# Patient Record
Sex: FEMALE | Race: WHITE | Employment: OTHER | URBAN - METROPOLITAN AREA
[De-identification: names, ages, dates, MRNs, and addresses within clinical notes are randomized per-mention and may not be internally consistent; named-entity substitution may affect disease eponyms.]

---

## 2017-05-02 ENCOUNTER — ONCOLOGY VISIT (OUTPATIENT)
Dept: ONCOLOGY | Facility: CLINIC | Age: 47
End: 2017-05-02
Attending: PHYSICIAN ASSISTANT
Payer: COMMERCIAL

## 2017-05-02 VITALS
RESPIRATION RATE: 16 BRPM | HEIGHT: 64 IN | TEMPERATURE: 98.4 F | OXYGEN SATURATION: 99 % | SYSTOLIC BLOOD PRESSURE: 109 MMHG | WEIGHT: 128.2 LBS | HEART RATE: 59 BPM | BODY MASS INDEX: 21.89 KG/M2 | DIASTOLIC BLOOD PRESSURE: 72 MMHG

## 2017-05-02 DIAGNOSIS — R20.9 DISTURBANCE OF SKIN SENSATION: ICD-10-CM

## 2017-05-02 DIAGNOSIS — M25.512 ACUTE PAIN OF LEFT SHOULDER: Primary | ICD-10-CM

## 2017-05-02 PROCEDURE — 99212 OFFICE O/P EST SF 10 MIN: CPT | Mod: ZF

## 2017-05-02 PROCEDURE — 99214 OFFICE O/P EST MOD 30 MIN: CPT | Mod: ZP | Performed by: PHYSICIAN ASSISTANT

## 2017-05-02 RX ORDER — DOXYCYCLINE 100 MG/1
100 CAPSULE ORAL DAILY
COMMUNITY
Start: 2017-04-06 | End: 2017-05-16

## 2017-05-02 RX ORDER — MUPIROCIN 20 MG/G
OINTMENT TOPICAL
COMMUNITY
Start: 2016-12-02 | End: 2017-05-16

## 2017-05-02 ASSESSMENT — PAIN SCALES - GENERAL: PAINLEVEL: SEVERE PAIN (6)

## 2017-05-02 NOTE — LETTER
5/2/2017      RE: Shan Marsh  [address]  [facility] [address]       Oncology Follow-up Visit:    Reason for visit: Shoulder pain    History Of Present Illness:  Ms. Marsh is a 46 year old premenopausal female with [id number] er/pr positive her 2 negative breast cancer.  She initially noticed a left breast mass of around a coin size in December 2012, for which she saw a doctor at [facility] & was given a referral, which she did not follow. However, later she noticed the mass getting bigger & followed up on the referral in May. Had a mammogram, U/s, MRI breast followed by biopsy.  She underwent a diagnostic mammogram with Ultrasound, which revealed heterogeneously dense breast tissue of about 5 cm x 2.6 cm size area with suspicious microcalcifications. MRI breast showed nonmasslike enhancement in the upper-outer quadrant extending from about 1:00 to 3:00 measuring about 6 cm from anterior to posterior by about 1.8 cm in transverse diameter by about 2.7 cm in superior inferior dimension similar to calcifications on mammogram. She subsequently had a stereotactic needle biopsy which revealed Ductal carcinoma in-situ (DCIS), high grade, suspicious for microinvasion. She was seen by , had bilateral mastectomies on 6/11/12. Her final pathology revealed grade 1 infiltrating ductal cancer, ER positive, [place] positive, HER-2/kaitlin negative. She had multiple microscopic areas of invasion, each measuring 1-2 mm. Her pathology was reviewed at our multidisciplinary conference, and confirmed multiple areas of microinvasion. Her final staging was a [id number], stage I breast cancer. [place] lymph node biopsy was negative. Her oncotype score was 15% (10 year distant recurrence rate with 5 years of hormone therapy). Her initial evaluation also revealed a hemangioma in the lateral segment left lobe of the liver in the dome, which was confirmed by MRI abdomen. She started Tamoxifen August 2012 and  "discontinued February 2013 due to side effects (palpitations, vaginal discharge, mood swings). BRCA negative.     Interval history/ROS:   Shan hasn't been seen in over 2 years in oncology clinic. She is here today for 1 mos left shoulder pain, intermittent, starts in shoulder and shoots down arm. Now also has numbness in forearm. No weakness. Worse when she sleeps on it and with certain movements like overhead activity. Ibuprofen helps. No other bone pains. 10 pt ROS otherwise negative.        Physical Exam:  /72 (BP Location: Left arm, Patient Position: Chair, Cuff Size: Adult Regular)  Pulse 59  Temp 98.4  F (36.9  C) (Oral)  Resp 16  Ht 1.626 m (5' 4.02\")  Wt 58.2 kg (128 lb 3.2 oz)  LMP 04/25/2017  SpO2 99%  BMI 21.99 kg/m2   Wt Readings from Last 4 Encounters:   05/02/17 58.2 kg (128 lb 3.2 oz)   06/28/16 56.2 kg (123 lb 14.4 oz)   06/25/16 54.4 kg (120 lb)   05/11/15 56.7 kg (125 lb)   General: No acute distress. Affect flat.   Breasts: S/p b/l mastectomy with reconstruction. No lumps/bumps  Musc: Left shoulder: Inspection of the left shoulder is without abnormality. On palpation she has pain in soft tissue of anterior shoulder going into the axilla. ROM limited and pain elicited with Abduction. Strength is 5/5 but with pain and there is numbness in her ventral forearm.     ASSESSMENT/PLAN:  1. Left shoulder pain >1 mos worse with abduction with neuropathic pain and sensory changes. 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 Will get MRI and if MRI negative for cancer, will refer to ortho and PT.     2. Stage I premenopausal er/pr positive, her2 negative breast cancer. Her Oncotype DX recurrence score was 23, which is associated with a ten year distant recurrence rate of 15% for woman who take five years of adjuvant Tamoxifen. She was on Tamoxifen from August until February and discontinued due to palpitations, mood " swings, and vaginal discharge. She declined further hormonal therapy. She is 5 years out and hasn't been coming to any follow up appts. Will assess her shoulder and go from there.     Over 50% of 15 min visit was spent counseling and coordinating care    Marilu HAN

## 2017-05-02 NOTE — PROGRESS NOTES
Oncology Follow-up Visit:    Reason for visit: Shoulder pain    History Of Present Illness:  Ms. Marsh is a 46 year old premenopausal female with [id number] er/pr positive her 2 negative breast cancer.  She initially noticed a left breast mass of around a coin size in December 2012, for which she saw a doctor at [facility] & was given a referral, which she did not follow. However, later she noticed the mass getting bigger & followed up on the referral in May. Had a mammogram, U/s, MRI breast followed by biopsy.  She underwent a diagnostic mammogram with Ultrasound, which revealed heterogeneously dense breast tissue of about 5 cm x 2.6 cm size area with suspicious microcalcifications. MRI breast showed nonmasslike enhancement in the upper-outer quadrant extending from about 1:00 to 3:00 measuring about 6 cm from anterior to posterior by about 1.8 cm in transverse diameter by about 2.7 cm in superior inferior dimension similar to calcifications on mammogram. She subsequently had a stereotactic needle biopsy which revealed Ductal carcinoma in-situ (DCIS), high grade, suspicious for microinvasion. She was seen by , had bilateral mastectomies on 6/11/12. Her final pathology revealed grade 1 infiltrating ductal cancer, ER positive, [place] positive, HER-2/kaitlin negative. She had multiple microscopic areas of invasion, each measuring 1-2 mm. Her pathology was reviewed at our multidisciplinary conference, and confirmed multiple areas of microinvasion. Her final staging was a [id number], stage I breast cancer. [place] lymph node biopsy was negative. Her oncotype score was 15% (10 year distant recurrence rate with 5 years of hormone therapy). Her initial evaluation also revealed a hemangioma in the lateral segment left lobe of the liver in the dome, which was confirmed by MRI abdomen. She started Tamoxifen August 2012 and discontinued February 2013 due to side effects (palpitations, vaginal discharge, mood  "swings). BRCA negative.     Interval history/ROS:   Shan hasn't been seen in over 2 years in oncology clinic. She is here today for 1 mos left shoulder pain, intermittent, starts in shoulder and shoots down arm. Now also has numbness in forearm. No weakness. Worse when she sleeps on it and with certain movements like overhead activity. Ibuprofen helps. No other bone pains. 10 pt ROS otherwise negative.        Physical Exam:  /72 (BP Location: Left arm, Patient Position: Chair, Cuff Size: Adult Regular)  Pulse 59  Temp 98.4  F (36.9  C) (Oral)  Resp 16  Ht 1.626 m (5' 4.02\")  Wt 58.2 kg (128 lb 3.2 oz)  LMP 04/25/2017  SpO2 99%  BMI 21.99 kg/m2   Wt Readings from Last 4 Encounters:   05/02/17 58.2 kg (128 lb 3.2 oz)   06/28/16 56.2 kg (123 lb 14.4 oz)   06/25/16 54.4 kg (120 lb)   05/11/15 56.7 kg (125 lb)   General: No acute distress. Affect flat.   Breasts: S/p b/l mastectomy with reconstruction. No lumps/bumps  Musc: Left shoulder: Inspection of the left shoulder is without abnormality. On palpation she has pain in soft tissue of anterior shoulder going into the axilla. ROM limited and pain elicited with Abduction. Strength is 5/5 but with pain and there is numbness in her ventral forearm.     ASSESSMENT/PLAN:  1. Left shoulder pain >1 mos worse with abduction with neuropathic pain and sensory changes. 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 Will get MRI and if MRI negative for cancer, will refer to ortho and PT.     2. Stage I premenopausal er/pr positive, her2 negative breast cancer. Her Oncotype DX recurrence score was 23, which is associated with a ten year distant recurrence rate of 15% for woman who take five years of adjuvant Tamoxifen. She was on Tamoxifen from August until February and discontinued due to palpitations, mood swings, and vaginal discharge. She declined further hormonal therapy. She is 5 years " out and hasn't been coming to any follow up appts. Will assess her shoulder and go from there.     Over 50% of 15 min visit was spent counseling and coordinating care    Marilu HAN

## 2017-05-02 NOTE — NURSING NOTE
"Oncology Rooming Note    May 2, 2017 3:48 PM   Shan Marsh is a 46 year old female who presents for:    Chief Complaint   Patient presents with     Oncology Clinic Visit     Breast Ca- F/U Arm pain     Initial Vitals: /72 (BP Location: Left arm, Patient Position: Chair, Cuff Size: Adult Regular)  Pulse 59  Temp 98.4  F (36.9  C) (Oral)  Resp 16  Ht 1.626 m (5' 4.02\")  Wt 58.2 kg (128 lb 3.2 oz)  LMP 04/25/2017  SpO2 99%  BMI 21.99 kg/m2 Estimated body mass index is 21.99 kg/(m^2) as calculated from the following:    Height as of this encounter: 1.626 m (5' 4.02\").    Weight as of this encounter: 58.2 kg (128 lb 3.2 oz). Body surface area is 1.62 meters squared.  Severe Pain (6) Comment: left arm   Patient's last menstrual period was 04/25/2017.  Allergies reviewed: Yes  Medications reviewed: Yes    Medications: Medication refills not needed today.  Pharmacy name entered into [employer]:    [facility] [place], [place] - [address] [place] [address] DRUG STORE [id number] [place] [address] - [address] [place] [address] AT [facility] & [facility] [place], [place] - [address] [facility] DRUG STORE [id number] - [place], [place] - [address] AT [facility] [address]    Clinical concerns: Shan has been having left arm pain for about one month. She stated she has a very high pain tolerance but she rated her arm pain a 6. provider was notified.    7 minutes for nursing intake (face to face time)     Elyssa Pike CMA              "

## 2017-05-02 NOTE — MR AVS SNAPSHOT
After Visit Summary   5/2/2017    Shan Marsh    MRN: [id number]           Patient Information     Date Of Birth          1970        Visit Information        Provider Department      5/2/2017 4:00 PM Marilu Pastor PA-C [facility]        Today's Diagnoses     Acute pain of left shoulder    -  1    Disturbance of skin sensation           Follow-ups after your visit        Additional Services     ORTHO  REFERRAL       [facility] is referring you to the Orthopedic  Services at [facility].       The  Representative will assist you in the coordination of your Orthopedic and Musculoskeletal Care as prescribed by your physician.    The  Representative will call you within 1 business day to help schedule your appointment, or you may contact the  Representative at:    All areas ~ [phone]     Type of Referral : Non Surgical       Timeframe requested: 3 - 5 days. After MRI. Patient having some sensory changes    Coverage of these services is subject to the terms and limitations of your health insurance plan.  Please call member services at your health plan with any benefit or coverage questions.      If X-rays, CT or MRI's have been performed, please contact the facility where they were done to arrange for , prior to your scheduled appointment.  Please bring this referral request to your appointment and present it to your specialist.                  Your next 10 appointments already scheduled     May 04, 2017  7:45 PM CDT   (Arrive by 7:30 PM)   MR SHOULDER LEFT W/O & W CONTRAST with [id number]   [facility] ([email or web address])    [address] [address] [phone]           Take your medicines as usual, unless your doctor tells you not to. Bring a list of your current medicines to your exam (including  vitamins, minerals and over-the-counter drugs).  You will be given intravenous contrast for this exam. To prepare:   The day before your exam, drink extra fluids at least six 8-ounce glasses (unless your doctor tells you to restrict your fluids).   Have a blood test (creatinine test) within 30 days of your exam. Go to your clinic or Diagnostic Imaging Department for this test.  The MRI machine uses a strong magnet. Please wear clothes without metal (snaps, zippers). A sweatsuit works well, or we may give you a hospital gown.  Please remove any body piercings and hair extensions before you arrive. You will also remove watches, jewelry, hairpins, wallets, dentures, partial dental plates and hearing aids. You may wear contact lenses, and you may be able to wear your rings. We have a safe place to keep your personal items, but it is safer to leave them at home.   **IMPORTANT** THE INSTRUCTIONS BELOW ARE ONLY FOR THOSE PATIENTS WHO HAVE BEEN TOLD THEY WILL RECEIVE SEDATION OR GENERAL ANESTHESIA DURING THEIR MRI PROCEDURE:  IF YOU WILL RECEIVE SEDATION (take medicine to help you relax during your exam):   You must get the medicine from your doctor before you arrive. Bring the medicine to the exam. Do not take it at home.   Arrive one hour early. Bring someone who can take you home after the test. Your medicine will make you sleepy. After the exam, you may not drive, take a bus or take a taxi by yourself.   No eating 8 hours before your exam. You may have clear liquids up until 4 hours before your exam. (Clear liquids include water, clear tea, black coffee and fruit juice without pulp.)  IF YOU WILL RECEIVE ANESTHESIA (be asleep for your exam):   Arrive 1 1/2 hours early. Bring someone who can take you home after the test. You may not drive, take a bus or take a taxi by yourself.   No eating 8 hours before your exam. You may have clear liquids up until 4 hours before your exam. (Clear liquids include water, clear tea,  "black coffee and fruit juice without pulp.)  Please call the Imaging Department at your exam site with any questions.              Future tests that were ordered for you today     Open Future Orders        Priority Expected Expires Ordered    MR Shoulder Left w/o & w Contrast Routine  5/2/2018 5/2/2017            Who to contact     If you have questions or need follow up information about today's clinic visit or your schedule please contact [facility] directly at [phone].  Normal or non-critical lab and imaging results will be communicated to you by Sparkbrowserhart, letter or phone within 4 business days after the clinic has received the results. If you do not hear from us within 7 days, please contact the clinic through Amity or phone. If you have a critical or abnormal lab result, we will notify you by phone as soon as possible.  Submit refill requests through Amity or call your pharmacy and they will forward the refill request to us. Please allow 3 business days for your refill to be completed.          Additional Information About Your Visit        Amity Information     Amity gives you secure access to your electronic health record. If you see a primary care provider, you can also send messages to your care team and make appointments. If you have questions, please call your primary care clinic.  If you do not have a primary care provider, please call [phone] and they will assist you.        Care EveryWhere ID     This is your Care EveryWhere ID. This could be used by other organizations to access your [place] medical records  [id number]        Your Vitals Were     Pulse Temperature Respirations Height Last Period Pulse Oximetry    59 98.4  F (36.9  C) (Oral) 16 1.626 m (5' 4.02\") 04/25/2017 99%    BMI (Body Mass Index)                   21.99 kg/m2            Blood Pressure from Last 3 Encounters:   05/02/17 109/72   06/28/16 97/49   06/25/16 102/54    Weight from Last 3 " Encounters:   05/02/17 58.2 kg (128 lb 3.2 oz)   06/28/16 56.2 kg (123 lb 14.4 oz)   06/25/16 54.4 kg (120 lb)              We Performed the Following     ORTHO  REFERRAL        Primary Care Provider Office Phone # Fax #    Marilu Michelle [phone]       [facility] [address] [facility] [address]        Thank you!     Thank you for choosing [facility]  for your care. Our goal is always to provide you with excellent care. Hearing back from our patients is one way we can continue to improve our services. Please take a few minutes to complete the written survey that you may receive in the mail after your visit with us. Thank you!             Your Updated Medication List - Protect others around you: Learn how to safely use, store and throw away your medicines at www.disposemymeds.org.          This list is accurate as of: 5/2/17  4:19 PM.  Always use your most recent med list.                   Brand Name Dispense Instructions for use    doxycycline Monohydrate 100 MG Caps      Take 100 mg by mouth daily       FLINTSTONES MULTIVITAMIN Chew      1 tablet daily       methylPREDNISolone 4 MG tablet    MEDROL DOSEPAK    21 tablet    Follow package instructions       mupirocin 2 % ointment    BACTROBAN

## 2017-05-08 DIAGNOSIS — C50.919 MALIGNANT NEOPLASM OF FEMALE BREAST, UNSPECIFIED LATERALITY, UNSPECIFIED SITE OF BREAST: Primary | ICD-10-CM

## 2017-05-09 ENCOUNTER — HOSPITAL ENCOUNTER (OUTPATIENT)
Dept: PET IMAGING | Facility: CLINIC | Age: 47
Discharge: HOME | End: 2017-05-09
Attending: PHYSICIAN ASSISTANT | Admitting: PHYSICIAN ASSISTANT
Payer: COMMERCIAL

## 2017-05-09 DIAGNOSIS — C50.919 MALIGNANT NEOPLASM OF FEMALE BREAST, UNSPECIFIED LATERALITY, UNSPECIFIED SITE OF BREAST: ICD-10-CM

## 2017-05-09 LAB — GLUCOSE BLDC GLUCOMTR-MCNC: 81 MG/DL (ref 70–99)

## 2017-05-09 PROCEDURE — 82962 GLUCOSE BLOOD TEST: CPT

## 2017-05-09 PROCEDURE — 25500064 ZZH RX 255 OP 636: Performed by: PHYSICIAN ASSISTANT

## 2017-05-09 PROCEDURE — A9552 F18 FDG: HCPCS | Performed by: PHYSICIAN ASSISTANT

## 2017-05-09 PROCEDURE — 71260 CT THORAX DX C+: CPT

## 2017-05-09 PROCEDURE — 78816 PET IMAGE W/CT FULL BODY: CPT | Mod: PI

## 2017-05-09 PROCEDURE — 34300033 ZZH RX 343: Performed by: PHYSICIAN ASSISTANT

## 2017-05-09 RX ORDER — IOPAMIDOL 755 MG/ML
45-150 INJECTION, SOLUTION INTRAVASCULAR ONCE
Status: COMPLETED | OUTPATIENT
Start: 2017-05-09 | End: 2017-05-09

## 2017-05-09 RX ADMIN — IOPAMIDOL 69 ML: 755 INJECTION, SOLUTION INTRAVENOUS at 14:28

## 2017-05-09 RX ADMIN — FLUDEOXYGLUCOSE F-18 8.78 MCI.: 500 INJECTION, SOLUTION INTRAVENOUS at 13:46

## 2017-05-10 ENCOUNTER — TRANSFERRED RECORDS (OUTPATIENT)
Dept: HEALTH INFORMATION MANAGEMENT | Facility: CLINIC | Age: 47
End: 2017-05-10

## 2017-05-10 ENCOUNTER — ONCOLOGY VISIT (OUTPATIENT)
Dept: ONCOLOGY | Facility: CLINIC | Age: 47
End: 2017-05-10
Attending: INTERNAL MEDICINE
Payer: COMMERCIAL

## 2017-05-10 ENCOUNTER — HOSPITAL ENCOUNTER (OUTPATIENT)
Facility: CLINIC | Age: 47
End: 2017-05-10
Attending: INTERNAL MEDICINE | Admitting: INTERNAL MEDICINE
Payer: COMMERCIAL

## 2017-05-10 ENCOUNTER — APPOINTMENT (OUTPATIENT)
Dept: LAB | Facility: CLINIC | Age: 47
End: 2017-05-10
Attending: INTERNAL MEDICINE
Payer: COMMERCIAL

## 2017-05-10 ENCOUNTER — DOCUMENTATION ONLY (OUTPATIENT)
Dept: SURGERY | Facility: CLINIC | Age: 47
End: 2017-05-10

## 2017-05-10 VITALS
HEART RATE: 51 BPM | BODY MASS INDEX: 20.56 KG/M2 | WEIGHT: 123.4 LBS | SYSTOLIC BLOOD PRESSURE: 113 MMHG | TEMPERATURE: 98.5 F | HEIGHT: 65 IN | DIASTOLIC BLOOD PRESSURE: 70 MMHG | RESPIRATION RATE: 12 BRPM | OXYGEN SATURATION: 95 %

## 2017-05-10 DIAGNOSIS — R59.0 MEDIASTINAL ADENOPATHY: Primary | ICD-10-CM

## 2017-05-10 DIAGNOSIS — C50.919 MALIGNANT NEOPLASM OF FEMALE BREAST, UNSPECIFIED LATERALITY, UNSPECIFIED SITE OF BREAST: Primary | ICD-10-CM

## 2017-05-10 DIAGNOSIS — C50.919: ICD-10-CM

## 2017-05-10 DIAGNOSIS — R59.0 AXILLARY ADENOPATHY: ICD-10-CM

## 2017-05-10 DIAGNOSIS — R59.1 LYMPHADENOPATHY: ICD-10-CM

## 2017-05-10 LAB
ALBUMIN SERPL-MCNC: 4.2 G/DL (ref 3.4–5)
ALP SERPL-CCNC: 68 U/L (ref 40–150)
ALT SERPL W P-5'-P-CCNC: 26 U/L (ref 0–50)
ANION GAP SERPL CALCULATED.3IONS-SCNC: 8 MMOL/L (ref 3–14)
AST SERPL W P-5'-P-CCNC: 24 U/L (ref 0–45)
BASOPHILS # BLD AUTO: 0 10E9/L (ref 0–0.2)
BASOPHILS NFR BLD AUTO: 0.9 %
BILIRUB SERPL-MCNC: 0.7 MG/DL (ref 0.2–1.3)
BUN SERPL-MCNC: 14 MG/DL (ref 7–30)
CALCIUM SERPL-MCNC: 9.1 MG/DL (ref 8.5–10.1)
CANCER AG27-29 SERPL-ACNC: 30 U/ML (ref 0–39)
CHLORIDE SERPL-SCNC: 107 MMOL/L (ref 94–109)
CO2 SERPL-SCNC: 26 MMOL/L (ref 20–32)
CREAT SERPL-MCNC: 0.62 MG/DL (ref 0.52–1.04)
DIFFERENTIAL METHOD BLD: ABNORMAL
EOSINOPHIL # BLD AUTO: 0 10E9/L (ref 0–0.7)
EOSINOPHIL NFR BLD AUTO: 0.9 %
ERYTHROCYTE [DISTWIDTH] IN BLOOD BY AUTOMATED COUNT: 12.2 % (ref 10–15)
GFR SERPL CREATININE-BSD FRML MDRD: NORMAL ML/MIN/1.7M2
GLUCOSE SERPL-MCNC: 94 MG/DL (ref 70–99)
HCT VFR BLD AUTO: 39.7 % (ref 35–47)
HGB BLD-MCNC: 13.1 G/DL (ref 11.7–15.7)
IMM GRANULOCYTES # BLD: 0 10E9/L (ref 0–0.4)
IMM GRANULOCYTES NFR BLD: 0.3 %
LYMPHOCYTES # BLD AUTO: 0.9 10E9/L (ref 0.8–5.3)
LYMPHOCYTES NFR BLD AUTO: 24.9 %
MCH RBC QN AUTO: 31.8 PG (ref 26.5–33)
MCHC RBC AUTO-ENTMCNC: 33 G/DL (ref 31.5–36.5)
MCV RBC AUTO: 96 FL (ref 78–100)
MONOCYTES # BLD AUTO: 0.2 10E9/L (ref 0–1.3)
MONOCYTES NFR BLD AUTO: 6.1 %
NEUTROPHILS # BLD AUTO: 2.3 10E9/L (ref 1.6–8.3)
NEUTROPHILS NFR BLD AUTO: 66.9 %
NRBC # BLD AUTO: 0 10*3/UL
NRBC BLD AUTO-RTO: 0 /100
PLATELET # BLD AUTO: 167 10E9/L (ref 150–450)
POTASSIUM SERPL-SCNC: 3.8 MMOL/L (ref 3.4–5.3)
PROT SERPL-MCNC: 7.8 G/DL (ref 6.8–8.8)
RBC # BLD AUTO: 4.12 10E12/L (ref 3.8–5.2)
SODIUM SERPL-SCNC: 141 MMOL/L (ref 133–144)
WBC # BLD AUTO: 3.5 10E9/L (ref 4–11)

## 2017-05-10 PROCEDURE — 85025 COMPLETE CBC W/AUTO DIFF WBC: CPT | Performed by: INTERNAL MEDICINE

## 2017-05-10 PROCEDURE — 99212 OFFICE O/P EST SF 10 MIN: CPT | Mod: ZF

## 2017-05-10 PROCEDURE — 99215 OFFICE O/P EST HI 40 MIN: CPT | Mod: ZP | Performed by: INTERNAL MEDICINE

## 2017-05-10 PROCEDURE — 82306 VITAMIN D 25 HYDROXY: CPT | Performed by: INTERNAL MEDICINE

## 2017-05-10 PROCEDURE — 80053 COMPREHEN METABOLIC PANEL: CPT | Performed by: INTERNAL MEDICINE

## 2017-05-10 PROCEDURE — 36415 COLL VENOUS BLD VENIPUNCTURE: CPT

## 2017-05-10 PROCEDURE — 86300 IMMUNOASSAY TUMOR CA 15-3: CPT | Performed by: INTERNAL MEDICINE

## 2017-05-10 ASSESSMENT — PAIN SCALES - GENERAL: PAINLEVEL: NO PAIN (0)

## 2017-05-10 NOTE — PROGRESS NOTES
May 10, 2017    Oncology Follow-up Visit:    Reason for visit: Follow up breast cancer    History Of Present Illness:  Ms. Marsh is a 46 year old premenopausal female with a history of [id number] er/pr positive her 2 negative breast cancer.  She initially noticed a left breast mass of around a coin size in December 2011, for which she saw a doctor at [facility] & was given a referral, which she did not follow. However, later she noticed the mass getting bigger & followed up on the referral in May 2012. Had a mammogram, U/s, MRI breast followed by biopsy.  She underwent a diagnostic mammogram with Ultrasound, which revealed heterogeneously dense breast tissue of about 5 cm x 2.6 cm size area with suspicious microcalcifications. MRI breast showed nonmasslike enhancement in the upper-outer quadrant extending from about 1:00 to 3:00 measuring about 6 cm from anterior to posterior by about 1.8 cm in transverse diameter by about 2.7 cm in superior inferior dimension similar to calcifications on mammogram. She subsequently had a stereotactic needle biopsy which revealed Ductal carcinoma in-situ (DCIS), high grade, suspicious for microinvasion. She was seen by , had bilateral mastectomies with sentinel lymph node biopsy on 6/11/12. Her final pathology revealed grade 1 infiltrating ductal cancer, ER positive, [place] positive, HER-2/kaitlin negative. She had multiple microscopic areas of invasion, each measuring 1-2 mm. Her pathology was reviewed at our multidisciplinary conference, and confirmed multiple areas of microinvasion. Her final staging was a X6uS4I4, stage I breast cancer. [place] lymph node biopsy was negative. Her oncotype score was 23 giving her a risk of distant recurrence at 10 years of 15%, assuming she takes tamoxifen. Her initial evaluation also revealed a hemangioma in the lateral segment left lobe of the liver in the dome, which was confirmed by MRI abdomen. She started Tamoxifen August  2012 and discontinued February 2013 due to adverse effects. She is not currently on any hormonal therapy.    Interval history/ROS:  Shan returns today on an add on basis.  She called into the clinic with left shoulder pain.  She was seen by my PA with imaging that confirmed a mass suspicious for metastatic disease.  She underwent a PET/CT and returns today to discuss next steps and to discuss the results of the imaging.     She is taking advil for it.       ROS: 10 point ROS neg other than the symptoms noted above in the HPI.      Physical Exam:  LMP 04/25/2017  General: No acute distress  HEENT: Sclera anicteric, PERRLA. Oral mucosa pink and moist  Lymph: No lymphadenopathy in neck, supraclavicular, and axillary areas  Heart: Regular, rate, and rhythm  Lungs: Clear to ascultation bilaterally  Breasts: S/p b/l mastectomy with reconstruction. No palpable masses.  Abdomen: Positive bowel sounds. Soft, non-distended, non-tender. No organomegaly or mass.   Extremities: no lower extremity edema  Neuro: Cranial nerves grossly intact    ASSESSMENT/PLAN:  Stage I premenopausal er/pr positive, her2 negative breast cancer. Her Oncotype DX recurrence score was 23, which is associated with a ten year distant recurrence rate of 15% for woman who take five years of adjuvant Tamoxifen. She declined chemotherapy.  She was on Tamoxifen from August until February 2013 and discontinued due to palpitations, mood swings, and yeast infections.      In follow up, Shan has a PET/CT scan and MRI of her shoulder that are concerning for metastatic disease.  I reviewed these images with her and discussed that she has masses in the left axilla and the hilar lymph nodes and supraclavicular nodes.  I would like to biopsy the axilla as well as the hilar lymph nodes.  It will be important to determine ER staining as well as whether the hilar lymph nodes could be cancerous or inflammatory.      I discussed with Shan that I am worried she has  stage 4 metastatic disease.  She asked many questions about how this would affect her life and whether she is terminal.  She has 6 and 9 year old girls at home.  I discussed that I would review this and a treatment plan with her next week when we have these biopsy results.    She met with my RNCC to discuss support services.    Ana Whitman MD

## 2017-05-10 NOTE — MR AVS SNAPSHOT
After Visit Summary   5/10/2017    Shan Marsh    MRN: [id number]           Patient Information     Date Of Birth          1970        Visit Information        Provider Department      5/10/2017 10:00 AM Ana Whitman MD [facility]        Today's Diagnoses     Malignant neoplasm of female breast, unspecified laterality, unspecified site of breast (H)    -  1    Axillary adenopathy        Lymphadenopathy           Follow-ups after your visit        Your next 10 appointments already scheduled     May 10, 2017 11:45 AM CDT   [facility] with  [facility]   [facility] ([facility])    [address]  2nd Floor  [facility] [id number] [phone]            May 10, 2017  3:15 PM CDT   Lymphedema Evaluation with Nelia Coles PT   [facility] [place] [facility] ([facility])    [address] [place] [address]  [address] [place] [id number]  [facility] [id number] [phone]              Future tests that were ordered for you today     Open Future Orders        Priority Expected Expires Ordered    MA Post Procedure Left Routine  5/10/2018 5/10/2017    US Axillary Left Routine  5/10/2018 5/10/2017    US Biopsy Axillary Routine  5/10/2018 5/10/2017    Comprehensive metabolic panel Routine 5/11/2017 5/10/2018 5/10/2017    *CBC with platelets differential Routine 5/11/2017 5/10/2018 5/10/2017    Vitamin D deficiency screening Routine 5/11/2017 5/10/2018 5/10/2017    Ca27.29  breast tumor marker Routine 5/11/2017 5/10/2018 5/10/2017    PET Oncology Whole Body Routine 5/8/2017 8/6/2017 5/8/2017            Who to contact     If you have questions or need follow up information about today's clinic visit or your schedule please contact [facility] directly at [phone].  Normal or non-critical lab and imaging results will be communicated to you by  "MyChart, letter or phone within 4 business days after the clinic has received the results. If you do not hear from us within 7 days, please contact the clinic through [employer] or phone. If you have a critical or abnormal lab result, we will notify you by phone as soon as possible.  Submit refill requests through [employer] or call your pharmacy and they will forward the refill request to us. Please allow 3 business days for your refill to be completed.          Additional Information About Your Visit        [employer] Information     [employer] gives you secure access to your electronic health record. If you see a primary care provider, you can also send messages to your care team and make appointments. If you have questions, please call your primary care clinic.  If you do not have a primary care provider, please call [phone] and they will assist you.        Care EveryWhere ID     This is your Care EveryWhere ID. This could be used by other organizations to access your [place] medical records  [id number]        Your Vitals Were     Pulse Temperature Respirations Height Last Period Pulse Oximetry    51 98.5  F (36.9  C) (Oral) 12 1.646 m (5' 4.8\") 04/25/2017 95%    BMI (Body Mass Index)                   20.66 kg/m2            Blood Pressure from Last 3 Encounters:   05/10/17 113/70   05/02/17 109/72   06/28/16 97/49    Weight from Last 3 Encounters:   05/10/17 56 kg (123 lb 6.4 oz)   05/02/17 58.2 kg (128 lb 3.2 oz)   06/28/16 56.2 kg (123 lb 14.4 oz)               Primary Care Provider Office Phone # Fax #    Marilu Michelle [phone]       [facility] [address] [facility] [address]        Thank you!     Thank you for choosing [facility]  for your care. Our goal is always to provide you with excellent care. Hearing back from our patients is one way we can continue to improve our services. Please take a few minutes to complete the written survey that you may " receive in the mail after your visit with us. Thank you!             Your Updated Medication List - Protect others around you: Learn how to safely use, store and throw away your medicines at www.disposemymeds.org.          This list is accurate as of: 5/10/17 11:37 AM.  Always use your most recent med list.                   Brand Name Dispense Instructions for use    doxycycline Monohydrate 100 MG Caps      Take 100 mg by mouth daily       FLINTSTONES MULTIVITAMIN Chew      1 tablet daily       methylPREDNISolone 4 MG tablet    MEDROL DOSEPAK    21 tablet    Follow package instructions       mupirocin 2 % ointment    BACTROBAN

## 2017-05-10 NOTE — NURSING NOTE
"Oncology Rooming Note    May 10, 2017 10:00 AM   Shan Marsh is a 46 year old female who presents for:    Chief Complaint   Patient presents with     Oncology Clinic Visit     breast ca     Initial Vitals: /70  Pulse 51  Temp 98.5  F (36.9  C) (Oral)  Resp 12  Ht 1.646 m (5' 4.8\")  Wt 56 kg (123 lb 6.4 oz)  LMP 04/25/2017  SpO2 95%  BMI 20.66 kg/m2 Estimated body mass index is 20.66 kg/(m^2) as calculated from the following:    Height as of this encounter: 1.646 m (5' 4.8\").    Weight as of this encounter: 56 kg (123 lb 6.4 oz). Body surface area is 1.6 meters squared.  No Pain (0) Comment: Data Unavailable   Patient's last menstrual period was 04/25/2017.  Allergies reviewed: Yes  Medications reviewed: Yes    Medications: Medication refills not needed today.  Pharmacy name entered into [facility]:    [facility] [address] [facility] [address] [place] [address] - [address] [place] [address] AT [facility] [address]  [facility] [place], [place] - [address] [facility] [address] - [place], [place] - [address] [facility] [address]    Clinical concerns: no Dr. Whitman was NOT notified.    5 minutes for nursing intake (face to face time)     Abril Crespo CMA              "

## 2017-05-10 NOTE — LETTER
5/10/2017       RE: Shan Marsh  [address]  [facility] [address]     Dear Colleague,    Thank you for referring your patient, Shan Marsh, to the [facility]. Please see a copy of my visit note below.    May 10, 2017    Oncology Follow-up Visit:    Reason for visit: Follow up breast cancer    History Of Present Illness:  Ms. Marsh is a 46 year old premenopausal female with a history of [id number] er/pr positive her 2 negative breast cancer.  She initially noticed a left breast mass of around a coin size in December 2011, for which she saw a doctor at [facility] & was given a referral, which she did not follow. However, later she noticed the mass getting bigger & followed up on the referral in May 2012. Had a mammogram, U/s, MRI breast followed by biopsy.  She underwent a diagnostic mammogram with Ultrasound, which revealed heterogeneously dense breast tissue of about 5 cm x 2.6 cm size area with suspicious microcalcifications. MRI breast showed nonmasslike enhancement in the upper-outer quadrant extending from about 1:00 to 3:00 measuring about 6 cm from anterior to posterior by about 1.8 cm in transverse diameter by about 2.7 cm in superior inferior dimension similar to calcifications on mammogram. She subsequently had a stereotactic needle biopsy which revealed Ductal carcinoma in-situ (DCIS), high grade, suspicious for microinvasion. She was seen by , had bilateral mastectomies with sentinel lymph node biopsy on 6/11/12. Her final pathology revealed grade 1 infiltrating ductal cancer, ER positive, UT positive, HER-2/kaitlin negative. She had multiple microscopic areas of invasion, each measuring 1-2 mm. Her pathology was reviewed at our multidisciplinary conference, and confirmed multiple areas of microinvasion. Her final staging was a [id number], stage I breast cancer. [place] lymph node biopsy was negative. Her oncotype score was 23 giving her  a risk of distant recurrence at 10 years of 15%, assuming she takes tamoxifen. Her initial evaluation also revealed a hemangioma in the lateral segment left lobe of the liver in the dome, which was confirmed by MRI abdomen. She started Tamoxifen August 2012 and discontinued February 2013 due to adverse effects. She is not currently on any hormonal therapy.    Interval history/ROS:  Shan returns today on an add on basis.  She called into the clinic with left shoulder pain.  She was seen by my PA with imaging that confirmed a mass suspicious for metastatic disease.  She underwent a PET/CT and returns today to discuss next steps and to discuss the results of the imaging.     She is taking advil for it.       ROS: 10 point ROS neg other than the symptoms noted above in the HPI.      Physical Exam:  LMP 04/25/2017  General: No acute distress  HEENT: Sclera anicteric, PERRLA. Oral mucosa pink and moist  Lymph: No lymphadenopathy in neck, supraclavicular, and axillary areas  Heart: Regular, rate, and rhythm  Lungs: Clear to ascultation bilaterally  Breasts: S/p b/l mastectomy with reconstruction. No palpable masses.  Abdomen: Positive bowel sounds. Soft, non-distended, non-tender. No organomegaly or mass.   Extremities: no lower extremity edema  Neuro: Cranial nerves grossly intact    ASSESSMENT/PLAN:  Stage I premenopausal er/pr positive, her2 negative breast cancer. Her Oncotype DX recurrence score was 23, which is associated with a ten year distant recurrence rate of 15% for woman who take five years of adjuvant Tamoxifen. She declined chemotherapy.  She was on Tamoxifen from August until February 2013 and discontinued due to palpitations, mood swings, and yeast infections.      In follow up, Shan has a PET/CT scan and MRI of her shoulder that are concerning for metastatic disease.  I reviewed these images with her and discussed that she has masses in the left axilla and the hilar lymph nodes and supraclavicular  nodes.  I would like to biopsy the axilla as well as the hilar lymph nodes.  It will be important to determine ER staining as well as whether the hilar lymph nodes could be cancerous or inflammatory.      I discussed with Shan that I am worried she has stage 4 metastatic disease.  She asked many questions about how this would affect her life and whether she is terminal.  She has 6 and 9 year old girls at home.  I discussed that I would review this and a treatment plan with her next week when we have these biopsy results.    She met with my RNCC to discuss support services.    Ana Whitman MD

## 2017-05-10 NOTE — NURSING NOTE
Chief Complaint   Patient presents with     Oncology Clinic Visit     breast ca   labs drawn peripherally from right arm after provider visit

## 2017-05-10 NOTE — PROGRESS NOTES
You have been scheduled for a bronchoscopy on Friday, May 12, 2017 at 1:30 .  You have to arrive at least one hour prior to your appointment time.  Dr. Sg Meyer is a pulmonary interventions specialist and he will be doing the procedure.  You will meet him prior to the procedure and he will answer any additional questions.    Arrival Time is 12:30     This will be performed at the [facility].  You will go to the [facility] and check-in at the endoscopy suite located on the first floor.       [facility] ([facility])   [address] [place] [address]    Parking:   parking is available at the front entrance of the hospital.     Special Instructions for:  None    How do I get ready?  Please bring an adult who can drive you home after your exam.  The adult  will have to come with you to check-in and accompany you at time of discharge from the procedure.  You will receive medication that will sedate you for your procedure and you will be unable to operate a vehicle.      Follow these guidelines for taking your Medications:   If you take blood thinners or anti-platelet medications such as coumadin, warfarin, plavix, aspirin, xarelto, eliquis, pradaxa, effient, brilinta, ibuprofen, advil, or alleve then these medications will need to be held for 1 week prior to the examination in general.  Notify your nurse or doctor if you are taking any of these medications for specific advice on discontinuing or  holding them.    If you have diabetes do not take insulins or other diabetic medications the morning of your procedure.    You may take necessary medications with a small sip of water. Clarify with your doctor if you have questions concerning which medications you can or cannot take.    Bring a list of your medications and their doses to the examination.    Stop all food and drink including water for a minimum  of 6 hours before the examination. Additional Information:    What is a bronchoscopy?   This exam uses a lighted tube to look at the main airways of the lungs.  We insert a thin, flexible tube through the nose or mouth, past your vocal cords and into the lungs.  We will take tissue or fluid samples if needed.    What happens during the examination?   The examination will take approximately 1 to 2 hours.  When you arrive we will get IV access.  During the examination we will monitor your heart rate, blood pressure and oxygen levels.  You will receive supplemental oxygen during the procedure.  You should not talk during the examination.  The bronchoscope will be inserted either through your nose or mouth.  Topical numbing medication will be used to decrease any discomfort as well as IV sedating medication will be used to ensure your comfort.      Particular procedures that will be performed vary in regards to your diagnosis or specific illness and should be discussed with your physician prior to the procedure and while being consented for the procedure when you arrive.     What happens after the examination?  You will remain in the recovery room for approximately 1-2 hours.  Your IV will be removed and we will monitor your blood pressure, heart rate, breathing rate, oxygen levels.  You may or may not need a chest x-ray following the procedure.  Tell the nurse if you have any chest pain, trouble breathing, have a bloody nose, or if you are coughing up blood.    When you go home you may cough up a small amount of blood for one or two days following the procedure.  It is also not uncommon to have mild fevers for 1 day.    Specific risks to the procedure should be discussed with your physician.  In general, there are risks for a collapsed lung (pneumothorax), bleeding complications, allergic reactions to any medications used, increased shortness of breath following the procedure, a hoarse voice or throat pain, and mild  low-grade fever.    Questions:   If you have questions following the procedure you may contact your physician or Endoscopy at [phone].  There is a 24 hour on-call physician who can be reached by calling the main hospital at [phone] (ask for the Pulmonary Doctor on Call).  You can also contact Loren Blandon at [phone] (Interventional Pulmonary Nurse Specialist).

## 2017-05-12 ENCOUNTER — SURGERY (OUTPATIENT)
Age: 47
End: 2017-05-12

## 2017-05-12 ENCOUNTER — HOSPITAL ENCOUNTER (OUTPATIENT)
Facility: CLINIC | Age: 47
Discharge: HOME | End: 2017-05-12
Attending: INTERNAL MEDICINE | Admitting: INTERNAL MEDICINE
Payer: COMMERCIAL

## 2017-05-12 VITALS
SYSTOLIC BLOOD PRESSURE: 114 MMHG | OXYGEN SATURATION: 98 % | RESPIRATION RATE: 17 BRPM | DIASTOLIC BLOOD PRESSURE: 76 MMHG

## 2017-05-12 LAB — DEPRECATED CALCIDIOL+CALCIFEROL SERPL-MC: 27 UG/L (ref 20–75)

## 2017-05-12 PROCEDURE — 88173 CYTOPATH EVAL FNA REPORT: CPT | Performed by: INTERNAL MEDICINE

## 2017-05-12 PROCEDURE — 25000308 HC RX OP HPI UCR WEL MED 250 IP 250: Performed by: INTERNAL MEDICINE

## 2017-05-12 PROCEDURE — 25000128 H RX IP 250 OP 636: Performed by: INTERNAL MEDICINE

## 2017-05-12 PROCEDURE — 25000132 ZZH RX MED GY IP 250 OP 250 PS 637: Performed by: INTERNAL MEDICINE

## 2017-05-12 PROCEDURE — 00000155 ZZHCL STATISTIC H-CELL BLOCK W/STAIN: Performed by: INTERNAL MEDICINE

## 2017-05-12 PROCEDURE — 88377 M/PHMTRC ALYS ISHQUANT/SEMIQ: CPT | Performed by: STUDENT IN AN ORGANIZED HEALTH CARE EDUCATION/TRAINING PROGRAM

## 2017-05-12 PROCEDURE — 25000125 ZZHC RX 250: Performed by: INTERNAL MEDICINE

## 2017-05-12 PROCEDURE — 99152 MOD SED SAME PHYS/QHP 5/>YRS: CPT | Performed by: INTERNAL MEDICINE

## 2017-05-12 PROCEDURE — G0500 MOD SEDAT ENDO SERVICE >5YRS: HCPCS | Performed by: INTERNAL MEDICINE

## 2017-05-12 PROCEDURE — 88360 TUMOR IMMUNOHISTOCHEM/MANUAL: CPT | Performed by: INTERNAL MEDICINE

## 2017-05-12 PROCEDURE — 40000588 ZZH STATISTIC BRONCH IN ENDO (20 MIN) THERAPIST TIME

## 2017-05-12 PROCEDURE — 00000158 ZZHCL STATISTIC H-FISH PROCESS B/S: Performed by: INTERNAL MEDICINE

## 2017-05-12 PROCEDURE — 99153 MOD SED SAME PHYS/QHP EA: CPT | Performed by: INTERNAL MEDICINE

## 2017-05-12 PROCEDURE — 31652 BRONCH EBUS SAMPLNG 1/2 NODE: CPT | Performed by: INTERNAL MEDICINE

## 2017-05-12 PROCEDURE — 88305 TISSUE EXAM BY PATHOLOGIST: CPT | Performed by: INTERNAL MEDICINE

## 2017-05-12 PROCEDURE — 00000159 ZZHCL STATISTIC H-SEND OUTS PREP: Performed by: INTERNAL MEDICINE

## 2017-05-12 PROCEDURE — 88172 CYTP DX EVAL FNA 1ST EA SITE: CPT | Performed by: INTERNAL MEDICINE

## 2017-05-12 RX ORDER — FENTANYL CITRATE 50 UG/ML
INJECTION, SOLUTION INTRAMUSCULAR; INTRAVENOUS PRN
Status: DISCONTINUED | OUTPATIENT
Start: 2017-05-12 | End: 2017-05-12 | Stop reason: HOSPADM

## 2017-05-12 RX ORDER — LIDOCAINE HYDROCHLORIDE 40 MG/ML
INJECTION, SOLUTION RETROBULBAR PRN
Status: DISCONTINUED | OUTPATIENT
Start: 2017-05-12 | End: 2017-05-12 | Stop reason: HOSPADM

## 2017-05-12 RX ADMIN — BENZOCAINE 1 SPRAY: 220 SPRAY, METERED PERIODONTAL at 13:57

## 2017-05-12 RX ADMIN — FENTANYL CITRATE 25 MCG: 50 INJECTION, SOLUTION INTRAMUSCULAR; INTRAVENOUS at 13:57

## 2017-05-12 RX ADMIN — MIDAZOLAM HYDROCHLORIDE 0.5 MG: 1 INJECTION, SOLUTION INTRAMUSCULAR; INTRAVENOUS at 13:57

## 2017-05-12 RX ADMIN — LIDOCAINE HYDROCHLORIDE 15 ML: 40 INJECTION, SOLUTION RETROBULBAR; TOPICAL at 13:58

## 2017-05-12 RX ADMIN — FENTANYL CITRATE 25 MCG: 50 INJECTION, SOLUTION INTRAMUSCULAR; INTRAVENOUS at 14:16

## 2017-05-12 RX ADMIN — MIDAZOLAM HYDROCHLORIDE 0.5 MG: 1 INJECTION, SOLUTION INTRAMUSCULAR; INTRAVENOUS at 14:00

## 2017-05-12 RX ADMIN — FENTANYL CITRATE 25 MCG: 50 INJECTION, SOLUTION INTRAMUSCULAR; INTRAVENOUS at 14:00

## 2017-05-12 RX ADMIN — LIDOCAINE HYDROCHLORIDE 21 ML: 10 INJECTION, SOLUTION EPIDURAL; INFILTRATION; INTRACAUDAL; PERINEURAL at 14:04

## 2017-05-12 RX ADMIN — LIDOCAINE HYDROCHLORIDE 9 ML: 40 INJECTION, SOLUTION RETROBULBAR; TOPICAL at 14:03

## 2017-05-12 NOTE — PROCEDURES
Procedure(s):    Bronchoscopy  Endobronchial ultrasound / EBUS (7 sites biopsied, see below for details)    Indication:  Breast cancer with PET positive mediastinal nodes    Attending of Record:     MARC Meyer MD    Trainees Present:   Toshia Whitfield MD    Medications:    9 ml 4% lidocaine  9 ml 1% lidocaine  1 spray(s) hurricaine spray  1.5 mg versed  100 mcg fentanyl    Sedation Time:  Total sedation time was 30 minutes of continuous bedside 1:1 monitoring.     Time Out:  Performed    The patient's medical record has been reviewed.  The indication for the procedure was reviewed.  The necessary history and physical examination was performed and reviewed.  The risks, benefits and alternatives of the procedure were discussed with the the patient in detail and she had the opportunity to ask questions.  I discussed in particular the potential complications including risks of minor or life-threatening bleeding and/or infection, respiratory failure, vocal cord trauma / paralysis, pneumothorax, and discomfort. Sedation risks were also discussed including abnormal heart rhythms, low blood pressure, and respiratory failure. All questions were answered to the best of my ability.  Verbal and written informed consent was obtained.  The proposed procedure and the patient's identification were verified prior to the procedure by the physician and the nurse.    The patient was assessed for the adequacy for the procedure and to receive medications.   Mental Status:  Alert and oriented x 3  Airway examination:  Class I (Complete visualization of soft palate)  Pulmonary:  Clear to ausculation bilaterally  CV:  RRR, no murmurs or gallops  ASA Grade:  (II)  Mild systemic disease    After clinical evaluation and reviewing the indication, risks, alternatives and benefits of the procedure the patient was deemed to be in satisfactory condition to undergo the procedure.      Immediately before administration of medications the  patient was re-assessed for adequacy to receive sedatives including the heart rate, respiratory rate, mental status, oxygen saturation, blood pressure and adequacy of pulmonary ventilation. These same parameters were continuously monitored throughout the procedure.    Maneuvers / Procedure:     The bronchoscope was inserted through the mouth, the cords were anesthetized with lidocaine. Upper airway structures, vocal cords (anatomy and function) appear to be normal.  The patient was not intubated..      Airway Examination:  A complete airway examination was performed from the distal trachea to the subsegmental level in each lobe of both lungs with exceptions/pertinent findings noted as follows; no endobronchial lesion  EBUS:  Station 7 biopsy x 5 with JORGE L present                    Pertinent Images / special notes:     Station 7      Any disposable equipment was visually inspected and deemed to be intact immediately post procedure.      Recommendations:     -->  Await for final reading  -->  Follow up with oncology

## 2017-05-12 NOTE — IP AVS SNAPSHOT
MRN:[id number]                      After Visit Summary   5/12/2017    Shan Marsh    MRN: [id number]           Thank you!     Thank you for choosing [facility] for your care. Our goal is always to provide you with excellent care. Hearing back from our patients is one way we can continue to improve our services. Please take a few minutes to complete the written survey that you may receive in the mail after you visit with us. Thank you!        Patient Information     Date Of Birth          1970        About your hospital stay     You were admitted on:  May 12, 2017 You last received care in the:  [facility], Endoscopy    You were discharged on:  May 12, 2017       Who to Call     For medical emergencies, please call 911.  For non-urgent questions about your medical care, please call your primary care provider or clinic, [phone]  For questions related to your surgery, please call your surgery clinic        Attending Provider     Provider Bandar Cameron MD Pulmonary       Primary Care Provider Office Phone # Fax #    Marilu Michelle [phone]       [address] [address]        Your next 10 appointments already scheduled     May 16, 2017 12:00 PM CDT   (Arrive by 11:45 AM)   Return Visit with Ana Whitman MD   [facility] ([facility])    [address] [address] [phone]              Further instructions from your care team       Discharge Instructions after Bronchoscopy    Activity    You had medicine to relax and for pain. You may feel dizzy or sleepy.  For 24 hours:    Do not drive or use heavy equipment.    Do not make important decisions.    Do not drink any alcohol.    Diet    When you can swallow easily, you may go back to your regular diet, medicines  and light exercise.    Follow-up    We took small tissue or  fluid samples to study. We will call you with the results in about 10 business days.    Call right away if you have:    Unusual chest pain    Temperature above 100.6  F (37.5  C)    Coughing that does not stop.    If you have severe pain, bleeding, or shortness of breath, go to an emergency room.    If you have questions, call:  Monday to Friday, 7 a.m. to 4:30 p.m.  Endoscopy: [phone] (We may have to call you back)    After hours:  Hospital: [phone] (Ask for the pulmonary fellow on call)    Pending Results     Date and Time Order Name Status Description    5/12/2017 1003 HER 2 KRISTEN FISH In process     5/10/2017 1346 SURGICAL PATHOLOGY EXAM In process     5/10/2017 1218 VITAMIN D DEFICIENCY SCREENING In process             Admission Information     Date & Time Provider Department Dept. Phone    5/12/2017 Bandar Meyer MD [facility], [place], Endoscopy [phone]      Your Vitals Were     Blood Pressure Respirations Last Period Pulse Oximetry          106/74 14 04/24/2017 96%        MyChart Information     [employer] gives you secure access to your electronic health record. If you see a primary care provider, you can also send messages to your care team and make appointments. If you have questions, please call your primary care clinic.  If you do not have a primary care provider, please call [phone] and they will assist you.        Care EveryWhere ID     This is your Care EveryWhere ID. This could be used by other organizations to access your [place] medical records  [id number]           Review of your medicines      UNREVIEWED medicines. Ask your doctor about these medicines        Dose / Directions    doxycycline Monohydrate 100 MG Caps        Dose:  100 mg   Take 100 mg by mouth daily   Refills:  0       FLINTSTONES MULTIVITAMIN Chew        1 tablet daily   Refills:  0       methylPREDNISolone 4 MG tablet   Commonly known as:  MEDROL DOSEPAK   Used for:  Drug-induced skin rash        Follow  package instructions   Quantity:  21 tablet   Refills:  0       mupirocin 2 % ointment   Commonly known as:  BACTROBAN        Refills:  0                Protect others around you: Learn how to safely use, store and throw away your medicines at www.disposemymeds.org.             Medication List: This is a list of all your medications and when to take them. Check marks below indicate your daily home schedule. Keep this list as a reference.      Medications           Morning Afternoon Evening Bedtime As Needed    doxycycline Monohydrate 100 MG Caps   Take 100 mg by mouth daily                                FLINTSTONES MULTIVITAMIN Chew   1 tablet daily                                methylPREDNISolone 4 MG tablet   Commonly known as:  MEDROL DOSEPAK   Follow package instructions                                mupirocin 2 % ointment   Commonly known as:  BACTROBAN

## 2017-05-12 NOTE — DISCHARGE INSTRUCTIONS
Discharge Instructions after Bronchoscopy    Activity    You had medicine to relax and for pain. You may feel dizzy or sleepy.  For 24 hours:    Do not drive or use heavy equipment.    Do not make important decisions.    Do not drink any alcohol.    Diet    When you can swallow easily, you may go back to your regular diet, medicines  and light exercise.    Follow-up    We took small tissue or fluid samples to study. We will call you with the results in about 10 business days.    Call right away if you have:    Unusual chest pain    Temperature above 100.6  F (37.5  C)    Coughing that does not stop.    If you have severe pain, bleeding, or shortness of breath, go to an emergency room.    If you have questions, call:  Monday to Friday, 7 a.m. to 4:30 p.m.  Endoscopy: [phone] (We may have to call you back)    After hours:  Hospital: [phone] (Ask for the pulmonary fellow on call)

## 2017-05-12 NOTE — OR NURSING
EBUS with FNA of Station 7 done, pt tolerated well. VSS on 3L NC, weaned to room air. Pathology team took specimens to lab.

## 2017-05-12 NOTE — IP AVS SNAPSHOT
[facility], [place], Endoscopy    [address] [address]    Phone:  [phone]                                       After Visit Summary   5/12/2017    Shan Marsh    MRN: [id number]           After Visit Summary Signature Page     I have received my discharge instructions, and my questions have been answered. I have discussed any challenges I see with this plan with the nurse or doctor.    ..........................................................................................................................................  Patient/Patient Representative Signature      ..........................................................................................................................................  Patient Representative Print Name and Relationship to Patient    ..................................................               ................................................  Date                                            Time    ..........................................................................................................................................  Reviewed by Signature/Title    ...................................................              ..............................................  Date                                                            Time

## 2017-05-16 ENCOUNTER — ALLIED HEALTH/NURSE VISIT (OUTPATIENT)
Dept: ONCOLOGY | Facility: CLINIC | Age: 47
End: 2017-05-16

## 2017-05-16 ENCOUNTER — ONCOLOGY VISIT (OUTPATIENT)
Dept: ONCOLOGY | Facility: CLINIC | Age: 47
End: 2017-05-16
Attending: INTERNAL MEDICINE
Payer: COMMERCIAL

## 2017-05-16 VITALS
SYSTOLIC BLOOD PRESSURE: 113 MMHG | WEIGHT: 119.71 LBS | BODY MASS INDEX: 20.04 KG/M2 | HEART RATE: 60 BPM | DIASTOLIC BLOOD PRESSURE: 75 MMHG | RESPIRATION RATE: 18 BRPM | OXYGEN SATURATION: 100 % | TEMPERATURE: 98.1 F

## 2017-05-16 DIAGNOSIS — C50.919 MALIGNANT NEOPLASM OF FEMALE BREAST, UNSPECIFIED LATERALITY, UNSPECIFIED SITE OF BREAST: Primary | ICD-10-CM

## 2017-05-16 DIAGNOSIS — Z71.9 VISIT FOR COUNSELING: Primary | ICD-10-CM

## 2017-05-16 PROCEDURE — 99212 OFFICE O/P EST SF 10 MIN: CPT | Mod: ZF

## 2017-05-16 PROCEDURE — 99215 OFFICE O/P EST HI 40 MIN: CPT | Mod: ZP | Performed by: INTERNAL MEDICINE

## 2017-05-16 ASSESSMENT — PAIN SCALES - GENERAL: PAINLEVEL: MILD PAIN (3)

## 2017-05-16 NOTE — NURSING NOTE
"Oncology Rooming Note    May 16, 2017 11:54 AM   Shan Marsh is a 46 year old female who presents for:    Chief Complaint   Patient presents with     Oncology Clinic Visit     Return for Breast Ca      Initial Vitals: /75 (BP Location: Right arm, Patient Position: Chair, Cuff Size: Adult Regular)  Pulse 60  Temp 98.1  F (36.7  C) (Oral)  Resp 18  Wt 54.3 kg (119 lb 11.4 oz)  LMP 04/24/2017  SpO2 100%  BMI 20.04 kg/m2 Estimated body mass index is 20.04 kg/(m^2) as calculated from the following:    Height as of 5/10/17: 1.646 m (5' 4.8\").    Weight as of this encounter: 54.3 kg (119 lb 11.4 oz). Body surface area is 1.58 meters squared.  Mild Pain (3) Comment: Data Unavailable   Patient's last menstrual period was 04/24/2017.  Allergies reviewed: Yes  Medications reviewed: Yes    Medications: Medication refills not needed today.  Pharmacy name entered into [facility]:    [facility] [address] [facility] [address] [place], [place] - [address] [place] [address] AV AT [facility] & [address]  [facility] [place], [place] - [address] [facility]UG STORE [address] - [place], [place] - [address] [facility] OF [address]    Clinical concerns: no concerns  Antonette was notified.    7 minutes for nursing intake (face to face time)     Julia Mayfield MA              "

## 2017-05-16 NOTE — MR AVS SNAPSHOT
After Visit Summary   5/16/2017    Shan Marsh    MRN: [id number]           Patient Information     Date Of Birth          1970        Visit Information        Provider Department      5/16/2017 12:00 PM Ana Whitman MD [facility]        Today's Diagnoses     Malignant neoplasm of female breast, unspecified laterality, unspecified site of breast (H)    -  1       Follow-ups after your visit        Your next 10 appointments already scheduled     May 17, 2017 11:30 AM CDT   NM INJECTION with UUNMINJ2   [facility], Nuclear Medicine ([facility])    [address] [facility] [id number] [phone]            May 17, 2017  1:30 PM CDT   Ech Complete with UUECHR2   [facility] [place],  Echocardiography ([facility])    [address] [place] [id number] [phone]           1.  Please bring or wear a comfortable two-piece outfit. 2.  You may eat, drink and take your normal medicines. 3.  For any questions that cannot be answered, please contact the ordering physician            May 17, 2017  2:30 PM CDT   NM BONE SCAN WHOLE BODY with UUNM3   [facility], Nuclear Medicine ([facility])    [address] [place] [address] [phone]           Please bring a list of your medicines to the exam. (Include vitamins, minerals and over-the-counter drugs.) You should wear comfortable clothes. Leave your valuables at home. Please bring related prior results and films. Tell your doctor:   If you are breastfeeding or may be pregnant.   If you have had a barium test within the past few days. Barium may change the results of certain exams.   If you think you may need sedation (medicine to help you relax).  You may eat and drink as normal.  Drink plenty of fluids and empty bladder frequently between injection  and scans.            May 24, 2017  8:45 AM CDT   [facility] Lab Draw with  [facility] LAB DRAW   [facility] Lab Draw ([facility])    [address] [address] [phone]            May 24, 2017  9:30 AM CDT   (Arrive by 9:15 AM)   Return Visit with GRAY Harman   [facility] ([facility])    [address] [address] [phone]              Future tests that were ordered for you today     Open Future Orders        Priority Expected Expires Ordered    NM Bone whole body Routine  8/14/2017 5/16/2017    Echocardiogram Complete Routine  5/16/2018 5/16/2017            Who to contact     If you have questions or need follow up information about today's clinic visit or your schedule please contact [facility] directly at [phone].  Normal or non-critical lab and imaging results will be communicated to you by WeSpekehart, letter or phone within 4 business days after the clinic has received the results. If you do not hear from us within 7 days, please contact the clinic through eSilicon or phone. If you have a critical or abnormal lab result, we will notify you by phone as soon as possible.  Submit refill requests through eSilicon or call your pharmacy and they will forward the refill request to us. Please allow 3 business days for your refill to be completed.          Additional Information About Your Visit        WeSpekehart Information     eSilicon gives you secure access to your electronic health record. If you see a primary care provider, you can also send messages to your care team and make appointments. If you have questions, please call your primary care clinic.  If you do not have a primary care provider, please call [phone] and they will assist you.        Care EveryWhere ID     This is your Care EveryWhere ID. This could be used by other  organizations to access your [place] medical records  [id number]        Your Vitals Were     Pulse Temperature Respirations Last Period Pulse Oximetry BMI (Body Mass Index)    60 98.1  F (36.7  C) (Oral) 18 04/24/2017 100% 20.04 kg/m2       Blood Pressure from Last 3 Encounters:   05/16/17 113/75   05/12/17 114/76   05/10/17 113/70    Weight from Last 3 Encounters:   05/16/17 54.3 kg (119 lb 11.4 oz)   05/10/17 56 kg (123 lb 6.4 oz)   05/02/17 58.2 kg (128 lb 3.2 oz)              We Performed the Following     HCL URINE PREGNANCY TEST          Today's Medication Changes          These changes are accurate as of: 5/16/17  1:49 PM.  If you have any questions, ask your nurse or doctor.               Stop taking these medicines if you haven't already. Please contact your care team if you have questions.     doxycycline Monohydrate 100 MG Caps   Stopped by:  Ana Whitman MD           mupirocin 2 % ointment   Commonly known as:  BACTROBAN   Stopped by:  Ana Whitman MD                    Primary Care Provider Office Phone # Fax #    Marilu PAULA Michelle [phone]       [address] [address]        Thank you!     Thank you for choosing [facility]  for your care. Our goal is always to provide you with excellent care. Hearing back from our patients is one way we can continue to improve our services. Please take a few minutes to complete the written survey that you may receive in the mail after your visit with us. Thank you!             Your Updated Medication List - Protect others around you: Learn how to safely use, store and throw away your medicines at www.disposemymeds.org.          This list is accurate as of: 5/16/17  1:49 PM.  Always use your most recent med list.                   Brand Name Dispense Instructions for use    FLINTSTONES MULTIVITAMIN Chew      1 tablet daily       methylPREDNISolone 4 MG tablet    MEDROL DOSEPAK    21  tablet    Follow package instructions

## 2017-05-16 NOTE — LETTER
5/16/2017       RE: Sahn Marsh  [address]  [facility] [address]     Dear Colleague,    Thank you for referring your patient, Shan Marsh, to the [facility]. Please see a copy of my visit note below.    dictated    ONCOLOGY VISIT NOTE      REASON FOR VISIT:  I am seeing Ms. Marsh in followup to discuss her laboratory results.      HISTORY OF PRESENT ILLNESS:  Shan is a 46-year-old premenopausal woman who comes in today for followup of her biopsies.  Briefly in history, she has a history of a T1a N0 M0, ER-positive, KY-positive, and HER2-negative breast cancer.  She originally met me in 12/2011 with a coin-sized mass involving her left breast.  This mass got bigger, and by 05/2012 she was referred for a mammogram, ultrasound and breast MRI.  This revealed heterogeneously dense breast tissue with an area of suspicious microcalcifications measuring 5 x 2.6 cm involving the left breast.  MRI showed non-mass-like enhancement in the upper outer quadrant extending from about 1-3 o'clock, measuring about 6 cm.  Stereotactic needle biopsy revealed DCIS high-grade, which was suspicious for microinvasion.  She was seen by Dr. Misael Cheng, and underwent bilateral mastectomies with sentinel lymph node biopsy on 06/11/2012.  Her final pathology revealed grade 1 infiltrating ductal carcinoma, ER-positive, KY-positive an d HER2-negative.  She had multiple microscopic areas of invasion, each measuring 1-2 mm.  Her pathology was reviewed at our Multidisciplinary Conference, and confirmed multiple areas of microinvasion.  Her final staging was a T1a N0 M0 stage I breast cancer.  Mercedes lymph node biopsy was negative.  Her Oncotype score was 23, giving her a risk of distant recurrence at 10 years of 15%, assuming she took tamoxifen.  She started on tamoxifen in 08/2012.  This was discontinued in 02/2013 due to complaints of vaginal discharge.  She also had complained of some  palpitations at that time and saw Cardiology.  This workup was negative.  She does have a type 1 AV block felt to be benign from her endurance as an athlete.      Shan presented to see me on 05/10/2017, at which time she had noticed fullness and pain involving her left shoulder with numbness down involving her left upper extremity.  She was found to have left axillary adenopathy, and was sent for a PET/CT scan revealing on MRI of the shoulder that she had a 4 x 1.8 x 1.5 cm lesion close to the subcutaneous tissue with close proximity to the distal clavicle.  She was subsequently sent for a PET/CT scan, which confirmed multiple PET-avid left axillary lymph nodes, as well as hilar lymph nodes that were suspicious for distant metastatic disease.  There were no suspicious lesions in the liver.  No suspicious lesions in the bones.  She has a known 3.2 cm hemangioma in the left lobe of the liver.      Shan comes in today for followup after her biopsy.  She says that she has been anxious, but is doing okay.  She is taking Tylenol PM at bedtime.  This seems to help her sleep.  She uses Tylenol as needed during the day for some numbness and discomfort involving her left upper extremity and down into her fingertips.      REVIEW OF SYSTEMS:  Her 10-point review of systems is otherwise negative.      PHYSICAL EXAMINATION:  A physical examination was not performed today.  More than 40 minutes were spent in counseling and coordinating care with the patient, her  and her mother.      ASSESSMENT AND PLAN:  This is a 46-year-old female with a history of a T1a N0, ER-positive, ID-positive and HER2-negative left breast cancer treated with bilateral mastectomies 5 years ago, who now presents with metastatic disease involving the left axilla, as well as left hilar lymph node.  On biopsy, these are strongly ER-positive, ID is 40%, and HER2 by FISH was negative with a ratio of 1.5.      I had a long discussion with Shan and her  mom and her  today.  We discussed that she has stage IV breast cancer, metastatic ductal carcinoma, that is strongly ER-positive and HER2-negative.  I discussed with her the treatment options.  At this time, we discussed that overall our treatments would be palliative, and are not curative-intent.  I would like to start her on aggressive endocrine therapy.  We are currently participating in a clinical trial with the [employer] using tamoxifen in conjunction with palbociclib.  I discussed with her that this is a phase II study.  She would take both of these oral medications.  The side effects were discussed in detail, and she was given a copy of the consent, as well as met with the research nurse to discuss the possibilities of going on this study.  On this trial, she would take both of these medications.  I think this would be a good choice for her, and I discussed the biologic rationale for that.      We discussed that if she is not interested in the clinical trial, other treatment options would be using tamoxifen alone or using ovarian suppression plus/minus tamoxifen or an aromatase inhibitor.  She inquired questions about whether to have her ovaries removed.  We discussed that this could be done in the future.  At this time, I would recommend the clinical trial as a first step, and then consider oophorectomy.  I would still recommend pill forms of therapy even with an oophorectomy.      She previously had a short course of tamoxifen and did not like the side effects, including vaginal discharge.  I did review that her palpitations, and that she had a full negative cardiac workup.  If these recurred on tamoxifen, her cardiologist, Dr. Juana Bryant, would recommend that she have a Holter testing performed.      She is coping reasonably well.      She is going to consider the trial.  She needs a bone scan and an echocardiogram, and she will get back to me as to whether she wants to participate in  the study.         CAMELIA GRISSOM MD             D: 2017 10:41   T: 2017 14:19   MT: VIJAYA      Name:     NIKKIE HICKS   MRN:      [id number]        Account:      [id number]   :      1970           Service Date: 2017      Document: [id number]

## 2017-05-16 NOTE — NURSING NOTE
[employer] Trial of Palbociclib with Tamoxifen as first -Line treatment in metastatic breast cancer. Dr Whitman and I presented this trial to patient and  and Mother. Consent version dated July 27,2016and related HIPAA forms were reviewed in detail , including potential risks and benefits, study procedures and schedule. Patient was given adequate time to consider trial and has decided to sign consents today and proceed with screening procedures. All her questions were answered to her satisfaction. Copy of consent given to patient and copy prepared for scanning into the EMR.  Patient reports only medications she is using are: Tylenol PM (Left shoulder pain /onset 4/11/17), tincture form  homeopathic medicine of dandelion root, oxalis, viola and poke weed, and multi vitamin daily. Pregnancy precautions discussed and  has had a vasectomy. Plan to start treatment next Wednesday 5/24/17.    Protocol Version Date Feb 10, 2016  IRB #[id number]  PI/Treating MD: Ana Whitman [phone]  Genet Christie RN/ Research [phone]

## 2017-05-17 ENCOUNTER — HOSPITAL ENCOUNTER (OUTPATIENT)
Dept: NUCLEAR MEDICINE | Facility: CLINIC | Age: 47
Setting detail: NUCLEAR MEDICINE
Discharge: HOME | End: 2017-05-17
Attending: INTERNAL MEDICINE | Admitting: INTERNAL MEDICINE
Payer: COMMERCIAL

## 2017-05-17 ENCOUNTER — HOSPITAL ENCOUNTER (OUTPATIENT)
Dept: NUCLEAR MEDICINE | Facility: CLINIC | Age: 47
Setting detail: NUCLEAR MEDICINE
End: 2017-05-17
Attending: INTERNAL MEDICINE
Payer: COMMERCIAL

## 2017-05-17 DIAGNOSIS — C50.919 MALIGNANT NEOPLASM OF FEMALE BREAST, UNSPECIFIED LATERALITY, UNSPECIFIED SITE OF BREAST: ICD-10-CM

## 2017-05-17 LAB — COPATH REPORT: NORMAL

## 2017-05-17 PROCEDURE — 34300033 ZZH RX 343: Performed by: INTERNAL MEDICINE

## 2017-05-17 PROCEDURE — 78306 BONE IMAGING WHOLE BODY: CPT

## 2017-05-17 PROCEDURE — A9503 TC99M MEDRONATE: HCPCS | Performed by: INTERNAL MEDICINE

## 2017-05-17 RX ORDER — TC 99M MEDRONATE 20 MG/10ML
25 INJECTION, POWDER, LYOPHILIZED, FOR SOLUTION INTRAVENOUS ONCE
Status: COMPLETED | OUTPATIENT
Start: 2017-05-17 | End: 2017-05-17

## 2017-05-17 RX ADMIN — TC 99M MEDRONATE 24 MCI.: 20 INJECTION, POWDER, LYOPHILIZED, FOR SOLUTION INTRAVENOUS at 11:30

## 2017-05-17 NOTE — PROGRESS NOTES
D: 46 year old female with breast cancer  I: Resources for children  A: Social work was asked by RNCC to meet with patient and her  in clinic.  Patient and  indicated they live with their two children, daughters ages 6 and 9.  They indicated that they would like some supportive resources for their children.  Patient has a new recurrence of breast cancer.  She shared that her older daughter has some PTSD from witnessing patient's first breast cancer diagnosis and treatment and has been in therapy.  They stated they would like to try and ease the anxiety likely to be brought on by informing their children of the recurrence of breast cancer.  SW provided Gust[employer] P[employer] from [employer] for the children and also reviewed [employer] family based program options.  SW also provided information on local summer camps for children who have a parent with cancer.  Patient signed parental information release form for [employer] Gustavo Packs which will be faxed to [employer].  Patient was also given information about financial assistance betty program, Cancer [employer] Line and other breast cancer support organizations.  P: SW will continue to follow and is available to assist with any identified needs.     Soo Yeon Han, [facility]  [phone]

## 2017-05-17 NOTE — PROGRESS NOTES
ONCOLOGY VISIT NOTE      REASON FOR VISIT:  I am seeing Ms. Marsh in followup to discuss her laboratory results.      HISTORY OF PRESENT ILLNESS:  Shan is a 46-year-old premenopausal woman who comes in today for followup of her biopsies.  Briefly in history, she has a history of a T1a N0 M0, ER-positive, CA-positive, and HER2-negative breast cancer.  She originally met me in 12/2011 with a coin-sized mass involving her left breast.  This mass got bigger, and by 05/2012 she was referred for a mammogram, ultrasound and breast MRI.  This revealed heterogeneously dense breast tissue with an area of suspicious microcalcifications measuring 5 x 2.6 cm involving the left breast.  MRI showed non-mass-like enhancement in the upper outer quadrant extending from about 1-3 o'clock, measuring about 6 cm.  Stereotactic needle biopsy revealed DCIS high-grade, which was suspicious for microinvasion.  She was seen by Dr. Misael Cheng, and underwent bilateral mastectomies with sentinel lymph node biopsy on 06/11/2012.  Her final pathology revealed grade 1 infiltrating ductal carcinoma, ER-positive, CA-positive an d HER2-negative.  She had multiple microscopic areas of invasion, each measuring 1-2 mm.  Her pathology was reviewed at our Multidisciplinary Conference, and confirmed multiple areas of microinvasion.  Her final staging was a T1a N0 M0 stage I breast cancer.  Trion lymph node biopsy was negative.  Her Oncotype score was 23, giving her a risk of distant recurrence at 10 years of 15%, assuming she took tamoxifen.  She started on tamoxifen in 08/2012.  This was discontinued in 02/2013 due to complaints of vaginal discharge.  She also had complained of some palpitations at that time and saw Cardiology.  This workup was negative.  She does have a type 1 AV block felt to be benign from her endurance as an athlete.      Shan presented to see me on 05/10/2017, at which time she had noticed fullness and pain involving her  left shoulder with numbness down involving her left upper extremity.  She was found to have left axillary adenopathy, and was sent for a PET/CT scan revealing on MRI of the shoulder that she had a 4 x 1.8 x 1.5 cm lesion close to the subcutaneous tissue with close proximity to the distal clavicle.  She was subsequently sent for a PET/CT scan, which confirmed multiple PET-avid left axillary lymph nodes, as well as hilar lymph nodes that were suspicious for distant metastatic disease.  There were no suspicious lesions in the liver.  No suspicious lesions in the bones.  She has a known 3.2 cm hemangioma in the left lobe of the liver.      Shan comes in today for followup after her biopsy.  She says that she has been anxious, but is doing okay.  She is taking Tylenol PM at bedtime.  This seems to help her sleep.  She uses Tylenol as needed during the day for some numbness and discomfort involving her left upper extremity and down into her fingertips.      REVIEW OF SYSTEMS:  Her 10-point review of systems is otherwise negative.      PHYSICAL EXAMINATION:  A physical examination was not performed today.  More than 40 minutes were spent in counseling and coordinating care with the patient, her  and her mother.      ASSESSMENT AND PLAN:  This is a 46-year-old female with a history of a T1a N0, ER-positive, IA-positive and HER2-negative left breast cancer treated with bilateral mastectomies 5 years ago, who now presents with metastatic disease involving the left axilla, as well as left hilar lymph node.  On biopsy, these are strongly ER-positive, IA is 40%, and HER2 by FISH was negative with a ratio of 1.5.      I had a long discussion with Shan and her mom and her  today.  We discussed that she has stage IV breast cancer, metastatic ductal carcinoma, that is strongly ER-positive and HER2-negative.  I discussed with her the treatment options.  At this time, we discussed that overall our treatments would be  palliative, and are not curative-intent.  I would like to start her on aggressive endocrine therapy.  We are currently participating in a clinical trial with the [employer] using tamoxifen in conjunction with palbociclib.  I discussed with her that this is a phase II study.  She would take both of these oral medications.  The side effects were discussed in detail, and she was given a copy of the consent, as well as met with the research nurse to discuss the possibilities of going on this study.  On this trial, she would take both of these medications.  I think this would be a good choice for her, and I discussed the biologic rationale for that.      We discussed that if she is not interested in the clinical trial, other treatment options would be using tamoxifen alone or using ovarian suppression plus/minus tamoxifen or an aromatase inhibitor.  She inquired questions about whether to have her ovaries removed.  We discussed that this could be done in the future.  At this time, I would recommend the clinical trial as a first step, and then consider oophorectomy.  I would still recommend pill forms of therapy even with an oophorectomy.      She previously had a short course of tamoxifen and did not like the side effects, including vaginal discharge.  I did review that her palpitations, and that she had a full negative cardiac workup.  If these recurred on tamoxifen, her cardiologist, Dr. Juana Bryant, would recommend that she have a Holter testing performed.      She is coping reasonably well.      She is going to consider the trial.  She needs a bone scan and an echocardiogram, and she will get back to me as to whether she wants to participate in the study.         CAMELIA GRISSOM MD             D: 2017 10:41   T: 2017 14:19   MT: VIJAYA      Name:     NIKKIE HICKS   MRN:      [id number]        Account:      [id number]   :      1970           Service Date: 2017      Document:  [id number]

## 2017-05-18 LAB — COPATH REPORT: NORMAL

## 2017-05-24 ENCOUNTER — APPOINTMENT (OUTPATIENT)
Dept: LAB | Facility: CLINIC | Age: 47
End: 2017-05-24
Attending: INTERNAL MEDICINE
Payer: COMMERCIAL

## 2017-05-24 ENCOUNTER — ONCOLOGY VISIT (OUTPATIENT)
Dept: ONCOLOGY | Facility: CLINIC | Age: 47
End: 2017-05-24
Attending: PHYSICIAN ASSISTANT
Payer: COMMERCIAL

## 2017-05-24 VITALS
RESPIRATION RATE: 16 BRPM | DIASTOLIC BLOOD PRESSURE: 67 MMHG | BODY MASS INDEX: 19.88 KG/M2 | HEIGHT: 65 IN | HEART RATE: 63 BPM | TEMPERATURE: 98.4 F | OXYGEN SATURATION: 100 % | SYSTOLIC BLOOD PRESSURE: 120 MMHG | WEIGHT: 119.3 LBS

## 2017-05-24 DIAGNOSIS — C50.919 MALIGNANT NEOPLASM OF FEMALE BREAST, UNSPECIFIED LATERALITY, UNSPECIFIED SITE OF BREAST: Primary | ICD-10-CM

## 2017-05-24 DIAGNOSIS — C50.919 MALIGNANT NEOPLASM OF FEMALE BREAST, UNSPECIFIED LATERALITY, UNSPECIFIED SITE OF BREAST: ICD-10-CM

## 2017-05-24 LAB
ALBUMIN SERPL-MCNC: 4 G/DL (ref 3.4–5)
ALP SERPL-CCNC: 61 U/L (ref 40–150)
ALT SERPL W P-5'-P-CCNC: 33 U/L (ref 0–50)
ANION GAP SERPL CALCULATED.3IONS-SCNC: 8 MMOL/L (ref 3–14)
AST SERPL W P-5'-P-CCNC: 32 U/L (ref 0–45)
BASOPHILS # BLD AUTO: 0 10E9/L (ref 0–0.2)
BASOPHILS NFR BLD AUTO: 0.8 %
BILIRUB SERPL-MCNC: 0.5 MG/DL (ref 0.2–1.3)
BUN SERPL-MCNC: 7 MG/DL (ref 7–30)
CALCIUM SERPL-MCNC: 9 MG/DL (ref 8.5–10.1)
CHLORIDE SERPL-SCNC: 105 MMOL/L (ref 94–109)
CO2 SERPL-SCNC: 27 MMOL/L (ref 20–32)
CREAT SERPL-MCNC: 0.6 MG/DL (ref 0.52–1.04)
DIFFERENTIAL METHOD BLD: ABNORMAL
EOSINOPHIL # BLD AUTO: 0.1 10E9/L (ref 0–0.7)
EOSINOPHIL NFR BLD AUTO: 2 %
ERYTHROCYTE [DISTWIDTH] IN BLOOD BY AUTOMATED COUNT: 11.9 % (ref 10–15)
GFR SERPL CREATININE-BSD FRML MDRD: NORMAL ML/MIN/1.7M2
GLUCOSE SERPL-MCNC: 77 MG/DL (ref 70–99)
HCG SERPL QL: NEGATIVE
HCT VFR BLD AUTO: 39.3 % (ref 35–47)
HGB BLD-MCNC: 12.7 G/DL (ref 11.7–15.7)
IMM GRANULOCYTES # BLD: 0 10E9/L (ref 0–0.4)
IMM GRANULOCYTES NFR BLD: 0.4 %
LYMPHOCYTES # BLD AUTO: 0.8 10E9/L (ref 0.8–5.3)
LYMPHOCYTES NFR BLD AUTO: 33.2 %
MCH RBC QN AUTO: 31.6 PG (ref 26.5–33)
MCHC RBC AUTO-ENTMCNC: 32.3 G/DL (ref 31.5–36.5)
MCV RBC AUTO: 98 FL (ref 78–100)
MONOCYTES # BLD AUTO: 0.2 10E9/L (ref 0–1.3)
MONOCYTES NFR BLD AUTO: 7.9 %
NEUTROPHILS # BLD AUTO: 1.4 10E9/L (ref 1.6–8.3)
NEUTROPHILS NFR BLD AUTO: 55.7 %
NRBC # BLD AUTO: 0 10*3/UL
NRBC BLD AUTO-RTO: 0 /100
PLATELET # BLD AUTO: 149 10E9/L (ref 150–450)
POTASSIUM SERPL-SCNC: 4.2 MMOL/L (ref 3.4–5.3)
PROT SERPL-MCNC: 7.4 G/DL (ref 6.8–8.8)
RBC # BLD AUTO: 4.02 10E12/L (ref 3.8–5.2)
SODIUM SERPL-SCNC: 140 MMOL/L (ref 133–144)
WBC # BLD AUTO: 2.5 10E9/L (ref 4–11)

## 2017-05-24 PROCEDURE — 80053 COMPREHEN METABOLIC PANEL: CPT | Performed by: INTERNAL MEDICINE

## 2017-05-24 PROCEDURE — 99212 OFFICE O/P EST SF 10 MIN: CPT | Mod: ZF

## 2017-05-24 PROCEDURE — 36415 COLL VENOUS BLD VENIPUNCTURE: CPT

## 2017-05-24 PROCEDURE — 99214 OFFICE O/P EST MOD 30 MIN: CPT | Performed by: PHYSICIAN ASSISTANT

## 2017-05-24 PROCEDURE — 85025 COMPLETE CBC W/AUTO DIFF WBC: CPT | Performed by: INTERNAL MEDICINE

## 2017-05-24 PROCEDURE — 84703 CHORIONIC GONADOTROPIN ASSAY: CPT | Performed by: INTERNAL MEDICINE

## 2017-05-24 RX ORDER — ONDANSETRON 8 MG/1
8 TABLET, FILM COATED ORAL EVERY 8 HOURS PRN
Qty: 30 TABLET | Refills: 2 | Status: SHIPPED | OUTPATIENT
Start: 2017-05-24 | End: 2018-01-12

## 2017-05-24 RX ORDER — TAMOXIFEN CITRATE 20 MG/1
20 TABLET ORAL DAILY
Qty: 28 TABLET | Refills: 0 | Status: SHIPPED | OUTPATIENT
Start: 2017-05-24 | End: 2017-05-24

## 2017-05-24 RX ORDER — PROCHLORPERAZINE MALEATE 10 MG
10 TABLET ORAL EVERY 6 HOURS PRN
Qty: 30 TABLET | Refills: 2 | Status: SHIPPED | OUTPATIENT
Start: 2017-05-24 | End: 2018-01-12

## 2017-05-24 RX ORDER — TAMOXIFEN CITRATE 20 MG/1
20 TABLET ORAL DAILY
Qty: 28 TABLET | Refills: 0 | COMMUNITY
Start: 2017-05-24 | End: 2018-01-12

## 2017-05-24 ASSESSMENT — PAIN SCALES - GENERAL: PAINLEVEL: NO PAIN (0)

## 2017-05-24 NOTE — PROGRESS NOTES
"Hematology-Oncology Visit  May 24, 2017    Reason for Visit: follow-up metastatic breast cancer, ER positive     HPI: Shan Marsh is a 47 year old female without significant past medical history with recent diagnosis of metastatic breast cancer after presenting with left shoulder pain. Shoulder MRI showed soft tissue mass close to distal clavicle concerning for malignancy. She had PET/CT 5/9/17 showing hypermetabolic left axillary, supraclavicular, mediastinal and hilar nodes. She had biopsies of left axilla and hilar nodes showing metastatic breast cancer, strongly ER positive, moderately OK positive and HER-2 kaitlin non-amplified. She originally had [id number], ER/OK positive, HER-2 negative breast cancer diagnosed in spring of 2012 s/p bilateral mastectomies and Tamoxifen from 8/2012-2/2013 and discontinued due to side effects. She met with Dr. Whitman most recently on 5/16/17 who recommended treatment on BIG10 clinical trial with palbociclib and tamoxifen. She consented to this and presents to start treatment today.     Interval History: Shan is here with her mother and feeling well today. She notes her left shoulder pain is a little better. She has intermittent radiating pain down to her forearm that feel like \"zingers\". Normally she does not have to take anything for pain but does take 2 Tylenol PM to help her sleep as the pain will sometimes wake her up. She has no other pain. Her appetite has been a little lower lately. She is following a vegan diet so eating as many calories has been difficult. She has stopped running to try to conserve more energy but is walking and going to yoga. She is taking many supplements per her homeopath and going to acupuncture. No fatigue, fevers/chills, cough, SOB, CP, n/v/c/d, or abdominal pain. ROS otherwise negative.     Current Outpatient Prescriptions   Medication     prochlorperazine (COMPAZINE) 10 MG tablet     ondansetron (ZOFRAN) 8 MG tablet     methylPREDNISolone " (MEDROL DOSEPAK) 4 MG tablet     FLINTSTONES MULTIVITAMIN OR CHEW     No current facility-administered medications for this visit.        PHYSICAL EXAM:  /67  Pulse 63  Temp 98.4  F (36.9  C) (Oral)  Resp 16  Wt 54.1 kg (119 lb 4.8 oz)  LMP 04/24/2017  SpO2 100%  BMI 19.97 kg/m2  General: Alert, oriented, pleasant, NAD  HEENT: Normocephalic, atraumatic, PERRLA, EOMI. Moist mucus membranes, no lesions or thrush  Neck: No cervical or supraclavicular LAD.   Axillary: Deep semi-palpable node in mid-left axilla   Lungs: CTA bilaterally, normal work of breathing  Cardiac: RRR, S1, S2, no murmurs  Abdomen: Soft, nontender, nondistended. Normoactive bowel sounds. No hepatosplenomegaly, masses  Neuro: CNII-XII grossly intact  Extremities: No pedal edema    Labs:   Results for orders placed or performed in visit on 05/24/17 (from the past 24 hour(s))   CBC with platelets differential   Result Value Ref Range    WBC 2.5 (L) 4.0 - 11.0 10e9/L    RBC Count 4.02 3.8 - 5.2 10e12/L    Hemoglobin 12.7 11.7 - 15.7 g/dL    Hematocrit 39.3 35.0 - 47.0 %    MCV 98 78 - 100 fl    MCH 31.6 26.5 - 33.0 pg    MCHC 32.3 31.5 - 36.5 g/dL    RDW 11.9 10.0 - 15.0 %    Platelet Count 149 (L) 150 - 450 10e9/L    Diff Method Automated Method     % Neutrophils 55.7 %    % Lymphocytes 33.2 %    % Monocytes 7.9 %    % Eosinophils 2.0 %    % Basophils 0.8 %    % Immature Granulocytes 0.4 %    Nucleated RBCs 0 0 /100    Absolute Neutrophil 1.4 (L) 1.6 - 8.3 10e9/L    Absolute Lymphocytes 0.8 0.8 - 5.3 10e9/L    Absolute Monocytes 0.2 0.0 - 1.3 10e9/L    Absolute Eosinophils 0.1 0.0 - 0.7 10e9/L    Absolute Basophils 0.0 0.0 - 0.2 10e9/L    Abs Immature Granulocytes 0.0 0 - 0.4 10e9/L    Absolute Nucleated RBC 0.0    Comprehensive metabolic panel   Result Value Ref Range    Sodium 140 133 - 144 mmol/L    Potassium 4.2 3.4 - 5.3 mmol/L    Chloride 105 94 - 109 mmol/L    Carbon Dioxide 27 20 - 32 mmol/L    Anion Gap 8 3 - 14 mmol/L    Glucose  77 70 - 99 mg/dL    Urea Nitrogen 7 7 - 30 mg/dL    Creatinine 0.60 0.52 - 1.04 mg/dL    GFR Estimate >90  Non  GFR Calc   >60 mL/min/1.7m2    GFR Estimate If Black >90   GFR Calc   >60 mL/min/1.7m2    Calcium 9.0 8.5 - 10.1 mg/dL    Bilirubin Total 0.5 0.2 - 1.3 mg/dL    Albumin 4.0 3.4 - 5.0 g/dL    Protein Total 7.4 6.8 - 8.8 g/dL    Alkaline Phosphatase 61 40 - 150 U/L    ALT 33 0 - 50 U/L    AST 32 0 - 45 U/L   HCG qualitative   Result Value Ref Range    HCG Qualitative Serum Negative NEG       Assessment & Plan:   1. Metastatic breast cancer to nodes: Recently recurred and biopsy proven. She is feeling well to proceed with treatment with Ibrance and Tamoxifen today per clinical trial. She does have mild neutropenia and thrombocytopenia today. In absence of any viral symptoms, I am concerned this is from her supplements. I would like her to hold all of them for 2 weeks and then recheck labs at her planned research lab appt.     2. Supplements: Discontinue permanently dandelion root as this is estrogenic and turmeric with its bleeding properties. I will look up the others. I discussed there is limited data done on herbals so safety is never 100% guaranteed. She will hold all supplements for now as above.     3. Left shoulder pain and radiculopathy: Secondary to disease in axilla. Okay to continue Tylenol PM at bedtime and tylenol prn for now.     Rafia Voss PA-C    [facility] [address] [address]  [phone]

## 2017-05-24 NOTE — LETTER
"5/24/2017       RE: Shan Marsh  [address]  [facility] [address]     Dear Colleague,    Thank you for referring your patient, Shan Marsh, to the [facility]. Please see a copy of my visit note below.    Hematology-Oncology Visit  May 24, 2017    Reason for Visit: follow-up metastatic breast cancer, ER positive     HPI: Shan Marsh is a 47 year old female without significant past medical history with recent diagnosis of metastatic breast cancer after presenting with left shoulder pain. Shoulder MRI showed soft tissue mass close to distal clavicle concerning for malignancy. She had PET/CT 5/9/17 showing hypermetabolic left axillary, supraclavicular, mediastinal and hilar nodes. She had biopsies of left axilla and hilar nodes showing metastatic breast cancer, strongly ER positive, moderately WI positive and HER-2 kaitlin non-amplified. She originally had [id number], ER/WI positive, HER-2 negative breast cancer diagnosed in spring of 2012 s/p bilateral mastectomies and Tamoxifen from 8/2012-2/2013 and discontinued due to side effects. She met with Dr. Whitman most recently on 5/16/17 who recommended treatment on BIG10 clinical trial with palbociclib and tamoxifen. She consented to this and presents to start treatment today.     Interval History: Shan is here with her mother and feeling well today. She notes her left shoulder pain is a little better. She has intermittent radiating pain down to her forearm that feel like \"zingers\". Normally she does not have to take anything for pain but does take 2 Tylenol PM to help her sleep as the pain will sometimes wake her up. She has no other pain. Her appetite has been a little lower lately. She is following a vegan diet so eating as many calories has been difficult. She has stopped running to try to conserve more energy but is walking and going to yoga. She is taking many supplements per her homeopath and going to " acupuncture. No fatigue, fevers/chills, cough, SOB, CP, n/v/c/d, or abdominal pain. ROS otherwise negative.     Current Outpatient Prescriptions   Medication     prochlorperazine (COMPAZINE) 10 MG tablet     ondansetron (ZOFRAN) 8 MG tablet     methylPREDNISolone (MEDROL DOSEPAK) 4 MG tablet     FLINTSTONES MULTIVITAMIN OR CHEW     No current facility-administered medications for this visit.        PHYSICAL EXAM:  /67  Pulse 63  Temp 98.4  F (36.9  C) (Oral)  Resp 16  Wt 54.1 kg (119 lb 4.8 oz)  LMP 04/24/2017  SpO2 100%  BMI 19.97 kg/m2  General: Alert, oriented, pleasant, NAD  HEENT: Normocephalic, atraumatic, PERRLA, EOMI. Moist mucus membranes, no lesions or thrush  Neck: No cervical or supraclavicular LAD.   Axillary: Deep semi-palpable node in mid-left axilla   Lungs: CTA bilaterally, normal work of breathing  Cardiac: RRR, S1, S2, no murmurs  Abdomen: Soft, nontender, nondistended. Normoactive bowel sounds. No hepatosplenomegaly, masses  Neuro: CNII-XII grossly intact  Extremities: No pedal edema    Labs:   Results for orders placed or performed in visit on 05/24/17 (from the past 24 hour(s))   CBC with platelets differential   Result Value Ref Range    WBC 2.5 (L) 4.0 - 11.0 10e9/L    RBC Count 4.02 3.8 - 5.2 10e12/L    Hemoglobin 12.7 11.7 - 15.7 g/dL    Hematocrit 39.3 35.0 - 47.0 %    MCV 98 78 - 100 fl    MCH 31.6 26.5 - 33.0 pg    MCHC 32.3 31.5 - 36.5 g/dL    RDW 11.9 10.0 - 15.0 %    Platelet Count 149 (L) 150 - 450 10e9/L    Diff Method Automated Method     % Neutrophils 55.7 %    % Lymphocytes 33.2 %    % Monocytes 7.9 %    % Eosinophils 2.0 %    % Basophils 0.8 %    % Immature Granulocytes 0.4 %    Nucleated RBCs 0 0 /100    Absolute Neutrophil 1.4 (L) 1.6 - 8.3 10e9/L    Absolute Lymphocytes 0.8 0.8 - 5.3 10e9/L    Absolute Monocytes 0.2 0.0 - 1.3 10e9/L    Absolute Eosinophils 0.1 0.0 - 0.7 10e9/L    Absolute Basophils 0.0 0.0 - 0.2 10e9/L    Abs Immature Granulocytes 0.0 0 - 0.4  10e9/L    Absolute Nucleated RBC 0.0    Comprehensive metabolic panel   Result Value Ref Range    Sodium 140 133 - 144 mmol/L    Potassium 4.2 3.4 - 5.3 mmol/L    Chloride 105 94 - 109 mmol/L    Carbon Dioxide 27 20 - 32 mmol/L    Anion Gap 8 3 - 14 mmol/L    Glucose 77 70 - 99 mg/dL    Urea Nitrogen 7 7 - 30 mg/dL    Creatinine 0.60 0.52 - 1.04 mg/dL    GFR Estimate >90  Non  GFR Calc   >60 mL/min/1.7m2    GFR Estimate If Black >90   GFR Calc   >60 mL/min/1.7m2    Calcium 9.0 8.5 - 10.1 mg/dL    Bilirubin Total 0.5 0.2 - 1.3 mg/dL    Albumin 4.0 3.4 - 5.0 g/dL    Protein Total 7.4 6.8 - 8.8 g/dL    Alkaline Phosphatase 61 40 - 150 U/L    ALT 33 0 - 50 U/L    AST 32 0 - 45 U/L   HCG qualitative   Result Value Ref Range    HCG Qualitative Serum Negative NEG       Assessment & Plan:   1. Metastatic breast cancer to nodes: Recently recurred and biopsy proven. She is feeling well to proceed with treatment with Ibrance and Tamoxifen today per clinical trial. She does have mild neutropenia and thrombocytopenia today. In absence of any viral symptoms, I am concerned this is from her supplements. I would like her to hold all of them for 2 weeks and then recheck labs at her planned research lab appt.     2. Supplements: Discontinue permanently dandelion root as this is estrogenic and turmeric with its bleeding properties. I will look up the others. I discussed there is limited data done on herbals so safety is never 100% guaranteed. She will hold all supplements for now as above.     3. Left shoulder pain and radiculopathy: Secondary to disease in axilla. Okay to continue Tylenol PM at bedtime and tylenol prn for now.     Rafia Voss PA-C    [facility] [address] [address]  [phone]

## 2017-05-24 NOTE — MR AVS SNAPSHOT
After Visit Summary   5/24/2017    Shan Marsh    MRN: [id number]           Patient Information     Date Of Birth          1970        Visit Information        Provider Department      5/24/2017 9:30 AM Rafia Voss PA [facility]        Today's Diagnoses     Malignant neoplasm of female breast, unspecified laterality, unspecified site of breast (H)    -  1       Follow-ups after your visit        Your next 10 appointments already scheduled     Jun 07, 2017  9:00 AM CDT   [facility] Lab Draw with  [facility] DRAW   [facility])    [address] [address]   [phone]            Jun 07, 2017  9:30 AM CDT   (Arrive by 9:15 AM)   Return Visit with GRAY Harman   [facility] ([facility])    [address] [place] [address]   [phone]            Jun 21, 2017  9:45 AM CDT   [facility] Lab Draw with  [facility] LAB DRAW   [facility])    [address] [address]   [phone]            Jun 21, 2017 10:20 AM CDT   (Arrive by 10:05 AM)   Return Visit with GRAY Harman   [facility] ([facility])    [address] [address]   [phone]            Jul 05, 2017 11:00 AM CDT   [facility] Lab Draw with  MASONIC LAB DRAW   [facility] ([facility])    [address] [place] [address]   [phone]              Who to contact     If you have questions or need follow up information about today's clinic visit or your schedule please contact [facility] directly at [phone].  Normal or non-critical lab and imaging results will be communicated to you  "by [employer], letter or phone within 4 business days after the clinic has received the results. If you do not hear from us within 7 days, please contact the clinic through [employer] or phone. If you have a critical or abnormal lab result, we will notify you by phone as soon as possible.  Submit refill requests through [employer] or call your pharmacy and they will forward the refill request to us. Please allow 3 business days for your refill to be completed.          Additional Information About Your Visit        [employer] Information     [employer] gives you secure access to your electronic health record. If you see a primary care provider, you can also send messages to your care team and make appointments. If you have questions, please call your primary care clinic.  If you do not have a primary care provider, please call [phone] and they will assist you.        Care EveryWhere ID     This is your Care EveryWhere ID. This could be used by other organizations to access your [facility] medical records  [id number]        Your Vitals Were     Pulse Temperature Respirations Height Last Period Pulse Oximetry    63 98.4  F (36.9  C) (Oral) 16 1.646 m (5' 4.8\") 05/17/2017 (Approximate) 100%    BMI (Body Mass Index)                   19.97 kg/m2            Blood Pressure from Last 3 Encounters:   05/24/17 120/67   05/16/17 113/75   05/12/17 114/76    Weight from Last 3 Encounters:   05/24/17 54.1 kg (119 lb 4.8 oz)   05/16/17 54.3 kg (119 lb 11.4 oz)   05/10/17 56 kg (123 lb 6.4 oz)              We Performed the Following     CBC with platelets differential     Comprehensive metabolic panel     HCG qualitative          Today's Medication Changes          These changes are accurate as of: 5/24/17 10:43 AM.  If you have any questions, ask your nurse or doctor.               Start taking these medicines.        Dose/Directions    ondansetron 8 MG tablet   Commonly known as:  ZOFRAN   Used for:  Malignant neoplasm of female breast, " unspecified laterality, unspecified site of breast (H)   Started by:  Rafia Voss PA        Dose:  8 mg   Take 1 tablet (8 mg) by mouth every 8 hours as needed for nausea (vomiting)   Quantity:  30 tablet   Refills:  2       prochlorperazine 10 MG tablet   Commonly known as:  COMPAZINE   Used for:  Malignant neoplasm of female breast, unspecified laterality, unspecified site of breast (H)   Started by:  Rafia Voss PA        Dose:  10 mg   Take 1 tablet (10 mg) by mouth every 6 hours as needed (Nausea/Vomiting)   Quantity:  30 tablet   Refills:  2       study - palbociclib 125 mg capsule   Commonly known as:  ids# [id number]   Used for:  Malignant neoplasm of female breast, unspecified laterality, unspecified site of breast (H)   Started by:  Rafia Voss PA        Dose:  125 mg   Take 1 capsule (125 mg) by mouth daily with food Take on Days 1 through 21 of each 28 day cycle. Avoid grapefruit.   Quantity:  21 capsule   Refills:  0       tamoxifen 20 MG tablet   Commonly known as:  NOLVADEX   Used for:  Malignant neoplasm of female breast, unspecified laterality, unspecified site of breast (H)   Started by:  Rafia Voss PA        Dose:  20 mg   Take 1 tablet (20 mg) by mouth daily   Quantity:  28 tablet   Refills:  0            Where to get your medicines      These medications were sent to [id number] - [place], [place] - [address] AT [facility] [address]  [address], [place] [address]     Phone:  [phone]     ondansetron 8 MG tablet    prochlorperazine 10 MG tablet    tamoxifen 20 MG tablet         Some of these will need a paper prescription and others can be bought over the counter.  Ask your nurse if you have questions.     Bring a paper prescription for each of these medications     study - palbociclib 125 mg capsule                Primary Care Provider Office Phone # Fax #    Marilu Michelle [phone]       [facility] [address] [address]        Thank you!     Thank you for choosing [facility]  for your care. Our goal is always to provide you with excellent care. Hearing back from our patients is one way we can continue to improve our services. Please take a few minutes to complete the written survey that you may receive in the mail after your visit with us. Thank you!             Your Updated Medication List - Protect others around you: Learn how to safely use, store and throw away your medicines at www.disposemymeds.org.          This list is accurate as of: 5/24/17 10:43 AM.  Always use your most recent med list.                   Brand Name Dispense Instructions for use    CHLOROXYGEN PO      Take by mouth daily       DANDELION ROOT PO      Take by mouth daily       FLINTSTONES MULTIVITAMIN Chew      1 tablet daily       methylPREDNISolone 4 MG tablet    MEDROL DOSEPAK    21 tablet    Follow package instructions       ondansetron 8 MG tablet    ZOFRAN    30 tablet    Take 1 tablet (8 mg) by mouth every 8 hours as needed for nausea (vomiting)       prochlorperazine 10 MG tablet    COMPAZINE    30 tablet    Take 1 tablet (10 mg) by mouth every 6 hours as needed (Nausea/Vomiting)       study - palbociclib 125 mg capsule    ids# [id number]    21 capsule    Take 1 capsule (125 mg) by mouth daily with food Take on Days 1 through 21 of each 28 day cycle. Avoid grapefruit.       tamoxifen 20 MG tablet    NOLVADEX    28 tablet    Take 1 tablet (20 mg) by mouth daily       TURMERIC PO      Take by mouth daily       * UNABLE TO FIND      daily Comprehensive Immunity Support - Host Defense       * UNABLE TO FIND      daily Mushrooms - My Community, Reishi, and Shitake.       * UNABLE TO FIND      daily Astragalus Immunity       * UNABLE TO FIND      daily Fucoidon       VITAMIN D (CHOLECALCIFEROL) PO      Take by mouth daily       * Notice:  This list has 4 medication(s) that are the same as other  medications prescribed for you. Read the directions carefully, and ask your doctor or other care provider to review them with you.

## 2017-05-24 NOTE — NURSING NOTE
Chief Complaint   Patient presents with     Blood Draw     Labs collected peripherally by RN.      Labs collected via venipuncture, checked in for provider visit.     Nereyda Alejo RN

## 2017-05-24 NOTE — NURSING NOTE
"Oncology Rooming Note    May 24, 2017 9:38 AM   Shan Marsh is a 47 year old female who presents for:    Chief Complaint   Patient presents with     Blood Draw     Labs collected peripherally by RN.      Oncology Clinic Visit     Breast Ca F/U.     Initial Vitals: /67  Pulse 63  Temp 98.4  F (36.9  C) (Oral)  Resp 16  Ht 1.646 m (5' 4.8\")  Wt 54.1 kg (119 lb 4.8 oz)  LMP 05/17/2017 (Approximate)  SpO2 100%  BMI 19.97 kg/m2 Estimated body mass index is 19.97 kg/(m^2) as calculated from the following:    Height as of this encounter: 1.646 m (5' 4.8\").    Weight as of this encounter: 54.1 kg (119 lb 4.8 oz). Body surface area is 1.57 meters squared.  No Pain (0) Comment: Data Unavailable   Patient's last menstrual period was 05/17/2017 (approximate).  Allergies reviewed: Yes  Medications reviewed: Yes    Medications: Medication refills not needed today.  Pharmacy name entered into [employer]:    [facility] [place], [place] - [address] [place] [address] DRUG STORE [address] [place], [place] - [address] [facility] [address]  [facility] [place], [place] - [address] [facility] DRUG STORE [id number] - [place], [place] - [address] AT [facility] [address]    Clinical concerns: None Rafia Voss was NOT notified.    7 minutes for nursing intake (face to face time)     Hannah Barnes LPN              "

## 2017-05-24 NOTE — NURSING NOTE
BIG TEN Trial using Tamoxifen/Ibrance to treat breast cancer recurrence: Patient here for Cycle1 Day 1. Patient met all eligibility criteria. Pt counseled to stop all herbal supplements that she recently initiated (dandilion root and Astrugulus tinctures /1 dropper 2 x day-which she took this morning).Baseline HX includes D&C 4/2006 and D&C and  D&C with removal of uterine fibroid 8/2010.  Mastectomy in 2012. Pt has had intermittent Left arm and shoulder pain since 4/1/17 which is related to the axllia metastasis site. She uses Tylenol PM (2 tabs) at night to manage. ECOG is rated 0 with full activity noted.  All patients questions related to research and study drug administration  answered by Rafia and myself. Study drug was dispensed through [facility]:Palbociclib 125 mg/day x 21 days with a 7 day break  For each cycle. SOC tamoxifen 20 mg/day is continuous. Pt has compazine and zofran for PRN use if needed.  Diary given to patient to complete and return with empty drug bottle each cycle.   Safety labs and FU with SAGAR Voss in 2 weeks.  Genet Christie RN/ Research

## 2017-06-07 ENCOUNTER — ONCOLOGY VISIT (OUTPATIENT)
Dept: ONCOLOGY | Facility: CLINIC | Age: 47
End: 2017-06-07
Attending: PHYSICIAN ASSISTANT
Payer: COMMERCIAL

## 2017-06-07 ENCOUNTER — APPOINTMENT (OUTPATIENT)
Dept: LAB | Facility: CLINIC | Age: 47
End: 2017-06-07
Attending: PHYSICIAN ASSISTANT
Payer: COMMERCIAL

## 2017-06-07 VITALS
HEIGHT: 65 IN | SYSTOLIC BLOOD PRESSURE: 114 MMHG | OXYGEN SATURATION: 99 % | WEIGHT: 118.5 LBS | HEART RATE: 66 BPM | RESPIRATION RATE: 16 BRPM | BODY MASS INDEX: 19.74 KG/M2 | TEMPERATURE: 98.8 F | DIASTOLIC BLOOD PRESSURE: 65 MMHG

## 2017-06-07 DIAGNOSIS — C50.912 MALIGNANT NEOPLASM OF LEFT FEMALE BREAST, UNSPECIFIED SITE OF BREAST: Primary | ICD-10-CM

## 2017-06-07 LAB
BASOPHILS # BLD AUTO: 0 10E9/L (ref 0–0.2)
BASOPHILS NFR BLD AUTO: 0 %
DIFFERENTIAL METHOD BLD: ABNORMAL
EOSINOPHIL # BLD AUTO: 0 10E9/L (ref 0–0.7)
EOSINOPHIL NFR BLD AUTO: 0.9 %
ERYTHROCYTE [DISTWIDTH] IN BLOOD BY AUTOMATED COUNT: 11.6 % (ref 10–15)
HCT VFR BLD AUTO: 35 % (ref 35–47)
HGB BLD-MCNC: 11.6 G/DL (ref 11.7–15.7)
LYMPHOCYTES # BLD AUTO: 0.7 10E9/L (ref 0.8–5.3)
LYMPHOCYTES NFR BLD AUTO: 44.1 %
MCH RBC QN AUTO: 32.2 PG (ref 26.5–33)
MCHC RBC AUTO-ENTMCNC: 33.1 G/DL (ref 31.5–36.5)
MCV RBC AUTO: 97 FL (ref 78–100)
MONOCYTES # BLD AUTO: 0 10E9/L (ref 0–1.3)
MONOCYTES NFR BLD AUTO: 1.8 %
NEUTROPHILS # BLD AUTO: 0.9 10E9/L (ref 1.6–8.3)
NEUTROPHILS NFR BLD AUTO: 53.2 %
PLATELET # BLD AUTO: 90 10E9/L (ref 150–450)
RBC # BLD AUTO: 3.6 10E12/L (ref 3.8–5.2)
RBC MORPH BLD: NORMAL
WBC # BLD AUTO: 1.6 10E9/L (ref 4–11)

## 2017-06-07 PROCEDURE — 99212 OFFICE O/P EST SF 10 MIN: CPT | Mod: ZF

## 2017-06-07 PROCEDURE — 99214 OFFICE O/P EST MOD 30 MIN: CPT | Mod: ZP | Performed by: PHYSICIAN ASSISTANT

## 2017-06-07 PROCEDURE — 36415 COLL VENOUS BLD VENIPUNCTURE: CPT | Performed by: PHYSICIAN ASSISTANT

## 2017-06-07 PROCEDURE — 85025 COMPLETE CBC W/AUTO DIFF WBC: CPT | Performed by: PHYSICIAN ASSISTANT

## 2017-06-07 ASSESSMENT — PAIN SCALES - GENERAL: PAINLEVEL: NO PAIN (0)

## 2017-06-07 NOTE — NURSING NOTE
"Oncology Rooming Note    June 7, 2017 9:37 AM   Shan Marsh is a 47 year old female who presents for:    Chief Complaint   Patient presents with     Blood Draw     venipuncture     Oncology Clinic Visit     return patient visit for follow up related to breast cancer     Initial Vitals: /65  Pulse 66  Temp 98.8  F (37.1  C) (Oral)  Resp 16  Ht 1.646 m (5' 4.8\")  Wt 53.8 kg (118 lb 8 oz)  LMP 05/17/2017 (Approximate)  SpO2 99%  BMI 19.84 kg/m2 Estimated body mass index is 19.84 kg/(m^2) as calculated from the following:    Height as of this encounter: 1.646 m (5' 4.8\").    Weight as of this encounter: 53.8 kg (118 lb 8 oz). Body surface area is 1.57 meters squared.  No Pain (0) Comment: Data Unavailable   Patient's last menstrual period was 05/17/2017 (approximate).  Allergies reviewed: Yes  Medications reviewed: Yes    Medications: Medication refills not needed today.  Pharmacy name entered into Mixers:    [facility] [place], [place] - [address] [place] [address] DRUG STORE [id number] [place], [place] - [address] [place] [address] AT [facility] & [address]  [facility] [place], [place] - [address] [facility] DRUG STORE [id number] - [place], [place] - [address] AT [facility] OF [address]    Clinical concerns: none katrin was notified.    5 minutes for nursing intake (face to face time)     Justus Pruitt CMA              "

## 2017-06-07 NOTE — MR AVS SNAPSHOT
After Visit Summary   6/7/2017    Shan Marsh    MRN: [id number]           Patient Information     Date Of Birth          1970        Visit Information        Provider Department      6/7/2017 9:30 AM Rafia Voss PA [facility]        Today's Diagnoses     Malignant neoplasm of left female breast, unspecified site of breast (H)    -  1       Follow-ups after your visit        Your next 10 appointments already scheduled     Jun 14, 2017  9:00 AM CDT   [facility] Lab Draw with  [facility]   [facility] Draw ([facility])    [address] [place] [id number]   [phone]            Jun 21, 2017  9:45 AM CDT   [facility] with  [facility]   [facility])    [address] [place] [address]   [phone]            Jun 21, 2017 10:20 AM CDT   (Arrive by 10:05 AM)   Return Visit with GRAY Harman   [facility] ([facility])    [address] [place] [address]   [phone]            Jul 05, 2017 11:00 AM CDT   [facility] Draw with [facility]   [facility] Draw ([facility])    [address] [place] [address]   [phone]            Jul 17, 2017 12:00 PM CDT   (Arrive by 11:45 AM)   Return Visit with Ana Whitman MD   [facility] ([facility])    [address] [place] [id number]   [phone]              Future tests that were ordered for you today     Open Future Orders        Priority Expected Expires Ordered    CBC with platelets differential Routine 6/14/2017 6/16/2017 6/7/2017            Who to contact     If you have questions or need follow up information about today's clinic  "visit or your schedule please contact [facility] directly at [phone].  Normal or non-critical lab and imaging results will be communicated to you by MyChart, letter or phone within 4 business days after the clinic has received the results. If you do not hear from us within 7 days, please contact the clinic through my[employer]ivery[employer] or phone. If you have a critical or abnormal lab result, we will notify you by phone as soon as possible.  Submit refill requests through [employer] or call your pharmacy and they will forward the refill request to us. Please allow 3 business days for your refill to be completed.          Additional Information About Your Visit        myParcelDeliveryharAIRTAME Information     [employer] gives you secure access to your electronic health record. If you see a primary care provider, you can also send messages to your care team and make appointments. If you have questions, please call your primary care clinic.  If you do not have a primary care provider, please call [phone] and they will assist you.        Care EveryWhere ID     This is your Care EveryWhere ID. This could be used by other organizations to access your [facility] medical records  [id number]        Your Vitals Were     Pulse Temperature Respirations Height Last Period Pulse Oximetry    66 98.8  F (37.1  C) (Oral) 16 1.646 m (5' 4.8\") 05/17/2017 (Approximate) 99%    BMI (Body Mass Index)                   19.84 kg/m2            Blood Pressure from Last 3 Encounters:   06/07/17 114/65   05/24/17 120/67   05/16/17 113/75    Weight from Last 3 Encounters:   06/07/17 53.8 kg (118 lb 8 oz)   05/24/17 54.1 kg (119 lb 4.8 oz)   05/16/17 54.3 kg (119 lb 11.4 oz)              We Performed the Following     CBC with platelets differential        Primary Care Provider Office Phone # Fax #    Marilu Michelle [phone]       [facility] [address]  [facility] [address]        Thank you!     Thank you for choosing [facility]  for your care. Our goal is always to provide you with excellent care. Hearing back from our patients is one way we can continue to improve our services. Please take a few minutes to complete the written survey that you may receive in the mail after your visit with us. Thank you!             Your Updated Medication List - Protect others around you: Learn how to safely use, store and throw away your medicines at www.disposemymeds.org.          This list is accurate as of: 6/7/17  2:48 PM.  Always use your most recent med list.                   Brand Name Dispense Instructions for use    CHLOROXYGEN PO      Take by mouth daily       FLINTSTONES MULTIVITAMIN Chew      1 tablet daily       ondansetron 8 MG tablet    ZOFRAN    30 tablet    Take 1 tablet (8 mg) by mouth every 8 hours as needed for nausea (vomiting)       prochlorperazine 10 MG tablet    COMPAZINE    30 tablet    Take 1 tablet (10 mg) by mouth every 6 hours as needed (Nausea/Vomiting)       study - palbociclib 125 mg capsule    ids# 4993    21 capsule    Take 1 capsule (125 mg) by mouth daily with food Take on Days 1 through 21 of each 28 day cycle. Avoid grapefruit.       tamoxifen 20 MG tablet    NOLVADEX    28 tablet    Take 1 tablet (20 mg) by mouth daily       * UNABLE TO FIND      daily Comprehensive Immunity Support - Host Defense       * UNABLE TO FIND      daily Mushrooms - My Community, Reishi, and Shitake.       * UNABLE TO FIND      daily Astragalus Immunity       * UNABLE TO FIND      daily Fucoidon       VITAMIN D (CHOLECALCIFEROL) PO      Take by mouth daily       * Notice:  This list has 4 medication(s) that are the same as other medications prescribed for you. Read the directions carefully, and ask your doctor or other care provider to review them with you.

## 2017-06-07 NOTE — NURSING NOTE
Chief Complaint   Patient presents with     Blood Draw     venipuncture     Vitals done and labs drawn, see flow sheets.  PEÑA SOTO, CMA

## 2017-06-07 NOTE — PROGRESS NOTES
"Hematology-Oncology Visit  Jun 7, 2017    Reason for Visit: follow-up metastatic breast cancer, ER positive     HPI: Shan Marsh is a 47 year old female without significant past medical history with recent diagnosis of metastatic breast cancer after presenting with left shoulder pain. Shoulder MRI showed soft tissue mass close to distal clavicle concerning for malignancy. She had PET/CT 5/9/17 showing hypermetabolic left axillary, supraclavicular, mediastinal and hilar nodes. She had biopsies of left axilla and hilar nodes showing metastatic breast cancer, strongly ER positive, moderately ME positive and HER-2 kaitlin non-amplified. She originally had [id number], ER/ME positive, HER-2 negative breast cancer diagnosed in spring of 2012 s/p bilateral mastectomies and Tamoxifen from 8/2012-2/2013 and discontinued due to side effects. She met with Dr. Whitman most recently on 5/16/17 who recommended treatment on BIG10 clinical trial with palbociclib and tamoxifen. She consented to this and started treatment on 5/24/17.     Interval History: Shan is here with her mother and feeling well today. She is tolerating palbo and tamoxifen well. The first week she had some fatigue and orthostatic dizziness but these both have completely resolved. She has some blood-tinged rhinorrhea however she has also been recovering from head cold with congestion. She denies any fevers/chills with this. No cough. It is improving. She denies any headaches, nausea, bowel changes, or other symptoms. Her left shoulder pain has continued to improve. She no longer has \"zingers\".  She does later admit that she was taking a mushroom blend while she had sinus symptoms to improve her immunity. ROS otherwise negative.     Current Outpatient Prescriptions   Medication     Chlorophyll (CHLOROXYGEN PO)     UNABLE TO FIND     UNABLE TO FIND     VITAMIN D, CHOLECALCIFEROL, PO     UNABLE TO FIND     UNABLE TO FIND     study - palbociclib (IDS# 4993) 125 mg " "capsule     tamoxifen (NOLVADEX) 20 MG tablet     FLINTSTONES MULTIVITAMIN OR CHEW     prochlorperazine (COMPAZINE) 10 MG tablet     ondansetron (ZOFRAN) 8 MG tablet     No current facility-administered medications for this visit.        PHYSICAL EXAM:  /65  Pulse 66  Temp 98.8  F (37.1  C) (Oral)  Resp 16  Ht 1.646 m (5' 4.8\")  Wt 53.8 kg (118 lb 8 oz)  LMP 05/17/2017 (Approximate)  SpO2 99%  BMI 19.84 kg/m2  General: Alert, oriented, pleasant, NAD  She was not otherwise examined.     Labs:   Results for orders placed or performed in visit on 06/07/17 (from the past 24 hour(s))   CBC with platelets differential   Result Value Ref Range    WBC 1.6 (L) 4.0 - 11.0 10e9/L    RBC Count 3.60 (L) 3.8 - 5.2 10e12/L    Hemoglobin 11.6 (L) 11.7 - 15.7 g/dL    Hematocrit 35.0 35.0 - 47.0 %    MCV 97 78 - 100 fl    MCH 32.2 26.5 - 33.0 pg    MCHC 33.1 31.5 - 36.5 g/dL    RDW 11.6 10.0 - 15.0 %    Platelet Count 90 (L) 150 - 450 10e9/L    Diff Method Manual Differential     % Neutrophils 53.2 %    % Lymphocytes 44.1 %    % Monocytes 1.8 %    % Eosinophils 0.9 %    % Basophils 0.0 %    Absolute Neutrophil 0.9 (L) 1.6 - 8.3 10e9/L    Absolute Lymphocytes 0.7 (L) 0.8 - 5.3 10e9/L    Absolute Monocytes 0.0 0.0 - 1.3 10e9/L    Absolute Eosinophils 0.0 0.0 - 0.7 10e9/L    Absolute Basophils 0.0 0.0 - 0.2 10e9/L    RBC Morphology Normal        Assessment & Plan:   1. Metastatic breast cancer to nodes: Recently recurred and biopsy proven. She started palbociclib and tamoxifen as part of BIG10 clinical trial on 5/24/17. She is tolerating well but ANC has dropped to 900 so per protocol will have to hold palbo until ANC >1000 and then reduce dose to 100 mg. She should continue tamoxifen. I will follow-up with her in 2 weeks.     2. Supplements: Discontinue all supplements. I sent a [employer] message on 5/24 with more details. This could be contributing to neutropenia.     3. Left shoulder pain and radiculopathy: Improving. " Secondary to disease in axilla. Okay to continue Tylenol PM at bedtime and tylenol prn for now.     Greater than 25 minutes was spent with this patient with greater than 50% spent in counseling and coordination of care.    Rafia Voss PA-C    [facility] [address] [address]  [phone]

## 2017-06-07 NOTE — LETTER
"6/7/2017      RE: Shan Marsh  [address]  [facility] [address]       Hematology-Oncology Visit  Jun 7, 2017    Reason for Visit: follow-up metastatic breast cancer, ER positive     HPI: Shan Marsh is a 47 year old female without significant past medical history with recent diagnosis of metastatic breast cancer after presenting with left shoulder pain. Shoulder MRI showed soft tissue mass close to distal clavicle concerning for malignancy. She had PET/CT 5/9/17 showing hypermetabolic left axillary, supraclavicular, mediastinal and hilar nodes. She had biopsies of left axilla and hilar nodes showing metastatic breast cancer, strongly ER positive, moderately AZ positive and HER-2 kaitlin non-amplified. She originally had [id number], ER/AZ positive, HER-2 negative breast cancer diagnosed in spring of 2012 s/p bilateral mastectomies and Tamoxifen from 8/2012-2/2013 and discontinued due to side effects. She met with Dr. Whitman most recently on 5/16/17 who recommended treatment on BIG10 clinical trial with palbociclib and tamoxifen. She consented to this and started treatment on 5/24/17.     Interval History: Shan is here with her mother and feeling well today. She is tolerating palbo and tamoxifen well. The first week she had some fatigue and orthostatic dizziness but these both have completely resolved. She has some blood-tinged rhinorrhea however she has also been recovering from head cold with congestion. She denies any fevers/chills with this. No cough. It is improving. She denies any headaches, nausea, bowel changes, or other symptoms. Her left shoulder pain has continued to improve. She no longer has \"zingers\".  She does later admit that she was taking a mushroom blend while she had sinus symptoms to improve her immunity. ROS otherwise negative.     Current Outpatient Prescriptions   Medication     Chlorophyll (CHLOROXYGEN PO)     UNABLE TO FIND     UNABLE TO FIND     VITAMIN D, " "CHOLECALCIFEROL, PO     UNABLE TO FIND     UNABLE TO FIND     study - palbociclib (IDS# 4993) 125 mg capsule     tamoxifen (NOLVADEX) 20 MG tablet     FLINTSTONES MULTIVITAMIN OR CHEW     prochlorperazine (COMPAZINE) 10 MG tablet     ondansetron (ZOFRAN) 8 MG tablet     No current facility-administered medications for this visit.        PHYSICAL EXAM:  /65  Pulse 66  Temp 98.8  F (37.1  C) (Oral)  Resp 16  Ht 1.646 m (5' 4.8\")  Wt 53.8 kg (118 lb 8 oz)  LMP 05/17/2017 (Approximate)  SpO2 99%  BMI 19.84 kg/m2  General: Alert, oriented, pleasant, NAD  She was not otherwise examined.     Labs:   Results for orders placed or performed in visit on 06/07/17 (from the past 24 hour(s))   CBC with platelets differential   Result Value Ref Range    WBC 1.6 (L) 4.0 - 11.0 10e9/L    RBC Count 3.60 (L) 3.8 - 5.2 10e12/L    Hemoglobin 11.6 (L) 11.7 - 15.7 g/dL    Hematocrit 35.0 35.0 - 47.0 %    MCV 97 78 - 100 fl    MCH 32.2 26.5 - 33.0 pg    MCHC 33.1 31.5 - 36.5 g/dL    RDW 11.6 10.0 - 15.0 %    Platelet Count 90 (L) 150 - 450 10e9/L    Diff Method Manual Differential     % Neutrophils 53.2 %    % Lymphocytes 44.1 %    % Monocytes 1.8 %    % Eosinophils 0.9 %    % Basophils 0.0 %    Absolute Neutrophil 0.9 (L) 1.6 - 8.3 10e9/L    Absolute Lymphocytes 0.7 (L) 0.8 - 5.3 10e9/L    Absolute Monocytes 0.0 0.0 - 1.3 10e9/L    Absolute Eosinophils 0.0 0.0 - 0.7 10e9/L    Absolute Basophils 0.0 0.0 - 0.2 10e9/L    RBC Morphology Normal        Assessment & Plan:   1. Metastatic breast cancer to nodes: Recently recurred and biopsy proven. She started palbociclib and tamoxifen as part of BIG10 clinical trial on 5/24/17. She is tolerating well but ANC has dropped to 900 so per protocol will have to hold palbo until ANC >1000 and then reduce dose to 100 mg. She should continue tamoxifen. I will follow-up with her in 2 weeks.     2. Supplements: Discontinue all supplements. I sent a [employer]achio message on 5/24 with more details. " This could be contributing to neutropenia.     3. Left shoulder pain and radiculopathy: Improving. Secondary to disease in axilla. Okay to continue Tylenol PM at bedtime and tylenol prn for now.     Greater than 25 minutes was spent with this patient with greater than 50% spent in counseling and coordination of care.    Rafia Voss PA-C    [facility] [address] [id number] [phone]

## 2017-06-08 ENCOUNTER — RESEARCH ENCOUNTER (OUTPATIENT)
Dept: ONCOLOGY | Facility: CLINIC | Age: 47
End: 2017-06-08

## 2017-06-08 NOTE — PROGRESS NOTES
Dr. Whitman was notified regarding ANC value obtained on 6/7/2017. Grade 3 neutropenia. Per protocol, palbociclib to be held for grade 3 neutropenia with fever and/or infection. Subject is afebrile with no signs for infection. Dr. Whitman confirmed that subject should continue to take daily Palbociclib. Subject did hold dose on 6/7/2017 but will resume today per our phone conversation.

## 2017-06-13 ENCOUNTER — NURSE TRIAGE (OUTPATIENT)
Dept: NURSING | Facility: CLINIC | Age: 47
End: 2017-06-13

## 2017-06-13 NOTE — TELEPHONE ENCOUNTER
"  Reason for Disposition    [1] Not caused by an injury AND [2] < 5 unexplained bruises    Additional Information    Negative: Shock suspected (e.g., cold/pale/clammy skin, too weak to stand, low BP, rapid pulse)    Negative: Sounds like a life-threatening emergency to the triager    Negative: Dizziness or lightheadedness    Negative: Bruises with fever    Negative: Tiny bruises (spots or dots) of unknown cause    Negative: Bruise(s) of forehead or head    Negative: Bruise(s) of face or jaw    Negative: Followed an injury, and triager doesn't know which injury guideline to use first    Negative: Post-operative bruising    Negative: [1] Bruise on head/face, chest, or abdomen AND [2]  taking Coumadin (warfarin) or other strong blood thinner, or known bleeding disorder (e.g., thrombocytopenia)    Negative: Unexplained bleeding from another site (e.g., gums, nose, urine) as well    Negative: Patient sounds very sick or weak to the triager    Negative: [1] Not caused by an injury AND [2] 5 or more bruises now    Negative: [1] Raised bruise AND [2] size > 2 inches (5 cm) AND [3] expanding    Negative: [1] SEVERE pain AND [2] not improved 2 hours after pain medicine/ice packs    Negative: Suspicious history for the injury    Negative: Taking Coumadin (warfarin) or other strong blood thinner, or known bleeding disorder (e.g., thrombocytopenia)    Protocols used: BRUISES-ADULT-AH  \"I am a patient at the [facility]. I have an enlarged vessel and I have an appt to have someone take a look at it tomorrow morning. Today I noted a bruise on my forehead that is the size of a dime. No other sx.\"  Leah Ruiz RN  [facility]    "

## 2017-06-14 DIAGNOSIS — C50.912 MALIGNANT NEOPLASM OF LEFT FEMALE BREAST, UNSPECIFIED SITE OF BREAST: ICD-10-CM

## 2017-06-14 LAB
BASOPHILS # BLD AUTO: 0 10E9/L (ref 0–0.2)
BASOPHILS NFR BLD AUTO: 0 %
DIFFERENTIAL METHOD BLD: ABNORMAL
EOSINOPHIL # BLD AUTO: 0 10E9/L (ref 0–0.7)
EOSINOPHIL NFR BLD AUTO: 0.9 %
ERYTHROCYTE [DISTWIDTH] IN BLOOD BY AUTOMATED COUNT: 12.2 % (ref 10–15)
HCT VFR BLD AUTO: 34.9 % (ref 35–47)
HGB BLD-MCNC: 11.3 G/DL (ref 11.7–15.7)
LYMPHOCYTES # BLD AUTO: 0.6 10E9/L (ref 0.8–5.3)
LYMPHOCYTES NFR BLD AUTO: 38.1 %
MCH RBC QN AUTO: 32 PG (ref 26.5–33)
MCHC RBC AUTO-ENTMCNC: 32.4 G/DL (ref 31.5–36.5)
MCV RBC AUTO: 99 FL (ref 78–100)
MONOCYTES # BLD AUTO: 0.1 10E9/L (ref 0–1.3)
MONOCYTES NFR BLD AUTO: 4.4 %
NEUTROPHILS # BLD AUTO: 0.8 10E9/L (ref 1.6–8.3)
NEUTROPHILS NFR BLD AUTO: 56.6 %
PLATELET # BLD AUTO: 82 10E9/L (ref 150–450)
RBC # BLD AUTO: 3.53 10E12/L (ref 3.8–5.2)
RBC MORPH BLD: NORMAL
WBC # BLD AUTO: 1.5 10E9/L (ref 4–11)

## 2017-06-14 PROCEDURE — 36415 COLL VENOUS BLD VENIPUNCTURE: CPT | Performed by: PHYSICIAN ASSISTANT

## 2017-06-14 PROCEDURE — 85025 COMPLETE CBC W/AUTO DIFF WBC: CPT | Performed by: PHYSICIAN ASSISTANT

## 2017-06-21 ENCOUNTER — RESEARCH ENCOUNTER (OUTPATIENT)
Dept: ONCOLOGY | Facility: CLINIC | Age: 47
End: 2017-06-21

## 2017-06-21 ENCOUNTER — ONCOLOGY VISIT (OUTPATIENT)
Dept: ONCOLOGY | Facility: CLINIC | Age: 47
End: 2017-06-21
Attending: PHYSICIAN ASSISTANT
Payer: COMMERCIAL

## 2017-06-21 VITALS
WEIGHT: 118.2 LBS | OXYGEN SATURATION: 100 % | TEMPERATURE: 99.1 F | SYSTOLIC BLOOD PRESSURE: 126 MMHG | HEART RATE: 70 BPM | DIASTOLIC BLOOD PRESSURE: 79 MMHG | RESPIRATION RATE: 18 BRPM | BODY MASS INDEX: 19.79 KG/M2

## 2017-06-21 DIAGNOSIS — C50.912 MALIGNANT NEOPLASM OF LEFT FEMALE BREAST, UNSPECIFIED SITE OF BREAST: ICD-10-CM

## 2017-06-21 DIAGNOSIS — C50.912 MALIGNANT NEOPLASM OF LEFT FEMALE BREAST, UNSPECIFIED SITE OF BREAST: Primary | ICD-10-CM

## 2017-06-21 DIAGNOSIS — C50.919 MALIGNANT NEOPLASM OF FEMALE BREAST, UNSPECIFIED LATERALITY, UNSPECIFIED SITE OF BREAST: Primary | ICD-10-CM

## 2017-06-21 DIAGNOSIS — B37.31 YEAST INFECTION OF THE VAGINA: ICD-10-CM

## 2017-06-21 LAB
ALBUMIN SERPL-MCNC: 3.8 G/DL (ref 3.4–5)
ALP SERPL-CCNC: 47 U/L (ref 40–150)
ALT SERPL W P-5'-P-CCNC: 21 U/L (ref 0–50)
ANION GAP SERPL CALCULATED.3IONS-SCNC: 6 MMOL/L (ref 3–14)
AST SERPL W P-5'-P-CCNC: 18 U/L (ref 0–45)
BASOPHILS # BLD AUTO: 0 10E9/L (ref 0–0.2)
BASOPHILS NFR BLD AUTO: 0.9 %
BILIRUB SERPL-MCNC: 0.4 MG/DL (ref 0.2–1.3)
BUN SERPL-MCNC: 11 MG/DL (ref 7–30)
CALCIUM SERPL-MCNC: 8.7 MG/DL (ref 8.5–10.1)
CHLORIDE SERPL-SCNC: 106 MMOL/L (ref 94–109)
CO2 SERPL-SCNC: 26 MMOL/L (ref 20–32)
CREAT SERPL-MCNC: 0.58 MG/DL (ref 0.52–1.04)
DIFFERENTIAL METHOD BLD: ABNORMAL
EOSINOPHIL # BLD AUTO: 0 10E9/L (ref 0–0.7)
EOSINOPHIL NFR BLD AUTO: 0 %
ERYTHROCYTE [DISTWIDTH] IN BLOOD BY AUTOMATED COUNT: 14.2 % (ref 10–15)
GFR SERPL CREATININE-BSD FRML MDRD: NORMAL ML/MIN/1.7M2
GLUCOSE SERPL-MCNC: 91 MG/DL (ref 70–99)
HCG UR QL: NEGATIVE
HCT VFR BLD AUTO: 34.7 % (ref 35–47)
HGB BLD-MCNC: 11.5 G/DL (ref 11.7–15.7)
LYMPHOCYTES # BLD AUTO: 0.6 10E9/L (ref 0.8–5.3)
LYMPHOCYTES NFR BLD AUTO: 38.9 %
MCH RBC QN AUTO: 32.9 PG (ref 26.5–33)
MCHC RBC AUTO-ENTMCNC: 33.1 G/DL (ref 31.5–36.5)
MCV RBC AUTO: 99 FL (ref 78–100)
MONOCYTES # BLD AUTO: 0.1 10E9/L (ref 0–1.3)
MONOCYTES NFR BLD AUTO: 9.7 %
MYELOCYTES # BLD: 0 10E9/L
MYELOCYTES NFR BLD MANUAL: 0.9 %
NEUTROPHILS # BLD AUTO: 0.7 10E9/L (ref 1.6–8.3)
NEUTROPHILS NFR BLD AUTO: 49.6 %
PLATELET # BLD AUTO: 149 10E9/L (ref 150–450)
POTASSIUM SERPL-SCNC: 4.2 MMOL/L (ref 3.4–5.3)
PROT SERPL-MCNC: 7.2 G/DL (ref 6.8–8.8)
RBC # BLD AUTO: 3.5 10E12/L (ref 3.8–5.2)
RBC MORPH BLD: NORMAL
SODIUM SERPL-SCNC: 138 MMOL/L (ref 133–144)
WBC # BLD AUTO: 1.5 10E9/L (ref 4–11)

## 2017-06-21 PROCEDURE — 85025 COMPLETE CBC W/AUTO DIFF WBC: CPT | Performed by: INTERNAL MEDICINE

## 2017-06-21 PROCEDURE — 81025 URINE PREGNANCY TEST: CPT | Performed by: PHYSICIAN ASSISTANT

## 2017-06-21 PROCEDURE — 80053 COMPREHEN METABOLIC PANEL: CPT | Performed by: INTERNAL MEDICINE

## 2017-06-21 PROCEDURE — 84703 CHORIONIC GONADOTROPIN ASSAY: CPT | Performed by: PHYSICIAN ASSISTANT

## 2017-06-21 PROCEDURE — 99212 OFFICE O/P EST SF 10 MIN: CPT | Mod: ZF

## 2017-06-21 PROCEDURE — 36415 COLL VENOUS BLD VENIPUNCTURE: CPT | Performed by: INTERNAL MEDICINE

## 2017-06-21 PROCEDURE — 99215 OFFICE O/P EST HI 40 MIN: CPT | Mod: ZP | Performed by: PHYSICIAN ASSISTANT

## 2017-06-21 RX ORDER — TAMOXIFEN CITRATE 20 MG/1
20 TABLET ORAL DAILY
Qty: 90 TABLET | Refills: 3 | Status: SHIPPED | OUTPATIENT
Start: 2017-06-21 | End: 2017-08-21

## 2017-06-21 RX ORDER — FLUCONAZOLE 150 MG/1
150 TABLET ORAL ONCE
Qty: 1 TABLET | Refills: 3 | Status: SHIPPED | OUTPATIENT
Start: 2017-06-21 | End: 2018-03-20

## 2017-06-21 RX ORDER — TAMOXIFEN CITRATE 20 MG/1
20 TABLET ORAL DAILY
Qty: 28 TABLET | Refills: 0 | Status: SHIPPED | OUTPATIENT
Start: 2017-06-21 | End: 2017-08-21

## 2017-06-21 NOTE — PROGRESS NOTES
"Hematology-Oncology Visit  Jun 21, 2017    Reason for Visit: follow-up metastatic breast cancer, ER positive     HPI: Shan Marsh is a 47 year old female without significant past medical history with recent diagnosis of metastatic breast cancer after presenting with left shoulder pain. Shoulder MRI showed soft tissue mass close to distal clavicle concerning for malignancy. She had PET/CT 5/9/17 showing hypermetabolic left axillary, supraclavicular, mediastinal and hilar nodes. She had biopsies of left axilla and hilar nodes showing metastatic breast cancer, strongly ER positive, moderately MA positive and HER-2 kaitlin non-amplified. She originally had [id number], ER/MA positive, HER-2 negative breast cancer diagnosed in spring of 2012 s/p bilateral mastectomies and Tamoxifen from 8/2012-2/2013 and discontinued due to side effects. She met with Dr. Whitman most recently on 5/16/17 who recommended treatment on BIG10 clinical trial with palbociclib and tamoxifen. She started treatment on 5/24/17.     Interval History: Shan is here with her mother prior to cycle 2 day 1 of palbociclib and tamoxifen. She completely recovered from head cold a week ago. She also noted a superficial vein become more apparent along her left upper chest wall and down to axilla. It is not warm, erythematous, achy, or tender. There is no palpable cord. Her axillary fullness has diminished. Her pain has dramatically improved. She has full ROM now without discomfort. No further \"zingers\".  She has also noticed softer stools but still having the same amount of BMs today. No nausea, diarrhea, mucositis, headaches. She has very minimal fatigue. She also notes palpitations with a \"fluttering feeling\" and occasional chest tightness. No SOB. She also has more anxiety and notes the palpitations often with this. She also notes some clear vaginal discharge and mild yeast infection. She continues following a vegan diet so eating as many calories has " been difficult. She has stopped running to try to conserve more energy but is walking and going to yoga. No fevers/chills, cough, pain, difficulties with urination. ROS otherwise negative.     Current Outpatient Prescriptions   Medication     fluconazole (DIFLUCAN) 150 MG tablet     tamoxifen (NOLVADEX) 20 MG tablet     prochlorperazine (COMPAZINE) 10 MG tablet     ondansetron (ZOFRAN) 8 MG tablet     Chlorophyll (CHLOROXYGEN PO)     UNABLE TO FIND     UNABLE TO FIND     VITAMIN D, CHOLECALCIFEROL, PO     UNABLE TO FIND     UNABLE TO FIND     study - palbociclib (IDS# 4993) 125 mg capsule     tamoxifen (NOLVADEX) 20 MG tablet     FLINTSTONES MULTIVITAMIN OR CHEW     tamoxifen (NOLVADEX) 20 MG tablet     No current facility-administered medications for this visit.        PHYSICAL EXAM:  ECO  /79  Pulse 70  Temp 99.1  F (37.3  C) (Oral)  Resp 18  Wt 53.6 kg (118 lb 3.2 oz)  LMP 2017 (Approximate)  SpO2 100%  BMI 19.79 kg/m2  General: Alert, oriented, pleasant, NAD  Skin: Visible superficial vein along L upper chest wall down to medial axilla. There is no induration, cording, or erythema.   HEENT: Normocephalic, atraumatic, PERRLA, EOMI. Moist mucus membranes, no lesions or thrush  Neck: No cervical or supraclavicular LAD.   Axillary: Deep semi-palpable node in mid-left axilla--smaller, can only palpate inferior edge    Lungs: CTA bilaterally, normal work of breathing  Cardiac: RRR, S1, S2, no murmurs  Abdomen: Soft, nontender, nondistended. Normoactive bowel sounds. No hepatosplenomegaly, masses  Neuro: CNII-XII grossly intact  Extremities: No pedal edema    Labs:   Results for orders placed or performed in visit on 17 (from the past 24 hour(s))   HCG qualitative urine   Result Value Ref Range    HCG Qual Urine Negative NEG   CBC with platelets differential   Result Value Ref Range    WBC 1.5 (L) 4.0 - 11.0 10e9/L    RBC Count 3.50 (L) 3.8 - 5.2 10e12/L    Hemoglobin 11.5 (L) 11.7 - 15.7  g/dL    Hematocrit 34.7 (L) 35.0 - 47.0 %    MCV 99 78 - 100 fl    MCH 32.9 26.5 - 33.0 pg    MCHC 33.1 31.5 - 36.5 g/dL    RDW 14.2 10.0 - 15.0 %    Platelet Count 149 (L) 150 - 450 10e9/L    Diff Method Manual Differential     % Neutrophils 49.6 %    % Lymphocytes 38.9 %    % Monocytes 9.7 %    % Eosinophils 0.0 %    % Basophils 0.9 %    % Myelocytes 0.9 %    Absolute Neutrophil 0.7 (L) 1.6 - 8.3 10e9/L    Absolute Lymphocytes 0.6 (L) 0.8 - 5.3 10e9/L    Absolute Monocytes 0.1 0.0 - 1.3 10e9/L    Absolute Eosinophils 0.0 0.0 - 0.7 10e9/L    Absolute Basophils 0.0 0.0 - 0.2 10e9/L    Absolute Myelocytes 0.0 0 10e9/L    RBC Morphology Normal    Comprehensive metabolic panel   Result Value Ref Range    Sodium 138 133 - 144 mmol/L    Potassium 4.2 3.4 - 5.3 mmol/L    Chloride 106 94 - 109 mmol/L    Carbon Dioxide 26 20 - 32 mmol/L    Anion Gap 6 3 - 14 mmol/L    Glucose 91 70 - 99 mg/dL    Urea Nitrogen 11 7 - 30 mg/dL    Creatinine 0.58 0.52 - 1.04 mg/dL    GFR Estimate >90  Non  GFR Calc   >60 mL/min/1.7m2    GFR Estimate If Black >90   GFR Calc   >60 mL/min/1.7m2    Calcium 8.7 8.5 - 10.1 mg/dL    Bilirubin Total 0.4 0.2 - 1.3 mg/dL    Albumin 3.8 3.4 - 5.0 g/dL    Protein Total 7.2 6.8 - 8.8 g/dL    Alkaline Phosphatase 47 40 - 150 U/L    ALT 21 0 - 50 U/L    AST 18 0 - 45 U/L       Assessment & Plan:   1. Metastatic breast cancer to nodes: Recently recurred and biopsy proven. She is s/p 1 cycle of palbo and tamoxifen and tolerating well however she has grade 3 neutropenia today with ANC of 700 so will have to hold starting cycle 2 until ANC is 1000 or greater. Recheck CBC in 1 week. Follow-up per clinical trial.     2. Supplements: She was taking supplements. See [employer] message from 5/24.     3. Left shoulder pain and radiculopathy: Secondary to disease in axilla. Resolved as she is clinically responding.    4. Palpitations: Regular rate and rhythm on exam. Likely related to  anxiety. Monitor. She had this in the past on tamoxifen but was much later after 7 months of treatment.     5. Loose stools: Monitor. No Imodium needed for now unless watery or greater than baseline amount.     6. Yeast infection: Diflucan 150 mg once. Refills provided.     Rafia Voss PA-C    [facility]  [address] [place] [id number]  [phone]

## 2017-06-21 NOTE — LETTER
"6/21/2017      RE: Shan Marsh  [address]  [facility] [address]       Hematology-Oncology Visit  Jun 21, 2017    Reason for Visit: follow-up metastatic breast cancer, ER positive     HPI: Shan Marsh is a 47 year old female without significant past medical history with recent diagnosis of metastatic breast cancer after presenting with left shoulder pain. Shoulder MRI showed soft tissue mass close to distal clavicle concerning for malignancy. She had PET/CT 5/9/17 showing hypermetabolic left axillary, supraclavicular, mediastinal and hilar nodes. She had biopsies of left axilla and hilar nodes showing metastatic breast cancer, strongly ER positive, moderately NC positive and HER-2 kaitlin non-amplified. She originally had [id number], ER/NC positive, HER-2 negative breast cancer diagnosed in spring of 2012 s/p bilateral mastectomies and Tamoxifen from 8/2012-2/2013 and discontinued due to side effects. She met with Dr. Whitman most recently on 5/16/17 who recommended treatment on BIG10 clinical trial with palbociclib and tamoxifen. She started treatment on 5/24/17.     Interval History: Shan is here with her mother prior to cycle 2 day 1 of palbociclib and tamoxifen. She completely recovered from head cold a week ago. She also noted a superficial vein become more apparent along her left upper chest wall and down to axilla. It is not warm, erythematous, achy, or tender. There is no palpable cord. Her axillary fullness has diminished. Her pain has dramatically improved. She has full ROM now without discomfort. No further \"zingers\".  She has also noticed softer stools but still having the same amount of BMs today. No nausea, diarrhea, mucositis, headaches. She has very minimal fatigue. She also notes palpitations with a \"fluttering feeling\" and occasional chest tightness. No SOB. She also has more anxiety and notes the palpitations often with this. She also notes some clear vaginal discharge and " mild yeast infection. She continues following a vegan diet so eating as many calories has been difficult. She has stopped running to try to conserve more energy but is walking and going to yoga. No fevers/chills, cough, pain, difficulties with urination. ROS otherwise negative.     Current Outpatient Prescriptions   Medication     fluconazole (DIFLUCAN) 150 MG tablet     tamoxifen (NOLVADEX) 20 MG tablet     prochlorperazine (COMPAZINE) 10 MG tablet     ondansetron (ZOFRAN) 8 MG tablet     Chlorophyll (CHLOROXYGEN PO)     UNABLE TO FIND     UNABLE TO FIND     VITAMIN D, CHOLECALCIFEROL, PO     UNABLE TO FIND     UNABLE TO FIND     study - palbociclib (IDS# [id number]) 125 mg capsule     tamoxifen (NOLVADEX) 20 MG tablet     FLINTSTONES MULTIVITAMIN OR CHEW     tamoxifen (NOLVADEX) 20 MG tablet     No current facility-administered medications for this visit.        PHYSICAL EXAM:  ECO  /79  Pulse 70  Temp 99.1  F (37.3  C) (Oral)  Resp 18  Wt 53.6 kg (118 lb 3.2 oz)  LMP 2017 (Approximate)  SpO2 100%  BMI 19.79 kg/m2  General: Alert, oriented, pleasant, NAD  Skin: Visible superficial vein along L upper chest wall down to medial axilla. There is no induration, cording, or erythema.   HEENT: Normocephalic, atraumatic, PERRLA, EOMI. Moist mucus membranes, no lesions or thrush  Neck: No cervical or supraclavicular LAD.   Axillary: Deep semi-palpable node in mid-left axilla--smaller, can only palpate inferior edge    Lungs: CTA bilaterally, normal work of breathing  Cardiac: RRR, S1, S2, no murmurs  Abdomen: Soft, nontender, nondistended. Normoactive bowel sounds. No hepatosplenomegaly, masses  Neuro: CNII-XII grossly intact  Extremities: No pedal edema    Labs:   Results for orders placed or performed in visit on 17 (from the past 24 hour(s))   HCG qualitative urine   Result Value Ref Range    HCG Qual Urine Negative NEG   CBC with platelets differential   Result Value Ref Range    WBC 1.5 (L) 4.0  - 11.0 10e9/L    RBC Count 3.50 (L) 3.8 - 5.2 10e12/L    Hemoglobin 11.5 (L) 11.7 - 15.7 g/dL    Hematocrit 34.7 (L) 35.0 - 47.0 %    MCV 99 78 - 100 fl    MCH 32.9 26.5 - 33.0 pg    MCHC 33.1 31.5 - 36.5 g/dL    RDW 14.2 10.0 - 15.0 %    Platelet Count 149 (L) 150 - 450 10e9/L    Diff Method Manual Differential     % Neutrophils 49.6 %    % Lymphocytes 38.9 %    % Monocytes 9.7 %    % Eosinophils 0.0 %    % Basophils 0.9 %    % Myelocytes 0.9 %    Absolute Neutrophil 0.7 (L) 1.6 - 8.3 10e9/L    Absolute Lymphocytes 0.6 (L) 0.8 - 5.3 10e9/L    Absolute Monocytes 0.1 0.0 - 1.3 10e9/L    Absolute Eosinophils 0.0 0.0 - 0.7 10e9/L    Absolute Basophils 0.0 0.0 - 0.2 10e9/L    Absolute Myelocytes 0.0 0 10e9/L    RBC Morphology Normal    Comprehensive metabolic panel   Result Value Ref Range    Sodium 138 133 - 144 mmol/L    Potassium 4.2 3.4 - 5.3 mmol/L    Chloride 106 94 - 109 mmol/L    Carbon Dioxide 26 20 - 32 mmol/L    Anion Gap 6 3 - 14 mmol/L    Glucose 91 70 - 99 mg/dL    Urea Nitrogen 11 7 - 30 mg/dL    Creatinine 0.58 0.52 - 1.04 mg/dL    GFR Estimate >90  Non  GFR Calc   >60 mL/min/1.7m2    GFR Estimate If Black >90   GFR Calc   >60 mL/min/1.7m2    Calcium 8.7 8.5 - 10.1 mg/dL    Bilirubin Total 0.4 0.2 - 1.3 mg/dL    Albumin 3.8 3.4 - 5.0 g/dL    Protein Total 7.2 6.8 - 8.8 g/dL    Alkaline Phosphatase 47 40 - 150 U/L    ALT 21 0 - 50 U/L    AST 18 0 - 45 U/L       Assessment & Plan:   1. Metastatic breast cancer to nodes: Recently recurred and biopsy proven. She is s/p 1 cycle of palbo and tamoxifen and tolerating well however she has grade 3 neutropenia today with ANC of 700 so will have to hold starting cycle 2 until ANC is 1000 or greater. Recheck CBC in 1 week. Follow-up per clinical trial.     2. Supplements: She was taking supplements. See [facility] message from 5/24.     3. Left shoulder pain and radiculopathy: Secondary to disease in axilla. Resolved as she is clinically  responding.    4. Palpitations: Regular rate and rhythm on exam. Likely related to anxiety. Monitor. She had this in the past on tamoxifen but was much later after 7 months of treatment.     5. Loose stools: Monitor. No Imodium needed for now unless watery or greater than baseline amount.     6. Yeast infection: Diflucan 150 mg once. Refills provided.     Rafia Voss PA-C    [facility] [address] [id number] [phone]

## 2017-06-21 NOTE — PROGRESS NOTES
Met with subject for cycle 2, day 1. No changes in concomitant medications. Reports heart palpitations (baseline condition). ANC 0.7 10e9/L. Dr. Whitman informed. Per protocol, palbociclib will be held until ANC recovers. Subject to return to clinic in 7 days for repeat CBC. Subject will continue on daily Tamoxifen.

## 2017-06-21 NOTE — NURSING NOTE
"Oncology Rooming Note    June 21, 2017 10:47 AM   Shan Marsh is a 47 year old female who presents for:    Chief Complaint   Patient presents with     Lab Only     peripheral blood draw and vitals     Oncology Clinic Visit     breast ca      Initial Vitals: /79  Pulse 70  Temp 99.1  F (37.3  C) (Oral)  Resp 18  Wt 53.6 kg (118 lb 3.2 oz)  LMP 05/17/2017 (Approximate)  SpO2 100%  BMI 19.79 kg/m2 Estimated body mass index is 19.79 kg/(m^2) as calculated from the following:    Height as of 6/7/17: 1.646 m (5' 4.8\").    Weight as of this encounter: 53.6 kg (118 lb 3.2 oz). Body surface area is 1.57 meters squared.  Data Unavailable Comment: Data Unavailable   Patient's last menstrual period was 05/17/2017 (approximate).  Allergies reviewed: Yes  Medications reviewed: Yes    Medications: Medication refills not needed today.  Pharmacy name entered into [facility]:    [facility] [place], [place] - [address] [place] [address] DRUG STORE [id number] [place], [place] - [address] AT [facility] [address]  [facility] [place], [place] - [address] [facility] DRUG STORE [id number] - [place], [place] - [address] AT [facility] [address]    Clinical concerns: no doc was NOT notified.    5 minutes for nursing intake (face to face time)     Abril Crespo CMA              "

## 2017-06-21 NOTE — Clinical Note
"6/21/2017       RE: Shan Marsh  [address]  [facility] [address]     Dear Colleague,    Thank you for referring your patient, Shan Marsh, to the [facility]. Please see a copy of my visit note below.    Hematology-Oncology Visit  Jun 21, 2017    Reason for Visit: follow-up metastatic breast cancer, ER positive     HPI: Shan Marsh is a 47 year old female without significant past medical history with recent diagnosis of metastatic breast cancer after presenting with left shoulder pain. Shoulder MRI showed soft tissue mass close to distal clavicle concerning for malignancy. She had PET/CT 5/9/17 showing hypermetabolic left axillary, supraclavicular, mediastinal and hilar nodes. She had biopsies of left axilla and hilar nodes showing metastatic breast cancer, strongly ER positive, moderately SD positive and HER-2 kaitlin non-amplified. She originally had [id number], ER/SD positive, HER-2 negative breast cancer diagnosed in spring of 2012 s/p bilateral mastectomies and Tamoxifen from 8/2012-2/2013 and discontinued due to side effects. She met with Dr. Whitman most recently on 5/16/17 who recommended treatment on BIG10 clinical trial with palbociclib and tamoxifen. She started treatment on 5/24/17.     Interval History: Shan is here with her mother prior to cycle 2 day 1 of palbociclib and tamoxifen. She completely recovered from head cold a week ago. She also noted a superficial vein become more apparent along her left upper chest wall and down to axilla. It is not warm, erythematous, achy, or tender. There is no palpable cord. Her axillary fullness has diminished. Her pain has dramatically improved. She has full ROM now without discomfort. No further \"zingers\".  She has also noticed softer stools but still having the same amount of BMs today. No nausea, diarrhea, mucositis, headaches. She has very minimal fatigue. She also notes palpitations with a \"fluttering " "feeling\" and occasional chest tightness. No SOB. She also has more anxiety and notes the palpitations often with this. She also notes some clear vaginal discharge and mild yeast infection. She continues following a vegan diet so eating as many calories has been difficult. She has stopped running to try to conserve more energy but is walking and going to yoga. No fevers/chills, cough, pain, difficulties with urination. ROS otherwise negative.     Current Outpatient Prescriptions   Medication     fluconazole (DIFLUCAN) 150 MG tablet     tamoxifen (NOLVADEX) 20 MG tablet     prochlorperazine (COMPAZINE) 10 MG tablet     ondansetron (ZOFRAN) 8 MG tablet     Chlorophyll (CHLOROXYGEN PO)     UNABLE TO FIND     UNABLE TO FIND     VITAMIN D, CHOLECALCIFEROL, PO     UNABLE TO FIND     UNABLE TO FIND     study - palbociclib (IDS# [id number]) 125 mg capsule     tamoxifen (NOLVADEX) 20 MG tablet     FLINTSTONES MULTIVITAMIN OR CHEW     tamoxifen (NOLVADEX) 20 MG tablet     No current facility-administered medications for this visit.        PHYSICAL EXAM:  ECO  /79  Pulse 70  Temp 99.1  F (37.3  C) (Oral)  Resp 18  Wt 53.6 kg (118 lb 3.2 oz)  LMP 2017 (Approximate)  SpO2 100%  BMI 19.79 kg/m2  General: Alert, oriented, pleasant, NAD  Skin: Visible superficial vein along L upper chest wall down to medial axilla. There is no induration, cording, or erythema.   HEENT: Normocephalic, atraumatic, PERRLA, EOMI. Moist mucus membranes, no lesions or thrush  Neck: No cervical or supraclavicular LAD.   Axillary: Deep semi-palpable node in mid-left axilla--smaller, can only palpate inferior edge    Lungs: CTA bilaterally, normal work of breathing  Cardiac: RRR, S1, S2, no murmurs  Abdomen: Soft, nontender, nondistended. Normoactive bowel sounds. No hepatosplenomegaly, masses  Neuro: CNII-XII grossly intact  Extremities: No pedal edema    Labs:   Results for orders placed or performed in visit on 17 (from the past 24 " hour(s))   HCG qualitative urine   Result Value Ref Range    HCG Qual Urine Negative NEG   CBC with platelets differential   Result Value Ref Range    WBC 1.5 (L) 4.0 - 11.0 10e9/L    RBC Count 3.50 (L) 3.8 - 5.2 10e12/L    Hemoglobin 11.5 (L) 11.7 - 15.7 g/dL    Hematocrit 34.7 (L) 35.0 - 47.0 %    MCV 99 78 - 100 fl    MCH 32.9 26.5 - 33.0 pg    MCHC 33.1 31.5 - 36.5 g/dL    RDW 14.2 10.0 - 15.0 %    Platelet Count 149 (L) 150 - 450 10e9/L    Diff Method Manual Differential     % Neutrophils 49.6 %    % Lymphocytes 38.9 %    % Monocytes 9.7 %    % Eosinophils 0.0 %    % Basophils 0.9 %    % Myelocytes 0.9 %    Absolute Neutrophil 0.7 (L) 1.6 - 8.3 10e9/L    Absolute Lymphocytes 0.6 (L) 0.8 - 5.3 10e9/L    Absolute Monocytes 0.1 0.0 - 1.3 10e9/L    Absolute Eosinophils 0.0 0.0 - 0.7 10e9/L    Absolute Basophils 0.0 0.0 - 0.2 10e9/L    Absolute Myelocytes 0.0 0 10e9/L    RBC Morphology Normal    Comprehensive metabolic panel   Result Value Ref Range    Sodium 138 133 - 144 mmol/L    Potassium 4.2 3.4 - 5.3 mmol/L    Chloride 106 94 - 109 mmol/L    Carbon Dioxide 26 20 - 32 mmol/L    Anion Gap 6 3 - 14 mmol/L    Glucose 91 70 - 99 mg/dL    Urea Nitrogen 11 7 - 30 mg/dL    Creatinine 0.58 0.52 - 1.04 mg/dL    GFR Estimate >90  Non  GFR Calc   >60 mL/min/1.7m2    GFR Estimate If Black >90   GFR Calc   >60 mL/min/1.7m2    Calcium 8.7 8.5 - 10.1 mg/dL    Bilirubin Total 0.4 0.2 - 1.3 mg/dL    Albumin 3.8 3.4 - 5.0 g/dL    Protein Total 7.2 6.8 - 8.8 g/dL    Alkaline Phosphatase 47 40 - 150 U/L    ALT 21 0 - 50 U/L    AST 18 0 - 45 U/L       Assessment & Plan:   1. Metastatic breast cancer to nodes: Recently recurred and biopsy proven. She is s/p 1 cycle of palbo and tamoxifen and tolerating well however she has grade 3 neutropenia today with ANC of 700 so will have to hold starting cycle 2 until ANC is 1000 or greater. Recheck CBC in 1 week. Follow-up per clinical trial.     2.  Supplements: She was taking supplements. See [employer] message from 5/24.     3. Left shoulder pain and radiculopathy: Secondary to disease in axilla. Resolved as she is clinically responding.    4. Palpitations: Regular rate and rhythm on exam. Likely related to anxiety. Monitor. She had this in the past on tamoxifen but was much later after 7 months of treatment.     5. Loose stools: Monitor. No Imodium needed for now unless watery or greater than baseline amount.     6. Yeast infection: Diflucan 150 mg once. Refills provided.     Rafia Voss PA-C    [facility]  [address] [place] [id number]  [phone]                              Again, thank you for allowing me to participate in the care of your patient.      Sincerely,    GRAY Harman

## 2017-06-21 NOTE — MR AVS SNAPSHOT
After Visit Summary   6/21/2017    Shan Marsh    MRN: [id number]           Patient Information     Date Of Birth          1970        Visit Information        Provider Department      6/21/2017 10:20 AM Rafia Voss PA [facility]        Today's Diagnoses     Malignant neoplasm of female breast, unspecified laterality, unspecified site of breast (H)    -  1    Malignant neoplasm of left female breast, unspecified site of breast (H)        Yeast infection of the vagina           Follow-ups after your visit        Your next 10 appointments already scheduled     Jul 05, 2017 11:00 AM CDT   [facility] with  [facility]   [facility] ([facility])    [address] [place] [id number] [phone]            Jul 17, 2017 12:00 PM CDT   (Arrive by 11:45 AM)   Return Visit with Ana Whitman MD   [facility] ([facility])    [address] [place] [id number] [phone]              Future tests that were ordered for you today     Open Future Orders        Priority Expected Expires Ordered    CBC with platelets differential Routine 6/28/2017 6/28/2017 6/21/2017            Who to contact     If you have questions or need follow up information about today's clinic visit or your schedule please contact [facility] directly at [phone].  Normal or non-critical lab and imaging results will be communicated to you by MyChart, letter or phone within 4 business days after the clinic has received the results. If you do not hear from us within 7 days, please contact the clinic through MyChart or phone. If you have a critical or abnormal lab result, we will notify you by phone as soon as possible.  Submit refill requests through [employer] or call your pharmacy and they will forward the refill request to  us. Please allow 3 business days for your refill to be completed.          Additional Information About Your Visit        [employer] Information     [employer] gives you secure access to your electronic health record. If you see a primary care provider, you can also send messages to your care team and make appointments. If you have questions, please call your primary care clinic.  If you do not have a primary care provider, please call [phone] and they will assist you.        Care EveryWhere ID     This is your Care EveryWhere ID. This could be used by other organizations to access your [facility] medical records  [id number]        Your Vitals Were     Pulse Temperature Respirations Last Period Pulse Oximetry BMI (Body Mass Index)    70 99.1  F (37.3  C) (Oral) 18 05/17/2017 (Approximate) 100% 19.79 kg/m2       Blood Pressure from Last 3 Encounters:   06/21/17 126/79   06/07/17 114/65   05/24/17 120/67    Weight from Last 3 Encounters:   06/21/17 53.6 kg (118 lb 3.2 oz)   06/07/17 53.8 kg (118 lb 8 oz)   05/24/17 54.1 kg (119 lb 4.8 oz)              We Performed the Following     CBC with platelets differential     Comprehensive metabolic panel     HCG qualitative urine          Today's Medication Changes          These changes are accurate as of: 6/21/17 12:19 PM.  If you have any questions, ask your nurse or doctor.               Start taking these medicines.        Dose/Directions    fluconazole 150 MG tablet   Commonly known as:  DIFLUCAN   Used for:  Yeast infection of the vagina   Started by:  Rafia Voss PA        Dose:  150 mg   Take 1 tablet (150 mg) by mouth once for 1 dose   Quantity:  1 tablet   Refills:  3         These medicines have changed or have updated prescriptions.        Dose/Directions    * tamoxifen 20 MG tablet   Commonly known as:  NOLVADEX   This may have changed:  Another medication with the same name was added. Make sure you understand how and when to take each.   Used for:   Malignant neoplasm of female breast, unspecified laterality, unspecified site of breast (H)   Changed by:  Sharifa Celeste RN        Dose:  20 mg   Take 1 tablet (20 mg) by mouth daily   Quantity:  28 tablet   Refills:  0       * tamoxifen 20 MG tablet   Commonly known as:  NOLVADEX   This may have changed:  You were already taking a medication with the same name, and this prescription was added. Make sure you understand how and when to take each.   Used for:  Malignant neoplasm of female breast, unspecified laterality, unspecified site of breast (H)   Changed by:  Rafia Voss PA        Dose:  20 mg   Take 1 tablet (20 mg) by mouth daily   Quantity:  28 tablet   Refills:  0       * tamoxifen 20 MG tablet   Commonly known as:  NOLVADEX   This may have changed:  You were already taking a medication with the same name, and this prescription was added. Make sure you understand how and when to take each.   Used for:  Malignant neoplasm of left female breast, unspecified site of breast (H)   Changed by:  Rafia Voss PA        Dose:  20 mg   Take 1 tablet (20 mg) by mouth daily   Quantity:  90 tablet   Refills:  3       * Notice:  This list has 3 medication(s) that are the same as other medications prescribed for you. Read the directions carefully, and ask your doctor or other care provider to review them with you.         Where to get your medicines      These medications were sent to [facility] [address]  [address], [facility] [address]     Phone:  [phone]     fluconazole 150 MG tablet    tamoxifen 20 MG tablet         These medications were sent to [place], [place] - [address] [facility] [address]  [address] [facility] [address] [place] [address]    Hours:  TRANSPLANT PHONE NUMBER [phone] Phone:  [phone]     tamoxifen 20 MG tablet                Primary Care Provider Office Phone # Fax #    Marilu MITCHELL  MD Miguel Angel [phone]       [facility] [address]  [facility] [address]        Equal Access to Services     LUZ JAMES : Hadii aad ku hadsilvinoleonor Angelali, ge[place] tenashahab, genita kamary kayda anneliese, edi laithin hayaaluna fonsecadana guajardo moon kolb. So [facility] [phone].    ATENCIÓN: Si habla español, tiene a gamez disposición servicios gratuitos de asistencia lingüística. Llame al [phone].    We comply with applicable federal civil rights laws and [place] laws. We do not discriminate on the basis of race, color, national origin, age, disability sex, sexual orientation or gender identity.            Thank you!     Thank you for choosing [facility]  for your care. Our goal is always to provide you with excellent care. Hearing back from our patients is one way we can continue to improve our services. Please take a few minutes to complete the written survey that you may receive in the mail after your visit with us. Thank you!             Your Updated Medication List - Protect others around you: Learn how to safely use, store and throw away your medicines at www.disposemymeds.org.          This list is accurate as of: 6/21/17 12:19 PM.  Always use your most recent med list.                   Brand Name Dispense Instructions for use Diagnosis    CHLOROXYGEN PO      Take by mouth daily        FLINTSTONES MULTIVITAMIN Chew      1 tablet daily        fluconazole 150 MG tablet    DIFLUCAN    1 tablet    Take 1 tablet (150 mg) by mouth once for 1 dose    Yeast infection of the vagina       ondansetron 8 MG tablet    ZOFRAN    30 tablet    Take 1 tablet (8 mg) by mouth every 8 hours as needed for nausea (vomiting)    Malignant neoplasm of female breast, unspecified laterality, unspecified site of breast (H)       prochlorperazine 10 MG tablet    COMPAZINE    30 tablet    Take 1 tablet (10 mg) by mouth every 6 hours as needed (Nausea/Vomiting)    Malignant neoplasm of female breast,  unspecified laterality, unspecified site of breast (H)       study - palbociclib 125 mg capsule    ids# [id number]    21 capsule    Take 1 capsule (125 mg) by mouth daily with food Take on Days 1 through 21 of each 28 day cycle. Avoid grapefruit.    Malignant neoplasm of female breast, unspecified laterality, unspecified site of breast (H)       * tamoxifen 20 MG tablet    NOLVADEX    28 tablet    Take 1 tablet (20 mg) by mouth daily    Malignant neoplasm of female breast, unspecified laterality, unspecified site of breast (H)       * tamoxifen 20 MG tablet    NOLVADEX    28 tablet    Take 1 tablet (20 mg) by mouth daily    Malignant neoplasm of female breast, unspecified laterality, unspecified site of breast (H)       * tamoxifen 20 MG tablet    NOLVADEX    90 tablet    Take 1 tablet (20 mg) by mouth daily    Malignant neoplasm of left female breast, unspecified site of breast (H)       * UNABLE TO FIND      daily Comprehensive Immunity Support - Host Defense        * UNABLE TO FIND      daily Mushrooms - My Community, Reishi, and Shitake.        * UNABLE TO FIND      daily Astragalus Immunity        * UNABLE TO FIND      daily Fucoidon        VITAMIN D (CHOLECALCIFEROL) PO      Take by mouth daily        * Notice:  This list has 7 medication(s) that are the same as other medications prescribed for you. Read the directions carefully, and ask your doctor or other care provider to review them with you.

## 2017-06-28 ENCOUNTER — RESEARCH ENCOUNTER (OUTPATIENT)
Dept: ONCOLOGY | Facility: CLINIC | Age: 47
End: 2017-06-28

## 2017-06-28 DIAGNOSIS — C50.519 MALIGNANT NEOPLASM OF LOWER-OUTER QUADRANT OF FEMALE BREAST, UNSPECIFIED ESTROGEN RECEPTOR STATUS, UNSPECIFIED LATERALITY (H): Primary | ICD-10-CM

## 2017-06-28 DIAGNOSIS — C50.919 MALIGNANT NEOPLASM OF FEMALE BREAST (H): ICD-10-CM

## 2017-06-28 LAB
BASOPHILS # BLD AUTO: 0.1 10E9/L (ref 0–0.2)
BASOPHILS NFR BLD AUTO: 2.2 %
DIFFERENTIAL METHOD BLD: ABNORMAL
EOSINOPHIL # BLD AUTO: 0 10E9/L (ref 0–0.7)
EOSINOPHIL NFR BLD AUTO: 0.4 %
ERYTHROCYTE [DISTWIDTH] IN BLOOD BY AUTOMATED COUNT: 15.2 % (ref 10–15)
HCT VFR BLD AUTO: 38.1 % (ref 35–47)
HGB BLD-MCNC: 12.2 G/DL (ref 11.7–15.7)
IMM GRANULOCYTES # BLD: 0 10E9/L (ref 0–0.4)
IMM GRANULOCYTES NFR BLD: 0.4 %
LYMPHOCYTES # BLD AUTO: 0.9 10E9/L (ref 0.8–5.3)
LYMPHOCYTES NFR BLD AUTO: 38.1 %
MCH RBC QN AUTO: 32.4 PG (ref 26.5–33)
MCHC RBC AUTO-ENTMCNC: 32 G/DL (ref 31.5–36.5)
MCV RBC AUTO: 101 FL (ref 78–100)
MONOCYTES # BLD AUTO: 0.3 10E9/L (ref 0–1.3)
MONOCYTES NFR BLD AUTO: 11.5 %
NEUTROPHILS # BLD AUTO: 1.1 10E9/L (ref 1.6–8.3)
NEUTROPHILS NFR BLD AUTO: 47.4 %
NRBC # BLD AUTO: 0 10*3/UL
NRBC BLD AUTO-RTO: 0 /100
PLATELET # BLD AUTO: 225 10E9/L (ref 150–450)
RBC # BLD AUTO: 3.77 10E12/L (ref 3.8–5.2)
WBC # BLD AUTO: 2.3 10E9/L (ref 4–11)

## 2017-06-28 PROCEDURE — 85025 COMPLETE CBC W/AUTO DIFF WBC: CPT | Performed by: PHYSICIAN ASSISTANT

## 2017-06-28 PROCEDURE — 36415 COLL VENOUS BLD VENIPUNCTURE: CPT | Performed by: PHYSICIAN ASSISTANT

## 2017-06-28 NOTE — NURSING NOTE
Chief Complaint   Patient presents with     Blood Draw     Labs only     Vitals and labs done peripherally.  See doc flow sheets for details.  Adina Kaplan CMA

## 2017-06-28 NOTE — NURSING NOTE
Patient here today for repeat labs for the BIG TEN study.  Patient to resume palbociclib at reduced dose of 100 mg daily when ANC over 1.0.  Patient;s ANC today was 1.1.  Dr. Whitman notified and signed drug orders.  Patient was dispensed study medication and given pill dairy.  Patient denies any issues or concerns at this time.  Patient instructed to call with any issues or concerns.  Patient voiced understanding.      Melita Duff RN     Pager [phone]

## 2017-07-01 ENCOUNTER — HEALTH MAINTENANCE LETTER (OUTPATIENT)
Age: 47
End: 2017-07-01

## 2017-07-05 DIAGNOSIS — C50.519 MALIGNANT NEOPLASM OF LOWER-OUTER QUADRANT OF FEMALE BREAST, UNSPECIFIED ESTROGEN RECEPTOR STATUS, UNSPECIFIED LATERALITY (H): Primary | ICD-10-CM

## 2017-07-05 LAB
BASOPHILS # BLD AUTO: 0 10E9/L (ref 0–0.2)
BASOPHILS NFR BLD AUTO: 1.3 %
DIFFERENTIAL METHOD BLD: ABNORMAL
EOSINOPHIL # BLD AUTO: 0 10E9/L (ref 0–0.7)
EOSINOPHIL NFR BLD AUTO: 1.3 %
ERYTHROCYTE [DISTWIDTH] IN BLOOD BY AUTOMATED COUNT: 14.8 % (ref 10–15)
HCT VFR BLD AUTO: 35.9 % (ref 35–47)
HGB BLD-MCNC: 11.5 G/DL (ref 11.7–15.7)
IMM GRANULOCYTES # BLD: 0 10E9/L (ref 0–0.4)
IMM GRANULOCYTES NFR BLD: 0.4 %
LYMPHOCYTES # BLD AUTO: 0.8 10E9/L (ref 0.8–5.3)
LYMPHOCYTES NFR BLD AUTO: 34.6 %
MCH RBC QN AUTO: 32.3 PG (ref 26.5–33)
MCHC RBC AUTO-ENTMCNC: 32 G/DL (ref 31.5–36.5)
MCV RBC AUTO: 101 FL (ref 78–100)
MONOCYTES # BLD AUTO: 0.1 10E9/L (ref 0–1.3)
MONOCYTES NFR BLD AUTO: 4.6 %
NEUTROPHILS # BLD AUTO: 1.4 10E9/L (ref 1.6–8.3)
NEUTROPHILS NFR BLD AUTO: 57.8 %
NRBC # BLD AUTO: 0 10*3/UL
NRBC BLD AUTO-RTO: 0 /100
PLATELET # BLD AUTO: 192 10E9/L (ref 150–450)
RBC # BLD AUTO: 3.56 10E12/L (ref 3.8–5.2)
WBC # BLD AUTO: 2.4 10E9/L (ref 4–11)

## 2017-07-05 PROCEDURE — 85025 COMPLETE CBC W/AUTO DIFF WBC: CPT | Performed by: PHYSICIAN ASSISTANT

## 2017-07-05 PROCEDURE — 36415 COLL VENOUS BLD VENIPUNCTURE: CPT | Performed by: PHYSICIAN ASSISTANT

## 2017-07-14 DIAGNOSIS — C50.519 MALIGNANT NEOPLASM OF LOWER-OUTER QUADRANT OF FEMALE BREAST, UNSPECIFIED ESTROGEN RECEPTOR STATUS, UNSPECIFIED LATERALITY (H): ICD-10-CM

## 2017-07-14 LAB
ANISOCYTOSIS BLD QL SMEAR: SLIGHT
BASOPHILS # BLD AUTO: 0 10E9/L (ref 0–0.2)
BASOPHILS NFR BLD AUTO: 0.9 %
DIFFERENTIAL METHOD BLD: ABNORMAL
EOSINOPHIL # BLD AUTO: 0 10E9/L (ref 0–0.7)
EOSINOPHIL NFR BLD AUTO: 1.8 %
ERYTHROCYTE [DISTWIDTH] IN BLOOD BY AUTOMATED COUNT: 15.3 % (ref 10–15)
HCT VFR BLD AUTO: 34 % (ref 35–47)
HGB BLD-MCNC: 11.1 G/DL (ref 11.7–15.7)
LYMPHOCYTES # BLD AUTO: 1 10E9/L (ref 0.8–5.3)
LYMPHOCYTES NFR BLD AUTO: 41.4 %
MCH RBC QN AUTO: 33.1 PG (ref 26.5–33)
MCHC RBC AUTO-ENTMCNC: 32.6 G/DL (ref 31.5–36.5)
MCV RBC AUTO: 102 FL (ref 78–100)
MONOCYTES # BLD AUTO: 0 10E9/L (ref 0–1.3)
MONOCYTES NFR BLD AUTO: 0 %
NEUTROPHILS # BLD AUTO: 1.4 10E9/L (ref 1.6–8.3)
NEUTROPHILS NFR BLD AUTO: 55.9 %
PLATELET # BLD AUTO: 149 10E9/L (ref 150–450)
RBC # BLD AUTO: 3.35 10E12/L (ref 3.8–5.2)
WBC # BLD AUTO: 2.5 10E9/L (ref 4–11)

## 2017-07-14 PROCEDURE — 85025 COMPLETE CBC W/AUTO DIFF WBC: CPT | Performed by: INTERNAL MEDICINE

## 2017-07-14 PROCEDURE — 36415 COLL VENOUS BLD VENIPUNCTURE: CPT | Performed by: INTERNAL MEDICINE

## 2017-07-24 ENCOUNTER — APPOINTMENT (OUTPATIENT)
Dept: LAB | Facility: CLINIC | Age: 47
End: 2017-07-24
Attending: INTERNAL MEDICINE
Payer: COMMERCIAL

## 2017-07-24 ENCOUNTER — ONCOLOGY VISIT (OUTPATIENT)
Dept: ONCOLOGY | Facility: CLINIC | Age: 47
End: 2017-07-24
Attending: INTERNAL MEDICINE
Payer: COMMERCIAL

## 2017-07-24 ENCOUNTER — RESEARCH ENCOUNTER (OUTPATIENT)
Dept: ONCOLOGY | Facility: CLINIC | Age: 47
End: 2017-07-24

## 2017-07-24 VITALS
SYSTOLIC BLOOD PRESSURE: 110 MMHG | WEIGHT: 118.6 LBS | TEMPERATURE: 98 F | BODY MASS INDEX: 19.76 KG/M2 | HEIGHT: 65 IN | OXYGEN SATURATION: 100 % | HEART RATE: 62 BPM | DIASTOLIC BLOOD PRESSURE: 67 MMHG | RESPIRATION RATE: 16 BRPM

## 2017-07-24 DIAGNOSIS — C50.519 MALIGNANT NEOPLASM OF LOWER-OUTER QUADRANT OF FEMALE BREAST, UNSPECIFIED ESTROGEN RECEPTOR STATUS, UNSPECIFIED LATERALITY (H): Primary | ICD-10-CM

## 2017-07-24 LAB
ALBUMIN SERPL-MCNC: 3.9 G/DL (ref 3.4–5)
ALP SERPL-CCNC: 42 U/L (ref 40–150)
ALT SERPL W P-5'-P-CCNC: 22 U/L (ref 0–50)
ANION GAP SERPL CALCULATED.3IONS-SCNC: 7 MMOL/L (ref 3–14)
AST SERPL W P-5'-P-CCNC: 18 U/L (ref 0–45)
BASOPHILS # BLD AUTO: 0 10E9/L (ref 0–0.2)
BASOPHILS NFR BLD AUTO: 0.8 %
BILIRUB SERPL-MCNC: 0.4 MG/DL (ref 0.2–1.3)
BUN SERPL-MCNC: 6 MG/DL (ref 7–30)
CALCIUM SERPL-MCNC: 9 MG/DL (ref 8.5–10.1)
CHLORIDE SERPL-SCNC: 107 MMOL/L (ref 94–109)
CO2 SERPL-SCNC: 27 MMOL/L (ref 20–32)
CREAT SERPL-MCNC: 0.56 MG/DL (ref 0.52–1.04)
DIFFERENTIAL METHOD BLD: ABNORMAL
EOSINOPHIL # BLD AUTO: 0 10E9/L (ref 0–0.7)
EOSINOPHIL NFR BLD AUTO: 0.4 %
ERYTHROCYTE [DISTWIDTH] IN BLOOD BY AUTOMATED COUNT: 16.8 % (ref 10–15)
GFR SERPL CREATININE-BSD FRML MDRD: ABNORMAL ML/MIN/1.7M2
GLUCOSE SERPL-MCNC: 103 MG/DL (ref 70–99)
HCG UR QL: NEGATIVE
HCT VFR BLD AUTO: 36.4 % (ref 35–47)
HGB BLD-MCNC: 12.1 G/DL (ref 11.7–15.7)
IMM GRANULOCYTES # BLD: 0 10E9/L (ref 0–0.4)
IMM GRANULOCYTES NFR BLD: 0.4 %
LYMPHOCYTES # BLD AUTO: 1.2 10E9/L (ref 0.8–5.3)
LYMPHOCYTES NFR BLD AUTO: 45.2 %
MCH RBC QN AUTO: 34.1 PG (ref 26.5–33)
MCHC RBC AUTO-ENTMCNC: 33.2 G/DL (ref 31.5–36.5)
MCV RBC AUTO: 103 FL (ref 78–100)
MONOCYTES # BLD AUTO: 0.2 10E9/L (ref 0–1.3)
MONOCYTES NFR BLD AUTO: 8.9 %
NEUTROPHILS # BLD AUTO: 1.2 10E9/L (ref 1.6–8.3)
NEUTROPHILS NFR BLD AUTO: 44.3 %
NRBC # BLD AUTO: 0 10*3/UL
NRBC BLD AUTO-RTO: 0 /100
PLATELET # BLD AUTO: 120 10E9/L (ref 150–450)
POTASSIUM SERPL-SCNC: 4.1 MMOL/L (ref 3.4–5.3)
PROT SERPL-MCNC: 7.3 G/DL (ref 6.8–8.8)
RBC # BLD AUTO: 3.55 10E12/L (ref 3.8–5.2)
SODIUM SERPL-SCNC: 141 MMOL/L (ref 133–144)
WBC # BLD AUTO: 2.6 10E9/L (ref 4–11)

## 2017-07-24 PROCEDURE — 80053 COMPREHEN METABOLIC PANEL: CPT | Performed by: INTERNAL MEDICINE

## 2017-07-24 PROCEDURE — 99212 OFFICE O/P EST SF 10 MIN: CPT | Mod: ZF

## 2017-07-24 PROCEDURE — 85025 COMPLETE CBC W/AUTO DIFF WBC: CPT | Performed by: INTERNAL MEDICINE

## 2017-07-24 PROCEDURE — 81025 URINE PREGNANCY TEST: CPT | Performed by: INTERNAL MEDICINE

## 2017-07-24 PROCEDURE — 36415 COLL VENOUS BLD VENIPUNCTURE: CPT

## 2017-07-24 PROCEDURE — 99214 OFFICE O/P EST MOD 30 MIN: CPT | Mod: ZP | Performed by: INTERNAL MEDICINE

## 2017-07-24 ASSESSMENT — PAIN SCALES - GENERAL: PAINLEVEL: NO PAIN (0)

## 2017-07-24 NOTE — NURSING NOTE
"Oncology Rooming Note    July 24, 2017 12:45 PM   Shan Marsh is a 47 year old female who presents for:    Chief Complaint   Patient presents with     Blood Draw     labs drawn by vpt by rn.  vs taken.     Oncology Clinic Visit     Breast Ca- F/U     Initial Vitals: /67 (BP Location: Right arm, Patient Position: Chair, Cuff Size: Adult Regular)  Pulse 62  Temp 98  F (36.7  C) (Oral)  Resp 16  Ht 1.646 m (5' 4.8\")  Wt 53.8 kg (118 lb 9.6 oz)  LMP  (LMP Unknown)  SpO2 100%  BMI 19.86 kg/m2 Estimated body mass index is 19.86 kg/(m^2) as calculated from the following:    Height as of this encounter: 1.646 m (5' 4.8\").    Weight as of this encounter: 53.8 kg (118 lb 9.6 oz). Body surface area is 1.57 meters squared.  No Pain (0) Comment: Data Unavailable   No LMP recorded (lmp unknown). Patient is not currently having periods (Reason: UNKNOWN).  Allergies reviewed: Yes  Medications reviewed: Yes    Medications: Medication refills not needed today.  Pharmacy name entered into [facility]:    [facility] [place], [place] - [facility] [place] [address] DRUG STORE [address] [place] [address] [address] [place] [address] AT [facility] [address]  [facility] [place], [place] - [address] [facility] DRUG STORE [address] - [place], [place] - [address] AT [facility] OF [address]    Clinical concerns: no clinical concerns  provider was notified.    7 minutes for nursing intake (face to face time)     Elyssa iPke CMA              "

## 2017-07-24 NOTE — LETTER
7/24/2017       RE: Shan Marsh  [address]  [facility] [address]     Dear Colleague,    Thank you for referring your patient, Shan Marsh, to the [facility]. Please see a copy of my visit note below.    July 27, 2017    ONCOLOGY VISIT NOTE      REASON FOR VISIT:  I am seeing Ms. Marsh in followup for her stage 4, metastatic ER + breast cancer with metastases to the axilla and hilar lymph nodes.     Briefly in history, she has a history of a T1a N0 M0, ER-positive, CT-positive, and HER2-negative breast cancer.  She originally met me in 12/2011 with a coin-sized mass involving her left breast.  This mass got bigger, and by 05/2012 she was referred for a mammogram, ultrasound and breast MRI.  This revealed heterogeneously dense breast tissue with an area of suspicious microcalcifications measuring 5 x 2.6 cm involving the left breast.  MRI showed non-mass-like enhancement in the upper outer quadrant extending from about 1-3 o'clock, measuring about 6 cm.  Stereotactic needle biopsy revealed DCIS high-grade, which was suspicious for microinvasion.  She was seen by Dr. Misael Cheng, and underwent bilateral mastectomies with sentinel lymph node biopsy on 06/11/2012.  Her final pathology revealed grade 1 infiltrating ductal carcinoma, ER-positive, CT-positive an d HER2-negative.  She had multiple microscopic areas of invasion, each measuring 1-2 mm.  Her pathology was reviewed at our Multidisciplinary Conference, and confirmed multiple areas of microinvasion.  Her final staging was a T1a N0 M0 stage I breast cancer.  [place] lymph node biopsy was negative.  Her Oncotype score was 23, giving her a risk of distant recurrence at 10 years of 15%, assuming she took tamoxifen.  She started on tamoxifen in 08/2012.  This was discontinued in 02/2013 due to complaints of vaginal discharge.  She also had complained of some palpitations at that time and saw Cardiology.  This  "workup was negative.  She does have a type 1 AV block felt to be benign from her endurance as an athlete.      Shan presented to see me on 05/10/2017, at which time she had noticed fullness and pain involving her left shoulder with numbness down involving her left upper extremity.  She was found to have left axillary adenopathy, and was sent for a PET/CT scan revealing on MRI of the shoulder that she had a 4 x 1.8 x 1.5 cm lesion close to the subcutaneous tissue with close proximity to the distal clavicle.  She was subsequently sent for a PET/CT scan, which confirmed multiple PET-avid left axillary lymph nodes, as well as hilar lymph nodes that were suspicious for distant metastatic disease.  There were no suspicious lesions in the liver.  No suspicious lesions in the bones.  She has a known 3.2 cm hemangioma in the left lobe of the liver.      She enrolled on the BIG10 trial using palbociclib and tamoxifen.  She is tolerating the therapy without any difficulty.  No infections.  No f/c.  She thinks the axillary mass is getting smaller.  She has good energy.  No fatigue.      Her menses stopped shortly after starting tamoxifen.  No vaginal bleeding.  No sexual dysfunction.        REVIEW OF SYSTEMS:  Her 10-point review of systems is otherwise negative.      PHYSICAL EXAMINATION:   /67 (BP Location: Right arm, Patient Position: Chair, Cuff Size: Adult Regular)  Pulse 62  Temp 98  F (36.7  C) (Oral)  Resp 16  Ht 1.646 m (5' 4.8\")  Wt 53.8 kg (118 lb 9.6 oz)  LMP  (LMP Unknown)  SpO2 100%  BMI 19.86 kg/m2  Gen: well appearing, NAD  Heent: no icterus, o/p clear without ulcers or lesions  CV: rrr, nl S1S2  Lungs: clear  Abd: soft, nt, nd + bs  Breast examination not performed today, left axilla 1 cm firm hard nodule that is significantly smaller than on prior examination.  No right axillary adenopathy.  Ext : no edema  No skin rashes.    Lab Results   Component Value Date    WBC 2.6 07/24/2017     Lab " Results   Component Value Date    RBC 3.55 07/24/2017     Lab Results   Component Value Date    HGB 12.1 07/24/2017     Lab Results   Component Value Date    HCT 36.4 07/24/2017     No components found for: MCT  Lab Results   Component Value Date     07/24/2017     Lab Results   Component Value Date    MCH 34.1 07/24/2017     Lab Results   Component Value Date    MCHC 33.2 07/24/2017     Lab Results   Component Value Date    RDW 16.8 07/24/2017     Lab Results   Component Value Date     07/24/2017       Recent Labs   Lab Test  07/24/17   1239  06/21/17   1034   NA  141  138   POTASSIUM  4.1  4.2   CHLORIDE  107  106   CO2  27  26   ANIONGAP  7  6   GLC  103*  91   BUN  6*  11   CR  0.56  0.58   KRYSTEN  9.0  8.7     Liver Function Studies -   Recent Labs   Lab Test  07/24/17   1239   PROTTOTAL  7.3   ALBUMIN  3.9   BILITOTAL  0.4   ALKPHOS  42   AST  18   ALT  22     CT C/A/P - pending     ASSESSMENT AND PLAN:  This is a 47-year-old female with a history of a T1a N0, ER-positive, IA-positive and HER2-negative left breast cancer treated with bilateral mastectomies 5 years ago, who now has metastatic disease involving the left axilla, as well as left hilar lymph node.  On biopsy, these are strongly ER-positive, IA is 40%, and HER2 by FISH was negative with a ratio of 1.5.  She is on the BIG10 trial using palbociclib and tamoxifen.      Shan is tolerating her therapy with minimal side effects.  By examination, she is having an excellent clinical response.  By study, she will have a CT every two months for the first few cycles.      We discussed that she has metastatic disease and I would recommend she stay on a therapy indefinitely.       She is coping reasonably well.          CAMELIA GRISSOM MD

## 2017-07-24 NOTE — NURSING NOTE
Chief Complaint   Patient presents with     Blood Draw     labs drawn by vpt by rn.  vs taken.     Labs drawn by vpt by rn.  vs taken.  Pt checked in to next appointment.  Ness Duke RN

## 2017-07-24 NOTE — MR AVS SNAPSHOT
After Visit Summary   7/24/2017    Shan Marsh    MRN: [id number]           Patient Information     Date Of Birth          1970        Visit Information        Provider Department      7/24/2017 12:30 PM Ana Whitman MD [facility]        Today's Diagnoses     Malignant neoplasm of lower-outer quadrant of female breast, unspecified estrogen receptor status, unspecified laterality (H)    -  1       Follow-ups after your visit        Your next 10 appointments already scheduled     Jul 24, 2017  4:20 PM CDT   (Arrive by 4:05 PM)   CT CHEST/ABDOMEN/PELVIS W CONTRAST with UCCT1   [facility] CT ([facility])    [address]  [facility] [address] [phone]           Please bring any scans or X-rays taken at other hospitals, if similar tests were done. Also bring a list of your medicines, including vitamins, minerals and over-the-counter drugs. It is safest to leave personal items at home.  Be sure to tell your doctor:   If you have any allergies.   If there s any chance you are pregnant.   If you are breastfeeding.   If you have any special needs.  You may have contrast for this exam. To prepare:   Do not eat or drink for 2 hours before your exam. If you need to take medicine, you may take it with small sips of water. (We may ask you to take liquid medicine as well.)   The day before your exam, drink extra fluids at least six 8-ounce glasses (unless your doctor tells you to restrict your fluids).  Patients over 70 or patients with diabetes or kidney problems:   If you haven t had a blood test (creatinine test) within the last 30 days, go to your clinic or Diagnostic Imaging Department for this test.  If you have diabetes:   If your kidney function is normal, continue taking your metformin (Avandamet, Glucophage, Glucovance, Metaglip) on the day of your exam.   If your kidney function is abnormal, wait  48 hours before restarting this medicine.  You will have oral contrast for this exam:   You will drink the contrast at home. Get this from your clinic or Diagnostic Imaging Department. Please follow the directions given.  Please wear loose clothing, such as a sweat suit or jogging clothes. Avoid snaps, zippers and other metal. We may ask you to undress and put on a hospital gown.  If you have any questions, please call the Imaging Department where you will have your exam.              Future tests that were ordered for you today     Open Future Orders        Priority Expected Expires Ordered    CT Chest/Abdomen/Pelvis w Contrast Routine  2/19/2018 7/24/2017    CT Chest/Abdomen/Pelvis w Contrast Routine 7/24/2017 7/24/2018 7/24/2017    Beta HCG qual IFA urine Routine 7/24/2017 7/24/2018 7/24/2017            Who to contact     If you have questions or need follow up information about today's clinic visit or your schedule please contact [facility] directly at [phone].  Normal or non-critical lab and imaging results will be communicated to you by MixCommercehart, letter or phone within 4 business days after the clinic has received the results. If you do not hear from us within 7 days, please contact the clinic through Mysafeplacet or phone. If you have a critical or abnormal lab result, we will notify you by phone as soon as possible.  Submit refill requests through Belly Ballot or call your pharmacy and they will forward the refill request to us. Please allow 3 business days for your refill to be completed.          Additional Information About Your Visit        Belly Ballot Information     Belly Ballot gives you secure access to your electronic health record. If you see a primary care provider, you can also send messages to your care team and make appointments. If you have questions, please call your primary care clinic.  If you do not have a primary care provider, please call [phone] and they will assist you.       "  Care EveryWhere ID     This is your Care EveryWhere ID. This could be used by other organizations to access your [place] medical records  [id number]        Your Vitals Were     Pulse Temperature Respirations Height Last Period Pulse Oximetry    62 98  F (36.7  C) (Oral) 16 1.646 m (5' 4.8\") (LMP Unknown) 100%    BMI (Body Mass Index)                   19.86 kg/m2            Blood Pressure from Last 3 Encounters:   07/24/17 110/67   06/21/17 126/79   06/07/17 114/65    Weight from Last 3 Encounters:   07/24/17 53.8 kg (118 lb 9.6 oz)   06/21/17 53.6 kg (118 lb 3.2 oz)   06/07/17 53.8 kg (118 lb 8 oz)              We Performed the Following     CBC with platelets differential     Comprehensive metabolic panel     HCG qualitative urine          Today's Medication Changes          These changes are accurate as of: 7/24/17  1:20 PM.  If you have any questions, ask your nurse or doctor.               These medicines have changed or have updated prescriptions.        Dose/Directions    * study - palbociclib 125 mg capsule   Commonly known as:  ids# [id number]   This may have changed:  Another medication with the same name was added. Make sure you understand how and when to take each.   Used for:  Malignant neoplasm of female breast, unspecified laterality, unspecified site of breast   Changed by:  Rafia Voss PA        Dose:  125 mg   Take 1 capsule (125 mg) by mouth daily with food Take on Days 1 through 21 of each 28 day cycle. Avoid grapefruit.   Quantity:  21 capsule   Refills:  0       * study - palbociclib 100 mg capsule   Commonly known as:  IDS# [id number]   This may have changed:  Another medication with the same name was added. Make sure you understand how and when to take each.   Used for:  Malignant neoplasm of lower-outer quadrant of female breast, unspecified estrogen receptor status, unspecified laterality (H)   Changed by:  Ana Whitman MD        Dose:  100 mg   Take 1 capsule (100 mg) by mouth " daily   Quantity:  30 capsule   Refills:  0       * study - palbociclib 100 mg capsule   Commonly known as:  IDS# [id number]   This may have changed:  You were already taking a medication with the same name, and this prescription was added. Make sure you understand how and when to take each.   Used for:  Malignant neoplasm of lower-outer quadrant of female breast, unspecified estrogen receptor status, unspecified laterality (H)   Changed by:  Ana Whitman MD        Dose:  100 mg   Take 1 capsule (100 mg) by mouth daily   Quantity:  30 capsule   Refills:  0       * Notice:  This list has 3 medication(s) that are the same as other medications prescribed for you. Read the directions carefully, and ask your doctor or other care provider to review them with you.         Where to get your medicines      Some of these will need a paper prescription and others can be bought over the counter.  Ask your nurse if you have questions.     You don't need a prescription for these medications     study - palbociclib 100 mg capsule                Primary Care Provider Office Phone # Fax #    Marilu Michelle [phone]       [facility] [address] [facility] [address]        Equal Access to Services     [facility]: Hadii noemy landry Sojailyn, waaxda carlee, qaybta kaalmacecy coy, edi mustafa . So [facility] [phone].    ATENCIÓN: Si habla español, tiene a gamez disposición servicios gratuitos de asistencia lingüística. LlFirelands Regional Medical Center South Campus [phone].    We comply with applicable federal civil rights laws and [place] laws. We do not discriminate on the basis of race, color, national origin, age, disability sex, sexual orientation or gender identity.            Thank you!     Thank you for choosing [facility]  for your care. Our goal is always to provide you with excellent care. Hearing back from our patients is one way we can continue to improve our  services. Please take a few minutes to complete the written survey that you may receive in the mail after your visit with us. Thank you!             Your Updated Medication List - Protect others around you: Learn how to safely use, store and throw away your medicines at www.disposemymeds.org.          This list is accurate as of: 7/24/17  1:20 PM.  Always use your most recent med list.                   Brand Name Dispense Instructions for use Diagnosis    CHLOROXYGEN PO      Take by mouth daily        FLINTSTONES MULTIVITAMIN Chew      1 tablet daily        ondansetron 8 MG tablet    ZOFRAN    30 tablet    Take 1 tablet (8 mg) by mouth every 8 hours as needed for nausea (vomiting)    Malignant neoplasm of female breast, unspecified laterality, unspecified site of breast       prochlorperazine 10 MG tablet    COMPAZINE    30 tablet    Take 1 tablet (10 mg) by mouth every 6 hours as needed (Nausea/Vomiting)    Malignant neoplasm of female breast, unspecified laterality, unspecified site of breast       * study - palbociclib 125 mg capsule    ids# [id number]    21 capsule    Take 1 capsule (125 mg) by mouth daily with food Take on Days 1 through 21 of each 28 day cycle. Avoid grapefruit.    Malignant neoplasm of female breast, unspecified laterality, unspecified site of breast       * study - palbociclib 100 mg capsule    IDS# [id number]    30 capsule    Take 1 capsule (100 mg) by mouth daily    Malignant neoplasm of lower-outer quadrant of female breast, unspecified estrogen receptor status, unspecified laterality (H)       * study - palbociclib 100 mg capsule    IDS# [id number]    30 capsule    Take 1 capsule (100 mg) by mouth daily    Malignant neoplasm of lower-outer quadrant of female breast, unspecified estrogen receptor status, unspecified laterality (H)       * tamoxifen 20 MG tablet    NOLVADEX    28 tablet    Take 1 tablet (20 mg) by mouth daily    Malignant neoplasm of female breast, unspecified laterality, unspecified site  of breast       * tamoxifen 20 MG tablet    NOLVADEX    28 tablet    Take 1 tablet (20 mg) by mouth daily    Malignant neoplasm of female breast, unspecified laterality, unspecified site of breast       * tamoxifen 20 MG tablet    NOLVADEX    90 tablet    Take 1 tablet (20 mg) by mouth daily    Malignant neoplasm of left female breast, unspecified site of breast       * UNABLE TO FIND      daily Comprehensive Immunity Support - Host Defense        * UNABLE TO FIND      daily Mushrooms - My Community, Reishi, and Shitake.        * UNABLE TO FIND      daily Astragalus Immunity        * UNABLE TO FIND      daily Fucoidon        VITAMIN D (CHOLECALCIFEROL) PO      Take by mouth daily        * Notice:  This list has 10 medication(s) that are the same as other medications prescribed for you. Read the directions carefully, and ask your doctor or other care provider to review them with you.

## 2017-07-24 NOTE — PROGRESS NOTES
Met with subject for cycle 3, day 1. Subject denies changes in physical condition. No changes in concomitant medications. Subject believes axillary mass is shrinking. Subject re-supplied with study medication. CT scan demonstrated stable disease. Subject will return in 4 weeks for Cycle 4, day 1.

## 2017-07-27 NOTE — PROGRESS NOTES
July 27, 2017    ONCOLOGY VISIT NOTE      REASON FOR VISIT:  I am seeing Ms. Marsh in followup for her stage 4, metastatic ER + breast cancer with metastases to the axilla and hilar lymph nodes.     Briefly in history, she has a history of a T1a N0 M0, ER-positive, [place]-positive, and HER2-negative breast cancer.  She originally met me in 12/2011 with a coin-sized mass involving her left breast.  This mass got bigger, and by 05/2012 she was referred for a mammogram, ultrasound and breast MRI.  This revealed heterogeneously dense breast tissue with an area of suspicious microcalcifications measuring 5 x 2.6 cm involving the left breast.  MRI showed non-mass-like enhancement in the upper outer quadrant extending from about 1-3 o'clock, measuring about 6 cm.  Stereotactic needle biopsy revealed DCIS high-grade, which was suspicious for microinvasion.  She was seen by Dr. Misael Cheng, and underwent bilateral mastectomies with sentinel lymph node biopsy on 06/11/2012.  Her final pathology revealed grade 1 infiltrating ductal carcinoma, ER-positive, [place]-positive an d HER2-negative.  She had multiple microscopic areas of invasion, each measuring 1-2 mm.  Her pathology was reviewed at our Multidisciplinary Conference, and confirmed multiple areas of microinvasion.  Her final staging was a T1a N0 M0 stage I breast cancer.  [place] lymph node biopsy was negative.  Her Oncotype score was 23, giving her a risk of distant recurrence at 10 years of 15%, assuming she took tamoxifen.  She started on tamoxifen in 08/2012.  This was discontinued in 02/2013 due to complaints of vaginal discharge.  She also had complained of some palpitations at that time and saw Cardiology.  This workup was negative.  She does have a type 1 AV block felt to be benign from her endurance as an athlete.      Shan presented to see me on 05/10/2017, at which time she had noticed fullness and pain involving her left shoulder with numbness down involving  "her left upper extremity.  She was found to have left axillary adenopathy, and was sent for a PET/CT scan revealing on MRI of the shoulder that she had a 4 x 1.8 x 1.5 cm lesion close to the subcutaneous tissue with close proximity to the distal clavicle.  She was subsequently sent for a PET/CT scan, which confirmed multiple PET-avid left axillary lymph nodes, as well as hilar lymph nodes that were suspicious for distant metastatic disease.  There were no suspicious lesions in the liver.  No suspicious lesions in the bones.  She has a known 3.2 cm hemangioma in the left lobe of the liver.      She enrolled on the BIG10 trial using palbociclib and tamoxifen.  She is tolerating the therapy without any difficulty.  No infections.  No f/c.  She thinks the axillary mass is getting smaller.  She has good energy.  No fatigue.      Her menses stopped shortly after starting tamoxifen.  No vaginal bleeding.  No sexual dysfunction.        REVIEW OF SYSTEMS:  Her 10-point review of systems is otherwise negative.      PHYSICAL EXAMINATION:   /67 (BP Location: Right arm, Patient Position: Chair, Cuff Size: Adult Regular)  Pulse 62  Temp 98  F (36.7  C) (Oral)  Resp 16  Ht 1.646 m (5' 4.8\")  Wt 53.8 kg (118 lb 9.6 oz)  LMP  (LMP Unknown)  SpO2 100%  BMI 19.86 kg/m2  Gen: well appearing, NAD  Heent: no icterus, o/p clear without ulcers or lesions  CV: rrr, nl S1S2  Lungs: clear  Abd: soft, nt, nd + bs  Breast examination not performed today, left axilla 1 cm firm hard nodule that is significantly smaller than on prior examination.  No right axillary adenopathy.  Ext : no edema  No skin rashes.    Lab Results   Component Value Date    WBC 2.6 07/24/2017     Lab Results   Component Value Date    RBC 3.55 07/24/2017     Lab Results   Component Value Date    HGB 12.1 07/24/2017     Lab Results   Component Value Date    HCT 36.4 07/24/2017     No components found for: MCT  Lab Results   Component Value Date     " 07/24/2017     Lab Results   Component Value Date    MCH 34.1 07/24/2017     Lab Results   Component Value Date    MCHC 33.2 07/24/2017     Lab Results   Component Value Date    RDW 16.8 07/24/2017     Lab Results   Component Value Date     07/24/2017       Recent Labs   Lab Test  07/24/17   1239  06/21/17   1034   NA  141  138   POTASSIUM  4.1  4.2   CHLORIDE  107  106   CO2  27  26   ANIONGAP  7  6   GLC  103*  91   BUN  6*  11   CR  0.56  0.58   KRYSTEN  9.0  8.7     Liver Function Studies -   Recent Labs   Lab Test  07/24/17   1239   PROTTOTAL  7.3   ALBUMIN  3.9   BILITOTAL  0.4   ALKPHOS  42   AST  18   ALT  22     CT C/A/P - pending     ASSESSMENT AND PLAN:  This is a 47-year-old female with a history of a T1a N0, ER-positive, [place]-positive and HER2-negative left breast cancer treated with bilateral mastectomies 5 years ago, who now has metastatic disease involving the left axilla, as well as left hilar lymph node.  On biopsy, these are strongly ER-positive, HI is 40%, and HER2 by FISH was negative with a ratio of 1.5.  She is on the BIG10 trial using palbociclib and tamoxifen.      Shan is tolerating her therapy with minimal side effects.  By examination, she is having an excellent clinical response.  By study, she will have a CT every two months for the first few cycles.      We discussed that she has metastatic disease and I would recommend she stay on a therapy indefinitely.       She is coping reasonably well.          CAMELIA GRISSOM MD

## 2017-08-01 ENCOUNTER — CARE COORDINATION (OUTPATIENT)
Dept: ONCOLOGY | Facility: CLINIC | Age: 47
End: 2017-08-01

## 2017-08-01 NOTE — PROGRESS NOTES
"Called patient's cell phone and left message to return call to discuss results of CT CAP with Dr Whitman recommendations \"some shrinkage, relatively stable, no new lesions.\" Instructed patient to return call to discuss results of CT CAP and Dr Whitman recommendations. Sharifa Celeste RN, BSN  Spoke to patient with Dr Whitman review of CT CAP.  Answered all patient's questions and verbalized understanding. Sharifa Celeste RN, BSN.    "

## 2017-08-21 ENCOUNTER — APPOINTMENT (OUTPATIENT)
Dept: LAB | Facility: CLINIC | Age: 47
End: 2017-08-21
Attending: INTERNAL MEDICINE
Payer: COMMERCIAL

## 2017-08-21 ENCOUNTER — ONCOLOGY VISIT (OUTPATIENT)
Dept: ONCOLOGY | Facility: CLINIC | Age: 47
End: 2017-08-21
Attending: PHYSICIAN ASSISTANT
Payer: COMMERCIAL

## 2017-08-21 ENCOUNTER — RESEARCH ENCOUNTER (OUTPATIENT)
Dept: ONCOLOGY | Facility: CLINIC | Age: 47
End: 2017-08-21

## 2017-08-21 VITALS
SYSTOLIC BLOOD PRESSURE: 108 MMHG | TEMPERATURE: 99 F | HEIGHT: 65 IN | RESPIRATION RATE: 18 BRPM | WEIGHT: 119.1 LBS | BODY MASS INDEX: 19.84 KG/M2 | HEART RATE: 71 BPM | OXYGEN SATURATION: 98 % | DIASTOLIC BLOOD PRESSURE: 67 MMHG

## 2017-08-21 DIAGNOSIS — B37.31 YEAST INFECTION OF THE VAGINA: ICD-10-CM

## 2017-08-21 DIAGNOSIS — C50.519 MALIGNANT NEOPLASM OF LOWER-OUTER QUADRANT OF FEMALE BREAST, UNSPECIFIED ESTROGEN RECEPTOR STATUS, UNSPECIFIED LATERALITY (H): Primary | ICD-10-CM

## 2017-08-21 DIAGNOSIS — R00.2 PALPITATIONS: ICD-10-CM

## 2017-08-21 LAB
ALBUMIN SERPL-MCNC: 3.7 G/DL (ref 3.4–5)
ALP SERPL-CCNC: 41 U/L (ref 40–150)
ALT SERPL W P-5'-P-CCNC: 22 U/L (ref 0–50)
ANION GAP SERPL CALCULATED.3IONS-SCNC: 5 MMOL/L (ref 3–14)
AST SERPL W P-5'-P-CCNC: 22 U/L (ref 0–45)
BASOPHILS # BLD AUTO: 0.1 10E9/L (ref 0–0.2)
BASOPHILS NFR BLD AUTO: 1.7 %
BILIRUB SERPL-MCNC: 0.4 MG/DL (ref 0.2–1.3)
BUN SERPL-MCNC: 11 MG/DL (ref 7–30)
CALCIUM SERPL-MCNC: 8.9 MG/DL (ref 8.5–10.1)
CHLORIDE SERPL-SCNC: 106 MMOL/L (ref 94–109)
CO2 SERPL-SCNC: 29 MMOL/L (ref 20–32)
CREAT SERPL-MCNC: 0.57 MG/DL (ref 0.52–1.04)
DIFFERENTIAL METHOD BLD: ABNORMAL
EOSINOPHIL # BLD AUTO: 0 10E9/L (ref 0–0.7)
EOSINOPHIL NFR BLD AUTO: 0.7 %
ERYTHROCYTE [DISTWIDTH] IN BLOOD BY AUTOMATED COUNT: 15.9 % (ref 10–15)
GFR SERPL CREATININE-BSD FRML MDRD: >90 ML/MIN/1.7M2
GLUCOSE SERPL-MCNC: 93 MG/DL (ref 70–99)
HCG UR QL: NEGATIVE
HCT VFR BLD AUTO: 34.6 % (ref 35–47)
HGB BLD-MCNC: 11.5 G/DL (ref 11.7–15.7)
IMM GRANULOCYTES # BLD: 0 10E9/L (ref 0–0.4)
IMM GRANULOCYTES NFR BLD: 0.3 %
LYMPHOCYTES # BLD AUTO: 1.3 10E9/L (ref 0.8–5.3)
LYMPHOCYTES NFR BLD AUTO: 41.3 %
MCH RBC QN AUTO: 35.7 PG (ref 26.5–33)
MCHC RBC AUTO-ENTMCNC: 33.2 G/DL (ref 31.5–36.5)
MCV RBC AUTO: 108 FL (ref 78–100)
MONOCYTES # BLD AUTO: 0.2 10E9/L (ref 0–1.3)
MONOCYTES NFR BLD AUTO: 7.6 %
NEUTROPHILS # BLD AUTO: 1.5 10E9/L (ref 1.6–8.3)
NEUTROPHILS NFR BLD AUTO: 48.4 %
NRBC # BLD AUTO: 0 10*3/UL
NRBC BLD AUTO-RTO: 0 /100
PLATELET # BLD AUTO: 166 10E9/L (ref 150–450)
POTASSIUM SERPL-SCNC: 4.3 MMOL/L (ref 3.4–5.3)
PROT SERPL-MCNC: 7.3 G/DL (ref 6.8–8.8)
RBC # BLD AUTO: 3.22 10E12/L (ref 3.8–5.2)
SODIUM SERPL-SCNC: 140 MMOL/L (ref 133–144)
TSH SERPL DL<=0.005 MIU/L-ACNC: 1.78 MU/L (ref 0.4–4)
WBC # BLD AUTO: 3 10E9/L (ref 4–11)

## 2017-08-21 PROCEDURE — 81025 URINE PREGNANCY TEST: CPT | Performed by: PHYSICIAN ASSISTANT

## 2017-08-21 PROCEDURE — 85025 COMPLETE CBC W/AUTO DIFF WBC: CPT | Performed by: PHYSICIAN ASSISTANT

## 2017-08-21 PROCEDURE — 93010 ELECTROCARDIOGRAM REPORT: CPT | Performed by: INTERNAL MEDICINE

## 2017-08-21 PROCEDURE — 80053 COMPREHEN METABOLIC PANEL: CPT | Performed by: PHYSICIAN ASSISTANT

## 2017-08-21 PROCEDURE — 99212 OFFICE O/P EST SF 10 MIN: CPT | Mod: ZF

## 2017-08-21 PROCEDURE — 99214 OFFICE O/P EST MOD 30 MIN: CPT | Mod: ZP | Performed by: PHYSICIAN ASSISTANT

## 2017-08-21 PROCEDURE — 84443 ASSAY THYROID STIM HORMONE: CPT | Performed by: PHYSICIAN ASSISTANT

## 2017-08-21 PROCEDURE — 36415 COLL VENOUS BLD VENIPUNCTURE: CPT

## 2017-08-21 ASSESSMENT — PAIN SCALES - GENERAL: PAINLEVEL: NO PAIN (0)

## 2017-08-21 NOTE — NURSING NOTE
Chief Complaint   Patient presents with     Blood Draw     Labs collected from venipuncture. vs taken. checked pt in for appt     Labs collected from venipuncture by RN. Line flushed with saline. Vitals taken. Checked in for appointment(s).    Mahogany Georges RN

## 2017-08-21 NOTE — PROGRESS NOTES
Met with subject for cycle 4, day 1. No new changes in physical condition. Denies fatigue, nausea or vomiting. Denies needing zofran or compazine. Continues to experience intermittent heart palpitations (baseline). No chest pain, SOB, or dizziness. EKG performed with normal results. A 24 hour holter monitoring has been scheduled. No changes in concomitant medications. Subject returned two study drug bottles-one from each of the two past cycles. Subject re-supplied with study drug and drug diary.

## 2017-08-21 NOTE — MR AVS SNAPSHOT
After Visit Summary   8/21/2017    Shan Marsh    MRN: [id number]           Patient Information     Date Of Birth          1970        Visit Information        Provider Department      8/21/2017 12:10 PM Erin Fernández PA-C [facility]        Today's Diagnoses     Malignant neoplasm of lower-outer quadrant of female breast, unspecified estrogen receptor status, unspecified laterality (H)    -  1    Palpitations           Follow-ups after your visit        Your next 10 appointments already scheduled     Aug 22, 2017  2:00 PM CDT   (Arrive by 1:45 PM)   HOLTER MONITOR VISIT with  [facility] ([facility])    [address]  3rd Floor  [facility] [address] [phone]            Sep 19, 2017  4:45 PM CDT   PHYSICAL with Arin Richardson MD   [facility]    [address] [place] [id number] [phone]            Sep 29, 2017  5:20 PM CDT   (Arrive by 5:05 PM)   CT CHEST/ABDOMEN/PELVIS W CONTRAST with UCCT1   [facility] ([facility])    [address]  1st Floor  [facility] [address] [phone]           Please bring any scans or X-rays taken at other hospitals, if similar tests were done. Also bring a list of your medicines, including vitamins, minerals and over-the-counter drugs. It is safest to leave personal items at home.  Be sure to tell your doctor:   If you have any allergies.   If there s any chance you are pregnant.   If you are breastfeeding.   If you have any special needs.  You may have contrast for this exam. To prepare:   Do not eat or drink for 2 hours before your exam. If you need to take medicine, you may take it with small sips of water. (We may ask you to take liquid medicine as well.)   The day before your exam, drink extra fluids at least six  8-ounce glasses (unless your doctor tells you to restrict your fluids).  Patients over 70 or patients with diabetes or kidney problems:   If you haven t had a blood test (creatinine test) within the last 30 days, go to your clinic or Diagnostic Imaging Department for this test.  If you have diabetes:   If your kidney function is normal, continue taking your metformin (Avandamet, Glucophage, Glucovance, Metaglip) on the day of your exam.   If your kidney function is abnormal, wait 48 hours before restarting this medicine.  You will have oral contrast for this exam:   You will drink the contrast at home. Get this from your clinic or Diagnostic Imaging Department. Please follow the directions given.  Please wear loose clothing, such as a sweat suit or jogging clothes. Avoid snaps, zippers and other metal. We may ask you to undress and put on a hospital gown.  If you have any questions, please call the Imaging Department where you will have your exam.            Oct 02, 2017  8:30 AM CDT   [facility] Lab Draw with  [employer] LAB DRAW   [facility] ([facility])    [address] [place] [id number] [phone]            Oct 02, 2017  9:00 AM CDT   (Arrive by 8:45 AM)   Return Visit with Ana Whitman MD   [facility] ([facility])    [address] [place] [address] [phone]              Future tests that were ordered for you today     Open Future Orders        Priority Expected Expires Ordered    Holter monitor 24 hour Routine  2/17/2018 8/21/2017            Who to contact     If you have questions or need follow up information about today's clinic visit or your schedule please contact [facility] directly at [phone].  Normal or non-critical lab and imaging results will be communicated to you by MyChart, letter or phone within 4 business days after the  "clinic has received the results. If you do not hear from us within 7 days, please contact the clinic through [facility] or phone. If you have a critical or abnormal lab result, we will notify you by phone as soon as possible.  Submit refill requests through [employer] or call your pharmacy and they will forward the refill request to us. Please allow 3 business days for your refill to be completed.          Additional Information About Your Visit        DotBluharDaisyBill Information     [employer] gives you secure access to your electronic health record. If you see a primary care provider, you can also send messages to your care team and make appointments. If you have questions, please call your primary care clinic.  If you do not have a primary care provider, please call [phone] and they will assist you.        Care EveryWhere ID     This is your Care EveryWhere ID. This could be used by other organizations to access your [facility] medical records  [id number]        Your Vitals Were     Pulse Temperature Respirations Height Last Period Pulse Oximetry    71 99  F (37.2  C) (Oral) 18 1.646 m (5' 4.8\") (LMP Unknown) 98%    BMI (Body Mass Index)                   19.94 kg/m2            Blood Pressure from Last 3 Encounters:   08/21/17 108/67   07/24/17 110/67   06/21/17 126/79    Weight from Last 3 Encounters:   08/21/17 54 kg (119 lb 1.6 oz)   07/24/17 53.8 kg (118 lb 9.6 oz)   06/21/17 53.6 kg (118 lb 3.2 oz)              We Performed the Following     CBC with platelets differential     Comprehensive metabolic panel     EKG 12-lead complete w/read - Clinics     HCG qualitative urine     TSH          Today's Medication Changes          These changes are accurate as of: 8/21/17  1:18 PM.  If you have any questions, ask your nurse or doctor.               These medicines have changed or have updated prescriptions.        Dose/Directions    * study - palbociclib 125 mg capsule   Commonly known as:  ids# [id number]   This may have changed:  " Another medication with the same name was added. Make sure you understand how and when to take each.   Used for:  Malignant neoplasm of female breast, unspecified laterality, unspecified site of breast   Changed by:  Rafia Voss PA        Dose:  125 mg   Take 1 capsule (125 mg) by mouth daily with food Take on Days 1 through 21 of each 28 day cycle. Avoid grapefruit.   Quantity:  21 capsule   Refills:  0       * study - palbociclib 100 mg capsule   Commonly known as:  IDS# [id number]   This may have changed:  Another medication with the same name was added. Make sure you understand how and when to take each.   Used for:  Malignant neoplasm of lower-outer quadrant of female breast, unspecified estrogen receptor status, unspecified laterality (H)   Changed by:  Ana Whitman MD        Dose:  100 mg   Take 1 capsule (100 mg) by mouth daily   Quantity:  30 capsule   Refills:  0       * study - palbociclib 100 mg capsule   Commonly known as:  IDS# [id number]   This may have changed:  Another medication with the same name was added. Make sure you understand how and when to take each.   Used for:  Malignant neoplasm of lower-outer quadrant of female breast, unspecified estrogen receptor status, unspecified laterality (H)   Changed by:  Ana Whitman MD        Dose:  100 mg   Take 1 capsule (100 mg) by mouth daily   Quantity:  30 capsule   Refills:  0       * study - palbociclib 100 mg capsule   Commonly known as:  IDS# [id number]   This may have changed:  You were already taking a medication with the same name, and this prescription was added. Make sure you understand how and when to take each.   Used for:  Malignant neoplasm of lower-outer quadrant of female breast, unspecified estrogen receptor status, unspecified laterality (H)   Changed by:  Erin Fernández PA-C        Dose:  100 mg   Take 1 capsule (100 mg) by mouth daily   Quantity:  30 capsule   Refills:  0       tamoxifen 20 MG tablet   Commonly known  as:  NOLVADEX   This may have changed:  Another medication with the same name was removed. Continue taking this medication, and follow the directions you see here.   Used for:  Malignant neoplasm of female breast, unspecified laterality, unspecified site of breast   Changed by:  Sharifa Celeste RN        Dose:  20 mg   Take 1 tablet (20 mg) by mouth daily   Quantity:  28 tablet   Refills:  0       * Notice:  This list has 4 medication(s) that are the same as other medications prescribed for you. Read the directions carefully, and ask your doctor or other care provider to review them with you.         Where to get your medicines      Some of these will need a paper prescription and others can be bought over the counter.  Ask your nurse if you have questions.     You don't need a prescription for these medications     study - palbociclib 100 mg capsule                Primary Care Provider Office Phone # Fax #    Marilu Michelle [phone] [address]  [facility] [address]        Equal Access to Services     LUZ JAMES : Hadii noemy ku hadasho Soomaali, waaxda luqadaha, qaybta kaalmada adeegyada, edi mustafa . So [facility] [phone].    ATENCIÓN: Si habla español, tiene a gamez disposición servicios gratuitos de asistencia lingüística. Llame al [phone].    We comply with applicable federal civil rights laws and [place] laws. We do not discriminate on the basis of race, color, national origin, age, disability sex, sexual orientation or gender identity.            Thank you!     Thank you for choosing [facility]  for your care. Our goal is always to provide you with excellent care. Hearing back from our patients is one way we can continue to improve our services. Please take a few minutes to complete the written survey that you may receive in the mail after your visit with us. Thank you!             Your Updated Medication List - Protect  others around you: Learn how to safely use, store and throw away your medicines at www.disposemymeds.org.          This list is accurate as of: 8/21/17  1:18 PM.  Always use your most recent med list.                   Brand Name Dispense Instructions for use Diagnosis    CHLOROXYGEN PO      Take by mouth daily        FLINTSTONES MULTIVITAMIN Chew      1 tablet daily        ondansetron 8 MG tablet    ZOFRAN    30 tablet    Take 1 tablet (8 mg) by mouth every 8 hours as needed for nausea (vomiting)    Malignant neoplasm of female breast, unspecified laterality, unspecified site of breast       prochlorperazine 10 MG tablet    COMPAZINE    30 tablet    Take 1 tablet (10 mg) by mouth every 6 hours as needed (Nausea/Vomiting)    Malignant neoplasm of female breast, unspecified laterality, unspecified site of breast       * study - palbociclib 125 mg capsule    ids# [id number]    21 capsule    Take 1 capsule (125 mg) by mouth daily with food Take on Days 1 through 21 of each 28 day cycle. Avoid grapefruit.    Malignant neoplasm of female breast, unspecified laterality, unspecified site of breast       * study - palbociclib 100 mg capsule    IDS# [id number]    30 capsule    Take 1 capsule (100 mg) by mouth daily    Malignant neoplasm of lower-outer quadrant of female breast, unspecified estrogen receptor status, unspecified laterality (H)       * study - palbociclib 100 mg capsule    IDS# [id number]    30 capsule    Take 1 capsule (100 mg) by mouth daily    Malignant neoplasm of lower-outer quadrant of female breast, unspecified estrogen receptor status, unspecified laterality (H)       * study - palbociclib 100 mg capsule    IDS# [id number]    30 capsule    Take 1 capsule (100 mg) by mouth daily    Malignant neoplasm of lower-outer quadrant of female breast, unspecified estrogen receptor status, unspecified laterality (H)       tamoxifen 20 MG tablet    NOLVADEX    28 tablet    Take 1 tablet (20 mg) by mouth daily    Malignant neoplasm of  female breast, unspecified laterality, unspecified site of breast       * UNABLE TO FIND      daily Comprehensive Immunity Support - Host Defense        * UNABLE TO FIND      daily Mushrooms - My Sudhakar, Reishi, and Adryan.        * UNABLE TO FIND      daily Astragalus Immunity        * UNABLE TO FIND      daily Fucoidon        VITAMIN D (CHOLECALCIFEROL) PO      Take by mouth daily        * Notice:  This list has 8 medication(s) that are the same as other medications prescribed for you. Read the directions carefully, and ask your doctor or other care provider to review them with you.

## 2017-08-21 NOTE — NURSING NOTE
"Oncology Rooming Note    August 21, 2017 11:57 AM   Shan Marsh is a 47 year old female who presents for:    Chief Complaint   Patient presents with     Blood Draw     Labs collected from venipuncture. vs taken. checked pt in for appt     Initial Vitals: /67 (BP Location: Right arm, Cuff Size: Adult Regular)  Pulse 71  Temp 99  F (37.2  C) (Oral)  Resp 18  Ht 1.646 m (5' 4.8\")  Wt 54 kg (119 lb 1.6 oz)  LMP  (LMP Unknown)  SpO2 98%  BMI 19.94 kg/m2 Estimated body mass index is 19.94 kg/(m^2) as calculated from the following:    Height as of this encounter: 1.646 m (5' 4.8\").    Weight as of this encounter: 54 kg (119 lb 1.6 oz). Body surface area is 1.57 meters squared.  No Pain (0) Comment: Data Unavailable   No LMP recorded (lmp unknown). Patient is not currently having periods (Reason: UNKNOWN).  Allergies reviewed: Yes  Medications reviewed: Yes    Medications: Medication refills not needed today.  Pharmacy name entered into [facility]:    [facility] [place], [place] - [address] [place] [address] DRUG STORE [id number] [place] [address] [address] [place] [address] AT [facility] & [address]  [facility] [place], [place] - [address] [facility] DRUG STORE [id number] - [place], [place] - [address] [facility] [address]    Clinical concerns: no new concerns     6 minutes for nursing intake (face to face time)     Sachi Patel CMA                "

## 2017-08-21 NOTE — PROGRESS NOTES
8/21/17     ONCOLOGY VISIT NOTE      REASON FOR VISIT:  I am seeing Ms. Marsh in followup for her stage 4, metastatic ER + breast cancer with metastases to the axilla and hilar lymph nodes.     Briefly in history, she has a history of a T1a N0 M0, ER-positive, SD-positive, and HER2-negative breast cancer.  She originally met me in 12/2011 with a coin-sized mass involving her left breast.  This mass got bigger, and by 05/2012 she was referred for a mammogram, ultrasound and breast MRI.  This revealed heterogeneously dense breast tissue with an area of suspicious microcalcifications measuring 5 x 2.6 cm involving the left breast.  MRI showed non-mass-like enhancement in the upper outer quadrant extending from about 1-3 o'clock, measuring about 6 cm.  Stereotactic needle biopsy revealed DCIS high-grade, which was suspicious for microinvasion.  She was seen by Dr. Misael Cheng, and underwent bilateral mastectomies with sentinel lymph node biopsy on 06/11/2012.  Her final pathology revealed grade 1 infiltrating ductal carcinoma, ER-positive, SD-positive an d HER2-negative.  She had multiple microscopic areas of invasion, each measuring 1-2 mm.  Her pathology was reviewed at our Multidisciplinary Conference, and confirmed multiple areas of microinvasion.  Her final staging was a T1a N0 M0 stage I breast cancer.  [place] lymph node biopsy was negative.  Her Oncotype score was 23, giving her a risk of distant recurrence at 10 years of 15%, assuming she took tamoxifen.  She started on tamoxifen in 08/2012.  This was discontinued in 02/2013 due to complaints of vaginal discharge.  She also had complained of some palpitations at that time and saw Cardiology.  This workup was negative.  She does have a type 1 AV block felt to be benign from her endurance as an athlete.      Shan presented to see me on 05/10/2017, at which time she had noticed fullness and pain involving her left shoulder with numbness down involving her  "left upper extremity.  She was found to have left axillary adenopathy, and was sent for a PET/CT scan revealing on MRI of the shoulder that she had a 4 x 1.8 x 1.5 cm lesion close to the subcutaneous tissue with close proximity to the distal clavicle.  She was subsequently sent for a PET/CT scan, which confirmed multiple PET-avid left axillary lymph nodes, as well as hilar lymph nodes that were suspicious for distant metastatic disease.  There were no suspicious lesions in the liver.  No suspicious lesions in the bones.  She has a known 3.2 cm hemangioma in the left lobe of the liver.      INTERVAL HX:   She enrolled on the BIG10 trial using palbociclib and tamoxifen.  She is tolerating the therapy quite well. She remains active and with good energy. No cold symptoms.  No f/c. Her L shoulder pain resolved and n/t in the L arm has decreased. She reports occasional heart palpations. These predated her chemo and she recalls similar feeling on tamoxifen in the past. She will feel her heart \"pounding\" a few times a day. This has been more frequent on the days with the ibrance and tamoxifen. This feeling lasts a couple minutes and denies any breathing changes, dizziness or chest pain. She reports no other significant heart hx outside of palpitations in the past.     Her menses stopped shortly after starting tamoxifen. Reports slight spotting on one occasion this past month. She knows to alert us if this were to return. No swelling. No hot flashes. She is pleased with how she is feeling and doing.      She has had some difficulty with occasional vaginal yeast infections for which she has required fluconazole.       REVIEW OF SYSTEMS:  Her 10-point review of systems is otherwise negative.      PHYSICAL EXAMINATION:   /67 (BP Location: Right arm, Cuff Size: Adult Regular)  Pulse 71  Temp 99  F (37.2  C) (Oral)  Resp 18  Ht 1.646 m (5' 4.8\")  Wt 54 kg (119 lb 1.6 oz)  LMP  (LMP Unknown)  SpO2 98%  BMI 19.94 " kg/m2  Gen: well appearing, NAD  Heent: no icterus, o/p clear without ulcers or lesions  CV: rrr, nl S1S2  Lungs: clear  Abd: soft, nt, nd + bs  Breast examination not performed today, left axilla some firmness with small < 1 cm palpable node. No right axillary adenopathy.  Ext : no edema  No skin rashes.    LABS:  Results for NIKKIE HICKS (MRN [id number]) as of 8/21/2017 12:37   Ref. Range 7/24/2017 12:39 7/24/2017 12:41 8/21/2017 11:47 8/21/2017 11:49   Sodium Latest Ref Range: 133 - 144 mmol/L 141  140    Potassium Latest Ref Range: 3.4 - 5.3 mmol/L 4.1  4.3    Chloride Latest Ref Range: 94 - 109 mmol/L 107  106    Carbon Dioxide Latest Ref Range: 20 - 32 mmol/L 27  29    Urea Nitrogen Latest Ref Range: 7 - 30 mg/dL 6 (L)  11    Creatinine Latest Ref Range: 0.52 - 1.04 mg/dL 0.56  0.57    GFR Estimate Latest Ref Range: >60 mL/min/1.7m2 >90...  >90    GFR Estimate If Black Latest Ref Range: >60 mL/min/1.7m2 >90...  >90    Calcium Latest Ref Range: 8.5 - 10.1 mg/dL 9.0  8.9    Anion Gap Latest Ref Range: 3 - 14 mmol/L 7  5    Albumin Latest Ref Range: 3.4 - 5.0 g/dL 3.9  3.7    Protein Total Latest Ref Range: 6.8 - 8.8 g/dL 7.3  7.3    Bilirubin Total Latest Ref Range: 0.2 - 1.3 mg/dL 0.4  0.4    Alkaline Phosphatase Latest Ref Range: 40 - 150 U/L 42  41    ALT Latest Ref Range: 0 - 50 U/L 22  22    AST Latest Ref Range: 0 - 45 U/L 18  22    HCG Qual Urine Latest Ref Range: NEG^Negative   Negative  Negative   Results for NIKKIE HICKS (MRN [id number]) as of 8/21/2017 12:37   Ref. Range 7/24/2017 12:39 8/21/2017 11:47   WBC Latest Ref Range: 4.0 - 11.0 10e9/L 2.6 (L) 3.0 (L)   Hemoglobin Latest Ref Range: 11.7 - 15.7 g/dL 12.1 11.5 (L)   Hematocrit Latest Ref Range: 35.0 - 47.0 % 36.4 34.6 (L)   Platelet Count Latest Ref Range: 150 - 450 10e9/L 120 (L) 166   RBC Count Latest Ref Range: 3.8 - 5.2 10e12/L 3.55 (L) 3.22 (L)   MCV Latest Ref Range: 78 - 100 fl 103 (H) 108 (H)   MCH Latest Ref Range:  26.5 - 33.0 pg 34.1 (H) 35.7 (H)   MCHC Latest Ref Range: 31.5 - 36.5 g/dL 33.2 33.2   RDW Latest Ref Range: 10.0 - 15.0 % 16.8 (H) 15.9 (H)   Diff Method Unknown Automated Method Automated Method   % Neutrophils Latest Units: % 44.3 48.4   % Lymphocytes Latest Units: % 45.2 41.3   % Monocytes Latest Units: % 8.9 7.6   % Eosinophils Latest Units: % 0.4 0.7   % Basophils Latest Units: % 0.8 1.7   % Immature Granulocytes Latest Units: % 0.4 0.3   Nucleated RBCs Latest Ref Range: 0 /100 0 0   Absolute Neutrophil Latest Ref Range: 1.6 - 8.3 10e9/L 1.2 (L) 1.5 (L)   Absolute Lymphocytes Latest Ref Range: 0.8 - 5.3 10e9/L 1.2 1.3   Absolute Monocytes Latest Ref Range: 0.0 - 1.3 10e9/L 0.2 0.2   Absolute Eosinophils Latest Ref Range: 0.0 - 0.7 10e9/L 0.0 0.0   Absolute Basophils Latest Ref Range: 0.0 - 0.2 10e9/L 0.0 0.1   Abs Immature Granulocytes Latest Ref Range: 0 - 0.4 10e9/L 0.0 0.0   Absolute Nucleated RBC Unknown 0.0 0.0          ASSESSMENT AND PLAN:  This is a 47-year-old female with a history of a T1a N0, ER-positive, SD-positive and HER2-negative left breast cancer treated with bilateral mastectomies 5 years ago, who now has metastatic disease involving the left axilla, as well as left hilar lymph node.  On biopsy, these are strongly ER-positive, SD is 40%, and HER2 by FISH was negative with a ratio of 1.5.  She is on the BIG10 trial using palbociclib and tamoxifen. She began this in May. She was evaluated with CT last month and found to have stable disease, although clinically her symptoms have really improved. She is here for ongoing f/u today and doing quite well. She will continue on ibrance and tamoxifen and return again in 1 month for restaging per the clinical trial.       We reviewed her ANC today, 1.5 and discussed neutropenic precautions. She has had some issues with occasional vaginal yeast infections requiring fluconazole. Asymptomatic at this time.     She notes palpitations. She has a hx of these  when she was on tamoxifen in the past, but a true association was unclear. W/u was unremarkable at that time. She noted them occasionally prior to her recent treatment, but on the trial they have been more frequent-mostly on the days with ibrance and tamoxifen. No other associated symptoms.  With report of increased frequency on these medications I recommended EKG today and setting up a holter monitor and will add TSH on to today's labs. Unlikely anxiety as she denies this at this time.     She is coping quite well.     ADDENDUM:  TSH wnl. EKG okay-sinus jovanny and 1st degree AV block-no change from prior. Will await holter results.     It is my privilege to be involved in the care of the above patient.     Erin Fernández PA-C  [facility]  [address] [place] [id number] [phone]

## 2017-08-22 ENCOUNTER — ALLIED HEALTH/NURSE VISIT (OUTPATIENT)
Dept: CARDIOLOGY | Facility: CLINIC | Age: 47
End: 2017-08-22
Payer: COMMERCIAL

## 2017-08-22 DIAGNOSIS — R00.2 PALPITATIONS: ICD-10-CM

## 2017-08-22 PROCEDURE — 93225 XTRNL ECG REC<48 HRS REC: CPT | Mod: ZF

## 2017-08-22 PROCEDURE — 93227 XTRNL ECG REC<48 HR R&I: CPT | Performed by: INTERNAL MEDICINE

## 2017-08-22 PROCEDURE — 93226 XTRNL ECG REC<48 HR SCAN A/R: CPT

## 2017-08-22 NOTE — MR AVS SNAPSHOT
After Visit Summary   8/22/2017    Shan Marsh    MRN: [id number]           Patient Information     Date Of Birth          1970        Visit Information        Provider Department      8/22/2017 2:00 PM Tech, U[facility] Cvc Monitor, [facility]        Today's Diagnoses     Palpitations           Follow-ups after your visit        Your next 10 appointments already scheduled     Sep 19, 2017  4:45 PM CDT   PHYSICAL with Arin Richardson MD   [facility] ([facility]    [address] [place] [id number] [phone]            Sep 29, 2017  5:20 PM CDT   (Arrive by 5:05 PM)   CT CHEST/ABDOMEN/PELVIS W CONTRAST with UCCT1   [facility] ([facility])    [address]  1st Floor  [facility] [id number] [phone]           Please bring any scans or X-rays taken at other hospitals, if similar tests were done. Also bring a list of your medicines, including vitamins, minerals and over-the-counter drugs. It is safest to leave personal items at home.  Be sure to tell your doctor:   If you have any allergies.   If there s any chance you are pregnant.   If you are breastfeeding.   If you have any special needs.  You may have contrast for this exam. To prepare:   Do not eat or drink for 2 hours before your exam. If you need to take medicine, you may take it with small sips of water. (We may ask you to take liquid medicine as well.)   The day before your exam, drink extra fluids at least six 8-ounce glasses (unless your doctor tells you to restrict your fluids).  Patients over 70 or patients with diabetes or kidney problems:   If you haven t had a blood test (creatinine test) within the last 30 days, go to your clinic or Diagnostic Imaging Department for this test.  If you have diabetes:   If your kidney function is normal, continue taking your metformin (Avandamet, Glucophage,  Glucovance, Metaglip) on the day of your exam.   If your kidney function is abnormal, wait 48 hours before restarting this medicine.  You will have oral contrast for this exam:   You will drink the contrast at home. Get this from your clinic or Diagnostic Imaging Department. Please follow the directions given.  Please wear loose clothing, such as a sweat suit or jogging clothes. Avoid snaps, zippers and other metal. We may ask you to undress and put on a hospital gown.  If you have any questions, please call the Imaging Department where you will have your exam.            Oct 02, 2017  8:30 AM CDT   [facility] Lab Draw with  [facility] LAB DRAW   [facility]    [address] [place] [id number] [phone]            Oct 02, 2017  9:00 AM CDT   (Arrive by 8:45 AM)   Return Visit with Ana Whitman MD   [facility] ([facility])    [address] [place] [id number] [phone]              Who to contact     If you have questions or need follow up information about today's clinic visit or your schedule please contact [facility] directly at [phone].  Normal or non-critical lab and imaging results will be communicated to you by MyChart, letter or phone within 4 business days after the clinic has received the results. If you do not hear from us within 7 days, please contact the clinic through [employer] or phone. If you have a critical or abnormal lab result, we will notify you by phone as soon as possible.  Submit refill requests through [employer] or call your pharmacy and they will forward the refill request to us. Please allow 3 business days for your refill to be completed.          Additional Information About Your Visit        [employer] Information     [employer] gives you secure access to your electronic health record. If you see a primary care provider, you  can also send messages to your care team and make appointments. If you have questions, please call your primary care clinic.  If you do not have a primary care provider, please call [phone] and they will assist you.        Care EveryWhere ID     This is your Care EveryWhere ID. This could be used by other organizations to access your [facility] medical records  [id number]        Your Vitals Were     Last Period                   (LMP Unknown)            Blood Pressure from Last 3 Encounters:   08/21/17 108/67   07/24/17 110/67   06/21/17 126/79    Weight from Last 3 Encounters:   08/21/17 54 kg (119 lb 1.6 oz)   07/24/17 53.8 kg (118 lb 9.6 oz)   06/21/17 53.6 kg (118 lb 3.2 oz)              We Performed the Following     Holter monitor 24 hour        Primary Care Provider Office Phone # Fax #    Mariul Michelle [phone] [address]  [facility] [address]        Equal Access to Services     LUZ JAMES : Hadii aad ku hadasho Soomaali, waaxda luqadaha, qaybta kaalmada adeegyada, waxay idiin hayaan lexi mustafa . So [facility] [phone].    ATENCIÓN: Si habla español, tiene a gamez disposición servicios gratuitos de asistencia lingüística. [facility] [phone].    We comply with applicable federal civil rights laws and [place] laws. We do not discriminate on the basis of race, color, national origin, age, disability sex, sexual orientation or gender identity.            Thank you!     Thank you for choosing [facility]  for your care. Our goal is always to provide you with excellent care. Hearing back from our patients is one way we can continue to improve our services. Please take a few minutes to complete the written survey that you may receive in the mail after your visit with us. Thank you!             Your Updated Medication List - Protect others around you: Learn how to safely use, store and throw away your medicines at www.disposemymeds.org.          This list is  accurate as of: 8/22/17  2:28 PM.  Always use your most recent med list.                   Brand Name Dispense Instructions for use Diagnosis    CHLOROXYGEN PO      Take by mouth daily        FLINTSTONES MULTIVITAMIN Chew      1 tablet daily        ondansetron 8 MG tablet    ZOFRAN    30 tablet    Take 1 tablet (8 mg) by mouth every 8 hours as needed for nausea (vomiting)    Malignant neoplasm of female breast, unspecified laterality, unspecified site of breast       prochlorperazine 10 MG tablet    COMPAZINE    30 tablet    Take 1 tablet (10 mg) by mouth every 6 hours as needed (Nausea/Vomiting)    Malignant neoplasm of female breast, unspecified laterality, unspecified site of breast       * study - palbociclib 125 mg capsule    ids# [id number]    21 capsule    Take 1 capsule (125 mg) by mouth daily with food Take on Days 1 through 21 of each 28 day cycle. Avoid grapefruit.    Malignant neoplasm of female breast, unspecified laterality, unspecified site of breast       * study - palbociclib 100 mg capsule    IDS# [id number]    30 capsule    Take 1 capsule (100 mg) by mouth daily    Malignant neoplasm of lower-outer quadrant of female breast, unspecified estrogen receptor status, unspecified laterality (H)       * study - palbociclib 100 mg capsule    IDS# [id number]    30 capsule    Take 1 capsule (100 mg) by mouth daily    Malignant neoplasm of lower-outer quadrant of female breast, unspecified estrogen receptor status, unspecified laterality (H)       * study - palbociclib 100 mg capsule    IDS# [id number]    30 capsule    Take 1 capsule (100 mg) by mouth daily    Malignant neoplasm of lower-outer quadrant of female breast, unspecified estrogen receptor status, unspecified laterality (H)       tamoxifen 20 MG tablet    NOLVADEX    28 tablet    Take 1 tablet (20 mg) by mouth daily    Malignant neoplasm of female breast, unspecified laterality, unspecified site of breast       * UNABLE TO FIND      daily Comprehensive Immunity Support -  Host Defense        * UNABLE TO FIND      daily Mushrooms - My Community, Reishi, and Adryan.        * UNABLE TO FIND      daily Astragalus Immunity        * UNABLE TO FIND      daily Fucoidon        VITAMIN D (CHOLECALCIFEROL) PO      Take by mouth daily        * Notice:  This list has 8 medication(s) that are the same as other medications prescribed for you. Read the directions carefully, and ask your doctor or other care provider to review them with you.

## 2017-08-22 NOTE — NURSING NOTE
Per SANDRA [facility]cle[facility] patient to have 24hr holter monitor placed.  Diagnosis: Palpitations  Monitor placed: Yes  Patient Instructed: Yes  Patient verbalized understanding: Yes  Holter # 4  Card ID: [id number]    Placed by Sly Young MA

## 2017-08-23 LAB — INTERPRETATION ECG - MUSE: NORMAL

## 2017-09-19 ENCOUNTER — APPOINTMENT (OUTPATIENT)
Dept: LAB | Facility: CLINIC | Age: 47
End: 2017-09-19
Attending: INTERNAL MEDICINE
Payer: COMMERCIAL

## 2017-09-19 ENCOUNTER — RESEARCH ENCOUNTER (OUTPATIENT)
Dept: ONCOLOGY | Facility: CLINIC | Age: 47
End: 2017-09-19

## 2017-09-19 ENCOUNTER — ONCOLOGY VISIT (OUTPATIENT)
Dept: ONCOLOGY | Facility: CLINIC | Age: 47
End: 2017-09-19
Attending: PHYSICIAN ASSISTANT
Payer: COMMERCIAL

## 2017-09-19 VITALS
OXYGEN SATURATION: 100 % | DIASTOLIC BLOOD PRESSURE: 65 MMHG | HEIGHT: 65 IN | RESPIRATION RATE: 16 BRPM | SYSTOLIC BLOOD PRESSURE: 120 MMHG | BODY MASS INDEX: 19.98 KG/M2 | TEMPERATURE: 98.7 F | WEIGHT: 119.9 LBS | HEART RATE: 56 BPM

## 2017-09-19 DIAGNOSIS — C50.519 MALIGNANT NEOPLASM OF LOWER-OUTER QUADRANT OF FEMALE BREAST, UNSPECIFIED ESTROGEN RECEPTOR STATUS, UNSPECIFIED LATERALITY (H): Primary | ICD-10-CM

## 2017-09-19 DIAGNOSIS — C50.919 MALIGNANT NEOPLASM OF FEMALE BREAST (H): ICD-10-CM

## 2017-09-19 LAB
ALBUMIN SERPL-MCNC: 3.8 G/DL (ref 3.4–5)
ALP SERPL-CCNC: 41 U/L (ref 40–150)
ALT SERPL W P-5'-P-CCNC: 23 U/L (ref 0–50)
ANION GAP SERPL CALCULATED.3IONS-SCNC: 4 MMOL/L (ref 3–14)
AST SERPL W P-5'-P-CCNC: 22 U/L (ref 0–45)
BASOPHILS # BLD AUTO: 0 10E9/L (ref 0–0.2)
BASOPHILS NFR BLD AUTO: 1 %
BILIRUB SERPL-MCNC: 0.2 MG/DL (ref 0.2–1.3)
BUN SERPL-MCNC: 9 MG/DL (ref 7–30)
CALCIUM SERPL-MCNC: 9.2 MG/DL (ref 8.5–10.1)
CHLORIDE SERPL-SCNC: 103 MMOL/L (ref 94–109)
CO2 SERPL-SCNC: 30 MMOL/L (ref 20–32)
CREAT SERPL-MCNC: 0.62 MG/DL (ref 0.52–1.04)
DIFFERENTIAL METHOD BLD: ABNORMAL
EOSINOPHIL # BLD AUTO: 0 10E9/L (ref 0–0.7)
EOSINOPHIL NFR BLD AUTO: 1.3 %
ERYTHROCYTE [DISTWIDTH] IN BLOOD BY AUTOMATED COUNT: 13.2 % (ref 10–15)
GFR SERPL CREATININE-BSD FRML MDRD: >90 ML/MIN/1.7M2
GLUCOSE SERPL-MCNC: 96 MG/DL (ref 70–99)
HCG UR QL: NEGATIVE
HCT VFR BLD AUTO: 34.8 % (ref 35–47)
HGB BLD-MCNC: 11.8 G/DL (ref 11.7–15.7)
IMM GRANULOCYTES # BLD: 0 10E9/L (ref 0–0.4)
IMM GRANULOCYTES NFR BLD: 0.3 %
LYMPHOCYTES # BLD AUTO: 1.5 10E9/L (ref 0.8–5.3)
LYMPHOCYTES NFR BLD AUTO: 47.9 %
MCH RBC QN AUTO: 36.9 PG (ref 26.5–33)
MCHC RBC AUTO-ENTMCNC: 33.9 G/DL (ref 31.5–36.5)
MCV RBC AUTO: 109 FL (ref 78–100)
MONOCYTES # BLD AUTO: 0.3 10E9/L (ref 0–1.3)
MONOCYTES NFR BLD AUTO: 8.5 %
NEUTROPHILS # BLD AUTO: 1.3 10E9/L (ref 1.6–8.3)
NEUTROPHILS NFR BLD AUTO: 41 %
NRBC # BLD AUTO: 0 10*3/UL
NRBC BLD AUTO-RTO: 0 /100
PLATELET # BLD AUTO: 141 10E9/L (ref 150–450)
POTASSIUM SERPL-SCNC: 4 MMOL/L (ref 3.4–5.3)
PROT SERPL-MCNC: 7.8 G/DL (ref 6.8–8.8)
RBC # BLD AUTO: 3.2 10E12/L (ref 3.8–5.2)
SODIUM SERPL-SCNC: 138 MMOL/L (ref 133–144)
WBC # BLD AUTO: 3.1 10E9/L (ref 4–11)

## 2017-09-19 PROCEDURE — 99212 OFFICE O/P EST SF 10 MIN: CPT | Mod: ZF

## 2017-09-19 PROCEDURE — 99214 OFFICE O/P EST MOD 30 MIN: CPT | Mod: ZP | Performed by: PHYSICIAN ASSISTANT

## 2017-09-19 PROCEDURE — 81025 URINE PREGNANCY TEST: CPT | Performed by: INTERNAL MEDICINE

## 2017-09-19 PROCEDURE — 80053 COMPREHEN METABOLIC PANEL: CPT | Performed by: PHYSICIAN ASSISTANT

## 2017-09-19 PROCEDURE — 85025 COMPLETE CBC W/AUTO DIFF WBC: CPT | Performed by: PHYSICIAN ASSISTANT

## 2017-09-19 PROCEDURE — 36415 COLL VENOUS BLD VENIPUNCTURE: CPT

## 2017-09-19 RX ORDER — TAMOXIFEN CITRATE 20 MG/1
20 TABLET ORAL DAILY
Qty: 28 TABLET | Refills: 0 | Status: SHIPPED | OUTPATIENT
Start: 2017-09-19 | End: 2017-10-17

## 2017-09-19 ASSESSMENT — PAIN SCALES - GENERAL: PAINLEVEL: NO PAIN (0)

## 2017-09-19 NOTE — MR AVS SNAPSHOT
After Visit Summary   9/19/2017    Shan Marsh    MRN: [id number]           Patient Information     Date Of Birth          1970        Visit Information        Provider Department      9/19/2017 5:50 PM Lima Griffiths PA-C [facility]        Today's Diagnoses     Malignant neoplasm of lower-outer quadrant of female breast, unspecified estrogen receptor status, unspecified laterality (H)    -  1       Follow-ups after your visit        Your next 10 appointments already scheduled     Oct 02, 2017  8:30 AM CDT   [facility] Draw with  [facility] DRAW   [facility] ([facility])    [address] [place] [id number] [phone]            Oct 02, 2017  9:00 AM CDT   (Arrive by 8:45 AM)   Return Visit with Ana Whitman MD   [facility] ([facility])    [address] [place] [id number] [phone]              Who to contact     If you have questions or need follow up information about today's clinic visit or your schedule please contact [facility] directly at [phone].  Normal or non-critical lab and imaging results will be communicated to you by MyChart, letter or phone within 4 business days after the clinic has received the results. If you do not hear from us within 7 days, please contact the clinic through MyChart or phone. If you have a critical or abnormal lab result, we will notify you by phone as soon as possible.  Submit refill requests through [employer] or call your pharmacy and they will forward the refill request to us. Please allow 3 business days for your refill to be completed.          Additional Information About Your Visit        MyChart Information     [employer] gives you secure access to your electronic health record. If you see a primary care provider, you can also send messages  "to your care team and make appointments. If you have questions, please call your primary care clinic.  If you do not have a primary care provider, please call [phone] and they will assist you.        Care EveryWhere ID     This is your Care EveryWhere ID. This could be used by other organizations to access your [place] medical records  [id number]        Your Vitals Were     Pulse Temperature Respirations Height Pulse Oximetry BMI (Body Mass Index)    56 98.7  F (37.1  C) (Oral) 16 1.646 m (5' 4.8\") 100% 20.07 kg/m2       Blood Pressure from Last 3 Encounters:   09/19/17 120/65   08/21/17 108/67   07/24/17 110/67    Weight from Last 3 Encounters:   09/19/17 54.4 kg (119 lb 14.4 oz)   08/21/17 54 kg (119 lb 1.6 oz)   07/24/17 53.8 kg (118 lb 9.6 oz)              We Performed the Following     CBC with platelets differential     Comprehensive metabolic panel     HCG qualitative urine          Today's Medication Changes          These changes are accurate as of: 9/19/17  6:15 PM.  If you have any questions, ask your nurse or doctor.               These medicines have changed or have updated prescriptions.        Dose/Directions    * study - palbociclib 125 mg capsule   Commonly known as:  ids# [id number]   This may have changed:  Another medication with the same name was added. Make sure you understand how and when to take each.   Used for:  Malignant neoplasm of female breast, unspecified laterality, unspecified site of breast   Changed by:  Rafia Voss PA        Dose:  125 mg   Take 1 capsule (125 mg) by mouth daily with food Take on Days 1 through 21 of each 28 day cycle. Avoid grapefruit.   Quantity:  21 capsule   Refills:  0       * study - palbociclib 100 mg capsule   Commonly known as:  IDS# 4993   This may have changed:  Another medication with the same name was added. Make sure you understand how and when to take each.   Used for:  Malignant neoplasm of lower-outer quadrant of female breast, " unspecified estrogen receptor status, unspecified laterality (H)   Changed by:  Ana Whitman MD        Dose:  100 mg   Take 1 capsule (100 mg) by mouth daily   Quantity:  30 capsule   Refills:  0       * study - palbociclib 100 mg capsule   Commonly known as:  IDS# [id number]   This may have changed:  Another medication with the same name was added. Make sure you understand how and when to take each.   Used for:  Malignant neoplasm of lower-outer quadrant of female breast, unspecified estrogen receptor status, unspecified laterality (H)   Changed by:  Ana Whitman MD        Dose:  100 mg   Take 1 capsule (100 mg) by mouth daily   Quantity:  30 capsule   Refills:  0       * study - palbociclib 100 mg capsule   Commonly known as:  IDS# [id number]   This may have changed:  Another medication with the same name was added. Make sure you understand how and when to take each.   Used for:  Malignant neoplasm of lower-outer quadrant of female breast, unspecified estrogen receptor status, unspecified laterality (H)   Changed by:  Erin Fernández PA-C        Dose:  100 mg   Take 1 capsule (100 mg) by mouth daily   Quantity:  30 capsule   Refills:  0       * study - palbociclib 100 mg capsule   Commonly known as:  IDS# [id number]   This may have changed:  You were already taking a medication with the same name, and this prescription was added. Make sure you understand how and when to take each.   Used for:  Malignant neoplasm of lower-outer quadrant of female breast, unspecified estrogen receptor status, unspecified laterality (H)   Changed by:  Lima Griffiths PA-C        Dose:  100 mg   Take 1 capsule (100 mg) by mouth daily   Quantity:  30 capsule   Refills:  0       * tamoxifen 20 MG tablet   Commonly known as:  NOLVADEX   This may have changed:  Another medication with the same name was added. Make sure you understand how and when to take each.   Used for:  Malignant neoplasm of female breast, unspecified  laterality, unspecified site of breast   Changed by:  Sharifa Celeste, RN        Dose:  20 mg   Take 1 tablet (20 mg) by mouth daily   Quantity:  28 tablet   Refills:  0       * tamoxifen 20 MG tablet   Commonly known as:  NOLVADEX   This may have changed:  You were already taking a medication with the same name, and this prescription was added. Make sure you understand how and when to take each.   Used for:  Malignant neoplasm of lower-outer quadrant of female breast, unspecified estrogen receptor status, unspecified laterality (H)   Changed by:  Lima Griffiths PA-C        Dose:  20 mg   Take 1 tablet (20 mg) by mouth daily   Quantity:  28 tablet   Refills:  0       * Notice:  This list has 7 medication(s) that are the same as other medications prescribed for you. Read the directions carefully, and ask your doctor or other care provider to review them with you.         Where to get your medicines      These medications were sent to [address] [place] [address] [address] [address]    Hours:  TRANSPLANT PHONE NUMBER [phone] Phone:  [phone]     tamoxifen 20 MG tablet         Some of these will need a paper prescription and others can be bought over the counter.  Ask your nurse if you have questions.     You don't need a prescription for these medications     study - palbociclib 100 mg capsule                Primary Care Provider Office Phone # Fax #    Marilu Michelle [phone]       [address]  [facility] [address]        Equal Access to Services     LUZ JAMES AH: Hadii noemy darlingo Sojailyn, waaxda luqadaha, qaybta kaalmada lexiyacecy, edi kolb. So [facility] [phone].    ATENCIÓN: Si habla español, tiene a gamez disposición servicios gratuitos de asistencia lingüística. Llame al [phone].    We comply with applicable federal civil rights laws and  [place] laws. We do not discriminate on the basis of race, color, national origin, age, disability sex, sexual orientation or gender identity.            Thank you!     Thank you for choosing [facility]  for your care. Our goal is always to provide you with excellent care. Hearing back from our patients is one way we can continue to improve our services. Please take a few minutes to complete the written survey that you may receive in the mail after your visit with us. Thank you!             Your Updated Medication List - Protect others around you: Learn how to safely use, store and throw away your medicines at www.disposemymeds.org.          This list is accurate as of: 9/19/17  6:15 PM.  Always use your most recent med list.                   Brand Name Dispense Instructions for use Diagnosis    CHLOROXYGEN PO      Take by mouth daily        FLINTSTONES MULTIVITAMIN Chew      1 tablet daily        ondansetron 8 MG tablet    ZOFRAN    30 tablet    Take 1 tablet (8 mg) by mouth every 8 hours as needed for nausea (vomiting)    Malignant neoplasm of female breast, unspecified laterality, unspecified site of breast       prochlorperazine 10 MG tablet    COMPAZINE    30 tablet    Take 1 tablet (10 mg) by mouth every 6 hours as needed (Nausea/Vomiting)    Malignant neoplasm of female breast, unspecified laterality, unspecified site of breast       * study - palbociclib 125 mg capsule    ids# 4993    21 capsule    Take 1 capsule (125 mg) by mouth daily with food Take on Days 1 through 21 of each 28 day cycle. Avoid grapefruit.    Malignant neoplasm of female breast, unspecified laterality, unspecified site of breast       * study - palbociclib 100 mg capsule    IDS# [id number]    30 capsule    Take 1 capsule (100 mg) by mouth daily    Malignant neoplasm of lower-outer quadrant of female breast, unspecified estrogen receptor status, unspecified laterality (H)       * study - palbociclib 100 mg capsule     IDS# [id number]    30 capsule    Take 1 capsule (100 mg) by mouth daily    Malignant neoplasm of lower-outer quadrant of female breast, unspecified estrogen receptor status, unspecified laterality (H)       * study - palbociclib 100 mg capsule    IDS# [id number]    30 capsule    Take 1 capsule (100 mg) by mouth daily    Malignant neoplasm of lower-outer quadrant of female breast, unspecified estrogen receptor status, unspecified laterality (H)       * study - palbociclib 100 mg capsule    IDS# 4993    30 capsule    Take 1 capsule (100 mg) by mouth daily    Malignant neoplasm of lower-outer quadrant of female breast, unspecified estrogen receptor status, unspecified laterality (H)       * tamoxifen 20 MG tablet    NOLVADEX    28 tablet    Take 1 tablet (20 mg) by mouth daily    Malignant neoplasm of female breast, unspecified laterality, unspecified site of breast       * tamoxifen 20 MG tablet    NOLVADEX    28 tablet    Take 1 tablet (20 mg) by mouth daily    Malignant neoplasm of lower-outer quadrant of female breast, unspecified estrogen receptor status, unspecified laterality (H)       * UNABLE TO FIND      daily Comprehensive Immunity Support - Host Defense        * UNABLE TO FIND      daily Mushrooms - My Community, Reishi, and Shitake.        * UNABLE TO FIND      daily Astragalus Immunity        * UNABLE TO FIND      daily Fucoidon        VITAMIN D (CHOLECALCIFEROL) PO      Take by mouth daily        * Notice:  This list has 11 medication(s) that are the same as other medications prescribed for you. Read the directions carefully, and ask your doctor or other care provider to review them with you.

## 2017-09-19 NOTE — NURSING NOTE
Chief Complaint   Patient presents with     Blood Draw     labs drawn via venipuncture     /65 (BP Location: Right arm, Patient Position: Chair, Cuff Size: Adult Regular)  Pulse 56  Temp 98.7  F (37.1  C) (Oral)  Wt 54.4 kg (119 lb 14.4 oz)  SpO2 100%  BMI 20.07 kg/m2    Vitals taken.  Blood collected from right antecub venipuncture. Pt tolerated well. Pt checked in for next appointment.    Freida Smith RN

## 2017-09-19 NOTE — PROGRESS NOTES
Research visit- pt here after having a CT of chest/abdomen/pelvis and to get study drug to do C5 for Big 10. Need to reconsent patient re: the mandatory tissue. Went thru the consent and highlighted the part that has changed. Discussed the difference and answered all the patients questions to her satisfaction.   Pt reporting that she feels good, went thru some of the meds she has been taking and d/c'd a couple she is not longer on. Pt denying pain at this time. Reporting the one thing she has been having some difficulty with is getting yeast infections. When she gets them has been using a 1 time dose of fluconazole which she says works well to resolve the yeast infection.  Otheriwise pt reporting she is doing well.   Irene Reed RN  [phone]

## 2017-09-19 NOTE — PROGRESS NOTES
Hematology-Oncology Visit  Sep 19, 2017    Reason for Visit: follow-up metastatic breast cancer, ER positive     HPI: Shan Marsh is a 47 year old female without significant past medical history with recent diagnosis of metastatic breast cancer after presenting with left shoulder pain. Shoulder MRI showed soft tissue mass close to distal clavicle concerning for malignancy. She had PET/CT 5/9/17 showing hypermetabolic left axillary, supraclavicular, mediastinal and hilar nodes. She had biopsies of left axilla and hilar nodes showing metastatic breast cancer, strongly ER positive, moderately PA positive and HER-2 kaitlin non-amplified. She originally had [id number], ER/PA positive, HER-2 negative breast cancer diagnosed in spring of 2012 s/p bilateral mastectomies and Tamoxifen from 8/2012-2/2013 and discontinued due to side effects. She met with Dr. Whitman most recently on 5/16/17 who recommended treatment on BIG10 clinical trial with palbociclib and tamoxifen. She started treatment on 5/24/17.     Interval History: Shan is here for follow-up on the BIG10 trial. She is feeling really well today. She has no new concerns. She recently had a period and attributes this to eating too much soy, so she has backed off of that. No concerns with the Ibrance or the Tamoxifen. She thinks the palpitations that she had previously may relate to stress, remains with these very intermittently without any dizziness, chest pain, or shortness of breath. She is using meditation. No issues with fevers, chills, cough, SOB, chest pain, nausea, vomiting, diarrhea, constipation, bleeding, or swelling.     ROS otherwise negative.     Current Outpatient Prescriptions   Medication     study - palbociclib (IDS# 4993) 100 mg capsule     study - palbociclib (IDS# 4993) 100 mg capsule     study - palbociclib (IDS# 4993) 100 mg capsule     prochlorperazine (COMPAZINE) 10 MG tablet     ondansetron (ZOFRAN) 8 MG tablet     Chlorophyll (CHLOROXYGEN  "PO)     UNABLE TO FIND     UNABLE TO FIND     VITAMIN D, CHOLECALCIFEROL, PO     UNABLE TO FIND     UNABLE TO FIND     study - palbociclib (IDS# 4993) 125 mg capsule     tamoxifen (NOLVADEX) 20 MG tablet     FLINTSTONES MULTIVITAMIN OR CHEW     No current facility-administered medications for this visit.        PHYSICAL EXAM:  ECO  /65 (BP Location: Right arm, Patient Position: Chair, Cuff Size: Adult Regular)  Pulse 56  Temp 98.7  F (37.1  C) (Oral)  Resp 16  Ht 1.646 m (5' 4.8\")  Wt 54.4 kg (119 lb 14.4 oz)  SpO2 100%  BMI 20.07 kg/m2  General: Alert, oriented, pleasant, NAD  Skin: Visible superficial vein along L upper chest wall down to medial axilla. There is no induration, cording, or erythema.   HEENT: Normocephalic, atraumatic, PERRLA, EOMI. Moist mucus membranes, no lesions or thrush  Neck: No cervical or supraclavicular LAD.   Axillary: Very faintly palpable left axillary node, <1cm  Lungs: CTA bilaterally, normal work of breathing  Cardiac: RRR, S1, S2, no murmurs  Abdomen: Soft, nontender, nondistended. Normoactive bowel sounds. No hepatosplenomegaly, masses  Neuro: CNII-XII grossly intact  Extremities: No pedal edema    Labs:   Results for orders placed or performed in visit on 17 (from the past 24 hour(s))   HCG qualitative urine   Result Value Ref Range    HCG Qual Urine Negative NEG^Negative   CBC with platelets differential   Result Value Ref Range    WBC 3.1 (L) 4.0 - 11.0 10e9/L    RBC Count 3.20 (L) 3.8 - 5.2 10e12/L    Hemoglobin 11.8 11.7 - 15.7 g/dL    Hematocrit 34.8 (L) 35.0 - 47.0 %     (H) 78 - 100 fl    MCH 36.9 (H) 26.5 - 33.0 pg    MCHC 33.9 31.5 - 36.5 g/dL    RDW 13.2 10.0 - 15.0 %    Platelet Count 141 (L) 150 - 450 10e9/L    Diff Method Automated Method     % Neutrophils 41.0 %    % Lymphocytes 47.9 %    % Monocytes 8.5 %    % Eosinophils 1.3 %    % Basophils 1.0 %    % Immature Granulocytes 0.3 %    Nucleated RBCs 0 0 /100    Absolute Neutrophil 1.3 " (L) 1.6 - 8.3 10e9/L    Absolute Lymphocytes 1.5 0.8 - 5.3 10e9/L    Absolute Monocytes 0.3 0.0 - 1.3 10e9/L    Absolute Eosinophils 0.0 0.0 - 0.7 10e9/L    Absolute Basophils 0.0 0.0 - 0.2 10e9/L    Abs Immature Granulocytes 0.0 0 - 0.4 10e9/L    Absolute Nucleated RBC 0.0    Comprehensive metabolic panel   Result Value Ref Range    Sodium 138 133 - 144 mmol/L    Potassium 4.0 3.4 - 5.3 mmol/L    Chloride 103 94 - 109 mmol/L    Carbon Dioxide 30 20 - 32 mmol/L    Anion Gap 4 3 - 14 mmol/L    Glucose 96 70 - 99 mg/dL    Urea Nitrogen 9 7 - 30 mg/dL    Creatinine 0.62 0.52 - 1.04 mg/dL    GFR Estimate >90 >60 mL/min/1.7m2    GFR Estimate If Black >90 >60 mL/min/1.7m2    Calcium 9.2 8.5 - 10.1 mg/dL    Bilirubin Total 0.2 0.2 - 1.3 mg/dL    Albumin 3.8 3.4 - 5.0 g/dL    Protein Total 7.8 6.8 - 8.8 g/dL    Alkaline Phosphatase 41 40 - 150 U/L    ALT 23 0 - 50 U/L    AST 22 0 - 45 U/L       Assessment & Plan: Shan Marsh is a 47-year-old female with history of a T1 N0, ER/WA positive, HER2-negative left breast cancer treated with bilateral mastectomies in 2012, now with metastatic disease involving the left axilla, as well as left hilar lymph node, ER/WA positive, HER2 negative.  She started on BIG10 trial using palbociclib and Tamoxifen in May 2017    1. Metastatic breast cancer: Here today for follow-up on the BIG10 trial, and to start cycle 5 day 1. She continues to tolerate treatment well with minimal toxicities. She had stable disease on recent restaging. She will continue with treatment.    2. Left shoulder pain and radiculopathy: Secondary to disease in axilla. Resolved as she is clinically responding.    3. Palpitations: History of palpitations predating Ibrance/Tamoxifen use. TSH was negative. She was given a holter monitor which showed sinus rhythm without ectopy or any ST events. We will continue to monitor.    4. GGO of the MILAGROS: No symptoms of infection at this time. She will let us know if she develops  any fevers or respiratory symptoms.    Lima Griffiths PA-C

## 2017-09-19 NOTE — NURSING NOTE
"Oncology Rooming Note    September 19, 2017 5:42 PM   Shan Marsh is a 54 year old female who presents for: Blood Draw and Oncology Clinic Visit    Initial Vitals: /65 (BP Location: Right arm, Patient Position: Chair, Cuff Size: Adult Regular)  Pulse 56  Temp 98.7  F (37.1  C) (Oral)  Resp 16  Ht 1.646 m (5' 4.8\")  Wt 54.4 kg (119 lb 14.4 oz)  SpO2 100%  BMI 20.07 kg/m2 Estimated body mass index is 20.07 kg/(m^2) as calculated from the following:    Height as of this encounter: 1.646 m (5' 4.8\").    Weight as of this encounter: 54.4 kg (119 lb 14.4 oz). Body surface area is 1.58 meters squared.  No Pain (0) Comment: Data Unavailable   No LMP recorded. Patient is not currently having periods (Reason: UNKNOWN).  Allergies reviewed: Yes  Medications reviewed: Yes    Medications: MEDICATION REFILLS NEEDED TODAY. Provider was notified.  Pharmacy name entered into [facility]:    [facility] [place], [place] - [address] [place] [address] DRUG STORE [id number] [place], [place] - [address] [place] [address] AV AT [facility] [address]  [facility] [place], [place] - [address] [facility] DRUG STORE [id number] - [place], [place] - [address] AT [facility] [address]    Clinical concerns:ibrance   6 minutes for nursing intake (face to face time)     Sachi Patel CMA                          '  "

## 2017-09-25 ENCOUNTER — TELEPHONE (OUTPATIENT)
Dept: ONCOLOGY | Facility: CLINIC | Age: 47
End: 2017-09-25

## 2017-09-25 NOTE — TELEPHONE ENCOUNTER
Social work received phone call from patient.  Patient indicated questions about application process for social security disability (SSDI).  Social work discussed application process with patient and provided education regarding compassionate allowance list.  Social work also educated patient about five month waiting period following approval.  Patient was also given contact information for Cancer Legal Line, Social Security Administration and Disability Linkage Line for any other questions that may arise regarding process.  Per patient's request, this information was emailed to her at [email or web address].  No other needs indicated.  SW contact information provided for any other needs, questions or concerns.  SW will continue to follow and is available to assist.     Soo Yeon Han, [facility] Psychiatric CenterSW  [phone]

## 2017-10-02 ENCOUNTER — ONCOLOGY VISIT (OUTPATIENT)
Dept: ONCOLOGY | Facility: CLINIC | Age: 47
End: 2017-10-02
Attending: INTERNAL MEDICINE
Payer: COMMERCIAL

## 2017-10-02 VITALS
HEART RATE: 62 BPM | OXYGEN SATURATION: 98 % | SYSTOLIC BLOOD PRESSURE: 100 MMHG | HEIGHT: 65 IN | BODY MASS INDEX: 19.88 KG/M2 | DIASTOLIC BLOOD PRESSURE: 61 MMHG | RESPIRATION RATE: 16 BRPM | TEMPERATURE: 99.1 F | WEIGHT: 119.3 LBS

## 2017-10-02 DIAGNOSIS — C50.919 METASTATIC BREAST CANCER: Primary | ICD-10-CM

## 2017-10-02 PROCEDURE — 99214 OFFICE O/P EST MOD 30 MIN: CPT | Mod: ZP | Performed by: INTERNAL MEDICINE

## 2017-10-02 PROCEDURE — 99212 OFFICE O/P EST SF 10 MIN: CPT | Mod: ZF

## 2017-10-02 ASSESSMENT — PAIN SCALES - GENERAL: PAINLEVEL: NO PAIN (0)

## 2017-10-02 NOTE — NURSING NOTE
"Oncology Rooming Note    October 2, 2017 9:14 AM   Shan Marsh is a 47 year old female who presents for:    Chief Complaint   Patient presents with     Blood Draw     Vitals only completed     Oncology Clinic Visit     Return: Breast CA     Initial Vitals: /61  Pulse 62  Temp 99.1  F (37.3  C) (Oral)  Resp 16  Ht 1.646 m (5' 4.8\")  Wt 54.1 kg (119 lb 4.8 oz)  SpO2 98%  BMI 19.97 kg/m2 Estimated body mass index is 19.97 kg/(m^2) as calculated from the following:    Height as of this encounter: 1.646 m (5' 4.8\").    Weight as of this encounter: 54.1 kg (119 lb 4.8 oz). Body surface area is 1.57 meters squared.  No Pain (0) Comment: Data Unavailable   No LMP recorded. Patient is not currently having periods (Reason: UNKNOWN).  Allergies reviewed: Yes  Medications reviewed: Yes    Medications: Medication refills not needed today.  Pharmacy name entered into [employer]:    [facility] [place], [place] - [address] [place] [address] DRUG STORE [address] [place], [place] - [address] [facility] & [address]  [facility] [place], [place] - [address] [facility] DRUG STORE [id number] - [place], [place] - [address] AT [facility] OF [address]    Clinical concerns: no new concerns .  Patient was offered a flu vaccine today but declined.      6 minutes for nursing intake (face to face time)     Sachi Patel CMA                "

## 2017-10-02 NOTE — LETTER
10/2/2017       RE: Shan Marsh  [address]  [facility] [address]     Dear Colleague,    Thank you for referring your patient, Shan Marsh, to the [facility]. Please see a copy of my visit note below.    October 4, 2017    ONCOLOGY VISIT NOTE      REASON FOR VISIT:  I am seeing Ms. Marsh in followup for her stage 4, metastatic ER + breast cancer with metastases to the axilla and hilar lymph nodes.  She is currently on the [employer] protocol using tamoxifen and palbociclib.     Briefly in history, she has a history of a T1a N0 M0, ER-positive, TN-positive, and HER2-negative breast cancer.  She originally met me in 12/2011 with a coin-sized mass involving her left breast.  This mass got bigger, and by 05/2012 she was referred for a mammogram, ultrasound and breast MRI.  This revealed heterogeneously dense breast tissue with an area of suspicious microcalcifications measuring 5 x 2.6 cm involving the left breast.  MRI showed non-mass-like enhancement in the upper outer quadrant extending from about 1-3 o'clock, measuring about 6 cm.  Stereotactic needle biopsy revealed DCIS high-grade, which was suspicious for microinvasion.  She was seen by Dr. Misael Cheng, and underwent bilateral mastectomies with sentinel lymph node biopsy on 06/11/2012.  Her final pathology revealed grade 1 infiltrating ductal carcinoma, ER-positive, TN-positive an d HER2-negative.  She had multiple microscopic areas of invasion, each measuring 1-2 mm.  Her pathology was reviewed at our Multidisciplinary Conference, and confirmed multiple areas of microinvasion.  Her final staging was a T1a N0 M0 stage I breast cancer.  Harmon lymph node biopsy was negative.  Her Oncotype score was 23, giving her a risk of distant recurrence at 10 years of 15%, assuming she took tamoxifen.  She started on tamoxifen in 08/2012.  This was discontinued in 02/2013 due to complaints of vaginal discharge.   "She also had complained of some palpitations at that time and saw Cardiology.  This workup was negative.  She does have a type 1 AV block felt to be benign from her endurance as an athlete.      Shan presented to see me on 05/10/2017, at which time she had noticed fullness and pain involving her left shoulder with numbness down involving her left upper extremity.  She was found to have left axillary adenopathy, and was sent for a PET/CT scan revealing on MRI of the shoulder that she had a 4 x 1.8 x 1.5 cm lesion close to the subcutaneous tissue with close proximity to the distal clavicle.  She was subsequently sent for a PET/CT scan, which confirmed multiple PET-avid left axillary lymph nodes, as well as hilar lymph nodes that were suspicious for distant metastatic disease.  There were no suspicious lesions in the liver.  No suspicious lesions in the bones.  She has a known 3.2 cm hemangioma in the left lobe of the liver.  Biopsies confirmed estrogen positive metastatic breast cancer with lung involvement.     She enrolled on the BIG[employer] trial using palbociclib and tamoxifen.   Shan returns today for follow up while on the study.  She is tolerating the therapy without any difficulty.  No infections.  No f/c.  She thinks the axillary mass is getting smaller.  She has good energy.  No fatigue.  She has stopped running as extensively and is trying to take better care of herself.  She has had to reduce her hours given mild fatigue, and the need to decrease stress.    Her menses stopped shortly after starting tamoxifen.  No vaginal bleeding.  No sexual dysfunction.        REVIEW OF SYSTEMS:  Her 10-point review of systems is otherwise negative.      PHYSICAL EXAMINATION:   /61  Pulse 62  Temp 99.1  F (37.3  C) (Oral)  Resp 16  Ht 1.646 m (5' 4.8\")  Wt 54.1 kg (119 lb 4.8 oz)  SpO2 98%  BMI 19.97 kg/m2  Gen: well appearing, NAD  Heent: no icterus, o/p clear without ulcers or lesions  CV: rrr, nl S1S2  Lungs: " clear  Abd: soft, nt, nd + bs  Breast examination not performed today, left axilla 1 cm firm hard nodule that is significantly smaller than on prior examination.  No right axillary adenopathy.  Ext : no edema  No skin rashes.       Lab Results   Component Value Date    WBC 3.1 09/19/2017     Lab Results   Component Value Date    RBC 3.20 09/19/2017     Lab Results   Component Value Date    HGB 11.8 09/19/2017     Lab Results   Component Value Date    HCT 34.8 09/19/2017     No components found for: MCT  Lab Results   Component Value Date     09/19/2017     Lab Results   Component Value Date    MCH 36.9 09/19/2017     Lab Results   Component Value Date    MCHC 33.9 09/19/2017     Lab Results   Component Value Date    RDW 13.2 09/19/2017     Lab Results   Component Value Date     09/19/2017       Recent Labs   Lab Test  09/19/17   1736  08/21/17   1147   NA  138  140   POTASSIUM  4.0  4.3   CHLORIDE  103  106   CO2  30  29   ANIONGAP  4  5   GLC  96  93   BUN  9  11   CR  0.62  0.57   KRYSTEN  9.2  8.9     Liver Function Studies -   Recent Labs   Lab Test  09/19/17   1736   PROTTOTAL  7.8   ALBUMIN  3.8   BILITOTAL  0.2   ALKPHOS  41   AST  22   ALT  23     CT scans reviewed     ASSESSMENT AND PLAN:  This is a 47-year-old female with a history of a T1a N0, ER-positive, DE-positive and HER2-negative left breast cancer treated with bilateral mastectomies 5 years ago, who now has metastatic disease involving the left axilla, as well as left hilar lymph node.  On biopsy, these are strongly ER-positive, DE is 40%, and HER2 by FISH was negative with a ratio of 1.5.  She is on the BIG[employer] trial using palbociclib and tamoxifen.      Shan is tolerating her therapy with minimal side effects.  By examination, she is having an excellent clinical response.  By study, she will have a CT every two months for the first few cycles.  She continues to have mild shrinkage with overall stable disease.    We discussed that she has  metastatic disease and I would recommend she stay on a therapy indefinitely.       She is coping reasonably well.     She is thinking about reducing her hours; paperwork brought in for completion.         CAMELIA GRISSOM MD

## 2017-10-02 NOTE — MR AVS SNAPSHOT
"              After Visit Summary   10/2/2017    Shan Marsh    MRN: [id number]           Patient Information     Date Of Birth          1970        Visit Information        Provider Department      10/2/2017 9:00 AM Ana Whitman MD [facility]        Today's Diagnoses     Metastatic breast cancer (H)    -  1       Follow-ups after your visit        Who to contact     If you have questions or need follow up information about today's clinic visit or your schedule please contact [facility] directly at [phone].  Normal or non-critical lab and imaging results will be communicated to you by ISIShart, letter or phone within 4 business days after the clinic has received the results. If you do not hear from us within 7 days, please contact the clinic through ISIShart or phone. If you have a critical or abnormal lab result, we will notify you by phone as soon as possible.  Submit refill requests through Fresh ! or call your pharmacy and they will forward the refill request to us. Please allow 3 business days for your refill to be completed.          Additional Information About Your Visit        MyChart Information     Fresh ! gives you secure access to your electronic health record. If you see a primary care provider, you can also send messages to your care team and make appointments. If you have questions, please call your primary care clinic.  If you do not have a primary care provider, please call [phone] and they will assist you.        Care EveryWhere ID     This is your Care EveryWhere ID. This could be used by other organizations to access your [facility] medical records  [id number]        Your Vitals Were     Pulse Temperature Respirations Height Pulse Oximetry BMI (Body Mass Index)    62 99.1  F (37.3  C) (Oral) 16 1.646 m (5' 4.8\") 98% 19.97 kg/m2       Blood Pressure from Last 3 Encounters:   10/02/17 100/61   09/19/17 120/65   08/21/17 108/67 "    Weight from Last 3 Encounters:   10/02/17 54.1 kg (119 lb 4.8 oz)   09/19/17 54.4 kg (119 lb 14.4 oz)   08/21/17 54 kg (119 lb 1.6 oz)              Today, you had the following     No orders found for display       Primary Care Provider Office Phone # Fax #    Marilu Michelle [phone]       [address]  [facility] [address]        Equal Access to Services     LUZ JAMES : Hadii aad ku hadasho Soomaali, waaxda luqadaha, qaybta kaalmada adeegyada, waxay idiin hayaan adeeg kharasukhjinder lalambert . So [facility] [phone].    ATENCIÓN: Si habla español, tiene a gamez disposición servicios gratuitos de asistencia lingüística. [facility] [phone].    We comply with applicable federal civil rights laws and [place] laws. We do not discriminate on the basis of race, color, national origin, age, disability, sex, sexual orientation, or gender identity.            Thank you!     Thank you for choosing [facility]  for your care. Our goal is always to provide you with excellent care. Hearing back from our patients is one way we can continue to improve our services. Please take a few minutes to complete the written survey that you may receive in the mail after your visit with us. Thank you!             Your Updated Medication List - Protect others around you: Learn how to safely use, store and throw away your medicines at www.disposemymeds.org.          This list is accurate as of: 10/2/17 11:59 PM.  Always use your most recent med list.                   Brand Name Dispense Instructions for use Diagnosis    CHLOROXYGEN PO      Take by mouth daily        FLINTSTONES MULTIVITAMIN Chew      1 tablet daily        ondansetron 8 MG tablet    ZOFRAN    30 tablet    Take 1 tablet (8 mg) by mouth every 8 hours as needed for nausea (vomiting)    Malignant neoplasm of female breast, unspecified laterality, unspecified site of breast       prochlorperazine 10 MG tablet    COMPAZINE    30 tablet    Take  1 tablet (10 mg) by mouth every 6 hours as needed (Nausea/Vomiting)    Malignant neoplasm of female breast, unspecified laterality, unspecified site of breast       * study - palbociclib 125 mg capsule    ids# [id number]    21 capsule    Take 1 capsule (125 mg) by mouth daily with food Take on Days 1 through 21 of each 28 day cycle. Avoid grapefruit.    Malignant neoplasm of female breast, unspecified laterality, unspecified site of breast       * study - palbociclib 100 mg capsule    IDS# [id number]    30 capsule    Take 1 capsule (100 mg) by mouth daily    Malignant neoplasm of lower-outer quadrant of female breast, unspecified estrogen receptor status, unspecified laterality (H)       * study - palbociclib 100 mg capsule    IDS# [id number]    30 capsule    Take 1 capsule (100 mg) by mouth daily    Malignant neoplasm of lower-outer quadrant of female breast, unspecified estrogen receptor status, unspecified laterality (H)       * study - palbociclib 100 mg capsule    IDS# [id number]    30 capsule    Take 1 capsule (100 mg) by mouth daily    Malignant neoplasm of lower-outer quadrant of female breast, unspecified estrogen receptor status, unspecified laterality (H)       * study - palbociclib 100 mg capsule    IDS# [id number]    30 capsule    Take 1 capsule (100 mg) by mouth daily    Malignant neoplasm of lower-outer quadrant of female breast, unspecified estrogen receptor status, unspecified laterality (H)       * tamoxifen 20 MG tablet    NOLVADEX    28 tablet    Take 1 tablet (20 mg) by mouth daily    Malignant neoplasm of female breast, unspecified laterality, unspecified site of breast       * tamoxifen 20 MG tablet    NOLVADEX    28 tablet    Take 1 tablet (20 mg) by mouth daily    Malignant neoplasm of lower-outer quadrant of female breast, unspecified estrogen receptor status, unspecified laterality (H)       * UNABLE TO FIND      daily Comprehensive Immunity Support - Host Defense        * UNABLE TO FIND      daily Mushrooms - My  Community, Reishi, and Shitake.        * UNABLE TO FIND      daily Astragalus Immunity        * UNABLE TO FIND      daily Fucoidon        VITAMIN D (CHOLECALCIFEROL) PO      Take by mouth daily        * Notice:  This list has 11 medication(s) that are the same as other medications prescribed for you. Read the directions carefully, and ask your doctor or other care provider to review them with you.

## 2017-10-09 ENCOUNTER — TELEPHONE (OUTPATIENT)
Dept: ONCOLOGY | Facility: CLINIC | Age: 47
End: 2017-10-09

## 2017-10-09 NOTE — TELEPHONE ENCOUNTER
FMLA form completed. Faxed to [employer] at [facility].    [phone]. Copy sent for scanning and copy mailed to pt home.

## 2017-10-17 ENCOUNTER — APPOINTMENT (OUTPATIENT)
Dept: LAB | Facility: CLINIC | Age: 47
End: 2017-10-17
Attending: INTERNAL MEDICINE
Payer: COMMERCIAL

## 2017-10-17 ENCOUNTER — RESEARCH ENCOUNTER (OUTPATIENT)
Dept: ONCOLOGY | Facility: CLINIC | Age: 47
End: 2017-10-17

## 2017-10-17 ENCOUNTER — ONCOLOGY VISIT (OUTPATIENT)
Dept: ONCOLOGY | Facility: CLINIC | Age: 47
End: 2017-10-17
Attending: PHYSICIAN ASSISTANT
Payer: COMMERCIAL

## 2017-10-17 VITALS
WEIGHT: 119.2 LBS | HEART RATE: 70 BPM | TEMPERATURE: 98.8 F | SYSTOLIC BLOOD PRESSURE: 114 MMHG | DIASTOLIC BLOOD PRESSURE: 76 MMHG | RESPIRATION RATE: 15 BRPM | OXYGEN SATURATION: 96 % | HEIGHT: 65 IN | BODY MASS INDEX: 19.86 KG/M2

## 2017-10-17 DIAGNOSIS — C50.519 MALIGNANT NEOPLASM OF LOWER-OUTER QUADRANT OF FEMALE BREAST, UNSPECIFIED ESTROGEN RECEPTOR STATUS, UNSPECIFIED LATERALITY (H): Primary | ICD-10-CM

## 2017-10-17 LAB
ALBUMIN SERPL-MCNC: 3.7 G/DL (ref 3.4–5)
ALP SERPL-CCNC: 36 U/L (ref 40–150)
ALT SERPL W P-5'-P-CCNC: 24 U/L (ref 0–50)
ANION GAP SERPL CALCULATED.3IONS-SCNC: 6 MMOL/L (ref 3–14)
AST SERPL W P-5'-P-CCNC: 22 U/L (ref 0–45)
BASOPHILS # BLD AUTO: 0 10E9/L (ref 0–0.2)
BASOPHILS NFR BLD AUTO: 0.3 %
BILIRUB SERPL-MCNC: 0.3 MG/DL (ref 0.2–1.3)
BUN SERPL-MCNC: 11 MG/DL (ref 7–30)
CALCIUM SERPL-MCNC: 8.8 MG/DL (ref 8.5–10.1)
CHLORIDE SERPL-SCNC: 108 MMOL/L (ref 94–109)
CO2 SERPL-SCNC: 26 MMOL/L (ref 20–32)
CREAT SERPL-MCNC: 0.6 MG/DL (ref 0.52–1.04)
DIFFERENTIAL METHOD BLD: ABNORMAL
EOSINOPHIL # BLD AUTO: 0 10E9/L (ref 0–0.7)
EOSINOPHIL NFR BLD AUTO: 0.7 %
ERYTHROCYTE [DISTWIDTH] IN BLOOD BY AUTOMATED COUNT: 12.5 % (ref 10–15)
GFR SERPL CREATININE-BSD FRML MDRD: >90 ML/MIN/1.7M2
GLUCOSE SERPL-MCNC: 86 MG/DL (ref 70–99)
HCG UR QL: NEGATIVE
HCT VFR BLD AUTO: 34.6 % (ref 35–47)
HGB BLD-MCNC: 11.9 G/DL (ref 11.7–15.7)
IMM GRANULOCYTES # BLD: 0 10E9/L (ref 0–0.4)
IMM GRANULOCYTES NFR BLD: 0.3 %
LYMPHOCYTES # BLD AUTO: 1.5 10E9/L (ref 0.8–5.3)
LYMPHOCYTES NFR BLD AUTO: 51.2 %
MCH RBC QN AUTO: 37.5 PG (ref 26.5–33)
MCHC RBC AUTO-ENTMCNC: 34.4 G/DL (ref 31.5–36.5)
MCV RBC AUTO: 109 FL (ref 78–100)
MONOCYTES # BLD AUTO: 0.3 10E9/L (ref 0–1.3)
MONOCYTES NFR BLD AUTO: 9.3 %
NEUTROPHILS # BLD AUTO: 1.1 10E9/L (ref 1.6–8.3)
NEUTROPHILS NFR BLD AUTO: 38.2 %
NRBC # BLD AUTO: 0 10*3/UL
NRBC BLD AUTO-RTO: 0 /100
PLATELET # BLD AUTO: 155 10E9/L (ref 150–450)
POTASSIUM SERPL-SCNC: 4 MMOL/L (ref 3.4–5.3)
PROT SERPL-MCNC: 7.3 G/DL (ref 6.8–8.8)
RBC # BLD AUTO: 3.17 10E12/L (ref 3.8–5.2)
SODIUM SERPL-SCNC: 140 MMOL/L (ref 133–144)
WBC # BLD AUTO: 2.9 10E9/L (ref 4–11)

## 2017-10-17 PROCEDURE — 99214 OFFICE O/P EST MOD 30 MIN: CPT | Mod: ZP | Performed by: PHYSICIAN ASSISTANT

## 2017-10-17 PROCEDURE — 36415 COLL VENOUS BLD VENIPUNCTURE: CPT

## 2017-10-17 PROCEDURE — 80053 COMPREHEN METABOLIC PANEL: CPT | Performed by: PHYSICIAN ASSISTANT

## 2017-10-17 PROCEDURE — 85025 COMPLETE CBC W/AUTO DIFF WBC: CPT | Performed by: PHYSICIAN ASSISTANT

## 2017-10-17 PROCEDURE — 81025 URINE PREGNANCY TEST: CPT | Performed by: PHYSICIAN ASSISTANT

## 2017-10-17 PROCEDURE — 99212 OFFICE O/P EST SF 10 MIN: CPT | Mod: ZF

## 2017-10-17 ASSESSMENT — PAIN SCALES - GENERAL: PAINLEVEL: NO PAIN (0)

## 2017-10-17 NOTE — MR AVS SNAPSHOT
After Visit Summary   10/17/2017    Shan Marsh    MRN: [id number]           Patient Information     Date Of Birth          1970        Visit Information        Provider Department      10/17/2017 5:00 PM Lima Griffiths PA-C [facility]        Today's Diagnoses     Malignant neoplasm of lower-outer quadrant of female breast, unspecified estrogen receptor status, unspecified laterality (H)    -  1       Follow-ups after your visit        Your next 10 appointments already scheduled     Nov 03, 2017  3:00 PM CDT   PHYSICAL with Arin Richardson MD   [facility]    [address] [place] [id number]   [phone]            Nov 14, 2017  4:30 PM CST   [facility] Draw with  Compliance 11 LAB DRAW   [facility] ([facility])    [address] [place] [address]   [phone]            Nov 14, 2017  5:00 PM CST   (Arrive by 4:45 PM)   Return Visit with Lima Griffiths PA-C   [facility] ([facility])    [address] [place] [address]   [phone]            Nov 27, 2017 12:00 PM CST   [facility] Lab Draw with  [facility] LAB DRAW   [facility] ([facility])    [address] [place] [phone]   [phone]            Nov 27, 2017 12:30 PM CST   (Arrive by 12:15 PM)   Return Visit with Ana Whitman MD   [facility] ([facility])    [address] [place] [address]   [phone]              Future tests that were ordered for you today     Open Future Orders        Priority Expected Expires Ordered    CT Chest/Abdomen/Pelvis w Contrast Routine  10/18/2018 10/17/2017            Who to contact     If you have questions or  "need follow up information about today's clinic visit or your schedule please contact [facility] directly at [phone].  Normal or non-critical lab and imaging results will be communicated to you by MyChart, letter or phone within 4 business days after the clinic has received the results. If you do not hear from us within 7 days, please contact the clinic through [employer] or phone. If you have a critical or abnormal lab result, we will notify you by phone as soon as possible.  Submit refill requests through [employer] or call your pharmacy and they will forward the refill request to us. Please allow 3 business days for your refill to be completed.          Additional Information About Your Visit        [employer] Information     [employer] gives you secure access to your electronic health record. If you see a primary care provider, you can also send messages to your care team and make appointments. If you have questions, please call your primary care clinic.  If you do not have a primary care provider, please call [phone] and they will assist you.        Care EveryWhere ID     This is your Care EveryWhere ID. This could be used by other organizations to access your [place] medical records  [id number]        Your Vitals Were     Pulse Temperature Respirations Height Pulse Oximetry BMI (Body Mass Index)    70 98.8  F (37.1  C) (Oral) 15 1.646 m (5' 4.8\") 96% 19.96 kg/m2       Blood Pressure from Last 3 Encounters:   10/17/17 114/76   10/02/17 100/61   09/19/17 120/65    Weight from Last 3 Encounters:   10/17/17 54.1 kg (119 lb 3.2 oz)   10/02/17 54.1 kg (119 lb 4.8 oz)   09/19/17 54.4 kg (119 lb 14.4 oz)              We Performed the Following     CBC with platelets differential     Comprehensive metabolic panel     HCG qualitative urine - CSC and Range          Today's Medication Changes          These changes are accurate as of: 10/17/17  5:26 PM.  If you have any questions, ask your nurse or " doctor.               These medicines have changed or have updated prescriptions.        Dose/Directions    * study - palbociclib 125 mg capsule   Commonly known as:  ids# [id number]   This may have changed:  Another medication with the same name was added. Make sure you understand how and when to take each.   Used for:  Malignant neoplasm of female breast, unspecified laterality, unspecified site of breast   Changed by:  Rafia Voss PA        Dose:  125 mg   Take 1 capsule (125 mg) by mouth daily with food Take on Days 1 through 21 of each 28 day cycle. Avoid grapefruit.   Quantity:  21 capsule   Refills:  0       * study - palbociclib 100 mg capsule   Commonly known as:  IDS# 4993   This may have changed:  Another medication with the same name was added. Make sure you understand how and when to take each.   Used for:  Malignant neoplasm of lower-outer quadrant of female breast, unspecified estrogen receptor status, unspecified laterality (H)   Changed by:  Ana Whitman MD        Dose:  100 mg   Take 1 capsule (100 mg) by mouth daily   Quantity:  30 capsule   Refills:  0       * study - palbociclib 100 mg capsule   Commonly known as:  IDS# 4993   This may have changed:  Another medication with the same name was added. Make sure you understand how and when to take each.   Used for:  Malignant neoplasm of lower-outer quadrant of female breast, unspecified estrogen receptor status, unspecified laterality (H)   Changed by:  Ana Whitman MD        Dose:  100 mg   Take 1 capsule (100 mg) by mouth daily   Quantity:  30 capsule   Refills:  0       * study - palbociclib 100 mg capsule   Commonly known as:  IDS# 4993   This may have changed:  Another medication with the same name was added. Make sure you understand how and when to take each.   Used for:  Malignant neoplasm of lower-outer quadrant of female breast, unspecified estrogen receptor status, unspecified laterality (H)   Changed by:  Erin Fernández  PEE Clark        Dose:  100 mg   Take 1 capsule (100 mg) by mouth daily   Quantity:  30 capsule   Refills:  0       * study - palbociclib 100 mg capsule   Commonly known as:  IDS# 4993   This may have changed:  Another medication with the same name was added. Make sure you understand how and when to take each.   Used for:  Malignant neoplasm of lower-outer quadrant of female breast, unspecified estrogen receptor status, unspecified laterality (H)   Changed by:  Lima Griffiths PA-C        Dose:  100 mg   Take 1 capsule (100 mg) by mouth daily   Quantity:  30 capsule   Refills:  0       * study - palbociclib 100 mg capsule   Commonly known as:  IDS# 4993   This may have changed:  You were already taking a medication with the same name, and this prescription was added. Make sure you understand how and when to take each.   Used for:  Malignant neoplasm of lower-outer quadrant of female breast, unspecified estrogen receptor status, unspecified laterality (H)   Changed by:  Lima Griffiths PA-C        Dose:  100 mg   Take 1 capsule (100 mg) by mouth daily   Quantity:  30 capsule   Refills:  0       tamoxifen 20 MG tablet   Commonly known as:  NOLVADEX   This may have changed:  Another medication with the same name was removed. Continue taking this medication, and follow the directions you see here.   Used for:  Malignant neoplasm of female breast, unspecified laterality, unspecified site of breast   Changed by:  Sharifa Celeste RN        Dose:  20 mg   Take 1 tablet (20 mg) by mouth daily   Quantity:  28 tablet   Refills:  0       * Notice:  This list has 6 medication(s) that are the same as other medications prescribed for you. Read the directions carefully, and ask your doctor or other care provider to review them with you.         Where to get your medicines      Some of these will need a paper prescription and others can be bought over the counter.  Ask your nurse if you have questions.      You don't need a prescription for these medications     study - palbociclib 100 mg capsule                Primary Care Provider Office Phone # Fax #    Marilu Michelle [phone] [address] [facility] [address]        Equal Access to Services     LUZ JAMES : Hadii noemy ku hadsilvinoo Soomaali, waaxda luqadaha, qaybta kaalmada adeegyada, waxneftaly ratliff elidaluna elliott carlinesukhjinder kolb. So [facility] [phone].    ATENCIÓN: Si habla español, tiene a gamez disposición servicios gratuitos de asistencia lingüística. Llame al [phone].    We comply with applicable federal civil rights laws and [place] laws. We do not discriminate on the basis of race, color, national origin, age, disability, sex, sexual orientation, or gender identity.            Thank you!     Thank you for choosing [facility]  for your care. Our goal is always to provide you with excellent care. Hearing back from our patients is one way we can continue to improve our services. Please take a few minutes to complete the written survey that you may receive in the mail after your visit with us. Thank you!             Your Updated Medication List - Protect others around you: Learn how to safely use, store and throw away your medicines at www.disposemymeds.org.          This list is accurate as of: 10/17/17  5:26 PM.  Always use your most recent med list.                   Brand Name Dispense Instructions for use Diagnosis    CHLOROXYGEN PO      Take by mouth daily        FLINTSTONES MULTIVITAMIN Chew      1 tablet daily        ondansetron 8 MG tablet    ZOFRAN    30 tablet    Take 1 tablet (8 mg) by mouth every 8 hours as needed for nausea (vomiting)    Malignant neoplasm of female breast, unspecified laterality, unspecified site of breast       prochlorperazine 10 MG tablet    COMPAZINE    30 tablet    Take 1 tablet (10 mg) by mouth every 6 hours as needed (Nausea/Vomiting)    Malignant neoplasm of female breast,  unspecified laterality, unspecified site of breast       * study - palbociclib 125 mg capsule    ids# 4993    21 capsule    Take 1 capsule (125 mg) by mouth daily with food Take on Days 1 through 21 of each 28 day cycle. Avoid grapefruit.    Malignant neoplasm of female breast, unspecified laterality, unspecified site of breast       * study - palbociclib 100 mg capsule    IDS# 4993    30 capsule    Take 1 capsule (100 mg) by mouth daily    Malignant neoplasm of lower-outer quadrant of female breast, unspecified estrogen receptor status, unspecified laterality (H)       * study - palbociclib 100 mg capsule    IDS# 4993    30 capsule    Take 1 capsule (100 mg) by mouth daily    Malignant neoplasm of lower-outer quadrant of female breast, unspecified estrogen receptor status, unspecified laterality (H)       * study - palbociclib 100 mg capsule    IDS# 4993    30 capsule    Take 1 capsule (100 mg) by mouth daily    Malignant neoplasm of lower-outer quadrant of female breast, unspecified estrogen receptor status, unspecified laterality (H)       * study - palbociclib 100 mg capsule    IDS# 4993    30 capsule    Take 1 capsule (100 mg) by mouth daily    Malignant neoplasm of lower-outer quadrant of female breast, unspecified estrogen receptor status, unspecified laterality (H)       * study - palbociclib 100 mg capsule    IDS# 4993    30 capsule    Take 1 capsule (100 mg) by mouth daily    Malignant neoplasm of lower-outer quadrant of female breast, unspecified estrogen receptor status, unspecified laterality (H)       tamoxifen 20 MG tablet    NOLVADEX    28 tablet    Take 1 tablet (20 mg) by mouth daily    Malignant neoplasm of female breast, unspecified laterality, unspecified site of breast       * UNABLE TO FIND      daily Comprehensive Immunity Support - Host Defense        * UNABLE TO FIND      daily Mushrooms - My Community, Reishi, and Shitake.        * UNABLE TO FIND      daily Astragalus Immunity        *  UNABLE TO FIND      daily Fucoidon        VITAMIN D (CHOLECALCIFEROL) PO      Take by mouth daily        * Notice:  This list has 10 medication(s) that are the same as other medications prescribed for you. Read the directions carefully, and ask your doctor or other care provider to review them with you.

## 2017-10-17 NOTE — NURSING NOTE
Research note for BIG10 /Palbociclib/Tamoxifen in Breast CA for C6D1: Patient lab results acceptable for treatment to continue/note ANC1.1 and trending down. Pt reports she has not had any shoulder pain since 7.17/17 when she stopped using Tylenol PM. Yeast infection resolved 8/23/17 after using diflucan.  She reports intermittent mouth sores starting 9/19/17 and resolved by 9/23/17 with use of baking soda/watwer rinses PRN.  Diary returned for C5 but forgot her empty pill bottle for C4 at home. Will bring next visit. Stressed need to complete diary for each cycle and return drug bottles. Drug Palbociclib dispensed and she will start today. All questions answered to her satisfaction. Plan to restage with CT prior to C7.  Genet Christie RN/ Research [phone]

## 2017-10-17 NOTE — PROGRESS NOTES
Hematology-Oncology Visit  Oct 17, 2017    Reason for Visit: follow-up metastatic breast cancer, ER positive     HPI: Shan Marsh is a 47 year old female without significant past medical history with recent diagnosis of metastatic breast cancer after presenting with left shoulder pain. Shoulder MRI showed soft tissue mass close to distal clavicle concerning for malignancy. She had PET/CT 5/9/17 showing hypermetabolic left axillary, supraclavicular, mediastinal and hilar nodes. She had biopsies of left axilla and hilar nodes showing metastatic breast cancer, strongly ER positive, moderately KS positive and HER-2 kaitlin non-amplified. She originally had [id number], ER/KS positive, HER-2 negative breast cancer diagnosed in spring of 2012 s/p bilateral mastectomies and Tamoxifen from 8/2012-2/2013 and discontinued due to side effects. She met with Dr. Whitman most recently on 5/16/17 who recommended treatment on BIG10 clinical trial with palbociclib and tamoxifen. She started treatment on 5/24/17.     Interval History: Shan is here for follow-up on the BIG10 trial. Nothing new. No concerns with the medications. No palpitations recently. No pain. 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     ROS otherwise negative.     Current Outpatient Prescriptions   Medication     study - palbociclib (IDS# 4993) 100 mg capsule     tamoxifen (NOLVADEX) 20 MG tablet     study - palbociclib (IDS# 4993) 100 mg capsule     study - palbociclib (IDS# 4993) 100 mg capsule     study - palbociclib (IDS# 4993) 100 mg capsule     study - palbociclib (IDS# 4993) 100 mg capsule     prochlorperazine (COMPAZINE) 10 MG tablet     ondansetron (ZOFRAN) 8 MG tablet     Chlorophyll (CHLOROXYGEN PO)     UNABLE TO FIND     UNABLE TO FIND     VITAMIN D, CHOLECALCIFEROL, PO     UNABLE TO FIND     UNABLE TO FIND     study -  "palbociclib (IDS# 4993) 125 mg capsule     tamoxifen (NOLVADEX) 20 MG tablet     FLINTSTONES MULTIVITAMIN OR CHEW     No current facility-administered medications for this visit.        PHYSICAL EXAM:  ECO  /76  Pulse 70  Temp 98.8  F (37.1  C) (Oral)  Resp 15  Ht 1.646 m (5' 4.8\")  Wt 54.1 kg (119 lb 3.2 oz)  SpO2 96%  BMI 19.96 kg/m2     General: Alert, oriented, pleasant, NAD  Skin: Visible superficial vein along L upper chest wall down to medial axilla. There is no induration, cording, or erythema.   HEENT: Normocephalic, atraumatic, PERRLA, EOMI. Moist mucus membranes, no lesions or thrush  Neck: No cervical or supraclavicular LAD.   Axillary: Very faintly palpable left axillary node, <1cm  Lungs: CTA bilaterally, normal work of breathing  Cardiac: RRR, S1, S2, no murmurs  Abdomen: Soft, nontender, nondistended. Normoactive bowel sounds. No hepatosplenomegaly, masses  Neuro: CNII-XII grossly intact  Extremities: No pedal edema    Labs:   No results found for this or any previous visit (from the past 24 hour(s)).    Assessment & Plan: Shan Marsh is a 47-year-old female with history of a T1 N0, ER/MD positive, HER2-negative left breast cancer treated with bilateral mastectomies in , now with metastatic disease involving the left axilla, as well as left hilar lymph node, ER/MD positive, HER2 negative.  She started on BIG10 trial using palbociclib and Tamoxifen in May 2017. She had a dose reduction of palbociclib due to neutropenia.    1. Metastatic breast cancer: Here today for follow-up on the BIG10 trial, and to start cycle 6 day 1. She continues to tolerate treatment well with minimal toxicities. Neutropenia stable. She had stable disease on recent restaging. She will continue with treatment. She will have a CT CAP prior to next cycle per study protocol.    2. Left shoulder pain and radiculopathy: Secondary to disease in axilla. Resolved as she is clinically responding.    3. " Palpitations: History of palpitations predating Ibrance/Tamoxifen use. TSH was negative. She was given a holter monitor which showed sinus rhythm without ectopy or any ST events. We will continue to monitor.      Lima Griffiths PA-C

## 2017-10-17 NOTE — LETTER
10/17/2017      RE: Shan Marsh  [address]  [facility] [address]       Hematology-Oncology Visit  Oct 17, 2017    Reason for Visit: follow-up metastatic breast cancer, ER positive     HPI: Shan Marsh is a 47 year old female without significant past medical history with recent diagnosis of metastatic breast cancer after presenting with left shoulder pain. Shoulder MRI showed soft tissue mass close to distal clavicle concerning for malignancy. She had PET/CT 5/9/17 showing hypermetabolic left axillary, supraclavicular, mediastinal and hilar nodes. She had biopsies of left axilla and hilar nodes showing metastatic breast cancer, strongly ER positive, moderately MS positive and HER-2 kaitlin non-amplified. She originally had [id number], ER/MS positive, HER-2 negative breast cancer diagnosed in spring of 2012 s/p bilateral mastectomies and Tamoxifen from 8/2012-2/2013 and discontinued due to side effects. She met with Dr. Whitman most recently on 5/16/17 who recommended treatment on BIG10 clinical trial with palbociclib and tamoxifen. She started treatment on 5/24/17.     Interval History: Shan is here for follow-up on the BIG10 trial. Nothing new. No concerns with the medications. No palpitations recently. No pain. 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     ROS otherwise negative.     Current Outpatient Prescriptions   Medication     study - palbociclib (IDS# 4993) 100 mg capsule     tamoxifen (NOLVADEX) 20 MG tablet     study - palbociclib (IDS# 4993) 100 mg capsule     study - palbociclib (IDS# 4993) 100 mg capsule     study - palbociclib (IDS# 4993) 100 mg capsule     study - palbociclib (IDS# 4993) 100 mg capsule     prochlorperazine (COMPAZINE) 10 MG tablet     ondansetron (ZOFRAN) 8 MG tablet     Chlorophyll (CHLOROXYGEN PO)     UNABLE TO FIND     UNABLE TO  "FIND     VITAMIN D, CHOLECALCIFEROL, PO     UNABLE TO FIND     UNABLE TO FIND     study - palbociclib (IDS# 4993) 125 mg capsule     tamoxifen (NOLVADEX) 20 MG tablet     FLINTSTONES MULTIVITAMIN OR CHEW     No current facility-administered medications for this visit.        PHYSICAL EXAM:  ECO  /76  Pulse 70  Temp 98.8  F (37.1  C) (Oral)  Resp 15  Ht 1.646 m (5' 4.8\")  Wt 54.1 kg (119 lb 3.2 oz)  SpO2 96%  BMI 19.96 kg/m2     General: Alert, oriented, pleasant, NAD  Skin: Visible superficial vein along L upper chest wall down to medial axilla. There is no induration, cording, or erythema.   HEENT: Normocephalic, atraumatic, PERRLA, EOMI. Moist mucus membranes, no lesions or thrush  Neck: No cervical or supraclavicular LAD.   Axillary: Very faintly palpable left axillary node, <1cm  Lungs: CTA bilaterally, normal work of breathing  Cardiac: RRR, S1, S2, no murmurs  Abdomen: Soft, nontender, nondistended. Normoactive bowel sounds. No hepatosplenomegaly, masses  Neuro: CNII-XII grossly intact  Extremities: No pedal edema    Labs:   No results found for this or any previous visit (from the past 24 hour(s)).    Assessment & Plan: Shan Marsh is a 47-year-old female with history of a T1 N0, ER/DC positive, HER2-negative left breast cancer treated with bilateral mastectomies in , now with metastatic disease involving the left axilla, as well as left hilar lymph node, ER/DC positive, HER2 negative.  She started on BIG10 trial using palbociclib and Tamoxifen in May 2017. She had a dose reduction of palbociclib due to neutropenia.    1. Metastatic breast cancer: Here today for follow-up on the BIG10 trial, and to start cycle 6 day 1. She continues to tolerate treatment well with minimal toxicities. Neutropenia stable. She had stable disease on recent restaging. She will continue with treatment. She will have a CT CAP prior to next cycle per study protocol.    2. Left shoulder pain and radiculopathy: " Secondary to disease in axilla. Resolved as she is clinically responding.    3. Palpitations: History of palpitations predating Ibrance/Tamoxifen use. TSH was negative. She was given a holter monitor which showed sinus rhythm without ectopy or any ST events. We will continue to monitor.      Lima Griffiths PA-C

## 2017-11-03 ENCOUNTER — OFFICE VISIT (OUTPATIENT)
Dept: OBGYN | Facility: CLINIC | Age: 47
End: 2017-11-03
Payer: COMMERCIAL

## 2017-11-03 VITALS
HEIGHT: 65 IN | WEIGHT: 121.5 LBS | TEMPERATURE: 97.1 F | DIASTOLIC BLOOD PRESSURE: 62 MMHG | BODY MASS INDEX: 20.24 KG/M2 | SYSTOLIC BLOOD PRESSURE: 108 MMHG

## 2017-11-03 DIAGNOSIS — Z01.419 ENCOUNTER FOR GYNECOLOGICAL EXAMINATION WITHOUT ABNORMAL FINDING: Primary | ICD-10-CM

## 2017-11-03 DIAGNOSIS — N91.2 ABSENCE OF MENSTRUATION: ICD-10-CM

## 2017-11-03 LAB — T3FREE SERPL-MCNC: 2.4 PG/ML (ref 2.3–4.2)

## 2017-11-03 PROCEDURE — 99396 PREV VISIT EST AGE 40-64: CPT | Performed by: OBSTETRICS & GYNECOLOGY

## 2017-11-03 PROCEDURE — 84439 ASSAY OF FREE THYROXINE: CPT | Performed by: OBSTETRICS & GYNECOLOGY

## 2017-11-03 PROCEDURE — G0145 SCR C/V CYTO,THINLAYER,RESCR: HCPCS | Performed by: OBSTETRICS & GYNECOLOGY

## 2017-11-03 PROCEDURE — 84481 FREE ASSAY (FT-3): CPT | Performed by: OBSTETRICS & GYNECOLOGY

## 2017-11-03 PROCEDURE — 87624 HPV HI-RISK TYP POOLED RSLT: CPT | Performed by: OBSTETRICS & GYNECOLOGY

## 2017-11-03 PROCEDURE — 36415 COLL VENOUS BLD VENIPUNCTURE: CPT | Performed by: OBSTETRICS & GYNECOLOGY

## 2017-11-03 PROCEDURE — 80061 LIPID PANEL: CPT | Performed by: OBSTETRICS & GYNECOLOGY

## 2017-11-03 NOTE — NURSING NOTE
"Chief Complaint   Patient presents with     Physical     annual and pap       Initial /62  Temp 97.1  F (36.2  C) (Oral)  Ht 5' 4.8\" (1.646 m)  Wt 121 lb 8 oz (55.1 kg)  LMP 10/06/2017 (Approximate)  BMI 20.34 kg/m2 Estimated body mass index is 20.34 kg/(m^2) as calculated from the following:    Height as of this encounter: 5' 4.8\" (1.646 m).    Weight as of this encounter: 121 lb 8 oz (55.1 kg).  BP completed using cuff size: regular        The following HM Due: pap smear      The following patient reported/Care Every where data was sent to:  P ABSTRACT QUALITY INITIATIVES [[id number]]       patient has appointment for today  Sachi Holland               "

## 2017-11-03 NOTE — PROGRESS NOTES
Shan is a 47 year old  female who presents for annual exam.     Menses are rare and taking medication that makes her not have a period.   About 6 months ago she went to see her oncologist for some significant arm pain.  It was in her left arm, radiating from her shoulder and causing her fingers to be numb.  She was found to have metastatic breast cancer in the lymph nodes and into the lung.  She started a trial medication to suppress hormones about 4 months ago and no longer has a period.    She had decided she would not do chemotherapy, has some reservations about [facility] medicine and the oncology dept at the [facility], so was happy to be able to enroll in the trial.  She is also working closely with a naturopath, taking lots of herbal supplements and working with healers.  She feels the best she has in 10 years and attributes that to her naturopath.  Emotionally she is doing well, hardest is having to do with her daughters, but they are in good spirits about it.  Besides routine health maintenance, she has no other health concerns today.  GYNECOLOGIC HISTORY:  Menarche: 17  Age at first intercourse: 18 Number of lifetime partners: <6  Shan is sexually active with male partner(s) and is currently in monogamous relationship with .    History sexually transmitted infections:No STD history and warts  STI testing offered?  Declined  ROMA exposure: No  History of abnormal Pap smear: NO - age 30- 65 PAP every 3 years recommended  Family history of breast CA: Yes (Please explain): self  Family history of uterine/ovarian CA: No    Family history of colon CA: Yes (Please explain): Maternal uncle    HEALTH MAINTENANCE:  Cholesterol: (  Cholesterol   Date Value Ref Range Status   2012 154 0 - 200 mg/dL Final     Comment:     LDL Cholesterol is the primary guide to therapy.   The NCEP recommends further evaluation of: patients with cholesterol greater   than 200 mg/dL if additional risk factors are present,  cholesterol greater   than   240 mg/dL, triglycerides greater than 150 mg/dL, or HDL less than 40 mg/dL.   10/12/2006 180 0 - 200 mg/dL Final     Comment:     LDL Cholesterol is the primary guide to therapy: LDL-cholesterol goal in high   risk patients is <100 mg/dL and in very high risk patients is <70 mg/dL.   The NCEP recommends further evaluation of: patients with cholesterol <200 mg/dL   if additional risk factors are present, cholesterol >240 mg/dL, triglycerides   >150 mg/dL, or HDL <40 mg/dL.    History of abnormal lipids: No  Mammo: NA . History of abnormal Mammo: YES.  Regular Self Breast Exams: Yes  Calcium/Vitamin D intake: source:  dairy, dietary supplement(s) Adequate? Yes  TSH: (  TSH   Date Value Ref Range Status   2017 1.78 0.40 - 4.00 mU/L Final    )  Pap; (  Lab Results   Component Value Date    PAP NIL 10/01/2014    PAP NIL 2012    PAP NIL 2011    )    HISTORY:  Obstetric History       T2      L2     SAB0   TAB0   Ectopic0   Multiple0   Live Births2       # Outcome Date GA Lbr Delmar/2nd Weight Sex Delivery Anes PTL Lv   3 Term 08/02/10 37w1d  5 lb 12 oz (2.608 kg) F    NIKKI   2 Term 07 40w2d  7 lb 13 oz (3.544 kg) F    NIKKI      Name: Meghan Kirkpatrick SAB 06 19w0d    AB, MISSED   DEC      Obstetric Comments   Loss of twin pregnancy at 19 weeks due to premature labor vs. cervical incompetence and twin-twin transfusion syndrome.     Past Medical History:   Diagnosis Date     Breast cancer, left breast (H)     grade 1 infiltrating ductal cancer, ER/[place]+, s/p bilateral mastectomy     Complication of anesthesia     bradycardia,b/p drop p childbirth     Dysplasia of cervix (uteri)     LUCAS II - III, s/p LEEP  @ [facility]     Incompetence of cervix     D&E at 19+4 weeks twin pregnancy     Shingles outbreak      Past Surgical History:   Procedure Laterality Date     D & C  2010    retained POC     ENDOBRONCHIAL ULTRASOUND FLEXIBLE N/A 2017     Procedure: ENDOBRONCHIAL ULTRASOUND FLEXIBLE;  Flexible Bronchoscopy, Endobronchial Ultrasound, Transbronchial Biopsy ;  Surgeon: Bandar Meyer MD;  Location: [facility] GI     LEEP TX, CERVICAL  12/01    LEEP or LUCAS II - III     MASTECTOMY, RECONSTRUCT BREAST, COMBINED  6/11/2012    Procedure: COMBINED MASTECTOMY, RECONSTRUCT BREAST;  Bilateral Mastectomy with Reconstruction, Left [place] Node Biopsy , placement of On-Q catheters X 2 bilateral breast.;  Surgeon: Misael Cheng MD;  Location: [facility] OR     RECONSTRUCT BREAST BILATERAL  3/25/2013    Procedure: RECONSTRUCT BREAST BILATERAL;  Revise bilateral breasts and reconstructions, with insertion of gel breast implants with fat grafting and fat after mastectomies.;  Surgeon: Yair Olvera MD;  Location:  OR     RECONSTRUCT BREAST BILATERAL, IMPLANT PROSTHESIS BILATERAL, COMBINED  9/27/2012    Procedure: COMBINED RECONSTRUCT BREAST BILATERAL, IMPLANT PROSTHESIS BILATERAL;  Bilateral Second Stage Breast Reconstruction With Gel Implants, Fat        Family History   Problem Relation Age of Onset     Alcohol/Drug Father      Allergies Mother      Neurologic Disorder Mother      seizures, related to fever     GASTROINTESTINAL DISEASE Mother      IBS     Blood Disease Mother      [place]     CANCER Maternal Grandfather      bladder     Circulatory Maternal Grandfather      blood clots     Alcohol/Drug Maternal Grandfather      alcohol dependency     Allergies Maternal Aunt      Allergies Other      maternal cousins     Arthritis Maternal Aunt      Prostate Cancer Maternal Uncle      Thyroid Disease Maternal Aunt      Alcohol/Drug Maternal Uncle      alcohol and drug dependency     Cancer - colorectal Maternal Uncle      GASTROINTESTINAL DISEASE Sister      IBS     Breast Cancer Other      self     Social History     Social History     Marital status:      Spouse name: Kemar     Number of children: 2     Years of education: 16     Occupational History      teacher [employer]     Teacher     Social History Main Topics     Smoking status: Former Smoker     Quit date: 6/23/1997     Smokeless tobacco: Never Used     Alcohol use No     Drug use: No      Comment: In the past as a teen, marijuana     Sexual activity: Yes     Partners: Male     Birth control/ protection: None      Comment: No birth control/protection usage.  had vasectomy     Other Topics Concern     Parent/Sibling W/ Cabg, Mi Or Angioplasty Before 65f 55m? No     Social History Narrative    Caffeine intake/servings daily - 0-1    Calcium intake/servings daily - 3-4    Exercise 3-4 times weekly - describe running    Sunscreen used - Yes    Seatbelts used - Yes    Guns stored in the home - Yes    Self Breast Exam - No    Pap test up to date -  Yes    Eye exam up to date -  Yes    Dental exam up to date -  Yes    DEXA scan up to date -  Not Applicable    Flex Sig/Colonoscopy up to date -  Not Applicable    Mammography up to date -  Not Applicable    Immunizations reviewed and up to date - Yes    Abuse: Current or Past (Physical, Sexual or Emotional) - No    Do you feel safe in your environment - Yes    Do you cope well with stress - Yes    Do you suffer from insomnia - No    Last updated by: Emani Estes  11/11/2011                   Current Outpatient Prescriptions:      study - palbociclib (IDS# 4993) 100 mg capsule, Take 1 capsule (100 mg) by mouth daily, Disp: 30 capsule, Rfl: 0     study - palbociclib (IDS# 4993) 100 mg capsule, Take 1 capsule (100 mg) by mouth daily, Disp: 30 capsule, Rfl: 0     study - palbociclib (IDS# 4993) 100 mg capsule, Take 1 capsule (100 mg) by mouth daily, Disp: 30 capsule, Rfl: 0     study - palbociclib (IDS# 4993) 100 mg capsule, Take 1 capsule (100 mg) by mouth daily, Disp: 30 capsule, Rfl: 0     study - palbociclib (IDS# 4993) 100 mg capsule, Take 1 capsule (100 mg) by mouth daily, Disp: 30 capsule, Rfl: 0     prochlorperazine (COMPAZINE) 10 MG tablet,  Take 1 tablet (10 mg) by mouth every 6 hours as needed (Nausea/Vomiting) (Patient not taking: Reported on 8/21/2017), Disp: 30 tablet, Rfl: 2     ondansetron (ZOFRAN) 8 MG tablet, Take 1 tablet (8 mg) by mouth every 8 hours as needed for nausea (vomiting) (Patient not taking: Reported on 8/21/2017), Disp: 30 tablet, Rfl: 2     Chlorophyll (CHLOROXYGEN PO), Take by mouth daily, Disp: , Rfl:      UNABLE TO FIND, daily Comprehensive Immunity Support - Host Defense, Disp: , Rfl:      UNABLE TO FIND, daily Mushrooms - My Community, Reishi, and Shitake., Disp: , Rfl:      VITAMIN D, CHOLECALCIFEROL, PO, Take by mouth daily, Disp: , Rfl:      UNABLE TO FIND, daily Astragalus Immunity, Disp: , Rfl:      UNABLE TO FIND, daily Fucoidon, Disp: , Rfl:      study - palbociclib (IDS# 4993) 125 mg capsule, Take 1 capsule (125 mg) by mouth daily with food Take on Days 1 through 21 of each 28 day cycle. Avoid grapefruit., Disp: 21 capsule, Rfl: 0     tamoxifen (NOLVADEX) 20 MG tablet, Take 1 tablet (20 mg) by mouth daily, Disp: 28 tablet, Rfl: 0     FLINTSTONES MULTIVITAMIN OR CHEW, 1 tablet daily, Disp: , Rfl: 0     Allergies   Allergen Reactions     Erythromycin Shortness Of Breath     Sulfa Drugs      rash       Past medical, surgical, social and family history were reviewed and updated in EPIC.    ROS:   C:     NEGATIVE for fever, chills, change in weight  I:       NEGATIVE for worrisome rashes, moles or lesions  E:     NEGATIVE for vision changes or irritation  E/M: NEGATIVE for ear, mouth and throat problems  R:     NEGATIVE for significant cough or SOB  CV:   NEGATIVE for chest pain, palpitations or peripheral edema  GI:     NEGATIVE for nausea, abdominal pain, heartburn, or change in bowel habits  :   NEGATIVE for frequency, dysuria, hematuria, vaginal discharge, or irregular bleeding  M:     NEGATIVE for significant arthralgias or myalgia  N:      NEGATIVE for weakness, dizziness or paresthesias  E:      NEGATIVE for  "temperature intolerance, skin/hair changes  P:      NEGATIVE for changes in mood or affect.    EXAM:  /62  Temp 97.1  F (36.2  C) (Oral)  Ht 5' 4.8\" (1.646 m)  Wt 121 lb 8 oz (55.1 kg)  LMP 10/06/2017 (Approximate)  BMI 20.34 kg/m2   BMI: Body mass index is 20.34 kg/(m^2).  Constitutional: healthy, alert and no distress  Head: Normocephalic. No masses, lesions, tenderness or abnormalities  Neck: Neck supple. Trachea midline. No adenopathy. Thyroid symmetric, normal size.   Cardiovascular: RRR.   Respiratory: Negative.   Breast: s/p mastectomy with implants.   Gastrointestinal: Abdomen soft, non-tender, non-distended. No masses, organomegaly.  :  Vulva:  No external lesions, normal female hair distribution, no inguinal adenopathy.    Urethra:  Midline, non-tender, well supported, no discharge  Vagina:  Moist, pink, no abnormal discharge, no lesions  Uterus:  Normal size, non-tender, freely mobile  Ovaries:  No masses appreciated, non-tender, mobile  Rectal Exam: deferred  Musculoskeletal: extremities normal  Skin: no suspicious lesions or rashes  Psychiatric: Affect appropriate, cooperative,mentation appears normal.     COUNSELING:   Reviewed preventive health counseling, as reflected in patient instructions  Special attention given to:        Regular exercise       Healthy diet/nutrition       Osteoporosis Prevention/Bone Health       (Petty)menopause management   reports that she quit smoking about 20 years ago. She has never used smokeless tobacco.    Body mass index is 20.34 kg/(m^2).      FRAX Risk Assessment    ASSESSMENT:  47 year old female with satisfactory annual exam  Plan: cotesting done.  Non fasting labs.  Will check thyroid as well.  RTC yearly or prn.    KIESHA TOMAS MD  "

## 2017-11-03 NOTE — MR AVS SNAPSHOT
After Visit Summary   11/3/2017    Shan Marsh    MRN: [id number]           Patient Information     Date Of Birth          1970        Visit Information        Provider Department      11/3/2017 3:00 PM Arin Richardson [place] [facility] [place]        Today's Diagnoses     Encounter for gynecological examination without abnormal finding    -  1    Absence of menstruation           Follow-ups after your visit        Your next 10 appointments already scheduled     Nov 13, 2017 12:30 PM CST   [facility] with [facility] LAB DRAW   [facility] ([facility])    [address] [place] [address]  [address] [facility] [address]   [phone]            Nov 13, 2017  1:00 PM CST   (Arrive by 12:45 PM)   Return Visit with Ana Whitman MD   [facility] ([facility])    [address] [place] [address]   [phone]            Nov 13, 2017  1:40 PM CST   (Arrive by 1:25 PM)   CT CHEST/ABDOMEN/PELVIS W CONTRAST with UCCT1   [facility] ([facility])    [address] [place] [address]  [address] [facility] [address]   [phone]           Please bring any scans or X-rays taken at other hospitals, if similar tests were done. Also bring a list of your medicines, including vitamins, minerals and over-the-counter drugs. It is safest to leave personal items at home.  Be sure to tell your doctor:   If you have any allergies.   If there s any chance you are pregnant.   If you are breastfeeding.   If you have any special needs.  You may have contrast for this exam. To prepare:   Do not eat or drink for 2 hours before your exam. If you need to take medicine, you may take it with small sips of water. (We may ask you to take liquid medicine as well.)   The day before your exam, drink extra fluids at least six 8-ounce glasses (unless your doctor tells you to  restrict your fluids).  Patients over 70 or patients with diabetes or kidney problems:   If you haven t had a blood test (creatinine test) within the last 30 days, go to your clinic or Diagnostic Imaging Department for this test.  If you have diabetes:   If your kidney function is normal, continue taking your metformin (Avandamet, Glucophage, Glucovance, Metaglip) on the day of your exam.   If your kidney function is abnormal, wait 48 hours before restarting this medicine.  You will have oral contrast for this exam:   You will drink the contrast at home. Get this from your clinic or Diagnostic Imaging Department. Please follow the directions given.  Please wear loose clothing, such as a sweat suit or jogging clothes. Avoid snaps, zippers and other metal. We may ask you to undress and put on a hospital gown.  If you have any questions, please call the Imaging Department where you will have your exam.            Nov 14, 2017  5:00 PM CST   (Arrive by 4:45 PM)   Return Visit with Lima Griffiths PA-C   [facility] ([facility])    [address] [place] [address] [phone]            Nov 27, 2017 12:00 PM CST   [facility] Lab Draw with  [facility] LAB DRAW   [facility] ([facility])    [address] [place] [id number] [phone]            Nov 27, 2017 12:30 PM CST   (Arrive by 12:15 PM)   Return Visit with Ana Whitman MD   [facility] ([facility])    [address] [place] [id number] [phone]              Who to contact     If you have questions or need follow up information about today's clinic visit or your schedule please contact [facility] directly at [phone].  Normal or non-critical lab and imaging results will be communicated to you by MyChart, letter or phone  "within 4 business days after the clinic has received the results. If you do not hear from us within 7 days, please contact the clinic through [employer] or phone. If you have a critical or abnormal lab result, we will notify you by phone as soon as possible.  Submit refill requests through [employer] or call your pharmacy and they will forward the refill request to us. Please allow 3 business days for your refill to be completed.          Additional Information About Your Visit        [email or web address] Information     [employer] gives you secure access to your electronic health record. If you see a primary care provider, you can also send messages to your care team and make appointments. If you have questions, please call your primary care clinic.  If you do not have a primary care provider, please call [phone] and they will assist you.        Care EveryWhere ID     This is your Care EveryWhere ID. This could be used by other organizations to access your [facility] medical records  [id number]        Your Vitals Were     Temperature Height Last Period BMI (Body Mass Index)          97.1  F (36.2  C) (Oral) 5' 4.8\" (1.646 m) 10/06/2017 (Approximate) 20.34 kg/m2         Blood Pressure from Last 3 Encounters:   11/03/17 108/62   10/17/17 114/76   10/02/17 100/61    Weight from Last 3 Encounters:   11/03/17 121 lb 8 oz (55.1 kg)   10/17/17 119 lb 3.2 oz (54.1 kg)   10/02/17 119 lb 4.8 oz (54.1 kg)              We Performed the Following     HPV High Risk Types DNA Cervical     Lipid Profile     Pap imaged thin layer screen with HPV - recommended age 30 - 65 years (select HPV order below)     T3, Free     T4, free        Primary Care Provider Office Phone # Fax #    Marilu Michelle [phone] [address] [facility] [address]        Equal Access to Services     LUZ JAMES : Karla Schaefer, waaxda luqadaha, qaybta kaalmada anneliese, edi kolb. So wa " [phone].    ATENCIÓN: Si zach longo, tiene a gamez disposición servicios gratuitos de asistencia lingüística. Gabriella cooper [phone].    We comply with applicable federal civil rights laws and [place] laws. We do not discriminate on the basis of race, color, national origin, age, disability, sex, sexual orientation, or gender identity.            Thank you!     Thank you for [place] [facility]  [place]. Our goal is always to provide you with excellent care. Hearing back from our patients is one way we can continue to improve our services. Please take a few minutes to complete the written survey that you may receive in the mail after your visit with us. Thank you!             Your Updated Medication List - Protect others around you: Learn how to safely use, store and throw away your medicines at www.disposemymeds.org.          This list is accurate as of: 11/3/17  4:50 PM.  Always use your most recent med list.                   Brand Name Dispense Instructions for use Diagnosis    CHLOROXYGEN PO      Take by mouth daily        FLINTSTONES MULTIVITAMIN Chew      1 tablet daily        ondansetron 8 MG tablet    ZOFRAN    30 tablet    Take 1 tablet (8 mg) by mouth every 8 hours as needed for nausea (vomiting)    Malignant neoplasm of female breast, unspecified laterality, unspecified site of breast       prochlorperazine 10 MG tablet    COMPAZINE    30 tablet    Take 1 tablet (10 mg) by mouth every 6 hours as needed (Nausea/Vomiting)    Malignant neoplasm of female breast, unspecified laterality, unspecified site of breast       * study - palbociclib 125 mg capsule    ids# 4993    21 capsule    Take 1 capsule (125 mg) by mouth daily with food Take on Days 1 through 21 of each 28 day cycle. Avoid grapefruit.    Malignant neoplasm of female breast, unspecified laterality, unspecified site of breast       * study - palbociclib 100 mg capsule    IDS# 4993    30 capsule    Take 1 capsule (100  mg) by mouth daily    Malignant neoplasm of lower-outer quadrant of female breast, unspecified estrogen receptor status, unspecified laterality (H)       * study - palbociclib 100 mg capsule    IDS# 4993    30 capsule    Take 1 capsule (100 mg) by mouth daily    Malignant neoplasm of lower-outer quadrant of female breast, unspecified estrogen receptor status, unspecified laterality (H)       * study - palbociclib 100 mg capsule    IDS# 4993    30 capsule    Take 1 capsule (100 mg) by mouth daily    Malignant neoplasm of lower-outer quadrant of female breast, unspecified estrogen receptor status, unspecified laterality (H)       * study - palbociclib 100 mg capsule    IDS# 4993    30 capsule    Take 1 capsule (100 mg) by mouth daily    Malignant neoplasm of lower-outer quadrant of female breast, unspecified estrogen receptor status, unspecified laterality (H)       * study - palbociclib 100 mg capsule    IDS# 4993    30 capsule    Take 1 capsule (100 mg) by mouth daily    Malignant neoplasm of lower-outer quadrant of female breast, unspecified estrogen receptor status, unspecified laterality (H)       tamoxifen 20 MG tablet    NOLVADEX    28 tablet    Take 1 tablet (20 mg) by mouth daily    Malignant neoplasm of female breast, unspecified laterality, unspecified site of breast       * UNABLE TO FIND      daily Comprehensive Immunity Support - Host Defense        * UNABLE TO FIND      daily Mushrooms - My Sudhakar, Reishi, and Shitake.        * UNABLE TO FIND      daily Astragalus Immunity        * UNABLE TO FIND      daily Fucoidon        VITAMIN D (CHOLECALCIFEROL) PO      Take by mouth daily        * Notice:  This list has 10 medication(s) that are the same as other medications prescribed for you. Read the directions carefully, and ask your doctor or other care provider to review them with you.

## 2017-11-04 LAB
CHOLEST SERPL-MCNC: 129 MG/DL
HDLC SERPL-MCNC: 50 MG/DL
LDLC SERPL CALC-MCNC: 43 MG/DL
NONHDLC SERPL-MCNC: 79 MG/DL
T4 FREE SERPL-MCNC: 1.01 NG/DL (ref 0.76–1.46)
TRIGL SERPL-MCNC: 178 MG/DL

## 2017-11-07 LAB
COPATH REPORT: NORMAL
PAP: NORMAL

## 2017-11-08 LAB
FINAL DIAGNOSIS: NORMAL
HPV HR 12 DNA CVX QL NAA+PROBE: NEGATIVE
HPV16 DNA SPEC QL NAA+PROBE: NEGATIVE
HPV18 DNA SPEC QL NAA+PROBE: NEGATIVE
SPECIMEN DESCRIPTION: NORMAL

## 2017-11-13 ENCOUNTER — RESEARCH ENCOUNTER (OUTPATIENT)
Dept: ONCOLOGY | Facility: CLINIC | Age: 47
End: 2017-11-13

## 2017-11-13 ENCOUNTER — APPOINTMENT (OUTPATIENT)
Dept: LAB | Facility: CLINIC | Age: 47
End: 2017-11-13
Attending: INTERNAL MEDICINE
Payer: COMMERCIAL

## 2017-11-13 ENCOUNTER — ONCOLOGY VISIT (OUTPATIENT)
Dept: ONCOLOGY | Facility: CLINIC | Age: 47
End: 2017-11-13
Attending: INTERNAL MEDICINE
Payer: COMMERCIAL

## 2017-11-13 VITALS
TEMPERATURE: 98.1 F | SYSTOLIC BLOOD PRESSURE: 97 MMHG | WEIGHT: 118.7 LBS | BODY MASS INDEX: 19.87 KG/M2 | DIASTOLIC BLOOD PRESSURE: 60 MMHG | OXYGEN SATURATION: 97 %

## 2017-11-13 DIAGNOSIS — C50.519 MALIGNANT NEOPLASM OF LOWER-OUTER QUADRANT OF FEMALE BREAST, UNSPECIFIED ESTROGEN RECEPTOR STATUS, UNSPECIFIED LATERALITY (H): Primary | ICD-10-CM

## 2017-11-13 LAB
ALBUMIN SERPL-MCNC: 3.6 G/DL (ref 3.4–5)
ALP SERPL-CCNC: 41 U/L (ref 40–150)
ALT SERPL W P-5'-P-CCNC: 24 U/L (ref 0–50)
ANION GAP SERPL CALCULATED.3IONS-SCNC: 6 MMOL/L (ref 3–14)
AST SERPL W P-5'-P-CCNC: 26 U/L (ref 0–45)
B-HCG SERPL-ACNC: <1 IU/L (ref 0–5)
BASOPHILS # BLD AUTO: 0 10E9/L (ref 0–0.2)
BASOPHILS NFR BLD AUTO: 0.6 %
BILIRUB SERPL-MCNC: 0.4 MG/DL (ref 0.2–1.3)
BUN SERPL-MCNC: 10 MG/DL (ref 7–30)
CALCIUM SERPL-MCNC: 8.8 MG/DL (ref 8.5–10.1)
CHLORIDE SERPL-SCNC: 105 MMOL/L (ref 94–109)
CO2 SERPL-SCNC: 28 MMOL/L (ref 20–32)
CREAT SERPL-MCNC: 0.68 MG/DL (ref 0.52–1.04)
DIFFERENTIAL METHOD BLD: ABNORMAL
EOSINOPHIL # BLD AUTO: 0 10E9/L (ref 0–0.7)
EOSINOPHIL NFR BLD AUTO: 0.3 %
ERYTHROCYTE [DISTWIDTH] IN BLOOD BY AUTOMATED COUNT: 12.3 % (ref 10–15)
GFR SERPL CREATININE-BSD FRML MDRD: >90 ML/MIN/1.7M2
GLUCOSE SERPL-MCNC: 93 MG/DL (ref 70–99)
HCT VFR BLD AUTO: 33.8 % (ref 35–47)
HGB BLD-MCNC: 12.1 G/DL (ref 11.7–15.7)
IMM GRANULOCYTES # BLD: 0 10E9/L (ref 0–0.4)
IMM GRANULOCYTES NFR BLD: 0.3 %
LYMPHOCYTES # BLD AUTO: 1.5 10E9/L (ref 0.8–5.3)
LYMPHOCYTES NFR BLD AUTO: 46.7 %
MCH RBC QN AUTO: 37.9 PG (ref 26.5–33)
MCHC RBC AUTO-ENTMCNC: 35.8 G/DL (ref 31.5–36.5)
MCV RBC AUTO: 106 FL (ref 78–100)
MONOCYTES # BLD AUTO: 0.2 10E9/L (ref 0–1.3)
MONOCYTES NFR BLD AUTO: 7.5 %
NEUTROPHILS # BLD AUTO: 1.4 10E9/L (ref 1.6–8.3)
NEUTROPHILS NFR BLD AUTO: 44.6 %
NRBC # BLD AUTO: 0 10*3/UL
NRBC BLD AUTO-RTO: 0 /100
PLATELET # BLD AUTO: 170 10E9/L (ref 150–450)
POTASSIUM SERPL-SCNC: 3.8 MMOL/L (ref 3.4–5.3)
PROT SERPL-MCNC: 7.3 G/DL (ref 6.8–8.8)
RBC # BLD AUTO: 3.19 10E12/L (ref 3.8–5.2)
SODIUM SERPL-SCNC: 138 MMOL/L (ref 133–144)
WBC # BLD AUTO: 3.2 10E9/L (ref 4–11)

## 2017-11-13 PROCEDURE — 99212 OFFICE O/P EST SF 10 MIN: CPT | Mod: ZF

## 2017-11-13 PROCEDURE — 85025 COMPLETE CBC W/AUTO DIFF WBC: CPT | Performed by: INTERNAL MEDICINE

## 2017-11-13 PROCEDURE — 99214 OFFICE O/P EST MOD 30 MIN: CPT | Mod: ZP | Performed by: INTERNAL MEDICINE

## 2017-11-13 PROCEDURE — 80053 COMPREHEN METABOLIC PANEL: CPT | Performed by: PHYSICIAN ASSISTANT

## 2017-11-13 PROCEDURE — 36415 COLL VENOUS BLD VENIPUNCTURE: CPT

## 2017-11-13 PROCEDURE — 80053 COMPREHEN METABOLIC PANEL: CPT | Performed by: INTERNAL MEDICINE

## 2017-11-13 PROCEDURE — 84702 CHORIONIC GONADOTROPIN TEST: CPT | Performed by: INTERNAL MEDICINE

## 2017-11-13 RX ORDER — TAMOXIFEN CITRATE 20 MG/1
20 TABLET ORAL DAILY
Qty: 28 TABLET | Refills: 0 | Status: SHIPPED | OUTPATIENT
Start: 2017-11-13 | End: 2018-01-12

## 2017-11-13 ASSESSMENT — PAIN SCALES - GENERAL: PAINLEVEL: NO PAIN (0)

## 2017-11-13 NOTE — LETTER
11/13/2017       RE: Shan Marsh  [address]  [facility] [address]     Dear Colleague,    Thank you for referring your patient, Shan Marsh, to the [facility]. Please see a copy of my visit note below.    November 13, 2017      ONCOLOGY VISIT NOTE      REASON FOR VISIT:  I am seeing Ms. Marsh in followup for her stage 4, metastatic ER + breast cancer with metastases to the axilla and hilar lymph nodes.  She is currently on the [employer] protocol using tamoxifen and palbociclib.     Briefly in history, she has a history of a T1a N0 M0, ER-positive, VT-positive, and HER2-negative breast cancer.  She originally met me in 12/2011 with a coin-sized mass involving her left breast.  This mass got bigger, and by 05/2012 she was referred for a mammogram, ultrasound and breast MRI.  This revealed heterogeneously dense breast tissue with an area of suspicious microcalcifications measuring 5 x 2.6 cm involving the left breast.  MRI showed non-mass-like enhancement in the upper outer quadrant extending from about 1-3 o'clock, measuring about 6 cm.  Stereotactic needle biopsy revealed DCIS high-grade, which was suspicious for microinvasion.  She was seen by Dr. Misael Cheng, and underwent bilateral mastectomies with sentinel lymph node biopsy on 06/11/2012.  Her final pathology revealed grade 1 infiltrating ductal carcinoma, ER-positive, VT-positive an d HER2-negative.  She had multiple microscopic areas of invasion, each measuring 1-2 mm.  Her pathology was reviewed at our Multidisciplinary Conference, and confirmed multiple areas of microinvasion.  Her final staging was a T1a N0 M0 stage I breast cancer.  [place] lymph node biopsy was negative.  Her Oncotype score was 23, giving her a risk of distant recurrence at 10 years of 15%, assuming she took tamoxifen.  She started on tamoxifen in 08/2012.  This was discontinued in 02/2013 due to complaints of vaginal  discharge.  She also had complained of some palpitations at that time and saw Cardiology.  This workup was negative.  She does have a type 1 AV block felt to be benign from her endurance as an athlete.      Shan presented to see me on 05/10/2017, at which time she had noticed fullness and pain involving her left shoulder with numbness down involving her left upper extremity.  She was found to have left axillary adenopathy, and was sent for a PET/CT scan revealing on MRI of the shoulder that she had a 4 x 1.8 x 1.5 cm lesion close to the subcutaneous tissue with close proximity to the distal clavicle.  She was subsequently sent for a PET/CT scan, which confirmed multiple PET-avid left axillary lymph nodes, as well as hilar lymph nodes that were suspicious for distant metastatic disease.  There were no suspicious lesions in the liver.  No suspicious lesions in the bones.  She has a known 3.2 cm hemangioma in the left lobe of the liver.  Biopsies confirmed estrogen positive metastatic breast cancer with lung involvement.     She enrolled on the BIG10 trial using palbociclib and tamoxifen.   Shan returns today for followup.  She currently remains on tamoxifen and palbociclib without any current side effects.       She reports occasional hot flashes.  She says she has good energy.  She says last week she had a day with extreme fatigue.  She thinks her children were running viruses and wondered whether or not this warding off some type of infection.       She has no fevers or chills, no chest pain or shortness of breath.  She has no nausea, vomiting, diarrhea or constipation.  She does report that she needs to eat small meals in order to hennessy off nausea, but this is very minimal.       She is interested in decreasing her work down to 50%.      Her menses stopped shortly after starting tamoxifen.  She has no vaginal bleeding or discharge and no sexual dysfunction.      She has not noticed any changes in her axillary  mass.      Her 10-point review of systems is otherwise negative.         PHYSICAL EXAMINATION:   BP 97/60 (BP Location: Left arm, Patient Position: Left side, Cuff Size: Adult Regular)  Temp 98.1  F (36.7  C) (Oral)  Wt 53.8 kg (118 lb 11.2 oz)  LMP 10/06/2017 (Approximate)  SpO2 97%  BMI 19.87 kg/m2  Gen: well appearing, NAD  Heent: no icterus, o/p clear without ulcers or lesions  CV: rrr, nl S1S2  Lungs: clear  Abd: soft, nt, nd + bs  Breast :  S/p bilateral mastectomy with immediate reconstruction, no nodules or masses, no axillary adenopathy that I can appreciate  Ext : no edema  No skin rashes.       Lab Results   Component Value Date    WBC 3.2 11/13/2017     Lab Results   Component Value Date    RBC 3.19 11/13/2017     Lab Results   Component Value Date    HGB 12.1 11/13/2017     Lab Results   Component Value Date    HCT 33.8 11/13/2017     No components found for: MCT  Lab Results   Component Value Date     11/13/2017     Lab Results   Component Value Date    MCH 37.9 11/13/2017     Lab Results   Component Value Date    MCHC 35.8 11/13/2017     Lab Results   Component Value Date    RDW 12.3 11/13/2017     Lab Results   Component Value Date     11/13/2017     Recent Labs   Lab Test  10/17/17   1655  09/19/17   1736   NA  140  138   POTASSIUM  4.0  4.0   CHLORIDE  108  103   CO2  26  30   ANIONGAP  6  4   GLC  86  96   BUN  11  9   CR  0.60  0.62   KRYSTEN  8.8  9.2     Liver Function Studies -   Recent Labs   Lab Test  10/17/17   1655   PROTTOTAL  7.3   ALBUMIN  3.7   BILITOTAL  0.3   ALKPHOS  36*   AST  22   ALT  24             ASSESSMENT AND PLAN:  This is a 47-year-old female with a history of a T1a N0, ER-positive, OH-positive and HER2-negative left breast cancer treated with bilateral mastectomies 5 years ago, who now has metastatic disease involving the left axilla, as well as left hilar lymph node.  On biopsy, these are strongly ER-positive, OH is 40%, and HER2 by FISH was negative with  a ratio of 1.5.  She is on the BIG10 trial using palbociclib and tamoxifen.      Shan is tolerating her current treatment with minimal side effects.  We discussed that she is due for a CT scan today.  She will obtain this.  Assuming that she has clinically stable disease, we will go ahead and continue her on palbociclib and tamoxifen.      We discussed that she has metastatic disease and I would recommend she stay on therapy indefinitely.      She is coping well.      She brought in information for disability to reduce her working hours.  We will go ahead and sign these for her.      She does have some ecchymoses.  We discussed that this may be related to the use of her fish oil.      CAMELIA GRISSOM MD

## 2017-11-13 NOTE — NURSING NOTE
"Oncology Rooming Note    November 13, 2017 1:22 PM   Shan Marsh is a 47 year old female who presents for:    Chief Complaint   Patient presents with     Blood Draw     Pt is here for a lab draw for her afternoon appt. Labs drawn via her right arm     Oncology Clinic Visit     Follow up-Breast CA     Initial Vitals: BP 97/60 (BP Location: Left arm, Patient Position: Left side, Cuff Size: Adult Regular)  Temp 98.1  F (36.7  C) (Oral)  Wt 53.8 kg (118 lb 11.2 oz)  LMP 10/06/2017 (Approximate)  SpO2 97%  BMI 19.87 kg/m2 Estimated body mass index is 19.87 kg/(m^2) as calculated from the following:    Height as of 11/3/17: 1.646 m (5' 4.8\").    Weight as of this encounter: 53.8 kg (118 lb 11.2 oz). Body surface area is 1.57 meters squared.  No Pain (0) Comment: Data Unavailable   Patient's last menstrual period was 10/06/2017 (approximate).  Allergies reviewed: Yes  Medications reviewed: Yes    Medications: Medication refills not needed today.  Pharmacy name entered into Exp[employer]ify:    [facility] [place], [place] - [address] [place] [address] DRUG STORE [address] [place] [address] [address] [place] [address] [address] [facility] [address]  [facility] - [place], [place] - [address] [facility] DRUG STORE [id number] - [place], [place] - [address] AT [facility] OF [address]    Clinical concerns: Questions Dr. Whitman was notified.    10 minutes for nursing intake (face to face time)     Swetha Silva LPN              "

## 2017-11-13 NOTE — MR AVS SNAPSHOT
After Visit Summary   11/13/2017    Shan Masrh    MRN: [id number]           Patient Information     Date Of Birth          1970        Visit Information        Provider Department      11/13/2017 1:00 PM Ana Whitman MD [facility]        Today's Diagnoses     Malignant neoplasm of lower-outer quadrant of female breast, unspecified estrogen receptor status, unspecified laterality (H)    -  1       Follow-ups after your visit        Your next 10 appointments already scheduled     Nov 27, 2017 12:00 PM CST   [facility] Lab Draw with  [facility] DRAW   [facility])    [address] [place] [address]   [phone]            Nov 27, 2017 12:30 PM CST   (Arrive by 12:15 PM)   Return Visit with Ana Whitman MD   [facility] ([facility])    [address] [place] [address]   [phone]            Dec 12, 2017 11:15 AM CST   [facility] Lab Draw with  [facility] LAB DRAW   [facility] ([facility])    [address] [place] [address]   [phone]            Dec 12, 2017 11:50 AM CST   (Arrive by 11:35 AM)   Return Visit with Lima Griffiths PA-C   [facility] ([facility])    [address] [place] [address]   [phone]            Jan 12, 2018 12:30 PM CST   [facility] Lab Draw with  [facility] LAB DRAW   [facility] ([facility])    [address] [place] [address]   [phone]            Jan 12, 2018  1:00 PM CST   (Arrive by 12:45 PM)   Return Visit with Ana Whitman MD   [facility] ([facility])    [address] [place] [address]    [phone]              Who to contact     If you have questions or need follow up information about today's clinic visit or your schedule please contact [facility] directly at [phone].  Normal or non-critical lab and imaging results will be communicated to you by MyChart, letter or phone within 4 business days after the clinic has received the results. If you do not hear from us within 7 days, please contact the clinic through ripplrr inchart or phone. If you have a critical or abnormal lab result, we will notify you by phone as soon as possible.  Submit refill requests through DecisionView or call your pharmacy and they will forward the refill request to us. Please allow 3 business days for your refill to be completed.          Additional Information About Your Visit        ripplrr incharBrandBoards Information     DecisionView gives you secure access to your electronic health record. If you see a primary care provider, you can also send messages to your care team and make appointments. If you have questions, please call your primary care clinic.  If you do not have a primary care provider, please call [phone] and they will assist you.        Care EveryWhere ID     This is your Care EveryWhere ID. This could be used by other organizations to access your [place] medical records  [id number]        Your Vitals Were     Temperature Last Period Pulse Oximetry BMI (Body Mass Index)          98.1  F (36.7  C) (Oral) 10/06/2017 (Approximate) 97% 19.87 kg/m2         Blood Pressure from Last 3 Encounters:   11/13/17 97/60   11/03/17 108/62   10/17/17 114/76    Weight from Last 3 Encounters:   11/13/17 53.8 kg (118 lb 11.2 oz)   11/03/17 55.1 kg (121 lb 8 oz)   10/17/17 54.1 kg (119 lb 3.2 oz)              We Performed the Following     *CBC with platelets differential     Comprehensive metabolic panel     HCG quantitative pregnancy          Today's Medication Changes          These changes are accurate as of: 11/13/17  11:59 PM.  If you have any questions, ask your nurse or doctor.               These medicines have changed or have updated prescriptions.        Dose/Directions    * study - palbociclib 125 mg capsule   Commonly known as:  ids# [id number]   This may have changed:  Another medication with the same name was added. Make sure you understand how and when to take each.   Used for:  Malignant neoplasm of female breast, unspecified laterality, unspecified site of breast   Changed by:  Rafia Voss PA        Dose:  125 mg   Take 1 capsule (125 mg) by mouth daily with food Take on Days 1 through 21 of each 28 day cycle. Avoid grapefruit.   Quantity:  21 capsule   Refills:  0       * study - palbociclib 100 mg capsule   Commonly known as:  IDS# [id number]   This may have changed:  Another medication with the same name was added. Make sure you understand how and when to take each.   Used for:  Malignant neoplasm of lower-outer quadrant of female breast, unspecified estrogen receptor status, unspecified laterality (H)   Changed by:  Ana Whitman MD        Dose:  100 mg   Take 1 capsule (100 mg) by mouth daily   Quantity:  30 capsule   Refills:  0       * study - palbociclib 100 mg capsule   Commonly known as:  IDS# [id number]   This may have changed:  Another medication with the same name was added. Make sure you understand how and when to take each.   Used for:  Malignant neoplasm of lower-outer quadrant of female breast, unspecified estrogen receptor status, unspecified laterality (H)   Changed by:  Ana Whitman MD        Dose:  100 mg   Take 1 capsule (100 mg) by mouth daily   Quantity:  30 capsule   Refills:  0       * study - palbociclib 100 mg capsule   Commonly known as:  IDS# [id number]   This may have changed:  Another medication with the same name was added. Make sure you understand how and when to take each.   Used for:  Malignant neoplasm of lower-outer quadrant of female breast, unspecified estrogen receptor status,  unspecified laterality (H)   Changed by:  Erin Fernández PA-C        Dose:  100 mg   Take 1 capsule (100 mg) by mouth daily   Quantity:  30 capsule   Refills:  0       * study - palbociclib 100 mg capsule   Commonly known as:  IDS# [id number]   This may have changed:  Another medication with the same name was added. Make sure you understand how and when to take each.   Used for:  Malignant neoplasm of lower-outer quadrant of female breast, unspecified estrogen receptor status, unspecified laterality (H)   Changed by:  Lima Griffiths PA-C        Dose:  100 mg   Take 1 capsule (100 mg) by mouth daily   Quantity:  30 capsule   Refills:  0       * study - palbociclib 100 mg capsule   Commonly known as:  IDS# [id number]   This may have changed:  Another medication with the same name was added. Make sure you understand how and when to take each.   Used for:  Malignant neoplasm of lower-outer quadrant of female breast, unspecified estrogen receptor status, unspecified laterality (H)   Changed by:  Lima Griffiths PA-C        Dose:  100 mg   Take 1 capsule (100 mg) by mouth daily   Quantity:  30 capsule   Refills:  0       * study - palbociclib 100 mg capsule   Commonly known as:  IDS# [id number]   This may have changed:  You were already taking a medication with the same name, and this prescription was added. Make sure you understand how and when to take each.   Used for:  Malignant neoplasm of lower-outer quadrant of female breast, unspecified estrogen receptor status, unspecified laterality (H)   Changed by:  Ana Whitman MD        Dose:  100 mg   Take 1 capsule (100 mg) by mouth daily   Quantity:  30 capsule   Refills:  0       * tamoxifen 20 MG tablet   Commonly known as:  NOLVADEX   This may have changed:  Another medication with the same name was added. Make sure you understand how and when to take each.   Used for:  Malignant neoplasm of female breast, unspecified laterality, unspecified site of  breast   Changed by:  Sharifa Celeste RN        Dose:  20 mg   Take 1 tablet (20 mg) by mouth daily   Quantity:  28 tablet   Refills:  0       * tamoxifen 20 MG tablet   Commonly known as:  NOLVADEX   This may have changed:  You were already taking a medication with the same name, and this prescription was added. Make sure you understand how and when to take each.   Used for:  Malignant neoplasm of lower-outer quadrant of female breast, unspecified estrogen receptor status, unspecified laterality (H)   Changed by:  Ana Whitman MD        Dose:  20 mg   Take 1 tablet (20 mg) by mouth daily   Quantity:  28 tablet   Refills:  0       * tamoxifen 20 MG tablet   Commonly known as:  NOLVADEX   This may have changed:  You were already taking a medication with the same name, and this prescription was added. Make sure you understand how and when to take each.   Used for:  Malignant neoplasm of lower-outer quadrant of female breast, unspecified estrogen receptor status, unspecified laterality (H)   Changed by:  Ana Whitman MD        Dose:  20 mg   Take 1 tablet (20 mg) by mouth daily   Quantity:  28 tablet   Refills:  0       * Notice:  This list has 10 medication(s) that are the same as other medications prescribed for you. Read the directions carefully, and ask your doctor or other care provider to review them with you.         Where to get your medicines      These medications were sent to [place], [place] - [address]  [address] [facility] [address]     Phone:  [phone]     tamoxifen 20 MG tablet    tamoxifen 20 MG tablet         Some of these will need a paper prescription and others can be bought over the counter.  Ask your nurse if you have questions.     You don't need a prescription for these medications     study - palbociclib 100 mg capsule                Primary Care Provider Office Phone # Fax #    Marilu Michelle [phone] [address]  [facility] [address]        Equal Access to Services     LUZ JAMES : Hadii noemy chinchilla lissetleonor Angelali, waaxda luqadaha, qaybta moiramary kaycecy coy, edi jimenezrenardsukhjinder kolb. So [facility] [place] [phone].    ATENCIÓN: Si habla español, tiene a gamez disposición servicios gratuitos de asistencia lingüística. Llame al [phone].    We comply with applicable federal civil rights laws and [place] laws. We do not discriminate on the basis of race, color, national origin, age, disability, sex, sexual orientation, or gender identity.            Thank you!     Thank you for choosing [facility]  for your care. Our goal is always to provide you with excellent care. Hearing back from our patients is one way we can continue to improve our services. Please take a few minutes to complete the written survey that you may receive in the mail after your visit with us. Thank you!             Your Updated Medication List - Protect others around you: Learn how to safely use, store and throw away your medicines at www.disposemymeds.org.          This list is accurate as of: 11/13/17 11:59 PM.  Always use your most recent med list.                   Brand Name Dispense Instructions for use Diagnosis    CHLOROXYGEN PO      Take by mouth daily        FLINTSTONES MULTIVITAMIN Chew      1 tablet daily        ondansetron 8 MG tablet    ZOFRAN    30 tablet    Take 1 tablet (8 mg) by mouth every 8 hours as needed for nausea (vomiting)    Malignant neoplasm of female breast, unspecified laterality, unspecified site of breast       prochlorperazine 10 MG tablet    COMPAZINE    30 tablet    Take 1 tablet (10 mg) by mouth every 6 hours as needed (Nausea/Vomiting)    Malignant neoplasm of female breast, unspecified laterality, unspecified site of breast       * study - palbociclib 125 mg capsule    ids# [id number]    21 capsule    Take 1 capsule (125 mg) by mouth daily with food Take on Days 1 through 21 of each 28  day cycle. Avoid grapefruit.    Malignant neoplasm of female breast, unspecified laterality, unspecified site of breast       * study - palbociclib 100 mg capsule    IDS# [id number]    30 capsule    Take 1 capsule (100 mg) by mouth daily    Malignant neoplasm of lower-outer quadrant of female breast, unspecified estrogen receptor status, unspecified laterality (H)       * study - palbociclib 100 mg capsule    IDS# [id number]    30 capsule    Take 1 capsule (100 mg) by mouth daily    Malignant neoplasm of lower-outer quadrant of female breast, unspecified estrogen receptor status, unspecified laterality (H)       * study - palbociclib 100 mg capsule    IDS# [id number]    30 capsule    Take 1 capsule (100 mg) by mouth daily    Malignant neoplasm of lower-outer quadrant of female breast, unspecified estrogen receptor status, unspecified laterality (H)       * study - palbociclib 100 mg capsule    IDS# [id number]    30 capsule    Take 1 capsule (100 mg) by mouth daily    Malignant neoplasm of lower-outer quadrant of female breast, unspecified estrogen receptor status, unspecified laterality (H)       * study - palbociclib 100 mg capsule    IDS# [id number]    30 capsule    Take 1 capsule (100 mg) by mouth daily    Malignant neoplasm of lower-outer quadrant of female breast, unspecified estrogen receptor status, unspecified laterality (H)       * study - palbociclib 100 mg capsule    IDS# [id number]    30 capsule    Take 1 capsule (100 mg) by mouth daily    Malignant neoplasm of lower-outer quadrant of female breast, unspecified estrogen receptor status, unspecified laterality (H)       * tamoxifen 20 MG tablet    NOLVADEX    28 tablet    Take 1 tablet (20 mg) by mouth daily    Malignant neoplasm of female breast, unspecified laterality, unspecified site of breast       * tamoxifen 20 MG tablet    NOLVADEX    28 tablet    Take 1 tablet (20 mg) by mouth daily    Malignant neoplasm of lower-outer quadrant of female breast, unspecified estrogen receptor  status, unspecified laterality (H)       * tamoxifen 20 MG tablet    NOLVADEX    28 tablet    Take 1 tablet (20 mg) by mouth daily    Malignant neoplasm of lower-outer quadrant of female breast, unspecified estrogen receptor status, unspecified laterality (H)       * UNABLE TO FIND      daily Comprehensive Immunity Support - Host Defense        * UNABLE TO FIND      daily Mushrooms - My Community, Reishi, and Shitake.        * UNABLE TO FIND      daily Astragalus Immunity        * UNABLE TO FIND      daily Fucoidon        VITAMIN D (CHOLECALCIFEROL) PO      Take by mouth daily        * Notice:  This list has 14 medication(s) that are the same as other medications prescribed for you. Read the directions carefully, and ask your doctor or other care provider to review them with you.

## 2017-11-13 NOTE — PROGRESS NOTES
November 13, 2017      ONCOLOGY VISIT NOTE      REASON FOR VISIT:  I am seeing Ms. Marsh in followup for her stage 4, metastatic ER + breast cancer with metastases to the axilla and hilar lymph nodes.  She is currently on the [employer] protocol using tamoxifen and palbociclib.     Briefly in history, she has a history of a T1a N0 M0, ER-positive, AR-positive, and HER2-negative breast cancer.  She originally met me in 12/2011 with a coin-sized mass involving her left breast.  This mass got bigger, and by 05/2012 she was referred for a mammogram, ultrasound and breast MRI.  This revealed heterogeneously dense breast tissue with an area of suspicious microcalcifications measuring 5 x 2.6 cm involving the left breast.  MRI showed non-mass-like enhancement in the upper outer quadrant extending from about 1-3 o'clock, measuring about 6 cm.  Stereotactic needle biopsy revealed DCIS high-grade, which was suspicious for microinvasion.  She was seen by Dr. Misael Cheng, and underwent bilateral mastectomies with sentinel lymph node biopsy on 06/11/2012.  Her final pathology revealed grade 1 infiltrating ductal carcinoma, ER-positive, AR-positive an d HER2-negative.  She had multiple microscopic areas of invasion, each measuring 1-2 mm.  Her pathology was reviewed at our Multidisciplinary Conference, and confirmed multiple areas of microinvasion.  Her final staging was a T1a N0 M0 stage I breast cancer.  Prospect lymph node biopsy was negative.  Her Oncotype score was 23, giving her a risk of distant recurrence at 10 years of 15%, assuming she took tamoxifen.  She started on tamoxifen in 08/2012.  This was discontinued in 02/2013 due to complaints of vaginal discharge.  She also had complained of some palpitations at that time and saw Cardiology.  This workup was negative.  She does have a type 1 AV block felt to be benign from her endurance as an athlete.      Shan presented to see me on 05/10/2017, at which time she  had noticed fullness and pain involving her left shoulder with numbness down involving her left upper extremity.  She was found to have left axillary adenopathy, and was sent for a PET/CT scan revealing on MRI of the shoulder that she had a 4 x 1.8 x 1.5 cm lesion close to the subcutaneous tissue with close proximity to the distal clavicle.  She was subsequently sent for a PET/CT scan, which confirmed multiple PET-avid left axillary lymph nodes, as well as hilar lymph nodes that were suspicious for distant metastatic disease.  There were no suspicious lesions in the liver.  No suspicious lesions in the bones.  She has a known 3.2 cm hemangioma in the left lobe of the liver.  Biopsies confirmed estrogen positive metastatic breast cancer with lung involvement.     She enrolled on the [employer] trial using palbociclib and tamoxifen.   Shan returns today for followup.  She currently remains on tamoxifen and palbociclib without any current side effects.       She reports occasional hot flashes.  She says she has good energy.  She says last week she had a day with extreme fatigue.  She thinks her children were running viruses and wondered whether or not this warding off some type of infection.       She has no fevers or chills, no chest pain or shortness of breath.  She has no nausea, vomiting, diarrhea or constipation.  She does report that she needs to eat small meals in order to hennessy off nausea, but this is very minimal.       She is interested in decreasing her work down to 50%.      Her menses stopped shortly after starting tamoxifen.  She has no vaginal bleeding or discharge and no sexual dysfunction.      She has not noticed any changes in her axillary mass.      Her 10-point review of systems is otherwise negative.         PHYSICAL EXAMINATION:   BP 97/60 (BP Location: Left arm, Patient Position: Left side, Cuff Size: Adult Regular)  Temp 98.1  F (36.7  C) (Oral)  Wt 53.8 kg (118 lb 11.2 oz)  LMP 10/06/2017  (Approximate)  SpO2 97%  BMI 19.87 kg/m2  Gen: well appearing, NAD  Heent: no icterus, o/p clear without ulcers or lesions  CV: rrr, nl S1S2  Lungs: clear  Abd: soft, nt, nd + bs  Breast :  S/p bilateral mastectomy with immediate reconstruction, no nodules or masses, no axillary adenopathy that I can appreciate  Ext : no edema  No skin rashes.       Lab Results   Component Value Date    WBC 3.2 11/13/2017     Lab Results   Component Value Date    RBC 3.19 11/13/2017     Lab Results   Component Value Date    HGB 12.1 11/13/2017     Lab Results   Component Value Date    HCT 33.8 11/13/2017     No components found for: MCT  Lab Results   Component Value Date     11/13/2017     Lab Results   Component Value Date    MCH 37.9 11/13/2017     Lab Results   Component Value Date    MCHC 35.8 11/13/2017     Lab Results   Component Value Date    RDW 12.3 11/13/2017     Lab Results   Component Value Date     11/13/2017     Recent Labs   Lab Test  10/17/17   1655  09/19/17   1736   NA  140  138   POTASSIUM  4.0  4.0   CHLORIDE  108  103   CO2  26  30   ANIONGAP  6  4   GLC  86  96   BUN  11  9   CR  0.60  0.62   KRYSTEN  8.8  9.2     Liver Function Studies -   Recent Labs   Lab Test  10/17/17   1655   PROTTOTAL  7.3   ALBUMIN  3.7   BILITOTAL  0.3   ALKPHOS  36*   AST  22   ALT  24             ASSESSMENT AND PLAN:  This is a 47-year-old female with a history of a T1a N0, ER-positive, CA-positive and HER2-negative left breast cancer treated with bilateral mastectomies 5 years ago, who now has metastatic disease involving the left axilla, as well as left hilar lymph node.  On biopsy, these are strongly ER-positive, CA is 40%, and HER2 by FISH was negative with a ratio of 1.5.  She is on the [employer] trial using palbociclib and tamoxifen.      Shan is tolerating her current treatment with minimal side effects.  We discussed that she is due for a CT scan today.  She will obtain this.  Assuming that she has clinically stable  disease, we will go ahead and continue her on palbociclib and tamoxifen.      We discussed that she has metastatic disease and I would recommend she stay on therapy indefinitely.      She is coping well.      She brought in information for disability to reduce her working hours.  We will go ahead and sign these for her.      She does have some ecchymoses.  We discussed that this may be related to the use of her fish oil.      CAMELIA GRISSOM MD

## 2017-11-14 ENCOUNTER — DOCUMENTATION ONLY (OUTPATIENT)
Dept: ONCOLOGY | Facility: CLINIC | Age: 47
End: 2017-11-14

## 2017-11-14 NOTE — PROGRESS NOTES
Form Request Documentation    Date Received in Clinic:  11/13/17  Name/Type of Form: Attending Physician's Statement  Questions that need to be addressed:   Current Employment Status: currently working on a part time basis 20 hrs/week   Other: None  Date Completed: 11/14/2017  Copy emailed to patient: Yes on 11/14/2017  Disposition of Form: Fax to Aliya at [phone] and also emailed to (David Espinoza) [email or web address] on 11/14/17

## 2017-11-14 NOTE — PROGRESS NOTES
"Research appt: C7D1. Pt had labs is seeing Dr Whitman and then will have a restaging CT scan. Pt has appt late in the day tomorrow to get results, instead pt is going to wait for the scan to be read by Dr Whitman and then will get study drug if all ok to proceed.   Pt has had over the last couple weeks some mild nausea and then on Sat 11/11 had extreme fatigue- significant enough that she had to nap and didn't feel rested after several hours of sleeping. The next day though she felt better, back to her normal.   Labs checked and look ok to proceed. CT per Dr Whitman shows stable disease. Ok\"d release of meds. Pt received month supply of meds, a new drug diary and returned 3 pill bottles. She didn't return drug diary from cycle 6. appts to be made for f/u in one month.   Irene Reed RN  [phone]  "

## 2017-12-12 ENCOUNTER — ONCOLOGY VISIT (OUTPATIENT)
Dept: ONCOLOGY | Facility: CLINIC | Age: 47
End: 2017-12-12
Attending: PHYSICIAN ASSISTANT
Payer: COMMERCIAL

## 2017-12-12 ENCOUNTER — APPOINTMENT (OUTPATIENT)
Dept: LAB | Facility: CLINIC | Age: 47
End: 2017-12-12
Attending: INTERNAL MEDICINE
Payer: COMMERCIAL

## 2017-12-12 VITALS
HEIGHT: 65 IN | DIASTOLIC BLOOD PRESSURE: 59 MMHG | SYSTOLIC BLOOD PRESSURE: 94 MMHG | BODY MASS INDEX: 20.04 KG/M2 | HEART RATE: 61 BPM | OXYGEN SATURATION: 99 % | RESPIRATION RATE: 18 BRPM | WEIGHT: 120.3 LBS | TEMPERATURE: 98.6 F

## 2017-12-12 DIAGNOSIS — C50.519 MALIGNANT NEOPLASM OF LOWER-OUTER QUADRANT OF FEMALE BREAST, UNSPECIFIED ESTROGEN RECEPTOR STATUS, UNSPECIFIED LATERALITY (H): Primary | ICD-10-CM

## 2017-12-12 LAB
ALBUMIN SERPL-MCNC: 3.6 G/DL (ref 3.4–5)
ALBUMIN UR-MCNC: NEGATIVE MG/DL
ALP SERPL-CCNC: 41 U/L (ref 40–150)
ALT SERPL W P-5'-P-CCNC: 23 U/L (ref 0–50)
ANION GAP SERPL CALCULATED.3IONS-SCNC: 6 MMOL/L (ref 3–14)
APPEARANCE UR: ABNORMAL
AST SERPL W P-5'-P-CCNC: 35 U/L (ref 0–45)
BASOPHILS # BLD AUTO: 0 10E9/L (ref 0–0.2)
BASOPHILS NFR BLD AUTO: 0.8 %
BILIRUB SERPL-MCNC: 0.3 MG/DL (ref 0.2–1.3)
BILIRUB UR QL STRIP: NEGATIVE
BUN SERPL-MCNC: 13 MG/DL (ref 7–30)
CALCIUM SERPL-MCNC: 8.5 MG/DL (ref 8.5–10.1)
CHLORIDE SERPL-SCNC: 106 MMOL/L (ref 94–109)
CO2 SERPL-SCNC: 28 MMOL/L (ref 20–32)
COLOR UR AUTO: YELLOW
CREAT SERPL-MCNC: 0.54 MG/DL (ref 0.52–1.04)
DIFFERENTIAL METHOD BLD: ABNORMAL
EOSINOPHIL # BLD AUTO: 0 10E9/L (ref 0–0.7)
EOSINOPHIL NFR BLD AUTO: 1.1 %
ERYTHROCYTE [DISTWIDTH] IN BLOOD BY AUTOMATED COUNT: 12.5 % (ref 10–15)
GFR SERPL CREATININE-BSD FRML MDRD: >90 ML/MIN/1.7M2
GLUCOSE SERPL-MCNC: 98 MG/DL (ref 70–99)
GLUCOSE UR STRIP-MCNC: NEGATIVE MG/DL
HCT VFR BLD AUTO: 35.1 % (ref 35–47)
HGB BLD-MCNC: 12 G/DL (ref 11.7–15.7)
HGB UR QL STRIP: NEGATIVE
IMM GRANULOCYTES # BLD: 0 10E9/L (ref 0–0.4)
IMM GRANULOCYTES NFR BLD: 0.3 %
KETONES UR STRIP-MCNC: NEGATIVE MG/DL
LEUKOCYTE ESTERASE UR QL STRIP: ABNORMAL
LYMPHOCYTES # BLD AUTO: 1.4 10E9/L (ref 0.8–5.3)
LYMPHOCYTES NFR BLD AUTO: 39.7 %
MCH RBC QN AUTO: 37.4 PG (ref 26.5–33)
MCHC RBC AUTO-ENTMCNC: 34.2 G/DL (ref 31.5–36.5)
MCV RBC AUTO: 109 FL (ref 78–100)
MONOCYTES # BLD AUTO: 0.2 10E9/L (ref 0–1.3)
MONOCYTES NFR BLD AUTO: 6.6 %
MUCOUS THREADS #/AREA URNS LPF: PRESENT /LPF
NEUTROPHILS # BLD AUTO: 1.9 10E9/L (ref 1.6–8.3)
NEUTROPHILS NFR BLD AUTO: 51.5 %
NITRATE UR QL: NEGATIVE
NRBC # BLD AUTO: 0 10*3/UL
NRBC BLD AUTO-RTO: 0 /100
PH UR STRIP: 6 PH (ref 5–7)
PLATELET # BLD AUTO: 181 10E9/L (ref 150–450)
POTASSIUM SERPL-SCNC: 4.2 MMOL/L (ref 3.4–5.3)
PROT SERPL-MCNC: 7.5 G/DL (ref 6.8–8.8)
RBC # BLD AUTO: 3.21 10E12/L (ref 3.8–5.2)
RBC #/AREA URNS AUTO: 1 /HPF (ref 0–2)
SODIUM SERPL-SCNC: 140 MMOL/L (ref 133–144)
SOURCE: ABNORMAL
SP GR UR STRIP: 1.01 (ref 1–1.03)
SQUAMOUS #/AREA URNS AUTO: 1 /HPF (ref 0–1)
UROBILINOGEN UR STRIP-MCNC: 0 MG/DL (ref 0–2)
WBC # BLD AUTO: 3.6 10E9/L (ref 4–11)
WBC #/AREA URNS AUTO: 6 /HPF (ref 0–2)

## 2017-12-12 PROCEDURE — 80053 COMPREHEN METABOLIC PANEL: CPT | Performed by: PHYSICIAN ASSISTANT

## 2017-12-12 PROCEDURE — 85025 COMPLETE CBC W/AUTO DIFF WBC: CPT | Performed by: INTERNAL MEDICINE

## 2017-12-12 PROCEDURE — 80053 COMPREHEN METABOLIC PANEL: CPT | Performed by: INTERNAL MEDICINE

## 2017-12-12 PROCEDURE — 81001 URINALYSIS AUTO W/SCOPE: CPT | Performed by: INTERNAL MEDICINE

## 2017-12-12 PROCEDURE — 36415 COLL VENOUS BLD VENIPUNCTURE: CPT

## 2017-12-12 PROCEDURE — 99214 OFFICE O/P EST MOD 30 MIN: CPT | Mod: ZP | Performed by: PHYSICIAN ASSISTANT

## 2017-12-12 PROCEDURE — 99213 OFFICE O/P EST LOW 20 MIN: CPT | Mod: ZF

## 2017-12-12 RX ORDER — TAMOXIFEN CITRATE 20 MG/1
20 TABLET ORAL DAILY
Qty: 28 TABLET | Refills: 0 | Status: SHIPPED | OUTPATIENT
Start: 2017-12-12 | End: 2018-01-12

## 2017-12-12 ASSESSMENT — PAIN SCALES - GENERAL: PAINLEVEL: NO PAIN (0)

## 2017-12-12 NOTE — MR AVS SNAPSHOT
After Visit Summary   12/12/2017    Shan Marsh    MRN: [id number]           Patient Information     Date Of Birth          1970        Visit Information        Provider Department      12/12/2017 2:30 PM Lima Griffiths PA-C [facility]        Today's Diagnoses     Malignant neoplasm of lower-outer quadrant of female breast, unspecified estrogen receptor status, unspecified laterality (H)    -  1       Follow-ups after your visit        Your next 10 appointments already scheduled     Jan 11, 2018 12:00 PM CST   (Arrive by 11:45 AM)   NM INJECTION with UUNMINJ1   [facility], Nuclear Medicine ([facility], [facility])    [address] [id number] [phone]            Jan 11, 2018 12:20 PM CST   (Arrive by 12:05 PM)   CT CHEST/ABDOMEN/PELVIS W CONTRAST with UUCT1   [facility], CT ([facility], [facility])    [address] [id number] [phone]           Please bring any scans or X-rays taken at other hospitals, if similar tests were done. Also bring a list of your medicines, including vitamins, minerals and over-the-counter drugs. It is safest to leave personal items at home.  Be sure to tell your doctor:   If you have any allergies.   If there s any chance you are pregnant.   If you are breastfeeding.   If you have any special needs.  You may have contrast for this exam. To prepare:   Do not eat or drink for 2 hours before your exam. If you need to take medicine, you may take it with small sips of water. (We may ask you to take liquid medicine as well.)   The day before your exam, drink extra fluids at least six 8-ounce glasses (unless your doctor tells you to restrict your fluids).  Patients over 70 or patients with diabetes or kidney problems:   If you haven t had a blood test (creatinine test) within the last 30 days, go to your  clinic or Diagnostic Imaging Department for this test.  If you have diabetes:   If your kidney function is normal, continue taking your metformin (Avandamet, Glucophage, Glucovance, Metaglip) on the day of your exam.   If your kidney function is abnormal, wait 48 hours before restarting this medicine.  You will have oral contrast for this exam:   You will drink the contrast at home. Get this from your clinic or Diagnostic Imaging Department. Please follow the directions given.  Please wear loose clothing, such as a sweat suit or jogging clothes. Avoid snaps, zippers and other metal. We may ask you to undress and put on a hospital gown.  If you have any questions, please call the Imaging Department where you will have your exam.            Jan 11, 2018  1:30 PM CST   [facility] Lab Draw with  [facility] DRAW   [facility]    [address]  [address] [facility] [id number] [phone]            Jan 11, 2018  3:00 PM CST   (Arrive by 2:45 PM)   NM BONE SCAN WHOLE BODY with UUNM3   [facility] [place], Nuclear Medicine ([facility])    [address] [address] [phone]           Please bring a list of your medicines to the exam. (Include vitamins, minerals and over-the-counter drugs.) You should wear comfortable clothes. Leave your valuables at home. Please bring related prior results and films. Tell your doctor:   If you are breastfeeding or may be pregnant.   If you have had a barium test within the past few days. Barium may change the results of certain exams.   If you think you may need sedation (medicine to help you relax).  You may eat and drink as normal.  Drink plenty of fluids and empty bladder frequently between injection and scans.            Jan 12, 2018  1:00 PM CST   (Arrive by 12:45 PM)   Return Visit with Ana Whitman MD   [facility] ([facility])    [address]  2nd Floor  [facility] [id number] [phone]              Future tests that were ordered for you today     Open Future Orders        Priority Expected Expires Ordered    NM Bone whole body Routine  3/12/2018 12/12/2017    CT Chest/Abdomen/Pelvis w Contrast Routine  12/13/2018 12/12/2017            Who to contact     If you have questions or need follow up information about today's clinic visit or your schedule please contact [facility] directly at [phone].  Normal or non-critical lab and imaging results will be communicated to you by [employer], letter or phone within 4 business days after the clinic has received the results. If you do not hear from us within 7 days, please contact the clinic through Ambrxt or phone. If you have a critical or abnormal lab result, we will notify you by phone as soon as possible.  Submit refill requests through [employer] or call your pharmacy and they will forward the refill request to us. Please allow 3 business days for your refill to be completed.          Additional Information About Your Visit        [employer] Information     [employer] gives you secure access to your electronic health record. If you see a primary care provider, you can also send messages to your care team and make appointments. If you have questions, please call your primary care clinic.  If you do not have a primary care provider, please call [phone] and they will assist you.        Care EveryWhere ID     This is your Care EveryWhere ID. This could be used by other organizations to access your [facility] medical records  [id number]        Your Vitals Were     Pulse Temperature Respirations Height Pulse Oximetry BMI (Body Mass Index)    61 98.6  F (37  C) (Oral) 18 1.646 m (5' 4.8\") 99% 20.14 kg/m2       Blood Pressure from Last 3 Encounters:   12/12/17 94/59   11/13/17 97/60   11/03/17 108/62    Weight from Last 3 Encounters: "   12/12/17 54.6 kg (120 lb 4.8 oz)   11/13/17 53.8 kg (118 lb 11.2 oz)   11/03/17 55.1 kg (121 lb 8 oz)              We Performed the Following     *CBC with platelets differential     Comprehensive metabolic panel     Routine UA with micro reflex to culture          Today's Medication Changes          These changes are accurate as of: 12/12/17  3:51 PM.  If you have any questions, ask your nurse or doctor.               These medicines have changed or have updated prescriptions.        Dose/Directions    * study - palbociclib 125 mg capsule   Commonly known as:  ids# [id number]   This may have changed:  Another medication with the same name was added. Make sure you understand how and when to take each.   Used for:  Malignant neoplasm of female breast, unspecified laterality, unspecified site of breast   Changed by:  Rafia Voss PA        Dose:  125 mg   Take 1 capsule (125 mg) by mouth daily with food Take on Days 1 through 21 of each 28 day cycle. Avoid grapefruit.   Quantity:  21 capsule   Refills:  0       * study - palbociclib 100 mg capsule   Commonly known as:  IDS# 4993   This may have changed:  Another medication with the same name was added. Make sure you understand how and when to take each.   Used for:  Malignant neoplasm of lower-outer quadrant of female breast, unspecified estrogen receptor status, unspecified laterality (H)   Changed by:  Ana Whitman MD        Dose:  100 mg   Take 1 capsule (100 mg) by mouth daily   Quantity:  30 capsule   Refills:  0       * study - palbociclib 100 mg capsule   Commonly known as:  IDS# 4993   This may have changed:  Another medication with the same name was added. Make sure you understand how and when to take each.   Used for:  Malignant neoplasm of lower-outer quadrant of female breast, unspecified estrogen receptor status, unspecified laterality (H)   Changed by:  Ana Whitman MD        Dose:  100 mg   Take 1 capsule (100 mg) by mouth daily    Quantity:  30 capsule   Refills:  0       * study - palbociclib 100 mg capsule   Commonly known as:  IDS# 4993   This may have changed:  Another medication with the same name was added. Make sure you understand how and when to take each.   Used for:  Malignant neoplasm of lower-outer quadrant of female breast, unspecified estrogen receptor status, unspecified laterality (H)   Changed by:  Erin Fernández PA-C        Dose:  100 mg   Take 1 capsule (100 mg) by mouth daily   Quantity:  30 capsule   Refills:  0       * study - palbociclib 100 mg capsule   Commonly known as:  IDS# 4993   This may have changed:  Another medication with the same name was added. Make sure you understand how and when to take each.   Used for:  Malignant neoplasm of lower-outer quadrant of female breast, unspecified estrogen receptor status, unspecified laterality (H)   Changed by:  Lima Griffiths PA-C        Dose:  100 mg   Take 1 capsule (100 mg) by mouth daily   Quantity:  30 capsule   Refills:  0       * study - palbociclib 100 mg capsule   Commonly known as:  IDS# 4993   This may have changed:  Another medication with the same name was added. Make sure you understand how and when to take each.   Used for:  Malignant neoplasm of lower-outer quadrant of female breast, unspecified estrogen receptor status, unspecified laterality (H)   Changed by:  Lima Griffiths PA-C        Dose:  100 mg   Take 1 capsule (100 mg) by mouth daily   Quantity:  30 capsule   Refills:  0       * study - palbociclib 100 mg capsule   Commonly known as:  IDS# 4993   This may have changed:  Another medication with the same name was added. Make sure you understand how and when to take each.   Used for:  Malignant neoplasm of lower-outer quadrant of female breast, unspecified estrogen receptor status, unspecified laterality (H)   Changed by:  Ana Whitman MD        Dose:  100 mg   Take 1 capsule (100 mg) by mouth daily   Quantity:   30 capsule   Refills:  0       * study - palbociclib 100 mg capsule   Commonly known as:  IDS# 4993   This may have changed:  You were already taking a medication with the same name, and this prescription was added. Make sure you understand how and when to take each.   Used for:  Malignant neoplasm of lower-outer quadrant of female breast, unspecified estrogen receptor status, unspecified laterality (H)   Changed by:  Lima Griffiths PA-C        Dose:  100 mg   Take 1 capsule (100 mg) by mouth daily   Quantity:  30 capsule   Refills:  0       * tamoxifen 20 MG tablet   Commonly known as:  NOLVADEX   This may have changed:  Another medication with the same name was added. Make sure you understand how and when to take each.   Used for:  Malignant neoplasm of female breast, unspecified laterality, unspecified site of breast   Changed by:  Sharifa Celeste RN        Dose:  20 mg   Take 1 tablet (20 mg) by mouth daily   Quantity:  28 tablet   Refills:  0       * tamoxifen 20 MG tablet   Commonly known as:  NOLVADEX   This may have changed:  Another medication with the same name was added. Make sure you understand how and when to take each.   Used for:  Malignant neoplasm of lower-outer quadrant of female breast, unspecified estrogen receptor status, unspecified laterality (H)   Changed by:  Ana Whitman MD        Dose:  20 mg   Take 1 tablet (20 mg) by mouth daily   Quantity:  28 tablet   Refills:  0       * tamoxifen 20 MG tablet   Commonly known as:  NOLVADEX   This may have changed:  Another medication with the same name was added. Make sure you understand how and when to take each.   Used for:  Malignant neoplasm of lower-outer quadrant of female breast, unspecified estrogen receptor status, unspecified laterality (H)   Changed by:  Ana Whitman MD        Dose:  20 mg   Take 1 tablet (20 mg) by mouth daily   Quantity:  28 tablet   Refills:  0       * tamoxifen 20 MG tablet   Commonly known  as:  NOLVADEX   This may have changed:  You were already taking a medication with the same name, and this prescription was added. Make sure you understand how and when to take each.   Used for:  Malignant neoplasm of lower-outer quadrant of female breast, unspecified estrogen receptor status, unspecified laterality (H)   Changed by:  Lima Griffiths PA-C        Dose:  20 mg   Take 1 tablet (20 mg) by mouth daily   Quantity:  28 tablet   Refills:  0       * Notice:  This list has 12 medication(s) that are the same as other medications prescribed for you. Read the directions carefully, and ask your doctor or other care provider to review them with you.         Where to get your medicines      These medications were sent to [facility] [place], [place] - [address]  [address] [facility] [address]     Phone:  [phone]     tamoxifen 20 MG tablet         Some of these will need a paper prescription and others can be bought over the counter.  Ask your nurse if you have questions.     You don't need a prescription for these medications     study - palbociclib 100 mg capsule                Primary Care Provider Office Phone # Fax #    Marilu Michelle [phone]       [address]  [facility] [address]        Equal Access to Services     LUZ JAMES AH: Karla darlingo Sojailyn, waaxda luqadaha, qaybta kaalmada adeegyada, edi kolb. So [facility] [phone].    ATENCIÓN: Si habla español, tiene a gamez disposición servicios gratuitos de asistencia lingüística. Llame al [phone].    We comply with applicable federal civil rights laws and [place] laws. We do not discriminate on the basis of race, color, national origin, age, disability, sex, sexual orientation, or gender identity.            Thank you!     Thank you for choosing [facility]  for your care. Our goal is always to provide you with excellent care.  Hearing back from our patients is one way we can continue to improve our services. Please take a few minutes to complete the written survey that you may receive in the mail after your visit with us. Thank you!             Your Updated Medication List - Protect others around you: Learn how to safely use, store and throw away your medicines at www.disposemymeds.org.          This list is accurate as of: 12/12/17  3:51 PM.  Always use your most recent med list.                   Brand Name Dispense Instructions for use Diagnosis    CHLOROXYGEN PO      Take by mouth daily        FLINTSTONES MULTIVITAMIN Chew      1 tablet daily        ondansetron 8 MG tablet    ZOFRAN    30 tablet    Take 1 tablet (8 mg) by mouth every 8 hours as needed for nausea (vomiting)    Malignant neoplasm of female breast, unspecified laterality, unspecified site of breast       prochlorperazine 10 MG tablet    COMPAZINE    30 tablet    Take 1 tablet (10 mg) by mouth every 6 hours as needed (Nausea/Vomiting)    Malignant neoplasm of female breast, unspecified laterality, unspecified site of breast       * study - palbociclib 125 mg capsule    ids# 4993    21 capsule    Take 1 capsule (125 mg) by mouth daily with food Take on Days 1 through 21 of each 28 day cycle. Avoid grapefruit.    Malignant neoplasm of female breast, unspecified laterality, unspecified site of breast       * study - palbociclib 100 mg capsule    IDS# 4993    30 capsule    Take 1 capsule (100 mg) by mouth daily    Malignant neoplasm of lower-outer quadrant of female breast, unspecified estrogen receptor status, unspecified laterality (H)       * study - palbociclib 100 mg capsule    IDS# 4993    30 capsule    Take 1 capsule (100 mg) by mouth daily    Malignant neoplasm of lower-outer quadrant of female breast, unspecified estrogen receptor status, unspecified laterality (H)       * study - palbociclib 100 mg capsule    IDS# 4993    30 capsule    Take 1 capsule (100 mg) by  mouth daily    Malignant neoplasm of lower-outer quadrant of female breast, unspecified estrogen receptor status, unspecified laterality (H)       * study - palbociclib 100 mg capsule    IDS# 4993    30 capsule    Take 1 capsule (100 mg) by mouth daily    Malignant neoplasm of lower-outer quadrant of female breast, unspecified estrogen receptor status, unspecified laterality (H)       * study - palbociclib 100 mg capsule    IDS# 4993    30 capsule    Take 1 capsule (100 mg) by mouth daily    Malignant neoplasm of lower-outer quadrant of female breast, unspecified estrogen receptor status, unspecified laterality (H)       * study - palbociclib 100 mg capsule    IDS# 4993    30 capsule    Take 1 capsule (100 mg) by mouth daily    Malignant neoplasm of lower-outer quadrant of female breast, unspecified estrogen receptor status, unspecified laterality (H)       * study - palbociclib 100 mg capsule    IDS# 4993    30 capsule    Take 1 capsule (100 mg) by mouth daily    Malignant neoplasm of lower-outer quadrant of female breast, unspecified estrogen receptor status, unspecified laterality (H)       * tamoxifen 20 MG tablet    NOLVADEX    28 tablet    Take 1 tablet (20 mg) by mouth daily    Malignant neoplasm of female breast, unspecified laterality, unspecified site of breast       * tamoxifen 20 MG tablet    NOLVADEX    28 tablet    Take 1 tablet (20 mg) by mouth daily    Malignant neoplasm of lower-outer quadrant of female breast, unspecified estrogen receptor status, unspecified laterality (H)       * tamoxifen 20 MG tablet    NOLVADEX    28 tablet    Take 1 tablet (20 mg) by mouth daily    Malignant neoplasm of lower-outer quadrant of female breast, unspecified estrogen receptor status, unspecified laterality (H)       * tamoxifen 20 MG tablet    NOLVADEX    28 tablet    Take 1 tablet (20 mg) by mouth daily    Malignant neoplasm of lower-outer quadrant of female breast, unspecified estrogen receptor status,  unspecified laterality (H)       * UNABLE TO FIND      daily Comprehensive Immunity Support - Host Defense        * UNABLE TO FIND      daily Mushrooms - My Sudhakar, Reishi, and Adryan.        * UNABLE TO FIND      daily Astragalus Immunity        * UNABLE TO FIND      daily Fucoidon        VITAMIN D (CHOLECALCIFEROL) PO      Take by mouth daily        * Notice:  This list has 16 medication(s) that are the same as other medications prescribed for you. Read the directions carefully, and ask your doctor or other care provider to review them with you.

## 2017-12-12 NOTE — LETTER
12/12/2017      RE: Shan Marsh  [address]  [facility] [address]       Hematology-Oncology Visit  Dec 12, 2017    Reason for Visit: follow-up metastatic breast cancer, ER positive     HPI: Shan Marsh is a 47 year old female without significant past medical history with recent diagnosis of metastatic breast cancer after presenting with left shoulder pain. Shoulder MRI showed soft tissue mass close to distal clavicle concerning for malignancy. She had PET/CT 5/9/17 showing hypermetabolic left axillary, supraclavicular, mediastinal and hilar nodes. She had biopsies of left axilla and hilar nodes showing metastatic breast cancer, strongly ER positive, moderately OH positive and HER-2 kaitlin non-amplified. She originally had B8aG4T9, ER/OH positive, HER-2 negative breast cancer diagnosed in spring of 2012 s/p bilateral mastectomies and Tamoxifen from 8/2012-2/2013 and discontinued due to side effects. She met with Dr. Whitman most recently on 5/16/17 who recommended treatment on BIG10 clinical trial with palbociclib and tamoxifen. She started treatment on 5/24/17.     Interval History: Shan is here for follow-up on the BIG10 trial. Nothing new. Fatigue is stable. She remains with some mild nausea that started with starting the trial. She recently saw her acupunturist for this and feels that has already helped. No pains anywhere. No issues with fevers, chills, headaches, vision changes, cough, SOB, chest pain, vomiting, diarrhea, constipation, bleeding, or swelling.     ROS otherwise negative.     Current Outpatient Prescriptions   Medication     tamoxifen (NOLVADEX) 20 MG tablet     study - palbociclib (IDS# 4993) 100 mg capsule     tamoxifen (NOLVADEX) 20 MG tablet     study - palbociclib (IDS# 4993) 100 mg capsule     study - palbociclib (IDS# 4993) 100 mg capsule     study - palbociclib (IDS# 4993) 100 mg capsule     study - palbociclib (IDS# 4993) 100 mg capsule     study - palbociclib  "(IDS# 4993) 100 mg capsule     prochlorperazine (COMPAZINE) 10 MG tablet     ondansetron (ZOFRAN) 8 MG tablet     Chlorophyll (CHLOROXYGEN PO)     UNABLE TO FIND     UNABLE TO FIND     VITAMIN D, CHOLECALCIFEROL, PO     UNABLE TO FIND     UNABLE TO FIND     study - palbociclib (IDS# 4993) 125 mg capsule     tamoxifen (NOLVADEX) 20 MG tablet     FLINTSTONES MULTIVITAMIN OR CHEW     study - palbociclib (IDS# 4993) 100 mg capsule     tamoxifen (NOLVADEX) 20 MG tablet     No current facility-administered medications for this visit.        PHYSICAL EXAM:  ECO  BP 94/59 (BP Location: Right arm, Patient Position: Sitting, Cuff Size: Adult Regular)  Pulse 61  Temp 98.6  F (37  C) (Oral)  Resp 18  Ht 1.646 m (5' 4.8\")  Wt 54.6 kg (120 lb 4.8 oz)  SpO2 99%  BMI 20.14 kg/m2     General: Alert, oriented, pleasant, NAD  Skin: warm, dry without rashes or bruising  HEENT: Normocephalic, atraumatic, PERRLA, EOMI. Moist mucus membranes, no lesions or thrush  Neck: No cervical or supraclavicular LAD.   Lungs: CTA bilaterally, normal work of breathing  Cardiac: RRR, S1, S2, no murmurs  Abdomen: Soft, nontender, nondistended. Normoactive bowel sounds. No hepatosplenomegaly, masses  Neuro: CNII-XII grossly intact  Extremities: No pedal edema    Labs:   Results for orders placed or performed in visit on 17 (from the past 24 hour(s))   *CBC with platelets differential   Result Value Ref Range    WBC 3.6 (L) 4.0 - 11.0 10e9/L    RBC Count 3.21 (L) 3.8 - 5.2 10e12/L    Hemoglobin 12.0 11.7 - 15.7 g/dL    Hematocrit 35.1 35.0 - 47.0 %     (H) 78 - 100 fl    MCH 37.4 (H) 26.5 - 33.0 pg    MCHC 34.2 31.5 - 36.5 g/dL    RDW 12.5 10.0 - 15.0 %    Platelet Count 181 150 - 450 10e9/L    Diff Method Automated Method     % Neutrophils 51.5 %    % Lymphocytes 39.7 %    % Monocytes 6.6 %    % Eosinophils 1.1 %    % Basophils 0.8 %    % Immature Granulocytes 0.3 %    Nucleated RBCs 0 0 /100    Absolute Neutrophil 1.9 1.6 - 8.3 " 10e9/L    Absolute Lymphocytes 1.4 0.8 - 5.3 10e9/L    Absolute Monocytes 0.2 0.0 - 1.3 10e9/L    Absolute Eosinophils 0.0 0.0 - 0.7 10e9/L    Absolute Basophils 0.0 0.0 - 0.2 10e9/L    Abs Immature Granulocytes 0.0 0 - 0.4 10e9/L    Absolute Nucleated RBC 0.0        Assessment & Plan: Shan Marsh is a 47-year-old female with history of a T1 N0, ER/SC positive, HER2-negative left breast cancer treated with bilateral mastectomies in 2012, now with metastatic disease involving the left axilla, as well as left hilar lymph node, ER/SC positive, HER2 negative.  She started on BIG10 trial using palbociclib and Tamoxifen in May 2017. She had a dose reduction of palbociclib due to neutropenia.    1. Metastatic breast cancer: Here today for follow-up on the BIG10 trial, and to start cycle 8 day 1. She continues to tolerate treatment well with minimal toxicities. She will have a CT CAP and a bone scan after this cycle, prior to seeing Dr. Whitman.    2. Nausea: Mild, starting with starting trial. Finding acupuncture helpful. Discussed aromatherapy, edward as alternative treatments.      Lima Griffiths PA-C

## 2017-12-12 NOTE — NURSING NOTE
"Oncology Rooming Note    December 12, 2017 2:46 PM   Shan Marsh is a 47 year old female who presents for:    Chief Complaint   Patient presents with     Blood Draw     Labs drawn via  by RN. VS taken.     Oncology Clinic Visit     Return visit related to Breast Cancer     Initial Vitals: BP 94/59 (BP Location: Right arm, Patient Position: Sitting, Cuff Size: Adult Regular)  Pulse 61  Temp 98.6  F (37  C) (Oral)  Resp 18  Ht 1.646 m (5' 4.8\")  Wt 54.6 kg (120 lb 4.8 oz)  SpO2 99%  BMI 20.14 kg/m2 Estimated body mass index is 20.14 kg/(m^2) as calculated from the following:    Height as of this encounter: 1.646 m (5' 4.8\").    Weight as of this encounter: 54.6 kg (120 lb 4.8 oz). Body surface area is 1.58 meters squared.  No Pain (0) Comment: Data Unavailable   No LMP recorded. Patient is not currently having periods (Reason: UNKNOWN).  Allergies reviewed: Yes  Medications reviewed: Yes    Medications: Medication refills not needed today.  Pharmacy name entered into Alcresta:    [facility] [place], [place] - [address] [place] [address] DRUG STORE [id number] [place] [address] [address] [place] [address] AT [facility] & [address]  [facility] [place], [place] - [address] [facility] DRUG STORE [address] - [place], [place] - [address] AT [facility] [address]    Clinical concerns: No new concerns. Provider was notified.    10 minutes for nursing intake (face to face time)     Ana Solano LPN            "

## 2017-12-12 NOTE — PROGRESS NOTES
Hematology-Oncology Visit  Dec 12, 2017    Reason for Visit: follow-up metastatic breast cancer, ER positive     HPI: Shan Marsh is a 47 year old female without significant past medical history with recent diagnosis of metastatic breast cancer after presenting with left shoulder pain. Shoulder MRI showed soft tissue mass close to distal clavicle concerning for malignancy. She had PET/CT 5/9/17 showing hypermetabolic left axillary, supraclavicular, mediastinal and hilar nodes. She had biopsies of left axilla and hilar nodes showing metastatic breast cancer, strongly ER positive, moderately ID positive and HER-2 kaitlin non-amplified. She originally had [id number], ER/ID positive, HER-2 negative breast cancer diagnosed in spring of 2012 s/p bilateral mastectomies and Tamoxifen from 8/2012-2/2013 and discontinued due to side effects. She met with Dr. Whitman most recently on 5/16/17 who recommended treatment on BIG10 clinical trial with palbociclib and tamoxifen. She started treatment on 5/24/17.     Interval History: Shan is here for follow-up on the BIG10 trial. Nothing new. Fatigue is stable. She remains with some mild nausea that started with starting the trial. She recently saw her acupunturist for this and feels that has already helped. No pains anywhere. No issues with fevers, chills, headaches, vision changes, cough, SOB, chest pain, vomiting, diarrhea, constipation, bleeding, or swelling.     ROS otherwise negative.     Current Outpatient Prescriptions   Medication     tamoxifen (NOLVADEX) 20 MG tablet     study - palbociclib (IDS# 4993) 100 mg capsule     tamoxifen (NOLVADEX) 20 MG tablet     study - palbociclib (IDS# 4993) 100 mg capsule     study - palbociclib (IDS# 4993) 100 mg capsule     study - palbociclib (IDS# 4993) 100 mg capsule     study - palbociclib (IDS# 4993) 100 mg capsule     study - palbociclib (IDS# 4993) 100 mg capsule     prochlorperazine (COMPAZINE) 10 MG tablet     ondansetron  "(ZOFRAN) 8 MG tablet     Chlorophyll (CHLOROXYGEN PO)     UNABLE TO FIND     UNABLE TO FIND     VITAMIN D, CHOLECALCIFEROL, PO     UNABLE TO FIND     UNABLE TO FIND     study - palbociclib (IDS# 4993) 125 mg capsule     tamoxifen (NOLVADEX) 20 MG tablet     FLINTSTONES MULTIVITAMIN OR CHEW     study - palbociclib (IDS# 4993) 100 mg capsule     tamoxifen (NOLVADEX) 20 MG tablet     No current facility-administered medications for this visit.        PHYSICAL EXAM:  ECO  BP 94/59 (BP Location: Right arm, Patient Position: Sitting, Cuff Size: Adult Regular)  Pulse 61  Temp 98.6  F (37  C) (Oral)  Resp 18  Ht 1.646 m (5' 4.8\")  Wt 54.6 kg (120 lb 4.8 oz)  SpO2 99%  BMI 20.14 kg/m2     General: Alert, oriented, pleasant, NAD  Skin: warm, dry without rashes or bruising  HEENT: Normocephalic, atraumatic, PERRLA, EOMI. Moist mucus membranes, no lesions or thrush  Neck: No cervical or supraclavicular LAD.   Lungs: CTA bilaterally, normal work of breathing  Cardiac: RRR, S1, S2, no murmurs  Abdomen: Soft, nontender, nondistended. Normoactive bowel sounds. No hepatosplenomegaly, masses  Neuro: CNII-XII grossly intact  Extremities: No pedal edema    Labs:   Results for orders placed or performed in visit on 17 (from the past 24 hour(s))   *CBC with platelets differential   Result Value Ref Range    WBC 3.6 (L) 4.0 - 11.0 10e9/L    RBC Count 3.21 (L) 3.8 - 5.2 10e12/L    Hemoglobin 12.0 11.7 - 15.7 g/dL    Hematocrit 35.1 35.0 - 47.0 %     (H) 78 - 100 fl    MCH 37.4 (H) 26.5 - 33.0 pg    MCHC 34.2 31.5 - 36.5 g/dL    RDW 12.5 10.0 - 15.0 %    Platelet Count 181 150 - 450 10e9/L    Diff Method Automated Method     % Neutrophils 51.5 %    % Lymphocytes 39.7 %    % Monocytes 6.6 %    % Eosinophils 1.1 %    % Basophils 0.8 %    % Immature Granulocytes 0.3 %    Nucleated RBCs 0 0 /100    Absolute Neutrophil 1.9 1.6 - 8.3 10e9/L    Absolute Lymphocytes 1.4 0.8 - 5.3 10e9/L    Absolute Monocytes 0.2 0.0 - 1.3 " 10e9/L    Absolute Eosinophils 0.0 0.0 - 0.7 10e9/L    Absolute Basophils 0.0 0.0 - 0.2 10e9/L    Abs Immature Granulocytes 0.0 0 - 0.4 10e9/L    Absolute Nucleated RBC 0.0        Assessment & Plan: Shan Marsh is a 47-year-old female with history of a T1 N0, ER/HI positive, HER2-negative left breast cancer treated with bilateral mastectomies in 2012, now with metastatic disease involving the left axilla, as well as left hilar lymph node, ER/HI positive, HER2 negative.  She started on BIG10 trial using palbociclib and Tamoxifen in May 2017. She had a dose reduction of palbociclib due to neutropenia.    1. Metastatic breast cancer: Here today for follow-up on the BIG10 trial, and to start cycle 8 day 1. She continues to tolerate treatment well with minimal toxicities. She will have a CT CAP and a bone scan after this cycle, prior to seeing Dr. Whitman.    2. Nausea: Mild, starting with starting trial. Finding acupuncture helpful. Discussed aromatherapy, edward as alternative treatments.      Lima Griffiths PA-C

## 2017-12-12 NOTE — NURSING NOTE
Chief Complaint   Patient presents with     Blood Draw     Labs drawn via  by RN. VS taken.     Luly Palmer RN

## 2017-12-13 ENCOUNTER — RESEARCH ENCOUNTER (OUTPATIENT)
Dept: ONCOLOGY | Facility: CLINIC | Age: 47
End: 2017-12-13

## 2017-12-13 NOTE — PROGRESS NOTES
Reseach appt- C8D1. Late entry, Pt doing well having same symptoms as before- fatigue and nausea mostly. Seeing an acupuncturist for the nausea with benefit in symptoms. Continues to be fatigued but reports she is able to do the things she wants to.   Labs checked and ok to continue on the study drug. Pt forgot the empty bottles and drug diary. Was given new bottles and diary and reminded to bring next time.   Will need restaging for C9. Tests ordered and scheduled for 1/11. Pt will then see Dr Whitman on 1/12. F/u in 1 month  Irene Reed RN   [phone]

## 2018-01-09 DIAGNOSIS — C50.519 MALIGNANT NEOPLASM OF LOWER-OUTER QUADRANT OF FEMALE BREAST, UNSPECIFIED ESTROGEN RECEPTOR STATUS, UNSPECIFIED LATERALITY (H): Primary | ICD-10-CM

## 2018-01-11 ENCOUNTER — HOSPITAL ENCOUNTER (OUTPATIENT)
Dept: NUCLEAR MEDICINE | Facility: CLINIC | Age: 48
Setting detail: NUCLEAR MEDICINE
End: 2018-01-11
Attending: PHYSICIAN ASSISTANT
Payer: COMMERCIAL

## 2018-01-11 ENCOUNTER — HOSPITAL ENCOUNTER (OUTPATIENT)
Dept: CT IMAGING | Facility: CLINIC | Age: 48
Discharge: HOME | End: 2018-01-11
Attending: PHYSICIAN ASSISTANT | Admitting: PHYSICIAN ASSISTANT
Payer: COMMERCIAL

## 2018-01-11 DIAGNOSIS — C50.519 MALIGNANT NEOPLASM OF LOWER-OUTER QUADRANT OF FEMALE BREAST, UNSPECIFIED ESTROGEN RECEPTOR STATUS, UNSPECIFIED LATERALITY (H): ICD-10-CM

## 2018-01-11 LAB
ALBUMIN SERPL-MCNC: 3.2 G/DL (ref 3.4–5)
ALP SERPL-CCNC: 52 U/L (ref 40–150)
ALT SERPL W P-5'-P-CCNC: 19 U/L (ref 0–50)
ANION GAP SERPL CALCULATED.3IONS-SCNC: 6 MMOL/L (ref 3–14)
AST SERPL W P-5'-P-CCNC: 32 U/L (ref 0–45)
BASOPHILS # BLD AUTO: 0.1 10E9/L (ref 0–0.2)
BASOPHILS NFR BLD AUTO: 1.3 %
BILIRUB SERPL-MCNC: 0.3 MG/DL (ref 0.2–1.3)
BUN SERPL-MCNC: 12 MG/DL (ref 7–30)
CALCIUM SERPL-MCNC: 8.4 MG/DL (ref 8.5–10.1)
CHLORIDE SERPL-SCNC: 105 MMOL/L (ref 94–109)
CO2 SERPL-SCNC: 28 MMOL/L (ref 20–32)
CREAT SERPL-MCNC: 0.62 MG/DL (ref 0.52–1.04)
DIFFERENTIAL METHOD BLD: ABNORMAL
EOSINOPHIL # BLD AUTO: 0 10E9/L (ref 0–0.7)
EOSINOPHIL NFR BLD AUTO: 0.8 %
ERYTHROCYTE [DISTWIDTH] IN BLOOD BY AUTOMATED COUNT: 11.5 % (ref 10–15)
GFR SERPL CREATININE-BSD FRML MDRD: >90 ML/MIN/1.7M2
GLUCOSE SERPL-MCNC: 108 MG/DL (ref 70–99)
HCG UR QL: NEGATIVE
HCT VFR BLD AUTO: 35.4 % (ref 35–47)
HGB BLD-MCNC: 12.1 G/DL (ref 11.7–15.7)
IMM GRANULOCYTES # BLD: 0 10E9/L (ref 0–0.4)
IMM GRANULOCYTES NFR BLD: 0.4 %
LYMPHOCYTES # BLD AUTO: 1.5 10E9/L (ref 0.8–5.3)
LYMPHOCYTES NFR BLD AUTO: 32.3 %
MCH RBC QN AUTO: 36.9 PG (ref 26.5–33)
MCHC RBC AUTO-ENTMCNC: 34.2 G/DL (ref 31.5–36.5)
MCV RBC AUTO: 108 FL (ref 78–100)
MONOCYTES # BLD AUTO: 0.3 10E9/L (ref 0–1.3)
MONOCYTES NFR BLD AUTO: 6.1 %
NEUTROPHILS # BLD AUTO: 2.8 10E9/L (ref 1.6–8.3)
NEUTROPHILS NFR BLD AUTO: 59.1 %
NRBC # BLD AUTO: 0 10*3/UL
NRBC BLD AUTO-RTO: 0 /100
PLATELET # BLD AUTO: 236 10E9/L (ref 150–450)
POTASSIUM SERPL-SCNC: 3.6 MMOL/L (ref 3.4–5.3)
PROT SERPL-MCNC: 7.3 G/DL (ref 6.8–8.8)
RBC # BLD AUTO: 3.28 10E12/L (ref 3.8–5.2)
SODIUM SERPL-SCNC: 139 MMOL/L (ref 133–144)
WBC # BLD AUTO: 4.7 10E9/L (ref 4–11)

## 2018-01-11 PROCEDURE — 34300033 ZZH RX 343: Performed by: PHYSICIAN ASSISTANT

## 2018-01-11 PROCEDURE — 78306 BONE IMAGING WHOLE BODY: CPT

## 2018-01-11 PROCEDURE — A9503 TC99M MEDRONATE: HCPCS | Performed by: PHYSICIAN ASSISTANT

## 2018-01-11 RX ORDER — TC 99M MEDRONATE 20 MG/10ML
20-30 INJECTION, POWDER, LYOPHILIZED, FOR SOLUTION INTRAVENOUS ONCE
Status: COMPLETED | OUTPATIENT
Start: 2018-01-11 | End: 2018-01-11

## 2018-01-11 RX ORDER — IOPAMIDOL 755 MG/ML
73 INJECTION, SOLUTION INTRAVASCULAR ONCE
Status: COMPLETED | OUTPATIENT
Start: 2018-01-11 | End: 2018-01-11

## 2018-01-11 RX ADMIN — IOPAMIDOL 73 ML: 755 INJECTION, SOLUTION INTRAVENOUS at 12:17

## 2018-01-11 RX ADMIN — TC 99M MEDRONATE 19.1 MCI.: 20 INJECTION, POWDER, LYOPHILIZED, FOR SOLUTION INTRAVENOUS at 11:57

## 2018-01-11 RX ADMIN — SODIUM CHLORIDE, PRESERVATIVE FREE 74 ML: 5 INJECTION INTRAVENOUS at 12:17

## 2018-01-11 NOTE — NURSING NOTE
Chief Complaint   Patient presents with     Blood Draw     Lab only appt-no vitals so RN collected labs by  and pt able to leave urine specimen.     Jesika Ruffin

## 2018-01-12 ENCOUNTER — INFUSION THERAPY VISIT (OUTPATIENT)
Dept: ONCOLOGY | Facility: CLINIC | Age: 48
End: 2018-01-12
Attending: INTERNAL MEDICINE
Payer: COMMERCIAL

## 2018-01-12 ENCOUNTER — RESEARCH ENCOUNTER (OUTPATIENT)
Dept: ONCOLOGY | Facility: CLINIC | Age: 48
End: 2018-01-12

## 2018-01-12 ENCOUNTER — ONCOLOGY VISIT (OUTPATIENT)
Dept: ONCOLOGY | Facility: CLINIC | Age: 48
End: 2018-01-12
Payer: COMMERCIAL

## 2018-01-12 ENCOUNTER — CARE COORDINATION (OUTPATIENT)
Dept: ONCOLOGY | Facility: CLINIC | Age: 48
End: 2018-01-12

## 2018-01-12 VITALS
WEIGHT: 120.9 LBS | OXYGEN SATURATION: 99 % | HEIGHT: 64 IN | HEART RATE: 55 BPM | BODY MASS INDEX: 20.64 KG/M2 | DIASTOLIC BLOOD PRESSURE: 71 MMHG | SYSTOLIC BLOOD PRESSURE: 112 MMHG | TEMPERATURE: 97.1 F

## 2018-01-12 DIAGNOSIS — C50.919 METASTATIC BREAST CANCER: Primary | ICD-10-CM

## 2018-01-12 DIAGNOSIS — C79.51 BONE METASTASIS: ICD-10-CM

## 2018-01-12 DIAGNOSIS — Z79.899 ENCOUNTER FOR LONG-TERM (CURRENT) USE OF MEDICATIONS: ICD-10-CM

## 2018-01-12 DIAGNOSIS — C50.919 METASTATIC BREAST CANCER: ICD-10-CM

## 2018-01-12 PROCEDURE — G0463 HOSPITAL OUTPT CLINIC VISIT: HCPCS | Mod: ZF

## 2018-01-12 PROCEDURE — 96402 CHEMO HORMON ANTINEOPL SQ/IM: CPT

## 2018-01-12 PROCEDURE — 99215 OFFICE O/P EST HI 40 MIN: CPT | Mod: ZP | Performed by: INTERNAL MEDICINE

## 2018-01-12 PROCEDURE — 25000125 ZZHC RX 250: Mod: ZF | Performed by: INTERNAL MEDICINE

## 2018-01-12 PROCEDURE — 25000128 H RX IP 250 OP 636: Mod: ZF | Performed by: INTERNAL MEDICINE

## 2018-01-12 RX ORDER — LETROZOLE 2.5 MG/1
2.5 TABLET, FILM COATED ORAL DAILY
Qty: 28 TABLET | Refills: 0 | Status: SHIPPED | OUTPATIENT
Start: 2018-01-12 | End: 2019-01-01

## 2018-01-12 RX ORDER — LIDOCAINE HYDROCHLORIDE 10 MG/ML
2 INJECTION, SOLUTION EPIDURAL; INFILTRATION; INTRACAUDAL; PERINEURAL ONCE
Status: COMPLETED | OUTPATIENT
Start: 2018-01-12 | End: 2018-01-12

## 2018-01-12 RX ADMIN — LIDOCAINE HYDROCHLORIDE 20 MG: 10 INJECTION, SOLUTION EPIDURAL; INFILTRATION; INTRACAUDAL; PERINEURAL at 16:04

## 2018-01-12 RX ADMIN — GOSERELIN ACETATE 3.6 MG: 3.6 IMPLANT SUBCUTANEOUS at 16:08

## 2018-01-12 ASSESSMENT — PAIN SCALES - GENERAL: PAINLEVEL: NO PAIN (0)

## 2018-01-12 NOTE — ORAL ONC MGMT
"Oral Chemotherapy Monitoring Program    Primary Oncologist: Dr. Whitman  Primary Oncology Clinic: [facility]  Cancer Diagnosis: Breast Cancer    Drug: Verzenio (abemaciclib)   Start Date: As soon as possible/pending insurance approval  Dose is appropriate for patients:  Renal Function and Hepatic Function   Expected duration of therapy: Until disease progression or unacceptable toxicity    Drug Interaction Assessment: Verzenio -Vitamin D3 -Multivitamins/Fluoride -Letrozole    Lab Monitoring Plan  We as a oral oncology team do not have a template for Verzenio monitoring but am following the package insert of CBC with diff and CMP every 2 weeks for the first 2 months and then monthly.  I inbasketed Dr. Whitman to see if she was okay with this monitoring, if she is not I will update the note/plan.  Labs drawn outside of [place]:     Subjective/Objective:  Shan Marsh is a 47 year old female seen in clinic for an initial visit for oral chemotherapy education.      ORAL CHEMOTHERAPY 1/12/2018   Drug Name (No Data)   Current Dosage 150mg   Current Schedule BID   Cycle Details Continuous   Any new drug interactions? No   Is the dose as ordered appropriate for the patient? Yes       Vitals:  BP:   BP Readings from Last 1 Encounters:   01/12/18 112/71     Wt Readings from Last 1 Encounters:   01/12/18 54.8 kg (120 lb 14.4 oz)     Estimated body surface area is 1.57 meters squared as calculated from the following:    Height as of an earlier encounter on 1/12/18: 1.626 m (5' 4\").    Weight as of an earlier encounter on 1/12/18: 54.8 kg (120 lb 14.4 oz).      Labs:  Lab Results   Component Value Date     01/11/2018      Lab Results   Component Value Date    POTASSIUM 3.6 01/11/2018     Lab Results   Component Value Date    KRYSTEN 8.4 01/11/2018     No results found for: MAG  No results found for: PHOS  Lab Results   Component Value Date    ALBUMIN 3.2 01/11/2018     Lab Results   Component Value Date    " BUN 12 01/11/2018     Lab Results   Component Value Date    CR 0.62 01/11/2018       Lab Results   Component Value Date    AST 32 01/11/2018     Lab Results   Component Value Date    ALT 19 01/11/2018     Lab Results   Component Value Date    BILITOTAL 0.3 01/11/2018       Lab Results   Component Value Date    WBC 4.7 01/11/2018     Lab Results   Component Value Date    HGB 12.1 01/11/2018     Lab Results   Component Value Date     01/11/2018     Lab Results   Component Value Date    ANEU 2.8 01/11/2018       Assessment:  Patient is appropriate to start therapy.    Plan:  Basic chemotherapy teaching was reviewed with the patient including indication, start date of therapy, dose, administration, adverse effects, missed doses, food and drug interactions, monitoring, side effect management, office contact information, and safe handling. Written materials were provided and all questions answered.    Follow-Up:  Will follow-up with patient 1 week after patient starts medication and review PA/MD appointments as they occur.     Rachel Slater Pharm.D., [facility]  [phone]  01/12/18

## 2018-01-12 NOTE — NURSING NOTE
"Oncology Rooming Note    January 12, 2018 1:14 PM   Shan Marsh is a 47 year old female who presents for:    Chief Complaint   Patient presents with     Oncology Clinic Visit     f/u Breast ca     Initial Vitals: /71 (BP Location: Left arm, Patient Position: Sitting, Cuff Size: Adult Regular)  Pulse 55  Temp 97.1  F (36.2  C) (Oral)  Ht 1.626 m (5' 4\")  Wt 54.8 kg (120 lb 14.4 oz)  SpO2 99%  BMI 20.75 kg/m2 Estimated body mass index is 20.75 kg/(m^2) as calculated from the following:    Height as of this encounter: 1.626 m (5' 4\").    Weight as of this encounter: 54.8 kg (120 lb 14.4 oz). Body surface area is 1.57 meters squared.  No Pain (0) Comment: Data Unavailable   No LMP recorded. Patient is not currently having periods (Reason: UNKNOWN).  Allergies reviewed: Yes  Medications reviewed: Yes    Medications: MEDICATION REFILLS NEEDED TODAY. Provider was notified.  Pharmacy name entered into LoiLo:    [facility] [place], [place] - [address] [place] [address] DRUG STORE [id number] [place] [address] - [address] [place] [address] AV AT [facility] [address]  [facility] [place], [place] - [address] [facility] DRUG STORE [address] - [place], [place] - [address] AT [facility] OF [address]    Clinical concerns: Refill on Ibrance, Cough Dr. Whitman was notified.    10 minutes for nursing intake (face to face time)     Swetha Silva LPN              "

## 2018-01-12 NOTE — MR AVS SNAPSHOT
"              After Visit Summary   1/12/2018    Shan Marsh    MRN: [id number]           Patient Information     Date Of Birth          1970        Visit Information        Provider Department      1/12/2018 1:00 PM Ana Whitman MD [facility]        Today's Diagnoses     Bone metastasis (H)        Metastatic breast cancer (H)           Follow-ups after your visit        Who to contact     If you have questions or need follow up information about today's clinic visit or your schedule please contact LT[facility], located within [facility] directly at [phone].  Normal or non-critical lab and imaging results will be communicated to you by CinemaNowhart, letter or phone within 4 business days after the clinic has received the results. If you do not hear from us within 7 days, please contact the clinic through Yazinot or phone. If you have a critical or abnormal lab result, we will notify you by phone as soon as possible.  Submit refill requests through Piictu or call your pharmacy and they will forward the refill request to us. Please allow 3 business days for your refill to be completed.          Additional Information About Your Visit        MyChart Information     Piictu gives you secure access to your electronic health record. If you see a primary care provider, you can also send messages to your care team and make appointments. If you have questions, please call your primary care clinic.  If you do not have a primary care provider, please call [phone] and they will assist you.        Care EveryWhere ID     This is your Care EveryWhere ID. This could be used by other organizations to access your [place] medical records  [id number]        Your Vitals Were     Pulse Temperature Height Pulse Oximetry BMI (Body Mass Index)       55 97.1  F (36.2  C) (Oral) 1.626 m (5' 4\") 99% 20.75 kg/m2        Blood Pressure from Last 3 Encounters:   01/12/18 112/71   12/12/17 94/59   11/13/17 " 97/60    Weight from Last 3 Encounters:   01/12/18 54.8 kg (120 lb 14.4 oz)   12/12/17 54.6 kg (120 lb 4.8 oz)   11/13/17 53.8 kg (118 lb 11.2 oz)              Today, you had the following     No orders found for display         Today's Medication Changes          These changes are accurate as of: 1/12/18 11:59 PM.  If you have any questions, ask your nurse or doctor.               Start taking these medicines.        Dose/Directions    abemaciclib 150 MG tablet CHEMO   Commonly known as:  VERZENIO   Used for:  Metastatic breast cancer (H), Bone metastasis (H)   Started by:  Rachel Slater RPH        Dose:  150 mg   Take 1 tablet (150 mg) by mouth 2 times daily   Quantity:  60 tablet   Refills:  1       letrozole 2.5 MG tablet   Commonly known as:  FEMARA   Used for:  Metastatic breast cancer (H), Bone metastasis (H)   Started by:  Rachel Slater RPH        Dose:  2.5 mg   Take 1 tablet (2.5 mg) by mouth daily for 28 days   Quantity:  28 tablet   Refills:  0         These medicines have changed or have updated prescriptions.        Dose/Directions    UNABLE TO FIND   This may have changed:  Another medication with the same name was removed. Continue taking this medication, and follow the directions you see here.   Changed by:  Rafia Voss PA-C        daily Comprehensive Immunity Support - Host Defense   Refills:  0         Stop taking these medicines if you haven't already. Please contact your care team if you have questions.     CHLOROXYGEN PO   Stopped by:  Ana Whitman MD           ondansetron 8 MG tablet   Commonly known as:  ZOFRAN   Stopped by:  Ana Whitman MD           prochlorperazine 10 MG tablet   Commonly known as:  COMPAZINE   Stopped by:  Ana Whitman MD           study - palbociclib 100 mg capsule   Commonly known as:  IDS# 4993   Stopped by:  Ana Whitman MD           study - palbociclib 125 mg capsule   Commonly known as:  ids# 4993   Stopped by:   Ana Whitman MD           tamoxifen 20 MG tablet   Commonly known as:  NOLVADEX   Stopped by:  Ana Whitman MD                Where to get your medicines      These medications were sent to [place], [place] - [address] [facility] [address]  [address] [facility] [address], [facility] [address]    Hours:  TRANSPLANT PHONE NUMBER [phone] Phone:  [phone]     abemaciclib 150 MG tablet CHEMO    letrozole 2.5 MG tablet                Primary Care Provider Office Phone # Fax #    Marilu Michelle [phone]       [address]  [facility] [address]        Equal Access to Services     LUZ JAMES : Karla chinchilla hadasho Sojailyn, waaxda etnaadaha, qaybta kaalmada adedanayada, edi kolb. So [facility] [phone].    ATENCIÓN: Si habla español, tiene a gamez disposición servicios gratuitos de asistencia lingüística. Llame al [phone].    We comply with applicable federal civil rights laws and [place] laws. We do not discriminate on the basis of race, color, national origin, age, disability, sex, sexual orientation, or gender identity.            Thank you!     Thank you for choosing [facility]  for your care. Our goal is always to provide you with excellent care. Hearing back from our patients is one way we can continue to improve our services. Please take a few minutes to complete the written survey that you may receive in the mail after your visit with us. Thank you!             Your Updated Medication List - Protect others around you: Learn how to safely use, store and throw away your medicines at www.disposemymeds.org.          This list is accurate as of: 1/12/18 11:59 PM.  Always use your most recent med list.                   Brand Name Dispense Instructions for use Diagnosis    abemaciclib 150 MG tablet CHEMO    VERZENIO    60 tablet    Take 1 tablet (150 mg) by mouth 2 times daily    Metastatic  breast cancer (H), Bone metastasis (H)       FLINTSTONES MULTIVITAMIN Chew      1 tablet daily        letrozole 2.5 MG tablet    FEMARA    28 tablet    Take 1 tablet (2.5 mg) by mouth daily for 28 days    Metastatic breast cancer (H), Bone metastasis (H)       UNABLE TO FIND      daily Comprehensive Immunity Support - Host Defense        VITAMIN D (CHOLECALCIFEROL) PO      Take by mouth daily

## 2018-01-12 NOTE — LETTER
1/12/2018       RE: Shan Marsh  [address]  [facility] [address]     Dear Colleague,    Thank you for referring your patient, Shan Marsh, to the [facility]. Please see a copy of my visit note below.    January 16, 2018        ONCOLOGY VISIT NOTE      REASON FOR VISIT:  I am seeing Ms. Marsh in followup for her stage 4, metastatic ER + breast cancer with metastases to the axilla and hilar lymph nodes.  She is currently on the [employer] protocol using tamoxifen and palbociclib.     Briefly in history, she has a history of a T1a N0 M0, ER-positive, AZ-positive, and HER2-negative breast cancer.  She originally met me in 12/2011 with a coin-sized mass involving her left breast.  This mass got bigger, and by 05/2012 she was referred for a mammogram, ultrasound and breast MRI.  This revealed heterogeneously dense breast tissue with an area of suspicious microcalcifications measuring 5 x 2.6 cm involving the left breast.  MRI showed non-mass-like enhancement in the upper outer quadrant extending from about 1-3 o'clock, measuring about 6 cm.  Stereotactic needle biopsy revealed DCIS high-grade, which was suspicious for microinvasion.  She was seen by Dr. Misael Cheng, and underwent bilateral mastectomies with sentinel lymph node biopsy on 06/11/2012.  Her final pathology revealed grade 1 infiltrating ductal carcinoma, ER-positive, AZ-positive an d HER2-negative.  She had multiple microscopic areas of invasion, each measuring 1-2 mm.  Her pathology was reviewed at our Multidisciplinary Conference, and confirmed multiple areas of microinvasion.  Her final staging was a T1a N0 M0 stage I breast cancer.  Bells lymph node biopsy was negative.  Her Oncotype score was 23, giving her a risk of distant recurrence at 10 years of 15%, assuming she took tamoxifen.  She started on tamoxifen in 08/2012.  This was discontinued in 02/2013 due to complaints of vaginal  discharge.  She also had complained of some palpitations at that time and saw Cardiology.  This workup was negative.  She does have a type 1 AV block felt to be benign from her endurance as an athlete.      Shan presented to see me on 05/10/2017, at which time she had noticed fullness and pain involving her left shoulder with numbness down involving her left upper extremity.  She was found to have left axillary adenopathy, and was sent for a PET/CT scan revealing on MRI of the shoulder that she had a 4 x 1.8 x 1.5 cm lesion close to the subcutaneous tissue with close proximity to the distal clavicle.  She was subsequently sent for a PET/CT scan, which confirmed multiple PET-avid left axillary lymph nodes, as well as hilar lymph nodes that were suspicious for distant metastatic disease.  There were no suspicious lesions in the liver.  No suspicious lesions in the bones.  She has a known 3.2 cm hemangioma in the left lobe of the liver.  Biopsies confirmed estrogen positive metastatic breast cancer with lung involvement.     She enrolled on the BIG10 trial using palbociclib and tamoxifen.   Shan returns today for followup.  She currently remains on tamoxifen and palbociclib without any current side effects.       She reports occasional hot flashes.  She says she has good energy.  She had a good holiday.  She feels overall like she is doing well.  Her left axillary lymph nodes have been resolved in her opinion.        She has no fevers or chills, no chest pain or shortness of breath.  She has had a cough with scant sputum production.  She has no nausea, vomiting, diarrhea or constipation.  She does report that she needs to eat small meals in order to hennessy off nausea, but this is very minimal.         Her menses stopped shortly after starting tamoxifen.  She has no vaginal bleeding or discharge and no sexual dysfunction.      Her 10-point review of systems is otherwise negative.         PHYSICAL EXAMINATION:   BP  "112/71 (BP Location: Left arm, Patient Position: Sitting, Cuff Size: Adult Regular)  Pulse 55  Temp 97.1  F (36.2  C) (Oral)  Ht 1.626 m (5' 4\")  Wt 54.8 kg (120 lb 14.4 oz)  SpO2 99%  BMI 20.75 kg/m2  Gen: well appearing, NAD  Heent: no icterus, o/p clear without ulcers or lesions  CV: rrr, nl S1S2  Lungs: clear  Abd: soft, nt, nd + bs  Ext: no edema  Breast: s/p bilateral mastectomy with reconstruction, small 1 cm left axillary lymph node  No rashes on skin        Lab Results   Component Value Date    WBC 4.7 01/11/2018     Lab Results   Component Value Date    RBC 3.28 01/11/2018     Lab Results   Component Value Date    HGB 12.1 01/11/2018     Lab Results   Component Value Date    HCT 35.4 01/11/2018     No components found for: MCT  Lab Results   Component Value Date     01/11/2018     Lab Results   Component Value Date    MCH 36.9 01/11/2018     Lab Results   Component Value Date    MCHC 34.2 01/11/2018     Lab Results   Component Value Date    RDW 11.5 01/11/2018     Lab Results   Component Value Date     01/11/2018       Recent Labs   Lab Test  01/11/18   1341  12/12/17   1420   NA  139  140   POTASSIUM  3.6  4.2   CHLORIDE  105  106   CO2  28  28   ANIONGAP  6  6   GLC  108*  98   BUN  12  13   CR  0.62  0.54   KRYSTEN  8.4*  8.5     Liver Function Studies -   Recent Labs   Lab Test  01/11/18   1341   PROTTOTAL  7.3   ALBUMIN  3.2*   BILITOTAL  0.3   ALKPHOS  52   AST  32   ALT  19          ASSESSMENT AND PLAN:  This is a 47-year-old female with a history of a T1a N0, ER-positive, HI-positive and HER2-negative left breast cancer treated with bilateral mastectomies 5 years ago, who now has metastatic disease involving the left axilla, as well as left hilar lymph node.  On biopsy, these are strongly ER-positive, HI is 40%, and HER2 by FISH was negative with a ratio of 1.5.  She is on the BIG10 trial using palbociclib and tamoxifen.      I reviewed Shan's imaging with her that unfortunately " shows progressive disease with new bony lesions in the ribs and T spine, possibly lungs.  I recommended stopping the study.      We discussed treatment options including ovarian suppression, AI vs faslodex and abemiciclib.  Side effects were discussed.  We discussed the side effects of chemotherapy including myelosuppression, nausea/vomiting/diarrhea/constipation, hair loss, neuropathy, fertility complications, dehydration, cardiomyopathy, etc.  The patient will be receiving chemotherapy teaching through my nurse coordinator with handouts describing all of the side effects again.  Consent for chemotherapy was obtained.    I also discussed with her that I would like her to start Zometa for bone mets.  Side effects discussed.      Will start today.      Pt inquired about a second opinion; I encouraged her to do this.         CAMELIA GRISSOM MD

## 2018-01-12 NOTE — PROGRESS NOTES
Met with patient today and gave brochures for Zometa, Abemaciclib, Faslodex, Letrozole and Zoladex as recommended by Dr Whitman. Pages the Pharm D to meet with patient for Abermaciclib education.  Answered all patient's questions and verbalized understanding. Sharifa Celeste RN, BSN.

## 2018-01-12 NOTE — MR AVS SNAPSHOT
After Visit Summary   1/12/2018    Shan Marsh    MRN: [id number]           Patient Information     Date Of Birth          1970        Visit Information        Provider Department      1/12/2018 4:00 PM  27 ATC; [facility]        Today's Diagnoses     Metastatic breast cancer (H)    -  1    Bone metastasis (H)          Care Instructions    Contact numbers:  Triage Main Line: [phone]  After hours: [phone]    Call with chills and/or temperature greater than or equal to 100.5 and questions or concerns.    If after hours, weekends, or holidays, call main hospital  at  [phone] and ask for Oncology doctor on call.           January 2018 Sunday Monday Tuesday Wednesday Thursday Friday Saturday        1     2     3     4     5     6       7     8     9     10     11     NM INJ   11:45 AM   (15 min.)   UUNMINJ1   [facility], Nuclear Medicine     CT CHEST/ABDOMEN/PELVIS W   12:05 PM   (20 min.)   UUCT1   [facility], [place]     [facility] MASON[facility] LAB DRAW    1:30 PM   (15 min.)    MASONIC LAB DRAW   [facility] Lab Draw     NM BONE SCAN WHOLE BODY    2:45 PM   (60 min.)   UUNM3   [facility], Nuclear Medicine [facility] RETURN   12:45 PM   (30 min.)   Ana Whitman MD   [facility] ONC INFUSION 60    4:00 PM   (60 min.)    ONCOLOGY INFUSION   [facility] 13       14     15     16     17     18     19     20       21     22     23     24     25     26     27       28     29     30     31 February 2018 Sunday Monday Tuesday Wednesday Thursday Friday Saturday                       1     2     3       4     5     6     7     8     9     10       11     12     13     14     15     16     17       18     19     20     21     22     23     24       25     26     27     28                                 Lab Results:  No results found for this or  any previous visit (from the past 12 hour(s)).            Follow-ups after your visit        Who to contact     If you have questions or need follow up information about today's clinic visit or your schedule please contact [facility] CANCER CLINIC directly at [phone].  Normal or non-critical lab and imaging results will be communicated to you by Photowhoahart, letter or phone within 4 business days after the clinic has received the results. If you do not hear from us within 7 days, please contact the clinic through Photowhoahart or phone. If you have a critical or abnormal lab result, we will notify you by phone as soon as possible.  Submit refill requests through [employer] or call your pharmacy and they will forward the refill request to us. Please allow 3 business days for your refill to be completed.          Additional Information About Your Visit        MyChart Information     [employer] gives you secure access to your electronic health record. If you see a primary care provider, you can also send messages to your care team and make appointments. If you have questions, please call your primary care clinic.  If you do not have a primary care provider, please call [phone] and they will assist you.        Care EveryWhere ID     This is your Care EveryWhere ID. This could be used by other organizations to access your [place] medical records  [id number]         Blood Pressure from Last 3 Encounters:   01/12/18 112/71   12/12/17 94/59   11/13/17 97/60    Weight from Last 3 Encounters:   01/12/18 54.8 kg (120 lb 14.4 oz)   12/12/17 54.6 kg (120 lb 4.8 oz)   11/13/17 53.8 kg (118 lb 11.2 oz)              Today, you had the following     No orders found for display         Today's Medication Changes          These changes are accurate as of: 1/12/18  5:24 PM.  If you have any questions, ask your nurse or doctor.               Start taking these medicines.        Dose/Directions    abemaciclib 150 MG tablet CHEMO    Commonly known as:  VERZENIO   Used for:  Metastatic breast cancer (H), Bone metastasis (H)   Started by:  Rachel Slater RP        Dose:  150 mg   Take 1 tablet (150 mg) by mouth 2 times daily   Quantity:  60 tablet   Refills:  1       letrozole 2.5 MG tablet   Commonly known as:  FEMARA   Used for:  Metastatic breast cancer (H), Bone metastasis (H)   Started by:  Rachel Slater RPKERON        Dose:  2.5 mg   Take 1 tablet (2.5 mg) by mouth daily for 28 days   Quantity:  28 tablet   Refills:  0         These medicines have changed or have updated prescriptions.        Dose/Directions    UNABLE TO FIND   This may have changed:  Another medication with the same name was removed. Continue taking this medication, and follow the directions you see here.   Changed by:  Rafia Voss PA-C        daily Comprehensive Immunity Support - Host Defense   Refills:  0         Stop taking these medicines if you haven't already. Please contact your care team if you have questions.     CHLOROXYGEN PO   Stopped by:  Ana Whitman MD           ondansetron 8 MG tablet   Commonly known as:  ZOFRAN   Stopped by:  Ana Whitman MD           prochlorperazine 10 MG tablet   Commonly known as:  COMPAZINE   Stopped by:  Ana Whitman MD           study - palbociclib 100 mg capsule   Commonly known as:  IDS# [id number]   Stopped by:  Ana Whitman MD           study - palbociclib 125 mg capsule   Commonly known as:  ids# [id number]   Stopped by:  Ana Whitman MD           tamoxifen 20 MG tablet   Commonly known as:  NOLVADEX   Stopped by:  Ana Whitman MD                Where to get your medicines      These medications were sent to [facility] [place], [place] - [address]  [address], [facility] [address]    Hours:  TRANSPLANT PHONE NUMBER [phone] Phone:  [phone]     abemaciclib 150 MG tablet CHEMO    letrozole 2.5 MG tablet                 Primary Care Provider Office Phone # Fax #    Marilu Michelle [phone] [address]  [facility] [address]        Equal Access to Services     LUZ JAMES : Hadii aad ku hadsilvinoleonor Silvano, wacristianda luqshahab, qaybta kamary kayda anneliese, edi guajardo laAlisadahlia kolb. So [facility] [phone].    ATENCIÓN: Si habla español, tiene a gamez disposición servicios gratuitos de asistencia lingüística. Llame al [phone].    We comply with applicable federal civil rights laws and [place] laws. We do not discriminate on the basis of race, color, national origin, age, disability, sex, sexual orientation, or gender identity.            Thank you!     Thank you for choosing [facility] CANCER CLINIC  for your care. Our goal is always to provide you with excellent care. Hearing back from our patients is one way we can continue to improve our services. Please take a few minutes to complete the written survey that you may receive in the mail after your visit with us. Thank you!             Your Updated Medication List - Protect others around you: Learn how to safely use, store and throw away your medicines at www.disposemymeds.org.          This list is accurate as of: 1/12/18  5:24 PM.  Always use your most recent med list.                   Brand Name Dispense Instructions for use Diagnosis    abemaciclib 150 MG tablet CHEMO    VERZENIO    60 tablet    Take 1 tablet (150 mg) by mouth 2 times daily    Metastatic breast cancer (H), Bone metastasis (H)       FLINTSTONES MULTIVITAMIN Chew      1 tablet daily        letrozole 2.5 MG tablet    FEMARA    28 tablet    Take 1 tablet (2.5 mg) by mouth daily for 28 days    Metastatic breast cancer (H), Bone metastasis (H)       UNABLE TO FIND      daily Comprehensive Immunity Support - Host Defense        VITAMIN D (CHOLECALCIFEROL) PO      Take by mouth daily

## 2018-01-12 NOTE — PATIENT INSTRUCTIONS
Contact numbers:  Triage Main Line: [phone]  After hours: [phone]    Call with chills and/or temperature greater than or equal to 100.5 and questions or concerns.    If after hours, weekends, or holidays, call main hospital  at  [phone] and ask for Oncology doctor on call.           January 2018 Sunday Monday Tuesday Wednesday Thursday Friday Saturday        1     2     3     4     5     6       7     8     9     10     11     NM INJ   11:45 AM   (15 min.)   UUNMINJ1   [facility], Nuclear Medicine     CT CHEST/ABDOMEN/PELVIS W   12:05 PM   (20 min.)   UUCT1   [facility], [place], [place]     [facility]ON[facility] LAB DRAW    1:30 PM   (15 min.)    MASONIC LAB DRAW   [facility] Lab Draw     NM BONE SCAN WHOLE BODY    2:45 PM   (60 min.)   UUNM3   [facility], Nuclear Medicine 12     [facility] RETURN   12:45 PM   (30 min.)   Ana Whitman MD   [facility] Cancer [facility] ONC INFUSION 60    4:00 PM   (60 min.)    ONCOLOGY INFUSION   [facility] Cancer [facility] 13       14     15     16     17     18     19     20       21     22     23     24     25     26     27       28     29     30     31                               February 2018 Sunday Monday Tuesday Wednesday Thursday Friday Saturday                       1     2     3       4     5     6     7     8     9     10       11     12     13     14     15     16     17       18     19     20     21     22     23     24       25     26     27     28                                 Lab Results:  No results found for this or any previous visit (from the past 12 hour(s)).

## 2018-01-12 NOTE — MR AVS SNAPSHOT
After Visit Summary   1/12/2018    Shan Marsh    MRN: [id number]           Patient Information     Date Of Birth          1970        Visit Information        Provider Department      1/12/2018 3:23 PM Rachel Slater [facility]        Today's Diagnoses     Metastatic breast cancer (H)    -  1    Bone metastasis (H)        Encounter for long-term (current) use of medications           Follow-ups after your visit        Who to contact     If you have questions or need follow up information about today's clinic visit or your schedule please contact [facility] CANCER [facility] directly at [phone].  Normal or non-critical lab and imaging results will be communicated to you by ZoweeTVhart, letter or phone within 4 business days after the clinic has received the results. If you do not hear from us within 7 days, please contact the clinic through ZoweeTVhart or phone. If you have a critical or abnormal lab result, we will notify you by phone as soon as possible.  Submit refill requests through [employer] or call your pharmacy and they will forward the refill request to us. Please allow 3 business days for your refill to be completed.          Additional Information About Your Visit        MyChart Information     [employer] gives you secure access to your electronic health record. If you see a primary care provider, you can also send messages to your care team and make appointments. If you have questions, please call your primary care clinic.  If you do not have a primary care provider, please call [phone] and they will assist you.        Care EveryWhere ID     This is your Care EveryWhere ID. This could be used by other organizations to access your [place] medical records  [id number]         Blood Pressure from Last 3 Encounters:   01/12/18 112/71   12/12/17 94/59   11/13/17 97/60    Weight from Last 3 Encounters:   01/12/18 54.8 kg (120 lb 14.4 oz)   12/12/17  54.6 kg (120 lb 4.8 oz)   11/13/17 53.8 kg (118 lb 11.2 oz)                 Today's Medication Changes          These changes are accurate as of: 1/12/18 11:59 PM.  If you have any questions, ask your nurse or doctor.               Start taking these medicines.        Dose/Directions    abemaciclib 150 MG tablet CHEMO   Commonly known as:  VERZENIO   Used for:  Metastatic breast cancer (H), Bone metastasis (H)   Started by:  Rachel Slater RPH        Dose:  150 mg   Take 1 tablet (150 mg) by mouth 2 times daily   Quantity:  60 tablet   Refills:  1       letrozole 2.5 MG tablet   Commonly known as:  FEMARA   Used for:  Metastatic breast cancer (H), Bone metastasis (H)   Started by:  Rachel Slater RPH        Dose:  2.5 mg   Take 1 tablet (2.5 mg) by mouth daily for 28 days   Quantity:  28 tablet   Refills:  0         These medicines have changed or have updated prescriptions.        Dose/Directions    UNABLE TO FIND   This may have changed:  Another medication with the same name was removed. Continue taking this medication, and follow the directions you see here.   Changed by:  Rafia Voss PA-C        daily Comprehensive Immunity Support - Host Defense   Refills:  0         Stop taking these medicines if you haven't already. Please contact your care team if you have questions.     CHLOROXYGEN PO   Stopped by:  Ana Whitman MD           ondansetron 8 MG tablet   Commonly known as:  ZOFRAN   Stopped by:  Ana Whitman MD           prochlorperazine 10 MG tablet   Commonly known as:  COMPAZINE   Stopped by:  Ana Whitman MD           study - palbociclib 100 mg capsule   Commonly known as:  IDS# [id number]   Stopped by:  Ana Whitman MD           study - palbociclib 125 mg capsule   Commonly known as:  ids# [id number]   Stopped by:  Ana Whitman MD           tamoxifen 20 MG tablet   Commonly known as:  NOLVADEX   Stopped by:  Ana Whitman MD                Where to get your  medicines      These medications were sent to [place], [place] - [address]  [address], [facility] [address]    Hours:  TRANSPLANT PHONE NUMBER [phone] Phone:  [phone]     abemaciclib 150 MG tablet CHEMO    letrozole 2.5 MG tablet                Primary Care Provider Office Phone # Fax #    Marilu Michelle [phone]       [address]  [facility] [address]        Equal Access to Services     LUZ [facility] CenterBRANT : Hadii aad ku hadasho Soomaali, waaxda luqadaha, qaybta kaalmada adeegyada, waxay idiin hayaan adedana mustafa . So [facility] [phone].    ATENCIÓN: Si habla español, tiene a gamez disposición servicios gratuitos de asistencia lingüística. Llame al [phone].    We comply with applicable federal civil rights laws and [place] laws. We do not discriminate on the basis of race, color, national origin, age, disability, sex, sexual orientation, or gender identity.            Thank you!     Thank you for choosing [facility] CANCER CLINIC  for your care. Our goal is always to provide you with excellent care. Hearing back from our patients is one way we can continue to improve our services. Please take a few minutes to complete the written survey that you may receive in the mail after your visit with us. Thank you!             Your Updated Medication List - Protect others around you: Learn how to safely use, store and throw away your medicines at www.disposemymeds.org.          This list is accurate as of: 1/12/18 11:59 PM.  Always use your most recent med list.                   Brand Name Dispense Instructions for use Diagnosis    abemaciclib 150 MG tablet CHEMO    VERZENIO    60 tablet    Take 1 tablet (150 mg) by mouth 2 times daily    Metastatic breast cancer (H), Bone metastasis (H)       FLINTSTONES MULTIVITAMIN Chew      1 tablet daily        letrozole 2.5 MG tablet    FEMARA    28 tablet    Take 1  tablet (2.5 mg) by mouth daily for 28 days    Metastatic breast cancer (H), Bone metastasis (H)       UNABLE TO FIND      daily Comprehensive Immunity Support - Host Defense        VITAMIN D (CHOLECALCIFEROL) PO      Take by mouth daily

## 2018-01-12 NOTE — PROGRESS NOTES
Infusion Nursing Note:  Shan Marsh presents for new zoladex  Met with Dr. Whitman before infusion.    Note: pt new to infusion, reviewed zoladex indications and side effects, answered all questions for pt. Pt given print out from [facility] on zoladex.    Treatment Conditions:  Not Applicable.    Intravenous Access:  No Intravenous access/labs at this visit.    Post Infusion Assessment:  Patient tolerated injection without incident to LLQ    Discharge Plan:   Patient declined prescription refills.  Discharge instructions reviewed with: Patient.  Patient and/or family verbalized understanding of discharge instructions and all questions answered.  AVS to patient via [employer].  Patient will return in one month for next appointment (still needs to be scheduled)  Patient discharged in stable condition accompanied by: self.    Sweta Wen RN

## 2018-01-12 NOTE — LETTER
1/12/2018       RE: Shan Marsh  [address]  [facility] [address]     Dear Colleague,    Thank you for referring your patient, Shan Marsh, to the [facility]. Please see a copy of my visit note below.    HPI      ROS      Physical Exam    See note in oral oncology management    Again, thank you for allowing me to participate in the care of your patient.      Sincerely,    Rachel Slater RPH

## 2018-01-12 NOTE — PROGRESS NOTES
Research-- Pt here for C9 today. Had restaging yesterday and scans showed progression of disease. Pt disappointed and feeling mixed about how to proceed. She discussed getting a 2nd opinion but then as she thought more about it wasn't sure she wanted to do that. She is also thinking about alternative therapies. Encouraged her to get the info she needed to help her make the decision.  As of today she is now off study due to progression of the disease. Spoke with pt about needing to do some research labs and check in in approx 30 after treatment ends. Pt verbalized understanding and being ok with that. No other questions today. F/u in about a month.  Irene Reed RN   [phone]

## 2018-01-16 NOTE — PROGRESS NOTES
January 16, 2018        ONCOLOGY VISIT NOTE      REASON FOR VISIT:  I am seeing Ms. Marsh in followup for her stage 4, metastatic ER + breast cancer with metastases to the axilla and hilar lymph nodes.  She is currently on the [employer] protocol using tamoxifen and palbociclib.     Briefly in history, she has a history of a T1a N0 M0, ER-positive, NE-positive, and HER2-negative breast cancer.  She originally met me in 12/2011 with a coin-sized mass involving her left breast.  This mass got bigger, and by 05/2012 she was referred for a mammogram, ultrasound and breast MRI.  This revealed heterogeneously dense breast tissue with an area of suspicious microcalcifications measuring 5 x 2.6 cm involving the left breast.  MRI showed non-mass-like enhancement in the upper outer quadrant extending from about 1-3 o'clock, measuring about 6 cm.  Stereotactic needle biopsy revealed DCIS high-grade, which was suspicious for microinvasion.  She was seen by Dr. Misael Cheng, and underwent bilateral mastectomies with sentinel lymph node biopsy on 06/11/2012.  Her final pathology revealed grade 1 infiltrating ductal carcinoma, ER-positive, NE-positive an d HER2-negative.  She had multiple microscopic areas of invasion, each measuring 1-2 mm.  Her pathology was reviewed at our Multidisciplinary Conference, and confirmed multiple areas of microinvasion.  Her final staging was a T1a N0 M0 stage I breast cancer.  Pelham lymph node biopsy was negative.  Her Oncotype score was 23, giving her a risk of distant recurrence at 10 years of 15%, assuming she took tamoxifen.  She started on tamoxifen in 08/2012.  This was discontinued in 02/2013 due to complaints of vaginal discharge.  She also had complained of some palpitations at that time and saw Cardiology.  This workup was negative.  She does have a type 1 AV block felt to be benign from her endurance as an athlete.      Shan presented to see me on 05/10/2017, at which time  "she had noticed fullness and pain involving her left shoulder with numbness down involving her left upper extremity.  She was found to have left axillary adenopathy, and was sent for a PET/CT scan revealing on MRI of the shoulder that she had a 4 x 1.8 x 1.5 cm lesion close to the subcutaneous tissue with close proximity to the distal clavicle.  She was subsequently sent for a PET/CT scan, which confirmed multiple PET-avid left axillary lymph nodes, as well as hilar lymph nodes that were suspicious for distant metastatic disease.  There were no suspicious lesions in the liver.  No suspicious lesions in the bones.  She has a known 3.2 cm hemangioma in the left lobe of the liver.  Biopsies confirmed estrogen positive metastatic breast cancer with lung involvement.     She enrolled on the BIG10 trial using palbociclib and tamoxifen.   Shan returns today for followup.  She currently remains on tamoxifen and palbociclib without any current side effects.       She reports occasional hot flashes.  She says she has good energy.  She had a good holiday.  She feels overall like she is doing well.  Her left axillary lymph nodes have been resolved in her opinion.        She has no fevers or chills, no chest pain or shortness of breath.  She has had a cough with scant sputum production.  She has no nausea, vomiting, diarrhea or constipation.  She does report that she needs to eat small meals in order to hennessy off nausea, but this is very minimal.         Her menses stopped shortly after starting tamoxifen.  She has no vaginal bleeding or discharge and no sexual dysfunction.      Her 10-point review of systems is otherwise negative.         PHYSICAL EXAMINATION: ECOG 0  /71 (BP Location: Left arm, Patient Position: Sitting, Cuff Size: Adult Regular)  Pulse 55  Temp 97.1  F (36.2  C) (Oral)  Ht 1.626 m (5' 4\")  Wt 54.8 kg (120 lb 14.4 oz)  SpO2 99%  BMI 20.75 kg/m2  Gen: well appearing, NAD  Heent: no icterus, o/p " clear without ulcers or lesions  CV: rrr, nl S1S2  Lungs: clear  Abd: soft, nt, nd + bs  Ext: no edema  Breast: s/p bilateral mastectomy with reconstruction, small 1 cm left axillary lymph node  No rashes on skin        Lab Results   Component Value Date    WBC 4.7 01/11/2018     Lab Results   Component Value Date    RBC 3.28 01/11/2018     Lab Results   Component Value Date    HGB 12.1 01/11/2018     Lab Results   Component Value Date    HCT 35.4 01/11/2018     No components found for: MCT  Lab Results   Component Value Date     01/11/2018     Lab Results   Component Value Date    MCH 36.9 01/11/2018     Lab Results   Component Value Date    MCHC 34.2 01/11/2018     Lab Results   Component Value Date    RDW 11.5 01/11/2018     Lab Results   Component Value Date     01/11/2018       Recent Labs   Lab Test  01/11/18   1341  12/12/17   1420   NA  139  140   POTASSIUM  3.6  4.2   CHLORIDE  105  106   CO2  28  28   ANIONGAP  6  6   GLC  108*  98   BUN  12  13   CR  0.62  0.54   KRYSTEN  8.4*  8.5     Liver Function Studies -   Recent Labs   Lab Test  01/11/18   1341   PROTTOTAL  7.3   ALBUMIN  3.2*   BILITOTAL  0.3   ALKPHOS  52   AST  32   ALT  19          ASSESSMENT AND PLAN:  This is a 47-year-old female with a history of a T1a N0, ER-positive, OK-positive and HER2-negative left breast cancer treated with bilateral mastectomies 5 years ago, who now has metastatic disease involving the left axilla, as well as left hilar lymph node.  On biopsy, these are strongly ER-positive, OK is 40%, and HER2 by FISH was negative with a ratio of 1.5.  She is on the BIG10 trial using palbociclib and tamoxifen.      I reviewed Shan's imaging with her that unfortunately shows progressive disease with new bony lesions in the ribs and T spine, possibly lungs.  I recommended stopping the study.      We discussed treatment options including ovarian suppression, AI vs faslodex and abemiciclib.  Side effects were discussed.  We  discussed the side effects of chemotherapy including myelosuppression, nausea/vomiting/diarrhea/constipation, hair loss, neuropathy, fertility complications, dehydration, cardiomyopathy, etc.  The patient will be receiving chemotherapy teaching through my nurse coordinator with handouts describing all of the side effects again.  Consent for chemotherapy was obtained.    I also discussed with her that I would like her to start Zometa for bone mets.  Side effects discussed.      Will start today.      Pt inquired about a second opinion; I encouraged her to do this.         CAMELIA GRISSOM MD

## 2018-01-17 ENCOUNTER — TELEPHONE (OUTPATIENT)
Dept: ONCOLOGY | Facility: CLINIC | Age: 48
End: 2018-01-17

## 2018-01-17 NOTE — TELEPHONE ENCOUNTER
----- Message from Ana Whitman MD sent at 1/17/2018  4:24 PM CST -----  Regarding: RE: Starting oral chemo  Contact: [phone]  She can start letrozole.  She had her zoladex shot in clinic.    ----- Message -----     From: Temo Brown RN     Sent: 1/16/2018   4:31 PM       To: Ana Whitman MD, Sharifa Celeste RN  Subject: Starting oral chemo                              Pt called stating insurance is pending on her Verzenio (abemaciclib) but her Letrozole is ready for . She wonders if she is supposed to wait and start both at the same time or ok to start Letrozole now.    Thank You,    Joann Davila RN  [phone]

## 2018-01-19 ENCOUNTER — TELEPHONE (OUTPATIENT)
Dept: ONCOLOGY | Facility: CLINIC | Age: 48
End: 2018-01-19

## 2018-01-19 NOTE — TELEPHONE ENCOUNTER
[facility] Telephone Triage Note    Assessment: Patient reported to clinic lobby reporting the following symptoms: cough  occasional, slightly productive and shortness of breath with exertion.  She also reports pain in her right ribs/lung along the middle in the back.    Recommendations: Discussed with Dr. Whitman.  Provided home care advice- alternate Tylenol/Ibuprofen, monitor for fever.  Move next weeks appt from Friday up to Monday.     Follow-Up: Message sent to schedulers to add pt on to schedule, Informed patient of appointment times, Instructed patient to seek care immediately for worsening symptoms, including: fever, chest pain, shortness of breath, dizziness. Patient voiced understanding of advice and/or instructions given.

## 2018-01-19 NOTE — ORAL ONC MGMT
Oral Chemotherapy Monitoring Program     Placed call to patient in follow up of Verzenio therapy.     Left message to please call back in follow-up of therapy. Messages left at approximately 3PM on 1/18/2018 and 8 AM on 1/19/2018. No patient or drug names were mentioned.     Robbie OrtegaD  [facility]  [phone]  January 19, 2018

## 2018-01-22 ENCOUNTER — CARE COORDINATION (OUTPATIENT)
Dept: ONCOLOGY | Facility: CLINIC | Age: 48
End: 2018-01-22

## 2018-01-22 ENCOUNTER — ONCOLOGY VISIT (OUTPATIENT)
Dept: ONCOLOGY | Facility: CLINIC | Age: 48
End: 2018-01-22
Attending: PHYSICIAN ASSISTANT
Payer: COMMERCIAL

## 2018-01-22 VITALS
HEART RATE: 70 BPM | WEIGHT: 118.4 LBS | DIASTOLIC BLOOD PRESSURE: 72 MMHG | BODY MASS INDEX: 20.22 KG/M2 | TEMPERATURE: 98.7 F | RESPIRATION RATE: 18 BRPM | OXYGEN SATURATION: 99 % | HEIGHT: 64 IN | SYSTOLIC BLOOD PRESSURE: 114 MMHG

## 2018-01-22 DIAGNOSIS — Z79.899 ENCOUNTER FOR LONG-TERM (CURRENT) USE OF MEDICATIONS: ICD-10-CM

## 2018-01-22 DIAGNOSIS — C50.919 METASTATIC BREAST CANCER: ICD-10-CM

## 2018-01-22 DIAGNOSIS — C79.51 BONE METASTASIS: ICD-10-CM

## 2018-01-22 DIAGNOSIS — R05.9 COUGH: Primary | ICD-10-CM

## 2018-01-22 LAB
ALBUMIN SERPL-MCNC: 3.4 G/DL (ref 3.4–5)
ALP SERPL-CCNC: 57 U/L (ref 40–150)
ALT SERPL W P-5'-P-CCNC: 21 U/L (ref 0–50)
ANION GAP SERPL CALCULATED.3IONS-SCNC: 4 MMOL/L (ref 3–14)
AST SERPL W P-5'-P-CCNC: 32 U/L (ref 0–45)
BASOPHILS # BLD AUTO: 0 10E9/L (ref 0–0.2)
BASOPHILS NFR BLD AUTO: 0.7 %
BILIRUB SERPL-MCNC: 0.3 MG/DL (ref 0.2–1.3)
BUN SERPL-MCNC: 7 MG/DL (ref 7–30)
CALCIUM SERPL-MCNC: 8.8 MG/DL (ref 8.5–10.1)
CHLORIDE SERPL-SCNC: 105 MMOL/L (ref 94–109)
CO2 SERPL-SCNC: 28 MMOL/L (ref 20–32)
CREAT SERPL-MCNC: 0.74 MG/DL (ref 0.52–1.04)
DIFFERENTIAL METHOD BLD: ABNORMAL
EOSINOPHIL # BLD AUTO: 0.1 10E9/L (ref 0–0.7)
EOSINOPHIL NFR BLD AUTO: 1 %
ERYTHROCYTE [DISTWIDTH] IN BLOOD BY AUTOMATED COUNT: 11.2 % (ref 10–15)
GFR SERPL CREATININE-BSD FRML MDRD: 84 ML/MIN/1.7M2
GLUCOSE SERPL-MCNC: 93 MG/DL (ref 70–99)
HCT VFR BLD AUTO: 38.4 % (ref 35–47)
HGB BLD-MCNC: 13.2 G/DL (ref 11.7–15.7)
IMM GRANULOCYTES # BLD: 0 10E9/L (ref 0–0.4)
IMM GRANULOCYTES NFR BLD: 0.3 %
LYMPHOCYTES # BLD AUTO: 1.4 10E9/L (ref 0.8–5.3)
LYMPHOCYTES NFR BLD AUTO: 23.4 %
MCH RBC QN AUTO: 36 PG (ref 26.5–33)
MCHC RBC AUTO-ENTMCNC: 34.4 G/DL (ref 31.5–36.5)
MCV RBC AUTO: 105 FL (ref 78–100)
MONOCYTES # BLD AUTO: 0.3 10E9/L (ref 0–1.3)
MONOCYTES NFR BLD AUTO: 5.7 %
NEUTROPHILS # BLD AUTO: 4.1 10E9/L (ref 1.6–8.3)
NEUTROPHILS NFR BLD AUTO: 68.9 %
NRBC # BLD AUTO: 0 10*3/UL
NRBC BLD AUTO-RTO: 0 /100
PLATELET # BLD AUTO: 279 10E9/L (ref 150–450)
POTASSIUM SERPL-SCNC: 4.4 MMOL/L (ref 3.4–5.3)
PROT SERPL-MCNC: 7.9 G/DL (ref 6.8–8.8)
RBC # BLD AUTO: 3.67 10E12/L (ref 3.8–5.2)
SODIUM SERPL-SCNC: 137 MMOL/L (ref 133–144)
WBC # BLD AUTO: 5.9 10E9/L (ref 4–11)

## 2018-01-22 PROCEDURE — 80053 COMPREHEN METABOLIC PANEL: CPT | Performed by: INTERNAL MEDICINE

## 2018-01-22 PROCEDURE — 99214 OFFICE O/P EST MOD 30 MIN: CPT | Mod: ZP | Performed by: PHYSICIAN ASSISTANT

## 2018-01-22 PROCEDURE — 36415 COLL VENOUS BLD VENIPUNCTURE: CPT

## 2018-01-22 PROCEDURE — 85025 COMPLETE CBC W/AUTO DIFF WBC: CPT | Performed by: INTERNAL MEDICINE

## 2018-01-22 PROCEDURE — G0463 HOSPITAL OUTPT CLINIC VISIT: HCPCS | Mod: ZF

## 2018-01-22 RX ORDER — CALCIUM CARBONATE 500(1250)
1 TABLET ORAL DAILY
COMMUNITY
End: 2019-01-01

## 2018-01-22 RX ORDER — PROCHLORPERAZINE MALEATE 10 MG
TABLET ORAL
COMMUNITY
Start: 2017-05-24 | End: 2019-01-01

## 2018-01-22 RX ORDER — ONDANSETRON 8 MG/1
TABLET, FILM COATED ORAL
COMMUNITY
Start: 2017-05-24 | End: 2018-05-07

## 2018-01-22 ASSESSMENT — PAIN SCALES - GENERAL: PAINLEVEL: NO PAIN (0)

## 2018-01-22 NOTE — NURSING NOTE
"Oncology Rooming Note    January 22, 2018 1:11 PM   Shan Marsh is a 47 year old female who presents for:    Chief Complaint   Patient presents with     Lab Only     Pt here for venipuncture blood draw, vitals completed by MA. Right antecub.     Oncology Clinic Visit     Follow up-Breast CA     Initial Vitals: /72 (BP Location: Left arm, Patient Position: Sitting, Cuff Size: Adult Regular)  Pulse 70  Temp 98.7  F (37.1  C) (Oral)  Resp 18  Ht 1.626 m (5' 4\")  Wt 53.7 kg (118 lb 6.4 oz)  SpO2 99%  BMI 20.32 kg/m2 Estimated body mass index is 20.32 kg/(m^2) as calculated from the following:    Height as of this encounter: 1.626 m (5' 4\").    Weight as of this encounter: 53.7 kg (118 lb 6.4 oz). Body surface area is 1.56 meters squared.  No Pain (0) Comment: Data Unavailable   No LMP recorded. Patient is not currently having periods (Reason: UNKNOWN).  Allergies reviewed: Yes  Medications reviewed: Yes    Medications: Medication refills not needed today.  Pharmacy name entered into Grabbed:    [facility] [place], [place] - [address] [place] [address] DRUG STORE [address] [place] [address] [address] [place] [address] AVE AT [facility] [address]  [facility] [place], [place] - [address] [facility] [id number] - [place], [place] - [address] AT [facility] [address]    Clinical concerns: Trouble Breathing, Lumps on Left side of Collar Bone Karena Cox was notified.    10 minutes for nursing intake (face to face time)     Swetha Silva LPN              "

## 2018-01-22 NOTE — MR AVS SNAPSHOT
After Visit Summary   1/22/2018    Shan Marsh    MRN: [id number]           Patient Information     Date Of Birth          1970        Visit Information        Provider Department      1/22/2018 1:10 PM Karena Cox PA [facility]        Today's Diagnoses     Cough    -  1    Metastatic breast cancer (H)           Follow-ups after your visit        Your next 10 appointments already scheduled     Feb 09, 2018  1:00 PM CST   [facility] Lab Draw with  [facility] DRAW   [facility] ([facility])    [address] [place] [address]  [address]  [facility] [address]   [phone]            Feb 09, 2018  1:40 PM CST   (Arrive by 1:25 PM)   Return Visit with Lima Grififths PA-C   [facility] ([facility])    [address] [place] [address]  [address]  [facility] [address]   [phone]            Feb 09, 2018  2:30 PM CST   Infusion 60 with [facility] ONCOLOGY INFUSION, UC 22 ATC   [facility] ([facility])    [address] [place] [address]  [address] [place] [address]   [phone]            Mar 05, 2018  9:00 AM CST   [facility] Lab Draw with [facility] DRAW   [facility] ([facility])    [address] [place] [address]  [address] [place] [address]   [phone]            Mar 05, 2018  9:30 AM CST   (Arrive by 9:15 AM)   Return Visit with Ana Whitman MD   [facility] ([facility])    [address] [place] [address]  [address] [place] [id number] [phone]              Who to contact     If you have questions or need follow up information about today's clinic visit or your schedule please contact [facility] directly at [phone].  Normal or non-critical lab and imaging results will be communicated to you by MyChart, letter or phone within 4  "business days after the clinic has received the results. If you do not hear from us within 7 days, please contact the clinic through [employer] or phone. If you have a critical or abnormal lab result, we will notify you by phone as soon as possible.  Submit refill requests through [employer] or call your pharmacy and they will forward the refill request to us. Please allow 3 business days for your refill to be completed.          Additional Information About Your Visit        [employer] Information     [employer] gives you secure access to your electronic health record. If you see a primary care provider, you can also send messages to your care team and make appointments. If you have questions, please call your primary care clinic.  If you do not have a primary care provider, please call [phone] and they will assist you.        Care EveryWhere ID     This is your Care EveryWhere ID. This could be used by other organizations to access your [place] medical records  [id number]        Your Vitals Were     Pulse Temperature Respirations Height Pulse Oximetry BMI (Body Mass Index)    70 98.7  F (37.1  C) (Oral) 18 1.626 m (5' 4\") 99% 20.32 kg/m2       Blood Pressure from Last 3 Encounters:   01/22/18 114/72   01/12/18 112/71   12/12/17 94/59    Weight from Last 3 Encounters:   01/22/18 53.7 kg (118 lb 6.4 oz)   01/12/18 54.8 kg (120 lb 14.4 oz)   12/12/17 54.6 kg (120 lb 4.8 oz)              Today, you had the following     No orders found for display         Today's Medication Changes          These changes are accurate as of: 1/22/18  2:53 PM.  If you have any questions, ask your nurse or doctor.               Stop taking these medicines if you haven't already. Please contact your care team if you have questions.     [facility] MULTIVITAMIN Chew   Stopped by:  Karena Cox PA                    Primary Care Provider Office Phone # Fax #    Marilu Michelle [phone] [address]  [facility] [address]        Equal Access to Services     SADIA JAMES : Hadii aad ku hadsilvinoleonor Danyelljailyn, wacristianda tenavalarieha, qaybta kaalmaedi ware. So [facility] [phone].    ATENCIÓN: Si habla español, tiene a gamez disposición servicios gratuitos de asistencia lingüística. Gabriella al [phone].    We comply with applicable federal civil rights laws and [place] laws. We do not discriminate on the basis of race, color, national origin, age, disability, sex, sexual orientation, or gender identity.            Thank you!     Thank you for choosing [facility]  for your care. Our goal is always to provide you with excellent care. Hearing back from our patients is one way we can continue to improve our services. Please take a few minutes to complete the written survey that you may receive in the mail after your visit with us. Thank you!             Your Updated Medication List - Protect others around you: Learn how to safely use, store and throw away your medicines at www.disposemymeds.org.          This list is accurate as of: 1/22/18  2:53 PM.  Always use your most recent med list.                   Brand Name Dispense Instructions for use Diagnosis    abemaciclib 150 MG tablet CHEMO    VERZENIO    60 tablet    Take 1 tablet (150 mg) by mouth 2 times daily    Metastatic breast cancer (H), Bone metastasis (H)       calcium carbonate 1250 MG tablet    OS-KRYSTEN 500 mg [place]. Ca     Take 1 tablet by mouth daily        letrozole 2.5 MG tablet    FEMARA    28 tablet    Take 1 tablet (2.5 mg) by mouth daily for 28 days    Metastatic breast cancer (H), Bone metastasis (H)       ondansetron 8 MG tablet    ZOFRAN          prochlorperazine 10 MG tablet    COMPAZINE          UNABLE TO FIND      daily Comprehensive Immunity Support - Host Defense        VITAMIN D (CHOLECALCIFEROL) PO      Take by mouth daily

## 2018-01-22 NOTE — PROGRESS NOTES
Faxed BETSY to HIM for patient's request for records to be sent to [facility]. Sharifa Celeste RN, BSN

## 2018-01-22 NOTE — LETTER
1/22/2018       RE: Shan Marsh  [address]  [facility] [id number]     Dear Colleague,    Thank you for referring your patient, Shan Marsh, to the [facility]. Please see a copy of my visit note below.    Oncology/Hematology Visit Note  Jan 22, 2018    Reason for Visit: follow up of     History of Present Illness: Shan Marsh is a 47 year old without significant past medical history with recent diagnosis of metastatic breast cancer after presenting with left shoulder pain. Shoulder MRI showed soft tissue mass close to distal clavicle concerning for malignancy. She had PET/CT 5/9/17 showing hypermetabolic left axillary, supraclavicular, mediastinal and hilar nodes. She had biopsies of left axilla and hilar nodes showing metastatic breast cancer, strongly ER positive, moderately NC positive and HER-2 kaitlin non-amplified. She originally had [id number], ER/NC positive, HER-2 negative breast cancer diagnosed in spring of 2012 s/p bilateral mastectomies and Tamoxifen from 8/2012-2/2013 and discontinued due to side effects. She met with Dr. Whitman on 5/16/17 who recommended treatment on BIG10 clinical trial with palbociclib and tamoxifen. She started treatment on 5/24/17. She had a dose reduction of palbociclib due to neutropenia. CT CAP and Bone scan on 1/11/18 with progressive disease--new bony lesions in the ribs, T spine and possibly liver, lungs. She was taken off the BIG10 trial and started on Zoladex and abemiciclib on 1/12/18.    Interval History:  Patient sates she has a cough that mostly occurs when she is talking a lot or with exertion. She feels mild dyspnea when going up stairs. The cough started a couple weeks ago--she is not sure but perhaps prior to her recent CT 11 days ago. She states it is mostly non-productive. Denies hemoptysis. She felt it was worse last week, but better this week. She does not cough during this visit. She also states she does  "not cough at night. She had some pain in lower back associated with the cough, and feels some mild pleuritic pain when she inhales deeply, but not with normal breathing. She denies any recent fever, chills, sore throat, nasal congestion, headaches, bodyaches. She had a URI over the holidays at the end of December, but has otherwise been well. She denies any back pain currently.     She started abemiciclib on 1/19. She has had some anorexia and \"stomachache\" on the medication, but denies nausea. She has loose stools about 3-4 times daily. Has not noticed any other changes. She has noticed some left supraclavicular lymph nodes.       Current Outpatient Prescriptions   Medication Sig Dispense Refill     calcium carbonate (OS-KRYSTEN 500 MG [place]. [place]) 1250 MG tablet Take 1 tablet by mouth daily       abemaciclib (VERZENIO) 150 MG tablet CHEMO Take 1 tablet (150 mg) by mouth 2 times daily 60 tablet 1     letrozole (FEMARA) 2.5 MG tablet Take 1 tablet (2.5 mg) by mouth daily for 28 days 28 tablet 0     UNABLE TO FIND daily Comprehensive Immunity Support - Host Defense       VITAMIN D, CHOLECALCIFEROL, PO Take by mouth daily       ondansetron (ZOFRAN) 8 MG tablet        prochlorperazine (COMPAZINE) 10 MG tablet        [DISCONTINUED] tamoxifen (NOLVADEX) 20 MG tablet Take 1 tablet (20 mg) by mouth daily 28 tablet 0       Physical Examination:  General: The patient is a pleasant female in no acute distress.  /72 (BP Location: Left arm, Patient Position: Sitting, Cuff Size: Adult Regular)  Pulse 70  Temp 98.7  F (37.1  C) (Oral)  Resp 18  Ht 1.626 m (5' 4\")  Wt 53.7 kg (118 lb 6.4 oz)  SpO2 99%  BMI 20.32 kg/m2  Wt Readings from Last 10 Encounters:   01/22/18 53.7 kg (118 lb 6.4 oz)   01/12/18 54.8 kg (120 lb 14.4 oz)   12/12/17 54.6 kg (120 lb 4.8 oz)   11/13/17 53.8 kg (118 lb 11.2 oz)   11/03/17 55.1 kg (121 lb 8 oz)   10/17/17 54.1 kg (119 lb 3.2 oz)   10/02/17 54.1 kg (119 lb 4.8 oz)   09/19/17 54.4 kg (119 lb " 14.4 oz)   08/21/17 54 kg (119 lb 1.6 oz)   07/24/17 53.8 kg (118 lb 9.6 oz)     HEENT: EOMI, PERRL. Sclerae are anicteric. Oral mucosa is pink and moist with no lesions or thrush.   Lymph: No palpable cervical ARTHUR. 2 small subcentimeter lymph nodes in left supraclavicular area. No palpable axillary lymphadenopathy.   Heart: Regular rate and rhythm.   Lungs: Breathing comfortably on room air. No cough during exam. Decreased BS at left base. Otherwise clear to auscultation bilaterally.   Abdomen: Bowel sounds present, soft, nontender with no palpable hepatosplenomegaly or masses.   Extremities: No lower extremity edema noted bilaterally.   Neuro: Cranial nerves II through XII are grossly intact. Grossly non-focal  MSK: No TTP of spine  Skin: No rashes, petechiae, or bruising noted on exposed skin.    Laboratory Data:  Results for orders placed or performed in visit on 01/22/18 (from the past 24 hour(s))   Comprehensive metabolic panel (BMP + Alb, Alk Phos, ALT, AST, Total. Bili, TP)   Result Value Ref Range    Sodium 137 133 - 144 mmol/L    Potassium 4.4 3.4 - 5.3 mmol/L    Chloride 105 94 - 109 mmol/L    Carbon Dioxide 28 20 - 32 mmol/L    Anion Gap 4 3 - 14 mmol/L    Glucose 93 70 - 99 mg/dL    Urea Nitrogen 7 7 - 30 mg/dL    Creatinine 0.74 0.52 - 1.04 mg/dL    GFR Estimate 84 >60 mL/min/1.7m2    GFR Estimate If Black >90 >60 mL/min/1.7m2    Calcium 8.8 8.5 - 10.1 mg/dL    Bilirubin Total 0.3 0.2 - 1.3 mg/dL    Albumin 3.4 3.4 - 5.0 g/dL    Protein Total 7.9 6.8 - 8.8 g/dL    Alkaline Phosphatase 57 40 - 150 U/L    ALT 21 0 - 50 U/L    AST 32 0 - 45 U/L   CBC with platelets and differential   Result Value Ref Range    WBC 5.9 4.0 - 11.0 10e9/L    RBC Count 3.67 (L) 3.8 - 5.2 10e12/L    Hemoglobin 13.2 11.7 - 15.7 g/dL    Hematocrit 38.4 35.0 - 47.0 %     (H) 78 - 100 fl    MCH 36.0 (H) 26.5 - 33.0 pg    MCHC 34.4 31.5 - 36.5 g/dL    RDW 11.2 10.0 - 15.0 %    Platelet Count 279 150 - 450 10e9/L    Diff  Method Automated Method     % Neutrophils 68.9 %    % Lymphocytes 23.4 %    % Monocytes 5.7 %    % Eosinophils 1.0 %    % Basophils 0.7 %    % Immature Granulocytes 0.3 %    Nucleated RBCs 0 0 /100    Absolute Neutrophil 4.1 1.6 - 8.3 10e9/L    Absolute Lymphocytes 1.4 0.8 - 5.3 10e9/L    Absolute Monocytes 0.3 0.0 - 1.3 10e9/L    Absolute Eosinophils 0.1 0.0 - 0.7 10e9/L    Absolute Basophils 0.0 0.0 - 0.2 10e9/L    Abs Immature Granulocytes 0.0 0 - 0.4 10e9/L    Absolute Nucleated RBC 0.0          Assessment and Plan:    Metastatic breast CA: history of a T1 N0, ER/OH positive, HER2-negative left breast cancer treated with bilateral mastectomies in 2012, now with metastatic disease involving the left axilla, as well as left hilar lymph node, ER/OH positive, HER2 negative. Started on BIG 10 trial in May 2017 but removed on 1/12/18 due to disease progression on imaging. CT CAP and Bone scan on 1/11/18 with progressive disease--new bony lesions in the ribs, T spine and possibly liver, lungs. Treatment changed to Zoladex and abeciclimib. She started Zoladex on 1/12/18. She started abeciclimib on 1/19/18. Tolerating well aside from some mild GI symptoms.  --Follow up with Lima Griffiths PA-C on 2/9/18 with labs, infusion (Zoladex, Zometa)  --Follow up with Dr. Whitman on 3/5/18    Bone mets: Denies any pain. She states she saw her dentist at the beginning of January and had no cavities or dental work needed.  --Plan for Zometa next visit on 2/9.    Cough, dyspnea: She has had a cough with scant sputum production for the past few weeks. She felt it was worse last week, but improved this week. She feels it is triggered by exertion and by speaking. No observed cough during this visit, even during exam. CT CAP from 1/11 with increase in nodular and consolidative/groundglass changes of both lungs, suspicious for metastasis vs infectious/inflammatory etiologies.  Afebrile, with normal WBC. She is saturating 99% on room air  and 98% with ambulation. She declined trial of Robitussin AC, as she feels cough is improving. Other possible etiology would be PE given her description of pleuritic pain, but she is neither tachypneic nor hypoxic and had a CT less than 2 weeks ago.  --Continue to monitor. Recommended to call or RTC if worsening cough, dyspnea, chest pain, fever, chills, hemoptysis or other concerning symptoms.      Karena Cox PA-C  [facility]  [address] [place] [address] [phone]

## 2018-01-22 NOTE — PROGRESS NOTES
Oncology/Hematology Visit Note  Jan 22, 2018    Reason for Visit: follow up of     History of Present Illness: Shan Marsh is a 47 year old without significant past medical history with recent diagnosis of metastatic breast cancer after presenting with left shoulder pain. Shoulder MRI showed soft tissue mass close to distal clavicle concerning for malignancy. She had PET/CT 5/9/17 showing hypermetabolic left axillary, supraclavicular, mediastinal and hilar nodes. She had biopsies of left axilla and hilar nodes showing metastatic breast cancer, strongly ER positive, moderately AR positive and HER-2 kaitlin non-amplified. She originally had [id number], ER/AR positive, HER-2 negative breast cancer diagnosed in spring of 2012 s/p bilateral mastectomies and Tamoxifen from 8/2012-2/2013 and discontinued due to side effects. She met with Dr. Whitman on 5/16/17 who recommended treatment on BIG10 clinical trial with palbociclib and tamoxifen. She started treatment on 5/24/17. She had a dose reduction of palbociclib due to neutropenia. CT CAP and Bone scan on 1/11/18 with progressive disease--new bony lesions in the ribs, T spine and possibly liver, lungs. She was taken off the BIG10 trial and started on Zoladex and abemiciclib on 1/12/18.    Interval History:  Patient sates she has a cough that mostly occurs when she is talking a lot or with exertion. She feels mild dyspnea when going up stairs. The cough started a couple weeks ago--she is not sure but perhaps prior to her recent CT 11 days ago. She states it is mostly non-productive. Denies hemoptysis. She felt it was worse last week, but better this week. She does not cough during this visit. She also states she does not cough at night. She had some pain in lower back associated with the cough, and feels some mild pleuritic pain when she inhales deeply, but not with normal breathing. She denies any recent fever, chills, sore throat, nasal congestion, headaches,  "bodyaches. She had a URI over the holidays at the end of December, but has otherwise been well. She denies any back pain currently.     She started abemiciclib on 1/19. She has had some anorexia and \"stomachache\" on the medication, but denies nausea. She has loose stools about 3-4 times daily. Has not noticed any other changes. She has noticed some left supraclavicular lymph nodes.       Current Outpatient Prescriptions   Medication Sig Dispense Refill     calcium carbonate (OS-KRYSTEN 500 MG [place]. [place]) 1250 MG tablet Take 1 tablet by mouth daily       abemaciclib (VERZENIO) 150 MG tablet CHEMO Take 1 tablet (150 mg) by mouth 2 times daily 60 tablet 1     letrozole (FEMARA) 2.5 MG tablet Take 1 tablet (2.5 mg) by mouth daily for 28 days 28 tablet 0     UNABLE TO FIND daily Comprehensive Immunity Support - Host Defense       VITAMIN D, CHOLECALCIFEROL, PO Take by mouth daily       ondansetron (ZOFRAN) 8 MG tablet        prochlorperazine (COMPAZINE) 10 MG tablet        [DISCONTINUED] tamoxifen (NOLVADEX) 20 MG tablet Take 1 tablet (20 mg) by mouth daily 28 tablet 0       Physical Examination:  General: The patient is a pleasant female in no acute distress.  /72 (BP Location: Left arm, Patient Position: Sitting, Cuff Size: Adult Regular)  Pulse 70  Temp 98.7  F (37.1  C) (Oral)  Resp 18  Ht 1.626 m (5' 4\")  Wt 53.7 kg (118 lb 6.4 oz)  SpO2 99%  BMI 20.32 kg/m2  Wt Readings from Last 10 Encounters:   01/22/18 53.7 kg (118 lb 6.4 oz)   01/12/18 54.8 kg (120 lb 14.4 oz)   12/12/17 54.6 kg (120 lb 4.8 oz)   11/13/17 53.8 kg (118 lb 11.2 oz)   11/03/17 55.1 kg (121 lb 8 oz)   10/17/17 54.1 kg (119 lb 3.2 oz)   10/02/17 54.1 kg (119 lb 4.8 oz)   09/19/17 54.4 kg (119 lb 14.4 oz)   08/21/17 54 kg (119 lb 1.6 oz)   07/24/17 53.8 kg (118 lb 9.6 oz)     HEENT: EOMI, PERRL. Sclerae are anicteric. Oral mucosa is pink and moist with no lesions or thrush.   Lymph: No palpable cervical ARTHUR. 2 small subcentimeter lymph nodes " in left supraclavicular area. No palpable axillary lymphadenopathy.   Heart: Regular rate and rhythm.   Lungs: Breathing comfortably on room air. No cough during exam. Decreased BS at left base. Otherwise clear to auscultation bilaterally.   Abdomen: Bowel sounds present, soft, nontender with no palpable hepatosplenomegaly or masses.   Extremities: No lower extremity edema noted bilaterally.   Neuro: Cranial nerves II through XII are grossly intact. Grossly non-focal  MSK: No TTP of spine  Skin: No rashes, petechiae, or bruising noted on exposed skin.    Laboratory Data:  Results for orders placed or performed in visit on 01/22/18 (from the past 24 hour(s))   Comprehensive metabolic panel (BMP + Alb, Alk Phos, ALT, AST, Total. Bili, TP)   Result Value Ref Range    Sodium 137 133 - 144 mmol/L    Potassium 4.4 3.4 - 5.3 mmol/L    Chloride 105 94 - 109 mmol/L    Carbon Dioxide 28 20 - 32 mmol/L    Anion Gap 4 3 - 14 mmol/L    Glucose 93 70 - 99 mg/dL    Urea Nitrogen 7 7 - 30 mg/dL    Creatinine 0.74 0.52 - 1.04 mg/dL    GFR Estimate 84 >60 mL/min/1.7m2    GFR Estimate If Black >90 >60 mL/min/1.7m2    Calcium 8.8 8.5 - 10.1 mg/dL    Bilirubin Total 0.3 0.2 - 1.3 mg/dL    Albumin 3.4 3.4 - 5.0 g/dL    Protein Total 7.9 6.8 - 8.8 g/dL    Alkaline Phosphatase 57 40 - 150 U/L    ALT 21 0 - 50 U/L    AST 32 0 - 45 U/L   CBC with platelets and differential   Result Value Ref Range    WBC 5.9 4.0 - 11.0 10e9/L    RBC Count 3.67 (L) 3.8 - 5.2 10e12/L    Hemoglobin 13.2 11.7 - 15.7 g/dL    Hematocrit 38.4 35.0 - 47.0 %     (H) 78 - 100 fl    MCH 36.0 (H) 26.5 - 33.0 pg    MCHC 34.4 31.5 - 36.5 g/dL    RDW 11.2 10.0 - 15.0 %    Platelet Count 279 150 - 450 10e9/L    Diff Method Automated Method     % Neutrophils 68.9 %    % Lymphocytes 23.4 %    % Monocytes 5.7 %    % Eosinophils 1.0 %    % Basophils 0.7 %    % Immature Granulocytes 0.3 %    Nucleated RBCs 0 0 /100    Absolute Neutrophil 4.1 1.6 - 8.3 10e9/L    Absolute  Lymphocytes 1.4 0.8 - 5.3 10e9/L    Absolute Monocytes 0.3 0.0 - 1.3 10e9/L    Absolute Eosinophils 0.1 0.0 - 0.7 10e9/L    Absolute Basophils 0.0 0.0 - 0.2 10e9/L    Abs Immature Granulocytes 0.0 0 - 0.4 10e9/L    Absolute Nucleated RBC 0.0          Assessment and Plan:    Metastatic breast CA: history of a T1 N0, ER/TX positive, HER2-negative left breast cancer treated with bilateral mastectomies in 2012, now with metastatic disease involving the left axilla, as well as left hilar lymph node, ER/TX positive, HER2 negative. Started on BIG 10 trial in May 2017 but removed on 1/12/18 due to disease progression on imaging. CT CAP and Bone scan on 1/11/18 with progressive disease--new bony lesions in the ribs, T spine and possibly liver, lungs. Treatment changed to Zoladex and abeciclimib. She started Zoladex on 1/12/18. She started abeciclimib on 1/19/18. Tolerating well aside from some mild GI symptoms.  --Follow up with Lima Griffiths PA-C on 2/9/18 with labs, infusion (Zoladex, Zometa)  --Follow up with Dr. Whitman on 3/5/18    Bone mets: Denies any pain. She states she saw her dentist at the beginning of January and had no cavities or dental work needed.  --Plan for Zometa next visit on 2/9.    Cough, dyspnea: She has had a cough with scant sputum production for the past few weeks. She felt it was worse last week, but improved this week. She feels it is triggered by exertion and by speaking. No observed cough during this visit, even during exam. CT CAP from 1/11 with increase in nodular and consolidative/groundglass changes of both lungs, suspicious for metastasis vs infectious/inflammatory etiologies.  Afebrile, with normal WBC. She is saturating 99% on room air and 98% with ambulation. She declined trial of Robitussin AC, as she feels cough is improving. Other possible etiology would be PE given her description of pleuritic pain, but she is neither tachypneic nor hypoxic and had a CT less than 2 weeks  ago.  --Continue to monitor. Recommended to call or RTC if worsening cough, dyspnea, chest pain, fever, chills, hemoptysis or other concerning symptoms.      Karena Cox PA-C  [facility]  [address] [place] [id number] [phone]

## 2018-01-22 NOTE — NURSING NOTE
Chief Complaint   Patient presents with     Lab Only     Pt here for venipuncture blood draw, vitals completed by MA. Right antecub.     Rayne Walls MA

## 2018-01-29 NOTE — PROGRESS NOTES
Oral Chemotherapy Monitoring Program      Placed call to patient in follow up of Verzenio therapy.      Left message to please call back in follow-up of therapy. Messages left at 120PM 1/29. No patient or drug names were mentioned.     Fermín Parada, PharmD, BCOP  January 29, 2018

## 2018-02-02 ENCOUNTER — CARE COORDINATION (OUTPATIENT)
Dept: ONCOLOGY | Facility: CLINIC | Age: 48
End: 2018-02-02

## 2018-02-02 ENCOUNTER — TELEPHONE (OUTPATIENT)
Dept: ONCOLOGY | Facility: CLINIC | Age: 48
End: 2018-02-02

## 2018-02-02 NOTE — PROGRESS NOTES
[employer] disability work forms and a copy was mailed out to patient today to patient's home as requested. Sharifa Celeste RN, BSN

## 2018-02-02 NOTE — ORAL ONC MGMT
Oral Chemotherapy Monitoring Program     Placed call to patient in follow up of Verzenio therapy.     Left message to please call back in follow-up of therapy. No patient or drug names were mentioned.     Robbie Harden PharmD  [facility]  [phone]  February 2, 2018

## 2018-02-08 DIAGNOSIS — C50.919 METASTATIC BREAST CANCER: Primary | ICD-10-CM

## 2018-02-08 RX ORDER — LETROZOLE 2.5 MG/1
2.5 TABLET, FILM COATED ORAL DAILY
Qty: 28 TABLET | Refills: 0 | Status: SHIPPED | OUTPATIENT
Start: 2018-02-08 | End: 2018-03-09

## 2018-02-09 ENCOUNTER — APPOINTMENT (OUTPATIENT)
Dept: LAB | Facility: CLINIC | Age: 48
End: 2018-02-09
Attending: PHYSICIAN ASSISTANT
Payer: COMMERCIAL

## 2018-02-09 ENCOUNTER — INFUSION THERAPY VISIT (OUTPATIENT)
Dept: ONCOLOGY | Facility: CLINIC | Age: 48
End: 2018-02-09
Attending: INTERNAL MEDICINE
Payer: COMMERCIAL

## 2018-02-09 ENCOUNTER — ONCOLOGY VISIT (OUTPATIENT)
Dept: ONCOLOGY | Facility: CLINIC | Age: 48
End: 2018-02-09
Attending: PHYSICIAN ASSISTANT
Payer: COMMERCIAL

## 2018-02-09 VITALS
OXYGEN SATURATION: 96 % | DIASTOLIC BLOOD PRESSURE: 67 MMHG | WEIGHT: 121.4 LBS | BODY MASS INDEX: 20.73 KG/M2 | HEART RATE: 73 BPM | SYSTOLIC BLOOD PRESSURE: 101 MMHG | RESPIRATION RATE: 16 BRPM | HEIGHT: 64 IN | TEMPERATURE: 98.3 F

## 2018-02-09 DIAGNOSIS — C50.919 METASTATIC BREAST CANCER: Primary | ICD-10-CM

## 2018-02-09 DIAGNOSIS — Z79.899 ENCOUNTER FOR LONG-TERM (CURRENT) USE OF MEDICATIONS: ICD-10-CM

## 2018-02-09 DIAGNOSIS — C50.919 METASTATIC BREAST CANCER: ICD-10-CM

## 2018-02-09 DIAGNOSIS — C79.51 BONE METASTASIS: ICD-10-CM

## 2018-02-09 LAB
ALBUMIN SERPL-MCNC: 3.3 G/DL (ref 3.4–5)
ALP SERPL-CCNC: 68 U/L (ref 40–150)
ALT SERPL W P-5'-P-CCNC: 16 U/L (ref 0–50)
ANION GAP SERPL CALCULATED.3IONS-SCNC: 6 MMOL/L (ref 3–14)
AST SERPL W P-5'-P-CCNC: 28 U/L (ref 0–45)
BASOPHILS # BLD AUTO: 0 10E9/L (ref 0–0.2)
BASOPHILS NFR BLD AUTO: 0.9 %
BILIRUB SERPL-MCNC: 0.4 MG/DL (ref 0.2–1.3)
BUN SERPL-MCNC: 14 MG/DL (ref 7–30)
CALCIUM SERPL-MCNC: 9.1 MG/DL (ref 8.5–10.1)
CHLORIDE SERPL-SCNC: 103 MMOL/L (ref 94–109)
CO2 SERPL-SCNC: 28 MMOL/L (ref 20–32)
CREAT SERPL-MCNC: 0.73 MG/DL (ref 0.52–1.04)
DIFFERENTIAL METHOD BLD: ABNORMAL
EOSINOPHIL # BLD AUTO: 0 10E9/L (ref 0–0.7)
EOSINOPHIL NFR BLD AUTO: 0.4 %
ERYTHROCYTE [DISTWIDTH] IN BLOOD BY AUTOMATED COUNT: 11.8 % (ref 10–15)
GFR SERPL CREATININE-BSD FRML MDRD: 86 ML/MIN/1.7M2
GLUCOSE SERPL-MCNC: 86 MG/DL (ref 70–99)
HCT VFR BLD AUTO: 37.1 % (ref 35–47)
HGB BLD-MCNC: 12.5 G/DL (ref 11.7–15.7)
IMM GRANULOCYTES # BLD: 0 10E9/L (ref 0–0.4)
IMM GRANULOCYTES NFR BLD: 0.4 %
LYMPHOCYTES # BLD AUTO: 1.6 10E9/L (ref 0.8–5.3)
LYMPHOCYTES NFR BLD AUTO: 33.3 %
MCH RBC QN AUTO: 35.5 PG (ref 26.5–33)
MCHC RBC AUTO-ENTMCNC: 33.7 G/DL (ref 31.5–36.5)
MCV RBC AUTO: 105 FL (ref 78–100)
MONOCYTES # BLD AUTO: 0.2 10E9/L (ref 0–1.3)
MONOCYTES NFR BLD AUTO: 3.2 %
NEUTROPHILS # BLD AUTO: 2.9 10E9/L (ref 1.6–8.3)
NEUTROPHILS NFR BLD AUTO: 61.8 %
NRBC # BLD AUTO: 0 10*3/UL
NRBC BLD AUTO-RTO: 0 /100
PLATELET # BLD AUTO: 191 10E9/L (ref 150–450)
POTASSIUM SERPL-SCNC: 3.6 MMOL/L (ref 3.4–5.3)
PROT SERPL-MCNC: 8.2 G/DL (ref 6.8–8.8)
RBC # BLD AUTO: 3.52 10E12/L (ref 3.8–5.2)
SODIUM SERPL-SCNC: 137 MMOL/L (ref 133–144)
WBC # BLD AUTO: 4.7 10E9/L (ref 4–11)

## 2018-02-09 PROCEDURE — 25000128 H RX IP 250 OP 636: Mod: ZF | Performed by: PHYSICIAN ASSISTANT

## 2018-02-09 PROCEDURE — G0463 HOSPITAL OUTPT CLINIC VISIT: HCPCS | Mod: ZF

## 2018-02-09 PROCEDURE — 85025 COMPLETE CBC W/AUTO DIFF WBC: CPT | Performed by: INTERNAL MEDICINE

## 2018-02-09 PROCEDURE — 80053 COMPREHEN METABOLIC PANEL: CPT | Performed by: INTERNAL MEDICINE

## 2018-02-09 PROCEDURE — 96402 CHEMO HORMON ANTINEOPL SQ/IM: CPT

## 2018-02-09 PROCEDURE — 25000125 ZZHC RX 250: Mod: ZF | Performed by: PHYSICIAN ASSISTANT

## 2018-02-09 PROCEDURE — 99214 OFFICE O/P EST MOD 30 MIN: CPT | Mod: ZP | Performed by: PHYSICIAN ASSISTANT

## 2018-02-09 RX ORDER — LIDOCAINE HYDROCHLORIDE 10 MG/ML
2 INJECTION, SOLUTION EPIDURAL; INFILTRATION; INTRACAUDAL; PERINEURAL ONCE
Status: COMPLETED | OUTPATIENT
Start: 2018-02-09 | End: 2018-02-09

## 2018-02-09 RX ORDER — LIDOCAINE HYDROCHLORIDE 10 MG/ML
2 INJECTION, SOLUTION EPIDURAL; INFILTRATION; INTRACAUDAL; PERINEURAL ONCE
Status: CANCELLED
Start: 2018-02-09 | End: 2018-02-09

## 2018-02-09 RX ORDER — ZOLEDRONIC ACID 0.04 MG/ML
4 INJECTION, SOLUTION INTRAVENOUS ONCE
Status: CANCELLED
Start: 2018-02-09 | End: 2018-02-09

## 2018-02-09 RX ADMIN — LIDOCAINE HYDROCHLORIDE 20 MG: 10 INJECTION, SOLUTION EPIDURAL; INFILTRATION; INTRACAUDAL; PERINEURAL at 14:48

## 2018-02-09 RX ADMIN — GOSERELIN ACETATE 3.6 MG: 3.6 IMPLANT SUBCUTANEOUS at 14:48

## 2018-02-09 ASSESSMENT — PAIN SCALES - GENERAL: PAINLEVEL: MILD PAIN (3)

## 2018-02-09 NOTE — NURSING NOTE
Chief Complaint   Patient presents with     Oncology Clinic Visit     Return visit related to Breast Cancer     Blood Draw     Labs drawn from PIV placed by RN. Line flushed with saline. Vs taken and pt checked in for appt     Labs drawn from PIV placed by RN. Line flushed with saline. Vitals taken. Pt checked in for appointment(s).    Mahogany Georges RN

## 2018-02-09 NOTE — PATIENT INSTRUCTIONS
February 2018 Sunday Monday Tuesday Wednesday Thursday Friday Saturday                       1     2     3       4     5     6     7     8     9     [facility] DRAW    1:00 PM   (15 min.)    [facility] DRAW   [facility] Lab Draw     [facility] RETURN    1:25 PM   (50 min.)   Lima Griffiths PA-C   [facility] 60    2:30 PM   (60 min.)   [facility]FUSION   [facility] 10       11     12     13     14     15     16     17       18     19     20     21     22     23     24       25     26     27     28 March 2018 Sunday Monday Tuesday Wednesday Thursday Friday Saturday                       1     2     3       4     5     [facility] DRAW    9:00 AM   (15 min.)    [facility] DRAW   [facility] Lab Draw     P RETURN    9:15 AM   (30 min.)   Ana Whitman MD   [facility] 6     7     8     9     10       11     12     13     14     15     16     17       18     19     20     21     22     23     24       25     26     27     28     29     30     31                  Lab Results:  Recent Results (from the past 12 hour(s))   Comprehensive metabolic panel (BMP + Alb, Alk Phos, ALT, AST, Total. Bili, TP)    Collection Time: 02/09/18  1:22 PM   Result Value Ref Range    Sodium 137 133 - 144 mmol/L    Potassium 3.6 3.4 - 5.3 mmol/L    Chloride 103 94 - 109 mmol/L    Carbon Dioxide 28 20 - 32 mmol/L    Anion Gap 6 3 - 14 mmol/L    Glucose 86 70 - 99 mg/dL    Urea Nitrogen 14 7 - 30 mg/dL    Creatinine 0.73 0.52 - 1.04 mg/dL    GFR Estimate 86 >60 mL/min/1.7m2    GFR Estimate If Black >90 >60 mL/min/1.7m2    Calcium 9.1 8.5 - 10.1 mg/dL    Bilirubin Total 0.4 0.2 - 1.3 mg/dL    Albumin 3.3 (L) 3.4 - 5.0 g/dL    Protein Total 8.2 6.8 - 8.8 g/dL    Alkaline Phosphatase 68 40 - 150 U/L    ALT 16 0 - 50 U/L    AST 28 0 - 45 U/L   CBC with platelets and differential     Collection Time: 02/09/18  1:22 PM   Result Value Ref Range    WBC 4.7 4.0 - 11.0 10e9/L    RBC Count 3.52 (L) 3.8 - 5.2 10e12/L    Hemoglobin 12.5 11.7 - 15.7 g/dL    Hematocrit 37.1 35.0 - 47.0 %     (H) 78 - 100 fl    MCH 35.5 (H) 26.5 - 33.0 pg    MCHC 33.7 31.5 - 36.5 g/dL    RDW 11.8 10.0 - 15.0 %    Platelet Count 191 150 - 450 10e9/L    Diff Method Automated Method     % Neutrophils 61.8 %    % Lymphocytes 33.3 %    % Monocytes 3.2 %    % Eosinophils 0.4 %    % Basophils 0.9 %    % Immature Granulocytes 0.4 %    Nucleated RBCs 0 0 /100    Absolute Neutrophil 2.9 1.6 - 8.3 10e9/L    Absolute Lymphocytes 1.6 0.8 - 5.3 10e9/L    Absolute Monocytes 0.2 0.0 - 1.3 10e9/L    Absolute Eosinophils 0.0 0.0 - 0.7 10e9/L    Absolute Basophils 0.0 0.0 - 0.2 10e9/L    Abs Immature Granulocytes 0.0 0 - 0.4 10e9/L    Absolute Nucleated RBC 0.0      Contact Numbers    [facility] Main Line: [phone]  [facility] Triage:  [phone]    Call triage with chills and/or temperature greater than or equal to 100.5, uncontrolled nausea/vomiting, diarrhea, constipation, dizziness, shortness of breath, chest pain, bleeding, unexplained bruising, or any new/concerning symptoms, questions/concerns.     If you are having any concerning symptoms or wish to speak to a provider before your next infusion visit, please call your care coordinator or triage to notify them so we can adequately serve you.       After Hours: [phone]    If after hours, weekends, or holidays, call main hospital  and ask for Oncology doctor on call.

## 2018-02-09 NOTE — MR AVS SNAPSHOT
After Visit Summary   2/9/2018    Shan Marsh    MRN: [id number]           Patient Information     Date Of Birth          1970        Visit Information        Provider Department      2/9/2018 2:30 PM [facility] 22 ATC; [facility] ONCOLOGY INFUSION Formerly [facility]        Today's Diagnoses     Metastatic breast cancer (H)    -  1    Bone metastasis (H)        Encounter for long-term (current) use of medications          Care Instructions          February 2018 Sunday Monday Tuesday Wednesday Thursday Friday Saturday                       1     2     3       4     5     6     7     8     9     [facility] DRAW    1:00 PM   (15 min.)    [facility] DRAW   [facility] Lab Draw     [facility] RETURN    1:25 PM   (50 min.)   Lima Griffiths PA-C   Formerly [facility]     [facility] ONC INFUSION 60    2:30 PM   (60 min.)    ONCOLOGY INFUSION   [facility] 10       11     12     13     14     15     16     17       18     19     20     21     22     23     24       25     26     27     28 March 2018 Sunday Monday Tuesday Wednesday Thursday Friday Saturday                       1     2     3       4     5     P [facility] DRAW    9:00 AM   (15 min.)    [facility]     [facility] RETURN    9:15 AM   (30 min.)   Ana Whitman MD   [facility] 6     7     8     9     10       11     12     13     14     15     16     17       18     19     20     21     22     23     24       25     26     27     28     29     30     31                  Lab Results:  Recent Results (from the past 12 hour(s))   Comprehensive metabolic panel (BMP + Alb, Alk Phos, ALT, AST, Total. Bili, TP)    Collection Time: 02/09/18  1:22 PM   Result Value Ref Range    Sodium 137 133 - 144 mmol/L    Potassium 3.6 3.4 - 5.3 mmol/L    Chloride 103 94 - 109 mmol/L    Carbon Dioxide 28 20 - 32 mmol/L    Anion  Gap 6 3 - 14 mmol/L    Glucose 86 70 - 99 mg/dL    Urea Nitrogen 14 7 - 30 mg/dL    Creatinine 0.73 0.52 - 1.04 mg/dL    GFR Estimate 86 >60 mL/min/1.7m2    GFR Estimate If Black >90 >60 mL/min/1.7m2    Calcium 9.1 8.5 - 10.1 mg/dL    Bilirubin Total 0.4 0.2 - 1.3 mg/dL    Albumin 3.3 (L) 3.4 - 5.0 g/dL    Protein Total 8.2 6.8 - 8.8 g/dL    Alkaline Phosphatase 68 40 - 150 U/L    ALT 16 0 - 50 U/L    AST 28 0 - 45 U/L   CBC with platelets and differential    Collection Time: 02/09/18  1:22 PM   Result Value Ref Range    WBC 4.7 4.0 - 11.0 10e9/L    RBC Count 3.52 (L) 3.8 - 5.2 10e12/L    Hemoglobin 12.5 11.7 - 15.7 g/dL    Hematocrit 37.1 35.0 - 47.0 %     (H) 78 - 100 fl    MCH 35.5 (H) 26.5 - 33.0 pg    MCHC 33.7 31.5 - 36.5 g/dL    RDW 11.8 10.0 - 15.0 %    Platelet Count 191 150 - 450 10e9/L    Diff Method Automated Method     % Neutrophils 61.8 %    % Lymphocytes 33.3 %    % Monocytes 3.2 %    % Eosinophils 0.4 %    % Basophils 0.9 %    % Immature Granulocytes 0.4 %    Nucleated RBCs 0 0 /100    Absolute Neutrophil 2.9 1.6 - 8.3 10e9/L    Absolute Lymphocytes 1.6 0.8 - 5.3 10e9/L    Absolute Monocytes 0.2 0.0 - 1.3 10e9/L    Absolute Eosinophils 0.0 0.0 - 0.7 10e9/L    Absolute Basophils 0.0 0.0 - 0.2 10e9/L    Abs Immature Granulocytes 0.0 0 - 0.4 10e9/L    Absolute Nucleated RBC 0.0      Contact Numbers    [facility] Main Line: [phone]  [facility] Triage:  [phone]    Call triage with chills and/or temperature greater than or equal to 100.5, uncontrolled nausea/vomiting, diarrhea, constipation, dizziness, shortness of breath, chest pain, bleeding, unexplained bruising, or any new/concerning symptoms, questions/concerns.     If you are having any concerning symptoms or wish to speak to a provider before your next infusion visit, please call your care coordinator or triage to notify them so we can adequately serve you.       After Hours: [phone]    If after hours, weekends, or holidays, call main  hospital  and ask for Oncology doctor on call.             Follow-ups after your visit        Your next 10 appointments already scheduled     Mar 05, 2018  9:00 AM CST   [facility] Draw with [facility] LAB DRAW   [facility] Lab Draw ([facility])    [address] [place] [address]  [address]  [facility] [id number] [phone]            Mar 05, 2018  9:30 AM CST   (Arrive by 9:15 AM)   Return Visit with Ana Whitman MD   [facility] ([facility])    [address] [place] [address]  [address]  [facility] [address] [phone]              Who to contact     If you have questions or need follow up information about today's clinic visit or your schedule please contact [facility] directly at [phone].  Normal or non-critical lab and imaging results will be communicated to you by MyChart, letter or phone within 4 business days after the clinic has received the results. If you do not hear from us within 7 days, please contact the clinic through [email or web address] or phone. If you have a critical or abnormal lab result, we will notify you by phone as soon as possible.  Submit refill requests through GoodApril or call your pharmacy and they will forward the refill request to us. Please allow 3 business days for your refill to be completed.          Additional Information About Your Visit        MyChart Information     GoodApril gives you secure access to your electronic health record. If you see a primary care provider, you can also send messages to your care team and make appointments. If you have questions, please call your primary care clinic.  If you do not have a primary care provider, please call [phone] and they will assist you.        Care EveryWhere ID     This is your Care EveryWhere ID. This could be used by other organizations to access your [facility] medical records  [id number]         Blood Pressure from Last 3  Encounters:   02/09/18 101/67   01/22/18 114/72   01/12/18 112/71    Weight from Last 3 Encounters:   02/09/18 55.1 kg (121 lb 6.4 oz)   01/22/18 53.7 kg (118 lb 6.4 oz)   01/12/18 54.8 kg (120 lb 14.4 oz)              We Performed the Following     CBC with platelets and differential     Comprehensive metabolic panel (BMP + Alb, Alk Phos, ALT, AST, Total. Bili, TP)        Primary Care Provider Office Phone # Fax #    Marilu Michelle [phone]       [address]  [facility] [address]        Equal Access to Services     LUZ JAMES : Karla Schaefer, georgia bejarano, makenzie coy, edi kolb. So [facility] [phone].    ATENCIÓN: Si habla español, tiene a gamez disposición servicios gratuitos de asistencia lingüística. Llame al [phone].    We comply with applicable federal civil rights laws and [place] laws. We do not discriminate on the basis of race, color, national origin, age, disability, sex, sexual orientation, or gender identity.            Thank you!     Thank you for choosing [facility]  for your care. Our goal is always to provide you with excellent care. Hearing back from our patients is one way we can continue to improve our services. Please take a few minutes to complete the written survey that you may receive in the mail after your visit with us. Thank you!             Your Updated Medication List - Protect others around you: Learn how to safely use, store and throw away your medicines at www.disposemymeds.org.          This list is accurate as of 2/9/18  2:56 PM.  Always use your most recent med list.                   Brand Name Dispense Instructions for use Diagnosis    abemaciclib 150 MG tablet CHEMO    VERZENIO    60 tablet    Take 1 tablet (150 mg) by mouth 2 times daily    Metastatic breast cancer (H), Bone metastasis (H)       calcium carbonate 1250 MG tablet    OS-KRYSTEN 500 mg [place]. [place]     Take 1  tablet by mouth daily        * letrozole 2.5 MG tablet    FEMARA    28 tablet    Take 1 tablet (2.5 mg) by mouth daily for 28 days    Metastatic breast cancer (H), Bone metastasis (H)       * letrozole 2.5 MG tablet    FEMARA    28 tablet    Take 1 tablet (2.5 mg) by mouth daily for 28 days    Metastatic breast cancer (H)       ondansetron 8 MG tablet    ZOFRAN          prochlorperazine 10 MG tablet    COMPAZINE          UNABLE TO FIND      daily Comprehensive Immunity Support - Host Defense        VITAMIN D (CHOLECALCIFEROL) PO      Take by mouth daily        * Notice:  This list has 2 medication(s) that are the same as other medications prescribed for you. Read the directions carefully, and ask your doctor or other care provider to review them with you.

## 2018-02-09 NOTE — PROGRESS NOTES
Infusion Nursing Note:  Shan Marsh presents today for Zoladex.    Patient seen by provider today: Yes: Lima CASTELLANO prior to infusion.   present during visit today: Not Applicable.    Note: N/A.    Intravenous Access:  Peripheral IV placed in lab.    Treatment Conditions:  Lab Results   Component Value Date    HGB 12.5 02/09/2018     Lab Results   Component Value Date    WBC 4.7 02/09/2018      Lab Results   Component Value Date    ANEU 2.9 02/09/2018     Lab Results   Component Value Date     02/09/2018      Lab Results   Component Value Date     02/09/2018                   Lab Results   Component Value Date    POTASSIUM 3.6 02/09/2018           No results found for: MAG         Lab Results   Component Value Date    CR 0.73 02/09/2018                   Lab Results   Component Value Date    KRYSTEN 9.1 02/09/2018                Lab Results   Component Value Date    BILITOTAL 0.4 02/09/2018           Lab Results   Component Value Date    ALBUMIN 3.3 02/09/2018                    Lab Results   Component Value Date    ALT 16 02/09/2018           Lab Results   Component Value Date    AST 28 02/09/2018     Post Infusion Assessment:  Patient tolerated injection without incident.    Discharge Plan:   Patient declined prescription refills.  Discharge instructions reviewed with: Patient.  Patient and/or family verbalized understanding of discharge instructions and all questions answered.  Copy of AVS reviewed with patient and/or family.  Patient will return 3/5/18 for next appointment.  Patient discharged in stable condition accompanied by: self.  Departure Mode: Ambulatory.    Alisa Ornelas RN

## 2018-02-09 NOTE — LETTER
2/9/2018      RE: Shan Marsh  [address]  [facility] [address]       Oncology/Hematology Visit Note  Feb 9, 2018    Reason for Visit: follow up of     History of Present Illness: Shan Marsh is a 47 year old without significant past medical history with recent diagnosis of metastatic breast cancer after presenting with left shoulder pain. Shoulder MRI showed soft tissue mass close to distal clavicle concerning for malignancy. She had PET/CT 5/9/17 showing hypermetabolic left axillary, supraclavicular, mediastinal and hilar nodes. She had biopsies of left axilla and hilar nodes showing metastatic breast cancer, strongly ER positive, moderately TX positive and HER-2 kaitlin non-amplified. She originally had [id number], ER/TX positive, HER-2 negative breast cancer diagnosed in spring of 2012 s/p bilateral mastectomies and Tamoxifen from 8/2012-2/2013 and discontinued due to side effects. She met with Dr. Whitman on 5/16/17 who recommended treatment on BIG10 clinical trial with palbociclib and tamoxifen. She started treatment on 5/24/17. She had a dose reduction of palbociclib due to neutropenia. CT CAP and Bone scan on 1/11/18 with progressive disease--new bony lesions in the ribs, T spine and possibly liver, lungs. She was taken off the BIG10 trial and started on Zoladex and abemiciclib on 1/12/18.  She is here today for cycle 2    Interval History: Hussein is doing well today.  She has been tolerating the Verzenio and Zoladex over the last few weeks.  She states she had a cough about 2 weeks ago and significantly improved within the last week.  She was not on any antibiotics and did not have a fever.  She continues to have low back pain on a regular basis that she tolerates during the day without medication and requires a Tylenol PM at night to help her sleep.  She has been experiencing some dyspnea on exertion over the last month that is worse with stairs.  She is able to walk flat surfaces  without difficulty and basic ADLs such as showering or getting dressed also does not bother her.  She has not noticed any leg swelling, hemoptysis, fever or return of her cough.  She denies abdominal pain.  She has been having occasional loose stools, however not requiring any Imodium.  She denies hematuria or dysuria.  Denies rash.  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  Appetite has been normal and actually improved.  She has been gaining 3 pounds in the last few weeks and is very happy with that.  She continues to take calcium and vitamin D on a daily basis.  Denies headache, vision changes, breast pain.  She has no other concerns or complaints at this time.    Review of Systems: See interval hx. Denies fevers, chills, HA, dizziness, n/t, changes in vision, sore throat, CP, abdominal pain, N/V, changes in urination, bleeding, bruising, rash.       Current Outpatient Prescriptions   Medication Sig Dispense Refill     letrozole (FEMARA) 2.5 MG tablet Take 1 tablet (2.5 mg) by mouth daily for 28 days 28 tablet 0     calcium carbonate (OS-KRYSTEN 500 MG [place]. [place]) 1250 MG tablet Take 1 tablet by mouth daily       abemaciclib (VERZENIO) 150 MG tablet CHEMO Take 1 tablet (150 mg) by mouth 2 times daily 60 tablet 1     UNABLE TO FIND daily Comprehensive Immunity Support - Host Defense       VITAMIN D, CHOLECALCIFEROL, PO Take by mouth daily       ondansetron (ZOFRAN) 8 MG tablet        prochlorperazine (COMPAZINE) 10 MG tablet        letrozole (FEMARA) 2.5 MG tablet Take 1 tablet (2.5 mg) by mouth daily for 28 days (Patient not taking: Reported on 2/9/2018) 28 tablet 0     [DISCONTINUED] tamoxifen (NOLVADEX) 20 MG tablet Take 1 tablet (20 mg) by mouth daily 28 tablet 0       Physical Examination:  General: The patient is a pleasant female in no acute distress.  /67 (BP Location: Right arm, Cuff Size: Adult Regular)  " Pulse 73  Temp 98.3  F (36.8  C) (Oral)  Resp 16  Ht 1.626 m (5' 4.02\")  Wt 55.1 kg (121 lb 6.4 oz)  SpO2 96%  BMI 20.83 kg/m2  Wt Readings from Last 10 Encounters:   02/09/18 55.1 kg (121 lb 6.4 oz)   01/22/18 53.7 kg (118 lb 6.4 oz)   01/12/18 54.8 kg (120 lb 14.4 oz)   12/12/17 54.6 kg (120 lb 4.8 oz)   11/13/17 53.8 kg (118 lb 11.2 oz)   11/03/17 55.1 kg (121 lb 8 oz)   10/17/17 54.1 kg (119 lb 3.2 oz)   10/02/17 54.1 kg (119 lb 4.8 oz)   09/19/17 54.4 kg (119 lb 14.4 oz)   08/21/17 54 kg (119 lb 1.6 oz)     HEENT: EOMI, PERRL. Sclerae are anicteric. Oral mucosa is pink and moist with no lesions or thrush.   Lymph: No palpable cervical ARTHUR. 2 small subcentimeter lymph nodes in left supraclavicular area. No palpable axillary lymphadenopathy.   Heart: Regular rate and rhythm.   Lungs: Breathing comfortably on room air. No cough during exam. Decreased BS at left base. Otherwise clear to auscultation bilaterally.   Abdomen: Bowel sounds present, soft, nontender with no palpable hepatosplenomegaly or masses.   Extremities: No lower extremity edema noted bilaterally.   Neuro: Cranial nerves II through XII are grossly intact. Grossly non-focal  MSK: No TTP of spine  Skin: No rashes, petechiae, or bruising noted on exposed skin.    Laboratory Data:  Results for NIKKIE HICKS (MRN [id number]) as of 2/9/2018 15:41   2/9/2018 13:22   Sodium 137   Potassium 3.6   Chloride 103   Carbon Dioxide 28   Urea Nitrogen 14   Creatinine 0.73   GFR Estimate 86   GFR Estimate If Black >90   Calcium 9.1   Anion Gap 6   Albumin 3.3 (L)   Protein Total 8.2   Bilirubin Total 0.4   Alkaline Phosphatase 68   ALT 16   AST 28   Glucose 86   WBC 4.7   Hemoglobin 12.5   Hematocrit 37.1   Platelet Count 191   RBC Count 3.52 (L)    (H)   MCH 35.5 (H)   MCHC 33.7   RDW 11.8   Diff Method Automated Method   % Neutrophils 61.8   % Lymphocytes 33.3   % Monocytes 3.2   % Eosinophils 0.4   % Basophils 0.9   % Immature " Granulocytes 0.4   Nucleated RBCs 0   Absolute Neutrophil 2.9   Absolute Lymphocytes 1.6   Absolute Monocytes 0.2   Absolute Eosinophils 0.0   Absolute Basophils 0.0   Abs Immature Granulocytes 0.0   Absolute Nucleated RBC 0.0       Assessment and Plan:    Metastatic breast CA: history of a T1 N0, ER/RI positive, HER2-negative left breast cancer treated with bilateral mastectomies in 2012, now with metastatic disease involving the left axilla, as well as left hilar lymph node, ER/RI positive, HER2 negative. Started on BIG 10 trial in May 2017 but removed on 1/12/18 due to disease progression on imaging. CT CAP and Bone scan on 1/11/18 with progressive disease--new bony lesions in the ribs, T spine and possibly liver, lungs. Treatment changed to Zoladex and abeciclimib. She started Zoladex on 1/12/18. She started abeciclimib on 1/19/18. Tolerating well aside from some mild GI symptoms.  -She has declined Zometa today.  We discussed side effects and she is taking a trip in a few days and nervous about side effects. She will take the Zometa at her next monthly appointment.  --Follow up with Dr. Whitman on 3/5/18, Zometa, Zoladex    Bone mets: Pain controlled with Tylenol. (see above) She states she saw her dentist at the beginning of January and had no cavities or dental work needed.  --Plan for Zometa next visit on 3/5.    Respiratory: Cough has siignificantly improved, however she still has some persistent dyspnea on exertion, though this is also improving.  She does have dullness to percussion and decreased breath sounds in the left lower lobe consistent with her CT scan results, appears stable from prior exams.  She was ambulated with an oximeter and she was steady at 96-97% on room air.  Dyspnea on exertion may be due to residual respiratory symptoms, however considered PE, however her sats are stable, she is not tachycardic, no hemoptysis and no leg swelling.  Would have likely noticed to be previous imaging as  well.  No indication for repeat imaging today.  Considered congestive heart failure, she does not clinically appear in failure and we will monitor closely. She was instructed to call the clinic if any worsening symptoms.     Lymphadenopathy: Left supraclavicular region no significant change from previous visit.  CT from 1/11/18 noted mildly prominent left supraclavicular nodes, however unchanged from previous imaging.  We will continue to monitor closely.    FEN: Appetite has been better and she has gained 3 pounds in the last few weeks.  Recommended continuing her diet changes and we will monitor her weight.      Carol Woody PA-C    The patient was seen in conjunction with Carol Woody PA-C who served as a scribe for today's visit. I have reviewed the note and agree with the above findings and plan. -ECT      Lima Griffiths PA-C

## 2018-02-09 NOTE — NURSING NOTE
"Oncology Rooming Note    February 9, 2018 1:32 PM   Shan Marsh is a 47 year old female who presents for:    Chief Complaint   Patient presents with     Oncology Clinic Visit     Return visit related to Breast Cancer     Initial Vitals: /67 (BP Location: Right arm, Cuff Size: Adult Regular)  Pulse 73  Temp 98.3  F (36.8  C) (Oral)  Resp 16  Ht 1.626 m (5' 4.02\")  Wt 55.1 kg (121 lb 6.4 oz)  SpO2 96%  BMI 20.83 kg/m2 Estimated body mass index is 20.83 kg/(m^2) as calculated from the following:    Height as of this encounter: 1.626 m (5' 4.02\").    Weight as of this encounter: 55.1 kg (121 lb 6.4 oz). Body surface area is 1.58 meters squared.  Mild Pain (3) Comment: Data Unavailable   No LMP recorded. Patient is not currently having periods (Reason: UNKNOWN).  Allergies reviewed: Yes  Medications reviewed: Yes    Medications: Medication refills not needed today.  Pharmacy name entered into GigsJam:    [facility] [place], [place] - [address] [place] [address] DRUG STORE [address] [place], [place] - [address] [place] [address] AT [facility] & [address]  [facility] [place], [place] - [address] [facility] [id number] - [place], [place] - [address] AT [facility] [address]    Clinical concerns: No new concerns. Provider was notified.    10 minutes for nursing intake (face to face time)     Ana Solano LPN            "

## 2018-02-09 NOTE — MR AVS SNAPSHOT
After Visit Summary   2/9/2018    Shan Marsh    MRN: [id number]           Patient Information     Date Of Birth          1970        Visit Information        Provider Department      2/9/2018 1:40 PM Lima Griffiths PA-C [facility]        Today's Diagnoses     Bone metastasis (H)        Metastatic breast cancer (H)           Follow-ups after your visit        Your next 10 appointments already scheduled     Mar 05, 2018  9:00 AM CST   [facility] Draw with [facility] LAB DRAW   [facility] ([facility])    [address] [place] [address]  [address] [place] [address] [id number] [phone]            Mar 05, 2018  9:30 AM CST   (Arrive by 9:15 AM)   Return Visit with Ana Whitman MD   [facility] ([facility])    [address] [place] [address]  [address] [place] [id number] [phone]              Who to contact     If you have questions or need follow up information about today's clinic visit or your schedule please contact [facility] directly at [phone].  Normal or non-critical lab and imaging results will be communicated to you by MyChart, letter or phone within 4 business days after the clinic has received the results. If you do not hear from us within 7 days, please contact the clinic through MyChart or phone. If you have a critical or abnormal lab result, we will notify you by phone as soon as possible.  Submit refill requests through GeriJoy or call your pharmacy and they will forward the refill request to us. Please allow 3 business days for your refill to be completed.          Additional Information About Your Visit        MyChart Information     Geri[employer] gives you secure access to your electronic health record. If you see a primary care provider, you can also send messages to your care team and make appointments. If you have questions, please call  "your primary care clinic.  If you do not have a primary care provider, please call [phone] and they will assist you.        Care EveryWhere ID     This is your Care EveryWhere ID. This could be used by other organizations to access your [facility] medical records  [id number]        Your Vitals Were     Pulse Temperature Respirations Height Pulse Oximetry BMI (Body Mass Index)    73 98.3  F (36.8  C) (Oral) 16 1.626 m (5' 4.02\") 96% 20.83 kg/m2       Blood Pressure from Last 3 Encounters:   02/09/18 101/67   01/22/18 114/72   01/12/18 112/71    Weight from Last 3 Encounters:   02/09/18 55.1 kg (121 lb 6.4 oz)   01/22/18 53.7 kg (118 lb 6.4 oz)   01/12/18 54.8 kg (120 lb 14.4 oz)              Today, you had the following     No orders found for display       Primary Care Provider Office Phone # Fax #    Marilu Michelle [phone] [address] [facility] [address]        Equal Access to Services     [facility]: Hadii noemy chinchilla hadneal Sojailyn, waaxda lupamela, qaybta kaalmacecy coy, edi mustafa . So [facility] [phone].    ATENCIÓN: Si habla español, tiene a gamez disposición servicios gratuitos de asistencia lingüística. [facility] [phone].    We comply with applicable federal civil rights laws and [place] laws. We do not discriminate on the basis of race, color, national origin, age, disability, sex, sexual orientation, or gender identity.            Thank you!     Thank you for choosing [facility]  for your care. Our goal is always to provide you with excellent care. Hearing back from our patients is one way we can continue to improve our services. Please take a few minutes to complete the written survey that you may receive in the mail after your visit with us. Thank you!             Your Updated Medication List - Protect others around you: Learn how to safely use, store and throw away your medicines at www.[employer].org.    "       This list is accurate as of 2/9/18  4:40 PM.  Always use your most recent med list.                   Brand Name Dispense Instructions for use Diagnosis    abemaciclib 150 MG tablet CHEMO    VERZENIO    60 tablet    Take 1 tablet (150 mg) by mouth 2 times daily    Metastatic breast cancer (H), Bone metastasis (H)       calcium carbonate 1250 MG tablet    OS-KRYSTEN 500 mg [place]. [place]     Take 1 tablet by mouth daily        * letrozole 2.5 MG tablet    FEMARA    28 tablet    Take 1 tablet (2.5 mg) by mouth daily for 28 days    Metastatic breast cancer (H), Bone metastasis (H)       * letrozole 2.5 MG tablet    FEMARA    28 tablet    Take 1 tablet (2.5 mg) by mouth daily for 28 days    Metastatic breast cancer (H)       ondansetron 8 MG tablet    ZOFRAN          prochlorperazine 10 MG tablet    COMPAZINE          UNABLE TO FIND      daily Comprehensive Immunity Support - Host Defense        VITAMIN D (CHOLECALCIFEROL) PO      Take by mouth daily        * Notice:  This list has 2 medication(s) that are the same as other medications prescribed for you. Read the directions carefully, and ask your doctor or other care provider to review them with you.

## 2018-02-09 NOTE — PROGRESS NOTES
Oncology/Hematology Visit Note  Feb 9, 2018    Reason for Visit: follow up of     History of Present Illness: Shan Marsh is a 47 year old without significant past medical history with recent diagnosis of metastatic breast cancer after presenting with left shoulder pain. Shoulder MRI showed soft tissue mass close to distal clavicle concerning for malignancy. She had PET/CT 5/9/17 showing hypermetabolic left axillary, supraclavicular, mediastinal and hilar nodes. She had biopsies of left axilla and hilar nodes showing metastatic breast cancer, strongly ER positive, moderately SC positive and HER-2 kaitlin non-amplified. She originally had [id number], ER/SC positive, HER-2 negative breast cancer diagnosed in spring of 2012 s/p bilateral mastectomies and Tamoxifen from 8/2012-2/2013 and discontinued due to side effects. She met with Dr. Whitman on 5/16/17 who recommended treatment on BIG10 clinical trial with palbociclib and tamoxifen. She started treatment on 5/24/17. She had a dose reduction of palbociclib due to neutropenia. CT CAP and Bone scan on 1/11/18 with progressive disease--new bony lesions in the ribs, T spine and possibly liver, lungs. She was taken off the BIG10 trial and started on Zoladex and abemiciclib on 1/12/18.  She is here today for cycle 2    Interval History: Hussein is doing well today.  She has been tolerating the Verzenio and Zoladex over the last few weeks.  She states she had a cough about 2 weeks ago and significantly improved within the last week.  She was not on any antibiotics and did not have a fever.  She continues to have low back pain on a regular basis that she tolerates during the day without medication and requires a Tylenol PM at night to help her sleep.  She has been experiencing some dyspnea on exertion over the last month that is worse with stairs.  She is able to walk flat surfaces without difficulty and basic ADLs such as showering or getting dressed also does not bother her.  " She has not noticed any leg swelling, hemoptysis, fever or return of her cough.  She denies abdominal pain.  She has been having occasional loose stools, however not requiring any Imodium.  She denies hematuria or dysuria.  Denies rash.  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  Appetite has been normal and actually improved.  She has been gaining 3 pounds in the last few weeks and is very happy with that.  She continues to take calcium and vitamin D on a daily basis.  Denies headache, vision changes, breast pain.  She has no other concerns or complaints at this time.    Review of Systems: See interval hx. Denies fevers, chills, HA, dizziness, n/t, changes in vision, sore throat, CP, abdominal pain, N/V, changes in urination, bleeding, bruising, rash.       Current Outpatient Prescriptions   Medication Sig Dispense Refill     letrozole (FEMARA) 2.5 MG tablet Take 1 tablet (2.5 mg) by mouth daily for 28 days 28 tablet 0     calcium carbonate (OS-KRYSTEN 500 MG [place]. [place]) 1250 MG tablet Take 1 tablet by mouth daily       abemaciclib (VERZENIO) 150 MG tablet CHEMO Take 1 tablet (150 mg) by mouth 2 times daily 60 tablet 1     UNABLE TO FIND daily Comprehensive Immunity Support - Host Defense       VITAMIN D, CHOLECALCIFEROL, PO Take by mouth daily       ondansetron (ZOFRAN) 8 MG tablet        prochlorperazine (COMPAZINE) 10 MG tablet        letrozole (FEMARA) 2.5 MG tablet Take 1 tablet (2.5 mg) by mouth daily for 28 days (Patient not taking: Reported on 2/9/2018) 28 tablet 0     [DISCONTINUED] tamoxifen (NOLVADEX) 20 MG tablet Take 1 tablet (20 mg) by mouth daily 28 tablet 0       Physical Examination:  General: The patient is a pleasant female in no acute distress.  /67 (BP Location: Right arm, Cuff Size: Adult Regular)  Pulse 73  Temp 98.3  F (36.8  C) (Oral)  Resp 16  Ht 1.626 m (5' 4.02\")  Wt 55.1 kg (121 lb " 6.4 oz)  SpO2 96%  BMI 20.83 kg/m2  Wt Readings from Last 10 Encounters:   02/09/18 55.1 kg (121 lb 6.4 oz)   01/22/18 53.7 kg (118 lb 6.4 oz)   01/12/18 54.8 kg (120 lb 14.4 oz)   12/12/17 54.6 kg (120 lb 4.8 oz)   11/13/17 53.8 kg (118 lb 11.2 oz)   11/03/17 55.1 kg (121 lb 8 oz)   10/17/17 54.1 kg (119 lb 3.2 oz)   10/02/17 54.1 kg (119 lb 4.8 oz)   09/19/17 54.4 kg (119 lb 14.4 oz)   08/21/17 54 kg (119 lb 1.6 oz)     HEENT: EOMI, PERRL. Sclerae are anicteric. Oral mucosa is pink and moist with no lesions or thrush.   Lymph: No palpable cervical ARTHUR. 2 small subcentimeter lymph nodes in left supraclavicular area. No palpable axillary lymphadenopathy.   Heart: Regular rate and rhythm.   Lungs: Breathing comfortably on room air. No cough during exam. Decreased BS at left base. Otherwise clear to auscultation bilaterally.   Abdomen: Bowel sounds present, soft, nontender with no palpable hepatosplenomegaly or masses.   Extremities: No lower extremity edema noted bilaterally.   Neuro: Cranial nerves II through XII are grossly intact. Grossly non-focal  MSK: No TTP of spine  Skin: No rashes, petechiae, or bruising noted on exposed skin.    Laboratory Data:  Results for NIKKIE HICKS (MRN [id number]) as of 2/9/2018 15:41   2/9/2018 13:22   Sodium 137   Potassium 3.6   Chloride 103   Carbon Dioxide 28   Urea Nitrogen 14   Creatinine 0.73   GFR Estimate 86   GFR Estimate If Black >90   Calcium 9.1   Anion Gap 6   Albumin 3.3 (L)   Protein Total 8.2   Bilirubin Total 0.4   Alkaline Phosphatase 68   ALT 16   AST 28   Glucose 86   WBC 4.7   Hemoglobin 12.5   Hematocrit 37.1   Platelet Count 191   RBC Count 3.52 (L)    (H)   MCH 35.5 (H)   MCHC 33.7   RDW 11.8   Diff Method Automated Method   % Neutrophils 61.8   % Lymphocytes 33.3   % Monocytes 3.2   % Eosinophils 0.4   % Basophils 0.9   % Immature Granulocytes 0.4   Nucleated RBCs 0   Absolute Neutrophil 2.9   Absolute Lymphocytes 1.6   Absolute  Monocytes 0.2   Absolute Eosinophils 0.0   Absolute Basophils 0.0   Abs Immature Granulocytes 0.0   Absolute Nucleated RBC 0.0       Assessment and Plan:    Metastatic breast CA: history of a T1 N0, ER/FL positive, HER2-negative left breast cancer treated with bilateral mastectomies in 2012, now with metastatic disease involving the left axilla, as well as left hilar lymph node, ER/FL positive, HER2 negative. Started on BIG 10 trial in May 2017 but removed on 1/12/18 due to disease progression on imaging. CT CAP and Bone scan on 1/11/18 with progressive disease--new bony lesions in the ribs, T spine and possibly liver, lungs. Treatment changed to Zoladex and abeciclimib. She started Zoladex on 1/12/18. She started abeciclimib on 1/19/18. Tolerating well aside from some mild GI symptoms.  -She has declined Zometa today.  We discussed side effects and she is taking a trip in a few days and nervous about side effects. She will take the Zometa at her next monthly appointment.  --Follow up with Dr. Whitman on 3/5/18, Zometa, Zoladex    Bone mets: Pain controlled with Tylenol. (see above) She states she saw her dentist at the beginning of January and had no cavities or dental work needed.  --Plan for Zometa next visit on 3/5.    Respiratory: Cough has siignificantly improved, however she still has some persistent dyspnea on exertion, though this is also improving.  She does have dullness to percussion and decreased breath sounds in the left lower lobe consistent with her CT scan results, appears stable from prior exams.  She was ambulated with an oximeter and she was steady at 96-97% on room air.  Dyspnea on exertion may be due to residual respiratory symptoms, however considered PE, however her sats are stable, she is not tachycardic, no hemoptysis and no leg swelling.  Would have likely noticed to be previous imaging as well.  No indication for repeat imaging today.  Considered congestive heart failure, she does not  clinically appear in failure and we will monitor closely. She was instructed to call the clinic if any worsening symptoms.     Lymphadenopathy: Left supraclavicular region no significant change from previous visit.  CT from 1/11/18 noted mildly prominent left supraclavicular nodes, however unchanged from previous imaging.  We will continue to monitor closely.    FEN: Appetite has been better and she has gained 3 pounds in the last few weeks.  Recommended continuing her diet changes and we will monitor her weight.      Carol Woody PA-C    The patient was seen in conjunction with Carol Woody PA-C who served as a scribe for today's visit. I have reviewed the note and agree with the above findings and plan. -ECT

## 2018-02-19 ENCOUNTER — TELEPHONE (OUTPATIENT)
Dept: ONCOLOGY | Facility: CLINIC | Age: 48
End: 2018-02-19

## 2018-02-19 NOTE — ORAL ONC MGMT
Oral Chemotherapy Monitoring Program     Placed call to patient in follow up of Verzenio therapy. The person answering the phone said [place] was not available    Left message with person who answered to please ask Shan call back in follow-up of therapy and to schedule required lab work. No drug names were mentioned.     Robbie Harden PharmD  [facility]  [phone]  February 19, 2018

## 2018-02-26 NOTE — TELEPHONE ENCOUNTER
Oral Chemotherapy Monitoring Program    Patient currently on Verzenio therapy. Placed call to schedule Q2week lab check that was due on 2/23.    Left message to please call back in follow up of therapy. No patient or drug names were mentioned.    Shae Camargo, Pharmacy Intern  O[facility] Chem[facility] Program  [facility]  [phone]

## 2018-03-09 ENCOUNTER — ONCOLOGY VISIT (OUTPATIENT)
Dept: ONCOLOGY | Facility: CLINIC | Age: 48
End: 2018-03-09
Attending: PHYSICIAN ASSISTANT
Payer: COMMERCIAL

## 2018-03-09 ENCOUNTER — INFUSION THERAPY VISIT (OUTPATIENT)
Dept: ONCOLOGY | Facility: CLINIC | Age: 48
End: 2018-03-09
Attending: INTERNAL MEDICINE
Payer: COMMERCIAL

## 2018-03-09 VITALS
BODY MASS INDEX: 19.24 KG/M2 | DIASTOLIC BLOOD PRESSURE: 68 MMHG | SYSTOLIC BLOOD PRESSURE: 105 MMHG | OXYGEN SATURATION: 99 % | HEART RATE: 96 BPM | TEMPERATURE: 98.5 F | WEIGHT: 112.7 LBS | HEIGHT: 64 IN | RESPIRATION RATE: 16 BRPM

## 2018-03-09 DIAGNOSIS — C79.51 BONE METASTASIS: ICD-10-CM

## 2018-03-09 DIAGNOSIS — Z79.899 ENCOUNTER FOR LONG-TERM (CURRENT) USE OF MEDICATIONS: ICD-10-CM

## 2018-03-09 DIAGNOSIS — C50.919 METASTATIC BREAST CANCER: ICD-10-CM

## 2018-03-09 DIAGNOSIS — C50.919 METASTATIC BREAST CANCER: Primary | ICD-10-CM

## 2018-03-09 LAB
ALBUMIN SERPL-MCNC: 3.1 G/DL (ref 3.4–5)
ALP SERPL-CCNC: 112 U/L (ref 40–150)
ALT SERPL W P-5'-P-CCNC: 104 U/L (ref 0–50)
ANION GAP SERPL CALCULATED.3IONS-SCNC: 7 MMOL/L (ref 3–14)
AST SERPL W P-5'-P-CCNC: 79 U/L (ref 0–45)
BASOPHILS # BLD AUTO: 0 10E9/L (ref 0–0.2)
BASOPHILS NFR BLD AUTO: 0.4 %
BILIRUB SERPL-MCNC: 0.4 MG/DL (ref 0.2–1.3)
BUN SERPL-MCNC: 14 MG/DL (ref 7–30)
CALCIUM SERPL-MCNC: 8.6 MG/DL (ref 8.5–10.1)
CHLORIDE SERPL-SCNC: 109 MMOL/L (ref 94–109)
CO2 SERPL-SCNC: 23 MMOL/L (ref 20–32)
CREAT SERPL-MCNC: 0.51 MG/DL (ref 0.52–1.04)
DIFFERENTIAL METHOD BLD: ABNORMAL
EOSINOPHIL # BLD AUTO: 0 10E9/L (ref 0–0.7)
EOSINOPHIL NFR BLD AUTO: 0.8 %
ERYTHROCYTE [DISTWIDTH] IN BLOOD BY AUTOMATED COUNT: 15.1 % (ref 10–15)
GFR SERPL CREATININE-BSD FRML MDRD: >90 ML/MIN/1.7M2
GLUCOSE SERPL-MCNC: 98 MG/DL (ref 70–99)
HCT VFR BLD AUTO: 34.1 % (ref 35–47)
HGB BLD-MCNC: 10.6 G/DL (ref 11.7–15.7)
IMM GRANULOCYTES # BLD: 0.2 10E9/L (ref 0–0.4)
IMM GRANULOCYTES NFR BLD: 3.4 %
LYMPHOCYTES # BLD AUTO: 1.3 10E9/L (ref 0.8–5.3)
LYMPHOCYTES NFR BLD AUTO: 25.3 %
MCH RBC QN AUTO: 33.9 PG (ref 26.5–33)
MCHC RBC AUTO-ENTMCNC: 31.1 G/DL (ref 31.5–36.5)
MCV RBC AUTO: 109 FL (ref 78–100)
MONOCYTES # BLD AUTO: 0.5 10E9/L (ref 0–1.3)
MONOCYTES NFR BLD AUTO: 10 %
NEUTROPHILS # BLD AUTO: 3.2 10E9/L (ref 1.6–8.3)
NEUTROPHILS NFR BLD AUTO: 60.1 %
NRBC # BLD AUTO: 0 10*3/UL
NRBC BLD AUTO-RTO: 0 /100
PLATELET # BLD AUTO: 362 10E9/L (ref 150–450)
POTASSIUM SERPL-SCNC: 4.6 MMOL/L (ref 3.4–5.3)
PROT SERPL-MCNC: 7.2 G/DL (ref 6.8–8.8)
RBC # BLD AUTO: 3.13 10E12/L (ref 3.8–5.2)
SODIUM SERPL-SCNC: 139 MMOL/L (ref 133–144)
WBC # BLD AUTO: 5.3 10E9/L (ref 4–11)

## 2018-03-09 PROCEDURE — 99215 OFFICE O/P EST HI 40 MIN: CPT | Mod: ZP | Performed by: PHYSICIAN ASSISTANT

## 2018-03-09 PROCEDURE — 80053 COMPREHEN METABOLIC PANEL: CPT | Performed by: INTERNAL MEDICINE

## 2018-03-09 PROCEDURE — 85025 COMPLETE CBC W/AUTO DIFF WBC: CPT | Performed by: INTERNAL MEDICINE

## 2018-03-09 PROCEDURE — 96365 THER/PROPH/DIAG IV INF INIT: CPT

## 2018-03-09 PROCEDURE — 96402 CHEMO HORMON ANTINEOPL SQ/IM: CPT

## 2018-03-09 PROCEDURE — G0463 HOSPITAL OUTPT CLINIC VISIT: HCPCS | Mod: ZF

## 2018-03-09 PROCEDURE — 25000128 H RX IP 250 OP 636: Mod: ZF | Performed by: PHYSICIAN ASSISTANT

## 2018-03-09 PROCEDURE — 25000125 ZZHC RX 250: Mod: ZF | Performed by: PHYSICIAN ASSISTANT

## 2018-03-09 RX ORDER — LIDOCAINE HYDROCHLORIDE 10 MG/ML
2 INJECTION, SOLUTION EPIDURAL; INFILTRATION; INTRACAUDAL; PERINEURAL ONCE
Status: COMPLETED | OUTPATIENT
Start: 2018-03-09 | End: 2018-03-09

## 2018-03-09 RX ORDER — ZOLEDRONIC ACID 0.04 MG/ML
4 INJECTION, SOLUTION INTRAVENOUS ONCE
Status: COMPLETED | OUTPATIENT
Start: 2018-03-09 | End: 2018-03-09

## 2018-03-09 RX ORDER — ZOLEDRONIC ACID 0.04 MG/ML
4 INJECTION, SOLUTION INTRAVENOUS ONCE
Status: CANCELLED
Start: 2018-03-09 | End: 2018-03-09

## 2018-03-09 RX ORDER — LIDOCAINE HYDROCHLORIDE 10 MG/ML
2 INJECTION, SOLUTION EPIDURAL; INFILTRATION; INTRACAUDAL; PERINEURAL ONCE
Status: CANCELLED
Start: 2018-03-09 | End: 2018-03-09

## 2018-03-09 RX ORDER — LETROZOLE 2.5 MG/1
2.5 TABLET, FILM COATED ORAL DAILY
Qty: 28 TABLET | Refills: 0 | Status: SHIPPED | OUTPATIENT
Start: 2018-03-09 | End: 2018-04-06

## 2018-03-09 RX ADMIN — GOSERELIN ACETATE 3.6 MG: 3.6 IMPLANT SUBCUTANEOUS at 13:44

## 2018-03-09 RX ADMIN — LIDOCAINE HYDROCHLORIDE 2 ML: 10 INJECTION, SOLUTION EPIDURAL; INFILTRATION; INTRACAUDAL; PERINEURAL at 13:41

## 2018-03-09 RX ADMIN — ZOLEDRONIC ACID 4 MG: 0.04 INJECTION, SOLUTION INTRAVENOUS at 13:38

## 2018-03-09 ASSESSMENT — PAIN SCALES - GENERAL: PAINLEVEL: NO PAIN (0)

## 2018-03-09 NOTE — MR AVS SNAPSHOT
After Visit Summary   3/9/2018    Shan Marsh    MRN: [id number]           Patient Information     Date Of Birth          1970        Visit Information        Provider Department      3/9/2018 12:00 PM Lima Griffiths PA-C [facility]        Today's Diagnoses     Metastatic breast cancer (H)        Bone metastasis (H)           Follow-ups after your visit        Future tests that were ordered for you today     Open Future Orders        Priority Expected Expires Ordered    CBC with platelets differential Routine 3/9/2018 4/9/2018 3/9/2018    Comprehensive metabolic panel Routine  3/9/2019 3/9/2018    CT Chest/Abdomen/Pelvis w Contrast Routine  3/10/2019 3/9/2018            Who to contact     If you have questions or need follow up information about today's clinic visit or your schedule please contact [facility] [place] directly at [phone].  Normal or non-critical lab and imaging results will be communicated to you by CrowdTogetherhart, letter or phone within 4 business days after the clinic has received the results. If you do not hear from us within 7 days, please contact the clinic through CrowdTogetherhart or phone. If you have a critical or abnormal lab result, we will notify you by phone as soon as possible.  Submit refill requests through [employer] or call your pharmacy and they will forward the refill request to us. Please allow 3 business days for your refill to be completed.          Additional Information About Your Visit        MyChart Information     [employer] gives you secure access to your electronic health record. If you see a primary care provider, you can also send messages to your care team and make appointments. If you have questions, please call your primary care clinic.  If you do not have a primary care provider, please call [phone] and they will assist you.        Care EveryWhere ID     This is your Care EveryWhere ID. This could be used  "by other organizations to access your [facility] medical records  [id number]        Your Vitals Were     Pulse Temperature Respirations Height Pulse Oximetry BMI (Body Mass Index)    96 98.5  F (36.9  C) (Oral) 16 1.626 m (5' 4\") 99% 19.34 kg/m2       Blood Pressure from Last 3 Encounters:   03/09/18 105/68   02/09/18 101/67   01/22/18 114/72    Weight from Last 3 Encounters:   03/09/18 51.1 kg (112 lb 11.2 oz)   02/09/18 55.1 kg (121 lb 6.4 oz)   01/22/18 53.7 kg (118 lb 6.4 oz)                 Where to get your medicines      These medications were sent to [place], [place] - [address]  [address] [place] [id number]    Hours:  TRANSPLANT PHONE NUMBER [phone] Phone:  [phone]     letrozole 2.5 MG tablet          Primary Care Provider Office Phone # Fax #    Marilu Michelle [phone]       [address]  [facility] [address]        Equal Access to Services     [facility]: Hadii noemy darlingo Sofloridaali, waaxda luqadaha, qaybta kaalmada adeegyada, edi kolb. So [facility] [phone].    ATENCIÓN: Si habla español, tiene a gamez disposición servicios gratuitos de asistencia lingüística. Ayannaame al [phone].    We comply with applicable federal civil rights laws and [place] laws. We do not discriminate on the basis of race, color, national origin, age, disability, sex, sexual orientation, or gender identity.            Thank you!     Thank you for choosing [facility]  for your care. Our goal is always to provide you with excellent care. Hearing back from our patients is one way we can continue to improve our services. Please take a few minutes to complete the written survey that you may receive in the mail after your visit with us. Thank you!             Your Updated Medication List - Protect others around you: Learn how to safely use, store and throw away your " medicines at www.disposemymeds.org.          This list is accurate as of 3/9/18  2:12 PM.  Always use your most recent med list.                   Brand Name Dispense Instructions for use Diagnosis    abemaciclib 150 MG tablet CHEMO    VERZENIO    60 tablet    Take 1 tablet (150 mg) by mouth 2 times daily    Metastatic breast cancer (H), Bone metastasis (H)       calcium carbonate 1250 MG tablet    OS-KRYSTEN 500 mg [place]. [place]     Take 1 tablet by mouth daily        letrozole 2.5 MG tablet    FEMARA    28 tablet    Take 1 tablet (2.5 mg) by mouth daily    Metastatic breast cancer (H)       ondansetron 8 MG tablet    ZOFRAN          prochlorperazine 10 MG tablet    COMPAZINE          UNABLE TO FIND      daily Comprehensive Immunity Support - Host Defense        VITAMIN D (CHOLECALCIFEROL) PO      Take by mouth daily

## 2018-03-09 NOTE — LETTER
"3/9/2018      RE: Shan Marsh  [address]  [facility] [address]       Hematology-Oncology Visit  Mar 9, 2018    Reason for Visit: follow-up metastatic breast cancer, ER positive     HPI: Shan Marsh is a 47 year old female without significant past medical history with recent diagnosis of metastatic breast cancer after presenting with left shoulder pain. Shoulder MRI showed soft tissue mass close to distal clavicle concerning for malignancy. She had PET/CT 5/9/17 showing hypermetabolic left axillary, supraclavicular, mediastinal and hilar nodes. She had biopsies of left axilla and hilar nodes showing metastatic breast cancer, strongly ER positive, moderately MS positive and HER-2 kaitlin non-amplified. She originally had [id number], ER/MS positive, HER-2 negative breast cancer diagnosed in spring of 2012 s/p bilateral mastectomies and Tamoxifen from 8/2012-2/2013 and discontinued due to side effects. She met with Dr. Whitman most recently on 5/16/17 who recommended treatment on BIG10 clinical trial with palbociclib and tamoxifen. She started treatment on 5/24/17 and required dose reduction for neutropenia. CT CAP and bone scan 1/11/2018 showing progressive disease with new bony lesions in the ribs, T spine, and possibly liver, lungs. She started Zoladex and abemiciclib on 1/12/2018.    Interval History: Shan is here for follow-up.    Since her last visit here, she went to [place] and was treated at a cancer center. She received:  - Valavex (injection, some sort of immunotherapy, she reports this is a phase 3 investigational agent in [place])  - Insulin potentiated therapy with conjugated chemotherapy (5% chemotherapy, unclear what chemotherapy)  - Serenity toxins: Oral immune agent that \"makes your body think its fighting a bacteria\", taken PO 1-2 times weekly, starts on Mondays  - Coffee enemas PRN  - A variety of supplements that she takes daily, of which she does not know the names  - She was " continued on Letrozole and advised to conintue Zoladex  - She was instructed to hold abemiciclib, has not taken since 3 weeks ago    She intends to go back in July for repeat Valavex.    Treatment was difficult on her. She had a lot of side effects. She had anemia there that was monitored. She had a lot of side effects form the Gina-S toxins, namely fevers and generalized malaise that made eating and drinking difficult. She does not know if she could tolerate taking those again at this time. She feels tired. Her breathing feels better. She has no chest pain. No fevers right now. No bleeding, abdominal pain, or bowel changes outside of those when she does the coffee enemas. She gets cramping with those, too. She noticed some L arm swelling there and the MD said it was a medicaiton related side effect. SHe is now wearing a sleeve, and that is helpful.     ROS otherwise negative.     Current Outpatient Prescriptions   Medication     letrozole (FEMARA) 2.5 MG tablet     ondansetron (ZOFRAN) 8 MG tablet     prochlorperazine (COMPAZINE) 10 MG tablet     calcium carbonate (OS-KRYSTEN 500 MG [place]. [place]) 1250 MG tablet     abemaciclib (VERZENIO) 150 MG tablet CHEMO     [DISCONTINUED] tamoxifen (NOLVADEX) 20 MG tablet     UNABLE TO FIND     VITAMIN D, CHOLECALCIFEROL, PO     No current facility-administered medications for this visit.        PHYSICAL EXAM:  ECO  /68 (BP Location: Right arm, Patient Position: Sitting, Cuff Size: Adult Regular)  Pulse 96  Temp 98.5  F (36.9  C) (Oral)  Resp 16  Wt 51.1 kg (112 lb 11.2 oz)  SpO2 99%  BMI 19.34 kg/m2   Wt Readings from Last 10 Encounters:   18 51.1 kg (112 lb 11.2 oz)   18 55.1 kg (121 lb 6.4 oz)   18 53.7 kg (118 lb 6.4 oz)   18 54.8 kg (120 lb 14.4 oz)   17 54.6 kg (120 lb 4.8 oz)   17 53.8 kg (118 lb 11.2 oz)   17 55.1 kg (121 lb 8 oz)   10/17/17 54.1 kg (119 lb 3.2 oz)   10/02/17 54.1 kg (119 lb 4.8 oz)   17 54.4 kg  "(119 lb 14.4 oz)      General: Alert, oriented, pleasant, NAD  Skin: warm, dry without rashes or bruising  HEENT: Normocephalic, atraumatic, PERRLA, EOMI. Moist mucus membranes, no lesions or thrush  Neck: No cervical or supraclavicular LAD.   Lungs:  normal work of breathing, perhaps the slightest dullness to auscultation and percussion   Cardiac: RRR, S1, S2, no murmurs  Abdomen: Soft, nontender, nondistended. Normoactive bowel sounds. No hepatosplenomegaly, masses  Neuro: CNII-XII grossly intact  Extremities: No pedal edema. L arm with a sleeve, unable to visualize gross edema    Labs:   Results for SHAN MARSH (MRN [id number]) as of 3/9/2018 14:03   Ref. Range 1/22/2018 13:03 2/9/2018 13:22 3/9/2018 12:07   Alkaline Phosphatase Latest Ref Range: 40 - 150 U/L 57 68 112   ALT Latest Ref Range: 0 - 50 U/L 21 16 104 (H)   AST Latest Ref Range: 0 - 45 U/L 32 28 79 (H)   Results for SHAN MARSH (MRN [id number]) as of 3/9/2018 14:03   Ref. Range 1/22/2018 13:03 2/9/2018 13:22 3/9/2018 12:07   Hemoglobin Latest Ref Range: 11.7 - 15.7 g/dL 13.2 12.5 10.6 (L)     Assessment & Plan: Shan Marsh is a 47-year-old female with history of a T1 N0, ER/[place] positive, HER2-negative left breast cancer treated with bilateral mastectomies in 2012, now with metastatic disease involving the left axilla, bones, and possible liver and lung, ER/[place] positive, HER2 negative.  She had progression on palbociclib and Tamoxifen.    1. Metastatic breast cancer: Remains on abemiciclib with Zoladex, initiaited on 1/12/2018. Since our last visit here, she reports today that she went to a cancer center in [place] where she received:  - Valavex (injection, some sort of immunotherapy, she reports this is a phase 3 investigational agent in [place])  - Insulin potentiated therapy (IPT) with conjugated chemotherapy (5% chemotherapy, unclear what chemotherapy)  - Gina-S toxins: Oral immune agent that \"makes your body think its " "fighting a bacteria\", taken PO 1-2 times weekly, starts on Mondays  - Coffee enemas PRN  - A variety of supplements that she takes daily, of which she does not know the names  - She was continued on Letrozole and advised to continue Zoladex  - She was instructed to hold abemiciclib, has not taken since 3 weeks ago  - She would return in July for next cycle of Valavex and ?IPT    She also reports anemia there, as well as 2.5 liters drained from her L lung base.    We had a long conversation about this today. We discussed that her honesty is appreciated, as she was contemplating not disclosing this information. Discussed that her safety is the most important factor to consider in this setting and she voiced understanding of that. She has a lot of side effects from the Gina-S toxin tablets and is unable to eat while taking them due to side effects, which include fevers. With her weight loss and fatigue, I did recommend that she hold off starting those next week. She was in agreement with that and is going to hold off.    Plan to have her see Dr. Whitman next week with imaging prior.    2. Bone mets: Plan to start Zometa today. Continue Ca++/Vit D    3. FEN: She has lost 9 lb since last visit, secondary likely to the new treatments she is on, particularly this Gina-S med. As above, we did discuss holding off next week given how poorly she felt on this. She was in agreement. She is eating and drinking without difficulty at this time and will focus on calories.    4. Reports of L pleural effusion: By exam, there is perhaps the slightest dullness to auscultation and percussion but does not appear to be a significant effusion there at this time. Presently no breathing symptoms.    5. Anemia: She reports she had anemia there from the insulin potentiated therapy, which contains 5% of an unknown chemotherapy.     6. L arm swelling: started in [place], told there it was medication related. Suspect this is 2/2 L axillary " involvment given she has no other sites of edema and she has known disease in the L axilla.    7. Elevated LFTS: Likely secondary to these investigational agents. MOnitor.     I spent >40 minutes with the patient, with over 50% of the time spent counseling or coordinating their care as described above.    Lima Griffiths PA-C

## 2018-03-09 NOTE — PROGRESS NOTES
Infusion Nursing Note:  Shan Marsh presents for Zoladex, Zometa  Met with GRAY Frias before infusion.    Note: N/A.    Treatment Conditions:  Lab Results   Component Value Date    HGB 10.6 03/09/2018     Lab Results   Component Value Date    WBC 5.3 03/09/2018      Lab Results   Component Value Date    ANEU 3.2 03/09/2018     Lab Results   Component Value Date     03/09/2018      Lab Results   Component Value Date     03/09/2018                   Lab Results   Component Value Date    POTASSIUM 4.6 03/09/2018           No results found for: MAG         Lab Results   Component Value Date    CR 0.51 03/09/2018                   Lab Results   Component Value Date    KRYSTEN 8.6 03/09/2018                Lab Results   Component Value Date    BILITOTAL 0.4 03/09/2018           Lab Results   Component Value Date    ALBUMIN 3.1 03/09/2018                    Lab Results   Component Value Date     03/09/2018           Lab Results   Component Value Date    AST 79 03/09/2018       Results reviewed, labs MET treatment parameters, ok to proceed with treatment.    Intravenous Access:  Peripheral IV placed.    Post Infusion Assessment:  Patient tolerated infusion without incident.  Patient tolerated injection without incident to LEFT lower quadrant    Discharge Plan:   Patient declined prescription refills.  Discharge instructions reviewed with: Patient.  Patient and/or family verbalized understanding of discharge instructions and all questions answered.  Copy of AVS reviewed with patient and/or family. Check out notes in, apps still to be scheduled  Patient discharged in stable condition accompanied by: self.    Sweta Wen RN    zometa stopped at 13:54

## 2018-03-09 NOTE — MR AVS SNAPSHOT
After Visit Summary   3/9/2018    Shan Marsh    MRN: [id number]           Patient Information     Date Of Birth          1970        Visit Information        Provider Department      3/9/2018 1:00 PM  28 ATC;  ONCOLOGY INFUSION [facility]        Today's Diagnoses     Metastatic breast cancer (H)    -  1    Bone metastasis (H)        Encounter for long-term (current) use of medications           Follow-ups after your visit        Future tests that were ordered for you today     Open Future Orders        Priority Expected Expires Ordered    CBC with platelets differential Routine 3/9/2018 4/9/2018 3/9/2018    Comprehensive metabolic panel Routine  3/9/2019 3/9/2018    CT Chest/Abdomen/Pelvis w Contrast Routine  3/10/2019 3/9/2018            Who to contact     If you have questions or need follow up information about today's clinic visit or your schedule please contact [facility] directly at [phone].  Normal or non-critical lab and imaging results will be communicated to you by [employer], letter or phone within 4 business days after the clinic has received the results. If you do not hear from us within 7 days, please contact the clinic through [employer] - /l?k â€¢ splik/t or phone. If you have a critical or abnormal lab result, we will notify you by phone as soon as possible.  Submit refill requests through [employer] or call your pharmacy and they will forward the refill request to us. Please allow 3 business days for your refill to be completed.          Additional Information About Your Visit        MyChart Information     [employer] gives you secure access to your electronic health record. If you see a primary care provider, you can also send messages to your care team and make appointments. If you have questions, please call your primary care clinic.  If you do not have a primary care provider, please call [phone] and they will assist you.        Care  EveryWhere ID     This is your Care EveryWhere ID. This could be used by other organizations to access your [facility] medical records  [id number]         Blood Pressure from Last 3 Encounters:   03/09/18 105/68   02/09/18 101/67   01/22/18 114/72    Weight from Last 3 Encounters:   03/09/18 51.1 kg (112 lb 11.2 oz)   02/09/18 55.1 kg (121 lb 6.4 oz)   01/22/18 53.7 kg (118 lb 6.4 oz)              We Performed the Following     CBC with platelets and differential     Comprehensive metabolic panel (BMP + Alb, Alk Phos, ALT, AST, Total. Bili, TP)          Where to get your medicines      These medications were sent to [facility] [address]  [address] [facility] [address] [place] [address]    Hours:  TRANSPLANT PHONE NUMBER [phone] Phone:  [phone]     letrozole 2.5 MG tablet          Primary Care Provider Office Phone # Fax #    Marilu Michelle [phone] [address]  [facility] [address]        Equal Access to Services     LUZ JAMES AH: Hadii aad ku hadasho Soomaali, waaxda luqadaha, qaybta kaalmada adeegyada, edi ozunain haymonikn lexi kolb. So [facility] [phone].    ATENCIÓN: Si habla español, tiene a gamez disposición servicios gratuitos de asistencia lingüística. Llame al [phone].    We comply with applicable federal civil rights laws and [place] laws. We do not discriminate on the basis of race, color, national origin, age, disability, sex, sexual orientation, or gender identity.            Thank you!     Thank you for choosing [facility]  for your care. Our goal is always to provide you with excellent care. Hearing back from our patients is one way we can continue to improve our services. Please take a few minutes to complete the written survey that you may receive in the mail after your visit with us. Thank you!             Your Updated Medication List - Protect others around you:  Learn how to safely use, store and throw away your medicines at www.disposemymeds.org.          This list is accurate as of 3/9/18  3:22 PM.  Always use your most recent med list.                   Brand Name Dispense Instructions for use Diagnosis    abemaciclib 150 MG tablet CHEMO    VERZENIO    60 tablet    Take 1 tablet (150 mg) by mouth 2 times daily    Metastatic breast cancer (H), Bone metastasis (H)       calcium carbonate 1250 MG tablet    OS-KRYSTEN 500 mg [place]. Ca     Take 1 tablet by mouth daily        letrozole 2.5 MG tablet    FEMARA    28 tablet    Take 1 tablet (2.5 mg) by mouth daily    Metastatic breast cancer (H)       ondansetron 8 MG tablet    ZOFRAN          prochlorperazine 10 MG tablet    COMPAZINE          UNABLE TO FIND      daily Comprehensive Immunity Support - Host Defense        VITAMIN D (CHOLECALCIFEROL) PO      Take by mouth daily

## 2018-03-09 NOTE — NURSING NOTE
"Oncology Rooming Note    March 9, 2018 12:16 PM   Shan Marsh is a 47 year old female who presents for:    Chief Complaint   Patient presents with     Blood Draw     labs drawn with PIV start by rn.  vs taken     Oncology Clinic Visit     Return: Breast Cancer     Initial Vitals: /68 (BP Location: Right arm, Patient Position: Sitting, Cuff Size: Adult Regular)  Pulse 96  Temp 98.5  F (36.9  C) (Oral)  Resp 16  Ht 1.626 m (5' 4\")  Wt 51.1 kg (112 lb 11.2 oz)  SpO2 99%  BMI 19.34 kg/m2 Estimated body mass index is 19.34 kg/(m^2) as calculated from the following:    Height as of this encounter: 1.626 m (5' 4\").    Weight as of this encounter: 51.1 kg (112 lb 11.2 oz). Body surface area is 1.52 meters squared.  No Pain (0) Comment: Data Unavailable   No LMP recorded. Patient is not currently having periods (Reason: UNKNOWN).  Allergies reviewed: Yes  Medications reviewed: Yes    Medications: Medication refills not needed today.  Pharmacy name entered into [facility]:    [facility] [place], [place] - [address] [place] [address] DRUG STORE [address] [place] [address] [address] [place] [address] AT [facility] & [address] [place] [address] PHARMACY [place], [place] - [address] [facility] DRUG STORE [address] - [place], [place] - [address] AT [facility] OF [address]    Clinical concerns: Pt. Stated that she needs a refill of Letrozole. I informed pt to remind the Provider/ISADORA about refills in case i dont touch bases with them, if the provider was in the exam room while i attend on rooming the next pt. Pt verbalized understandings.          5 minutes for nursing intake (face to face time)     VIRAJ Gary      "

## 2018-03-09 NOTE — PROGRESS NOTES
"Hematology-Oncology Visit  Mar 9, 2018    Reason for Visit: follow-up metastatic breast cancer, ER positive     HPI: Shan Marsh is a 47 year old female without significant past medical history with recent diagnosis of metastatic breast cancer after presenting with left shoulder pain. Shoulder MRI showed soft tissue mass close to distal clavicle concerning for malignancy. She had PET/CT 5/9/17 showing hypermetabolic left axillary, supraclavicular, mediastinal and hilar nodes. She had biopsies of left axilla and hilar nodes showing metastatic breast cancer, strongly ER positive, moderately RI positive and HER-2 kaitlin non-amplified. She originally had [id number], ER/RI positive, HER-2 negative breast cancer diagnosed in spring of 2012 s/p bilateral mastectomies and Tamoxifen from 8/2012-2/2013 and discontinued due to side effects. She met with Dr. Whitman most recently on 5/16/17 who recommended treatment on BIG10 clinical trial with palbociclib and tamoxifen. She started treatment on 5/24/17 and required dose reduction for neutropenia. CT CAP and bone scan 1/11/2018 showing progressive disease with new bony lesions in the ribs, T spine, and possibly liver, lungs. She started Zoladex and abemiciclib on 1/12/2018.    Interval History: Shan is here for follow-up.    Since her last visit here, she went to [place] and was treated at a cancer center. She received:  - Valavex (injection, some sort of immunotherapy, she reports this is a phase 3 investigational agent in [place])  - Insulin potentiated therapy with conjugated chemotherapy (5% chemotherapy, unclear what chemotherapy)  - Serenity toxins: Oral immune agent that \"makes your body think its fighting a bacteria\", taken PO 1-2 times weekly, starts on Mondays  - Coffee enemas PRN  - A variety of supplements that she takes daily, of which she does not know the names  - She was continued on Letrozole and advised to conintue Zoladex  - She was instructed to hold " abemiciclib, has not taken since 3 weeks ago    She intends to go back in July for repeat Valavex.    Treatment was difficult on her. She had a lot of side effects. She had anemia there that was monitored. She had a lot of side effects form the Gina-S toxins, namely fevers and generalized malaise that made eating and drinking difficult. She does not know if she could tolerate taking those again at this time. She feels tired. Her breathing feels better. She has no chest pain. No fevers right now. No bleeding, abdominal pain, or bowel changes outside of those when she does the coffee enemas. She gets cramping with those, too. She noticed some L arm swelling there and the MD said it was a medicaiton related side effect. SHe is now wearing a sleeve, and that is helpful.     ROS otherwise negative.     Current Outpatient Prescriptions   Medication     letrozole (FEMARA) 2.5 MG tablet     ondansetron (ZOFRAN) 8 MG tablet     prochlorperazine (COMPAZINE) 10 MG tablet     calcium carbonate (OS-KRYSTEN 500 MG [place]. [place]) 1250 MG tablet     abemaciclib (VERZENIO) 150 MG tablet CHEMO     [DISCONTINUED] tamoxifen (NOLVADEX) 20 MG tablet     UNABLE TO FIND     VITAMIN D, CHOLECALCIFEROL, PO     No current facility-administered medications for this visit.        PHYSICAL EXAM:  ECO  /68 (BP Location: Right arm, Patient Position: Sitting, Cuff Size: Adult Regular)  Pulse 96  Temp 98.5  F (36.9  C) (Oral)  Resp 16  Wt 51.1 kg (112 lb 11.2 oz)  SpO2 99%  BMI 19.34 kg/m2   Wt Readings from Last 10 Encounters:   18 51.1 kg (112 lb 11.2 oz)   18 55.1 kg (121 lb 6.4 oz)   18 53.7 kg (118 lb 6.4 oz)   18 54.8 kg (120 lb 14.4 oz)   17 54.6 kg (120 lb 4.8 oz)   17 53.8 kg (118 lb 11.2 oz)   17 55.1 kg (121 lb 8 oz)   10/17/17 54.1 kg (119 lb 3.2 oz)   10/02/17 54.1 kg (119 lb 4.8 oz)   17 54.4 kg (119 lb 14.4 oz)      General: Alert, oriented, pleasant, NAD  Skin: warm, dry without  "rashes or bruising  HEENT: Normocephalic, atraumatic, PERRLA, EOMI. Moist mucus membranes, no lesions or thrush  Neck: No cervical or supraclavicular LAD.   Lungs:  normal work of breathing, perhaps the slightest dullness to auscultation and percussion   Cardiac: RRR, S1, S2, no murmurs  Abdomen: Soft, nontender, nondistended. Normoactive bowel sounds. No hepatosplenomegaly, masses  Neuro: CNII-XII grossly intact  Extremities: No pedal edema. L arm with a sleeve, unable to visualize gross edema    Labs:   Results for SHAN MARSH (MRN [id number]) as of 3/9/2018 14:03   Ref. Range 1/22/2018 13:03 2/9/2018 13:22 3/9/2018 12:07   Alkaline Phosphatase Latest Ref Range: 40 - 150 U/L 57 68 112   ALT Latest Ref Range: 0 - 50 U/L 21 16 104 (H)   AST Latest Ref Range: 0 - 45 U/L 32 28 79 (H)   Results for SHAN MARSH (MRN [id number]) as of 3/9/2018 14:03   Ref. Range 1/22/2018 13:03 2/9/2018 13:22 3/9/2018 12:07   Hemoglobin Latest Ref Range: 11.7 - 15.7 g/dL 13.2 12.5 10.6 (L)     Assessment & Plan: Shan Marsh is a 47-year-old female with history of a T1 N0, ER/VT positive, HER2-negative left breast cancer treated with bilateral mastectomies in 2012, now with metastatic disease involving the left axilla, bones, and possible liver and lung, ER/VT positive, HER2 negative.  She had progression on palbociclib and Tamoxifen.    1. Metastatic breast cancer: Remains on abemiciclib with Zoladex, initiaited on 1/12/2018. Since our last visit here, she reports today that she went to a cancer center in [place] where she received:  - Valavex (injection, some sort of immunotherapy, she reports this is a phase 3 investigational agent in [place])  - Insulin potentiated therapy (IPT) with conjugated chemotherapy (5% chemotherapy, unclear what chemotherapy)  - Gina-S toxins: Oral immune agent that \"makes your body think its fighting a bacteria\", taken PO 1-2 times weekly, starts on Mondays  - Coffee enemas PRN  - " A variety of supplements that she takes daily, of which she does not know the names  - She was continued on Letrozole and advised to continue Zoladex  - She was instructed to hold abemiciclib, has not taken since 3 weeks ago  - She would return in July for next cycle of Valavex and ?IPT    She also reports anemia there, as well as 2.5 liters drained from her L lung base.    We had a long conversation about this today. We discussed that her honesty is appreciated, as she was contemplating not disclosing this information. Discussed that her safety is the most important factor to consider in this setting and she voiced understanding of that. She has a lot of side effects from the Gina-S toxin tablets and is unable to eat while taking them due to side effects, which include fevers. With her weight loss and fatigue, I did recommend that she hold off starting those next week. She was in agreement with that and is going to hold off.    Plan to have her see Dr. Whitman next week with imaging prior.    2. Bone mets: Plan to start Zometa today. Continue Ca++/Vit D    3. FEN: She has lost 9 lb since last visit, secondary likely to the new treatments she is on, particularly this Gina-S med. As above, we did discuss holding off next week given how poorly she felt on this. She was in agreement. She is eating and drinking without difficulty at this time and will focus on calories.    4. Reports of L pleural effusion: By exam, there is perhaps the slightest dullness to auscultation and percussion but does not appear to be a significant effusion there at this time. Presently no breathing symptoms.    5. Anemia: She reports she had anemia there from the insulin potentiated therapy, which contains 5% of an unknown chemotherapy.     6. L arm swelling: started in [place], told there it was medication related. Suspect this is 2/2 L axillary involvment given she has no other sites of edema and she has known disease in the L axilla.    7.  Elevated LFTS: Likely secondary to these investigational agents. MOnitor.     I spent >40 minutes with the patient, with over 50% of the time spent counseling or coordinating their care as described above.    Lima Griffiths PA-C

## 2018-03-12 ENCOUNTER — TELEPHONE (OUTPATIENT)
Dept: ONCOLOGY | Facility: CLINIC | Age: 48
End: 2018-03-12

## 2018-03-12 NOTE — TELEPHONE ENCOUNTER
Oral Chemotherapy Monitoring Program    Placed a call out to patient regarding Verzenio therapy. Patient is getting treatment in [place] and is not currently taking Verzenio. She took Verzenio until 2/11 and on 2/12, she started treatment in [place]. She continues on Letrozole, some injections mailed to her from [place], and a few oral supplements. Of note, the medications are not legal in the [place] and her biggest concern is seeing if the [facility]/Dr. Whitman will support the treatment that she is getting. I told her that I could not predict this. The plan is for her to return to [place] in June for continue treatment and assessment of therapy. Patient verbalized that she was supposed to have a CT scan and follow up with Dr. Whitman this week, but that she did not see anything in her MyChart. I let her know that I would reach out to the scheduling team to get those appointments set up and that we would follow up accordingly. Patient verbalized understanding and thanked me for the call and care.    Follow up: check for patient appointment on Friday 3/16 with Dr. Whitman to determine if patient will continue being followed by us.    Sanpa Carney, Pharmacy Intern  Oral Chemotherapy Monitoring Program  [facility]  [phone]

## 2018-03-15 ENCOUNTER — RADIANT APPOINTMENT (OUTPATIENT)
Dept: CT IMAGING | Facility: CLINIC | Age: 48
End: 2018-03-15
Attending: PHYSICIAN ASSISTANT
Payer: COMMERCIAL

## 2018-03-15 DIAGNOSIS — C50.919 METASTATIC BREAST CANCER: ICD-10-CM

## 2018-03-15 RX ORDER — IOPAMIDOL 755 MG/ML
69 INJECTION, SOLUTION INTRAVASCULAR ONCE
Status: COMPLETED | OUTPATIENT
Start: 2018-03-15 | End: 2018-03-15

## 2018-03-15 RX ADMIN — IOPAMIDOL 69 ML: 755 INJECTION, SOLUTION INTRAVASCULAR at 13:57

## 2018-03-15 NOTE — DISCHARGE INSTRUCTIONS

## 2018-03-16 ENCOUNTER — ONCOLOGY VISIT (OUTPATIENT)
Dept: ONCOLOGY | Facility: CLINIC | Age: 48
End: 2018-03-16
Attending: INTERNAL MEDICINE
Payer: COMMERCIAL

## 2018-03-16 ENCOUNTER — APPOINTMENT (OUTPATIENT)
Dept: LAB | Facility: CLINIC | Age: 48
End: 2018-03-16
Attending: INTERNAL MEDICINE
Payer: COMMERCIAL

## 2018-03-16 VITALS
SYSTOLIC BLOOD PRESSURE: 120 MMHG | RESPIRATION RATE: 18 BRPM | TEMPERATURE: 98.1 F | HEART RATE: 106 BPM | WEIGHT: 114.7 LBS | BODY MASS INDEX: 19.69 KG/M2 | OXYGEN SATURATION: 99 % | DIASTOLIC BLOOD PRESSURE: 75 MMHG

## 2018-03-16 DIAGNOSIS — J90 PLEURAL EFFUSION: ICD-10-CM

## 2018-03-16 DIAGNOSIS — C50.919 METASTATIC BREAST CANCER: Primary | ICD-10-CM

## 2018-03-16 LAB
ALBUMIN SERPL-MCNC: 3.4 G/DL (ref 3.4–5)
ALP SERPL-CCNC: 102 U/L (ref 40–150)
ALT SERPL W P-5'-P-CCNC: 49 U/L (ref 0–50)
ANION GAP SERPL CALCULATED.3IONS-SCNC: 6 MMOL/L (ref 3–14)
AST SERPL W P-5'-P-CCNC: 30 U/L (ref 0–45)
BASOPHILS # BLD AUTO: 0 10E9/L (ref 0–0.2)
BASOPHILS NFR BLD AUTO: 0.7 %
BILIRUB SERPL-MCNC: 0.3 MG/DL (ref 0.2–1.3)
BUN SERPL-MCNC: 9 MG/DL (ref 7–30)
CALCIUM SERPL-MCNC: 9 MG/DL (ref 8.5–10.1)
CANCER AG27-29 SERPL-ACNC: 474 U/ML (ref 0–39)
CEA SERPL-MCNC: 29.8 UG/L (ref 0–2.5)
CHLORIDE SERPL-SCNC: 107 MMOL/L (ref 94–109)
CO2 SERPL-SCNC: 26 MMOL/L (ref 20–32)
CREAT SERPL-MCNC: 0.55 MG/DL (ref 0.52–1.04)
DIFFERENTIAL METHOD BLD: ABNORMAL
EOSINOPHIL # BLD AUTO: 0.1 10E9/L (ref 0–0.7)
EOSINOPHIL NFR BLD AUTO: 1.7 %
ERYTHROCYTE [DISTWIDTH] IN BLOOD BY AUTOMATED COUNT: 13.9 % (ref 10–15)
GFR SERPL CREATININE-BSD FRML MDRD: >90 ML/MIN/1.7M2
GGT SERPL-CCNC: 64 U/L (ref 0–40)
GLUCOSE SERPL-MCNC: 92 MG/DL (ref 70–99)
HCT VFR BLD AUTO: 37.9 % (ref 35–47)
HGB BLD-MCNC: 12.2 G/DL (ref 11.7–15.7)
IMM GRANULOCYTES # BLD: 0 10E9/L (ref 0–0.4)
IMM GRANULOCYTES NFR BLD: 0.5 %
LYMPHOCYTES # BLD AUTO: 0.9 10E9/L (ref 0.8–5.3)
LYMPHOCYTES NFR BLD AUTO: 22.3 %
MCH RBC QN AUTO: 33.4 PG (ref 26.5–33)
MCHC RBC AUTO-ENTMCNC: 32.2 G/DL (ref 31.5–36.5)
MCV RBC AUTO: 104 FL (ref 78–100)
MONOCYTES # BLD AUTO: 0.3 10E9/L (ref 0–1.3)
MONOCYTES NFR BLD AUTO: 7.1 %
NEUTROPHILS # BLD AUTO: 2.9 10E9/L (ref 1.6–8.3)
NEUTROPHILS NFR BLD AUTO: 67.7 %
NRBC # BLD AUTO: 0 10*3/UL
NRBC BLD AUTO-RTO: 0 /100
PLATELET # BLD AUTO: 353 10E9/L (ref 150–450)
POTASSIUM SERPL-SCNC: 4 MMOL/L (ref 3.4–5.3)
PROT SERPL-MCNC: 7.6 G/DL (ref 6.8–8.8)
RBC # BLD AUTO: 3.65 10E12/L (ref 3.8–5.2)
SODIUM SERPL-SCNC: 139 MMOL/L (ref 133–144)
WBC # BLD AUTO: 4.2 10E9/L (ref 4–11)

## 2018-03-16 PROCEDURE — 86300 IMMUNOASSAY TUMOR CA 15-3: CPT | Performed by: INTERNAL MEDICINE

## 2018-03-16 PROCEDURE — 85025 COMPLETE CBC W/AUTO DIFF WBC: CPT | Performed by: INTERNAL MEDICINE

## 2018-03-16 PROCEDURE — 99215 OFFICE O/P EST HI 40 MIN: CPT | Mod: ZP | Performed by: INTERNAL MEDICINE

## 2018-03-16 PROCEDURE — 82977 ASSAY OF GGT: CPT | Performed by: INTERNAL MEDICINE

## 2018-03-16 PROCEDURE — 82378 CARCINOEMBRYONIC ANTIGEN: CPT | Performed by: INTERNAL MEDICINE

## 2018-03-16 PROCEDURE — 80053 COMPREHEN METABOLIC PANEL: CPT | Performed by: INTERNAL MEDICINE

## 2018-03-16 PROCEDURE — G0463 HOSPITAL OUTPT CLINIC VISIT: HCPCS | Mod: ZF

## 2018-03-16 PROCEDURE — 36415 COLL VENOUS BLD VENIPUNCTURE: CPT

## 2018-03-16 NOTE — NURSING NOTE
"Oncology Rooming Note    March 16, 2018 8:19 AM   Shan Marsh is a 47 year old female who presents for:    Chief Complaint   Patient presents with     Blood Draw     VPT blood draw and vitals     Oncology Clinic Visit     Return: Breast Ca     Initial Vitals: /75  Pulse 106  Temp 98.1  F (36.7  C) (Oral)  Resp 18  Wt 52 kg (114 lb 11.2 oz)  SpO2 99%  BMI 19.69 kg/m2 Estimated body mass index is 19.69 kg/(m^2) as calculated from the following:    Height as of 3/9/18: 1.626 m (5' 4\").    Weight as of this encounter: 52 kg (114 lb 11.2 oz). Body surface area is 1.53 meters squared.  Data Unavailable Comment: Data Unavailable   No LMP recorded. Patient is not currently having periods (Reason: UNKNOWN).  Allergies reviewed: Yes  Medications reviewed: Yes    Medications: Medication refills not needed today.  Pharmacy name entered into [employer]:    [facility] [address] [facility] [address] [place] [address] [id number] [place] [address] AVE AT [facility] [address]  [facility] [place], [place] - [address] [facility] [id number] - [place], [place] - [address] [facility] [address]    Clinical concerns: no new concerns Dr. Whitman was NOT notified.    10 minutes for nursing intake (face to face time)     Shaun De Santiago CMA              "

## 2018-03-16 NOTE — PROGRESS NOTES
"Oncology/Hematology Visit Note  Mar 16, 2018    Reason for Visit: metastatic ER/ID positive, Her2 negative breast cancer    History of Present Illness: Shan Marsh is a 47 year old with metastatic breast cancer diagnosed in May 2017 after presenting with left shoulder pain. Shoulder MRI showed soft tissue mass close to distal clavicle concerning for malignancy. She had PET/CT 5/9/17 showing hypermetabolic left axillary, supraclavicular, mediastinal and hilar nodes. Biopsies of left axilla and hilar nodes showed metastatic breast cancer, strongly ER positive, moderately ID positive and HER-2 non-amplified. She originally had [id number], ER/ID positive, HER-2 negative breast cancer diagnosed in spring of 2012 s/p bilateral mastectomies. She took tamoxifen from 8/2012-2/2013 and discontinued this due to side effects.     She started treatment on 5/24/17 per the BIG10 clinical trial with palbociclib and tamoxifen. She had a dose reduction of palbociclib due to neutropenia. CT CAP and Bone scan on 1/11/18 with progressive disease--new bony lesions in the ribs, T spine and possibly liver, lungs. She was taken off the BIG10 trial and started on Zoladex, letrozole, and abemiciclib on 1/12/18.      In February 2018, Shan traveled to [facility] in [place]. Seen by a Dr. Brooks. She received the following therapies:  - Vallovex vaccine (which per FDA is in phase I trials here in [place])  - IV Vitamin C, IV ozone, hyperbaric chamber, Vitamin B17 infection, Vitamin CK3 IV, IPT (\"Low dose chemo\"), Gina's toxins, and coffee enema.   - she reports having side effects including hypoxia, n/v, and dehydration. She was in the hospital there for 3 weeks.   - also is taking oral niacin, Lugol, acidol, pancreatin, and thyroid desiccated liver, per their regimen   - she plans to return to [place] in June for reimaging and more treatment     She stopped the abeiciclib on 2/10/18. She is still taking letrozole. Received Zoladex on " "3/9/18.     Interval History: Shan just returned from [place] last week. She was there for about 3 weeks getting a number of alternative therapies at an \"immunotherapy\" clinic in [facility]. It sounds like she became quite ill and was hospitalized for several of those weeks. Main symptoms were n/v, diarrhea, dehydration, and shortness of breath.   She had a left thoracentesis in [place] on 2/13/18, reports 2.5 L was drained. After this she had improvement in cough and SOB. She also had a central line placed.   Since arriving back home last week, she is feeling better. Weight is stable. Appetite is decent. No fevers or cough/cold symptoms. Going to yoga and acupuncture. Reports minimal hip pain that is adequately treated without medications.    Swelling in her left arm increased while in [place] and she now wears a compression sleeve.     A complete 10-pt ROS is negative other than what is reported above    Meds: reviewed    Physical Examination:  /75  Pulse 106  Temp 98.1  F (36.7  C) (Oral)  Resp 18  Wt 52 kg (114 lb 11.2 oz)  SpO2 99%  BMI 19.69 kg/m2  General: NAD, alert and interactive  HEENT: PERRL, MMM, no oral lesions  Lungs: decreased BS and dull to percussion at left base, otherwise clear   Cardiovascular: RRR, no M/R/G, no edema  Abdominal/Rectal: +BS, soft, NT, ND, No HSM   Lymph: palpable left supraclavicular and lower anterior cervical nodes. No palpable axillary or inguinal nodes   MSK: no LE edema   Skin: no rashes or petechaie  Neuro: A&O     Laboratory Data:  CBC RESULTS:   Recent Labs   Lab Test  03/16/18   0808   WBC  4.2   RBC  3.65*   HGB  12.2   HCT  37.9   MCV  104*   MCH  33.4*   MCHC  32.2   RDW  13.9   PLT  353     Recent Labs   Lab Test  03/16/18   0808  03/09/18   1207   NA  139  139   POTASSIUM  4.0  4.6   CHLORIDE  107  109   CO2  26  23   ANIONGAP  6  7   GLC  92  98   BUN  9  14   CR  0.55  0.51*   KRYSTEN  9.0  8.6     Liver Function Studies -   Recent Labs   Lab Test  03/16/18   " 0808   PROTTOTAL  7.6   ALBUMIN  3.4   BILITOTAL  0.3   ALKPHOS  102   AST  30   ALT  49     Results for NIKKIE HICKS (MRN [id number]) as of 3/16/2018 12:36   Ref. Range 5/10/2017 12:33 3/16/2018 08:08   CA 27-29 Latest Ref Range: 0 - 39 U/mL 30 474 (H)   Results for NIKKIE HICKS (MRN [id number]) as of 3/16/2018 12:36   Ref. Range 3/16/2018 08:08   CEA Latest Ref Range: 0 - 2.5 ug/L 29.8 (H)       CT cap 3/15/18: pending. Moderate left pleural effusion is present.       Assessment and Plan:    Metastatic ER/LA positive, Her2 negative breast cancer  She has a history of stage 1A, ER/LA positive, HER2-negative left breast cancer in 2012, treated with bilateral mastectomies and several years of tamoxifen. In May 2017 found to have metastatic disease involving the left axilla and hilar lymph nodes, ER/LA positive, HER2 negative.     Started on BIG 10 trial of palbociclib/tamoxifen in May 2017 but removed on 1/12/18 due to disease progression on imaging. Treatment changed to Zoladex, letrozole, and abeciclimib. She then traveled to [place] for most of last month and received a number of different therapies including a purported immunotherapy (Vallovex vaccine) and various homeopathic/naturopathic remedies. It sounds like she became quite ill and was hospitalized for several weeks. She is now feeling better.     She stopped the abemaciclib in February, only took this for one month. We recommended that she not restart the abemaciclib, since we do not know how it will interact with the multiple naturopathic therapies that she is still taking. She agrees and has no intention to restart it.     She is continuing on daily letrozole and Zoladex which she received last week on 3/9/18. We do recommend that she continue with this endocrine therapy. She has requested oophorectomy which we agree is a reasonable alternative to continuous LHRH agonist therapy. We referred her to gynecology.     Regarding the  alternative therapy she received in [place] last month, we did not endorse this. We did not discuss it in detail as she is quite determined to continue with it. Dr. Whitman has discussed alternative therapy with her extensively in the past. We do appreciate that she is providing us with a list of the medications. She is going to establish care with a local naturopathic provider and we referred her to several providers in the area.     The CT scan report from yesterday is still pending. We will contact her if the results dictate any changes in therapy.     Plan:   - continue letrozole and Zoladex (next dose on 4/9/18)  - referral to gynecology for oophorectomy   - thoracentesis for left pleural effusion  - follow-up in one month with PA, Zoladex and zometa will be due at that time    - f/u with Dr. Whitman in 2 months     Left pleural effusion: respiratory status is stable, she is minimally symptomatic from this but likely will develop symptoms over the next several weeks. ordered therapeutic thoracentesis.    Bone mets: continue monthly Zometa (next 4/9/18), calcium and vitamin D      Elevated LFTs: this likely was related to the therapy she received in [place]. On today's labs has resolved       Patient seen and discussed with staff Dr. Antonette Schmitz MD  Heme/Onc Fellow  Pager: [phone]          ADDENDUM:  The patient was seen and evaluated by me with Dr. Allen Schmitz as outlined above.  I added the above note to reflect my evaluation.       PHYSICAL EXAMINATION:     GENERAL:  On clinical examination, Shan appears comfortable.    LYMPH:  She has small, palpable cervical and supraclavicular lymph nodes.  I am unable to appreciate any axillary adenopathy.   HEART:  Her heart is regular.    LUNGS:  She has dullness to percussion and auscultation in the lower third of the left lung.  The remainder of her lungs are clear.    ABDOMEN:  Her abdomen is soft, nontender, nondistended.  There is no palpable  hepatosplenomegaly.   EXTREMITIES:  She has no peripheral edema in her lower extremities.  She has 1+ edema in her left upper extremity.      LABORATORY AND IMAGING:  Her laboratories and imaging were reviewed personally with Radiology.      ASSESSMENT AND PLAN:  This is a 47-year-old premenopausal woman currently on Zoladex and aromatase inhibitor originally with a prescription for abemaciclib who has not been taking this medication and has been doing alternative therapies in [place].  I discussed with Shan today that I do not endorse the therapies that she is receiving in [place].  I discussed the [employer]'s concern about integrative therapies and the lack of data.  Shan feels extremely important about doing these types of therapies.       I discussed with Shan that we will make a referral for an oophorectomy.  She will remain on Zometa for her bone metastases.  She will also remain on her aromatase inhibitor.       She will be seen monthly with blood work and her Zometa.       She was also given information about local integrative specialists here in the [place].

## 2018-03-16 NOTE — LETTER
"3/16/2018       RE: Shan Marsh  [address]  [facility] [address]     Dear Colleague,    Thank you for referring your patient, Shan Marsh, to the [facility]. Please see a copy of my visit note below.    Oncology/Hematology Visit Note  Mar 16, 2018    Reason for Visit: metastatic ER/IL positive, Her2 negative breast cancer    History of Present Illness: Shan Marsh is a 47 year old with metastatic breast cancer diagnosed in May 2017 after presenting with left shoulder pain. Shoulder MRI showed soft tissue mass close to distal clavicle concerning for malignancy. She had PET/CT 5/9/17 showing hypermetabolic left axillary, supraclavicular, mediastinal and hilar nodes. Biopsies of left axilla and hilar nodes showed metastatic breast cancer, strongly ER positive, moderately IL positive and HER-2 non-amplified. She originally had [id number], ER/IL positive, HER-2 negative breast cancer diagnosed in spring of 2012 s/p bilateral mastectomies. She took tamoxifen from 8/2012-2/2013 and discontinued this due to side effects.     She started treatment on 5/24/17 per the BIG10 clinical trial with palbociclib and tamoxifen. She had a dose reduction of palbociclib due to neutropenia. CT CAP and Bone scan on 1/11/18 with progressive disease--new bony lesions in the ribs, T spine and possibly liver, lungs. She was taken off the BIG10 trial and started on Zoladex, letrozole, and abemiciclib on 1/12/18.      In February 2018, Shan traveled to [facility] in [place]. Seen by a Dr. Brooks. She received the following therapies:  - Vallovex vaccine (which per FDA is in phase I trials here in [place])  - IV Vitamin C, IV ozone, hyperbaric chamber, Vitamin B17 infection, Vitamin CK3 IV, IPT (\"Low dose chemo\"), Gina's toxins, and coffee enema.   - she reports having side effects including hypoxia, n/v, and dehydration. She was in the hospital there for 3 weeks.   - also is taking " "oral niacin, Lugol, acidol, pancreatin, and thyroid desiccated liver, per their regimen   - she plans to return to [place] in June for reimaging and more treatment     She stopped the abeiciclib on 2/10/18. She is still taking letrozole. Received Zoladex on 3/9/18.     Interval History: Shan just returned from [place] last week. She was there for about 3 weeks getting a number of alternative therapies at an \"immunotherapy\" clinic in [facility]. It sounds like she became quite ill and was hospitalized for several of those weeks. Main symptoms were n/v, diarrhea, dehydration, and shortness of breath.   She had a left thoracentesis in [place] on 2/13/18, reports 2.5 L was drained. After this she had improvement in cough and SOB. She also had a central line placed.   Since arriving back home last week, she is feeling better. Weight is stable. Appetite is decent. No fevers or cough/cold symptoms. Going to yoga and acupuncture. Reports minimal hip pain that is adequately treated without medications.    Swelling in her left arm increased while in [place] and she now wears a compression sleeve.     A complete 10-pt ROS is negative other than what is reported above    Meds: reviewed    Physical Examination:  /75  Pulse 106  Temp 98.1  F (36.7  C) (Oral)  Resp 18  Wt 52 kg (114 lb 11.2 oz)  SpO2 99%  BMI 19.69 kg/m2  General: NAD, alert and interactive  HEENT: PERRL, MMM, no oral lesions  Lungs: decreased BS and dull to percussion at left base, otherwise clear   Cardiovascular: RRR, no M/R/G, no edema  Abdominal/Rectal: +BS, soft, NT, ND, No HSM   Lymph: palpable left supraclavicular and lower anterior cervical nodes. No palpable axillary or inguinal nodes   MSK: no LE edema   Skin: no rashes or petechaie  Neuro: A&O     Laboratory Data:  CBC RESULTS:   Recent Labs   Lab Test  03/16/18   0808   WBC  4.2   RBC  3.65*   HGB  12.2   HCT  37.9   MCV  104*   MCH  33.4*   MCHC  32.2   RDW  13.9   PLT  353     Recent Labs "   Lab Test  03/16/18   0808  03/09/18   1207   NA  139  139   POTASSIUM  4.0  4.6   CHLORIDE  107  109   CO2  26  23   ANIONGAP  6  7   GLC  92  98   BUN  9  14   CR  0.55  0.51*   KRYSTEN  9.0  8.6     Liver Function Studies -   Recent Labs   Lab Test  03/16/18   0808   PROTTOTAL  7.6   ALBUMIN  3.4   BILITOTAL  0.3   ALKPHOS  102   AST  30   ALT  49     Results for NIKKIE HICKS (MRN [id number]) as of 3/16/2018 12:36   Ref. Range 5/10/2017 12:33 3/16/2018 08:08   CA 27-29 Latest Ref Range: 0 - 39 U/mL 30 474 (H)   Results for NIKKIE HICKS (MRN [id number]) as of 3/16/2018 12:36   Ref. Range 3/16/2018 08:08   CEA Latest Ref Range: 0 - 2.5 ug/L 29.8 (H)       CT cap 3/15/18: pending. Moderate left pleural effusion is present.       Assessment and Plan:    Metastatic ER/MA positive, Her2 negative breast cancer  She has a history of stage 1A, ER/MA positive, HER2-negative left breast cancer in 2012, treated with bilateral mastectomies and several years of tamoxifen. In May 2017 found to have metastatic disease involving the left axilla and hilar lymph nodes, ER/MA positive, HER2 negative.     Started on BIG 10 trial of palbociclib/tamoxifen in May 2017 but removed on 1/12/18 due to disease progression on imaging. Treatment changed to Zoladex, letrozole, and abeciclimib. She then traveled to [place] for most of last month and received a number of different therapies including a purported immunotherapy (Vallovex vaccine) and various homeopathic/naturopathic remedies. It sounds like she became quite ill and was hospitalized for several weeks. She is now feeling better.     She stopped the abemaciclib in February, only took this for one month. We recommended that she not restart the abemaciclib, since we do not know how it will interact with the multiple naturopathic therapies that she is still taking. She agrees and has no intention to restart it.     She is continuing on daily letrozole and Zoladex which  she received last week on 3/9/18. We do recommend that she continue with this endocrine therapy. She has requested oophorectomy which we agree is a reasonable alternative to continuous LHRH agonist therapy. We referred her to gynecology.     Regarding the alternative therapy she received in [place] last month, we did not endorse this. We did not discuss it in detail as she is quite determined to continue with it. Dr. Whitman has discussed alternative therapy with her extensively in the past. We do appreciate that she is providing us with a list of the medications. She is going to establish care with a local naturopathic provider and we referred her to several providers in the area.     The CT scan report from yesterday is still pending. We will contact her if the results dictate any changes in therapy.     Plan:   - continue letrozole and Zoladex (next dose on 4/9/18)  - referral to gynecology for oophorectomy   - thoracentesis for left pleural effusion  - follow-up in one month with PA, Zoladex and zometa will be due at that time    - f/u with Dr. Whitman in 2 months     Left pleural effusion: respiratory status is stable, she is minimally symptomatic from this but likely will develop symptoms over the next several weeks. ordered therapeutic thoracentesis.    Bone mets: continue monthly Zometa (next 4/9/18), calcium and vitamin D      Elevated LFTs: this likely was related to the therapy she received in [place]. On today's labs has resolved       Patient seen and discussed with staff Dr. Antonette Schmitz MD  Heme/Onc Fellow  Pager: [phone]          ADDENDUM:  The patient was seen and evaluated by me with Dr. Allen Schmitz as outlined above.  I added the above note to reflect my evaluation.       PHYSICAL EXAMINATION:     GENERAL:  On clinical examination, Shan appears comfortable.    LYMPH:  She has small, palpable cervical and supraclavicular lymph nodes.  I am unable to appreciate any axillary adenopathy.   HEART:   Her heart is regular.    LUNGS:  She has dullness to percussion and auscultation in the lower third of the left lung.  The remainder of her lungs are clear.    ABDOMEN:  Her abdomen is soft, nontender, nondistended.  There is no palpable hepatosplenomegaly.   EXTREMITIES:  She has no peripheral edema in her lower extremities.  She has 1+ edema in her left upper extremity.      LABORATORY AND IMAGING:  Her laboratories and imaging were reviewed personally with Radiology.      ASSESSMENT AND PLAN:  This is a 47-year-old premenopausal woman currently on Zoladex and aromatase inhibitor originally with a prescription for abemaciclib who has not been taking this medication and has been doing alternative therapies in [place].  I discussed with Shan today that I do not endorse the therapies that she is receiving in [place].  I discussed the [employer]'s concern about integrative therapies and the lack of data.  Shan feels extremely important about doing these types of therapies.       I discussed with Shan that we will make a referral for an oophorectomy.  She will remain on Zometa for her bone metastases.  She will also remain on her aromatase inhibitor.       She will be seen monthly with blood work and her Zometa.       She was also given information about local integrative specialists here in the [facility].         Again, thank you for allowing me to participate in the care of your patient.      Sincerely,    Ana Whitman MD

## 2018-03-16 NOTE — MR AVS SNAPSHOT
After Visit Summary   3/16/2018    Shan Marsh    MRN: [id number]           Patient Information     Date Of Birth          1970        Visit Information        Provider Department      3/16/2018 8:00 AM Ana Whitman MD [facility]        Today's Diagnoses     Metastatic breast cancer (H)    -  1    Pleural effusion           Follow-ups after your visit        Additional Services     Obstetrics / Gynecology IP Consult       Needs BSO                  Your next 10 appointments already scheduled     Mar 23, 2018  2:00 PM CDT   Office Visit with Arin Richardson MD   [facility] ([facility])    [address] [place] [address] [address]  [facility] [address] [phone]           Bring a current list of meds and any records pertaining to this visit. For Physicals, please bring immunization records and any forms needing to be filled out. Please arrive 10 minutes early to complete paperwork.            Apr 06, 2018 11:30 AM CDT   [facility] Draw with  [facility] DRAW   [facility] ([facility])    [address] [place] [address]  [address]  [facility] [address]   [phone]            Apr 06, 2018 12:00 PM CDT   (Arrive by 11:45 AM)   Return Visit with Lima Griffiths PA-C   [facility] ([facility])    [address] [place] [address]  [address]  [facility] [address]   [phone]            Apr 06, 2018  1:00 PM CDT   Infusion 60 with UC ONCOLOGY INFUSION, UC 11 ATC   [facility] ([facility])    [address] [place] [address]  [address]  [facility] [address]   [phone]            May 07, 2018  8:00 AM CDT   [facility] Lab Draw with UC MASONIC LAB DRAW   [facility] Draw ([facility])    [address] [place] [address]  [address] [place] [address]   [phone]            May 07,  2018  8:30 AM CDT   (Arrive by 8:15 AM)   Return Visit with Ana Whitman MD   [facility] ([facility])    [address]  [address]  [facility] [address] [phone]            May 07, 2018  9:30 AM CDT   Infusion 60 with UC ONCOLOGY INFUSION, UC 21 ATC   [facility] ([facility])    [address]  [address]  [facility] [address] [phone]              Future tests that were ordered for you today     Open Future Orders        Priority Expected Expires Ordered    Thoracentesis ([facility]) Routine 3/16/2018 3/16/2019 3/16/2018            Who to contact     If you have questions or need follow up information about today's clinic visit or your schedule please contact [facility] directly at [phone].  Normal or non-critical lab and imaging results will be communicated to you by MyChart, letter or phone within 4 business days after the clinic has received the results. If you do not hear from us within 7 days, please contact the clinic through [employer] or phone. If you have a critical or abnormal lab result, we will notify you by phone as soon as possible.  Submit refill requests through [employer] or call your pharmacy and they will forward the refill request to us. Please allow 3 business days for your refill to be completed.          Additional Information About Your Visit        [employer] Information     [employer] gives you secure access to your electronic health record. If you see a primary care provider, you can also send messages to your care team and make appointments. If you have questions, please call your primary care clinic.  If you do not have a primary care provider, please call [phone] and they will assist you.        Care EveryWhere ID     This is your Care EveryWhere ID. This could be used by other organizations to access your [facility]  records  [id number]        Your Vitals Were     Pulse Temperature Respirations Pulse Oximetry BMI (Body Mass Index)       106 98.1  F (36.7  C) (Oral) 18 99% 19.69 kg/m2        Blood Pressure from Last 3 Encounters:   03/16/18 120/75   03/09/18 105/68   02/09/18 101/67    Weight from Last 3 Encounters:   03/16/18 52 kg (114 lb 11.2 oz)   03/09/18 51.1 kg (112 lb 11.2 oz)   02/09/18 55.1 kg (121 lb 6.4 oz)              We Performed the Following     *CBC with platelets differential     Ca27.29  breast tumor marker     CEA     Comprehensive metabolic panel     GGT     Obstetrics / Gynecology IP Consult        Primary Care Provider Office Phone # Fax #    Marilu Michelle [phone] [address]  [facility] [address]        Equal Access to Services     [facility]: Hadii noemy Schaefer, waaxda carlee, qaybta kaalmada anneliese, edi mustafa . So [facility] [phone].    ATENCIÓN: Si habla español, tiene a gamez disposición servicios gratuitos de asistencia lingüística. Llame al [phone].    We comply with applicable federal civil rights laws and [place] laws. We do not discriminate on the basis of race, color, national origin, age, disability, sex, sexual orientation, or gender identity.            Thank you!     Thank you for choosing [facility]  for your care. Our goal is always to provide you with excellent care. Hearing back from our patients is one way we can continue to improve our services. Please take a few minutes to complete the written survey that you may receive in the mail after your visit with us. Thank you!             Your Updated Medication List - Protect others around you: Learn how to safely use, store and throw away your medicines at www.disposemymeds.org.          This list is accurate as of 3/16/18  9:47 AM.  Always use your most recent med list.                   Brand Name Dispense Instructions for use  Diagnosis    abemaciclib 150 MG tablet CHEMO    VERZENIO    60 tablet    Take 1 tablet (150 mg) by mouth 2 times daily    Metastatic breast cancer (H), Bone metastasis (H)       calcium carbonate 1250 MG tablet    OS-KRYSTEN 500 mg [place]. Ca     Take 1 tablet by mouth daily        letrozole 2.5 MG tablet    FEMARA    28 tablet    Take 1 tablet (2.5 mg) by mouth daily    Metastatic breast cancer (H)       ondansetron 8 MG tablet    ZOFRAN          prochlorperazine 10 MG tablet    COMPAZINE          UNABLE TO FIND      daily Comprehensive Immunity Support - Host Defense        VITAMIN D (CHOLECALCIFEROL) PO      Take by mouth daily

## 2018-03-16 NOTE — NURSING NOTE
Chief Complaint   Patient presents with     Blood Draw     VPT blood draw and vitals     Venipuncture labs drawn from right arm

## 2018-03-19 NOTE — PROGRESS NOTES
Oral Chemotherapy Monitoring Program    Primary Oncologist: Antonette  Primary Oncology Clinic: [facility]  Cancer Diagnosis: Breast Cancer    Therapy History: Abemaciclib started on 1/12/18  Therapy Ended on: Feb 2018. She went to [place] to start chemotherapy treatment there. Given unknown drug interactions with therapy receiving there, will not continue abemaciclib.     Reason: Alternative therapies pursued.     Thank you for the opportunity to be a part in the care of this patient's oral chemotherapy. The oncology pharmacy will no longer be following this patient for oral chemotherapy. If there are any questions or the plan changes, feel free to contact us.    Janny Gates, PharmD, MPH, BCOP  Hematology/Oncology Clinical Pharmacist   [facility]   [email or web address]

## 2018-03-20 ENCOUNTER — TELEPHONE (OUTPATIENT)
Dept: ONCOLOGY | Facility: CLINIC | Age: 48
End: 2018-03-20

## 2018-03-20 DIAGNOSIS — B37.31 YEAST INFECTION OF THE VAGINA: Primary | ICD-10-CM

## 2018-03-20 RX ORDER — FLUCONAZOLE 150 MG/1
150 TABLET ORAL ONCE
Qty: 1 TABLET | Refills: 1 | Status: SHIPPED | OUTPATIENT
Start: 2018-03-20 | End: 2018-03-20

## 2018-03-20 NOTE — TELEPHONE ENCOUNTER
Pt called in to triage reporting following symptoms: thinks she has a yeast infection because intercourse was painful. Does not have any discharge, dysuria or burning. Not currently on any antibiotics. Asking for a refill of fluconazole.    Also stated she has been doing coffee enemas three times daily per her naturopathic provider and has noticed her heart rate >100s. Usually she ranges from 50-60s. She does not feel any associated racing pulse, lightheadedness, headache or sweating. She did hold yesterday's 3rd dose and this AM heart rate was still 108. Would like Onc's opinion on this though did state since she doesn't have any other symptoms she will continue with treatments.

## 2018-03-20 NOTE — TELEPHONE ENCOUNTER
Discussed with Dr. Whitman: she recommends pt stop the coffee enemas, to let her know there has been reports of deaths related to coffee enemas and long term health problems. Ok for fluconazole to treat yeast infection but if pain with intercourse continues, pt to follow up. May be d/t menopause instead. Relayed all information to pt who verbalized understanding but did not confirm she would stop enemas. Erx for fluconazole sent to [facility] and pt advised to eat yogurt and/or probiotics.

## 2018-03-23 ENCOUNTER — OFFICE VISIT (OUTPATIENT)
Dept: OBGYN | Facility: CLINIC | Age: 48
End: 2018-03-23
Payer: COMMERCIAL

## 2018-03-23 VITALS
WEIGHT: 114.4 LBS | DIASTOLIC BLOOD PRESSURE: 68 MMHG | TEMPERATURE: 96.8 F | BODY MASS INDEX: 19.64 KG/M2 | SYSTOLIC BLOOD PRESSURE: 125 MMHG | HEART RATE: 69 BPM

## 2018-03-23 DIAGNOSIS — C50.919 METASTATIC BREAST CANCER: Primary | ICD-10-CM

## 2018-03-23 PROCEDURE — 99214 OFFICE O/P EST MOD 30 MIN: CPT | Performed by: OBSTETRICS & GYNECOLOGY

## 2018-03-23 RX ORDER — METOCLOPRAMIDE 10 MG/1
10 TABLET ORAL
Qty: 60 TABLET | Refills: 3 | Status: SHIPPED | OUTPATIENT
Start: 2018-03-23 | End: 2018-03-23

## 2018-03-23 NOTE — MR AVS SNAPSHOT
After Visit Summary   3/23/2018    Shan Marsh    MRN: [id number]           Patient Information     Date Of Birth          1970        Visit Information        Provider Department      3/23/2018 2:00 PM Arin Richardson MD [facility] [place] [facility]        Today's Diagnoses     Metastatic breast cancer (H)    -  1       Follow-ups after your visit        Your next 10 appointments already scheduled     Apr 02, 2018  9:30 AM CDT   Procedure 3 hour with U2A ROOM 12   [address]A [facility] ([facility])    [address] [address]               Apr 02, 2018 11:00 AM CDT   (Arrive by 10:45 AM)   IR THORACENTESIS with UUIR2   [facility], Interventional Radiology ([facility])    [address] [id number] [phone]           1. Laboratory test are to be obtained by your doctor prior to the exam (Hgb/Hct, platelet count, INR) 2. Someone will need to drive you to and from the hospital if you feel you may need sedation for the procedure. 3. Bring a list of all drugs you are taking; include supplements and over-the-counter medications. 4. Wear comfortable clothes and leave your valuables at home. 5. If you are or may be pregnant, be sure to contact your doctor or a Radiology nurse prior to the day of the exam. 6. If you have diabetes, check with your doctor or a Radiology nurse to see if your insulin needs to be adjusted for the exam. 7. If you are taking Coumadin (to thin your blood) please contact your doctor or a Radiology nurse at least 3 days before the exam for special instructions (only need the INR to be <2.0). 8. The day before your exam you may eat your regular diet. Drink no alcoholic beverages for 24 hours prior to the exam. 9. Do not eat any solid food or milk products for 6 hours prior to the exam. You may drink clear liquids until 2 hours prior  to the exam. Clear liquids include the following: water, Jell-O, clear broth, apple juice or any noncarbonated drink that you can see through (no pop!) 10. The morning of the exam you may brush your teeth and take medications as directed with a sip of water. 11. Tell the Radiology nurse if you have any allergies. 12. If the tube needs to remain in place you will remain in the hospital until the tube can be removed 13. If the tube is removed immediately you may leave the hospital following your exam. However, if you received sedation you will need to stay 1-2 hours. You may be asked to stay longer and have a chest x-ray sometime after the procedure. 14. If you received sedation, you cannot drive until morning, and an adult must be with you until then. If you live more than one hour away you should stay in the [place] area overnight.            Apr 06, 2018 11:30 AM CDT   [facility] Lab Draw with  MASONIC LAB DRAW   [facility] ([facility])    [address] [place] [address]  [address] [place] [address]   [phone]            Apr 06, 2018 12:00 PM CDT   (Arrive by 11:45 AM)   Return Visit with Lima Griffiths PA-C   [facility] ([facility])    [address] [place] [address]  [address] [place] [address]   [phone]            Apr 06, 2018  1:00 PM CDT   Infusion 60 with UC ONCOLOGY INFUSION, UC 11 ATC   [facility] ([facility])    [address] [place] [address]  [address] [place] [address]   [phone]            May 07, 2018  8:00 AM CDT   M[facility]ic Lab Draw with UC MASONIC LAB DRAW   [facility] ([facility])    [address] [place] [address]  [address] [place] [address]   [phone]            May 07, 2018  8:30 AM CDT   (Arrive by 8:15 AM)   Return Visit with Ana Whitman MD   [facility] ([facility])    [address]  [address]  [facility] [address] [phone]            May 07, 2018  9:30 AM CDT   Infusion 60 with UC ONCOLOGY INFUSION, UC 21 ATC   [facility] ([facility])    [address]  [address]  [facility] [id number] [phone]              Who to contact     If you have questions or need follow up information about today's clinic visit or your schedule please contact [facility] directly at [phone].  Normal or non-critical lab and imaging results will be communicated to you by LiveHive Systemshart, letter or phone within 4 business days after the clinic has received the results. If you do not hear from us within 7 days, please contact the clinic through [employer] or phone. If you have a critical or abnormal lab result, we will notify you by phone as soon as possible.  Submit refill requests through Teliris or call your pharmacy and they will forward the refill request to us. Please allow 3 business days for your refill to be completed.          Additional Information About Your Visit        MyChart Information     Teliris gives you secure access to your electronic health record. If you see a primary care provider, you can also send messages to your care team and make appointments. If you have questions, please call your primary care clinic.  If you do not have a primary care provider, please call [phone] and they will assist you.        Care EveryWhere ID     This is your Care EveryWhere ID. This could be used by other organizations to access your [facility] medical records  [id number]        Your Vitals Were     Pulse Temperature BMI (Body Mass Index)             69 96.8  F (36  C) (Oral) 19.64 kg/m2          Blood Pressure from Last 3 Encounters:   03/23/18 125/68   03/16/18 120/75   03/09/18 105/68    Weight from Last 3 Encounters:   03/23/18 114 lb 6.4 oz (51.9 kg)   03/16/18 114 lb 11.2 oz (52 kg)    03/09/18 112 lb 11.2 oz (51.1 kg)              Today, you had the following     No orders found for display       Primary Care Provider Office Phone # Fax #    Marilu Michelle [phone] [address]  [facility] [address]        Equal Access to Services     LUZ JAMES : Hadii aad ku hadasho Soomaali, waaxda luqadaha, qaybta kaalmada adeegyada, waxay laithin hayaan adedana jimenezrenardsukhjinder kolb. So [facility] [phone].    ATENCIÓN: Si habla español, tiene a gamez disposición servicios gratuitos de asistencia lingüística. Llame al [phone].    We comply with applicable federal civil rights laws and [place] laws. We do not discriminate on the basis of race, color, national origin, age, disability, sex, sexual orientation, or gender identity.            Thank you!     Thank you for choosing [facility]  for your care. Our goal is always to provide you with excellent care. Hearing back from our patients is one way we can continue to improve our services. Please take a few minutes to complete the written survey that you may receive in the mail after your visit with us. Thank you!             Your Updated Medication List - Protect others around you: Learn how to safely use, store and throw away your medicines at www.disposemymeds.org.          This list is accurate as of 3/23/18  5:09 PM.  Always use your most recent med list.                   Brand Name Dispense Instructions for use Diagnosis    calcium carbonate 1250 MG tablet    OS-KRYSTEN 500 mg [place]. Ca     Take 1 tablet by mouth daily        letrozole 2.5 MG tablet    FEMARA    28 tablet    Take 1 tablet (2.5 mg) by mouth daily    Metastatic breast cancer (H)       ondansetron 8 MG tablet    ZOFRAN          prochlorperazine 10 MG tablet    COMPAZINE          VITAMIN D (CHOLECALCIFEROL) PO      Take by mouth daily

## 2018-03-23 NOTE — PROGRESS NOTES
S; Shan Marsh is a 47 year old  who is here to discuss oophorectomy.  She has stage 4 ER/HI positive breast ca with mets to bone, lymph nodes, possibly liver and lung.  She has been on zoladex and letrozole to suppress her ovaries and has been in a trial and the [facility] during which the mets worsened so she was taken off the trial and then tried another chemo, but it also did not work.  She has done several herbal remedies and holistic things with a naturopath here and just returned last week from a month in [facility] where she stayed at the [facility] for immune therapy and other alternative therapies.  She has been struggling with SOB and had a thoracentesis while in [place] removing 2.5L of fluid.  She was very sick while there, but reports that is the expected course for the treatments.  Since returning she has felt much better.  While there, the doctor she was working with recommended having her ovaries removed.  She discussed with her oncologist here and she felt it may be a good idea as well so that she does not need to take the letrozole anymore and referred her to me.    Currently kaushal's mood is ok.  She seems very aware of her situation, but has hope and confidence these treatments will work.  She continues to do TID coffee enemas, amirah's toxin injections, various supplements.  She plans to return in  for repeat imaging and another treatment.    O: /68  Pulse 69  Temp 96.8  F (36  C) (Oral)  Wt 114 lb 6.4 oz (51.9 kg)  BMI 19.64 kg/m2    Gen: thin female in NAD    No further exam done    A/P; receptor positive breast cancer   We discussed that oophorectomy would eliminate the need to take continuous LHRH agonist forever.  We discussed the typical surgery which is laparoscopic.  My concern for her undergoing the surgery is 2 things.  First, given her pulmonary edema I have concerns about her ability to withstand having the et tube and also removal as well as  ability to ventilate while in trendelenberg.  I explained an alternative would be to do a laparotomy under spinal anesthesia to avoid needing GETA.  My concern with this route is how well she will be able to recover from the surgery.  She may do just fine, but considering how the cancer has progressed recently, I worry she will have a very difficult recovery, that could in turn, affect her body's ability to fight the cancer.  I also explained that she would need to stop her alternative therapies for surgery and during the recovery since we do not know what affect they would have and any interactions with medications she would be taking.  She verbalizes agreement with that and does not want to stop the treatment plan at this time.  I explained that it is just impossible to know what her exact risk is undergoing a surgery like this, but given the state of her health, I think it is extremely risky, especially if we were to encounter any complications.  I feel that the benefit of removing the ovaries, although therapeutic, is not high enough over taking the LHRH agonist to justify surgery at this time.  I explained that if these therapies are working for her and her cancer is regressing, that would be a better time to consider surgery.  She is in full agreement and does not want to stop the current therapies or risk worsening because of the surgery.  She will contact me if/when things are improving and she wants to re-visit the issue.    KIESHA TOMAS MD      This visit lasted approximately 30 minutes and >50% of the time was spent on counseling about surgery options and risks associated.

## 2018-03-23 NOTE — NURSING NOTE
"Chief Complaint   Patient presents with     Consult     consult for oophorectomy       Initial /68  Pulse 69  Temp 96.8  F (36  C) (Oral)  Wt 114 lb 6.4 oz (51.9 kg)  BMI 19.64 kg/m2 Estimated body mass index is 19.64 kg/(m^2) as calculated from the following:    Height as of 3/9/18: 5' 4\" (1.626 m).    Weight as of this encounter: 114 lb 6.4 oz (51.9 kg).  BP completed using cuff size: regular        The following HM Due: NONE      The following patient reported/Care Every where data was sent to:  P ABSTRACT QUALITY INITIATIVES [[id number]]        patient has appointment for today  Sachi Holland                "

## 2018-03-30 ENCOUNTER — TELEPHONE (OUTPATIENT)
Dept: INTERVENTIONAL RADIOLOGY/VASCULAR | Facility: CLINIC | Age: 48
End: 2018-03-30

## 2018-04-02 ENCOUNTER — APPOINTMENT (OUTPATIENT)
Dept: INTERVENTIONAL RADIOLOGY/VASCULAR | Facility: CLINIC | Age: 48
End: 2018-04-02
Attending: INTERNAL MEDICINE
Payer: COMMERCIAL

## 2018-04-02 ENCOUNTER — HOSPITAL ENCOUNTER (OUTPATIENT)
Facility: CLINIC | Age: 48
Discharge: HOME | End: 2018-04-02
Attending: INTERNAL MEDICINE | Admitting: INTERNAL MEDICINE
Payer: COMMERCIAL

## 2018-04-02 ENCOUNTER — APPOINTMENT (OUTPATIENT)
Dept: MEDSURG UNIT | Facility: CLINIC | Age: 48
End: 2018-04-02
Attending: INTERNAL MEDICINE
Payer: COMMERCIAL

## 2018-04-02 VITALS
OXYGEN SATURATION: 98 % | WEIGHT: 114 LBS | DIASTOLIC BLOOD PRESSURE: 73 MMHG | HEART RATE: 64 BPM | TEMPERATURE: 98.2 F | BODY MASS INDEX: 19.46 KG/M2 | HEIGHT: 64 IN | SYSTOLIC BLOOD PRESSURE: 111 MMHG | RESPIRATION RATE: 16 BRPM

## 2018-04-02 DIAGNOSIS — C50.919 METASTATIC BREAST CANCER: ICD-10-CM

## 2018-04-02 DIAGNOSIS — J90 PLEURAL EFFUSION: ICD-10-CM

## 2018-04-02 LAB — INR BLD: 1 (ref 0.86–1.14)

## 2018-04-02 PROCEDURE — 27210903 ZZH KIT CR5

## 2018-04-02 PROCEDURE — 27210732 ZZH ACCESSORY CR1

## 2018-04-02 PROCEDURE — 40000166 ZZH STATISTIC PP CARE STAGE 1

## 2018-04-02 PROCEDURE — 85610 PROTHROMBIN TIME: CPT | Mod: QW

## 2018-04-02 PROCEDURE — 32555 ASPIRATE PLEURA W/ IMAGING: CPT

## 2018-04-02 PROCEDURE — 40000141 ZZH STATISTIC PERIPHERAL IV START W/O US GUIDANCE

## 2018-04-02 PROCEDURE — 27211039 ZZH NEEDLE CR2

## 2018-04-02 RX ORDER — LIDOCAINE 40 MG/G
CREAM TOPICAL
Status: DISCONTINUED | OUTPATIENT
Start: 2018-04-02 | End: 2018-04-02 | Stop reason: HOSPADM

## 2018-04-02 NOTE — IP AVS SNAPSHOT
[address] [address]                                       After Visit Summary   4/2/2018    Shan Marsh    MRN: [id number]           After Visit Summary Signature Page     I have received my discharge instructions, and my questions have been answered. I have discussed any challenges I see with this plan with the nurse or doctor.    ..........................................................................................................................................  Patient/Patient Representative Signature      ..........................................................................................................................................  Patient Representative Print Name and Relationship to Patient    ..................................................               ................................................  Date                                            Time    ..........................................................................................................................................  Reviewed by Signature/Title    ...................................................              ..............................................  Date                                                            Time

## 2018-04-02 NOTE — PROGRESS NOTES
Pt has returned to unit 2a s/p thoracentesis. Site CDI, soft, flat, non tender. VSS. Pt tolerating PO.

## 2018-04-02 NOTE — IP AVS SNAPSHOT
MRN:[id number]                      After Visit Summary   4/2/2018    Shan Marsh    MRN: [id number]           Visit Information        Department      4/2/2018  9:32 AM [address] [facility]          Review of your medicines      UNREVIEWED medicines. Ask your doctor about these medicines        Dose / Directions    calcium carbonate 1250 MG tablet   Commonly known as:  OS-KRYSTEN 500 mg [place]. Ca        Dose:  1 tablet   Take 1 tablet by mouth daily   Refills:  0       letrozole 2.5 MG tablet   Commonly known as:  FEMARA   Used for:  Metastatic breast cancer (H)        Dose:  2.5 mg   Take 1 tablet (2.5 mg) by mouth daily   Quantity:  28 tablet   Refills:  0       ondansetron 8 MG tablet   Commonly known as:  ZOFRAN        Refills:  0       prochlorperazine 10 MG tablet   Commonly known as:  COMPAZINE        Refills:  0       VITAMIN D (CHOLECALCIFEROL) PO        Take by mouth daily   Refills:  0                Protect others around you: Learn how to safely use, store and throw away your medicines at www.disposemymeds.org.         Follow-ups after your visit        Your next 10 appointments already scheduled     Apr 06, 2018 11:30 AM CDT   [facility]ic Lab Draw with  MASONIC LAB DRAW   [facility])    [address] [place] [address]  [address] [place] [address]   [phone]            Apr 06, 2018 12:00 PM CDT   (Arrive by 11:45 AM)   Return Visit with Lima Griffiths PA-C   [facility] ([facility])    [address] [place] [address]  [address] [place] [address]   [phone]            Apr 06, 2018  1:00 PM CDT   Infusion 60 with UC ONCOLOGY INFUSION, UC 11 ATC   [facility] ([facility])    [address] [place] [address]  [address]  [facility] [address]   [phone]            May 07, 2018  8:00 AM CDT   Masonic Lab Draw with  MASONIC LAB DRAW     [facility] Lab Draw ([facility])    [address]  [address]  [facility] [address]   [phone]            May 07, 2018  8:30 AM CDT   (Arrive by 8:15 AM)   Return Visit with Ana Whitman MD   [facility] ([facility])    [address]  [address]  [facility] [address] [phone]            May 07, 2018  9:30 AM CDT   Infusion 60 with UC ONCOLOGY INFUSION, UC 21 ATC   [facility] ([facility])    [address]  [address]  [facility] [address] [phone]               Care Instructions        Further instructions from your care team         [facility],   Interventional Radiology Discharge Instructions Following Thoracentesis        AFTER YOU GO HOME:  ? Resume previous diet and medications  ? Limit strenuous physical activity such as lifting, straining, or exercising for 48 hours.  You may resume normal activity in 24 hours  ? Change gauze at the puncture site as needed to keep site dry.  Leaking of additional fluid is not uncommon during the first 24-48 hours    CALL THE PHYSICIAN:  ? If you develop a fever, shortness of breath, chest pain, cough up blood, excessive bleeding from the thoracentesis site, severe lightheadedness, or fainting call you doctor immediately or come to the closest Emergency Department.  Do not drive yourself.   ? If you have questions or concerns regarding your procedure call the radiologist.      [facility] INTERVENTIONAL RADIOLOGY DEPARTMENT  Procedure Physician Assistant: GRAY Richards                                  Date of procedure: April 2, 2018  Telephone Numbers: [phone]       Monday-Friday 8:00 am to 4:30 pm                                                   [phone]   After 4:30 pm Monday - Friday, Ask for the Interventional Radiologist on call.  Someone is on call 24 hrs/day  [facility] toll free  "number: [phone]    Monday-Friday 8:00 am to 4:30 pm  [facility] Emergency Dept: [phone]  _             Additional Information About Your Visit        IndoorAtlashart Information     [employer] gives you secure access to your electronic health record. If you see a primary care provider, you can also send messages to your care team and make appointments. If you have questions, please call your primary care clinic.  If you do not have a primary care provider, please call [phone] and they will assist you.        Care EveryWhere ID     This is your Care EveryWhere ID. This could be used by other organizations to access your [facility] medical records  [id number]        Your Vitals Were     Blood Pressure Pulse Temperature Respirations Height Weight    117/74 (BP Location: Right arm) 65 98.2  F (36.8  C) (Oral) 16 1.626 m (5' 4\") 51.7 kg (114 lb)    Pulse Oximetry BMI (Body Mass Index)                99% 19.57 kg/m2           Primary Care Provider Office Phone # Fax #    Marilu Michelle [phone]      Equal Access to Services     [facility] : Hadii aad ku hadasho Soomaali, waaxda luqadaha, qaybta kaalmada adeegyada, edi mustafa . So [facility] [phone].    ATENCIÓN: Si habla español, tiene a gamez disposición servicios gratuitos de asistencia lingüística. Llame al [phone].    We comply with applicable federal civil rights laws and [place] laws. We do not discriminate on the basis of race, color, national origin, age, disability, sex, sexual orientation, or gender identity.            Thank you!     Thank you for choosing [facility] for your care. Our goal is always to provide you with excellent care. Hearing back from our patients is one way we can continue to improve our services. Please take a few minutes to complete the written survey that you may receive in the mail after you visit with us. Thank you!             Medication List: This is a list of all your medications " and when to take them. Check marks below indicate your daily home schedule. Keep this list as a reference.      Medications           Morning Afternoon Evening Bedtime As Needed    calcium carbonate 1250 MG tablet   Commonly known as:  OS-KRYSTEN 500 mg [place]. Ca   Take 1 tablet by mouth daily                                letrozole 2.5 MG tablet   Commonly known as:  FEMARA   Take 1 tablet (2.5 mg) by mouth daily                                ondansetron 8 MG tablet   Commonly known as:  ZOFRAN                                prochlorperazine 10 MG tablet   Commonly known as:  COMPAZINE                                VITAMIN D (CHOLECALCIFEROL) PO   Take by mouth daily

## 2018-04-02 NOTE — PROGRESS NOTES
Pt tolerated po well.  Pt tolerated ambulation in the hallway and up to the bathroom.  PIV D/C'ed, catheter intact.  PIV D/C'ed, catheter intact.  Lt back site remains F/D/I. 1220-Pt D/C'ed to home with his .

## 2018-04-02 NOTE — PROGRESS NOTES
Patient Name: Shan [place]  Medical Record Number: [id number]  Today's Date: 4/2/2018    Procedure: Left thoracentesis  Proceduralist: Rakel GLASGOW    Procedure start time: 1120  Procedure end time: 1135    Report given to: 2A RN    Other Notes: Pt arrived to IR room 2 from . Pt denies any questions or concerns regarding procedure. Pt positioned and monitored per protocol. NO sedation. No labs ordered per primary team. Left thoracentesis=1,250 ml. Pt tolerated procedure without any noted complications. Pt transferred back to .    Dee Dee HAYNES

## 2018-04-02 NOTE — PROCEDURES
Interventional Radiology Brief Post Procedure Note    Procedure: IR THORACENTESIS    Proceduralist: Boyd Nguyen PA-C    Assistant: None    Time Out: Prior to the start of the procedure and with procedural staff participation, I verbally confirmed the patient s identity using two indicators, relevant allergies, that the procedure was appropriate and matched the consent or emergent situation, and that the correct equipment/implants were available. Immediately prior to starting the procedure I conducted the Time Out with the procedural staff and re-confirmed the patient s name, procedure, and site/side. (The Joint Commission universal protocol was followed.)  Yes    Medications   Medication Event Details Admin User Admin Time       Sedation: None. Local Anesthestic used    Findings: Completed ultrasound guided thoracentesis. A total of 1250 mL of clear yellow fluid was drained without difficulty.     Estimated Blood Loss: Minimal    SPECIMENS: None    Complications: 1. None     Condition: Stable    Plan: Follow-up per primary team.     Comments: See dictated procedure note for full details.    Boyd Nguyen PA-C

## 2018-04-02 NOTE — DISCHARGE INSTRUCTIONS
[facility],   Interventional Radiology Discharge Instructions Following Thoracentesis        AFTER YOU GO HOME:  ? Resume previous diet and medications  ? Limit strenuous physical activity such as lifting, straining, or exercising for 48 hours.  You may resume normal activity in 24 hours  ? Change gauze at the puncture site as needed to keep site dry.  Leaking of additional fluid is not uncommon during the first 24-48 hours    CALL THE PHYSICIAN:  ? If you develop a fever, shortness of breath, chest pain, cough up blood, excessive bleeding from the thoracentesis site, severe lightheadedness, or fainting call you doctor immediately or come to the closest Emergency Department.  Do not drive yourself.   ? If you have questions or concerns regarding your procedure call the radiologist.      [facility] INTERVENTIONAL RADIOLOGY DEPARTMENT  Procedure Physician Assistant: GRAY Richards                                  Date of procedure: April 2, 2018  Telephone Numbers: [phone]       Monday-Friday 8:00 am to 4:30 pm                                                   [phone]   After 4:30 pm Monday - Friday, Ask for the Interventional Radiologist on call.  Someone is on call 24 hrs/day  [facility] toll free number: [phone]    Monday-Friday 8:00 am to 4:30 pm  [facility] Emergency Dept: [phone]  _

## 2018-04-04 ENCOUNTER — CARE COORDINATION (OUTPATIENT)
Dept: ONCOLOGY | Facility: CLINIC | Age: 48
End: 2018-04-04

## 2018-04-04 NOTE — PROGRESS NOTES
Spoke to patient post hospital and thoracentesis doing very well. No difficulties with breathing and has recovered quicker this time. Instructed patient of follow-up visit with Lima Griffiths on 4/6/18. Discussed future [place]acentesis [place] and will discuss with Lima Griffiths for a plan.   Answered all patient's questions and verbalized understanding. Sharifa Celeste RN, BSN.

## 2018-04-06 ENCOUNTER — ONCOLOGY VISIT (OUTPATIENT)
Dept: ONCOLOGY | Facility: CLINIC | Age: 48
End: 2018-04-06
Attending: PHYSICIAN ASSISTANT
Payer: COMMERCIAL

## 2018-04-06 VITALS
WEIGHT: 113.3 LBS | DIASTOLIC BLOOD PRESSURE: 84 MMHG | RESPIRATION RATE: 18 BRPM | HEART RATE: 88 BPM | HEIGHT: 64 IN | TEMPERATURE: 98.1 F | BODY MASS INDEX: 19.34 KG/M2 | SYSTOLIC BLOOD PRESSURE: 125 MMHG | OXYGEN SATURATION: 100 %

## 2018-04-06 DIAGNOSIS — C79.51 BONE METASTASIS: ICD-10-CM

## 2018-04-06 DIAGNOSIS — C50.919 METASTATIC BREAST CANCER: Primary | ICD-10-CM

## 2018-04-06 DIAGNOSIS — Z79.899 ENCOUNTER FOR LONG-TERM (CURRENT) USE OF MEDICATIONS: ICD-10-CM

## 2018-04-06 LAB
ALBUMIN SERPL-MCNC: 3.5 G/DL (ref 3.4–5)
ALP SERPL-CCNC: 75 U/L (ref 40–150)
ALT SERPL W P-5'-P-CCNC: 30 U/L (ref 0–50)
ANION GAP SERPL CALCULATED.3IONS-SCNC: 6 MMOL/L (ref 3–14)
AST SERPL W P-5'-P-CCNC: 30 U/L (ref 0–45)
BASOPHILS # BLD AUTO: 0 10E9/L (ref 0–0.2)
BASOPHILS NFR BLD AUTO: 0.8 %
BILIRUB SERPL-MCNC: 0.3 MG/DL (ref 0.2–1.3)
BUN SERPL-MCNC: 7 MG/DL (ref 7–30)
CALCIUM SERPL-MCNC: 9.2 MG/DL (ref 8.5–10.1)
CHLORIDE SERPL-SCNC: 107 MMOL/L (ref 94–109)
CO2 SERPL-SCNC: 26 MMOL/L (ref 20–32)
CREAT SERPL-MCNC: 0.58 MG/DL (ref 0.52–1.04)
DIFFERENTIAL METHOD BLD: ABNORMAL
EOSINOPHIL # BLD AUTO: 0.3 10E9/L (ref 0–0.7)
EOSINOPHIL NFR BLD AUTO: 4.8 %
ERYTHROCYTE [DISTWIDTH] IN BLOOD BY AUTOMATED COUNT: 12.5 % (ref 10–15)
GFR SERPL CREATININE-BSD FRML MDRD: >90 ML/MIN/1.7M2
GLUCOSE SERPL-MCNC: 74 MG/DL (ref 70–99)
HCT VFR BLD AUTO: 42.9 % (ref 35–47)
HGB BLD-MCNC: 13.9 G/DL (ref 11.7–15.7)
IMM GRANULOCYTES # BLD: 0 10E9/L (ref 0–0.4)
IMM GRANULOCYTES NFR BLD: 0.4 %
LYMPHOCYTES # BLD AUTO: 1.1 10E9/L (ref 0.8–5.3)
LYMPHOCYTES NFR BLD AUTO: 21.3 %
MCH RBC QN AUTO: 32.6 PG (ref 26.5–33)
MCHC RBC AUTO-ENTMCNC: 32.4 G/DL (ref 31.5–36.5)
MCV RBC AUTO: 101 FL (ref 78–100)
MONOCYTES # BLD AUTO: 0.4 10E9/L (ref 0–1.3)
MONOCYTES NFR BLD AUTO: 7.6 %
NEUTROPHILS # BLD AUTO: 3.4 10E9/L (ref 1.6–8.3)
NEUTROPHILS NFR BLD AUTO: 65.1 %
NRBC # BLD AUTO: 0 10*3/UL
NRBC BLD AUTO-RTO: 0 /100
PLATELET # BLD AUTO: 195 10E9/L (ref 150–450)
POTASSIUM SERPL-SCNC: 4.5 MMOL/L (ref 3.4–5.3)
PROT SERPL-MCNC: 7.6 G/DL (ref 6.8–8.8)
RBC # BLD AUTO: 4.26 10E12/L (ref 3.8–5.2)
SODIUM SERPL-SCNC: 139 MMOL/L (ref 133–144)
WBC # BLD AUTO: 5.3 10E9/L (ref 4–11)

## 2018-04-06 PROCEDURE — 25000125 ZZHC RX 250: Mod: ZF | Performed by: PHYSICIAN ASSISTANT

## 2018-04-06 PROCEDURE — 99214 OFFICE O/P EST MOD 30 MIN: CPT | Mod: ZP | Performed by: PHYSICIAN ASSISTANT

## 2018-04-06 PROCEDURE — 96402 CHEMO HORMON ANTINEOPL SQ/IM: CPT

## 2018-04-06 PROCEDURE — 25000128 H RX IP 250 OP 636: Mod: ZF | Performed by: PHYSICIAN ASSISTANT

## 2018-04-06 PROCEDURE — G0463 HOSPITAL OUTPT CLINIC VISIT: HCPCS | Mod: ZF

## 2018-04-06 PROCEDURE — 85025 COMPLETE CBC W/AUTO DIFF WBC: CPT | Performed by: INTERNAL MEDICINE

## 2018-04-06 PROCEDURE — 80053 COMPREHEN METABOLIC PANEL: CPT | Performed by: INTERNAL MEDICINE

## 2018-04-06 PROCEDURE — 96374 THER/PROPH/DIAG INJ IV PUSH: CPT

## 2018-04-06 RX ORDER — LIDOCAINE HYDROCHLORIDE 10 MG/ML
2 INJECTION, SOLUTION EPIDURAL; INFILTRATION; INTRACAUDAL; PERINEURAL ONCE
Status: COMPLETED | OUTPATIENT
Start: 2018-04-06 | End: 2018-04-06

## 2018-04-06 RX ORDER — LIDOCAINE HYDROCHLORIDE 10 MG/ML
2 INJECTION, SOLUTION EPIDURAL; INFILTRATION; INTRACAUDAL; PERINEURAL ONCE
Status: CANCELLED | OUTPATIENT
Start: 2018-04-06

## 2018-04-06 RX ORDER — ZOLEDRONIC ACID 0.04 MG/ML
4 INJECTION, SOLUTION INTRAVENOUS ONCE
Status: COMPLETED | OUTPATIENT
Start: 2018-04-06 | End: 2018-04-06

## 2018-04-06 RX ORDER — ZOLEDRONIC ACID 0.04 MG/ML
4 INJECTION, SOLUTION INTRAVENOUS ONCE
Status: CANCELLED | OUTPATIENT
Start: 2018-04-06

## 2018-04-06 RX ORDER — LETROZOLE 2.5 MG/1
2.5 TABLET, FILM COATED ORAL DAILY
Qty: 28 TABLET | Refills: 0 | Status: SHIPPED | OUTPATIENT
Start: 2018-04-06 | End: 2018-05-07

## 2018-04-06 RX ORDER — METOCLOPRAMIDE 10 MG/1
TABLET ORAL
COMMUNITY
Start: 2018-03-23 | End: 2018-07-05

## 2018-04-06 RX ADMIN — GOSERELIN ACETATE 3.6 MG: 3.6 IMPLANT SUBCUTANEOUS at 13:46

## 2018-04-06 RX ADMIN — LIDOCAINE HYDROCHLORIDE 2 ML: 10 INJECTION, SOLUTION EPIDURAL; INFILTRATION; INTRACAUDAL; PERINEURAL at 13:42

## 2018-04-06 RX ADMIN — ZOLEDRONIC ACID 4 MG: 0.04 INJECTION, SOLUTION INTRAVENOUS at 13:36

## 2018-04-06 ASSESSMENT — PAIN SCALES - GENERAL: PAINLEVEL: NO PAIN (0)

## 2018-04-06 NOTE — MR AVS SNAPSHOT
After Visit Summary   4/6/2018    Shan Marsh    MRN: [id number]           Patient Information     Date Of Birth          1970        Visit Information        Provider Department      4/6/2018 1:00 PM  11 ATC;  ONCOLOGY INFUSION [facility]        Today's Diagnoses     Metastatic breast cancer (H)    -  1    Bone metastasis (H)        Encounter for long-term (current) use of medications          Care Instructions    [facility] Main Line: [phone]    Call triage nurse with chills and/or temperature greater than or equal to 100.4, uncontrolled nausea/vomiting, diarrhea, constipation, dizziness, shortness of breath, chest pain, bleeding, unexplained bruising, or any new/concerning symptoms, questions/concerns.   If you are having any concerning symptoms or wish to speak to a provider before your next infusion visit, please call your care coordinator or triage to notify them so we can adequately serve you.   Triage Nurse Line: [phone]    If after hours, weekends, or holidays, call main hospital  and ask for Oncology doctor on call @ [phone]               April 2018 Sunday Monday Tuesday Wednesday Thursday Friday Saturday   1     2     PROCEDURE - 3 HR    9:30 AM   (180 min.)   U2A ROOM 12   Unit 2A [facility]     Admission    9:32 AM   Ana Whitman MD   Unit 2A [facility]   (Discharge: 4/2/2018)     IR THORACENTESIS   10:45 AM   (60 min.)   UUIR2   [facility] [place], Interventional Radiology 3     4     5     6     [facility] LAB DRAW   11:30 AM   (15 min.)    [facility]     [facility] RETURN   11:45 AM   (50 min.)   Lima Griffiths PA-C   [facility] ONC INFUSION 60    1:00 PM   (60 min.)    ONCOLOGY INFUSION   [facility] 7       8     9     10     11     12     13     14       15     16     17     18     19     20     21        22     23     24     25     26     27     28       29     30                                         May 2018   Juan A Monday Tuesday Wednesday Thursday Friday Saturday             1     2     3     4     5       6     7     [facility]    8:00 AM   (15 min.)    [facility] DRAW   [facility] Lab Draw     [facility] RETURN    8:15 AM   (30 min.)   Ana Whitman MD   formerly [facility] ONC INFUSION 60    9:30 AM   (60 min.)    ONCOLOGY INFUSION   [facility] 8     9     10     11     12       13     14     15     16     17     18     19       20     21     22     23  Happy Birthday!     24     25     26       27     28     29     30     31                            Lab Results:  Recent Results (from the past 12 hour(s))   Comprehensive metabolic panel (BMP + Alb, Alk Phos, ALT, AST, Total. Bili, TP)    Collection Time: 04/06/18 11:47 AM   Result Value Ref Range    Sodium 139 133 - 144 mmol/L    Potassium 4.5 3.4 - 5.3 mmol/L    Chloride 107 94 - 109 mmol/L    Carbon Dioxide 26 20 - 32 mmol/L    Anion Gap 6 3 - 14 mmol/L    Glucose 74 70 - 99 mg/dL    Urea Nitrogen 7 7 - 30 mg/dL    Creatinine 0.58 0.52 - 1.04 mg/dL    GFR Estimate >90 >60 mL/min/1.7m2    GFR Estimate If Black >90 >60 mL/min/1.7m2    Calcium 9.2 8.5 - 10.1 mg/dL    Bilirubin Total 0.3 0.2 - 1.3 mg/dL    Albumin 3.5 3.4 - 5.0 g/dL    Protein Total 7.6 6.8 - 8.8 g/dL    Alkaline Phosphatase 75 40 - 150 U/L    ALT 30 0 - 50 U/L    AST 30 0 - 45 U/L   CBC with platelets and differential    Collection Time: 04/06/18 11:47 AM   Result Value Ref Range    WBC 5.3 4.0 - 11.0 10e9/L    RBC Count 4.26 3.8 - 5.2 10e12/L    Hemoglobin 13.9 11.7 - 15.7 g/dL    Hematocrit 42.9 35.0 - 47.0 %     (H) 78 - 100 fl    MCH 32.6 26.5 - 33.0 pg    MCHC 32.4 31.5 - 36.5 g/dL    RDW 12.5 10.0 - 15.0 %    Platelet Count 195 150 - 450 10e9/L    Diff Method Automated Method     % Neutrophils 65.1 %    %  Lymphocytes 21.3 %    % Monocytes 7.6 %    % Eosinophils 4.8 %    % Basophils 0.8 %    % Immature Granulocytes 0.4 %    Nucleated RBCs 0 0 /100    Absolute Neutrophil 3.4 1.6 - 8.3 10e9/L    Absolute Lymphocytes 1.1 0.8 - 5.3 10e9/L    Absolute Monocytes 0.4 0.0 - 1.3 10e9/L    Absolute Eosinophils 0.3 0.0 - 0.7 10e9/L    Absolute Basophils 0.0 0.0 - 0.2 10e9/L    Abs Immature Granulocytes 0.0 0 - 0.4 10e9/L    Absolute Nucleated RBC 0.0                Follow-ups after your visit        Your next 10 appointments already scheduled     May 07, 2018  8:00 AM CDT   [facility] LAB DRAW   [facility] Lab Draw ([facility])    [address] [place] [address]  [address]  [facility] [id number] [phone]            May 07, 2018  8:30 AM CDT   (Arrive by 8:15 AM)   Return Visit with Ana Whitman MD   [facility] ([facility])    [address] [place] [address]  [address]  [facility] [id number] [phone]            May 07, 2018  9:30 AM CDT   Infusion 60 with  ONCOLOGY INFUSION, UC 21 ATC   [facility] ([facility])    [address] [place] [address]  [address]  [facility] [id number] [phone]              Future tests that were ordered for you today     Open Future Orders        Priority Expected Expires Ordered    CBC with platelets differential Routine 5/4/2018 7/6/2018 4/6/2018    Comprehensive metabolic panel Routine 5/4/2018 7/6/2018 4/6/2018    CEA Routine 5/4/2018 7/6/2018 4/6/2018    Ca27.29  breast tumor marker Routine 5/4/2018 7/6/2018 4/6/2018    IR Thoracentesis Routine  4/6/2019 4/6/2018            Who to contact     If you have questions or need follow up information about today's clinic visit or your schedule please contact [facility] directly at [phone].  Normal or non-critical lab and imaging results will be communicated to you by MyChart, letter or  phone within 4 business days after the clinic has received the results. If you do not hear from us within 7 days, please contact the clinic through [employer] or phone. If you have a critical or abnormal lab result, we will notify you by phone as soon as possible.  Submit refill requests through [employer] or call your pharmacy and they will forward the refill request to us. Please allow 3 business days for your refill to be completed.          Additional Information About Your Visit        [employer] Information     [employer] gives you secure access to your electronic health record. If you see a primary care provider, you can also send messages to your care team and make appointments. If you have questions, please call your primary care clinic.  If you do not have a primary care provider, please call [phone] and they will assist you.        Care EveryWhere ID     This is your Care EveryWhere ID. This could be used by other organizations to access your [facility] medical records  [id number]         Blood Pressure from Last 3 Encounters:   04/06/18 125/84   04/02/18 111/73   03/23/18 125/68    Weight from Last 3 Encounters:   04/06/18 51.4 kg (113 lb 4.8 oz)   04/02/18 51.7 kg (114 lb)   03/23/18 51.9 kg (114 lb 6.4 oz)              We Performed the Following     CBC with platelets and differential     Comprehensive metabolic panel (BMP + Alb, Alk Phos, ALT, AST, Total. Bili, TP)          Where to get your medicines      These medications were sent to [facility] [address] [facility]  [address] [place] [address] [address] [address]    Hours:  TRANSPLANT PHONE NUMBER [phone] Phone:  [phone]     letrozole 2.5 MG tablet          Primary Care Provider Office Phone # Fax #    Marilu Michelle [phone] [address]  [facility] [address]        Equal Access to Services     LUZ JAMES : georgia Rubi qaybta  edi magaña bennett mustafa ah. Danyell [facility] [phone].    ATENCIÓN: Si zach longo, tiene a gamez disposición servicios gratuitos de asistencia lingüística. Gabriella al [phone].    We comply with applicable federal civil rights laws and [place] laws. We do not discriminate on the basis of race, color, national origin, age, disability, sex, sexual orientation, or gender identity.            Thank you!     Thank you for choosing [facility]  for your care. Our goal is always to provide you with excellent care. Hearing back from our patients is one way we can continue to improve our services. Please take a few minutes to complete the written survey that you may receive in the mail after your visit with us. Thank you!             Your Updated Medication List - Protect others around you: Learn how to safely use, store and throw away your medicines at www.disposemymeds.org.          This list is accurate as of 4/6/18  3:07 PM.  Always use your most recent med list.                   Brand Name Dispense Instructions for use Diagnosis    calcium carbonate 1250 MG tablet    OS-KRYSTEN 500 mg [place]. [place]     Take 1 tablet by mouth daily        letrozole 2.5 MG tablet    FEMARA    28 tablet    Take 1 tablet (2.5 mg) by mouth daily    Metastatic breast cancer (H)       metoclopramide 10 MG tablet    REGLAN          ondansetron 8 MG tablet    ZOFRAN          prochlorperazine 10 MG tablet    COMPAZINE          VITAMIN D (CHOLECALCIFEROL) PO      Take by mouth daily

## 2018-04-06 NOTE — NURSING NOTE
Chief Complaint   Patient presents with     Blood Draw     va/labs vpt right AC     See flowsheets.    Shante Davila, CMA

## 2018-04-06 NOTE — PROGRESS NOTES
Hematology-Oncology Visit  Apr 6, 2018    Reason for Visit: follow-up metastatic breast cancer, ER positive     HPI: Shan Marsh is a 47 year old female without significant past medical history with recent diagnosis of metastatic breast cancer after presenting with left shoulder pain. Shoulder MRI showed soft tissue mass close to distal clavicle concerning for malignancy. She had PET/CT 5/9/17 showing hypermetabolic left axillary, supraclavicular, mediastinal and hilar nodes. She had biopsies of left axilla and hilar nodes showing metastatic breast cancer, strongly ER positive, moderately MI positive and HER-2 kaitlin non-amplified. She originally had O1vK2X2, ER/MI positive, HER-2 negative breast cancer diagnosed in spring of 2012 s/p bilateral mastectomies and Tamoxifen from 8/2012-2/2013 and discontinued due to side effects. She met with Dr. Whitman most recently on 5/16/17 who recommended treatment on BIG10 clinical trial with palbociclib and tamoxifen. She started treatment on 5/24/17 and required dose reduction for neutropenia. CT CAP and bone scan 1/11/2018 showing progressive disease with new bony lesions in the ribs, T spine, and possibly liver, lungs. She started Zoladex and abemiciclib on 1/12/2018.     In March 2018, she sought treatment at [facility] in [place], under the care of Dr. Brooks, where she received:  - Vallovex vaccine (per FDA, is in phase I trials here in [place])  - Insulin potentiated therapy with conjugated chemotherapy (5% chemotherapy, unclear what chemotherapy)  - Gina's toxins, IV vitamin C, IV Ozone, hyperbaric chamber, vitamin B17 infusion, vitamin CK3 IV, and coffee enemas PRN  - Also taking oral niacin, Lugol, acidol, pancreatin, and thyroid desiccated liver, per their regimen  - She was continued on Letrozole and advised to conintue Zoladex    She met with Dr. Whitman and patient wished to continue alternative regimen. Abemiciclib was placed on hold and remains on hold. She  "was continued on Zoladex and letrozole. While in [place], she required thoracentesis for symptomatic pleural effusion.    Interval History: Shan is here for follow-up.    She is feeling very well lately.   She has not had any SOB, orthopnea, cough. She had the fluid drained from her lungs and was asymptomatic prior to that, but it really distresses her knowing that the fluid is there.  She continues her alternative medicine regimen. She tolerates that well with exception of the Gina's toxins, she has low-grade fevers and some transient mental fogginess for a few hours after taking it. Otherwise, no new issues. Occasional hot flashes on the endocrine therapy. Emotionally, she feels she is healing and is now seeing a therapist, which she is excited about.  ROS otherwise negative.     Current Outpatient Prescriptions   Medication     letrozole (FEMARA) 2.5 MG tablet     ondansetron (ZOFRAN) 8 MG tablet     prochlorperazine (COMPAZINE) 10 MG tablet     calcium carbonate (OS-KRYSTEN 500 MG [place]. [place]) 1250 MG tablet     [DISCONTINUED] tamoxifen (NOLVADEX) 20 MG tablet     VITAMIN D, CHOLECALCIFEROL, PO     No current facility-administered medications for this visit.        PHYSICAL EXAM:  ECO  /84  Pulse 88  Temp 98.1  F (36.7  C) (Oral)  Resp 18  Ht 1.626 m (5' 4.02\")  Wt 51.4 kg (113 lb 4.8 oz)  SpO2 100%  BMI 19.44 kg/m2   Wt Readings from Last 10 Encounters:   18 51.4 kg (113 lb 4.8 oz)   18 51.7 kg (114 lb)   18 51.9 kg (114 lb 6.4 oz)   18 52 kg (114 lb 11.2 oz)   18 51.1 kg (112 lb 11.2 oz)   18 55.1 kg (121 lb 6.4 oz)   18 53.7 kg (118 lb 6.4 oz)   18 54.8 kg (120 lb 14.4 oz)   17 54.6 kg (120 lb 4.8 oz)   17 53.8 kg (118 lb 11.2 oz)      General: Alert, oriented, pleasant, NAD  Skin: warm, dry without rashes or bruising  HEENT: Normocephalic, atraumatic, PERRLA, EOMI. Moist mucus membranes, no lesions or thrush  Neck: No cervical or " supraclavicular LAD.   Lungs:  normal work of breathing, perhaps the slightest dullness to auscultation and percussion at left base  Cardiac: RRR, S1, S2, no murmurs  Abdomen: Soft, nontender, nondistended. Normoactive bowel sounds. No hepatosplenomegaly, masses  Neuro: CNII-XII grossly intact  Extremities: No pedal edema. L arm with a sleeve, unable to visualize gross edema    Labs:   Results for NIKKIE HICKS (MRN [id number]) as of 4/6/2018 12:52   Ref. Range 4/6/2018 11:47   Sodium Latest Ref Range: 133 - 144 mmol/L 139   Potassium Latest Ref Range: 3.4 - 5.3 mmol/L 4.5   Chloride Latest Ref Range: 94 - 109 mmol/L 107   Carbon Dioxide Latest Ref Range: 20 - 32 mmol/L 26   Urea Nitrogen Latest Ref Range: 7 - 30 mg/dL 7   Creatinine Latest Ref Range: 0.52 - 1.04 mg/dL 0.58   GFR Estimate Latest Ref Range: >60 mL/min/1.7m2 >90   GFR Estimate If Black Latest Ref Range: >60 mL/min/1.7m2 >90   Calcium Latest Ref Range: 8.5 - 10.1 mg/dL 9.2   Anion Gap Latest Ref Range: 3 - 14 mmol/L 6   Albumin Latest Ref Range: 3.4 - 5.0 g/dL 3.5   Protein Total Latest Ref Range: 6.8 - 8.8 g/dL 7.6   Bilirubin Total Latest Ref Range: 0.2 - 1.3 mg/dL 0.3   Alkaline Phosphatase Latest Ref Range: 40 - 150 U/L 75   ALT Latest Ref Range: 0 - 50 U/L 30   AST Latest Ref Range: 0 - 45 U/L 30   Glucose Latest Ref Range: 70 - 99 mg/dL 74   WBC Latest Ref Range: 4.0 - 11.0 10e9/L 5.3   Hemoglobin Latest Ref Range: 11.7 - 15.7 g/dL 13.9   Hematocrit Latest Ref Range: 35.0 - 47.0 % 42.9   Platelet Count Latest Ref Range: 150 - 450 10e9/L 195     Imaging:   CT CAP 3/15/18  IMPRESSION: In this patient with metastatic breast cancer there has  been progression of disease.  1. Mixed changes of poorly defined nodular consolidative groundglass  opacities. Overall there has been an increase in size and number.  Although infection is a possibility this is concerning for metastatic  disease.  2. Increased left pleural effusion and new small right  pleural  effusion.  3. Stable lymphadenopathy in the chest.   4. Increased size of the liver metastasis since the prior study. No  new lesions are seen.  5. Increase in the number of mixed sclerotic and lytic metastatic  lesions in the bone.  6. Pathologic compression fracture of L1.       Assessment & Plan: Shan Marsh is a 47-year-old female with history of a T1 N0, ER/ME positive, HER2-negative left breast cancer treated with bilateral mastectomies in 2012, now with metastatic disease involving the left axilla, bones, and possible liver and lung, ER/ME positive, HER2 negative.  She had progression on palbociclib and Tamoxifen.     1. Metastatic breast cancer: She was recommended to start abemiciclib however, shortly after starting the medication, she sought care in [place] and received a variety of alternative/investigative agents, as listed in HPI.  Abemiciclib was stopped. CT 3/15 after returning from [place] showing progressive disease with increased GGO, left pleural effusion, increased size of liver mets, and increased number of bony lesions. Per her wishes, she continues on the alternative regimen, as well as letrozole and Zoladex. She is feeling really well at this time. We will continue the letrozole/Zoladex.   - She met with Dr. Richardson in [place], recommended against  Oophorectomy for several reasons. If her pleural effusion clears may be an option.    2. Bone mets: Continue monthly Zometa    3. FEN: Weight stable today. She has decreased appetite on her alternative medicine treatments. She is mindful of her intake. Encouraged her to keep this up.    4. L pleural effusion: Required L thoracentesis in [place] for symptomatic pleural effusion. Underwent repeat thora 4/2, asymptomatic at that time. She does wish to continue monthly thoracentesis even if asymptomatic; she is concerned about the fluid. She understands to call with SOB, orthopnea, cough.     Unless interval concerns arise, she will see Dr. Whitman  in about a month.      I spent >25 minutes with the patient, with over 50% of the time spent counseling or coordinating their care as described above.    Lima Griffiths PA-C

## 2018-04-06 NOTE — PROGRESS NOTES
Infusion Nursing Note:  Shan Marsh presents today for Zometa and Zoladex.    Patient seen by provider today: Lima Griffiths   present during visit today: Not Applicable.    Note: denies any complaints today.    Intravenous Access:  Peripheral IV placed.    Treatment Conditions:  Lab Results   Component Value Date    HGB 13.9 04/06/2018     Lab Results   Component Value Date    WBC 5.3 04/06/2018      Lab Results   Component Value Date    ANEU 3.4 04/06/2018     Lab Results   Component Value Date     04/06/2018      Lab Results   Component Value Date     04/06/2018                   Lab Results   Component Value Date    POTASSIUM 4.5 04/06/2018           No results found for: MAG         Lab Results   Component Value Date    CR 0.58 04/06/2018                   Lab Results   Component Value Date    KRYSTEN 9.2 04/06/2018                Lab Results   Component Value Date    BILITOTAL 0.3 04/06/2018           Lab Results   Component Value Date    ALBUMIN 3.5 04/06/2018                    Lab Results   Component Value Date    ALT 30 04/06/2018           Lab Results   Component Value Date    AST 30 04/06/2018       Results reviewed, labs MET treatment parameters, ok to proceed with treatment. Corrected calcium 9.6.      Post Infusion Assessment:  Patient tolerated infusion without incident.  Patient tolerated injection without incident. Left lower quadrant.  Blood return noted pre and post infusion.  Site patent and intact, free from redness, edema or discomfort.  No evidence of extravasations.  Access discontinued per protocol.    Discharge Plan:   Patient declined prescription refills.  Discharge instructions reviewed with: Patient.  Patient and/or family verbalized understanding of discharge instructions and all questions answered.  AVS to patient via [employer].  Patient will return 5/7/18 for next appointment.   Patient discharged in stable condition accompanied by: self.  Departure  Mode: Ambulatory.  Face to Face time: 0 minutes.    Casimiro Tang RN    Zometa stop time 1359.

## 2018-04-06 NOTE — PATIENT INSTRUCTIONS
[facility] Main Line: [phone]    Call triage nurse with chills and/or temperature greater than or equal to 100.4, uncontrolled nausea/vomiting, diarrhea, constipation, dizziness, shortness of breath, chest pain, bleeding, unexplained bruising, or any new/concerning symptoms, questions/concerns.   If you are having any concerning symptoms or wish to speak to a provider before your next infusion visit, please call your care coordinator or triage to notify them so we can adequately serve you.   Triage Nurse Line: [phone]    If after hours, weekends, or holidays, call main hospital  and ask for Oncology doctor on call @ [phone]               April 2018 Sunday Monday Tuesday Wednesday Thursday Friday Saturday   1     2     PROCEDURE - 3 HR    9:30 AM   (180 min.)   U2A ROOM 12   Unit 2A [facility]     Admission    9:32 AM   Ana Whitman MD   Unit [facility]   (Discharge: 4/2/2018)     IR THORACENTESIS   10:45 AM   (60 min.)   UUIR2   [facility] [place], Interventional Radiology 3     4     5     6     [facility] DRAW   11:30 AM   (15 min.)    [facility]   [facility]     [facility] RETURN   11:45 AM   (50 min.)   Lima Griffiths PA-C   [facility] ONC INFUSION 60    1:00 PM   (60 min.)    ONCOLOGY INFUSION   [facility] 7       8     9     10     11     12     13     14       15     16     17     18     19     20     21       22     23     24     25     26     27     28       29     30                                         May 2018   Juan A Monday Tuesday Wednesday Thursday Friday Saturday             1     2     3     4     5       6     7     [facility] DRAW    8:00 AM   (15 min.)    [facility] DRAW   [facility] Lab Draw     [facility] RETURN    8:15 AM   (30 min.)   Ana Whitman MD   [facility] ONC INFUSION 60    9:30 AM   (60 min.)    ONCOLOGY  [facility] 8     9     10     11     12       13     14     15     16     17     18     19       20     21     22     23  Happy Birthday!     24     25     26       27     28     29     30     31                            Lab Results:  Recent Results (from the past 12 hour(s))   Comprehensive metabolic panel (BMP + Alb, Alk Phos, ALT, AST, Total. Bili, TP)    Collection Time: 04/06/18 11:47 AM   Result Value Ref Range    Sodium 139 133 - 144 mmol/L    Potassium 4.5 3.4 - 5.3 mmol/L    Chloride 107 94 - 109 mmol/L    Carbon Dioxide 26 20 - 32 mmol/L    Anion Gap 6 3 - 14 mmol/L    Glucose 74 70 - 99 mg/dL    Urea Nitrogen 7 7 - 30 mg/dL    Creatinine 0.58 0.52 - 1.04 mg/dL    GFR Estimate >90 >60 mL/min/1.7m2    GFR Estimate If Black >90 >60 mL/min/1.7m2    Calcium 9.2 8.5 - 10.1 mg/dL    Bilirubin Total 0.3 0.2 - 1.3 mg/dL    Albumin 3.5 3.4 - 5.0 g/dL    Protein Total 7.6 6.8 - 8.8 g/dL    Alkaline Phosphatase 75 40 - 150 U/L    ALT 30 0 - 50 U/L    AST 30 0 - 45 U/L   CBC with platelets and differential    Collection Time: 04/06/18 11:47 AM   Result Value Ref Range    WBC 5.3 4.0 - 11.0 10e9/L    RBC Count 4.26 3.8 - 5.2 10e12/L    Hemoglobin 13.9 11.7 - 15.7 g/dL    Hematocrit 42.9 35.0 - 47.0 %     (H) 78 - 100 fl    MCH 32.6 26.5 - 33.0 pg    MCHC 32.4 31.5 - 36.5 g/dL    RDW 12.5 10.0 - 15.0 %    Platelet Count 195 150 - 450 10e9/L    Diff Method Automated Method     % Neutrophils 65.1 %    % Lymphocytes 21.3 %    % Monocytes 7.6 %    % Eosinophils 4.8 %    % Basophils 0.8 %    % Immature Granulocytes 0.4 %    Nucleated RBCs 0 0 /100    Absolute Neutrophil 3.4 1.6 - 8.3 10e9/L    Absolute Lymphocytes 1.1 0.8 - 5.3 10e9/L    Absolute Monocytes 0.4 0.0 - 1.3 10e9/L    Absolute Eosinophils 0.3 0.0 - 0.7 10e9/L    Absolute Basophils 0.0 0.0 - 0.2 10e9/L    Abs Immature Granulocytes 0.0 0 - 0.4 10e9/L    Absolute Nucleated RBC 0.0

## 2018-04-06 NOTE — LETTER
4/6/2018      RE: Shan Marsh  [address]  [facility] [address]       Hematology-Oncology Visit  Apr 6, 2018    Reason for Visit: follow-up metastatic breast cancer, ER positive     HPI: Shan Marsh is a 47 year old female without significant past medical history with recent diagnosis of metastatic breast cancer after presenting with left shoulder pain. Shoulder MRI showed soft tissue mass close to distal clavicle concerning for malignancy. She had PET/CT 5/9/17 showing hypermetabolic left axillary, supraclavicular, mediastinal and hilar nodes. She had biopsies of left axilla and hilar nodes showing metastatic breast cancer, strongly ER positive, moderately CT positive and HER-2 kaitlin non-amplified. She originally had [id number], ER/CT positive, HER-2 negative breast cancer diagnosed in spring of 2012 s/p bilateral mastectomies and Tamoxifen from 8/2012-2/2013 and discontinued due to side effects. She met with Dr. Whitman most recently on 5/16/17 who recommended treatment on BIG10 clinical trial with palbociclib and tamoxifen. She started treatment on 5/24/17 and required dose reduction for neutropenia. CT CAP and bone scan 1/11/2018 showing progressive disease with new bony lesions in the ribs, T spine, and possibly liver, lungs. She started Zoladex and abemiciclib on 1/12/2018.     In March 2018, she sought treatment at [facility] in [place], under the care of Dr. Brooks, where she received:  - Vallovex vaccine (per FDA, is in phase I trials here in [place])  - Insulin potentiated therapy with conjugated chemotherapy (5% chemotherapy, unclear what chemotherapy)  - Gina's toxins, IV vitamin C, IV Ozone, hyperbaric chamber, vitamin B17 infusion, vitamin CK3 IV, and coffee enemas PRN  - Also taking oral niacin, Lugol, acidol, pancreatin, and thyroid desiccated liver, per their regimen  - She was continued on Letrozole and advised to conintue Zoladex    She met with Dr. Whitman and patient wished  "to continue alternative regimen. Abemiciclib was placed on hold and remains on hold. She was continued on Zoladex and letrozole. While in [place], she required thoracentesis for symptomatic pleural effusion.    Interval History: Shan is here for follow-up.    She is feeling very well lately.   She has not had any SOB, orthopnea, cough. She had the fluid drained from her lungs and was asymptomatic prior to that, but it really distresses her knowing that the fluid is there.  She continues her alternative medicine regimen. She tolerates that well with exception of the Gina's toxins, she has low-grade fevers and some transient mental fogginess for a few hours after taking it. Otherwise, no new issues. Occasional hot flashes on the endocrine therapy. Emotionally, she feels she is healing and is now seeing a therapist, which she is excited about.  ROS otherwise negative.     Current Outpatient Prescriptions   Medication     letrozole (FEMARA) 2.5 MG tablet     ondansetron (ZOFRAN) 8 MG tablet     prochlorperazine (COMPAZINE) 10 MG tablet     calcium carbonate (OS-KRYSTEN 500 MG [place]. [place]) 1250 MG tablet     [DISCONTINUED] tamoxifen (NOLVADEX) 20 MG tablet     VITAMIN D, CHOLECALCIFEROL, PO     No current facility-administered medications for this visit.        PHYSICAL EXAM:  ECO  /84  Pulse 88  Temp 98.1  F (36.7  C) (Oral)  Resp 18  Ht 1.626 m (5' 4.02\")  Wt 51.4 kg (113 lb 4.8 oz)  SpO2 100%  BMI 19.44 kg/m2   Wt Readings from Last 10 Encounters:   18 51.4 kg (113 lb 4.8 oz)   18 51.7 kg (114 lb)   18 51.9 kg (114 lb 6.4 oz)   18 52 kg (114 lb 11.2 oz)   18 51.1 kg (112 lb 11.2 oz)   18 55.1 kg (121 lb 6.4 oz)   18 53.7 kg (118 lb 6.4 oz)   18 54.8 kg (120 lb 14.4 oz)   17 54.6 kg (120 lb 4.8 oz)   17 53.8 kg (118 lb 11.2 oz)      General: Alert, oriented, pleasant, NAD  Skin: warm, dry without rashes or bruising  HEENT: Normocephalic, atraumatic, " PERRLA, EOMI. Moist mucus membranes, no lesions or thrush  Neck: No cervical or supraclavicular LAD.   Lungs:  normal work of breathing, perhaps the slightest dullness to auscultation and percussion at left base  Cardiac: RRR, S1, S2, no murmurs  Abdomen: Soft, nontender, nondistended. Normoactive bowel sounds. No hepatosplenomegaly, masses  Neuro: CNII-XII grossly intact  Extremities: No pedal edema. L arm with a sleeve, unable to visualize gross edema    Labs:   Results for NIKKIE HICKS (MRN [id number]) as of 4/6/2018 12:52   Ref. Range 4/6/2018 11:47   Sodium Latest Ref Range: 133 - 144 mmol/L 139   Potassium Latest Ref Range: 3.4 - 5.3 mmol/L 4.5   Chloride Latest Ref Range: 94 - 109 mmol/L 107   Carbon Dioxide Latest Ref Range: 20 - 32 mmol/L 26   Urea Nitrogen Latest Ref Range: 7 - 30 mg/dL 7   Creatinine Latest Ref Range: 0.52 - 1.04 mg/dL 0.58   GFR Estimate Latest Ref Range: >60 mL/min/1.7m2 >90   GFR Estimate If Black Latest Ref Range: >60 mL/min/1.7m2 >90   Calcium Latest Ref Range: 8.5 - 10.1 mg/dL 9.2   Anion Gap Latest Ref Range: 3 - 14 mmol/L 6   Albumin Latest Ref Range: 3.4 - 5.0 g/dL 3.5   Protein Total Latest Ref Range: 6.8 - 8.8 g/dL 7.6   Bilirubin Total Latest Ref Range: 0.2 - 1.3 mg/dL 0.3   Alkaline Phosphatase Latest Ref Range: 40 - 150 U/L 75   ALT Latest Ref Range: 0 - 50 U/L 30   AST Latest Ref Range: 0 - 45 U/L 30   Glucose Latest Ref Range: 70 - 99 mg/dL 74   WBC Latest Ref Range: 4.0 - 11.0 10e9/L 5.3   Hemoglobin Latest Ref Range: 11.7 - 15.7 g/dL 13.9   Hematocrit Latest Ref Range: 35.0 - 47.0 % 42.9   Platelet Count Latest Ref Range: 150 - 450 10e9/L 195     Imaging:   CT CAP 3/15/18  IMPRESSION: In this patient with metastatic breast cancer there has  been progression of disease.  1. Mixed changes of poorly defined nodular consolidative groundglass  opacities. Overall there has been an increase in size and number.  Although infection is a possibility this is concerning  for metastatic  disease.  2. Increased left pleural effusion and new small right pleural  effusion.  3. Stable lymphadenopathy in the chest.   4. Increased size of the liver metastasis since the prior study. No  new lesions are seen.  5. Increase in the number of mixed sclerotic and lytic metastatic  lesions in the bone.  6. Pathologic compression fracture of L1.       Assessment & Plan: Shan Marsh is a 47-year-old female with history of a T1 N0, ER/NY positive, HER2-negative left breast cancer treated with bilateral mastectomies in 2012, now with metastatic disease involving the left axilla, bones, and possible liver and lung, ER/NY positive, HER2 negative.  She had progression on palbociclib and Tamoxifen.     1. Metastatic breast cancer: She was recommended to start abemiciclib however, shortly after starting the medication, she sought care in [place] and received a variety of alternative/investigative agents, as listed in HPI.  Abemiciclib was stopped. CT 3/15 after returning from [place] showing progressive disease with increased GGO, left pleural effusion, increased size of liver mets, and increased number of bony lesions. Per her wishes, she continues on the alternative regimen, as well as letrozole and Zoladex. She is feeling really well at this time. We will continue the letrozole/Zoladex.   - She met with Dr. Richardson in [place], recommended against  Oophorectomy for several reasons. If her pleural effusion clears may be an option.    2. Bone mets: Continue monthly Zometa    3. FEN: Weight stable today. She has decreased appetite on her alternative medicine treatments. She is mindful of her intake. Encouraged her to keep this up.    4. L pleural effusion: Required L thoracentesis in [place] for symptomatic pleural effusion. Underwent repeat thora 4/2, asymptomatic at that time. She does wish to continue monthly thoracentesis even if asymptomatic; she is concerned about the fluid. She understands to call with  SOB, orthopnea, cough.     Unless interval concerns arise, she will see Dr. Whitman in about a month.      I spent >25 minutes with the patient, with over 50% of the time spent counseling or coordinating their care as described above.    Lima Griffiths PA-C

## 2018-04-06 NOTE — MR AVS SNAPSHOT
After Visit Summary   4/6/2018    Shan Marsh    MRN: [id number]           Patient Information     Date Of Birth          1970        Visit Information        Provider Department      4/6/2018 12:00 PM Lima Griffiths PA-C [facility]        Today's Diagnoses     Metastatic breast cancer (H)    -  1    Bone metastasis (H)           Follow-ups after your visit        Your next 10 appointments already scheduled     Apr 06, 2018  1:00 PM CDT   Infusion 60 with UC ONCOLOGY INFUSION, UC 11 ATC   [facility] ([facility])    [address]  [address]  [facility] [address]   [phone]            May 07, 2018  8:00 AM CDT   [facility] Lab Draw with  [facility] LAB DRAW   [facility] Lab Draw ([facility])    [address] [place] [address]  [address]  [facility] [address]   [phone]            May 07, 2018  8:30 AM CDT   (Arrive by 8:15 AM)   Return Visit with Ana Whitman MD   [facility] ([facility])    [address]  [address]  [facility] [address]   [phone]            May 07, 2018  9:30 AM CDT   Infusion 60 with UC ONCOLOGY INFUSION, UC 21 ATC   [facility] ([facility])    [address] [place] [address]  [address]  [facility] [address]   [phone]              Future tests that were ordered for you today     Open Future Orders        Priority Expected Expires Ordered    CBC with platelets differential Routine 5/4/2018 7/6/2018 4/6/2018    Comprehensive metabolic panel Routine 5/4/2018 7/6/2018 4/6/2018    CEA Routine 5/4/2018 7/6/2018 4/6/2018    Ca27.29  breast tumor marker Routine 5/4/2018 7/6/2018 4/6/2018    IR Thoracentesis Routine  4/6/2019 4/6/2018            Who to contact     If you have questions or need follow up information about today's clinic visit or your  "schedule please contact [facility] directly at [phone].  Normal or non-critical lab and imaging results will be communicated to you by MyChart, letter or phone within 4 business days after the clinic has received the results. If you do not hear from us within 7 days, please contact the clinic through MyChart or phone. If you have a critical or abnormal lab result, we will notify you by phone as soon as possible.  Submit refill requests through [employer] or call your pharmacy and they will forward the refill request to us. Please allow 3 business days for your refill to be completed.          Additional Information About Your Visit        [employer] Information     [employer] gives you secure access to your electronic health record. If you see a primary care provider, you can also send messages to your care team and make appointments. If you have questions, please call your primary care clinic.  If you do not have a primary care provider, please call [phone] and they will assist you.        Care EveryWhere ID     This is your Care EveryWhere ID. This could be used by other organizations to access your [place] medical records  [id number]        Your Vitals Were     Pulse Temperature Respirations Height Pulse Oximetry BMI (Body Mass Index)    88 98.1  F (36.7  C) (Oral) 18 1.626 m (5' 4.02\") 100% 19.44 kg/m2       Blood Pressure from Last 3 Encounters:   04/06/18 125/84   04/02/18 111/73   03/23/18 125/68    Weight from Last 3 Encounters:   04/06/18 51.4 kg (113 lb 4.8 oz)   04/02/18 51.7 kg (114 lb)   03/23/18 51.9 kg (114 lb 6.4 oz)                 Where to get your medicines      These medications were sent to [facility] [phone] [place] [address] [address] [address]    Hours:  TRANSPLANT PHONE NUMBER [phone] Phone:  [phone]     letrozole 2.5 MG tablet          Primary Care Provider Office Phone # Fax #    " Marilu Michelle [phone]       [address]  [facility] [address]        Equal Access to Services     LUZ JAMES : Hadii aad ku hadsilvinoleonor Angelali, ceasarda tenavalarieha, genita moiramary kayda anneliese, edi sotodahlia kolb. So [facility] [phone].    ATENCIÓN: Si habla español, tiene a gamez disposición servicios gratuitos de asistencia lingüística. Llame al [phone].    We comply with applicable federal civil rights laws and [place] laws. We do not discriminate on the basis of race, color, national origin, age, disability, sex, sexual orientation, or gender identity.            Thank you!     Thank you for choosing [facility]  for your care. Our goal is always to provide you with excellent care. Hearing back from our patients is one way we can continue to improve our services. Please take a few minutes to complete the written survey that you may receive in the mail after your visit with us. Thank you!             Your Updated Medication List - Protect others around you: Learn how to safely use, store and throw away your medicines at www.disposemymeds.org.          This list is accurate as of 4/6/18 12:58 PM.  Always use your most recent med list.                   Brand Name Dispense Instructions for use Diagnosis    calcium carbonate 1250 MG tablet    OS-KRYSTEN 500 mg [place]. [place]     Take 1 tablet by mouth daily        letrozole 2.5 MG tablet    FEMARA    28 tablet    Take 1 tablet (2.5 mg) by mouth daily    Metastatic breast cancer (H)       metoclopramide 10 MG tablet    REGLAN          ondansetron 8 MG tablet    ZOFRAN          prochlorperazine 10 MG tablet    COMPAZINE          VITAMIN D (CHOLECALCIFEROL) PO      Take by mouth daily

## 2018-04-06 NOTE — NURSING NOTE
"Oncology Rooming Note    April 6, 2018 12:04 PM   Shan Marsh is a 47 year old female who presents for:    Chief Complaint   Patient presents with     Blood Draw     va/labs vpt right AC     Oncology Clinic Visit     F/U Breast CA     Initial Vitals: /84  Pulse 88  Temp 98.1  F (36.7  C) (Oral)  Resp 18  Ht 1.626 m (5' 4.02\")  Wt 51.4 kg (113 lb 4.8 oz)  SpO2 100%  BMI 19.44 kg/m2 Estimated body mass index is 19.44 kg/(m^2) as calculated from the following:    Height as of this encounter: 1.626 m (5' 4.02\").    Weight as of this encounter: 51.4 kg (113 lb 4.8 oz). Body surface area is 1.52 meters squared.  No Pain (0) Comment: Data Unavailable   No LMP recorded. Patient is not currently having periods (Reason: UNKNOWN).  Allergies reviewed: Yes  Medications reviewed: Yes    Medications: MEDICATION REFILLS NEEDED TODAY. Provider was notified.  Pharmacy name entered into [facility]: [facility] [place], [place] - [address]    Clinical concerns: none Lima was NOT notified.    10 minutes for nursing intake (face to face time)     SINDY VIRAMONTES LPN            "

## 2018-04-10 ENCOUNTER — CARE COORDINATION (OUTPATIENT)
Dept: ONCOLOGY | Facility: CLINIC | Age: 48
End: 2018-04-10

## 2018-04-10 DIAGNOSIS — C50.919 METASTATIC BREAST CANCER: Primary | ICD-10-CM

## 2018-04-10 NOTE — PROGRESS NOTES
Patient asking about scheduling imaging to evaluate if a left thoracentesis is necessary with last one done 4/2 for over a Liter of fluid the day of the appointment with Dr Whitman on 5/7/18. Patient currently breathing easily and no shortness of breath noted with activity. Will discuss with Dr Whitman today for recommendations.  Answered all patient's questions and verbalized understanding. Sharifa Celeste RN, BSN.     Spoke to Dr Whitman and ordered a CXR PA/Lat prior to seeing Dr Whitman 5/7/18 and a left Thoracentesis after appointment with Dr Whitman. Updated patient with scheduling as requested.  Answered all patient's questions and verbalized understanding. Sharifa Celeste RN, BSN.

## 2018-04-18 ENCOUNTER — TELEPHONE (OUTPATIENT)
Dept: ONCOLOGY | Facility: CLINIC | Age: 48
End: 2018-04-18

## 2018-04-18 NOTE — TELEPHONE ENCOUNTER
Social Work Note: Telephone Call  Oncology Clinic    Data/Intervention:  Patient Name:  Shan Marsh  /Age:  1970 (47 year old)    Call From:  Social work received phone call from patient  Reason for Call:  Financial resources    Assessment:  SW received phone call from patient.  Patient reported concerns about managing increasing medical expenses as she would like mental health and counseling support for her children.  Patient stated their biggest challenge in accessing these services is their insurance coverage as copays for mental health visits are high in their plan.  SW gave education about support for children through [facility] program.  SW also suggested applying for [employer] betty to assist with cost of therapy.  [employer] application completed.    Plan:  SW continues to be available to assist with needs.     Soo Yeon Han, MSW, [facility]  Pager: [phone]  Phone: [phone]

## 2018-05-06 RX ORDER — LIDOCAINE HYDROCHLORIDE 10 MG/ML
10 INJECTION, SOLUTION INFILTRATION; PERINEURAL ONCE
Status: DISCONTINUED | OUTPATIENT
Start: 2018-05-06 | End: 2018-05-08 | Stop reason: HOSPADM

## 2018-05-07 ENCOUNTER — RADIANT APPOINTMENT (OUTPATIENT)
Dept: RADIOLOGY | Facility: AMBULATORY SURGERY CENTER | Age: 48
End: 2018-05-07
Attending: PHYSICIAN ASSISTANT
Payer: COMMERCIAL

## 2018-05-07 ENCOUNTER — INFUSION THERAPY VISIT (OUTPATIENT)
Dept: ONCOLOGY | Facility: CLINIC | Age: 48
End: 2018-05-07
Attending: INTERNAL MEDICINE
Payer: COMMERCIAL

## 2018-05-07 ENCOUNTER — RADIANT APPOINTMENT (OUTPATIENT)
Dept: GENERAL RADIOLOGY | Facility: CLINIC | Age: 48
End: 2018-05-07
Attending: INTERNAL MEDICINE
Payer: COMMERCIAL

## 2018-05-07 ENCOUNTER — SURGERY (OUTPATIENT)
Age: 48
End: 2018-05-07

## 2018-05-07 ENCOUNTER — HOSPITAL ENCOUNTER (OUTPATIENT)
Facility: AMBULATORY SURGERY CENTER | Age: 48
End: 2018-05-07
Attending: PHYSICIAN ASSISTANT
Payer: COMMERCIAL

## 2018-05-07 VITALS
HEART RATE: 97 BPM | WEIGHT: 110.5 LBS | DIASTOLIC BLOOD PRESSURE: 78 MMHG | TEMPERATURE: 97.5 F | OXYGEN SATURATION: 99 % | HEIGHT: 64 IN | SYSTOLIC BLOOD PRESSURE: 131 MMHG | RESPIRATION RATE: 18 BRPM | BODY MASS INDEX: 18.86 KG/M2

## 2018-05-07 VITALS
DIASTOLIC BLOOD PRESSURE: 76 MMHG | TEMPERATURE: 98.7 F | OXYGEN SATURATION: 98 % | HEIGHT: 64 IN | WEIGHT: 110.5 LBS | SYSTOLIC BLOOD PRESSURE: 104 MMHG | BODY MASS INDEX: 18.86 KG/M2 | RESPIRATION RATE: 16 BRPM

## 2018-05-07 DIAGNOSIS — C50.919 METASTATIC BREAST CANCER: ICD-10-CM

## 2018-05-07 DIAGNOSIS — C79.51 BONE METASTASIS: ICD-10-CM

## 2018-05-07 DIAGNOSIS — C50.919 METASTATIC BREAST CANCER: Primary | ICD-10-CM

## 2018-05-07 LAB
ALBUMIN SERPL-MCNC: 3.3 G/DL (ref 3.4–5)
ALP SERPL-CCNC: 59 U/L (ref 40–150)
ALT SERPL W P-5'-P-CCNC: 27 U/L (ref 0–50)
ANION GAP SERPL CALCULATED.3IONS-SCNC: 7 MMOL/L (ref 3–14)
AST SERPL W P-5'-P-CCNC: 23 U/L (ref 0–45)
BASOPHILS # BLD AUTO: 0 10E9/L (ref 0–0.2)
BASOPHILS NFR BLD AUTO: 0.3 %
BILIRUB SERPL-MCNC: 0.3 MG/DL (ref 0.2–1.3)
BUN SERPL-MCNC: 8 MG/DL (ref 7–30)
CALCIUM SERPL-MCNC: 9.4 MG/DL (ref 8.5–10.1)
CANCER AG27-29 SERPL-ACNC: 293 U/ML (ref 0–39)
CEA SERPL-MCNC: 30.9 UG/L (ref 0–2.5)
CHLORIDE SERPL-SCNC: 106 MMOL/L (ref 94–109)
CO2 SERPL-SCNC: 28 MMOL/L (ref 20–32)
CREAT SERPL-MCNC: 0.58 MG/DL (ref 0.52–1.04)
DIFFERENTIAL METHOD BLD: ABNORMAL
EOSINOPHIL # BLD AUTO: 0.1 10E9/L (ref 0–0.7)
EOSINOPHIL NFR BLD AUTO: 3.1 %
ERYTHROCYTE [DISTWIDTH] IN BLOOD BY AUTOMATED COUNT: 11.5 % (ref 10–15)
GFR SERPL CREATININE-BSD FRML MDRD: >90 ML/MIN/1.7M2
GLUCOSE SERPL-MCNC: 74 MG/DL (ref 70–99)
HCT VFR BLD AUTO: 40.7 % (ref 35–47)
HGB BLD-MCNC: 13.5 G/DL (ref 11.7–15.7)
IMM GRANULOCYTES # BLD: 0 10E9/L (ref 0–0.4)
IMM GRANULOCYTES NFR BLD: 0.5 %
INR PPP: 0.99 (ref 0.86–1.14)
LYMPHOCYTES # BLD AUTO: 1.1 10E9/L (ref 0.8–5.3)
LYMPHOCYTES NFR BLD AUTO: 28 %
MCH RBC QN AUTO: 31.3 PG (ref 26.5–33)
MCHC RBC AUTO-ENTMCNC: 33.2 G/DL (ref 31.5–36.5)
MCV RBC AUTO: 94 FL (ref 78–100)
MONOCYTES # BLD AUTO: 0.3 10E9/L (ref 0–1.3)
MONOCYTES NFR BLD AUTO: 8 %
NEUTROPHILS # BLD AUTO: 2.3 10E9/L (ref 1.6–8.3)
NEUTROPHILS NFR BLD AUTO: 60.1 %
NRBC # BLD AUTO: 0 10*3/UL
NRBC BLD AUTO-RTO: 0 /100
PLATELET # BLD AUTO: 188 10E9/L (ref 150–450)
POTASSIUM SERPL-SCNC: 4.2 MMOL/L (ref 3.4–5.3)
PROT SERPL-MCNC: 7.2 G/DL (ref 6.8–8.8)
RBC # BLD AUTO: 4.31 10E12/L (ref 3.8–5.2)
SODIUM SERPL-SCNC: 142 MMOL/L (ref 133–144)
WBC # BLD AUTO: 3.9 10E9/L (ref 4–11)

## 2018-05-07 PROCEDURE — 80053 COMPREHEN METABOLIC PANEL: CPT | Performed by: PHYSICIAN ASSISTANT

## 2018-05-07 PROCEDURE — 86300 IMMUNOASSAY TUMOR CA 15-3: CPT | Performed by: PHYSICIAN ASSISTANT

## 2018-05-07 PROCEDURE — 25000125 ZZHC RX 250: Mod: ZF | Performed by: INTERNAL MEDICINE

## 2018-05-07 PROCEDURE — 25000128 H RX IP 250 OP 636: Mod: ZF | Performed by: INTERNAL MEDICINE

## 2018-05-07 PROCEDURE — 99214 OFFICE O/P EST MOD 30 MIN: CPT | Mod: ZP | Performed by: INTERNAL MEDICINE

## 2018-05-07 PROCEDURE — 85025 COMPLETE CBC W/AUTO DIFF WBC: CPT | Performed by: PHYSICIAN ASSISTANT

## 2018-05-07 PROCEDURE — 82378 CARCINOEMBRYONIC ANTIGEN: CPT | Performed by: PHYSICIAN ASSISTANT

## 2018-05-07 PROCEDURE — G0463 HOSPITAL OUTPT CLINIC VISIT: HCPCS | Mod: ZF

## 2018-05-07 PROCEDURE — 96402 CHEMO HORMON ANTINEOPL SQ/IM: CPT

## 2018-05-07 RX ORDER — LETROZOLE 2.5 MG/1
2.5 TABLET, FILM COATED ORAL DAILY
Qty: 28 TABLET | Refills: 0 | Status: SHIPPED | OUTPATIENT
Start: 2018-05-07 | End: 2018-06-05

## 2018-05-07 RX ORDER — ONDANSETRON 8 MG/1
8 TABLET, FILM COATED ORAL EVERY 8 HOURS PRN
Qty: 40 TABLET | Refills: 3 | Status: SHIPPED | OUTPATIENT
Start: 2018-05-07 | End: 2018-08-28

## 2018-05-07 RX ORDER — LIDOCAINE HYDROCHLORIDE 10 MG/ML
2 INJECTION, SOLUTION EPIDURAL; INFILTRATION; INTRACAUDAL; PERINEURAL ONCE
Status: COMPLETED | OUTPATIENT
Start: 2018-05-07 | End: 2018-05-07

## 2018-05-07 RX ORDER — LIDOCAINE HYDROCHLORIDE 10 MG/ML
2 INJECTION, SOLUTION EPIDURAL; INFILTRATION; INTRACAUDAL; PERINEURAL ONCE
Status: CANCELLED | OUTPATIENT
Start: 2018-05-07 | End: 2018-05-07

## 2018-05-07 RX ADMIN — LIDOCAINE HYDROCHLORIDE 2 ML: 10 INJECTION, SOLUTION EPIDURAL; INFILTRATION; INTRACAUDAL; PERINEURAL at 10:05

## 2018-05-07 RX ADMIN — Medication 7 ML: at 12:15

## 2018-05-07 RX ADMIN — GOSERELIN ACETATE 3.6 MG: 3.6 IMPLANT SUBCUTANEOUS at 10:08

## 2018-05-07 ASSESSMENT — PAIN SCALES - GENERAL: PAINLEVEL: NO PAIN (0)

## 2018-05-07 NOTE — BRIEF OP NOTE
Interventional Radiology Brief Post Procedure Note    Procedure: IR Thoracentesis    Procedural[facility]    Assistant: MARIA GUADALUPE Schwab    Time Out: Prior to the start of the procedure and with procedural staff participation, I verbally confirmed the patient s identity using two indicators, relevant allergies, that the procedure was appropriate and matched the consent or emergent situation, and that the correct equipment/implants were available. Immediately prior to starting the procedure I conducted the Time Out with the procedural staff and re-confirmed the patient s name, procedure, and site/side. (The Joint Commission universal protocol was followed.)  Yes        Sedation: None. Local Anesthestic used    Findings: Ultrasound guided L therapeutic thoracentesis. Return of clear, serous fluid.     Estimated Blood Loss: Minimal    Fluoroscopy Time: 0 minute(s)    SPECIMENS: None    Complications: 1. None     Condition: Stable    Plan: Follow up per primary team.     Comments: See dictated procedure note for full details.    Venancio Hussein PA-C

## 2018-05-07 NOTE — DISCHARGE INSTRUCTIONS
Discharge Instructions for Paracentesis or Thoracentesis     Paracentesis is a procedure to remove extra fluid from your belly (abdomen). Thoracentesis is a procedure to remove extra fluid from your chest.  This fluid buildup is called ascites. The procedure may have been done to take a sample of the fluid. Or, it may have been done to drain the extra fluid from your abdomen or chest to help make you more comfortable.     Home care    If you have pain after the procedure, your healthcare provider can prescribe or recommend pain medicines. Take these exactly as directed. If you stopped taking other medicines before the procedure, ask your provider when you can start them again.    Avoid strenuous activity for 48 hours.    You will have a small bandage over the puncture site. Adhesive tapes, adhesive strips, or surgical glue may also be used to close the incision. They also help stop bleeding and promote healing. You may take the bandage off in 24-48 hours. Once there is a scab over the site, no bandages are required.    Check the puncture site for the signs of infection listed below.     Follow-up care  Make a follow-up appointment with your healthcare provider as directed. During your follow-up visit, your provider will check your healing. Let your provider know how you are feeling. You can also discuss the cause of your fluid, and if you need any further treatment.    When to seek medical advice:  Call your healthcare provider if you have any of the following after the procedure:    A fever of 100.4 F (38.0 C) or higher    Trouble breathing    Abdominal pain that is worse than expected    Belly Abdominal pain not caused by having the skin punctured that is worse than expected    Persistent bleeding from the puncture site    More than a small amount of fluid leaking from the puncture site    Swollen belly    Signs of infection at the puncture site. These include increased pain, redness, or swelling, warmth, or  bad-smelling drainage.    Feeling dizzy or lightheaded, or fainting     Call our Interventional Radiology (IR) service if:  If you start bleeding from the procedure site.  If you do start to bleed from the site, lie down and hold some pressure on the site.  Our radiology provider can help you decide if you need to return to the hospital.  If you have new or worsening pain related to the procedure.  If you have pronounced swelling at the procedure site.  If you develop persistent nausea or vomiting.  If you develop hives or a rash or any unexplained itching.  If you have a fever of greater than 100.4  F and chills in the first 5 days after procedure.  Any other concerns related to your procedure.      [facility]  Interventional Radiology (IR)  [address]  [address] [place] [address]    Contact Number:  [phone]  (IR control desk)  Monday - Friday 8:00 am - 4:30 pm    After hours for urgent concerns:  [phone]  After 4:30 pm Monday - Friday, Weekends and Holidays.   Ask for Interventional Radiology on-call.  Someone is available 24 hours a day.  [facility] toll free number:  [phone]

## 2018-05-07 NOTE — PATIENT INSTRUCTIONS
Contact Numbers    [facility] Main Line: [phone]  [facility] Triage:  [phone]    Call triage with chills and/or temperature greater than or equal to 100.5, uncontrolled nausea/vomiting, diarrhea, constipation, dizziness, shortness of breath, chest pain, bleeding, unexplained bruising, or any new/concerning symptoms, questions/concerns.     If you are having any concerning symptoms or wish to speak to a provider before your next infusion visit, please call your care coordinator or triage to notify them so we can adequately serve you.       After Hours: [phone]    If after hours, weekends, or holidays, call main hospital  and ask for Oncology doctor on call.         May 2018   Juan A Monday Tuesday Wednesday Thursday Friday Saturday             1     2     3     4     5       6     7     XR CHEST 2 VIEWS    7:45 AM   (15 min.)   [email or web address]   [facility] Xray     [facility] DRAW    8:00 AM   (15 min.)    [facility] LAB DRAW   [facility] Lab Draw     [facility] RETURN    8:15 AM   (30 min.)   Ana Whitman MD   [facility]     [facility] ONC INFUSION 60    9:30 AM   (60 min.)    ONCOLOGY INFUSION   [facility]     IR THORACENTESIS   12:00 PM   (75 min.)   [id number]   [facility]     THORACENTESIS   12:00 PM   Venancio Hussein PA-C   [facility] OR     Outpatient Visit   12:00 PM   [facility] 8     9     10     11     12       13     14     15     16     17     18     19       20     21     22     23  Happy Birthday!     24     25     26       27     28     29     30     31 June 2018 Sunday Monday Tuesday Wednesday Thursday Friday Saturday                            1     2       3     4     5     [facility] RETURN    8:15 AM   (50 min.)   Lima Griffiths PA-C   [facility]    9:30 AM   (60 min.)    ONCOLOGY INFUSION   [facility] 6      7     8     9       10     11     12     13     14     15     16       17     18     19     20     21     22     23       24     25     26     27     28     29     30                  Lab Results:  Recent Results (from the past 12 hour(s))   CBC with platelets differential    Collection Time: 05/07/18  8:25 AM   Result Value Ref Range    WBC 3.9 (L) 4.0 - 11.0 10e9/L    RBC Count 4.31 3.8 - 5.2 10e12/L    Hemoglobin 13.5 11.7 - 15.7 g/dL    Hematocrit 40.7 35.0 - 47.0 %    MCV 94 78 - 100 fl    MCH 31.3 26.5 - 33.0 pg    MCHC 33.2 31.5 - 36.5 g/dL    RDW 11.5 10.0 - 15.0 %    Platelet Count 188 150 - 450 10e9/L    Diff Method Automated Method     % Neutrophils 60.1 %    % Lymphocytes 28.0 %    % Monocytes 8.0 %    % Eosinophils 3.1 %    % Basophils 0.3 %    % Immature Granulocytes 0.5 %    Nucleated RBCs 0 0 /100    Absolute Neutrophil 2.3 1.6 - 8.3 10e9/L    Absolute Lymphocytes 1.1 0.8 - 5.3 10e9/L    Absolute Monocytes 0.3 0.0 - 1.3 10e9/L    Absolute Eosinophils 0.1 0.0 - 0.7 10e9/L    Absolute Basophils 0.0 0.0 - 0.2 10e9/L    Abs Immature Granulocytes 0.0 0 - 0.4 10e9/L    Absolute Nucleated RBC 0.0    Comprehensive metabolic panel    Collection Time: 05/07/18  8:25 AM   Result Value Ref Range    Sodium 142 133 - 144 mmol/L    Potassium 4.2 3.4 - 5.3 mmol/L    Chloride 106 94 - 109 mmol/L    Carbon Dioxide 28 20 - 32 mmol/L    Anion Gap 7 3 - 14 mmol/L    Glucose 74 70 - 99 mg/dL    Urea Nitrogen 8 7 - 30 mg/dL    Creatinine 0.58 0.52 - 1.04 mg/dL    GFR Estimate >90 >60 mL/min/1.7m2    GFR Estimate If Black >90 >60 mL/min/1.7m2    Calcium 9.4 8.5 - 10.1 mg/dL    Bilirubin Total 0.3 0.2 - 1.3 mg/dL    Albumin 3.3 (L) 3.4 - 5.0 g/dL    Protein Total 7.2 6.8 - 8.8 g/dL    Alkaline Phosphatase 59 40 - 150 U/L    ALT 27 0 - 50 U/L    AST 23 0 - 45 U/L

## 2018-05-07 NOTE — MR AVS SNAPSHOT
After Visit Summary   5/7/2018    Shan Marsh    MRN: [id number]           Patient Information     Date Of Birth          1970        Visit Information        Provider Department      5/7/2018 8:30 AM Ana Whitman MD [facility]        Today's Diagnoses     Bone metastasis (H)        Metastatic breast cancer (H)           Follow-ups after your visit        Your next 10 appointments already scheduled     Jun 05, 2018  8:30 AM CDT   (Arrive by 8:15 AM)   Return Visit with Lima Griffiths PA-C   [facility] ([facility])    [address]  [address]  [facility] [address]   [phone]            Jun 05, 2018  9:30 AM CDT   Infusion 60 with UC ONCOLOGY INFUSION, UC 22 ATC   [facility] ([facility])    [address] [place] [address]  [address]  [facility] [address]   [phone]            Jul 03, 2018  8:30 AM CDT   (Arrive by 8:15 AM)   Return Visit with Lima Griffiths PA-C   [facility] ([facility])    [address]  [address]  [facility] [address]   [phone]            Jul 03, 2018  9:30 AM CDT   Infusion 60 with UC ONCOLOGY INFUSION, UC 22 ATC   [facility] ([facility])    [address]  [address]  [facility] [address]   [phone]            Jul 30, 2018  8:00 AM CDT   [facility] Draw with [facility] LAB DRAW   [facility] ([facility])    [address] [place] [address]  [address]  [facility] [address]   [phone]            Jul 30, 2018  8:30 AM CDT   (Arrive by 8:15 AM)   Return Visit with Ana Whitman MD   [facility] ([facility])    [address] [place] [address]  [address]  [facility] [address]   [phone]            Jul 30, 2018  9:30 AM CDT  "  Infusion 60 with UC ONCOLOGY INFUSION, UC 22 ATC   [facility] ([facility])    [address]  [address]  [facility] [address] [phone]              Who to contact     If you have questions or need follow up information about today's clinic visit or your schedule please contact [facility] directly at [phone].  Normal or non-critical lab and imaging results will be communicated to you by [employer], letter or phone within 4 business days after the clinic has received the results. If you do not hear from us within 7 days, please contact the clinic through [employer] or phone. If you have a critical or abnormal lab result, we will notify you by phone as soon as possible.  Submit refill requests through [employer] or call your pharmacy and they will forward the refill request to us. Please allow 3 business days for your refill to be completed.          Additional Information About Your Visit        [employer] Information     [employer] gives you secure access to your electronic health record. If you see a primary care provider, you can also send messages to your care team and make appointments. If you have questions, please call your primary care clinic.  If you do not have a primary care provider, please call [phone] and they will assist you.        Care EveryWhere ID     This is your Care EveryWhere ID. This could be used by other organizations to access your [facility] medical records  [id number]        Your Vitals Were     Pulse Temperature Respirations Height Pulse Oximetry BMI (Body Mass Index)    97 97.5  F (36.4  C) (Tympanic) 18 1.626 m (5' 4.02\") 99% 18.96 kg/m2       Blood Pressure from Last 3 Encounters:   05/07/18 104/76   05/07/18 131/78   04/06/18 125/84    Weight from Last 3 Encounters:   05/07/18 50.1 kg (110 lb 8 oz)   05/07/18 50.1 kg (110 lb 8 oz)   04/06/18 51.4 kg (113 lb 4.8 oz)              Today, you had the following  "    No orders found for display         Today's Medication Changes          These changes are accurate as of 5/7/18 11:59 PM.  If you have any questions, ask your nurse or doctor.               These medicines have changed or have updated prescriptions.        Dose/Directions    ondansetron 8 MG tablet   Commonly known as:  ZOFRAN   This may have changed:    - how much to take  - how to take this  - when to take this  - reasons to take this   Used for:  Metastatic breast cancer (H), Bone metastasis (H)   Changed by:  Ana Whitman MD        Dose:  8 mg   Take 1 tablet (8 mg) by mouth every 8 hours as needed for nausea   Quantity:  40 tablet   Refills:  3            Where to get your medicines      These medications were sent to [facility] - [place], [place] - [address]  [address], [facility] [address]     Phone:  [phone]     letrozole 2.5 MG tablet    ondansetron 8 MG tablet                Primary Care Provider Office Phone # Fax #    Marilu Mihcelle [phone] [address]  [facility] [address]        Equal Access to Services     [facility]: Hadii noemy ku hadasho Soomaali, waaxda luqadaha, qaybta kaalmada adenely, edi mustafa . So [facility] [phone].    ATENCIÓN: Si habla español, tiene a gamez disposición servicios gratuitos de asistencia lingüística. [facility] [phone].    We comply with applicable federal civil rights laws and [place] laws. We do not discriminate on the basis of race, color, national origin, age, disability, sex, sexual orientation, or gender identity.            Thank you!     Thank you for choosing [facility]  for your care. Our goal is always to provide you with excellent care. Hearing back from our patients is one way we can continue to improve our services. Please take a few minutes to complete the written survey that you may receive in the mail  after your visit with us. Thank you!             Your Updated Medication List - Protect others around you: Learn how to safely use, store and throw away your medicines at www.disposemymeds.org.          This list is accurate as of 5/7/18 11:59 PM.  Always use your most recent med list.                   Brand Name Dispense Instructions for use Diagnosis    calcium carbonate 500 tablet    OS-KRYSTEN 500 mg [place]. [place]     Take 1 tablet by mouth daily        letrozole 2.5 MG tablet    FEMARA    28 tablet    Take 1 tablet (2.5 mg) by mouth daily    Metastatic breast cancer (H), Bone metastasis (H)       metoclopramide 10 MG tablet    REGLAN          ondansetron 8 MG tablet    ZOFRAN    40 tablet    Take 1 tablet (8 mg) by mouth every 8 hours as needed for nausea    Metastatic breast cancer (H), Bone metastasis (H)       prochlorperazine 10 MG tablet    COMPAZINE          VITAMIN D (CHOLECALCIFEROL) PO      Take by mouth daily

## 2018-05-07 NOTE — PROGRESS NOTES
Hematology-Oncology Visit  May 7, 2018    Reason for Visit: follow-up metastatic breast cancer, ER positive     HPI: Shan Marsh is a 47 year old female without significant past medical history with recent diagnosis of metastatic breast cancer after presenting with left shoulder pain. Shoulder MRI showed soft tissue mass close to distal clavicle concerning for malignancy. She had PET/CT 5/9/17 showing hypermetabolic left axillary, supraclavicular, mediastinal and hilar nodes. She had biopsies of left axilla and hilar nodes showing metastatic breast cancer, strongly ER positive, moderately IA positive and HER-2 kaitlin non-amplified. She originally had [id number], ER/IA positive, HER-2 negative breast cancer diagnosed in spring of 2012 s/p bilateral mastectomies and Tamoxifen from 8/2012-2/2013 and discontinued due to side effects. She met with Dr. Whitman most recently on 5/16/17 who recommended treatment on BIG10 clinical trial with palbociclib and tamoxifen. She started treatment on 5/24/17 and required dose reduction for neutropenia. CT CAP and bone scan 1/11/2018 showing progressive disease with new bony lesions in the ribs, T spine, and possibly liver, lungs. She started Zoladex and abemiciclib on 1/12/2018.     In March 2018, she sought treatment at [facility] in [place], under the care of Dr. Brooks, where she received:  - Vallovex vaccine (per FDA, is in phase I trials here in [place])  - Insulin potentiated therapy with conjugated chemotherapy (5% chemotherapy, unclear what chemotherapy)  - Gina's toxins, IV vitamin C, IV Ozone, hyperbaric chamber, vitamin B17 infusion, vitamin CK3 IV, and coffee enemas PRN  - Also taking oral niacin, Lugol, acidol, pancreatin, and thyroid desiccated liver, per their regimen  - She was continued on Letrozole and advised to conintue Zoladex    She met with Dr. Whitman and patient wished to continue alternative regimen. Abemiciclib was placed on hold and remains on hold. She  was continued on Zoladex and letrozole. While in [place], she required thoracentesis for symptomatic pleural effusion.    Interval History:   INTERVAL HISTORY:    Shan is here with her  for followup.  She reports feeling very well.  She remains very active.  She denies any shortness of breath, cough, chest pain.  She would like to have thoracentesis done as she says prophylactically if there is any fluid to tap.  She denies any fever or bone pain.  She does have some discomfort in her right lower sacral area.  She is still able to walk several miles a day if she wishes.  She reports her weight has been going down a little and she attributes that in part to her being very active.  She continues to follow in [place] and still doing therapy with Gina toxin.  In fact, she is flying back there next week.  She reports being compliant with letrozole.  She has a lot of hot flashes.  She also reports palpitations.  The rest of the review of systems is negative.             Current Outpatient Prescriptions   Medication     calcium carbonate (OS-KRYSTEN 500 MG [place]. [place]) 1250 MG tablet     letrozole (FEMARA) 2.5 MG tablet     metoclopramide (REGLAN) 10 MG tablet     ondansetron (ZOFRAN) 8 MG tablet     prochlorperazine (COMPAZINE) 10 MG tablet     VITAMIN D, CHOLECALCIFEROL, PO     [DISCONTINUED] tamoxifen (NOLVADEX) 20 MG tablet     No current facility-administered medications for this visit.      Facility-Administered Medications Ordered in Other Visits   Medication     lidocaine 1 % injection 10 mL       PHYSICAL EXAM:  ECO  There were no vitals taken for this visit.   Wt Readings from Last 10 Encounters:   18 51.4 kg (113 lb 4.8 oz)   18 51.7 kg (114 lb)   18 51.9 kg (114 lb 6.4 oz)   18 52 kg (114 lb 11.2 oz)   18 51.1 kg (112 lb 11.2 oz)   18 55.1 kg (121 lb 6.4 oz)   18 53.7 kg (118 lb 6.4 oz)   18 54.8 kg (120 lb 14.4 oz)   17 54.6 kg (120 lb 4.8 oz)    11/13/17 53.8 kg (118 lb 11.2 oz)      General: Alert, oriented, pleasant, NAD  Skin: warm, dry without rashes or bruising, yellow discoloration of the palms  HEENT: Normocephalic, atraumatic, PERRLA, EOMI. Moist mucus membranes, no lesions or thrush  Lymph: there are 1.5 cm LN in the left supraclavicular area and 2 distinct hard LN around 1cm and 0.5 cm anterior to SCM muscle. There is ~1 cm hard LN on the left subclavian triangle, there is small nodule ~ 0.5 cm in the left axilla.  Lungs:  normal work of breathing, slight dullness to  percussion at left base, decreased air entry in the left base, subtle pleural rub in the right lung base  Cardiac: RRR, tachycardia, normal S1, S2, no murmurs  Abdomen: Soft, nontender, nondistended. Normoactive bowel sounds. No hepatosplenomegaly, masses  Neuro: CNII-XII grossly intact  Extremities: No pedal edema. No lymphedema    Labs:     CBC RESULTS:   Recent Labs   Lab Test  05/07/18   0825   WBC  3.9*   RBC  4.31   HGB  13.5   HCT  40.7   MCV  94   MCH  31.3   MCHC  33.2   RDW  11.5   PLT  188     Recent Labs   Lab Test  05/07/18   0825  04/06/18   1147   NA  142  139   POTASSIUM  4.2  4.5   CHLORIDE  106  107   CO2  28  26   ANIONGAP  7  6   GLC  74  74   BUN  8  7   CR  0.58  0.58   KRYSTEN  9.4  9.2       Imaging:   CT CAP 3/15/18  IMPRESSION: In this patient with metastatic breast cancer there has  been progression of disease.  1. Mixed changes of poorly defined nodular consolidative groundglass  opacities. Overall there has been an increase in size and number.  Although infection is a possibility this is concerning for metastatic  disease.  2. Increased left pleural effusion and new small right pleural  effusion.  3. Stable lymphadenopathy in the chest.   4. Increased size of the liver metastasis since the prior study. No  new lesions are seen.  5. Increase in the number of mixed sclerotic and lytic metastatic  lesions in the bone.  6. Pathologic compression fracture of  L1.       Assessment & Plan:   ASSESSMENT AND PLAN:    Shan Marsh is a 47-year-old female with a history of a T1 N0 estrogen and progesterone receptor positive, HER2-negative left breast cancer treated with bilateral mastectomies in 2012, now with metastatic disease involving left axilla bones, possible liver and lung lesions, hormone receptor positive, HER2-negative.  She progressed on palbociclib and tamoxifen and is currently getting alternative therapies in [place].   1.  Metastatic breast cancer.  We recommended in the past to start the abemaciclib.  However, the patient initiated care in [place] and has received a variety of alternative investigative agents as listed above.  She stopped the abemaciclib and her last CT done in [place] in March showed progressive disease with left pleural effusion, increase in the liver mets and bony lesions.  She continued on alternative regimen and she sees us for managing of letrozole and Zoladex.  She reports feeling well at this time and surprisingly does not have a lot of symptoms.  We discussed last visit that she should find integrative physician in town.  She was provided with a list of names of a physician who could manage side effects of alternative therapy.  She is flying back to [place] on Sunday for continuation of her therapy.  She believes the therapy works and she feels great.  We will continue her on monthly Zoladex and will continue with letrozole at this time.   2.  Bone metastatic disease.  She was on monthly Zometa.  She does not have any bone pain.  We will switch her Zometa to every 3 months.   3.  Left pleural effusion.  She required left thoracentesis in [place] for symptomatic pleural effusion and had repeated thoracentesis a month ago.  She was symptomatic at that time.  She does not really have any symptoms today; however, she is willing to get another thoracentesis to avoid severe symptoms from it.  We will arrange for thoracentesis today.      The  patient seen and discussed with Dr. Whitman.   Laura Espinoza  Hem-Onc Fellow      Addendum:  Pt was seen and evaluated by me with Dr Espinoza.  I reviewed labs, imaging and recent clinial course.  On exam, heart is regular, lungs with dullness to percussion and auscultation in left lung base lower one third.  Small pea sized nodule in left axilla, unable to appreciate other axillary adenopathy.    I discussed with Shan that we will continue her zoladex and letrozole.  She will continue on zometa, though every three months.      She is going back to [place].  She understands that I am not willing to manage her side effects from these therapies given the lack of evidence to support these treatments.  I have referred her to several integrative physicians in the [facility] and encouraged her to make this appt.      Will arrange for thoracentesis today.    Ana Whitman MD

## 2018-05-07 NOTE — PROGRESS NOTES
Infusion Nursing Note:  Shan Marsh presents today for Zoladex injection.    Patient seen by provider today: Yes: Dr. Whitman   present during visit today: Not Applicable.    Note: Patient reports she is feeling well overall with no new complaints.    Intravenous Access:  No Intravenous access/labs at this visit.    Treatment Conditions:  Not Applicable.      Post Infusion Assessment:  Patient tolerated Zoladex injection into the right lower abdominal tissue without incident.    Discharge Plan:   Prescription refills given for Zofran and Letrozole (patient to pickup at discharge pharmacy downstairs).  Discharge instructions reviewed with: Patient and Family.  Patient and/or family verbalized understanding of discharge instructions and all questions answered.  AVS to patient via Field Agent. Patient has an appointment today 5/7/2018 for a Thoracentesis in IR at 12:00.  Patient will return 6/5/18 for next appointment.   Patient discharged in stable condition accompanied by: self and .  Departure Mode: Ambulatory.  Face to Face time: 0 minutes.    Curly Sheth RN

## 2018-05-07 NOTE — NURSING NOTE
"Oncology Rooming Note    May 7, 2018 8:29 AM   Shan Marsh is a 47 year old female who presents for:    Chief Complaint   Patient presents with     Oncology Clinic Visit     Return: Breast ca      Initial Vitals: /78 (BP Location: Right arm, Patient Position: Sitting, Cuff Size: Adult Regular)  Pulse 97  Temp 97.5  F (36.4  C) (Tympanic)  Resp 18  Ht 1.626 m (5' 4.02\")  Wt 50.1 kg (110 lb 8 oz)  SpO2 99%  BMI 18.96 kg/m2 Estimated body mass index is 18.96 kg/(m^2) as calculated from the following:    Height as of this encounter: 1.626 m (5' 4.02\").    Weight as of this encounter: 50.1 kg (110 lb 8 oz). Body surface area is 1.5 meters squared.  No Pain (0) Comment: Data Unavailable   No LMP recorded. Patient is not currently having periods (Reason: UNKNOWN).  Allergies reviewed: YES  Medications reviewed: YES    Medications: Medication refills not needed today.  Pharmacy name entered into [facility]: [facility] [place], [place] - [address]    Clinical concerns: no new concerns.  Unable to give flu vaccine to patient for research reasons.  Sachi Patel CMA        6 minutes for nursing intake (face to face time)     Sachi Patel CMA                "

## 2018-05-07 NOTE — IP AVS SNAPSHOT
MRN:[id number]                      After Visit Summary   5/7/2018    Shan Marsh    MRN: [id number]           Thank you!     Thank you for choosing [facility] for your care. Our goal is always to provide you with excellent care. Hearing back from our patients is one way we can continue to improve our services. Please take a few minutes to complete the written survey that you may receive in the mail after you visit with us. Thank you!        Patient Information     Date Of Birth          1970        About your hospital stay     You were admitted on:  May 7, 2018 You last received care in the:  [facility]    You were discharged on:  May 7, 2018       Who to Call     For medical emergencies, please call 911.  For non-urgent questions about your medical care, please call your primary care provider or clinic, [phone]  For questions related to your surgery, please call your surgery clinic        Attending Provider     Provider Specialty    Venancio Hussein PA-C Radiology       Primary Care Provider Office Phone # Fax #    Marilu Michelle [phone]      Your next 10 appointments already scheduled     Jun 05, 2018  8:30 AM CDT   (Arrive by 8:15 AM)   Return Visit with Lima Griffiths PA-C   [facility]    [address] [place] [address]  [address]  [facility] [id number] [phone]            Jun 05, 2018  9:30 AM CDT   Infusion 60 with UC ONCOLOGY INFUSION, UC 22 ATC   [facility]    [address] [place] [address]  [address]  [facility] [id number] [phone]            Jul 03, 2018  8:30 AM CDT   (Arrive by 8:15 AM)   Return Visit with Lima Griffiths PA-C   [facility]    [address] [place] [address]  [address]  [facility] [id number] [phone]             Jul 03, 2018  9:30 AM CDT   Infusion 60 with UC ONCOLOGY INFUSION, UC 22 ATC   [facility]    [address]  [address]  [facility] [address]   [phone]            Jul 30, 2018  8:00 AM CDT   [facility] Draw with UC [facility] LAB DRAW   [facility] ([facility])    [address]  [address]  [facility] [address]   [phone]            Jul 30, 2018  8:30 AM CDT   (Arrive by 8:15 AM)   Return Visit with Ana Whitman MD   [facility]    [address]  [address]  [facility] [address]   [phone]            Jul 30, 2018  9:30 AM CDT   Infusion 60 with UC ONCOLOGY INFUSION, UC 22 ATC   [facility]    [address]  [address]  [facility] [address]   [phone]              Further instructions from your care team       Discharge Instructions for Paracentesis or Thoracentesis     Paracentesis is a procedure to remove extra fluid from your belly (abdomen). Thoracentesis is a procedure to remove extra fluid from your chest.  This fluid buildup is called ascites. The procedure may have been done to take a sample of the fluid. Or, it may have been done to drain the extra fluid from your abdomen or chest to help make you more comfortable.     Home care    If you have pain after the procedure, your healthcare provider can prescribe or recommend pain medicines. Take these exactly as directed. If you stopped taking other medicines before the procedure, ask your provider when you can start them again.    Avoid strenuous activity for 48 hours.    You will have a small bandage over the puncture site. Adhesive tapes, adhesive strips, or surgical glue may also be used to close the incision. They also help stop bleeding and promote healing. You may take the bandage  off in 24-48 hours. Once there is a scab over the site, no bandages are required.    Check the puncture site for the signs of infection listed below.     Follow-up care  Make a follow-up appointment with your healthcare provider as directed. During your follow-up visit, your provider will check your healing. Let your provider know how you are feeling. You can also discuss the cause of your fluid, and if you need any further treatment.    When to seek medical advice:  Call your healthcare provider if you have any of the following after the procedure:    A fever of 100.4 F (38.0 C) or higher    Trouble breathing    Abdominal pain that is worse than expected    Belly Abdominal pain not caused by having the skin punctured that is worse than expected    Persistent bleeding from the puncture site    More than a small amount of fluid leaking from the puncture site    Swollen belly    Signs of infection at the puncture site. These include increased pain, redness, or swelling, warmth, or bad-smelling drainage.    Feeling dizzy or lightheaded, or fainting     Call our Interventional Radiology (IR) service if:  If you start bleeding from the procedure site.  If you do start to bleed from the site, lie down and hold some pressure on the site.  Our radiology provider can help you decide if you need to return to the hospital.  If you have new or worsening pain related to the procedure.  If you have pronounced swelling at the procedure site.  If you develop persistent nausea or vomiting.  If you develop hives or a rash or any unexplained itching.  If you have a fever of greater than 100.4  F and chills in the first 5 days after procedure.  Any other concerns related to your procedure.      [facility]  Interventional Radiology (IR)  [address]  [address] [facility] [place] [address]    Contact Number:  [phone]  (IR control desk)  Monday - Friday 8:00 am - 4:30 pm    After hours  "for urgent concerns:  [phone]  After 4:30 pm Monday - Friday, Weekends and Holidays.   Ask for Interventional Radiology on-call.  Someone is available 24 hours a day.  [facility] toll free number:  [phone]    Pending Results     Date and Time Order Name Status Description    5/7/2018 1150 IR THORACENTESIS In process     5/7/2018 0735 XR CHEST 2 VW In process             Admission Information     Date & Time Provider Department Dept. Phone    5/7/2018 Venancio Hussein PA-C [facility] [phone]      Your Vitals Were     Blood Pressure Temperature Respirations Height Weight Pulse Oximetry    105/57 98.2  F (36.8  C) (Oral) 16 1.626 m (5' 4.02\") 50.1 kg (110 lb 8 oz) 95%    BMI (Body Mass Index)                   18.96 kg/m2           [employer] Information     [employer] gives you secure access to your electronic health record. If you see a primary care provider, you can also send messages to your care team and make appointments. If you have questions, please call your primary care clinic.  If you do not have a primary care provider, please call [phone] and they will assist you.      [employer] is an electronic gateway that provides easy, online access to your medical records. With [employer], you can request a clinic appointment, read your test results, renew a prescription or communicate with your care team.     To access your existing account, please contact your [facility] or call [phone] for assistance.        Care EveryWhere ID     This is your Care EveryWhere ID. This could be used by other organizations to access your [facility] medical records  [id number]        Equal Access to Services     LUZ JAMES : Karla Schaefer, georgia bejarano, edi galindo. So [facility] [phone].    ATENCIÓN: Si habla español, tiene a gamez disposición servicios gratuitos de asistencia " kym Quiroz al [phone].    We comply with applicable federal civil rights laws and [place] laws. We do not discriminate on the basis of race, color, national origin, age, disability, sex, sexual orientation, or gender identity.               Review of your medicines      UNREVIEWED medicines. Ask your doctor about these medicines        Dose / Directions    calcium carbonate 500 tablet   Commonly known as:  OS-KRYSTEN 500 mg [place]. [place]        Dose:  1 tablet   Take 1 tablet by mouth daily   Refills:  0       letrozole 2.5 MG tablet   Commonly known as:  FEMARA   Used for:  Metastatic breast cancer (H), Bone metastasis (H)        Dose:  2.5 mg   Take 1 tablet (2.5 mg) by mouth daily   Quantity:  28 tablet   Refills:  0       metoclopramide 10 MG tablet   Commonly known as:  REGLAN        Refills:  0       ondansetron 8 MG tablet   Commonly known as:  ZOFRAN   Used for:  Metastatic breast cancer (H), Bone metastasis (H)        Dose:  8 mg   Take 1 tablet (8 mg) by mouth every 8 hours as needed for nausea   Quantity:  40 tablet   Refills:  3       prochlorperazine 10 MG tablet   Commonly known as:  COMPAZINE        Refills:  0       VITAMIN D (CHOLECALCIFEROL) PO        Take by mouth daily   Refills:  0                Protect others around you: Learn how to safely use, store and throw away your medicines at www.disposemymeds.org.             Medication List: This is a list of all your medications and when to take them. Check marks below indicate your daily home schedule. Keep this list as a reference.      Medications           Morning Afternoon Evening Bedtime As Needed    calcium carbonate 500 tablet   Commonly known as:  OS-KRYSTEN 500 mg [place]. [place]   Take 1 tablet by mouth daily                                letrozole 2.5 MG tablet   Commonly known as:  FEMARA   Take 1 tablet (2.5 mg) by mouth daily                                metoclopramide 10 MG tablet   Commonly known as:  REGLAN                                 ondansetron 8 MG tablet   Commonly known as:  ZOFRAN   Take 1 tablet (8 mg) by mouth every 8 hours as needed for nausea                                prochlorperazine 10 MG tablet   Commonly known as:  COMPAZINE                                VITAMIN D (CHOLECALCIFEROL) PO   Take by mouth daily

## 2018-05-07 NOTE — LETTER
5/7/2018     RE: Shan Marsh  [address]  [facility] [address]     Dear Colleague,    Thank you for referring your patient, Shan Marsh, to the [facility]. Please see a copy of my visit note below.    Hematology-Oncology Visit  May 7, 2018    Reason for Visit: follow-up metastatic breast cancer, ER positive     HPI: Shan Marsh is a 47 year old female without significant past medical history with recent diagnosis of metastatic breast cancer after presenting with left shoulder pain. Shoulder MRI showed soft tissue mass close to distal clavicle concerning for malignancy. She had PET/CT 5/9/17 showing hypermetabolic left axillary, supraclavicular, mediastinal and hilar nodes. She had biopsies of left axilla and hilar nodes showing metastatic breast cancer, strongly ER positive, moderately AR positive and HER-2 kaitlin non-amplified. She originally had [id number], ER/AR positive, HER-2 negative breast cancer diagnosed in spring of 2012 s/p bilateral mastectomies and Tamoxifen from 8/2012-2/2013 and discontinued due to side effects. She met with Dr. Whitman most recently on 5/16/17 who recommended treatment on BIG10 clinical trial with palbociclib and tamoxifen. She started treatment on 5/24/17 and required dose reduction for neutropenia. CT CAP and bone scan 1/11/2018 showing progressive disease with new bony lesions in the ribs, T spine, and possibly liver, lungs. She started Zoladex and abemiciclib on 1/12/2018.     In March 2018, she sought treatment at [facility] in [place], under the care of Dr. Brooks, where she received:  - Vallovex vaccine (per FDA, is in phase I trials here in [place])  - Insulin potentiated therapy with conjugated chemotherapy (5% chemotherapy, unclear what chemotherapy)  - Gina's toxins, IV vitamin C, IV Ozone, hyperbaric chamber, vitamin B17 infusion, vitamin CK3 IV, and coffee enemas PRN  - Also taking oral niacin, Lugol, acidol,  pancreatin, and thyroid desiccated liver, per their regimen  - She was continued on Letrozole and advised to conintue Zoladex    She met with Dr. Whitman and patient wished to continue alternative regimen. Abemiciclib was placed on hold and remains on hold. She was continued on Zoladex and letrozole. While in [place], she required thoracentesis for symptomatic pleural effusion.    Interval History:   INTERVAL HISTORY:    Shan is here with her  for followup.  She reports feeling very well.  She remains very active.  She denies any shortness of breath, cough, chest pain.  She would like to have thoracentesis done as she says prophylactically if there is any fluid to tap.  She denies any fever or bone pain.  She does have some discomfort in her right lower sacral area.  She is still able to walk several miles a day if she wishes.  She reports her weight has been going down a little and she attributes that in part to her being very active.  She continues to follow in [place] and still doing therapy with Gina toxin.  In fact, she is flying back there next week.  She reports being compliant with letrozole.  She has a lot of hot flashes.  She also reports palpitations.  The rest of the review of systems is negative.             Current Outpatient Prescriptions   Medication     calcium carbonate (OS-KRYSTEN 500 MG [place]. [place]) 1250 MG tablet     letrozole (FEMARA) 2.5 MG tablet     metoclopramide (REGLAN) 10 MG tablet     ondansetron (ZOFRAN) 8 MG tablet     prochlorperazine (COMPAZINE) 10 MG tablet     VITAMIN D, CHOLECALCIFEROL, PO     [DISCONTINUED] tamoxifen (NOLVADEX) 20 MG tablet     No current facility-administered medications for this visit.      Facility-Administered Medications Ordered in Other Visits   Medication     lidocaine 1 % injection 10 mL       PHYSICAL EXAM:  ECO  There were no vitals taken for this visit.   Wt Readings from Last 10 Encounters:   18 51.4 kg (113 lb 4.8 oz)   18 51.7 kg (114  lb)   03/23/18 51.9 kg (114 lb 6.4 oz)   03/16/18 52 kg (114 lb 11.2 oz)   03/09/18 51.1 kg (112 lb 11.2 oz)   02/09/18 55.1 kg (121 lb 6.4 oz)   01/22/18 53.7 kg (118 lb 6.4 oz)   01/12/18 54.8 kg (120 lb 14.4 oz)   12/12/17 54.6 kg (120 lb 4.8 oz)   11/13/17 53.8 kg (118 lb 11.2 oz)      General: Alert, oriented, pleasant, NAD  Skin: warm, dry without rashes or bruising, yellow discoloration of the palms  HEENT: Normocephalic, atraumatic, PERRLA, EOMI. Moist mucus membranes, no lesions or thrush  Lymph: there are 1.5 cm LN in the left supraclavicular area and 2 distinct hard LN around 1cm and 0.5 cm anterior to SCM muscle. There is ~1 cm hard LN on the left subclavian triangle, there is small nodule ~ 0.5 cm in the left axilla.  Lungs:  normal work of breathing, slight dullness to  percussion at left base, decreased air entry in the left base, subtle pleural rub in the right lung base  Cardiac: RRR, tachycardia, normal S1, S2, no murmurs  Abdomen: Soft, nontender, nondistended. Normoactive bowel sounds. No hepatosplenomegaly, masses  Neuro: CNII-XII grossly intact  Extremities: No pedal edema. No lymphedema    Labs:     CBC RESULTS:   Recent Labs   Lab Test  05/07/18   0825   WBC  3.9*   RBC  4.31   HGB  13.5   HCT  40.7   MCV  94   MCH  31.3   MCHC  33.2   RDW  11.5   PLT  188     Recent Labs   Lab Test  05/07/18   0825  04/06/18   1147   NA  142  139   POTASSIUM  4.2  4.5   CHLORIDE  106  107   CO2  28  26   ANIONGAP  7  6   GLC  74  74   BUN  8  7   CR  0.58  0.58   KRYSTEN  9.4  9.2       Imaging:   CT CAP 3/15/18  IMPRESSION: In this patient with metastatic breast cancer there has  been progression of disease.  1. Mixed changes of poorly defined nodular consolidative groundglass  opacities. Overall there has been an increase in size and number.  Although infection is a possibility this is concerning for metastatic  disease.  2. Increased left pleural effusion and new small right pleural  effusion.  3. Stable  lymphadenopathy in the chest.   4. Increased size of the liver metastasis since the prior study. No  new lesions are seen.  5. Increase in the number of mixed sclerotic and lytic metastatic  lesions in the bone.  6. Pathologic compression fracture of L1.       Assessment & Plan:   ASSESSMENT AND PLAN:    Shan Marsh is a 47-year-old female with a history of a T1 N0 estrogen and progesterone receptor positive, HER2-negative left breast cancer treated with bilateral mastectomies in 2012, now with metastatic disease involving left axilla bones, possible liver and lung lesions, hormone receptor positive, HER2-negative.  She progressed on palbociclib and tamoxifen and is currently getting alternative therapies in [place].   1.  Metastatic breast cancer.  We recommended in the past to start the abemaciclib.  However, the patient initiated care in [place] and has received a variety of alternative investigative agents as listed above.  She stopped the abemaciclib and her last CT done in [place] in March showed progressive disease with left pleural effusion, increase in the liver mets and bony lesions.  She continued on alternative regimen and she sees us for managing of letrozole and Zoladex.  She reports feeling well at this time and surprisingly does not have a lot of symptoms.  We discussed last visit that she should find integrative physician in town.  She was provided with a list of names of a physician who could manage side effects of alternative therapy.  She is flying back to [place] on Sunday for continuation of her therapy.  She believes the therapy works and she feels great.  We will continue her on monthly Zoladex and will continue with letrozole at this time.   2.  Bone metastatic disease.  She was on monthly Zometa.  She does not have any bone pain.  We will switch her Zometa to every 3 months.   3.  Left pleural effusion.  She required left thoracentesis in [place] for symptomatic pleural effusion and had  repeated thoracentesis a month ago.  She was symptomatic at that time.  She does not really have any symptoms today; however, she is willing to get another thoracentesis to avoid severe symptoms from it.  We will arrange for thoracentesis today.      The patient seen and discussed with Dr. Whitman.   Laura Espinoza  Hem-Onc Fellow    Addendum:  Pt was seen and evaluated by me with Dr Espinoza.  I reviewed labs, imaging and recent clinial course.  On exam, heart is regular, lungs with dullness to percussion and auscultation in left lung base lower one third.  Small pea sized nodule in left axilla, unable to appreciate other axillary adenopathy.    I discussed with Shan that we will continue her zoladex and letrozole.  She will continue on zometa, though every three months.      She is going back to [place].  She understands that I am not willing to manage her side effects from these therapies given the lack of evidence to support these treatments.  I have referred her to several integrative physicians in the [facility] and encouraged her to make this appt.      Will arrange for thoracentesis today.    Ana Whitman MD

## 2018-05-07 NOTE — IP AVS SNAPSHOT
[facility]    [address] [place] [address]    Phone:  [phone]    Fax:  [phone]                                       After Visit Summary   5/7/2018    Shan Marsh    MRN: [id number]           After Visit Summary Signature Page     I have received my discharge instructions, and my questions have been answered. I have discussed any challenges I see with this plan with the nurse or doctor.    ..........................................................................................................................................  Patient/Patient Representative Signature      ..........................................................................................................................................  Patient Representative Print Name and Relationship to Patient    ..................................................               ................................................  Date                                            Time    ..........................................................................................................................................  Reviewed by Signature/Title    ...................................................              ..............................................  Date                                                            Time

## 2018-05-07 NOTE — NURSING NOTE
Chief Complaint   Patient presents with     Blood Draw     Labs only     Vitals done, labs drawn by venipuncture.  See doc flow sheets for details.  Adina Kaplan CMA

## 2018-05-07 NOTE — MR AVS SNAPSHOT
After Visit Summary   5/7/2018    Shan Marsh    MRN: [id number]           Patient Information     Date Of Birth          1970        Visit Information        Provider Department      5/7/2018 9:30 AM UC 21 ATC; UC ONCOLOGY INFUSION [facility]        Today's Diagnoses     Metastatic breast cancer (H)    -  1    Bone metastasis (H)          Care Instructions    Contact Numbers    [facility] Main Line: [phone]  [facility] Triage:  [phone]    Call triage with chills and/or temperature greater than or equal to 100.5, uncontrolled nausea/vomiting, diarrhea, constipation, dizziness, shortness of breath, chest pain, bleeding, unexplained bruising, or any new/concerning symptoms, questions/concerns.     If you are having any concerning symptoms or wish to speak to a provider before your next infusion visit, please call your care coordinator or triage to notify them so we can adequately serve you.       After Hours: [phone]    If after hours, weekends, or holidays, call main hospital  and ask for Oncology doctor on call.         May 2018   Juan A Monday Tuesday Wednesday Thursday Friday Saturday             1     2     3     4     5       6     7     XR CHEST 2 VIEWS    7:45 AM   (15 min.)   [id number]   [facility] Xray     [facility] LAB DRAW    8:00 AM   (15 min.)    [facility] LAB DRAW   [facility] Lab Draw     [facility] RETURN    8:15 AM   (30 min.)   Ana Whitman MD   [facility]     UMP ONC INFUSION 60    9:30 AM   (60 min.)    ONCOLOGY INFUSION   [facility]     IR THORACENTESIS   12:00 PM   (75 min.)   [email or web address]   [facility] Imaging     THORACENTESIS   12:00 PM   Venancio Hussein PA-C   UC OR     Outpatient Visit   12:00 PM   [facility] 8     9     10     11     12       13     14     15     16     17     18     19       20     21     22     23  Happy Birthday!      24     25     26       27     28     29     30     31 June 2018 Sunday Monday Tuesday Wednesday Thursday Friday Saturday                            1     2       3     4     5     UMP RETURN    8:15 AM   (50 min.)   Lima Griffiths PA-C   [facility]     UMP ONC INFUSION 60    9:30 AM   (60 min.)   UC ONCOLOGY INFUSION   [facility] 6     7     8     9       10     11     12     13     14     15     16       17     18     19     20     21     22     23       24     25     26     27     28     29     30                  Lab Results:  Recent Results (from the past 12 hour(s))   CBC with platelets differential    Collection Time: 05/07/18  8:25 AM   Result Value Ref Range    WBC 3.9 (L) 4.0 - 11.0 10e9/L    RBC Count 4.31 3.8 - 5.2 10e12/L    Hemoglobin 13.5 11.7 - 15.7 g/dL    Hematocrit 40.7 35.0 - 47.0 %    MCV 94 78 - 100 fl    MCH 31.3 26.5 - 33.0 pg    MCHC 33.2 31.5 - 36.5 g/dL    RDW 11.5 10.0 - 15.0 %    Platelet Count 188 150 - 450 10e9/L    Diff Method Automated Method     % Neutrophils 60.1 %    % Lymphocytes 28.0 %    % Monocytes 8.0 %    % Eosinophils 3.1 %    % Basophils 0.3 %    % Immature Granulocytes 0.5 %    Nucleated RBCs 0 0 /100    Absolute Neutrophil 2.3 1.6 - 8.3 10e9/L    Absolute Lymphocytes 1.1 0.8 - 5.3 10e9/L    Absolute Monocytes 0.3 0.0 - 1.3 10e9/L    Absolute Eosinophils 0.1 0.0 - 0.7 10e9/L    Absolute Basophils 0.0 0.0 - 0.2 10e9/L    Abs Immature Granulocytes 0.0 0 - 0.4 10e9/L    Absolute Nucleated RBC 0.0    Comprehensive metabolic panel    Collection Time: 05/07/18  8:25 AM   Result Value Ref Range    Sodium 142 133 - 144 mmol/L    Potassium 4.2 3.4 - 5.3 mmol/L    Chloride 106 94 - 109 mmol/L    Carbon Dioxide 28 20 - 32 mmol/L    Anion Gap 7 3 - 14 mmol/L    Glucose 74 70 - 99 mg/dL    Urea Nitrogen 8 7 - 30 mg/dL    Creatinine 0.58 0.52 - 1.04 mg/dL    GFR Estimate >90 >60 mL/min/1.7m2    GFR Estimate If  Black >90 >60 mL/min/1.7m2    Calcium 9.4 8.5 - 10.1 mg/dL    Bilirubin Total 0.3 0.2 - 1.3 mg/dL    Albumin 3.3 (L) 3.4 - 5.0 g/dL    Protein Total 7.2 6.8 - 8.8 g/dL    Alkaline Phosphatase 59 40 - 150 U/L    ALT 27 0 - 50 U/L    AST 23 0 - 45 U/L               Follow-ups after your visit        Your next 10 appointments already scheduled     May 07, 2018   Procedure with Venancio Hussein PA-C   [facility] ([facility])    [address] [place] [address]  [address]th [place] [id number] [phone]           Located in the Clinics and Surgery Center at [address] [place] [address].   parking is very convenient and highly recommended.  is a $6 flat rate fee.  Both  and self parkers should enter the main arrival plaza from [facility]; parking attendants will direct you based on your parking preference.            May 07, 2018 12:00 PM CDT   IR THORACENTESIS with [id number]   [facility] ([facility])    [address] [place] [address]  5th [facility] [id number] [phone]           1. Laboratory test are to be obtained by your doctor prior to the exam (Hgb/Hct, platelet count, INR) 2. Someone will need to drive you to and from the hospital if you feel you may need sedation for the procedure. 3. Bring a list of all drugs you are taking; include supplements and over-the-counter medications. 4. Wear comfortable clothes and leave your valuables at home. 5. If you are or may be pregnant, be sure to contact your doctor or a Radiology nurse prior to the day of the exam. 6. If you have diabetes, check with your doctor or a Radiology nurse to see if your insulin needs to be adjusted for the exam. 7. If you are taking Coumadin (to thin your blood) please contact your doctor or a Radiology nurse at least 3 days before the exam for special instructions (only need the INR to be <2.0). 8. The day before your exam  you may eat your regular diet. Drink no alcoholic beverages for 24 hours prior to the exam. 9. Do not eat any solid food or milk products for 6 hours prior to the exam. You may drink clear liquids until 2 hours prior to the exam. Clear liquids include the following: water, Jell-O, clear broth, apple juice or any noncarbonated drink that you can see through (no pop!) 10. The morning of the exam you may brush your teeth and take medications as directed with a sip of water. 11. Tell the Radiology nurse if you have any allergies. 12. If the tube needs to remain in place you will remain in the hospital until the tube can be removed 13. If the tube is removed immediately you may leave the hospital following your exam. However, if you received sedation you will need to stay 1-2 hours. You may be asked to stay longer and have a chest x-ray sometime after the procedure. 14. If you received sedation, you cannot drive until morning, and an adult must be with you until then. If you live more than one hour away you should stay in the [place] area overnight.            Jun 05, 2018  8:30 AM CDT   (Arrive by 8:15 AM)   Return Visit with Lima Griffiths PA-C   [facility] ([facility])    [address]  [address]  [facility] [place] [address]   [phone]            Jun 05, 2018  9:30 AM CDT   Infusion 60 with UC ONCOLOGY INFUSION, UC 22 ATC   [facility] ([facility])    [address]  [address]  [facility] [place] [address]   [phone]            Jul 03, 2018  8:30 AM CDT   (Arrive by 8:15 AM)   Return Visit with Lima Griffiths PA-C   [facility] ([facility])    [address]  [address]  [facility] [place] [address]   [phone]            Jul 03, 2018  9:30 AM CDT   Infusion 60 with UC ONCOLOGY INFUSION, UC 22 ATC   [facility] ([facility]  [facility] [employer])    [address]  [address]  [facility] [address]   [phone]            Jul 30, 2018  8:00 AM CDT   [facility] Draw with [facility] LAB DRAW   [facility] Lab Draw ([facility])    [address] [place] [address]  [address]  [facility] [address]   [phone]            Jul 30, 2018  8:30 AM CDT   (Arrive by 8:15 AM)   Return Visit with Ana Whitman MD   [facility] ([facility])    [address] [place] [address]  [address]  [facility] [id number] [phone]              Who to contact     If you have questions or need follow up information about today's clinic visit or your schedule please contact [facility] directly at [phone].  Normal or non-critical lab and imaging results will be communicated to you by MyChart, letter or phone within 4 business days after the clinic has received the results. If you do not hear from us within 7 days, please contact the clinic through [employer] or phone. If you have a critical or abnormal lab result, we will notify you by phone as soon as possible.  Submit refill requests through [employer] or call your pharmacy and they will forward the refill request to us. Please allow 3 business days for your refill to be completed.          Additional Information About Your Visit        MyChart Information     [employer] gives you secure access to your electronic health record. If you see a primary care provider, you can also send messages to your care team and make appointments. If you have questions, please call your primary care clinic.  If you do not have a primary care provider, please call [phone] and they will assist you.        Care EveryWhere ID     This is your Care EveryWhere ID. This could be used by other organizations to access your [facility] medical records  [id number]         Blood Pressure from Last 3 Encounters:   05/07/18 131/78    04/06/18 125/84   04/02/18 111/73    Weight from Last 3 Encounters:   05/07/18 50.1 kg (110 lb 8 oz)   04/06/18 51.4 kg (113 lb 4.8 oz)   04/02/18 51.7 kg (114 lb)              We Performed the Following     Ca27.29  breast tumor marker     CBC with platelets differential     CEA     Comprehensive metabolic panel          Today's Medication Changes          These changes are accurate as of 5/7/18 10:24 AM.  If you have any questions, ask your nurse or doctor.               These medicines have changed or have updated prescriptions.        Dose/Directions    ondansetron 8 MG tablet   Commonly known as:  ZOFRAN   This may have changed:    - how much to take  - how to take this  - when to take this  - reasons to take this   Used for:  Metastatic breast cancer (H), Bone metastasis (H)   Changed by:  Ana Whitman MD        Dose:  8 mg   Take 1 tablet (8 mg) by mouth every 8 hours as needed for nausea   Quantity:  40 tablet   Refills:  3            Where to get your medicines      These medications were sent to [facility] - [place], [place] - [address]  [address], [place] [address]     Phone:  [phone]     letrozole 2.5 MG tablet    ondansetron 8 MG tablet                Primary Care Provider Office Phone # Fax #    Marilu Michelle [phone] [address]  [facility] [address]        Equal Access to Services     [facility]: Hadii noemy chinchilla hadasho Sofloridaali, waaxda luqadaha, qaybta kaalmada adeegyada, waxay evy kolb. So [facility] [phone].    ATENCIÓN: Si habla español, tiene a gamez disposición servicios gratuitos de asistencia lingüística. Llame al [phone].    We comply with applicable federal civil rights laws and [place] laws. We do not discriminate on the basis of race, color, national origin, age, disability, sex, sexual orientation, or gender identity.            Thank you!     Thank you  for [facility]  for your care. Our goal is always to provide you with excellent care. Hearing back from our patients is one way we can continue to improve our services. Please take a few minutes to complete the written survey that you may receive in the mail after your visit with us. Thank you!             Your Updated Medication List - Protect others around you: Learn how to safely use, store and throw away your medicines at www.disposemymeds.org.          This list is accurate as of 5/7/18 10:24 AM.  Always use your most recent med list.                   Brand Name Dispense Instructions for use Diagnosis    calcium carbonate 500 tablet    OS-KRYSTEN 500 mg [place]. [place]     Take 1 tablet by mouth daily        letrozole 2.5 MG tablet    FEMARA    28 tablet    Take 1 tablet (2.5 mg) by mouth daily    Metastatic breast cancer (H), Bone metastasis (H)       metoclopramide 10 MG tablet    REGLAN          ondansetron 8 MG tablet    ZOFRAN    40 tablet    Take 1 tablet (8 mg) by mouth every 8 hours as needed for nausea    Metastatic breast cancer (H), Bone metastasis (H)       prochlorperazine 10 MG tablet    COMPAZINE          VITAMIN D (CHOLECALCIFEROL) PO      Take by mouth daily

## 2018-06-05 ENCOUNTER — INFUSION THERAPY VISIT (OUTPATIENT)
Dept: ONCOLOGY | Facility: CLINIC | Age: 48
End: 2018-06-05
Attending: INTERNAL MEDICINE
Payer: COMMERCIAL

## 2018-06-05 VITALS
SYSTOLIC BLOOD PRESSURE: 106 MMHG | BODY MASS INDEX: 19.3 KG/M2 | TEMPERATURE: 98 F | RESPIRATION RATE: 16 BRPM | DIASTOLIC BLOOD PRESSURE: 73 MMHG | HEART RATE: 98 BPM | OXYGEN SATURATION: 99 % | WEIGHT: 112.5 LBS

## 2018-06-05 DIAGNOSIS — C79.51 BONE METASTASIS: ICD-10-CM

## 2018-06-05 DIAGNOSIS — C50.919 METASTATIC BREAST CANCER: Primary | ICD-10-CM

## 2018-06-05 PROCEDURE — 25000128 H RX IP 250 OP 636: Mod: ZF | Performed by: PHYSICIAN ASSISTANT

## 2018-06-05 PROCEDURE — 25000125 ZZHC RX 250: Mod: ZF | Performed by: PHYSICIAN ASSISTANT

## 2018-06-05 PROCEDURE — 96402 CHEMO HORMON ANTINEOPL SQ/IM: CPT

## 2018-06-05 PROCEDURE — 99214 OFFICE O/P EST MOD 30 MIN: CPT | Mod: ZP | Performed by: PHYSICIAN ASSISTANT

## 2018-06-05 RX ORDER — LIDOCAINE HYDROCHLORIDE 10 MG/ML
2 INJECTION, SOLUTION EPIDURAL; INFILTRATION; INTRACAUDAL; PERINEURAL ONCE
Status: COMPLETED | OUTPATIENT
Start: 2018-06-05 | End: 2018-06-05

## 2018-06-05 RX ORDER — LIDOCAINE HYDROCHLORIDE 10 MG/ML
2 INJECTION, SOLUTION EPIDURAL; INFILTRATION; INTRACAUDAL; PERINEURAL ONCE
Status: CANCELLED | OUTPATIENT
Start: 2018-06-07

## 2018-06-05 RX ORDER — LETROZOLE 2.5 MG/1
2.5 TABLET, FILM COATED ORAL DAILY
Qty: 28 TABLET | Refills: 3 | Status: SHIPPED | OUTPATIENT
Start: 2018-06-05 | End: 2018-09-04

## 2018-06-05 RX ADMIN — GOSERELIN ACETATE 3.6 MG: 3.6 IMPLANT SUBCUTANEOUS at 10:09

## 2018-06-05 RX ADMIN — LIDOCAINE HYDROCHLORIDE 2 ML: 10 INJECTION, SOLUTION EPIDURAL; INFILTRATION; INTRACAUDAL; PERINEURAL at 10:02

## 2018-06-05 ASSESSMENT — PAIN SCALES - GENERAL: PAINLEVEL: NO PAIN (0)

## 2018-06-05 NOTE — PROGRESS NOTES
Hematology-Oncology Visit  Jun 5, 2018    Reason for Visit: follow-up metastatic breast cancer, ER positive     HPI: Shan Marsh is a 48 year old female without significant past medical history with recent diagnosis of metastatic breast cancer after presenting with left shoulder pain. Shoulder MRI showed soft tissue mass close to distal clavicle concerning for malignancy. She had PET/CT 5/9/17 showing hypermetabolic left axillary, supraclavicular, mediastinal and hilar nodes. She had biopsies of left axilla and hilar nodes showing metastatic breast cancer, strongly ER positive, moderately AR positive and HER-2 kaitlin non-amplified. She originally had [id number], ER/AR positive, HER-2 negative breast cancer diagnosed in spring of 2012 s/p bilateral mastectomies and Tamoxifen from 8/2012-2/2013 and discontinued due to side effects. She met with Dr. Whitman most recently on 5/16/17 who recommended treatment on BIG10 clinical trial with palbociclib and tamoxifen. She started treatment on 5/24/17 and required dose reduction for neutropenia. CT CAP and bone scan 1/11/2018 showing progressive disease with new bony lesions in the ribs, T spine, and possibly liver, lungs. She started Zoladex and abemiciclib on 1/12/2018.     In March 2018, she sought treatment at [facility] in [place], under the care of Dr. Brooks, where she received:  - Vallovex vaccine (per FDA, is in phase I trials here in [place])  - Insulin potentiated therapy with conjugated chemotherapy (5% chemotherapy, unclear what chemotherapy)  - Gina's toxins, IV vitamin C, IV Ozone, hyperbaric chamber, vitamin B17 infusion, vitamin CK3 IV, and coffee enemas PRN  - Also taking oral niacin, Lugol, acidol, pancreatin, and thyroid desiccated liver, per their regimen  - She was continued on Letrozole and advised to conintue Zoladex    She met with Dr. Whitman and patient wished to continue alternative regimen. Abemiciclib was placed on hold and remains on hold. She  was continued on Zoladex and letrozole. While in [place], she required thoracentesis for symptomatic pleural effusion.    Interval History: Shan is here for follow-up.    Shan is feeling overall well. Got back from [place] a few days ago. Went a lot better for her this time. She reports she had a PET/CT that showed stable disease there. 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 She hasn't had any issues with appetite, fevers, chills, nausea, vomiting, new sites of pain (chronically has some low back achiness), abdominal pain, diarrhea, constipation, bleeding, or swelling. Breathing has been stable without shortness of breath, WILSON, orthopnea. No chest pain.  ROS otherwise negative.     Current Outpatient Prescriptions   Medication     calcium carbonate (OS-KRYSTEN 500 MG [place]. [place]) 1250 MG tablet     letrozole (FEMARA) 2.5 MG tablet     metoclopramide (REGLAN) 10 MG tablet     ondansetron (ZOFRAN) 8 MG tablet     prochlorperazine (COMPAZINE) 10 MG tablet     VITAMIN D, CHOLECALCIFEROL, PO     [DISCONTINUED] tamoxifen (NOLVADEX) 20 MG tablet     No current facility-administered medications for this visit.        PHYSICAL EXAM:  ECO  /73 (BP Location: Right arm, Patient Position: Right side, Cuff Size: Adult Regular)  Pulse 98  Temp 98  F (36.7  C) (Oral)  Resp 16  Wt 51 kg (112 lb 8 oz)  SpO2 99%  BMI 19.3 kg/m2   Wt Readings from Last 10 Encounters:   18 50.1 kg (110 lb 8 oz)   18 50.1 kg (110 lb 8 oz)   18 51.4 kg (113 lb 4.8 oz)   18 51.7 kg (114 lb)   18 51.9 kg (114 lb 6.4 oz)   18 52 kg (114 lb 11.2 oz)   18 51.1 kg (112 lb 11.2 oz)   18 55.1 kg (121 lb 6.4 oz)   18 53.7 kg (118 lb 6.4 oz)   18 54.8 kg (120 lb 14.4 oz)      General: Alert, oriented, pleasant, NAD  Skin: warm, dry without rashes or bruising  HEENT: Normocephalic, atraumatic, PERRLA, EOMI. Moist mucus membranes, no lesions or thrush  Neck: No cervical or  supraclavicular LAD.   Lungs:  normal work of breathing, perhaps the slightest dullness to auscultation and percussion at left base  Cardiac: RRR, S1, S2, no murmurs  Abdomen: Soft, nontender, nondistended. Normoactive bowel sounds. No hepatosplenomegaly, masses  Neuro: CNII-XII grossly intact  Extremities: No pedal edema. L arm with a sleeve, unable to visualize gross edema    Labs:     Imaging:   CT CAP 3/15/18  IMPRESSION: In this patient with metastatic breast cancer there has  been progression of disease.  1. Mixed changes of poorly defined nodular consolidative groundglass  opacities. Overall there has been an increase in size and number.  Although infection is a possibility this is concerning for metastatic  disease.  2. Increased left pleural effusion and new small right pleural  effusion.  3. Stable lymphadenopathy in the chest.   4. Increased size of the liver metastasis since the prior study. No  new lesions are seen.  5. Increase in the number of mixed sclerotic and lytic metastatic  lesions in the bone.  6. Pathologic compression fracture of L1.       Assessment & Plan: Shan Marsh is a 47-year-old female with history of a T1 N0, ER/OK positive, HER2-negative left breast cancer treated with bilateral mastectomies in 2012, now with metastatic disease involving the left axilla, bones, and possible liver and lung, ER/OK positive, HER2 negative.  She had progression on palbociclib and Tamoxifen.     1. Metastatic breast cancer: She was recommended to start abemiciclib however, shortly after starting the medication, she sought care in [place] and received a variety of alternative/investigative agents, as listed in HPI.  Abemiciclib was stopped. CT 3/15 after returning from [place] showing progressive disease with increased GGO, left pleural effusion, increased size of liver mets, and increased number of bony lesions. Per her wishes, she continues on the alternative regimen, as well as letrozole and  Zoladex. She is feeling really well at this time. She just returned from [place], reporting a stable PET/CT. We will continue the letrozole/Zoladex.   - She met with Dr. Richardson in [place], recommended against  Oophorectomy for several reasons. If her pleural effusion clears may be an option.    2. Bone mets: Continue monthly Zometa every 3 months, due in July    3. FEN: Weight stable today. She has decreased appetite on her alternative medicine treatments. She is mindful of her intake. Encouraged her to keep this up.    4. L pleural effusion: Requiring intermittent thoracentesis, about once per month, last performed 5/7/18. Reviewed symptoms of enlarging effusion and to call if she experiences. Tentatively arranging for a thora in July.        I spent >25 minutes with the patient, with over 50% of the time spent counseling or coordinating their care as described above.    Lima Griffiths PA-C

## 2018-06-05 NOTE — MR AVS SNAPSHOT
After Visit Summary   6/5/2018    Shan Marsh    MRN: [id number]           Patient Information     Date Of Birth          1970        Visit Information        Provider Department      6/5/2018 9:30 AM  22 ATC;  ONCOLOGY INFUSION [facility]        Today's Diagnoses     Metastatic breast cancer (H)    -  1    Bone metastasis (H)          [facility] Main Line: [phone]    Call triage nurse with chills and/or temperature greater than or equal to 100.4, uncontrolled nausea/vomiting, diarrhea, constipation, dizziness, shortness of breath, chest pain, bleeding, unexplained bruising, or any new/concerning symptoms, questions/concerns.   If you are having any concerning symptoms or wish to speak to a provider before your next infusion visit, please call your care coordinator or triage to notify them so we can adequately serve you.   Triage Nurse Line: [phone]    If after hours, weekends, or holidays [phone]               June 2018 Sunday Monday Tuesday Wednesday Thursday Friday Saturday                            1     2       3     4     5     UMP RETURN    8:15 AM   (50 min.)   Lima Griffiths PA-C   [facility] 60    9:30 AM   (60 min.)    ONCOLOGY INFUSION   [facility] 6     7     8     9       10     11     12     13     14     15     16       17     18     19     20     21     22     23       24     25     26     27     28     29     30 July 2018 Sunday Monday Tuesday Wednesday Thursday Friday Saturday   1     2     3     [facility] LAB DRAW    8:00 AM   (15 min.)    [facility] LAB DRAW   [facility] Lab Draw     UMP RETURN    8:15 AM   (50 min.)   Lima Griffiths PA-C   [facility]     UMP ONC INFUSION 60    9:30 AM   (60 min.)    ONCOLOGY INFUSION   [facility] 4     5      IR THORACENTESIS    8:00 AM   (75 min.)   [email or web address]   [facility] 6     7       8     9     10     11     12     13     14       15     16     17     18     19     20     21       22     23     24     25     26     27     28       29     30     [facility] DRAW    8:00 AM   (15 min.)   [facility] DRAW   [facility] Lab Draw     UMP RETURN    8:15 AM   (30 min.)   Ana Whitman MD   [facility]     UMP ONC INFUSION 60    9:30 AM   (60 min.)   UC ONCOLOGY INFUSION   [facility] 31                                      Lab Results:  No results found for this or any previous visit (from the past 12 hour(s)).            Follow-ups after your visit        Your next 10 appointments already scheduled     Jul 03, 2018  8:00 AM CDT   [facility] Lab Draw with  [facility] LAB DRAW   [facility] ([facility])    [address]  [address]  [facility] [address]   [phone]            Jul 03, 2018  8:30 AM CDT   (Arrive by 8:15 AM)   Return Visit with Lima Griffiths PA-C   [facility] ([facility])    [address]  [address]  [facility] [address]   [phone]            Jul 03, 2018  9:30 AM CDT   Infusion 60 with UC ONCOLOGY INFUSION, UC 22 ATC   [facility] ([facility])    [address]  [address]  [facility] [address]   [phone]            Jul 05, 2018  9:00 AM CDT   (Arrive by 8:00 AM)   IR THORACENTESIS with [id number]   [facility] ([facility])    [address]  5th Floor  [facility] [address]   [phone]           1. Laboratory test are to be obtained by your doctor prior to the exam (Hgb/Hct, platelet count, INR) 2. Someone will need to drive you to and from the hospital if you feel you may need sedation for the procedure. 3. Bring a list  of all drugs you are taking; include supplements and over-the-counter medications. 4. Wear comfortable clothes and leave your valuables at home. 5. If you are or may be pregnant, be sure to contact your doctor or a Radiology nurse prior to the day of the exam. 6. If you have diabetes, check with your doctor or a Radiology nurse to see if your insulin needs to be adjusted for the exam. 7. If you are taking Coumadin (to thin your blood) please contact your doctor or a Radiology nurse at least 3 days before the exam for special instructions (only need the INR to be <2.0). 8. The day before your exam you may eat your regular diet. Drink no alcoholic beverages for 24 hours prior to the exam. 9. Do not eat any solid food or milk products for 6 hours prior to the exam. You may drink clear liquids until 2 hours prior to the exam. Clear liquids include the following: water, Jell-O, clear broth, apple juice or any noncarbonated drink that you can see through (no pop!) 10. The morning of the exam you may brush your teeth and take medications as directed with a sip of water. 11. Tell the Radiology nurse if you have any allergies. 12. If the tube needs to remain in place you will remain in the hospital until the tube can be removed 13. If the tube is removed immediately you may leave the hospital following your exam. However, if you received sedation you will need to stay 1-2 hours. You may be asked to stay longer and have a chest x-ray sometime after the procedure. 14. If you received sedation, you cannot drive until morning, and an adult must be with you until then. If you live more than one hour away you should stay in the [place] area overnight.            Jul 05, 2018   Procedure with Lydia Murillo PA-C   [facility] ([facility])    [address] [place] [address]  5th Floor  [facility] [address] [phone]           Located in the Clinics and Surgery Center at [facility]  [facility] [address].   parking is very convenient and highly recommended.  is a $6 flat rate fee.  Both  and self parkers should enter the main arrival plaza from [facility]; parking attendants will direct you based on your parking preference.            Jul 30, 2018  8:00 AM CDT   [facility] Lab Draw with  [facility] LAB DRAW   [facility] Lab Draw ([facility])    [address] [place] [address]  [address]  [facility] [id number] [phone]            Jul 30, 2018  8:30 AM CDT   (Arrive by 8:15 AM)   Return Visit with Ana Whitman MD   [facility] ([facility])    [address] [place] [address]  [address]  [facility] [id number] [phone]            Jul 30, 2018  9:30 AM CDT   Infusion 60 with UC ONCOLOGY INFUSION, UC 22 ATC   [facility] ([facility])    [address] [place] [address]  [address]  [facility] [id number] [phone]              Future tests that were ordered for you today     Open Future Orders        Priority Expected Expires Ordered    IR Thoracentesis Routine  6/5/2019 6/5/2018    CBC with platelets differential Routine 7/5/2018 9/5/2018 6/5/2018    Comprehensive metabolic panel Routine 7/5/2018 9/5/2018 6/5/2018            Who to contact     If you have questions or need follow up information about today's clinic visit or your schedule please contact [facility] directly at [phone].  Normal or non-critical lab and imaging results will be communicated to you by MyChart, letter or phone within 4 business days after the clinic has received the results. If you do not hear from us within 7 days, please contact the clinic through MyChart or phone. If you have a critical or abnormal lab result, we will notify you by phone as soon as possible.  Submit refill requests through [employer] or call your pharmacy and they will forward the refill  request to us. Please allow 3 business days for your refill to be completed.          Additional Information About Your Visit        MyChart Information     [employer] gives you secure access to your electronic health record. If you see a primary care provider, you can also send messages to your care team and make appointments. If you have questions, please call your primary care clinic.  If you do not have a primary care provider, please call [phone] and they will assist you.        Care EveryWhere ID     This is your Care EveryWhere ID. This could be used by other organizations to access your [facility] medical records  [id number]         Blood Pressure from Last 3 Encounters:   06/05/18 106/73   05/07/18 104/76   05/07/18 131/78    Weight from Last 3 Encounters:   06/05/18 51 kg (112 lb 8 oz)   05/07/18 50.1 kg (110 lb 8 oz)   05/07/18 50.1 kg (110 lb 8 oz)              Today, you had the following     No orders found for display         Where to get your medicines      These medications were sent to [facility] [address]  [address] [address]     Phone:  [phone]     letrozole 2.5 MG tablet          Primary Care Provider Office Phone # Fax #    Marilu Michelle [phone]       [address]  [facility] [address]        Equal Access to Services     LUZ JAMES : Hadii noemy Schaefer, waaxda carlee, qaybta kaaledi beard . So [facility] [phone].    ATENCIÓN: Si habla español, tiene a gamez disposición servicios gratuitos de asistencia lingüística. Llame al [phone].    We comply with applicable federal civil rights laws and [place] laws. We do not discriminate on the basis of race, color, national origin, age, disability, sex, sexual orientation, or gender identity.            Thank you!     Thank you for choosing [facility]  for your care. Our goal is  always to provide you with excellent care. Hearing back from our patients is one way we can continue to improve our services. Please take a few minutes to complete the written survey that you may receive in the mail after your visit with us. Thank you!             Your Updated Medication List - Protect others around you: Learn how to safely use, store and throw away your medicines at www.disposemymeds.org.          This list is accurate as of 6/5/18 10:13 AM.  Always use your most recent med list.                   Brand Name Dispense Instructions for use Diagnosis    calcium carbonate 500 MG tablet    OS-KRYSTEN 500 mg [place]. [place]     Take 1 tablet by mouth daily        letrozole 2.5 MG tablet    FEMARA    28 tablet    Take 1 tablet (2.5 mg) by mouth daily    Metastatic breast cancer (H), Bone metastasis (H)       metoclopramide 10 MG tablet    REGLAN          ondansetron 8 MG tablet    ZOFRAN    40 tablet    Take 1 tablet (8 mg) by mouth every 8 hours as needed for nausea    Metastatic breast cancer (H), Bone metastasis (H)       prochlorperazine 10 MG tablet    COMPAZINE          VITAMIN D (CHOLECALCIFEROL) PO      Take by mouth daily

## 2018-06-05 NOTE — LETTER
6/5/2018      RE: Shan Marsh  [address]  [facility] [address]       Hematology-Oncology Visit  Jun 5, 2018    Reason for Visit: follow-up metastatic breast cancer, ER positive     HPI: Shan Marsh is a 48 year old female without significant past medical history with recent diagnosis of metastatic breast cancer after presenting with left shoulder pain. Shoulder MRI showed soft tissue mass close to distal clavicle concerning for malignancy. She had PET/CT 5/9/17 showing hypermetabolic left axillary, supraclavicular, mediastinal and hilar nodes. She had biopsies of left axilla and hilar nodes showing metastatic breast cancer, strongly ER positive, moderately WA positive and HER-2 kaitlin non-amplified. She originally had [id number], ER/WA positive, HER-2 negative breast cancer diagnosed in spring of 2012 s/p bilateral mastectomies and Tamoxifen from 8/2012-2/2013 and discontinued due to side effects. She met with Dr. Whitman most recently on 5/16/17 who recommended treatment on BIG10 clinical trial with palbociclib and tamoxifen. She started treatment on 5/24/17 and required dose reduction for neutropenia. CT CAP and bone scan 1/11/2018 showing progressive disease with new bony lesions in the ribs, T spine, and possibly liver, lungs. She started Zoladex and abemiciclib on 1/12/2018.     In March 2018, she sought treatment at [facility] in [place], under the care of Dr. Brooks, where she received:  - Vallovex vaccine (per FDA, is in phase I trials here in [place])  - Insulin potentiated therapy with conjugated chemotherapy (5% chemotherapy, unclear what chemotherapy)  - Gina's toxins, IV vitamin C, IV Ozone, hyperbaric chamber, vitamin B17 infusion, vitamin CK3 IV, and coffee enemas PRN  - Also taking oral niacin, Lugol, acidol, pancreatin, and thyroid desiccated liver, per their regimen  - She was continued on Letrozole and advised to conintue Zoladex    She met with Dr. Whitman and patient wished  to continue alternative regimen. Abemiciclib was placed on hold and remains on hold. She was continued on Zoladex and letrozole. While in [place], she required thoracentesis for symptomatic pleural effusion.    Interval History: Shan is here for follow-up.    Shan is feeling overall well. Got back from [place] a few days ago. Went a lot better for her this time. She reports she had a PET/CT that showed stable disease there. 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 She hasn't had any issues with appetite, fevers, chills, nausea, vomiting, new sites of pain (chronically has some low back achiness), abdominal pain, diarrhea, constipation, bleeding, or swelling. Breathing has been stable without shortness of breath, WILSON, orthopnea. No chest pain.  ROS otherwise negative.     Current Outpatient Prescriptions   Medication     calcium carbonate (OS-KRYSTEN 500 MG [place]. [place]) 1250 MG tablet     letrozole (FEMARA) 2.5 MG tablet     metoclopramide (REGLAN) 10 MG tablet     ondansetron (ZOFRAN) 8 MG tablet     prochlorperazine (COMPAZINE) 10 MG tablet     VITAMIN D, CHOLECALCIFEROL, PO     [DISCONTINUED] tamoxifen (NOLVADEX) 20 MG tablet     No current facility-administered medications for this visit.        PHYSICAL EXAM:  ECO  /73 (BP Location: Right arm, Patient Position: Right side, Cuff Size: Adult Regular)  Pulse 98  Temp 98  F (36.7  C) (Oral)  Resp 16  Wt 51 kg (112 lb 8 oz)  SpO2 99%  BMI 19.3 kg/m2   Wt Readings from Last 10 Encounters:   18 50.1 kg (110 lb 8 oz)   18 50.1 kg (110 lb 8 oz)   18 51.4 kg (113 lb 4.8 oz)   18 51.7 kg (114 lb)   18 51.9 kg (114 lb 6.4 oz)   18 52 kg (114 lb 11.2 oz)   18 51.1 kg (112 lb 11.2 oz)   18 55.1 kg (121 lb 6.4 oz)   18 53.7 kg (118 lb 6.4 oz)   18 54.8 kg (120 lb 14.4 oz)      General: Alert, oriented, pleasant, NAD  Skin: warm, dry without rashes or bruising  HEENT: Normocephalic, atraumatic,  PERRLA, EOMI. Moist mucus membranes, no lesions or thrush  Neck: No cervical or supraclavicular LAD.   Lungs:  normal work of breathing, perhaps the slightest dullness to auscultation and percussion at left base  Cardiac: RRR, S1, S2, no murmurs  Abdomen: Soft, nontender, nondistended. Normoactive bowel sounds. No hepatosplenomegaly, masses  Neuro: CNII-XII grossly intact  Extremities: No pedal edema. L arm with a sleeve, unable to visualize gross edema    Labs:     Imaging:   CT CAP 3/15/18  IMPRESSION: In this patient with metastatic breast cancer there has  been progression of disease.  1. Mixed changes of poorly defined nodular consolidative groundglass  opacities. Overall there has been an increase in size and number.  Although infection is a possibility this is concerning for metastatic  disease.  2. Increased left pleural effusion and new small right pleural  effusion.  3. Stable lymphadenopathy in the chest.   4. Increased size of the liver metastasis since the prior study. No  new lesions are seen.  5. Increase in the number of mixed sclerotic and lytic metastatic  lesions in the bone.  6. Pathologic compression fracture of L1.       Assessment & Plan: Shan Marsh is a 47-year-old female with history of a T1 N0, ER/TN positive, HER2-negative left breast cancer treated with bilateral mastectomies in 2012, now with metastatic disease involving the left axilla, bones, and possible liver and lung, ER/TN positive, HER2 negative.  She had progression on palbociclib and Tamoxifen.     1. Metastatic breast cancer: She was recommended to start abemiciclib however, shortly after starting the medication, she sought care in [place] and received a variety of alternative/investigative agents, as listed in HPI.  Abemiciclib was stopped. CT 3/15 after returning from [place] showing progressive disease with increased GGO, left pleural effusion, increased size of liver mets, and increased number of bony lesions. Per her  wishes, she continues on the alternative regimen, as well as letrozole and Zoladex. She is feeling really well at this time. She just returned from [place], reporting a stable PET/CT. We will continue the letrozole/Zoladex.   - She met with Dr. Richardson in [place], recommended against  Oophorectomy for several reasons. If her pleural effusion clears may be an option.    2. Bone mets: Continue monthly Zometa every 3 months, due in July    3. FEN: Weight stable today. She has decreased appetite on her alternative medicine treatments. She is mindful of her intake. Encouraged her to keep this up.    4. L pleural effusion: Requiring intermittent thoracentesis, about once per month, last performed 5/7/18. Reviewed symptoms of enlarging effusion and to call if she experiences. Tentatively arranging for a thora in July.        I spent >25 minutes with the patient, with over 50% of the time spent counseling or coordinating their care as described above.    Lima Griffiths PA-C

## 2018-06-05 NOTE — PROGRESS NOTES
Infusion Nursing Note:  Shan Marsh presents today for C6D1 Zoladex.    Patient seen by provider today: Yes: Lima Griffiths PA-C   present during visit today: Not Applicable.    Note: Patient reported to clinic today with no new complaints..  Zoladex injection administered into lower left quadrant of abdomen, iced and lidocaine used.     Intravenous Access:  No Intravenous access/labs at this visit.    Treatment Conditions:  Not Applicable.      Post Infusion Assessment:  Patient tolerated injection without incident.    Discharge Plan:   Patient declined prescription refills.  Discharge instructions reviewed with: Patient.  Patient and/or family verbalized understanding of discharge instructions and all questions answered.  AVS to patient via [employer].  Patient will return 7/3/18 for next appointment.   Patient discharged in stable condition accompanied by: self.  Departure Mode: Ambulatory.  Face to Face time: 0 minutes.    Casimiro Tang RN

## 2018-06-05 NOTE — PATIENT INSTRUCTIONS
[facility] Main Line: [phone]    Call triage nurse with chills and/or temperature greater than or equal to 100.4, uncontrolled nausea/vomiting, diarrhea, constipation, dizziness, shortness of breath, chest pain, bleeding, unexplained bruising, or any new/concerning symptoms, questions/concerns.   If you are having any concerning symptoms or wish to speak to a provider before your next infusion visit, please call your care coordinator or triage to notify them so we can adequately serve you.   Triage Nurse Line: [phone]    If after hours, weekends, or holidays [phone]               June 2018 Sunday Monday Tuesday Wednesday Thursday Friday Saturday                            1     2       3     4     5     UMP RETURN    8:15 AM   (50 min.)   Lima Griffiths PA-C   [facility]     UMP ONC INFUSION 60    9:30 AM   (60 min.)   UC ONCOLOGY INFUSION   [facility] 6     7     8     9       10     11     12     13     14     15     16       17     18     19     20     21     22     23       24     25     26     27     28     29     30                July 2018 Sunday Monday Tuesday Wednesday Thursday Friday Saturday   1     2     3     UMP [facility] LAB DRAW    8:00 AM   (15 min.)    [facility] LAB DRAW   [facility] Lab Draw     UMP RETURN    8:15 AM   (50 min.)   Lima Griffiths PA-C   [facility]     UMP ONC INFUSION 60    9:30 AM   (60 min.)   UC ONCOLOGY INFUSION   [facility] 4     5     IR THORACENTESIS    8:00 AM   (75 min.)   [email or web address]   [facility] 6     7       8     9     10     11     12     13     14       15     16     17     18     19     20     21       22     23     24     25     26     27     28       29     30     UMP [facility] LAB DRAW    8:00 AM   (15 min.)   [facility] LAB DRAW   [facility] Lab Draw     UMP RETURN    8:15 AM   (30 min.)   Ana Whitman,  MD [facility] ONC INFUSION 60    9:30 AM   (60 min.)    ONCOLOGY INFUSION   [facility] 31                                      Lab Results:  No results found for this or any previous visit (from the past 12 hour(s)).

## 2018-06-05 NOTE — MR AVS SNAPSHOT
After Visit Summary   6/5/2018    Shan Marsh    MRN: [id number]           Patient Information     Date Of Birth          1970        Visit Information        Provider Department      6/5/2018 8:30 AM Lima Griffiths PA-C [facility]        Today's Diagnoses     Metastatic breast cancer (H)    -  1    Bone metastasis (H)           Follow-ups after your visit        Your next 10 appointments already scheduled     Jul 03, 2018  8:00 AM CDT   [facility] Draw with [facility] LAB DRAW   [facility] ([facility])    [address]  [address]  [facility] [address]   [phone]            Jul 03, 2018  8:30 AM CDT   (Arrive by 8:15 AM)   Return Visit with Lima Griffiths PA-C   [facility] ([facility])    [address] [place] [address]  [address]  [facility] [address]   [phone]            Jul 03, 2018  9:30 AM CDT   Infusion 60 with UC ONCOLOGY INFUSION, UC 22 ATC   [facility] ([facility])    [address]  [address]  [facility] [address]   [phone]            Jul 30, 2018  8:00 AM CDT   [facility] Lab Draw with UC [facility] LAB DRAW   [facility] ([facility])    [address]  [address]  [facility] [address]   [phone]            Jul 30, 2018  8:30 AM CDT   (Arrive by 8:15 AM)   Return Visit with Ana Whitman MD   [facility] ([facility])    [address] [place] [address]  [address]  [facility] [address]   [phone]            Jul 30, 2018  9:30 AM CDT   Infusion 60 with UC ONCOLOGY INFUSION, UC 22 ATC   [facility] ([facility])    [address] [place] [address]  [address]  [facility] [address]   [phone]              Future tests that were ordered for you today      Open Future Orders        Priority Expected Expires Ordered    IR Thoracentesis Routine  6/5/2019 6/5/2018    CBC with platelets differential Routine 7/5/2018 9/5/2018 6/5/2018    Comprehensive metabolic panel Routine 7/5/2018 9/5/2018 6/5/2018            Who to contact     If you have questions or need follow up information about today's clinic visit or your schedule please contact [facility] directly at [phone].  Normal or non-critical lab and imaging results will be communicated to you by [employer], letter or phone within 4 business days after the clinic has received the results. If you do not hear from us within 7 days, please contact the clinic through [employer] or phone. If you have a critical or abnormal lab result, we will notify you by phone as soon as possible.  Submit refill requests through [employer] or call your pharmacy and they will forward the refill request to us. Please allow 3 business days for your refill to be completed.          Additional Information About Your Visit        [employer] Information     [employer] gives you secure access to your electronic health record. If you see a primary care provider, you can also send messages to your care team and make appointments. If you have questions, please call your primary care clinic.  If you do not have a primary care provider, please call [phone] and they will assist you.        Care EveryWhere ID     This is your Care EveryWhere ID. This could be used by other organizations to access your [place] medical records  [id number]        Your Vitals Were     Pulse Temperature Respirations Pulse Oximetry BMI (Body Mass Index)       98 98  F (36.7  C) (Oral) 16 99% 19.3 kg/m2        Blood Pressure from Last 3 Encounters:   06/05/18 106/73   05/07/18 104/76   05/07/18 131/78    Weight from Last 3 Encounters:   06/05/18 51 kg (112 lb 8 oz)   05/07/18 50.1 kg (110 lb 8 oz)   05/07/18 50.1 kg (110 lb 8 oz)                 Where to get  your medicines      These medications were sent to [facility] [place], [place] - [address]  [address], [facility] [address]     Phone:  [phone]     letrozole 2.5 MG tablet          Primary Care Provider Office Phone # Fax #    Marilu Michelle [phone]       [address]  [facility] [address]        Equal Access to Services     LUZ AJMES : Hadii aad ku hadasho Soomaali, waaxda luqadaha, qaybta kaalmada adeegyada, waxay idiin hayaan lexi jimenezrenardsukhjinder mustafa . So [facility] [phone].    ATENCIÓN: Si habla español, tiene a gamez disposición servicios gratuitos de asistencia lingüística. Ayannaame al [phone].    We comply with applicable federal civil rights laws and [place] laws. We do not discriminate on the basis of race, color, national origin, age, disability, sex, sexual orientation, or gender identity.            Thank you!     Thank you for choosing [facility]  for your care. Our goal is always to provide you with excellent care. Hearing back from our patients is one way we can continue to improve our services. Please take a few minutes to complete the written survey that you may receive in the mail after your visit with us. Thank you!             Your Updated Medication List - Protect others around you: Learn how to safely use, store and throw away your medicines at www.disposemymeds.org.          This list is accurate as of 6/5/18  9:36 AM.  Always use your most recent med list.                   Brand Name Dispense Instructions for use Diagnosis    calcium carbonate 500 MG tablet    OS-KRYSTEN 500 mg Andreafski. Ca     Take 1 tablet by mouth daily        letrozole 2.5 MG tablet    FEMARA    28 tablet    Take 1 tablet (2.5 mg) by mouth daily    Metastatic breast cancer (H), Bone metastasis (H)       metoclopramide 10 MG tablet    REGLAN          ondansetron 8 MG tablet    ZOFRAN    40 tablet    Take 1 tablet (8 mg) by mouth every 8 hours as needed  for nausea    Metastatic breast cancer (H), Bone metastasis (H)       prochlorperazine 10 MG tablet    COMPAZINE          VITAMIN D (CHOLECALCIFEROL) PO      Take by mouth daily

## 2018-06-11 ENCOUNTER — TELEPHONE (OUTPATIENT)
Dept: ONCOLOGY | Facility: CLINIC | Age: 48
End: 2018-06-11

## 2018-06-12 ENCOUNTER — TELEPHONE (OUTPATIENT)
Dept: ONCOLOGY | Facility: CLINIC | Age: 48
End: 2018-06-12

## 2018-06-12 NOTE — TELEPHONE ENCOUNTER
Completed [facility] [employer] attending physician statement regarding disability. Faxed to [phone] mailed copy to patient

## 2018-06-17 ENCOUNTER — NURSE TRIAGE (OUTPATIENT)
Dept: NURSING | Facility: CLINIC | Age: 48
End: 2018-06-17

## 2018-06-18 ENCOUNTER — TELEPHONE (OUTPATIENT)
Dept: ONCOLOGY | Facility: CLINIC | Age: 48
End: 2018-06-18

## 2018-06-18 NOTE — TELEPHONE ENCOUNTER
"Caller: self  Reason for call: \"when I got out of the shower today I noticed the upper part of my abdomen was puffy, it's swollen, not red, it doesn't hurt, I have an appetite, I am tired, kit achy, no fever, my triglycerides have been a little high, do I need to come in or can I just call my doctor tomorrow? I am worried it may be my liver.\" Reports she is currently receiving treatment at [facility] and [place]. Reports also having a shiatzu massage 1 week ago and that she hasn't felt well since.    Symptoms: upper abdomen swelling - middle to right side (goes away when laying flat), stomach is soft, fatigue, achy, tired, gas pain-like cramps (more of a tightness), increase of flatulence, last BM at 5 pm tonight.  Symptoms started: abdominal swelling - noticed today. Gas pains - recently  Denies constipation, nausea, vomiting, diarrhea, jaundice, or urination issues.  Pain? yes Location: abdomen - gas pain like, tightness    Rated: \"2/10\"  Constant or intermittent?  \"comes and goes\"   Educated to liver disease symptoms to monitor for via [facility] web site. (https://[email or web address]/diseases-conditions/liver-problems/symptoms-causes/c-47521298). Discussed asking her doctors about OTC gas relief products.  Emergent symptoms reviewed. Care advice given per triage guideline; advised caller to be seen by her provider within the next 24 hours. Caller verbalized understanding of care advice given and plans to contact all of her doctors tomorrow. Caller had no further questions. Encouraged call back to [facility] 24/7 for nurse line services, new/worsening symptoms or further questions.    Isadora Weiner RN  [facility]      Reason for Disposition    [1] MILD pain (e.g., does not interfere with normal activities) AND [2] comes and goes (cramps) AND [3] present > 72 hours     Upper abdominal swelling and gas pains    Additional Information    Negative: Severe difficulty breathing (e.g., struggling " "for each breath, speaks in single words)    Negative: Shock suspected (e.g., cold/pale/clammy skin, too weak to stand, low BP, rapid pulse)    Negative: Difficult to awaken or acting confused  (e.g., disoriented, slurred speech)    Negative: Passed out (i.e., lost consciousness, collapsed and was not responding)    Negative: Visible sweat on face or sweat dripping down face    Negative: Sounds like a life-threatening emergency to the triager    Negative: Followed an abdomen (stomach) injury    Negative: Chest pain    Negative: [1] SEVERE pain (e.g., excruciating) AND [2] present > 1 hour    Negative: [1] Pain lasts > 10 minutes AND [2] age > 50    Negative: [1] Pain lasts > 10 minutes AND [2] age > 40 AND [3] associated chest, arm, neck, upper back or jaw pain    Negative: [1] Pain lasts > 10 minutes AND [2] age > 35 AND [3] at least one cardiac risk factor (i.e., hypertension, diabetes, obesity, smoker or strong family history of heart disease)    Negative: [1] Pain lasts > 10 minutes AND [2] history of heart disease (i.e., heart attack, bypass surgery, angina, angioplasty, CHF; not just a heart murmur)    Negative: [1] Pain lasts > 10 minutes AND [2] difficulty breathing    Negative: [1] Vomiting AND [2] contains red blood  (Exception: few streaks and only occurred once)    Negative: [1] Vomiting AND [2] contains black (\"coffee ground\") material    Negative: Blood in bowel movements  (Exception: Blood on surface of BM with constipation)    Negative: Black or tarry bowel movements  (Exception: chronic-unchanged  black-grey bowel movements AND is taking iron pills or Pepto-bismol)    Negative: [1] Pregnant > 24 weeks AND [2] hand or face swelling    Negative: Patient sounds very sick or weak to the triager    Negative: [1] MILD-MODERATE pain AND [2] constant AND [3] present > 2 hours    Negative: [1] MILD-MODERATE pain AND [2] not relieved by antacids    Negative: [1] Vomiting AND [2] contains bile (green color)    " "Negative: [1] Vomiting AND [2] abdomen looks much more swollen than usual    Negative: White of the eyes have turned yellow (i.e., jaundice)    Negative: Fever > 103 F (39.4 C)    Negative: [1] Fever > 101 F (38.3 C) AND [2] age > 60    Negative: [1] Fever > 101 F (38.3 C) AND [2] bedridden (e.g., nursing home patient, CVA, chronic illness, recovering from surgery)    Negative: [1] Fever > 100.5 F (38.1 C) AND [2] diabetes mellitus or weak immune system (e.g., HIV positive, cancer chemo, splenectomy, organ transplant, chronic steroids)    Negative: [1] MODERATE pain (e.g., interferes with normal activities) AND [2] comes and goes (cramps) AND [3] present > 24 hours  (Exception: pain with Vomiting or Diarrhea - see that Guideline)    Answer Assessment - Initial Assessment Questions  1. LOCATION: \"Where does it hurt?\"       Middle to right  2. RADIATION: \"Does the pain shoot anywhere else?\" (e.g., chest, back)      denies  3. ONSET: \"When did the pain begin?\" (e.g., minutes, hours or days ago)       Within the past month  4. SUDDEN: \"Gradual or sudden onset?\"      gradual  5. PATTERN \"Does the pain come and go, or is it constant?\"     - If constant: \"Is it getting better, staying the same, or worsening?\"       (Note: Constant means the pain never goes away completely; most serious pain is constant and it progresses)      - If intermittent: \"How long does it last?\" \"Do you have pain now?\"      (Note: Intermittent means the pain goes away completely between bouts)      Comes and goes  6. SEVERITY: \"How bad is the pain?\"  (e.g., Scale 1-10; mild, moderate, or severe)     - MILD (1-3): doesn't interfere with normal activities, abdomen soft and not tender to touch      - MODERATE (4-7): interferes with normal activities or awakens from sleep, tender to touch      - SEVERE (8-10): excruciating pain, doubled over, unable to do any normal activities        2/10  7. RECURRENT SYMPTOM: \"Have you ever had this type of abdominal " "pain before?\" If so, ask: \"When was the last time?\" and \"What happened that time?\"       Yes, gas pains  8. AGGRAVATING FACTORS: \"Does anything seem to cause this pain?\" (e.g., foods, stress, alcohol)      Didn't report  9. CARDIAC SYMPTOMS: \"Do you have any of the following symptoms: chest pain, difficulty breathing, sweating, nausea?\"      denies  10. OTHER SYMPTOMS: \"Do you have any other symptoms?\" (e.g., fever, vomiting, diarrhea)        Fatigue, achy and tired  11. PREGNANCY: \"Is there any chance you are pregnant?\" \"When was your last menstrual period?\"        N/a    Protocols used: ABDOMINAL PAIN - UPPER-ADULT-AH      "

## 2018-06-18 NOTE — TELEPHONE ENCOUNTER
Per Dr. Whitman: difficult to assess this type of symptom over the phone. If it continues or new/worsening symptoms develop should be seen in the clinic this week.     Called Shan back, no answer, LVM with detailed information above and provided triage # for further questions/concerns.

## 2018-06-18 NOTE — TELEPHONE ENCOUNTER
"[facility] Telephone Triage Note    Assessment: Patient called in to triage reporting the following symptoms: Swelling to RUQ, per Shan the area looks \"puffy\" . Negative for tenderness, jaundice, change in appetite, nausea/vomiting, constipation. Positive for loose stools, 3/day x 3-4 days. Mid back pain, described as \"tightness\", flatulence and gas like pains. Afebrile T98.6 this morning. Does have mild discomfort to RUQ, intermittent. She is also being seen in [place] and receiving additional treatments, she mentioned a medication she takes 5 times daily, Apatone, that is always hard on her stomach. She thinks that could be causing her symptoms. She is also concerned about pancreatitis or liver disease.     Recommendations: .  Paged Dr. Whitman. - awaiting response    Follow-Up: Patient voiced understanding of advice and/or instructions given.       "

## 2018-06-25 ENCOUNTER — TELEPHONE (OUTPATIENT)
Dept: FAMILY MEDICINE | Facility: CLINIC | Age: 48
End: 2018-06-25

## 2018-06-25 NOTE — TELEPHONE ENCOUNTER
Reason for Call:  Other call back    Detailed comments: pt has an appt scheduled on 07/02 to fill out disability papers for work. She would like to know if office visit is necessary . Please Advice thank you    Phone Number Patient can be reached at: Home number on file [phone] (home)    Best Time: any time    Can we leave a detailed message on this number? YES    Call taken on 6/25/2018 at 10:21 AM by Luann Singh

## 2018-06-25 NOTE — TELEPHONE ENCOUNTER
There are forms that will need to be filled out.  She will have them fax the forms to you. I am leaving the appointment in place until forms have been reviewed.  Called patient with the fax number for the clinic.  Left on her identified voice mail.  Jackie Torres RN

## 2018-06-25 NOTE — TELEPHONE ENCOUNTER
RN -- I can do that for her. What do I sign? She does not need to come in to clinic for this. Please pend a letter if that is needed. Marilu Michelle M.D.

## 2018-06-25 NOTE — TELEPHONE ENCOUNTER
Left message on machine to call back.   Ask to speak to an RN, let them know it's a return call.    Leave a number and time that you can be reached.   Jackie Torres RN

## 2018-06-25 NOTE — TELEPHONE ENCOUNTER
She should have her paperwork filled out by her oncologist. It looks like they have already done this. See below. She does not necessarily need to see me unless there is another issue she would like to address. Thanks, Marilu Michelle M.D.          Telephone     6/12/2018  [facility]    Ana Whitman MD   Hematology    Forms    Reason for call    Conversation: Forms   (Newest Message First)   Patricia Sanchez LPN        6/12/18 3:51 PM   Note      Completed [facility] attending physician statement regarding disability. Faxed to [phone] mailed copy to patient

## 2018-06-25 NOTE — TELEPHONE ENCOUNTER
Last patient was seen at [facility] was January 2015.  Dr Michelle would you fill out disability paperwork, or should patient be speaking to oncology (has had several visits with them.)  Looks like some forms were done on 6/12/18 for this.  Please advise.  Jackie Torres RN

## 2018-06-25 NOTE — TELEPHONE ENCOUNTER
Patient states that the insurance company is requiring 2 providers.  Needs oncologist and a second physician    Need you to look at the information from the Dr Whitman to say that you agree that the diagnosis she has is a disability.    States it is on an automatic disability list for the state but she needs an additional provider to sign off.  She does not want to go through additional testing and is not asking for a second opinion, just asking for Dr Michelle to say she has the diagnosis.  Jackie Torres RN

## 2018-06-26 ENCOUNTER — CARE COORDINATION (OUTPATIENT)
Dept: ONCOLOGY | Facility: CLINIC | Age: 48
End: 2018-06-26

## 2018-06-26 NOTE — PROGRESS NOTES
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 Sharifa Celeste RN, BSN    "

## 2018-06-28 ENCOUNTER — MYC MEDICAL ADVICE (OUTPATIENT)
Dept: ONCOLOGY | Facility: CLINIC | Age: 48
End: 2018-06-28

## 2018-07-02 ENCOUNTER — OFFICE VISIT (OUTPATIENT)
Dept: FAMILY MEDICINE | Facility: CLINIC | Age: 48
End: 2018-07-02
Payer: COMMERCIAL

## 2018-07-02 VITALS
DIASTOLIC BLOOD PRESSURE: 76 MMHG | WEIGHT: 114.25 LBS | RESPIRATION RATE: 16 BRPM | OXYGEN SATURATION: 98 % | HEART RATE: 74 BPM | SYSTOLIC BLOOD PRESSURE: 112 MMHG | BODY MASS INDEX: 19.6 KG/M2 | TEMPERATURE: 97.8 F

## 2018-07-02 DIAGNOSIS — C79.51 BONE METASTASIS: ICD-10-CM

## 2018-07-02 DIAGNOSIS — C50.919 METASTATIC BREAST CANCER: Primary | ICD-10-CM

## 2018-07-02 PROCEDURE — 99213 OFFICE O/P EST LOW 20 MIN: CPT | Performed by: FAMILY MEDICINE

## 2018-07-02 NOTE — PROGRESS NOTES
SUBJECTIVE:   Shan Marsh is a 48 year old female who presents to clinic today for the following health issues:    Patient is applying for disability for stage 4 cancer and is here to get a disability form completed. This is apparently different from the [facility] attending physician statement regarding disability. I also do not see a copy of that scanned into the chart. There is an FMLA form scanned from 2/2/18.     She would like a form filled out today acknowledging that she is disabled due to her metastatic breast cancer. She was working part-time, but felt that her schedule of treatments, the side effects from her treatments and her ongoing general fatigue made it extremely difficult to manage. She has been trying to focus all of her energy on healing. She has continued to see Dr. Whitman and has also been seeing an alternative medicine practitioner in [place] for immunotherapy treatments. She has also been trying to stay positive for the benefit of herself and her family. She has found laughter yoga very helpful.       Problem list and histories reviewed & adjusted, as indicated.  Additional history: as documented    Patient Active Problem List   Diagnosis     Incompetence of cervix     CARDIOVASCULAR SCREENING; LDL GOAL LESS THAN 160     S/P LEEP of cervix     Breast cancer (H)     Metastatic breast cancer (H)     Bone metastasis (H)     Past Surgical History:   Procedure Laterality Date     D & C  9/2010    retained POC     ENDOBRONCHIAL ULTRASOUND FLEXIBLE N/A 5/12/2017    Procedure: ENDOBRONCHIAL ULTRASOUND FLEXIBLE;  Flexible Bronchoscopy, Endobronchial Ultrasound, Transbronchial Biopsy ;  Surgeon: Bandar Meyer MD;  Location:  GI     LEEP TX, CERVICAL  12/01    LEEP or LUCAS II - III     MASTECTOMY, RECONSTRUCT BREAST, COMBINED  6/11/2012    Procedure: COMBINED MASTECTOMY, RECONSTRUCT BREAST;  Bilateral Mastectomy with Reconstruction, Left [place] Node Biopsy , placement of On-Q  catheters X 2 bilateral breast.;  Surgeon: Misael Cheng MD;  Location: [facility] OR     RECONSTRUCT BREAST BILATERAL  3/25/2013    Procedure: RECONSTRUCT BREAST BILATERAL;  Revise bilateral breasts and reconstructions, with insertion of gel breast implants with fat grafting and fat after mastectomies.;  Surgeon: Yair Olvera MD;  Location: [facility] OR     RECONSTRUCT BREAST BILATERAL, IMPLANT PROSTHESIS BILATERAL, COMBINED  9/27/2012    Procedure: COMBINED RECONSTRUCT BREAST BILATERAL, IMPLANT PROSTHESIS BILATERAL;  Bilateral Second Stage Breast Reconstruction With Gel Implants, Fat        THORACENTESIS Left 5/7/2018    Procedure: THORACENTESIS;  Thoracentesis;  Surgeon: Venancio Hussein PA-C;  Location:  OR       Social History   Substance Use Topics     Smoking status: Former Smoker     Quit date: 6/23/1997     Smokeless tobacco: Never Used     Alcohol use No     Family History   Problem Relation Age of Onset     Alcohol/Drug Father      Allergies Mother      Neurologic Disorder Mother      seizures, related to fever     GASTROINTESTINAL DISEASE Mother      IBS     Blood Disease Mother      [place]     Cancer Maternal Grandfather      bladder     Circulatory Maternal Grandfather      blood clots     Alcohol/Drug Maternal Grandfather      alcohol dependency     Allergies Maternal Aunt      Allergies Other      maternal cousins     Arthritis Maternal Aunt      Prostate Cancer Maternal Uncle      Thyroid Disease Maternal Aunt      Alcohol/Drug Maternal Uncle      alcohol and drug dependency     Cancer - colorectal Maternal Uncle      GASTROINTESTINAL DISEASE Sister      IBS     Breast Cancer Other      self         Current Outpatient Prescriptions   Medication Sig Dispense Refill     calcium carbonate (OS-KRYSTEN 500 MG [place]. [place]) 1250 MG tablet Take 1 tablet by mouth daily       letrozole (FEMARA) 2.5 MG tablet Take 1 tablet (2.5 mg) by mouth daily 28 tablet 3     ondansetron (ZOFRAN) 8 MG tablet Take 1 tablet (8  mg) by mouth every 8 hours as needed for nausea 40 tablet 3     prochlorperazine (COMPAZINE) 10 MG tablet        VITAMIN D, CHOLECALCIFEROL, PO Take by mouth daily       metoclopramide (REGLAN) 10 MG tablet        [DISCONTINUED] tamoxifen (NOLVADEX) 20 MG tablet Take 1 tablet (20 mg) by mouth daily 28 tablet 0     Allergies   Allergen Reactions     Erythromycin Shortness Of Breath     Sulfa Drugs      rash     Recent Labs   Lab Test  05/07/18   0825  04/06/18   1147  03/16/18   0808   11/03/17   1611   08/21/17   1147   10/01/14   1548  11/13/12   1613   LDL   --    --    --    --   43   --    --    --    --   83   HDL   --    --    --    --   50   --    --    --    --   55   TRIG   --    --    --    --   178*   --    --    --    --   78   ALT  27  30  49   < >   --    < >  22   < >   --    --    CR  0.58  0.58  0.55   < >   --    < >  0.57   < >   --    --    GFRESTIMATED  >90  >90  >90   < >   --    < >  >90   < >   --    --    GFRESTBLACK  >90  >90  >90   < >   --    < >  >90   < >   --    --    POTASSIUM  4.2  4.5  4.0   < >   --    < >  4.3   < >   --    --    TSH   --    --    --    --    --    --   1.78   --   1.36  1.21    < > = values in this interval not displayed.      BP Readings from Last 3 Encounters:   07/02/18 112/76   06/05/18 106/73   05/07/18 104/76    Wt Readings from Last 3 Encounters:   07/02/18 51.8 kg (114 lb 4 oz)   06/05/18 51 kg (112 lb 8 oz)   05/07/18 50.1 kg (110 lb 8 oz)        Reviewed and updated as needed this visit by clinical staff  Tobacco  Allergies  Meds  Med Hx  Surg Hx  Fam Hx  Soc Hx      Reviewed and updated as needed this visit by Provider       ROS:  See above    This document serves as a record of the services and decisions personally performed and made by Marilu Michelle MD. It was created on his/her behalf by Rafia Villareal, trained medical scribe. The creation of this document is based the provider's statements to the medical scribes.    Lucius Villareal,  "July 2, 2018  OBJECTIVE:     /76 (BP Location: Left arm, Patient Position: Sitting, Cuff Size: Adult Regular)  Pulse 74  Temp 97.8  F (36.6  C) (Tympanic)  Resp 16  Wt 51.8 kg (114 lb 4 oz)  SpO2 98%  BMI 19.6 kg/m2  Body mass index is 19.6 kg/(m^2).     GENERAL:  alert and no distress  PSYCH: mentation appears normal, affect normal/bright    Diagnostic Test Results:  No results found for this or any previous visit (from the past 24 hour(s)).    ASSESSMENT/PLAN:   Shan Marsh is a 47-year-old female with history of a T1 N0, ER/KS positive, HER2-negative left breast cancer treated with bilateral mastectomies in 2012, now with metastatic disease involving the left axilla, bones, and possible liver and lung, ER/KS positive, HER2 negative.  She had progression on palbociclib and Tamoxifen.     1. Metastatic breast cancer (H)  2. Bone metastasis (H)  She has been working with Dr. Whitman as well as an alternative medicine practitioner in [place] who has been providing immunotherapy. She requests a form be filled out today certifying her as disabled for her nursing home benefits. This has not been completed yet by Dr. Whitman. I will defer to Dr. Whitman to fill out this form. Once the form is completed (and scanned), if she does support Shan's disability application for nursing home benefits, I told Shan that I could fill out a form in support of Dr. Whitman's opinion if that is needed, however I do not feel comfortable being the person who provides this primary certification as I have not been the physician managing her illness.     Per Dr. Whitman's last A/P on 6/5/18:   \"1. Metastatic breast cancer: She was recommended to start abemiciclib however, shortly after starting the medication, she sought care in [place] and received a variety of alternative/investigative agents, as listed in HPI.  Abemiciclib was stopped. CT 3/15 after returning from [place] showing progressive disease with increased GGO, left pleural " "effusion, increased size of liver mets, and increased number of bony lesions. Per her wishes, she continues on the alternative regimen, as well as letrozole and Zoladex. She is feeling really well at this time. She just returned from [place], reporting a stable PET/CT. We will continue the letrozole/Zoladex.   - She met with Dr. Richardson in [place], recommended against  Oophorectomy for several reasons. If her pleural effusion clears may be an option.    2. Bone mets: Continue monthly Zometa every 3 months, due in July     3. FEN: Weight stable today. She has decreased appetite on her alternative medicine treatments. She is mindful of her intake. Encouraged her to keep this up.     4. L pleural effusion: Requiring intermittent thoracentesis, about once per month, last performed 5/7/18. Reviewed symptoms of enlarging effusion and to call if she experiences. Tentatively arranging for a thora in July.\"    Time spent with pt answering questions, discussing findings, counseling and coordinating care was at least half the appointment time, 15minutes.       The information in this document, created by the medical scribe for me, accurately reflects the services I personally performed and the decisions made by me. I have reviewed and approved this document for accuracy. 07/02/18    Marilu Michelle MD  [facility]    "

## 2018-07-02 NOTE — MR AVS SNAPSHOT
After Visit Summary   7/2/2018    Shan Marsh    MRN: [id number]           Patient Information     Date Of Birth          1970        Visit Information        Provider Department      7/2/2018 3:20 PM Marilu Michelle MD [facility]        Today's Diagnoses     Metastatic breast cancer (H)    -  1    Bone metastasis (H)           Follow-ups after your visit        Your next 10 appointments already scheduled     Jul 03, 2018  8:00 AM CDT   [facility] Lab Draw with UC MASONIC LAB DRAW   [facility] ([facility])    [address]  [address]  [facility] [address]   [phone]            Jul 03, 2018  8:30 AM CDT   (Arrive by 8:15 AM)   Return Visit with Lima Griffiths PA-C   [facility] ([facility])    [address]  [address]  [facility] [address]   [phone]            Jul 03, 2018  9:30 AM CDT   Infusion 60 with  ONCOLOGY INFUSION, UC 22 ATC   [facility] ([facility])    [address]  [address]  [facility] [address]   [phone]            Jul 05, 2018  9:00 AM CDT   (Arrive by 8:00 AM)   IR THORACENTESIS with [email or web address]   [facility] ([facility])    [address]  [address]  [facility] [address]   [phone]           1. Laboratory test are to be obtained by your doctor prior to the exam (Hgb/Hct, platelet count, INR) 2. Someone will need to drive you to and from the hospital if you feel you may need sedation for the procedure. 3. Bring a list of all drugs you are taking; include supplements and over-the-counter medications. 4. Wear comfortable clothes and leave your valuables at home. 5. If you are or may be pregnant, be sure to contact your doctor or a Radiology nurse prior to the day of the exam. 6. If you have diabetes, check with your doctor or  a Radiology nurse to see if your insulin needs to be adjusted for the exam. 7. If you are taking Coumadin (to thin your blood) please contact your doctor or a Radiology nurse at least 3 days before the exam for special instructions (only need the INR to be <2.0). 8. The day before your exam you may eat your regular diet. Drink no alcoholic beverages for 24 hours prior to the exam. 9. Do not eat any solid food or milk products for 6 hours prior to the exam. You may drink clear liquids until 2 hours prior to the exam. Clear liquids include the following: water, Jell-O, clear broth, apple juice or any noncarbonated drink that you can see through (no pop!) 10. The morning of the exam you may brush your teeth and take medications as directed with a sip of water. 11. Tell the Radiology nurse if you have any allergies. 12. If the tube needs to remain in place you will remain in the hospital until the tube can be removed 13. If the tube is removed immediately you may leave the hospital following your exam. However, if you received sedation you will need to stay 1-2 hours. You may be asked to stay longer and have a chest x-ray sometime after the procedure. 14. If you received sedation, you cannot drive until morning, and an adult must be with you until then. If you live more than one hour away you should stay in the [place] area overnight.            Jul 05, 2018   Procedure with Lydia Murillo PA-C   [facility] ([facility])    [address] [place] [address]  [address] [place] [id number] [phone]           Located in the Clinics and Surgery Center at [address] [place] [address].   parking is very convenient and highly recommended.  is a $6 flat rate fee.  Both  and self parkers should enter the main arrival plaza from [facility]; parking attendants will direct you based on your parking preference.            Jul 30, 2018  8:00 AM CDT    [facility] Lab Draw with  MASONIC LAB DRAW   [facility] ([facility])    [address]  [address]  [facility] [address] [phone]            Jul 30, 2018  8:30 AM CDT   (Arrive by 8:15 AM)   Return Visit with Ana Whitman MD   [facility] ([facility])    [address]  [address]  [facility] [id number] [phone]            Jul 30, 2018  9:30 AM CDT   Infusion 60 with UC ONCOLOGY INFUSION, UC 22 ATC   [facility] ([facility])    [address]  [address]  [facility] [address] [phone]              Who to contact     If you have questions or need follow up information about today's clinic visit or your schedule please contact [facility] directly at [phone].  Normal or non-critical lab and imaging results will be communicated to you by [employer]Dexhart, letter or phone within 4 business days after the clinic has received the results. If you do not hear from us within 7 days, please contact the clinic through [employer]t or phone. If you have a critical or abnormal lab result, we will notify you by phone as soon as possible.  Submit refill requests through [employer] or call your pharmacy and they will forward the refill request to us. Please allow 3 business days for your refill to be completed.          Additional Information About Your Visit        [email or web address] Information     [employer] gives you secure access to your electronic health record. If you see a primary care provider, you can also send messages to your care team and make appointments. If you have questions, please call your primary care clinic.  If you do not have a primary care provider, please call [phone] and they will assist you.        Care EveryWhere ID     This is your Care EveryWhere ID. This could be used by other organizations to access your [facility]  records  [id number]        Your Vitals Were     Pulse Temperature Respirations Pulse Oximetry BMI (Body Mass Index)       74 97.8  F (36.6  C) (Tympanic) 16 98% 19.6 kg/m2        Blood Pressure from Last 3 Encounters:   07/02/18 112/76   06/05/18 106/73   05/07/18 104/76    Weight from Last 3 Encounters:   07/02/18 114 lb 4 oz (51.8 kg)   06/05/18 112 lb 8 oz (51 kg)   05/07/18 110 lb 8 oz (50.1 kg)              Today, you had the following     No orders found for display       Primary Care Provider Office Phone # Fax #    Marilu Michelle [phone] [address]  [facility] [address]        Equal Access to Services     LUZ JAMES : Karla Schaefer, wacristianda luqadaha, qaybta kaalmada anneliese, edi mustafa . So [facility] [phone].    ATENCIÓN: Si habla español, tiene a gamez disposición servicios gratuitos de asistencia lingüística. Llame al [phone].    We comply with applicable federal civil rights laws and [place] laws. We do not discriminate on the basis of race, color, national origin, age, disability, sex, sexual orientation, or gender identity.            Thank you!     Thank you for choosing [facility]  for your care. Our goal is always to provide you with excellent care. Hearing back from our patients is one way we can continue to improve our services. Please take a few minutes to complete the written survey that you may receive in the mail after your visit with us. Thank you!             Your Updated Medication List - Protect others around you: Learn how to safely use, store and throw away your medicines at www.disposemymeds.org.          This list is accurate as of 7/2/18  5:22 PM.  Always use your most recent med list.                   Brand Name Dispense Instructions for use Diagnosis    calcium carbonate 500 MG tablet    OS-KRYSTEN 500 mg [place]. [place]     Take 1 tablet by mouth daily        letrozole 2.5 MG tablet     FEMARA    28 tablet    Take 1 tablet (2.5 mg) by mouth daily    Metastatic breast cancer (H), Bone metastasis (H)       metoclopramide 10 MG tablet    REGLAN          ondansetron 8 MG tablet    ZOFRAN    40 tablet    Take 1 tablet (8 mg) by mouth every 8 hours as needed for nausea    Metastatic breast cancer (H), Bone metastasis (H)       prochlorperazine 10 MG tablet    COMPAZINE          VITAMIN D (CHOLECALCIFEROL) PO      Take by mouth daily

## 2018-07-03 ENCOUNTER — INFUSION THERAPY VISIT (OUTPATIENT)
Dept: ONCOLOGY | Facility: CLINIC | Age: 48
End: 2018-07-03
Attending: INTERNAL MEDICINE
Payer: COMMERCIAL

## 2018-07-03 ENCOUNTER — MYC MEDICAL ADVICE (OUTPATIENT)
Dept: FAMILY MEDICINE | Facility: CLINIC | Age: 48
End: 2018-07-03

## 2018-07-03 ENCOUNTER — APPOINTMENT (OUTPATIENT)
Dept: LAB | Facility: CLINIC | Age: 48
End: 2018-07-03
Attending: INTERNAL MEDICINE
Payer: COMMERCIAL

## 2018-07-03 VITALS
DIASTOLIC BLOOD PRESSURE: 80 MMHG | HEART RATE: 82 BPM | WEIGHT: 112.9 LBS | OXYGEN SATURATION: 98 % | TEMPERATURE: 98.9 F | RESPIRATION RATE: 16 BRPM | BODY MASS INDEX: 19.27 KG/M2 | HEIGHT: 64 IN | SYSTOLIC BLOOD PRESSURE: 112 MMHG

## 2018-07-03 DIAGNOSIS — C79.51 BONE METASTASIS: ICD-10-CM

## 2018-07-03 DIAGNOSIS — C50.919 METASTATIC BREAST CANCER: Primary | ICD-10-CM

## 2018-07-03 LAB
ALBUMIN SERPL-MCNC: 3.9 G/DL (ref 3.4–5)
ALP SERPL-CCNC: 76 U/L (ref 40–150)
ALT SERPL W P-5'-P-CCNC: 27 U/L (ref 0–50)
ANION GAP SERPL CALCULATED.3IONS-SCNC: 9 MMOL/L (ref 3–14)
AST SERPL W P-5'-P-CCNC: 32 U/L (ref 0–45)
BASOPHILS # BLD AUTO: 0 10E9/L (ref 0–0.2)
BASOPHILS NFR BLD AUTO: 1.2 %
BILIRUB SERPL-MCNC: 0.4 MG/DL (ref 0.2–1.3)
BUN SERPL-MCNC: 11 MG/DL (ref 7–30)
CALCIUM SERPL-MCNC: 9.4 MG/DL (ref 8.5–10.1)
CHLORIDE SERPL-SCNC: 109 MMOL/L (ref 94–109)
CO2 SERPL-SCNC: 24 MMOL/L (ref 20–32)
CREAT SERPL-MCNC: 0.62 MG/DL (ref 0.52–1.04)
DIFFERENTIAL METHOD BLD: ABNORMAL
EOSINOPHIL # BLD AUTO: 0.1 10E9/L (ref 0–0.7)
EOSINOPHIL NFR BLD AUTO: 2.8 %
ERYTHROCYTE [DISTWIDTH] IN BLOOD BY AUTOMATED COUNT: 13.5 % (ref 10–15)
GFR SERPL CREATININE-BSD FRML MDRD: >90 ML/MIN/1.7M2
GLUCOSE SERPL-MCNC: 66 MG/DL (ref 70–99)
HCT VFR BLD AUTO: 42.2 % (ref 35–47)
HGB BLD-MCNC: 13.7 G/DL (ref 11.7–15.7)
IMM GRANULOCYTES # BLD: 0 10E9/L (ref 0–0.4)
IMM GRANULOCYTES NFR BLD: 0.3 %
LYMPHOCYTES # BLD AUTO: 1.2 10E9/L (ref 0.8–5.3)
LYMPHOCYTES NFR BLD AUTO: 37.4 %
MCH RBC QN AUTO: 31.2 PG (ref 26.5–33)
MCHC RBC AUTO-ENTMCNC: 32.5 G/DL (ref 31.5–36.5)
MCV RBC AUTO: 96 FL (ref 78–100)
MONOCYTES # BLD AUTO: 0.2 10E9/L (ref 0–1.3)
MONOCYTES NFR BLD AUTO: 7.4 %
NEUTROPHILS # BLD AUTO: 1.7 10E9/L (ref 1.6–8.3)
NEUTROPHILS NFR BLD AUTO: 50.9 %
NRBC # BLD AUTO: 0 10*3/UL
NRBC BLD AUTO-RTO: 0 /100
PLATELET # BLD AUTO: 162 10E9/L (ref 150–450)
POTASSIUM SERPL-SCNC: 3.8 MMOL/L (ref 3.4–5.3)
PROT SERPL-MCNC: 7.8 G/DL (ref 6.8–8.8)
RBC # BLD AUTO: 4.39 10E12/L (ref 3.8–5.2)
SODIUM SERPL-SCNC: 142 MMOL/L (ref 133–144)
WBC # BLD AUTO: 3.3 10E9/L (ref 4–11)

## 2018-07-03 PROCEDURE — 96402 CHEMO HORMON ANTINEOPL SQ/IM: CPT

## 2018-07-03 PROCEDURE — 80053 COMPREHEN METABOLIC PANEL: CPT | Performed by: PHYSICIAN ASSISTANT

## 2018-07-03 PROCEDURE — 85025 COMPLETE CBC W/AUTO DIFF WBC: CPT | Performed by: PHYSICIAN ASSISTANT

## 2018-07-03 PROCEDURE — 25000128 H RX IP 250 OP 636: Mod: ZF | Performed by: PHYSICIAN ASSISTANT

## 2018-07-03 PROCEDURE — 99214 OFFICE O/P EST MOD 30 MIN: CPT | Mod: ZP | Performed by: PHYSICIAN ASSISTANT

## 2018-07-03 PROCEDURE — 96365 THER/PROPH/DIAG IV INF INIT: CPT

## 2018-07-03 PROCEDURE — G0463 HOSPITAL OUTPT CLINIC VISIT: HCPCS | Mod: ZF

## 2018-07-03 PROCEDURE — 25000125 ZZHC RX 250: Mod: ZF | Performed by: PHYSICIAN ASSISTANT

## 2018-07-03 PROCEDURE — 25000128 H RX IP 250 OP 636: Mod: ZF | Performed by: INTERNAL MEDICINE

## 2018-07-03 RX ORDER — LIDOCAINE HYDROCHLORIDE 10 MG/ML
2 INJECTION, SOLUTION EPIDURAL; INFILTRATION; INTRACAUDAL; PERINEURAL ONCE
Status: CANCELLED | OUTPATIENT
Start: 2018-07-03 | End: 2018-07-08

## 2018-07-03 RX ORDER — ZOLEDRONIC ACID 0.04 MG/ML
4 INJECTION, SOLUTION INTRAVENOUS ONCE
Status: CANCELLED | OUTPATIENT
Start: 2018-07-03

## 2018-07-03 RX ORDER — ZOLEDRONIC ACID 0.04 MG/ML
4 INJECTION, SOLUTION INTRAVENOUS ONCE
Status: COMPLETED | OUTPATIENT
Start: 2018-07-03 | End: 2018-07-03

## 2018-07-03 RX ORDER — LIDOCAINE HYDROCHLORIDE 10 MG/ML
2 INJECTION, SOLUTION EPIDURAL; INFILTRATION; INTRACAUDAL; PERINEURAL ONCE
Status: COMPLETED | OUTPATIENT
Start: 2018-07-03 | End: 2018-07-03

## 2018-07-03 RX ADMIN — LIDOCAINE HYDROCHLORIDE 2 ML: 10 INJECTION, SOLUTION EPIDURAL; INFILTRATION; INTRACAUDAL; PERINEURAL at 09:45

## 2018-07-03 RX ADMIN — GOSERELIN ACETATE 3.6 MG: 3.6 IMPLANT SUBCUTANEOUS at 09:51

## 2018-07-03 RX ADMIN — ZOLEDRONIC ACID 4 MG: 0.04 INJECTION, SOLUTION INTRAVENOUS at 09:50

## 2018-07-03 RX ADMIN — SODIUM CHLORIDE 250 ML: 9 INJECTION, SOLUTION INTRAVENOUS at 09:50

## 2018-07-03 ASSESSMENT — PAIN SCALES - GENERAL: PAINLEVEL: NO PAIN (0)

## 2018-07-03 NOTE — MR AVS SNAPSHOT
After Visit Summary   7/3/2018    Shan Marsh    MRN: [id number]           Patient Information     Date Of Birth          1970        Visit Information        Provider Department      7/3/2018 8:30 AM Lima Griffiths PA-C [facility]        Today's Diagnoses     Metastatic breast cancer (H)    -  1       Follow-ups after your visit        Your next 10 appointments already scheduled     Jul 03, 2018  9:30 AM CDT   Infusion 60 with UC ONCOLOGY INFUSION, UC 22 ATC   [facility] ([facility])    [address]  [address]  [facility] [id number] [phone]            Jul 05, 2018  9:00 AM CDT   (Arrive by 8:00 AM)   IR THORACENTESIS with [email or web address]   [facility] ([facility])    [address]  5th Floor  [facility] [id number] [phone]           1. Laboratory test are to be obtained by your doctor prior to the exam (Hgb/Hct, platelet count, INR) 2. Someone will need to drive you to and from the hospital if you feel you may need sedation for the procedure. 3. Bring a list of all drugs you are taking; include supplements and over-the-counter medications. 4. Wear comfortable clothes and leave your valuables at home. 5. If you are or may be pregnant, be sure to contact your doctor or a Radiology nurse prior to the day of the exam. 6. If you have diabetes, check with your doctor or a Radiology nurse to see if your insulin needs to be adjusted for the exam. 7. If you are taking Coumadin (to thin your blood) please contact your doctor or a Radiology nurse at least 3 days before the exam for special instructions (only need the INR to be <2.0). 8. The day before your exam you may eat your regular diet. Drink no alcoholic beverages for 24 hours prior to the exam. 9. Do not eat any solid food or milk products for 6 hours prior to the exam. You may drink clear liquids  until 2 hours prior to the exam. Clear liquids include the following: water, Jell-O, clear broth, apple juice or any noncarbonated drink that you can see through (no pop!) 10. The morning of the exam you may brush your teeth and take medications as directed with a sip of water. 11. Tell the Radiology nurse if you have any allergies. 12. If the tube needs to remain in place you will remain in the hospital until the tube can be removed 13. If the tube is removed immediately you may leave the hospital following your exam. However, if you received sedation you will need to stay 1-2 hours. You may be asked to stay longer and have a chest x-ray sometime after the procedure. 14. If you received sedation, you cannot drive until morning, and an adult must be with you until then. If you live more than one hour away you should stay in the [place] area overnight.            Jul 05, 2018   Procedure with Lydia Murillo PA-C   [facility] ([facility] [place])    [address] [place] [address]  5th Floor  [facility] [address] [phone]           Located in the Clinics and Surgery Center at [address] [place] [address].   parking is very convenient and highly recommended.  is a $6 flat rate fee.  Both  and self parkers should enter the main arrival plaza from [facility]; parking attendants will direct you based on your parking preference.            Jul 30, 2018  8:00 AM CDT   Masonic Lab Draw with  MASONIC LAB DRAW   [facility] ([facility])    [address] [place] [address]  [address] [place] [address]   [phone]            Jul 30, 2018  8:30 AM CDT   (Arrive by 8:15 AM)   Return Visit with Ana Whitman MD   [facility] ([facility])    [address] [place] [address]  [address]  [facility] [address]   [phone]            Jul 30, 2018  9:30 AM CDT   Infusion 60 with   ONCOLOGY INFUSION, UC 22 ATC   [facility] ([facility] [place])    [address]  [address]  [facility] [address] [phone]              Future tests that were ordered for you today     Open Future Orders        Priority Expected Expires Ordered    CBC with platelets differential Routine 7/3/2018 10/3/2018 7/3/2018    Ca27.29  breast tumor marker Routine 7/3/2018 10/3/2018 7/3/2018    Comprehensive metabolic panel Routine 7/3/2018 10/3/2018 7/3/2018    CEA Routine 7/3/2018 10/3/2018 7/3/2018            Who to contact     If you have questions or need follow up information about today's clinic visit or your schedule please contact [facility] directly at [phone].  Normal or non-critical lab and imaging results will be communicated to you by TitanFilehart, letter or phone within 4 business days after the clinic has received the results. If you do not hear from us within 7 days, please contact the clinic through TitanFilehart or phone. If you have a critical or abnormal lab result, we will notify you by phone as soon as possible.  Submit refill requests through Pressly or call your pharmacy and they will forward the refill request to us. Please allow 3 business days for your refill to be completed.          Additional Information About Your Visit        TitanFilehart Information     Pressly gives you secure access to your electronic health record. If you see a primary care provider, you can also send messages to your care team and make appointments. If you have questions, please call your primary care clinic.  If you do not have a primary care provider, please call [phone] and they will assist you.        Care EveryWhere ID     This is your Care EveryWhere ID. This could be used by other organizations to access your [place] medical records  [id number]        Your Vitals Were     Pulse Temperature Respirations Height Pulse Oximetry BMI (Body Mass  "Index)    82 98.9  F (37.2  C) 16 1.626 m (5' 4.02\") 98% 19.37 kg/m2       Blood Pressure from Last 3 Encounters:   07/03/18 112/80   07/02/18 112/76   06/05/18 106/73    Weight from Last 3 Encounters:   07/03/18 51.2 kg (112 lb 14.4 oz)   07/02/18 51.8 kg (114 lb 4 oz)   06/05/18 51 kg (112 lb 8 oz)               Primary Care Provider Office Phone # Fax #    Marilu Michelle [phone] [address] [facility] [address]        Equal Access to Services     LUZ JAMES : Karla Schaefer, georgia bejarano, makenzie coy, edi kolb. So [facility] [phone].    ATENCIÓN: Si habla español, tiene a gamez disposición servicios gratuitos de asistencia lingüística. [facility] [phone].    We comply with applicable federal civil rights laws and [place] laws. We do not discriminate on the basis of race, color, national origin, age, disability, sex, sexual orientation, or gender identity.            Thank you!     Thank you for choosing [facility]  for your care. Our goal is always to provide you with excellent care. Hearing back from our patients is one way we can continue to improve our services. Please take a few minutes to complete the written survey that you may receive in the mail after your visit with us. Thank you!             Your Updated Medication List - Protect others around you: Learn how to safely use, store and throw away your medicines at www.disposemymeds.org.          This list is accurate as of 7/3/18  9:18 AM.  Always use your most recent med list.                   Brand Name Dispense Instructions for use Diagnosis    calcium carbonate 500 MG tablet    OS-KRYSTEN 500 mg [place]. [place]     Take 1 tablet by mouth daily        letrozole 2.5 MG tablet    FEMARA    28 tablet    Take 1 tablet (2.5 mg) by mouth daily    Metastatic breast cancer (H), Bone metastasis (H)       metoclopramide 10 MG tablet    REGLAN          " ondansetron 8 MG tablet    ZOFRAN    40 tablet    Take 1 tablet (8 mg) by mouth every 8 hours as needed for nausea    Metastatic breast cancer (H), Bone metastasis (H)       prochlorperazine 10 MG tablet    COMPAZINE          VITAMIN D (CHOLECALCIFEROL) PO      Take by mouth daily

## 2018-07-03 NOTE — PROGRESS NOTES
Infusion Nursing Note:  Shan Marsh presents today for Cycle 7 Zoladex, Zometa.    Patient seen by provider today: Yes: CARLOS CASTELLANO    Treatment Conditions:  Lab Results   Component Value Date    HGB 13.7 07/03/2018     Lab Results   Component Value Date    WBC 3.3 07/03/2018      Lab Results   Component Value Date    ANEU 1.7 07/03/2018     Lab Results   Component Value Date     07/03/2018      Lab Results   Component Value Date     07/03/2018                   Lab Results   Component Value Date    POTASSIUM 3.8 07/03/2018           No results found for: MAG         Lab Results   Component Value Date    CR 0.62 07/03/2018                   Lab Results   Component Value Date    KRYSTEN 9.4 07/03/2018                Lab Results   Component Value Date    BILITOTAL 0.4 07/03/2018           Lab Results   Component Value Date    ALBUMIN 3.9 07/03/2018                    Lab Results   Component Value Date    ALT 27 07/03/2018           Lab Results   Component Value Date    AST 32 07/03/2018       Results reviewed, labs MET treatment parameters, ok to proceed with treatment.    Intravenous Access:  Peripheral IV placed.    Note: Pt with no concerns during infusion visit.  Zometa stop time: 1010    Post Infusion Assessment:  Patient tolerated infusion without incident.  Patient tolerated injection to RLQ abdomen without incident.  Blood return noted pre and post infusion.  Access discontinued per protocol.    Discharge Plan:   Patient declined prescription refills.  Discharge instructions reviewed with: Patient.  Patient and/or family verbalized understanding of discharge instructions and all questions answered.  Copy of AVS reviewed with patient and/or family.  Patient will return 7/30/18 for next appointment.  Patient discharged in stable condition accompanied by: self.  Departure Mode: Ambulatory.  Face to Face time: 0.    Juany Stevenson RN

## 2018-07-03 NOTE — NURSING NOTE
Chief Complaint   Patient presents with     Labs Only     venipuncture, vitals checked     PIV placed for  infusion

## 2018-07-03 NOTE — MR AVS SNAPSHOT
After Visit Summary   7/3/2018    Shan Marsh    MRN: [id number]           Patient Information     Date Of Birth          1970        Visit Information        Provider Department      7/3/2018 9:30 AM UC 22 ATC;  ONCOLOGY INFUSION [facility]        Today's Diagnoses     Metastatic breast cancer (H)    -  1    Bone metastasis (H)          Care Instructions    Contact Numbers    [facility] Main Line: [phone]  [facility] Triage and after hours / weekends / holidays:  [phone]      Please call the triage or after hours line if you experience a temperature greater than or equal to 100.5, shaking chills, have uncontrolled nausea, vomiting and/or diarrhea, dizziness, shortness of breath, chest pain, bleeding, unexplained bruising, or if you have any other new/concerning symptoms, questions or concerns.      If you are having any concerning symptoms or wish to speak to a provider before your next infusion visit, please call your care coordinator or triage to notify them so we can adequately serve you.     If you need a refill on a narcotic prescription or other medication, please call before your infusion appointment.             July 2018 Sunday Monday Tuesday Wednesday Thursday Friday Saturday   1     2     LONG    3:20 PM   (20 min.)   Marilu Michelle MD   [facility] 3     [facility] MASONIC LAB DRAW    8:00 AM   (15 min.)    MASONIC LAB DRAW   [facility] Lab Draw     [facility] RETURN    8:15 AM   (50 min.)   Lima Griffiths PA-C   [facility] ONC INFUSION 60    9:30 AM   (60 min.)    ONCOLOGY INFUSION   [facility] 4     5     IR THORACENTESIS    8:00 AM   (75 min.)   [email or web address]   [facility] Imaging     THORACENTESIS    9:00 AM   Lydia Murillo PA-C    OR     Outpatient Visit    9:00 AM   [facility] 6     7       8     9     10     11     12     13     14       15      16     17     18     19     20     21       22     23     24     25     26     27     28       29     30     [facility] MASONIC LAB DRAW    8:00 AM   (15 min.)    MASONIC LAB DRAW   [facility] Lab Draw     P RETURN    8:15 AM   (30 min.)   Ana Whitman MD   [facility] ONC INFUSION 60    9:30 AM   (60 min.)   UC ONCOLOGY INFUSION   [facility] 31 August 2018 Sunday Monday Tuesday Wednesday Thursday Friday Saturday                  1     2     3     4       5     6     7     8     9     10     11       12     13     14     15     16     17     18       19     20     21     22     23     24     25       26     27     28     29     30     31                      Recent Results (from the past 24 hour(s))   CBC with platelets differential    Collection Time: 07/03/18  8:26 AM   Result Value Ref Range    WBC 3.3 (L) 4.0 - 11.0 10e9/L    RBC Count 4.39 3.8 - 5.2 10e12/L    Hemoglobin 13.7 11.7 - 15.7 g/dL    Hematocrit 42.2 35.0 - 47.0 %    MCV 96 78 - 100 fl    MCH 31.2 26.5 - 33.0 pg    MCHC 32.5 31.5 - 36.5 g/dL    RDW 13.5 10.0 - 15.0 %    Platelet Count 162 150 - 450 10e9/L    Diff Method Automated Method     % Neutrophils 50.9 %    % Lymphocytes 37.4 %    % Monocytes 7.4 %    % Eosinophils 2.8 %    % Basophils 1.2 %    % Immature Granulocytes 0.3 %    Nucleated RBCs 0 0 /100    Absolute Neutrophil 1.7 1.6 - 8.3 10e9/L    Absolute Lymphocytes 1.2 0.8 - 5.3 10e9/L    Absolute Monocytes 0.2 0.0 - 1.3 10e9/L    Absolute Eosinophils 0.1 0.0 - 0.7 10e9/L    Absolute Basophils 0.0 0.0 - 0.2 10e9/L    Abs Immature Granulocytes 0.0 0 - 0.4 10e9/L    Absolute Nucleated RBC 0.0    Comprehensive metabolic panel    Collection Time: 07/03/18  8:26 AM   Result Value Ref Range    Sodium 142 133 - 144 mmol/L    Potassium 3.8 3.4 - 5.3 mmol/L    Chloride 109 94 - 109 mmol/L    Carbon Dioxide 24 20 - 32 mmol/L    Anion Gap 9 3 - 14 mmol/L     Glucose 66 (L) 70 - 99 mg/dL    Urea Nitrogen 11 7 - 30 mg/dL    Creatinine 0.62 0.52 - 1.04 mg/dL    GFR Estimate >90 >60 mL/min/1.7m2    GFR Estimate If Black >90 >60 mL/min/1.7m2    Calcium 9.4 8.5 - 10.1 mg/dL    Bilirubin Total 0.4 0.2 - 1.3 mg/dL    Albumin 3.9 3.4 - 5.0 g/dL    Protein Total 7.8 6.8 - 8.8 g/dL    Alkaline Phosphatase 76 40 - 150 U/L    ALT 27 0 - 50 U/L    AST 32 0 - 45 U/L               Follow-ups after your visit        Your next 10 appointments already scheduled     Jul 05, 2018  9:00 AM CDT   (Arrive by 8:00 AM)   IR THORACENTESIS with [email or web address]   [facility] ([facility] [place])    [address]  5th [facility] [address] [phone]           1. Laboratory test are to be obtained by your doctor prior to the exam (Hgb/Hct, platelet count, INR) 2. Someone will need to drive you to and from the hospital if you feel you may need sedation for the procedure. 3. Bring a list of all drugs you are taking; include supplements and over-the-counter medications. 4. Wear comfortable clothes and leave your valuables at home. 5. If you are or may be pregnant, be sure to contact your doctor or a Radiology nurse prior to the day of the exam. 6. If you have diabetes, check with your doctor or a Radiology nurse to see if your insulin needs to be adjusted for the exam. 7. If you are taking Coumadin (to thin your blood) please contact your doctor or a Radiology nurse at least 3 days before the exam for special instructions (only need the INR to be <2.0). 8. The day before your exam you may eat your regular diet. Drink no alcoholic beverages for 24 hours prior to the exam. 9. Do not eat any solid food or milk products for 6 hours prior to the exam. You may drink clear liquids until 2 hours prior to the exam. Clear liquids include the following: water, Jell-O, clear broth, apple juice or any noncarbonated drink that you can see through (no pop!) 10. The morning  of the exam you may brush your teeth and take medications as directed with a sip of water. 11. Tell the Radiology nurse if you have any allergies. 12. If the tube needs to remain in place you will remain in the hospital until the tube can be removed 13. If the tube is removed immediately you may leave the hospital following your exam. However, if you received sedation you will need to stay 1-2 hours. You may be asked to stay longer and have a chest x-ray sometime after the procedure. 14. If you received sedation, you cannot drive until morning, and an adult must be with you until then. If you live more than one hour away you should stay in the [place] area overnight.            Jul 05, 2018   Procedure with Lydia Murillo PA-C   [facility] ([facility])    [address] [place] [address]  5th Floor  [facility] [address]   [phone]           Located in the Clinics and Surgery Center at [address] [place] [address].   parking is very convenient and highly recommended.  is a $6 flat rate fee.  Both  and self parkers should enter the main arrival plaza from [facility]; parking attendants will direct you based on your parking preference.            Jul 30, 2018  8:00 AM CDT   Masonic Lab Draw with  MALINDA LAB DRAW   [facility] Lab Draw ([facility])    [address] [place] [address]  [address]  [facility] [address]   [phone]            Jul 30, 2018  8:30 AM CDT   (Arrive by 8:15 AM)   Return Visit with Ana Whitman MD   [facility] ([facility])    [address] [place] [address]  [address]  [facility] [address]   [phone]            Jul 30, 2018  9:30 AM CDT   Infusion 60 with UC ONCOLOGY INFUSION, UC 22 ATC   [facility] ([facility])    [address] [place] [address]  [address]  [facility] [address]   [phone]               Future tests that were ordered for you today     Open Future Orders        Priority Expected Expires Ordered    CBC with platelets differential Routine 7/3/2018 10/3/2018 7/3/2018    Ca27.29  breast tumor marker Routine 7/3/2018 10/3/2018 7/3/2018    Comprehensive metabolic panel Routine 7/3/2018 10/3/2018 7/3/2018    CEA Routine 7/3/2018 10/3/2018 7/3/2018            Who to contact     If you have questions or need follow up information about today's clinic visit or your schedule please contact [facility] directly at [phone].  Normal or non-critical lab and imaging results will be communicated to you by MyChart, letter or phone within 4 business days after the clinic has received the results. If you do not hear from us within 7 days, please contact the clinic through VentriPoint Diagnosticshart or phone. If you have a critical or abnormal lab result, we will notify you by phone as soon as possible.  Submit refill requests through PeeP Mobile Digital or call your pharmacy and they will forward the refill request to us. Please allow 3 business days for your refill to be completed.          Additional Information About Your Visit        VentriPoint Diagnosticshart Information     [employer] gives you secure access to your electronic health record. If you see a primary care provider, you can also send messages to your care team and make appointments. If you have questions, please call your primary care clinic.  If you do not have a primary care provider, please call [phone] and they will assist you.        Care EveryWhere ID     This is your Care EveryWhere ID. This could be used by other organizations to access your [place] medical records  [id number]         Blood Pressure from Last 3 Encounters:   07/03/18 112/80   07/02/18 112/76   06/05/18 106/73    Weight from Last 3 Encounters:   07/03/18 51.2 kg (112 lb 14.4 oz)   07/02/18 51.8 kg (114 lb 4 oz)   06/05/18 51 kg (112 lb 8 oz)              We Performed the Following     CBC with  platelets differential     Comprehensive metabolic panel        Primary Care Provider Office Phone # Fax #    Marilu Michelle [phone] [address]  [facility] [address]        Equal Access to Services     LUZ JAMES : Hadii aad ku hadneal Schaefer, wacristianda luqshahab, qaybta kaalmada anneliese, edi guajardo laAlisadahlia kolb. So [facility] [phone].    ATENCIÓN: Si habla español, tiene a gamez disposición servicios gratuitos de asistencia lingüística. Llame al [phone].    We comply with applicable federal civil rights laws and [place] laws. We do not discriminate on the basis of race, color, national origin, age, disability, sex, sexual orientation, or gender identity.            Thank you!     Thank you for choosing [facility]ER [facility]  for your care. Our goal is always to provide you with excellent care. Hearing back from our patients is one way we can continue to improve our services. Please take a few minutes to complete the written survey that you may receive in the mail after your visit with us. Thank you!             Your Updated Medication List - Protect others around you: Learn how to safely use, store and throw away your medicines at www.disposemymeds.org.          This list is accurate as of 7/3/18 10:14 AM.  Always use your most recent med list.                   Brand Name Dispense Instructions for use Diagnosis    calcium carbonate 500 MG tablet    OS-KRYSTEN 500 mg [place]. Ca     Take 1 tablet by mouth daily        letrozole 2.5 MG tablet    FEMARA    28 tablet    Take 1 tablet (2.5 mg) by mouth daily    Metastatic breast cancer (H), Bone metastasis (H)       metoclopramide 10 MG tablet    REGLAN          ondansetron 8 MG tablet    ZOFRAN    40 tablet    Take 1 tablet (8 mg) by mouth every 8 hours as needed for nausea    Metastatic breast cancer (H), Bone metastasis (H)       prochlorperazine 10 MG tablet    COMPAZINE          VITAMIN D (CHOLECALCIFEROL)  PO      Take by mouth daily

## 2018-07-03 NOTE — PATIENT INSTRUCTIONS
Contact Numbers    [facility] [place]: [phone]  [facility] [place] and after hours / weekends / holidays:  [phone]      Please call the triage or after hours line if you experience a temperature greater than or equal to 100.5, shaking chills, have uncontrolled nausea, vomiting and/or diarrhea, dizziness, shortness of breath, chest pain, bleeding, unexplained bruising, or if you have any other new/concerning symptoms, questions or concerns.      If you are having any concerning symptoms or wish to speak to a provider before your next infusion visit, please call your care coordinator or triage to notify them so we can adequately serve you.     If you need a refill on a narcotic prescription or other medication, please call before your infusion appointment.             July 2018 Sunday Monday Tuesday Wednesday Thursday Friday Saturday   1     2     LONG    3:20 PM   (20 min.)   Marilu Michelle MD   [facility] 3     P [facility] DRAW    8:00 AM   (15 min.)    [facility] DRAW   [facility] Lab Draw     [facility] RETURN    8:15 AM   (50 min.)   Lima Griffiths PA-C   [facility] ONC INFUSION 60    9:30 AM   (60 min.)    ONCOLOGY INFUSION   [facility] 4     5     IR THORACENTESIS    8:00 AM   (75 min.)   [id number]   [facility]     THORACENTESIS    9:00 AM   Lydia Murillo PA-C   [facility] OR     Outpatient Visit    9:00 AM   [facility] 6     7       8     9     10     11     12     13     14       15     16     17     18     19     20     21       22     23     24     25     26     27     28       29     30     [facility] DRAW    8:00 AM   (15 min.)   [facility]   [facility]     [facility] RETURN    8:15 AM   (30 min.)   [facility] Whitman MD   [facility]    9:30 AM   (60 min.)    ONCOLOGY INFUSION   [facility] 31                                     August 2018 Sunday Monday Tuesday Wednesday Thursday Friday Saturday                  1     2     3     4       5     6     7     8     9     10     11       12     13     14     15     16     17     18       19     20     21     22     23     24     25       26     27     28     29     30     31                      Recent Results (from the past 24 hour(s))   CBC with platelets differential    Collection Time: 07/03/18  8:26 AM   Result Value Ref Range    WBC 3.3 (L) 4.0 - 11.0 10e9/L    RBC Count 4.39 3.8 - 5.2 10e12/L    Hemoglobin 13.7 11.7 - 15.7 g/dL    Hematocrit 42.2 35.0 - 47.0 %    MCV 96 78 - 100 fl    MCH 31.2 26.5 - 33.0 pg    MCHC 32.5 31.5 - 36.5 g/dL    RDW 13.5 10.0 - 15.0 %    Platelet Count 162 150 - 450 10e9/L    Diff Method Automated Method     % Neutrophils 50.9 %    % Lymphocytes 37.4 %    % Monocytes 7.4 %    % Eosinophils 2.8 %    % Basophils 1.2 %    % Immature Granulocytes 0.3 %    Nucleated RBCs 0 0 /100    Absolute Neutrophil 1.7 1.6 - 8.3 10e9/L    Absolute Lymphocytes 1.2 0.8 - 5.3 10e9/L    Absolute Monocytes 0.2 0.0 - 1.3 10e9/L    Absolute Eosinophils 0.1 0.0 - 0.7 10e9/L    Absolute Basophils 0.0 0.0 - 0.2 10e9/L    Abs Immature Granulocytes 0.0 0 - 0.4 10e9/L    Absolute Nucleated RBC 0.0    Comprehensive metabolic panel    Collection Time: 07/03/18  8:26 AM   Result Value Ref Range    Sodium 142 133 - 144 mmol/L    Potassium 3.8 3.4 - 5.3 mmol/L    Chloride 109 94 - 109 mmol/L    Carbon Dioxide 24 20 - 32 mmol/L    Anion Gap 9 3 - 14 mmol/L    Glucose 66 (L) 70 - 99 mg/dL    Urea Nitrogen 11 7 - 30 mg/dL    Creatinine 0.62 0.52 - 1.04 mg/dL    GFR Estimate >90 >60 mL/min/1.7m2    GFR Estimate If Black >90 >60 mL/min/1.7m2    Calcium 9.4 8.5 - 10.1 mg/dL    Bilirubin Total 0.4 0.2 - 1.3 mg/dL    Albumin 3.9 3.4 - 5.0 g/dL    Protein Total 7.8 6.8 - 8.8 g/dL    Alkaline Phosphatase 76 40 - 150 U/L    ALT 27 0 - 50 U/L    AST 32 0 - 45 U/L

## 2018-07-03 NOTE — NURSING NOTE
"Oncology Rooming Note    July 3, 2018 8:36 AM   Shan Marsh is a 48 year old female who presents for:    Chief Complaint   Patient presents with     Labs Only     venipuncture, vitals checked     Oncology Clinic Visit     Return visit related to Breast Cancer     Initial Vitals: /80 (BP Location: Right arm, Patient Position: Sitting, Cuff Size: Adult Small)  Pulse 82  Temp 98.9  F (37.2  C)  Resp 16  Ht 1.626 m (5' 4.02\")  Wt 51.2 kg (112 lb 14.4 oz)  SpO2 98%  BMI 19.37 kg/m2 Estimated body mass index is 19.37 kg/(m^2) as calculated from the following:    Height as of this encounter: 1.626 m (5' 4.02\").    Weight as of this encounter: 51.2 kg (112 lb 14.4 oz). Body surface area is 1.52 meters squared.  No Pain (0) Comment: Data Unavailable   No LMP recorded. Patient is not currently having periods (Reason: UNKNOWN).  Allergies reviewed: Yes  Medications reviewed: Yes    Medications: Medication refills not needed today.  Pharmacy name entered into [facility]: [facility] [place], [place] - [address]    Clinical concerns: No new concerns. Provider was notified.    10 minutes for nursing intake (face to face time)     Ana Solano LPN            "

## 2018-07-03 NOTE — LETTER
7/3/2018      RE: Shan Marsh  [address]  [facility] [address]       Hematology-Oncology Visit  Jul 3, 2018    Reason for Visit: follow-up metastatic breast cancer, ER positive     HPI: Shan Marsh is a 48 year old female without significant past medical history with recent diagnosis of metastatic breast cancer after presenting with left shoulder pain. Shoulder MRI showed soft tissue mass close to distal clavicle concerning for malignancy. She had PET/CT 5/9/17 showing hypermetabolic left axillary, supraclavicular, mediastinal and hilar nodes. She had biopsies of left axilla and hilar nodes showing metastatic breast cancer, strongly ER positive, moderately LA positive and HER-2 kaitlin non-amplified. She originally had [id number], ER/LA positive, HER-2 negative breast cancer diagnosed in spring of 2012 s/p bilateral mastectomies and Tamoxifen from 8/2012-2/2013 and discontinued due to side effects. She met with Dr. Whitman most recently on 5/16/17 who recommended treatment on BIG10 clinical trial with palbociclib and tamoxifen. She started treatment on 5/24/17 and required dose reduction for neutropenia. CT CAP and bone scan 1/11/2018 showing progressive disease with new bony lesions in the ribs, T spine, and possibly liver, lungs. She started Zoladex and abemiciclib on 1/12/2018.     In March 2018, she sought treatment at [facility] in [place], under the care of Dr. Brooks, where she received:  - Vallovex vaccine (per FDA, is in phase I trials here in [place])  - Insulin potentiated therapy with conjugated chemotherapy (5% chemotherapy, unclear what chemotherapy)  - Gina's toxins, IV vitamin C, IV Ozone, hyperbaric chamber, vitamin B17 infusion, vitamin CK3 IV, and coffee enemas PRN  - Also taking oral niacin, Lugol, acidol, pancreatin, and thyroid desiccated liver, per their regimen  - She was continued on Letrozole and advised to conintue Zoladex    She met with Dr. Whitman and patient wished  "to continue alternative regimen. Abemiciclib was placed on hold and remains on hold. She was continued on Zoladex and letrozole. While in [place], she required thoracentesis for symptomatic pleural effusion.    Interval History: Shan is here for follow-up.  She reports feeling \"really really good\". She hasn't had any new concerns and overall feels the atlernative regimen she is doing is going better for her. She continues to have some achiness in the neck and low back, stable. 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 She hasn't felt that the pleural effusion has accumulated, hasn't had any WILSON, SOB, or orthopnea. She had called in last month with abdominal bloating and realized that this was actually just fat; she has had liposuction at time of initial breast surgery and was told that fat could look unevenly distributed. It is actually better now, remains without pain. She hasn't had any issues with appetite, fevers, chills, chest pain, nausea, vomiting, new sites of pain, abdominal pain, diarrhea, constipation, bleeding, or swelling. She continues the alternative medicine as prescribed by Steven, as well as acupuncture, body work, and therapy. A psychic told her that she was going to be \"all done\" with cancer treatments by August.  ROS otherwise negative.     Current Outpatient Prescriptions   Medication     calcium carbonate (OS-KRYSTEN 500 MG [place]. [place]) 1250 MG tablet     letrozole (FEMARA) 2.5 MG tablet     metoclopramide (REGLAN) 10 MG tablet     ondansetron (ZOFRAN) 8 MG tablet     prochlorperazine (COMPAZINE) 10 MG tablet     VITAMIN D, CHOLECALCIFEROL, PO     [DISCONTINUED] tamoxifen (NOLVADEX) 20 MG tablet     No current facility-administered medications for this visit.        PHYSICAL EXAM:  ECO  /80  Pulse 82  Temp 98.9  F (37.2  C)  Resp 16  Wt 51.2 kg (112 lb 14.4 oz)  SpO2 98%  BMI 19.37 kg/m2   Wt Readings from Last 10 Encounters:   18 51.2 kg (112 lb 14.4 oz) "   07/02/18 51.8 kg (114 lb 4 oz)   06/05/18 51 kg (112 lb 8 oz)   05/07/18 50.1 kg (110 lb 8 oz)   05/07/18 50.1 kg (110 lb 8 oz)   04/06/18 51.4 kg (113 lb 4.8 oz)   04/02/18 51.7 kg (114 lb)   03/23/18 51.9 kg (114 lb 6.4 oz)   03/16/18 52 kg (114 lb 11.2 oz)   03/09/18 51.1 kg (112 lb 11.2 oz)      General: Alert, oriented, pleasant, NAD  Skin: warm, dry without rashes or bruising  HEENT: Normocephalic, atraumatic, PERRLA, EOMI. Moist mucus membranes, no lesions or thrush  Neck: No cervical or supraclavicular LAD.   Lungs:  normal work of breathing, perhaps the slightest dullness to auscultation and percussion at left base  Cardiac: RRR, S1, S2, no murmurs  Abdomen: Soft, nontender, nondistended. Normoactive bowel sounds. No hepatosplenomegaly, masses. Very faint uneven distribution of adipose tissue  Neuro: CNII-XII grossly intact  Extremities: No pedal edema. L arm with a sleeve, unable to visualize gross edema    Labs:   Results for NIKKIE HICKS (MRN [id number]) as of 7/3/2018 09:10   Ref. Range 7/3/2018 08:26   Sodium Latest Ref Range: 133 - 144 mmol/L 142   Potassium Latest Ref Range: 3.4 - 5.3 mmol/L 3.8   Chloride Latest Ref Range: 94 - 109 mmol/L 109   Carbon Dioxide Latest Ref Range: 20 - 32 mmol/L 24   Urea Nitrogen Latest Ref Range: 7 - 30 mg/dL 11   Creatinine Latest Ref Range: 0.52 - 1.04 mg/dL 0.62   GFR Estimate Latest Ref Range: >60 mL/min/1.7m2 >90   GFR Estimate If Black Latest Ref Range: >60 mL/min/1.7m2 >90   Calcium Latest Ref Range: 8.5 - 10.1 mg/dL 9.4   Anion Gap Latest Ref Range: 3 - 14 mmol/L 9   Albumin Latest Ref Range: 3.4 - 5.0 g/dL 3.9   Protein Total Latest Ref Range: 6.8 - 8.8 g/dL 7.8   Bilirubin Total Latest Ref Range: 0.2 - 1.3 mg/dL 0.4   Alkaline Phosphatase Latest Ref Range: 40 - 150 U/L 76   ALT Latest Ref Range: 0 - 50 U/L 27   AST Latest Ref Range: 0 - 45 U/L 32   Glucose Latest Ref Range: 70 - 99 mg/dL 66 (L)   WBC Latest Ref Range: 4.0 - 11.0 10e9/L 3.3 (L)    Hemoglobin Latest Ref Range: 11.7 - 15.7 g/dL 13.7   Hematocrit Latest Ref Range: 35.0 - 47.0 % 42.2   Platelet Count Latest Ref Range: 150 - 450 10e9/L 162     Imaging:   CT CAP 3/15/18  IMPRESSION: In this patient with metastatic breast cancer there has  been progression of disease.  1. Mixed changes of poorly defined nodular consolidative groundglass  opacities. Overall there has been an increase in size and number.  Although infection is a possibility this is concerning for metastatic  disease.  2. Increased left pleural effusion and new small right pleural  effusion.  3. Stable lymphadenopathy in the chest.   4. Increased size of the liver metastasis since the prior study. No  new lesions are seen.  5. Increase in the number of mixed sclerotic and lytic metastatic  lesions in the bone.  6. Pathologic compression fracture of L1.       Assessment & Plan: Shan Marsh is a 47-year-old female with history of a T1 N0, ER/NY positive, HER2-negative left breast cancer treated with bilateral mastectomies in 2012, now with metastatic disease involving the left axilla, bones, and possible liver and lung, ER/NY positive, HER2 negative.  She had progression on palbociclib and Tamoxifen.     1. Metastatic breast cancer: She was recommended to start abemiciclib however, shortly after starting the medication, she sought care in [place] and received a variety of alternative/investigative agents, as listed in HPI.  Abemiciclib was stopped. CT 3/15 after returning from [place] showing progressive disease with increased GGO, left pleural effusion, increased size of liver mets, and increased number of bony lesions. Per her wishes, she continues on the alternative regimen, as well as letrozole and Zoladex. She is feeling really well at this time. She reports stable PET/CT in [place] in June -- the disc was given to our nurse for uploading. We will continue the letrozole/Zoladex. She was recommended a CT prior to seeing Dr. Whitman,  however prefers to wait given how well she is feeling and would prefer to do this prior to going to [place] in September. Will monitor her clinically.  - She met with Dr. Richardson in [place]yn, recommended against  Oophorectomy for several reasons. If her pleural effusion clears may be an option.    2. Bone mets: Continue monthly Zometa every 3 months, due today.    3. FEN: Weight grossly stable today. She has decreased appetite on her alternative medicine treatments. She is mindful of her intake. Encouraged her to keep this up.    4. L pleural effusion: Requiring intermittent thoracentesis, about once per month, last performed 5/7/18. Reviewed symptoms of enlarging effusion and to call if she experiences. She has a tentative thora this week.      I spent >25 minutes with the patient, with over 50% of the time spent counseling or coordinating their care as described above.    Lima Griffiths PA-C

## 2018-07-03 NOTE — PROGRESS NOTES
Hematology-Oncology Visit  Jul 3, 2018    Reason for Visit: follow-up metastatic breast cancer, ER positive     HPI: Shan Marsh is a 48 year old female without significant past medical history with recent diagnosis of metastatic breast cancer after presenting with left shoulder pain. Shoulder MRI showed soft tissue mass close to distal clavicle concerning for malignancy. She had PET/CT 5/9/17 showing hypermetabolic left axillary, supraclavicular, mediastinal and hilar nodes. She had biopsies of left axilla and hilar nodes showing metastatic breast cancer, strongly ER positive, moderately GA positive and HER-2 kaitlin non-amplified. She originally had [id number], ER/GA positive, HER-2 negative breast cancer diagnosed in spring of 2012 s/p bilateral mastectomies and Tamoxifen from 8/2012-2/2013 and discontinued due to side effects. She met with Dr. Whitman most recently on 5/16/17 who recommended treatment on BIG10 clinical trial with palbociclib and tamoxifen. She started treatment on 5/24/17 and required dose reduction for neutropenia. CT CAP and bone scan 1/11/2018 showing progressive disease with new bony lesions in the ribs, T spine, and possibly liver, lungs. She started Zoladex and abemiciclib on 1/12/2018.     In March 2018, she sought treatment at [facility] in [place], under the care of Dr. Brooks, where she received:  - Vallovex vaccine (per FDA, is in phase I trials here in [place])  - Insulin potentiated therapy with conjugated chemotherapy (5% chemotherapy, unclear what chemotherapy)  - Gina's toxins, IV vitamin C, IV Ozone, hyperbaric chamber, vitamin B17 infusion, vitamin CK3 IV, and coffee enemas PRN  - Also taking oral niacin, Lugol, acidol, pancreatin, and thyroid desiccated liver, per their regimen  - She was continued on Letrozole and advised to conintue Zoladex    She met with Dr. Whitman and patient wished to continue alternative regimen. Abemiciclib was placed on hold and remains on hold. She  "was continued on Zoladex and letrozole. While in [place], she required thoracentesis for symptomatic pleural effusion.    Interval History: Shan is here for follow-up.  She reports feeling \"really really good\". She hasn't had any new concerns and overall feels the atlernative regimen she is doing is going better for her. She continues to have some achiness in the neck and low back, stable. 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 She hasn't felt that the pleural effusion has accumulated, hasn't had any WILSON, SOB, or orthopnea. She had called in last month with abdominal bloating and realized that this was actually just fat; she has had liposuction at time of initial breast surgery and was told that fat could look unevenly distributed. It is actually better now, remains without pain. She hasn't had any issues with appetite, fevers, chills, chest pain, nausea, vomiting, new sites of pain, abdominal pain, diarrhea, constipation, bleeding, or swelling. She continues the alternative medicine as prescribed by Steven, as well as acupuncture, body work, and therapy. A psychic told her that she was going to be \"all done\" with cancer treatments by August.  ROS otherwise negative.     Current Outpatient Prescriptions   Medication     calcium carbonate (OS-KRYSTEN 500 MG [place]. [place]) 1250 MG tablet     letrozole (FEMARA) 2.5 MG tablet     metoclopramide (REGLAN) 10 MG tablet     ondansetron (ZOFRAN) 8 MG tablet     prochlorperazine (COMPAZINE) 10 MG tablet     VITAMIN D, CHOLECALCIFEROL, PO     [DISCONTINUED] tamoxifen (NOLVADEX) 20 MG tablet     No current facility-administered medications for this visit.        PHYSICAL EXAM:  ECO  /80  Pulse 82  Temp 98.9  F (37.2  C)  Resp 16  Wt 51.2 kg (112 lb 14.4 oz)  SpO2 98%  BMI 19.37 kg/m2   Wt Readings from Last 10 Encounters:   18 51.2 kg (112 lb 14.4 oz)   18 51.8 kg (114 lb 4 oz)   18 51 kg (112 lb 8 oz)   18 50.1 kg (110 lb 8 oz) "   05/07/18 50.1 kg (110 lb 8 oz)   04/06/18 51.4 kg (113 lb 4.8 oz)   04/02/18 51.7 kg (114 lb)   03/23/18 51.9 kg (114 lb 6.4 oz)   03/16/18 52 kg (114 lb 11.2 oz)   03/09/18 51.1 kg (112 lb 11.2 oz)      General: Alert, oriented, pleasant, NAD  Skin: warm, dry without rashes or bruising  HEENT: Normocephalic, atraumatic, PERRLA, EOMI. Moist mucus membranes, no lesions or thrush  Neck: No cervical or supraclavicular LAD.   Lungs:  normal work of breathing, perhaps the slightest dullness to auscultation and percussion at left base  Cardiac: RRR, S1, S2, no murmurs  Abdomen: Soft, nontender, nondistended. Normoactive bowel sounds. No hepatosplenomegaly, masses. Very faint uneven distribution of adipose tissue  Neuro: CNII-XII grossly intact  Extremities: No pedal edema. L arm with a sleeve, unable to visualize gross edema    Labs:   Results for NIKKIE HICKS (MRN [id number]) as of 7/3/2018 09:10   Ref. Range 7/3/2018 08:26   Sodium Latest Ref Range: 133 - 144 mmol/L 142   Potassium Latest Ref Range: 3.4 - 5.3 mmol/L 3.8   Chloride Latest Ref Range: 94 - 109 mmol/L 109   Carbon Dioxide Latest Ref Range: 20 - 32 mmol/L 24   Urea Nitrogen Latest Ref Range: 7 - 30 mg/dL 11   Creatinine Latest Ref Range: 0.52 - 1.04 mg/dL 0.62   GFR Estimate Latest Ref Range: >60 mL/min/1.7m2 >90   GFR Estimate If Black Latest Ref Range: >60 mL/min/1.7m2 >90   Calcium Latest Ref Range: 8.5 - 10.1 mg/dL 9.4   Anion Gap Latest Ref Range: 3 - 14 mmol/L 9   Albumin Latest Ref Range: 3.4 - 5.0 g/dL 3.9   Protein Total Latest Ref Range: 6.8 - 8.8 g/dL 7.8   Bilirubin Total Latest Ref Range: 0.2 - 1.3 mg/dL 0.4   Alkaline Phosphatase Latest Ref Range: 40 - 150 U/L 76   ALT Latest Ref Range: 0 - 50 U/L 27   AST Latest Ref Range: 0 - 45 U/L 32   Glucose Latest Ref Range: 70 - 99 mg/dL 66 (L)   WBC Latest Ref Range: 4.0 - 11.0 10e9/L 3.3 (L)   Hemoglobin Latest Ref Range: 11.7 - 15.7 g/dL 13.7   Hematocrit Latest Ref Range: 35.0 - 47.0 %  42.2   Platelet Count Latest Ref Range: 150 - 450 10e9/L 162     Imaging:   CT CAP 3/15/18  IMPRESSION: In this patient with metastatic breast cancer there has  been progression of disease.  1. Mixed changes of poorly defined nodular consolidative groundglass  opacities. Overall there has been an increase in size and number.  Although infection is a possibility this is concerning for metastatic  disease.  2. Increased left pleural effusion and new small right pleural  effusion.  3. Stable lymphadenopathy in the chest.   4. Increased size of the liver metastasis since the prior study. No  new lesions are seen.  5. Increase in the number of mixed sclerotic and lytic metastatic  lesions in the bone.  6. Pathologic compression fracture of L1.       Assessment & Plan: Shan Marsh is a 47-year-old female with history of a T1 N0, ER/ND positive, HER2-negative left breast cancer treated with bilateral mastectomies in 2012, now with metastatic disease involving the left axilla, bones, and possible liver and lung, ER/ND positive, HER2 negative.  She had progression on palbociclib and Tamoxifen.     1. Metastatic breast cancer: She was recommended to start abemiciclib however, shortly after starting the medication, she sought care in [place] and received a variety of alternative/investigative agents, as listed in HPI.  Abemiciclib was stopped. CT 3/15 after returning from [place] showing progressive disease with increased GGO, left pleural effusion, increased size of liver mets, and increased number of bony lesions. Per her wishes, she continues on the alternative regimen, as well as letrozole and Zoladex. She is feeling really well at this time. She reports stable PET/CT in [place] in June -- the disc was given to our nurse for uploading. We will continue the letrozole/Zoladex. She was recommended a CT prior to seeing Dr. Whitman, however prefers to wait given how well she is feeling and would prefer to do this prior to going  to [place] in September. Will monitor her clinically.  - She met with Dr. Richardson in [place], recommended against  Oophorectomy for several reasons. If her pleural effusion clears may be an option.    2. Bone mets: Continue monthly Zometa every 3 months, due today.    3. FEN: Weight grossly stable today. She has decreased appetite on her alternative medicine treatments. She is mindful of her intake. Encouraged her to keep this up.    4. L pleural effusion: Requiring intermittent thoracentesis, about once per month, last performed 5/7/18. Reviewed symptoms of enlarging effusion and to call if she experiences. She has a tentative thora this week.      I spent >25 minutes with the patient, with over 50% of the time spent counseling or coordinating their care as described above.    Lima Griffiths PA-C

## 2018-07-05 ENCOUNTER — HOSPITAL ENCOUNTER (OUTPATIENT)
Facility: AMBULATORY SURGERY CENTER | Age: 48
End: 2018-07-05
Attending: PHYSICIAN ASSISTANT
Payer: COMMERCIAL

## 2018-07-05 ENCOUNTER — RADIANT APPOINTMENT (OUTPATIENT)
Dept: RADIOLOGY | Facility: AMBULATORY SURGERY CENTER | Age: 48
End: 2018-07-05
Attending: PHYSICIAN ASSISTANT
Payer: COMMERCIAL

## 2018-07-05 ENCOUNTER — SURGERY (OUTPATIENT)
Age: 48
End: 2018-07-05

## 2018-07-05 VITALS
HEART RATE: 69 BPM | OXYGEN SATURATION: 100 % | RESPIRATION RATE: 16 BRPM | DIASTOLIC BLOOD PRESSURE: 81 MMHG | SYSTOLIC BLOOD PRESSURE: 116 MMHG | TEMPERATURE: 98.1 F

## 2018-07-05 DIAGNOSIS — C79.51 BONE METASTASIS: ICD-10-CM

## 2018-07-05 DIAGNOSIS — C50.919 METASTATIC BREAST CANCER: ICD-10-CM

## 2018-07-05 LAB — INR PPP: 0.93 (ref 0.86–1.14)

## 2018-07-05 RX ADMIN — Medication 13 ML: at 09:30

## 2018-07-05 NOTE — IP AVS SNAPSHOT
MRN:[id number]                      After Visit Summary   7/5/2018    Shan Marsh    MRN: [id number]           Thank you!     Thank you for choosing [facility] for your care. Our goal is always to provide you with excellent care. Hearing back from our patients is one way we can continue to improve our services. Please take a few minutes to complete the written survey that you may receive in the mail after you visit with us. Thank you!        Patient Information     Date Of Birth          1970        About your hospital stay     You were admitted on:  July 5, 2018 You last received care in the:  [facility]    You were discharged on:  July 5, 2018       Who to Call     For medical emergencies, please call 911.  For non-urgent questions about your medical care, please call your primary care provider or clinic, [phone]  For questions related to your surgery, please call your surgery clinic        Attending Provider     Provider Specialty    Lydia Murillo PA-C Physician Assistant       Primary Care Provider Office Phone # Fax #    Marilu Michelle [phone]      Your next 10 appointments already scheduled     Jul 30, 2018  8:00 AM CDT   [facility] with  [facility]   [facility] ([facility])    [address] [place] [address]  [address]  [facility] [address] [phone]            Jul 30, 2018  8:30 AM CDT   (Arrive by 8:15 AM)   Return Visit with Ana Whitman MD   [facility] ([facility])    [address] [place] [address]  [address]  [facility] [id number] [phone]            Jul 30, 2018  9:30 AM CDT   Infusion 60 with UC ONCOLOGY INFUSION, UC 22 ATC   [facility] ([facility])    [address] [place] [address]  [address]  [facility] [id number] [phone]              Further instructions from your  care team       Discharge Instructions for Paracentesis or Thoracentesis     Paracentesis is a procedure to remove extra fluid from your belly (abdomen). Thoracentesis is a procedure to remove extra fluid from your chest.  This fluid buildup is called ascites. The procedure may have been done to take a sample of the fluid. Or, it may have been done to drain the extra fluid from your abdomen or chest to help make you more comfortable.     Home care    If you have pain after the procedure, your healthcare provider can prescribe or recommend pain medicines. Take these exactly as directed. If you stopped taking other medicines before the procedure, ask your provider when you can start them again.    Avoid strenuous activity for 48 hours.    You will have a small bandage over the puncture site. Adhesive tapes, adhesive strips, or surgical glue may also be used to close the incision. They also help stop bleeding and promote healing. You may take the bandage off in 24-48 hours. Once there is a scab over the site, no bandages are required.    Check the puncture site for the signs of infection listed below.     Follow-up care  Make a follow-up appointment with your healthcare provider as directed. During your follow-up visit, your provider will check your healing. Let your provider know how you are feeling. You can also discuss the cause of your fluid, and if you need any further treatment.    When to seek medical advice:  Call your healthcare provider if you have any of the following after the procedure:    A fever of 100.4 F (38.0 C) or higher    Trouble breathing    Abdominal pain that is worse than expected    Belly Abdominal pain not caused by having the skin punctured that is worse than expected    Persistent bleeding from the puncture site    More than a small amount of fluid leaking from the puncture site    Swollen belly    Signs of infection at the puncture site. These include increased pain, redness, or swelling,  warmth, or bad-smelling drainage.    Feeling dizzy or lightheaded, or fainting     Call our Interventional Radiology (IR) service if:  If you start bleeding from the procedure site.  If you do start to bleed from the site, lie down and hold some pressure on the site.  Our radiology provider can help you decide if you need to return to the hospital.  If you have new or worsening pain related to the procedure.  If you have pronounced swelling at the procedure site.  If you develop persistent nausea or vomiting.  If you develop hives or a rash or any unexplained itching.  If you have a fever of greater than 100.4  F and chills in the first 5 days after procedure.  Any other concerns related to your procedure.      [facility]  Interventional Radiology (IR)  [address]  2nd [facility] [place] [address]    Contact Number:  [phone]  (IR control desk)  Monday - Friday 8:00 am - 4:30 pm    After hours for urgent concerns:  [phone]  After 4:30 pm Monday - Friday, Weekends and Holidays.   Ask for Interventional Radiology on-call.  Someone is available 24 hours a day.  [facility] toll free number:  [phone]    Pending Results     Date and Time Order Name Status Description    7/5/2018 0855 IR THORACENTESIS In process             Admission Information     Date & Time Provider Department Dept. Phone    7/5/2018 Lydia Murillo PA-C [facility] [phone]      Your Vitals Were     Blood Pressure Pulse Temperature Respirations Last Period Pulse Oximetry    108/78 70 98.1  F (36.7  C) (Temporal) 15 10/06/2017 (Approximate) 96%      MyChart Information     [employer] gives you secure access to your electronic health record. If you see a primary care provider, you can also send messages to your care team and make appointments. If you have questions, please call your primary care clinic.  If you do not have a primary care provider, please call  [phone] and they will assist you.      [employer] is an electronic gateway that provides easy, online access to your medical records. With [employer], you can request a clinic appointment, read your test results, renew a prescription or communicate with your care team.     To access your existing account, please contact your [facility] or call [phone] for assistance.        Care EveryWhere ID     This is your Care EveryWhere ID. This could be used by other organizations to access your [facility] medical records  [id number]        Equal Access to Services     LUZ JAMES : Hadaide chinchilla hadsilvinoo Sojailyn, waaxda luqadaha, qaybta kaalmada adedanayacecy, edi mustafa . So [facility] [phone].    ATENCIÓN: Si habla español, tiene a gamez disposición servicios gratuitos de asistencia lingüística. Llame al [phone].    We comply with applicable federal civil rights laws and [place] laws. We do not discriminate on the basis of race, color, national origin, age, disability, sex, sexual orientation, or gender identity.               Review of your medicines      UNREVIEWED medicines. Ask your doctor about these medicines        Dose / Directions    calcium carbonate 500 MG tablet   Commonly known as:  OS-KRYSTEN 500 mg [place]. [place]        Dose:  1 tablet   Take 1 tablet by mouth daily   Refills:  0       letrozole 2.5 MG tablet   Commonly known as:  FEMARA   Used for:  Metastatic breast cancer (H), Bone metastasis (H)        Dose:  2.5 mg   Take 1 tablet (2.5 mg) by mouth daily   Quantity:  28 tablet   Refills:  3       ondansetron 8 MG tablet   Commonly known as:  ZOFRAN   Used for:  Metastatic breast cancer (H), Bone metastasis (H)        Dose:  8 mg   Take 1 tablet (8 mg) by mouth every 8 hours as needed for nausea   Quantity:  40 tablet   Refills:  3       prochlorperazine 10 MG tablet   Commonly known as:  COMPAZINE        Refills:  0       VITAMIN D (CHOLECALCIFEROL)  PO        Take by mouth daily   Refills:  0                Protect others around you: Learn how to safely use, store and throw away your medicines at www.disposemymeds.org.             Medication List: This is a list of all your medications and when to take them. Check marks below indicate your daily home schedule. Keep this list as a reference.      Medications           Morning Afternoon Evening Bedtime As Needed    calcium carbonate 500 MG tablet   Commonly known as:  OS-KRYSTEN 500 mg [place]. [place]   Take 1 tablet by mouth daily                                letrozole 2.5 MG tablet   Commonly known as:  FEMARA   Take 1 tablet (2.5 mg) by mouth daily                                ondansetron 8 MG tablet   Commonly known as:  ZOFRAN   Take 1 tablet (8 mg) by mouth every 8 hours as needed for nausea                                prochlorperazine 10 MG tablet   Commonly known as:  COMPAZINE                                VITAMIN D (CHOLECALCIFEROL) PO   Take by mouth daily

## 2018-07-05 NOTE — IP AVS SNAPSHOT
[facility]    [address] [place] [address]    Phone:  [phone]    Fax:  [phone]                                       After Visit Summary   7/5/2018    Shan Marsh    MRN: [id number]           After Visit Summary Signature Page     I have received my discharge instructions, and my questions have been answered. I have discussed any challenges I see with this plan with the nurse or doctor.    ..........................................................................................................................................  Patient/Patient Representative Signature      ..........................................................................................................................................  Patient Representative Print Name and Relationship to Patient    ..................................................               ................................................  Date                                            Time    ..........................................................................................................................................  Reviewed by Signature/Title    ...................................................              ..............................................  Date                                                            Time

## 2018-07-05 NOTE — DISCHARGE INSTRUCTIONS
Discharge Instructions for Paracentesis or Thoracentesis     Paracentesis is a procedure to remove extra fluid from your belly (abdomen). Thoracentesis is a procedure to remove extra fluid from your chest.  This fluid buildup is called ascites. The procedure may have been done to take a sample of the fluid. Or, it may have been done to drain the extra fluid from your abdomen or chest to help make you more comfortable.     Home care    If you have pain after the procedure, your healthcare provider can prescribe or recommend pain medicines. Take these exactly as directed. If you stopped taking other medicines before the procedure, ask your provider when you can start them again.    Avoid strenuous activity for 48 hours.    You will have a small bandage over the puncture site. Adhesive tapes, adhesive strips, or surgical glue may also be used to close the incision. They also help stop bleeding and promote healing. You may take the bandage off in 24-48 hours. Once there is a scab over the site, no bandages are required.    Check the puncture site for the signs of infection listed below.     Follow-up care  Make a follow-up appointment with your healthcare provider as directed. During your follow-up visit, your provider will check your healing. Let your provider know how you are feeling. You can also discuss the cause of your fluid, and if you need any further treatment.    When to seek medical advice:  Call your healthcare provider if you have any of the following after the procedure:    A fever of 100.4 F (38.0 C) or higher    Trouble breathing    Abdominal pain that is worse than expected    Belly Abdominal pain not caused by having the skin punctured that is worse than expected    Persistent bleeding from the puncture site    More than a small amount of fluid leaking from the puncture site    Swollen belly    Signs of infection at the puncture site. These include increased pain, redness, or swelling, warmth, or  bad-smelling drainage.    Feeling dizzy or lightheaded, or fainting     Call our Interventional Radiology (IR) service if:  If you start bleeding from the procedure site.  If you do start to bleed from the site, lie down and hold some pressure on the site.  Our radiology provider can help you decide if you need to return to the hospital.  If you have new or worsening pain related to the procedure.  If you have pronounced swelling at the procedure site.  If you develop persistent nausea or vomiting.  If you develop hives or a rash or any unexplained itching.  If you have a fever of greater than 100.4  F and chills in the first 5 days after procedure.  Any other concerns related to your procedure.      [facility]  Interventional Radiology (IR)  [address]  [address] [facility] [place] [address]    Contact Number:  [phone]  (IR control desk)  Monday - Friday 8:00 am - 4:30 pm    After hours for urgent concerns:  [phone]  After 4:30 pm Monday - Friday, Weekends and Holidays.   Ask for Interventional Radiology on-call.  Someone is available 24 hours a day.  [facility] toll free number:  [phone]

## 2018-07-05 NOTE — PROCEDURES
Interventional Radiology Brief Post Procedure Note    Procedure: Left Thoracentesis     Proceduralist: yLdia Murillo MS, PA-C    Assistant: None    Time Out: Prior to the start of the procedure and with procedural staff participation, I verbally confirmed the patient s identity using two indicators, relevant allergies, that the procedure was appropriate and matched the consent or emergent situation, and that the correct equipment/implants were available. Immediately prior to starting the procedure I conducted the Time Out with the procedural staff and re-confirmed the patient s name, procedure, and site/side. (The Joint Commission universal protocol was followed.)  Yes        Sedation: None. Local Anesthestic used    Findings: Completed ultrasound-guided left therapeutic thoracentesis. A total of 1235 mL thin yellow fluid drained from the right pleural space.     Estimated Blood Loss: Minimal    Fluoroscopy Time:  minute(s)    SPECIMENS: None    Complications: 1. None     Condition: Stable    Plan: Follow up per primary team.     Comments: See dictated procedure note for full details.    Lydia Murillo PA-C

## 2018-07-30 ENCOUNTER — ONCOLOGY VISIT (OUTPATIENT)
Dept: ONCOLOGY | Facility: CLINIC | Age: 48
End: 2018-07-30
Attending: INTERNAL MEDICINE
Payer: COMMERCIAL

## 2018-07-30 ENCOUNTER — APPOINTMENT (OUTPATIENT)
Dept: LAB | Facility: CLINIC | Age: 48
End: 2018-07-30
Attending: INTERNAL MEDICINE
Payer: COMMERCIAL

## 2018-07-30 VITALS
SYSTOLIC BLOOD PRESSURE: 106 MMHG | DIASTOLIC BLOOD PRESSURE: 67 MMHG | WEIGHT: 116.4 LBS | HEIGHT: 64 IN | RESPIRATION RATE: 14 BRPM | OXYGEN SATURATION: 98 % | BODY MASS INDEX: 19.87 KG/M2 | TEMPERATURE: 98.6 F | HEART RATE: 90 BPM

## 2018-07-30 DIAGNOSIS — C79.51 BONE METASTASIS: ICD-10-CM

## 2018-07-30 DIAGNOSIS — C50.919 METASTATIC BREAST CANCER: Primary | ICD-10-CM

## 2018-07-30 DIAGNOSIS — Z79.899 ENCOUNTER FOR LONG-TERM (CURRENT) USE OF MEDICATIONS: ICD-10-CM

## 2018-07-30 LAB
ALBUMIN SERPL-MCNC: 3.5 G/DL (ref 3.4–5)
ALP SERPL-CCNC: 91 U/L (ref 40–150)
ALT SERPL W P-5'-P-CCNC: 21 U/L (ref 0–50)
ANION GAP SERPL CALCULATED.3IONS-SCNC: 11 MMOL/L (ref 3–14)
AST SERPL W P-5'-P-CCNC: 24 U/L (ref 0–45)
BASOPHILS # BLD AUTO: 0 10E9/L (ref 0–0.2)
BASOPHILS NFR BLD AUTO: 0.2 %
BILIRUB SERPL-MCNC: 0.4 MG/DL (ref 0.2–1.3)
BUN SERPL-MCNC: 9 MG/DL (ref 7–30)
CALCIUM SERPL-MCNC: 9.2 MG/DL (ref 8.5–10.1)
CHLORIDE SERPL-SCNC: 107 MMOL/L (ref 94–109)
CO2 SERPL-SCNC: 23 MMOL/L (ref 20–32)
CREAT SERPL-MCNC: 0.56 MG/DL (ref 0.52–1.04)
DIFFERENTIAL METHOD BLD: NORMAL
EOSINOPHIL # BLD AUTO: 0.1 10E9/L (ref 0–0.7)
EOSINOPHIL NFR BLD AUTO: 2.8 %
ERYTHROCYTE [DISTWIDTH] IN BLOOD BY AUTOMATED COUNT: 12 % (ref 10–15)
GFR SERPL CREATININE-BSD FRML MDRD: >90 ML/MIN/1.7M2
GLUCOSE SERPL-MCNC: 101 MG/DL (ref 70–99)
HCT VFR BLD AUTO: 40 % (ref 35–47)
HGB BLD-MCNC: 13 G/DL (ref 11.7–15.7)
IMM GRANULOCYTES # BLD: 0 10E9/L (ref 0–0.4)
IMM GRANULOCYTES NFR BLD: 0.2 %
LYMPHOCYTES # BLD AUTO: 1.2 10E9/L (ref 0.8–5.3)
LYMPHOCYTES NFR BLD AUTO: 25.1 %
MCH RBC QN AUTO: 31.8 PG (ref 26.5–33)
MCHC RBC AUTO-ENTMCNC: 32.5 G/DL (ref 31.5–36.5)
MCV RBC AUTO: 98 FL (ref 78–100)
MONOCYTES # BLD AUTO: 0.3 10E9/L (ref 0–1.3)
MONOCYTES NFR BLD AUTO: 6.5 %
NEUTROPHILS # BLD AUTO: 3 10E9/L (ref 1.6–8.3)
NEUTROPHILS NFR BLD AUTO: 65.2 %
NRBC # BLD AUTO: 0 10*3/UL
NRBC BLD AUTO-RTO: 0 /100
PLATELET # BLD AUTO: 187 10E9/L (ref 150–450)
POTASSIUM SERPL-SCNC: 3.8 MMOL/L (ref 3.4–5.3)
PROT SERPL-MCNC: 8 G/DL (ref 6.8–8.8)
RBC # BLD AUTO: 4.09 10E12/L (ref 3.8–5.2)
SODIUM SERPL-SCNC: 141 MMOL/L (ref 133–144)
WBC # BLD AUTO: 4.6 10E9/L (ref 4–11)

## 2018-07-30 PROCEDURE — G0463 HOSPITAL OUTPT CLINIC VISIT: HCPCS | Mod: ZF

## 2018-07-30 PROCEDURE — 25000125 ZZHC RX 250: Mod: JW,ZF | Performed by: INTERNAL MEDICINE

## 2018-07-30 PROCEDURE — 96402 CHEMO HORMON ANTINEOPL SQ/IM: CPT

## 2018-07-30 PROCEDURE — 36415 COLL VENOUS BLD VENIPUNCTURE: CPT

## 2018-07-30 PROCEDURE — 80053 COMPREHEN METABOLIC PANEL: CPT | Performed by: INTERNAL MEDICINE

## 2018-07-30 PROCEDURE — 25000128 H RX IP 250 OP 636: Mod: ZF | Performed by: INTERNAL MEDICINE

## 2018-07-30 PROCEDURE — 85025 COMPLETE CBC W/AUTO DIFF WBC: CPT | Performed by: INTERNAL MEDICINE

## 2018-07-30 PROCEDURE — 99214 OFFICE O/P EST MOD 30 MIN: CPT | Mod: ZP | Performed by: INTERNAL MEDICINE

## 2018-07-30 RX ORDER — LIDOCAINE HYDROCHLORIDE 10 MG/ML
2 INJECTION, SOLUTION EPIDURAL; INFILTRATION; INTRACAUDAL; PERINEURAL ONCE
Status: COMPLETED | OUTPATIENT
Start: 2018-07-30 | End: 2018-07-30

## 2018-07-30 RX ORDER — LIDOCAINE HYDROCHLORIDE 10 MG/ML
2 INJECTION, SOLUTION EPIDURAL; INFILTRATION; INTRACAUDAL; PERINEURAL ONCE
Status: CANCELLED | OUTPATIENT
Start: 2018-07-30 | End: 2018-08-03

## 2018-07-30 RX ADMIN — GOSERELIN ACETATE 3.6 MG: 3.6 IMPLANT SUBCUTANEOUS at 09:48

## 2018-07-30 RX ADMIN — LIDOCAINE HYDROCHLORIDE 2 ML: 10 INJECTION, SOLUTION EPIDURAL; INFILTRATION; INTRACAUDAL; PERINEURAL at 09:45

## 2018-07-30 ASSESSMENT — PAIN SCALES - GENERAL: PAINLEVEL: NO PAIN (0)

## 2018-07-30 NOTE — PROGRESS NOTES
Hematology-Oncology Visit  Jul 30, 2018    Reason for Visit: follow-up metastatic breast cancer, ER positive     HPI: Shan Marsh is a 48 year old female without significant past medical history with recent diagnosis of metastatic breast cancer after presenting with left shoulder pain. Shoulder MRI showed soft tissue mass close to distal clavicle concerning for malignancy. She had PET/CT 5/9/17 showing hypermetabolic left axillary, supraclavicular, mediastinal and hilar nodes. She had biopsies of left axilla and hilar nodes showing metastatic breast cancer, strongly ER positive, moderately MT positive and HER-2 kaitlin non-amplified. She originally had [id number], ER/MT positive, HER-2 negative breast cancer diagnosed in spring of 2012 s/p bilateral mastectomies and Tamoxifen from 8/2012-2/2013 and discontinued due to side effects. She met with Dr. Whitman most recently on 5/16/17 who recommended treatment on BIG10 clinical trial with palbociclib and tamoxifen. She started treatment on 5/24/17 and required dose reduction for neutropenia. CT CAP and bone scan 1/11/2018 showing progressive disease with new bony lesions in the ribs, T spine, and possibly liver, lungs. She started Zoladex and abemiciclib on 1/12/2018.     In March 2018, she sought treatment at [facility] in [place], under the care of Dr. Brooks, where she received:  - Vallovex vaccine (per FDA, is in phase I trials here in [place])  - Insulin potentiated therapy with conjugated chemotherapy (5% chemotherapy, unclear what chemotherapy)  - Gina's toxins, IV vitamin C, IV Ozone, hyperbaric chamber, vitamin B17 infusion, vitamin CK3 IV, and coffee enemas PRN  - Also taking oral niacin, Lugol, acidol, pancreatin, and thyroid desiccated liver, per their regimen  - She was continued on Letrozole and advised to conintue Zoladex     INTERVAL HISTORY:  Shan is here with her  for followup.      She is going to [place] every three months.    She is  "currently using high dose oxygen, B12, supplemental enzymes (in [place]), and Gina's toxin.  She had a PET scan in [place] in May that was reportedly stable.      She is feeling well herself.  No specific complaints.    After the immunotherapy and toxin, she complains of fatigue.  She complains of arthralgias and myalgias.          Current Outpatient Prescriptions   Medication     calcium carbonate (OS-KRYSTEN 500 MG [place]. [place]) 1250 MG tablet     letrozole (FEMARA) 2.5 MG tablet     VITAMIN D, CHOLECALCIFEROL, PO     ondansetron (ZOFRAN) 8 MG tablet     prochlorperazine (COMPAZINE) 10 MG tablet     [DISCONTINUED] tamoxifen (NOLVADEX) 20 MG tablet     No current facility-administered medications for this visit.        PHYSICAL EXAM:  ECO  /67 (BP Location: Right arm, Patient Position: Sitting, Cuff Size: Adult Regular)  Pulse 90  Temp 98.6  F (37  C) (Oral)  Resp 14  Ht 1.626 m (5' 4\")  Wt 52.8 kg (116 lb 6.4 oz)  LMP 10/06/2017 (Approximate)  SpO2 98%  BMI 19.98 kg/m2   Wt Readings from Last 10 Encounters:   18 52.8 kg (116 lb 6.4 oz)   18 51.2 kg (112 lb 14.4 oz)   18 51.8 kg (114 lb 4 oz)   18 51 kg (112 lb 8 oz)   18 50.1 kg (110 lb 8 oz)   18 50.1 kg (110 lb 8 oz)   18 51.4 kg (113 lb 4.8 oz)   18 51.7 kg (114 lb)   18 51.9 kg (114 lb 6.4 oz)   18 52 kg (114 lb 11.2 oz)      General: Alert, oriented, pleasant, NAD  Skin: warm, dry without rashes or bruising, yellow discoloration of the palms  HEENT: Normocephalic, atraumatic, PERRLA, EOMI. Moist mucus membranes, no lesions or thrush  Lymph: there are 1.5 cm LN in the left supraclavicular area and 2 distinct hard LN around 1cm and 0.5 cm anterior to SCM muscle. There is ~1 cm hard LN on the left subclavian triangle, there is small nodule ~ 0.5 cm in the left axilla.  Lungs:  normal work of breathing, slight dullness to  percussion at left base, decreased air entry in the left base, subtle " pleural rub in the right lung base  Cardiac: RRR, tachycardia, normal S1, S2, no murmurs  Abdomen: Soft, nontender, nondistended. Normoactive bowel sounds. No hepatosplenomegaly, masses  Neuro: CNII-XII grossly intact  Extremities: No pedal edema. No lymphedema    Labs:      Lab Results   Component Value Date    WBC 4.6 07/30/2018     Lab Results   Component Value Date    RBC 4.09 07/30/2018     Lab Results   Component Value Date    HGB 13.0 07/30/2018     Lab Results   Component Value Date    HCT 40.0 07/30/2018     No components found for: MCT  Lab Results   Component Value Date    MCV 98 07/30/2018     Lab Results   Component Value Date    MCH 31.8 07/30/2018     Lab Results   Component Value Date    MCHC 32.5 07/30/2018     Lab Results   Component Value Date    RDW 12.0 07/30/2018     Lab Results   Component Value Date     07/30/2018     Recent Labs   Lab Test  07/30/18   0837  07/03/18   0826   NA  141  142   POTASSIUM  3.8  3.8   CHLORIDE  107  109   CO2  23  24   ANIONGAP  11  9   GLC  101*  66*   BUN  9  11   CR  0.56  0.62   KRYSTEN  9.2  9.4     Liver Function Studies -   Recent Labs   Lab Test  07/30/18   0837   PROTTOTAL  8.0   ALBUMIN  3.5   BILITOTAL  0.4   ALKPHOS  91   AST  24   ALT  21       Assessment & Plan:      Shan Marsh is a 48-year-old female with a history of a T1 N0 estrogen and progesterone receptor positive, HER2-negative left breast cancer treated with bilateral mastectomies in 2012, now with metastatic disease involving left axilla bones, possible liver and lung lesions, hormone receptor positive, HER2-negative.  She progressed on palbociclib and tamoxifen and is currently getting alternative therapies in [place].     1.  Metastatic breast cancer.  We recommended in the past to start the abemaciclib.  However, the patient initiated care in [place] and has received a variety of alternative investigative agents as listed above.  She stopped the abemaciclib and her last CT done in  [place] in March showed progressive disease with left pleural effusion, increase in the liver mets and bony lesions.      She continued on alternative regimen and she sees us for managing of letrozole and Zoladex.      She reports feeling well at this time and surprisingly does not have a lot of symptoms.  We discussed last visit that she should find integrative physician in [facility]. She has a close contact here in [place].      We will continue her on monthly Zoladex and will continue with letrozole at this time.     Arrange for CT CAP end of August.    2.  Bone metastatic disease.  She remains on Zometa  every 3 months.     3.  Left pleural effusion.  Last thoracentesis in early July.    Monitor.    Ana Whitman MD

## 2018-07-30 NOTE — NURSING NOTE
Chief Complaint   Patient presents with     Port Draw     labs drawn via  by RN. VS taken.      Labs drawn via venipuncture. Vital signs taken. Checked into next appointment.     Pat Beltran RN

## 2018-07-30 NOTE — MR AVS SNAPSHOT
After Visit Summary   7/30/2018    Shan Marsh    MRN: [id number]           Patient Information     Date Of Birth          1970        Visit Information        Provider Department      7/30/2018 8:30 AM Ana Whitman MD [facility]        Today's Diagnoses     Metastatic breast cancer (H)    -  1    Bone metastasis (H)           Follow-ups after your visit        Your next 10 appointments already scheduled     Aug 27, 2018 12:45 PM CDT   PET ONCOLOGY WHOLE BODY with UUPET1   [facility] [place] PET CT ([facility])    [address] [address] [phone]           Tell your doctor:   If there is any chance you may be pregnant or if you are breastfeeding.   If you have problems lying in small spaces (claustrophobia). If you do, your doctor may give you medicine to help you relax. If you have diabetes:   Have your exam early in the morning. Your blood glucose will go up as the day goes by.   Your glucose level must be 180 or less at the start of the exam. Please take any oral diabetic medication you need to ensure this blood glucose level is below 180, but no insulin 4 hours prior to the exam   If you are taking insulin in the morning take with breakfast by 6 am and schedule procedure between 12 and 2:15 pm.   If you are taking insulin at night take nightly dose, fast overnight, schedule procedure before 10 am.   If you take insulin both morning and night take morning dose by 6am and schedule procedure between 12 and 2:15 pm.   24 hours before your scan: Don t do any heavy exercise. (No jogging, aerobics or other workouts.) Exercise will make your pictures less accurate.  Patients should eat a low carb, high protein meal 7 hours (or the last meal you eat) before the scan. Low carb, high protein meals consist of lean meats, seafood, beans, soy, low-fat dairy, eggs, nuts & seeds. 6 hours before your  scan:   Stop all food and liquids (except water).   Do not chew gum or suck on mints.   If you need to take medicine with food, you may take it with a few crackers.  Please call your Imaging Department at your exam site with any questions.            Aug 28, 2018  7:45 AM CDT   [facility] Lab Draw with  [facility] LAB DRAW   [facility] Draw ([facility])    [address]  [address]  [facility] [address]   [phone]            Aug 28, 2018  8:30 AM CDT   (Arrive by 8:15 AM)   Return Visit with Lima Griffiths PA-C   [facility] ([facility])    [address]  [address]  [facility] [address]   [phone]            Aug 28, 2018  9:00 AM CDT   Infusion 60 with UC ONCOLOGY INFUSION, UC 23 ATC   [facility] ([facility])    [address]  [address]  [facility] [address]   [phone]            Sep 25, 2018  8:30 AM CDT   (Arrive by 8:15 AM)   Return Visit with Lima Griffiths PA-C   [facility] ([facility])    [address]  [address]  [facility] [address]   [phone]            Sep 25, 2018  9:00 AM CDT   Infusion 60 with UC ONCOLOGY INFUSION, UC 25 ATC   [facility] ([facility])    [address]  [address]  [facility] [address]   [phone]            Oct 22, 2018 10:00 AM CDT   (Arrive by 9:45 AM)   Return Visit with Ana Whitman MD   [facility] ([facility])    [address]  [address]  [facility] [address]   [phone]            Oct 22, 2018 10:30 AM CDT   Infusion 60 with UC ONCOLOGY INFUSION   [facility] ([facility])    [address] [place] [address]  [address]  [facility] [address]   [phone]              Future tests  "that were ordered for you today     Open Future Orders        Priority Expected Expires Ordered    PET Oncology Whole Body Routine  7/30/2019 7/30/2018            Who to contact     If you have questions or need follow up information about today's clinic visit or your schedule please contact [facility] directly at [phone].  Normal or non-critical lab and imaging results will be communicated to you by Flash[employer], letter or phone within 4 business days after the clinic has received the results. If you do not hear from us within 7 days, please contact the clinic through [employer]not[employer] or phone. If you have a critical or abnormal lab result, we will notify you by phone as soon as possible.  Submit refill requests through [employer] or call your pharmacy and they will forward the refill request to us. Please allow 3 business days for your refill to be completed.          Additional Information About Your Visit        Flashnoteshart Information     [employer] gives you secure access to your electronic health record. If you see a primary care provider, you can also send messages to your care team and make appointments. If you have questions, please call your primary care clinic.  If you do not have a primary care provider, please call [phone] and they will assist you.        Care EveryWhere ID     This is your Care EveryWhere ID. This could be used by other organizations to access your [place] medical records  [id number]        Your Vitals Were     Pulse Temperature Respirations Height Last Period Pulse Oximetry    90 98.6  F (37  C) (Oral) 14 1.626 m (5' 4\") 10/06/2017 (Approximate) 98%    BMI (Body Mass Index)                   19.98 kg/m2            Blood Pressure from Last 3 Encounters:   07/30/18 106/67   07/05/18 116/81   07/03/18 112/80    Weight from Last 3 Encounters:   07/30/18 52.8 kg (116 lb 6.4 oz)   07/03/18 51.2 kg (112 lb 14.4 oz)   07/02/18 51.8 kg (114 lb 4 oz)               Primary Care " Provider Office Phone # Fax #    Marilu Michelle [phone] [address]  [facility] [address]        Equal Access to Services     LUZ JAMES : Hadii aad ku hadsilvinoleonor Schaefer, wacristianda tenavalarieha, qacandista kafreedom coy, edi summers mariandana guajardo moon kolb. So [facility] [phone].    ATENCIÓN: Si habla español, tiene a gamez disposición servicios gratuitos de asistencia lingüística. Llame al [phone].    We comply with applicable federal civil rights laws and [place] laws. We do not discriminate on the basis of race, color, national origin, age, disability, sex, sexual orientation, or gender identity.            Thank you!     Thank you for choosing [facility]  for your care. Our goal is always to provide you with excellent care. Hearing back from our patients is one way we can continue to improve our services. Please take a few minutes to complete the written survey that you may receive in the mail after your visit with us. Thank you!             Your Updated Medication List - Protect others around you: Learn how to safely use, store and throw away your medicines at www.disposemymeds.org.          This list is accurate as of 7/30/18  2:18 PM.  Always use your most recent med list.                   Brand Name Dispense Instructions for use Diagnosis    calcium carbonate 500 MG tablet    OS-KRYSTEN 500 mg [place]     Take 1 tablet by mouth daily        letrozole 2.5 MG tablet    FEMARA    28 tablet    Take 1 tablet (2.5 mg) by mouth daily    Metastatic breast cancer (H), Bone metastasis (H)       ondansetron 8 MG tablet    ZOFRAN    40 tablet    Take 1 tablet (8 mg) by mouth every 8 hours as needed for nausea    Metastatic breast cancer (H), Bone metastasis (H)       prochlorperazine 10 MG tablet    COMPAZINE          VITAMIN D (CHOLECALCIFEROL) PO      Take by mouth daily

## 2018-07-30 NOTE — LETTER
7/30/2018       RE: Shan Marsh  [address]  [facility] [address]     Dear Colleague,    Thank you for referring your patient, Shan Marsh, to the [facility]. Please see a copy of my visit note below.    Hematology-Oncology Visit  Jul 30, 2018    Reason for Visit: follow-up metastatic breast cancer, ER positive     HPI: Shan Marsh is a 48 year old female without significant past medical history with recent diagnosis of metastatic breast cancer after presenting with left shoulder pain. Shoulder MRI showed soft tissue mass close to distal clavicle concerning for malignancy. She had PET/CT 5/9/17 showing hypermetabolic left axillary, supraclavicular, mediastinal and hilar nodes. She had biopsies of left axilla and hilar nodes showing metastatic breast cancer, strongly ER positive, moderately MT positive and HER-2 kaitlin non-amplified. She originally had [id number], ER/MT positive, HER-2 negative breast cancer diagnosed in spring of 2012 s/p bilateral mastectomies and Tamoxifen from 8/2012-2/2013 and discontinued due to side effects. She met with Dr. Whitman most recently on 5/16/17 who recommended treatment on BIG10 clinical trial with palbociclib and tamoxifen. She started treatment on 5/24/17 and required dose reduction for neutropenia. CT CAP and bone scan 1/11/2018 showing progressive disease with new bony lesions in the ribs, T spine, and possibly liver, lungs. She started Zoladex and abemiciclib on 1/12/2018.     In March 2018, she sought treatment at [facility] in [place], under the care of Dr. Brooks, where she received:  - Vallovex vaccine (per FDA, is in phase I trials here in [place])  - Insulin potentiated therapy with conjugated chemotherapy (5% chemotherapy, unclear what chemotherapy)  - Gina's toxins, IV vitamin C, IV Ozone, hyperbaric chamber, vitamin B17 infusion, vitamin CK3 IV, and coffee enemas PRN  - Also taking oral niacin, Lugol, acidol,  "pancreatin, and thyroid desiccated liver, per their regimen  - She was continued on Letrozole and advised to conintue Zoladex     INTERVAL HISTORY:  Shan is here with her  for followup.      She is going to [place] every three months.    She is currently using high dose oxygen, B12, supplemental enzymes (in [place]), and Gina's toxin.  She had a PET scan in [place] in May that was reportedly stable.      She is feeling well herself.  No specific complaints.    After the immunotherapy and toxin, she complains of fatigue.  She complains of arthralgias and myalgias.          Current Outpatient Prescriptions   Medication     calcium carbonate (OS-KRYSTEN 500 MG [place]. [place]) 1250 MG tablet     letrozole (FEMARA) 2.5 MG tablet     VITAMIN D, CHOLECALCIFEROL, PO     ondansetron (ZOFRAN) 8 MG tablet     prochlorperazine (COMPAZINE) 10 MG tablet     [DISCONTINUED] tamoxifen (NOLVADEX) 20 MG tablet     No current facility-administered medications for this visit.        PHYSICAL EXAM:  ECO  /67 (BP Location: Right arm, Patient Position: Sitting, Cuff Size: Adult Regular)  Pulse 90  Temp 98.6  F (37  C) (Oral)  Resp 14  Ht 1.626 m (5' 4\")  Wt 52.8 kg (116 lb 6.4 oz)  LMP 10/06/2017 (Approximate)  SpO2 98%  BMI 19.98 kg/m2   Wt Readings from Last 10 Encounters:   18 52.8 kg (116 lb 6.4 oz)   18 51.2 kg (112 lb 14.4 oz)   18 51.8 kg (114 lb 4 oz)   18 51 kg (112 lb 8 oz)   18 50.1 kg (110 lb 8 oz)   18 50.1 kg (110 lb 8 oz)   18 51.4 kg (113 lb 4.8 oz)   18 51.7 kg (114 lb)   18 51.9 kg (114 lb 6.4 oz)   18 52 kg (114 lb 11.2 oz)      General: Alert, oriented, pleasant, NAD  Skin: warm, dry without rashes or bruising, yellow discoloration of the palms  HEENT: Normocephalic, atraumatic, PERRLA, EOMI. Moist mucus membranes, no lesions or thrush  Lymph: there are 1.5 cm LN in the left supraclavicular area and 2 distinct hard LN around 1cm and 0.5 cm " anterior to SCM muscle. There is ~1 cm hard LN on the left subclavian triangle, there is small nodule ~ 0.5 cm in the left axilla.  Lungs:  normal work of breathing, slight dullness to  percussion at left base, decreased air entry in the left base, subtle pleural rub in the right lung base  Cardiac: RRR, tachycardia, normal S1, S2, no murmurs  Abdomen: Soft, nontender, nondistended. Normoactive bowel sounds. No hepatosplenomegaly, masses  Neuro: CNII-XII grossly intact  Extremities: No pedal edema. No lymphedema    Labs:      Lab Results   Component Value Date    WBC 4.6 07/30/2018     Lab Results   Component Value Date    RBC 4.09 07/30/2018     Lab Results   Component Value Date    HGB 13.0 07/30/2018     Lab Results   Component Value Date    HCT 40.0 07/30/2018     No components found for: MCT  Lab Results   Component Value Date    MCV 98 07/30/2018     Lab Results   Component Value Date    MCH 31.8 07/30/2018     Lab Results   Component Value Date    MCHC 32.5 07/30/2018     Lab Results   Component Value Date    RDW 12.0 07/30/2018     Lab Results   Component Value Date     07/30/2018     Recent Labs   Lab Test  07/30/18   0837  07/03/18   0826   NA  141  142   POTASSIUM  3.8  3.8   CHLORIDE  107  109   CO2  23  24   ANIONGAP  11  9   GLC  101*  66*   BUN  9  11   CR  0.56  0.62   KRYSTEN  9.2  9.4     Liver Function Studies -   Recent Labs   Lab Test  07/30/18   0837   PROTTOTAL  8.0   ALBUMIN  3.5   BILITOTAL  0.4   ALKPHOS  91   AST  24   ALT  21       Assessment & Plan:      Shan Marsh is a 48-year-old female with a history of a T1 N0 estrogen and progesterone receptor positive, HER2-negative left breast cancer treated with bilateral mastectomies in 2012, now with metastatic disease involving left axilla bones, possible liver and lung lesions, hormone receptor positive, HER2-negative.  She progressed on palbociclib and tamoxifen and is currently getting alternative therapies in [place].     1.   Metastatic breast cancer.  We recommended in the past to start the abemaciclib.  However, the patient initiated care in [place] and has received a variety of alternative investigative agents as listed above.  She stopped the abemaciclib and her last CT done in [place] in March showed progressive disease with left pleural effusion, increase in the liver mets and bony lesions.      She continued on alternative regimen and she sees us for managing of letrozole and Zoladex.      She reports feeling well at this time and surprisingly does not have a lot of symptoms.  We discussed last visit that she should find integrative physician in [facility]. She has a close contact here in [place].      We will continue her on monthly Zoladex and will continue with letrozole at this time.     Arrange for CT CAP end of August.    2.  Bone metastatic disease.  She remains on Zometa  every 3 months.     3.  Left pleural effusion.  Last thoracentesis in early July.    Monitor.    Ana Whitman MD

## 2018-07-30 NOTE — PROGRESS NOTES
Infusion Nursing Note:  Shan Marsh presents today for Zoladex.    Patient seen by provider today: Yes: Dr. Whitman    Note: Patient presents to clinic today feeling well with no questions.      Intravenous Access:  No Intravenous access/labs at this visit.    Treatment Conditions:  Not Applicable.    Post Infusion Assessment:  Patient tolerated injection to LLQ of abd without incident.    Discharge Plan:   Patient declined prescription refills.  Discharge instructions reviewed with: Patient.  Patient and/or family verbalized understanding of discharge instructions and all questions answered.  AVS to patient via [employer].  Patient will return 8/28/2018 for next appointment.   Departure Mode: Ambulatory.    Ashli Cameron RN

## 2018-07-30 NOTE — MR AVS SNAPSHOT
After Visit Summary   7/30/2018    Shan Marsh    MRN: [id number]           Patient Information     Date Of Birth          1970        Visit Information        Provider Department      7/30/2018 9:30 AM UC 22 ATC;  ONCOLOGY INFUSION [facility]        Today's Diagnoses     Metastatic breast cancer (H)    -  1    Bone metastasis (H)        Encounter for long-term (current) use of medications          Care Instructions    Contact Numbers    [facility] Main Line: [phone]  [facility] Triage:  [phone]    Call triage with chills and/or temperature greater than or equal to 100.5, uncontrolled nausea/vomiting, diarrhea, constipation, dizziness, shortness of breath, chest pain, bleeding, unexplained bruising, or any new/concerning symptoms, questions/concerns.     If you are having any concerning symptoms or wish to speak to a provider before your next infusion visit, please call your care coordinator or triage to notify them so we can adequately serve you.       After Hours: [phone]    If after hours, weekends, or holidays, call main hospital  and ask for Oncology doctor on call.         July 2018 Sunday Monday Tuesday Wednesday Thursday Friday Saturday   1     2     LONG    3:20 PM   (20 min.)   Marilu Michelle MD   [facility] 3     [facility] MASON[facility] LAB DRAW    8:00 AM   (15 min.)    M[facility] LAB DRAW   [facility] Lab Draw     [facility] RETURN    8:15 AM   (50 min.)   Lima Griffiths PA-C   [facility] ONC INFUSION 60    9:30 AM   (60 min.)    ONCOLOGY INFUSION   [facility] 4     5     IR THORACENTESIS    8:00 AM   (75 min.)   [id number]   [facility] ASC Imaging     Outpatient Visit    8:09 AM   [facility]     THORACENTESIS    9:00 AM   Lydia Murillo PA-C    OR 6     7       8     9     10     11     12     13     14       15     16     17     18     19     20      21       22     23     24     25     26     27     28       29     30     [facility] LAB DRAW    8:00 AM   (15 min.)    MASONIC LAB DRAW   [facility] Masonic Lab Draw     UMP RETURN    8:15 AM   (30 min.)   Ana Whitman MD   [facility] ONC INFUSION 60    9:30 AM   (60 min.)   UC ONCOLOGY INFUSION   [facility] 31 August 2018 Sunday Monday Tuesday Wednesday Thursday Friday Saturday                  1     2     3     4       5     6     7     8     9     10     11       12     13     14     15     16     17     18       19     20     21     22     23     24     25       26     27     PET ONCOLOGY WHOLE BODY   12:45 PM   (45 min.)   UUPET1   [facility] [place] PET CT 28     [facility] LAB DRAW    7:45 AM   (15 min.)    [facility]   [facility]     [facility] RETURN    8:15 AM   (50 min.)   Lima Griffiths PA-C   [facility] ONC INFUSION 60    9:00 AM   (60 min.)    ONCOLOGY INFUSION   [facility] 29 30 31                       Lab Results:  Recent Results (from the past 12 hour(s))   Comprehensive metabolic panel (BMP + Alb, Alk Phos, ALT, AST, Total. Bili, TP)    Collection Time: 07/30/18  8:37 AM   Result Value Ref Range    Sodium 141 133 - 144 mmol/L    Potassium 3.8 3.4 - 5.3 mmol/L    Chloride 107 94 - 109 mmol/L    Carbon Dioxide 23 20 - 32 mmol/L    Anion Gap 11 3 - 14 mmol/L    Glucose 101 (H) 70 - 99 mg/dL    Urea Nitrogen 9 7 - 30 mg/dL    Creatinine 0.56 0.52 - 1.04 mg/dL    GFR Estimate >90 >60 mL/min/1.7m2    GFR Estimate If Black >90 >60 mL/min/1.7m2    Calcium 9.2 8.5 - 10.1 mg/dL    Bilirubin Total 0.4 0.2 - 1.3 mg/dL    Albumin 3.5 3.4 - 5.0 g/dL    Protein Total 8.0 6.8 - 8.8 g/dL    Alkaline Phosphatase 91 40 - 150 U/L    ALT 21 0 - 50 U/L    AST 24 0 - 45 U/L   CBC with platelets and differential    Collection Time: 07/30/18  8:37  AM   Result Value Ref Range    WBC 4.6 4.0 - 11.0 10e9/L    RBC Count 4.09 3.8 - 5.2 10e12/L    Hemoglobin 13.0 11.7 - 15.7 g/dL    Hematocrit 40.0 35.0 - 47.0 %    MCV 98 78 - 100 fl    MCH 31.8 26.5 - 33.0 pg    MCHC 32.5 31.5 - 36.5 g/dL    RDW 12.0 10.0 - 15.0 %    Platelet Count 187 150 - 450 10e9/L    Diff Method Automated Method     % Neutrophils 65.2 %    % Lymphocytes 25.1 %    % Monocytes 6.5 %    % Eosinophils 2.8 %    % Basophils 0.2 %    % Immature Granulocytes 0.2 %    Nucleated RBCs 0 0 /100    Absolute Neutrophil 3.0 1.6 - 8.3 10e9/L    Absolute Lymphocytes 1.2 0.8 - 5.3 10e9/L    Absolute Monocytes 0.3 0.0 - 1.3 10e9/L    Absolute Eosinophils 0.1 0.0 - 0.7 10e9/L    Absolute Basophils 0.0 0.0 - 0.2 10e9/L    Abs Immature Granulocytes 0.0 0 - 0.4 10e9/L    Absolute Nucleated RBC 0.0                Follow-ups after your visit        Your next 10 appointments already scheduled     Aug 27, 2018 12:45 PM CDT   PET ONCOLOGY WHOLE BODY with UUPET1   [facility] [place] PET [facility] ([facility])    [address] [address] [phone]           Tell your doctor:   If there is any chance you may be pregnant or if you are breastfeeding.   If you have problems lying in small spaces (claustrophobia). If you do, your doctor may give you medicine to help you relax. If you have diabetes:   Have your exam early in the morning. Your blood glucose will go up as the day goes by.   Your glucose level must be 180 or less at the start of the exam. Please take any oral diabetic medication you need to ensure this blood glucose level is below 180, but no insulin 4 hours prior to the exam   If you are taking insulin in the morning take with breakfast by 6 am and schedule procedure between 12 and 2:15 pm.   If you are taking insulin at night take nightly dose, fast overnight, schedule procedure before 10 am.   If you take insulin both morning and night take morning  dose by 6am and schedule procedure between 12 and 2:15 pm.   24 hours before your scan: Don t do any heavy exercise. (No jogging, aerobics or other workouts.) Exercise will make your pictures less accurate.  Patients should eat a low carb, high protein meal 7 hours (or the last meal you eat) before the scan. Low carb, high protein meals consist of lean meats, seafood, beans, soy, low-fat dairy, eggs, nuts & seeds. 6 hours before your scan:   Stop all food and liquids (except water).   Do not chew gum or suck on mints.   If you need to take medicine with food, you may take it with a few crackers.  Please call your Imaging Department at your exam site with any questions.            Aug 28, 2018  7:45 AM CDT   [facility] Lab Draw with  MASONIC LAB DRAW   [facility] Lab Draw ([facility])    [address] [place] [address]  [address]  [facility] [address]   [phone]            Aug 28, 2018  8:30 AM CDT   (Arrive by 8:15 AM)   Return Visit with Lima Griffiths PA-C   [facility] ([facility])    [address] [place] [address]  [address]  [facility] [address]   [phone]            Aug 28, 2018  9:00 AM CDT   Infusion 60 with UC ONCOLOGY INFUSION, UC 23 ATC   [facility] ([facility])    [address] [place] [address]  [address]  [facility] [address]   [phone]            Sep 25, 2018  8:30 AM CDT   (Arrive by 8:15 AM)   Return Visit with Lima Griffiths PA-C   [facility] ([facility])    [address] [place] [address]  [address]  [facility] [address]   [phone]            Sep 25, 2018  9:00 AM CDT   Infusion 60 with UC ONCOLOGY INFUSION, UC 25 ATC   [facility] ([facility])    [address] [place] [address]  [address]  [facility] [address]   [phone]            Oct 22, 2018 10:00 AM CDT   (Arrive by 9:45 AM)    Return Visit with Ana Whitman MD   [facility] ([facility])    [address]  [address]  [facility] [address] [phone]            Oct 22, 2018 10:30 AM CDT   Infusion 60 with UC ONCOLOGY INFUSION   [facility] ([facility])    [address]  [address]  [facility] [id number] [phone]              Future tests that were ordered for you today     Open Future Orders        Priority Expected Expires Ordered    PET Oncology Whole Body Routine  7/30/2019 7/30/2018            Who to contact     If you have questions or need follow up information about today's clinic visit or your schedule please contact [facility] directly at [phone].  Normal or non-critical lab and imaging results will be communicated to you by [email or web address], letter or phone within 4 business days after the clinic has received the results. If you do not hear from us within 7 days, please contact the clinic through [email or web address] or phone. If you have a critical or abnormal lab result, we will notify you by phone as soon as possible.  Submit refill requests through AddonTV or call your pharmacy and they will forward the refill request to us. Please allow 3 business days for your refill to be completed.          Additional Information About Your Visit        AddonTV Information     AddonTV gives you secure access to your electronic health record. If you see a primary care provider, you can also send messages to your care team and make appointments. If you have questions, please call your primary care clinic.  If you do not have a primary care provider, please call [phone] and they will assist you.        Care EveryWhere ID     This is your Care EveryWhere ID. This could be used by other organizations to access your [place] medical records  [id number]        Your Vitals Were     Last Period                    10/06/2017 (Approximate)            Blood Pressure from Last 3 Encounters:   07/30/18 106/67   07/05/18 116/81   07/03/18 112/80    Weight from Last 3 Encounters:   07/30/18 52.8 kg (116 lb 6.4 oz)   07/03/18 51.2 kg (112 lb 14.4 oz)   07/02/18 51.8 kg (114 lb 4 oz)              We Performed the Following     CBC with platelets and differential     Comprehensive metabolic panel (BMP + Alb, Alk Phos, ALT, AST, Total. Bili, TP)        Primary Care Provider Office Phone # Fax #    Marilu Michelle [phone] [address]  [facility] [place] [address]        Equal Access to Services     LUZ AJMES : Hadii noemy darlingo Silvano, waaxda luqadaha, qaybta kaalmada adeegyacecy, edi mustafa . So [facility] [phone].    ATENCIÓN: Si habla español, tiene a gamez disposición servicios gratuitos de asistencia lingüística. [facility] [phone].    We comply with applicable federal civil rights laws and [place] laws. We do not discriminate on the basis of race, color, national origin, age, disability, sex, sexual orientation, or gender identity.            Thank you!     Thank you for choosing [facility]  for your care. Our goal is always to provide you with excellent care. Hearing back from our patients is one way we can continue to improve our services. Please take a few minutes to complete the written survey that you may receive in the mail after your visit with us. Thank you!             Your Updated Medication List - Protect others around you: Learn how to safely use, store and throw away your medicines at www.disposemymeds.org.          This list is accurate as of 7/30/18 10:04 AM.  Always use your most recent med list.                   Brand Name Dispense Instructions for use Diagnosis    calcium carbonate 500 MG tablet    OS-KRYSTEN 500 mg [place]. [place]     Take 1 tablet by mouth daily        letrozole 2.5 MG tablet    FEMARA    28 tablet    Take 1 tablet (2.5 mg)  by mouth daily    Metastatic breast cancer (H), Bone metastasis (H)       ondansetron 8 MG tablet    ZOFRAN    40 tablet    Take 1 tablet (8 mg) by mouth every 8 hours as needed for nausea    Metastatic breast cancer (H), Bone metastasis (H)       prochlorperazine 10 MG tablet    COMPAZINE          VITAMIN D (CHOLECALCIFEROL) PO      Take by mouth daily

## 2018-07-30 NOTE — PATIENT INSTRUCTIONS
Contact Numbers    [facility] Main Line: [phone]  [facility] Triage:  [phone]    Call triage with chills and/or temperature greater than or equal to 100.5, uncontrolled nausea/vomiting, diarrhea, constipation, dizziness, shortness of breath, chest pain, bleeding, unexplained bruising, or any new/concerning symptoms, questions/concerns.     If you are having any concerning symptoms or wish to speak to a provider before your next infusion visit, please call your care coordinator or triage to notify them so we can adequately serve you.       After Hours: [phone]    If after hours, weekends, or holidays, call main hospital  and ask for Oncology doctor on call.         July 2018 Sunday Monday Tuesday Wednesday Thursday Friday Saturday   1     2     LONG    3:20 PM   (20 min.)   Marilu Michelle MD   [facility] 3     P [facility] LAB DRAW    8:00 AM   (15 min.)    [facility] LAB DRAW   [facility] Lab Draw     UMP RETURN    8:15 AM   (50 min.)   Lima Griffiths PA-C   [facility] ONC INFUSION 60    9:30 AM   (60 min.)    ONCOLOGY INFUSION   [facility] 4     5     IR THORACENTESIS    8:00 AM   (75 min.)   [id number]   [facility] ASC Imaging     Outpatient Visit    8:09 AM   [facility]     THORACENTESIS    9:00 AM   Lydia Murillo PA-C   UC OR 6     7       8     9     10     11     12     13     14       15     16     17     18     19     20     21       22     23     24     25     26     27     28       29     30     P [facility] LAB DRAW    8:00 AM   (15 min.)   UC [facility] LAB DRAW   [facility] Lab Draw     UMP RETURN    8:15 AM   (30 min.)   Ana Whitman MD   [facility] ONC INFUSION 60    9:30 AM   (60 min.)    ONCOLOGY INFUSION   [facility] 31 August 2018 Sunday Monday Tuesday Wednesday Thursday Friday  Saturday                  1     2     3     4       5     6     7     8     9     10     11       12     13     14     15     16     17     18       19     20     21     22     23     24     25       26     27     PET ONCOLOGY WHOLE BODY   12:45 PM   (45 min.)   UUPET1   [facility] PET CT 28     [facility]ON[facility] LAB DRAW    7:45 AM   (15 min.)   [facility] LAB DRAW   [facility] Lab Draw     [facility] RETURN    8:15 AM   (50 min.)   Lima Griffiths PA-C   [facility] ONC INFUSION 60    9:00 AM   (60 min.)    ONCOLOGY INFUSION   [facility] 29     30     31                       Lab Results:  Recent Results (from the past 12 hour(s))   Comprehensive metabolic panel (BMP + Alb, Alk Phos, ALT, AST, Total. Bili, TP)    Collection Time: 07/30/18  8:37 AM   Result Value Ref Range    Sodium 141 133 - 144 mmol/L    Potassium 3.8 3.4 - 5.3 mmol/L    Chloride 107 94 - 109 mmol/L    Carbon Dioxide 23 20 - 32 mmol/L    Anion Gap 11 3 - 14 mmol/L    Glucose 101 (H) 70 - 99 mg/dL    Urea Nitrogen 9 7 - 30 mg/dL    Creatinine 0.56 0.52 - 1.04 mg/dL    GFR Estimate >90 >60 mL/min/1.7m2    GFR Estimate If Black >90 >60 mL/min/1.7m2    Calcium 9.2 8.5 - 10.1 mg/dL    Bilirubin Total 0.4 0.2 - 1.3 mg/dL    Albumin 3.5 3.4 - 5.0 g/dL    Protein Total 8.0 6.8 - 8.8 g/dL    Alkaline Phosphatase 91 40 - 150 U/L    ALT 21 0 - 50 U/L    AST 24 0 - 45 U/L   CBC with platelets and differential    Collection Time: 07/30/18  8:37 AM   Result Value Ref Range    WBC 4.6 4.0 - 11.0 10e9/L    RBC Count 4.09 3.8 - 5.2 10e12/L    Hemoglobin 13.0 11.7 - 15.7 g/dL    Hematocrit 40.0 35.0 - 47.0 %    MCV 98 78 - 100 fl    MCH 31.8 26.5 - 33.0 pg    MCHC 32.5 31.5 - 36.5 g/dL    RDW 12.0 10.0 - 15.0 %    Platelet Count 187 150 - 450 10e9/L    Diff Method Automated Method     % Neutrophils 65.2 %    % Lymphocytes 25.1 %    % Monocytes 6.5 %    % Eosinophils 2.8 %    % Basophils 0.2 %    % Immature  Granulocytes 0.2 %    Nucleated RBCs 0 0 /100    Absolute Neutrophil 3.0 1.6 - 8.3 10e9/L    Absolute Lymphocytes 1.2 0.8 - 5.3 10e9/L    Absolute Monocytes 0.3 0.0 - 1.3 10e9/L    Absolute Eosinophils 0.1 0.0 - 0.7 10e9/L    Absolute Basophils 0.0 0.0 - 0.2 10e9/L    Abs Immature Granulocytes 0.0 0 - 0.4 10e9/L    Absolute Nucleated RBC 0.0

## 2018-07-30 NOTE — NURSING NOTE
"Oncology Rooming Note    July 30, 2018 8:50 AM   Shan Marsh is a 48 year old female who presents for:    Chief Complaint   Patient presents with     Port Draw     labs drawn via  by RN. VS taken.      Oncology Clinic Visit     Return: Breast CA     Initial Vitals: /67 (BP Location: Right arm, Patient Position: Sitting, Cuff Size: Adult Regular)  Pulse 90  Temp 98.6  F (37  C) (Oral)  Resp 14  Ht 1.626 m (5' 4\")  Wt 52.8 kg (116 lb 6.4 oz)  LMP 10/06/2017 (Approximate)  SpO2 98%  BMI 19.98 kg/m2 Estimated body mass index is 19.98 kg/(m^2) as calculated from the following:    Height as of this encounter: 1.626 m (5' 4\").    Weight as of this encounter: 52.8 kg (116 lb 6.4 oz). Body surface area is 1.54 meters squared.  No Pain (0) Comment: Data Unavailable   Patient's last menstrual period was 10/06/2017 (approximate).  Allergies reviewed: Yes  Medications reviewed: Yes    Medications: Medication refills not needed today.  Pharmacy name entered into [facility]: [facility] [place], [place] - [address]    Clinical concerns: no new concerns today, she needs to have some [facility] paperwork for early jail faxed over to be completed,  Dr. Whitman was notified.    10 minutes for nursing intake (face to face time)     Shaun De Santiago CMA              "

## 2018-08-02 ENCOUNTER — CARE COORDINATION (OUTPATIENT)
Dept: ONCOLOGY | Facility: CLINIC | Age: 48
End: 2018-08-02

## 2018-08-02 NOTE — PROGRESS NOTES
Faxed [facility]' prison paper work to Fax: [phone] as listed on paper work. Patient aware was faxed today and will mail the original and a copy to patient and send a copy to be scanned into EPIC.  Answered all patient's questions and verbalized understanding. Sharifa Celeste RN, BSN.

## 2018-08-03 ENCOUNTER — DOCUMENTATION ONLY (OUTPATIENT)
Dept: FAMILY MEDICINE | Facility: CLINIC | Age: 48
End: 2018-08-03

## 2018-08-07 NOTE — PROGRESS NOTES
Patient calling to find out status of this form.  Please call when done.  OK to leave message on voicemail.

## 2018-08-09 ENCOUNTER — MYC MEDICAL ADVICE (OUTPATIENT)
Dept: FAMILY MEDICINE | Facility: CLINIC | Age: 48
End: 2018-08-09

## 2018-08-09 NOTE — PROGRESS NOTES
Dr. Michelle is not able to fill out the forms. The forms need to be filled out by her treating oncologist.     I have faxed the forms to [facility] [phone]    -  -Please call patient and relay this message.     Thanks!  Arin Vann RN, BSN

## 2018-08-09 NOTE — PROGRESS NOTES
I have attempted to contact this patient by phone with the following results: left a detail VM.    1st attempt    Cheikh Villanueva MA

## 2018-08-10 NOTE — TELEPHONE ENCOUNTER
Dr. Michelle -- please see pt message below and advise how to proceed. I informed pt you were not in clinic until Monday 8/13.    Thank you  YOMI BlackN, RN  [facility]      Principal Discharge DX:	Flank pain

## 2018-08-13 ENCOUNTER — DOCUMENTATION ONLY (OUTPATIENT)
Dept: FAMILY MEDICINE | Facility: CLINIC | Age: 48
End: 2018-08-13

## 2018-08-14 NOTE — PROGRESS NOTES
I signed the form. Please fax and send mychart note to Shan once the form has been faxed. Thanks! Marilu Michelle M.D.

## 2018-08-27 ENCOUNTER — HOSPITAL ENCOUNTER (OUTPATIENT)
Dept: PET IMAGING | Facility: CLINIC | Age: 48
Discharge: HOME | End: 2018-08-27
Attending: INTERNAL MEDICINE | Admitting: INTERNAL MEDICINE
Payer: COMMERCIAL

## 2018-08-27 ENCOUNTER — HOSPITAL ENCOUNTER (OUTPATIENT)
Dept: PET IMAGING | Facility: CLINIC | Age: 48
End: 2018-08-27
Attending: INTERNAL MEDICINE
Payer: COMMERCIAL

## 2018-08-27 DIAGNOSIS — C50.919 METASTATIC BREAST CANCER: ICD-10-CM

## 2018-08-27 DIAGNOSIS — C79.51 BONE METASTASIS: ICD-10-CM

## 2018-08-27 LAB — GLUCOSE BLDC GLUCOMTR-MCNC: 83 MG/DL (ref 70–99)

## 2018-08-27 PROCEDURE — 71260 CT THORAX DX C+: CPT

## 2018-08-27 PROCEDURE — A9552 F18 FDG: HCPCS | Performed by: INTERNAL MEDICINE

## 2018-08-27 PROCEDURE — 70491 CT SOFT TISSUE NECK W/DYE: CPT

## 2018-08-27 PROCEDURE — 34300033 ZZH RX 343: Performed by: INTERNAL MEDICINE

## 2018-08-27 PROCEDURE — 82962 GLUCOSE BLOOD TEST: CPT

## 2018-08-27 PROCEDURE — 25000128 H RX IP 250 OP 636: Performed by: INTERNAL MEDICINE

## 2018-08-27 RX ORDER — IOPAMIDOL 755 MG/ML
45-135 INJECTION, SOLUTION INTRAVASCULAR ONCE
Status: COMPLETED | OUTPATIENT
Start: 2018-08-27 | End: 2018-08-27

## 2018-08-27 RX ADMIN — FLUDEOXYGLUCOSE F-18 14.6 MCI.: 500 INJECTION, SOLUTION INTRAVENOUS at 12:45

## 2018-08-27 RX ADMIN — IOPAMIDOL 72 ML: 755 INJECTION, SOLUTION INTRAVENOUS at 13:43

## 2018-08-28 ENCOUNTER — APPOINTMENT (OUTPATIENT)
Dept: LAB | Facility: CLINIC | Age: 48
End: 2018-08-28
Attending: INTERNAL MEDICINE
Payer: COMMERCIAL

## 2018-08-28 ENCOUNTER — INFUSION THERAPY VISIT (OUTPATIENT)
Dept: ONCOLOGY | Facility: CLINIC | Age: 48
End: 2018-08-28
Attending: INTERNAL MEDICINE
Payer: COMMERCIAL

## 2018-08-28 VITALS
HEIGHT: 64 IN | WEIGHT: 116 LBS | HEART RATE: 96 BPM | RESPIRATION RATE: 16 BRPM | BODY MASS INDEX: 19.81 KG/M2 | SYSTOLIC BLOOD PRESSURE: 129 MMHG | OXYGEN SATURATION: 97 % | DIASTOLIC BLOOD PRESSURE: 87 MMHG | TEMPERATURE: 97.9 F

## 2018-08-28 DIAGNOSIS — C79.51 BONE METASTASIS: ICD-10-CM

## 2018-08-28 DIAGNOSIS — Z79.899 ENCOUNTER FOR LONG-TERM (CURRENT) USE OF MEDICATIONS: ICD-10-CM

## 2018-08-28 DIAGNOSIS — C50.919 METASTATIC BREAST CANCER: Primary | ICD-10-CM

## 2018-08-28 DIAGNOSIS — C50.919 METASTATIC BREAST CANCER: ICD-10-CM

## 2018-08-28 LAB
ALBUMIN SERPL-MCNC: 3.4 G/DL (ref 3.4–5)
ALP SERPL-CCNC: 107 U/L (ref 40–150)
ALT SERPL W P-5'-P-CCNC: 24 U/L (ref 0–50)
ANION GAP SERPL CALCULATED.3IONS-SCNC: 8 MMOL/L (ref 3–14)
AST SERPL W P-5'-P-CCNC: 40 U/L (ref 0–45)
BASOPHILS # BLD AUTO: 0.1 10E9/L (ref 0–0.2)
BASOPHILS NFR BLD AUTO: 1.1 %
BILIRUB SERPL-MCNC: 0.3 MG/DL (ref 0.2–1.3)
BUN SERPL-MCNC: 12 MG/DL (ref 7–30)
CALCIUM SERPL-MCNC: 9.3 MG/DL (ref 8.5–10.1)
CHLORIDE SERPL-SCNC: 106 MMOL/L (ref 94–109)
CO2 SERPL-SCNC: 26 MMOL/L (ref 20–32)
CREAT SERPL-MCNC: 0.66 MG/DL (ref 0.52–1.04)
DIFFERENTIAL METHOD BLD: NORMAL
EOSINOPHIL # BLD AUTO: 0.1 10E9/L (ref 0–0.7)
EOSINOPHIL NFR BLD AUTO: 2.4 %
ERYTHROCYTE [DISTWIDTH] IN BLOOD BY AUTOMATED COUNT: 11.8 % (ref 10–15)
GFR SERPL CREATININE-BSD FRML MDRD: >90 ML/MIN/1.7M2
GLUCOSE SERPL-MCNC: 85 MG/DL (ref 70–99)
HCT VFR BLD AUTO: 41.6 % (ref 35–47)
HGB BLD-MCNC: 13.8 G/DL (ref 11.7–15.7)
IMM GRANULOCYTES # BLD: 0 10E9/L (ref 0–0.4)
IMM GRANULOCYTES NFR BLD: 0.6 %
LYMPHOCYTES # BLD AUTO: 1.4 10E9/L (ref 0.8–5.3)
LYMPHOCYTES NFR BLD AUTO: 29.2 %
MCH RBC QN AUTO: 31.7 PG (ref 26.5–33)
MCHC RBC AUTO-ENTMCNC: 33.2 G/DL (ref 31.5–36.5)
MCV RBC AUTO: 95 FL (ref 78–100)
MONOCYTES # BLD AUTO: 0.3 10E9/L (ref 0–1.3)
MONOCYTES NFR BLD AUTO: 6.1 %
NEUTROPHILS # BLD AUTO: 2.8 10E9/L (ref 1.6–8.3)
NEUTROPHILS NFR BLD AUTO: 60.6 %
NRBC # BLD AUTO: 0 10*3/UL
NRBC BLD AUTO-RTO: 0 /100
PLATELET # BLD AUTO: 215 10E9/L (ref 150–450)
POTASSIUM SERPL-SCNC: 3.6 MMOL/L (ref 3.4–5.3)
PROT SERPL-MCNC: 7.9 G/DL (ref 6.8–8.8)
RBC # BLD AUTO: 4.36 10E12/L (ref 3.8–5.2)
SODIUM SERPL-SCNC: 140 MMOL/L (ref 133–144)
WBC # BLD AUTO: 4.6 10E9/L (ref 4–11)

## 2018-08-28 PROCEDURE — 25000128 H RX IP 250 OP 636: Mod: ZF | Performed by: PHYSICIAN ASSISTANT

## 2018-08-28 PROCEDURE — 96402 CHEMO HORMON ANTINEOPL SQ/IM: CPT

## 2018-08-28 PROCEDURE — 85025 COMPLETE CBC W/AUTO DIFF WBC: CPT | Performed by: INTERNAL MEDICINE

## 2018-08-28 PROCEDURE — G0463 HOSPITAL OUTPT CLINIC VISIT: HCPCS | Mod: ZF

## 2018-08-28 PROCEDURE — 80053 COMPREHEN METABOLIC PANEL: CPT | Performed by: INTERNAL MEDICINE

## 2018-08-28 PROCEDURE — 99215 OFFICE O/P EST HI 40 MIN: CPT | Mod: ZP | Performed by: PHYSICIAN ASSISTANT

## 2018-08-28 PROCEDURE — 25000125 ZZHC RX 250: Mod: ZF | Performed by: PHYSICIAN ASSISTANT

## 2018-08-28 RX ORDER — ONDANSETRON 8 MG/1
8 TABLET, FILM COATED ORAL EVERY 8 HOURS PRN
Qty: 40 TABLET | Refills: 3 | Status: ON HOLD | OUTPATIENT
Start: 2018-08-28 | End: 2019-01-01

## 2018-08-28 RX ORDER — LIDOCAINE HYDROCHLORIDE 10 MG/ML
2 INJECTION, SOLUTION EPIDURAL; INFILTRATION; INTRACAUDAL; PERINEURAL ONCE
Status: COMPLETED | OUTPATIENT
Start: 2018-08-28 | End: 2018-08-28

## 2018-08-28 RX ORDER — LIDOCAINE HYDROCHLORIDE 10 MG/ML
2 INJECTION, SOLUTION EPIDURAL; INFILTRATION; INTRACAUDAL; PERINEURAL ONCE
Status: CANCELLED | OUTPATIENT
Start: 2018-08-28 | End: 2018-08-28

## 2018-08-28 RX ADMIN — LIDOCAINE HYDROCHLORIDE 2 ML: 10 INJECTION, SOLUTION EPIDURAL; INFILTRATION; INTRACAUDAL; PERINEURAL at 10:00

## 2018-08-28 RX ADMIN — GOSERELIN ACETATE 3.6 MG: 3.6 IMPLANT SUBCUTANEOUS at 10:03

## 2018-08-28 ASSESSMENT — PAIN SCALES - GENERAL: PAINLEVEL: WORST PAIN (10)

## 2018-08-28 NOTE — LETTER
8/28/2018      RE: Shan Marsh  [address]  [facility] [id number]       Hematology-Oncology Visit  Aug 28, 2018    Reason for Visit: follow-up metastatic breast cancer, ER positive     HPI: Shan Mrash is a 48 year old female without significant past medical history with recent diagnosis of metastatic breast cancer after presenting with left shoulder pain. Shoulder MRI showed soft tissue mass close to distal clavicle concerning for malignancy. She had PET/CT 5/9/17 showing hypermetabolic left axillary, supraclavicular, mediastinal and hilar nodes. She had biopsies of left axilla and hilar nodes showing metastatic breast cancer, strongly ER positive, moderately SC positive and HER-2 kaitlin non-amplified. She originally had [id number], ER/SC positive, HER-2 negative breast cancer diagnosed in spring of 2012 s/p bilateral mastectomies and Tamoxifen from 8/2012-2/2013 and discontinued due to side effects. She met with Dr. Whitman most recently on 5/16/17 who recommended treatment on BIG10 clinical trial with palbociclib and tamoxifen. She started treatment on 5/24/17 and required dose reduction for neutropenia. CT CAP and bone scan 1/11/2018 showing progressive disease with new bony lesions in the ribs, T spine, and possibly liver, lungs. She started Zoladex and abemiciclib on 1/12/2018.     In March 2018, she sought treatment at [facility] in [place], under the care of Dr. Brooks, where she received:  - Vallovex vaccine (per FDA, is in phase I trials here in [place])  - Insulin potentiated therapy with conjugated chemotherapy (5% chemotherapy, unclear what chemotherapy)  - Gina's toxins, IV vitamin C, IV Ozone, hyperbaric chamber, vitamin B17 infusion, vitamin CK3 IV, and coffee enemas PRN  - Also taking oral niacin, Lugol, acidol, pancreatin, and thyroid desiccated liver, per their regimen  - She was continued on Letrozole and advised to conintue Zoladex    She met with Dr. Whitman and patient  "wished to continue alternative regimen. Abemiciclib was placed on hold and remains on hold. She was continued on Zoladex and letrozole. While in [place], she required thoracentesis for symptomatic pleural effusion.    Interval History: Shan is here for follow-up.  She has had neck tightness on the left side for about a week. It is painful. thinks it is related to thoracentesis. After the most recent one, she had some numbness that wrapped around her ribcage that started immediately during the procedure. This has not improved. She thinks has caused muscle tightness on the left side. It keeps her up at night. She takes an ibuprofen at night and that helps her to sleep but she is interested in cannabis to help her pain and chronic nausea. She has no other new pains. Chronic nausea is overall stable on Zofran but also interested in cannabis for that and for the pains she gets. He remains on the alternative treatments prescribed by Steven. She has fatigue that is perhaps worse. She has WILSON with stairs that has been worsening. No SOB at rest or difficulties laying flat. No other concerns. She is sad about her children and tearful for this.  ROS otherwise negative.     Current Outpatient Prescriptions   Medication     calcium carbonate (OS-KRYSTEN 500 MG [place]. CA) 1250 MG tablet     letrozole (FEMARA) 2.5 MG tablet     ondansetron (ZOFRAN) 8 MG tablet     VITAMIN D, CHOLECALCIFEROL, PO     prochlorperazine (COMPAZINE) 10 MG tablet     [DISCONTINUED] tamoxifen (NOLVADEX) 20 MG tablet     No current facility-administered medications for this visit.        PHYSICAL EXAM:  ECO  /87 (BP Location: Right arm, Cuff Size: Adult Regular)  Pulse 96  Temp 97.9  F (36.6  C) (Oral)  Resp 16  Ht 1.626 m (5' 4.02\")  Wt 52.6 kg (116 lb)  LMP 10/06/2017 (Approximate)  SpO2 97%  BMI 19.9 kg/m2   Wt Readings from Last 10 Encounters:   18 52.6 kg (116 lb)   18 52.8 kg (116 lb 6.4 oz)   18 51.2 kg (112 lb 14.4 " oz)   07/02/18 51.8 kg (114 lb 4 oz)   06/05/18 51 kg (112 lb 8 oz)   05/07/18 50.1 kg (110 lb 8 oz)   05/07/18 50.1 kg (110 lb 8 oz)   04/06/18 51.4 kg (113 lb 4.8 oz)   04/02/18 51.7 kg (114 lb)   03/23/18 51.9 kg (114 lb 6.4 oz)      General: Alert, oriented, pleasant, NAD  Skin: warm, dry without rashes or bruising  HEENT: Normocephalic, atraumatic, PERRLA, EOMI. Moist mucus membranes, no lesions or thrush  Neck: No cervical or supraclavicular LAD.   Lungs:  normal work of breathing, LLL with dullness to auscultation and percussion at left base  Cardiac: RRR, S1, S2, no murmurs  Abdomen: Soft, nontender, nondistended. Normoactive bowel sounds. No hepatosplenomegaly, masses. Very faint uneven distribution of adipose tissue  Neuro: CNII-XII grossly intact  Extremities: No pedal edema. L arm with a sleeve, unable to visualize gross edema    Labs:   Results for NIKKIE HICKS (MRN [id number]) as of 8/28/2018 10:28   Ref. Range 8/28/2018 08:06   Sodium Latest Ref Range: 133 - 144 mmol/L 140   Potassium Latest Ref Range: 3.4 - 5.3 mmol/L 3.6   Chloride Latest Ref Range: 94 - 109 mmol/L 106   Carbon Dioxide Latest Ref Range: 20 - 32 mmol/L 26   Urea Nitrogen Latest Ref Range: 7 - 30 mg/dL 12   Creatinine Latest Ref Range: 0.52 - 1.04 mg/dL 0.66   GFR Estimate Latest Ref Range: >60 mL/min/1.7m2 >90   GFR Estimate If Black Latest Ref Range: >60 mL/min/1.7m2 >90   Calcium Latest Ref Range: 8.5 - 10.1 mg/dL 9.3   Anion Gap Latest Ref Range: 3 - 14 mmol/L 8   Albumin Latest Ref Range: 3.4 - 5.0 g/dL 3.4   Protein Total Latest Ref Range: 6.8 - 8.8 g/dL 7.9   Bilirubin Total Latest Ref Range: 0.2 - 1.3 mg/dL 0.3   Alkaline Phosphatase Latest Ref Range: 40 - 150 U/L 107   ALT Latest Ref Range: 0 - 50 U/L 24   AST Latest Ref Range: 0 - 45 U/L 40   Glucose Latest Ref Range: 70 - 99 mg/dL 85   WBC Latest Ref Range: 4.0 - 11.0 10e9/L 4.6   Hemoglobin Latest Ref Range: 11.7 - 15.7 g/dL 13.8   Hematocrit Latest Ref Range:  35.0 - 47.0 % 41.6   Platelet Count Latest Ref Range: 150 - 450 10e9/L 215     Imaging:   PET pending       Assessment & Plan: Shan Marsh is a 47-year-old female with history of a T1 N0, ER/MO positive, HER2-negative left breast cancer treated with bilateral mastectomies in 2012, now with metastatic disease involving the left axilla, bones, and possible liver and lung, ER/MO positive, HER2 negative.  She had progression on palbociclib and Tamoxifen.     1. Metastatic breast cancer: She was recommended to start abemiciclib however, shortly after starting the medication, she sought care in [place] and received a variety of alternative/investigative agents, as listed in HPI.  Abemiciclib was stopped. Offical report on PET yesterday is pending, however in discussion with radiologist, she has new bony metastases, increased L pleural effusion, new mets in the abdominal wall, stable hepatic mets. Discussed with Shan at length. She reports that the team in [facility] was recommending a different immunotherapy if she progressed. Dr. Whitman is recommending Xeloda. Expressed to Shan that Dr. Whitman and I both discourage returning to [facility] and to proceed with chemotherapy in effort to gain control of her cancer. We discussed the potential side effects of Xeloda. She wanted to discuss with family and with the team in [facility]. FOr now, will give the Zoladex given she doesn't know what she wants to do.  - She met with Dr. Richardson in [place], recommended against  Oophorectomy for several reasons. If her pleural effusion clears may be an option.    2. Bone mets: Continue monthly Zometa every 3 months, due October.     3. FEN: Weight grossly stable today. She has decreased appetite on her alternative medicine treatments. She is mindful of her intake. Encouraged her to keep this up.    4. L pleural effusion: Requiring intermittent thoracentesis. Re-accumulation on most recent PET with WILSON. Requested scheduling for thora.    5. Chronic  nausea: presumably cancer related/treatment related. She had been certified for cannabis in the past and never filled it, re-certified today.    6. L neck, chest wall pains secondary to cancer. Unclear if there is a new lesion on scan, will await read. Could be referrred from the pleural effusion. COntinue ibuprofen for now, trial of cannabis as above. Sent for thora. If there is a focal bone met that could be contributing, could run by Rad Onc.    I spent >25 minutes with the patient, with over 50% of the time spent counseling or coordinating their care as described above.    Lima Griffiths PA-C

## 2018-08-28 NOTE — PROGRESS NOTES
Infusion Nursing Note:  Shan Marsh presents today for Cycle 9 Zoladex.    Patient seen by provider today: Yes: Lima CASTELLANO    Treatment Conditions:  Lab Results   Component Value Date    HGB 13.8 08/28/2018     Lab Results   Component Value Date    WBC 4.6 08/28/2018      Lab Results   Component Value Date    ANEU 2.8 08/28/2018     Lab Results   Component Value Date     08/28/2018      Lab Results   Component Value Date     08/28/2018                   Lab Results   Component Value Date    POTASSIUM 3.6 08/28/2018           No results found for: MAG         Lab Results   Component Value Date    CR 0.66 08/28/2018                   Lab Results   Component Value Date    KRYSTEN 9.3 08/28/2018                Lab Results   Component Value Date    BILITOTAL 0.3 08/28/2018           Lab Results   Component Value Date    ALBUMIN 3.4 08/28/2018                    Lab Results   Component Value Date    ALT 24 08/28/2018           Lab Results   Component Value Date    AST 40 08/28/2018       Results reviewed, labs MET treatment parameters, ok to proceed with treatment.    Pain: Denies pain at this time. Pt stated she only has pain when laying down at night and discussed this with Lima today.    Note: Ice applied to RLQ abdomen before injections. Tolerated injection to RLQ abdomen without incident.      Discharge Plan:   Patient declined prescription refills.  Discharge instructions reviewed with: Patient.  Patient and/or family verbalized understanding of discharge instructions and all questions answered.  Copy of AVS reviewed with patient and/or family.  Patient will return 9/25/18 for next appointment.  Patient discharged in stable condition accompanied by: self.  Departure Mode: Ambulatory.  Face to Face time: 0.    Juany Stevenson RN

## 2018-08-28 NOTE — NURSING NOTE
"Oncology Rooming Note    August 28, 2018 8:37 AM   Shan Marsh is a 48 year old female who presents for:    Chief Complaint   Patient presents with     Blood Draw     Labs drawn from vpt by RN. Vs taken and pt checked in for appt.     Oncology Clinic Visit     return breast cancer     Initial Vitals: /87 (BP Location: Right arm, Cuff Size: Adult Regular)  Pulse 96  Temp 97.9  F (36.6  C) (Oral)  Resp 16  Ht 1.626 m (5' 4.02\")  Wt 52.6 kg (116 lb)  LMP 10/06/2017 (Approximate)  SpO2 97%  BMI 19.9 kg/m2 Estimated body mass index is 19.9 kg/(m^2) as calculated from the following:    Height as of this encounter: 1.626 m (5' 4.02\").    Weight as of this encounter: 52.6 kg (116 lb). Body surface area is 1.54 meters squared.  Worst Pain (10) Comment: pt has severe nerve pain in back at night; also having SOB with brisk walking   Patient's last menstrual period was 10/06/2017 (approximate).  Allergies reviewed: Yes  Medications reviewed: Yes    Medications: MEDICATION REFILLS NEEDED TODAY. Provider was notified.  Pharmacy name entered into [facility]: [facility] [place], [place] - [id number]    Clinical concerns: nerve pain     6 minutes for nursing intake (face to face time)     Thu Powell RN            "

## 2018-08-28 NOTE — PROGRESS NOTES
Hematology-Oncology Visit  Aug 28, 2018    Reason for Visit: follow-up metastatic breast cancer, ER positive     HPI: Shan Marsh is a 48 year old female without significant past medical history with recent diagnosis of metastatic breast cancer after presenting with left shoulder pain. Shoulder MRI showed soft tissue mass close to distal clavicle concerning for malignancy. She had PET/CT 5/9/17 showing hypermetabolic left axillary, supraclavicular, mediastinal and hilar nodes. She had biopsies of left axilla and hilar nodes showing metastatic breast cancer, strongly ER positive, moderately NE positive and HER-2 kaitlin non-amplified. She originally had [id number], ER/NE positive, HER-2 negative breast cancer diagnosed in spring of 2012 s/p bilateral mastectomies and Tamoxifen from 8/2012-2/2013 and discontinued due to side effects. She met with Dr. Whitman most recently on 5/16/17 who recommended treatment on BIG10 clinical trial with palbociclib and tamoxifen. She started treatment on 5/24/17 and required dose reduction for neutropenia. CT CAP and bone scan 1/11/2018 showing progressive disease with new bony lesions in the ribs, T spine, and possibly liver, lungs. She started Zoladex and abemiciclib on 1/12/2018.     In March 2018, she sought treatment at [facility] in [place], under the care of Dr. Brooks, where she received:  - Vallovex vaccine (per FDA, is in phase I trials here in [place])  - Insulin potentiated therapy with conjugated chemotherapy (5% chemotherapy, unclear what chemotherapy)  - Gina's toxins, IV vitamin C, IV Ozone, hyperbaric chamber, vitamin B17 infusion, vitamin CK3 IV, and coffee enemas PRN  - Also taking oral niacin, Lugol, acidol, pancreatin, and thyroid desiccated liver, per their regimen  - She was continued on Letrozole and advised to conintue Zoladex    She met with Dr. Whitman and patient wished to continue alternative regimen. Abemiciclib was placed on hold and remains on hold. She  "was continued on Zoladex and letrozole. While in [place], she required thoracentesis for symptomatic pleural effusion.    Interval History: Shan is here for follow-up.  She has had neck tightness on the left side for about a week. It is painful. thinks it is related to thoracentesis. After the most recent one, she had some numbness that wrapped around her ribcage that started immediately during the procedure. This has not improved. She thinks has caused muscle tightness on the left side. It keeps her up at night. She takes an ibuprofen at night and that helps her to sleep but she is interested in cannabis to help her pain and chronic nausea. She has no other new pains. Chronic nausea is overall stable on Zofran but also interested in cannabis for that and for the pains she gets. He remains on the alternative treatments prescribed by Steven. She has fatigue that is perhaps worse. She has WILSON with stairs that has been worsening. No SOB at rest or difficulties laying flat. No other concerns. She is sad about her children and tearful for this.  ROS otherwise negative.     Current Outpatient Prescriptions   Medication     calcium carbonate (OS-KRYSTEN 500 MG [place]. [place]) 1250 MG tablet     letrozole (FEMARA) 2.5 MG tablet     ondansetron (ZOFRAN) 8 MG tablet     VITAMIN D, CHOLECALCIFEROL, PO     prochlorperazine (COMPAZINE) 10 MG tablet     [DISCONTINUED] tamoxifen (NOLVADEX) 20 MG tablet     No current facility-administered medications for this visit.        PHYSICAL EXAM:  ECO  /87 (BP Location: Right arm, Cuff Size: Adult Regular)  Pulse 96  Temp 97.9  F (36.6  C) (Oral)  Resp 16  Ht 1.626 m (5' 4.02\")  Wt 52.6 kg (116 lb)  LMP 10/06/2017 (Approximate)  SpO2 97%  BMI 19.9 kg/m2   Wt Readings from Last 10 Encounters:   18 52.6 kg (116 lb)   18 52.8 kg (116 lb 6.4 oz)   18 51.2 kg (112 lb 14.4 oz)   18 51.8 kg (114 lb 4 oz)   18 51 kg (112 lb 8 oz)   18 50.1 kg (110 lb 8 " oz)   05/07/18 50.1 kg (110 lb 8 oz)   04/06/18 51.4 kg (113 lb 4.8 oz)   04/02/18 51.7 kg (114 lb)   03/23/18 51.9 kg (114 lb 6.4 oz)      General: Alert, oriented, pleasant, NAD  Skin: warm, dry without rashes or bruising  HEENT: Normocephalic, atraumatic, PERRLA, EOMI. Moist mucus membranes, no lesions or thrush  Neck: No cervical or supraclavicular LAD.   Lungs:  normal work of breathing, LLL with dullness to auscultation and percussion at left base  Cardiac: RRR, S1, S2, no murmurs  Abdomen: Soft, nontender, nondistended. Normoactive bowel sounds. No hepatosplenomegaly, masses. Very faint uneven distribution of adipose tissue  Neuro: CNII-XII grossly intact  Extremities: No pedal edema. L arm with a sleeve, unable to visualize gross edema    Labs:   Results for NIKKIE HICKS (MRN [id number]) as of 8/28/2018 10:28   Ref. Range 8/28/2018 08:06   Sodium Latest Ref Range: 133 - 144 mmol/L 140   Potassium Latest Ref Range: 3.4 - 5.3 mmol/L 3.6   Chloride Latest Ref Range: 94 - 109 mmol/L 106   Carbon Dioxide Latest Ref Range: 20 - 32 mmol/L 26   Urea Nitrogen Latest Ref Range: 7 - 30 mg/dL 12   Creatinine Latest Ref Range: 0.52 - 1.04 mg/dL 0.66   GFR Estimate Latest Ref Range: >60 mL/min/1.7m2 >90   GFR Estimate If Black Latest Ref Range: >60 mL/min/1.7m2 >90   Calcium Latest Ref Range: 8.5 - 10.1 mg/dL 9.3   Anion Gap Latest Ref Range: 3 - 14 mmol/L 8   Albumin Latest Ref Range: 3.4 - 5.0 g/dL 3.4   Protein Total Latest Ref Range: 6.8 - 8.8 g/dL 7.9   Bilirubin Total Latest Ref Range: 0.2 - 1.3 mg/dL 0.3   Alkaline Phosphatase Latest Ref Range: 40 - 150 U/L 107   ALT Latest Ref Range: 0 - 50 U/L 24   AST Latest Ref Range: 0 - 45 U/L 40   Glucose Latest Ref Range: 70 - 99 mg/dL 85   WBC Latest Ref Range: 4.0 - 11.0 10e9/L 4.6   Hemoglobin Latest Ref Range: 11.7 - 15.7 g/dL 13.8   Hematocrit Latest Ref Range: 35.0 - 47.0 % 41.6   Platelet Count Latest Ref Range: 150 - 450 10e9/L 215     Imaging:   PET  pending       Assessment & Plan: Shan Marsh is a 47-year-old female with history of a T1 N0, ER/ME positive, HER2-negative left breast cancer treated with bilateral mastectomies in 2012, now with metastatic disease involving the left axilla, bones, and possible liver and lung, ER/ME positive, HER2 negative.  She had progression on palbociclib and Tamoxifen.     1. Metastatic breast cancer: She was recommended to start abemiciclib however, shortly after starting the medication, she sought care in [place] and received a variety of alternative/investigative agents, as listed in HPI.  Abemiciclib was stopped. Offical report on PET yesterday is pending, however in discussion with radiologist, she has new bony metastases, increased L pleural effusion, new mets in the abdominal wall, stable hepatic mets. Discussed with Shan at length. She reports that the team in [facility] was recommending a different immunotherapy if she progressed. Dr. Whitman is recommending Xeloda. Expressed to Shan that Dr. Whitman and I both discourage returning to [facility] and to proceed with chemotherapy in effort to gain control of her cancer. We discussed the potential side effects of Xeloda. She wanted to discuss with family and with the team in [facility]. FOr now, will give the Zoladex given she doesn't know what she wants to do.  - She met with Dr. Richardson in [place]yn, recommended against  Oophorectomy for several reasons. If her pleural effusion clears may be an option.    2. Bone mets: Continue monthly Zometa every 3 months, due October.     3. FEN: Weight grossly stable today. She has decreased appetite on her alternative medicine treatments. She is mindful of her intake. Encouraged her to keep this up.    4. L pleural effusion: Requiring intermittent thoracentesis. Re-accumulation on most recent PET with WILSON. Requested scheduling for thora.    5. Chronic nausea: presumably cancer related/treatment related. She had been certified for cannabis in the  past and never filled it, re-certified today.    6. L neck, chest wall pains secondary to cancer. Unclear if there is a new lesion on scan, will await read. Could be referrred from the pleural effusion. COntinue ibuprofen for now, trial of cannabis as above. Sent for thora. If there is a focal bone met that could be contributing, could run by Rad Onc.    I spent >25 minutes with the patient, with over 50% of the time spent counseling or coordinating their care as described above.    Lima Griffiths PA-C

## 2018-08-28 NOTE — MR AVS SNAPSHOT
After Visit Summary   8/28/2018    Shan Marsh    MRN: [id number]           Patient Information     Date Of Birth          1970        Visit Information        Provider Department      8/28/2018 9:00 AM UC 23 ATC; UC ONCOLOGY INFUSION [facility]        Today's Diagnoses     Metastatic breast cancer (H)    -  1    Bone metastasis (H)        Encounter for long-term (current) use of medications           Follow-ups after your visit        Your next 10 appointments already scheduled     Sep 25, 2018  8:30 AM CDT   (Arrive by 8:15 AM)   Return Visit with Lima Griffiths PA-C   [facility] ([facility])    [address]  [address]  [facility] [id number] [phone]            Sep 25, 2018  9:00 AM CDT   Infusion 60 with UC ONCOLOGY INFUSION, UC 25 ATC   [facility] ([facility])    [address]  [address]  [facility] [address] [phone]            Oct 22, 2018 10:00 AM CDT   (Arrive by 9:45 AM)   Return Visit with Ana Whitman MD   [facility] ([facility])    [address]  [address]  [facility] [address] [phone]            Oct 22, 2018 10:30 AM CDT   Infusion 60 with UC ONCOLOGY INFUSION, UC 31 ATC   [facility] ([facility])    [address]  [address]  [facility] [id number] [phone]              Future tests that were ordered for you today     Open Future Orders        Priority Expected Expires Ordered    IR Thoracentesis Routine  8/28/2019 8/28/2018    CT Soft Tissue Neck w Contrast Routine  8/27/2019 8/27/2018            Who to contact     If you have questions or need follow up information about today's clinic visit or your schedule please contact [facility] directly at [phone].  Normal or  non-critical lab and imaging results will be communicated to you by MyChart, letter or phone within 4 business days after the clinic has received the results. If you do not hear from us within 7 days, please contact the clinic through [email or web address] or phone. If you have a critical or abnormal lab result, we will notify you by phone as soon as possible.  Submit refill requests through Fuhu or call your pharmacy and they will forward the refill request to us. Please allow 3 business days for your refill to be completed.          Additional Information About Your Visit        Fuhu Information     Fuhu gives you secure access to your electronic health record. If you see a primary care provider, you can also send messages to your care team and make appointments. If you have questions, please call your primary care clinic.  If you do not have a primary care provider, please call [phone] and they will assist you.        Care EveryWhere ID     This is your Care EveryWhere ID. This could be used by other organizations to access your [place] medical records  [id number]        Your Vitals Were     Last Period                   10/06/2017 (Approximate)            Blood Pressure from Last 3 Encounters:   08/28/18 129/87   07/30/18 106/67   07/05/18 116/81    Weight from Last 3 Encounters:   08/28/18 52.6 kg (116 lb)   07/30/18 52.8 kg (116 lb 6.4 oz)   07/03/18 51.2 kg (112 lb 14.4 oz)              We Performed the Following     CBC with platelets and differential     Comprehensive metabolic panel (BMP + Alb, Alk Phos, ALT, AST, Total. Bili, TP)          Where to get your medicines      These medications were sent to [facility] [place], [place] - [address]  [address], [facility] [address]    Hours:  TRANSPLANT PHONE NUMBER [phone] Phone:  [phone]     ondansetron 8 MG tablet          Primary Care Provider Office Phone # Fax #    Marilu Michelle MD  [phone]       [address]  [facility] [address]        Equal Access to Services     LUZ JAMES : Hadii aad ku hadsilvinoleonor Silvano, [place] jo annpamela, makenzie coy, edi guajardo laAlisadahlia kolbKeith Child [facility] [phone].    ATENCIÓN: Si habla español, tiene a gamez disposición servicios gratuitos de asistencia lingüística. Llame al [phone].    We comply with applicable federal civil rights laws and [place] laws. We do not discriminate on the basis of race, color, national origin, age, disability, sex, sexual orientation, or gender identity.            Thank you!     Thank you for choosing [facility]  for your care. Our goal is always to provide you with excellent care. Hearing back from our patients is one way we can continue to improve our services. Please take a few minutes to complete the written survey that you may receive in the mail after your visit with us. Thank you!             Your Updated Medication List - Protect others around you: Learn how to safely use, store and throw away your medicines at www.disposemymeds.org.          This list is accurate as of 8/28/18 10:15 AM.  Always use your most recent med list.                   Brand Name Dispense Instructions for use Diagnosis    calcium carbonate 500 mg {elemental} 500 MG tablet    OS-KRYSTEN     Take 1 tablet by mouth daily        letrozole 2.5 MG tablet    FEMARA    28 tablet    Take 1 tablet (2.5 mg) by mouth daily    Metastatic breast cancer (H), Bone metastasis (H)       ondansetron 8 MG tablet    ZOFRAN    40 tablet    Take 1 tablet (8 mg) by mouth every 8 hours as needed for nausea    Metastatic breast cancer (H), Bone metastasis (H)       prochlorperazine 10 MG tablet    COMPAZINE          VITAMIN D (CHOLECALCIFEROL) PO      Take by mouth daily

## 2018-08-28 NOTE — NURSING NOTE
Chief Complaint   Patient presents with     Blood Draw     Labs drawn from vpt by RN. Vs taken and pt checked in for appt.     Labs collected from venipuncture by RN. Vitals taken. Checked in for appointment(s).    Mahogany Brandon RN

## 2018-08-28 NOTE — MR AVS SNAPSHOT
After Visit Summary   8/28/2018    Shan Marsh    MRN: [id number]           Patient Information     Date Of Birth          1970        Visit Information        Provider Department      8/28/2018 8:30 AM Lima Griffiths PA-C [facility]        Today's Diagnoses     Metastatic breast cancer (H)        Bone metastasis (H)           Follow-ups after your visit        Your next 10 appointments already scheduled     Sep 25, 2018  8:30 AM CDT   (Arrive by 8:15 AM)   Return Visit with Lima Griffiths PA-C   [facility] ([facility])    [address]  [address]  [facility] [address] [phone]            Sep 25, 2018  9:00 AM CDT   Infusion 60 with UC ONCOLOGY INFUSION, UC 25 ATC   [facility] ([facility])    [address]  [address]  [facility] [address] [phone]            Oct 22, 2018 10:00 AM CDT   (Arrive by 9:45 AM)   Return Visit with Ana Whitman MD   [facility] ([facility])    [address]  [address]  [facility] [address] [phone]            Oct 22, 2018 10:30 AM CDT   Infusion 60 with UC ONCOLOGY INFUSION, UC 31 ATC   [facility] ([facility])    [address]  [address]  [facility] [address] [phone]              Future tests that were ordered for you today     Open Future Orders        Priority Expected Expires Ordered    IR Thoracentesis Routine  8/28/2019 8/28/2018    CT Soft Tissue Neck w Contrast Routine  8/27/2019 8/27/2018            Who to contact     If you have questions or need follow up information about today's clinic visit or your schedule please contact [facility] directly at [phone].  Normal or non-critical lab and imaging results will be communicated to you  "by High Tower Softwarehart, letter or phone within 4 business days after the clinic has received the results. If you do not hear from us within 7 days, please contact the clinic through JustPartst or phone. If you have a critical or abnormal lab result, we will notify you by phone as soon as possible.  Submit refill requests through [employer] [place] or call your pharmacy and they will forward the refill request to us. Please allow 3 business days for your refill to be completed.          Additional Information About Your Visit        [employer] Tower [employer] Trust Information     [employer] [place] gives you secure access to your electronic health record. If you see a primary care provider, you can also send messages to your care team and make appointments. If you have questions, please call your primary care clinic.  If you do not have a primary care provider, please call [phone] and they will assist you.        Care EveryWhere ID     This is your Care EveryWhere ID. This could be used by other organizations to access your [facility] medical records  [id number]        Your Vitals Were     Pulse Temperature Respirations Height Last Period Pulse Oximetry    96 97.9  F (36.6  C) (Oral) 16 1.626 m (5' 4.02\") 10/06/2017 (Approximate) 97%    BMI (Body Mass Index)                   19.9 kg/m2            Blood Pressure from Last 3 Encounters:   08/28/18 129/87   07/30/18 106/67   07/05/18 116/81    Weight from Last 3 Encounters:   08/28/18 52.6 kg (116 lb)   07/30/18 52.8 kg (116 lb 6.4 oz)   07/03/18 51.2 kg (112 lb 14.4 oz)                 Where to get your medicines      These medications were sent to [facility] [place], [place] - [address]  [address] [address]    Hours:  TRANSPLANT PHONE NUMBER [phone] Phone:  [phone]     ondansetron 8 MG tablet          Primary Care Provider Office Phone # Fax #    Marilu Michelle [phone] [address]  [facility] [address]      "   Equal Access to Services     [facility] VistaBRANT : Hadii aad ku hadsilvinoleonor Schaefer, wacristianda tenavalarieha, qanoris moiramary kayedi ware. So [facility] [phone].    ATENCIÓN: Si habla español, tiene a gamez disposición servicios gratuitos de asistencia lingüística. Ayannaame al [phone].    We comply with applicable federal civil rights laws and [place] laws. We do not discriminate on the basis of race, color, national origin, age, disability, sex, sexual orientation, or gender identity.            Thank you!     Thank you for choosing [facility]  for your care. Our goal is always to provide you with excellent care. Hearing back from our patients is one way we can continue to improve our services. Please take a few minutes to complete the written survey that you may receive in the mail after your visit with us. Thank you!             Your Updated Medication List - Protect others around you: Learn how to safely use, store and throw away your medicines at www.disposemymeds.org.          This list is accurate as of 8/28/18 10:35 AM.  Always use your most recent med list.                   Brand Name Dispense Instructions for use Diagnosis    calcium carbonate 500 mg {elemental} 500 MG tablet    OS-KRYSTEN     Take 1 tablet by mouth daily        letrozole 2.5 MG tablet    FEMARA    28 tablet    Take 1 tablet (2.5 mg) by mouth daily    Metastatic breast cancer (H), Bone metastasis (H)       ondansetron 8 MG tablet    ZOFRAN    40 tablet    Take 1 tablet (8 mg) by mouth every 8 hours as needed for nausea    Metastatic breast cancer (H), Bone metastasis (H)       prochlorperazine 10 MG tablet    COMPAZINE          VITAMIN D (CHOLECALCIFEROL) PO      Take by mouth daily

## 2018-08-29 ENCOUNTER — TELEPHONE (OUTPATIENT)
Dept: ONCOLOGY | Facility: CLINIC | Age: 48
End: 2018-08-29

## 2018-08-29 ENCOUNTER — CARE COORDINATION (OUTPATIENT)
Dept: ONCOLOGY | Facility: CLINIC | Age: 48
End: 2018-08-29

## 2018-08-29 DIAGNOSIS — J90 PLEURAL EFFUSION: Primary | ICD-10-CM

## 2018-08-29 NOTE — TELEPHONE ENCOUNTER
I spoke with Shan.  We reviewed the protocol that she would like to pursue.  I did not recommend it.  We reviewed the protocol she was advised in [place].  I am not in agreement with it.  I discussed the lack of evidence to support it.      I would encourage her to consider Xeloda chemotherapy.  I discussed I will not give Xeloda with her current alternative therapies.    She is going to talk to her  and decide what to do.    Right now, she has an appt in later Sept with zoladex.  We will keep this.  I will schedule thoracentesis now, given her symptoms.    I encouraged her to reach out to other women with stage 4 disease through our support groups, etc.    Ana Whitman

## 2018-08-29 NOTE — PROGRESS NOTES
Returned call to patient to discuss the scheduling of her thoracentesis and was unable to reach patient and her VM is full before she goes to [place] 9/10/18 for treatment. Patient also requesting to have a port placed for therapy in [place]. Will send message to Dr Whitman for recommendations for port placement. Sharifa Celeste RN, BSN

## 2018-08-31 DIAGNOSIS — G62.9 NEUROPATHY: Primary | ICD-10-CM

## 2018-08-31 RX ORDER — GABAPENTIN 300 MG/1
300 CAPSULE ORAL AT BEDTIME
Qty: 90 CAPSULE | Refills: 0 | Status: SHIPPED | OUTPATIENT
Start: 2018-08-31 | End: 2018-09-04

## 2018-09-04 ENCOUNTER — ONCOLOGY VISIT (OUTPATIENT)
Dept: ONCOLOGY | Facility: CLINIC | Age: 48
End: 2018-09-04

## 2018-09-04 ENCOUNTER — CARE COORDINATION (OUTPATIENT)
Dept: ONCOLOGY | Facility: CLINIC | Age: 48
End: 2018-09-04

## 2018-09-04 ENCOUNTER — ONCOLOGY VISIT (OUTPATIENT)
Dept: ONCOLOGY | Facility: CLINIC | Age: 48
End: 2018-09-04
Attending: PHYSICIAN ASSISTANT
Payer: COMMERCIAL

## 2018-09-04 VITALS
HEART RATE: 84 BPM | TEMPERATURE: 96.3 F | BODY MASS INDEX: 19.29 KG/M2 | HEIGHT: 64 IN | WEIGHT: 113 LBS | RESPIRATION RATE: 14 BRPM | OXYGEN SATURATION: 98 % | SYSTOLIC BLOOD PRESSURE: 127 MMHG | DIASTOLIC BLOOD PRESSURE: 93 MMHG

## 2018-09-04 DIAGNOSIS — C50.919 METASTATIC BREAST CANCER: Primary | ICD-10-CM

## 2018-09-04 DIAGNOSIS — G89.3 CANCER ASSOCIATED PAIN: ICD-10-CM

## 2018-09-04 PROCEDURE — G0463 HOSPITAL OUTPT CLINIC VISIT: HCPCS | Mod: ZF

## 2018-09-04 PROCEDURE — 99214 OFFICE O/P EST MOD 30 MIN: CPT | Mod: ZP | Performed by: PHYSICIAN ASSISTANT

## 2018-09-04 RX ORDER — CAPECITABINE 500 MG/1
1500 TABLET, FILM COATED ORAL 2 TIMES DAILY
Qty: 84 TABLET | Refills: 0 | Status: SHIPPED | OUTPATIENT
Start: 2018-09-04 | End: 2018-09-05

## 2018-09-04 RX ORDER — PROCHLORPERAZINE MALEATE 10 MG
10 TABLET ORAL EVERY 6 HOURS PRN
Qty: 30 TABLET | Refills: 2 | Status: CANCELLED | OUTPATIENT
Start: 2018-09-04

## 2018-09-04 RX ORDER — TRAMADOL HYDROCHLORIDE 50 MG/1
50-100 TABLET ORAL EVERY 6 HOURS PRN
Qty: 60 TABLET | Refills: 0 | Status: ON HOLD | OUTPATIENT
Start: 2018-09-04 | End: 2018-10-04

## 2018-09-04 ASSESSMENT — PAIN SCALES - GENERAL: PAINLEVEL: NO PAIN (0)

## 2018-09-04 NOTE — ORAL ONC MGMT
"Oral Chemotherapy Monitoring Program    Primary Oncologist: Dr. Whitman  Primary Oncology Clinic: [facility]  Cancer Diagnosis: Breast Cancer    Drug: Xeloda  Start Date: as soon as available  Dose is appropriate for patients: 1.52 m2 BSA and Renal Function   Expected duration of therapy: Until disease progression or unacceptable toxicity    Drug Interaction Assessment: Xeloda + Ondansetron = possible increase in QTc prolongation risk. Monitor.    Lab Monitoring Plan  CBC and CMP every 4 weeks  Labs drawn outside of [place]: May need labs set up while traveling in [place]  Subjective/Objective:  Shan Marsh is a 48 year old female seen in clinic for an initial visit for oral chemotherapy education.      ORAL CHEMOTHERAPY 1/12/2018 9/4/2018   Drug Name (No Data) Xeloda (Capecitabine)   Current Dosage 150mg 1500mg   Current Schedule BID BID   Cycle Details Continuous 1 week on 1 week off   Any new drug interactions? No Yes   Pharmacist Intervention? - Yes   Intervention(s) - Patient Education   Is the dose as ordered appropriate for the patient? Yes Yes       Vitals:  BP:   BP Readings from Last 1 Encounters:   09/04/18 (!) 127/93     Wt Readings from Last 1 Encounters:   09/04/18 51.3 kg (113 lb)     Estimated body surface area is 1.52 meters squared as calculated from the following:    Height as of an earlier encounter on 9/4/18: 1.626 m (5' 4.02\").    Weight as of an earlier encounter on 9/4/18: 51.3 kg (113 lb).      Labs:  _  Result Component Current Result Ref Range   Sodium 140 (8/28/2018) 133 - 144 mmol/L     _  Result Component Current Result Ref Range   Potassium 3.6 (8/28/2018) 3.4 - 5.3 mmol/L     _  Result Component Current Result Ref Range   Calcium 9.3 (8/28/2018) 8.5 - 10.1 mg/dL     No results found for Mag within last 30 days.     No results found for Phos within last 30 days.     _  Result Component Current Result Ref Range   Albumin 3.4 (8/28/2018) 3.4 - 5.0 g/dL     _  Result " Component Current Result Ref Range   Urea Nitrogen 12 (8/28/2018) 7 - 30 mg/dL     _  Result Component Current Result Ref Range   Creatinine 0.66 (8/28/2018) 0.52 - 1.04 mg/dL       _  Result Component Current Result Ref Range   AST 40 (8/28/2018) 0 - 45 U/L     _  Result Component Current Result Ref Range   ALT 24 (8/28/2018) 0 - 50 U/L     _  Result Component Current Result Ref Range   Bilirubin Total 0.3 (8/28/2018) 0.2 - 1.3 mg/dL       _  Result Component Current Result Ref Range   WBC 4.6 (8/28/2018) 4.0 - 11.0 10e9/L     _  Result Component Current Result Ref Range   Hemoglobin 13.8 (8/28/2018) 11.7 - 15.7 g/dL     _  Result Component Current Result Ref Range   Platelet Count 215 (8/28/2018) 150 - 450 10e9/L     _  Result Component Current Result Ref Range   Absolute Neutrophil 2.8 (8/28/2018) 1.6 - 8.3 10e9/L       Assessment:  Patient is appropriate to start therapy.    Plan:  Basic chemotherapy teaching was reviewed with the patient including indication, start date of therapy, dose, administration, adverse effects, missed doses, food and drug interactions, monitoring, side effect management, office contact information, and safe handling. Written materials were provided and all questions answered to Shan's stated satisfaction.    Follow-Up:  In about one week     Robbie Harden PharmD  [facility]  [phone]  September 4, 2018

## 2018-09-04 NOTE — MR AVS SNAPSHOT
After Visit Summary   9/4/2018    Shan Marsh    MRN: [id number]           Patient Information     Date Of Birth          1970        Visit Information        Provider Department      9/4/2018 4:10 PM Lima Griffiths PA-C [facility]        Today's Diagnoses     Cancer associated pain    -  1      Care Instructions    For pain:  - Try the Tramadol  mg every 6 hours as needed for pain. Remember that this can cause constipation, so be mindful to stay hydrated. It is OK to take Senna tea or Miralax to help with constipation.  - If the pain is still persistent after the thoracentesis, and if you can find a pattern to it/pinpoint where it is coming from, let me know and we can consider having you meet with a radiation oncologist.  - We can always try gabapentin at some point down the line if Tramadol isn't cutting it.    Xeloda:  - Start Xeloda as soon as you receive it  - If possible, try to get labs in [place] after 2 weeks of being on the medication. Our fax number is attn: Dr. Whitman, [facility], [phone].  - Call with any fevers greater than 100.4.              Follow-ups after your visit        Your next 10 appointments already scheduled     Sep 05, 2018  1:15 PM CDT   (Arrive by 12:15 PM)   IR THORACENTESIS with [id number]   [facility] ([facility])    [address] [place] [address] [phone]           Please wear loose clothing, such as a sweat suit or jogging clothes. Avoid snaps, zippers and other metal. We may ask you to undress and put on a hospital gown.  Please bring any scans or X-rays taken at other hospitals, if similar tests were done. Also bring a list of your medicines, including vitamins, minerals and over-the-counter drugs. It is safest to leave personal items at home.  Tell your doctor in advance:   If you are or may be pregnant.   If you are taking  Coumadin (or any other blood thinners) 5 days prior to the exam for any special instructions.   If you are diabetic to determine if your insulin needs have to be adjusted for the exam.  Your doctor will obtain necessary laboratory tests prior to the exam (creatinine, Hgb/Hct, platelet count, and INR).  If you have any questions, please call the Imaging Department where you will have your exam. Directions, parking instructions, and other information are available on our website, [email or web address].            Sep 05, 2018   Procedure with López Guillen PA-C   [facility] ([facility])    [address] [place] [address]  5th Floor  [facility] [address]   [phone]           Located in the Clinics [facility] at [address] [place] [address].   parking is very convenient and highly recommended.  is a $6 flat rate fee.  Both  and self parkers should enter the main arrival plaza from [facility]; parking attendants will direct you based on your parking preference.            Sep 25, 2018  8:30 AM CDT   (Arrive by 8:15 AM)   Return Visit with Lima Griffiths PA-C   [facility] ([facility])    [address] [place] [address]  [address]  [facility] [address]   [phone]            Sep 25, 2018  9:00 AM CDT   Infusion 60 with UC ONCOLOGY INFUSION, UC 25 ATC   [facility] ([facility])    [address] [place] [address]  [address]  [facility] [address]   [phone]            Oct 22, 2018 10:00 AM CDT   (Arrive by 9:45 AM)   Return Visit with Ana Whitman MD   [facility] ([facility])    [address] [place] [address]  [address]  [facility] [address]   [phone]            Oct 22, 2018 10:30 AM CDT   Infusion 60 with UC ONCOLOGY INFUSION, UC 31 ATC   [facility] ([facility]  "and [facility])    [address] [facility]  [address]  [facility] [address] [phone]              Who to contact     If you have questions or need follow up information about today's clinic visit or your schedule please contact [facility] directly at [phone].  Normal or non-critical lab and imaging results will be communicated to you by MyChart, letter or phone within 4 business days after the clinic has received the results. If you do not hear from us within 7 days, please contact the clinic through Pinguohart or phone. If you have a critical or abnormal lab result, we will notify you by phone as soon as possible.  Submit refill requests through MobilePaks or call your pharmacy and they will forward the refill request to us. Please allow 3 business days for your refill to be completed.          Additional Information About Your Visit        Pinguohart Information     [employer]aks gives you secure access to your electronic health record. If you see a primary care provider, you can also send messages to your care team and make appointments. If you have questions, please call your primary care clinic.  If you do not have a primary care provider, please call [phone] and they will assist you.        Care EveryWhere ID     This is your Care EveryWhere ID. This could be used by other organizations to access your [place] medical records  [id number]        Your Vitals Were     Pulse Temperature Respirations Height Last Period Pulse Oximetry    84 96.3  F (35.7  C) (Oral) 14 1.626 m (5' 4.02\") 10/06/2017 (Approximate) 98%    BMI (Body Mass Index)                   19.39 kg/m2            Blood Pressure from Last 3 Encounters:   09/04/18 (!) 127/93   08/28/18 129/87   07/30/18 106/67    Weight from Last 3 Encounters:   09/04/18 51.3 kg (113 lb)   08/28/18 52.6 kg (116 lb)   07/30/18 52.8 kg (116 lb 6.4 oz)              Today, you had the following     No orders found for display         Today's " Medication Changes          These changes are accurate as of 9/4/18  5:14 PM.  If you have any questions, ask your nurse or doctor.               Start taking these medicines.        Dose/Directions    traMADol 50 MG tablet   Commonly known as:  ULTRAM   Used for:  Cancer associated pain   Started by:  Lima Griffiths PA-C        Dose:   mg   Take 1-2 tablets ( mg) by mouth every 6 hours as needed for moderate to severe pain or severe pain   Quantity:  60 tablet   Refills:  0         Stop taking these medicines if you haven't already. Please contact your care team if you have questions.     gabapentin 300 MG capsule   Commonly known as:  NEURONTIN   Stopped by:  Lima Griffiths PA-C           letrozole 2.5 MG tablet   Commonly known as:  FEMARA   Stopped by:  Lima Griffiths PA-C                Where to get your medicines      Some of these will need a paper prescription and others can be bought over the counter.  Ask your nurse if you have questions.     Bring a paper prescription for each of these medications     traMADol 50 MG tablet               Information about OPIOIDS     PRESCRIPTION OPIOIDS: WHAT YOU NEED TO KNOW   We gave you an opioid (narcotic) pain medicine. It is important to manage your pain, but opioids are not always the best choice. You should first try all the other options your care team gave you. Take this medicine for as short a time (and as few doses) as possible.    Some activities can increase your pain, such as bandage changes or therapy sessions. It may help to take your pain medicine 30 to 60 minutes before these activities. Reduce your stress by getting enough sleep, working on hobbies you enjoy and practicing relaxation or meditation. Talk to your care team about ways to manage your pain beyond prescription opioids.    These medicines have risks:    DO NOT drive when on new or higher doses of pain medicine. These medicines can affect your  alertness and reaction times, and you could be arrested for driving under the influence (DUI). If you need to use opioids long-term, talk to your care team about driving.    DO NOT operate heavy machinery    DO NOT do any other dangerous activities while taking these medicines.    DO NOT drink any alcohol while taking these medicines.     If the opioid prescribed includes acetaminophen, DO NOT take with any other medicines that contain acetaminophen. Read all labels carefully. Look for the word  acetaminophen  or  Tylenol.  Ask your pharmacist if you have questions or are unsure.    You can get addicted to pain medicines, especially if you have a history of addiction (chemical, alcohol or substance dependence). Talk to your care team about ways to reduce this risk.    All opioids tend to cause constipation. Drink plenty of water and eat foods that have a lot of fiber, such as fruits, vegetables, prune juice, apple juice and high-fiber cereal. Take a laxative (Miralax, milk of magnesia, Colace, Senna) if you don t move your bowels at least every other day. Other side effects include upset stomach, sleepiness, dizziness, throwing up, tolerance (needing more of the medicine to have the same effect), physical dependence and slowed breathing.    Store your pills in a secure place, locked if possible. We will not replace any lost or stolen medicine. If you don t finish your medicine, please throw away (dispose) as directed by your pharmacist. The [place] Pollution Control Agency has more information about safe disposal: [email or web address]://[email or web address]/living-green/managing-unwanted-medications         Primary Care Provider Office Phone # Fax #    Marilu Michelle [phone] [address]  [facility] [address]        Equal Access to Services     LUZ JAMES : Karla Schaefer, ge[place]ia bejarano, edi galindo. So [facility]  [phone].    ATENCIÓN: Si zach longo, tiene a gamez disposición servicios gratuitos de asistencia lingüística. Gabriella cooper [phone].    We comply with applicable federal civil rights laws and [place] laws. We do not discriminate on the basis of race, color, national origin, age, disability, sex, sexual orientation, or gender identity.            Thank you!     Thank you for choosing [facility]  for your care. Our goal is always to provide you with excellent care. Hearing back from our patients is one way we can continue to improve our services. Please take a few minutes to complete the written survey that you may receive in the mail after your visit with us. Thank you!             Your Updated Medication List - Protect others around you: Learn how to safely use, store and throw away your medicines at www.disposemymeds.org.          This list is accurate as of 9/4/18  5:14 PM.  Always use your most recent med list.                   Brand Name Dispense Instructions for use Diagnosis    calcium carbonate 500 mg {elemental} 500 MG tablet    OS-KRYSTEN     Take 1 tablet by mouth daily        ondansetron 8 MG tablet    ZOFRAN    40 tablet    Take 1 tablet (8 mg) by mouth every 8 hours as needed for nausea    Metastatic breast cancer (H), Bone metastasis (H)       prochlorperazine 10 MG tablet    COMPAZINE          traMADol 50 MG tablet    ULTRAM    60 tablet    Take 1-2 tablets ( mg) by mouth every 6 hours as needed for moderate to severe pain or severe pain    Cancer associated pain       VITAMIN D (CHOLECALCIFEROL) PO      Take by mouth daily

## 2018-09-04 NOTE — LETTER
9/4/2018       RE: Shan Marsh  [address]  [place] [address]     Dear Colleague,    Thank you for referring your patient, Shan Marsh, to the [facility]. Please see a copy of my visit note below.    See Oral Onc Mgmt note.    Again, thank you for allowing me to participate in the care of your patient.      Sincerely,    Robbie Harden RP

## 2018-09-04 NOTE — NURSING NOTE
"Oncology Rooming Note    September 4, 2018 4:27 PM   Shan Marsh is a 48 year old female who presents for:    Chief Complaint   Patient presents with     Oncology Clinic Visit     [facility] RETURN- BREAST CA     Initial Vitals: BP (!) 127/93 (BP Location: Left arm, Patient Position: Chair, Cuff Size: Adult Regular)  Pulse 84  Temp 96.3  F (35.7  C) (Oral)  Resp 14  Ht 1.626 m (5' 4.02\")  Wt 51.3 kg (113 lb)  LMP 10/06/2017 (Approximate)  SpO2 98%  BMI 19.39 kg/m2 Estimated body mass index is 19.39 kg/(m^2) as calculated from the following:    Height as of this encounter: 1.626 m (5' 4.02\").    Weight as of this encounter: 51.3 kg (113 lb). Body surface area is 1.52 meters squared.  No Pain (0) Comment: Data Unavailable   Patient's last menstrual period was 10/06/2017 (approximate).  Allergies reviewed: Yes  Medications reviewed: Yes    Medications: Medication refills not needed today.  Pharmacy name entered into [facility]: [facility] [place], [place] - [address]    Clinical concerns: No new concerns. Tunde was notified.    10 minutes for nursing intake (face to face time)     Chacorta Martinez LPN            "

## 2018-09-04 NOTE — PATIENT INSTRUCTIONS
For pain:  - Try the Tramadol  mg every 6 hours as needed for pain. Remember that this can cause constipation, so be mindful to stay hydrated. It is OK to take Senna tea or Miralax to help with constipation.  - If the pain is still persistent after the thoracentesis, and if you can find a pattern to it/pinpoint where it is coming from, let me know and we can consider having you meet with a radiation oncologist.  - We can always try gabapentin at some point down the line if Tramadol isn't cutting it.    Xeloda:  - Start Xeloda as soon as you receive it  - If possible, try to get labs in [place] after 2 weeks of being on the medication. Our fax number is attn: Dr. Whitman, [facility], [phone].  - Call with any fevers greater than 100.4.

## 2018-09-04 NOTE — LETTER
9/4/2018      RE: Shan Marsh  [address]  [facility] [address]       Hematology-Oncology Visit  Sep 4, 2018    Reason for Visit: follow-up metastatic breast cancer, ER positive     HPI: Shan Marsh is a 48 year old female without significant past medical history with recent diagnosis of metastatic breast cancer after presenting with left shoulder pain. Shoulder MRI showed soft tissue mass close to distal clavicle concerning for malignancy. She had PET/CT 5/9/17 showing hypermetabolic left axillary, supraclavicular, mediastinal and hilar nodes. She had biopsies of left axilla and hilar nodes showing metastatic breast cancer, strongly ER positive, moderately IA positive and HER-2 kaitlin non-amplified. She originally had A1yH1C5, ER/IA positive, HER-2 negative breast cancer diagnosed in spring of 2012 s/p bilateral mastectomies and Tamoxifen from 8/2012-2/2013 and discontinued due to side effects. She met with Dr. Whitman most recently on 5/16/17 who recommended treatment on BIG10 clinical trial with palbociclib and tamoxifen. She started treatment on 5/24/17 and required dose reduction for neutropenia. CT CAP and bone scan 1/11/2018 showing progressive disease with new bony lesions in the ribs, T spine, and possibly liver, lungs. She started Zoladex and abemiciclib on 1/12/2018.     In March 2018, she sought treatment at [facility] in [place], under the care of Dr. Brooks, where she received:  - Vallovex vaccine (per FDA, is in phase I trials here in [place])  - Insulin potentiated therapy with conjugated chemotherapy (5% chemotherapy, unclear what chemotherapy)  - Gina's toxins, IV vitamin C, IV Ozone, hyperbaric chamber, vitamin B17 infusion, vitamin CK3 IV, and coffee enemas PRN  - Also taking oral niacin, Lugol, acidol, pancreatin, and thyroid desiccated liver, per their regimen  - She was continued on Letrozole and advised to conintue Zoladex    She met with Dr. Whitman and patient wished  "to continue alternative regimen. Abemiciclib was placed on hold and remains on hold. She was continued on Zoladex and letrozole. While in [place], she required thoracentesis for symptomatic pleural effusion. A repeat PET August 2018 showing widely progressive disease with multiple new bone mets involving the spine, ribs, pelvis, and sacrum; new lesions involving the left lateral thoracic/abdominal wall musculature and subcutaneous tissues, new metastases in the left adrenal gland, and recurrent large L pleural effusion.    Interval History: Shan is here for interval follow-up and to discuss starting Xeloda.  She feels no different than she did when she saw me last week. Remains with pain over the left chest wall that radiates to her neck, feels \"tight\", \"numb\", \"tingling\" sensations across the entire inferolateral chest wall, which will radiate to her neck. No symptoms with standing, occurs only with laying down. She has WILSON, otherwise no SOB at rest or orthopnea. No chest pain. No other new pains. She wonders if Letrozole is causing the pain. She is open to Xeloda. She has been readnig about it and is very optimistic for it. She is going to keep her trip to [place] as planned and turn it into a family vacation for one week, and then use the last 2 weeks to rest. She has been in touch with the [facility] team and they agree with her being on Xeloda and she is not going to go there for any alternative treatments. She already placed a $500 deposit there so they said she could come for labs or XR if she needed to.  ROS otherwise negative.     Current Outpatient Prescriptions   Medication     calcium carbonate (OS-KRYSTEN 500 MG [place]. [place]) 1250 MG tablet     gabapentin (NEURONTIN) 300 MG capsule     letrozole (FEMARA) 2.5 MG tablet     ondansetron (ZOFRAN) 8 MG tablet     prochlorperazine (COMPAZINE) 10 MG tablet     VITAMIN D, CHOLECALCIFEROL, PO     [DISCONTINUED] tamoxifen (NOLVADEX) 20 MG tablet     No current " "facility-administered medications for this visit.        PHYSICAL EXAM:  ECO  BP (!) 127/93 (BP Location: Left arm, Patient Position: Chair, Cuff Size: Adult Regular)  Pulse 84  Temp 96.3  F (35.7  C) (Oral)  Resp 14  Ht 1.626 m (5' 4.02\")  Wt 51.3 kg (113 lb)  LMP 10/06/2017 (Approximate)  SpO2 98%  BMI 19.39 kg/m2   Wt Readings from Last 10 Encounters:   18 51.3 kg (113 lb)   18 52.6 kg (116 lb)   18 52.8 kg (116 lb 6.4 oz)   18 51.2 kg (112 lb 14.4 oz)   18 51.8 kg (114 lb 4 oz)   18 51 kg (112 lb 8 oz)   18 50.1 kg (110 lb 8 oz)   18 50.1 kg (110 lb 8 oz)   18 51.4 kg (113 lb 4.8 oz)   18 51.7 kg (114 lb)      General: Alert, oriented, pleasant, NAD  Skin: warm, dry without rashes or bruising  MSK: Unable to elicit any pain over the LCW  Lungs:  normal work of breathing, LLL with dullness to auscultation and percussion at left base  Full exam not performed, counseling visit, no change in patient status since last visit    Labs:   Imaging:   PET18  IMPRESSION: In this patient with metastatic breast cancer, there is  evidence of disease progression:  1. There are multiple new and/or worsening osseous metastatic lesions  diffusely involving the spine, ribs, pelvis and sacrum. No pathologic  fracture.  2. Multiple new or worsening FDG avid metastatic lesions involving the  left lateral thoracic/abdominal wall musculature and subcutaneous  tissues.  3. Large left-sided, malignant pleural effusion with FDG avid pleural  thickening/nodularity. Pleural effusion results in near complete  collapse of the left lung and mild rightward shift of the mediastinum.  Additionally, worsening pulmonary metastatic disease with irregular  interlobular septal thickening, which may represent lymphangitic  carcinomatosis.  4.  Stable hepatic metastasis with new mildly increased FDG avidity.  New FDG avid left adrenal/retroperitoneal metastasis.  5. Please see " same-day head and neck PET/CT for further information  regarding those findings.       Assessment & Plan: Shan Marsh is a 47-year-old female with history of a T1 N0, ER/AR positive, HER2-negative left breast cancer treated with bilateral mastectomies in 2012, now with metastatic disease involving the left axilla, bones, and possible liver and lung, ER/AR positive, HER2 negative.  She had progression on palbociclib and Tamoxifen.     Metastatic breast cancer: She was recommended to start abemiciclib however, shortly after starting the medication, she sought care in [place] and received a variety of alternative/investigative agents, as listed in HPI.  Abemiciclib was stopped. PET 8/27 showing widely progressive disease with numerous new bone metastases, new metastases involving the left thoracic/abdominal wall musculature and subcutaneous tissues, new left adrenal met, and recurrent large L pleural effusion.     Discussed starting Xeloda and discouraged further treatment in [place]. She would like to try Xeloda, 1500 mg BID 7 days on/7 days off. The possible side effects of treatment and anticipatory side effect management were reviewed in detail. She also met with one of our oral chemotherapy pharmacists for teaching.  - Stop Letrozole -- she believes this is causing her side effects  - She met with Dr. Richardson in [place], recommended against  Oophorectomy for several reasons. If her pleural effusion clears may be an option.    L neck, chest wall pains secondary to cancer. Seems nerve related given the descriptors. Likely secondary to cancer, unclear where the foci is originating from as she has lesions throughout the ribcage and lateral thoracic musculature, without any localizing foci of pain. Minimal pain with standing, worsens with laying down, could be related to the large effusion? We had discussed gabapentin last week, but she ended up not wanting to take it after reading the patient insert. She was open to  trying Tramadol  mg q6h PRN. Counseled on constipation. She will try this. She has a thoracentesis tomorrow. If the pain persists, and if this seems to localize, we can consider having her see rad onc. She is in process of cannabis certification.    Bone mets: Continue monthly Zometa every 3 months, due October.     L pleural effusion: Requiring intermittent thoracentesis. Re-accumulation on most recent PET with WILSON. Requested scheduling for thora last week, scheduled for tomorrow.    Chronic nausea: presumably cancer related/treatment related. She had been certified for cannabis in the past and never filled it, re-certified last week. She has zofran at home if needed with Xeloda.      Of note, Shan is going to [place] 9/10-30. She understands to call us with any concerns. She has access to [facility] for labwork if needed and was given our fax number. She also has family in nearby [place] and plans to go there should she need medical care.    I spent >25 minutes with the patient, with over 50% of the time spent counseling or coordinating their care as described above.    Lima Griffiths PA-C

## 2018-09-04 NOTE — PROGRESS NOTES
"Patient called to start Xeloda before leaving to [place] for therapy. Patient also c/o \"whole left sided pain\" that is intermittent, but \"severe\" that started a few days ago with laying down in bed. Patient takes two 20 mg Advil alternating with Tylenol or two tablets of ASA. Patient does not want to take Gabapentin as it is used for \"psychotic\" symptoms that she does not want to use.  Will follow-up with Dr Whitman to prescribe Xeloda this week and symptoms of left sided pain. Answered all patient's questions and verbalized understanding. Sharifa Celeste RN, BSN.   "

## 2018-09-04 NOTE — MR AVS SNAPSHOT
After Visit Summary   9/4/2018    Shan Marsh    MRN: [id number]           Patient Information     Date Of Birth          1970        Visit Information        Provider Department      9/4/2018 5:41 PM [facility]        Today's Diagnoses     Metastatic breast cancer (H)    -  1       Follow-ups after your visit        Your next 10 appointments already scheduled     Sep 05, 2018  1:15 PM CDT   (Arrive by 12:15 PM)   IR THORACENTESIS with [id number]   [facility] ([facility])    [address] [place] [address] [phone]           Please wear loose clothing, such as a sweat suit or jogging clothes. Avoid snaps, zippers and other metal. We may ask you to undress and put on a hospital gown.  Please bring any scans or X-rays taken at other hospitals, if similar tests were done. Also bring a list of your medicines, including vitamins, minerals and over-the-counter drugs. It is safest to leave personal items at home.  Tell your doctor in advance:   If you are or may be pregnant.   If you are taking Coumadin (or any other blood thinners) 5 days prior to the exam for any special instructions.   If you are diabetic to determine if your insulin needs have to be adjusted for the exam.  Your doctor will obtain necessary laboratory tests prior to the exam (creatinine, Hgb/Hct, platelet count, and INR).  If you have any questions, please call the Imaging Department where you will have your exam. Directions, parking instructions, and other information are available on our website, [email or web address].            Sep 05, 2018   Procedure with López Guillen PA-C   [facility] ([facility])    [address] [place] [address] [phone]           Located in the Clinics and Surgery Center at [address] [place] [address]  , [facility] [address].   parking is very convenient and highly recommended.  is a $6 flat rate fee.  Both  and self parkers should enter the main arrival plaza from [facility]; parking attendants will direct you based on your parking preference.            Sep 25, 2018 12:30 PM CDT   (Arrive by 12:15 PM)   Return Visit with Ana Whitman MD   [facility] ([facility])    [address] [place] [address]  [address]  [facility] [id number] [phone]            Sep 25, 2018  3:00 PM CDT   Infusion 60 with UC ONCOLOGY INFUSION, UC 26 ATC   [facility] ([facility])    [address] [place] [address]  [address]  [facility] [id number] [phone]            Oct 22, 2018 10:00 AM CDT   (Arrive by 9:45 AM)   Return Visit with Ana Whitman MD   [facility] ([facility])    [address] [place] [address]  [address]  [facility] [address] [phone]            Oct 22, 2018 10:30 AM CDT   Infusion 60 with UC ONCOLOGY INFUSION, UC 31 ATC   [facility] ([facility])    [address] [place] [address]  [address]  [facility] [id number] [phone]              Future tests that were ordered for you today     Open Standing Orders        Priority Remaining Interval Expires Ordered    Ca27.29  breast tumor marker Routine 12/12 monthly 9/4/2019 9/4/2018    CEA Routine 12/12 monthly 9/4/2019 9/4/2018    CBC with platelets differential Routine 100/100  9/4/2019 9/4/2018    Comprehensive metabolic panel Routine 100/100  9/4/2019 9/4/2018            Who to contact     If you have questions or need follow up information about today's clinic visit or your schedule please contact [facility] directly at [phone].  Normal or non-critical lab and imaging results will be communicated to you by MyChart, letter or phone within 4 business  days after the clinic has received the results. If you do not hear from us within 7 days, please contact the clinic through [facility] or phone. If you have a critical or abnormal lab result, we will notify you by phone as soon as possible.  Submit refill requests through [facility] or call your pharmacy and they will forward the refill request to us. Please allow 3 business days for your refill to be completed.          Additional Information About Your Visit        Baihe Information     Baihe gives you secure access to your electronic health record. If you see a primary care provider, you can also send messages to your care team and make appointments. If you have questions, please call your primary care clinic.  If you do not have a primary care provider, please call [phone] and they will assist you.        Care EveryWhere ID     This is your Care EveryWhere ID. This could be used by other organizations to access your [place] medical records  [id number]        Your Vitals Were     Last Period                   10/06/2017 (Approximate)            Blood Pressure from Last 3 Encounters:   09/04/18 (!) 127/93   08/28/18 129/87   07/30/18 106/67    Weight from Last 3 Encounters:   09/04/18 51.3 kg (113 lb)   08/28/18 52.6 kg (116 lb)   07/30/18 52.8 kg (116 lb 6.4 oz)              Today, you had the following     No orders found for display         Today's Medication Changes          These changes are accurate as of 9/4/18  5:59 PM.  If you have any questions, ask your nurse or doctor.               Start taking these medicines.        Dose/Directions    capecitabine 500 MG tablet CHEMO   Commonly known as:  XELODA   Used for:  Metastatic breast cancer (H)   Started by:  Robbie Harden [facility]        Dose:  1500 mg   Take 3 tablets (1,500 mg) by mouth 2 times daily Take on Days 1 thru 7 and 15 thru 21 of each 28 day cycle. Take with water within 30 minutes after a meal.   Quantity:  84 tablet   Refills:  0        traMADol 50 MG tablet   Commonly known as:  ULTRAM   Used for:  Cancer associated pain   Started by:  Lima Griffiths PA-C        Dose:   mg   Take 1-2 tablets ( mg) by mouth every 6 hours as needed for moderate to severe pain or severe pain   Quantity:  60 tablet   Refills:  0         Stop taking these medicines if you haven't already. Please contact your care team if you have questions.     gabapentin 300 MG capsule   Commonly known as:  NEURONTIN   Stopped by:  Lima Griffiths PA-C           letrozole 2.5 MG tablet   Commonly known as:  FEMARA   Stopped by:  Lima Griffiths PA-C                Where to get your medicines      Some of these will need a paper prescription and others can be bought over the counter.  Ask your nurse if you have questions.     Bring a paper prescription for each of these medications     traMADol 50 MG tablet         Call your pharmacy to confirm that your medication is ready for pickup. It may take up to 24 hours for them to receive the prescription. If the prescription is not ready within 3 business days, please contact your clinic or your provider.     We will let you know when these medications are ready. If you don't hear back within 3 business days, please contact us.     capecitabine 500 MG tablet CHEMO                Primary Care Provider Office Phone # Fax #    Marilu Michelle [phone] [address] [facility] [address]        Equal Access to Services     LUZ JAMES : Karla landry Sojailyn, waaxda luqadaha, qaybta kaalmacecy coy, edi kolb. So [facility] [phone].    ATENCIÓN: Si habla español, tiene a gamez disposición servicios gratuitos de asistencia lingüística. Gabriella al [phone].    We comply with applicable federal civil rights laws and [place] laws. We do not discriminate on the basis of race, color, national origin, age, disability, sex, sexual  orientation, or gender identity.            Thank you!     Thank you for choosing [facility]  for your care. Our goal is always to provide you with excellent care. Hearing back from our patients is one way we can continue to improve our services. Please take a few minutes to complete the written survey that you may receive in the mail after your visit with us. Thank you!             Your Updated Medication List - Protect others around you: Learn how to safely use, store and throw away your medicines at www.disposemymeds.org.          This list is accurate as of 9/4/18  5:59 PM.  Always use your most recent med list.                   Brand Name Dispense Instructions for use Diagnosis    calcium carbonate 500 mg {elemental} 500 MG tablet    OS-KRYSTEN     Take 1 tablet by mouth daily        capecitabine 500 MG tablet CHEMO    XELODA    84 tablet    Take 3 tablets (1,500 mg) by mouth 2 times daily Take on Days 1 thru 7 and 15 thru 21 of each 28 day cycle. Take with water within 30 minutes after a meal.    Metastatic breast cancer (H)       ondansetron 8 MG tablet    ZOFRAN    40 tablet    Take 1 tablet (8 mg) by mouth every 8 hours as needed for nausea    Metastatic breast cancer (H), Bone metastasis (H)       prochlorperazine 10 MG tablet    COMPAZINE          traMADol 50 MG tablet    ULTRAM    60 tablet    Take 1-2 tablets ( mg) by mouth every 6 hours as needed for moderate to severe pain or severe pain    Cancer associated pain       VITAMIN D (CHOLECALCIFEROL) PO      Take by mouth daily

## 2018-09-04 NOTE — PROGRESS NOTES
Hematology-Oncology Visit  Sep 4, 2018    Reason for Visit: follow-up metastatic breast cancer, ER positive     HPI: Shan Marsh is a 48 year old female without significant past medical history with recent diagnosis of metastatic breast cancer after presenting with left shoulder pain. Shoulder MRI showed soft tissue mass close to distal clavicle concerning for malignancy. She had PET/CT 5/9/17 showing hypermetabolic left axillary, supraclavicular, mediastinal and hilar nodes. She had biopsies of left axilla and hilar nodes showing metastatic breast cancer, strongly ER positive, moderately CA positive and HER-2 kaitlin non-amplified. She originally had [id number], ER/CA positive, HER-2 negative breast cancer diagnosed in spring of 2012 s/p bilateral mastectomies and Tamoxifen from 8/2012-2/2013 and discontinued due to side effects. She met with Dr. Whitman most recently on 5/16/17 who recommended treatment on BIG10 clinical trial with palbociclib and tamoxifen. She started treatment on 5/24/17 and required dose reduction for neutropenia. CT CAP and bone scan 1/11/2018 showing progressive disease with new bony lesions in the ribs, T spine, and possibly liver, lungs. She started Zoladex and abemiciclib on 1/12/2018.     In March 2018, she sought treatment at [facility] in [place], under the care of Dr. Brooks, where she received:  - Vallovex vaccine (per FDA, is in phase I trials here in [place])  - Insulin potentiated therapy with conjugated chemotherapy (5% chemotherapy, unclear what chemotherapy)  - Gina's toxins, IV vitamin C, IV Ozone, hyperbaric chamber, vitamin B17 infusion, vitamin CK3 IV, and coffee enemas PRN  - Also taking oral niacin, Lugol, acidol, pancreatin, and thyroid desiccated liver, per their regimen  - She was continued on Letrozole and advised to conintue Zoladex    She met with Dr. Whitman and patient wished to continue alternative regimen. Abemiciclib was placed on hold and remains on hold. She  "was continued on Zoladex and letrozole. While in [place], she required thoracentesis for symptomatic pleural effusion. A repeat PET 2018 showing widely progressive disease with multiple new bone mets involving the spine, ribs, pelvis, and sacrum; new lesions involving the left lateral thoracic/abdominal wall musculature and subcutaneous tissues, new metastases in the left adrenal gland, and recurrent large L pleural effusion.    Interval History: Shan is here for interval follow-up and to discuss starting Xeloda.  She feels no different than she did when she saw me last week. Remains with pain over the left chest wall that radiates to her neck, feels \"tight\", \"numb\", \"tingling\" sensations across the entire inferolateral chest wall, which will radiate to her neck. No symptoms with standing, occurs only with laying down. She has WILSON, otherwise no SOB at rest or orthopnea. No chest pain. No other new pains. She wonders if Letrozole is causing the pain. She is open to Xeloda. She has been readnig about it and is very optimistic for it. She is going to keep her trip to [place] as planned and turn it into a family vacation for one week, and then use the last 2 weeks to rest. She has been in touch with the [facility] team and they agree with her being on Xeloda and she is not going to go there for any alternative treatments. She already placed a $500 deposit there so they said she could come for labs or XR if she needed to.  ROS otherwise negative.     Current Outpatient Prescriptions   Medication     calcium carbonate (OS-KRYSTEN 500 MG Sun'aq. CA) 1250 MG tablet     gabapentin (NEURONTIN) 300 MG capsule     letrozole (FEMARA) 2.5 MG tablet     ondansetron (ZOFRAN) 8 MG tablet     prochlorperazine (COMPAZINE) 10 MG tablet     VITAMIN D, CHOLECALCIFEROL, PO     [DISCONTINUED] tamoxifen (NOLVADEX) 20 MG tablet     No current facility-administered medications for this visit.        PHYSICAL EXAM:  ECO  BP (!) 127/93 (BP " "Location: Left arm, Patient Position: Chair, Cuff Size: Adult Regular)  Pulse 84  Temp 96.3  F (35.7  C) (Oral)  Resp 14  Ht 1.626 m (5' 4.02\")  Wt 51.3 kg (113 lb)  LMP 10/06/2017 (Approximate)  SpO2 98%  BMI 19.39 kg/m2   Wt Readings from Last 10 Encounters:   09/04/18 51.3 kg (113 lb)   08/28/18 52.6 kg (116 lb)   07/30/18 52.8 kg (116 lb 6.4 oz)   07/03/18 51.2 kg (112 lb 14.4 oz)   07/02/18 51.8 kg (114 lb 4 oz)   06/05/18 51 kg (112 lb 8 oz)   05/07/18 50.1 kg (110 lb 8 oz)   05/07/18 50.1 kg (110 lb 8 oz)   04/06/18 51.4 kg (113 lb 4.8 oz)   04/02/18 51.7 kg (114 lb)      General: Alert, oriented, pleasant, NAD  Skin: warm, dry without rashes or bruising  MSK: Unable to elicit any pain over the LCW  Lungs:  normal work of breathing, LLL with dullness to auscultation and percussion at left base  Full exam not performed, counseling visit, no change in patient status since last visit    Labs:   Imaging:   PET8/27/18  IMPRESSION: In this patient with metastatic breast cancer, there is  evidence of disease progression:  1. There are multiple new and/or worsening osseous metastatic lesions  diffusely involving the spine, ribs, pelvis and sacrum. No pathologic  fracture.  2. Multiple new or worsening FDG avid metastatic lesions involving the  left lateral thoracic/abdominal wall musculature and subcutaneous  tissues.  3. Large left-sided, malignant pleural effusion with FDG avid pleural  thickening/nodularity. Pleural effusion results in near complete  collapse of the left lung and mild rightward shift of the mediastinum.  Additionally, worsening pulmonary metastatic disease with irregular  interlobular septal thickening, which may represent lymphangitic  carcinomatosis.  4.  Stable hepatic metastasis with new mildly increased FDG avidity.  New FDG avid left adrenal/retroperitoneal metastasis.  5. Please see same-day head and neck PET/CT for further information  regarding those findings.       Assessment & " Plan: Shan Marsh is a 47-year-old female with history of a T1 N0, ER/TX positive, HER2-negative left breast cancer treated with bilateral mastectomies in 2012, now with metastatic disease involving the left axilla, bones, and possible liver and lung, ER/TX positive, HER2 negative.  She had progression on palbociclib and Tamoxifen.     Metastatic breast cancer: She was recommended to start abemiciclib however, shortly after starting the medication, she sought care in [place] and received a variety of alternative/investigative agents, as listed in HPI.  Abemiciclib was stopped. PET 8/27 showing widely progressive disease with numerous new bone metastases, new metastases involving the left thoracic/abdominal wall musculature and subcutaneous tissues, new left adrenal met, and recurrent large L pleural effusion.     Discussed starting Xeloda and discouraged further treatment in [place]. She would like to try Xeloda, 1500 mg BID 7 days on/7 days off. The possible side effects of treatment and anticipatory side effect management were reviewed in detail. She also met with one of our oral chemotherapy pharmacists for teaching.  - Stop Letrozole -- she believes this is causing her side effects  - She met with Dr. Richardson in [place], recommended against  Oophorectomy for several reasons. If her pleural effusion clears may be an option.    L neck, chest wall pains secondary to cancer. Seems nerve related given the descriptors. Likely secondary to cancer, unclear where the foci is originating from as she has lesions throughout the ribcage and lateral thoracic musculature, without any localizing foci of pain. Minimal pain with standing, worsens with laying down, could be related to the large effusion? We had discussed gabapentin last week, but she ended up not wanting to take it after reading the patient insert. She was open to trying Tramadol  mg q6h PRN. Counseled on constipation. She will try this. She has a thoracentesis  tomorrow. If the pain persists, and if this seems to localize, we can consider having her see rad onc. She is in process of cannabis certification.    Bone mets: Continue monthly Zometa every 3 months, due October.     L pleural effusion: Requiring intermittent thoracentesis. Re-accumulation on most recent PET with WILSON. Requested scheduling for [place] last week, scheduled for tomorrow.    Chronic nausea: presumably cancer related/treatment related. She had been certified for cannabis in the past and never filled it, re-certified last week. She has zofran at home if needed with Xeloda.      Of note, Shan is going to [place] 9/10-30. She understands to call us with any concerns. She has access to [facility] for labwork if needed and was given our fax number. She also has family in nearby [place] and plans to go there should she need medical care.    I spent >25 minutes with the patient, with over 50% of the time spent counseling or coordinating their care as described above.    Lima Griffiths PA-C

## 2018-09-05 ENCOUNTER — HOSPITAL ENCOUNTER (OUTPATIENT)
Facility: AMBULATORY SURGERY CENTER | Age: 48
End: 2018-09-05
Attending: PHYSICIAN ASSISTANT
Payer: COMMERCIAL

## 2018-09-05 ENCOUNTER — RADIANT APPOINTMENT (OUTPATIENT)
Dept: RADIOLOGY | Facility: AMBULATORY SURGERY CENTER | Age: 48
End: 2018-09-05
Attending: PHYSICIAN ASSISTANT
Payer: COMMERCIAL

## 2018-09-05 ENCOUNTER — SURGERY (OUTPATIENT)
Age: 48
End: 2018-09-05

## 2018-09-05 VITALS
HEIGHT: 64 IN | RESPIRATION RATE: 14 BRPM | HEART RATE: 90 BPM | OXYGEN SATURATION: 98 % | WEIGHT: 114 LBS | DIASTOLIC BLOOD PRESSURE: 91 MMHG | SYSTOLIC BLOOD PRESSURE: 128 MMHG | TEMPERATURE: 99.2 F | BODY MASS INDEX: 19.46 KG/M2

## 2018-09-05 DIAGNOSIS — C50.919 METASTATIC BREAST CANCER: ICD-10-CM

## 2018-09-05 DIAGNOSIS — R39.15 URINARY URGENCY: Primary | ICD-10-CM

## 2018-09-05 DIAGNOSIS — R39.15 URINARY URGENCY: ICD-10-CM

## 2018-09-05 LAB
ALBUMIN UR-MCNC: NEGATIVE MG/DL
AMORPH CRY #/AREA URNS HPF: ABNORMAL /HPF
APPEARANCE UR: CLEAR
BILIRUB UR QL STRIP: NEGATIVE
COLOR UR AUTO: COLORLESS
ERYTHROCYTE [DISTWIDTH] IN BLOOD BY AUTOMATED COUNT: 11.9 % (ref 10–15)
GLUCOSE UR STRIP-MCNC: NEGATIVE MG/DL
HCT VFR BLD AUTO: 43.7 % (ref 35–47)
HGB BLD-MCNC: 14.7 G/DL (ref 11.7–15.7)
HGB UR QL STRIP: NEGATIVE
INR PPP: 0.95 (ref 0.86–1.14)
KETONES UR STRIP-MCNC: NEGATIVE MG/DL
LEUKOCYTE ESTERASE UR QL STRIP: NEGATIVE
MCH RBC QN AUTO: 31.4 PG (ref 26.5–33)
MCHC RBC AUTO-ENTMCNC: 33.6 G/DL (ref 31.5–36.5)
MCV RBC AUTO: 93 FL (ref 78–100)
MUCOUS THREADS #/AREA URNS LPF: PRESENT /LPF
NITRATE UR QL: NEGATIVE
PH UR STRIP: 8 PH (ref 5–7)
PLATELET # BLD AUTO: 240 10E9/L (ref 150–450)
RBC # BLD AUTO: 4.68 10E12/L (ref 3.8–5.2)
RBC #/AREA URNS AUTO: <1 /HPF (ref 0–2)
SOURCE: ABNORMAL
SP GR UR STRIP: 1 (ref 1–1.03)
UROBILINOGEN UR STRIP-MCNC: 0 MG/DL (ref 0–2)
WBC # BLD AUTO: 5 10E9/L (ref 4–11)
WBC #/AREA URNS AUTO: <1 /HPF (ref 0–5)

## 2018-09-05 RX ORDER — CAPECITABINE 500 MG/1
1500 TABLET, FILM COATED ORAL 2 TIMES DAILY
Qty: 84 TABLET | Refills: 0 | Status: ON HOLD | OUTPATIENT
Start: 2018-09-05 | End: 2018-10-11

## 2018-09-05 RX ORDER — LIDOCAINE 40 MG/G
CREAM TOPICAL
Status: DISCONTINUED | OUTPATIENT
Start: 2018-09-05 | End: 2018-09-06 | Stop reason: HOSPADM

## 2018-09-05 NOTE — DISCHARGE INSTRUCTIONS
Discharge Instructions for Paracentesis or Thoracentesis     Paracentesis is a procedure to remove extra fluid from your belly (abdomen). Thoracentesis is a procedure to remove extra fluid from your chest.  This fluid buildup is called ascites. The procedure may have been done to take a sample of the fluid. Or, it may have been done to drain the extra fluid from your abdomen or chest to help make you more comfortable.     Home care    If you have pain after the procedure, your healthcare provider can prescribe or recommend pain medicines. Take these exactly as directed. If you stopped taking other medicines before the procedure, ask your provider when you can start them again.    Avoid strenuous activity for 48 hours.    You will have a small bandage over the puncture site. Adhesive tapes, adhesive strips, or surgical glue may also be used to close the incision. They also help stop bleeding and promote healing. You may take the bandage off in 24-48 hours. Once there is a scab over the site, no bandages are required.    Check the puncture site for the signs of infection listed below.     Follow-up care  Make a follow-up appointment with your healthcare provider as directed. During your follow-up visit, your provider will check your healing. Let your provider know how you are feeling. You can also discuss the cause of your fluid, and if you need any further treatment.    When to seek medical advice:  Call your healthcare provider if you have any of the following after the procedure:    A fever of 100.4 F (38.0 C) or higher    Trouble breathing    Abdominal pain that is worse than expected    Belly Abdominal pain not caused by having the skin punctured that is worse than expected    Persistent bleeding from the puncture site    More than a small amount of fluid leaking from the puncture site    Swollen belly    Signs of infection at the puncture site. These include increased pain, redness, or swelling, warmth, or  bad-smelling drainage.    Feeling dizzy or lightheaded, or fainting     Call our Interventional Radiology (IR) service if:  If you start bleeding from the procedure site.  If you do start to bleed from the site, lie down and hold some pressure on the site.  Our radiology provider can help you decide if you need to return to the hospital.  If you have new or worsening pain related to the procedure.  If you have pronounced swelling at the procedure site.  If you develop persistent nausea or vomiting.  If you develop hives or a rash or any unexplained itching.  If you have a fever of greater than 100.4  F and chills in the first 5 days after procedure.  Any other concerns related to your procedure.      [facility]  Interventional Radiology (IR)  [address]  [facility] [place] [address]    Contact Number:  [phone]  (IR control desk)  Monday - Friday 8:00 am - 4:30 pm    After hours for urgent concerns:  [phone]  After 4:30 pm Monday - Friday, Weekends and Holidays.   Ask for Interventional Radiology on-call.  Someone is available 24 hours a day.  [facility] toll free number:  [phone]

## 2018-09-05 NOTE — PROCEDURES
Interventional Radiology Brief Post Procedure Note    Procedure:  IR THORACENTESIS [[id number]]    Proceduralist: HERMANN Banda PA-C    Assistant: None    Time Out: Prior to the start of the procedure and with procedural staff participation, I verbally confirmed the patient s identity using two indicators, relevant allergies, that the procedure was appropriate and matched the consent or emergent situation, and that the correct equipment/implants were available. Immediately prior to starting the procedure I conducted the Time Out with the procedural staff and re-confirmed the patient s name, procedure, and site/side. (The Joint Commission universal protocol was followed.)  Yes        Sedation: None. Local Anesthestic used    Findings: Completed ultrasound guided LEFT-sided thoracentesis. A total of 1100 mL dark yellow colored fluid drained from the chest. No fluid sample was collected for analysis. No immediate complication.  Dx: Metastatic breast cancer; Pleural effusion, left. Mindy.  LOCAL    Estimated Blood Loss: Minimal    Fluoroscopy Time:  minute(s)    SPECIMENS: None    Complications: 1. None     Condition: Stable    Plan: 1 hour observation with vitals, (until 1445)    Comments: See dictated procedure note for full details.    López Guillen PA-C

## 2018-09-05 NOTE — IP AVS SNAPSHOT
[facility]    [address] [place] [address]    Phone:  [phone]    Fax:  [phone]                                       After Visit Summary   9/5/2018    Shan Marsh    MRN: [id number]           After Visit Summary Signature Page     I have received my discharge instructions, and my questions have been answered. I have discussed any challenges I see with this plan with the nurse or doctor.    ..........................................................................................................................................  Patient/Patient Representative Signature      ..........................................................................................................................................  Patient Representative Print Name and Relationship to Patient    ..................................................               ................................................  Date                                            Time    ..........................................................................................................................................  Reviewed by Signature/Title    ...................................................              ..............................................  Date                                                            Time          22EPIC Rev 08/18

## 2018-09-05 NOTE — IP AVS SNAPSHOT
MRN:[id number]                      After Visit Summary   9/5/2018    Shan Marsh    MRN: [id number]           Thank you!     Thank you for choosing [facility] for your care. Our goal is always to provide you with excellent care. Hearing back from our patients is one way we can continue to improve our services. Please take a few minutes to complete the written survey that you may receive in the mail after you visit with us. Thank you!        Patient Information     Date Of Birth          1970        About your hospital stay     You were admitted on:  September 5, 2018 You last received care in the:  [facility]    You were discharged on:  September 5, 2018       Who to Call     For medical emergencies, please call 911.  For non-urgent questions about your medical care, please call your primary care provider or clinic, [phone]  For questions related to your surgery, please call your surgery clinic        Attending Provider     Provider Specialty    López Guillen PA-C Radiology       Primary Care Provider Office Phone # Fax #    Marilu Michelle [phone]      Your next 10 appointments already scheduled     Sep 25, 2018 12:30 PM CDT   (Arrive by 12:15 PM)   Return Visit with Ana Whitman MD   [facility] ([facility])    [address] [place] [address]  [address]  [facility] [id number] [phone]            Sep 25, 2018  3:00 PM CDT   Infusion 60 with UC ONCOLOGY INFUSION, UC 26 ATC   [facility] ([facility])    [address] [place] [address]  [address]  [facility] [id number] [phone]            Oct 22, 2018 10:00 AM CDT   (Arrive by 9:45 AM)   Return Visit with Ana Whitman MD   [facility] ([facility])    [address] [place] [address]  [address]  [facility] [id number] [phone]             Oct 22, 2018 10:30 AM CDT   Infusion 60 with UC ONCOLOGY INFUSION, UC 31 ATC   [facility] ([facility])    [address]  [address]  [facility] [address] [phone]              Further instructions from your care team       Discharge Instructions for Paracentesis or Thoracentesis     Paracentesis is a procedure to remove extra fluid from your belly (abdomen). Thoracentesis is a procedure to remove extra fluid from your chest.  This fluid buildup is called ascites. The procedure may have been done to take a sample of the fluid. Or, it may have been done to drain the extra fluid from your abdomen or chest to help make you more comfortable.     Home care    If you have pain after the procedure, your healthcare provider can prescribe or recommend pain medicines. Take these exactly as directed. If you stopped taking other medicines before the procedure, ask your provider when you can start them again.    Avoid strenuous activity for 48 hours.    You will have a small bandage over the puncture site. Adhesive tapes, adhesive strips, or surgical glue may also be used to close the incision. They also help stop bleeding and promote healing. You may take the bandage off in 24-48 hours. Once there is a scab over the site, no bandages are required.    Check the puncture site for the signs of infection listed below.     Follow-up care  Make a follow-up appointment with your healthcare provider as directed. During your follow-up visit, your provider will check your healing. Let your provider know how you are feeling. You can also discuss the cause of your fluid, and if you need any further treatment.    When to seek medical advice:  Call your healthcare provider if you have any of the following after the procedure:    A fever of 100.4 F (38.0 C) or higher    Trouble breathing    Abdominal pain that is worse than expected    Belly Abdominal pain not caused by having  "the skin punctured that is worse than expected    Persistent bleeding from the puncture site    More than a small amount of fluid leaking from the puncture site    Swollen belly    Signs of infection at the puncture site. These include increased pain, redness, or swelling, warmth, or bad-smelling drainage.    Feeling dizzy or lightheaded, or fainting     Call our Interventional Radiology (IR) service if:  If you start bleeding from the procedure site.  If you do start to bleed from the site, lie down and hold some pressure on the site.  Our radiology provider can help you decide if you need to return to the hospital.  If you have new or worsening pain related to the procedure.  If you have pronounced swelling at the procedure site.  If you develop persistent nausea or vomiting.  If you develop hives or a rash or any unexplained itching.  If you have a fever of greater than 100.4  F and chills in the first 5 days after procedure.  Any other concerns related to your procedure.      [facility]  Interventional Radiology (IR)  [address]  [address] [facility] [place] [address]    Contact Number:  [phone]  (IR control desk)  Monday - Friday 8:00 am - 4:30 pm    After hours for urgent concerns:  [phone]  After 4:30 pm Monday - Friday, Weekends and Holidays.   Ask for Interventional Radiology on-call.  Someone is available 24 hours a day.  [facility] toll free number:  [phone]    Pending Results     Date and Time Order Name Status Description    9/5/2018 1243 IR THORACENTESIS In process             Admission Information     Date & Time Provider Department Dept. Phone    9/5/2018 López Guillen PA-C [facility] [phone]      Your Vitals Were     Blood Pressure Pulse Temperature Respirations Height Weight    114/80 90 98.2  F (36.8  C) (Oral) 16 1.626 m (5' 4\") 51.7 kg (114 lb)    Last Period Pulse Oximetry BMI (Body Mass " Index)             10/06/2017 (Approximate) 97% 19.57 kg/m2         [employer] Information     [employer] gives you secure access to your electronic health record. If you see a primary care provider, you can also send messages to your care team and make appointments. If you have questions, please call your primary care clinic.  If you do not have a primary care provider, please call [phone] and they will assist you.      [employer] is an electronic gateway that provides easy, online access to your medical records. With [employer], you can request a clinic appointment, read your test results, renew a prescription or communicate with your care team.     To access your existing account, please contact your [facility] or call [phone] for assistance.        Care EveryWhere ID     This is your Care EveryWhere ID. This could be used by other organizations to access your [facility] medical records  [id number]        Equal Access to Services     LUZ JAMES : Karla Schaefer, [place] bejarano, makenzie coy, edi mustafa . So [facility] [phone].    ATENCIÓN: Si habla español, tiene a gamez disposición servicios gratuitos de asistencia lingüística. Llame al [phone].    We comply with applicable federal civil rights laws and [place] laws. We do not discriminate on the basis of race, color, national origin, age, disability, sex, sexual orientation, or gender identity.               Review of your medicines      UNREVIEWED medicines. Ask your doctor about these medicines        Dose / Directions    calcium carbonate 500 mg {elemental} 500 MG tablet   Commonly known as:  OS-KRYSTEN        Dose:  1 tablet   Take 1 tablet by mouth daily   Refills:  0       capecitabine 500 MG tablet CHEMO   Commonly known as:  XELODA   Used for:  Metastatic breast cancer (H)        Dose:  1500 mg   Take 3 tablets (1,500 mg) by mouth 2 times daily Take on Days 1 thru 7  and 15 thru 21 of each 28 day cycle.   Quantity:  84 tablet   Refills:  0       ondansetron 8 MG tablet   Commonly known as:  ZOFRAN   Used for:  Metastatic breast cancer (H), Bone metastasis (H)        Dose:  8 mg   Take 1 tablet (8 mg) by mouth every 8 hours as needed for nausea   Quantity:  40 tablet   Refills:  3       prochlorperazine 10 MG tablet   Commonly known as:  COMPAZINE        Refills:  0       traMADol 50 MG tablet   Commonly known as:  ULTRAM   Used for:  Cancer associated pain        Dose:   mg   Take 1-2 tablets ( mg) by mouth every 6 hours as needed for moderate to severe pain or severe pain   Quantity:  60 tablet   Refills:  0       VITAMIN D (CHOLECALCIFEROL) PO        Take by mouth daily   Refills:  0                Protect others around you: Learn how to safely use, store and throw away your medicines at www.disposemymeds.org.             Medication List: This is a list of all your medications and when to take them. Check marks below indicate your daily home schedule. Keep this list as a reference.      Medications           Morning Afternoon Evening Bedtime As Needed    calcium carbonate 500 mg {elemental} 500 MG tablet   Commonly known as:  OS-KRYSTEN   Take 1 tablet by mouth daily                                capecitabine 500 MG tablet CHEMO   Commonly known as:  XELODA   Take 3 tablets (1,500 mg) by mouth 2 times daily Take on Days 1 thru 7 and 15 thru 21 of each 28 day cycle.                                ondansetron 8 MG tablet   Commonly known as:  ZOFRAN   Take 1 tablet (8 mg) by mouth every 8 hours as needed for nausea                                prochlorperazine 10 MG tablet   Commonly known as:  COMPAZINE                                traMADol 50 MG tablet   Commonly known as:  ULTRAM   Take 1-2 tablets ( mg) by mouth every 6 hours as needed for moderate to severe pain or severe pain                                VITAMIN D (CHOLECALCIFEROL) PO   Take by mouth  daily

## 2018-09-06 ENCOUNTER — CARE COORDINATION (OUTPATIENT)
Dept: ONCOLOGY | Facility: CLINIC | Age: 48
End: 2018-09-06

## 2018-09-06 NOTE — PROGRESS NOTES
"Spoke to patient with having a liter of fluid with thoracentesis yesterday and IR MD stated there was still a \"liter\" of fluid remaining requiring a \"staged thoracentesis\" for the next procedure. Patient flying to [place] 9/10/18 returning on 9/30/18 asking about when to schedule another thoracentesis with travelling. Patient asking if the fluid will keep re-accumulating with travelling to [place]. Patient has a return visit on 9/25/18 with Dr Whitman and will not be back from [place] at that time. Patient is willing to change dates of her trip to [place], but will return call later today to discuss her travel. Patient wants to check with the MD she sees in [place] to perform a thoracentesis which the patient is wanting to do that and offered to do a thoracentesis in a week and delay travel to accommodate the thoracentesis. Will send a message to Dr Whitman for recommendations.  Answered all patient's questions and verbalized understanding. Sharifa Celeste RN, BSN.     "

## 2018-09-07 DIAGNOSIS — C50.919 METASTATIC BREAST CANCER: Primary | ICD-10-CM

## 2018-09-11 ENCOUNTER — DOCUMENTATION ONLY (OUTPATIENT)
Dept: ONCOLOGY | Facility: CLINIC | Age: 48
End: 2018-09-11

## 2018-09-11 NOTE — PROGRESS NOTES
Dr. Whitman attested that patient is disabled. Signed on 9/4/18. Faxed to [employer]' custodial fund at [phone]

## 2018-09-12 ENCOUNTER — TELEPHONE (OUTPATIENT)
Dept: PHARMACY | Facility: CLINIC | Age: 48
End: 2018-09-12

## 2018-09-12 NOTE — ORAL ONC MGMT
Oral Chemotherapy Monitoring Program    Primary Oncologist: Dr. Whitman  Primary Oncology Clinic: [facility]  Cancer Diagnosis: Breast Cancer    Therapy History:  C1D1=9/5/18, Xeloda 1500mg BID 7 days on 7 days off for 28 day cycles    Drug Interaction Assessment: Recently started gabapentin and CBD oil, no interactions identified    Lab Monitoring Plan  C1D1+   CMP, CBC C2D1+ Call, CMP, CBC C3D1+ Call, CMP, CBC C4D1+ Call, CMP, CBC C5D1+ Call, CMP, CBC C6D1+ Call, CMP, CBC   C1D8+ Call C2D8+  C3D8+  C4D8+  C5D8+  C6D8+    C1D15+  C2D15+  C3D15+  C4D15+  C5D15+  C6D15+    Monitoring plan for the 5 days per week x 5-6 weeks with XRT:   C1D1+   CMP, CBC C1D15+  C1D29+ call         C1D8+ Call C1D22+ Call, CMP, CBC C1D36+            Subjective/Objective:  Shan Marsh is a 48 year old female contacted by phone for a follow-up visit for oral chemotherapy.  Shan is currently on vacation and reports that the first week of therapy went very well.  Had more nausea then her previous therapies but has been able to manage this with prochlorperazine and CBD oil.  She does have some constipation but is using dietary changes to manage.  While on vacation she is having her provider in [place] help manage her therapy and lab draws.  She will be back from the trip on 9/30/18.    ORAL CHEMOTHERAPY 1/12/2018 9/4/2018 9/12/2018   Drug Name (No Data) Xeloda (Capecitabine) Xeloda (Capecitabine)   Current Dosage 150mg 1500mg 1500mg   Current Schedule BID BID BID   Cycle Details Continuous 1 week on 1 week off 1 week on 1 week off   Start Date of Last Cycle - - 9/5/2018   Doses missed in last 2 weeks - - 0   Adherence Assessment - - Adherent   Adverse Effects - - No AE identified during assessment   Any new drug interactions? No Yes No   Pharmacist Intervention? - Yes -   Intervention(s) - Patient Education -   Is the dose as ordered appropriate for the patient? Yes Yes Yes       Vitals:  BP:   BP Readings from Last 1 Encounters:  "  09/05/18 (!) 128/91     Wt Readings from Last 1 Encounters:   09/05/18 51.7 kg (114 lb)     Estimated body surface area is 1.53 meters squared as calculated from the following:    Height as of 9/5/18: 1.626 m (5' 4\").    Weight as of 9/5/18: 51.7 kg (114 lb).    Labs:  _  Result Component Current Result Ref Range   Sodium 140 (8/28/2018) 133 - 144 mmol/L     _  Result Component Current Result Ref Range   Potassium 3.6 (8/28/2018) 3.4 - 5.3 mmol/L     _  Result Component Current Result Ref Range   Calcium 9.3 (8/28/2018) 8.5 - 10.1 mg/dL     No results found for Mag within last 30 days.     No results found for Phos within last 30 days.     _  Result Component Current Result Ref Range   Albumin 3.4 (8/28/2018) 3.4 - 5.0 g/dL     _  Result Component Current Result Ref Range   Urea Nitrogen 12 (8/28/2018) 7 - 30 mg/dL     _  Result Component Current Result Ref Range   Creatinine 0.66 (8/28/2018) 0.52 - 1.04 mg/dL       _  Result Component Current Result Ref Range   AST 40 (8/28/2018) 0 - 45 U/L     _  Result Component Current Result Ref Range   ALT 24 (8/28/2018) 0 - 50 U/L     _  Result Component Current Result Ref Range   Bilirubin Total 0.3 (8/28/2018) 0.2 - 1.3 mg/dL       _  Result Component Current Result Ref Range   WBC 5.0 (9/5/2018) 4.0 - 11.0 10e9/L     _  Result Component Current Result Ref Range   Hemoglobin 14.7 (9/5/2018) 11.7 - 15.7 g/dL     _  Result Component Current Result Ref Range   Platelet Count 240 (9/5/2018) 150 - 450 10e9/L     _  Result Component Current Result Ref Range   Absolute Neutrophil 2.8 (8/28/2018) 1.6 - 8.3 10e9/L       Assessment:  Shan is doing well, has some nausea that is being managed with medication and constipation being managed by dietary changes.    Plan:  Continue with therapy.    Follow-Up:  10/8 Dr. Whitman appt.      Alan Castaneda, PharmD.  Oral Chemotherapy Monitoring Program  [phone]    "

## 2018-10-01 DIAGNOSIS — C50.919 METASTATIC BREAST CANCER: Primary | ICD-10-CM

## 2018-10-03 ENCOUNTER — RADIANT APPOINTMENT (OUTPATIENT)
Dept: RADIOLOGY | Facility: AMBULATORY SURGERY CENTER | Age: 48
End: 2018-10-03
Attending: INTERNAL MEDICINE
Payer: COMMERCIAL

## 2018-10-03 ENCOUNTER — SURGERY (OUTPATIENT)
Age: 48
End: 2018-10-03

## 2018-10-03 ENCOUNTER — HOSPITAL ENCOUNTER (OUTPATIENT)
Facility: AMBULATORY SURGERY CENTER | Age: 48
End: 2018-10-03
Attending: PHYSICIAN ASSISTANT
Payer: COMMERCIAL

## 2018-10-03 ENCOUNTER — MYC MEDICAL ADVICE (OUTPATIENT)
Dept: ONCOLOGY | Facility: CLINIC | Age: 48
End: 2018-10-03

## 2018-10-03 VITALS
HEIGHT: 64 IN | TEMPERATURE: 99 F | OXYGEN SATURATION: 99 % | SYSTOLIC BLOOD PRESSURE: 94 MMHG | DIASTOLIC BLOOD PRESSURE: 69 MMHG | RESPIRATION RATE: 16 BRPM | WEIGHT: 111 LBS | HEART RATE: 117 BPM | BODY MASS INDEX: 18.95 KG/M2

## 2018-10-03 DIAGNOSIS — C50.919 METASTATIC BREAST CANCER: ICD-10-CM

## 2018-10-03 DIAGNOSIS — J90 PLEURAL EFFUSION: ICD-10-CM

## 2018-10-03 RX ADMIN — Medication 8 ML: at 09:57

## 2018-10-03 NOTE — IP AVS SNAPSHOT
[facility]    [address] [place] [address]    Phone:  [phone]    Fax:  [phone]                                       After Visit Summary   10/3/2018    Shan Marsh    MRN: [id number]           After Visit Summary Signature Page     I have received my discharge instructions, and my questions have been answered. I have discussed any challenges I see with this plan with the nurse or doctor.    ..........................................................................................................................................  Patient/Patient Representative Signature      ..........................................................................................................................................  Patient Representative Print Name and Relationship to Patient    ..................................................               ................................................  Date                                   Time    ..........................................................................................................................................  Reviewed by Signature/Title    ...................................................              ..............................................  Date                                               Time          22EPIC Rev 08/18

## 2018-10-03 NOTE — IP AVS SNAPSHOT
MRN:[id number]                      After Visit Summary   10/3/2018    Shan Marsh    MRN: [id number]           Thank you!     Thank you for choosing [facility] for your care. Our goal is always to provide you with excellent care. Hearing back from our patients is one way we can continue to improve our services. Please take a few minutes to complete the written survey that you may receive in the mail after you visit with us. Thank you!        Patient Information     Date Of Birth          1970        About your hospital stay     You were admitted on:  October 3, 2018 You last received care in the:  [facility]    You were discharged on:  October 3, 2018       Who to Call     For medical emergencies, please call 911.  For non-urgent questions about your medical care, please call your primary care provider or clinic, [phone]  For questions related to your surgery, please call your surgery clinic        Attending Provider     Provider Eros Morales PA-C Physician Assistant       Primary Care Provider Office Phone # Fax #    Marilu Michelle [phone]      Your next 10 appointments already scheduled     Oct 08, 2018  8:00 AM CDT   (Arrive by 7:45 AM)   Return Visit with Ana Whitman MD   [facility] ([facility])    [address] [place] [address]  [address] [place] [id number] [phone]            Oct 08, 2018  1:30 PM CDT   Infusion 60 with UC ONCOLOGY INFUSION, UC 28 ATC   [facility] ([facility])    [address] [place] [address]  [address] [place] [id number] [phone]              Further instructions from your care team       Discharge Instructions for Paracentesis or Thoracentesis     Paracentesis is a procedure to remove extra fluid from your belly (abdomen). Thoracentesis is a procedure to remove extra fluid from  your chest.  This fluid buildup is called ascites. The procedure may have been done to take a sample of the fluid. Or, it may have been done to drain the extra fluid from your abdomen or chest to help make you more comfortable.     Home care    If you have pain after the procedure, your healthcare provider can prescribe or recommend pain medicines. Take these exactly as directed. If you stopped taking other medicines before the procedure, ask your provider when you can start them again.    Avoid strenuous activity for 48 hours.    You will have a small bandage over the puncture site. Adhesive tapes, adhesive strips, or surgical glue may also be used to close the incision. They also help stop bleeding and promote healing. You may take the bandage off in 24-48 hours. Once there is a scab over the site, no bandages are required.    Check the puncture site for the signs of infection listed below.     Follow-up care  Make a follow-up appointment with your healthcare provider as directed. During your follow-up visit, your provider will check your healing. Let your provider know how you are feeling. You can also discuss the cause of your fluid, and if you need any further treatment.    When to seek medical advice:  Call your healthcare provider if you have any of the following after the procedure:    A fever of 100.4 F (38.0 C) or higher    Trouble breathing    Abdominal pain that is worse than expected    Belly Abdominal pain not caused by having the skin punctured that is worse than expected    Persistent bleeding from the puncture site    More than a small amount of fluid leaking from the puncture site    Swollen belly    Signs of infection at the puncture site. These include increased pain, redness, or swelling, warmth, or bad-smelling drainage.    Feeling dizzy or lightheaded, or fainting     Call our Interventional Radiology (IR) service if:  If you start bleeding from the procedure site.  If you do start to bleed  "from the site, lie down and hold some pressure on the site.  Our radiology provider can help you decide if you need to return to the hospital.  If you have new or worsening pain related to the procedure.  If you have pronounced swelling at the procedure site.  If you develop persistent nausea or vomiting.  If you develop hives or a rash or any unexplained itching.  If you have a fever of greater than 100.4  F and chills in the first 5 days after procedure.  Any other concerns related to your procedure.      [facility]  Interventional Radiology (IR)  [address]  2nd Floor, [facility] [place] [address]    Contact Number:  [phone]  (IR control desk)  Monday - Friday 8:00 am - 4:30 pm    After hours for urgent concerns:  [phone]  After 4:30 pm Monday - Friday, Weekends and Holidays.   Ask for Interventional Radiology on-call.  Someone is available 24 hours a day.  [facility] toll free number:  [phone]    Pending Results     Date and Time Order Name Status Description    10/3/2018 0845 IR THORACENTESIS In process             Admission Information     Date & Time Provider Department Dept. Phone    10/3/2018 Eros Chauhan PA-C [facility] [phone]      Your Vitals Were     Blood Pressure Pulse Temperature Respirations Height Weight    110/85 117 99  F (37.2  C) (Oral) 16 1.626 m (5' 4\") 50.3 kg (111 lb)    Last Period Pulse Oximetry BMI (Body Mass Index)             10/06/2017 (Approximate) 97% 19.05 kg/m2         [email or web address] Information     [email or web address] gives you secure access to your electronic health record. If you see a primary care provider, you can also send messages to your care team and make appointments. If you have questions, please call your primary care clinic.  If you do not have a primary care provider, please call [phone] and they will assist you.      [email or web address] is an electronic gateway that provides easy, online access " to your medical records. With [employer], you can request a clinic appointment, read your test results, renew a prescription or communicate with your care team.     To access your existing account, please contact your [facility] or call [phone] for assistance.        Care EveryWhere ID     This is your Care EveryWhere ID. This could be used by other organizations to access your [facility] medical records  [id number]        Equal Access to Services     LUZ JAMES : Hadii noemy ku hadasho Soomaali, waaxda luqadaha, qaybta kaalmada adeegyada, edi jimenezrenardsukhjinder kolb. So [facility] [phone].    ATENCIÓN: Si nedla donta, tiene a gamez disposición servicios gratuitos de asistencia lingüística. Llame al [phone].    We comply with applicable federal civil rights laws and [place] laws. We do not discriminate on the basis of race, color, national origin, age, disability, sex, sexual orientation, or gender identity.               Review of your medicines      UNREVIEWED medicines. Ask your doctor about these medicines        Dose / Directions    calcium carbonate 500 mg (elemental) 500 MG tablet   Commonly known as:  OS-KRYSTEN        Dose:  1 tablet   Take 1 tablet by mouth daily   Refills:  0       capecitabine 500 MG tablet CHEMO   Commonly known as:  XELODA   Used for:  Metastatic breast cancer (H)        Dose:  1500 mg   Take 3 tablets (1,500 mg) by mouth 2 times daily Take on Days 1 thru 7 and 15 thru 21 of each 28 day cycle.   Quantity:  84 tablet   Refills:  0       ondansetron 8 MG tablet   Commonly known as:  ZOFRAN   Used for:  Metastatic breast cancer (H), Bone metastasis (H)        Dose:  8 mg   Take 1 tablet (8 mg) by mouth every 8 hours as needed for nausea   Quantity:  40 tablet   Refills:  3       prochlorperazine 10 MG tablet   Commonly known as:  COMPAZINE        Refills:  0       traMADol 50 MG tablet   Commonly known as:  ULTRAM   Used for:  Cancer  associated pain        Dose:   mg   Take 1-2 tablets ( mg) by mouth every 6 hours as needed for moderate to severe pain or severe pain   Quantity:  60 tablet   Refills:  0       VITAMIN D (CHOLECALCIFEROL) PO        Take by mouth daily   Refills:  0                Protect others around you: Learn how to safely use, store and throw away your medicines at www.disposemymeds.org.             Medication List: This is a list of all your medications and when to take them. Check marks below indicate your daily home schedule. Keep this list as a reference.      Medications           Morning Afternoon Evening Bedtime As Needed    calcium carbonate 500 mg (elemental) 500 MG tablet   Commonly known as:  OS-KRYSTEN   Take 1 tablet by mouth daily                                capecitabine 500 MG tablet CHEMO   Commonly known as:  XELODA   Take 3 tablets (1,500 mg) by mouth 2 times daily Take on Days 1 thru 7 and 15 thru 21 of each 28 day cycle.                                ondansetron 8 MG tablet   Commonly known as:  ZOFRAN   Take 1 tablet (8 mg) by mouth every 8 hours as needed for nausea                                prochlorperazine 10 MG tablet   Commonly known as:  COMPAZINE                                traMADol 50 MG tablet   Commonly known as:  ULTRAM   Take 1-2 tablets ( mg) by mouth every 6 hours as needed for moderate to severe pain or severe pain                                VITAMIN D (CHOLECALCIFEROL) PO   Take by mouth daily

## 2018-10-03 NOTE — DISCHARGE INSTRUCTIONS
Discharge Instructions for Paracentesis or Thoracentesis     Paracentesis is a procedure to remove extra fluid from your belly (abdomen). Thoracentesis is a procedure to remove extra fluid from your chest.  This fluid buildup is called ascites. The procedure may have been done to take a sample of the fluid. Or, it may have been done to drain the extra fluid from your abdomen or chest to help make you more comfortable.     Home care    If you have pain after the procedure, your healthcare provider can prescribe or recommend pain medicines. Take these exactly as directed. If you stopped taking other medicines before the procedure, ask your provider when you can start them again.    Avoid strenuous activity for 48 hours.    You will have a small bandage over the puncture site. Adhesive tapes, adhesive strips, or surgical glue may also be used to close the incision. They also help stop bleeding and promote healing. You may take the bandage off in 24-48 hours. Once there is a scab over the site, no bandages are required.    Check the puncture site for the signs of infection listed below.     Follow-up care  Make a follow-up appointment with your healthcare provider as directed. During your follow-up visit, your provider will check your healing. Let your provider know how you are feeling. You can also discuss the cause of your fluid, and if you need any further treatment.    When to seek medical advice:  Call your healthcare provider if you have any of the following after the procedure:    A fever of 100.4 F (38.0 C) or higher    Trouble breathing    Abdominal pain that is worse than expected    Belly Abdominal pain not caused by having the skin punctured that is worse than expected    Persistent bleeding from the puncture site    More than a small amount of fluid leaking from the puncture site    Swollen belly    Signs of infection at the puncture site. These include increased pain, redness, or swelling, warmth, or  bad-smelling drainage.    Feeling dizzy or lightheaded, or fainting     Call our Interventional Radiology (IR) service if:  If you start bleeding from the procedure site.  If you do start to bleed from the site, lie down and hold some pressure on the site.  Our radiology provider can help you decide if you need to return to the hospital.  If you have new or worsening pain related to the procedure.  If you have pronounced swelling at the procedure site.  If you develop persistent nausea or vomiting.  If you develop hives or a rash or any unexplained itching.  If you have a fever of greater than 100.4  F and chills in the first 5 days after procedure.  Any other concerns related to your procedure.      [facility]  Interventional Radiology (IR)  [address]  [facility] [place] [address]    Contact Number:  [phone]  (IR control desk)  Monday - Friday 8:00 am - 4:30 pm    After hours for urgent concerns:  [phone]  After 4:30 pm Monday - Friday, Weekends and Holidays.   Ask for Interventional Radiology on-call.  Someone is available 24 hours a day.  [facility] toll free number:  [phone]

## 2018-10-03 NOTE — PROCEDURES
Interventional Radiology Brief Post Procedure Note    Procedure: IR THORACENTESIS [[id number]]    Proceduralist: Vaibhav Chauhan PA-C    Assistant: None    Time Out: Prior to the start of the procedure and with procedural staff participation, I verbally confirmed the patient s identity using two indicators, relevant allergies, that the procedure was appropriate and matched the consent or emergent situation, and that the correct equipment/implants were available. Immediately prior to starting the procedure I conducted the Time Out with the procedural staff and re-confirmed the patient s name, procedure, and site/side. (The Joint Commission universal protocol was followed.)  Yes    Sedation: None. Local Anesthestic used    Findings: Completed ultrasound-guided left therapeutic thoracentesis. A total of 500 mL clear yellow fluid drained from the left pleural space. No immediate complication.    Estimated Blood Loss: None    Fluoroscopy Time: None    Specimens: None    Complications: 1. None     Condition: Stable    Plan: Follow up per primary team. Return to IR as needed.    Comments: See dictated procedure note for full details.    Vaibhav Chauhan PA-C  Interventional Radiology  [phone]

## 2018-10-04 ENCOUNTER — TELEPHONE (OUTPATIENT)
Dept: ONCOLOGY | Facility: CLINIC | Age: 48
End: 2018-10-04

## 2018-10-04 ENCOUNTER — APPOINTMENT (OUTPATIENT)
Dept: GENERAL RADIOLOGY | Facility: CLINIC | Age: 48
DRG: 200 | End: 2018-10-04
Attending: EMERGENCY MEDICINE
Payer: COMMERCIAL

## 2018-10-04 ENCOUNTER — HOSPITAL ENCOUNTER (INPATIENT)
Facility: CLINIC | Age: 48
LOS: 7 days | Discharge: HOME | DRG: 200 | End: 2018-10-11
Attending: EMERGENCY MEDICINE | Admitting: INTERNAL MEDICINE
Payer: COMMERCIAL

## 2018-10-04 ENCOUNTER — APPOINTMENT (OUTPATIENT)
Dept: GENERAL RADIOLOGY | Facility: CLINIC | Age: 48
DRG: 200 | End: 2018-10-04
Attending: NURSE PRACTITIONER
Payer: COMMERCIAL

## 2018-10-04 DIAGNOSIS — J95.811 PNEUMOTHORAX, POSTPROCEDURAL: ICD-10-CM

## 2018-10-04 DIAGNOSIS — C50.919 METASTATIC BREAST CANCER: Primary | ICD-10-CM
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
## 2018-10-04 DIAGNOSIS — R55 SYNCOPE, UNSPECIFIED SYNCOPE TYPE: ICD-10-CM

## 2018-10-04 DIAGNOSIS — R55 SYNCOPE AND COLLAPSE: ICD-10-CM

## 2018-10-04 PROBLEM — J94.8 HYDROPNEUMOTHORAX: Status: ACTIVE | Noted: 2018-10-04

## 2018-10-04 LAB
ANION GAP SERPL CALCULATED.3IONS-SCNC: 8 MMOL/L (ref 3–14)
BASOPHILS # BLD AUTO: 0 10E9/L (ref 0–0.2)
BASOPHILS NFR BLD AUTO: 0.1 %
BUN SERPL-MCNC: 13 MG/DL (ref 7–30)
CALCIUM SERPL-MCNC: 8.4 MG/DL (ref 8.5–10.1)
CHLORIDE SERPL-SCNC: 102 MMOL/L (ref 94–109)
CO2 SERPL-SCNC: 28 MMOL/L (ref 20–32)
CREAT SERPL-MCNC: 0.64 MG/DL (ref 0.52–1.04)
CREAT SERPL-MCNC: 0.7 MG/DL (ref 0.52–1.04)
DIFFERENTIAL METHOD BLD: ABNORMAL
EOSINOPHIL # BLD AUTO: 0.1 10E9/L (ref 0–0.7)
EOSINOPHIL NFR BLD AUTO: 0.9 %
ERYTHROCYTE [DISTWIDTH] IN BLOOD BY AUTOMATED COUNT: 15 % (ref 10–15)
ERYTHROCYTE [DISTWIDTH] IN BLOOD BY AUTOMATED COUNT: 15.2 % (ref 10–15)
GFR SERPL CREATININE-BSD FRML MDRD: >90 ML/MIN/1.7M2
GFR SERPL CREATININE-BSD FRML MDRD: >90 ML/MIN/1.7M2
GLUCOSE BLDC GLUCOMTR-MCNC: 141 MG/DL (ref 70–99)
GLUCOSE SERPL-MCNC: 140 MG/DL (ref 70–99)
HCT VFR BLD AUTO: 36 % (ref 35–47)
HCT VFR BLD AUTO: 36.6 % (ref 35–47)
HGB BLD-MCNC: 11.7 G/DL (ref 11.7–15.7)
HGB BLD-MCNC: 11.8 G/DL (ref 11.7–15.7)
IMM GRANULOCYTES # BLD: 0 10E9/L (ref 0–0.4)
IMM GRANULOCYTES NFR BLD: 0.3 %
INTERPRETATION ECG - MUSE: NORMAL
LACTATE BLD-SCNC: 2.8 MMOL/L (ref 0.7–2)
LYMPHOCYTES # BLD AUTO: 1.4 10E9/L (ref 0.8–5.3)
LYMPHOCYTES NFR BLD AUTO: 16.1 %
MCH RBC QN AUTO: 31.8 PG (ref 26.5–33)
MCH RBC QN AUTO: 32.1 PG (ref 26.5–33)
MCHC RBC AUTO-ENTMCNC: 32.2 G/DL (ref 31.5–36.5)
MCHC RBC AUTO-ENTMCNC: 32.5 G/DL (ref 31.5–36.5)
MCV RBC AUTO: 99 FL (ref 78–100)
MCV RBC AUTO: 99 FL (ref 78–100)
MONOCYTES # BLD AUTO: 0.8 10E9/L (ref 0–1.3)
MONOCYTES NFR BLD AUTO: 8.8 %
NEUTROPHILS # BLD AUTO: 6.5 10E9/L (ref 1.6–8.3)
NEUTROPHILS NFR BLD AUTO: 73.8 %
NRBC # BLD AUTO: 0 10*3/UL
NRBC BLD AUTO-RTO: 0 /100
PLATELET # BLD AUTO: 170 10E9/L (ref 150–450)
PLATELET # BLD AUTO: 171 10E9/L (ref 150–450)
PLATELET # BLD AUTO: 187 10E9/L (ref 150–450)
POTASSIUM SERPL-SCNC: 3.9 MMOL/L (ref 3.4–5.3)
RBC # BLD AUTO: 3.65 10E12/L (ref 3.8–5.2)
RBC # BLD AUTO: 3.71 10E12/L (ref 3.8–5.2)
SODIUM SERPL-SCNC: 138 MMOL/L (ref 133–144)
WBC # BLD AUTO: 8.3 10E9/L (ref 4–11)
WBC # BLD AUTO: 8.8 10E9/L (ref 4–11)

## 2018-10-04 PROCEDURE — 85027 COMPLETE CBC AUTOMATED: CPT | Performed by: NURSE PRACTITIONER

## 2018-10-04 PROCEDURE — 71046 X-RAY EXAM CHEST 2 VIEWS: CPT

## 2018-10-04 PROCEDURE — 25000132 ZZH RX MED GY IP 250 OP 250 PS 637: Performed by: NURSE PRACTITIONER

## 2018-10-04 PROCEDURE — 25000128 H RX IP 250 OP 636: Performed by: PHYSICIAN ASSISTANT

## 2018-10-04 PROCEDURE — 93010 ELECTROCARDIOGRAM REPORT: CPT | Performed by: INTERNAL MEDICINE

## 2018-10-04 PROCEDURE — 0W9B30Z DRAINAGE OF LEFT PLEURAL CAVITY WITH DRAINAGE DEVICE, PERCUTANEOUS APPROACH: ICD-10-PCS | Performed by: EMERGENCY MEDICINE

## 2018-10-04 PROCEDURE — 40000556 ZZH STATISTIC PERIPHERAL IV START W US GUIDANCE

## 2018-10-04 PROCEDURE — 83605 ASSAY OF LACTIC ACID: CPT | Performed by: NURSE PRACTITIONER

## 2018-10-04 PROCEDURE — 32551 INSERTION OF CHEST TUBE: CPT | Mod: Z6 | Performed by: EMERGENCY MEDICINE

## 2018-10-04 PROCEDURE — 96361 HYDRATE IV INFUSION ADD-ON: CPT | Performed by: EMERGENCY MEDICINE

## 2018-10-04 PROCEDURE — 99285 EMERGENCY DEPT VISIT HI MDM: CPT | Mod: 25 | Performed by: EMERGENCY MEDICINE

## 2018-10-04 PROCEDURE — 93005 ELECTROCARDIOGRAM TRACING: CPT

## 2018-10-04 PROCEDURE — 99223 1ST HOSP IP/OBS HIGH 75: CPT | Mod: AI | Performed by: INTERNAL MEDICINE

## 2018-10-04 PROCEDURE — 71045 X-RAY EXAM CHEST 1 VIEW: CPT

## 2018-10-04 PROCEDURE — 25000128 H RX IP 250 OP 636: Performed by: EMERGENCY MEDICINE

## 2018-10-04 PROCEDURE — 93010 ELECTROCARDIOGRAM REPORT: CPT | Mod: 59 | Performed by: EMERGENCY MEDICINE

## 2018-10-04 PROCEDURE — 82565 ASSAY OF CREATININE: CPT | Performed by: NURSE PRACTITIONER

## 2018-10-04 PROCEDURE — 20000002 ZZH R&B BMT INTERMEDIATE

## 2018-10-04 PROCEDURE — 25000128 H RX IP 250 OP 636: Performed by: NURSE PRACTITIONER

## 2018-10-04 PROCEDURE — 00000146 ZZHCL STATISTIC GLUCOSE BY METER IP

## 2018-10-04 PROCEDURE — 85025 COMPLETE CBC W/AUTO DIFF WBC: CPT | Performed by: EMERGENCY MEDICINE

## 2018-10-04 PROCEDURE — 85049 AUTOMATED PLATELET COUNT: CPT | Performed by: NURSE PRACTITIONER

## 2018-10-04 PROCEDURE — 40000986 XR CHEST PORT 1 VW

## 2018-10-04 PROCEDURE — 36415 COLL VENOUS BLD VENIPUNCTURE: CPT | Performed by: NURSE PRACTITIONER

## 2018-10-04 PROCEDURE — 76604 US EXAM CHEST: CPT | Mod: 26 | Performed by: EMERGENCY MEDICINE

## 2018-10-04 PROCEDURE — 32551 INSERTION OF CHEST TUBE: CPT | Performed by: EMERGENCY MEDICINE

## 2018-10-04 PROCEDURE — 87040 BLOOD CULTURE FOR BACTERIA: CPT | Performed by: NURSE PRACTITIONER

## 2018-10-04 PROCEDURE — 93005 ELECTROCARDIOGRAM TRACING: CPT | Performed by: EMERGENCY MEDICINE

## 2018-10-04 PROCEDURE — 25000131 ZZH RX MED GY IP 250 OP 636 PS 637: Performed by: NURSE PRACTITIONER

## 2018-10-04 PROCEDURE — 96360 HYDRATION IV INFUSION INIT: CPT | Performed by: EMERGENCY MEDICINE

## 2018-10-04 PROCEDURE — 80048 BASIC METABOLIC PNL TOTAL CA: CPT | Performed by: EMERGENCY MEDICINE

## 2018-10-04 PROCEDURE — 76604 US EXAM CHEST: CPT | Performed by: EMERGENCY MEDICINE

## 2018-10-04 RX ORDER — ONDANSETRON 4 MG/1
8 TABLET, FILM COATED ORAL EVERY 8 HOURS PRN
Status: DISCONTINUED | OUTPATIENT
Start: 2018-10-04 | End: 2018-10-11 | Stop reason: HOSPADM

## 2018-10-04 RX ORDER — NALOXONE HYDROCHLORIDE 0.4 MG/ML
.1-.4 INJECTION, SOLUTION INTRAMUSCULAR; INTRAVENOUS; SUBCUTANEOUS
Status: DISCONTINUED | OUTPATIENT
Start: 2018-10-04 | End: 2018-10-11 | Stop reason: HOSPADM

## 2018-10-04 RX ORDER — LIDOCAINE HYDROCHLORIDE AND EPINEPHRINE 10; 10 MG/ML; UG/ML
INJECTION, SOLUTION INFILTRATION; PERINEURAL
Status: DISCONTINUED
Start: 2018-10-04 | End: 2018-10-04 | Stop reason: HOSPADM

## 2018-10-04 RX ORDER — TRAMADOL HYDROCHLORIDE 50 MG/1
50-100 TABLET ORAL EVERY 6 HOURS PRN
Status: DISCONTINUED | OUTPATIENT
Start: 2018-10-04 | End: 2018-10-05

## 2018-10-04 RX ORDER — SODIUM CHLORIDE 9 MG/ML
INJECTION, SOLUTION INTRAVENOUS CONTINUOUS
Status: DISCONTINUED | OUTPATIENT
Start: 2018-10-04 | End: 2018-10-06

## 2018-10-04 RX ORDER — CAPECITABINE 500 MG/1
1500 TABLET, FILM COATED ORAL 2 TIMES DAILY
Status: DISCONTINUED | OUTPATIENT
Start: 2018-10-04 | End: 2018-10-04

## 2018-10-04 RX ORDER — GABAPENTIN 300 MG/1
300 CAPSULE ORAL
Status: DISCONTINUED | OUTPATIENT
Start: 2018-10-04 | End: 2018-10-04

## 2018-10-04 RX ORDER — SODIUM CHLORIDE 9 MG/ML
INJECTION, SOLUTION INTRAVENOUS CONTINUOUS
Status: DISCONTINUED | OUTPATIENT
Start: 2018-10-04 | End: 2018-10-04

## 2018-10-04 RX ORDER — CALCIUM CARBONATE 500(1250)
1 TABLET ORAL DAILY
Status: DISCONTINUED | OUTPATIENT
Start: 2018-10-04 | End: 2018-10-06

## 2018-10-04 RX ORDER — TRAMADOL HYDROCHLORIDE 50 MG/1
50-100 TABLET ORAL EVERY 6 HOURS PRN
COMMUNITY
Start: 2018-10-04 | End: 2019-01-01

## 2018-10-04 RX ORDER — GABAPENTIN 300 MG/1
300 TABLET, FILM COATED ORAL
Status: ON HOLD | COMMUNITY
End: 2018-10-04

## 2018-10-04 RX ORDER — ENZYMES,DIGESTIVE
1 CAPSULE ORAL DAILY
COMMUNITY

## 2018-10-04 RX ORDER — FENTANYL CITRATE 50 UG/ML
INJECTION, SOLUTION INTRAMUSCULAR; INTRAVENOUS
Status: DISCONTINUED
Start: 2018-10-04 | End: 2018-10-04 | Stop reason: HOSPADM

## 2018-10-04 RX ORDER — LORAZEPAM 0.5 MG/1
0.5 TABLET ORAL ONCE
Status: DISCONTINUED | OUTPATIENT
Start: 2018-10-04 | End: 2018-10-04

## 2018-10-04 RX ORDER — PROCHLORPERAZINE MALEATE 5 MG
5-10 TABLET ORAL EVERY 6 HOURS PRN
Status: DISCONTINUED | OUTPATIENT
Start: 2018-10-04 | End: 2018-10-11 | Stop reason: HOSPADM

## 2018-10-04 RX ORDER — ONDANSETRON 2 MG/ML
8 INJECTION INTRAMUSCULAR; INTRAVENOUS EVERY 8 HOURS PRN
Status: DISCONTINUED | OUTPATIENT
Start: 2018-10-04 | End: 2018-10-11 | Stop reason: HOSPADM

## 2018-10-04 RX ORDER — ACETAMINOPHEN 325 MG/1
650 TABLET ORAL EVERY 4 HOURS PRN
Status: DISCONTINUED | OUTPATIENT
Start: 2018-10-04 | End: 2018-10-05

## 2018-10-04 RX ADMIN — ENOXAPARIN SODIUM 40 MG: 40 INJECTION SUBCUTANEOUS at 09:40

## 2018-10-04 RX ADMIN — CALCIUM 500 MG: 500 TABLET ORAL at 09:39

## 2018-10-04 RX ADMIN — ACETAMINOPHEN 650 MG: 325 TABLET, FILM COATED ORAL at 15:47

## 2018-10-04 RX ADMIN — VITAMIN D, TAB 1000IU (100/BT) 2000 UNITS: 25 TAB at 09:39

## 2018-10-04 RX ADMIN — TRAMADOL HYDROCHLORIDE 50 MG: 50 TABLET, COATED ORAL at 08:19

## 2018-10-04 RX ADMIN — ONDANSETRON HYDROCHLORIDE 8 MG: 8 TABLET, FILM COATED ORAL at 15:39

## 2018-10-04 RX ADMIN — ONDANSETRON HYDROCHLORIDE 8 MG: 8 TABLET, FILM COATED ORAL at 08:22

## 2018-10-04 RX ADMIN — SODIUM CHLORIDE, POTASSIUM CHLORIDE, SODIUM LACTATE AND CALCIUM CHLORIDE 1000 ML: 600; 310; 30; 20 INJECTION, SOLUTION INTRAVENOUS at 02:52

## 2018-10-04 RX ADMIN — SODIUM CHLORIDE: 9 INJECTION, SOLUTION INTRAVENOUS at 10:46

## 2018-10-04 RX ADMIN — SODIUM CHLORIDE: 9 INJECTION, SOLUTION INTRAVENOUS at 18:26

## 2018-10-04 RX ADMIN — SODIUM CHLORIDE 1000 ML: 9 INJECTION, SOLUTION INTRAVENOUS at 16:54

## 2018-10-04 RX ADMIN — SODIUM CHLORIDE 1000 ML: 9 INJECTION, SOLUTION INTRAVENOUS at 15:30

## 2018-10-04 RX ADMIN — PROCHLORPERAZINE EDISYLATE 5 MG: 5 INJECTION INTRAMUSCULAR; INTRAVENOUS at 17:48

## 2018-10-04 RX ADMIN — SODIUM CHLORIDE: 9 INJECTION, SOLUTION INTRAVENOUS at 16:35

## 2018-10-04 ASSESSMENT — ENCOUNTER SYMPTOMS
NAUSEA: 1
CHEST TIGHTNESS: 1
PALPITATIONS: 1
CHILLS: 1
NECK PAIN: 1

## 2018-10-04 ASSESSMENT — ACTIVITIES OF DAILY LIVING (ADL)
NUMBER_OF_TIMES_PATIENT_HAS_FALLEN_WITHIN_LAST_SIX_MONTHS: 1
SWALLOWING: 0-->SWALLOWS FOODS/LIQUIDS WITHOUT DIFFICULTY
TRANSFERRING: 0-->INDEPENDENT
ADLS_ACUITY_SCORE: 11
ADLS_ACUITY_SCORE: 11
AMBULATION: 0-->INDEPENDENT
ADLS_ACUITY_SCORE: 11
RETIRED_EATING: 0-->INDEPENDENT
DRESS: 0-->INDEPENDENT
RETIRED_COMMUNICATION: 0-->UNDERSTANDS/COMMUNICATES WITHOUT DIFFICULTY
COGNITION: 0 - NO COGNITION ISSUES REPORTED
FALL_HISTORY_WITHIN_LAST_SIX_MONTHS: YES
BATHING: 0-->INDEPENDENT
WHICH_OF_THE_ABOVE_FUNCTIONAL_RISKS_HAD_A_RECENT_ONSET_OR_CHANGE?: FALL HISTORY
ADLS_ACUITY_SCORE: 12
TOILETING: 0-->INDEPENDENT

## 2018-10-04 ASSESSMENT — PAIN DESCRIPTION - DESCRIPTORS: DESCRIPTORS: ACHING

## 2018-10-04 NOTE — TELEPHONE ENCOUNTER
Writer received [employer] message to Dr. Ana Whitman regarding the desire to go off her Gabapentin 300mg/day. Patient stated that she believes it is making her depressed and has had 1 suicidal thought. Writer called [facility] and spoke with her nurse, Candi Hoskins RN, as she is currently IP status. Candi stated that during her inpatient admission interview, she denied any feelings of depression or thoughts of hurting herself or others. Candi will advise rounding staff and discuss with patient. Writer also notified Dr. Whitman and copied Sharifa Celeste, RNCC. Response sent to Shan that we received her message and will consult with Dr. Whitman about stopping her medication and encouraged patient to contact nurse advice line vs sending [employer] message if there are symptoms present such as those outlined.

## 2018-10-04 NOTE — ED PROVIDER NOTES
History     Chief Complaint   Patient presents with     Loss of Consciousness     HPI  Shan Marsh is a 48 year old female with a hx of left breast CA, s/p chemo last on Wednesday.   Tonight had an episode of LOC with fall at 1AM. Fall not witnesses.  heard a thump and went to check on her. She was hypotensive on EMS arrival.   Having chest tightness for a few days.   No past falls.   + nausea with decreased PO intake since chemo. No vomiting.  Chills and palpitations currently.  Today she began medical cannibus by vape this afternoon and oil at 9pm.   Having left neck pain related to nodes in neck, on gabapentin for this. No new neck or back pain since the fall. No extremity injuries.     S/p left mastectomy with reconstruction.     I have reviewed the Medications, Allergies, Past Medical and Surgical History, and Social History in the [employer] system.  Past Medical History:   Diagnosis Date     Breast cancer, left breast (H) 5/12    grade 1 infiltrating ductal cancer, ER/ME+, s/p bilateral mastectomy     Complication of anesthesia     bradycardia,b/p drop p childbirth     Dysplasia of cervix (uteri) 12/01    LUCAS II - III, s/p LEEP  @ [facility]     Incompetence of cervix 4/06    D&E at 19+4 weeks twin pregnancy     Shingles outbreak 5/12       Past Surgical History:   Procedure Laterality Date     D & C  9/2010    retained POC     ENDOBRONCHIAL ULTRASOUND FLEXIBLE N/A 5/12/2017    Procedure: ENDOBRONCHIAL ULTRASOUND FLEXIBLE;  Flexible Bronchoscopy, Endobronchial Ultrasound, Transbronchial Biopsy ;  Surgeon: Bandar Meyer MD;  Location:  GI     LEEP TX, CERVICAL  12/01    LEEP or LUCAS II - III     MASTECTOMY, RECONSTRUCT BREAST, COMBINED  6/11/2012    Procedure: COMBINED MASTECTOMY, RECONSTRUCT BREAST;  Bilateral Mastectomy with Reconstruction, Left [place] Node Biopsy , placement of On-Q catheters X 2 bilateral breast.;  Surgeon: Misael Cheng MD;  Location:  OR     RECONSTRUCT BREAST  BILATERAL  3/25/2013    Procedure: RECONSTRUCT BREAST BILATERAL;  Revise bilateral breasts and reconstructions, with insertion of gel breast implants with fat grafting and fat after mastectomies.;  Surgeon: Yair Olvera MD;  Location: [facility] OR     RECONSTRUCT BREAST BILATERAL, IMPLANT PROSTHESIS BILATERAL, COMBINED  9/27/2012    Procedure: COMBINED RECONSTRUCT BREAST BILATERAL, IMPLANT PROSTHESIS BILATERAL;  Bilateral Second Stage Breast Reconstruction With Gel Implants, Fat        THORACENTESIS Left 5/7/2018    Procedure: THORACENTESIS;  Thoracentesis;  Surgeon: Venancio Hussein PA-C;  [facility]: [facility]     THORACENTESIS Left 7/5/2018    Procedure: THORACENTESIS;  Thoracentesis;  Surgeon: Lydia Murillo PA-C;  [facility]     THORACENTESIS Left 9/5/2018    Procedure: THORACENTESIS;  Left Thoracentesis;  Surgeon: López Guillen PA-C;  [facility]     THORACENTESIS Left 10/3/2018    Procedure: THORACENTESIS;  Thoracentesis, left;  Surgeon: Eros Chauhan PA-C;  [facility]       Family History   Problem Relation Age of Onset     Alcohol/Drug Father      Allergies Mother      Neurologic Disorder Mother      seizures, related to fever     GASTROINTESTINAL DISEASE Mother      IBS     Blood Disease Mother      Shogrens     Cancer Maternal Grandfather      bladder     Circulatory Maternal Grandfather      blood clots     Alcohol/Drug Maternal Grandfather      alcohol dependency     Allergies Maternal Aunt      Allergies Other      maternal cousins     Arthritis Maternal Aunt      Prostate Cancer Maternal Uncle      Thyroid Disease Maternal Aunt      Alcohol/Drug Maternal Uncle      alcohol and drug dependency     Cancer - colorectal Maternal Uncle      GASTROINTESTINAL DISEASE Sister      IBS     Breast Cancer Other      self       Social History   Substance Use Topics     Smoking status: Former Smoker     Quit date: 6/23/1997     Smokeless tobacco: Never Used     Alcohol use No  "      Review of Systems   Constitutional: Positive for chills.   HENT: Negative.    Respiratory: Positive for chest tightness.    Cardiovascular: Positive for palpitations.   Gastrointestinal: Positive for nausea.   Genitourinary: Negative.    Musculoskeletal: Positive for neck pain.   Skin: Negative.    All other systems reviewed and are negative.         Physical Exam   BP: 94/74  Pulse: 89  Heart Rate: 89  Temp: 98.2  F (36.8  C)  Resp: 16  Height: 162.6 cm (5' 4\")  Weight: 51.2 kg (112 lb 14.4 oz)  SpO2: 100 %      Physical Exam   Gen:A&Ox3, no acute distress, speaking in full sentences, work of breathing not labored  HEENT:PERRL, no facial tenderness or wounds, head atraumatic, oropharynx clear, mucous membranes moist, TMs clear bilaterally  Neck:no bony tenderness or step offs, no JVD, trachea midline  Back: no CVA tenderness, no midline bony tenderness  CV:RRR without murmurs  PULM: Decreased breath sounds on the left, no crepitus to the chest or axilla  Abd: Soft, non-tender and non-distended  UE:No traumatic injuries, skin normal  LE:no traumatic injuries, skin normal, no LE edema or calf tenderness  Neuro:CN II-XII intact, strength 5/5 throughout, sensation intact  Skin: no rashes or ecchymoses    ED Course     ED Course     Procedures  Results for orders placed during the hospital encounter of 10/04/18   POC US ECHO LIMITED    Impression Limited Bedside Lung Ultrasound, performed and interpreted by me. Indication:    Syncope    Lung ultrasound was performed for the assessment of pneumothorax   The patient was placed in a semi recumbent position at approximately 45 degrees. The left and right lung apices were evaluated for evidence of pneumothorax.     Findings    Right side:  Lung sliding artifact   Present     [place] tail artifacts   Present     [place] sign on M-Mode Present   Left side:  Lung sliding artifact   Absent     Comet tail artifacts   Absent     [place] sign on M-Mode Absent    IMPRESSION:   " left Pneumothorax.               EKG Interpretation:      Interpreted by Nora Rai  Time reviewed: 5:13AM  Symptoms at time of EKG: syncope   Rhythm: normal sinus   Rate: 86  Axis: normal  Ectopy: none  Conduction: normal  ST Segments/ T Waves: No ST-T wave changes  Q Waves: none  Comparison to prior: Unchanged from August 21, 2017 other than shorter CA interval    Clinical Impression: normal EKG      Critical Care time:  none    Labs Ordered and Resulted from Time of ED Arrival Up to the Time of Departure from the ED   CBC WITH PLATELETS DIFFERENTIAL - Abnormal; Notable for the following:        Result Value    RBC Count 3.71 (*)     All other components within normal limits   BASIC METABOLIC PANEL - Abnormal; Notable for the following:     Glucose 140 (*)     Calcium 8.4 (*)     All other components within normal limits   PERIPHERAL IV CATHETER       Narrative: Procedure: Tube Thoracostomy       Indication:        It is medically necessary to place a chest tube for      pneumothorax.      Consent:                Risks, benefits and alternatives were discussed with     patient and consent for procedure was obtained.      Timeout:                 Universal protocol was followed. TIME OUT     conducted just prior to starting procedure confirmed patient identity,    site/side, procedure, patient position, and availability of correct equipment    and implants.?  Yes      Medication:      Lidocaine 1% with epinephrine: Local infiltration      Procedure Note:          Patient was placed in a semirecumbent position    with the head of the bed at 30 degrees.  The  left side was prepped with     Betadine.  Patient was medicated as above.  Incision was made     laterally in the midaxilary line.  Blunt dissection up and over the rib  was preformed until access was obtained to the pleural cavity.  A  20 Yi chest tube was placed and connected to     Pleurovac at 20 cm H2O pressure.  Tube was sutured in place with  2-0    silk, and all connections banded.      Results:                       There was  moderate drainage, with post    procedure X-ray showing  complete     reexpansion of the lung.      Patient Status:            Patient tolerated the procedure  well. There were  no complications.          Assessments & Plan (with Medical Decision Making)   48-year-old female presenting to the emergency department with syncopal episode.  History of breast cancer actively on chemotherapy.  Possible triggers for syncope including vasovagal, dehydration secondary to poor p.o. intake, medication reaction due to new use of cannabinoids.  PE was considered in the differential diagnosis.  She is currently without signs of DVT.   She is postprocedure from a left-sided thoracentesis today, and chest x-ray and bedside US demonstrated a large left pneumothorax.  Given her hypotension in the field this raise concern for tension pneumothorax. Bedside echo showing moderate pericardial effusion. EKG without alternans, and shows no acute ischemic abnormalities or arrhythmias.  She was monitored on telemetry.  She was placed on oxygen by nasal cannula and consented for placement of a left chest tube.  A 20 French chest tube was placed as described in the procedure above and the patient tolerated the procedure well.  Post procedure chest x-ray shows reexpansion of the lung.  Chest tube is also drained out approximately 50-75 mL's of pleural fluid.  Laboratory evaluation included a CBC and a basic metabolic panel which are unremarkable.  Case was discussed with the hematology fellow on call and she was admitted to the oncology service for further care.    I have reviewed the nursing notes.    I have reviewed the findings, diagnosis, plan and need for follow up with the patient.    Current Discharge Medication List          Final diagnoses:   Pneumothorax, postprocedural   Syncope, unspecified syncope type       10/4/2018   [facility], [place], EMERGENCY  DEPARTMENT    MD ANDRES Escalante, Nora Perez MD  10/08/18 5942       Nora Rai MD  10/08/18 1407

## 2018-10-04 NOTE — ED NOTES
Alert and oriented patient presents to the ED via HEMS from home after an unwitnessed syncopal event with a fall. No obvious injury. No new pain after fall.  Stage 4 breast cancer, care received at [facility]. Currently on chemo, last chemo was last Wednesday. 22g IV placed by EMS, IVF fluids infusing. 4mg iv zofran given with improvement. Patient was pale upon arrival per EMS.  She reports that she significantly improved upon going outside into the cold air. Started using CBD/THC oil today, most of the day without problems until she got up to use the vape instead of the oil. She became dizzy, and does not remember anything until after the fall. Also has been taking gabapentin, would like to discontinue this.  Vision changes initially. EMS unable to palpate BP when inside the house.

## 2018-10-04 NOTE — CONSULTS
[facility]    Pulmonary, Allergy, Critical Care and Sleep Medicine    Initial Consultation  10/04/2018    Shan Marsh MRN# [id number]   Age: 48 year old YOB: 1970     Date of Admission: 10/4/2018  Reason for Consultation: left hydropneumothroax, chest tube management   Requesting Team: izzy onc    Primary care provider: Marilu Michelle     Assessment and Recommendations:    Shan Marsh is a 48 year old female with a history of metastatic breast cancer on treatment who presented on 10/4/2018 with pneumothorax after fall, s/p chest tube placement.      1. Malignant pleural effusion, s/p thoracentesis as outpatient, complicated by iatrogenic pneumothorax: s/p chest tube placement in ED. Today on my exam, was tympanic on left with decreased breath sounds. I was concerned for ongoing pneuomothorax, as the tube was clamped. I unclamped the tube, and while on water seal with tidal breathing, the patient had some air leak. I attached her to suction and she proceeded to have an intermittent air leak. Her pneumothorax is not yet resolved. She has had her malignant effusion tapped about monthly since January. She does not wish to consider an indwelling catheter at this time.   -Please keep chest tube on suction, -20 cm H20 continuous, tonight  -CXR in AM, portable upright while on suction.   -If no air leak in AM and lung is fully expanded on imaging, will consider change to water seal at that time.   -No indication for pleurex as not desired by patient at this time, and frequency does not favor indwelling pleural catheter placement.     Seen and discussed with Dr Harpal Conrad MD  Pulmonary and Critical Care Fellow   Pager [phone]    Chief Complaint and History of Present Illness:    CC:  fall  HPI:   Ms. Marsh is a 48 year old female with a history of metastatic breast cancer on treatment who presented on 10/4/2018 after a fall. She had  undergone scheduled thoracentesis yesterday for known malignant effusion, which was drained. Afterward, she went home. She started taking a cannabinoid for chronic pain. She was at home, when she became dizzy and had an unwitnessed fall. Her  heard her fall, and found her. She woke up easily, and denied chest pain, or shortness of breath. She did have a lot of coughing. She had no fevers or chills.  She presented to the ED. She was found at admission to have a pneumothorax, and chest tube was placed. She was admitted to oncology.     Currently, she feels much better. Her chest tube was on suction in the ED, but just water seal on the floor. It was bubbling, per patient, in the ED. This afternoon, it was clamped. She is nervous about this returning.     Review of Systems:  Complete 12 point ROS negative unless mentioned in HPI  Histories, Prior to Admission Medications, Allergies:    Past Medical History:  Past Medical History:   Diagnosis Date     Breast cancer, left breast (H) 5/12    grade 1 infiltrating ductal cancer, ER/HI+, s/p bilateral mastectomy     Complication of anesthesia     bradycardia,b/p drop p childbirth     Dysplasia of cervix (uteri) 12/01    LUCAS II - III, s/p LEEP  @ [facility]     Incompetence of cervix 4/06    D&E at 19+4 weeks twin pregnancy     Shingles outbreak 5/12       Past Surgical History:  Past Surgical History:   Procedure Laterality Date     D & C  9/2010    retained POC     ENDOBRONCHIAL ULTRASOUND FLEXIBLE N/A 5/12/2017    Procedure: ENDOBRONCHIAL ULTRASOUND FLEXIBLE;  Flexible Bronchoscopy, Endobronchial Ultrasound, Transbronchial Biopsy ;  Surgeon: Bandar Meyer MD;  Location:  GI     LEEP TX, CERVICAL  12/01    LEEP or LUCAS II - III     MASTECTOMY, RECONSTRUCT BREAST, COMBINED  6/11/2012    Procedure: COMBINED MASTECTOMY, RECONSTRUCT BREAST;  Bilateral Mastectomy with Reconstruction, Left [place] Node Biopsy , placement of On-Q catheters X 2 bilateral breast.;   Surgeon: Misael Cheng MD;  Location: [facility] OR     RECONSTRUCT BREAST BILATERAL  3/25/2013    Procedure: RECONSTRUCT BREAST BILATERAL;  Revise bilateral breasts and reconstructions, with insertion of gel breast implants with fat grafting and fat after mastectomies.;  Surgeon: Yair Olvera MD;  Location: [facility] OR     RECONSTRUCT BREAST BILATERAL, IMPLANT PROSTHESIS BILATERAL, COMBINED  9/27/2012    Procedure: COMBINED RECONSTRUCT BREAST BILATERAL, IMPLANT PROSTHESIS BILATERAL;  Bilateral Second Stage Breast Reconstruction With Gel Implants, Fat        THORACENTESIS Left 5/7/2018    Procedure: THORACENTESIS;  Thoracentesis;  Surgeon: Venancio Hussein PA-C;  [facility]     THORACENTESIS Left 7/5/2018    Procedure: THORACENTESIS;  Thoracentesis;  Surgeon: Lydia Murillo PA-C;  [facility]     THORACENTESIS Left 9/5/2018    Procedure: THORACENTESIS;  Left Thoracentesis;  Surgeon: López Guillen PA-C;  [facility] OR     THORACENTESIS Left 10/3/2018    Procedure: THORACENTESIS;  Thoracentesis, left;  Surgeon: Eros Chauhan PA-C;  Location: [facility] OR       Past Social History:  Social History     Social History     Marital status:      Spouse name: Kemar     Number of children: 2     Years of education: 16     Occupational History     teacher [employer]     Teacher     Social History Main Topics     Smoking status: Former Smoker     Quit date: 6/23/1997     Smokeless tobacco: Never Used     Alcohol use No     Drug use: Yes      Comment: CBD/THC oil     Sexual activity: Yes     Partners: Male     Birth control/ protection: None      Comment: No birth control/protection usage.  had vasectomy     Other Topics Concern     Parent/Sibling W/ Cabg, Mi Or Angioplasty Before 65f 55m? No     Social History Narrative    Caffeine intake/servings daily - 0-1    Calcium intake/servings daily - 3-4    Exercise 3-4 times weekly - describe running    Sunscreen used - Yes     Seatbelts used - Yes    Guns stored in the home - Yes    Self Breast Exam - No    Pap test up to date -  Yes    Eye exam up to date -  Yes    Dental exam up to date -  Yes    DEXA scan up to date -  Not Applicable    Flex Sig/Colonoscopy up to date -  Not Applicable    Mammography up to date -  Not Applicable    Immunizations reviewed and up to date - Yes    Abuse: Current or Past (Physical, Sexual or Emotional) - No    Do you feel safe in your environment - Yes    Do you cope well with stress - Yes    Do you suffer from insomnia - No    Last updated by: Emani Estes  11/11/2011                   Family History:  Family History   Problem Relation Age of Onset     Alcohol/Drug Father      Allergies Mother      Neurologic Disorder Mother      seizures, related to fever     GASTROINTESTINAL DISEASE Mother      IBS     Blood Disease Mother      Shogrens     Cancer Maternal Grandfather      bladder     Circulatory Maternal Grandfather      blood clots     Alcohol/Drug Maternal Grandfather      alcohol dependency     Allergies Maternal Aunt      Allergies Other      maternal cousins     Arthritis Maternal Aunt      Prostate Cancer Maternal Uncle      Thyroid Disease Maternal Aunt      Alcohol/Drug Maternal Uncle      alcohol and drug dependency     Cancer - colorectal Maternal Uncle      GASTROINTESTINAL DISEASE Sister      IBS     Breast Cancer Other      self       Medications:    calcium carbonate 500 mg (elemental)  1 tablet Oral Daily     cholecalciferol  2,000 Units Oral Daily     enoxaparin  40 mg Subcutaneous Q24H     gabapentin  300 mg Oral Daily with supper     [START ON 10/5/2018] influenza vaccine adult (product based on age)  0.5 mL Intramuscular Prior to discharge     naloxone, ondansetron, prochlorperazine, traMADol    Allergies:     Allergies   Allergen Reactions     Erythromycin Shortness Of Breath     Sulfa Drugs      rash       Physical Exam:    Temp:  [98.2  F (36.8  C)-98.4  F (36.9  C)] 98.4  F  (36.9  C)  Pulse:  [89] 89  Heart Rate:  [] 90  Resp:  [11-28] 20  BP: ()/(68-81) 113/68  SpO2:  [94 %-100 %] 99 %    Intake/Output Summary (Last 24 hours) at 10/04/18 1045  Last data filed at 10/04/18 0954   Gross per 24 hour   Intake                0 ml   Output              800 ml   Net             -800 ml     General: laying in bed in NAD  HEENT: anicteric, moist mucosa  Neck: no palpable lymphadenopathy, no JVD noted  Chest: tympanic to percussion on left, with decreased air movement. Good air movement on right, with mild decrease at base. Left chest tube in place.   Cardiac: RRR no murmurs  Abdomen: Soft, flat, non tender, active BS  Extremities: No LE Edema  Neuro: A&Ox3, no focal deficits   Skin: no rash noted    Laboratory, imaging, and microbiologic data:    All laboratory and imaging data reviewed.    Notable for:   K 3.9 Cr 0.7  WBC 8.8. Hgb 11.8. plts 187.     CXR in ED: left hydropneumothorax with rightward shift present. Stable right effusion.   CXR after chest tube placement: resolution of pneumothorax on left, chest tube in place, continued effusion on left and right

## 2018-10-04 NOTE — PHARMACY-ADMISSION MEDICATION HISTORY
Admission medication history interview status for the 10/4/2018 admission is complete. See Epic admission navigator for allergy information, pharmacy, prior to admission medications and immunization status.     Medication history interview sources:  patient interview    Changes made to PTA medication list (reason)  Added: medical cannabis (oil and vape)  Deleted: tramadol  Changed: vit D dose    Additional medication history information (including reliability of information, actions taken by pharmacist): Patient was a good historian of own meds. Pt is on 28-day cycle of capecitabine with 7 days on and 7 days off. D7 capecitabine was on 9/26/18 and is currently holding capecitabine until 10/8/18 for follow up with oncologist prior to restarting. Takes Zofran and Compazine mainly while on capecitabine, not routinely. Pt also takes gabapentin 300mg daily, but was instructed to take up to 300mg 3x daily if needed for neuropathy (hasn't taken more than 300mg/d). Lastly, pt started medical cannabis oil (5mg twice daily) and vape on 10/3/18 PM. Informed pt about hospital protocol about medical cannabis use - topical and oral okay, but not vape.    Prior to Admission medications    Medication Sig Last Dose Taking? Auth Provider   calcium carbonate (OS-KRYSTEN 500 MG [place]. [place]) 1250 MG tablet Take 1 tablet by mouth daily 10/3/2018 at Unknown time Yes Reported, Patient   capecitabine (XELODA) 500 MG tablet CHEMO Take 3 tablets (1,500 mg) by mouth 2 times daily Take on Days 1 thru 7 and 15 thru 21 of each 28 day cycle. 9/26/2018 at Unknown time Yes Ana Whitman MD   Digestive Enzymes (ENZYME DIGEST) CAPS Take 1 capsule by mouth daily 10/3/2018 at Unknown time Yes Unknown, Entered By History   gabapentin (GRALISE) 300 MG 24 hr tablet Take 300 mg by mouth daily (with dinner) 10/3/2018 at Unknown time Yes Reported, Patient   medical cannabis (Patient's own supply.  Not a prescription) (This is NOT a prescription, and does not  certify that the patient has a qualifying medical condition for medical cannabis.  The purpose of this order is  to document that the patient reports taking medical cannabis.) 10/3/2018 at Unknown time Yes Unknown, Entered By History   ondansetron (ZOFRAN) 8 MG tablet Take 1 tablet (8 mg) by mouth every 8 hours as needed for nausea Past Week at Unknown time Yes Lima Griffiths PA-C   prochlorperazine (COMPAZINE) 10 MG tablet Take 10 mg by mouth before 1st dose of oral chemotherapy, then prn thereafter  Yes Reported, Patient   VITAMIN D, CHOLECALCIFEROL, PO Take 1,000 Units by mouth daily  10/3/2018 at Unknown time Yes Reported, Patient         Medication history completed by: Soheila Jeff, PharmD Student

## 2018-10-04 NOTE — ED TRIAGE NOTES
BIBA from home.  Stage 4 breast cancer, started cannibas oil today.  She takes gabapentin, which she has been discussing discontinuing. Last chemo treatment was last Wednesday. She became dizzy, loss of consciousness, unwitnessed fall. No obvious signs of trauma or new pain from the fall.  She has had pain in her neck and shoulder (L) from swollen lymph nodes. Not worsened from the fall.  Vision changes, slow to respond, oriented.  She was carried outside, felt instantly better when she got outside.  Inside, unable to palpate bp  90/60 initially when outside.  Initial twelve lead had some elevation, second had normalized.    22G IV in right AC.  IVF infusing. Blood glucose 160.  4mg iv zofran with improvement.

## 2018-10-04 NOTE — PLAN OF CARE
Pt continues to dip on occasion. Lactate 2.8. Other labs pending. MD aware.  Pt may transfer to ICU or 5C. Awaiting final plans from MD.   Pt continues to have nausea. Vomit a small amount. IV compazine given.   No appetite.   Fluids running at 125cc/hr.

## 2018-10-04 NOTE — PROVIDER NOTIFICATION
10/04/18 1500   Call Information   Date of Call 10/04/18   Time of Call 1525   Name of person requesting the team Blanche   Title of person requesting team RN   RRT Arrival time 1530   Time RRT ended 1552   Reason for call   Type of RRT Adult   Primary reason for call Cardiovascular   Cardiovascular SBP less than 90   Was patient transferred from the ED, ICU, or PACU within last 24 hours prior to RRT call? Yes   SBAR   Situation hypotensive, diaphoretic, senstitive to new pain meds   Background cancer   Notable History/Conditions Cancer  (breast cancer)   Assessment weak, sweaty, ashen color, nausea, low BP HR50-60   Interventions Blood glucose;ECG;Fluid bolus;Labs;Meds   Patient Outcome   Patient Outcome Stabilized on unit   RRT Team   Attending/Primary/Covering Physician Heme Onc   Date Attending Physician notified 10/04/18   Time Attending Physician notified 1525   Physician(s) Mary Coreas NP   Lead RN Faina Hamilton

## 2018-10-04 NOTE — IP AVS SNAPSHOT
"                  MRN:[id number]                      After Visit Summary   10/4/2018    Shan Marsh    MRN: [id number]           Thank you!     Thank you for choosing [facility] for your care. Our goal is always to provide you with excellent care. Hearing back from our patients is one way we can continue to improve our services. Please take a few minutes to complete the written survey that you may receive in the mail after you visit with us. Thank you!        Patient Information     Date Of Birth          1970        Designated Caregiver       Most Recent Value    Caregiver    Will someone help with your care after discharge? no      About your hospital stay     You were admitted on:  October 4, 2018 You last received care in the:  [address] [facility]    You were discharged on:  October 11, 2018        Reason for your hospital stay       You were admitted for management of left lung pneumothorax (\"collapsed lung\").                  Who to Call     For medical emergencies, please call 341.  For non-urgent questions about your medical care, please call your primary care provider or clinic, [phone]  For questions related to your surgery, please call your surgery clinic        Attending Provider     Provider Nora Garcia MD Emergency Medicine    [facility] MD Oncology       Primary Care Provider Office Phone # Fax #    Marilu Michelle [phone]       When to contact your care team       Call the [facility] 24-hour triage line at [phone] or [phone] for temp >100.4, uncontrolled nausea/vomiting/diarrhea/constipation, unrelieved pain, bleeding not relieved with pressure, dizziness, chest pain, shortness of breath, loss of consciousness, and any new or concerning symptoms.     Call [phone] and ask for the care coordinator that works with your oncologist if you have questions about scans, appointments, hospital follow-up, or " other concerns.                  After Care Instructions     Activity       Your activity upon discharge: Activity as tolerated. No driving or strenuous activity while taking narcotics, if having headaches/dizziness/vision changes, or if feeling generally weak or unwell.            Diet       Follow this diet upon discharge: Regular diet as tolerated.            Discharge Instructions       -Go ahead and start your Xeloda starting tomorrow, 10/12 as prescribed.   -Okay to use tylenol as needed for pain.                  Follow-up Appointments     Follow Up and recommended labs and tests       Follow up as scheduled:  -I have requested an apt in infusion for your Zometa/Zoledex dosing. They will inform you when an apt is set up.                  Your next 10 appointments already scheduled     Oct 15, 2018 10:45 AM CDT   [facility] with [facility]   [facility] ([facility])    [address] [place] [address]  [address]  [facility] [address]   [phone]            Oct 15, 2018 11:20 AM CDT   (Arrive by 11:05 AM)   Return Visit with GRAY Felix   [facility] ([facility])    [address] [place] [address]  [address]  [facility] [address]   [phone]            Oct 18, 2018  1:30 PM CDT   Infusion 60 with  ONCOLOGY INFUSION, UC 28 ATC   [facility] ([facility])    [address] [place] [address]  [address]  [facility] [address]   [phone]            Oct 23, 2018  9:00 AM CDT   XR CHEST 2 VIEWS with UCXR1   [facility] ([facility])    [address] [place] [address]  1st Floor  [facility] [address]   [phone]           How do I prepare for my exam? (Food and drink instructions) No Food and Drink Restrictions.  How do I prepare for my exam? (Other instructions) You do not need to do anything special for this exam.  What should I wear: Wear  comfortable clothes.  How long does the exam take: Most scans take less than 5 minutes.  What should I bring: Bring a list of your medicines, including vitamins, minerals and over-the-counter drugs. It is safest to leave personal items at home.  Do I need a :  No  is needed.  What do I need to tell my doctor: Tell your doctor if there s any chance you are pregnant.  What should I do after the exam: No restrictions, You may resume normal activities.  What is this test: An image of a specific body part shown in shades of black and white.  Who should I call with questions: If you have any questions, please call the Imaging Department where you will have your exam. Directions, parking instructions, and other information is available on our website, [email or web address].            Oct 23, 2018 10:00 AM CDT   (Arrive by 9:45 AM)   Return Visit with TURNER Bahena [facility] ([facility])    [address] [place] [address]  [address] [place] [id number] [phone]              Pending Results     Date and Time Order Name Status Description    10/9/2018 1102 Fungus Culture, non-blood Preliminary     10/9/2018 1102 Fluid Culture Aerobic Bacterial Preliminary     10/9/2018 1102 AFB Culture Non Blood Preliminary     10/9/2018 1102 Actinomyces rule out Preliminary     10/8/2018 2300 Blood culture Preliminary     10/8/2018 2300 Blood culture Preliminary     10/8/2018 0730 Blood culture Preliminary     10/8/2018 0730 Blood culture Preliminary     10/7/2018 1829 Blood culture Preliminary     10/7/2018 1829 Blood culture Preliminary     10/7/2018 1115 Fluid Culture Aerobic Bacterial Preliminary     10/6/2018 1653 Blood culture Preliminary             Statement of Approval     Ordered          10/11/18 1529  I have reviewed and agree with all the recommendations and orders detailed in this document.  EFFECTIVE NOW     Approved and electronically signed  "by:  Caridad Martinez PA-C             Admission Information     Date & Time Provider Department Dept. Phone    10/4/2018 Thanh Leal MD [address] [facility] [phone]      Your Vitals Were     Blood Pressure Pulse Temperature Respirations Height Weight    121/84 98 98.2  F (36.8  C) (Oral) 12 1.626 m (5' 4\") 53.3 kg (117 lb 6.4 oz)    Last Period Pulse Oximetry BMI (Body Mass Index)             10/06/2017 (Approximate) 98% 20.15 kg/m2         MyChart Information     [employer] gives you secure access to your electronic health record. If you see a primary care provider, you can also send messages to your care team and make appointments. If you have questions, please call your primary care clinic.  If you do not have a primary care provider, please call [phone] and they will assist you.        Care EveryWhere ID     This is your Care EveryWhere ID. This could be used by other organizations to access your [facility] medical records  [id number]        Equal Access to Services     LUZ JAMES AH: Hadii noemy darlingo Sojailyn, waaxda luqadaha, qaybta kaalmada adeegmary, edi mustafa . So [facility] [phone].    ATENCIÓN: Si habla español, tiene a gamez disposición servicios gratuitos de asistencia lingüística. Llame al [phone].    We comply with applicable federal civil rights laws and [place] laws. We do not discriminate on the basis of race, color, national origin, age, disability, sex, sexual orientation, or gender identity.               Review of your medicines      START taking        Dose / Directions    diclofenac 1 % Gel topical gel   Commonly known as:  VOLTAREN   Used for:  Metastatic breast cancer (H)        Dose:  2-4 g   Place 2-4 g onto the skin 4 times daily   Quantity:  1 Tube   Refills:  0         CONTINUE these medicines which may have CHANGED, or have new prescriptions. If we are uncertain of the size of tablets/capsules you have at home, strength may " be listed as something that might have changed.        Dose / Directions    capecitabine 500 MG tablet CHEMO   Commonly known as:  XELODA   This may have changed:  additional instructions   Used for:  Metastatic breast cancer (H)        Dose:  1500 mg   Take 3 tablets (1,500 mg) by mouth 2 times daily Take on day 1-7 and 15-21 of 28-day cycle.   Quantity:  84 tablet   Refills:  0       traMADol 50 MG tablet   Commonly known as:  ULTRAM   This may have changed:  reasons to take this        Dose:   mg   Take 1-2 tablets ( mg) by mouth every 6 hours as needed for moderate pain or headaches   Refills:  0         CONTINUE these medicines which have NOT CHANGED        Dose / Directions    calcium carbonate 500 mg (elemental) 500 MG tablet   Commonly known as:  OS-KRYSTEN        Dose:  1 tablet   Take 1 tablet by mouth daily   Refills:  0       ENZYME DIGEST Caps        Dose:  1 capsule   Take 1 capsule by mouth daily   Refills:  0       medical cannabis (Patient's own supply.  Not a prescription)   Indication:  Oil; 5mg twice daily        (This is NOT a prescription, and does not certify that the patient has a qualifying medical condition for medical cannabis.  The purpose of this order is  to document that the patient reports taking medical cannabis.)   Refills:  0       ondansetron 8 MG tablet   Commonly known as:  ZOFRAN   Used for:  Metastatic breast cancer (H), Bone metastasis (H)        Dose:  8 mg   Take 1 tablet (8 mg) by mouth every 8 hours as needed for nausea   Quantity:  40 tablet   Refills:  3       prochlorperazine 10 MG tablet   Commonly known as:  COMPAZINE        Take 10 mg by mouth before 1st dose of oral chemotherapy, then prn thereafter   Refills:  0       VITAMIN D (CHOLECALCIFEROL) PO        Dose:  1000 Units   Take 1,000 Units by mouth daily   Refills:  0         STOP taking     gabapentin 300 MG 24 hr tablet   Commonly known as:  GRAARTEMSE                Where to get your medicines      These  medications were sent to [facility] - [place], [place] - [address]   [address] [facility] [address]    Hours:  Mon-Fri 8:30am-5:00pm Toll Free [phone] Phone:  [phone]     capecitabine 500 MG tablet CHEMO         These medications were sent to [facility] - [place], [place] - [address]  [address] [facility] [address]     Phone:  [phone]     diclofenac 1 % Gel topical gel                Protect others around you: Learn how to safely use, store and throw away your medicines at www.disposemymeds.org.        Information about OPIOIDS     PRESCRIPTION OPIOIDS: WHAT YOU NEED TO KNOW   We gave you an opioid (narcotic) pain medicine. It is important to manage your pain, but opioids are not always the best choice. You should first try all the other options your care team gave you. Take this medicine for as short a time (and as few doses) as possible.    Some activities can increase your pain, such as bandage changes or therapy sessions. It may help to take your pain medicine 30 to 60 minutes before these activities. Reduce your stress by getting enough sleep, working on hobbies you enjoy and practicing relaxation or meditation. Talk to your care team about ways to manage your pain beyond prescription opioids.    These medicines have risks:    DO NOT drive when on new or higher doses of pain medicine. These medicines can affect your alertness and reaction times, and you could be arrested for driving under the influence (DUI). If you need to use opioids long-term, talk to your care team about driving.    DO NOT operate heavy machinery    DO NOT do any other dangerous activities while taking these medicines.    DO NOT drink any alcohol while taking these medicines.     If the opioid prescribed includes acetaminophen, DO NOT take with any other medicines that contain acetaminophen. Read all labels carefully. Look for the word   acetaminophen  or  Tylenol.  Ask your pharmacist if you have questions or are unsure.    You can get addicted to pain medicines, especially if you have a history of addiction (chemical, alcohol or substance dependence). Talk to your care team about ways to reduce this risk.    All opioids tend to cause constipation. Drink plenty of water and eat foods that have a lot of fiber, such as fruits, vegetables, prune juice, apple juice and high-fiber cereal. Take a laxative (Miralax, milk of magnesia, Colace, Senna) if you don t move your bowels at least every other day. Other side effects include upset stomach, sleepiness, dizziness, throwing up, tolerance (needing more of the medicine to have the same effect), physical dependence and slowed breathing.    Store your pills in a secure place, locked if possible. We will not replace any lost or stolen medicine. If you don t finish your medicine, please throw away (dispose) as directed by your pharmacist. The [place] [employer]ution [employer] has more information about safe disposal: https://www.pca.[facility].[place].us/living-green/managing-unwanted-medications             Medication List: This is a list of all your medications and when to take them. Check marks below indicate your daily home schedule. Keep this list as a reference.      Medications           Morning Afternoon Evening Bedtime As Needed    calcium carbonate 500 mg (elemental) 500 MG tablet   Commonly known as:  OS-KRYSTEN   Take 1 tablet by mouth daily                                   capecitabine 500 MG tablet CHEMO   Commonly known as:  XELODA   Take 3 tablets (1,500 mg) by mouth 2 times daily Take on day 1-7 and 15-21 of 28-day cycle.                                      diclofenac 1 % Gel topical gel   Commonly known as:  VOLTAREN   Place 2-4 g onto the skin 4 times daily   Last time this was given:  2 g on 10/10/2018  9:15 PM                                            ENZYME DIGEST Caps   Take 1 capsule by mouth  daily                                   medical cannabis (Patient's own supply.  Not a prescription)   (This is NOT a prescription, and does not certify that the patient has a qualifying medical condition for medical cannabis.  The purpose of this order is  to document that the patient reports taking medical cannabis.)                                ondansetron 8 MG tablet   Commonly known as:  ZOFRAN   Take 1 tablet (8 mg) by mouth every 8 hours as needed for nausea   Last time this was given:  8 mg on 10/4/2018  3:39 PM                                prochlorperazine 10 MG tablet   Commonly known as:  COMPAZINE   Take 10 mg by mouth before 1st dose of oral chemotherapy, then prn thereafter   Last time this was given:  5 mg on 10/7/2018  1:07 AM                                traMADol 50 MG tablet   Commonly known as:  ULTRAM   Take 1-2 tablets ( mg) by mouth every 6 hours as needed for moderate pain or headaches   Last time this was given:  25 mg on 10/8/2018  8:53 PM                                VITAMIN D (CHOLECALCIFEROL) PO   Take 1,000 Units by mouth daily   Last time this was given:  2,000 Units on 10/11/2018  8:01 AM

## 2018-10-04 NOTE — PLAN OF CARE
Patient receive 1000ml fluids. Lab came to collect labs. EKG completed. NSR. Xray to come.   Pt BP dipped to 87/72, HR 89 and began to feel nauseated again. MD called at that time.   Recheck a few minutes later was 100/74 89.  Second 1000ml bolus started. May be transferring to ICU.

## 2018-10-04 NOTE — PROGRESS NOTES
Nursing Focus: Admission    D: Arrived at 0700 from ED via liter. Patient accompanied by self. Admitted for syncope episode at home with LOC, dizziness and hypotension. PMHx: Left-sided breast cancer s/p last chemotherapy on Wednesday (Xeloda).    I: Admission process began.  Patient oriented to room, enviroment, call light.  MD notified of patient's arrival on unit.     A: VSS. Saturations on RA are 95% but per patient wanting oxygen on. O2 on via NC at 1 LPM. Telemetry in place due to syncope episode. Telemetry showing NSR. C/o pain at left chest tube insertion site. PRN PO Tramadol given with relief. Yesterday patient began medical cannibas via vape and oil, which is currently at home. Chest XR in ED showed new large left-side hydropneumothorax status post thoracentesis, small right pleural effusion and atelectasis present in both lungs. 20 Cymraes chest tube placed and currently clamped with 110 cc output. Pulmonary consult placed. Patient to get a 2 view chest x-ray at 1800. Nausea has been an issue since starting chemotherapy with a decrease in oral intake. PRN PO Zofran given. Right PIV saline locked. Regular diet but poor appetite. UOP adequate. LBM yesterday. Full code. Allergy to Erythromycin and Sulfa drugs. Fall precautions in place and assist of +1.    P: Implement plan of care when available. Continue to monitor patient. Nursing interventions as appropriate. Notify MD with changes in pt status.    Addendum: Pulmonary saw patient and left chest tube to -20 cm H2O suction and patient to get a chest x-ray tomorrow AM.

## 2018-10-04 NOTE — PLAN OF CARE
Rapid Response called on patient at shift report.   HR was noted to be 51. Pt runs in the 80s and up.   BP taken at 54/41  Patient was cool and clammy.   Blood sugar was 141.  MD called and came immediately.   Lactate, CBC ordered.  1000ml/hr started BP improved to 104/76. HR increased up to 85  We will continue to monitor her. VS will be taken frequently.   EKG and Xray ordered.   Fluids changed to be 125ml/hr from 75ml/hr.   No increase in chest tube noted from previous view by AM RN.

## 2018-10-04 NOTE — IP AVS SNAPSHOT
[address] [address]    Phone:  [phone]                                       After Visit Summary   10/4/2018    Shan Marsh    MRN: [id number]           After Visit Summary Signature Page     I have received my discharge instructions, and my questions have been answered. I have discussed any challenges I see with this plan with the nurse or doctor.    ..........................................................................................................................................  Patient/Patient Representative Signature      ..........................................................................................................................................  Patient Representative Print Name and Relationship to Patient    ..................................................               ................................................  Date                                   Time    ..........................................................................................................................................  Reviewed by Signature/Title    ...................................................              ..............................................  Date                                               Time          22EPIC Rev 08/18

## 2018-10-04 NOTE — H&P
[facility], [place]    History and Physical  Hematology / Oncology     Date of Admission:  10/4/2018    Assessment & Plan   Shan Marsh is a 48 year old woman with recurrent now metastatic ER+, mod [place]+, HER-2 neg breast cancer currently on treatment with Xeloda. She was BIBA to ED after syncopal event/unwitness fall at home around 0100. On workup in ED she was found to have left pneumothorax and had chest tube placed while in ED. She is admitted for further management of PTX.    Syncopal event  Possible triggers include vasovagal, dehydration secondary to poor PO intake, medication reaction due to new use of cannabinoids, PTX. No s/sx PE. Admission EKG demonstrates NSR. No irregularities noted on telemetry while in ED. Syncopal symptoms have resolved.  -Continue to monitor sx off telemetry.  -Advised patient to proceed cautiously with future use of medical cannabis, take note of sx, and check BP (she has device at home) after using in case causing hypotension.      Left pneumothorax  Suspect secondary to thoracentesis on 10/3 for recurrent malignant pleural effusion vs coughing vs spontaneous. Chest tube placed in ED. F/u CXR showed reexpansion of the lung. Patient denies respiratory sx at this time, though does feel more comfortable with 2L NC given that she prefers O2 sats >95% (~95% off O2).   -Chest tube has drained ~120ml thus far. Will clamp chest tube this afternoon at 1400. Repeat CXR at 1800.  -Pulmonology consulted for assistance with management.     Metastatic breast cancer  See PA's clinic note dated 9/4/18 for detailed oncologic and treatment history. Most recent started on Xeloda - planned for 1 week on, 1 week off. She completed her first week last Wednesday and is planned to see Dr. Whitman on Monday 10/8 prior to started 2nd week of xeloda. Tolerating OK so far.  -Continue on Xeloda break until next week. Letrozole has been discontinued.    -Continue tramadol  "PRN pain.  -Continue medical cannabis PRN chronic nausea, pain.  -Has been taking gabapentin only 300mg at bedtime and would like to stop so will discontinue.   -Continue zometa q3 months for bone mets - due October, to be given outpatient.     FEN: PRN lyte replacement, RDAT   Lines: PIV  Prophylaxis: mechanical   Consults: Pulmonology   Disposition: Possible discharge home this evening vs tomorrow pending resolution of PTX and removal of chest tube    Maureen Coreas DNP, APRN, CNP  Hematology/Oncology  Pager: [phone]    Code Status   Full Code    Primary Care Physician   Primary hematologist/oncologist: Ana Whitman    Chief Complaint   \"Passed out\" at home then was found to have pneumothorax    History of Present Illness   History obtained from chart review and discussed with patient.    Shan Marsh is a 48 year old woman with recurrent now metastatic ER+, moderate [place]+, HER-2 neg breast cancer currently on treatment with Xeloda. She was BIBA to ED after syncopal event/unwitnessed fall at home around 0100. She reports that she tried medical cannabis for first time around 2100 last night. In the late evening/early morning, she got from sleep to take another dose. She remembers feeling dizzy and lightheaded and grasping on to something to help balance, then apparently passed out and was found on the kitchen floor by her  who had heard the commotion from the bedroom. Her eyes were open but she was not responding to him so he called an ambulance. She came to and was found to be hypotensive on EMS eval. She also felt nauseated, had tunnel vision, then had brief loss of vision and passed out again, came to once taken outside into the fresh air. She is feeling much better now from that standpoint and doesn't have any syncopal symptoms, HA, double or blurry vision or vision loss, dizziness, lightheadedness.     On workup in ED, Shan was found to have left pneumothorax and had chest tube placed while " there. She reports that % SpO2 is normal for her and has elected to wear 2L NC supplemental O2 since she has been ~95% here without it. She does not feel particularly short of breath. She experienced some coughing yesterday. She had 500ml thoracentesis yesterday. She has some pain at the chest tube site right now, otherwise denies chest pain, pressure, palpitations. Has chronic nausea, no vomiting today. No other immediate concerns.     Past Medical History    I have reviewed this patient's medical history and updated it with pertinent information if needed.   Past Medical History:   Diagnosis Date     Breast cancer, left breast (H) 5/12    grade 1 infiltrating ductal cancer, ER/SC+, s/p bilateral mastectomy     Complication of anesthesia     bradycardia,b/p drop p childbirth     Dysplasia of cervix (uteri) 12/01    LUCAS II - III, s/p LEEP  @ [facility]     Incompetence of cervix 4/06    D&E at 19+4 weeks twin pregnancy     Shingles outbreak 5/12       Past Surgical History   I have reviewed this patient's surgical history and updated it with pertinent information if needed.  Past Surgical History:   Procedure Laterality Date     D & C  9/2010    retained POC     ENDOBRONCHIAL ULTRASOUND FLEXIBLE N/A 5/12/2017    Procedure: ENDOBRONCHIAL ULTRASOUND FLEXIBLE;  Flexible Bronchoscopy, Endobronchial Ultrasound, Transbronchial Biopsy ;  Surgeon: Bandar Meyer MD;  Location:  GI     LEEP TX, CERVICAL  12/01    LEEP or LUCAS II - III     MASTECTOMY, RECONSTRUCT BREAST, COMBINED  6/11/2012    Procedure: COMBINED MASTECTOMY, RECONSTRUCT BREAST;  Bilateral Mastectomy with Reconstruction, Left [place] Node Biopsy , placement of On-Q catheters X 2 bilateral breast.;  Surgeon: Misael Cheng MD;  Location:  OR     RECONSTRUCT BREAST BILATERAL  3/25/2013    Procedure: RECONSTRUCT BREAST BILATERAL;  Revise bilateral breasts and reconstructions, with insertion of gel breast implants with fat grafting and fat after  mastectomies.;  Surgeon: Yair Olvera MD;  Location: [facility] OR     RECONSTRUCT BREAST BILATERAL, IMPLANT PROSTHESIS BILATERAL, COMBINED  9/27/2012    Procedure: COMBINED RECONSTRUCT BREAST BILATERAL, IMPLANT PROSTHESIS BILATERAL;  Bilateral Second Stage Breast Reconstruction With Gel Implants, Fat        THORACENTESIS Left 5/7/2018    Procedure: THORACENTESIS;  Thoracentesis;  Surgeon: Venancio Hussein PA-C;  Location: [facility] OR     THORACENTESIS Left 7/5/2018    Procedure: THORACENTESIS;  Thoracentesis;  Surgeon: Lydia Murillo PA-C;  Location: [facility] OR     THORACENTESIS Left 9/5/2018    Procedure: THORACENTESIS;  Left Thoracentesis;  Surgeon: López Guillen PA-C;  Location: [facility] OR     THORACENTESIS Left 10/3/2018    Procedure: THORACENTESIS;  Thoracentesis, left;  Surgeon: Eros Chauhan PA-C;  Location: [facility] OR       Prior to Admission Medications   Prescriptions Prior to Admission   Medication Sig Dispense Refill Last Dose     calcium carbonate (OS-KRYSTEN 500 MG [place]) 1250 MG tablet Take 1 tablet by mouth daily   10/3/2018 at Unknown time     capecitabine (XELODA) 500 MG tablet CHEMO Take 3 tablets (1,500 mg) by mouth 2 times daily Take on Days 1 thru 7 and 15 thru 21 of each 28 day cycle. 84 tablet 0 9/26/2018 at Unknown time     Digestive Enzymes (ENZYME DIGEST) CAPS Take 1 capsule by mouth daily   10/3/2018 at Unknown time     gabapentin (GRALISE) 300 MG 24 hr tablet Take 300 mg by mouth daily (with dinner)   10/3/2018 at Unknown time     medical cannabis (Patient's own supply.  Not a prescription) (This is NOT a prescription, and does not certify that the patient has a qualifying medical condition for medical cannabis.  The purpose of this order is  to document that the patient reports taking medical cannabis.)   10/3/2018 at Unknown time     ondansetron (ZOFRAN) 8 MG tablet Take 1 tablet (8 mg) by mouth every 8 hours as needed for nausea 40 tablet 3 Past Week at Unknown time      prochlorperazine (COMPAZINE) 10 MG tablet Take 10 mg by mouth before 1st dose of oral chemotherapy, then prn thereafter        VITAMIN D, CHOLECALCIFEROL, PO Take 1,000 Units by mouth daily    10/3/2018 at Unknown time     Allergies   Allergies   Allergen Reactions     Erythromycin Shortness Of Breath     Sulfa Drugs      rash       Social History   I have reviewed this patient's social history and updated it with pertinent information if needed. Shan Marsh  reports that she quit smoking about 21 years ago. She has never used smokeless tobacco. She reports that she uses illicit drugs. She reports that she does not drink alcohol.    Family History   I have reviewed this patient's family history and updated it with pertinent information if needed.   Family History   Problem Relation Age of Onset     Alcohol/Drug Father      Allergies Mother      Neurologic Disorder Mother      seizures, related to fever     GASTROINTESTINAL DISEASE Mother      IBS     Blood Disease Mother      [place]     Cancer Maternal Grandfather      bladder     Circulatory Maternal Grandfather      blood clots     Alcohol/Drug Maternal Grandfather      alcohol dependency     Allergies Maternal Aunt      Allergies Other      maternal cousins     Arthritis Maternal Aunt      Prostate Cancer Maternal Uncle      Thyroid Disease Maternal Aunt      Alcohol/Drug Maternal Uncle      alcohol and drug dependency     Cancer - colorectal Maternal Uncle      GASTROINTESTINAL DISEASE Sister      IBS     Breast Cancer Other      self       Review of Systems   Negative other than as stated above in HPI.   Physical Exam   Temp: 97.5  F (36.4  C) Temp src: Oral BP: 92/60 Pulse: 89 Heart Rate: 98 Resp: 20 SpO2: 99 % O2 Device: Nasal cannula Oxygen Delivery: 1 LPM  Vital Signs with Ranges  Temp:  [97.5  F (36.4  C)-98.4  F (36.9  C)] 97.5  F (36.4  C)  Pulse:  [89] 89  Heart Rate:  [] 98  Resp:  [11-28] 20  BP: ()/(60-81) 92/60  SpO2:  [94  "%-100 %] 99 %  112 lbs 14.4 oz    BP 92/60 (BP Location: Left arm)  Pulse 89  Temp 97.5  F (36.4  C) (Oral)  Resp 20  Ht 1.626 m (5' 4\")  Wt 51.2 kg (112 lb 14.4 oz)  LMP 10/06/2017 (Approximate)  SpO2 99%  BMI 19.38 kg/m2  Vitals:    10/04/18 0722   Weight: 51.2 kg (112 lb 14.4 oz)       Constitutional: Pleasant woman seen resting comfortably in bed in NAD. Alert and interactive.   HEENT: NCAT. PERRL, EOMI, anicteric sclera. MMM.   Respiratory: Non-labored breathing on 2L NC. Breath sounds diminished on left side/LLL, clear on right. Chest tube on left, site tender, to water seal, no air leak noted.   Cardiovascular: Regular rate and rhythm. No murmur or rub.   GI: Normoactive bowel sounds. Abdomen soft, non-distended, and non-tender.   Skin: Warm and dry. No concerning lesions or rash on exposed surfaces.  Musculoskeletal: Extremities grossly normal, non-tender, no edema.   Neurologic: Alert, oriented, speech normal, no focal deficits.   Neuropsychiatric: Mentation and affect appear normal/appropriate.      Data   CBC  Recent Labs  Lab 10/04/18  0827 10/04/18  0240   WBC  --  8.8   RBC  --  3.71*   HGB  --  11.8   HCT  --  36.6   MCV  --  99   MCH  --  31.8   MCHC  --  32.2   RDW  --  15.0    170     CMP  Recent Labs  Lab 10/04/18  0827 10/04/18  0240   NA  --  138   POTASSIUM  --  3.9   CHLORIDE  --  102   CO2  --  28   ANIONGAP  --  8   GLC  --  140*   BUN  --  13   CR 0.64 0.70   GFRESTIMATED >90 >90   GFRESTBLACK >90 >90   KRYSTEN  --  8.4*     INRNo lab results found in last 7 days.    Results for orders placed or performed during the hospital encounter of 10/04/18   Chest XR,  PA & LAT    Narrative    Exam: XR CHEST 2 VW, 10/4/2018 3:05 AM    Indication: syncope and recent thoracentesis, hx of breast CA;     Comparison: CT chest Assessment/pelvis 3/15/2018. PET/CT 8/27/2018.    Findings:   New large left-sided hydropneumothorax status post thoracentesis, with  significant collapse of the left " lung. Associated compressive  atelectasis of both left and right lungs seen. Mild rightward  mediastinal shift involving the heart and lower tracheobronchial tree.  Small right pleural effusion again seen.      Impression    Impression:   1. Large left hydropneumothorax and rightward mediastinal shift.  2. No interval change in right-sided pleural effusion.    [Result: Hydropneumothorax]    Finding was identified on 10/4/2018 3:08 AM.     Dr. Rai was contacted by Dr. Giraldo at 10/4/2018 3:13 AM and  verbalized understanding of the urgent finding.     I have personally reviewed the examination and initial interpretation  and I agree with the findings.    LANIE PORTILLO MD   Chest  XR, 1 view portable    Narrative    Exam: XR CHEST PORT 1 VW, 10/4/2018 4:27 AM    Indication: post-chest tube placement;     Comparison: Same-day x-ray    Findings:   New onset of chest tube. Pneumothorax no longer visualized. Persistent  layering bilateral pleural effusions. Central airway is midline with  possible minimal rightward deviation at the level of the stephanie. Mild  anterior wedge compression deformity seen in the lower thoracic spine.      Impression    Impression:   1. Pneumothorax no longer visualized on the left, status post  left-sided apically directed chest tube placement.  2. Persistent layering bilateral pleural effusions.    I have personally reviewed the examination and initial interpretation  and I agree with the findings.    LANIE PORTILLO MD

## 2018-10-04 NOTE — ED NOTES
[facility], [place]   ED Nurse to Floor Handoff     Shan Marsh is a 48 year old female who speaks English and lives with a spouse,  in a home  They arrived in the ED by ambulance from home    ED Chief Complaint: Loss of Consciousness    ED Dx;   Final diagnoses:   Pneumothorax, postprocedural   Syncope, unspecified syncope type         Needed?: No    Allergies:   Allergies   Allergen Reactions     Erythromycin Shortness Of Breath     Sulfa Drugs      rash   .  Past Medical Hx:   Past Medical History:   Diagnosis Date     Breast cancer, left breast (H) 5/12    grade 1 infiltrating ductal cancer, ER/DC+, s/p bilateral mastectomy     Complication of anesthesia     bradycardia,b/p drop p childbirth     Dysplasia of cervix (uteri) 12/01    LUCAS II - III, s/p LEEP  @ [facility] [place]     Incompetence of cervix 4/06    D&E at 19+4 weeks twin pregnancy     Shingles outbreak 5/12      Baseline Mental status: WDL  Current Mental Status changes: at basesline    Infection present or suspected this encounter: no  Sepsis suspected: No  Isolation type: No active isolations     Activity level - Baseline/Home:  Stand with Assist  Activity Level - Current:   Stand with Assist    Bariatric equipment needed?: No    In the ED these meds were given:   Medications   lactated ringers BOLUS 1,000 mL (1,000 mLs Intravenous New Bag 10/4/18 0252)       Drips running?  No    Home pump  No    Current LDAs  Peripheral IV 10/04/18 Right Upper arm (Active)   Site Assessment WDL 10/4/2018  3:07 AM   Line Status Infusing 10/4/2018  3:07 AM   Number of days:0       Closed/Suction Drain 1 Left Breast 19 [place] (Active)   Number of days:2306       Closed/Suction Drain 2 Right Breast 19 [place] (Active)   Number of days:2306       Incision/Surgical Site Bilateral Breast (Active)   Number of days:       Incision/Surgical Site 09/27/12 Anterior;Left Breast (Active)   Number of days:2198       Incision/Surgical Site  09/27/12 Anterior;Right Breast (Active)   Number of days:2198       Incision/Surgical Site 09/27/12 Left Other (Comment) (Active)   Number of days:2198       Incision/Surgical Site 09/27/12 Right Other (Comment) (Active)   Number of days:2198       Incision/Surgical Site 03/25/13 Bilateral Breast (Active)   Number of days:2019       Incision/Surgical Site 03/25/13 Bilateral Abdomen (Active)   Number of days:2019       Incision/Surgical Site 05/07/18 Left Back (Active)   Number of days:150       Incision/Surgical Site 05/07/18 Left Back (Active)   Number of days:150       Incision/Surgical Site 09/05/18 Left Back (Active)   Number of days:29       Incision/Surgical Site 10/03/18 Left Back (Active)   Number of days:1       Labs results:   Labs Ordered and Resulted from Time of ED Arrival Up to the Time of Departure from the ED   CBC WITH PLATELETS DIFFERENTIAL - Abnormal; Notable for the following:        Result Value    RBC Count 3.71 (*)     All other components within normal limits   BASIC METABOLIC PANEL - Abnormal; Notable for the following:     Glucose 140 (*)     Calcium 8.4 (*)     All other components within normal limits   PERIPHERAL IV CATHETER       Imaging Studies:   Recent Results (from the past 24 hour(s))   IR Thoracentesis    Narrative    PRE-PROCEDURE DIAGNOSIS: Pleural effusion    POST-PROCEDURE DIAGNOSIS: Same    PROCEDURE: Thoracentesis    Impression    IMPRESSION: Completed ultrasound-guided therapeutic thoracentesis. 500  mL clear yellow fluid aspirated from the left pleural space. No  immediate complication. There is a small right pleural effusion.    PLAN: Consider bilateral thoracenteses if symptoms return.    ----------    CLINICAL HISTORY: Patient with metastatic breast cancer and recurrent  left pleural effusion. She had a thoracentesis done 1 week ago in  [place] and had 1.5 L drained. The proceduralist there told her she had  a right pleural effusion as well. She feels minimal chest  discomfort  and does not have dyspnea.    PERFORMED BY: Vaibhav Chauhan PA-C    CONSENT: Written informed consent was obtained and is documented in  the patient record.    MEDICATIONS: Buffered 1% lidocaine for local anesthesia      NURSING: The patient was placed on continuous vital signs monitoring.  Vital signs were monitored by nursing staff under my supervision.      DESCRIPTION: Targeted ultrasound shows large left pleural effusion and  small right pleural effusion. Patient positioned upright and the skin  overlying the left pleural effusion was prepped and draped in the  usual sterile fashion.    Under continuous ultrasound visualization, the skin and pleura was  anesthetized before a centesis needle/catheter system was advanced  into the pleural space. The catheter was advanced off the needle into  the fluid and the needle was removed. Catheter connected to vacuum  drainage and fluid aspirated. Upon completion of drainage, the  catheter was removed on expiration. Occlusive dressing applied.     COMPLICATIONS: No immediate concerns, the patient remained stable  throughout the procedure and tolerated it well.    ESTIMATED BLOOD LOSS: Minimal    SPECIMENS: None    LILY CHAUHAN PA-C   Chest XR,  PA & LAT    Narrative    Exam: XR CHEST 2 VW, 10/4/2018 3:05 AM    Indication: syncope and recent thoracentesis, hx of breast CA;     Comparison: CT chest Assessment/pelvis 3/15/2018. PET/CT 8/27/2018.    Findings:   New large left-sided hydropneumothorax status post thoracentesis, with  significant collapse of the left lung. Associated compressive  atelectasis of both left and right lungs seen. Mild rightward  mediastinal shift involving the heart and lower tracheobronchial tree.  Small right pleural effusion again seen.      Impression    Impression:   Large left hydropneumothorax and rightward mediastinal shift.    [Result: Hydropneumothorax]    Finding was identified on 10/4/2018 3:08 AM.     Dr. Rai was contacted by  Dr. Giraldo at 10/4/2018 3:13 AM and  verbalized understanding of the urgent finding.    Chest  XR, 1 view portable    Narrative    Exam: XR CHEST PORT 1 VW, 10/4/2018 4:27 AM    Indication: post-chest tube placement;     Comparison: Same-day x-ray    Findings:   New onset of chest tube. Pneumothorax no longer visualized. Persistent  layering bilateral pleural effusions. Central airway is midline with  possible minimal rightward deviation at the level of the stephanie. Mild  anterior wedge compression deformity seen in the lower thoracic spine.      Impression    Impression:   1. Pneumothorax no longer visualized status post left-sided chest  tube.  2. Persistent layering bilateral pleural effusions.       Recent vital signs:   BP 99/71  Pulse 89  Temp 98.2  F (36.8  C) (Oral)  Resp 12  LMP 10/06/2017 (Approximate)  SpO2 97%    Cardiac Rhythm: Normal Sinus  Pt needs tele? Yes  Skin/wound Issues: None    Code Status: Full Code    Pain control: good    Nausea control: good    Abnormal labs/tests/findings requiring intervention: Left sided pneumothorax; chest tube in place at this time    Family present during ED course? No   Family Comments/Social Situation comments:  is at home with children    Tasks needing completion: None    Shiloh Martini, RN      [phone] [facility]

## 2018-10-05 ENCOUNTER — APPOINTMENT (OUTPATIENT)
Dept: GENERAL RADIOLOGY | Facility: CLINIC | Age: 48
DRG: 200 | End: 2018-10-05
Attending: NURSE PRACTITIONER
Payer: COMMERCIAL

## 2018-10-05 LAB
ALBUMIN SERPL-MCNC: 2.5 G/DL (ref 3.4–5)
ALP SERPL-CCNC: 137 U/L (ref 40–150)
ALT SERPL W P-5'-P-CCNC: 78 U/L (ref 0–50)
ANION GAP SERPL CALCULATED.3IONS-SCNC: 10 MMOL/L (ref 3–14)
AST SERPL W P-5'-P-CCNC: 62 U/L (ref 0–45)
BILIRUB DIRECT SERPL-MCNC: 0.2 MG/DL (ref 0–0.2)
BILIRUB SERPL-MCNC: 0.7 MG/DL (ref 0.2–1.3)
BUN SERPL-MCNC: 14 MG/DL (ref 7–30)
CALCIUM SERPL-MCNC: 8 MG/DL (ref 8.5–10.1)
CHLORIDE SERPL-SCNC: 110 MMOL/L (ref 94–109)
CO2 SERPL-SCNC: 17 MMOL/L (ref 20–32)
CREAT SERPL-MCNC: 0.66 MG/DL (ref 0.52–1.04)
ERYTHROCYTE [DISTWIDTH] IN BLOOD BY AUTOMATED COUNT: 15.6 % (ref 10–15)
GFR SERPL CREATININE-BSD FRML MDRD: >90 ML/MIN/1.7M2
GLUCOSE SERPL-MCNC: 118 MG/DL (ref 70–99)
HCT VFR BLD AUTO: 41 % (ref 35–47)
HGB BLD-MCNC: 12.7 G/DL (ref 11.7–15.7)
LACTATE BLD-SCNC: 3.9 MMOL/L (ref 0.7–2)
MCH RBC QN AUTO: 31.4 PG (ref 26.5–33)
MCHC RBC AUTO-ENTMCNC: 31 G/DL (ref 31.5–36.5)
MCV RBC AUTO: 102 FL (ref 78–100)
PLATELET # BLD AUTO: 196 10E9/L (ref 150–450)
POTASSIUM SERPL-SCNC: 4.5 MMOL/L (ref 3.4–5.3)
PROT SERPL-MCNC: 6.1 G/DL (ref 6.8–8.8)
RBC # BLD AUTO: 4.04 10E12/L (ref 3.8–5.2)
SODIUM SERPL-SCNC: 137 MMOL/L (ref 133–144)
TROPONIN I SERPL-MCNC: <0.015 UG/L (ref 0–0.04)
WBC # BLD AUTO: 8.2 10E9/L (ref 4–11)

## 2018-10-05 PROCEDURE — 85027 COMPLETE CBC AUTOMATED: CPT | Performed by: NURSE PRACTITIONER

## 2018-10-05 PROCEDURE — 20000002 ZZH R&B BMT INTERMEDIATE

## 2018-10-05 PROCEDURE — 36415 COLL VENOUS BLD VENIPUNCTURE: CPT | Performed by: NURSE PRACTITIONER

## 2018-10-05 PROCEDURE — 25000128 H RX IP 250 OP 636: Performed by: NURSE PRACTITIONER

## 2018-10-05 PROCEDURE — 25000128 H RX IP 250 OP 636: Performed by: PHYSICIAN ASSISTANT

## 2018-10-05 PROCEDURE — 84484 ASSAY OF TROPONIN QUANT: CPT | Performed by: NURSE PRACTITIONER

## 2018-10-05 PROCEDURE — 25000132 ZZH RX MED GY IP 250 OP 250 PS 637: Performed by: NURSE PRACTITIONER

## 2018-10-05 PROCEDURE — 80048 BASIC METABOLIC PNL TOTAL CA: CPT | Performed by: NURSE PRACTITIONER

## 2018-10-05 PROCEDURE — 83605 ASSAY OF LACTIC ACID: CPT | Performed by: INTERNAL MEDICINE

## 2018-10-05 PROCEDURE — 80076 HEPATIC FUNCTION PANEL: CPT | Performed by: NURSE PRACTITIONER

## 2018-10-05 PROCEDURE — 71046 X-RAY EXAM CHEST 2 VIEWS: CPT

## 2018-10-05 PROCEDURE — 99233 SBSQ HOSP IP/OBS HIGH 50: CPT | Performed by: INTERNAL MEDICINE

## 2018-10-05 PROCEDURE — 36415 COLL VENOUS BLD VENIPUNCTURE: CPT | Performed by: INTERNAL MEDICINE

## 2018-10-05 RX ORDER — LIDOCAINE 4 G/G
1-2 PATCH TOPICAL
Status: DISCONTINUED | OUTPATIENT
Start: 2018-10-05 | End: 2018-10-11 | Stop reason: HOSPADM

## 2018-10-05 RX ORDER — ACETAMINOPHEN 325 MG/1
975 TABLET ORAL EVERY 8 HOURS
Status: DISCONTINUED | OUTPATIENT
Start: 2018-10-05 | End: 2018-10-11 | Stop reason: HOSPADM

## 2018-10-05 RX ADMIN — DICLOFENAC SODIUM 4 G: 10 GEL TOPICAL at 22:51

## 2018-10-05 RX ADMIN — CALCIUM 500 MG: 500 TABLET ORAL at 09:00

## 2018-10-05 RX ADMIN — SODIUM CHLORIDE: 9 INJECTION, SOLUTION INTRAVENOUS at 02:40

## 2018-10-05 RX ADMIN — ONDANSETRON HYDROCHLORIDE 8 MG: 2 INJECTION INTRAMUSCULAR; INTRAVENOUS at 11:47

## 2018-10-05 RX ADMIN — ACETAMINOPHEN 975 MG: 325 TABLET, FILM COATED ORAL at 18:24

## 2018-10-05 RX ADMIN — PROCHLORPERAZINE EDISYLATE 5 MG: 5 INJECTION INTRAMUSCULAR; INTRAVENOUS at 13:32

## 2018-10-05 RX ADMIN — SODIUM CHLORIDE: 9 INJECTION, SOLUTION INTRAVENOUS at 23:04

## 2018-10-05 RX ADMIN — VITAMIN D, TAB 1000IU (100/BT) 2000 UNITS: 25 TAB at 09:00

## 2018-10-05 RX ADMIN — ENOXAPARIN SODIUM 40 MG: 40 INJECTION SUBCUTANEOUS at 09:00

## 2018-10-05 ASSESSMENT — ACTIVITIES OF DAILY LIVING (ADL)
ADLS_ACUITY_SCORE: 11

## 2018-10-05 NOTE — PROGRESS NOTES
[facility], [place]    Hematology / Oncology Progress Note    Date of Admission: 10/4/2018     Assessment & Plan   Shan Marsh is a 48 year old woman with recurrent now metastatic ER+, mod OK+, HER-2 neg breast cancer currently on treatment with Xeloda. She was BIBA to ED after syncopal event/unwitness fall at home around 0100. On workup in ED she was found to have left pneumothorax and had chest tube placed while in ED. She is admitted for further management of PTX.    Plan today:  -CXR this AM with bibasilar opacities, increased L pleural effusion, and small PTX. Will continue chest tube to suction for now and consult with Pulm today. ?Consider repositioning chest tube to drain effusion.  -BPs have rebounded and HR now slightly tachy. Monitor. Continue IVF for now but will decrease rate.   -Add voltaren gel and lidoderm patches for chronic neck and shoulder pain.   _____________________________________________________________________________________      Syncopal event x1 prior to admission  Possible triggers include vasovagal, dehydration secondary to poor PO intake, medication reaction due to new use of cannabinoids, ptx. No s/sx PE. Admission EKG demonstrates NSR. No irregularities noted on telemetry while in ED. Syncopal symptoms had resolved until 10/4 early evening, when patient was again hypotensive, bradycardic, and felt lightheaded. She did not lose consciousness and f/u CXR did not show any worsening of the ptx.   -Continue telemetry. Trop checked this AM and was negative.   -Concern that cannabis oil contributed to hypotension and syncope.      Left pneumothorax  Suspect secondary to thoracentesis on 10/3 for recurrent malignant pleural effusion vs coughing vs spontaneous. Chest tube placed in ED. First f/u CXR showed reexpansion of the lung. No significant SOB or hypoxia.   -Pulmonology consulted for assistance with management.   -There was air leak noted 10/4  afternoon so chest tube placed back to -20cm H2O suction and continued overnight. CXR this AM demonstrates tiny ptx as well as increasing reaccumulation of left pleural effusion. Continue chest tube to suction.     Metastatic breast cancer  See PA's clinic note dated 9/4/18 for detailed oncologic and treatment history. Most recent started on xeloda - planned for 1 week on, 1 week off. She completed her first week last Wednesday and is planned to see Dr. Whitman on Monday 10/8 prior to starting 2nd week of xeloda. Tolerating ok so far.  -Continue on xeloda break until next week. Letrozole has been discontinued prior to this admission.    -Continue tramadol PRN pain but will try a lower dose in case contributed to hypotension yesterday.   -Continue medical cannabis PRN chronic nausea, pain. Advised patient to proceed cautiously with future use of medical cannabis, take note of sx, and check BP (she has device at home) after using in case causing hypotension.   -Has been taking gabapentin only 300mg at bedtime and would like to stop so will discontinue.   -Continue zometa q3 months for bone mets - due October, to be given outpatient.   -Added lidoderm patches, voltaren gel, tylenol 975mg TID scheduled for pain.     FEN: PRN lyte replacement, RDAT   Lines: PIV  Prophylaxis: mechanical   Consults: Pulmonology   Disposition: Possible discharge home this evening vs tomorrow pending resolution of ptx and removal of chest tube    Maureen Coreas DNP, APRN, CNP  Hematology/Oncology  Pager: [phone]    Interval History   Shan reports feeling much better this AM than yesterday evening. Her BPs have improved and now HR slightly tachy. She is afebrile and o/w hemodynamically stable. She reports improvement of neck and shoulder pain that has been chronic and was worse yesterday evening. She still has some discomfort at chest tube site. Denies significant SOB and is not hypoxic off supplemental O2. No new concerns.     Physical  Exam   Vitals:    10/04/18 0722 10/05/18 0810   Weight: 51.2 kg (112 lb 14.4 oz) 54.8 kg (120 lb 12.8 oz)     Vital Signs with Ranges  Temp:  [97.5  F (36.4  C)-99.3  F (37.4  C)] 98.2  F (36.8  C)  Pulse:  [] 82  Heart Rate:  [] 111  Resp:  [16-20] 18  BP: ()/(41-85) 91/74  SpO2:  [93 %-99 %] 97 %  I/O last 3 completed shifts:  In: 3047.08 [P.O.:120; I.V.:1927.08; IV Piggyback:1000]  Out: 1380 [Urine:1300; Chest Tube:80]    Constitutional: Very pleasant woman seen sitting up in bed in no acute distress. Alert and interactive.   HEENT: NCAT. Anicteric sclera. MMM.   Respiratory: Non-labored breathing on room air. Breath sounds diminished in b/l bases and midway up left lung. No cough or crackles. Chest tube on left to suction, no air leak/bubbles noted in chamber this AM.   Cardiovascular: Mild tachycardia, regular rhythm. No murmur or rub.   GI: Normoactive bowel sounds. Abdomen soft, non-distended, and non-tender.   Skin: Warm and dry. No concerning lesions or rash on exposed surfaces.  Musculoskeletal: Extremities grossly normal.  Neurologic: Alert, oriented, speech normal, no focal deficits.   Neuropsychiatric: Mentation and affect appear normal/appropriate.    Medications   Current Facility-Administered Medications   Medication     acetaminophen (TYLENOL) tablet 975 mg     calcium carbonate 500 mg (elemental) (OSCAL;OYSTER SHELL CALCIUM) tablet 500 mg     cholecalciferol (vitamin D3) tablet 2,000 Units     diclofenac (VOLTAREN) 1 % topical gel 2-4 g     enoxaparin (LOVENOX) injection 40 mg     influenza quadrivalent (PF) vacc (FLUZONE or Flulaval or FLUARIX) injection 0.5 mL     Lidocaine (LIDOCARE) 4 % Patch 1-2 patch     lidocaine patch in PLACE     lidocaine patch REMOVAL     naloxone (NARCAN) injection 0.1-0.4 mg     ondansetron (ZOFRAN) injection 8 mg     ondansetron (ZOFRAN) tablet 8 mg     prochlorperazine (COMPAZINE) injection 5 mg     prochlorperazine (COMPAZINE) tablet 5-10 mg      sodium chloride 0.9% infusion     traMADol (ULTRAM) half-tab 25 mg       Data   CBC  Recent Labs  Lab 10/05/18  0817 10/04/18  1633 10/04/18  0827 10/04/18  0240   WBC 8.2 8.3  --  8.8   RBC 4.04 3.65*  --  3.71*   HGB 12.7 11.7  --  11.8   HCT 41.0 36.0  --  36.6   * 99  --  99   MCH 31.4 32.1  --  31.8   MCHC 31.0* 32.5  --  32.2   RDW 15.6* 15.2*  --  15.0    171 187 170     CMP  Recent Labs  Lab 10/05/18  0817 10/04/18  0827 10/04/18  0240     --  138   POTASSIUM 4.5  --  3.9   CHLORIDE 110*  --  102   CO2 17*  --  28   ANIONGAP 10  --  8   *  --  140*   BUN 14  --  13   CR 0.66 0.64 0.70   GFRESTIMATED >90 >90 >90   GFRESTBLACK >90 >90 >90   KRYSTEN 8.0*  --  8.4*     INRNo lab results found in last 7 days.    Results for orders placed or performed during the hospital encounter of 10/04/18   Chest XR,  PA & LAT    Narrative    Exam: XR CHEST 2 VW, 10/4/2018 3:05 AM    Indication: syncope and recent thoracentesis, hx of breast CA;     Comparison: CT chest Assessment/pelvis 3/15/2018. PET/CT 8/27/2018.    Findings:   New large left-sided hydropneumothorax status post thoracentesis, with  significant collapse of the left lung. Associated compressive  atelectasis of both left and right lungs seen. Mild rightward  mediastinal shift involving the heart and lower tracheobronchial tree.  Small right pleural effusion again seen.      Impression    Impression:   1. Large left hydropneumothorax and rightward mediastinal shift.  2. No interval change in right-sided pleural effusion.    [Result: Hydropneumothorax]    Finding was identified on 10/4/2018 3:08 AM.     Dr. Rai was contacted by Dr. Giraldo at 10/4/2018 3:13 AM and  verbalized understanding of the urgent finding.     I have personally reviewed the examination and initial interpretation  and I agree with the findings.    LANIE PORTILLO MD   Chest  XR, 1 view portable    Narrative    Exam: XR CHEST PORT 1 VW, 10/4/2018 4:27 AM    Indication:  post-chest tube placement;     Comparison: Same-day x-ray    Findings:   New onset of chest tube. Pneumothorax no longer visualized. Persistent  layering bilateral pleural effusions. Central airway is midline with  possible minimal rightward deviation at the level of the stephanie. Mild  anterior wedge compression deformity seen in the lower thoracic spine.      Impression    Impression:   1. Pneumothorax no longer visualized on the left, status post  left-sided apically directed chest tube placement.  2. Persistent layering bilateral pleural effusions.    I have personally reviewed the examination and initial interpretation  and I agree with the findings.    LANIE PORTILLO MD   XR Chest Port 1 View    Narrative    XR CHEST PORT 1 VW  10/4/2018 5:39 PM      HISTORY: eval pneumothorax as patient had episode of acute  hypotension;     COMPARISON: Earlier same day    FINDINGS: Single AP view of the chest. Cardiomediastinal silhouette is  stable. Bilateral pleural effusions are unchanged. No convincing  residual left pneumothorax. Left chest tube remains in place.  Bibasilar opacities, likely atelectasis (versus consolidation.      Impression    IMPRESSION: Stable chest. No residual pneumothorax is definitely  identified.    I have personally reviewed the examination and initial interpretation  and I agree with the findings.    ALESSANDRO MARION MD   XR Chest 2 Views    Narrative    Exam: XR CHEST 2 VW, 10/5/2018 8:34 AM    Indication: F/u left pneumothorax post chest tube to suction  overnight;     Comparison: Chest x-ray on 10/04/2018.    Findings:   PA and lateral view of the chest. Minimal worsening left pleural  effusion with associated basilar opacity. Left-sided chest in place  with tiny hydropneumothorax. Patchy opacity in right lung base.  Enlarged cardiac mediastinal silhouette, stable. Upper abdomen is  unremarkable.      Impression    Impression:   1. Left-sided chest tube in place with tiny hydropneumothorax.    2. Mild worsening of moderate size left pleural effusion with  associated basilar opacity, atelectasis versus consolidation.   3. Right basilar opacity, favoring atelectasis.    I have personally reviewed the examination and initial interpretation  and I agree with the findings.    MAITE ANTON MD

## 2018-10-05 NOTE — PLAN OF CARE
Patient transferred to 5C. Report given to charge nurse. Primary nurse on 5C was given 7D RN's phone number for questions. No issues with transfer.

## 2018-10-05 NOTE — PROVIDER NOTIFICATION
10/04/18 1500   Call Information   Date of Call 10/04/18   Time of Call 1525   Name of person requesting the team Blanche   Title of person requesting team RN   RRT Arrival time 1530   Time RRT ended 1552   Reason for call   Type of RRT Adult   Primary reason for call Cardiovascular   Cardiovascular SBP less than 90   Was patient transferred from the ED, ICU, or PACU within last 24 hours prior to RRT call? Yes   SBAR   Situation hypotensive, diaphoretic, senstitive to new pain meds   Background cancer   Notable History/Conditions Cancer  (breast cancer)   Assessment weak, sweaty, ashen color, nausea, low BP HR50-60   Interventions Blood glucose;ECG;Fluid bolus;Labs;Meds   Patient Outcome   Patient Outcome Stabilized on unit   RRT Team   Attending/Primary/Covering Physician Heme Onc   Date Attending Physician notified 10/04/18   Time Attending Physician notified 1525   Physician(s) Mary Coreas NP   Lead RN Faina Mancia   RT Joy   Post RRT Intervention Assessment   [place] RRT Assessment [place] [facility]   Date Follow Up Done 10/04/18   Time Follow Up Done 1800

## 2018-10-05 NOTE — PROGRESS NOTES
[facility], [place]    Sepsis Evaluation Progress Note    Date of Service: 10/04/2018    I was called to see Shan Marsh due to a change in status. She is not known to have an infection.     Physical Exam    Vital Signs:  Temp: 97.5  F (36.4  C) Temp src: Oral BP: 105/73 Pulse: 82 Heart Rate: 91 Resp: 18 SpO2: 99 % O2 Device: Nasal cannula Oxygen Delivery: 1 LPM    Lab:  Lactic Acid   Date Value Ref Range Status   10/04/2018 2.8 (H) 0.7 - 2.0 mmol/L Final       The patient is at baseline mental status.    The rest of their physical exam is significant for chest tube left side.    Assessment and Plan    The SIRS and exam findings are likely due to possible vasovagal changes with chest tube vs hypovolemia, there is no sign of sepsis at this time.    Disposition: The patient will transfer to  for higher level of care.  Patient's blood pressures have stabilized after 2 L IVF. .    Claudia Richardson PA-C

## 2018-10-05 NOTE — PLAN OF CARE
Problem: Patient Care Overview  Goal: Plan of Care/Patient Progress Review  Outcome: No Change   10/05/18 1551   OTHER   Plan Of Care Reviewed With patient;family   Plan of Care Review   Progress improving   Slept well. Hr 111-121. Soft blood pressure 91/74. No pain. Declined lidocaine patches and voltaren cream. Chest xray done. Chest tube dressing intact. Chest tube output 170 ml and serosanguineous. Total volume in chest tube is 360 ml. Nausea and received compazine and zofran. No appetite. Ate 1/2 rice and beans, 1/2 oatmeal and all the yogurt. Will start calorie counts tomorrow. Incentive spirometer at the bedside. Voided 245 ml. One medium formed stool. Standby assist. Family at bedside.

## 2018-10-05 NOTE — PROGRESS NOTES
[facility], [place]    Hematology / Oncology Progress Note    Date of Admission: 10/4/2018    Assessment & Plan   Shan Marsh is a 48 year old dwoman with recurrent now metastatic ER+, mod WI+, HER-2 neg breast cancer currently on treatment with Xeloa. She was BIBA to ED after syncopal event/unwitness fall at home around 0100. On workup in ED she was found to have left pneumothorax and had chest tube placed while in ED. She is admitted for further management of PTX.    Left hydropneumothorax  Suspect secondary to thoracentesis on 10/3 for recurrent malignant pleural effusion vs coughing vs spontaneous. Chest tube placed in ED. First f/u CXR showed reexpansion of the lung. No significant SOB or hypoxia.   -Pulmonology consulted for assistance with management.   -Discussed PleurX placement. Pulm is not in favor of PleurX. Patient also declines PleurX  -There was air leak noted 10/4 afternoon so chest tube placed back to -20cm H2O suction and continued overnight.  -10/5 CXR demonstrates tiny ptx as well as increasing reaccumulation of left pleural effusion. Continue chest tube to suction.  -10/6 CXR shows increased air volume. There was some leakage at the tube correction. Some bubbles in the canister. Discussed with pulm fellow. Pulm will follow. Will keep CT on suction all the time; even when patient goes to bathroom. RN to find equipment for continuous suction when pt is off floor. Appreciate pulm assistance    Syncopal event x1 prior to admission  Possible triggers include vasovagal, dehydration secondary to poor PO intake, medication reaction due to new use of cannabinoids, ptx. No s/sx PE. Admission EKG demonstrates NSR. No irregularities noted on telemetry while in ED. Syncopal symptoms had resolved until 10/4 early evening, when patient was again hypotensive, bradycardic, and felt lightheaded. She did not lose consciousness and f/u CXR did not show any worsening of the  ptx.   -Continue telemetry. Trop checked on 10/5 and was negative.   -Concern that cannabis oil contributed to hypotension and syncope.   -BPs have rebounded and DSP even on the higher side. Will discontinue IVF  -Net positive 2.9 L yesterday. LE more swollen. Will give 10mg Lasix for gentle diuresis.    Metastatic breast cancer   See PA's clinic note dated 9/4/18 for detailed oncologic and treatment history. Most recent started on xeloda - planned for 1 week on, 1 week off. She completed her first week last Wednesday and is planned to see Dr. Whitman on Monday 10/8 prior to starting 2nd week of xeloda. Tolerating ok so far.  -Continue on xeloda break until next week. Letrozole has been discontinued prior to this admission.    -Continue tramadol PRN pain but will try a lower dose in case contributed to hypotension yesterday.   -Continue medical cannabis PRN chronic nausea, pain. Advised patient to proceed cautiously with future use of medical cannabis, take note of sx, and check BP (she has device at home) after using in case causing hypotension.   -Has been taking gabapentin only 300mg at bedtime and would like to stop so will discontinue.   -Continue Zometa q3 months for bone mets - due October, to be given outpatient.   -Added lidoderm patches, voltaren gel, tylenol 975mg TID scheduled for pain.     Pain   -Added Voltaren gel and lidoderm patches for chronic neck and shoulder pain.     FEN: PRN lyte replacement, RDAT  Lines: PIV  Prophylaxis: On Lovenox 40 mg subcutaneous.  Consults: Pulmonology   Disposition: Inpatient status pending resolution of PTX and removal of chest tube. Anticipate 2~3 more days.    Staffed with Dr. Elvis Deng MD, PhD  Hem/Onc Fellow    Interval History   Feeling OK. A bit anxious. Would like to go home. On 1 LPM for comfort. PTX slightly worse this AM. Bubbles in canister. Leaking at the tubing connection site. Tachy. Legs more swollen. No fever or chills.    Physical Exam    Vitals:    10/04/18 0722 10/05/18 0810   Weight: 51.2 kg (112 lb 14.4 oz) 54.8 kg (120 lb 12.8 oz)     Vital Signs with Ranges  Temp:  [97.5  F (36.4  C)-99.3  F (37.4  C)] 98.3  F (36.8  C)  Pulse:  [] 82  Heart Rate:  [] 117  Resp:  [16-20] 20  BP: ()/(41-85) 101/72  SpO2:  [93 %-98 %] 98 %  I/O last 3 completed shifts:  In: 3047.08 [P.O.:120; I.V.:1927.08; IV Piggyback:1000]  Out: 1380 [Urine:1300; Chest Tube:80]    Constitutional: Awake and alert, thin, not in acute distress.  Eyes: No scleral icterus. Eyes exhibit no discharge.  ENT/Mouth: Oral mucosa pink and moist  Cardiovascular: Tachycardia, regular rhythm, S1, S2. No murmur or rub. 1+ LE edema.  Respiratory: BS decreased at b/l bases and half way of left. No wheezes. Chest tube in place, some bubbles in the chamber  Gastrointestinal: Soft. No distension. No tenderness or garding.  Neurological: AAOX3, grossly non-focal  Psychiatric: Mentation and affect appear normal.  Skin: Skin is warm, not diaphoretic.    Medications   Current Facility-Administered Medications   Medication     acetaminophen (TYLENOL) tablet 975 mg     calcium carbonate 500 mg (elemental) (OSCAL;OYSTER SHELL CALCIUM) tablet 500 mg     cholecalciferol (vitamin D3) tablet 2,000 Units     diclofenac (VOLTAREN) 1 % topical gel 2-4 g     enoxaparin (LOVENOX) injection 40 mg     influenza quadrivalent (PF) vacc (FLUZONE or Flulaval or FLUARIX) injection 0.5 mL     Lidocaine (LIDOCARE) 4 % Patch 1-2 patch     lidocaine patch in PLACE     lidocaine patch REMOVAL     naloxone (NARCAN) injection 0.1-0.4 mg     ondansetron (ZOFRAN) injection 8 mg     ondansetron (ZOFRAN) tablet 8 mg     prochlorperazine (COMPAZINE) injection 5 mg     prochlorperazine (COMPAZINE) tablet 5-10 mg     sodium chloride 0.9% infusion     traMADol (ULTRAM) half-tab 25 mg       Data   CBC    Recent Labs  Lab 10/05/18  0817 10/04/18  1633 10/04/18  0827 10/04/18  0240   WBC 8.2 8.3  --  8.8   RBC 4.04  3.65*  --  3.71*   HGB 12.7 11.7  --  11.8   HCT 41.0 36.0  --  36.6   * 99  --  99   MCH 31.4 32.1  --  31.8   MCHC 31.0* 32.5  --  32.2   RDW 15.6* 15.2*  --  15.0    171 187 170     CMP    Recent Labs  Lab 10/05/18  0817 10/04/18  0827 10/04/18  0240     --  138   POTASSIUM 4.5  --  3.9   CHLORIDE 110*  --  102   CO2 17*  --  28   ANIONGAP 10  --  8   *  --  140*   BUN 14  --  13   CR 0.66 0.64 0.70   GFRESTIMATED >90 >90 >90   GFRESTBLACK >90 >90 >90   KRYSTEN 8.0*  --  8.4*   PROTTOTAL 6.1*  --   --    ALBUMIN 2.5*  --   --    BILITOTAL 0.7  --   --    ALKPHOS 137  --   --    AST 62*  --   --    ALT 78*  --   --      INRNo lab results found in last 7 days.    Results for orders placed or performed during the hospital encounter of 10/04/18   Chest XR,  PA & LAT    Narrative    Exam: XR CHEST 2 VW, 10/4/2018 3:05 AM    Indication: syncope and recent thoracentesis, hx of breast CA;     Comparison: CT chest Assessment/pelvis 3/15/2018. PET/CT 8/27/2018.    Findings:   New large left-sided hydropneumothorax status post thoracentesis, with  significant collapse of the left lung. Associated compressive  atelectasis of both left and right lungs seen. Mild rightward  mediastinal shift involving the heart and lower tracheobronchial tree.  Small right pleural effusion again seen.      Impression    Impression:   1. Large left hydropneumothorax and rightward mediastinal shift.  2. No interval change in right-sided pleural effusion.    [Result: Hydropneumothorax]    Finding was identified on 10/4/2018 3:08 AM.     Dr. Rai was contacted by Dr. Giraldo at 10/4/2018 3:13 AM and  verbalized understanding of the urgent finding.     I have personally reviewed the examination and initial interpretation  and I agree with the findings.    LANIE PORTILLO MD   Chest  XR, 1 view portable    Narrative    Exam: XR CHEST PORT 1 VW, 10/4/2018 4:27 AM    Indication: post-chest tube placement;     Comparison: Same-day  x-ray    Findings:   New onset of chest tube. Pneumothorax no longer visualized. Persistent  layering bilateral pleural effusions. Central airway is midline with  possible minimal rightward deviation at the level of the stephanie. Mild  anterior wedge compression deformity seen in the lower thoracic spine.      Impression    Impression:   1. Pneumothorax no longer visualized on the left, status post  left-sided apically directed chest tube placement.  2. Persistent layering bilateral pleural effusions.    I have personally reviewed the examination and initial interpretation  and I agree with the findings.    LANIE PORTILLO MD   XR Chest Port 1 View    Narrative    XR CHEST PORT 1 VW  10/4/2018 5:39 PM      HISTORY: eval pneumothorax as patient had episode of acute  hypotension;     COMPARISON: Earlier same day    FINDINGS: Single AP view of the chest. Cardiomediastinal silhouette is  stable. Bilateral pleural effusions are unchanged. No convincing  residual left pneumothorax. Left chest tube remains in place.  Bibasilar opacities, likely atelectasis (versus consolidation.      Impression    IMPRESSION: Stable chest. No residual pneumothorax is definitely  identified.    I have personally reviewed the examination and initial interpretation  and I agree with the findings.    ALESSANDRO MARION MD   XR Chest 2 Views    Narrative    Exam: XR CHEST 2 VW, 10/5/2018 8:34 AM    Indication: F/u left pneumothorax post chest tube to suction  overnight;     Comparison: Chest x-ray on 10/04/2018.    Findings:   PA and lateral view of the chest. Minimal worsening left pleural  effusion with associated basilar opacity. Left-sided chest in place  with tiny hydropneumothorax. Patchy opacity in right lung base.  Enlarged cardiac mediastinal silhouette, stable. Upper abdomen is  unremarkable.      Impression    Impression:   1. Left-sided chest tube in place with tiny hydropneumothorax.   2. Mild worsening of moderate size left pleural  effusion with  associated basilar opacity, atelectasis versus consolidation.   3. Right basilar opacity, favoring atelectasis.    I have personally reviewed the examination and initial interpretation  and I agree with the findings.    MAITE ANTON MD

## 2018-10-05 NOTE — PLAN OF CARE
Problem: Patient Care Overview  Goal: Plan of Care/Patient Progress Review  Outcome: Improving   10/05/18 0512   OTHER   Plan Of Care Reviewed With patient   Plan of Care Review   Progress improving     Afebrile. BPs stable and HR . On 1L O2 via NC. Denies P/N/V. Voiding adequately. CT to -20 suction. NS @ 125 mL/hr. No reported lightheadedness/dizziness. Pt offers no other complaints and slept between cares. Continue to monitor and follow POC.     Problem: Syncope (Adult)  Goal: Identify Related Risk Factors and Signs and Symptoms  Related risk factors and signs and symptoms are identified upon initiation of Human Response Clinical Practice Guideline (CPG).   Outcome: Improving   10/04/18 0814   Syncope   Related Risk Factors (Syncope) medication effects;hypotension;dysrhythmia   Signs and Symptoms (Syncope) shortness of breath

## 2018-10-05 NOTE — PROGRESS NOTES
"CLINICAL NUTRITION SERVICES - ASSESSMENT NOTE     Nutrition Prescription    RECOMMENDATIONS FOR MDs/PROVIDERS TO ORDER:  None at this time    Malnutrition Status:    Unable to determine due to not being able to meet pt (busy with other providers)      Recommendations already ordered by Registered Dietitian (RD):  Calorie Counts x 3 days to assess adequacy of PO intake  Medical food supplement therapy - PRN supplements     Future/Additional Recommendations:  If calorie count results indicate pt meeting less than 75% of estimated needs for kcal and protein, recommend NS if within pt's GOC.      REASON FOR ASSESSMENT  Shan Marsh is a/an 48 year old female assessed by the dietitian for Admission Nutrition Risk Screen for reduced oral intake over the last month. Pt with hx of metastatic breast cancer undergoing chemotherapy admitted for syncope s/p fall, large left pneumothorax s/p left sided thoracentesis recently and had chest tube placed this admit. Reduced Intake related to chronic nausea associated with cancer and cancer treatments.     NUTRITION HISTORY  Attempted to meet pt but pt busy with provider    CURRENT NUTRITION ORDERS  Diet: Regular  Intake/Tolerance: Noted pt had ordered 2 meals yesterday with Omelet and fruit cup for breakfast and grilled chicken, brown rice and broccoli for dinner and ordered greek yogurt and Oatmeal for breakfast today per [employer] [facility] program in the kitchen. Per RN charting pt only at yogurt yesterday.  Unable to clearly assess adequacy of in take at this time    LABS  Labs reviewed  K+: 4.5  No Mg++ or Phos     MEDICATIONS  Medications reviewed    ANTHROPOMETRICS  Height: 162.6 cm (5' 4\")  Most Recent Weight: 54.8 kg (120 lb 12.8 oz)    IBW: 55 kg  BMI: Normal BMI  Weight History:   Wt Readings from Last 15 Encounters:   10/05/18 54.8 kg (120 lb 12.8 oz)   10/03/18 50.3 kg (111 lb)   09/05/18 51.7 kg (114 lb)   09/04/18 51.3 kg (113 lb)   08/28/18 52.6 kg (116 lb) "   07/30/18 52.8 kg (116 lb 6.4 oz)   07/03/18 51.2 kg (112 lb 14.4 oz)   07/02/18 51.8 kg (114 lb 4 oz)   06/05/18 51 kg (112 lb 8 oz)   05/07/18 50.1 kg (110 lb 8 oz)   05/07/18 50.1 kg (110 lb 8 oz)   04/06/18 51.4 kg (113 lb 4.8 oz)   04/02/18 51.7 kg (114 lb)   03/23/18 51.9 kg (114 lb 6.4 oz)   03/16/18 52 kg (114 lb 11.2 oz)   No wt loss noted,   Dosing Weight: 55 kg    ASSESSED NUTRITION NEEDS  Estimated Energy Needs: 1650 - 1925 kcals/day (30 - 35 kcals/kg )  Justification: Increased needs and Maintenance  Estimated Protein Needs: 66 - 83 grams protein/day (1.2 - 1.5 grams of pro/kg)  Justification: Increased needs and Maintenance  Estimated Fluid Needs:  (1 mL/kcal)   Justification: Maintenance and Per provider pending fluid status    PHYSICAL FINDINGS  See malnutrition section below.       MALNUTRITION  % Intake: Unable to assess  % Weight Loss: None noted  Subcutaneous Fat Loss: Unable to assess  Muscle Loss: Unable to assess  Fluid Accumulation/Edema: Unable to assess  Malnutrition Diagnosis: Unable to determine due to not being able to meet pt (busy with other providers)    NUTRITION DIAGNOSIS  Predicted inadequate nutrient intake kcal/protein related to possible decreased appetite, chronic nausea vomiting associated with cancer and cancer treatments as evidenced by as indicated in chart review, and positive admission nutrition screen for reduced oral intake for the past month      INTERVENTIONS  Implementation  Nutrition Education: Unable to complete due to not being able to meet pt   Calorie counts   Medical food supplement therapy     Goals  Patient to consume % of nutritionally adequate meal trays TID, or the equivalent with supplements/snacks.     Monitoring/Evaluation  Progress toward goals will be monitored and evaluated per protocol.    Adam Burks RD   Weekday pager - [phone]  Weekend pager - [phone]

## 2018-10-05 NOTE — PROGRESS NOTES
[facility]    Pulmonary, Allergy, Critical Care and Sleep Medicine    Follow-up Note  10/05/2018    Assessment and Recommendations:    Shan Marsh is a 48 year old female with a history of metastatic breast cancer on treatment who presented on 10/4/2018 with pneumothorax after fall, s/p chest tube placement.       1. Malignant pleural effusion, s/p thoracentesis as outpatient, complicated by iatrogenic pneumothorax: s/p chest tube placement. Recurrence on 10/4 when chest tube was clamped, now on suction. CXR this AM shows a pneumothorax as well (small). Patient states she was moved and chest tube as not on suction during this exam, unfortunately. She had an intermittent air leak on my exam.   -Please keep chest tube on suction, -20 cm H20 continuous. Needs to stay on suction during imaging as well!   -CXR in AM, portable upright while on suction. She was taken off suction this AM for the CXR.   -No indication for pleurex as not desired by patient at this time, and frequency does not favor indwelling pleural catheter placement.      Seen and discussed with Dr Harpal Conrad MD  Pulmonary and Critical Care Fellow   Pager [phone]  Subjective, Interval history:   In the afternoon and evening, had several episodes of lightheadedness associated with low heart rate and BP. Initial episode treated with fluid bolus. This resolved later in the evening. Chest tube remained on suction overnight. Feels much better currently. Not short of breath. No cough. No chest pain, just mild non-radiating soreness at chest tube site. Slept well    ROS:  GI: continues to have some nausea, improved from yesterday. Vomited yesterday. No abdominal pain. Low appetite.   Constitutional: no fevers, chills    Objective:   Medications:    calcium carbonate 500 mg (elemental)  1 tablet Oral Daily     cholecalciferol  2,000 Units Oral Daily     enoxaparin  40 mg Subcutaneous Q24H     influenza  vaccine adult (product based on age)  0.5 mL Intramuscular Prior to discharge     acetaminophen, naloxone, ondansetron, ondansetron, prochlorperazine, prochlorperazine, traMADol    Physical Exam:  Temp:  [97.5  F (36.4  C)-99.3  F (37.4  C)] 99.3  F (37.4  C)  Pulse:  [] 82  Heart Rate:  [] 101  Resp:  [16-20] 18  BP: ()/(41-85) 113/85  SpO2:  [94 %-99 %] 96 %    Intake/Output Summary (Last 24 hours) at 10/05/18 0816  Last data filed at 10/05/18 0800   Gross per 24 hour   Intake          3047.08 ml   Output              955 ml   Net          2092.08 ml     General: sitting up in bed in NAD  HEENT: anicteric, moist mucosa  Chest: left chest tube in place. Mildly decreased breath sounds at left base, otherwise clear bilaterally   Cardiac: RRR no murmurs  Abdomen: Soft, flat, non tender, active BS  Extremities: No LE Edema  Neuro: A&Ox3, no focal deficits   Skin: no rash noted    Labs and imaging: All laboratory and imaging data personally reviewed.    Notable for:  WBC 8.3 Hgb 11.7 Plts 171   Blood culture 10/4 NGTD    10/4 1705 CXR: left effusion continues, no obvious pneumothorax remains. Chest tube in place.   10/5 AM CXR: recurrent small pneumothorax component of left hydropneumothorax.

## 2018-10-06 ENCOUNTER — APPOINTMENT (OUTPATIENT)
Dept: GENERAL RADIOLOGY | Facility: CLINIC | Age: 48
DRG: 200 | End: 2018-10-06
Attending: NURSE PRACTITIONER
Payer: COMMERCIAL

## 2018-10-06 ENCOUNTER — APPOINTMENT (OUTPATIENT)
Dept: GENERAL RADIOLOGY | Facility: CLINIC | Age: 48
DRG: 200 | End: 2018-10-06
Attending: INTERNAL MEDICINE
Payer: COMMERCIAL

## 2018-10-06 ENCOUNTER — APPOINTMENT (OUTPATIENT)
Dept: CT IMAGING | Facility: CLINIC | Age: 48
DRG: 200 | End: 2018-10-06
Attending: INTERNAL MEDICINE
Payer: COMMERCIAL

## 2018-10-06 LAB
ALBUMIN UR-MCNC: 10 MG/DL
APPEARANCE UR: CLEAR
BILIRUB UR QL STRIP: NEGATIVE
COLOR UR AUTO: YELLOW
GLUCOSE UR STRIP-MCNC: NEGATIVE MG/DL
HGB UR QL STRIP: NEGATIVE
KETONES UR STRIP-MCNC: NEGATIVE MG/DL
LACTATE BLD-SCNC: 6.1 MMOL/L (ref 0.7–2)
LACTATE BLD-SCNC: 6.3 MMOL/L (ref 0.7–2)
LEUKOCYTE ESTERASE UR QL STRIP: NEGATIVE
MUCOUS THREADS #/AREA URNS LPF: PRESENT /LPF
NITRATE UR QL: NEGATIVE
PH UR STRIP: 6 PH (ref 5–7)
RBC #/AREA URNS AUTO: 1 /HPF (ref 0–2)
SOURCE: ABNORMAL
SP GR UR STRIP: 1.02 (ref 1–1.03)
SQUAMOUS #/AREA URNS AUTO: <1 /HPF (ref 0–1)
UROBILINOGEN UR STRIP-MCNC: NORMAL MG/DL (ref 0–2)
WBC #/AREA URNS AUTO: <1 /HPF (ref 0–5)

## 2018-10-06 PROCEDURE — 25000128 H RX IP 250 OP 636: Performed by: PHYSICIAN ASSISTANT

## 2018-10-06 PROCEDURE — 25000128 H RX IP 250 OP 636: Performed by: NURSE PRACTITIONER

## 2018-10-06 PROCEDURE — 36415 COLL VENOUS BLD VENIPUNCTURE: CPT | Performed by: INTERNAL MEDICINE

## 2018-10-06 PROCEDURE — 83605 ASSAY OF LACTIC ACID: CPT | Performed by: INTERNAL MEDICINE

## 2018-10-06 PROCEDURE — 87186 SC STD MICRODIL/AGAR DIL: CPT | Performed by: INTERNAL MEDICINE

## 2018-10-06 PROCEDURE — 71046 X-RAY EXAM CHEST 2 VIEWS: CPT

## 2018-10-06 PROCEDURE — 93005 ELECTROCARDIOGRAM TRACING: CPT

## 2018-10-06 PROCEDURE — 87040 BLOOD CULTURE FOR BACTERIA: CPT | Performed by: INTERNAL MEDICINE

## 2018-10-06 PROCEDURE — 25000132 ZZH RX MED GY IP 250 OP 250 PS 637: Performed by: NURSE PRACTITIONER

## 2018-10-06 PROCEDURE — 87800 DETECT AGNT MULT DNA DIREC: CPT | Performed by: INTERNAL MEDICINE

## 2018-10-06 PROCEDURE — 87077 CULTURE AEROBIC IDENTIFY: CPT | Performed by: INTERNAL MEDICINE

## 2018-10-06 PROCEDURE — 74019 RADEX ABDOMEN 2 VIEWS: CPT

## 2018-10-06 PROCEDURE — 71250 CT THORAX DX C-: CPT

## 2018-10-06 PROCEDURE — 81001 URINALYSIS AUTO W/SCOPE: CPT | Performed by: INTERNAL MEDICINE

## 2018-10-06 PROCEDURE — 25000128 H RX IP 250 OP 636: Performed by: STUDENT IN AN ORGANIZED HEALTH CARE EDUCATION/TRAINING PROGRAM

## 2018-10-06 PROCEDURE — 20000002 ZZH R&B BMT INTERMEDIATE

## 2018-10-06 PROCEDURE — 25000128 H RX IP 250 OP 636: Performed by: INTERNAL MEDICINE

## 2018-10-06 PROCEDURE — 93010 ELECTROCARDIOGRAM REPORT: CPT | Performed by: INTERNAL MEDICINE

## 2018-10-06 RX ORDER — FUROSEMIDE 10 MG/ML
10 INJECTION INTRAMUSCULAR; INTRAVENOUS ONCE
Status: COMPLETED | OUTPATIENT
Start: 2018-10-06 | End: 2018-10-06

## 2018-10-06 RX ORDER — IOPAMIDOL 755 MG/ML
63 INJECTION, SOLUTION INTRAVASCULAR ONCE
Status: DISCONTINUED | OUTPATIENT
Start: 2018-10-06 | End: 2018-10-06

## 2018-10-06 RX ORDER — PIPERACILLIN SODIUM, TAZOBACTAM SODIUM 4; .5 G/20ML; G/20ML
4.5 INJECTION, POWDER, LYOPHILIZED, FOR SOLUTION INTRAVENOUS EVERY 6 HOURS
Status: DISCONTINUED | OUTPATIENT
Start: 2018-10-07 | End: 2018-10-09

## 2018-10-06 RX ORDER — CEFAZOLIN SODIUM 1 G/50ML
1250 SOLUTION INTRAVENOUS EVERY 12 HOURS
Status: DISCONTINUED | OUTPATIENT
Start: 2018-10-07 | End: 2018-10-08

## 2018-10-06 RX ADMIN — ACETAMINOPHEN 975 MG: 325 TABLET, FILM COATED ORAL at 03:07

## 2018-10-06 RX ADMIN — SODIUM CHLORIDE 250 ML: 9 INJECTION, SOLUTION INTRAVENOUS at 17:38

## 2018-10-06 RX ADMIN — VITAMIN D, TAB 1000IU (100/BT) 2000 UNITS: 25 TAB at 07:59

## 2018-10-06 RX ADMIN — ENOXAPARIN SODIUM 40 MG: 40 INJECTION SUBCUTANEOUS at 08:00

## 2018-10-06 RX ADMIN — ACETAMINOPHEN 975 MG: 325 TABLET, FILM COATED ORAL at 12:11

## 2018-10-06 RX ADMIN — SODIUM CHLORIDE 500 ML: 9 INJECTION, SOLUTION INTRAVENOUS at 21:58

## 2018-10-06 RX ADMIN — ONDANSETRON HYDROCHLORIDE 8 MG: 2 INJECTION INTRAMUSCULAR; INTRAVENOUS at 06:00

## 2018-10-06 RX ADMIN — CALCIUM 500 MG: 500 TABLET ORAL at 07:59

## 2018-10-06 RX ADMIN — FUROSEMIDE 10 MG: 10 INJECTION, SOLUTION INTRAVENOUS at 10:43

## 2018-10-06 RX ADMIN — DICLOFENAC SODIUM 4 G: 10 GEL TOPICAL at 12:14

## 2018-10-06 RX ADMIN — ACETAMINOPHEN 975 MG: 325 TABLET, FILM COATED ORAL at 20:15

## 2018-10-06 RX ADMIN — DICLOFENAC SODIUM 2 G: 10 GEL TOPICAL at 17:40

## 2018-10-06 RX ADMIN — DICLOFENAC SODIUM 2 G: 10 GEL TOPICAL at 21:47

## 2018-10-06 RX ADMIN — ONDANSETRON HYDROCHLORIDE 8 MG: 2 INJECTION INTRAMUSCULAR; INTRAVENOUS at 21:37

## 2018-10-06 ASSESSMENT — ACTIVITIES OF DAILY LIVING (ADL)
ADLS_ACUITY_SCORE: 11

## 2018-10-06 NOTE — PLAN OF CARE
Problem: Patient Care Overview  Goal: Plan of Care/Patient Progress Review  Outcome: No Change   10/06/18 0632   OTHER   Plan Of Care Reviewed With patient   Plan of Care Review   Progress improving     Neuro: A&Ox4. Slept between care.   Cardiac: SR. VSS.   Respiratory: Sating 96%  on one liter.  GI/: No BM.  Adequate urine output.  Diet/appetite: Tolerating regular diet.   Activity:  Assist of one up to the bathroom  Pain: Kasandra discomfort.  Skin: No new deficits noted.  LDA's:PIV x2. Left chest tube to suction 150 ml output.  Plan: Continue with POC. Notify primary team with changes.

## 2018-10-06 NOTE — PLAN OF CARE
Problem: Patient Care Overview  Goal: Plan of Care/Patient Progress Review  Outcome: No Change   10/05/18 2320   OTHER   Plan Of Care Reviewed With patient   Plan of Care Review   Progress no change     Neuro: A&Ox4.   Cardiac: SR. VSS.   Respiratory: Sating 96% on RA.  GI/: No BM. Adequate urine output.   Diet/appetite: Tolerating regular diet. Eating fair.  Activity:  Assist of one up to chair and bathroom.  Pain: Kasandra pain.  Skin: No new deficits noted.  LDA's: PIV x 2.  Labs: The lactic acid came back at 3.9, MD was informed and no new order. Will continue to monitor.  Drains: Left chest tube to suction, total of 190 ml for output.  Plan: Continue with POC. Notify primary team with changes.

## 2018-10-06 NOTE — PROGRESS NOTES
[facility], [place]    Sepsis Evaluation Progress Note    Date of Service: 10/05/2018    I was called to see Shan Marsh due to abnormal vital signs triggering the Sepsis SIRS screening alertShe is not known to have an infection.     Physical Exam    Vital Signs:  Temp: 97.3  F (36.3  C) Temp src: Axillary BP: 112/85   Heart Rate: 121 Resp: 28 SpO2: 95 % O2 Device: Nasal cannula Oxygen Delivery: 1 LPM    Lab:  Lactic Acid   Date Value Ref Range Status   10/04/2018 2.8 (H) 0.7 - 2.0 mmol/L Final     Lactate for Sepsis Protocol   Date Value Ref Range Status   10/05/2018 3.9 (H) 0.7 - 2.0 mmol/L Final     Comment:     Significant value called to and read back by  RN DIANNA MAJANO [id number] ON 10/05/18 @ 1133 BY DT         The patient is at baseline mental status. The rest of the physical exam is without any acute changes.     Assessment and Plan    The SIRS and exam findings and elevated lactic acid are likely due to malignancy. Patient is currently not known to have infection. Bld and urine cx are preliminarily negative. Not febrile. Other VS are stable. Not on any prophylactic ABX.     Got a call around 8:30 about chest tightness at the chest tube site and subjective increased work of breathing. No chest pain. Patient was started on supplemental oxygen. In speaking to RN at 10:00 PM, she is clinically stable. No worsening chest symptoms. Walked with RN to the bathroom without increased dyspnea. Will continue to monitor. Plan to order a stat CXR if symptoms acutely worsen (has a routine CXR ordered for tomorrow AM otherwise).     Disposition: The patient will remain on the current unit. We will continue to monitor this patient closely.    Layton Fontenot MD

## 2018-10-06 NOTE — PROVIDER NOTIFICATION
At 2130 the lactic acid level came back at 3.9 and no other s/s at this time, MD was informed and no new order. Pt reported feeling better from chest tube site tightness. Will continue to monitor.

## 2018-10-06 NOTE — PROGRESS NOTES
[facility]    Pulmonary, Allergy, Critical Care and Sleep Medicine    Follow-up Note  10/06/2018    Assessment and Recommendations:    Shan Marsh is a 48 year old female with a history of metastatic breast cancer on treatment who presented on 10/4/2018 with pneumothorax after therapeutic thoracentesis now s/p chest tube placement (in ED on 10/4).       1. Malignant pleural effusion, s/p thoracentesis as outpatient, complicated by iatrogenic pneumothorax: s/p chest tube placement. Recurrence on 10/4 when chest tube was clamped, now on suction. CXR this AM shows a hydropneumothorax with increase in pneumothorax while chest tube was off suction. She had an intermittent air leak on my exam induced only by cough.    -Please keep chest tube on suction, -20 cm H20 continuous. Needs to stay on suction at all times.  This order was updated in her chart.   -Chest CT without contrast to evaluate for pericardial effusion, and potential rind around left lung (please follow this up - discussed this with Dr. [facility])  - Daily CXR while on suction  -Discussed possibility of placing pleurX catheter given now iatrogenic pneumothorax, and known malignant pleural effusion - will provide patient with more information regarding this catheter  - Continue 1-2 L supplemental O2 to help with air reabsorption      Seen and discussed with Dr Michaels who agrees with this plan.       Ese Mcbride MD  Pulmonary and Critical Care Fellow, PGY-6  Pager: [phone]      Subjective, Interval history:   Reports having worsened SOB yesterday that is now resolved.  Some pain at chest tube insertion site but tolerable.  Persistent air leak bubbling heard but fixed when reconnected and taped.  Was feeling quite down yesterday from a mental standpoint but slightly better today.  Discussed talking to a therapist while hospitalized if needed and she will let her primary team know.       Objective:    Medications:    acetaminophen  975 mg Oral Q8H     calcium carbonate 500 mg (elemental)  1 tablet Oral Daily     cholecalciferol  2,000 Units Oral Daily     diclofenac  2-4 g Transdermal 4x Daily     enoxaparin  40 mg Subcutaneous Q24H     influenza vaccine adult (product based on age)  0.5 mL Intramuscular Prior to discharge     lidocaine  1-2 patch Transdermal Q24H     lidocaine   Transdermal Q8H     lidocaine   Transdermal Q24h     naloxone, ondansetron, ondansetron, prochlorperazine, prochlorperazine, traMADol    Physical Exam:  Temp:  [97.2  F (36.2  C)-98.3  F (36.8  C)] 97.8  F (36.6  C)  Pulse:  [121] 121  Heart Rate:  [112-124] 124  Resp:  [18-28] 28  BP: ()/() 106/92  SpO2:  [94 %-98 %] 98 %      Intake/Output Summary (Last 24 hours) at 10/06/18 1208  Last data filed at 10/06/18 1100   Gross per 24 hour   Intake             3535 ml   Output             1810 ml   Net             1725 ml       General: sitting up in bed in NAD  HEENT: anicteric, moist mucosa  Chest: left chest tube in place without surrounding erythema or edema. Mildly decreased breath sounds at left base, otherwise clear bilaterally.  Air leak in pleurvac with cough only.   Cardiac: RRR no murmurs  Abdomen: Soft, flat, non tender, active BS  Extremities: No LE Edema  Neuro: A&Ox3, no focal deficits   Skin: no rash noted    Labs and imaging: All laboratory and imaging data personally reviewed.      10/4 1705 CXR: left effusion continues, no obvious pneumothorax remains. Chest tube in place.   10/5 AM CXR: recurrent small pneumothorax component of left hydropneumothorax.   10/6 AM CXR: Increased pneumothorax as compared to prior; air fluid level seen.

## 2018-10-06 NOTE — PROVIDER NOTIFICATION
At 0715 another nurse called writer and reported that pt's chest tube has air bubble. Writer went in right away to assess the pt, upon arrival pt stated that the air bubble started after a strong cough, when going to the bathroom. She miri shortness of breath, sat 97% at one liter and no s/s of resp distress. Pt has chest xray order for 0800 today.The tube connection between the pt and the chest tube container appears to be leaking. Writer informed MD of this entry, and was told to monitor the pt for now, since she is not in resp distress.  Writer called xray to see if she can go down for the chest xray as soon as possible. The x ray staff will send transport.  This entry was also shared with the next RN.  Will continue to monitor.

## 2018-10-06 NOTE — PROGRESS NOTES
[facility], [place]    Sepsis Evaluation Progress Note    Date of Service: 10/06/2018    I was called to see Shan Marsh due to abnormal vital signs triggering the Sepsis SIRS screening alert. She is not known to have an infection. Patient is feeling ok and sitting in chair. No CP, SOA, cough, fever, chills, dizziness, orthostatic s/s, nausea, vomiting, diarrhea, abd pain etc. No clear infectious source.    Physical Exam    Vital Signs:  Temp: 97.7  F (36.5  C) Temp src: Axillary BP: 101/78 Pulse: 120 Heart Rate: 124 Resp: 24 SpO2: 98 % O2 Device: Nasal cannula Oxygen Delivery: 2 LPM On RA during my eval.    Lab:  Lactic Acid   Date Value Ref Range Status   10/04/2018 2.8 (H) 0.7 - 2.0 mmol/L Final     Lactate for Sepsis Protocol   Date Value Ref Range Status   10/06/2018 6.1 (HH) 0.7 - 2.0 mmol/L Final     Comment:     Critical Value called to and read back by  IVY HAWKINS @1646 10.06.18 [id number]. BY EK     The patient is at baseline mental status. Diminished a/e in left hemithorax consistent with known mod-large hydropneumothorax; left chest tube draining well; S1S2 audible and normal; no JVD.   The rest of their physical exam is significant for mild TTP in epigastric region and some increased abdominal muscle tone (baseline unknown) with good generalized bowel sounds and no concurrent acute GI symptoms.    Assessment and Plan  Patient appears stable and has no signs/symptoms of acute etiology to explain high lactate. The SIRS and exam findings are likely due to malignancy, there is no sign of sepsis at this time. To complete workup, will get blood cultures x2, urinalysis with reflex culture, and abdominal Xray 2-view. Give 250ml IV bolus and repeat lactate in 4 hours from prior. Of note, patient is also going to get a CT chest (ordered by pulmonary) for eval of pericardial effusion - this will help visualize liver and upper abdomen as well.     Disposition: The patient  will remain on the current unit. We will continue to monitor this patient closely.    Solomon Garner MD       ADDENDUM 10/06/18 11:11 PM:  Patient had another elevated lactate at 6.3. On exam, she is unchanged and HDS. CT chest and abd Xray results as under.   Addition to plan:  1. Recheck lactate at 12 AM - still no clear etiology.  2. ECHO complete ASAP to eval for tamponade physiology.  3. Cardiology consult.  4. NS 500ml IV bolus STAT.  4. Monitor closely - sign out given to overnight on-call physician.    Recent Results (from the past 24 hour(s))   CT Chest w/o Contrast    Narrative    EXAMINATION: CT CHEST W/O CONTRAST  10/6/2018 5:39 PM      CLINICAL HISTORY: Evaluate pneumothorax. Possible pericardial  effusion.    COMPARISON: Same-day chest x-ray. PET/CT 8/27/2018.    TECHNIQUE: CT imaging obtained through the chest without intravenous  contrast. Coronal and axial MIP reformatted images obtained.    FINDINGS:  Left-sided hydropneumothorax, as seen on recent chest x-ray. The air  in the pleura is small and overlies the anterior left lower lobe.  There is a left-sided apically directed chest tube. Associated left  basilar compressive atelectasis, as well as regions of rounded  atelectasis in the left base and lingula.    A moderate-sized right-sided pleural effusion is seen with overlying  compressive atelectasis, which is increased from the PET/CT on  8/27/2018. Fluid extends into the minor and major fissure on the  right.    Redemonstrated peribronchovascular nodules in both upper lobes,  unchanged. There is mild interlobular septal thickening in both lung  apices likely representing interstitial pulmonary edema.    There is a new large right-sided pericardial effusion, of intermediate  density (25-30 Hounsfield units).    Bilateral breast implants. Enlarged left axillary lymph nodes, appear  decreased. For example on series 2 image 20 is an 11 x 6 mm lymph  node, previously 15 x 8 mm. Biopsy clip in the left  axilla, and  extensive left axillary fat stranding.    Numerous metastatic lesions in the liver, better seen on the  contrast-enhanced exam from 8/27/2018. Persistently enlarged  approximately 15 mm lymph node adjacent to the celiac axis, which  demonstrate FDG activity on 8/27/2018.    Thyroid gland unremarkable. Trachea and mainstem bronchi are patent.  Normal size aorta and pulmonary artery.    Redemonstrated posterior left 10th rib fracture. Redemonstrated mixed  sclerotic and lytic lesions throughout the spine and some ribs. No  significant change in anterior wedge compression deformity at L1.      Impression    IMPRESSION:   In this patient with history of metastatic breast cancer:  1. New intermediate attenuating large pericardial effusion, favors  malignant pericardial effusion. Other possibility includes  hemopericardium.  2. Redemonstrated left small hydropneumothorax. Left-sided chest tube  in place.  3. Increase in a moderate size right-sided pleural effusion.  4. Decreased axillary adenopathy and unchanged lung metastases.  5. Lesions in the upper abdomen, including the liver, left adrenal  gland, and about the celiac axis better visualized on PET/CT 8/27/2018  on this noncontrast enhanced exam.  6. No significant change in numerous metastatic lesions throughout the  spine and ribs. Persistent left 10th rib fracture.    I have personally reviewed the examination and initial interpretation  and I agree with the findings.    MATHEW FERREIRA MD   XR Abdomen 2 Views    Narrative    Exam: XR ABDOMEN 2 VW, 10/6/2018 9:11 PM    Indication: elevated lactate; LUQ abd tenderness to palpation;     Comparison: PET/CT 8/27/2018    Findings:   Bowel gas pattern is nonobstructive. Mild stool noted in the left  colon. No pneumatosis or portal venous gas.    Small left hydropneumothorax and moderate right pleural effusion with  subjacent atelectasis. Prominence of the cardiac silhouette due to  pericardial  effusion.      Impression    Impression:   Nonobstructive bowel gas pattern. Mild stool in the right colon.    I have personally reviewed the examination and initial interpretation  and I agree with the findings.    MATHEW FERREIRA MD       Addendum at 11:32pm:  - Spoke with cardiology fellow, who will see patient tonight.  - Infectious workup done.  However, given rising lactic acid, and unclear etiology (sepsis vs. Malignancy), will empirically start broad spectrum abx.  Can be de-escalated if lactic stabalizes or patient improves (no longer tachycardic, tachypnic, and borderline BP).  - STAT CBC and INR in case patient needs to go to cath lab tonight.   Imelda Toscano DO, MS  Internal Medicine, PGY-3  Pager [phone]

## 2018-10-06 NOTE — PROVIDER NOTIFICATION
At 2020 pt c/o tightness at chest tube site and working harder to breath(still brody to hold a conversation, saturation on room air mid 90's), miri chest pain.  Writer put the pt on 1 liter of Oxygen for comfort, which she said it helps.  Avss, chest tube is patent. Writer informed MD and was told to monitor for now.

## 2018-10-06 NOTE — PLAN OF CARE
Problem: Patient Care Overview  Goal: Plan of Care/Patient Progress Review  Outcome: No Change   10/06/18 1544   OTHER   Plan Of Care Reviewed With patient   Plan of Care Review   Progress no change   Chest xray and EKG done. Xray showed worsening pnemothorax. On 2L nc and sats upper 90's. Having anxiety today and feeling frustrated and would like to go home. Hr 113-124 sinus tachycardia and on tele. Normal blood pressures are 110/70. Neck, shoulder, spine and abdominal cramping and received tylenol and voltaren cream. Moonlighter was notified about the abdominal cramping this afternoon. On calorie counts and ate cheerios, 1/4 grapes, 4 oz of soy milk and 1/2 stir choudhury. Canister was bubbling this morning and tube connection was leaking. The leak was fixed and the canister is not bubbling anymore. 210 ml output from chest tube that is serosanguineous.1110 ml total in the canister. Weight was up six pounds and has JESSICA. Received lasix and voided 630 ml. Pulmonary saw patient. Suction needs to be on at all times and portable suction in room. Plan is to have a cat scan today this evening.

## 2018-10-07 ENCOUNTER — APPOINTMENT (OUTPATIENT)
Dept: GENERAL RADIOLOGY | Facility: CLINIC | Age: 48
DRG: 200 | End: 2018-10-07
Attending: INTERNAL MEDICINE
Payer: COMMERCIAL

## 2018-10-07 ENCOUNTER — APPOINTMENT (OUTPATIENT)
Dept: CARDIOLOGY | Facility: CLINIC | Age: 48
DRG: 200 | End: 2018-10-07
Attending: INTERNAL MEDICINE
Payer: COMMERCIAL

## 2018-10-07 LAB
ALBUMIN FLD-MCNC: 2.3 G/DL
ALBUMIN SERPL-MCNC: 2.7 G/DL (ref 3.4–5)
ALP SERPL-CCNC: 136 U/L (ref 40–150)
ALT SERPL W P-5'-P-CCNC: 102 U/L (ref 0–50)
ANION GAP SERPL CALCULATED.3IONS-SCNC: 8 MMOL/L (ref 3–14)
AST SERPL W P-5'-P-CCNC: 72 U/L (ref 0–45)
BASOPHILS # BLD AUTO: 0 10E9/L (ref 0–0.2)
BASOPHILS NFR BLD AUTO: 0.1 %
BILIRUB SERPL-MCNC: 0.6 MG/DL (ref 0.2–1.3)
BUN SERPL-MCNC: 19 MG/DL (ref 7–30)
CALCIUM SERPL-MCNC: 7.9 MG/DL (ref 8.5–10.1)
CHLORIDE SERPL-SCNC: 103 MMOL/L (ref 94–109)
CO2 SERPL-SCNC: 20 MMOL/L (ref 20–32)
CREAT SERPL-MCNC: 0.68 MG/DL (ref 0.52–1.04)
DIFFERENTIAL METHOD BLD: ABNORMAL
EOSINOPHIL # BLD AUTO: 0 10E9/L (ref 0–0.7)
EOSINOPHIL NFR BLD AUTO: 0 %
ERYTHROCYTE [DISTWIDTH] IN BLOOD BY AUTOMATED COUNT: 15.2 % (ref 10–15)
ERYTHROCYTE [DISTWIDTH] IN BLOOD BY AUTOMATED COUNT: 15.3 % (ref 10–15)
GFR SERPL CREATININE-BSD FRML MDRD: >90 ML/MIN/1.7M2
GLUCOSE FLD-MCNC: 26 MG/DL
GLUCOSE SERPL-MCNC: 122 MG/DL (ref 70–99)
GRAM STN SPEC: NORMAL
GRAM STN SPEC: NORMAL
HCT VFR BLD AUTO: 39.1 % (ref 35–47)
HCT VFR BLD AUTO: 39.2 % (ref 35–47)
HGB BLD-MCNC: 12.5 G/DL (ref 11.7–15.7)
HGB BLD-MCNC: 12.9 G/DL (ref 11.7–15.7)
IMM GRANULOCYTES # BLD: 0 10E9/L (ref 0–0.4)
IMM GRANULOCYTES NFR BLD: 0.3 %
INR PPP: 1.18 (ref 0.86–1.14)
INR PPP: 1.21 (ref 0.86–1.14)
LACTATE BLD-SCNC: 2.3 MMOL/L (ref 0.7–2)
LACTATE BLD-SCNC: 3.5 MMOL/L (ref 0.7–2)
LDH FLD L TO P-CCNC: 2289 U/L
LYMPHOCYTES # BLD AUTO: 1.3 10E9/L (ref 0.8–5.3)
LYMPHOCYTES NFR BLD AUTO: 13.4 %
MCH RBC QN AUTO: 32 PG (ref 26.5–33)
MCH RBC QN AUTO: 32.7 PG (ref 26.5–33)
MCHC RBC AUTO-ENTMCNC: 32 G/DL (ref 31.5–36.5)
MCHC RBC AUTO-ENTMCNC: 32.9 G/DL (ref 31.5–36.5)
MCV RBC AUTO: 100 FL (ref 78–100)
MCV RBC AUTO: 100 FL (ref 78–100)
MONOCYTES # BLD AUTO: 0.8 10E9/L (ref 0–1.3)
MONOCYTES NFR BLD AUTO: 8 %
NEUTROPHILS # BLD AUTO: 7.8 10E9/L (ref 1.6–8.3)
NEUTROPHILS NFR BLD AUTO: 78.2 %
NRBC # BLD AUTO: 0 10*3/UL
NRBC BLD AUTO-RTO: 0 /100
PLATELET # BLD AUTO: 222 10E9/L (ref 150–450)
PLATELET # BLD AUTO: 246 10E9/L (ref 150–450)
POTASSIUM SERPL-SCNC: 5 MMOL/L (ref 3.4–5.3)
PROT FLD-MCNC: 5.1 G/DL
PROT SERPL-MCNC: 6 G/DL (ref 6.8–8.8)
RBC # BLD AUTO: 3.91 10E12/L (ref 3.8–5.2)
RBC # BLD AUTO: 3.94 10E12/L (ref 3.8–5.2)
SODIUM SERPL-SCNC: 131 MMOL/L (ref 133–144)
SPECIMEN SOURCE FLD: NORMAL
SPECIMEN SOURCE: NORMAL
WBC # BLD AUTO: 10 10E9/L (ref 4–11)
WBC # BLD AUTO: 10.2 10E9/L (ref 4–11)

## 2018-10-07 PROCEDURE — 25000125 ZZHC RX 250: Performed by: INTERNAL MEDICINE

## 2018-10-07 PROCEDURE — 88112 CYTOPATH CELL ENHANCE TECH: CPT | Performed by: INTERNAL MEDICINE

## 2018-10-07 PROCEDURE — 93308 TTE F-UP OR LMTD: CPT | Mod: 26 | Performed by: INTERNAL MEDICINE

## 2018-10-07 PROCEDURE — 00000102 ZZHCL STATISTIC CYTO WRIGHT STAIN TC: Performed by: INTERNAL MEDICINE

## 2018-10-07 PROCEDURE — 82042 OTHER SOURCE ALBUMIN QUAN EA: CPT | Performed by: INTERNAL MEDICINE

## 2018-10-07 PROCEDURE — 80053 COMPREHEN METABOLIC PANEL: CPT | Performed by: STUDENT IN AN ORGANIZED HEALTH CARE EDUCATION/TRAINING PROGRAM

## 2018-10-07 PROCEDURE — 99233 SBSQ HOSP IP/OBS HIGH 50: CPT | Mod: 25 | Performed by: INTERNAL MEDICINE

## 2018-10-07 PROCEDURE — 00000155 ZZHCL STATISTIC H-CELL BLOCK W/STAIN: Performed by: INTERNAL MEDICINE

## 2018-10-07 PROCEDURE — 93321 DOPPLER ECHO F-UP/LMTD STD: CPT

## 2018-10-07 PROCEDURE — 33015 ZZHC TUBE PERICARDIOSTOMY: CPT | Performed by: INTERNAL MEDICINE

## 2018-10-07 PROCEDURE — 84157 ASSAY OF PROTEIN OTHER: CPT | Performed by: INTERNAL MEDICINE

## 2018-10-07 PROCEDURE — 88305 TISSUE EXAM BY PATHOLOGIST: CPT | Performed by: INTERNAL MEDICINE

## 2018-10-07 PROCEDURE — 27210946 ZZH KIT HC TOTES DISP CR8

## 2018-10-07 PROCEDURE — 25000128 H RX IP 250 OP 636: Performed by: PHYSICIAN ASSISTANT

## 2018-10-07 PROCEDURE — 25000128 H RX IP 250 OP 636: Performed by: STUDENT IN AN ORGANIZED HEALTH CARE EDUCATION/TRAINING PROGRAM

## 2018-10-07 PROCEDURE — 87205 SMEAR GRAM STAIN: CPT | Performed by: INTERNAL MEDICINE

## 2018-10-07 PROCEDURE — 36415 COLL VENOUS BLD VENIPUNCTURE: CPT | Performed by: INTERNAL MEDICINE

## 2018-10-07 PROCEDURE — 83605 ASSAY OF LACTIC ACID: CPT | Performed by: INTERNAL MEDICINE

## 2018-10-07 PROCEDURE — 21400006 ZZH R&B CCU INTERMEDIATE UMMC

## 2018-10-07 PROCEDURE — 93325 DOPPLER ECHO COLOR FLOW MAPG: CPT | Mod: 26 | Performed by: INTERNAL MEDICINE

## 2018-10-07 PROCEDURE — 27210795 ZZH PAD DEFIB QUICK CR4

## 2018-10-07 PROCEDURE — 25000132 ZZH RX MED GY IP 250 OP 250 PS 637: Performed by: NURSE PRACTITIONER

## 2018-10-07 PROCEDURE — 36415 COLL VENOUS BLD VENIPUNCTURE: CPT | Performed by: STUDENT IN AN ORGANIZED HEALTH CARE EDUCATION/TRAINING PROGRAM

## 2018-10-07 PROCEDURE — 83615 LACTATE (LD) (LDH) ENZYME: CPT | Performed by: INTERNAL MEDICINE

## 2018-10-07 PROCEDURE — 88342 IMHCHEM/IMCYTCHM 1ST ANTB: CPT | Performed by: INTERNAL MEDICINE

## 2018-10-07 PROCEDURE — 85027 COMPLETE CBC AUTOMATED: CPT | Performed by: STUDENT IN AN ORGANIZED HEALTH CARE EDUCATION/TRAINING PROGRAM

## 2018-10-07 PROCEDURE — 82945 GLUCOSE OTHER FLUID: CPT | Performed by: INTERNAL MEDICINE

## 2018-10-07 PROCEDURE — 88360 TUMOR IMMUNOHISTOCHEM/MANUAL: CPT | Performed by: INTERNAL MEDICINE

## 2018-10-07 PROCEDURE — 99152 MOD SED SAME PHYS/QHP 5/>YRS: CPT

## 2018-10-07 PROCEDURE — 33015 ZZHC TUBE PERICARDIOSTOMY: CPT

## 2018-10-07 PROCEDURE — 25000128 H RX IP 250 OP 636

## 2018-10-07 PROCEDURE — 88341 IMHCHEM/IMCYTCHM EA ADD ANTB: CPT | Performed by: INTERNAL MEDICINE

## 2018-10-07 PROCEDURE — 85025 COMPLETE CBC W/AUTO DIFF WBC: CPT | Performed by: STUDENT IN AN ORGANIZED HEALTH CARE EDUCATION/TRAINING PROGRAM

## 2018-10-07 PROCEDURE — 83605 ASSAY OF LACTIC ACID: CPT | Performed by: STUDENT IN AN ORGANIZED HEALTH CARE EDUCATION/TRAINING PROGRAM

## 2018-10-07 PROCEDURE — 99233 SBSQ HOSP IP/OBS HIGH 50: CPT | Performed by: INTERNAL MEDICINE

## 2018-10-07 PROCEDURE — 87040 BLOOD CULTURE FOR BACTERIA: CPT | Performed by: INTERNAL MEDICINE

## 2018-10-07 PROCEDURE — 25000128 H RX IP 250 OP 636: Performed by: INTERNAL MEDICINE

## 2018-10-07 PROCEDURE — 0W9D30Z DRAINAGE OF PERICARDIAL CAVITY WITH DRAINAGE DEVICE, PERCUTANEOUS APPROACH: ICD-10-PCS | Performed by: INTERNAL MEDICINE

## 2018-10-07 PROCEDURE — 71046 X-RAY EXAM CHEST 2 VIEWS: CPT | Mod: 77

## 2018-10-07 PROCEDURE — 89051 BODY FLUID CELL COUNT: CPT | Performed by: INTERNAL MEDICINE

## 2018-10-07 PROCEDURE — 87070 CULTURE OTHR SPECIMN AEROBIC: CPT | Performed by: INTERNAL MEDICINE

## 2018-10-07 PROCEDURE — 85610 PROTHROMBIN TIME: CPT | Performed by: STUDENT IN AN ORGANIZED HEALTH CARE EDUCATION/TRAINING PROGRAM

## 2018-10-07 PROCEDURE — 93321 DOPPLER ECHO F-UP/LMTD STD: CPT | Mod: 26 | Performed by: INTERNAL MEDICINE

## 2018-10-07 PROCEDURE — 27210785 ZZH KIT PERICARDIAL TAP CR8

## 2018-10-07 PROCEDURE — 71046 X-RAY EXAM CHEST 2 VIEWS: CPT

## 2018-10-07 RX ORDER — ONDANSETRON 2 MG/ML
4 INJECTION INTRAMUSCULAR; INTRAVENOUS EVERY 4 HOURS PRN
Status: DISCONTINUED | OUTPATIENT
Start: 2018-10-07 | End: 2018-10-07 | Stop reason: HOSPADM

## 2018-10-07 RX ORDER — FLUMAZENIL 0.1 MG/ML
0.2 INJECTION, SOLUTION INTRAVENOUS
Status: DISCONTINUED | OUTPATIENT
Start: 2018-10-07 | End: 2018-10-07 | Stop reason: HOSPADM

## 2018-10-07 RX ORDER — DOPAMINE HYDROCHLORIDE 160 MG/100ML
2-20 INJECTION, SOLUTION INTRAVENOUS CONTINUOUS PRN
Status: DISCONTINUED | OUTPATIENT
Start: 2018-10-07 | End: 2018-10-07 | Stop reason: HOSPADM

## 2018-10-07 RX ORDER — ENALAPRILAT 1.25 MG/ML
1.25-2.5 INJECTION INTRAVENOUS
Status: DISCONTINUED | OUTPATIENT
Start: 2018-10-07 | End: 2018-10-07 | Stop reason: HOSPADM

## 2018-10-07 RX ORDER — NITROGLYCERIN 20 MG/100ML
.07-2 INJECTION INTRAVENOUS CONTINUOUS PRN
Status: DISCONTINUED | OUTPATIENT
Start: 2018-10-07 | End: 2018-10-07 | Stop reason: HOSPADM

## 2018-10-07 RX ORDER — METHYLPREDNISOLONE SODIUM SUCCINATE 125 MG/2ML
125 INJECTION, POWDER, LYOPHILIZED, FOR SOLUTION INTRAMUSCULAR; INTRAVENOUS
Status: DISCONTINUED | OUTPATIENT
Start: 2018-10-07 | End: 2018-10-07 | Stop reason: HOSPADM

## 2018-10-07 RX ORDER — HYDRALAZINE HYDROCHLORIDE 20 MG/ML
10-20 INJECTION INTRAMUSCULAR; INTRAVENOUS
Status: DISCONTINUED | OUTPATIENT
Start: 2018-10-07 | End: 2018-10-07 | Stop reason: HOSPADM

## 2018-10-07 RX ORDER — NIFEDIPINE 10 MG/1
10 CAPSULE ORAL
Status: DISCONTINUED | OUTPATIENT
Start: 2018-10-07 | End: 2018-10-07 | Stop reason: HOSPADM

## 2018-10-07 RX ORDER — NALOXONE HYDROCHLORIDE 0.4 MG/ML
0.4 INJECTION, SOLUTION INTRAMUSCULAR; INTRAVENOUS; SUBCUTANEOUS EVERY 5 MIN PRN
Status: DISCONTINUED | OUTPATIENT
Start: 2018-10-07 | End: 2018-10-07 | Stop reason: HOSPADM

## 2018-10-07 RX ORDER — NITROGLYCERIN 5 MG/ML
100-500 VIAL (ML) INTRAVENOUS
Status: DISCONTINUED | OUTPATIENT
Start: 2018-10-07 | End: 2018-10-07 | Stop reason: HOSPADM

## 2018-10-07 RX ORDER — DEXTROSE MONOHYDRATE 25 G/50ML
12.5-5 INJECTION, SOLUTION INTRAVENOUS EVERY 30 MIN PRN
Status: DISCONTINUED | OUTPATIENT
Start: 2018-10-07 | End: 2018-10-07 | Stop reason: HOSPADM

## 2018-10-07 RX ORDER — SODIUM CHLORIDE, SODIUM LACTATE, POTASSIUM CHLORIDE, CALCIUM CHLORIDE 600; 310; 30; 20 MG/100ML; MG/100ML; MG/100ML; MG/100ML
INJECTION, SOLUTION INTRAVENOUS CONTINUOUS
Status: DISCONTINUED | OUTPATIENT
Start: 2018-10-07 | End: 2018-10-07

## 2018-10-07 RX ORDER — NICARDIPINE HYDROCHLORIDE 2.5 MG/ML
100 INJECTION INTRAVENOUS
Status: DISCONTINUED | OUTPATIENT
Start: 2018-10-07 | End: 2018-10-07 | Stop reason: HOSPADM

## 2018-10-07 RX ORDER — VERAPAMIL HYDROCHLORIDE 2.5 MG/ML
1-5 INJECTION, SOLUTION INTRAVENOUS
Status: DISCONTINUED | OUTPATIENT
Start: 2018-10-07 | End: 2018-10-07 | Stop reason: HOSPADM

## 2018-10-07 RX ORDER — ASPIRIN 81 MG/1
81-324 TABLET, CHEWABLE ORAL
Status: DISCONTINUED | OUTPATIENT
Start: 2018-10-07 | End: 2018-10-07 | Stop reason: HOSPADM

## 2018-10-07 RX ORDER — ARGATROBAN 1 MG/ML
150 INJECTION, SOLUTION INTRAVENOUS
Status: DISCONTINUED | OUTPATIENT
Start: 2018-10-07 | End: 2018-10-07 | Stop reason: HOSPADM

## 2018-10-07 RX ORDER — DOBUTAMINE HYDROCHLORIDE 200 MG/100ML
2-20 INJECTION INTRAVENOUS CONTINUOUS PRN
Status: DISCONTINUED | OUTPATIENT
Start: 2018-10-07 | End: 2018-10-07 | Stop reason: HOSPADM

## 2018-10-07 RX ORDER — EPTIFIBATIDE 2 MG/ML
2 INJECTION, SOLUTION INTRAVENOUS CONTINUOUS PRN
Status: DISCONTINUED | OUTPATIENT
Start: 2018-10-07 | End: 2018-10-07 | Stop reason: HOSPADM

## 2018-10-07 RX ORDER — PROTAMINE SULFATE 10 MG/ML
25-100 INJECTION, SOLUTION INTRAVENOUS EVERY 5 MIN PRN
Status: DISCONTINUED | OUTPATIENT
Start: 2018-10-07 | End: 2018-10-07 | Stop reason: HOSPADM

## 2018-10-07 RX ORDER — ADENOSINE 3 MG/ML
12-12000 INJECTION, SOLUTION INTRAVENOUS
Status: DISCONTINUED | OUTPATIENT
Start: 2018-10-07 | End: 2018-10-07 | Stop reason: HOSPADM

## 2018-10-07 RX ORDER — LORAZEPAM 2 MG/ML
.5-2 INJECTION INTRAMUSCULAR EVERY 4 HOURS PRN
Status: DISCONTINUED | OUTPATIENT
Start: 2018-10-07 | End: 2018-10-07 | Stop reason: HOSPADM

## 2018-10-07 RX ORDER — POTASSIUM CHLORIDE 29.8 MG/ML
20 INJECTION INTRAVENOUS
Status: DISCONTINUED | OUTPATIENT
Start: 2018-10-07 | End: 2018-10-07 | Stop reason: HOSPADM

## 2018-10-07 RX ORDER — NITROGLYCERIN 0.4 MG/1
0.4 TABLET SUBLINGUAL EVERY 5 MIN PRN
Status: DISCONTINUED | OUTPATIENT
Start: 2018-10-07 | End: 2018-10-07 | Stop reason: HOSPADM

## 2018-10-07 RX ORDER — PRASUGREL 10 MG/1
10-60 TABLET, FILM COATED ORAL
Status: DISCONTINUED | OUTPATIENT
Start: 2018-10-07 | End: 2018-10-07 | Stop reason: HOSPADM

## 2018-10-07 RX ORDER — LIDOCAINE HYDROCHLORIDE 10 MG/ML
30 INJECTION, SOLUTION EPIDURAL; INFILTRATION; INTRACAUDAL; PERINEURAL
Status: COMPLETED | OUTPATIENT
Start: 2018-10-07 | End: 2018-10-07

## 2018-10-07 RX ORDER — PHENYLEPHRINE HCL IN 0.9% NACL 1 MG/10 ML
20-100 SYRINGE (ML) INTRAVENOUS
Status: DISCONTINUED | OUTPATIENT
Start: 2018-10-07 | End: 2018-10-07 | Stop reason: HOSPADM

## 2018-10-07 RX ORDER — ARGATROBAN 1 MG/ML
350 INJECTION, SOLUTION INTRAVENOUS
Status: DISCONTINUED | OUTPATIENT
Start: 2018-10-07 | End: 2018-10-07 | Stop reason: HOSPADM

## 2018-10-07 RX ORDER — NITROGLYCERIN 5 MG/ML
100-200 VIAL (ML) INTRAVENOUS
Status: DISCONTINUED | OUTPATIENT
Start: 2018-10-07 | End: 2018-10-07 | Stop reason: HOSPADM

## 2018-10-07 RX ORDER — CLOPIDOGREL BISULFATE 75 MG/1
75 TABLET ORAL
Status: DISCONTINUED | OUTPATIENT
Start: 2018-10-07 | End: 2018-10-07 | Stop reason: HOSPADM

## 2018-10-07 RX ORDER — FENTANYL CITRATE 50 UG/ML
50 INJECTION, SOLUTION INTRAMUSCULAR; INTRAVENOUS ONCE
Status: DISCONTINUED | OUTPATIENT
Start: 2018-10-07 | End: 2018-10-11 | Stop reason: HOSPADM

## 2018-10-07 RX ORDER — EPTIFIBATIDE 2 MG/ML
180 INJECTION, SOLUTION INTRAVENOUS EVERY 10 MIN PRN
Status: DISCONTINUED | OUTPATIENT
Start: 2018-10-07 | End: 2018-10-07 | Stop reason: HOSPADM

## 2018-10-07 RX ORDER — FUROSEMIDE 10 MG/ML
20-100 INJECTION INTRAMUSCULAR; INTRAVENOUS
Status: DISCONTINUED | OUTPATIENT
Start: 2018-10-07 | End: 2018-10-07 | Stop reason: HOSPADM

## 2018-10-07 RX ORDER — EPINEPHRINE 1 MG/ML
0.3 INJECTION, SOLUTION, CONCENTRATE INTRAVENOUS
Status: DISCONTINUED | OUTPATIENT
Start: 2018-10-07 | End: 2018-10-07 | Stop reason: HOSPADM

## 2018-10-07 RX ORDER — MAGNESIUM HYDROXIDE 1200 MG/15ML
1000 LIQUID ORAL CONTINUOUS PRN
Status: DISCONTINUED | OUTPATIENT
Start: 2018-10-07 | End: 2018-10-07 | Stop reason: HOSPADM

## 2018-10-07 RX ORDER — LORAZEPAM 2 MG/ML
0.5 INJECTION INTRAMUSCULAR EVERY 4 HOURS PRN
Status: DISCONTINUED | OUTPATIENT
Start: 2018-10-07 | End: 2018-10-11 | Stop reason: HOSPADM

## 2018-10-07 RX ORDER — METOPROLOL TARTRATE 1 MG/ML
5 INJECTION, SOLUTION INTRAVENOUS EVERY 5 MIN PRN
Status: DISCONTINUED | OUTPATIENT
Start: 2018-10-07 | End: 2018-10-07 | Stop reason: HOSPADM

## 2018-10-07 RX ORDER — ATROPINE SULFATE 0.1 MG/ML
.5-1 INJECTION INTRAVENOUS
Status: DISCONTINUED | OUTPATIENT
Start: 2018-10-07 | End: 2018-10-07 | Stop reason: HOSPADM

## 2018-10-07 RX ORDER — DIPHENHYDRAMINE HYDROCHLORIDE 50 MG/ML
25-50 INJECTION INTRAMUSCULAR; INTRAVENOUS
Status: DISCONTINUED | OUTPATIENT
Start: 2018-10-07 | End: 2018-10-07 | Stop reason: HOSPADM

## 2018-10-07 RX ORDER — POTASSIUM CHLORIDE 7.45 MG/ML
10 INJECTION INTRAVENOUS
Status: DISCONTINUED | OUTPATIENT
Start: 2018-10-07 | End: 2018-10-07 | Stop reason: HOSPADM

## 2018-10-07 RX ORDER — ASPIRIN 325 MG
325 TABLET ORAL
Status: DISCONTINUED | OUTPATIENT
Start: 2018-10-07 | End: 2018-10-07 | Stop reason: HOSPADM

## 2018-10-07 RX ORDER — SODIUM NITROPRUSSIDE 25 MG/ML
100-200 INJECTION INTRAVENOUS
Status: DISCONTINUED | OUTPATIENT
Start: 2018-10-07 | End: 2018-10-07 | Stop reason: HOSPADM

## 2018-10-07 RX ORDER — PROTAMINE SULFATE 10 MG/ML
1-5 INJECTION, SOLUTION INTRAVENOUS
Status: DISCONTINUED | OUTPATIENT
Start: 2018-10-07 | End: 2018-10-07 | Stop reason: HOSPADM

## 2018-10-07 RX ORDER — CLOPIDOGREL BISULFATE 75 MG/1
300-600 TABLET ORAL
Status: DISCONTINUED | OUTPATIENT
Start: 2018-10-07 | End: 2018-10-07 | Stop reason: HOSPADM

## 2018-10-07 RX ORDER — HEPARIN SODIUM 1000 [USP'U]/ML
1000-10000 INJECTION, SOLUTION INTRAVENOUS; SUBCUTANEOUS EVERY 5 MIN PRN
Status: DISCONTINUED | OUTPATIENT
Start: 2018-10-07 | End: 2018-10-07 | Stop reason: HOSPADM

## 2018-10-07 RX ORDER — FENTANYL CITRATE 50 UG/ML
25-50 INJECTION, SOLUTION INTRAMUSCULAR; INTRAVENOUS
Status: DISCONTINUED | OUTPATIENT
Start: 2018-10-07 | End: 2018-10-07 | Stop reason: HOSPADM

## 2018-10-07 RX ADMIN — PIPERACILLIN AND TAZOBACTAM 4.5 G: 4; .5 INJECTION, POWDER, FOR SOLUTION INTRAVENOUS at 00:23

## 2018-10-07 RX ADMIN — DICLOFENAC SODIUM 4 G: 10 GEL TOPICAL at 20:04

## 2018-10-07 RX ADMIN — PIPERACILLIN AND TAZOBACTAM 4.5 G: 4; .5 INJECTION, POWDER, FOR SOLUTION INTRAVENOUS at 23:59

## 2018-10-07 RX ADMIN — SODIUM CHLORIDE, POTASSIUM CHLORIDE, SODIUM LACTATE AND CALCIUM CHLORIDE: 600; 310; 30; 20 INJECTION, SOLUTION INTRAVENOUS at 03:56

## 2018-10-07 RX ADMIN — MIDAZOLAM 1 MG: 1 INJECTION INTRAMUSCULAR; INTRAVENOUS at 11:11

## 2018-10-07 RX ADMIN — VANCOMYCIN HYDROCHLORIDE 1250 MG: 10 INJECTION, POWDER, LYOPHILIZED, FOR SOLUTION INTRAVENOUS at 14:17

## 2018-10-07 RX ADMIN — VITAMIN D, TAB 1000IU (100/BT) 2000 UNITS: 25 TAB at 08:48

## 2018-10-07 RX ADMIN — LIDOCAINE 1 PATCH: 560 PATCH PERCUTANEOUS; TOPICAL; TRANSDERMAL at 20:04

## 2018-10-07 RX ADMIN — PIPERACILLIN AND TAZOBACTAM 4.5 G: 4; .5 INJECTION, POWDER, FOR SOLUTION INTRAVENOUS at 12:53

## 2018-10-07 RX ADMIN — PIPERACILLIN AND TAZOBACTAM 4.5 G: 4; .5 INJECTION, POWDER, FOR SOLUTION INTRAVENOUS at 05:48

## 2018-10-07 RX ADMIN — ACETAMINOPHEN 975 MG: 325 TABLET, FILM COATED ORAL at 20:04

## 2018-10-07 RX ADMIN — Medication 25 MG: at 20:04

## 2018-10-07 RX ADMIN — ACETAMINOPHEN 975 MG: 325 TABLET, FILM COATED ORAL at 12:52

## 2018-10-07 RX ADMIN — FENTANYL CITRATE 50 MCG: 50 INJECTION, SOLUTION INTRAMUSCULAR; INTRAVENOUS at 11:11

## 2018-10-07 RX ADMIN — LIDOCAINE HYDROCHLORIDE 30 ML: 10 INJECTION, SOLUTION EPIDURAL; INFILTRATION; INTRACAUDAL; PERINEURAL at 11:11

## 2018-10-07 RX ADMIN — PROCHLORPERAZINE MALEATE 5 MG: 5 TABLET, FILM COATED ORAL at 00:38

## 2018-10-07 RX ADMIN — ONDANSETRON HYDROCHLORIDE 8 MG: 2 INJECTION INTRAMUSCULAR; INTRAVENOUS at 05:40

## 2018-10-07 RX ADMIN — VANCOMYCIN HYDROCHLORIDE 1250 MG: 10 INJECTION, POWDER, LYOPHILIZED, FOR SOLUTION INTRAVENOUS at 01:07

## 2018-10-07 RX ADMIN — PIPERACILLIN AND TAZOBACTAM 4.5 G: 4; .5 INJECTION, POWDER, FOR SOLUTION INTRAVENOUS at 17:45

## 2018-10-07 RX ADMIN — PROCHLORPERAZINE MALEATE 5 MG: 5 TABLET, FILM COATED ORAL at 01:07

## 2018-10-07 RX ADMIN — DICLOFENAC SODIUM 2 G: 10 GEL TOPICAL at 08:49

## 2018-10-07 ASSESSMENT — ACTIVITIES OF DAILY LIVING (ADL)
ADLS_ACUITY_SCORE: 11

## 2018-10-07 ASSESSMENT — PAIN DESCRIPTION - DESCRIPTORS
DESCRIPTORS: ACHING
DESCRIPTORS: ACHING

## 2018-10-07 NOTE — PROGRESS NOTES
10/06/18 1700   Call Information   Date of Call 10/06/18   Time of Call 1645   Name of person requesting the team Isaiah Brewer   Title of person requesting team RN   RRT Arrival time 1650   Time RRT ended 1725   Reason for call   Type of RRT Adult   Primary reason for call Sepsis suspected   Cardiovascular Other (describe)   Sepsis Suspected Elevated Lactate level;Heart Rate > 100   Was patient transferred from the ED, ICU, or PACU within last 24 hours prior to RRT call? No   SBAR   Situation tackycardic, lactic 6.1 other VSS, BP good   Background h/o breast [place]   Notable History/Conditions Cancer   Assessment Alert orientated x 4,    Interventions Fluid bolus;Labs   Adjustments to Recommend NSx 500 cc flush, blood and urine cultures   Patient Outcome   Patient Outcome Stabilized on unit   RRT Team   Attending/Primary/Covering Physician Dr Thanh De La Torre   Date Attending Physician notified 10/06/18   Physician(s) Dr Solomon Garner   Lead RN Trae BRANHAM   Post RRT Intervention Assessment   Post RRT Assessment Stable/Improved   Date Follow Up Done 10/06/18   Time Follow Up Done 1954   Comments Awaiting repeat lactic

## 2018-10-07 NOTE — PLAN OF CARE
Problem: Patient Care Overview  Goal: Plan of Care/Patient Progress Review  Outcome: Declining   10/07/18 1312   OTHER   Plan Of Care Reviewed With patient   Plan of Care Review   Progress declining   Chest xray done. Handed off patient to Heart cath lab staff for pericardial tapping. Patient transferred from  to  directly [facility] Heart cath lab.    Patient's belonging brought to  and received by patient and informed bedside RN.

## 2018-10-07 NOTE — PROGRESS NOTES
10/06/18 1700   Call Information   Date of Call 10/06/18   Time of Call 1645   Name of person requesting the team Isaiah Brewer   Title of person requesting team RN   RRT Arrival time 1650   Time RRT ended 1725   Reason for call   Type of RRT Adult   Primary reason for call Sepsis suspected   Cardiovascular Other (describe)   Sepsis Suspected Elevated Lactate level;Heart Rate > 100   Was patient transferred from the ED, ICU, or PACU within last 24 hours prior to RRT call? No   SBAR   Situation tackycardic, lactic 6.1 other VSS, BP good   Background h/o breast [place]   Notable History/Conditions Cancer   Assessment Alert orientated x 4,    Interventions Fluid bolus;Labs   Adjustments to Recommend NSx 500 cc flush, blood and urine cultures   Patient Outcome   Patient Outcome Stabilized on unit   RRT Team   Attending/Primary/Covering Physician Dr Thanh De La Torre   Date Attending Physician notified 10/06/18   Physician(s) Dr Solomon Garner   Lead RN Trae BRANHAM   Post RRT Intervention Assessment   Post RRT Assessment Stable/Improved   Date Follow Up Done 10/06/18   Time Follow Up Done 1954   Comments Awaiting repeaat lactic

## 2018-10-07 NOTE — PROGRESS NOTES
Calorie Count  Intake recorded for: 10/6                   Kcals: 335                   Protein: 19g  # Meals Recorded: 100% cheerios, 50% stir choudhury, 4oz soy milk, 25% grapes   # Supplements Recorded: 0

## 2018-10-07 NOTE — PLAN OF CARE
Problem: Patient Care Overview  Goal: Plan of Care/Patient Progress Review   10/07/18 0755   OTHER   Plan Of Care Reviewed With patient   Plan of Care Review   Progress no change     Patient tachycardic. 100-120's overnight. Up with stand by assist. Lactic acid 6.3 after 250cc bolus last evening. MD ordered 500cc bolus and re-check of lactic acid at 0000. Re-check 3.5. MD ordered LR @ 100ml/hr. CT showed pericardial effusion. MD consulted cardiology. Cardiology recommended pericardialthoracentesis. NPO since 0100 except meds/ice chips per order. Lactic acid slowly decreasing overnight.  Prophylactic antibiotics of vancomycin & zosyn started. Chest tube dressing changed. Chest tube drainage serous fluid. At -20mmHg suction. Nausea intermittent. Compazine given x2 & zofran given x2. Offered ativan, but patient denied need. Continue to monitor.

## 2018-10-07 NOTE — CONSULTS
Cardiology History and Physical      Patient Name: Shan Marsh MRN# [id number]   Age: 48 year old YOB: 1970     Date of Admission:10/4/2018             Assessment and Plan:   48 year old lady with metastatic breast cancer on treatment currently was admitted on 10/4/18 with syncope with orthostatic positional change. Workup in ED also showed iatrogenic left PNX after thoracocentesis on 10/3 s/p chest tube on 10/4/18.  Patient triggered sepsis protocol due to tachycardia. Lactate was 6. CT chest on admission showed large pericardial effusion. Primary team now was concerned about cardiac tamponade which is why they asked me to see the patient.    # Large pericardial effusion with early tamponade physiology-likely malignant, infectious is also possible  -Patient is clinically stable. Lactate improved with fluids. I spoke with Dr. Sanchez and Dr. Baldwin. Plan for pericardiocentesis in morning. Patient consented.      Pradip Camilo  General Cardiology Fellow, PGY6  Pager [phone]         Chief Complaint:     Pericardial effusion       HPI:     48 year old lady with metastatic breast cancer on treatment currently was admitted on 10/4/18 with syncope with orthostatic positional change. Workup in ED also showed iatrogenic left PNX after thoracocentesis on 10/3 s/p chest tube on 10/4/18.  Patient triggered sepsis protocol due to tachycardia. Lactate was 6. CT chest on admission showed large pericardial effusion. Primary team now was concerned about cardiac tamponade which is why they asked me to see the patient.  Patient has chronic dyspnea which is not worse in last 24 hours. Patient is more tachycardc in last 24 hrs. EKG shows sinus tachycardia  SBP 90s-100s. Baseline SBP 100s.  Bedside echo by me showed large circumferential pericardial effusion with RV diastolic collapse on PLAX 2-D and in M-mode and also respiratory dependent septal motion. Dilated IVC with abnormal respiratory variation.  Apical views difficult to obtain.         Past Medical History:     Past Medical History:   Diagnosis Date     Breast cancer, left breast (H) 5/12    grade 1 infiltrating ductal cancer, ER/VA+, s/p bilateral mastectomy     Complication of anesthesia     bradycardia,b/p drop p childbirth     Dysplasia of cervix (uteri) 12/01    LUCAS II - III, s/p LEEP  @ [facility]     Incompetence of cervix 4/06    D&E at 19+4 weeks twin pregnancy     Shingles outbreak 5/12              Past Surgical History:     Past Surgical History:   Procedure Laterality Date     D & C  9/2010    retained POC     ENDOBRONCHIAL ULTRASOUND FLEXIBLE N/A 5/12/2017    Procedure: ENDOBRONCHIAL ULTRASOUND FLEXIBLE;  Flexible Bronchoscopy, Endobronchial Ultrasound, Transbronchial Biopsy ;  Surgeon: Bandar Meyer MD;  Location:  GI     LEEP TX, CERVICAL  12/01    LEEP or LUCAS II - III     MASTECTOMY, RECONSTRUCT BREAST, COMBINED  6/11/2012    Procedure: COMBINED MASTECTOMY, RECONSTRUCT BREAST;  Bilateral Mastectomy with Reconstruction, Left Varina Node Biopsy , placement of On-Q catheters X 2 bilateral breast.;  Surgeon: Misael Cheng MD;  Location:  OR     RECONSTRUCT BREAST BILATERAL  3/25/2013    Procedure: RECONSTRUCT BREAST BILATERAL;  Revise bilateral breasts and reconstructions, with insertion of gel breast implants with fat grafting and fat after mastectomies.;  Surgeon: Yair Olvera MD;  Location:  OR     RECONSTRUCT BREAST BILATERAL, IMPLANT PROSTHESIS BILATERAL, COMBINED  9/27/2012    Procedure: COMBINED RECONSTRUCT BREAST BILATERAL, IMPLANT PROSTHESIS BILATERAL;  Bilateral Second Stage Breast Reconstruction With Gel Implants, Fat        THORACENTESIS Left 5/7/2018    Procedure: THORACENTESIS;  Thoracentesis;  Surgeon: Venancio Hussein PA-C;  Location:  OR     THORACENTESIS Left 7/5/2018    Procedure: THORACENTESIS;  Thoracentesis;  Surgeon: Lydia Murillo PA-C;  Location:  OR     THORACENTESIS Left 9/5/2018     Procedure: THORACENTESIS;  Left Thoracentesis;  Surgeon: López Guillen PA-C;  Location: [facility] OR     THORACENTESIS Left 10/3/2018    Procedure: THORACENTESIS;  Thoracentesis, left;  Surgeon: Eros Chauhan PA-C;  Location: [facility] OR              Social History:     Social History     Social History     Marital status:      Spouse name: Kemar     Number of children: 2     Years of education: 16     Occupational History     teacher [employer]     Teacher     Social History Main Topics     Smoking status: Former Smoker     Quit date: 6/23/1997     Smokeless tobacco: Never Used     Alcohol use No     Drug use: Yes      Comment: CBD/THC oil     Sexual activity: Yes     Partners: Male     Birth control/ protection: None      Comment: No birth control/protection usage.  had vasectomy     Other Topics Concern     Parent/Sibling W/ Cabg, Mi Or Angioplasty Before 65f 55m? No     Social History Narrative    Caffeine intake/servings daily - 0-1    Calcium intake/servings daily - 3-4    Exercise 3-4 times weekly - describe running    Sunscreen used - Yes    Seatbelts used - Yes    Guns stored in the home - Yes    Self Breast Exam - No    Pap test up to date -  Yes    Eye exam up to date -  Yes    Dental exam up to date -  Yes    DEXA scan up to date -  Not Applicable    Flex Sig/Colonoscopy up to date -  Not Applicable    Mammography up to date -  Not Applicable    Immunizations reviewed and up to date - Yes    Abuse: Current or Past (Physical, Sexual or Emotional) - No    Do you feel safe in your environment - Yes    Do you cope well with stress - Yes    Do you suffer from insomnia - No    Last updated by: Emani Estes  11/11/2011                        Family History:     Family History   Problem Relation Age of Onset     Alcohol/Drug Father      Allergies Mother      Neurologic Disorder Mother      seizures, related to fever     GASTROINTESTINAL DISEASE Mother      IBS     Blood  Disease Mother      [place]     Cancer Maternal Grandfather      bladder     Circulatory Maternal Grandfather      blood clots     Alcohol/Drug Maternal Grandfather      alcohol dependency     Allergies Maternal Aunt      Allergies Other      maternal cousins     Arthritis Maternal Aunt      Prostate Cancer Maternal Uncle      Thyroid Disease Maternal Aunt      Alcohol/Drug Maternal Uncle      alcohol and drug dependency     Cancer - colorectal Maternal Uncle      GASTROINTESTINAL DISEASE Sister      IBS     Breast Cancer Other      self                Allergies:      Allergies   Allergen Reactions     Erythromycin Shortness Of Breath     Sulfa Drugs      rash            Medications:     Prescriptions Prior to Admission   Medication Sig Dispense Refill Last Dose     calcium carbonate (OS-KRYSTEN 500 MG Lime. CA) 1250 MG tablet Take 1 tablet by mouth daily   10/3/2018 at Unknown time     capecitabine (XELODA) 500 MG tablet CHEMO Take 3 tablets (1,500 mg) by mouth 2 times daily Take on Days 1 thru 7 and 15 thru 21 of each 28 day cycle. 84 tablet 0 9/26/2018 at Unknown time     Digestive Enzymes (ENZYME DIGEST) CAPS Take 1 capsule by mouth daily   10/3/2018 at Unknown time     medical cannabis (Patient's own supply.  Not a prescription) (This is NOT a prescription, and does not certify that the patient has a qualifying medical condition for medical cannabis.  The purpose of this order is  to document that the patient reports taking medical cannabis.)   10/3/2018 at Unknown time     ondansetron (ZOFRAN) 8 MG tablet Take 1 tablet (8 mg) by mouth every 8 hours as needed for nausea 40 tablet 3 Past Week at Unknown time     prochlorperazine (COMPAZINE) 10 MG tablet Take 10 mg by mouth before 1st dose of oral chemotherapy, then prn thereafter        VITAMIN D, CHOLECALCIFEROL, PO Take 1,000 Units by mouth daily    10/3/2018 at Unknown time     [DISCONTINUED] gabapentin (GRALISE) 300 MG 24 hr tablet Take 300 mg by mouth daily  "(with dinner)   10/3/2018 at Unknown time             Review of Systems:   A 14 point ROS was completed and is negative other than what is stated in the HPI.          Physical Exam:   Blood pressure 98/64, pulse 119, temperature 98  F (36.7  C), temperature source Axillary, resp. rate 16, height 1.626 m (5' 4\"), weight 57.5 kg (126 lb 12.8 oz), last menstrual period 10/06/2017, SpO2 96 %.  Gen: in no acute distress   Head: atraumatic   Eyes: EOM intact   Mouth: no pharyngeal exudate   CV: tachycardic, S1 & S2, no murmurs/gallops   Lungs: crackles in left lung, right lung clear, chest tube in left lung  Abd: soft, nontender, BS present   Pysch: alert and oriented      Tubes/Lines/Devices:   Peripheral IV 10/04/18 Right Hand (Active)   Site Assessment [facility] 10/6/2018 11:00 PM   Line Status Saline locked 10/6/2018 11:00 PM   Phlebitis Scale 0-->no symptoms 10/4/2018 11:23 AM   Infiltration Scale 0 10/4/2018 11:23 AM   Infiltration Site Treatment Method  None 10/4/2018 11:23 AM   Extravasation? No 10/4/2018 11:23 AM   Dressing Intervention New dressing  10/4/2018  8:25 AM   Number of days:3       Peripheral IV 10/04/18 Right Lower forearm (Active)   Site Assessment [facility] 10/6/2018 11:00 PM   Line Status Saline locked 10/6/2018 11:00 PM   Number of days:3            Data:   Laboratory data reviewed.     Cultures:   Invalid input(s): BC       Recent Labs  Lab 10/06/18  1750 10/04/18  1640 10/04/18  1633   CULT PENDING  PENDING No growth after 1 day No growth after 1 day       No results for input(s): [facility] in the last 168 hours.    Unresulted Labs Ordered in the Past 30 Days of this Admission     Date and Time Order Name Status Description    10/6/2018 2330 INR In process     10/6/2018 2330 CBC with platelets In process     10/6/2018 2117 Lactic acid whole blood In process     10/6/2018 1653 Blood culture Preliminary     10/6/2018 1653 Blood culture Preliminary     10/4/2018 1534 Blood culture Preliminary     10/4/2018 1534 " Blood culture Preliminary           Imaging/Studies reviewed.      I interviewed and examined the patient with the house staff.  I agree with the assessment and plan as documented.    Robbie Sanchez MD, PhD  Professor of Medicine  Division of Cardiology

## 2018-10-07 NOTE — PROGRESS NOTES
Late entry note, elevated lactic acid, rapid response called, MD notified.  Patient afebrile, vital signs stable, alert and oriented, offering no new complaints. Cultures ordered and IVF bolus. Continue to monitor closely

## 2018-10-07 NOTE — PROVIDER NOTIFICATION
10/06/18 2000   Call Information   Date of Call 10/06/18   Time of Call 2030   Name of person requesting the team Claudia Christie   Title of person requesting team RN   RRT Arrival time 2035   Time RRT ended 2100   Reason for call   Type of RRT Adult   Primary reason for call Sepsis suspected   Cardiovascular Other (describe)  (Lactic 6.3)   Sepsis Suspected Elevated Lactate level   Was patient transferred from the ED, ICU, or PACU within last 24 hours prior to RRT call? No   SBAR   Situation Lactic Acid = 6.3 Tachycardia -120, VSS, Alert and orientated   Background H/O breast Ca   Notable History/Conditions Cancer  (mets to bones, lung, liver)   Assessment Alert orientated, stable VS   Interventions Fluid bolus   Adjustments to Recommend continue to monitor all night   Patient Outcome   Patient Outcome Stabilized on unit   RRT Team   Attending/Primary/Covering Physician Dr Thanh Colon   Date Attending Physician notified 10/06/18   Physician(s) Dr Solomon Garner   Lead RN Trae Christie   RT Cony B   Post RRT Intervention Assessment   Post RRT Assessment Stable/Improved   Date Follow Up Done 10/07/18   Time Follow Up Done 0255   Comments Continues to be Tachycardic, Cardiology diagnosed a large Pericardial effusion and treatment plan. Repeat Lactic Acid = 3.5

## 2018-10-07 NOTE — PHARMACY-VANCOMYCIN DOSING SERVICE
Pharmacy Vancomycin Initial Note  Date of Service 2018  Patient's  1970  48 year old, female    Indication: Sepsis    Current estimated CrCl = Estimated Creatinine Clearance: 96.7 mL/min (based on Cr of 0.66).    Creatinine for last 3 days  10/4/2018:  2:40 AM Creatinine 0.70 mg/dL;  8:27 AM Creatinine 0.64 mg/dL  10/5/2018:  8:17 AM Creatinine 0.66 mg/dL    Recent Vancomycin Level(s) for last 3 days  No results found for requested labs within last 72 hours.      Vancomycin IV Administrations (past 72 hours)      No vancomycin orders with administrations in past 72 hours.                Nephrotoxins and other renal medications (Future)    Start     Dose/Rate Route Frequency Ordered Stop    10/07/18 0000  piperacillin-tazobactam (ZOSYN) 4.5 g vial to attach to  mL bag      4.5 g  over 30 Minutes Intravenous EVERY 6 HOURS 10/06/18 2330            Contrast Orders - past 72 hours (72h ago through future)    Start     Dose/Rate Route Frequency Ordered Stop    10/06/18 1600  iopamidol (ISOVUE-370) solution 63 mL  Status:  Discontinued      63 mL Intravenous ONCE 10/06/18 1550 10/06/18 1559                Plan:  1.  Start vancomycin  1250 mg IV q12h.   2.  Goal Trough Level: 15-20 mg/L   3.  Pharmacy will check trough levels as appropriate in 1-3 Days.    4. Serum creatinine levels will be ordered daily for the first week of therapy and at least twice weekly for subsequent weeks.    5. [place] method utilized to dose vancomycin therapy: Method 2    Garrick Saba

## 2018-10-07 NOTE — PLAN OF CARE
Problem: Patient Care Overview  Goal: Plan of Care/Patient Progress Review  D: Pt transferred from  via cath lab after pericardial drain placement. Pt admitted 10/4 following syncopal episode with loss of consciousness. Pt stable, AVSS. Incisional pain relieved with tylenol. Pt requesting lidocaine patches for back and shoulder pain overnight. No shortness of breath noted. Pt has 1 CT to waterseal. 1 pericardial drain to continuous suction.  Positive blood cultures - gram + cocci in clusters. Physician notified. Pt has hx of L breast cancer with bilateral mastectomy. Mets to spine and shoulder, possibly to abdomen. IV Abx given as ordered.  I: Monitored/assessed pt. Assisted with cares.  A: Pt stable and comfortable.  P: Continue to monitor/assess pt, contact provider with concerns.

## 2018-10-07 NOTE — PROGRESS NOTES
[facility], [place]    Hematology / Oncology Progress Note    Date of Admission: 10/4/2018    Assessment & Plan   Shan Marsh is a 48 year old dwoman with recurrent now metastatic ER+, mod CT+, HER-2 neg breast cancer currently on treatment with Xeloa. She was BIBA to ED after syncopal event/unwitness fall at home around 0100. On workup in ED she was found to have left pneumothorax and had chest tube placed while in ED. She was admitted for further management of PTX. She was then found to have large pericardial effusion with tamponade physiology, now s/p pericardiocentesis.    Left hydropneumothorax  Suspect secondary to thoracentesis on 10/3 for recurrent malignant pleural effusion vs coughing vs spontaneous. Chest tube placed in ED. First f/u CXR showed reexpansion of the lung. No significant SOB or hypoxia.   -Pulmonology consulted for assistance with management. Appreciate pulm assistance  -Discussed PleurX placement. Pulm is not in favor of PleurX. Patient also declines PleurX  -There was air leak noted 10/4 afternoon so chest tube placed back to -20cm H2O suction and continued overnight.  -10/5 CXR demonstrates tiny ptx as well as increasing reaccumulation of left pleural effusion. Continue chest tube to suction.  -10/6 CXR shows increased air volume. There was some leakage at the tube correction. Some bubbles in the canister. Discussed with pulm fellow. Pulm will follow. Will keep CT on suction all the time; even when patient goes to bathroom. RN to find equipment for continuous suction when pt is off floor.    Pericardial effusion with tamponade physiology, s/p pericardiocentesis  -Likely malignant. Noticed on CT scan on 10/6. Echo showed tamponade physiology. This was likely the cause of her syncope.  -S/p pericardiocentesis. Cytology sent   -Appreciate cardiology input    Syncopal event x1 prior to admission  Likely related to cardiac tamponade as discussed above.  Other possible triggers include vasovagal, dehydration secondary to poor PO intake, medication reaction due to new use of cannabinoids, PTX. No s/sx PE. Admission EKG demonstrates NSR. No irregularities noted on telemetry while in ED. Syncopal symptoms had resolved until 10/4 early evening, when patient was again hypotensive, bradycardic, and felt lightheaded. She did not lose consciousness and f/u CXR did not show any worsening of the ptx.   -Continue telemetry. Trop checked on 10/5 and was negative.   -Initial concern was that cannabis oil contributed to hypotension and syncope. Now it appears that tamponade is more likely to be the cause of her syncope    Sinus tachycardia  SIRS  -Trigger sepsis protocol multiple times. Lactate ~6 last night. Improved with IVF. No clear infectious source. Cultures sent. Started empiric antibiotics  - Her tachycardia was likely due to pericardial effusion with tamponade rather than infection. Anticipate it to improve after pericardiocentesis.  - Will likely discontinue anabiotics on Monday if there is no growth from cultures.    Metastatic breast cancer   See PA's clinic note dated 9/4/18 for detailed oncologic and treatment history. Most recent started on xeloda - planned for 1 week on, 1 week off. She completed her first week last Wednesday and is planned to see Dr. Whitman on Monday 10/8 prior to starting 2nd week of xeloda. Tolerating ok so far.  -Continue on xeloda break until next week. Letrozole has been discontinued prior to this admission.    -Continue tramadol PRN pain but will try a lower dose in case contributed to hypotension yesterday.   -Continue medical cannabis PRN chronic nausea, pain. Advised patient to proceed cautiously with future use of medical cannabis, take note of sx, and check BP (she has device at home) after using in case causing hypotension.   -Has been taking gabapentin only 300mg at bedtime and would like to stop so will discontinue.   -Continue  Zometa q3 months for bone mets - due October, to be given outpatient.   -Added lidoderm patches, voltaren gel, tylenol 975mg TID scheduled for pain.     Pain  -Added Voltaren gel and lidoderm patches for chronic neck and shoulder pain.     FEN: PRN lyte replacement, RDAT  Lines: PIV  Prophylaxis: Lovenox sq was held for pericardiocentesis. Will need to be restarted likely tomorrow after clearance from cards.  Consults: Pulmonology, cardiology  Disposition: Inpatient status pending resolution of PTX and removal of chest tube and pericardial drain. Anticipate 2~3 more days.    Staffed with Dr. Elvis Deng MD, PhD  Hem/Onc Fellow    Interval History   Had a rough night. Triggers sepsis protocol multiple times. Found to have pericardial effusion. States she is feeling OK. Tachycardic. No fever or chills. Pericardiocentesis today.    Physical Exam   Vitals:    10/04/18 0722 10/05/18 0810 10/06/18 0752   Weight: 51.2 kg (112 lb 14.4 oz) 54.8 kg (120 lb 12.8 oz) 57.5 kg (126 lb 12.8 oz)     Vital Signs with Ranges  Temp:  [97.4  F (36.3  C)-98.7  F (37.1  C)] 98.7  F (37.1  C)  Pulse:  [119-132] 119  Heart Rate:  [112-124] 112  Resp:  [16-24] 16  BP: ()/(60-94) 109/84  SpO2:  [96 %-98 %] 97 %  I/O last 3 completed shifts:  In: 2945 [P.O.:1400; I.V.:795; IV Piggyback:750]  Out: 1810 [Urine:930; Chest Tube:880]    Constitutional: Awake and alert, thin, not in acute distress.  Eyes: No scleral icterus. Eyes exhibit no discharge.  ENT/Mouth: Oral mucosa pink and moist  Cardiovascular: Tachycardia, regular rhythm, S1, S2. No murmur or rub. 1+ LE edema.  Respiratory: BS decreased at b/l bases and half way of left. No wheezes. Chest tube in place, some bubbles in the chamber  Gastrointestinal: Soft. No distension. No tenderness or garding.  Neurological: AAOX3, grossly non-focal  Psychiatric: Mentation and affect appear normal.  Skin: Skin is warm, not diaphoretic.    Medications   Current Facility-Administered  Medications   Medication     acetaminophen (TYLENOL) tablet 975 mg     cholecalciferol (vitamin D3) tablet 2,000 Units     diclofenac (VOLTAREN) 1 % topical gel 2-4 g     influenza quadrivalent (PF) vacc (FLUZONE or Flulaval or FLUARIX) injection 0.5 mL     Lidocaine (LIDOCARE) 4 % Patch 1-2 patch     lidocaine patch in PLACE     lidocaine patch REMOVAL     LORazepam (ATIVAN) injection 0.5 mg     naloxone (NARCAN) injection 0.1-0.4 mg     ondansetron (ZOFRAN) injection 8 mg     ondansetron (ZOFRAN) tablet 8 mg     piperacillin-tazobactam (ZOSYN) 4.5 g vial to attach to  mL bag     prochlorperazine (COMPAZINE) injection 5 mg     prochlorperazine (COMPAZINE) tablet 5-10 mg     traMADol (ULTRAM) half-tab 25 mg     vancomycin (VANCOCIN) 1,250 mg in sodium chloride 0.9 % 250 mL intermittent infusion       Data   CBC    Recent Labs  Lab 10/07/18  0347 10/07/18  0023 10/05/18  0817 10/04/18  1633   WBC 10.0 10.2 8.2 8.3   RBC 3.91 3.94 4.04 3.65*   HGB 12.5 12.9 12.7 11.7   HCT 39.1 39.2 41.0 36.0    100 102* 99   MCH 32.0 32.7 31.4 32.1   MCHC 32.0 32.9 31.0* 32.5   RDW 15.2* 15.3* 15.6* 15.2*    222 196 171     CMP    Recent Labs  Lab 10/07/18  0347 10/05/18  0817 10/04/18  0827 10/04/18  0240   * 137  --  138   POTASSIUM 5.0 4.5  --  3.9   CHLORIDE 103 110*  --  102   CO2 20 17*  --  28   ANIONGAP 8 10  --  8   * 118*  --  140*   BUN 19 14  --  13   CR 0.68 0.66 0.64 0.70   GFRESTIMATED >90 >90 >90 >90   GFRESTBLACK >90 >90 >90 >90   KRYSTEN 7.9* 8.0*  --  8.4*   PROTTOTAL 6.0* 6.1*  --   --    ALBUMIN 2.7* 2.5*  --   --    BILITOTAL 0.6 0.7  --   --    ALKPHOS 136 137  --   --    AST 72* 62*  --   --    * 78*  --   --      INR    Recent Labs  Lab 10/07/18  0347 10/07/18  0023   INR 1.21* 1.18*       Results for orders placed or performed during the hospital encounter of 10/04/18   Chest XR,  PA & LAT    Narrative    Exam: XR CHEST 2 VW, 10/4/2018 3:05 AM    Indication: syncope and  recent thoracentesis, hx of breast CA;     Comparison: CT chest Assessment/pelvis 3/15/2018. PET/CT 8/27/2018.    Findings:   New large left-sided hydropneumothorax status post thoracentesis, with  significant collapse of the left lung. Associated compressive  atelectasis of both left and right lungs seen. Mild rightward  mediastinal shift involving the heart and lower tracheobronchial tree.  Small right pleural effusion again seen.      Impression    Impression:   1. Large left hydropneumothorax and rightward mediastinal shift.  2. No interval change in right-sided pleural effusion.    [Result: Hydropneumothorax]    Finding was identified on 10/4/2018 3:08 AM.     Dr. Rai was contacted by Dr. Giraldo at 10/4/2018 3:13 AM and  verbalized understanding of the urgent finding.     I have personally reviewed the examination and initial interpretation  and I agree with the findings.    LANIE PORTILLO MD   Chest  XR, 1 view portable    Narrative    Exam: XR CHEST PORT 1 VW, 10/4/2018 4:27 AM    Indication: post-chest tube placement;     Comparison: Same-day x-ray    Findings:   New onset of chest tube. Pneumothorax no longer visualized. Persistent  layering bilateral pleural effusions. Central airway is midline with  possible minimal rightward deviation at the level of the stephanie. Mild  anterior wedge compression deformity seen in the lower thoracic spine.      Impression    Impression:   1. Pneumothorax no longer visualized on the left, status post  left-sided apically directed chest tube placement.  2. Persistent layering bilateral pleural effusions.    I have personally reviewed the examination and initial interpretation  and I agree with the findings.    LANIE PORTILLO MD   XR Chest Port 1 View    Narrative    XR CHEST PORT 1 VW  10/4/2018 5:39 PM      HISTORY: eval pneumothorax as patient had episode of acute  hypotension;     COMPARISON: Earlier same day    FINDINGS: Single AP view of the chest. Cardiomediastinal  silhouette is  stable. Bilateral pleural effusions are unchanged. No convincing  residual left pneumothorax. Left chest tube remains in place.  Bibasilar opacities, likely atelectasis (versus consolidation.      Impression    IMPRESSION: Stable chest. No residual pneumothorax is definitely  identified.    I have personally reviewed the examination and initial interpretation  and I agree with the findings.    ALESSANDRO MARION MD   XR Chest 2 Views    Narrative    Exam: XR CHEST 2 VW, 10/5/2018 8:34 AM    Indication: F/u left pneumothorax post chest tube to suction  overnight;     Comparison: Chest x-ray on 10/04/2018.    Findings:   PA and lateral view of the chest. Minimal worsening left pleural  effusion with associated basilar opacity. Left-sided chest in place  with tiny hydropneumothorax. Patchy opacity in right lung base.  Enlarged cardiac mediastinal silhouette, stable. Upper abdomen is  unremarkable.      Impression    Impression:   1. Left-sided chest tube in place with tiny hydropneumothorax.   2. Mild worsening of moderate size left pleural effusion with  associated basilar opacity, atelectasis versus consolidation.   3. Right basilar opacity, favoring atelectasis.    I have personally reviewed the examination and initial interpretation  and I agree with the findings.    MAITE ANTON MD   XR Chest 2 Views    Narrative    Exam: XR CHEST 2 VW, 10/6/2018 8:21 AM    Indication: f/u pneumothorax;     Comparison: Chest radiograph dated 10/5/2018.    Findings:   PA and lateral views of the chest. Stable position of a left approach  chest tube with the tip visible near the apex. Air-fluid level in the  left hemithorax consistent with hydropneumothorax. Air volume  increased compared to prior exam. Stable right pleural effusion with  adjacent compressive atelectasis. Cardiac silhouette is partially  obscured. No acute osseous pathology. Visualized portion of the upper  abdomen is unremarkable.      Impression     Impression:   1. Left hydropneumothorax with an air-fluid level. Increased air  volume compared to prior.  2. Stable right pleural effusion and adjacent compressive atelectasis.    [Result: Hydropneumothorax with increased air volume compared to  prior]    Finding was identified on 10/6/2018 8:25 AM.     Karuna German was contacted by Dr. Eddy Villareal at 10/6/2018 8:31 AM  and verbalized understanding of the urgent finding.     I have personally reviewed the examination and initial interpretation  and I agree with the findings.    MAITE ANTON MD   CT Chest w/o Contrast    Narrative    EXAMINATION: CT CHEST W/O CONTRAST  10/6/2018 5:39 PM      CLINICAL HISTORY: Evaluate pneumothorax. Possible pericardial  effusion.    COMPARISON: Same-day chest x-ray. PET/CT 8/27/2018.    TECHNIQUE: CT imaging obtained through the chest without intravenous  contrast. Coronal and axial MIP reformatted images obtained.    FINDINGS:  Left-sided hydropneumothorax, as seen on recent chest x-ray. The air  in the pleura is small and overlies the anterior left lower lobe.  There is a left-sided apically directed chest tube. Associated left  basilar compressive atelectasis, as well as regions of rounded  atelectasis in the left base and lingula.    A moderate-sized right-sided pleural effusion is seen with overlying  compressive atelectasis, which is increased from the PET/CT on  8/27/2018. Fluid extends into the minor and major fissure on the  right.    Redemonstrated peribronchovascular nodules in both upper lobes,  unchanged. There is mild interlobular septal thickening in both lung  apices likely representing interstitial pulmonary edema.    There is a new large right-sided pericardial effusion, of intermediate  density (25-30 Hounsfield units).    Bilateral breast implants. Enlarged left axillary lymph nodes, appear  decreased. For example on series 2 image 20 is an 11 x 6 mm lymph  node, previously 15 x 8 mm. Biopsy clip in the  left axilla, and  extensive left axillary fat stranding.    Numerous metastatic lesions in the liver, better seen on the  contrast-enhanced exam from 8/27/2018. Persistently enlarged  approximately 15 mm lymph node adjacent to the celiac axis, which  demonstrate FDG activity on 8/27/2018.    Thyroid gland unremarkable. Trachea and mainstem bronchi are patent.  Normal size aorta and pulmonary artery.    Redemonstrated posterior left 10th rib fracture. Redemonstrated mixed  sclerotic and lytic lesions throughout the spine and some ribs. No  significant change in anterior wedge compression deformity at L1.      Impression    IMPRESSION:   In this patient with history of metastatic breast cancer:  1. New intermediate attenuating large pericardial effusion, favors  malignant pericardial effusion. Other possibility includes  hemopericardium.  2. Redemonstrated left small hydropneumothorax. Left-sided chest tube  in place.  3. Increase in a moderate size right-sided pleural effusion.  4. Decreased axillary adenopathy and unchanged lung metastases.  5. Lesions in the upper abdomen, including the liver, left adrenal  gland, and about the celiac axis better visualized on PET/CT 8/27/2018  on this noncontrast enhanced exam.  6. No significant change in numerous metastatic lesions throughout the  spine and ribs. Persistent left 10th rib fracture.    I have personally reviewed the examination and initial interpretation  and I agree with the findings.    MATHEW FERREIRA MD   XR Abdomen 2 Views    Narrative    Exam: XR ABDOMEN 2 VW, 10/6/2018 9:11 PM    Indication: elevated lactate; LUQ abd tenderness to palpation;     Comparison: PET/CT 8/27/2018    Findings:   Bowel gas pattern is nonobstructive. Mild stool noted in the left  colon. No pneumatosis or portal venous gas.    Small left hydropneumothorax and moderate right pleural effusion with  subjacent atelectasis. Prominence of the cardiac silhouette due to  pericardial  effusion.      Impression    Impression:   Nonobstructive bowel gas pattern. Mild stool in the right colon.    I have personally reviewed the examination and initial interpretation  and I agree with the findings.    MATHEW FERREIRA MD

## 2018-10-07 NOTE — PROGRESS NOTES
[facility]    Pulmonary, Allergy, Critical Care and Sleep Medicine    Follow-up Note  10/07/2018    Assessment and Recommendations:    Shan Marsh is a 48 year old female with a history of metastatic breast cancer on treatment who presented on 10/4/2018 with pneumothorax after therapeutic thoracentesis now s/p chest tube placement (in ED on 10/4).       1. Malignant pleural effusion, s/p thoracentesis as outpatient, complicated by iatrogenic pneumothorax and now likely trapped lung with hydropneumothorax: s/p chest tube placement. Recurrence on 10/4 when chest tube was clamped, now on suction. CXR this AM shows a hydropneumothorax with increase in pneumothorax while chest tube was off suction. She had no air leak this morning and lung was fully inflated on CXR today.  Hope to transition to water seal with repeat imaging after pericardiocentesis today for cardiac tamponade.    -once she returns from cath lab, okay to water seal and repeat CXR in 4 hours (discussed with RN and updated orders); if this looks okay, would continue on waterseal overnight   - Daily CXR   - patient is interested in placement of a pleurX catheter placement, likely this week   - Continue 1-2 L supplemental O2 to help with air reabsorption     2. Large pericardial effusion and tamponade physiology likely the cause of syncope at presentation and malignant in nature: receiving pericardiocentesis today per cardiology.   - would send this fluid for cytology and consider involvement of palliative care     3. Moderate-sized right pleural effusion: likely malignant in nature as well.  Symptoms improved with drainage of pericardial effusion.  Will need to monitor and evaluate for need of drainage. This has not been an issue for her in the past as the left-sided effusion has been and may be due to recent tamponade physiology.      Seen and discussed with Dr Macdonald who agrees with this plan.      Ese Mcbride,  MD  Pulmonary and Critical Care Fellow, PGY-6  Pager: [phone]      Subjective, Interval history:   She received several boluses of IVF overnight for elevated lactate (6.1).  Today feels that her breathing is better.  She has remained on suction overnight without issue.  Has been OOB to bathroom without lightheadednesss.  Somewhat anxious about procedure today (pericardiocentesis).  No airleak today when seen x 2.     Objective:   Medications:    acetaminophen  975 mg Oral Q8H     cholecalciferol  2,000 Units Oral Daily     diclofenac  2-4 g Transdermal 4x Daily     fentaNYL  50 mcg Intravenous Once     influenza vaccine adult (product based on age)  0.5 mL Intramuscular Prior to discharge     lidocaine  1-2 patch Transdermal Q24H     lidocaine   Transdermal Q8H     lidocaine   Transdermal Q24h     midazolam  1 mg Intravenous Once     piperacillin-tazobactam  4.5 g Intravenous Q6H     vancomycin (VANCOCIN) IV  1,250 mg Intravenous Q12H     sodium chloride 0.9%, abciximab (REOPRO) infusion ADULT, abciximab, adenosine, adenosine (ADENOSCAN) 90mg in sodium chloride 0.9% 90 mL - for Cath LAB (FFR), amiodarone, argatroban, argatroban, argatroban, aspirin, aspirin, atropine, bivalirudin (ANGIOMAX) infusion ADULT, bivalirudin, clopidogrel, clopidogrel, dextrose, diphenhydrAMINE, DOBUTamine, DOPamine, enalaprilat, EPINEPHrine PF, EPINEPHrine PF, EPINEPHrine PF, epoprostenol (FLOLAN) ([facility] Cath Lab Only) intravenous infusion, eptifibatide, eptifibatide, fentaNYL, flumazenil, furosemide, heparin (porcine), heparin 1500 units in 1500 mL NS (1:1 concentration)-Table Solution, heparin 500 units in 500 mL NS, HEParin, hydrALAZINE, LORazepam, LORazepam, methylPREDNISolone, metoprolol, midazolam, naloxone, naloxone, niCARdipine, niCARdipine, NIFEdipine, nitroGLYcerin, nitroGLYcerin, nitroGLYcerin, nitroGLYcerin, nitroPRUsside (NIPRIDE) IV infusion ADULT/PEDS GREATER than or EQUAL to 45 kg std conc, nitroPRUsside, ondansetron,  ondansetron, ondansetron, phenylephrine, potassium chloride, potassium chloride, prasugrel, prochlorperazine, prochlorperazine, protamine, protamine, sodium bicarbonate, sodium chloride 0.9% (bottle), ticagrelor, traMADol, vasopressin, verapamil    Physical Exam:  Temp:  [96  F (35.6  C)-98.7  F (37.1  C)] 96  F (35.6  C)  Pulse:  [119-132] 119  Heart Rate:  [112-124] 112  Resp:  [16-28] 28  BP: ()/(60-94) 92/72  SpO2:  [96 %-98 %] 97 %      Intake/Output Summary (Last 24 hours) at 10/06/18 1208  Last data filed at 10/06/18 1100   Gross per 24 hour   Intake             3535 ml   Output             1810 ml   Net             1725 ml       General: sitting up in bed in NAD  HEENT: anicteric, moist mucosa  Chest: left chest tube in place without surrounding erythema or edema. Mildly decreased breath sounds at left base, otherwise clear bilaterally.  Air leak in pleurvac with cough only.   Cardiac: RRR no murmurs  Abdomen: Soft, flat, non tender, active BS  Extremities: No LE Edema  Neuro: A&Ox3, no focal deficits   Skin: no rash noted    Labs and imaging: All laboratory and imaging data personally reviewed.      10/4 1705 CXR: left effusion continues, no obvious pneumothorax remains. Chest tube in place.   10/5 AM CXR: recurrent small pneumothorax component of left hydropneumothorax.   10/6 AM CXR: Increased pneumothorax as compared to prior; air fluid level seen.     CT chest - 10/6/18 - IMPRESSION:   In this patient with history of metastatic breast cancer:  1. New intermediate attenuating large pericardial effusion, favors  malignant pericardial effusion. Other possibility includes  hemopericardium.  2. Redemonstrated left small hydropneumothorax. Left-sided chest tube  in place.  3. Increase in a moderate size right-sided pleural effusion.  4. Decreased axillary adenopathy and unchanged lung metastases.  5. Lesions in the upper abdomen, including the liver, left adrenal  gland, and about the celiac axis better  visualized on PET/CT 8/27/2018  on this noncontrast enhanced exam.  6. No significant change in numerous metastatic lesions throughout the  spine and ribs. Persistent left 10th rib fracture.    TTE - 10/7/18 -   Interpretation Summary  There is a large circumferential pericardial effusion with maximal thickness  of up to 4 cm. There is IVC plethora, RA and RV collapse and increased  respiratory variation in the mitral and tricuspid inflows, all of which are  suggestive of cardiac tamponade. The patient's team is aware; the patient is  scheduled for a pericardiocentesis.

## 2018-10-08 ENCOUNTER — APPOINTMENT (OUTPATIENT)
Dept: GENERAL RADIOLOGY | Facility: CLINIC | Age: 48
DRG: 200 | End: 2018-10-08
Attending: INTERNAL MEDICINE
Payer: COMMERCIAL

## 2018-10-08 ENCOUNTER — APPOINTMENT (OUTPATIENT)
Dept: CARDIOLOGY | Facility: CLINIC | Age: 48
DRG: 200 | End: 2018-10-08
Attending: INTERNAL MEDICINE
Payer: COMMERCIAL

## 2018-10-08 ENCOUNTER — APPOINTMENT (OUTPATIENT)
Dept: GENERAL RADIOLOGY | Facility: CLINIC | Age: 48
DRG: 200 | End: 2018-10-08
Attending: PHYSICIAN ASSISTANT
Payer: COMMERCIAL

## 2018-10-08 LAB
ANION GAP SERPL CALCULATED.3IONS-SCNC: 5 MMOL/L (ref 3–14)
BASOPHILS # BLD AUTO: 0 10E9/L (ref 0–0.2)
BASOPHILS NFR BLD AUTO: 0.3 %
BUN SERPL-MCNC: 19 MG/DL (ref 7–30)
CALCIUM SERPL-MCNC: 7.6 MG/DL (ref 8.5–10.1)
CHLORIDE SERPL-SCNC: 108 MMOL/L (ref 94–109)
CO2 SERPL-SCNC: 25 MMOL/L (ref 20–32)
CREAT SERPL-MCNC: 0.73 MG/DL (ref 0.52–1.04)
CREAT SERPL-MCNC: 0.74 MG/DL (ref 0.52–1.04)
DIFFERENTIAL METHOD BLD: ABNORMAL
EOSINOPHIL # BLD AUTO: 0 10E9/L (ref 0–0.7)
EOSINOPHIL NFR BLD AUTO: 0.1 %
ERYTHROCYTE [DISTWIDTH] IN BLOOD BY AUTOMATED COUNT: 16.5 % (ref 10–15)
GFR SERPL CREATININE-BSD FRML MDRD: 84 ML/MIN/1.7M2
GFR SERPL CREATININE-BSD FRML MDRD: 85 ML/MIN/1.7M2
GLUCOSE SERPL-MCNC: 115 MG/DL (ref 70–99)
HCT VFR BLD AUTO: 36.6 % (ref 35–47)
HGB BLD-MCNC: 11.8 G/DL (ref 11.7–15.7)
IMM GRANULOCYTES # BLD: 0.1 10E9/L (ref 0–0.4)
IMM GRANULOCYTES NFR BLD: 0.7 %
INTERPRETATION ECG - MUSE: NORMAL
INTERPRETATION ECG - MUSE: NORMAL
LYMPHOCYTES # BLD AUTO: 1.4 10E9/L (ref 0.8–5.3)
LYMPHOCYTES NFR BLD AUTO: 18.5 %
MCH RBC QN AUTO: 32.1 PG (ref 26.5–33)
MCHC RBC AUTO-ENTMCNC: 32.2 G/DL (ref 31.5–36.5)
MCV RBC AUTO: 100 FL (ref 78–100)
MONOCYTES # BLD AUTO: 0.5 10E9/L (ref 0–1.3)
MONOCYTES NFR BLD AUTO: 6.1 %
NEUTROPHILS # BLD AUTO: 5.6 10E9/L (ref 1.6–8.3)
NEUTROPHILS NFR BLD AUTO: 74.3 %
NRBC # BLD AUTO: 0 10*3/UL
NRBC BLD AUTO-RTO: 0 /100
PLATELET # BLD AUTO: 190 10E9/L (ref 150–450)
POTASSIUM SERPL-SCNC: 3.5 MMOL/L (ref 3.4–5.3)
RADIOLOGIST FLAGS: ABNORMAL
RBC # BLD AUTO: 3.68 10E12/L (ref 3.8–5.2)
SODIUM SERPL-SCNC: 139 MMOL/L (ref 133–144)
VANCOMYCIN SERPL-MCNC: 15.2 MG/L
WBC # BLD AUTO: 7.6 10E9/L (ref 4–11)

## 2018-10-08 PROCEDURE — 25000128 H RX IP 250 OP 636: Performed by: INTERNAL MEDICINE

## 2018-10-08 PROCEDURE — 93306 TTE W/DOPPLER COMPLETE: CPT | Mod: 26 | Performed by: INTERNAL MEDICINE

## 2018-10-08 PROCEDURE — 25000132 ZZH RX MED GY IP 250 OP 250 PS 637: Performed by: NURSE PRACTITIONER

## 2018-10-08 PROCEDURE — 71045 X-RAY EXAM CHEST 1 VIEW: CPT

## 2018-10-08 PROCEDURE — 87040 BLOOD CULTURE FOR BACTERIA: CPT | Performed by: PHYSICIAN ASSISTANT

## 2018-10-08 PROCEDURE — 80202 ASSAY OF VANCOMYCIN: CPT | Performed by: INTERNAL MEDICINE

## 2018-10-08 PROCEDURE — 85025 COMPLETE CBC W/AUTO DIFF WBC: CPT | Performed by: PHYSICIAN ASSISTANT

## 2018-10-08 PROCEDURE — 25000128 H RX IP 250 OP 636

## 2018-10-08 PROCEDURE — 99232 SBSQ HOSP IP/OBS MODERATE 35: CPT | Performed by: INTERNAL MEDICINE

## 2018-10-08 PROCEDURE — 93306 TTE W/DOPPLER COMPLETE: CPT

## 2018-10-08 PROCEDURE — 21400006 ZZH R&B CCU INTERMEDIATE UMMC

## 2018-10-08 PROCEDURE — 36415 COLL VENOUS BLD VENIPUNCTURE: CPT | Performed by: PHYSICIAN ASSISTANT

## 2018-10-08 PROCEDURE — 82565 ASSAY OF CREATININE: CPT | Performed by: INTERNAL MEDICINE

## 2018-10-08 PROCEDURE — 71046 X-RAY EXAM CHEST 2 VIEWS: CPT

## 2018-10-08 PROCEDURE — 80048 BASIC METABOLIC PNL TOTAL CA: CPT | Performed by: PHYSICIAN ASSISTANT

## 2018-10-08 PROCEDURE — 25000128 H RX IP 250 OP 636: Performed by: STUDENT IN AN ORGANIZED HEALTH CARE EDUCATION/TRAINING PROGRAM

## 2018-10-08 PROCEDURE — 36415 COLL VENOUS BLD VENIPUNCTURE: CPT | Performed by: INTERNAL MEDICINE

## 2018-10-08 RX ADMIN — VITAMIN D, TAB 1000IU (100/BT) 2000 UNITS: 25 TAB at 08:26

## 2018-10-08 RX ADMIN — Medication 25 MG: at 20:53

## 2018-10-08 RX ADMIN — VANCOMYCIN HYDROCHLORIDE 1250 MG: 10 INJECTION, POWDER, LYOPHILIZED, FOR SOLUTION INTRAVENOUS at 00:34

## 2018-10-08 RX ADMIN — ACETAMINOPHEN 975 MG: 325 TABLET, FILM COATED ORAL at 12:13

## 2018-10-08 RX ADMIN — ACETAMINOPHEN 975 MG: 325 TABLET, FILM COATED ORAL at 20:44

## 2018-10-08 RX ADMIN — PIPERACILLIN AND TAZOBACTAM 4.5 G: 4; .5 INJECTION, POWDER, FOR SOLUTION INTRAVENOUS at 17:46

## 2018-10-08 RX ADMIN — DICLOFENAC SODIUM 4 G: 10 GEL TOPICAL at 20:44

## 2018-10-08 RX ADMIN — LIDOCAINE 1 PATCH: 560 PATCH PERCUTANEOUS; TOPICAL; TRANSDERMAL at 20:44

## 2018-10-08 RX ADMIN — PIPERACILLIN AND TAZOBACTAM 4.5 G: 4; .5 INJECTION, POWDER, FOR SOLUTION INTRAVENOUS at 12:14

## 2018-10-08 RX ADMIN — VANCOMYCIN HYDROCHLORIDE 1500 MG: 10 INJECTION, POWDER, LYOPHILIZED, FOR SOLUTION INTRAVENOUS at 22:41

## 2018-10-08 RX ADMIN — VANCOMYCIN HYDROCHLORIDE 1250 MG: 10 INJECTION, POWDER, LYOPHILIZED, FOR SOLUTION INTRAVENOUS at 12:47

## 2018-10-08 RX ADMIN — PIPERACILLIN AND TAZOBACTAM 4.5 G: 4; .5 INJECTION, POWDER, FOR SOLUTION INTRAVENOUS at 07:01

## 2018-10-08 RX ADMIN — DICLOFENAC SODIUM 4 G: 10 GEL TOPICAL at 17:46

## 2018-10-08 RX ADMIN — DICLOFENAC SODIUM 4 G: 10 GEL TOPICAL at 12:14

## 2018-10-08 RX ADMIN — DICLOFENAC SODIUM 4 G: 10 GEL TOPICAL at 08:25

## 2018-10-08 ASSESSMENT — ACTIVITIES OF DAILY LIVING (ADL)
ADLS_ACUITY_SCORE: 11

## 2018-10-08 ASSESSMENT — PAIN DESCRIPTION - DESCRIPTORS: DESCRIPTORS: ACHING

## 2018-10-08 NOTE — PROGRESS NOTES
Brief Cardiology Note    Pericardiocentesis performed this morning with approximately 500ml removed. Labs sent. Primary team to follow up results.  Pericardial drain left and placed on intermittent suction.     Will plan to clamp tomorrow and repeat limited echo.    Patient and plan of care discussed with Dr. Sanchez.    Radu Mai MD  Cardiovascular Medicine Fellow, PGY-7  [phone]

## 2018-10-08 NOTE — PROGRESS NOTES
"[facility], [place]    Hematology / Oncology Progress Note    Date of Admission: 10/4/2018  Hospital Day #: 4   Date of Service (when I saw the patient): 10/08/2018     Assessment & Plan   Shan Marsh is a 48 year old woman with recurrent now metastatic ER+, mod AZ+, HER-2 neg breast cancer currently on treatment with Xeloda. She presented to the hospital after a syncopal event/unwitness fall at home and was found to have left pneumothorax, now s/p chest tube placement. Additionally, she was then found to have large pericardial effusion with tamponade physiology, now s/p pericardiocentesis.     #Left hydropneumothorax.  #Recurrent pleural effusion.   #S/p chest tube placement.   Suspect pneumothorax 2/2 thoracentesis on 10/3 for recurrent malignant pleural effusion vs coughing vs spontaneous. A/p chest tube placement in ED. First f/u CXR showed reexpansion of the lung. No significant SOB or hypoxia.   -Pulmonology consulted for assistance with management. Appreciate pulm assistance. Placed to water seal overnight 10/7, f/u CXR demonstrating increased hydropneumothorax and increased pleural effusion as well as possible pneumomediastinum. Pulm favors possible \"trapped lung\" and would continue water seal at this time. Repeat CXR this afternoon to monitor status. Discussion on whether PleurX drain placement necessary still ongoing, patient now agreeable to drain.  -There was air leak noted 10/4 afternoon so chest tube placed back to -20cm H2O suction and continued overnight.  -10/5 CXR demonstrates tiny ptx as well as increasing reaccumulation of left pleural effusion. Continue chest tube to suction.  -10/6 CXR shows increased air volume. There was some leakage at the tube correction. Some bubbles in the canister. Discussed with pulm fellow. Pulm will follow. Will keep CT on suction all the time; even when patient goes to bathroom. RN to find equipment for continuous suction when " pt is off floor.  -10/7 Pulm placed CT to water seal overnight and will reevaluate with CXR the status of the pleural effusion and pneumothorax the following day.      #Pericardial effusion with tamponade physiology, s/p pericardiocentesis.  #Pericardial drain placement.   Suspect this is likely malignant. Noticed on CT scan on 10/6. Echo demonstrated tamponade physiology. Syncope likely 2/2 this.   -S/p pericardiocentesis: 4290 WBC, LDH 2289, cytology pending.   -Cardiology consulted, appreciate input. Clamped pericardial drain this AM, repeat ECHO demonstrated trivial pericardial effusion (improved). Cardiology removed pericardial drain. Will likely do a repeat ECHO at discharge if patient stays for 2-3 days in hospital (otherwise do f/u ECHO in 1 wk).     #Syncopal event x1 prior to admission.  Likely related to cardiac tamponade as discussed above. Other possible triggers include vasovagal, dehydration secondary to poor PO intake, medication reaction due to new use of cannabinoids, PTX. No s/sx PE. Admission EKG demonstrates NSR. No irregularities noted on telemetry while in ED. Syncopal symptoms had resolved until 10/4 early evening, when patient was again hypotensive, bradycardic, and felt lightheaded. She did not lose consciousness and f/u CXR did not show any worsening of the ptx.   -Continue telemetry. Trop checked on 10/5 and was negative.   -Initial concern was that cannabis oil contributed to hypotension and syncope. Now it appears that tamponade is more likely to be the cause of her syncope     #Sinus tachycardia.  #SIRS.  Trigger sepsis protocol multiple times d/t vitals. Lactate 6.3 overnight 10/6. Improved with IVF. No clear infectious source. Cultures sent. Started empiric antibiotics.  - Suspect tachycardia 2/2 pericardial effusion with tamponade rather than infection. Anticipate it to improve after pericardiocentesis.  -One BCx positive from 10/6 with GPC in clusters identified by ReplySend as NOT  "staph aureus, staph lugdunensis or staph epi. Full speciation pending, suspect contaminant at this time. Daily BCx ordered x 3 days. Continue current antimicrobial therapy.     #Metastatic breast cancer.   See PA's clinic note dated 9/4/18 for detailed oncologic and treatment history. Most recent started on xeloda - planned for 1 week on, 1 week off. She completed her first week last Wednesday and is planned to see Dr. Whitman on Monday 10/8 prior to starting 2nd week of xeloda. Tolerating ok so far.  -Continue on Xeloda break until f/u in clinic. Letrozole has been discontinued prior to this admission.    -Continue tramadol PRN pain but will try a lower dose in case contributed to hypotension.  -Continue medical cannabis PRN chronic nausea, pain. Advised patient to proceed cautiously with future use of medical cannabis, take note of sx, and check BP (she has device at home) after using in case causing hypotension.   -Has been taking gabapentin only 300mg at bedtime and would like to stop so will discontinue.   -Continue Zometa q3 months for bone mets - due October, to be given outpatient.   -Added lidoderm patches, voltaren gel, tylenol 975mg TID scheduled for pain.   -will need f/u rescheduled at discharge.      Pain  -Added Voltaren gel and lidoderm patches for chronic neck and shoulder pain.      FEN  -PRN lyte replacement  -RDAT  -No MIVF    Lines: PIV    Prophylaxis  -Lovenox sq was held for pericardiocentesis. Will discuss with cardiology when to resume.    Consults: Pulmonology, cardiology    Disposition: Inpatient status pending resolution of PTX and removal of chest tube and pericardial drain. Anticipate 2~3 more days.    Patient discussed with Dr. Leal.    Caridad Martinez PA-C  Hematology/Oncology  Pager #[phone]     Interval History   Shan is feeling very well today. She states her nausea resolved and chest tightness resolved after pericardial fluid drained. Denies SOB or chest pain. \"Feels great\". Bowels " and bladder moving without issue. Notes some b/l lower extremity edema which she has had in the past with IVF administration per patient report. No further nausea or vomiting. Tolerating oral intake. Offers no new complaints.     Physical Exam   Temp: 97.8  F (36.6  C) Temp src: Oral BP: 99/76   Heart Rate: 88 Resp: 16 SpO2: 96 % O2 Device: None (Room air) Oxygen Delivery: 2 LPM  Vitals:    10/05/18 0810 10/06/18 0752 10/08/18 0500   Weight: 54.8 kg (120 lb 12.8 oz) 57.5 kg (126 lb 12.8 oz) 58.4 kg (128 lb 11.2 oz)     Vital Signs with Ranges  Temp:  [97.6  F (36.4  C)-98.1  F (36.7  C)] 97.8  F (36.6  C)  Heart Rate:  [] 88  Resp:  [16] 16  BP: ()/(65-88) 99/76  SpO2:  [94 %-99 %] 96 %  I/O last 3 completed shifts:  In: 1440 [P.O.:490; I.V.:900; IV Piggyback:50]  Out: 2609 [Urine:1675; Drains:535; Chest Tube:399]    Constitutional: Pleasant female seen sitting up on EOB, in NAD. Alert and interactive.   HEENT: NCAT, anicteric sclerae, conjunctiva clear. Moist mucous membranes.  Respiratory: Non-labored breathing on RA. Left lung sounds diminshed in base upto mid lung, clear in apex. RLL diminished, otherwise clear. No crackles or wheezes.  Cardiovascular: Regular rate and rhythm with no murmur, rub or gallop.  GI: Normoactive BS. Abdomen is soft, non-distended, and non-tender to palpation. No rigidity or guarding.  Skin: Warm and dry. No rashes or lesions on exposed surfaces.  Musculoskeletal: Extremities grossly normal. No tenderness, bilateral lower extremity, non-pitting edema.  Neurologic: A &O x3, speech normal, answering questions appropriately. Moves all extremities spontaneously. Grossly non-focal.  Neuropsychiatric: Mentation and affect normal/appropriate.    Medications   Current Facility-Administered Medications   Medication     acetaminophen (TYLENOL) tablet 975 mg     cholecalciferol (vitamin D3) tablet 2,000 Units     diclofenac (VOLTAREN) 1 % topical gel 2-4 g     fentaNYL (PF)  (SUBLIMAZE) injection 50 mcg     influenza quadrivalent (PF) vacc (FLUZONE or Flulaval or FLUARIX) injection 0.5 mL     Lidocaine (LIDOCARE) 4 % Patch 1-2 patch     lidocaine patch in PLACE     lidocaine patch REMOVAL     LORazepam (ATIVAN) injection 0.5 mg     midazolam (VERSED) injection 1 mg     naloxone (NARCAN) injection 0.1-0.4 mg     ondansetron (ZOFRAN) injection 8 mg     ondansetron (ZOFRAN) tablet 8 mg     piperacillin-tazobactam (ZOSYN) 4.5 g vial to attach to  mL bag     prochlorperazine (COMPAZINE) injection 5 mg     prochlorperazine (COMPAZINE) tablet 5-10 mg     traMADol (ULTRAM) half-tab 25 mg     vancomycin (VANCOCIN) 1,250 mg in sodium chloride 0.9 % 250 mL intermittent infusion       Data   CBC  Recent Labs  Lab 10/08/18  0831 10/07/18  0347 10/07/18  0023 10/05/18  0817   WBC 7.6 10.0 10.2 8.2   RBC 3.68* 3.91 3.94 4.04   HGB 11.8 12.5 12.9 12.7   HCT 36.6 39.1 39.2 41.0    100 100 102*   MCH 32.1 32.0 32.7 31.4   MCHC 32.2 32.0 32.9 31.0*   RDW 16.5* 15.2* 15.3* 15.6*    246 222 196     CMP  Recent Labs  Lab 10/08/18  0831 10/08/18  0543 10/07/18  0347 10/05/18  0817  10/04/18  0240     --  131* 137  --  138   POTASSIUM 3.5  --  5.0 4.5  --  3.9   CHLORIDE 108  --  103 110*  --  102   CO2 25  --  20 17*  --  28   ANIONGAP 5  --  8 10  --  8   *  --  122* 118*  --  140*   BUN 19  --  19 14  --  13   CR 0.73 0.74 0.68 0.66  < > 0.70   GFRESTIMATED 85 84 >90 >90  < > >90   GFRESTBLACK >90 >90 >90 >90  < > >90   KRYSTEN 7.6*  --  7.9* 8.0*  --  8.4*   PROTTOTAL  --   --  6.0* 6.1*  --   --    ALBUMIN  --   --  2.7* 2.5*  --   --    BILITOTAL  --   --  0.6 0.7  --   --    ALKPHOS  --   --  136 137  --   --    AST  --   --  72* 62*  --   --    ALT  --   --  102* 78*  --   --    < > = values in this interval not displayed.  INR  Recent Labs  Lab 10/07/18  0347 10/07/18  0023   INR 1.21* 1.18*       Results for orders placed or performed during the hospital encounter of  10/04/18   Chest XR,  PA & LAT    Narrative    Exam: XR CHEST 2 VW, 10/4/2018 3:05 AM    Indication: syncope and recent thoracentesis, hx of breast CA;     Comparison: CT chest Assessment/pelvis 3/15/2018. PET/CT 8/27/2018.    Findings:   New large left-sided hydropneumothorax status post thoracentesis, with  significant collapse of the left lung. Associated compressive  atelectasis of both left and right lungs seen. Mild rightward  mediastinal shift involving the heart and lower tracheobronchial tree.  Small right pleural effusion again seen.      Impression    Impression:   1. Large left hydropneumothorax and rightward mediastinal shift.  2. No interval change in right-sided pleural effusion.    [Result: Hydropneumothorax]    Finding was identified on 10/4/2018 3:08 AM.     Dr. Rai was contacted by Dr. Giraldo at 10/4/2018 3:13 AM and  verbalized understanding of the urgent finding.     I have personally reviewed the examination and initial interpretation  and I agree with the findings.    LANIE PORTILLO MD   Chest  XR, 1 view portable    Narrative    Exam: XR CHEST PORT 1 VW, 10/4/2018 4:27 AM    Indication: post-chest tube placement;     Comparison: Same-day x-ray    Findings:   New onset of chest tube. Pneumothorax no longer visualized. Persistent  layering bilateral pleural effusions. Central airway is midline with  possible minimal rightward deviation at the level of the stephanie. Mild  anterior wedge compression deformity seen in the lower thoracic spine.      Impression    Impression:   1. Pneumothorax no longer visualized on the left, status post  left-sided apically directed chest tube placement.  2. Persistent layering bilateral pleural effusions.    I have personally reviewed the examination and initial interpretation  and I agree with the findings.    LANIE PORTILLO MD   XR Chest Port 1 View    Narrative    XR CHEST PORT 1 VW  10/4/2018 5:39 PM      HISTORY: eval pneumothorax as patient had episode of  acute  hypotension;     COMPARISON: Earlier same day    FINDINGS: Single AP view of the chest. Cardiomediastinal silhouette is  stable. Bilateral pleural effusions are unchanged. No convincing  residual left pneumothorax. Left chest tube remains in place.  Bibasilar opacities, likely atelectasis (versus consolidation.      Impression    IMPRESSION: Stable chest. No residual pneumothorax is definitely  identified.    I have personally reviewed the examination and initial interpretation  and I agree with the findings.    ALESSANDRO MARION MD   XR Chest 2 Views    Narrative    Exam: XR CHEST 2 VW, 10/5/2018 8:34 AM    Indication: F/u left pneumothorax post chest tube to suction  overnight;     Comparison: Chest x-ray on 10/04/2018.    Findings:   PA and lateral view of the chest. Minimal worsening left pleural  effusion with associated basilar opacity. Left-sided chest in place  with tiny hydropneumothorax. Patchy opacity in right lung base.  Enlarged cardiac mediastinal silhouette, stable. Upper abdomen is  unremarkable.      Impression    Impression:   1. Left-sided chest tube in place with tiny hydropneumothorax.   2. Mild worsening of moderate size left pleural effusion with  associated basilar opacity, atelectasis versus consolidation.   3. Right basilar opacity, favoring atelectasis.    I have personally reviewed the examination and initial interpretation  and I agree with the findings.    MAITE ANTON MD   XR Chest 2 Views    Narrative    Exam: XR CHEST 2 VW, 10/6/2018 8:21 AM    Indication: f/u pneumothorax;     Comparison: Chest radiograph dated 10/5/2018.    Findings:   PA and lateral views of the chest. Stable position of a left approach  chest tube with the tip visible near the apex. Air-fluid level in the  left hemithorax consistent with hydropneumothorax. Air volume  increased compared to prior exam. Stable right pleural effusion with  adjacent compressive atelectasis. Cardiac silhouette is  partially  obscured. No acute osseous pathology. Visualized portion of the upper  abdomen is unremarkable.      Impression    Impression:   1. Left hydropneumothorax with an air-fluid level. Increased air  volume compared to prior.  2. Stable right pleural effusion and adjacent compressive atelectasis.    [Result: Hydropneumothorax with increased air volume compared to  prior]    Finding was identified on 10/6/2018 8:25 AM.     Karuna German was contacted by Dr. Eddy Villareal at 10/6/2018 8:31 AM  and verbalized understanding of the urgent finding.     I have personally reviewed the examination and initial interpretation  and I agree with the findings.    MAITE ANTON MD   CT Chest w/o Contrast    Narrative    EXAMINATION: CT CHEST W/O CONTRAST  10/6/2018 5:39 PM      CLINICAL HISTORY: Evaluate pneumothorax. Possible pericardial  effusion.    COMPARISON: Same-day chest x-ray. PET/CT 8/27/2018.    TECHNIQUE: CT imaging obtained through the chest without intravenous  contrast. Coronal and axial MIP reformatted images obtained.    FINDINGS:  Left-sided hydropneumothorax, as seen on recent chest x-ray. The air  in the pleura is small and overlies the anterior left lower lobe.  There is a left-sided apically directed chest tube. Associated left  basilar compressive atelectasis, as well as regions of rounded  atelectasis in the left base and lingula.    A moderate-sized right-sided pleural effusion is seen with overlying  compressive atelectasis, which is increased from the PET/CT on  8/27/2018. Fluid extends into the minor and major fissure on the  right.    Redemonstrated peribronchovascular nodules in both upper lobes,  unchanged. There is mild interlobular septal thickening in both lung  apices likely representing interstitial pulmonary edema.    There is a new large right-sided pericardial effusion, of intermediate  density (25-30 Hounsfield units).    Bilateral breast implants. Enlarged left axillary lymph nodes,  appear  decreased. For example on series 2 image 20 is an 11 x 6 mm lymph  node, previously 15 x 8 mm. Biopsy clip in the left axilla, and  extensive left axillary fat stranding.    Numerous metastatic lesions in the liver, better seen on the  contrast-enhanced exam from 8/27/2018. Persistently enlarged  approximately 15 mm lymph node adjacent to the celiac axis, which  demonstrate FDG activity on 8/27/2018.    Thyroid gland unremarkable. Trachea and mainstem bronchi are patent.  Normal size aorta and pulmonary artery.    Redemonstrated posterior left 10th rib fracture. Redemonstrated mixed  sclerotic and lytic lesions throughout the spine and some ribs. No  significant change in anterior wedge compression deformity at L1.      Impression    IMPRESSION:   In this patient with history of metastatic breast cancer:  1. New intermediate attenuating large pericardial effusion, favors  malignant pericardial effusion. Other possibility includes  hemopericardium.  2. Redemonstrated left small hydropneumothorax. Left-sided chest tube  in place.  3. Increase in a moderate size right-sided pleural effusion.  4. Decreased axillary adenopathy and unchanged lung metastases.  5. Lesions in the upper abdomen, including the liver, left adrenal  gland, and about the celiac axis better visualized on PET/CT 8/27/2018  on this noncontrast enhanced exam.  6. No significant change in numerous metastatic lesions throughout the  spine and ribs. Persistent left 10th rib fracture.    I have personally reviewed the examination and initial interpretation  and I agree with the findings.    MATHEW FERREIRA MD   XR Abdomen 2 Views    Narrative    Exam: XR ABDOMEN 2 VW, 10/6/2018 9:11 PM    Indication: elevated lactate; LUQ abd tenderness to palpation;     Comparison: PET/CT 8/27/2018    Findings:   Bowel gas pattern is nonobstructive. Mild stool noted in the left  colon. No pneumatosis or portal venous gas.    Small left hydropneumothorax  and moderate right pleural effusion with  subjacent atelectasis. Prominence of the cardiac silhouette due to  pericardial effusion.      Impression    Impression:   Nonobstructive bowel gas pattern. Mild stool in the right colon.    I have personally reviewed the examination and initial interpretation  and I agree with the findings.    MATHEW FERREIRA MD   XR Chest 2 Views    Narrative     Examination:  XR CHEST 2 VW     Date:  10/7/2018 10:28 AM      Clinical Information: hydropneumothorax;      Additional Information: none    Comparison: Chest x-ray 10/6/2018    Findings:   Left apical chest tube,  previous left basilar pneumothorax is no  longer evident however there is a pleural effusion there.    Pulmonary vasculature is engorged. Moderate right pleural effusion,  increased from prior. No focal opacities in the upper lungs.     Compression deformity in the sclerotic L1 vertebral body. Sclerosis  and lucency elsewhere in thoracolumbar spine consistent with  metastatic disease.       Impression    Impression:  1. Previously demonstrated left basilar pneumothorax is now a pleural  effusion. Left chest tube remains in place.   2. Increased right pleural effusion.  3. Pulmonary vascular congestion.    GISELL PLUMMER MD   XR Chest 2 Views    Narrative    XR CHEST 2 VW  10/7/2018 4:40 PM      HISTORY: Evaluate hydropneumothorax while on water seal (12:30 pm);     COMPARISON: Earlier same day    FINDINGS: PA and lateral views of the chest upright. Left apical chest  tube in stable position. Unchanged fluid component of left  hydropneumothorax. No definite residual pneumothorax is seen.  Right-sided pleural effusion is also unchanged. No new consolidation.      Impression    IMPRESSION: Stable left hydropneumothorax with chest tube in place.  Unchanged moderate right pleural effusion.    I have personally reviewed the examination and initial interpretation  and I agree with the findings.    MATHEW  MD JHON

## 2018-10-08 NOTE — PROGRESS NOTES
Calorie Count  Intake recorded for: 10/7 Kcals: 0  Protein: 0g  # Meals Recorded: 1 meal ordered from kitchen, no intake recorded.   # Supplements Recorded: no intake recorded.

## 2018-10-08 NOTE — PHARMACY-VANCOMYCIN DOSING SERVICE
Pharmacy Vancomycin Note  Date of Service 2018  Patient's  1970   48 year old, female    Indication: Sepsis  Goal Trough Level: 15-20 mg/L  Day of Therapy: 2  Current Vancomycin regimen:  1250 mg IV q12h    Current estimated CrCl = Estimated Creatinine Clearance: 88.8 mL/min (based on Cr of 0.73).    Creatinine for last 3 days  10/7/2018:  3:47 AM Creatinine 0.68 mg/dL  10/8/2018:  5:43 AM Creatinine 0.74 mg/dL;  8:31 AM Creatinine 0.73 mg/dL    Recent Vancomycin Levels (past 3 days)  10/8/2018: 11:04 AM Vancomycin Level 15.2 mg/L    Vancomycin IV Administrations (past 72 hours)                   vancomycin (VANCOCIN) 1,250 mg in sodium chloride 0.9 % 250 mL intermittent infusion (mg) 1,250 mg New Bag 10/08/18 1247     1,250 mg New Bag  0034     1,250 mg New Bag 10/07/18 1417     1,250 mg New Bag  0107                Nephrotoxins and other renal medications (Future)    Start     Dose/Rate Route Frequency Ordered Stop    10/07/18 0000  piperacillin-tazobactam (ZOSYN) 4.5 g vial to attach to  mL bag      4.5 g  over 30 Minutes Intravenous EVERY 6 HOURS 10/06/18 2330      10/07/18 0000  vancomycin (VANCOCIN) 1,250 mg in sodium chloride 0.9 % 250 mL intermittent infusion      1,250 mg  over 90 Minutes Intravenous EVERY 12 HOURS 10/06/18 2338               Contrast Orders - past 72 hours (72h ago through future)    Start     Dose/Rate Route Frequency Ordered Stop    10/06/18 1600  iopamidol (ISOVUE-370) solution 63 mL  Status:  Discontinued      63 mL Intravenous ONCE 10/06/18 1550 10/06/18 1559          Interpretation of levels and current regimen:  Trough level is--> subtherapeutic    Has serum creatinine changed > 50% in last 72 hours: No    Urine output:  good urine output    Renal Function: Stable    Plan:  1.   --> Increase dose to 1500mg q12H for subtherapeutic trough of ~14 (10.5 hour trough)  2.  Pharmacy will check trough levels as appropriate in 1-3 Days.    3. Serum creatinine levels  will be ordered daily for the first week of therapy and at least twice weekly for subsequent weeks.      Key Fatima, PGY-1 Pharmacy Resident        .

## 2018-10-08 NOTE — PLAN OF CARE
Problem: Patient Care Overview  Goal: Plan of Care/Patient Progress Review  D: Syncopal episode at home. (+) Blood culture 10/06. Cardiac tamponade with pericardial drain placement 10/07.     I: Receiving IV abx as ordered. Blood cultures drawn evening of 10/07, no growth so far. Scheduled tylenol and PRN tramadol for pain. Left chest tube to water seal. Pericardial drain to low intermittent suction.     A: VSS, A&O, up SBA. Pt reports minimal pain at pericardial drain site. Pt reports SOB has improved, oxygen stable on RA while awake, requiring 2L NC while asleep. Chest tube sites remain CDI. Minimal output from chest tube, no output overnight from pericardial drain. Tele SR, HR 80-90's. Poor appetite, on calorie counts. Pt slept off and on overnight.     P: Echo today, repeat blood cultures. Will continue to monitor and notify MD of pertinent changes.

## 2018-10-08 NOTE — PROGRESS NOTES
[facility]    Pulmonary, Allergy, Critical Care and Sleep Medicine    Follow-up Note  10/08/2018    Assessment and Recommendations:    Shan Marsh is a 48 year old female with a history of metastatic breast cancer on treatment who presented on 10/4/2018 with pneumothorax after therapeutic thoracentesis now s/p chest tube placement (in ED on 10/4), with corse complicated by cardiac tamponade s/p pericardiocentesis.      1. Malignant pleural effusion, s/p thoracentesis as outpatient, complicated by iatrogenic pneumothorax and now likely trapped lung with hydropneumothorax: s/p chest tube placement. It sees likely that there is a a trapped lung component as well.   -Leave chest tube on water seal overnight  -Check CXR in AM, if unchanged, will consider clamping trial  -If any shortness of breath overnight, get stat CXR and connect chest tube to -20 cm H20 continuous wall suction.   -Continue 1-2 L supplemental O2 to help with air reabsorption     2. Moderate-sized right pleural effusion: This may be malignant in nature as well, but could also be related to cardiac tamponade with fluid back up into lung.  She has not had the R side drained before.    -Will plan on thoracentesis in endoscopy tomorrow AM by our team, will send for analysis  -Hold off on pleurX at this time, given new chemotherapy medication. Will either by at 8 or 10 AM.      Seen and discussed with Dr Freddy Conrad MD  Pulmonary and Critical Care Fellow  Pager: [phone]     Subjective, Interval history:   Feeling much better today. After pericardiocentesis, all shortness of breath, chest pain, and nausea went away. No fevers, chills. Not even having soreness at pericardial drain or chest tube sites. Appetite improved. No cough. Is having some new bilateral LE edema.     ROS:   GI: normal appetite. No nausea  Constitutional: no fevers or chills.     Objective:   Medications:    acetaminophen  975 mg  Oral Q8H     cholecalciferol  2,000 Units Oral Daily     diclofenac  2-4 g Transdermal 4x Daily     fentaNYL  50 mcg Intravenous Once     influenza vaccine adult (product based on age)  0.5 mL Intramuscular Prior to discharge     lidocaine  1-2 patch Transdermal Q24H     lidocaine   Transdermal Q8H     lidocaine   Transdermal Q24h     midazolam  1 mg Intravenous Once     piperacillin-tazobactam  4.5 g Intravenous Q6H     vancomycin (VANCOCIN) IV  1,250 mg Intravenous Q12H     LORazepam, naloxone, ondansetron, ondansetron, prochlorperazine, prochlorperazine, traMADol    Physical Exam:  Temp:  [96  F (35.6  C)-98.1  F (36.7  C)] 97.6  F (36.4  C)  Heart Rate:  [] 84  Resp:  [16-28] 16  BP: ()/(65-88) 108/69  SpO2:  [94 %-99 %] 99 %      Intake/Output Summary (Last 24 hours) at 10/06/18 1208  Last data filed at 10/06/18 1100   Gross per 24 hour   Intake             3535 ml   Output             1810 ml   Net             1725 ml     General: lying in bed in NAD  HEENT: anicteric, moist mucosa  Chest: left chest tube in place on suction. Mildly decreased breath sounds at left base, otherwise clear bilaterally.  No air leak noted this AM.   Cardiac: RRR no murmurs. Pericardial drain in place.  Abdomen: Soft, flat, non tender, active BS  Extremities: 1+ pitting LE Edema  Neuro: A&Ox3, no focal deficits   Skin: no rash noted    Labs and imaging: All laboratory and imaging data personally reviewed.      10/4 1705 CXR: left effusion continues, no obvious pneumothorax remains. Chest tube in place.   10/5 AM CXR: recurrent small pneumothorax component of left hydropneumothorax.   10/6 AM CXR: Increased pneumothorax as compared to prior; air fluid level seen.   10/7 Afternoon CXR: stable hydropneumothorax on left, with chest tube in place.   10/8 10 AM CXR: 1. Increase in air component size of left basilar hydropneumothorax. 2. Interval increase in size of possible left hilar pneumomediastinum. 3. Grossly unchanged  bilateral pleural effusions and associated  atelectasis.   10/8 4 PM CXR: 1. Stable appearance of left lateral hydropneumothorax status post removal mediastinal drain.  2. Mostly unchanged appearance of moderate bilateral pleural effusions and associated atelectasis.    CT chest - 10/6/18 - IMPRESSION:   In this patient with history of metastatic breast cancer:  1. New intermediate attenuating large pericardial effusion, favors  malignant pericardial effusion. Other possibility includes  hemopericardium.  2. Redemonstrated left small hydropneumothorax. Left-sided chest tube  in place.  3. Increase in a moderate size right-sided pleural effusion.  4. Decreased axillary adenopathy and unchanged lung metastases.  5. Lesions in the upper abdomen, including the liver, left adrenal  gland, and about the celiac axis better visualized on PET/CT 8/27/2018  on this noncontrast enhanced exam.  6. No significant change in numerous metastatic lesions throughout the  spine and ribs. Persistent left 10th rib fracture.    TTE - 10/7/18 -   Interpretation Summary  There is a large circumferential pericardial effusion with maximal thickness  of up to 4 cm. There is IVC plethora, RA and RV collapse and increased  respiratory variation in the mitral and tricuspid inflows, all of which are  suggestive of cardiac tamponade. The patient's team is aware; the patient is  scheduled for a pericardiocentesis.

## 2018-10-09 ENCOUNTER — APPOINTMENT (OUTPATIENT)
Dept: GENERAL RADIOLOGY | Facility: CLINIC | Age: 48
DRG: 200 | End: 2018-10-09
Attending: INTERNAL MEDICINE
Payer: COMMERCIAL

## 2018-10-09 LAB
ALBUMIN FLD-MCNC: 1.3 G/DL
ALBUMIN SERPL-MCNC: 2.2 G/DL (ref 3.4–5)
ALP SERPL-CCNC: 178 U/L (ref 40–150)
ALT SERPL W P-5'-P-CCNC: 66 U/L (ref 0–50)
AMYLASE FLD-CCNC: 13 U/L
ANION GAP SERPL CALCULATED.3IONS-SCNC: 8 MMOL/L (ref 3–14)
AST SERPL W P-5'-P-CCNC: 61 U/L (ref 0–45)
BACTERIA SPEC CULT: ABNORMAL
BASOPHILS # BLD AUTO: 0 10E9/L (ref 0–0.2)
BASOPHILS NFR BLD AUTO: 0.3 %
BILIRUB DIRECT SERPL-MCNC: 0.2 MG/DL (ref 0–0.2)
BILIRUB SERPL-MCNC: 0.5 MG/DL (ref 0.2–1.3)
BUN SERPL-MCNC: 11 MG/DL (ref 7–30)
CALCIUM SERPL-MCNC: 7.8 MG/DL (ref 8.5–10.1)
CHLORIDE SERPL-SCNC: 110 MMOL/L (ref 94–109)
CO2 SERPL-SCNC: 23 MMOL/L (ref 20–32)
CREAT SERPL-MCNC: 0.69 MG/DL (ref 0.52–1.04)
DIFFERENTIAL METHOD BLD: ABNORMAL
EOSINOPHIL # BLD AUTO: 0 10E9/L (ref 0–0.7)
EOSINOPHIL NFR BLD AUTO: 0.7 %
ERYTHROCYTE [DISTWIDTH] IN BLOOD BY AUTOMATED COUNT: 16.4 % (ref 10–15)
GFR SERPL CREATININE-BSD FRML MDRD: >90 ML/MIN/1.7M2
GLUCOSE FLD-MCNC: 102 MG/DL
GLUCOSE SERPL-MCNC: 78 MG/DL (ref 70–99)
GRAM STN SPEC: NORMAL
HCT VFR BLD AUTO: 34.4 % (ref 35–47)
HGB BLD-MCNC: 11 G/DL (ref 11.7–15.7)
IMM GRANULOCYTES # BLD: 0 10E9/L (ref 0–0.4)
IMM GRANULOCYTES NFR BLD: 0.5 %
LDH FLD L TO P-CCNC: 135 U/L
LDH SERPL L TO P-CCNC: 449 U/L (ref 81–234)
LDH SERPL L TO P-CCNC: NORMAL U/L (ref 81–234)
LIPASE FLD-CCNC: 25 U/L
LYMPHOCYTES # BLD AUTO: 1.1 10E9/L (ref 0.8–5.3)
LYMPHOCYTES NFR BLD AUTO: 18.4 %
Lab: ABNORMAL
MCH RBC QN AUTO: 31.8 PG (ref 26.5–33)
MCHC RBC AUTO-ENTMCNC: 32 G/DL (ref 31.5–36.5)
MCV RBC AUTO: 99 FL (ref 78–100)
MONOCYTES # BLD AUTO: 0.5 10E9/L (ref 0–1.3)
MONOCYTES NFR BLD AUTO: 9.4 %
NEUTROPHILS # BLD AUTO: 4.1 10E9/L (ref 1.6–8.3)
NEUTROPHILS NFR BLD AUTO: 70.7 %
NRBC # BLD AUTO: 0 10*3/UL
NRBC BLD AUTO-RTO: 0 /100
PH FLD: 7.4 PH
PLATELET # BLD AUTO: 182 10E9/L (ref 150–450)
POTASSIUM SERPL-SCNC: 3.4 MMOL/L (ref 3.4–5.3)
PROT FLD-MCNC: 2.4 G/DL
PROT SERPL-MCNC: 5.6 G/DL (ref 6.8–8.8)
PROT SERPL-MCNC: 6.2 G/DL (ref 6.8–8.8)
RBC # BLD AUTO: 3.46 10E12/L (ref 3.8–5.2)
SODIUM SERPL-SCNC: 141 MMOL/L (ref 133–144)
SPECIMEN SOURCE FLD: NORMAL
SPECIMEN SOURCE: ABNORMAL
SPECIMEN SOURCE: NORMAL
TRIGL FLD-MCNC: 20 MG/DL
WBC # BLD AUTO: 5.8 10E9/L (ref 4–11)

## 2018-10-09 PROCEDURE — 88185 FLOWCYTOMETRY/TC ADD-ON: CPT | Performed by: INTERNAL MEDICINE

## 2018-10-09 PROCEDURE — 87116 MYCOBACTERIA CULTURE: CPT | Performed by: INTERNAL MEDICINE

## 2018-10-09 PROCEDURE — 32554 ASPIRATE PLEURA W/O IMAGING: CPT | Performed by: INTERNAL MEDICINE

## 2018-10-09 PROCEDURE — 21400006 ZZH R&B CCU INTERMEDIATE UMMC

## 2018-10-09 PROCEDURE — 00000155 ZZHCL STATISTIC H-CELL BLOCK W/STAIN: Performed by: INTERNAL MEDICINE

## 2018-10-09 PROCEDURE — 80076 HEPATIC FUNCTION PANEL: CPT | Performed by: PHYSICIAN ASSISTANT

## 2018-10-09 PROCEDURE — 25000132 ZZH RX MED GY IP 250 OP 250 PS 637: Performed by: NURSE PRACTITIONER

## 2018-10-09 PROCEDURE — 36415 COLL VENOUS BLD VENIPUNCTURE: CPT | Performed by: INTERNAL MEDICINE

## 2018-10-09 PROCEDURE — 40000802 ZZH SITE CHECK

## 2018-10-09 PROCEDURE — 25000128 H RX IP 250 OP 636: Performed by: INTERNAL MEDICINE

## 2018-10-09 PROCEDURE — 84157 ASSAY OF PROTEIN OTHER: CPT | Performed by: INTERNAL MEDICINE

## 2018-10-09 PROCEDURE — 84311 SPECTROPHOTOMETRY: CPT | Performed by: INTERNAL MEDICINE

## 2018-10-09 PROCEDURE — 82150 ASSAY OF AMYLASE: CPT | Performed by: INTERNAL MEDICINE

## 2018-10-09 PROCEDURE — 00000102 ZZHCL STATISTIC CYTO WRIGHT STAIN TC: Performed by: INTERNAL MEDICINE

## 2018-10-09 PROCEDURE — 40000141 ZZH STATISTIC PERIPHERAL IV START W/O US GUIDANCE

## 2018-10-09 PROCEDURE — 40001004 ZZHCL STATISTIC FLOW INT 9-15 ABY TC 88188: Performed by: INTERNAL MEDICINE

## 2018-10-09 PROCEDURE — 87102 FUNGUS ISOLATION CULTURE: CPT | Performed by: INTERNAL MEDICINE

## 2018-10-09 PROCEDURE — 87040 BLOOD CULTURE FOR BACTERIA: CPT | Performed by: PHYSICIAN ASSISTANT

## 2018-10-09 PROCEDURE — 82945 GLUCOSE OTHER FLUID: CPT | Performed by: INTERNAL MEDICINE

## 2018-10-09 PROCEDURE — 25000128 H RX IP 250 OP 636: Performed by: PHYSICIAN ASSISTANT

## 2018-10-09 PROCEDURE — 0W993ZZ DRAINAGE OF RIGHT PLEURAL CAVITY, PERCUTANEOUS APPROACH: ICD-10-PCS | Performed by: INTERNAL MEDICINE

## 2018-10-09 PROCEDURE — 87015 SPECIMEN INFECT AGNT CONCNTJ: CPT | Performed by: INTERNAL MEDICINE

## 2018-10-09 PROCEDURE — 88184 FLOWCYTOMETRY/ TC 1 MARKER: CPT | Performed by: INTERNAL MEDICINE

## 2018-10-09 PROCEDURE — 87070 CULTURE OTHR SPECIMN AEROBIC: CPT | Performed by: INTERNAL MEDICINE

## 2018-10-09 PROCEDURE — 89051 BODY FLUID CELL COUNT: CPT | Performed by: INTERNAL MEDICINE

## 2018-10-09 PROCEDURE — 88305 TISSUE EXAM BY PATHOLOGIST: CPT | Performed by: INTERNAL MEDICINE

## 2018-10-09 PROCEDURE — 83690 ASSAY OF LIPASE: CPT | Performed by: INTERNAL MEDICINE

## 2018-10-09 PROCEDURE — 87206 SMEAR FLUORESCENT/ACID STAI: CPT | Performed by: INTERNAL MEDICINE

## 2018-10-09 PROCEDURE — 84478 ASSAY OF TRIGLYCERIDES: CPT | Performed by: INTERNAL MEDICINE

## 2018-10-09 PROCEDURE — 99232 SBSQ HOSP IP/OBS MODERATE 35: CPT | Performed by: INTERNAL MEDICINE

## 2018-10-09 PROCEDURE — 87205 SMEAR GRAM STAIN: CPT | Performed by: INTERNAL MEDICINE

## 2018-10-09 PROCEDURE — 83986 ASSAY PH BODY FLUID NOS: CPT | Performed by: INTERNAL MEDICINE

## 2018-10-09 PROCEDURE — 84155 ASSAY OF PROTEIN SERUM: CPT | Performed by: INTERNAL MEDICINE

## 2018-10-09 PROCEDURE — 80048 BASIC METABOLIC PNL TOTAL CA: CPT | Performed by: PHYSICIAN ASSISTANT

## 2018-10-09 PROCEDURE — 87081 CULTURE SCREEN ONLY: CPT | Performed by: INTERNAL MEDICINE

## 2018-10-09 PROCEDURE — 85025 COMPLETE CBC W/AUTO DIFF WBC: CPT | Performed by: PHYSICIAN ASSISTANT

## 2018-10-09 PROCEDURE — 36415 COLL VENOUS BLD VENIPUNCTURE: CPT | Performed by: PHYSICIAN ASSISTANT

## 2018-10-09 PROCEDURE — 83615 LACTATE (LD) (LDH) ENZYME: CPT | Performed by: INTERNAL MEDICINE

## 2018-10-09 PROCEDURE — 82042 OTHER SOURCE ALBUMIN QUAN EA: CPT | Performed by: INTERNAL MEDICINE

## 2018-10-09 PROCEDURE — 88112 CYTOPATH CELL ENHANCE TECH: CPT | Performed by: INTERNAL MEDICINE

## 2018-10-09 PROCEDURE — 71045 X-RAY EXAM CHEST 1 VIEW: CPT

## 2018-10-09 PROCEDURE — 25000128 H RX IP 250 OP 636: Performed by: STUDENT IN AN ORGANIZED HEALTH CARE EDUCATION/TRAINING PROGRAM

## 2018-10-09 RX ORDER — FUROSEMIDE 10 MG/ML
20 INJECTION INTRAMUSCULAR; INTRAVENOUS ONCE
Status: COMPLETED | OUTPATIENT
Start: 2018-10-09 | End: 2018-10-09

## 2018-10-09 RX ADMIN — FUROSEMIDE 20 MG: 10 INJECTION, SOLUTION INTRAVENOUS at 14:40

## 2018-10-09 RX ADMIN — VANCOMYCIN HYDROCHLORIDE 1500 MG: 10 INJECTION, POWDER, LYOPHILIZED, FOR SOLUTION INTRAVENOUS at 12:01

## 2018-10-09 RX ADMIN — DICLOFENAC SODIUM 2 G: 10 GEL TOPICAL at 20:08

## 2018-10-09 RX ADMIN — PIPERACILLIN AND TAZOBACTAM 4.5 G: 4; .5 INJECTION, POWDER, FOR SOLUTION INTRAVENOUS at 13:55

## 2018-10-09 RX ADMIN — ACETAMINOPHEN 975 MG: 325 TABLET, FILM COATED ORAL at 20:05

## 2018-10-09 RX ADMIN — DICLOFENAC SODIUM 4 G: 10 GEL TOPICAL at 17:02

## 2018-10-09 RX ADMIN — PIPERACILLIN AND TAZOBACTAM 4.5 G: 4; .5 INJECTION, POWDER, FOR SOLUTION INTRAVENOUS at 06:09

## 2018-10-09 RX ADMIN — VITAMIN D, TAB 1000IU (100/BT) 2000 UNITS: 25 TAB at 08:27

## 2018-10-09 RX ADMIN — PIPERACILLIN AND TAZOBACTAM 4.5 G: 4; .5 INJECTION, POWDER, FOR SOLUTION INTRAVENOUS at 00:56

## 2018-10-09 ASSESSMENT — ACTIVITIES OF DAILY LIVING (ADL)
ADLS_ACUITY_SCORE: 11

## 2018-10-09 ASSESSMENT — PAIN DESCRIPTION - DESCRIPTORS: DESCRIPTORS: ACHING;SORE

## 2018-10-09 NOTE — PROGRESS NOTES
Brief Cardiology Note    No output from pericardial drain overnight.   Limited TTE shows trivial pericardial effusion.   Patient feels much better after removal of fluid. No chest pain. Breathing is more comfortable.    Pericardial drain was removed at bedside and dressing applied.     Recommend follow up limited TTE in 1 week to assess for pericardial effusion. If patient remains in the hospital for at least 3-4 days, we can repeat it before she leaves. Please call when she is ready for discharge.    Radu Mai MD  Cardiovascular Medicine Fellow, PGY-7  [phone]

## 2018-10-09 NOTE — PLAN OF CARE
Problem: Syncope (Adult)  Goal: Identify Related Risk Factors and Signs and Symptoms  Related risk factors and signs and symptoms are identified upon initiation of Human Response Clinical Practice Guideline (CPG).   NEURO:  Alert and oriented x4. Calm and cooperative.  RESPIRATORY: SpO2 >90% on RA. Denied SOB. LS diminished in bases L>R. Chest tube to water seal, moderate amount of serous drainage during day. Chest xray results reviewed by pulm- okay to keep at H20 seal.   CARDIO: Pericardial drain removed. Echo done. HR - sinus rhythm- sinus tach. BP WNL. Trace BLE edema. ERA stockings applied, ambulated in [place]iam,and had pt elevated legs. MD aware of swelling.   GI/: BS active, +gas, +BM. Poor appetite. Continues on calorie counts. Voiding without difficulty.Denied nausea.  SKIN: Intact.  ACTIVITY: SBA   PAIN: Reported relief with pain relieving cream, declined further pain medication when offered.   LINES: Right PIV SL. Right PIV in hand removed due to pain.  PLAN: Continue POC.

## 2018-10-09 NOTE — OR NURSING
Pt tolerated throracentesis of R lung. Pleural fluid samples sent. 1.5 liters removed. VSS. Pt to have portable CXR done before returning back to floor. Pt without complaints.

## 2018-10-09 NOTE — PROGRESS NOTES
Calorie Count  Intake recorded for: 10/8  Kcals: 871  Protein: 57g  # Meals Recorded: 3 meals (First - 100% cheerios w/ soy milk, 75% wheat toast, 40% fruit cup)      (Secodn - 100% garlic edward stir choudhury w/ brown rice)      (Third - 50% salad, grilled chicken sandwich)  # Supplements Recorded: 0

## 2018-10-09 NOTE — PLAN OF CARE
"Problem: Patient Care Overview  Goal: Plan of Care/Patient Progress Review   10/09/18 1750   OTHER   Plan Of Care Reviewed With patient   Plan of Care Review   Progress improving   Neuro: A&Ox4.   Cardiac: SR/ST. VSS. /81 (BP Location: Right arm)  Pulse 106  Temp 98.4  F (36.9  C) (Oral)  Resp 18  Ht 1.626 m (5' 4\")  Wt 58.4 kg (128 lb 11.2 oz)  LMP 10/06/2017 (Approximate)  SpO2 98%  BMI 22.09 kg/m2   Respiratory: Sating 98% on RA. Pt had right thoracentesis today, 1.5L fluid taken off. Left chest tube to water seal--has serous output. Chest tube has known air leak, if pt becomes short of breath, let MD know (will get STAT chest xray). Currently, pt reports breathing much better, only slightly dyspneic with exertion.  GI/: Adequate urine output, IV lasix given for leg swelling. BM X1  Diet/appetite: Tolerating regular diet. Eating well.  Activity:  Independent, up to chair and in halls.  Pain: At acceptable level on current regimen.   Skin: No new deficits noted.  LDA's: chest tube, PIV    Plan: Continue with POC. Notify primary team with changes.    Problem: Chest Tube Drainage Device (Adult)  Goal: Signs and Symptoms of Listed Potential Problems Will be Absent, Minimized or Managed (Chest Tube Drainage Device)  Signs and symptoms of listed potential problems will be absent, minimized or managed by discharge/transition of care (reference Chest Tube Drainage Device (Adult) CPG).   10/09/18 1750   Chest Tube Drainage Device   Problems Assessed (Chest Tube Drainage Device) all   Problems Assessed (Chest Tube Drain Dev) persistent air leak/drainage         "

## 2018-10-09 NOTE — PLAN OF CARE
Problem: Patient Care Overview  Goal: Plan of Care/Patient Progress Review  Outcome: No Change  D: Syncopal episode at home. (+) Blood culture 10/06. Cardiac tamponade with pericardial drain placement 10/07.      I: Receiving IV abx as ordered. Blood cultures drawn evening of from 10/7 and 10/8 continue to be negative. Left chest tube to water seal, air leak, MD notified (see previous note). Pericardial drain removed 10/8.      A: VSS, A&O, up SBA. Pt reports minimal pain, relieved with lidocaine patch and scheduled tylenol. Pt reports some SOB with exertion, oxygen stable on RA while awake, requiring 2L NC while asleep. Chest tube site remains CDI. Large amount of output from CT, about 700 out overnight.Tele SR, HR 80-90's. Poor appetite, on calorie counts. Pt slept off and on overnight.      P: Consider PleurX drain per heme/onc note. Will continue to monitor and notify MD of pertinent changes.

## 2018-10-09 NOTE — PROCEDURES
PROCEDURE:   Ultrasound Guided Diagnostic and Therapeutic Thoracentesis.     INDICATION:   Large right pleural effusion.     PROCEDURE :   Ryan Conrad MD    SUPERVISED BY:  Dr Hayes    CONSENT:  Consent was obtained from the patient prior to the procedure. Indications, risks, and benefits were explained at length.     UNIVERSAL PROTOCOL: Patient Identification was verified, time out was performed, site marking was done. Imaging data reviewed. Full Barrier precaution done: Hands washed, mask, gloves, gown, cap and eye protection all used.     PROCEDURE SUMMARY:   A time out was performed. Ultrasound was used to locate an appropriate collection of fluid and was marked.  The patient was prepped and draped in a sterile manner using chlorhexidine scrub.  8 mL of 1% lidocaine was used to numb the region and fluid was aspirated with the longer finder needle.  Clear dark yellow fluid was obtained. The finder needle was then removed and a 10-blade scalpel used to make a small incision. The thoracentesis catheter was then threaded without difficulty. The patient had 1500 mL of dark yellow fluid removed. No immediate complications were noted during the procedure. The fluid was sent for microbiologic, cytologic, and chemistry studies.    Dr. Hayes was present for the key portions of the procedure.      ESTIMATED BLOOD LOSS: <5 mL    Ryan Conrad MD  Pulmonary and Critical Care Fellow  Pager: [phone]

## 2018-10-09 NOTE — PLAN OF CARE
Problem: Patient Care Overview  Goal: Plan of Care/Patient Progress Review  Edema 6C: Lymphedema orders received - patient off the unit at [facility], will reschedule.

## 2018-10-09 NOTE — PROVIDER NOTIFICATION
-------------------CRITICAL LAB VALUE-------------------    Lab Value: STAT gram stain pleural fluid right lung. No organisms. Moderate white blood cells predominantly mononuclear cells.  Time of notification: 2:34 PM  MD notified: Caridad Martinez PA-C  Patient status:  Temp:  [98.1  F (36.7  C)-98.5  F (36.9  C)] 98.5  F (36.9  C)  Pulse:  [92] 92  Heart Rate:  [] 95  Resp:  [16-18] 18  BP: (102-136)/(76-96) 124/85  SpO2:  [94 %-100 %] 100 %  Orders received:

## 2018-10-09 NOTE — PROGRESS NOTES
"[facility], [place]    Hematology / Oncology Progress Note    Date of Admission: 10/4/2018  Hospital Day #: 5   Date of Service (when I saw the patient): 10/09/2018     Assessment & Plan   Shan Marsh is a 48 year old woman with recurrent now metastatic ER+, mod OK+, HER-2 neg breast cancer currently on treatment with Xeloda. She presented to the hospital after a syncopal event/unwitness fall at home and was found to have left pneumothorax, now s/p chest tube placement. Additionally, she was then found to have large pericardial effusion with tamponade physiology, now s/p pericardiocentesis.     #Left hydropneumothorax.  #Recurrent pleural effusion.   #S/p chest tube placement.   Suspect pneumothorax 2/2 thoracentesis on 10/3 for recurrent malignant pleural effusion vs coughing vs spontaneous. A/p chest tube placement in ED. First f/u CXR showed reexpansion of the lung. No significant SOB or hypoxia.   -Pulmonology consulted for assistance with management. Appreciate pulm assistance. Placed to water seal overnight 10/7, f/u CXR demonstrating increased hydropneumothorax and increased pleural effusion as well as possible pneumomediastinum. Evening CXR stable. Pulm favors possible \"trapped lung\", recommend continuing with water seal at this time with possible plan to clamp tube tomorrow pending AM CXR.   -Discussion on whether PleurX drain placement necessary still ongoing, patient now agreeable to drain.  -There was air leak noted 10/4 afternoon so chest tube placed back to -20cm H2O suction and continued overnight.  -10/5 CXR demonstrates tiny ptx as well as increasing reaccumulation of left pleural effusion. Continue chest tube to suction.  -10/6 CXR shows increased air volume. There was some leakage at the tube correction. Some bubbles in the canister. Discussed with pulm fellow. Pulm will follow. Will keep CT on suction all the time; even when patient goes to bathroom. RN to " find equipment for continuous suction when pt is off floor.  -10/7 Pulm placed CT to water seal overnight and will reevaluate with CXR the status of the pleural effusion and pneumothorax the following day.   -10/8 CT remained on water seal throughout the day, hydropneumo stable. 10/9 took patient for R sided thoracentesis and continue CT to water seal at this time (d/t large amt of output).     #Pericardial effusion with tamponade physiology, s/p pericardiocentesis.  #Pericardial drain placement.   Suspect this is likely malignant. Noticed on CT scan on 10/6. Echo demonstrated tamponade physiology. Syncope likely 2/2 this.   -S/p pericardiocentesis: 4290 WBC, LDH 2289, cytology pending.   -Cardiology consulted, appreciate input. Clamped pericardial drain f/b repeat ECHO demonstrated trivial pericardial effusion (improved). Cardiology removed pericardial drain. Will likely do a repeat ECHO at discharge if patient stays for 2-3 days in hospital (otherwise do f/u ECHO in 1 wk).     #Syncopal event x1 prior to admission.  Likely related to cardiac tamponade as discussed above. Other possible triggers include vasovagal, dehydration secondary to poor PO intake, medication reaction due to new use of cannabinoids, PTX. No s/sx PE. Admission EKG demonstrates NSR. No irregularities noted on telemetry while in ED. Syncopal symptoms had resolved until 10/4 early evening, when patient was again hypotensive, bradycardic, and felt lightheaded. She did not lose consciousness and f/u CXR did not show any worsening of the ptx.   -Continue telemetry. Trop checked on 10/5 and was negative.   -Initial concern was that cannabis oil contributed to hypotension and syncope. Now it appears that tamponade is more likely to be the cause of her syncope     #Sinus tachycardia.  #SIRS.  Trigger sepsis protocol multiple times d/t vitals. Lactate 6.3 overnight 10/6. Improved with IVF. No clear infectious source. Cultures sent. Started empiric  antibiotics.  - Suspect tachycardia 2/2 pericardial effusion with tamponade rather than infection. Anticipate it to improve after pericardiocentesis.  -One BCx positive from 10/6 for Staph warneri, continue to suspect contaminant at this time since BCx from 10/7-10/11 remain negative. Suspect tachycardia and elevated Lactic acid 2/2 pericardial effusion, now resolved. Will discontinue antibiotics at this time.     #Metastatic breast cancer.   See PA's clinic note dated 9/4/18 for detailed oncologic and treatment history. Most recent started on xeloda - planned for 1 week on, 1 week off. She completed her first week last Wednesday and is planned to see Dr. Whitman on Monday 10/8 prior to starting 2nd week of xeloda. Tolerating ok so far.  -Continue on Xeloda break until f/u in clinic. Letrozole has been discontinued prior to this admission.    -Continue tramadol PRN pain but will try a lower dose in case contributed to hypotension.  -Continue medical cannabis PRN chronic nausea, pain. Advised patient to proceed cautiously with future use of medical cannabis, take note of sx, and check BP (she has device at home) after using in case causing hypotension.   -Has been taking gabapentin only 300mg at bedtime and would like to stop so will discontinue.   -Continue Zometa q3 months for bone mets - due October, to be given outpatient.   -Added lidoderm patches, voltaren gel, tylenol 975mg TID scheduled for pain.   -will need f/u rescheduled at discharge.      Pain  -Added Voltaren gel and lidoderm patches for chronic neck and shoulder pain.      FEN  -PRN lyte replacement  -RDAT  -No MIVF    Lines: PIV    Prophylaxis  -Lovenox sq was held for pericardiocentesis. Will discuss with cardiology when to resume.    Consults: Pulmonology, cardiology    Disposition: Inpatient status pending resolution of PTX and removal of chest tube. Anticipate 2 more days.    Patient discussed with Dr. Leal.    Caridad Martinez  PEE  Hematology/Oncology  Pager #[phone]     Interval History   Shan is feeling well today. Right sided thoracentesis tolerated well with improved SOB feeling. Did have coughing overnight with possible air leak in CT. Coughing resolved with thoracentesis. Patient endorses edema of b/l lower extremities which is uncomfortable for her. Agreeable to small dose of Lasix and encouraged to ambulate at least 4x daily.    Physical Exam   Temp: 98.5  F (36.9  C) Temp src: Oral BP: 124/85 Pulse: 92 Heart Rate: 95 Resp: 18 SpO2: 100 % O2 Device: None (Room air) Oxygen Delivery: 2 LPM  Vitals:    10/06/18 0752 10/08/18 0500 10/09/18 0608   Weight: 57.5 kg (126 lb 12.8 oz) 58.4 kg (128 lb 11.2 oz) 58.4 kg (128 lb 11.2 oz)     Vital Signs with Ranges  Temp:  [98.1  F (36.7  C)-98.5  F (36.9  C)] 98.5  F (36.9  C)  Pulse:  [92] 92  Heart Rate:  [] 95  Resp:  [16-18] 18  BP: (102-136)/(76-96) 124/85  SpO2:  [94 %-100 %] 100 %  I/O last 3 completed shifts:  In: 1950 [P.O.:1050; I.V.:900]  Out: 1506 [Urine:750; Chest Tube:756]    Constitutional: Pleasant female seen sitting up on EOB, in NAD. Alert and interactive.   HEENT: NCAT, anicteric sclerae, conjunctiva clear. Moist mucous membranes.  Respiratory: Non-labored breathing on 2L NC. Left lung sounds diminshed in base upto mid lung, clear in apex. RLL diminished in base otherwise improved in clarity post thoracentesis, no crackles or wheezing.  Cardiovascular: Regular rate and rhythm with no murmur, rub or gallop.  GI: Normoactive BS. Abdomen is soft, non-distended, and non-tender.  Skin: Warm and dry. No rashes or lesions on exposed surfaces.  Musculoskeletal: Extremities grossly normal. No tenderness, bilateral lower extremity to thighs, trace pitting.  Neurologic: A &O x3, speech normal, answering questions appropriately. Moves all extremities spontaneously. Grossly non-focal.  Neuropsychiatric: Mentation and affect normal/appropriate.    Medications   Current  Facility-Administered Medications   Medication     acetaminophen (TYLENOL) tablet 975 mg     cholecalciferol (vitamin D3) tablet 2,000 Units     diclofenac (VOLTAREN) 1 % topical gel 2-4 g     fentaNYL (PF) (SUBLIMAZE) injection 50 mcg     furosemide (LASIX) injection 20 mg     influenza quadrivalent (PF) vacc (FLUZONE or Flulaval or FLUARIX) injection 0.5 mL     Lidocaine (LIDOCARE) 4 % Patch 1-2 patch     lidocaine patch in PLACE     lidocaine patch REMOVAL     LORazepam (ATIVAN) injection 0.5 mg     midazolam (VERSED) injection 1 mg     naloxone (NARCAN) injection 0.1-0.4 mg     ondansetron (ZOFRAN) injection 8 mg     ondansetron (ZOFRAN) tablet 8 mg     prochlorperazine (COMPAZINE) injection 5 mg     prochlorperazine (COMPAZINE) tablet 5-10 mg     traMADol (ULTRAM) half-tab 25 mg       Data   CBC    Recent Labs  Lab 10/09/18  0645 10/08/18  0831 10/07/18  0347 10/07/18  0023   WBC 5.8 7.6 10.0 10.2   RBC 3.46* 3.68* 3.91 3.94   HGB 11.0* 11.8 12.5 12.9   HCT 34.4* 36.6 39.1 39.2   MCV 99 100 100 100   MCH 31.8 32.1 32.0 32.7   MCHC 32.0 32.2 32.0 32.9   RDW 16.4* 16.5* 15.2* 15.3*    190 246 222     CMP    Recent Labs  Lab 10/09/18  1336 10/09/18  0645 10/08/18  0831 10/08/18  0543 10/07/18  0347 10/05/18  0817   NA  --  141 139  --  131* 137   POTASSIUM  --  3.4 3.5  --  5.0 4.5   CHLORIDE  --  110* 108  --  103 110*   CO2  --  23 25  --  20 17*   ANIONGAP  --  8 5  --  8 10   GLC  --  78 115*  --  122* 118*   BUN  --  11 19  --  19 14   CR  --  0.69 0.73 0.74 0.68 0.66   GFRESTIMATED  --  >90 85 84 >90 >90   GFRESTBLACK  --  >90 >90 >90 >90 >90   KRYSETN  --  7.8* 7.6*  --  7.9* 8.0*   PROTTOTAL 6.2* 5.6*  --   --  6.0* 6.1*   ALBUMIN  --  2.2*  --   --  2.7* 2.5*   BILITOTAL  --  0.5  --   --  0.6 0.7   ALKPHOS  --  178*  --   --  136 137   AST  --  61*  --   --  72* 62*   ALT  --  66*  --   --  102* 78*     INR    Recent Labs  Lab 10/07/18  0347 10/07/18  0023   INR 1.21* 1.18*       Results for orders  placed or performed during the hospital encounter of 10/04/18   Chest XR,  PA & LAT    Narrative    Exam: XR CHEST 2 VW, 10/4/2018 3:05 AM    Indication: syncope and recent thoracentesis, hx of breast CA;     Comparison: CT chest Assessment/pelvis 3/15/2018. PET/CT 8/27/2018.    Findings:   New large left-sided hydropneumothorax status post thoracentesis, with  significant collapse of the left lung. Associated compressive  atelectasis of both left and right lungs seen. Mild rightward  mediastinal shift involving the heart and lower tracheobronchial tree.  Small right pleural effusion again seen.      Impression    Impression:   1. Large left hydropneumothorax and rightward mediastinal shift.  2. No interval change in right-sided pleural effusion.    [Result: Hydropneumothorax]    Finding was identified on 10/4/2018 3:08 AM.     Dr. Rai was contacted by Dr. Giraldo at 10/4/2018 3:13 AM and  verbalized understanding of the urgent finding.     I have personally reviewed the examination and initial interpretation  and I agree with the findings.    LANIE PORTILLO MD   POC US ECHO LIMITED    Impression    Limited Bedside Lung Ultrasound, performed and interpreted by me. Indication:    Syncope    Lung ultrasound was performed for the assessment of pneumothorax   The patient was placed in a semi recumbent position at approximately 45 degrees. The left and right lung apices were evaluated for evidence of pneumothorax.     Findings    Right side:  Lung sliding artifact   Present     Comet tail artifacts   Present     [place] sign on M-Mode Present   Left side:  Lung sliding artifact   Absent     Comet tail artifacts   Absent     [place] sign on M-Mode Absent    IMPRESSION:   left Pneumothorax.     Chest  XR, 1 view portable    Narrative    Exam: XR CHEST PORT 1 VW, 10/4/2018 4:27 AM    Indication: post-chest tube placement;     Comparison: Same-day x-ray    Findings:   New onset of chest tube. Pneumothorax no longer  visualized. Persistent  layering bilateral pleural effusions. Central airway is midline with  possible minimal rightward deviation at the level of the stephanie. Mild  anterior wedge compression deformity seen in the lower thoracic spine.      Impression    Impression:   1. Pneumothorax no longer visualized on the left, status post  left-sided apically directed chest tube placement.  2. Persistent layering bilateral pleural effusions.    I have personally reviewed the examination and initial interpretation  and I agree with the findings.    LANIE PORTILLO MD   XR Chest Port 1 View    Narrative    XR CHEST PORT 1 VW  10/4/2018 5:39 PM      HISTORY: eval pneumothorax as patient had episode of acute  hypotension;     COMPARISON: Earlier same day    FINDINGS: Single AP view of the chest. Cardiomediastinal silhouette is  stable. Bilateral pleural effusions are unchanged. No convincing  residual left pneumothorax. Left chest tube remains in place.  Bibasilar opacities, likely atelectasis (versus consolidation.      Impression    IMPRESSION: Stable chest. No residual pneumothorax is definitely  identified.    I have personally reviewed the examination and initial interpretation  and I agree with the findings.    ALESSANDRO MARION MD   XR Chest 2 Views    Narrative    Exam: XR CHEST 2 VW, 10/5/2018 8:34 AM    Indication: F/u left pneumothorax post chest tube to suction  overnight;     Comparison: Chest x-ray on 10/04/2018.    Findings:   PA and lateral view of the chest. Minimal worsening left pleural  effusion with associated basilar opacity. Left-sided chest in place  with tiny hydropneumothorax. Patchy opacity in right lung base.  Enlarged cardiac mediastinal silhouette, stable. Upper abdomen is  unremarkable.      Impression    Impression:   1. Left-sided chest tube in place with tiny hydropneumothorax.   2. Mild worsening of moderate size left pleural effusion with  associated basilar opacity, atelectasis versus  consolidation.   3. Right basilar opacity, favoring atelectasis.    I have personally reviewed the examination and initial interpretation  and I agree with the findings.    MAITE ANTON MD   XR Chest 2 Views    Narrative    Exam: XR CHEST 2 VW, 10/6/2018 8:21 AM    Indication: f/u pneumothorax;     Comparison: Chest radiograph dated 10/5/2018.    Findings:   PA and lateral views of the chest. Stable position of a left approach  chest tube with the tip visible near the apex. Air-fluid level in the  left hemithorax consistent with hydropneumothorax. Air volume  increased compared to prior exam. Stable right pleural effusion with  adjacent compressive atelectasis. Cardiac silhouette is partially  obscured. No acute osseous pathology. Visualized portion of the upper  abdomen is unremarkable.      Impression    Impression:   1. Left hydropneumothorax with an air-fluid level. Increased air  volume compared to prior.  2. Stable right pleural effusion and adjacent compressive atelectasis.    [Result: Hydropneumothorax with increased air volume compared to  prior]    Finding was identified on 10/6/2018 8:25 AM.     Karuna Gemran was contacted by Dr. Eddy Villareal at 10/6/2018 8:31 AM  and verbalized understanding of the urgent finding.     I have personally reviewed the examination and initial interpretation  and I agree with the findings.    MAITE ANTON MD   CT Chest w/o Contrast    Narrative    EXAMINATION: CT CHEST W/O CONTRAST  10/6/2018 5:39 PM      CLINICAL HISTORY: Evaluate pneumothorax. Possible pericardial  effusion.    COMPARISON: Same-day chest x-ray. PET/CT 8/27/2018.    TECHNIQUE: CT imaging obtained through the chest without intravenous  contrast. Coronal and axial MIP reformatted images obtained.    FINDINGS:  Left-sided hydropneumothorax, as seen on recent chest x-ray. The air  in the pleura is small and overlies the anterior left lower lobe.  There is a left-sided apically directed chest tube.  Associated left  basilar compressive atelectasis, as well as regions of rounded  atelectasis in the left base and lingula.    A moderate-sized right-sided pleural effusion is seen with overlying  compressive atelectasis, which is increased from the PET/CT on  8/27/2018. Fluid extends into the minor and major fissure on the  right.    Redemonstrated peribronchovascular nodules in both upper lobes,  unchanged. There is mild interlobular septal thickening in both lung  apices likely representing interstitial pulmonary edema.    There is a new large right-sided pericardial effusion, of intermediate  density (25-30 Hounsfield units).    Bilateral breast implants. Enlarged left axillary lymph nodes, appear  decreased. For example on series 2 image 20 is an 11 x 6 mm lymph  node, previously 15 x 8 mm. Biopsy clip in the left axilla, and  extensive left axillary fat stranding.    Numerous metastatic lesions in the liver, better seen on the  contrast-enhanced exam from 8/27/2018. Persistently enlarged  approximately 15 mm lymph node adjacent to the celiac axis, which  demonstrate FDG activity on 8/27/2018.    Thyroid gland unremarkable. Trachea and mainstem bronchi are patent.  Normal size aorta and pulmonary artery.    Redemonstrated posterior left 10th rib fracture. Redemonstrated mixed  sclerotic and lytic lesions throughout the spine and some ribs. No  significant change in anterior wedge compression deformity at L1.      Impression    IMPRESSION:   In this patient with history of metastatic breast cancer:  1. New intermediate attenuating large pericardial effusion, favors  malignant pericardial effusion. Other possibility includes  hemopericardium.  2. Redemonstrated left small hydropneumothorax. Left-sided chest tube  in place.  3. Increase in a moderate size right-sided pleural effusion.  4. Decreased axillary adenopathy and unchanged lung metastases.  5. Lesions in the upper abdomen, including the liver, left  adrenal  gland, and about the celiac axis better visualized on PET/CT 8/27/2018  on this noncontrast enhanced exam.  6. No significant change in numerous metastatic lesions throughout the  spine and ribs. Persistent left 10th rib fracture.    I have personally reviewed the examination and initial interpretation  and I agree with the findings.    MATHEW FERREIRA MD   XR Abdomen 2 Views    Narrative    Exam: XR ABDOMEN 2 VW, 10/6/2018 9:11 PM    Indication: elevated lactate; LUQ abd tenderness to palpation;     Comparison: PET/CT 8/27/2018    Findings:   Bowel gas pattern is nonobstructive. Mild stool noted in the left  colon. No pneumatosis or portal venous gas.    Small left hydropneumothorax and moderate right pleural effusion with  subjacent atelectasis. Prominence of the cardiac silhouette due to  pericardial effusion.      Impression    Impression:   Nonobstructive bowel gas pattern. Mild stool in the right colon.    I have personally reviewed the examination and initial interpretation  and I agree with the findings.    MATHEW FERREIRA MD   XR Chest 2 Views    Narrative     Examination:  XR CHEST 2 VW     Date:  10/7/2018 10:28 AM      Clinical Information: hydropneumothorax;      Additional Information: none    Comparison: Chest x-ray 10/6/2018    Findings:   Left apical chest tube,  previous left basilar pneumothorax is no  longer evident however there is a pleural effusion there.    Pulmonary vasculature is engorged. Moderate right pleural effusion,  increased from prior. No focal opacities in the upper lungs.     Compression deformity in the sclerotic L1 vertebral body. Sclerosis  and lucency elsewhere in thoracolumbar spine consistent with  metastatic disease.       Impression    Impression:  1. Previously demonstrated left basilar pneumothorax is now a pleural  effusion. Left chest tube remains in place.   2. Increased right pleural effusion.  3. Pulmonary vascular congestion.    GISELL  MD KAVITHA   XR Chest 2 Views    Narrative    XR CHEST 2 VW  10/7/2018 4:40 PM      HISTORY: Evaluate hydropneumothorax while on water seal (12:30 pm);     COMPARISON: Earlier same day    FINDINGS: PA and lateral views of the chest upright. Left apical chest  tube in stable position. Unchanged fluid component of left  hydropneumothorax. No definite residual pneumothorax is seen.  Right-sided pleural effusion is also unchanged. No new consolidation.      Impression    IMPRESSION: Stable left hydropneumothorax with chest tube in place.  Unchanged moderate right pleural effusion.    I have personally reviewed the examination and initial interpretation  and I agree with the findings.    MATHEW FERREIRA MD   XR Chest 2 Views     Value    Radiologist flags Increase in size of hydropneumothorax and possible (Urgent)    Narrative    Exam: XR CHEST 2 VW, 10/8/2018 10:23 AM    Indication: hydropneumothorax;     Comparison: Chest x-ray from 10/7/2018, CT from 10/6/2018    Findings:   2 views of the chest are obtained demonstrating unremarkable soft  tissues and no acute bony abnormalities. Cardiomediastinal borders are  clear. No enlargement of the cardiac silhouette. No appreciable  pneumothorax or pleural effusions. No focal airspace opacities.  Unchanged position of midline pericardial drain and left apical chest  tube. Increase in air component size of left basilar  hydropneumothorax. Vertical lucency over left cardiac border may  represent pneumomediastinum, which is slightly more prominent today.  Bilateral pleural effusions are grossly unchanged in size. Likely  bibasilar atelectasis.      Impression    Impression:  1. Increase in air component size of left basilar hydropneumothorax.   2. Interval increase in size of possible left hilar pneumomediastinum.  3. Grossly unchanged bilateral pleural effusions and associated  atelectasis.     [[facility] Center: Increase in size of hydropneumothorax and  possible  pneumomediastinum]    This report will be copied to the [facility] to ensure a  provider acknowledges the finding. [facility] Center is available Monday  through Friday 8am-3:30 pm.     I have personally reviewed the examination and initial interpretation  and I agree with the findings.    MAITE ANTON MD   XR Chest Port 1 View    Narrative    Exam: XR CHEST PORT 1 VW, 10/8/2018 3:58 PM    Indication: evaluate for significant changes in hydropneumothorax  since morning imaging;     Comparison: Chest x-ray from earlier this morning.    Findings:   Single view AP chest is obtained demonstrating interval removal of  mediastinal drain. Left lateral hydropneumothorax appears mostly  unchanged in size. No appreciable pneumomediastinum. Stable left  apical chest tube. Stable appearance of moderate bilateral pleural  effusions and associated atelectasis.      Impression    Impression:   1. Stable appearance of left lateral hydropneumothorax status post  removal mediastinal drain.  2. Mostly unchanged appearance of moderate bilateral pleural effusions  and associated atelectasis.    I have personally reviewed the examination and initial interpretation  and I agree with the findings.    MAITE ANTON MD

## 2018-10-09 NOTE — PROVIDER NOTIFICATION
MD Notification    MD Notified: Heme/onc moonlighter    Notification date/time: 10/09/18 2:34 AM    Notification interaction: Spoke with MD on phone    Purpose of notification: Pt CT developed new air leak. Pt denies SOB but states she has been coughing more.     Comments: No new orders at this time. Continue to monitor for tachypnea, drop in O2 sats and SOB.

## 2018-10-09 NOTE — PROGRESS NOTES
[facility]    Pulmonary, Allergy, Critical Care and Sleep Medicine    Follow-up Note  10/09/2018    Assessment and Recommendations:    Shan Marsh is a 48 year old female with a history of metastatic breast cancer on treatment who presented on 10/4/2018 with pneumothorax after therapeutic thoracentesis now s/p chest tube placement (in ED on 10/4), with corse complicated by cardiac tamponade s/p pericardiocentesis.      1. Malignant pleural effusion, s/p thoracentesis as outpatient, complicated by iatrogenic pneumothorax and now likely trapped lung with hydropneumothorax: s/p chest tube placement. It sees likely that there is a a trapped lung component as well. Air leak this morning on water seal, cough overnight, but not more short of breath.   -Check CXR this AM, ordered  -If any significant shortness of breath, get stat CXR and connect chest tube to -20 cm H20 continuous wall suction.   -Continue 1-2 L supplemental O2 to help with air reabsorption   -we will call team with plan after CXR    2. Moderate-sized right pleural effusion: This may be malignant in nature as well, but could also be related to cardiac tamponade with fluid back up into lung.  She has not had the R side drained before.    -Plan on thoracentesis in endoscopy today, will send for analysis  -Hold off on pleurX at this time, given new chemotherapy medication. Will either by at 8 or 10 AM.      Seen and discussed with Dr Freddy Conrad MD  Pulmonary and Critical Care Fellow  Pager: [phone]     Subjective, Interval history:   Chest tube developed new air leak and more fluid output overnight. No real change in symptoms, did cough more last night. No chest pain, mild soreness at chest tube site. [place] bubbling last night. No fevers, chills. Some shortness of breath with exertion, not at rest. Still having LE edema.     ROS:   GI: normal appetite. No nausea. Normal BMs  : normal urination.    Constitutional: no fevers or chills.     Objective:   Medications:    acetaminophen  975 mg Oral Q8H     cholecalciferol  2,000 Units Oral Daily     diclofenac  2-4 g Transdermal 4x Daily     fentaNYL  50 mcg Intravenous Once     influenza vaccine adult (product based on age)  0.5 mL Intramuscular Prior to discharge     lidocaine  1-2 patch Transdermal Q24H     lidocaine   Transdermal Q8H     lidocaine   Transdermal Q24h     midazolam  1 mg Intravenous Once     piperacillin-tazobactam  4.5 g Intravenous Q6H     vancomycin (VANCOCIN) IV  1,500 mg Intravenous Q12H     LORazepam, naloxone, ondansetron, ondansetron, prochlorperazine, prochlorperazine, traMADol    Physical Exam:  Temp:  [97.8  F (36.6  C)-98.2  F (36.8  C)] 98.2  F (36.8  C)  Heart Rate:  [] 100  Resp:  [16-18] 16  BP: ()/(76-87) 119/87  SpO2:  [94 %-96 %] 96 %    General: lying in bed in NAD  HEENT: anicteric, moist mucosa  Chest: left chest tube in place on water seal, occasional air leak. Mildly decreased breath sounds on left.  Mildly decreased at right base. No wheeze  Cardiac: RRR no murmurs.   Abdomen: Soft, flat, non tender, active BS  Extremities: 1+ pitting LE Edema, compression stockings in place.   Neuro: A&Ox3, no focal deficits   Skin: no rash noted    Labs and imaging: All laboratory and imaging data personally reviewed.    K 3.4 Cr 0.69. Alb 2.2. ALk phos 178. AST 61, ALT 66.   WBC 5.8 Hgb 11.0  Plts 182.     10/4 1705 CXR: left effusion continues, no obvious pneumothorax remains. Chest tube in place.   10/5 AM CXR: recurrent small pneumothorax component of left hydropneumothorax.   10/6 AM CXR: Increased pneumothorax as compared to prior; air fluid level seen.   10/7 Afternoon CXR: stable hydropneumothorax on left, with chest tube in place.   10/8 10 AM CXR: 1. Increase in air component size of left basilar hydropneumothorax. 2. Interval increase in size of possible left hilar pneumomediastinum. 3. Grossly unchanged  bilateral pleural effusions and associated  atelectasis.   10/8 4 PM CXR: 1. Stable appearance of left lateral hydropneumothorax status post removal mediastinal drain. 2. Mostly unchanged appearance of moderate bilateral pleural effusions and associated atelectasis.  10/9 AM CXR: pending    CT chest - 10/6/18 - IMPRESSION:   In this patient with history of metastatic breast cancer:  1. New intermediate attenuating large pericardial effusion, favors  malignant pericardial effusion. Other possibility includes  hemopericardium.  2. Redemonstrated left small hydropneumothorax. Left-sided chest tube  in place.  3. Increase in a moderate size right-sided pleural effusion.  4. Decreased axillary adenopathy and unchanged lung metastases.  5. Lesions in the upper abdomen, including the liver, left adrenal  gland, and about the celiac axis better visualized on PET/CT 8/27/2018  on this noncontrast enhanced exam.  6. No significant change in numerous metastatic lesions throughout the  spine and ribs. Persistent left 10th rib fracture.    TTE - 10/7/18 -   Interpretation Summary  There is a large circumferential pericardial effusion with maximal thickness  of up to 4 cm. There is IVC plethora, RA and RV collapse and increased  respiratory variation in the mitral and tricuspid inflows, all of which are  suggestive of cardiac tamponade. The patient's team is aware; the patient is  scheduled for a pericardiocentesis.

## 2018-10-10 ENCOUNTER — APPOINTMENT (OUTPATIENT)
Dept: OCCUPATIONAL THERAPY | Facility: CLINIC | Age: 48
DRG: 200 | End: 2018-10-10
Attending: PHYSICIAN ASSISTANT
Payer: COMMERCIAL

## 2018-10-10 ENCOUNTER — APPOINTMENT (OUTPATIENT)
Dept: GENERAL RADIOLOGY | Facility: CLINIC | Age: 48
DRG: 200 | End: 2018-10-10
Attending: INTERNAL MEDICINE
Payer: COMMERCIAL

## 2018-10-10 ENCOUNTER — APPOINTMENT (OUTPATIENT)
Dept: GENERAL RADIOLOGY | Facility: CLINIC | Age: 48
DRG: 200 | End: 2018-10-10
Attending: PHYSICIAN ASSISTANT
Payer: COMMERCIAL

## 2018-10-10 LAB
ANION GAP SERPL CALCULATED.3IONS-SCNC: 10 MMOL/L (ref 3–14)
BASOPHILS # BLD AUTO: 0 10E9/L (ref 0–0.2)
BASOPHILS NFR BLD AUTO: 0.2 %
BUN SERPL-MCNC: 7 MG/DL (ref 7–30)
CALCIUM SERPL-MCNC: 8 MG/DL (ref 8.5–10.1)
CHLORIDE SERPL-SCNC: 104 MMOL/L (ref 94–109)
CO2 SERPL-SCNC: 28 MMOL/L (ref 20–32)
COPATH REPORT: NORMAL
COPATH REPORT: NORMAL
CREAT SERPL-MCNC: 0.57 MG/DL (ref 0.52–1.04)
DIFFERENTIAL METHOD BLD: ABNORMAL
EOSINOPHIL # BLD AUTO: 0.1 10E9/L (ref 0–0.7)
EOSINOPHIL NFR BLD AUTO: 1.5 %
ERYTHROCYTE [DISTWIDTH] IN BLOOD BY AUTOMATED COUNT: 16.1 % (ref 10–15)
GFR SERPL CREATININE-BSD FRML MDRD: >90 ML/MIN/1.7M2
GLUCOSE SERPL-MCNC: 84 MG/DL (ref 70–99)
HCT VFR BLD AUTO: 33.8 % (ref 35–47)
HGB BLD-MCNC: 10.9 G/DL (ref 11.7–15.7)
IMM GRANULOCYTES # BLD: 0 10E9/L (ref 0–0.4)
IMM GRANULOCYTES NFR BLD: 0.8 %
LYMPHOCYTES # BLD AUTO: 1.3 10E9/L (ref 0.8–5.3)
LYMPHOCYTES NFR BLD AUTO: 25.4 %
MAGNESIUM SERPL-MCNC: 2 MG/DL (ref 1.6–2.3)
MCH RBC QN AUTO: 31.3 PG (ref 26.5–33)
MCHC RBC AUTO-ENTMCNC: 32.2 G/DL (ref 31.5–36.5)
MCV RBC AUTO: 97 FL (ref 78–100)
MONOCYTES # BLD AUTO: 0.3 10E9/L (ref 0–1.3)
MONOCYTES NFR BLD AUTO: 6.5 %
NEUTROPHILS # BLD AUTO: 3.4 10E9/L (ref 1.6–8.3)
NEUTROPHILS NFR BLD AUTO: 65.6 %
NRBC # BLD AUTO: 0 10*3/UL
NRBC BLD AUTO-RTO: 0 /100
PLATELET # BLD AUTO: 194 10E9/L (ref 150–450)
POTASSIUM SERPL-SCNC: 2.8 MMOL/L (ref 3.4–5.3)
POTASSIUM SERPL-SCNC: 2.9 MMOL/L (ref 3.4–5.3)
POTASSIUM SERPL-SCNC: 3.9 MMOL/L (ref 3.4–5.3)
RBC # BLD AUTO: 3.48 10E12/L (ref 3.8–5.2)
SODIUM SERPL-SCNC: 143 MMOL/L (ref 133–144)
WBC # BLD AUTO: 5.2 10E9/L (ref 4–11)

## 2018-10-10 PROCEDURE — 40000133 ZZH STATISTIC OT WARD VISIT: Performed by: OCCUPATIONAL THERAPIST

## 2018-10-10 PROCEDURE — 99232 SBSQ HOSP IP/OBS MODERATE 35: CPT | Performed by: INTERNAL MEDICINE

## 2018-10-10 PROCEDURE — 83735 ASSAY OF MAGNESIUM: CPT | Performed by: PHYSICIAN ASSISTANT

## 2018-10-10 PROCEDURE — 71046 X-RAY EXAM CHEST 2 VIEWS: CPT

## 2018-10-10 PROCEDURE — 21400006 ZZH R&B CCU INTERMEDIATE UMMC

## 2018-10-10 PROCEDURE — 71045 X-RAY EXAM CHEST 1 VIEW: CPT

## 2018-10-10 PROCEDURE — 97535 SELF CARE MNGMENT TRAINING: CPT | Mod: GO | Performed by: OCCUPATIONAL THERAPIST

## 2018-10-10 PROCEDURE — 97165 OT EVAL LOW COMPLEX 30 MIN: CPT | Mod: GO | Performed by: OCCUPATIONAL THERAPIST

## 2018-10-10 PROCEDURE — 25000132 ZZH RX MED GY IP 250 OP 250 PS 637: Performed by: NURSE PRACTITIONER

## 2018-10-10 PROCEDURE — 84132 ASSAY OF SERUM POTASSIUM: CPT | Performed by: INTERNAL MEDICINE

## 2018-10-10 PROCEDURE — 80048 BASIC METABOLIC PNL TOTAL CA: CPT | Performed by: PHYSICIAN ASSISTANT

## 2018-10-10 PROCEDURE — 25000132 ZZH RX MED GY IP 250 OP 250 PS 637: Performed by: INTERNAL MEDICINE

## 2018-10-10 PROCEDURE — 85025 COMPLETE CBC W/AUTO DIFF WBC: CPT | Performed by: PHYSICIAN ASSISTANT

## 2018-10-10 PROCEDURE — 36415 COLL VENOUS BLD VENIPUNCTURE: CPT | Performed by: INTERNAL MEDICINE

## 2018-10-10 PROCEDURE — 36415 COLL VENOUS BLD VENIPUNCTURE: CPT | Performed by: PHYSICIAN ASSISTANT

## 2018-10-10 RX ORDER — POTASSIUM CHLORIDE 1.5 G/1.58G
20-40 POWDER, FOR SOLUTION ORAL
Status: DISCONTINUED | OUTPATIENT
Start: 2018-10-10 | End: 2018-10-11 | Stop reason: HOSPADM

## 2018-10-10 RX ORDER — POTASSIUM CHLORIDE 29.8 MG/ML
20 INJECTION INTRAVENOUS
Status: DISCONTINUED | OUTPATIENT
Start: 2018-10-10 | End: 2018-10-11 | Stop reason: HOSPADM

## 2018-10-10 RX ORDER — POTASSIUM CHLORIDE 750 MG/1
40 TABLET, EXTENDED RELEASE ORAL ONCE
Status: COMPLETED | OUTPATIENT
Start: 2018-10-10 | End: 2018-10-10

## 2018-10-10 RX ORDER — POTASSIUM CHLORIDE 7.45 MG/ML
10 INJECTION INTRAVENOUS
Status: DISCONTINUED | OUTPATIENT
Start: 2018-10-10 | End: 2018-10-11 | Stop reason: HOSPADM

## 2018-10-10 RX ORDER — POTASSIUM CL/LIDO/0.9 % NACL 10MEQ/0.1L
10 INTRAVENOUS SOLUTION, PIGGYBACK (ML) INTRAVENOUS
Status: DISCONTINUED | OUTPATIENT
Start: 2018-10-10 | End: 2018-10-10 | Stop reason: RX

## 2018-10-10 RX ORDER — POTASSIUM CHLORIDE 750 MG/1
20-40 TABLET, EXTENDED RELEASE ORAL
Status: DISCONTINUED | OUTPATIENT
Start: 2018-10-10 | End: 2018-10-11 | Stop reason: HOSPADM

## 2018-10-10 RX ADMIN — POTASSIUM CHLORIDE 40 MEQ: 750 TABLET, EXTENDED RELEASE ORAL at 07:53

## 2018-10-10 RX ADMIN — VITAMIN D, TAB 1000IU (100/BT) 2000 UNITS: 25 TAB at 09:07

## 2018-10-10 RX ADMIN — POTASSIUM CHLORIDE 40 MEQ: 750 TABLET, EXTENDED RELEASE ORAL at 11:38

## 2018-10-10 RX ADMIN — POTASSIUM CHLORIDE 40 MEQ: 750 TABLET, EXTENDED RELEASE ORAL at 13:55

## 2018-10-10 RX ADMIN — ACETAMINOPHEN 975 MG: 325 TABLET, FILM COATED ORAL at 21:13

## 2018-10-10 RX ADMIN — DICLOFENAC SODIUM 2 G: 10 GEL TOPICAL at 21:15

## 2018-10-10 RX ADMIN — ACETAMINOPHEN 975 MG: 325 TABLET, FILM COATED ORAL at 04:04

## 2018-10-10 RX ADMIN — ACETAMINOPHEN 975 MG: 325 TABLET, FILM COATED ORAL at 13:53

## 2018-10-10 ASSESSMENT — ACTIVITIES OF DAILY LIVING (ADL)
ADLS_ACUITY_SCORE: 11
ADLS_ACUITY_SCORE: 9
ADLS_ACUITY_SCORE: 11
ADLS_ACUITY_SCORE: 11
ADLS_ACUITY_SCORE: 9
ADLS_ACUITY_SCORE: 11

## 2018-10-10 NOTE — PLAN OF CARE
Problem: Patient Care Overview  Goal: Plan of Care/Patient Progress Review  A/I: VSS on 2L NC, scheduled tylenol given for pain.  Chest tube with no output this shift, intermittent air leak.  Up to bathroom independently.  0600: pt got up to bathroom and noticed chest tube laying in bed.  Last check was at 0400 by RN and was intact.  vaseline occlusive dressing applied.  Respiratory stable, no acute distress. MD notified.  P: continue to monitor

## 2018-10-10 NOTE — PROVIDER NOTIFICATION
Pt having sporadic, short runs of [place] II on telemetry--5 noted on 12hr day shift. Pt asymptomatic, vitals stable. MD notified, cardiology will look at strips.

## 2018-10-10 NOTE — PROGRESS NOTES
"[facility], [place]    Hematology / Oncology Progress Note    Date of Admission: 10/4/2018  Hospital Day #: 6   Date of Service (when I saw the patient): 10/10/2018     Assessment & Plan   Shan Marsh is a 48 year old woman with recurrent now metastatic ER+, mod WY+, HER-2 neg breast cancer currently on treatment with Xeloda. She presented to the hospital after a syncopal event/unwitness fall at home and was found to have left pneumothorax, now s/p chest tube placement. Additionally, she was then found to have large pericardial effusion with tamponade physiology, now s/p pericardiocentesis.     #Left hydropneumothorax.  #Recurrent pleural effusion.   #S/p chest tube placement.   Suspect pneumothorax 2/2 thoracentesis on 10/3 for recurrent malignant pleural effusion vs coughing vs spontaneous. A/p chest tube placement in ED. First f/u CXR showed reexpansion of the lung. No significant SOB or hypoxia.   -Pulmonology consulted for assistance with management. Appreciate pulm assistance. Placed to water seal overnight 10/7, f/u CXR demonstrating increased hydropneumothorax and increased pleural effusion as well as possible pneumomediastinum. Evening CXR stable. Pulm favors possible \"trapped lung\", recommend continuing with water seal with plan to clamp tube in AM. Chest tube fell out this morning after patient was laughing (per patient report). F/u stat CXR appears to be stable. Per discussion with pulm, will monitor overnight with evaluation with CXR in AM.   -Discussion on whether PleurX drain placement necessary still ongoing, patient now agreeable to drain.  -There was air leak noted 10/4 afternoon so chest tube placed back to -20cm H2O suction and continued overnight.  -10/5 CXR demonstrates tiny ptx as well as increasing reaccumulation of left pleural effusion. Continue chest tube to suction.  -10/6 CXR shows increased air volume. There was some leakage at the tube " correction. Some bubbles in the canister. Discussed with pulm fellow. Pulm will follow. Will keep CT on suction all the time; even when patient goes to bathroom. RN to find equipment for continuous suction when pt is off floor.  -10/7 Pulm placed CT to water seal overnight and will reevaluate with CXR the status of the pleural effusion and pneumothorax the following day.   -10/8 CT remained on water seal throughout the day, hydropneumo stable. 10/9 took patient for R sided thoracentesis and continue CT to water seal at this time (d/t large amt of output).  -10/9 remained on water seal with stable appearance of pleural effusion/hydropneumothorax. Output from CT ~900cc.  -10/10 chest tube fell out early this morning. Per pt report, it was after laughing really hard. Stable clinical/respiratory status. CXR appears stable. Ok to monitor over next 24 hours per pulm. Reeval with CXR in AM.      #Pericardial effusion with tamponade physiology, s/p pericardiocentesis.  #Pericardial drain placement.   Suspect this is likely malignant. Noticed on CT scan on 10/6. Echo demonstrated tamponade physiology. Syncope likely 2/2 this.   -S/p pericardiocentesis: 4290 WBC, LDH 2289, cytology still pending.   -Cardiology consulted, appreciate input. Clamped pericardial drain f/b repeat ECHO demonstrated trivial pericardial effusion (improved). Cardiology removed pericardial drain. Will likely do a repeat ECHO at discharge if patient stays for 2-3 days in hospital (otherwise do f/u ECHO in 1 wk). Notified of expected discharge tomorrow.     #Syncopal event x1 prior to admission.  Likely related to cardiac tamponade as discussed above. Other possible triggers include vasovagal, dehydration secondary to poor PO intake, medication reaction due to new use of cannabinoids, PTX. No s/sx PE. Admission EKG demonstrates NSR. No irregularities noted on telemetry while in ED. Syncopal symptoms had resolved until 10/4 early evening, when patient was  again hypotensive, bradycardic, and felt lightheaded. She did not lose consciousness and f/u CXR did not show any worsening of the ptx.   -Continue telemetry. Trop checked on 10/5 and was negative.   -Initial concern was that cannabis oil contributed to hypotension and syncope. Now it appears that tamponade is more likely to be the cause of her syncope     #Sinus tachycardia.  #SIRS.  Trigger sepsis protocol multiple times d/t vitals. Lactate 6.3 overnight 10/6. Improved with IVF. No clear infectious source. Cultures sent. Started empiric antibiotics.  - Suspect tachycardia 2/2 pericardial effusion with tamponade rather than infection. Anticipate it to improve after pericardiocentesis.  -One BCx positive from 10/6 for Staph warneri, continue to suspect contaminant at this time since BCx from 10/7-10/11 remain negative. Suspect tachycardia and elevated Lactic acid 2/2 pericardial effusion, now resolved. Will discontinue antibiotics at this time.     #Metastatic breast cancer.   See PA's clinic note dated 9/4/18 for detailed oncologic and treatment history. Most recent started on xeloda - planned for 1 week on, 1 week off. She completed her first week last Wednesday and is planned to see Dr. Whitman on Monday 10/8 prior to starting 2nd week of xeloda. Tolerating ok so far.  -Continue on Xeloda break until f/u in clinic. Letrozole has been discontinued prior to this admission.    -Continue tramadol PRN pain but will try a lower dose in case contributed to hypotension.  -Continue medical cannabis PRN chronic nausea, pain. Advised patient to proceed cautiously with future use of medical cannabis, take note of sx, and check BP (she has device at home) after using in case causing hypotension.   -Has been taking gabapentin only 300mg at bedtime and would like to stop so will discontinue.   -Continue Zometa q3 months for bone mets - due October, to be given outpatient.   -Added lidoderm patches, voltaren gel, tylenol 975mg TID  scheduled for pain.   -will need f/u rescheduled at discharge. Requested f/u mon/tues of next week.     Pain  -Added Voltaren gel and lidoderm patches for chronic neck and shoulder pain.      FEN  -PRN lyte replacement  -RDAT  -No MIVF    Lines: PIV    Prophylaxis  -Lovenox sq was held for pericardiocentesis. Defer resumption per cardiology d/t bloody pericardial fluid.  -PRN bowel meds    Consults: Pulmonology, cardiology    Disposition: Inpatient status pending resolution of PTX and removal of chest tube. Anticipate possible discharge tomorrow.    Patient discussed with Dr. Negro.    Caridad Martinez PA-C  Hematology/Oncology  Pager #[phone]     Interval History   Shan continues to feel well today. Explains she noticed her chest tube fell out after laughing hard with the . Denies SOB or chest pain. Breathing continues to feel better. Notes a small amount of loose stool which she thinks is secondary to the antibiotics. No abdominal pain, nausea or vomiting. Ambulating in halls independently.     Physical Exam   Temp: 97.9  F (36.6  C) Temp src: Oral BP: 98/72 Pulse: 98 Heart Rate: 84 Resp: 18 SpO2: 98 % O2 Device: None (Room air) Oxygen Delivery: 2 LPM  Vitals:    10/08/18 0500 10/09/18 0608 10/10/18 0600   Weight: 58.4 kg (128 lb 11.2 oz) 58.4 kg (128 lb 11.2 oz) 53.5 kg (117 lb 14.4 oz)     Vital Signs with Ranges  Temp:  [97.8  F (36.6  C)-98.9  F (37.2  C)] 97.9  F (36.6  C)  Pulse:  [] 98  Heart Rate:  [] 84  Resp:  [16-18] 18  BP: ()/(72-84) 98/72  SpO2:  [98 %-100 %] 98 %  I/O last 3 completed shifts:  In: 1050 [P.O.:700; I.V.:350]  Out: 2790 [Urine:2400; Chest Tube:390]    Constitutional: Pleasant female seen sitting up in a chair, in NAD. Alert and interactive.   HEENT: NCAT, anicteric sclerae, conjunctiva clear. Moist mucous membranes.  Respiratory: Non-labored breathing. Left lung sounds diminshed in base upto mid lung, clear in apex. RLL diminished in base otherwise clear. No  crackles or wheezing.   Cardiovascular: Regular rate and rhythm with no murmur, rub or gallop.  GI: Normoactive BS. Abdomen is soft, non-distended, and non-tender.  Skin: Warm and dry. No rashes or lesions on exposed surfaces.  Musculoskeletal: Extremities grossly normal. No tenderness, bilateral lower extremity edema, 1+ pitting to mid-calf. Proximal upper extremity swelling improved.   Neurologic: A &O x3, speech normal, answering questions appropriately. Moves all extremities spontaneously. Grossly non-focal.  Neuropsychiatric: Mentation and affect normal/appropriate.    Medications   Current Facility-Administered Medications   Medication     acetaminophen (TYLENOL) tablet 975 mg     cholecalciferol (vitamin D3) tablet 2,000 Units     diclofenac (VOLTAREN) 1 % topical gel 2-4 g     fentaNYL (PF) (SUBLIMAZE) injection 50 mcg     influenza quadrivalent (PF) vacc (FLUZONE or Flulaval or FLUARIX) injection 0.5 mL     Lidocaine (LIDOCARE) 4 % Patch 1-2 patch     lidocaine patch in PLACE     lidocaine patch REMOVAL     LORazepam (ATIVAN) injection 0.5 mg     midazolam (VERSED) injection 1 mg     naloxone (NARCAN) injection 0.1-0.4 mg     ondansetron (ZOFRAN) injection 8 mg     ondansetron (ZOFRAN) tablet 8 mg     potassium chloride (KLOR-CON) Packet 20-40 mEq     potassium chloride 10 mEq in 100 mL sterile water intermittent infusion (premix)     potassium chloride 20 mEq in 50 mL intermittent infusion     potassium chloride SA (K-DUR/KLOR-CON M) CR tablet 20-40 mEq     prochlorperazine (COMPAZINE) injection 5 mg     prochlorperazine (COMPAZINE) tablet 5-10 mg     traMADol (ULTRAM) half-tab 25 mg       Data   CBC    Recent Labs  Lab 10/10/18  0553 10/09/18  0645 10/08/18  0831 10/07/18  0347   WBC 5.2 5.8 7.6 10.0   RBC 3.48* 3.46* 3.68* 3.91   HGB 10.9* 11.0* 11.8 12.5   HCT 33.8* 34.4* 36.6 39.1   MCV 97 99 100 100   MCH 31.3 31.8 32.1 32.0   MCHC 32.2 32.0 32.2 32.0   RDW 16.1* 16.4* 16.5* 15.2*    182 190  246     CMP    Recent Labs  Lab 10/10/18  1108 10/10/18  0553 10/09/18  1336 10/09/18  0645 10/08/18  0831 10/08/18  0543 10/07/18  0347 10/05/18  0817   NA  --  143  --  141 139  --  131* 137   POTASSIUM 2.8* 2.9*  --  3.4 3.5  --  5.0 4.5   CHLORIDE  --  104  --  110* 108  --  103 110*   CO2  --  28  --  23 25  --  20 17*   ANIONGAP  --  10  --  8 5  --  8 10   GLC  --  84  --  78 115*  --  122* 118*   BUN  --  7  --  11 19  --  19 14   CR  --  0.57  --  0.69 0.73 0.74 0.68 0.66   GFRESTIMATED  --  >90  --  >90 85 84 >90 >90   GFRESTBLACK  --  >90  --  >90 >90 >90 >90 >90   KRYSTEN  --  8.0*  --  7.8* 7.6*  --  7.9* 8.0*   MAG  --  2.0  --   --   --   --   --   --    PROTTOTAL  --   --  6.2* 5.6*  --   --  6.0* 6.1*   ALBUMIN  --   --   --  2.2*  --   --  2.7* 2.5*   BILITOTAL  --   --   --  0.5  --   --  0.6 0.7   ALKPHOS  --   --   --  178*  --   --  136 137   AST  --   --   --  61*  --   --  72* 62*   ALT  --   --   --  66*  --   --  102* 78*     INR    Recent Labs  Lab 10/07/18  0347 10/07/18  0023   INR 1.21* 1.18*       Results for orders placed or performed during the hospital encounter of 10/04/18   Chest XR,  PA & LAT    Narrative    Exam: XR CHEST 2 VW, 10/4/2018 3:05 AM    Indication: syncope and recent thoracentesis, hx of breast CA;     Comparison: CT chest Assessment/pelvis 3/15/2018. PET/CT 8/27/2018.    Findings:   New large left-sided hydropneumothorax status post thoracentesis, with  significant collapse of the left lung. Associated compressive  atelectasis of both left and right lungs seen. Mild rightward  mediastinal shift involving the heart and lower tracheobronchial tree.  Small right pleural effusion again seen.      Impression    Impression:   1. Large left hydropneumothorax and rightward mediastinal shift.  2. No interval change in right-sided pleural effusion.    [Result: Hydropneumothorax]    Finding was identified on 10/4/2018 3:08 AM.     Dr. Rai was contacted by Dr. Giraldo at 10/4/2018  3:13 AM and  verbalized understanding of the urgent finding.     I have personally reviewed the examination and initial interpretation  and I agree with the findings.    LANIE PORTILLO MD   POC US ECHO LIMITED    Impression    Limited Bedside Lung Ultrasound, performed and interpreted by me. Indication:    Syncope    Lung ultrasound was performed for the assessment of pneumothorax   The patient was placed in a semi recumbent position at approximately 45 degrees. The left and right lung apices were evaluated for evidence of pneumothorax.     Findings    Right side:  Lung sliding artifact   Present     Comet tail artifacts   Present     [place] sign on M-Mode Present   Left side:  Lung sliding artifact   Absent     Comet tail artifacts   Absent     [place] sign on M-Mode Absent    IMPRESSION:   left Pneumothorax.     Chest  XR, 1 view portable    Narrative    Exam: XR CHEST PORT 1 VW, 10/4/2018 4:27 AM    Indication: post-chest tube placement;     Comparison: Same-day x-ray    Findings:   New onset of chest tube. Pneumothorax no longer visualized. Persistent  layering bilateral pleural effusions. Central airway is midline with  possible minimal rightward deviation at the level of the stephanie. Mild  anterior wedge compression deformity seen in the lower thoracic spine.      Impression    Impression:   1. Pneumothorax no longer visualized on the left, status post  left-sided apically directed chest tube placement.  2. Persistent layering bilateral pleural effusions.    I have personally reviewed the examination and initial interpretation  and I agree with the findings.    LANIE PORTILLO MD   XR Chest Port 1 View    Narrative    XR CHEST PORT 1 VW  10/4/2018 5:39 PM      HISTORY: eval pneumothorax as patient had episode of acute  hypotension;     COMPARISON: Earlier same day    FINDINGS: Single AP view of the chest. Cardiomediastinal silhouette is  stable. Bilateral pleural effusions are unchanged. No  convincing  residual left pneumothorax. Left chest tube remains in place.  Bibasilar opacities, likely atelectasis (versus consolidation.      Impression    IMPRESSION: Stable chest. No residual pneumothorax is definitely  identified.    I have personally reviewed the examination and initial interpretation  and I agree with the findings.    ALESSANDRO MARION MD   XR Chest 2 Views    Narrative    Exam: XR CHEST 2 VW, 10/5/2018 8:34 AM    Indication: F/u left pneumothorax post chest tube to suction  overnight;     Comparison: Chest x-ray on 10/04/2018.    Findings:   PA and lateral view of the chest. Minimal worsening left pleural  effusion with associated basilar opacity. Left-sided chest in place  with tiny hydropneumothorax. Patchy opacity in right lung base.  Enlarged cardiac mediastinal silhouette, stable. Upper abdomen is  unremarkable.      Impression    Impression:   1. Left-sided chest tube in place with tiny hydropneumothorax.   2. Mild worsening of moderate size left pleural effusion with  associated basilar opacity, atelectasis versus consolidation.   3. Right basilar opacity, favoring atelectasis.    I have personally reviewed the examination and initial interpretation  and I agree with the findings.    MAITE ANTON MD   XR Chest 2 Views    Narrative    Exam: XR CHEST 2 VW, 10/6/2018 8:21 AM    Indication: f/u pneumothorax;     Comparison: Chest radiograph dated 10/5/2018.    Findings:   PA and lateral views of the chest. Stable position of a left approach  chest tube with the tip visible near the apex. Air-fluid level in the  left hemithorax consistent with hydropneumothorax. Air volume  increased compared to prior exam. Stable right pleural effusion with  adjacent compressive atelectasis. Cardiac silhouette is partially  obscured. No acute osseous pathology. Visualized portion of the upper  abdomen is unremarkable.      Impression    Impression:   1. Left hydropneumothorax with an air-fluid level.  Increased air  volume compared to prior.  2. Stable right pleural effusion and adjacent compressive atelectasis.    [Result: Hydropneumothorax with increased air volume compared to  prior]    Finding was identified on 10/6/2018 8:25 AM.     Karuna German was contacted by Dr. Eddy Villareal at 10/6/2018 8:31 AM  and verbalized understanding of the urgent finding.     I have personally reviewed the examination and initial interpretation  and I agree with the findings.    MAITE ANTON MD   CT Chest w/o Contrast    Narrative    EXAMINATION: CT CHEST W/O CONTRAST  10/6/2018 5:39 PM      CLINICAL HISTORY: Evaluate pneumothorax. Possible pericardial  effusion.    COMPARISON: Same-day chest x-ray. PET/CT 8/27/2018.    TECHNIQUE: CT imaging obtained through the chest without intravenous  contrast. Coronal and axial MIP reformatted images obtained.    FINDINGS:  Left-sided hydropneumothorax, as seen on recent chest x-ray. The air  in the pleura is small and overlies the anterior left lower lobe.  There is a left-sided apically directed chest tube. Associated left  basilar compressive atelectasis, as well as regions of rounded  atelectasis in the left base and lingula.    A moderate-sized right-sided pleural effusion is seen with overlying  compressive atelectasis, which is increased from the PET/CT on  8/27/2018. Fluid extends into the minor and major fissure on the  right.    Redemonstrated peribronchovascular nodules in both upper lobes,  unchanged. There is mild interlobular septal thickening in both lung  apices likely representing interstitial pulmonary edema.    There is a new large right-sided pericardial effusion, of intermediate  density (25-30 Hounsfield units).    Bilateral breast implants. Enlarged left axillary lymph nodes, appear  decreased. For example on series 2 image 20 is an 11 x 6 mm lymph  node, previously 15 x 8 mm. Biopsy clip in the left axilla, and  extensive left axillary fat stranding.    Numerous  metastatic lesions in the liver, better seen on the  contrast-enhanced exam from 8/27/2018. Persistently enlarged  approximately 15 mm lymph node adjacent to the celiac axis, which  demonstrate FDG activity on 8/27/2018.    Thyroid gland unremarkable. Trachea and mainstem bronchi are patent.  Normal size aorta and pulmonary artery.    Redemonstrated posterior left 10th rib fracture. Redemonstrated mixed  sclerotic and lytic lesions throughout the spine and some ribs. No  significant change in anterior wedge compression deformity at L1.      Impression    IMPRESSION:   In this patient with history of metastatic breast cancer:  1. New intermediate attenuating large pericardial effusion, favors  malignant pericardial effusion. Other possibility includes  hemopericardium.  2. Redemonstrated left small hydropneumothorax. Left-sided chest tube  in place.  3. Increase in a moderate size right-sided pleural effusion.  4. Decreased axillary adenopathy and unchanged lung metastases.  5. Lesions in the upper abdomen, including the liver, left adrenal  gland, and about the celiac axis better visualized on PET/CT 8/27/2018  on this noncontrast enhanced exam.  6. No significant change in numerous metastatic lesions throughout the  spine and ribs. Persistent left 10th rib fracture.    I have personally reviewed the examination and initial interpretation  and I agree with the findings.    MATHEW FERREIRA MD   XR Abdomen 2 Views    Narrative    Exam: XR ABDOMEN 2 VW, 10/6/2018 9:11 PM    Indication: elevated lactate; LUQ abd tenderness to palpation;     Comparison: PET/CT 8/27/2018    Findings:   Bowel gas pattern is nonobstructive. Mild stool noted in the left  colon. No pneumatosis or portal venous gas.    Small left hydropneumothorax and moderate right pleural effusion with  subjacent atelectasis. Prominence of the cardiac silhouette due to  pericardial effusion.      Impression    Impression:   Nonobstructive bowel gas  pattern. Mild stool in the right colon.    I have personally reviewed the examination and initial interpretation  and I agree with the findings.    MATHEW FERREIRA MD   XR Chest 2 Views    Narrative     Examination:  XR CHEST 2 VW     Date:  10/7/2018 10:28 AM      Clinical Information: hydropneumothorax;      Additional Information: none    Comparison: Chest x-ray 10/6/2018    Findings:   Left apical chest tube,  previous left basilar pneumothorax is no  longer evident however there is a pleural effusion there.    Pulmonary vasculature is engorged. Moderate right pleural effusion,  increased from prior. No focal opacities in the upper lungs.     Compression deformity in the sclerotic L1 vertebral body. Sclerosis  and lucency elsewhere in thoracolumbar spine consistent with  metastatic disease.       Impression    Impression:  1. Previously demonstrated left basilar pneumothorax is now a pleural  effusion. Left chest tube remains in place.   2. Increased right pleural effusion.  3. Pulmonary vascular congestion.    GISELL PLUMMER MD   XR Chest 2 Views    Narrative    XR CHEST 2 VW  10/7/2018 4:40 PM      HISTORY: Evaluate hydropneumothorax while on water seal (12:30 pm);     COMPARISON: Earlier same day    FINDINGS: PA and lateral views of the chest upright. Left apical chest  tube in stable position. Unchanged fluid component of left  hydropneumothorax. No definite residual pneumothorax is seen.  Right-sided pleural effusion is also unchanged. No new consolidation.      Impression    IMPRESSION: Stable left hydropneumothorax with chest tube in place.  Unchanged moderate right pleural effusion.    I have personally reviewed the examination and initial interpretation  and I agree with the findings.    MATHEW FERREIRA MD   XR Chest 2 Views     Value    Radiologist flags Increase in size of hydropneumothorax and possible (Urgent)    Narrative    Exam: XR CHEST 2 VW, 10/8/2018 10:23 AM    Indication:  hydropneumothorax;     Comparison: Chest x-ray from 10/7/2018, CT from 10/6/2018    Findings:   2 views of the chest are obtained demonstrating unremarkable soft  tissues and no acute bony abnormalities. Cardiomediastinal borders are  clear. No enlargement of the cardiac silhouette. No appreciable  pneumothorax or pleural effusions. No focal airspace opacities.  Unchanged position of midline pericardial drain and left apical chest  tube. Increase in air component size of left basilar  hydropneumothorax. Vertical lucency over left cardiac border may  represent pneumomediastinum, which is slightly more prominent today.  Bilateral pleural effusions are grossly unchanged in size. Likely  bibasilar atelectasis.      Impression    Impression:  1. Increase in air component size of left basilar hydropneumothorax.   2. Interval increase in size of possible left hilar pneumomediastinum.  3. Grossly unchanged bilateral pleural effusions and associated  atelectasis.     [Access Center: Increase in size of hydropneumothorax and possible  pneumomediastinum]    This report will be copied to the [facility] to ensure a  provider acknowledges the finding. Access Center is available Monday  through Friday 8am-3:30 pm.     I have personally reviewed the examination and initial interpretation  and I agree with the findings.    MAITE ANTON MD   XR Chest Port 1 View    Narrative    Exam: XR CHEST PORT 1 VW, 10/8/2018 3:58 PM    Indication: evaluate for significant changes in hydropneumothorax  since morning imaging;     Comparison: Chest x-ray from earlier this morning.    Findings:   Single view AP chest is obtained demonstrating interval removal of  mediastinal drain. Left lateral hydropneumothorax appears mostly  unchanged in size. No appreciable pneumomediastinum. Stable left  apical chest tube. Stable appearance of moderate bilateral pleural  effusions and associated atelectasis.      Impression    Impression:   1.  Stable appearance of left lateral hydropneumothorax status post  removal mediastinal drain.  2. Mostly unchanged appearance of moderate bilateral pleural effusions  and associated atelectasis.    I have personally reviewed the examination and initial interpretation  and I agree with the findings.    MAITE ANTON MD   XR Chest Port 1 View    Narrative    Exam: XR CHEST PORT 1 VW, 10/9/2018 11:27 AM    Indication: hydropneumothorax;     Comparison: Chest x-rays from 10/8/2018    Findings:   Chest AP upright radiograph demonstrates stable cardiac and  mediastinal borders. Interval improvement in bilateral layering  pleural effusions and associated atelectasis. Left-sided  hydropneumothorax is no longer visible. No focal airspace opacities.  No new pneumothoraces. Stable left apical chest tube.      Impression    Impression:   1. Interval improvement of left hydropneumothorax.  2. Interval improvement in layering bilateral pleural effusions.     I have personally reviewed the examination and initial interpretation  and I agree with the findings.    MAITE ANTON MD   XR Chest 2 Views    Narrative    Exam: XR CHEST 2 VW, 10/10/2018 9:01 AM    Indication: Chest tube fell out this morning per pt report. Evaluate  status of L sided hydropneumothorax and b/l pleural effusions;     Comparison: Chest x-ray 10/9/2018    Findings:   Moderate left and small right pleural effusions with bibasilar  opacities. There are also bilateral interstitial opacities. Heart size  is unchanged. No appreciable pneumothorax. Left chest tube has been  removed.      Impression    Impression:  1. Left chest tube has been removed. Stable moderate left and small  right pleural effusions with bibasilar atelectasis versus  consolidation.  2. Stable interstitial pulmonary edema.    SUHAS CADENA MD

## 2018-10-10 NOTE — PLAN OF CARE
Problem: Patient Care Overview  Goal: Plan of Care/Patient Progress Review  Outcome: No Change  Pt reported to be intermittently in [place], asymptomatic. Cardiology consulted. VSS. Pain treated with Tylenol.  Minimal CT output this evening from left sided CT.

## 2018-10-10 NOTE — PROGRESS NOTES
10/10/18 0800   Rehab Discipline   Discipline OT   Type of Visit   Type of visit Initial Edema Evaluation   General Information   Start of care 10/10/18   Referring physician Caridad Martinez PA-C   Orders Evaluate and treat as indicated   Order date 10/08/18   Medical diagnosis 48 year old woman with recurrent now metastatic ER+, mod MT+, HER-2 neg breast cancer    Edema onset (acute onset with hospital admission, per pt. )   Affected body parts RLE;LLE   Edema etiology comments cancer, decreased mobility    Surgical / medical history reviewed Yes   Prior level of functional mobility Independent    Prior treatment Exercise;Elevation   Subjective Report   Patient report of symptoms patient reports slow improvements in LE edema.    Pain   Pain comments patient reports no pain in LEs at this time.    Edema Exam / Assessment   Skin condition Pitting;Dryness;Intact   Pitting 1+   Pitting location MTPs to hips    Range of Motion   ROM No deficits were identified   Strength   Strength No deficits were identified   Activities of Daily Living   Activities of Daily Living Independent    Sensory   Sensory perception Light touch   Light touch Intact   Planned Edema Interventions   Planned edema interventions Gradient compression bandaging;Fit for compression garment;Exercises;Precautions to prevent infection / exacerbation;Education   Planned edema intervention comments Patient fit with size L, 20-30mmHg, Jobst compression stockings from MTPs to upper thighs    Clinical Impression   Criteria for skilled therapeutic intervention met Yes   Therapy diagnosis Recommend thigh high compression stockings for management of acute onset LE edema.    Influenced by the following impairments / conditions Edema   Assessment of Occupational Performance 1-3 Performance Deficits   Identified Performance Deficits Decreased functional mobility    Clinical Decision Making (Complexity) Low complexity   Treatment frequency 5 times / week    Treatment duration 1 week   Patient / family and/or staff in agreement with plan of care Yes   Risks and benefits of therapy have been explained Yes   Total Evaluation Time   Total evaluation time 5

## 2018-10-10 NOTE — PROGRESS NOTES
[facility]    Pulmonary, Allergy, Critical Care and Sleep Medicine    Follow-up Note  10/10/2018    Assessment and Recommendations:    Shan Marsh is a 48 year old female with a history of metastatic breast cancer on treatment who presented on 10/4/2018 with pneumothorax after therapeutic thoracentesis now s/p chest tube placement (in ED on 10/4), with corse complicated by cardiac tamponade s/p pericardiocentesis.      1. Malignant pleural effusion, s/p thoracentesis as outpatient, complicated by iatrogenic pneumothorax (resolved), with possible trapped lung component: chest tube fell out. With 960 mL out of left chest tube yesterday, we are worried about rapid accumulation. May need pleurX on this side.   -Check CXR tomorrow morning for recurrence of fluid  -If there is significant fluid present tomorrow, Interventional pulmonary can place a PleurX tomorrow in Endo (I will tentatively place her on the schedule for this)  -Please make NPO at midnight, in case of procedure tomorrow.   -If any significant shortness of breath, get stat CXR    2. Moderate-sized right pleural effusion: This may be malignant in nature as well, but could also be related to cardiac tamponade with fluid back up into lung.  S/p Thora on 10/9. Fluid studies transudative so for.   -monitor cytology and cultures   -monitor for recurrence.     Seen and discussed with Dr Hayes. Also discussed with Dr. Chan.     Ryan Conrad MD  Pulmonary and Critical Care Fellow  Pager: [phone]     Subjective, Interval history:   Chest tube fell out this morning. Patient did not notice. No shortness of breath, no chest pain. No cough. No fevers or chills. Less short of breath today with activity after right sided thoracentesis.     ROS:   GI: normal appetite. No nausea. Normal BMs  Constitutional: no fevers or chills.     Objective:   Medications:    acetaminophen  975 mg Oral Q8H     cholecalciferol  2,000 Units  Oral Daily     diclofenac  2-4 g Transdermal 4x Daily     fentaNYL  50 mcg Intravenous Once     influenza vaccine adult (product based on age)  0.5 mL Intramuscular Prior to discharge     lidocaine  1-2 patch Transdermal Q24H     lidocaine   Transdermal Q8H     lidocaine   Transdermal Q24h     midazolam  1 mg Intravenous Once     LORazepam, naloxone, ondansetron, ondansetron, potassium chloride, potassium chloride, potassium chloride, potassium chloride, prochlorperazine, prochlorperazine, traMADol    Physical Exam:  Temp:  [97.8  F (36.6  C)-98.9  F (37.2  C)] 97.9  F (36.6  C)  Pulse:  [] 98  Heart Rate:  [] 84  Resp:  [16-18] 18  BP: ()/(72-84) 98/72  SpO2:  [98 %-100 %] 98 %    General: Sitting up in bed in NAD  HEENT: anicteric, moist mucosa  Chest: Mildly decreased breath sounds on bilateral bases. No wheeze.   Cardiac: RRR no murmurs.   Abdomen: Soft, flat, non tender, active BS  Extremities: 1+ pitting LE Edema, compression stockings in place, improved   Neuro: A&Ox3, no focal deficits   Skin: no rash noted    Labs and imaging: All laboratory and imaging data personally reviewed.    K 2.8 Cr 0.57. WBC 5.2 Hgb 10.9 Plts 194  Serum , Serum total protein 6.2.     Pleural fluid studies:   , Protein 2.4  TG 20, Glucose 102. WBC 1210. Amylase 13. Lipase 25. PH 7.4.   Gram stain negative  Cultures in process  Cytology in process     10/10 CXR: 1. Left chest tube has been removed. Stable moderate left and small right pleural effusions with bibasilar atelectasis versus consolidation. 2. Stable interstitial pulmonary edema.    CT chest - 10/6/18 - IMPRESSION:   In this patient with history of metastatic breast cancer:  1. New intermediate attenuating large pericardial effusion, favors  malignant pericardial effusion. Other possibility includes  hemopericardium.  2. Redemonstrated left small hydropneumothorax. Left-sided chest tube  in place.  3. Increase in a moderate size right-sided  pleural effusion.  4. Decreased axillary adenopathy and unchanged lung metastases.  5. Lesions in the upper abdomen, including the liver, left adrenal  gland, and about the celiac axis better visualized on PET/CT 8/27/2018  on this noncontrast enhanced exam.  6. No significant change in numerous metastatic lesions throughout the  spine and ribs. Persistent left 10th rib fracture.    TTE - 10/7/18 -   Interpretation Summary  There is a large circumferential pericardial effusion with maximal thickness  of up to 4 cm. There is IVC plethora, RA and RV collapse and increased  respiratory variation in the mitral and tricuspid inflows, all of which are  suggestive of cardiac tamponade. The patient's team is aware; the patient is  scheduled for a pericardiocentesis.

## 2018-10-10 NOTE — PLAN OF CARE
Problem: Chest Tube Drainage Device (Adult)  Goal: Signs and Symptoms of Listed Potential Problems Will be Absent, Minimized or Managed (Chest Tube Drainage Device)  Signs and symptoms of listed potential problems will be absent, minimized or managed by discharge/transition of care (reference Chest Tube Drainage Device (Adult) CPG).   Outcome: No Change  D: Monitor shows  SR 80s. No Wenckebach rhythm noted. No chest pain or shortness of breath noted after CT out at end of night shift. CXR completed to assess for complications. Potassium replaced per x1 order and per protocol for K 2.8. Ambulating in halls independently without difficulties.     I: Monitored vitals and assessed pt status.   Changed: none  Running: none  PRN: Potassium 40 meq x3 for K+ 2.8.    A: A0x4. VSS. Afebrile. Urinating adequately.     P: Plan to assess pleural effusions tomorrow for need for PleurX tube. Patient learning center appointment scheduled at 1500. Continue to monitor pt status and report changes to treatment team.   10/10/18 1508   Chest Tube Drainage Device   Problems Assessed (Chest Tube Drainage Device) all   Problems Assessed (Chest Tube Drain Dev) none

## 2018-10-10 NOTE — PROGRESS NOTES
I was asked by primary team to review patient's tele strips for possible 2nd degree Type 2 AV block. I reviewed the tele with tele tech on 6C and Dr. Stubbs. Consistent with Wenckebach AV block.   Patient is asymptomatic and does not have lightheadedness. She was admitted this time for orthostatic syncope in setting of nausea and poor fluid intake. Of note, she has history of Wenckebach AV block.  No particular intervention is needed for Wenckebach AV block.    Pradip Camilo  General Cardiology Fellow, PGY6  Pager [phone]

## 2018-10-10 NOTE — PROGRESS NOTES
"  Care Coordinator Progress Note    Admission Date/Time:  10/4/2018  Attending MD:  Thanh Leal  Data  Chart reviewed, discussed with interdisciplinary team.   Patient was admitted for:    Pneumothorax, postprocedural  Syncope, unspecified syncope type  Oth surgical procedures cause abn react/compl, w/o misadvnt  Syncope and collapse.    Concerns with insurance coverage for discharge needs: TBD  Current Living Situation: Patient lives with spouse and two chilldren (8 yrs old and 11 yrs old). Pt said that she is independent with her own care but she added that her sister and mother are also available to assist her if needed.   Support System: Supportive and Involved family.   Services Involved: Nohemy, Dr[facility] Janny Whitman at [facility]  Transportation at Discharge: Family or friend will provide  Transportation to Medical Appointments: family to provide.   - Name of caregiver: , mother, sister are available to assist as needed.   Barriers to Discharge: medical condition.     Coordination of Care and Referrals: Provided patient/family with options for possible home Pleurx catheter supplies.   Assessment  Pt said that her chest tube fell out today and she will be monitored overnight, then have CXR tomorrow and if fluid is present, will have a Pleurx drain placed.   Pt told this writer that she is \"very independent\" with her own care but will have family members available to assist with housekeeping as needed. Pt said that she is already set up with [employer] which pt said is a free service that is open during the week. Pt said that she goes to \"[employer]\" classes and has \"[employer]\" classes. Pt added that she also goes to a \"trauma therapist\" in her neighborhood.   Intervention:   Tentative appointment made with [facility] (Ph: [phone]) on 10/11/18 at 3:30 PM for Pleurx catheter instruction.    Plan  Anticipated Discharge Date:  TBD Unable to complete discharge planning " at this time.   Anticipated Discharge Plan:  Discharge to home.   CC will continue to monitor pt's medical condition and progress toward discharge.   ESTEFANY VALDEZ RN BSN  Care Coordinator Unit [address]  [phone]

## 2018-10-10 NOTE — PLAN OF CARE
Problem: Patient Care Overview  Goal: Plan of Care/Patient Progress Review  Edema 6C: Initial edema evaluation completed. Patient with acute onset LE edema bilaterally, skin intact with 1+ pitting. Patient fit with size L, 20-30mmHg, Jobst compression stockings from MTPs to upper thighs for edema management and increased ease with functional mobility. Compression stockings to be worn during day time and removed at night - see patient care order for details.

## 2018-10-11 ENCOUNTER — APPOINTMENT (OUTPATIENT)
Dept: CARDIOLOGY | Facility: CLINIC | Age: 48
DRG: 200 | End: 2018-10-11
Attending: PHYSICIAN ASSISTANT
Payer: COMMERCIAL

## 2018-10-11 ENCOUNTER — APPOINTMENT (OUTPATIENT)
Dept: GENERAL RADIOLOGY | Facility: CLINIC | Age: 48
DRG: 200 | End: 2018-10-11
Attending: PHYSICIAN ASSISTANT
Payer: COMMERCIAL

## 2018-10-11 ENCOUNTER — APPOINTMENT (OUTPATIENT)
Dept: OCCUPATIONAL THERAPY | Facility: CLINIC | Age: 48
DRG: 200 | End: 2018-10-11
Attending: INTERNAL MEDICINE
Payer: COMMERCIAL

## 2018-10-11 ENCOUNTER — APPOINTMENT (OUTPATIENT)
Dept: GENERAL RADIOLOGY | Facility: CLINIC | Age: 48
DRG: 200 | End: 2018-10-11
Attending: INTERNAL MEDICINE
Payer: COMMERCIAL

## 2018-10-11 VITALS
RESPIRATION RATE: 12 BRPM | SYSTOLIC BLOOD PRESSURE: 121 MMHG | OXYGEN SATURATION: 98 % | BODY MASS INDEX: 20.04 KG/M2 | TEMPERATURE: 98.2 F | WEIGHT: 117.4 LBS | HEART RATE: 98 BPM | HEIGHT: 64 IN | DIASTOLIC BLOOD PRESSURE: 84 MMHG

## 2018-10-11 DIAGNOSIS — C50.919 METASTATIC BREAST CANCER: Primary | ICD-10-CM

## 2018-10-11 DIAGNOSIS — R59.0 MEDIASTINAL ADENOPATHY: Primary | ICD-10-CM

## 2018-10-11 LAB
ACID FAST STN SPEC QL: NORMAL
ACID FAST STN SPEC QL: NORMAL
ADENOSINE DEAMINASE PLR-CCNC: <1.6 U/L (ref 0–9.4)
ANION GAP SERPL CALCULATED.3IONS-SCNC: 8 MMOL/L (ref 3–14)
BACTERIA SPEC CULT: NO GROWTH
BACTERIA SPEC CULT: NO GROWTH
BASOPHILS # BLD AUTO: 0 10E9/L (ref 0–0.2)
BASOPHILS NFR BLD AUTO: 0.5 %
BUN SERPL-MCNC: 8 MG/DL (ref 7–30)
CALCIUM SERPL-MCNC: 8.8 MG/DL (ref 8.5–10.1)
CHLORIDE SERPL-SCNC: 107 MMOL/L (ref 94–109)
CO2 SERPL-SCNC: 27 MMOL/L (ref 20–32)
COPATH REPORT: NORMAL
CREAT SERPL-MCNC: 0.58 MG/DL (ref 0.52–1.04)
DIFFERENTIAL METHOD BLD: ABNORMAL
EOSINOPHIL # BLD AUTO: 0.1 10E9/L (ref 0–0.7)
EOSINOPHIL NFR BLD AUTO: 2.4 %
ERYTHROCYTE [DISTWIDTH] IN BLOOD BY AUTOMATED COUNT: 15.9 % (ref 10–15)
GFR SERPL CREATININE-BSD FRML MDRD: >90 ML/MIN/1.7M2
GLUCOSE SERPL-MCNC: 78 MG/DL (ref 70–99)
HCT VFR BLD AUTO: 35.8 % (ref 35–47)
HGB BLD-MCNC: 11.5 G/DL (ref 11.7–15.7)
IMM GRANULOCYTES # BLD: 0 10E9/L (ref 0–0.4)
IMM GRANULOCYTES NFR BLD: 0.5 %
LYMPHOCYTES # BLD AUTO: 1.3 10E9/L (ref 0.8–5.3)
LYMPHOCYTES NFR BLD AUTO: 31.6 %
Lab: NORMAL
Lab: NORMAL
MCH RBC QN AUTO: 31.8 PG (ref 26.5–33)
MCHC RBC AUTO-ENTMCNC: 32.1 G/DL (ref 31.5–36.5)
MCV RBC AUTO: 99 FL (ref 78–100)
MONOCYTES # BLD AUTO: 0.3 10E9/L (ref 0–1.3)
MONOCYTES NFR BLD AUTO: 6.5 %
NEUTROPHILS # BLD AUTO: 2.4 10E9/L (ref 1.6–8.3)
NEUTROPHILS NFR BLD AUTO: 58.5 %
NRBC # BLD AUTO: 0 10*3/UL
NRBC BLD AUTO-RTO: 0 /100
PLATELET # BLD AUTO: 266 10E9/L (ref 150–450)
POTASSIUM SERPL-SCNC: 3.9 MMOL/L (ref 3.4–5.3)
RBC # BLD AUTO: 3.62 10E12/L (ref 3.8–5.2)
SODIUM SERPL-SCNC: 143 MMOL/L (ref 133–144)
SPECIMEN SOURCE: NORMAL
WBC # BLD AUTO: 4.2 10E9/L (ref 4–11)

## 2018-10-11 PROCEDURE — 25000132 ZZH RX MED GY IP 250 OP 250 PS 637: Performed by: NURSE PRACTITIONER

## 2018-10-11 PROCEDURE — 40000133 ZZH STATISTIC OT WARD VISIT: Performed by: OCCUPATIONAL THERAPIST

## 2018-10-11 PROCEDURE — 99239 HOSP IP/OBS DSCHRG MGMT >30: CPT | Performed by: INTERNAL MEDICINE

## 2018-10-11 PROCEDURE — 93308 TTE F-UP OR LMTD: CPT | Mod: 26 | Performed by: INTERNAL MEDICINE

## 2018-10-11 PROCEDURE — 71046 X-RAY EXAM CHEST 2 VIEWS: CPT

## 2018-10-11 PROCEDURE — 36415 COLL VENOUS BLD VENIPUNCTURE: CPT | Performed by: PHYSICIAN ASSISTANT

## 2018-10-11 PROCEDURE — 93321 DOPPLER ECHO F-UP/LMTD STD: CPT

## 2018-10-11 PROCEDURE — 85025 COMPLETE CBC W/AUTO DIFF WBC: CPT | Performed by: PHYSICIAN ASSISTANT

## 2018-10-11 PROCEDURE — 32551 INSERTION OF CHEST TUBE: CPT | Performed by: INTERNAL MEDICINE

## 2018-10-11 PROCEDURE — 97535 SELF CARE MNGMENT TRAINING: CPT | Mod: GO | Performed by: OCCUPATIONAL THERAPIST

## 2018-10-11 PROCEDURE — 93325 DOPPLER ECHO COLOR FLOW MAPG: CPT | Mod: 26 | Performed by: INTERNAL MEDICINE

## 2018-10-11 PROCEDURE — 93321 DOPPLER ECHO F-UP/LMTD STD: CPT | Mod: 26 | Performed by: INTERNAL MEDICINE

## 2018-10-11 PROCEDURE — 90686 IIV4 VACC NO PRSV 0.5 ML IM: CPT | Performed by: INTERNAL MEDICINE

## 2018-10-11 PROCEDURE — 0W9B30Z DRAINAGE OF LEFT PLEURAL CAVITY WITH DRAINAGE DEVICE, PERCUTANEOUS APPROACH: ICD-10-PCS | Performed by: INTERNAL MEDICINE

## 2018-10-11 PROCEDURE — 80048 BASIC METABOLIC PNL TOTAL CA: CPT | Performed by: PHYSICIAN ASSISTANT

## 2018-10-11 PROCEDURE — 25000128 H RX IP 250 OP 636: Performed by: INTERNAL MEDICINE

## 2018-10-11 PROCEDURE — 71045 X-RAY EXAM CHEST 1 VIEW: CPT

## 2018-10-11 RX ORDER — CAPECITABINE 500 MG/1
1500 TABLET, FILM COATED ORAL 2 TIMES DAILY
Qty: 84 TABLET | Refills: 0 | Status: SHIPPED | OUTPATIENT
Start: 2018-10-11 | End: 2018-11-08

## 2018-10-11 RX ORDER — CAPECITABINE 500 MG/1
1500 TABLET, FILM COATED ORAL 2 TIMES DAILY
Qty: 84 TABLET | Refills: 0 | Status: SHIPPED | OUTPATIENT
Start: 2018-10-11 | End: 2018-10-11

## 2018-10-11 RX ADMIN — INFLUENZA A VIRUS A/MICHIGAN/45/2015 X-275 (H1N1) ANTIGEN (FORMALDEHYDE INACTIVATED), INFLUENZA A VIRUS A/SINGAPORE/INFIMH-16-0019/2016 IVR-186 (H3N2) ANTIGEN (FORMALDEHYDE INACTIVATED), INFLUENZA B VIRUS B/PHUKET/3073/2013 ANTIGEN (FORMALDEHYDE INACTIVATED), AND INFLUENZA B VIRUS B/MARYLAND/15/2016 BX-69A ANTIGEN (FORMALDEHYDE INACTIVATED) 0.5 ML: 15; 15; 15; 15 INJECTION, SUSPENSION INTRAMUSCULAR at 16:59

## 2018-10-11 RX ADMIN — ACETAMINOPHEN 975 MG: 325 TABLET, FILM COATED ORAL at 04:16

## 2018-10-11 RX ADMIN — VITAMIN D, TAB 1000IU (100/BT) 2000 UNITS: 25 TAB at 08:01

## 2018-10-11 ASSESSMENT — ACTIVITIES OF DAILY LIVING (ADL)
ADLS_ACUITY_SCORE: 9

## 2018-10-11 NOTE — PROGRESS NOTES
[facility]    Pulmonary, Allergy, Critical Care and Sleep Medicine    Follow-up Note  10/11/2018    Assessment and Recommendations:    Shan Marsh is a 48 year old female with a history of metastatic breast cancer on treatment who presented on 10/4/2018 with pneumothorax after therapeutic thoracentesis now s/p chest tube placement (in ED on 10/4), with corse complicated by cardiac tamponade s/p pericardiocentesis.      1. Malignant pleural effusion, s/p thoracentesis as outpatient, complicated by iatrogenic pneumothorax (resolved), with possible trapped lung component: chest tube fell out on 10/10. There is fluid present on 10/11 CXR.   -Interventional pulmonary will attempt to place a PleurX today in Endo   -If any significant shortness of breath, get stat CXR    2. Moderate-sized right pleural effusion, malignant: transudative studies, but cytology positive for metastatic adenocarcinoma.   -monitor for recurrence, may eventually need pleurX on this side too- but this was first tap, so hold for now.   -monitor cultures   -would start chemo again as soon as able.     Seen and discussed with Dr Hayes.     Ryan Conrad MD  Pulmonary and Critical Care Fellow  Pager: [phone]     Subjective, Interval history:   No acute events overnight. Feels well this morning. No chest pain, no fever, no chills. No shortness of breath. LE edema improving. No other complaints.     ROS:   GI: normal appetite. No nausea. Normal BMs  Constitutional: no fevers or chills.     Objective:   Medications:    acetaminophen  975 mg Oral Q8H     cholecalciferol  2,000 Units Oral Daily     diclofenac  2-4 g Transdermal 4x Daily     fentaNYL  50 mcg Intravenous Once     influenza vaccine adult (product based on age)  0.5 mL Intramuscular Prior to discharge     lidocaine  1-2 patch Transdermal Q24H     lidocaine   Transdermal Q8H     lidocaine   Transdermal Q24h     midazolam  1 mg Intravenous Once      LORazepam, naloxone, ondansetron, ondansetron, potassium chloride, potassium chloride, potassium chloride, potassium chloride, prochlorperazine, prochlorperazine, traMADol    Physical Exam:  Temp:  [97  F (36.1  C)-98.8  F (37.1  C)] 98.2  F (36.8  C)  Heart Rate:  [77-95] 95  Resp:  [16-18] 18  BP: (107-122)/(75-89) 122/89  SpO2:  [95 %-100 %] 95 %    General: Sitting up in a chair in NAD  HEENT: anicteric, moist mucosa  Chest: Mildly decreased breath sounds on left base, otherwise CTAB. No wheeze.   Cardiac: RRR no murmurs.   Abdomen: Soft, flat, non tender, active BS  Extremities: 1+ pitting LE Edema in ankles only, improved   Neuro: A&Ox3, no focal deficits   Skin: no rash noted    Labs and imaging: All laboratory and imaging data personally reviewed.    K 3.9 Cr 0.58.   WBC 4.2 Hgb 11.5. plts 266.     Pleural fluid studies:   , Protein 2.4  TG 20, Glucose 102. WBC 1210. Amylase 13. Lipase 25. PH 7.4.   Gram stain negative  Cultures in process  Cytology positive for metastatic adenocarcinoma. Negative for lymphoma.      10/10 CXR: 1. Left chest tube has been removed. Stable moderate left and small right pleural effusions with bibasilar atelectasis versus consolidation. 2. Stable interstitial pulmonary edema.  10/11 CXR: left effusion is present. No chest tube in place.     CT chest - 10/6/18 - IMPRESSION:   In this patient with history of metastatic breast cancer:  1. New intermediate attenuating large pericardial effusion, favors  malignant pericardial effusion. Other possibility includes  hemopericardium.  2. Redemonstrated left small hydropneumothorax. Left-sided chest tube  in place.  3. Increase in a moderate size right-sided pleural effusion.  4. Decreased axillary adenopathy and unchanged lung metastases.  5. Lesions in the upper abdomen, including the liver, left adrenal  gland, and about the celiac axis better visualized on PET/CT 8/27/2018  on this noncontrast enhanced exam.  6. No  significant change in numerous metastatic lesions throughout the  spine and ribs. Persistent left 10th rib fracture.    TTE - 10/7/18 -   Interpretation Summary  There is a large circumferential pericardial effusion with maximal thickness  of up to 4 cm. There is IVC plethora, RA and RV collapse and increased  respiratory variation in the mitral and tricuspid inflows, all of which are  suggestive of cardiac tamponade. The patient's team is aware; the patient is  scheduled for a pericardiocentesis.

## 2018-10-11 NOTE — PLAN OF CARE
Problem: Patient Care Overview  Goal: Plan of Care/Patient Progress Review  Outcome: No Change  Pt with metastatic breast CA s/p chemo has pleural effusions. CT fell out on nights shift last night. She has two covered sites on her back R and L from CT and thoracentesis. Feels well, up ad candy. VSS, SR/ST 70s-110s. Denies pain, on scheduled tylenol and voltaren gel. NPO after MN for possible pleurex catheter tomorrow. Also PLC appt tomorrow to learn how to care for the catheter. Last K+ 3.9. Teds on for some mild LE edema.

## 2018-10-11 NOTE — PROCEDURES
INTERVENTIONAL PULMONOLOGY       Procedure(s):    Chest ultrasound and interpretation (left chest)  Tunnelled pleural catheter placement (left chest)    Indication:  Recurrent malignant effusion associated with breast cancer    Attending of Record:  Brock Chan MD     Interventional Pulmonary Fellow   Juanito Rowan MD     Trainees Present:   None    Medications:    15 ml 1% lidocaine    Sedation Time:   None    Time Out:  Performed    The patient's medical record has been reviewed.  The indication for the procedure was reviewed.  The necessary history and physical examination was performed and reviewed.  The risks, benefits and alternatives of the procedure were discussed with the the patient in detail and she had the opportunity to ask questions.  I discussed in particular the potential complications including risks of minor or life-threatening bleeding and/or infection, respiratory failure, vocal cord trauma / paralysis, pneumothorax, and discomfort. Sedation risks were also discussed including abnormal heart rhythms, low blood pressure, and respiratory failure. All questions were answered to the best of my ability.  Verbal and written informed consent was obtained.  The proposed procedure and the patient's identification were verified prior to the procedure by the physician and the nurse, respiratory therapist.    The patient was assessed for the adequacy for the procedure and to receive medications.   Mental Status:  Alert and oriented x 3  Airway examination:  Class II (Complete visualization of uvula)  Pulmonary:  Decreased breathsounds in bilateral bases  CV:  RRR, no murmurs or gallops  ASA Grade:  (III)  Severe systemic disease    After clinical evaluation and reviewing the indication, risks, alternatives and benefits of the procedure the patient was deemed to be in satisfactory condition to undergo the procedure.      Immediately before administration of medications the patient was re-assessed for  adequacy to receive sedatives including the heart rate, respiratory rate, mental status, oxygen saturation, blood pressure and adequacy of pulmonary ventilation. These same parameters were continuously monitored throughout the procedure.    A Tuberculosis risk assessment was performed:  The patient has no known RISK of Tuberculosis    The procedure was performed in a negative airflow room: Yes    Maneuvers / Procedure:      Chest ultrasound: The Left side was ultasounded and demonstrated pleural fluid. The diaphragm, heart and lung tissue were clearly visualized. Other findings include L pleural effusion.  Indwelling pleural catheter insertion: Once the site was marked using US, the pleurX catheter kit was used for the procedure. The patient's left chest was prepped in sterile fashion. A total of 15 ml 1%lidocaine used to anesthetize the area. A finder needle was used to aspirate fluid. The wire was advanced using seldinger technique. A 1cm incision was made approximately 5cm anterior to the wire and at the wire site. The tunneling device was used to make a track towards the wire and insert the chest tube. A dilator was used to enlarge the track, then the peel-away catheter was used to insert the chest tube into the pleural space. The catheter was sutured into place using 2.0 silk. A sterile dressing was placed. A total of 300 cc was drained     Any disposable equipment was visually inspected and deemed to be intact immediately post procedure.      Relevant Pictures      Recommendations:     -->  CXR now - ordered  -->  Daily drainage  -->  We will arrange follow up in our clinic in 2 weeks      Juanito Rowan MD  Interventional Pulmonary  Department of Pulmonary, Allergy, Critical Care and Sleep Medicine   [facility]      Candi GORDON, NORBERTO  Clinical Nurse Specialist  Department of Thoracic Surgery  Office: [phone]  Email: [email or web address]    Parris  Juan F  Interventional Pulmonology Surgery Scheduler  Office: [phone]  Email: [email or web address]

## 2018-10-11 NOTE — ORAL ONC MGMT
Per verbal conversation with Caridad Martinez PA-C, Ms. Marsh will be discharged today and confirmed plan below with Dr. Whitman.     Plan:  Continue capecitabine- Take 3 tablets (1,500 mg) by mouth 2 times daily, take on day 1-7 and 15-21 of 28-day cycle. C2D1=10/12/18.   Released Rx to [facility] and will be delivered tonight for C2D1 to start on time tomorrow morning.     Thank you for the opportunity to participate in the care of the above patient,  Ward Lazcano, PharmD  Hematology/Oncology Clinical Pharmacist  [facility] and [facility]   [phone]

## 2018-10-11 NOTE — PROGRESS NOTES
CLINICAL NUTRITION SERVICES     Nutrition Prescription    RECOMMENDATIONS FOR MDs/PROVIDERS TO ORDER:  Per chart, pt is discharging. Consider outpatient nutrition consult, especially if oral intake remains decreased and if pt is losing weight.     Kcal counts:  10/6   335 kcals and 19 g protein (meal/s and no supplement/s recorded)  10/7   One meal ordered from the kitchen, no intake recorded.  10/8   871 kcals and 57 g protein (Three meal/s and no supplement/s recorded)  * Pt is not meeting estimated needs of 1650 - 1925 kcals/day (30 - 35 kcals/kg) and 66 - 83 grams protein/day (1.2 - 1.5 grams of pro/kg). Pt is on a regular diet order and has a prn snack/supplement order.     Follow up/Monitoring:  Per protocol    Shanna Dunn, MS, RD, LD, [facility]   6C Pgr: [phone]

## 2018-10-11 NOTE — OR NURSING
Pleurex catheter placed with local sedation, pt tolerated well. VSS on room air. Portable CXR ordered for 1215 prior to return to floor.

## 2018-10-11 NOTE — PROGRESS NOTES
Care Coordinator - Discharge Planning    Admission Date/Time:  10/4/2018  Attending MD:  Thanh Leal     Data  Date of initial CC assessment: 10/11/2018  Chart reviewed, discussed with interdisciplinary team.   Patient was admitted for:   1. Pneumothorax, postprocedural    2. Syncope, unspecified syncope type    3. Oth surgical procedures cause abn react/compl, w/o misadvnt    4. Syncope and collapse         Assessment   Full assessment completed in previous note  Concerns with insurance coverage for discharge needs: TBD  Current Living Situation: Patient lives with spouse and two chilldren (8 yrs old and 11 yrs old). Pt said that she is independent with her own care but she added that her sister and mother are also available to assist her if needed.   Support System: Supportive and Involved family.   Services Involved: [place], Dr. Janny Whitman at [facility]  Transportation at Discharge: Family or friend will provide  Transportation to Medical Appointments: family to provide.   - Name of caregiver: , mother, sister are available to assist as needed.   Barriers to Discharge: Medical plan of care    This writer received an update from Karuna York in endoscopy that pt is having pleurX catheter placed now. Karuna stated that she completed and faxed the script for pt's home pleurX catheter supplies as pt is hoping to discharge to day. Pt has PLC scheduled for today at 3:30pm for pleurX catheter education.       Plan  Anticipated Discharge Date: 10/11/2018  Anticipated Discharge Plan: Discharge to home with clinic follow up as recommended  Myriam Mart RN BSN  6C Unit Care Coordinator  Phone number: [phone]  Pager: [phone]

## 2018-10-11 NOTE — PLAN OF CARE
10/11/18 Patient and a family member watched the pleurx video.Patient correctly returned all pleurx skills using PLC supplies and model,able to answer and demonstrate teach back,and verbalized understanding of content presented.IR to bring up another box of Pleurx kits.Pleurx folder with written material and DVD given and reviewed with patient and family.Patient instructed to call the supply number in her folder yet today for more pleurx kits.Also see education flow sheet.Above information discussed with the patient's nurse.

## 2018-10-11 NOTE — PLAN OF CARE
Problem: Syncope (Adult)  Goal: Identify Related Risk Factors and Signs and Symptoms  Related risk factors and signs and symptoms are identified upon initiation of Human Response Clinical Practice Guideline (CPG).   NEURO: Alert and oriented x4. Calm and cooperative.   RESPIRATORY: LS diminished at bases. SpO2 >90% on RA. Denied SOB. PleurX drain inserted on left side. 500 ml output per RN report. Pt received pleurx teaching with patient learning center RN. Per PLC RN- pt was able to demonstrate correct procedure to care for new drain.   CARDIO: HR sinus rhythm. Echo done. Trace- Mild edema BLE - compression stockings available to pt. Pt requested to leave them off today.  GI/: BS active, reported loose stools x2. PO intake adequate. Voiding without difficulty.   SKIN: Pericardial drain site - TERI, CDI. PleurX site dressing CDI.   ACTIVITY: Up ad candy.   PAIN: denied pain.   LINES: PIV removed prior to discharge. PleurX drain in place.  Education about medications, diet and activity recommendations and follow up appointments were provided to pt. Pt verbalized understanding. Pt went home with 7 kits for pleurx drainage and her belongings - went with family to lobby ad candy.

## 2018-10-11 NOTE — PLAN OF CARE
Problem: Patient Care Overview  Goal: Plan of Care/Patient Progress Review  OT/Edema: Pt with edema close to resolved. Pt not wearing compression today reporting her skin is feeling better. Pt has Jobst stockings to wear prn. Will complete edema order.

## 2018-10-11 NOTE — PLAN OF CARE
Problem: Patient Care Overview  Goal: Plan of Care/Patient Progress Review  Outcome: No Change  D: Pt admitted 10/4 with syncope, pneumothorax s/p chest tube, tamponade s/p pericardiocentesis.  I: Monitored vitals and assessed pt status.  A: A0x4. VSS on RA. SR on tele, HR . Afebrile. Denies pain, SOB, dizziness, nausea. Occlusive dressing in place over L CT site, c/d/i. Up ind. NPO since midnight pending possible PleurX drain placement today. Pt slept between cares, making needs known.  P: Continue to monitor and report changes/concerns to Heme/Onc.

## 2018-10-11 NOTE — DISCHARGE SUMMARY
[facility] [place]    Discharge Summary  Hematology / Oncology    Date of Admission:  10/4/2018  Date of Discharge:  10/11/2018  Discharging Provider: Caridad Martinez  Date of Service (when I saw the patient): 10/11/18    Discharge Diagnoses   Left hydropneumothorax, s/p PleurX drain placement  Recurrent malignant pleural effusion  Pericardial effusion w/ tamponade  Syncope  Tachycardia-improved  Metastatic breast cancer     History of Present Illness   Shan Marsh is a 48 year old woman with recurrent metastatic ER+, mod WI+, HER-2 neg breast cancer currently on treatment with Xeloda. She presented to the hospital after a syncopal event/unwitness fall at home and was found to have left pneumothorax, now s/p chest tube placement. Additionally, she was then found to have large pericardial effusion with tamponade physiology, now s/p pericardiocentesis. Sinus tachycardia, nausea, and chest tightness resolved with pericardiocentesis. Right sided pleural effusion with thoracentesis, cytology positive for malignant cells. Patient is breathing much easier and without need of supplemental oxygen after procedures. She did have 1 positive blood culture from  hand growing Staph warneri, covered on IV antibiotics (Zosyn, Vanc), however this was thought to be a likely contaminant after results reported and with resolution of tachycardia post pericardiocentesis. Discontinued antibiotics. No further signs or sx of infection. Discussed patient care with primary oncologist, hortencia Dobson to start C2 of Xeloda 10/12. Close follow up in clinic with [facility] + labs 10/15.    Hospital Course   Shan Marsh was admitted on 10/4/2018.  The following problems were addressed during her hospitalization:      #Left hydropneumothorax.  #Recurrent pleural effusion.   #S/p chest tube placement.   Suspect pneumothorax 2/2 thoracentesis on 10/3 for recurrent malignant pleural effusion vs coughing  vs spontaneous. A/p chest tube placement in ED. First f/u CXR showed reexpansion of the lung. No significant SOB or hypoxia.   -Pulmonology consulted to manage chest tube. Chest tube fell out 10/10 AM and decision was made to place PleurX drain 10/11 prior to discharge on L side for recurrent pleural effusion. [facility] teaching provided for PleurX drain care prior to discharge.       #Pericardial effusion with tamponade physiology, s/p pericardiocentesis.  #Pericardial drain placement.   Suspect this is likely malignant. Noticed on CT scan on 10/6. Echo demonstrated tamponade physiology. Syncope likely 2/2 this. S/p pericardiocentesis: 4290 WBC, LDH 2289, cytology still pending.   -Cardiology consulted, appreciate input. Performed pericardiocentesis 10/7, left a pericardial drain and removed the following day 10/8. ECHO demonstrated trivial pericardial effusion (improved). Recommend repeat ECHO to evaluate in ~1 month.      #Syncopal event x1 prior to admission.  Likely related to cardiac tamponade as discussed above. Other possible triggers include vasovagal, dehydration secondary to poor PO intake, medication reaction due to new use of cannabinoids, PTX. No s/sx PE. Admission EKG demonstrates NSR. No irregularities noted on telemetry while in ED. Syncopal symptoms had resolved until 10/4 early evening, when patient was again hypotensive, bradycardic, and felt lightheaded. She did not lose consciousness and f/u CXR did not show any worsening of the ptx. Now resolved post pericardiocentesis.  -Initial concern was that cannabis oil contributed to hypotension and syncope. Now it appears that tamponade is more likely to be the cause.      #Sinus tachycardia.  #Possible SIRS.   Trigger sepsis protocol multiple times d/t vitals. Lactate 6.3 overnight 10/6. Improved with IVF. No clear infectious source. Cultures sent. Started empiric antibiotics, now discontinued. 1 BCx positive for Staph warneri from 10/6, however suspect  this is a contaminant rather than blood stream infection as all additional cultures 10/4, 10/7-9 all remain NGTD. With identification of pericardial effusion and improvement/resolution of tachycardia post pericardiocentesis, suspect this was more likely 2/2 pericardial effusion with tamponade rather than infection (less likely with no source identified).       #Metastatic breast cancer.   See PA's clinic note dated 9/4/18 for detailed oncologic and treatment history. Most recent started on xeloda - planned for 1 week on, 1 week off. She completed her first week Wednesday PTA and was planned to see Dr. Whitman on Monday 10/8 prior to starting 2nd week of Xeloda. Had tolerated Xeloda fairly well. HELD during admission for acute issues above.   -Discussed with Dr. Whitman, and will have patient resume Xeloda as previously prescribed starting 10/12.  -Continue tramadol PRN for pain, prn tylenol, and diclofenac gel.   -Continue medical cannabis PRN chronic nausea, pain. Advised patient to proceed cautiously with future use of medical cannabis, take note of sx, and check BP (she has device at home) after using in case causing hypotension.   -Has been taking gabapentin only 300mg at bedtime and would like to stop so will discontinue.   -Continue Zometa q3 months for bone mets - due October, to be given outpatient. Patient mentioned being due for Zoladex as well? Infusion apt requested but orders will need to be placed.  -F/u with [facility] 10/15. Per Dr. Whitman, would like her seen close to the start of her second week of Xeloda as well to monitor for adverse effects.       Pain  -Added Voltaren gel and lidoderm patches for chronic neck and shoulder pain. Continue with Voltaren gel at discharge, but discontinued lidocaine patches per patient request.      Prophylaxis  -Lovenox sq was held for pericardiocentesis. Defer resumption per cardiology d/t bloody pericardial fluid.  -PRN bowel meds    Caridad Martinez  PEE  Hematology/Oncology  Pager #[phone]    Significant Results and Procedures   L chest tube placement, R thoracentesis, pericardiocentesis.  See above for imaging reports.    Pending Results   These results will be followed up by Karena Cox PA-C.  Unresulted Labs Ordered in the Past 30 Days of this Admission     Date and Time Order Name Status Description    10/9/2018 1102 Fungus Culture, non-blood Preliminary     10/9/2018 1102 Fluid Culture Aerobic Bacterial Preliminary     10/9/2018 1102 AFB Culture Non Blood Preliminary     10/9/2018 1102 Actinomyces rule out Preliminary     10/8/2018 2300 Blood culture Preliminary     10/8/2018 2300 Blood culture Preliminary     10/8/2018 0730 Blood culture Preliminary     10/8/2018 0730 Blood culture Preliminary     10/7/2018 1829 Blood culture Preliminary     10/7/2018 1829 Blood culture Preliminary     10/7/2018 1115 Fluid Culture Aerobic Bacterial Preliminary     10/6/2018 1653 Blood culture Preliminary           Code Status   Full Code    Primary Care Physician   Marilu Michelle MD    Physical Exam   Temp: 98.2  F (36.8  C) Temp src: Oral BP: 121/84   Heart Rate: 82 Resp: 12 SpO2: 98 % O2 Device: None (Room air)    Vitals:    10/09/18 0608 10/10/18 0600 10/11/18 0623   Weight: 58.4 kg (128 lb 11.2 oz) 53.5 kg (117 lb 14.4 oz) 53.3 kg (117 lb 6.4 oz)     Vital Signs with Ranges  Temp:  [97  F (36.1  C)-98.3  F (36.8  C)] 98.2  F (36.8  C)  Heart Rate:  [69-95] 82  Resp:  [9-23] 12  BP: (107-127)/(75-89) 121/84  SpO2:  [95 %-100 %] 98 %  I/O last 3 completed shifts:  In: 120 [P.O.:120]  Out: -     Constitutional: Pleasant female seen sitting up in a chair, in NAD. Alert and interactive. Tearful on assessment, but states she was feeling overwhelmed.  HEENT: NCAT, anicteric sclerae, conjunctiva clear. Moist mucous membranes.  Respiratory: Non-labored breathing. Left and right lung sounds diminished in bases otherwise clear. No wheezing or crackles.   Cardiovascular:  "Regular rate and rhythm with no murmur, rub or gallop.  GI: Normoactive BS. Abdomen is soft, non-distended, and non-tender.  Skin: Warm and dry. No rashes or lesions on exposed surfaces. Multiple dressings in place over PleurX and CT sites, no drainage.  Musculoskeletal: Extremities grossly normal. No tenderness and trace-1+ pitting pedal edema, much improved.   Neurologic: A &O x3, speech normal, answering questions appropriately. Moves all extremities spontaneously. Grossly non-focal.  Neuropsychiatric: Mentation and affect normal/appropriate.    Discharge Disposition   Discharged to home  Condition at discharge: Stable    Consultations This Hospital Stay   PULMONARY GENERAL ADULT IP CONSULT  VASCULAR ACCESS CARE ADULT IP CONSULT  CARDIOLOGY GENERAL ADULT IP CONSULT  PHARMACY TO DOSE VANCO  LYMPHEDEMA THERAPY IP CONSULT  VASCULAR ACCESS CARE ADULT IP CONSULT    Discharge Orders     Reason for your hospital stay   You were admitted for management of left lung pneumothorax (\"collapsed lung\").     Activity   Your activity upon discharge: Activity as tolerated. No driving or strenuous activity while taking narcotics, if having headaches/dizziness/vision changes, or if feeling generally weak or unwell.     When to contact your care team   Call the [facility] 24-hour triage line at [phone] or [phone] for temp >100.4, uncontrolled nausea/vomiting/diarrhea/constipation, unrelieved pain, bleeding not relieved with pressure, dizziness, chest pain, shortness of breath, loss of consciousness, and any new or concerning symptoms.     Call [phone] and ask for the care coordinator that works with your oncologist if you have questions about scans, appointments, hospital follow-up, or other concerns.     Discharge Instructions   -Go ahead and start your Xeloda starting tomorrow, 10/12 as prescribed.   -Okay to use tylenol as needed for pain.     Follow Up and recommended labs and tests   Follow up as " scheduled:  -I have requested an apt in infusion for your Zometa/Zoledex dosing. They will inform you when an apt is set up.     Full Code     Diet   Follow this diet upon discharge: Regular diet as tolerated.       Discharge Medications   Current Discharge Medication List      START taking these medications    Details   diclofenac (VOLTAREN) 1 % GEL topical gel Place 2-4 g onto the skin 4 times daily  Qty: 1 Tube, Refills: 0    Comments: Please send with inpatient tube.  Associated Diagnoses: Metastatic breast cancer (H)         CONTINUE these medications which have CHANGED    Details   traMADol (ULTRAM) 50 MG tablet Take 1-2 tablets ( mg) by mouth every 6 hours as needed for moderate pain or headaches         CONTINUE these medications which have NOT CHANGED    Details   calcium carbonate (OS-KRYSTEN 500 MG [place]. [place]) 1250 MG tablet Take 1 tablet by mouth daily      Digestive Enzymes (ENZYME DIGEST) CAPS Take 1 capsule by mouth daily      medical cannabis (Patient's own supply.  Not a prescription) (This is NOT a prescription, and does not certify that the patient has a qualifying medical condition for medical cannabis.  The purpose of this order is  to document that the patient reports taking medical cannabis.)      ondansetron (ZOFRAN) 8 MG tablet Take 1 tablet (8 mg) by mouth every 8 hours as needed for nausea  Qty: 40 tablet, Refills: 3    Comments: Pt here, to  within the hour  Associated Diagnoses: Metastatic breast cancer (H); Bone metastasis (H)      prochlorperazine (COMPAZINE) 10 MG tablet Take 10 mg by mouth before 1st dose of oral chemotherapy, then prn thereafter      VITAMIN D, CHOLECALCIFEROL, PO Take 1,000 Units by mouth daily       capecitabine (XELODA) 500 MG tablet CHEMO Take 3 tablets (1,500 mg) by mouth 2 times daily Take on day 1-7 and 15-21 of 28-day cycle.  Qty: 84 tablet, Refills: 0    Comments: Take with water within 30 minutes after a meal.  Associated Diagnoses: Metastatic  breast cancer (H)         STOP taking these medications       gabapentin (GRALISE) 300 MG 24 hr tablet Comments:   Reason for Stopping:             Allergies   Allergies   Allergen Reactions     Erythromycin Shortness Of Breath     Sulfa Drugs      rash     Data   Most Recent 3 CBC's:  Recent Labs   Lab Test  10/11/18   0615  10/10/18   0553  10/09/18   0645   WBC  4.2  5.2  5.8   HGB  11.5*  10.9*  11.0*   MCV  99  97  99   PLT  266  194  182      Most Recent 3 BMP's:  Recent Labs   Lab Test  10/11/18   0615  10/10/18   1758  10/10/18   1108  10/10/18   0553  10/09/18   0645   NA  143   --    --   143  141   POTASSIUM  3.9  3.9  2.8*  2.9*  3.4   CHLORIDE  107   --    --   104  110*   CO2  27   --    --   28  23   BUN  8   --    --   7  11   CR  0.58   --    --   0.57  0.69   ANIONGAP  8   --    --   10  8   KRYSTEN  8.8   --    --   8.0*  7.8*   GLC  78   --    --   84  78     Most Recent 2 LFT's:  Recent Labs   Lab Test  10/09/18   0645  10/07/18   0347   AST  61*  72*   ALT  66*  102*   ALKPHOS  178*  136   BILITOTAL  0.5  0.6     Most Recent INR's and Anticoagulation Dosing History:  Anticoagulation Dose History     Recent Dosing and Labs Latest Ref Rng & Units 9/14/2010 4/2/2018 5/7/2018 7/5/2018 9/5/2018 10/7/2018 10/7/2018    INR 0.86 - 1.14 1.03 - 0.99 0.93 0.95 1.18(H) 1.21(H)    INR Point of Care 0.86 - 1.14 - 1.0 - - - - -        Most Recent 3 Troponin's:  Recent Labs   Lab Test  10/05/18   0817   TROPI  <0.015     Most Recent Cholesterol Panel:  Recent Labs   Lab Test  11/03/17   1611   CHOL  129   LDL  43   HDL  50   TRIG  178*     Most Recent 6 Bacteria Isolates From Any Culture (See EPIC Reports for Culture Details):  Recent Labs   Lab Test  10/09/18   1049  10/09/18   0645  10/08/18   0835  10/08/18   0831  10/07/18   2044  10/07/18   1855   CULT  Culture negative after 2 days  Culture received and in progress.  Positive AFB results are called as soon as detected.    Final report to follow in 7 to 8  weeks.    Assayed at [facility] [address] [place] [address] [phone]  Culture negative monitoring continues  Culture negative monitoring continues  No growth after 2 days  No growth after 2 days  No growth after 3 days  No growth after 3 days  No growth after 4 days  No growth after 4 days     Most Recent TSH, T4 and A1c Labs:  Recent Labs   Lab Test  11/03/17   1611  08/21/17   1147   TSH   --   1.78   T4  1.01   --      Results for orders placed or performed during the hospital encounter of 10/04/18   Chest XR,  PA & LAT    Narrative    Exam: XR CHEST 2 VW, 10/4/2018 3:05 AM    Indication: syncope and recent thoracentesis, hx of breast CA;     Comparison: CT chest Assessment/pelvis 3/15/2018. PET/CT 8/27/2018.    Findings:   New large left-sided hydropneumothorax status post thoracentesis, with  significant collapse of the left lung. Associated compressive  atelectasis of both left and right lungs seen. Mild rightward  mediastinal shift involving the heart and lower tracheobronchial tree.  Small right pleural effusion again seen.      Impression    Impression:   1. Large left hydropneumothorax and rightward mediastinal shift.  2. No interval change in right-sided pleural effusion.    [Result: Hydropneumothorax]    Finding was identified on 10/4/2018 3:08 AM.     Dr. Rai was contacted by Dr. Giraldo at 10/4/2018 3:13 AM and  verbalized understanding of the urgent finding.     I have personally reviewed the examination and initial interpretation  and I agree with the findings.    LANIE PORTILLO MD   POC US ECHO LIMITED    Impression    Limited Bedside Lung Ultrasound, performed and interpreted by me. Indication:    Syncope    Lung ultrasound was performed for the assessment of pneumothorax   The patient was placed in a semi recumbent position at approximately 45 degrees. The left and right lung apices were evaluated for evidence of pneumothorax.     Findings    Right side:  Lung sliding  artifact   Present     Comet tail artifacts   Present     [place] sign on M-Mode Present   Left side:  Lung sliding artifact   Absent     Comet tail artifacts   Absent     [place] sign on M-Mode Absent    IMPRESSION:   left Pneumothorax.     Chest  XR, 1 view portable    Narrative    Exam: XR CHEST PORT 1 VW, 10/4/2018 4:27 AM    Indication: post-chest tube placement;     Comparison: Same-day x-ray    Findings:   New onset of chest tube. Pneumothorax no longer visualized. Persistent  layering bilateral pleural effusions. Central airway is midline with  possible minimal rightward deviation at the level of the stephanie. Mild  anterior wedge compression deformity seen in the lower thoracic spine.      Impression    Impression:   1. Pneumothorax no longer visualized on the left, status post  left-sided apically directed chest tube placement.  2. Persistent layering bilateral pleural effusions.    I have personally reviewed the examination and initial interpretation  and I agree with the findings.    LANIE PORTILLO MD   XR Chest Port 1 View    Narrative    XR CHEST PORT 1 VW  10/4/2018 5:39 PM      HISTORY: eval pneumothorax as patient had episode of acute  hypotension;     COMPARISON: Earlier same day    FINDINGS: Single AP view of the chest. Cardiomediastinal silhouette is  stable. Bilateral pleural effusions are unchanged. No convincing  residual left pneumothorax. Left chest tube remains in place.  Bibasilar opacities, likely atelectasis (versus consolidation.      Impression    IMPRESSION: Stable chest. No residual pneumothorax is definitely  identified.    I have personally reviewed the examination and initial interpretation  and I agree with the findings.    ALESSANDRO MARION MD   XR Chest 2 Views    Narrative    Exam: XR CHEST 2 VW, 10/5/2018 8:34 AM    Indication: F/u left pneumothorax post chest tube to suction  overnight;     Comparison: Chest x-ray on 10/04/2018.    Findings:   PA and lateral view of the chest.  Minimal worsening left pleural  effusion with associated basilar opacity. Left-sided chest in place  with tiny hydropneumothorax. Patchy opacity in right lung base.  Enlarged cardiac mediastinal silhouette, stable. Upper abdomen is  unremarkable.      Impression    Impression:   1. Left-sided chest tube in place with tiny hydropneumothorax.   2. Mild worsening of moderate size left pleural effusion with  associated basilar opacity, atelectasis versus consolidation.   3. Right basilar opacity, favoring atelectasis.    I have personally reviewed the examination and initial interpretation  and I agree with the findings.    MAITE ANTON MD   XR Chest 2 Views    Narrative    Exam: XR CHEST 2 VW, 10/6/2018 8:21 AM    Indication: f/u pneumothorax;     Comparison: Chest radiograph dated 10/5/2018.    Findings:   PA and lateral views of the chest. Stable position of a left approach  chest tube with the tip visible near the apex. Air-fluid level in the  left hemithorax consistent with hydropneumothorax. Air volume  increased compared to prior exam. Stable right pleural effusion with  adjacent compressive atelectasis. Cardiac silhouette is partially  obscured. No acute osseous pathology. Visualized portion of the upper  abdomen is unremarkable.      Impression    Impression:   1. Left hydropneumothorax with an air-fluid level. Increased air  volume compared to prior.  2. Stable right pleural effusion and adjacent compressive atelectasis.    [Result: Hydropneumothorax with increased air volume compared to  prior]    Finding was identified on 10/6/2018 8:25 AM.     Karuna German was contacted by Dr. Eddy Villareal at 10/6/2018 8:31 AM  and verbalized understanding of the urgent finding.     I have personally reviewed the examination and initial interpretation  and I agree with the findings.    MAITE ANTON MD   CT Chest w/o Contrast    Narrative    EXAMINATION: CT CHEST W/O CONTRAST  10/6/2018 5:39 PM      CLINICAL HISTORY:  Evaluate pneumothorax. Possible pericardial  effusion.    COMPARISON: Same-day chest x-ray. PET/CT 8/27/2018.    TECHNIQUE: CT imaging obtained through the chest without intravenous  contrast. Coronal and axial MIP reformatted images obtained.    FINDINGS:  Left-sided hydropneumothorax, as seen on recent chest x-ray. The air  in the pleura is small and overlies the anterior left lower lobe.  There is a left-sided apically directed chest tube. Associated left  basilar compressive atelectasis, as well as regions of rounded  atelectasis in the left base and lingula.    A moderate-sized right-sided pleural effusion is seen with overlying  compressive atelectasis, which is increased from the PET/CT on  8/27/2018. Fluid extends into the minor and major fissure on the  right.    Redemonstrated peribronchovascular nodules in both upper lobes,  unchanged. There is mild interlobular septal thickening in both lung  apices likely representing interstitial pulmonary edema.    There is a new large right-sided pericardial effusion, of intermediate  density (25-30 Hounsfield units).    Bilateral breast implants. Enlarged left axillary lymph nodes, appear  decreased. For example on series 2 image 20 is an 11 x 6 mm lymph  node, previously 15 x 8 mm. Biopsy clip in the left axilla, and  extensive left axillary fat stranding.    Numerous metastatic lesions in the liver, better seen on the  contrast-enhanced exam from 8/27/2018. Persistently enlarged  approximately 15 mm lymph node adjacent to the celiac axis, which  demonstrate FDG activity on 8/27/2018.    Thyroid gland unremarkable. Trachea and mainstem bronchi are patent.  Normal size aorta and pulmonary artery.    Redemonstrated posterior left 10th rib fracture. Redemonstrated mixed  sclerotic and lytic lesions throughout the spine and some ribs. No  significant change in anterior wedge compression deformity at L1.      Impression    IMPRESSION:   In this patient with history of  metastatic breast cancer:  1. New intermediate attenuating large pericardial effusion, favors  malignant pericardial effusion. Other possibility includes  hemopericardium.  2. Redemonstrated left small hydropneumothorax. Left-sided chest tube  in place.  3. Increase in a moderate size right-sided pleural effusion.  4. Decreased axillary adenopathy and unchanged lung metastases.  5. Lesions in the upper abdomen, including the liver, left adrenal  gland, and about the celiac axis better visualized on PET/CT 8/27/2018  on this noncontrast enhanced exam.  6. No significant change in numerous metastatic lesions throughout the  spine and ribs. Persistent left 10th rib fracture.    I have personally reviewed the examination and initial interpretation  and I agree with the findings.    MATHEW FERREIRA MD   XR Abdomen 2 Views    Narrative    Exam: XR ABDOMEN 2 VW, 10/6/2018 9:11 PM    Indication: elevated lactate; LUQ abd tenderness to palpation;     Comparison: PET/CT 8/27/2018    Findings:   Bowel gas pattern is nonobstructive. Mild stool noted in the left  colon. No pneumatosis or portal venous gas.    Small left hydropneumothorax and moderate right pleural effusion with  subjacent atelectasis. Prominence of the cardiac silhouette due to  pericardial effusion.      Impression    Impression:   Nonobstructive bowel gas pattern. Mild stool in the right colon.    I have personally reviewed the examination and initial interpretation  and I agree with the findings.    MATHEW FERREIRA MD   XR Chest 2 Views    Narrative     Examination:  XR CHEST 2 VW     Date:  10/7/2018 10:28 AM      Clinical Information: hydropneumothorax;      Additional Information: none    Comparison: Chest x-ray 10/6/2018    Findings:   Left apical chest tube,  previous left basilar pneumothorax is no  longer evident however there is a pleural effusion there.    Pulmonary vasculature is engorged. Moderate right pleural effusion,  increased from  prior. No focal opacities in the upper lungs.     Compression deformity in the sclerotic L1 vertebral body. Sclerosis  and lucency elsewhere in thoracolumbar spine consistent with  metastatic disease.       Impression    Impression:  1. Previously demonstrated left basilar pneumothorax is now a pleural  effusion. Left chest tube remains in place.   2. Increased right pleural effusion.  3. Pulmonary vascular congestion.    GISELL PLUMMER MD   XR Chest 2 Views    Narrative    XR CHEST 2 VW  10/7/2018 4:40 PM      HISTORY: Evaluate hydropneumothorax while on water seal (12:30 pm);     COMPARISON: Earlier same day    FINDINGS: PA and lateral views of the chest upright. Left apical chest  tube in stable position. Unchanged fluid component of left  hydropneumothorax. No definite residual pneumothorax is seen.  Right-sided pleural effusion is also unchanged. No new consolidation.      Impression    IMPRESSION: Stable left hydropneumothorax with chest tube in place.  Unchanged moderate right pleural effusion.    I have personally reviewed the examination and initial interpretation  and I agree with the findings.    MATHEW FERREIRA MD   XR Chest 2 Views     Value    Radiologist flags Increase in size of hydropneumothorax and possible (Urgent)    Narrative    Exam: XR CHEST 2 VW, 10/8/2018 10:23 AM    Indication: hydropneumothorax;     Comparison: Chest x-ray from 10/7/2018, CT from 10/6/2018    Findings:   2 views of the chest are obtained demonstrating unremarkable soft  tissues and no acute bony abnormalities. Cardiomediastinal borders are  clear. No enlargement of the cardiac silhouette. No appreciable  pneumothorax or pleural effusions. No focal airspace opacities.  Unchanged position of midline pericardial drain and left apical chest  tube. Increase in air component size of left basilar  hydropneumothorax. Vertical lucency over left cardiac border may  represent pneumomediastinum, which is slightly more  prominent today.  Bilateral pleural effusions are grossly unchanged in size. Likely  bibasilar atelectasis.      Impression    Impression:  1. Increase in air component size of left basilar hydropneumothorax.   2. Interval increase in size of possible left hilar pneumomediastinum.  3. Grossly unchanged bilateral pleural effusions and associated  atelectasis.     [Access Center: Increase in size of hydropneumothorax and possible  pneumomediastinum]    This report will be copied to the [facility] to ensure a  provider acknowledges the finding. Access Center is available Monday  through Friday 8am-3:30 pm.     I have personally reviewed the examination and initial interpretation  and I agree with the findings.    MAITE ANTON MD   XR Chest Port 1 View    Narrative    Exam: XR CHEST PORT 1 VW, 10/8/2018 3:58 PM    Indication: evaluate for significant changes in hydropneumothorax  since morning imaging;     Comparison: Chest x-ray from earlier this morning.    Findings:   Single view AP chest is obtained demonstrating interval removal of  mediastinal drain. Left lateral hydropneumothorax appears mostly  unchanged in size. No appreciable pneumomediastinum. Stable left  apical chest tube. Stable appearance of moderate bilateral pleural  effusions and associated atelectasis.      Impression    Impression:   1. Stable appearance of left lateral hydropneumothorax status post  removal mediastinal drain.  2. Mostly unchanged appearance of moderate bilateral pleural effusions  and associated atelectasis.    I have personally reviewed the examination and initial interpretation  and I agree with the findings.    MAITE ANTON MD   XR Chest Port 1 View    Narrative    Exam: XR CHEST PORT 1 VW, 10/9/2018 11:27 AM    Indication: hydropneumothorax;     Comparison: Chest x-rays from 10/8/2018    Findings:   Chest AP upright radiograph demonstrates stable cardiac and  mediastinal borders. Interval improvement in bilateral  layering  pleural effusions and associated atelectasis. Left-sided  hydropneumothorax is no longer visible. No focal airspace opacities.  No new pneumothoraces. Stable left apical chest tube.      Impression    Impression:   1. Interval improvement of left hydropneumothorax.  2. Interval improvement in layering bilateral pleural effusions.     I have personally reviewed the examination and initial interpretation  and I agree with the findings.    MAITE ANTON MD   XR Chest Port 1 View    Narrative    Exam: XR CHEST PORT 1 VW, 10/10/2018 11:34 AM    Indication: hydropneumothorax;     Comparison: Chest x-ray from 10/10/2018    Findings:   Heart size is normal. Slightly increased small right-sided layering  pleural effusion, stable small left layering pleural effusion. Stable  bibasilar opacities. No pneumothorax. Bilateral interstitial  opacities.      Impression    Impression:   1. Slightly increased small right-sided pleural effusion and stable  small left pleural effusion. Bibasilar opacities may represent  atelectasis or consolidation.  2. Stable mild interstitial pulmonary edema.    I have personally reviewed the examination and initial interpretation  and I agree with the findings.    SUHAS CADENA MD   XR Chest 2 Views    Narrative    Exam: XR CHEST 2 VW, 10/10/2018 9:01 AM    Indication: Chest tube fell out this morning per pt report. Evaluate  status of L sided hydropneumothorax and b/l pleural effusions;     Comparison: Chest x-ray 10/9/2018    Findings:   Moderate left and small right pleural effusions with bibasilar  opacities. There are also bilateral interstitial opacities. Heart size  is unchanged. No appreciable pneumothorax. Left chest tube has been  removed.      Impression    Impression:  1. Left chest tube has been removed. Stable moderate left and small  right pleural effusions with bibasilar atelectasis versus  consolidation.  2. Stable interstitial pulmonary edema.    SUHAS CADENA MD   XR  Chest 2 Views    Narrative    Exam: XR CHEST 2 VW, 10/11/2018 8:17 AM    Indication: hydropneumothorax;     Comparison: Chest x-ray from 10/10/2018    Findings:   2 views of the chest are obtained demonstrating unremarkable soft  tissues and no acute bony abnormalities. Cardiomediastinal borders are  stable. No enlargement of the cardiac silhouette. Interval improvement  in bilateral pleural effusions (right greater than left) with stable  associated atelectasis. Left lateral hydropneumothorax appears grossly  increase in the air component. No acute airspace opacities.      Impression    Impression:  1. Slight increase in air component the left lateral  hydropneumothorax.  2. Interval improvement in right pleural effusion, stable left pleural  effusion.    I have personally reviewed the examination and initial interpretation  and I agree with the findings.    MAITE ANTON MD   XR Chest Port 1 View    Narrative    Exam: XR CHEST PORT 1 VW, 10/11/2018 12:45 PM    Indication: S/P plurex catheter placement;     Comparison: 10/11/2018 at 8:16 AM    Findings:   Single AP view of the chest is obtained demonstrating unremarkable  soft tissues and no acute bony abnormalities. Cardiomediastinal  borders are stable. Stable appearance of left basilar  hydropneumothorax. Interval decrease in size of left pleural effusion  status post placement of left basilar Pleurx catheter. Mild left  basilar atelectasis. Interval increase in size of right sided pleural  effusion, which may be loculated.      Impression    Impression:  1. Interval decrease in size of left pleural effusion status post  placement of Pleurx catheter. Stable left basilar hydropneumothorax.  2. Interval increase in size of right-sided pleural effusion, which  may be loculated.    I have personally reviewed the examination and initial interpretation  and I agree with the findings.    MAITE ANTON MD

## 2018-10-12 ENCOUNTER — TELEPHONE (OUTPATIENT)
Dept: FAMILY MEDICINE | Facility: CLINIC | Age: 48
End: 2018-10-12

## 2018-10-12 DIAGNOSIS — C50.919 METASTATIC BREAST CANCER: Primary | ICD-10-CM

## 2018-10-12 LAB
APPEARANCE FLD: NORMAL
APPEARANCE FLD: NORMAL
BACTERIA SPEC CULT: NO GROWTH
BACTERIA SPEC CULT: NO GROWTH
COLOR FLD: NORMAL
COLOR FLD: YELLOW
LYMPHOCYTES NFR FLD MANUAL: 44 %
LYMPHOCYTES NFR FLD MANUAL: 5 %
Lab: NORMAL
NEUTS BAND NFR FLD MANUAL: 14 %
NEUTS BAND NFR FLD MANUAL: 2 %
OTHER CELLS FLD MANUAL: 54 %
OTHER CELLS FLD MANUAL: 81 %
SPECIMEN SOURCE FLD: NORMAL
SPECIMEN SOURCE FLD: NORMAL
SPECIMEN SOURCE: NORMAL
SPECIMEN SOURCE: NORMAL
WBC # FLD AUTO: 1210 /UL
WBC # FLD AUTO: 4290 /UL

## 2018-10-12 NOTE — TELEPHONE ENCOUNTER
ED / Discharge Outreach Protocol    Patient Contact    Attempt # 1    Was call answered?  No.  Left message on voicemail with information to call me back.    Vanda Hansen, RN, BSN

## 2018-10-13 LAB
BACTERIA SPEC CULT: NO GROWTH
BACTERIA SPEC CULT: NO GROWTH
Lab: NORMAL
Lab: NORMAL
SPECIMEN SOURCE: NORMAL
SPECIMEN SOURCE: NORMAL

## 2018-10-14 LAB
BACTERIA SPEC CULT: NO GROWTH
Lab: NORMAL
Lab: NORMAL
SPECIMEN SOURCE: NORMAL

## 2018-10-15 ENCOUNTER — INFUSION THERAPY VISIT (OUTPATIENT)
Dept: ONCOLOGY | Facility: CLINIC | Age: 48
End: 2018-10-15
Attending: INTERNAL MEDICINE
Payer: COMMERCIAL

## 2018-10-15 ENCOUNTER — APPOINTMENT (OUTPATIENT)
Dept: LAB | Facility: CLINIC | Age: 48
End: 2018-10-15
Attending: INTERNAL MEDICINE
Payer: COMMERCIAL

## 2018-10-15 ENCOUNTER — ONCOLOGY VISIT (OUTPATIENT)
Dept: ONCOLOGY | Facility: CLINIC | Age: 48
End: 2018-10-15
Attending: PHYSICIAN ASSISTANT
Payer: COMMERCIAL

## 2018-10-15 ENCOUNTER — CARE COORDINATION (OUTPATIENT)
Dept: ONCOLOGY | Facility: CLINIC | Age: 48
End: 2018-10-15

## 2018-10-15 VITALS
SYSTOLIC BLOOD PRESSURE: 108 MMHG | RESPIRATION RATE: 16 BRPM | TEMPERATURE: 97.6 F | HEIGHT: 64 IN | DIASTOLIC BLOOD PRESSURE: 70 MMHG | OXYGEN SATURATION: 100 % | BODY MASS INDEX: 18.44 KG/M2 | HEART RATE: 95 BPM | WEIGHT: 108 LBS

## 2018-10-15 DIAGNOSIS — B37.31 YEAST INFECTION OF THE VAGINA: ICD-10-CM

## 2018-10-15 DIAGNOSIS — R19.7 DIARRHEA, UNSPECIFIED TYPE: Primary | ICD-10-CM

## 2018-10-15 DIAGNOSIS — I31.39 PERICARDIAL EFFUSION: ICD-10-CM

## 2018-10-15 DIAGNOSIS — C79.51 BONE METASTASIS: ICD-10-CM

## 2018-10-15 DIAGNOSIS — C50.919 METASTATIC BREAST CANCER: Primary | ICD-10-CM

## 2018-10-15 DIAGNOSIS — C50.919 METASTATIC BREAST CANCER: ICD-10-CM

## 2018-10-15 LAB
ALBUMIN SERPL-MCNC: 2.8 G/DL (ref 3.4–5)
ALP SERPL-CCNC: 169 U/L (ref 40–150)
ALT SERPL W P-5'-P-CCNC: 47 U/L (ref 0–50)
ANION GAP SERPL CALCULATED.3IONS-SCNC: 7 MMOL/L (ref 3–14)
AST SERPL W P-5'-P-CCNC: 47 U/L (ref 0–45)
BACTERIA SPEC CULT: NO GROWTH
BACTERIA SPEC CULT: NO GROWTH
BASOPHILS # BLD AUTO: 0 10E9/L (ref 0–0.2)
BASOPHILS NFR BLD AUTO: 0.5 %
BILIRUB SERPL-MCNC: 0.4 MG/DL (ref 0.2–1.3)
BUN SERPL-MCNC: 10 MG/DL (ref 7–30)
CALCIUM SERPL-MCNC: 9.2 MG/DL (ref 8.5–10.1)
CANCER AG27-29 SERPL-ACNC: 611 U/ML (ref 0–39)
CEA SERPL-MCNC: 73.9 UG/L (ref 0–2.5)
CHLORIDE SERPL-SCNC: 102 MMOL/L (ref 94–109)
CO2 SERPL-SCNC: 28 MMOL/L (ref 20–32)
CREAT SERPL-MCNC: 0.64 MG/DL (ref 0.52–1.04)
DIFFERENTIAL METHOD BLD: ABNORMAL
EOSINOPHIL # BLD AUTO: 0.1 10E9/L (ref 0–0.7)
EOSINOPHIL NFR BLD AUTO: 0.9 %
ERYTHROCYTE [DISTWIDTH] IN BLOOD BY AUTOMATED COUNT: 15.5 % (ref 10–15)
GFR SERPL CREATININE-BSD FRML MDRD: >90 ML/MIN/1.7M2
GLUCOSE SERPL-MCNC: 114 MG/DL (ref 70–99)
HCT VFR BLD AUTO: 40.3 % (ref 35–47)
HGB BLD-MCNC: 13 G/DL (ref 11.7–15.7)
IMM GRANULOCYTES # BLD: 0 10E9/L (ref 0–0.4)
IMM GRANULOCYTES NFR BLD: 0.2 %
LYMPHOCYTES # BLD AUTO: 1 10E9/L (ref 0.8–5.3)
LYMPHOCYTES NFR BLD AUTO: 18.4 %
Lab: NORMAL
Lab: NORMAL
MAGNESIUM SERPL-MCNC: 2.4 MG/DL (ref 1.6–2.3)
MCH RBC QN AUTO: 31.8 PG (ref 26.5–33)
MCHC RBC AUTO-ENTMCNC: 32.3 G/DL (ref 31.5–36.5)
MCV RBC AUTO: 99 FL (ref 78–100)
MONOCYTES # BLD AUTO: 0.3 10E9/L (ref 0–1.3)
MONOCYTES NFR BLD AUTO: 6.1 %
NEUTROPHILS # BLD AUTO: 4.1 10E9/L (ref 1.6–8.3)
NEUTROPHILS NFR BLD AUTO: 73.9 %
NRBC # BLD AUTO: 0 10*3/UL
NRBC BLD AUTO-RTO: 0 /100
PHOSPHATE SERPL-MCNC: 3.1 MG/DL (ref 2.5–4.5)
PLATELET # BLD AUTO: 304 10E9/L (ref 150–450)
POTASSIUM SERPL-SCNC: 3.8 MMOL/L (ref 3.4–5.3)
PROT SERPL-MCNC: 7.9 G/DL (ref 6.8–8.8)
RBC # BLD AUTO: 4.09 10E12/L (ref 3.8–5.2)
SODIUM SERPL-SCNC: 137 MMOL/L (ref 133–144)
SPECIMEN SOURCE: NORMAL
SPECIMEN SOURCE: NORMAL
WBC # BLD AUTO: 5.6 10E9/L (ref 4–11)

## 2018-10-15 PROCEDURE — 80053 COMPREHEN METABOLIC PANEL: CPT | Performed by: PHYSICIAN ASSISTANT

## 2018-10-15 PROCEDURE — G0463 HOSPITAL OUTPT CLINIC VISIT: HCPCS | Mod: ZF

## 2018-10-15 PROCEDURE — 83735 ASSAY OF MAGNESIUM: CPT | Performed by: PHYSICIAN ASSISTANT

## 2018-10-15 PROCEDURE — 86300 IMMUNOASSAY TUMOR CA 15-3: CPT | Performed by: PHYSICIAN ASSISTANT

## 2018-10-15 PROCEDURE — 82378 CARCINOEMBRYONIC ANTIGEN: CPT | Performed by: PHYSICIAN ASSISTANT

## 2018-10-15 PROCEDURE — 25000128 H RX IP 250 OP 636: Mod: ZF | Performed by: PHYSICIAN ASSISTANT

## 2018-10-15 PROCEDURE — 40000556 ZZH STATISTIC PERIPHERAL IV START W US GUIDANCE: Mod: ZF

## 2018-10-15 PROCEDURE — 25000125 ZZHC RX 250: Mod: ZF | Performed by: PHYSICIAN ASSISTANT

## 2018-10-15 PROCEDURE — 85025 COMPLETE CBC W/AUTO DIFF WBC: CPT | Performed by: PHYSICIAN ASSISTANT

## 2018-10-15 PROCEDURE — 96402 CHEMO HORMON ANTINEOPL SQ/IM: CPT

## 2018-10-15 PROCEDURE — 96374 THER/PROPH/DIAG INJ IV PUSH: CPT

## 2018-10-15 PROCEDURE — 84100 ASSAY OF PHOSPHORUS: CPT | Performed by: PHYSICIAN ASSISTANT

## 2018-10-15 PROCEDURE — 99215 OFFICE O/P EST HI 40 MIN: CPT | Performed by: PHYSICIAN ASSISTANT

## 2018-10-15 RX ORDER — LETROZOLE 2.5 MG/1
TABLET, FILM COATED ORAL
COMMUNITY
Start: 2018-08-30 | End: 2018-10-15

## 2018-10-15 RX ORDER — LIDOCAINE HYDROCHLORIDE 10 MG/ML
2 INJECTION, SOLUTION EPIDURAL; INFILTRATION; INTRACAUDAL; PERINEURAL ONCE
Status: CANCELLED | OUTPATIENT
Start: 2018-10-15 | End: 2018-10-15

## 2018-10-15 RX ORDER — LOPERAMIDE HYDROCHLORIDE 2 MG/1
2 TABLET ORAL 4 TIMES DAILY PRN
Qty: 30 TABLET | Refills: 3 | Status: SHIPPED | OUTPATIENT
Start: 2018-10-15 | End: 2018-10-23

## 2018-10-15 RX ORDER — LIDOCAINE HYDROCHLORIDE 10 MG/ML
2 INJECTION, SOLUTION EPIDURAL; INFILTRATION; INTRACAUDAL; PERINEURAL ONCE
Status: COMPLETED | OUTPATIENT
Start: 2018-10-15 | End: 2018-10-15

## 2018-10-15 RX ORDER — ZOLEDRONIC ACID 0.04 MG/ML
4 INJECTION, SOLUTION INTRAVENOUS ONCE
Status: CANCELLED | OUTPATIENT
Start: 2018-10-15 | End: 2018-10-15

## 2018-10-15 RX ORDER — ZOLEDRONIC ACID 0.04 MG/ML
4 INJECTION, SOLUTION INTRAVENOUS ONCE
Status: COMPLETED | OUTPATIENT
Start: 2018-10-15 | End: 2018-10-15

## 2018-10-15 RX ORDER — FLUCONAZOLE 150 MG/1
150 TABLET ORAL ONCE
Qty: 1 TABLET | Refills: 0 | Status: SHIPPED | OUTPATIENT
Start: 2018-10-15 | End: 2019-01-01

## 2018-10-15 RX ADMIN — LIDOCAINE HYDROCHLORIDE 2 ML: 10 INJECTION, SOLUTION EPIDURAL; INFILTRATION; INTRACAUDAL; PERINEURAL at 13:10

## 2018-10-15 RX ADMIN — GOSERELIN ACETATE 3.6 MG: 3.6 IMPLANT SUBCUTANEOUS at 13:14

## 2018-10-15 RX ADMIN — ZOLEDRONIC ACID 4 MG: 0.04 INJECTION, SOLUTION INTRAVENOUS at 13:06

## 2018-10-15 ASSESSMENT — PAIN SCALES - GENERAL: PAINLEVEL: NO PAIN (0)

## 2018-10-15 NOTE — NURSING NOTE
Chief Complaint   Patient presents with     Blood Draw     IV placed by Vascular Access   Vitals done and labs drawn, see flow sheets.  VIRAJ Weinstein

## 2018-10-15 NOTE — MR AVS SNAPSHOT
After Visit Summary   10/15/2018    Shan Marsh    MRN: [id number]           Patient Information     Date Of Birth          1970        Visit Information        Provider Department      10/15/2018 1:00 PM  19 ATC; [facility]        Today's Diagnoses     Metastatic breast cancer (H)    -  1    Bone metastasis (H)           Follow-ups after your visit        Your next 10 appointments already scheduled     Oct 23, 2018  9:00 AM CDT   XR CHEST 2 VIEWS with [email or web address]   [facility]    [address] [place] [address]  1st Floor  [facility] [address] [phone]           How do I prepare for my exam? (Food and drink instructions) No Food and Drink Restrictions.  How do I prepare for my exam? (Other instructions) You do not need to do anything special for this exam.  What should I wear: Wear comfortable clothes.  How long does the exam take: Most scans take less than 5 minutes.  What should I bring: Bring a list of your medicines, including vitamins, minerals and over-the-counter drugs. It is safest to leave personal items at home.  Do I need a :  No  is needed.  What do I need to tell my doctor: Tell your doctor if there s any chance you are pregnant.  What should I do after the exam: No restrictions, You may resume normal activities.  What is this test: An image of a specific body part shown in shades of black and white.  Who should I call with questions: If you have any questions, please call the Imaging Department where you will have your exam. Directions, parking instructions, and other information is available on our website, [email or web address].            Oct 23, 2018 10:00 AM CDT   (Arrive by 9:45 AM)   Return Visit with TURNER Bahena   [facility]    [address]  [address]  [facility]  [id number] [phone]              Future tests that were ordered for you today     Open Future Orders        Priority Expected Expires Ordered    Echocardiogram Complete Routine 11/8/2018 10/15/2019 10/15/2018            Who to contact     If you have questions or need follow up information about today's clinic visit or your schedule please contact [facility] directly at [phone].  Normal or non-critical lab and imaging results will be communicated to you by [employer], letter or phone within 4 business days after the clinic has received the results. If you do not hear from us within 7 days, please contact the clinic through [employer] or phone. If you have a critical or abnormal lab result, we will notify you by phone as soon as possible.  Submit refill requests through [employer] or call your pharmacy and they will forward the refill request to us. Please allow 3 business days for your refill to be completed.          Additional Information About Your Visit        [employer] Information     [employer] gives you secure access to your electronic health record. If you see a primary care provider, you can also send messages to your care team and make appointments. If you have questions, please call your primary care clinic.  If you do not have a primary care provider, please call [phone] and they will assist you.        Care EveryWhere ID     This is your Care EveryWhere ID. This could be used by other organizations to access your [facility] medical records  [id number]        Your Vitals Were     Last Period                   10/06/2017 (Approximate)            Blood Pressure from Last 3 Encounters:   10/15/18 108/70   10/11/18 121/84   10/03/18 94/69    Weight from Last 3 Encounters:   10/15/18 49 kg (108 lb)   10/11/18 53.3 kg (117 lb 6.4 oz)   10/03/18 50.3 kg (111 lb)              Today, you had the following     No orders found for display         Today's Medication Changes          These  changes are accurate as of 10/15/18  1:58 PM.  If you have any questions, ask your nurse or doctor.               Start taking these medicines.        Dose/Directions    fluconazole 150 MG tablet   Commonly known as:  DIFLUCAN   Used for:  Yeast infection of the vagina   Started by:  Karena Cox PA        Dose:  150 mg   Take 1 tablet (150 mg) by mouth once for 1 dose   Quantity:  1 tablet   Refills:  0       loperamide 2 MG tablet   Commonly known as:  IMODIUM A-D   Used for:  Diarrhea, unspecified type   Started by:  Karena Cox PA        Dose:  2 mg   Take 1 tablet (2 mg) by mouth 4 times daily as needed for diarrhea   Quantity:  30 tablet   Refills:  3            Where to get your medicines      These medications were sent to [facility] [address]  [address] [facility] [address]     Phone:  [phone]     fluconazole 150 MG tablet    loperamide 2 MG tablet                Primary Care Provider Office Phone # Fax #    Marilu Michelle [phone]       [address]  [facility] [address]        Equal Access to Services     [facility] : Hadii noemy landry Sojailyn, waaxda lupamela, qaybta kaalmacecy coy, edi mustafa . So [facility] [phone].    ATENCIÓN: Si habla español, tiene a gamez disposición servicios gratuitos de asistencia lingüística. LlChildren[facility] [phone].    We comply with applicable federal civil rights laws and [place] laws. We do not discriminate on the basis of race, color, national origin, age, disability, sex, sexual orientation, or gender identity.            Thank you!     Thank you for choosing [facility]  for your care. Our goal is always to provide you with excellent care. Hearing back from our patients is one way we can continue to improve our services. Please take a few minutes to complete the written survey that you may receive in the mail after your visit  with us. Thank you!             Your Updated Medication List - Protect others around you: Learn how to safely use, store and throw away your medicines at www.disposemymeds.org.          This list is accurate as of 10/15/18  1:58 PM.  Always use your most recent med list.                   Brand Name Dispense Instructions for use Diagnosis    calcium carbonate 500 mg (elemental) 500 MG tablet    OS-KRYSTEN     Take 1 tablet by mouth daily        capecitabine 500 MG tablet CHEMO    XELODA    84 tablet    Take 3 tablets (1,500 mg) by mouth 2 times daily Take on day 1-7 and 15-21 of 28-day cycle.    Metastatic breast cancer (H)       diclofenac 1 % Gel topical gel    VOLTAREN    1 Tube    Place 2-4 g onto the skin 4 times daily    Metastatic breast cancer (H)       ENZYME DIGEST Caps      Take 1 capsule by mouth daily        fluconazole 150 MG tablet    DIFLUCAN    1 tablet    Take 1 tablet (150 mg) by mouth once for 1 dose    Yeast infection of the vagina       loperamide 2 MG tablet    IMODIUM A-D    30 tablet    Take 1 tablet (2 mg) by mouth 4 times daily as needed for diarrhea    Diarrhea, unspecified type       medical cannabis (Patient's own supply.  Not a prescription)      (This is NOT a prescription, and does not certify that the patient has a qualifying medical condition for medical cannabis.  The purpose of this order is  to document that the patient reports taking medical cannabis.)        ondansetron 8 MG tablet    ZOFRAN    40 tablet    Take 1 tablet (8 mg) by mouth every 8 hours as needed for nausea    Metastatic breast cancer (H), Bone metastasis (H)       prochlorperazine 10 MG tablet    COMPAZINE     Take 10 mg by mouth before 1st dose of oral chemotherapy, then prn thereafter        traMADol 50 MG tablet    ULTRAM     Take 1-2 tablets ( mg) by mouth every 6 hours as needed for moderate pain or headaches        VITAMIN D (CHOLECALCIFEROL) PO      Take 1,000 Units by mouth daily

## 2018-10-15 NOTE — LETTER
10/15/2018      RE: Shan Marsh  [address]  [facility] [address]       Oncology/Hematology Visit Note  Oct 15, 2018    Reason for Visit: follow up of metastatic breast cancer, ER positive. Post hospital discharge follow up.    History of Present Illness: Shan Marsh is a 48 year old female without significant past medical history with recent diagnosis of metastatic breast cancer after presenting with left shoulder pain. Shoulder MRI showed soft tissue mass close to distal clavicle concerning for malignancy. She had PET/CT 5/9/17 showing hypermetabolic left axillary, supraclavicular, mediastinal and hilar nodes. She had biopsies of left axilla and hilar nodes showing metastatic breast cancer, strongly ER positive, moderately HI positive and HER-2 kaitlin non-amplified. She originally had [id number], ER/HI positive, HER-2 negative breast cancer diagnosed in spring of 2012 s/p bilateral mastectomies and Tamoxifen from 8/2012-2/2013 and discontinued due to side effects. She met with Dr. Whitman most recently on 5/16/17 who recommended treatment on BIG10 clinical trial with palbociclib and tamoxifen. She started treatment on 5/24/17 and required dose reduction for neutropenia. CT CAP and bone scan 1/11/2018 showing progressive disease with new bony lesions in the ribs, T spine, and possibly liver, lungs. She started Zoladex and abemiciclib on 1/12/2018.      In March 2018, she sought treatment at [facility] in [place], under the care of Dr. Brooks, where she received:  - Vallovex vaccine (per FDA, is in phase I trials here in [place])  - Insulin potentiated therapy with conjugated chemotherapy (5% chemotherapy, unclear what chemotherapy)  - Gina's toxins, IV vitamin C, IV Ozone, hyperbaric chamber, vitamin B17 infusion, vitamin CK3 IV, and coffee enemas PRN  - Also taking oral niacin, Lugol, acidol, pancreatin, and thyroid desiccated liver, per their regimen  - She was continued on Letrozole and  advised to conintue Zoladex     She met with Dr. Whitman and patient wished to continue alternative regimen. Abemiciclib was placed on hold and remains on hold. She was continued on Zoladex and letrozole. While in [place], she required thoracentesis for symptomatic pleural effusion. A repeat PET August 2018 showing widely progressive disease with multiple new bone mets involving the spine, ribs, pelvis, and sacrum; new lesions involving the left lateral thoracic/abdominal wall musculature and subcutaneous tissues, new metastases in the left adrenal gland, and recurrent large L pleural effusion. She was started on Xeloda 1500 mg BID on 9/5/18.     She was admitted 10/4-10/11/18 after syncopal event/unwitnessed fall at home. Found to have left pneumothorax and pericardial effusion with tamponade physiology. She had left chest tube placed in ED which then fell out on 10/10. Left pleurx placed on 10/11. She had pericardiocentesis on 10/7, drain removed 10/8. Follow up echo on 10/11 improved. She started C2 Xeloda on 10/12.     Please see previous notes for further details on the patient's history. She comes in today for hospital discharge follow up.    Interval History:  She has been feeling well since hospital discharge. No further left sided chest/neck pain. No falls, syncope or near syncope. Her weight is down about 4 lbs. She has altered taste but no nausea/vomiting. No mucositis, heartburn. She feels cannabis is improving her appetite. She has done protein shakes in past, but none recently. Plans to resume. She is drinking 4 large bottles of water daily. No edema in her legs--swelling went down after discharge.    No fevers, temp has not been over 99 F. No pain from pleurx site. Close friend is an RN that helps her drain it. She has been draining every day as instructed by pulmonary. She did 600 ml the first day, then 5ml the next day. Yesterday was only 3ml. Fluid is clear, no blood. Breathing has been good. She had  some mild dyspnea on stairs. No dizziness.  Occasional cough, non-productive. Occasional palpitations.     She states she has a vaginal yeast infection with no vaginal discharge but pruritic symptoms.    She started Xeloda 1500mg BID on 10/12. She had some nausea with the first cycle of Xeloda, but so far has not had nausea. She does have some loose stools a few times/day, but no watery diarrhea. She has not had any hand/foot syndrome so far.     Review of Systems:  Patient denies any of the following except if noted above: abdominal pain, urinary concerns, headaches, numbness, tingling, rashes or skin lesions, bleeding or bruising issues.    Current Outpatient Prescriptions   Medication Sig Dispense Refill     calcium carbonate (OS-KRYSTEN 500 MG [place]. [place]) 1250 MG tablet Take 1 tablet by mouth daily       capecitabine (XELODA) 500 MG tablet CHEMO Take 3 tablets (1,500 mg) by mouth 2 times daily Take on day 1-7 and 15-21 of 28-day cycle. 84 tablet 0     diclofenac (VOLTAREN) 1 % GEL topical gel Place 2-4 g onto the skin 4 times daily 1 Tube 0     Digestive Enzymes (ENZYME DIGEST) CAPS Take 1 capsule by mouth daily       medical cannabis (Patient's own supply.  Not a prescription) (This is NOT a prescription, and does not certify that the patient has a qualifying medical condition for medical cannabis.  The purpose of this order is  to document that the patient reports taking medical cannabis.)       ondansetron (ZOFRAN) 8 MG tablet Take 1 tablet (8 mg) by mouth every 8 hours as needed for nausea 40 tablet 3     prochlorperazine (COMPAZINE) 10 MG tablet Take 10 mg by mouth before 1st dose of oral chemotherapy, then prn thereafter       traMADol (ULTRAM) 50 MG tablet Take 1-2 tablets ( mg) by mouth every 6 hours as needed for moderate pain or headaches       VITAMIN D, CHOLECALCIFEROL, PO Take 1,000 Units by mouth daily        [DISCONTINUED] tamoxifen (NOLVADEX) 20 MG tablet Take 1 tablet (20 mg) by mouth daily 28  tablet 0       Physical Examination:  General: The patient is a pleasant female in no acute distress.  LMP 10/06/2017 (Approximate)  Wt Readings from Last 10 Encounters:   10/11/18 53.3 kg (117 lb 6.4 oz)   10/03/18 50.3 kg (111 lb)   09/05/18 51.7 kg (114 lb)   09/04/18 51.3 kg (113 lb)   08/28/18 52.6 kg (116 lb)   07/30/18 52.8 kg (116 lb 6.4 oz)   07/03/18 51.2 kg (112 lb 14.4 oz)   07/02/18 51.8 kg (114 lb 4 oz)   06/05/18 51 kg (112 lb 8 oz)   05/07/18 50.1 kg (110 lb 8 oz)     HEENT: EOMI, PERRL. Sclerae are anicteric. Oral mucosa is pink and moist with no lesions or thrush.   Lymph: Palpable left supraclavicular LNs. No other palpable cervical or supraclavicular LNs.   Heart: Regular rate and rhythm.   Lungs: Diminished BS in bilateral bases. Otherwise clear to auscultation bilaterally. No w/w/r  Chest: left pleurx site with dressing c/d/i  Abdomen: Bowel sounds present, soft, nontender with no palpable hepatosplenomegaly or masses.   Extremities: No lower extremity edema noted bilaterally.   Neuro: Cranial nerves II through XII are grossly intact.  Skin: No rashes, petechiae, or bruising noted on exposed skin.    Laboratory Data:  Results for NIKKIE HICKS (MRN [id number]) as of 10/15/2018 14:05   10/15/2018 11:56   Sodium 137   Potassium 3.8   Chloride 102   Carbon Dioxide 28   Urea Nitrogen 10   Creatinine 0.64   GFR Estimate >90   GFR Estimate If Black >90   Calcium 9.2   Anion Gap 7   Magnesium 2.4 (H)   Phosphorus 3.1   Albumin 2.8 (L)   Protein Total 7.9   Bilirubin Total 0.4   Alkaline Phosphatase 169 (H)   ALT 47   AST 47 (H)   Glucose 114 (H)   WBC 5.6   Hemoglobin 13.0   Hematocrit 40.3   Platelet Count 304   RBC Count 4.09   MCV 99   MCH 31.8   MCHC 32.3   RDW 15.5 (H)   Diff Method Automated Method   % Neutrophils 73.9   % Lymphocytes 18.4   % Monocytes 6.1   % Eosinophils 0.9   % Basophils 0.5   % Immature Granulocytes 0.2   Nucleated RBCs 0   Absolute Neutrophil 4.1   Absolute  Lymphocytes 1.0   Absolute Monocytes 0.3   Absolute Eosinophils 0.1   Absolute Basophils 0.0   Abs Immature Granulocytes 0.0   Absolute Nucleated RBC 0.0       Assessment and Plan:    Shan Marsh is a 47-year-old female with history of a T1 N0, ER/NE positive, HER2-negative left breast cancer treated with bilateral mastectomies in 2012, now with metastatic disease (bones, pleural effusions, lungs, pericardial effusion, liver, left adrenal/retroperitoneal mets), ER/NE positive, HER2 negative.       Metastatic breast cancer: She was recommended to start abemiciclib however, shortly after starting the medication, she sought care in [place] and received a variety of alternative/investigative agents, as listed in HPI.  Abemiciclib was stopped. PET 8/27 showing widely progressive disease with numerous new bone metastases, new metastases involving the left thoracic/abdominal wall musculature and subcutaneous tissues, new left adrenal met, and recurrent large L pleural effusion. She was started on Xeloda 1500 mg BID, 7 days on/7 days off on 9/5/18. Held during hospital admission 10/4-10/11. Pericardial fluid positive for malignancy. Xeloda C2 started on 10/12. Tolerating well aside from a some loose stools.  --Sent rx for imodium prn  - She met with Dr. Richardson in [place], recommended against  Oophorectomy for several reasons. If her pleural effusion clears may be an option. Last Zoladex on 8/28/18. Given today. Next due ~11/12  - Follow up ~10/30 with Lima Griffiths PA-C during second 7 on, per Dr. Whitman  - Per Dr. Whitman, will restage after C2. Will order CT CAP and neck prior to Antonette before C3.    Bone mets: Continue monthly Zometa every 3 months. Last dose 7/3/18.  --Given today. Next due ~January     Left hydropneumothorax s/p chest tube. Bilateral pleural effusions. S/p right thoracentesis on 10/9 and S/p left pleurx placement 10/11.  Suspect pneumothorax 2/2 thoracentesis on 10/3 for recurrent malignant pleural  effusion vs coughing vs spontaneous. A/p chest tube placement in ED. First f/u CXR showed reexpansion of the lung. No significant SOB or hypoxia. Pulmonology consulted to manage chest tube. Chest tube fell out 10/10 AM and decision was made to place PleurX drain 10/11 prior to discharge on L side for recurrent pleural effusion. [facility] teaching provided for PleurX drain care prior to discharge.   --She denies any pain at pleurx site. Mild, stable WILSON. Has follow up CXR on 10/23 and will see thoracic surgery NP    Pericardial effusion with tamponade physiology, s/p pericardiocentesis.  Noticed on CT scan on 10/6. Echo demonstrated tamponade physiology. Syncope likely 2/2 this. S/p pericardiocentesis: 4290 WBC, LDH 2289, cytology positive for malignancy  -Cardiology consulted, appreciate input. Performed pericardiocentesis 10/7, left a pericardial drain and removed the following day 10/8. ECHO demonstrated trivial pericardial effusion (improved). Recommend repeat ECHO to evaluate in ~1 month.  --Will order ECHO ~11/8 and cardiology follow up.    ID: BC on 10/6 positive for Staph warneri, felt to be contaminant as all subsequent BCs NG. She now states she has pruritic symptoms consistent with vaginal yeast infection she has had in past. Afebrile.  --Sent rx for fluconazole 150 mg po x 1.       FEN: Weight continues to downtrend. She had been certified for cannabis and states this is working well and improving her appetite. No recent nausea but reported dysgeusia. She has zofran at home if needed with Xeloda. Encouraged her to resume protein shakes and to increase po intake  --Nutrition referral placed.      Health maintenance: Influenza vaccine given on 10/11.    Karena Cox PA-C  [facility]  [address] [place] [address] [phone]

## 2018-10-15 NOTE — TELEPHONE ENCOUNTER
ED / Discharge Outreach Protocol    Patient Contact    Attempt # 2    Was call answered?  No.  Left message on voicemail with information to call me back.  Jackie Torres RN

## 2018-10-15 NOTE — TELEPHONE ENCOUNTER
Dr Michelle,    Pt was in clinic with complication from her breast CA . She has multiple oncology appts including today. Do you need to see her in clinic for post hosp check?    Vanda Hansen, RN, BSN

## 2018-10-15 NOTE — TELEPHONE ENCOUNTER
If she has hospital follow up with her oncologist, she does not need to see me additionally. Thanks, Marilu Michelle M.D.

## 2018-10-15 NOTE — MR AVS SNAPSHOT
After Visit Summary   10/15/2018    Shan Marsh    MRN: [id number]           Patient Information     Date Of Birth          1970        Visit Information        Provider Department      10/15/2018 11:20 AM Karena Cox PA [facility]        Today's Diagnoses     Diarrhea, unspecified type    -  1    Metastatic breast cancer (H)        Yeast infection of the vagina        Bone metastasis (H)        Pericardial effusion           Follow-ups after your visit        Additional Services     NUTRITION REFERRAL       Your provider has referred you to: [facility]: [facility] (on call location)  [facility] [phone]   [email or web address]    Please be aware that coverage of these services is subject to the terms and limitations of your health insurance plan.  Call member services at your health plan with any benefit or coverage questions.      Please bring the following with you to your appointment:    (1) This referral request  (2) Any documents given to you regarding this referral  (3) Any specific questions you have about diet and/or food choices                  Your next 10 appointments already scheduled     Oct 23, 2018  9:00 AM CDT   XR CHEST 2 VIEWS with UCXR1   [facility] ([facility])    [address] [address] [phone]           How do I prepare for my exam? (Food and drink instructions) No Food and Drink Restrictions.  How do I prepare for my exam? (Other instructions) You do not need to do anything special for this exam.  What should I wear: Wear comfortable clothes.  How long does the exam take: Most scans take less than 5 minutes.  What should I bring: Bring a list of your medicines, including vitamins, minerals and over-the-counter drugs. It is safest to leave personal items at home.  Do I need a :  No  is needed.  What  do I need to tell my doctor: Tell your doctor if there s any chance you are pregnant.  What should I do after the exam: No restrictions, You may resume normal activities.  What is this test: An image of a specific body part shown in shades of black and white.  Who should I call with questions: If you have any questions, please call the Imaging Department where you will have your exam. Directions, parking instructions, and other information is available on our website, xiao qu [email or web address].            Oct 23, 2018 10:00 AM CDT   (Arrive by 9:45 AM)   Return Visit with TURNER Bahena   [facility] ([facility])    [address]  [address]  [facility] [address] [phone]              Future tests that were ordered for you today     Open Future Orders        Priority Expected Expires Ordered    CT Chest/Abdomen/Pelvis w Contrast Routine 11/2/2018 10/15/2019 10/15/2018    CT Soft tissue neck w contrast* Routine 11/2/2018 10/15/2019 10/15/2018    Echocardiogram Complete Routine 11/8/2018 10/15/2019 10/15/2018            Who to contact     If you have questions or need follow up information about today's clinic visit or your schedule please contact [facility] directly at [phone].  Normal or non-critical lab and imaging results will be communicated to you by [employer], letter or phone within 4 business days after the clinic has received the results. If you do not hear from us within 7 days, please contact the clinic through MyChart or phone. If you have a critical or abnormal lab result, we will notify you by phone as soon as possible.  Submit refill requests through Pick1 or call your pharmacy and they will forward the refill request to us. Please allow 3 business days for your refill to be completed.          Additional Information About Your Visit        Pick1 Information     Pick1 gives you secure access to your  "electronic health record. If you see a primary care provider, you can also send messages to your care team and make appointments. If you have questions, please call your primary care clinic.  If you do not have a primary care provider, please call [phone] and they will assist you.        Care EveryWhere ID     This is your Care EveryWhere ID. This could be used by other organizations to access your [facility] medical records  [id number]        Your Vitals Were     Pulse Temperature Respirations Height Last Period Pulse Oximetry    95 97.6  F (36.4  C) (Oral) 16 1.626 m (5' 4\") 10/06/2017 (Approximate) 100%    BMI (Body Mass Index)                   18.54 kg/m2            Blood Pressure from Last 3 Encounters:   10/15/18 108/70   10/11/18 121/84   10/03/18 94/69    Weight from Last 3 Encounters:   10/15/18 49 kg (108 lb)   10/11/18 53.3 kg (117 lb 6.4 oz)   10/03/18 50.3 kg (111 lb)              We Performed the Following     Ca27.29  breast tumor marker     CBC with platelets differential     CEA     Comprehensive metabolic panel     Magnesium     NUTRITION REFERRAL     Phosphorus          Today's Medication Changes          These changes are accurate as of 10/15/18  2:38 PM.  If you have any questions, ask your nurse or doctor.               Start taking these medicines.        Dose/Directions    fluconazole 150 MG tablet   Commonly known as:  DIFLUCAN   Used for:  Yeast infection of the vagina   Started by:  Karena Cox PA        Dose:  150 mg   Take 1 tablet (150 mg) by mouth once for 1 dose   Quantity:  1 tablet   Refills:  0       loperamide 2 MG tablet   Commonly known as:  IMODIUM A-D   Used for:  Diarrhea, unspecified type   Started by:  Karena Cox PA        Dose:  2 mg   Take 1 tablet (2 mg) by mouth 4 times daily as needed for diarrhea   Quantity:  30 tablet   Refills:  3            Where to get your medicines      These medications were sent to [facility], " [place] - [address]  [address], [facility] [address]     Phone:  [phone]     fluconazole 150 MG tablet    loperamide 2 MG tablet                Primary Care Provider Office Phone # Fax #    Marilu Michelle [phone]       [address]  [facility] [address]        Equal Access to Services     [facility]: Hadii aad ku hadasho Soomaali, waaxda luqadaha, qaybta kaalmada adeegyada, waxay idiin hayaan adeeg kharash la'aan . So [facility] [phone].    ATENCIÓN: Si habla español, tiene a gamez disposición servicios gratuitos de asistencia lingüística. Gabriella al [phone].    We comply with applicable federal civil rights laws and [place] laws. We do not discriminate on the basis of race, color, national origin, age, disability, sex, sexual orientation, or gender identity.            Thank you!     Thank you for choosing [facility]  for your care. Our goal is always to provide you with excellent care. Hearing back from our patients is one way we can continue to improve our services. Please take a few minutes to complete the written survey that you may receive in the mail after your visit with us. Thank you!             Your Updated Medication List - Protect others around you: Learn how to safely use, store and throw away your medicines at www.disposemymeds.org.          This list is accurate as of 10/15/18  2:38 PM.  Always use your most recent med list.                   Brand Name Dispense Instructions for use Diagnosis    calcium carbonate 500 mg (elemental) 500 MG tablet    OS-KRYSTEN     Take 1 tablet by mouth daily        capecitabine 500 MG tablet CHEMO    XELODA    84 tablet    Take 3 tablets (1,500 mg) by mouth 2 times daily Take on day 1-7 and 15-21 of 28-day cycle.    Metastatic breast cancer (H)       diclofenac 1 % Gel topical gel    VOLTAREN    1 Tube    Place 2-4 g onto the skin 4 times daily    Metastatic breast cancer (H)       ENZYME DIGEST Caps      Take  1 capsule by mouth daily        fluconazole 150 MG tablet    DIFLUCAN    1 tablet    Take 1 tablet (150 mg) by mouth once for 1 dose    Yeast infection of the vagina       loperamide 2 MG tablet    IMODIUM A-D    30 tablet    Take 1 tablet (2 mg) by mouth 4 times daily as needed for diarrhea    Diarrhea, unspecified type       medical cannabis (Patient's own supply.  Not a prescription)      (This is NOT a prescription, and does not certify that the patient has a qualifying medical condition for medical cannabis.  The purpose of this order is  to document that the patient reports taking medical cannabis.)        ondansetron 8 MG tablet    ZOFRAN    40 tablet    Take 1 tablet (8 mg) by mouth every 8 hours as needed for nausea    Metastatic breast cancer (H), Bone metastasis (H)       prochlorperazine 10 MG tablet    COMPAZINE     Take 10 mg by mouth before 1st dose of oral chemotherapy, then prn thereafter        traMADol 50 MG tablet    ULTRAM     Take 1-2 tablets ( mg) by mouth every 6 hours as needed for moderate pain or headaches        VITAMIN D (CHOLECALCIFEROL) PO      Take 1,000 Units by mouth daily

## 2018-10-15 NOTE — PROGRESS NOTES
Oncology/Hematology Visit Note  Oct 15, 2018    Reason for Visit: follow up of metastatic breast cancer, ER positive. Post hospital discharge follow up.    History of Present Illness: Shan Marsh is a 48 year old female without significant past medical history with recent diagnosis of metastatic breast cancer after presenting with left shoulder pain. Shoulder MRI showed soft tissue mass close to distal clavicle concerning for malignancy. She had PET/CT 5/9/17 showing hypermetabolic left axillary, supraclavicular, mediastinal and hilar nodes. She had biopsies of left axilla and hilar nodes showing metastatic breast cancer, strongly ER positive, moderately WV positive and HER-2 kaitlin non-amplified. She originally had E2oH5I2, ER/WV positive, HER-2 negative breast cancer diagnosed in spring of 2012 s/p bilateral mastectomies and Tamoxifen from 8/2012-2/2013 and discontinued due to side effects. She met with Dr. Whitman most recently on 5/16/17 who recommended treatment on BIG10 clinical trial with palbociclib and tamoxifen. She started treatment on 5/24/17 and required dose reduction for neutropenia. CT CAP and bone scan 1/11/2018 showing progressive disease with new bony lesions in the ribs, T spine, and possibly liver, lungs. She started Zoladex and abemiciclib on 1/12/2018.      In March 2018, she sought treatment at [facility] in [place], under the care of Dr. Brooks, where she received:  - Vallovex vaccine (per FDA, is in phase I trials here in [place])  - Insulin potentiated therapy with conjugated chemotherapy (5% chemotherapy, unclear what chemotherapy)  - Gina's toxins, IV vitamin C, IV Ozone, hyperbaric chamber, vitamin B17 infusion, vitamin CK3 IV, and coffee enemas PRN  - Also taking oral niacin, Lugol, acidol, pancreatin, and thyroid desiccated liver, per their regimen  - She was continued on Letrozole and advised to conintue Zoladex     She met with Dr. Whitman and patient wished to continue  alternative regimen. Abemiciclib was placed on hold and remains on hold. She was continued on Zoladex and letrozole. While in [place], she required thoracentesis for symptomatic pleural effusion. A repeat PET August 2018 showing widely progressive disease with multiple new bone mets involving the spine, ribs, pelvis, and sacrum; new lesions involving the left lateral thoracic/abdominal wall musculature and subcutaneous tissues, new metastases in the left adrenal gland, and recurrent large L pleural effusion. She was started on Xeloda 1500 mg BID on 9/5/18.     She was admitted 10/4-10/11/18 after syncopal event/unwitnessed fall at home. Found to have left pneumothorax and pericardial effusion with tamponade physiology. She had left chest tube placed in ED which then fell out on 10/10. Left pleurx placed on 10/11. She had pericardiocentesis on 10/7, drain removed 10/8. Follow up echo on 10/11 improved. She started C2 Xeloda on 10/12.     Please see previous notes for further details on the patient's history. She comes in today for hospital discharge follow up.    Interval History:  She has been feeling well since hospital discharge. No further left sided chest/neck pain. No falls, syncope or near syncope. Her weight is down about 4 lbs. She has altered taste but no nausea/vomiting. No mucositis, heartburn. She feels cannabis is improving her appetite. She has done protein shakes in past, but none recently. Plans to resume. She is drinking 4 large bottles of water daily. No edema in her legs--swelling went down after discharge.    No fevers, temp has not been over 99 F. No pain from pleurx site. Close friend is an RN that helps her drain it. She has been draining every day as instructed by pulmonary. She did 600 ml the first day, then 5ml the next day. Yesterday was only 3ml. Fluid is clear, no blood. Breathing has been good. She had some mild dyspnea on stairs. No dizziness.  Occasional cough, non-productive.  Occasional palpitations.     She states she has a vaginal yeast infection with no vaginal discharge but pruritic symptoms.    She started Xeloda 1500mg BID on 10/12. She had some nausea with the first cycle of Xeloda, but so far has not had nausea. She does have some loose stools a few times/day, but no watery diarrhea. She has not had any hand/foot syndrome so far.     Review of Systems:  Patient denies any of the following except if noted above: abdominal pain, urinary concerns, headaches, numbness, tingling, rashes or skin lesions, bleeding or bruising issues.    Current Outpatient Prescriptions   Medication Sig Dispense Refill     calcium carbonate (OS-KRYSTEN 500 MG [place]. CA) 1250 MG tablet Take 1 tablet by mouth daily       capecitabine (XELODA) 500 MG tablet CHEMO Take 3 tablets (1,500 mg) by mouth 2 times daily Take on day 1-7 and 15-21 of 28-day cycle. 84 tablet 0     diclofenac (VOLTAREN) 1 % GEL topical gel Place 2-4 g onto the skin 4 times daily 1 Tube 0     Digestive Enzymes (ENZYME DIGEST) CAPS Take 1 capsule by mouth daily       medical cannabis (Patient's own supply.  Not a prescription) (This is NOT a prescription, and does not certify that the patient has a qualifying medical condition for medical cannabis.  The purpose of this order is  to document that the patient reports taking medical cannabis.)       ondansetron (ZOFRAN) 8 MG tablet Take 1 tablet (8 mg) by mouth every 8 hours as needed for nausea 40 tablet 3     prochlorperazine (COMPAZINE) 10 MG tablet Take 10 mg by mouth before 1st dose of oral chemotherapy, then prn thereafter       traMADol (ULTRAM) 50 MG tablet Take 1-2 tablets ( mg) by mouth every 6 hours as needed for moderate pain or headaches       VITAMIN D, CHOLECALCIFEROL, PO Take 1,000 Units by mouth daily        [DISCONTINUED] tamoxifen (NOLVADEX) 20 MG tablet Take 1 tablet (20 mg) by mouth daily 28 tablet 0       Physical Examination:  General: The patient is a pleasant female  in no acute distress.  LMP 10/06/2017 (Approximate)  Wt Readings from Last 10 Encounters:   10/11/18 53.3 kg (117 lb 6.4 oz)   10/03/18 50.3 kg (111 lb)   09/05/18 51.7 kg (114 lb)   09/04/18 51.3 kg (113 lb)   08/28/18 52.6 kg (116 lb)   07/30/18 52.8 kg (116 lb 6.4 oz)   07/03/18 51.2 kg (112 lb 14.4 oz)   07/02/18 51.8 kg (114 lb 4 oz)   06/05/18 51 kg (112 lb 8 oz)   05/07/18 50.1 kg (110 lb 8 oz)     HEENT: EOMI, PERRL. Sclerae are anicteric. Oral mucosa is pink and moist with no lesions or thrush.   Lymph: Palpable left supraclavicular LNs. No other palpable cervical or supraclavicular LNs.   Heart: Regular rate and rhythm.   Lungs: Diminished BS in bilateral bases. Otherwise clear to auscultation bilaterally. No w/w/r  Chest: left pleurx site with dressing c/d/i  Abdomen: Bowel sounds present, soft, nontender with no palpable hepatosplenomegaly or masses.   Extremities: No lower extremity edema noted bilaterally.   Neuro: Cranial nerves II through XII are grossly intact.  Skin: No rashes, petechiae, or bruising noted on exposed skin.    Laboratory Data:  Results for NIKKIE HICKS (MRN [id number]) as of 10/15/2018 14:05   10/15/2018 11:56   Sodium 137   Potassium 3.8   Chloride 102   Carbon Dioxide 28   Urea Nitrogen 10   Creatinine 0.64   GFR Estimate >90   GFR Estimate If Black >90   Calcium 9.2   Anion Gap 7   Magnesium 2.4 (H)   Phosphorus 3.1   Albumin 2.8 (L)   Protein Total 7.9   Bilirubin Total 0.4   Alkaline Phosphatase 169 (H)   ALT 47   AST 47 (H)   Glucose 114 (H)   WBC 5.6   Hemoglobin 13.0   Hematocrit 40.3   Platelet Count 304   RBC Count 4.09   MCV 99   MCH 31.8   MCHC 32.3   RDW 15.5 (H)   Diff Method Automated Method   % Neutrophils 73.9   % Lymphocytes 18.4   % Monocytes 6.1   % Eosinophils 0.9   % Basophils 0.5   % Immature Granulocytes 0.2   Nucleated RBCs 0   Absolute Neutrophil 4.1   Absolute Lymphocytes 1.0   Absolute Monocytes 0.3   Absolute Eosinophils 0.1   Absolute  Basophils 0.0   Abs Immature Granulocytes 0.0   Absolute Nucleated RBC 0.0       Assessment and Plan:    Shan Marsh is a 47-year-old female with history of a T1 N0, ER/MD positive, HER2-negative left breast cancer treated with bilateral mastectomies in 2012, now with metastatic disease (bones, pleural effusions, lungs, pericardial effusion, liver, left adrenal/retroperitoneal mets), ER/MD positive, HER2 negative.       Metastatic breast cancer: She was recommended to start abemiciclib however, shortly after starting the medication, she sought care in [place] and received a variety of alternative/investigative agents, as listed in HPI.  Abemiciclib was stopped. PET 8/27 showing widely progressive disease with numerous new bone metastases, new metastases involving the left thoracic/abdominal wall musculature and subcutaneous tissues, new left adrenal met, and recurrent large L pleural effusion. She was started on Xeloda 1500 mg BID, 7 days on/7 days off on 9/5/18. Held during hospital admission 10/4-10/11. Pericardial fluid positive for malignancy. Xeloda C2 started on 10/12. Tolerating well aside from a some loose stools.  --Sent rx for imodium prn  - She met with Dr. Richardson in [place], recommended against  Oophorectomy for several reasons. If her pleural effusion clears may be an option. Last Zoladex on 8/28/18. Given today. Next due ~11/12  - Follow up ~10/30 with Lima Griffiths PA-C during second 7 on, per Dr. Whitman  - Per Dr. Whitman, will restage after C2. Will order CT CAP and neck prior to Antonette before C3.    Bone mets: Continue monthly Zometa every 3 months. Last dose 7/3/18.  --Given today. Next due ~January     Left hydropneumothorax s/p chest tube. Bilateral pleural effusions. S/p right thoracentesis on 10/9 and S/p left pleurx placement 10/11.  Suspect pneumothorax 2/2 thoracentesis on 10/3 for recurrent malignant pleural effusion vs coughing vs spontaneous. A/p chest tube placement in ED. First f/u CXR  showed reexpansion of the lung. No significant SOB or hypoxia. Pulmonology consulted to manage chest tube. Chest tube fell out 10/10 AM and decision was made to place PleurX drain 10/11 prior to discharge on L side for recurrent pleural effusion. [facility] teaching provided for PleurX drain care prior to discharge.   --She denies any pain at pleurx site. Mild, stable WILSON. Has follow up CXR on 10/23 and will see thoracic surgery NP    Pericardial effusion with tamponade physiology, s/p pericardiocentesis.  Noticed on CT scan on 10/6. Echo demonstrated tamponade physiology. Syncope likely 2/2 this. S/p pericardiocentesis: 4290 WBC, LDH 2289, cytology positive for malignancy  -Cardiology consulted, appreciate input. Performed pericardiocentesis 10/7, left a pericardial drain and removed the following day 10/8. ECHO demonstrated trivial pericardial effusion (improved). Recommend repeat ECHO to evaluate in ~1 month.  --Will order ECHO ~11/8 and cardiology follow up.    ID: BC on 10/6 positive for Staph warneri, felt to be contaminant as all subsequent BCs NG. She now states she has pruritic symptoms consistent with vaginal yeast infection she has had in past. Afebrile.  --Sent rx for fluconazole 150 mg po x 1.       FEN: Weight continues to downtrend. She had been certified for cannabis and states this is working well and improving her appetite. No recent nausea but reported dysgeusia. She has zofran at home if needed with Xeloda. Encouraged her to resume protein shakes and to increase po intake  --Nutrition referral placed.      Health maintenance: Influenza vaccine given on 10/11.    Karena Cox PA-C  [facility]  [address] [place] [address] [phone]

## 2018-10-15 NOTE — PROGRESS NOTES
Infusion Nursing Note:  Shan Marsh presents today for Cycle 10 Zoladex, Zometa.    Patient seen by provider today: Yes: GRAY Gibson    Intravenous Access:  Peripheral IV placed.    Treatment Conditions:  Lab Results   Component Value Date    HGB 13.0 10/15/2018     Lab Results   Component Value Date    WBC 5.6 10/15/2018      Lab Results   Component Value Date    ANEU 4.1 10/15/2018     Lab Results   Component Value Date     10/15/2018      Lab Results   Component Value Date     10/15/2018                   Lab Results   Component Value Date    POTASSIUM 3.8 10/15/2018           Lab Results   Component Value Date    MAG 2.4 10/15/2018            Lab Results   Component Value Date    CR 0.64 10/15/2018                   Lab Results   Component Value Date    KRYSTEN 9.2 10/15/2018                Lab Results   Component Value Date    BILITOTAL 0.4 10/15/2018           Lab Results   Component Value Date    ALBUMIN 2.8 10/15/2018                    Lab Results   Component Value Date    ALT 47 10/15/2018           Lab Results   Component Value Date    AST 47 10/15/2018     Results reviewed, labs MET treatment parameters, ok to proceed with treatment.      Post Infusion Assessment:  Patient tolerated infusion without incident.  Patient tolerated injection to left abdomen without incident.  Blood return noted pre and post infusion.  Site patent and intact, free from redness, edema or discomfort.  No evidence of extravasations. Access discontinued per protocol.    Discharge Plan:   Patient declined prescription refills.  Copy of AVS reviewed with patient and/or family.  Return appointment not yet made; IB sent to GRAY Gibson to put orders in for return of next Zometa/Zoladex.   Patient discharged in stable condition accompanied by: self.  Departure Mode: Ambulatory.    Rachel Villareal RN

## 2018-10-15 NOTE — NURSING NOTE
"Oncology Rooming Note    October 15, 2018 12:06 PM   Shan Marsh is a 48 year old female who presents for:    Chief Complaint   Patient presents with     Blood Draw     IV placed by Vascular Access     RECHECK     ONC breast Ca hosp f/ip      Initial Vitals: /70  Pulse 95  Temp 97.6  F (36.4  C) (Oral)  Resp 16  Ht 1.626 m (5' 4\")  Wt 49 kg (108 lb)  LMP 10/06/2017 (Approximate)  SpO2 100%  BMI 18.54 kg/m2 Estimated body mass index is 18.54 kg/(m^2) as calculated from the following:    Height as of this encounter: 1.626 m (5' 4\").    Weight as of this encounter: 49 kg (108 lb). Body surface area is 1.49 meters squared.  No Pain (0) Comment: Data Unavailable   Patient's last menstrual period was 10/06/2017 (approximate).  Allergies reviewed: Yes  Medications reviewed: Yes    Medications: Medication refills not needed today.  Pharmacy name entered into [email or web address]:    [facility] - [place], [place] - [address]  [facility] - [place], [place] - [address]    Clinical concerns: none      6 minutes for nursing intake (face to face time)     Nyasia MARTIN Alcantara              "

## 2018-10-15 NOTE — PROGRESS NOTES
Left message on patient's home phone to start Xeloda as recommended by Dr Whitman. Instructed patient to call the Pharm D managing Xeloda if hasn't started so they are aware of when Xeloda was started. Instructed patient to call with any follow-up questions. Sharifa Celeste RN, BSN

## 2018-10-20 ENCOUNTER — OFFICE VISIT (OUTPATIENT)
Dept: URGENT CARE | Facility: URGENT CARE | Age: 48
End: 2018-10-20
Payer: COMMERCIAL

## 2018-10-20 ENCOUNTER — HOSPITAL ENCOUNTER (EMERGENCY)
Facility: CLINIC | Age: 48
Discharge: HOME | End: 2018-10-20
Attending: INTERNAL MEDICINE | Admitting: INTERNAL MEDICINE
Payer: COMMERCIAL

## 2018-10-20 ENCOUNTER — APPOINTMENT (OUTPATIENT)
Dept: GENERAL RADIOLOGY | Facility: CLINIC | Age: 48
End: 2018-10-20
Attending: INTERNAL MEDICINE
Payer: COMMERCIAL

## 2018-10-20 ENCOUNTER — APPOINTMENT (OUTPATIENT)
Dept: CT IMAGING | Facility: CLINIC | Age: 48
End: 2018-10-20
Attending: INTERNAL MEDICINE
Payer: COMMERCIAL

## 2018-10-20 VITALS
SYSTOLIC BLOOD PRESSURE: 104 MMHG | WEIGHT: 108 LBS | OXYGEN SATURATION: 99 % | TEMPERATURE: 97.8 F | HEART RATE: 78 BPM | BODY MASS INDEX: 18.44 KG/M2 | HEIGHT: 64 IN | DIASTOLIC BLOOD PRESSURE: 68 MMHG

## 2018-10-20 VITALS
DIASTOLIC BLOOD PRESSURE: 72 MMHG | BODY MASS INDEX: 19 KG/M2 | TEMPERATURE: 98.2 F | RESPIRATION RATE: 16 BRPM | OXYGEN SATURATION: 97 % | SYSTOLIC BLOOD PRESSURE: 110 MMHG | WEIGHT: 110.7 LBS | HEART RATE: 89 BPM

## 2018-10-20 DIAGNOSIS — C50.919 METASTATIC BREAST CANCER: ICD-10-CM

## 2018-10-20 DIAGNOSIS — M79.621 PAIN OF RIGHT UPPER ARM: ICD-10-CM

## 2018-10-20 DIAGNOSIS — J90 PLEURAL EFFUSION: ICD-10-CM

## 2018-10-20 DIAGNOSIS — M79.621 PAIN OF RIGHT UPPER ARM: Primary | ICD-10-CM

## 2018-10-20 LAB
ALBUMIN SERPL-MCNC: 3.3 G/DL (ref 3.4–5)
ALP SERPL-CCNC: 151 U/L (ref 40–150)
ALT SERPL W P-5'-P-CCNC: 33 U/L (ref 0–50)
ANION GAP SERPL CALCULATED.3IONS-SCNC: 6 MMOL/L (ref 3–14)
AST SERPL W P-5'-P-CCNC: 36 U/L (ref 0–45)
BASOPHILS # BLD AUTO: 0 10E9/L (ref 0–0.2)
BASOPHILS NFR BLD AUTO: 0.3 %
BILIRUB SERPL-MCNC: 0.3 MG/DL (ref 0.2–1.3)
BUN SERPL-MCNC: 11 MG/DL (ref 7–30)
CALCIUM SERPL-MCNC: 9.2 MG/DL (ref 8.5–10.1)
CHLORIDE SERPL-SCNC: 102 MMOL/L (ref 94–109)
CO2 SERPL-SCNC: 28 MMOL/L (ref 20–32)
CREAT SERPL-MCNC: 0.62 MG/DL (ref 0.52–1.04)
DIFFERENTIAL METHOD BLD: ABNORMAL
EOSINOPHIL # BLD AUTO: 0.1 10E9/L (ref 0–0.7)
EOSINOPHIL NFR BLD AUTO: 0.7 %
ERYTHROCYTE [DISTWIDTH] IN BLOOD BY AUTOMATED COUNT: 15.3 % (ref 10–15)
GFR SERPL CREATININE-BSD FRML MDRD: >90 ML/MIN/1.7M2
GLUCOSE SERPL-MCNC: 85 MG/DL (ref 70–99)
HCT VFR BLD AUTO: 41.7 % (ref 35–47)
HGB BLD-MCNC: 13.3 G/DL (ref 11.7–15.7)
IMM GRANULOCYTES # BLD: 0 10E9/L (ref 0–0.4)
IMM GRANULOCYTES NFR BLD: 0.4 %
INR PPP: 0.94 (ref 0.86–1.14)
LYMPHOCYTES # BLD AUTO: 1.6 10E9/L (ref 0.8–5.3)
LYMPHOCYTES NFR BLD AUTO: 22.6 %
MCH RBC QN AUTO: 32.4 PG (ref 26.5–33)
MCHC RBC AUTO-ENTMCNC: 31.9 G/DL (ref 31.5–36.5)
MCV RBC AUTO: 102 FL (ref 78–100)
MONOCYTES # BLD AUTO: 0.4 10E9/L (ref 0–1.3)
MONOCYTES NFR BLD AUTO: 5.4 %
NEUTROPHILS # BLD AUTO: 4.9 10E9/L (ref 1.6–8.3)
NEUTROPHILS NFR BLD AUTO: 70.6 %
NRBC # BLD AUTO: 0 10*3/UL
NRBC BLD AUTO-RTO: 0 /100
NT-PROBNP SERPL-MCNC: 272 PG/ML (ref 0–450)
PLATELET # BLD AUTO: 384 10E9/L (ref 150–450)
POTASSIUM SERPL-SCNC: 3.6 MMOL/L (ref 3.4–5.3)
PROT SERPL-MCNC: 8.2 G/DL (ref 6.8–8.8)
RBC # BLD AUTO: 4.11 10E12/L (ref 3.8–5.2)
SODIUM SERPL-SCNC: 137 MMOL/L (ref 133–144)
TROPONIN I SERPL-MCNC: <0.015 UG/L (ref 0–0.04)
WBC # BLD AUTO: 7 10E9/L (ref 4–11)

## 2018-10-20 PROCEDURE — 73060 X-RAY EXAM OF HUMERUS: CPT | Mod: RT

## 2018-10-20 PROCEDURE — 80053 COMPREHEN METABOLIC PANEL: CPT | Performed by: INTERNAL MEDICINE

## 2018-10-20 PROCEDURE — 99291 CRITICAL CARE FIRST HOUR: CPT | Mod: 25 | Performed by: INTERNAL MEDICINE

## 2018-10-20 PROCEDURE — 71046 X-RAY EXAM CHEST 2 VIEWS: CPT

## 2018-10-20 PROCEDURE — 99214 OFFICE O/P EST MOD 30 MIN: CPT | Mod: 24 | Performed by: PHYSICIAN ASSISTANT

## 2018-10-20 PROCEDURE — 83880 ASSAY OF NATRIURETIC PEPTIDE: CPT | Performed by: INTERNAL MEDICINE

## 2018-10-20 PROCEDURE — 93005 ELECTROCARDIOGRAM TRACING: CPT | Performed by: INTERNAL MEDICINE

## 2018-10-20 PROCEDURE — 71250 CT THORAX DX C-: CPT

## 2018-10-20 PROCEDURE — 99285 EMERGENCY DEPT VISIT HI MDM: CPT | Mod: 25 | Performed by: INTERNAL MEDICINE

## 2018-10-20 PROCEDURE — 93010 ELECTROCARDIOGRAM REPORT: CPT | Mod: 59 | Performed by: INTERNAL MEDICINE

## 2018-10-20 PROCEDURE — 93308 TTE F-UP OR LMTD: CPT | Performed by: INTERNAL MEDICINE

## 2018-10-20 PROCEDURE — 72040 X-RAY EXAM NECK SPINE 2-3 VW: CPT

## 2018-10-20 PROCEDURE — 93308 TTE F-UP OR LMTD: CPT | Mod: 26 | Performed by: INTERNAL MEDICINE

## 2018-10-20 PROCEDURE — 85025 COMPLETE CBC W/AUTO DIFF WBC: CPT | Performed by: INTERNAL MEDICINE

## 2018-10-20 PROCEDURE — 85610 PROTHROMBIN TIME: CPT | Performed by: INTERNAL MEDICINE

## 2018-10-20 PROCEDURE — 84484 ASSAY OF TROPONIN QUANT: CPT | Performed by: INTERNAL MEDICINE

## 2018-10-20 ASSESSMENT — ENCOUNTER SYMPTOMS
SHORTNESS OF BREATH: 0
HEADACHES: 1
ABDOMINAL PAIN: 0
NECK PAIN: 1
SORE THROAT: 1
COLOR CHANGE: 0
EYE REDNESS: 0
NECK PAIN: 1
ARTHRALGIAS: 0
FEVER: 0
VOMITING: 0
SHORTNESS OF BREATH: 0
CONFUSION: 0
NAUSEA: 1
NECK STIFFNESS: 0
FEVER: 0
DIFFICULTY URINATING: 0
HEADACHES: 0

## 2018-10-20 NOTE — ED NOTES
Bed: IN01  Expected date: 10/20/18  Expected time: 11:28 AM  Means of arrival: Car  Comments:  Shan SEAMAN 74  48 yr female  Metastatic breast cancer mets to bone  Left humerus pain  Recent hydropneumothorax  Left chest tube currently

## 2018-10-20 NOTE — DISCHARGE INSTRUCTIONS
Please make an appointment to follow up with Hematology Oncology Clinic (phone: [phone]) as soon as possible.  Results for orders placed or performed during the hospital encounter of 10/20/18   Humerus XR, G/E 2 views, right    Narrative    Exam: XR HUMERUS RT G/E 2 VW, 10/20/2018 2:36 PM    Indication: pain breast ca;     Comparison: None    Findings:   AP and lateral views of the right humerus. There is no visualized  fracture of the humerus or proximal radius and ulna. Normal alignment  of the elbow and glenohumeral joints. No significant soft tissue  swelling.      Impression    Impression:   No fracture. Normal alignment.    I have personally reviewed the examination and initial interpretation  and I agree with the findings.    ELEAZAR POLLARD MD   Cervical spine XR, 2-3 views    Narrative    Exam: XR CERVICAL SPINE 2/3 VWS, 10/20/2018 2:34 PM    Indication: pain breast ca;     Comparison: None    Findings:   AP, lateral, and odontoid views of the cervical spine. The cervical  spine is well-visualized to the level of the T2 vertebral body.  Alignment is normal. Mild disc space narrowing at C6-7. The lateral  masses of C1 are well aligned on C2. Visualized portions of the  odontoid appear normal. No acute fracture or subluxation of the  cervical spine is identified. The lung apices are clear.      Impression    Impression:   No acute fracture or subluxation of the cervical spine. Mild C6-7 disc  space degenerative narrowing.    I have personally reviewed the examination and initial interpretation  and I agree with the findings.    ELEAZAR POLLARD MD   XR Chest 2 Views    Narrative    Exam:  Chest X-ray 10/20/2018 2:41 PM    History: pain;     Comparison: 10/11/2018    Findings: PA and lateral views of the chest are obtained. Left basilar  Pleurx catheter. Interval increase in size of a left  hydropneumothorax. Slightly decreased size of a right-sided pleural  effusion.    Trachea is midline. The  cardiomediastinal silhouette is within normal  limits. Pulmonary vasculature is distinct. No focal pulmonary  opacities. No acute bony abnormalities. The upper abdomen is  unremarkable.      Impression    Impression:   1. Increased size of a left hydropneumothorax, with left basilar  Pleurx catheter in place.  2. Slightly decreased size of a right-sided pleural effusion.    I have personally reviewed the examination and initial interpretation  and I agree with the findings.    ELEAZAR POLLARD MD   POC US ECHO LIMITED    Impression    Limited Bedside Cardiac Ultrasound, performed and interpreted by me.   Indication: Weakness.  Parasternal long axis, parasternal short axis, apical 4 chamber and subcostal views were acquired.   Image quality was satisfactory.    Findings:    Global left ventricular function appears intact.  Chambers do not appear dilated.  There is no evidence of free fluid within the pericardium.    IMPRESSION: Grossly normal left ventricular function and chamber size.  No pericardial effusion. But noted to have moderate right and left pleural effusion left one may be loculated.   Chest CT w/o contrast    Narrative    EXAMINATION: Chest CT  10/20/2018 3:45 PM    CLINICAL HISTORY: Follow-up pleural effusion    COMPARISON: Chest radiograph same day, CT chest 10/6/2018.    TECHNIQUE: CT imaging obtained through the chest without contrast.  Axial, coronal, and sagittal reconstructions and axial MIP reformatted  images are reviewed.     Lungs: Loculated left pleural effusion with Pleurx catheter in place.  There are scattered foci of air within the left-sided effusion.  Loculated right pleural effusion. No focal consolidative opacities.  Unchanged peribronchovascular nodules in the bilateral upper lobes and  mild interlobular septal thickening in the lung apices. Unchanged  areas of round consolidation in the lingula and left lower lobe,  likely representing round atelectasis.    Mediastinum: The visualized  thyroid gland is within normal limits. The  central tracheobronchial tree is patent. The aorta and main pulmonary  artery are normal in caliber. Normal branching pattern of the great  vessels. Heart size is normal. Trace pericardial effusion.  Normal  thoracic vasculature. No thoracic lymphadenopathy.     Bones and soft tissues: Surgical clip in the left axilla. Bilateral  breast implants. Sclerotic bone metastases in the spine and ribs, not  significantly changed from prior. No acute bony abnormality.    Upper Abdomen: Limited evaluation of the upper abdomen demonstrates  unchanged scattered hypodensities in the liver, consistent with  metastatic disease. 1.0 cm lymph node adjacent to the celiac axis, not  significantly changed from prior. No acute abnormalities.      Impression    IMPRESSION:   In this patient with history of metastatic breast cancer, there is:  1. Loculated left hydropneumothorax with Pleurx catheter in place.  Unchanged areas of rounded consolidation in the lingula and left lower  lobe, likely representing atelectasis.  2. Small loculated right pleural effusion.  3. Trace pericardial effusion.  4. Evidence of metastatic disease in the lungs, liver, and skeleton,  unchanged.    I have personally reviewed the examination and initial interpretation  and I agree with the findings.    ELEAZAR POLLARD MD   CBC with platelets differential   Result Value Ref Range    WBC 7.0 4.0 - 11.0 10e9/L    RBC Count 4.11 3.8 - 5.2 10e12/L    Hemoglobin 13.3 11.7 - 15.7 g/dL    Hematocrit 41.7 35.0 - 47.0 %     (H) 78 - 100 fl    MCH 32.4 26.5 - 33.0 pg    MCHC 31.9 31.5 - 36.5 g/dL    RDW 15.3 (H) 10.0 - 15.0 %    Platelet Count 384 150 - 450 10e9/L    Diff Method Automated Method     % Neutrophils 70.6 %    % Lymphocytes 22.6 %    % Monocytes 5.4 %    % Eosinophils 0.7 %    % Basophils 0.3 %    % Immature Granulocytes 0.4 %    Nucleated RBCs 0 0 /100    Absolute Neutrophil 4.9 1.6 - 8.3 10e9/L    Absolute  Lymphocytes 1.6 0.8 - 5.3 10e9/L    Absolute Monocytes 0.4 0.0 - 1.3 10e9/L    Absolute Eosinophils 0.1 0.0 - 0.7 10e9/L    Absolute Basophils 0.0 0.0 - 0.2 10e9/L    Abs Immature Granulocytes 0.0 0 - 0.4 10e9/L    Absolute Nucleated RBC 0.0    Comprehensive metabolic panel   Result Value Ref Range    Sodium 137 133 - 144 mmol/L    Potassium 3.6 3.4 - 5.3 mmol/L    Chloride 102 94 - 109 mmol/L    Carbon Dioxide 28 20 - 32 mmol/L    Anion Gap 6 3 - 14 mmol/L    Glucose 85 70 - 99 mg/dL    Urea Nitrogen 11 7 - 30 mg/dL    Creatinine 0.62 0.52 - 1.04 mg/dL    GFR Estimate >90 >60 mL/min/1.7m2    GFR Estimate If Black >90 >60 mL/min/1.7m2    Calcium 9.2 8.5 - 10.1 mg/dL    Bilirubin Total 0.3 0.2 - 1.3 mg/dL    Albumin 3.3 (L) 3.4 - 5.0 g/dL    Protein Total 8.2 6.8 - 8.8 g/dL    Alkaline Phosphatase 151 (H) 40 - 150 U/L    ALT 33 0 - 50 U/L    AST 36 0 - 45 U/L   INR   Result Value Ref Range    INR 0.94 0.86 - 1.14   Troponin I   Result Value Ref Range    Troponin I ES <0.015 0.000 - 0.045 ug/L   Nt probnp inpatient (BNP)   Result Value Ref Range    N-Terminal Pro BNP Inpatient 272 0 - 450 pg/mL   EKG 12-lead, tracing only   Result Value Ref Range    Interpretation ECG Click View Image link to view waveform and result

## 2018-10-20 NOTE — ED AVS SNAPSHOT
[facility], [place], Emergency Department    [address] [place] [id number]    Phone:  [phone]                                       Shan Marsh   MRN: [id number]    Department:  [facility], Emergency Department   Date of Visit:  10/20/2018           After Visit Summary Signature Page     I have received my discharge instructions, and my questions have been answered. I have discussed any challenges I see with this plan with the nurse or doctor.    ..........................................................................................................................................  Patient/Patient Representative Signature      ..........................................................................................................................................  Patient Representative Print Name and Relationship to Patient    ..................................................               ................................................  Date                                   Time    ..........................................................................................................................................  Reviewed by Signature/Title    ...................................................              ..............................................  Date                                               Time          22EPIC Rev 08/18

## 2018-10-20 NOTE — ED TRIAGE NOTES
Pt arrived to the ED with c/o left neck and shoulder pain and this week and right arm pain starting thurrsday. Per pt she saw a massage therapist yesterday for the pain. Per pt her pain has increased today. On arrival vitals stable, afebrile. Pt alert and oriented x4.

## 2018-10-20 NOTE — MR AVS SNAPSHOT
After Visit Summary   10/20/2018    Shan Marsh    MRN: [id number]           Patient Information     Date Of Birth          1970        Visit Information        Provider Department      10/20/2018 10:55 AM Yamila Sheridan PA-C [facility]        Today's Diagnoses     Pain of right upper arm    -  1       Follow-ups after your visit        Your next 10 appointments already scheduled     Oct 23, 2018  9:00 AM CDT   XR CHEST 2 VIEWS with UCXR1   [facility] X[facility] ([facility])    [address]  1st Floor  [place] [facility] [address] [phone]           How do I prepare for my exam? (Food and drink instructions) No Food and Drink Restrictions.  How do I prepare for my exam? (Other instructions) You do not need to do anything special for this exam.  What should I wear: Wear comfortable clothes.  How long does the exam take: Most scans take less than 5 minutes.  What should I bring: Bring a list of your medicines, including vitamins, minerals and over-the-counter drugs. It is safest to leave personal items at home.  Do I need a :  No  is needed.  What do I need to tell my doctor: Tell your doctor if there s any chance you are pregnant.  What should I do after the exam: No restrictions, You may resume normal activities.  What is this test: An image of a specific body part shown in shades of black and white.  Who should I call with questions: If you have any questions, please call the Imaging Department where you will have your exam. Directions, parking instructions, and other information is available on our website, [email or web address].            Oct 23, 2018 10:00 AM CDT   (Arrive by 9:45 AM)   Return Visit with TURNER Bahena   [facility] ([facility])    [address]  [address]  [place] [address] [phone]             Oct 30, 2018  8:45 AM CDT   [facility] Lab Draw with  MASONIC LAB DRAW   [facility] Draw ([facility])    [address]  [address]  [place] [facility] [address]   [phone]            Oct 30, 2018  9:20 AM CDT   (Arrive by 9:05 AM)   Return Visit with Lima Griffiths PA-C   [facility] ([facility])    [address]  [address]  [place] [address]   [phone]            Oct 30, 2018 11:30 AM CDT   (Arrive by 11:15 AM)   NEW WITH ROOM with Rachel Pierson RPH,  [address] [facility] ([facility])    [address]  [address]  [place] [address]   [phone]            Nov 08, 2018  1:20 PM CST   CT SOFT TISSUE NECK W CONTRAST with UCCT1   [facility] [address] [facility] ([facility])    [address]  1st Floor  [facility] [facility] [address]   [phone]           How do I prepare for my exam? (Food and drink instructions) **You will have contrast for this exam.** Do not eat or drink for 2 hours before your exam. If you need to take medicine, you may take it with small sips of water. (We may ask you to take liquid medicine as well.)  The day before your exam, drink extra fluids at least six 8-ounce glasses (unless your doctor tells you to restrict your fluids).  How do I prepare for my exam? (Other instructions) Patients over 70 or patients with diabetes or kidney problems: If you haven t had a blood test (creatinine test) within the last 30 days, the Cardiologist/Radiologist may require you to get this test prior to your exam.  What should I wear: Please wear loose clothing, such as a sweat suit or jogging clothes.  Avoid snaps, zippers and other metal. We may ask you to undress and put on a hospital gown.  How long does the exam take: Most scans take less than 20 minutes.  What should I bring: Please bring any  scans or X-rays taken at other hospitals, if similar tests were done. Also bring a list of your medicines, including vitamins, minerals and over-the-counter drugs. It is safest to leave personal items at home.  Do I need a :  No  is needed.  What do I need to tell my doctor? Be sure to tell your doctor: * If you have any allergies. * If there s any chance you are pregnant. * If you are breastfeeding. * If you have diabetes as your medication may need to be adjusted for this exam.  What should I do after the exam: No restrictions, You may resume normal activities.  What is this test: A CT (computed tomography) scan is a series of pictures that allows us to look inside your body. The scanner creates images of the body in cross sections, much like slices of bread. This helps us see any problems more clearly. You may receive contrast (X-ray dye) before or during your scan. Contrast is given through an IV (small needle in your arm).  Who should I call with questions: If you have any questions, please call the Imaging Department where you will have your exam. Directions, parking instructions, and other information is available on our website, [email or web address].            Nov 08, 2018  1:40 PM CST   CT CHEST/ABDOMEN/PELVIS W CONTRAST with UCCT1   [facility] Imaging [place] [facility] ([facility])    [address] [address] [phone]           How do I prepare for my exam? (Food and drink instructions) To prepare: Do not eat or drink for 2 hours before your exam. If you need to take medicine, you may take it with small sips of water. (We may ask you to take liquid medicine as well.)  How do I prepare for my exam? (Other instructions) Please arrive 30 minutes early for your CT.  Once in the department you might be asked to drink water 15-20 minutes prior to your exam.  If indicated you may be asked to drink an oral contrast in advance of your CT.  If this  is the case, the imaging team will let you know or be in contact with you prior to your appointment  Patients over 70 or patients with diabetes or kidney problems: If you haven t had a blood test (creatinine test) within the last 30 days, the Cardiologist/Radiologist may require you to get this test prior to your exam.  If you have diabetes:  Continue to take your metformin medication on the day of your exam  What should I wear: Please wear loose clothing, such as a sweat suit or jogging clothes. Avoid snaps, zippers and other metal. We may ask you to undress and put on a hospital gown.  How long does the exam take: Most scans take less than 20 minutes.  What should I bring: Please bring any scans or X-rays taken at other hospitals, if similar tests were done. Also bring a list of your medicines, including vitamins, minerals and over-the-counter drugs. It is safest to leave personal items at home.  Do I need a : No  is needed.  What do I need to tell my doctor? Be sure to tell your doctor: * If you have any allergies. * If there s any chance you are pregnant. * If you are breastfeeding.  What should I do after the exam: No restrictions, You may resume normal activities.  What is this test: A CT (computed tomography) scan is a series of pictures that allows us to look inside your body. The scanner creates images of the body in cross sections, much like slices of bread. This helps us see any problems more clearly. You may receive contrast (X-ray dye) before or during your scan. You will be asked to drink the contrast.  Who should I call with questions: If you have any questions, please call the Imaging Department where you will have your exam. Directions, parking instructions, and other information is available on our website, [email or web address].            Nov 08, 2018  2:00 PM CST   Ech Complete with [id number]   [facility] ([facility])    [address] [place] [address]  Floor  [facility] [id number] [phone]           1.  Please bring or wear a comfortable two-piece outfit. 2.  You may eat, drink and take your normal medicines. 3.  For any questions that cannot be answered, please contact the ordering physician 4.  Please do not wear perfumes or scented lotions on the day of your exam.            Nov 08, 2018  3:30 PM CST   (Arrive by 3:15 PM)   New Patient Visit with Robbie Sanchez MD   [facility] [place]note 2[facility]    [address]  [address]  [facility] [id number] [phone]              Who to contact     If you have questions or need follow up information about today's clinic visit or your schedule please contact [facility] directly at [phone].  Normal or non-critical lab and imaging results will be communicated to you by MyChart, letter or phone within 4 business days after the clinic has received the results. If you do not hear from us within 7 days, please contact the clinic through [employer] or phone. If you have a critical or abnormal lab result, we will notify you by phone as soon as possible.  Submit refill requests through [employer] or call your pharmacy and they will forward the refill request to us. Please allow 3 business days for your refill to be completed.          Additional Information About Your Visit        [employer] Information     [employer] gives you secure access to your electronic health record. If you see a primary care provider, you can also send messages to your care team and make appointments. If you have questions, please call your primary care clinic.  If you do not have a primary care provider, please call [phone] and they will assist you.        Care EveryWhere ID     This is your Care EveryWhere ID. This could be used by other organizations to access your [place] medical records  [id number]        Your Vitals Were     Pulse Temperature Height Last Period Pulse  "Oximetry BMI (Body Mass Index)    78 97.8  F (36.6  C) (Tympanic) 5' 4\" (1.626 m) 10/06/2017 (Approximate) 99% 18.54 kg/m2       Blood Pressure from Last 3 Encounters:   10/20/18 104/68   10/15/18 108/70   10/11/18 121/84    Weight from Last 3 Encounters:   10/20/18 108 lb (49 kg)   10/15/18 108 lb (49 kg)   10/11/18 117 lb 6.4 oz (53.3 kg)              Today, you had the following     No orders found for display       Primary Care Provider Office Phone # Fax #    Marilu Michelle [phone]       [address]  [facility] [address]        Equal Access to Services     LUZ JAMES : Karla landry Sojailyn, waaxda luqadaha, qaybta kaalmada adeegyada, edi mustafa . So [facility] [phone].    ATENCIÓN: Si habla español, tiene a gamez disposición servicios gratuitos de asistencia lingüística. Llame al [phone].    We comply with applicable federal civil rights laws and [place] laws. We do not discriminate on the basis of race, color, national origin, age, disability, sex, sexual orientation, or gender identity.            Thank you!     Thank you for choosing [facility]  for your care. Our goal is always to provide you with excellent care. Hearing back from our patients is one way we can continue to improve our services. Please take a few minutes to complete the written survey that you may receive in the mail after your visit with us. Thank you!             Your Updated Medication List - Protect others around you: Learn how to safely use, store and throw away your medicines at www.disposemymeds.org.          This list is accurate as of 10/20/18 12:06 PM.  Always use your most recent med list.                   Brand Name Dispense Instructions for use Diagnosis    calcium carbonate 500 mg (elemental) 500 MG tablet    OS-KRYSTEN     Take 1 tablet by mouth daily        capecitabine 500 MG tablet CHEMO    XELODA    84 tablet    Take 3 tablets (1,500 " mg) by mouth 2 times daily Take on day 1-7 and 15-21 of 28-day cycle.    Metastatic breast cancer (H)       diclofenac 1 % Gel topical gel    VOLTAREN    1 Tube    Place 2-4 g onto the skin 4 times daily    Metastatic breast cancer (H)       ENZYME DIGEST Caps      Take 1 capsule by mouth daily        loperamide 2 MG tablet    IMODIUM A-D    30 tablet    Take 1 tablet (2 mg) by mouth 4 times daily as needed for diarrhea    Diarrhea, unspecified type       medical cannabis (Patient's own supply.  Not a prescription)      (This is NOT a prescription, and does not certify that the patient has a qualifying medical condition for medical cannabis.  The purpose of this order is  to document that the patient reports taking medical cannabis.)        ondansetron 8 MG tablet    ZOFRAN    40 tablet    Take 1 tablet (8 mg) by mouth every 8 hours as needed for nausea    Metastatic breast cancer (H), Bone metastasis (H)       prochlorperazine 10 MG tablet    COMPAZINE     Take 10 mg by mouth before 1st dose of oral chemotherapy, then prn thereafter        traMADol 50 MG tablet    ULTRAM     Take 1-2 tablets ( mg) by mouth every 6 hours as needed for moderate pain or headaches        VITAMIN D (CHOLECALCIFEROL) PO      Take 1,000 Units by mouth daily

## 2018-10-20 NOTE — ED PROVIDER NOTES
History     Chief Complaint   Patient presents with     Arm Pain     Neck Pain     The history is provided by the patient and medical records.     Shan Marsh is a 48 year old female with a medical history of metastatic breast cancer on her left breast currently on treatment with Xeloda who presents to the Emergency Department for evaluation of right humerus pain and left-sided neck pain. Per chart review, patient visited [facility] this morning for evaluation of ongoing arm pain rating of 9/10 for the past 2 days. Due to diminished breath sounds of left chest with recent history of hospitalization for hydropneumothorax and cardiac tamponade (10/4/18-10/11/18), she was recommended to see the ED for further evaluation. See below for Chest Xray results from 10/11/18    Today, patient complains of right humerus pain and left-sided neck pain. Patient reports that yesterday she felt fatigue and headache; however, denies those symptoms today. Patient reports that she is feeling similar right humerus and left-sided neck pain as the time she was hospitalized on 10/4/18. In addition, patient has previously seen a doctor about her right pupil being larger than her left and at the time, it was resolved. Patient reports taking Omega-3-6-9.     XR CHEST PORT 1 VW, 10/11/2018 - Impression  1. Interval decrease in size of left pleural effusion status post  placement of Pleurx catheter. Stable left basilar hydropneumothorax.  2. Interval increase in size of right-sided pleural effusion, which  may be loculated.    XR CHEST 2 VW, 10/11/2018  - Impression  1. Slight increase in air component the left lateral  hydropneumothorax.  2. Interval improvement in right pleural effusion, stable left pleural  Effusion.    Past Medical History:   Diagnosis Date     Breast cancer, left breast (H) 5/12    grade 1 infiltrating ductal cancer, ER/NC+, s/p bilateral mastectomy     Complication of anesthesia      bradycardia,b/p drop p childbirth     Dysplasia of cervix (uteri) 12/01    LUCAS II - III, s/p LEEP  @ [facility]     Incompetence of cervix 4/06    D&E at 19+4 weeks twin pregnancy     Shingles outbreak 5/12       Past Surgical History:   Procedure Laterality Date     D & C  9/2010    retained POC     ENDOBRONCHIAL ULTRASOUND FLEXIBLE N/A 5/12/2017    Procedure: ENDOBRONCHIAL ULTRASOUND FLEXIBLE;  Flexible Bronchoscopy, Endobronchial Ultrasound, Transbronchial Biopsy ;  Surgeon: Bandar Meyer MD;  Location: [facility]     INSERT CHEST TUBE Left 10/11/2018    Procedure: INSERT CHEST TUBE;  Insert Chest Tube ;  Surgeon: Brock Chan MD;  Location:  GI     LEEP TX, CERVICAL  12/01    LEEP or LUCAS II - III     MASTECTOMY, RECONSTRUCT BREAST, COMBINED  6/11/2012    Procedure: COMBINED MASTECTOMY, RECONSTRUCT BREAST;  Bilateral Mastectomy with Reconstruction, Left [place] Node Biopsy , placement of On-Q catheters X 2 bilateral breast.;  Surgeon: Misael Cheng MD;  Location:  OR     RECONSTRUCT BREAST BILATERAL  3/25/2013    Procedure: RECONSTRUCT BREAST BILATERAL;  Revise bilateral breasts and reconstructions, with insertion of gel breast implants with fat grafting and fat after mastectomies.;  Surgeon: Yair Olvera MD;  Location:  OR     RECONSTRUCT BREAST BILATERAL, IMPLANT PROSTHESIS BILATERAL, COMBINED  9/27/2012    Procedure: COMBINED RECONSTRUCT BREAST BILATERAL, IMPLANT PROSTHESIS BILATERAL;  Bilateral Second Stage Breast Reconstruction With Gel Implants, Fat        THORACENTESIS Left 5/7/2018    Procedure: THORACENTESIS;  Thoracentesis;  Surgeon: Venancio Hussein PA-C;  Location:  OR     THORACENTESIS Left 7/5/2018    Procedure: THORACENTESIS;  Thoracentesis;  Surgeon: Lydia Murillo PA-C;  Location:  OR     THORACENTESIS Left 9/5/2018    Procedure: THORACENTESIS;  Left Thoracentesis;  Surgeon: López Guillen PA-C;  Location:  OR     THORACENTESIS Left 10/3/2018     Procedure: THORACENTESIS;  Thoracentesis, left;  Surgeon: Eros Chauhan PA-C;  Location: [facility]     THORACENTESIS Right 10/9/2018    Procedure: THORACENTESIS;  Thoacentesis ;  Surgeon: Linh Hayes MD;  Location:  GI       Family History   Problem Relation Age of Onset     Alcohol/Drug Father      Allergies Mother      Neurologic Disorder Mother      seizures, related to fever     GASTROINTESTINAL DISEASE Mother      IBS     Blood Disease Mother      [place]     Cancer Maternal Grandfather      bladder     Circulatory Maternal Grandfather      blood clots     Alcohol/Drug Maternal Grandfather      alcohol dependency     Allergies Maternal Aunt      Allergies Other      maternal cousins     Arthritis Maternal Aunt      Prostate Cancer Maternal Uncle      Thyroid Disease Maternal Aunt      Alcohol/Drug Maternal Uncle      alcohol and drug dependency     Cancer - colorectal Maternal Uncle      GASTROINTESTINAL DISEASE Sister      IBS     Breast Cancer Other      self       Social History   Substance Use Topics     Smoking status: Former Smoker     Quit date: 6/23/1997     Smokeless tobacco: Never Used     Alcohol use No       No current facility-administered medications for this encounter.      Current Outpatient Prescriptions   Medication     calcium carbonate (OS-KRYSTEN 500 MG [place]. [place]) 1250 MG tablet     capecitabine (XELODA) 500 MG tablet CHEMO     diclofenac (VOLTAREN) 1 % GEL topical gel     Digestive Enzymes (ENZYME DIGEST) CAPS     loperamide (IMODIUM A-D) 2 MG tablet     medical cannabis (Patient's own supply.  Not a prescription)     ondansetron (ZOFRAN) 8 MG tablet     prochlorperazine (COMPAZINE) 10 MG tablet     traMADol (ULTRAM) 50 MG tablet     VITAMIN D, CHOLECALCIFEROL, PO     [DISCONTINUED] tamoxifen (NOLVADEX) 20 MG tablet        Allergies   Allergen Reactions     Erythromycin Shortness Of Breath     Sulfa Drugs      rash       I have reviewed the Medications, Allergies, Past Medical  and Surgical History, and Social History in the Epic system.    Review of Systems   Constitutional: Negative for fever.   HENT: Negative for congestion.    Eyes: Negative for redness.   Respiratory: Negative for shortness of breath.    Cardiovascular: Negative for chest pain.   Gastrointestinal: Negative for abdominal pain.   Genitourinary: Negative for difficulty urinating.   Musculoskeletal: Positive for neck pain (Left-sided). Negative for arthralgias and neck stiffness.        Positive for right humerus pain   Skin: Negative for color change.   Neurological: Negative for headaches.   Psychiatric/Behavioral: Negative for confusion.   All other systems reviewed and are negative.      Physical Exam   BP: 113/78  Heart Rate: 86  Temp: 98.2  F (36.8  C)  Resp: 16  Weight: 50.2 kg (110 lb 11.2 oz)  SpO2: 97 %      Physical Exam   Constitutional: She is oriented to person, place, and time. No distress.   HENT:   Head: Atraumatic.   Mouth/Throat: Oropharynx is clear and moist. No oropharyngeal exudate.   Eyes: Pupils are equal, round, and reactive to light. No scleral icterus.   Neck: Neck supple. No JVD present.   Cardiovascular: Normal rate, normal heart sounds and intact distal pulses.  Exam reveals no gallop and no friction rub.    No murmur heard.  Pulmonary/Chest: No tachypnea and no bradypnea. No respiratory distress. She has decreased breath sounds in the left middle field and the left lower field. She has no wheezes. She has no rhonchi. She has no rales.   Abdominal: Soft. Bowel sounds are normal. She exhibits no distension and no mass. There is no tenderness. There is no rebound and no guarding.   Musculoskeletal: She exhibits no edema.        Right upper arm: She exhibits tenderness and bony tenderness. She exhibits no swelling, no edema, no deformity and no laceration.        Arms:  Neurological: She is alert and oriented to person, place, and time. No cranial nerve deficit. Coordination normal.   Skin: Skin  is warm. No rash noted. She is not diaphoretic.       ED Course   1:15 PM  The patient was seen and examined by Rayne Rangel MD in Room ED07.     ED Course     Procedures  Results for orders placed during the hospital encounter of 10/20/18   POC US ECHO LIMITED    Impression Limited Bedside Cardiac Ultrasound, performed and interpreted by me.   Indication: Weakness.  Parasternal long axis, parasternal short axis, apical 4 chamber and subcostal views were acquired.   Image quality was satisfactory.    Findings:    Global left ventricular function appears intact.  Chambers do not appear dilated.  There is no evidence of free fluid within the pericardium.    IMPRESSION: Grossly normal left ventricular function and chamber size.  No pericardial effusion. But noted to have moderate right and left pleural effusion left one may be loculated.             EKG Interpretation:      Interpreted by MD Rayne  Time reviewed: 13:27  Symptoms at time of EKG: Right humerus pain and left-sided neck pain   Rhythm: normal sinus   Rate: 72 BPM  Axis: Normal  Ectopy: none  Conduction: Possible left atrial enlargement  ST Segments/ T Waves: Nonspecific T wave abnormalty  Q Waves: none  Comparison to prior: Unchanged from 10/2018, no low voltage    Clinical Impression: no acute changes, no longer low voltage                Critical Care time:  was 30 minutes for this patient excluding procedures.         Results for orders placed or performed during the hospital encounter of 10/20/18 (from the past 24 hour(s))   POC US ECHO LIMITED     Status: None    Collection Time: 10/20/18  1:24 PM    Impression    Limited Bedside Cardiac Ultrasound, performed and interpreted by me.   Indication: Weakness.  Parasternal long axis, parasternal short axis, apical 4 chamber and subcostal views were acquired.   Image quality was satisfactory.    Findings:    Global left ventricular function appears intact.  Chambers do not appear dilated.  There is no  evidence of free fluid within the pericardium.    IMPRESSION: Grossly normal left ventricular function and chamber size.  No pericardial effusion. But noted to have moderate right and left pleural effusion left one may be loculated.   EKG 12-lead, tracing only     Status: None (Preliminary result)    Collection Time: 10/20/18  1:27 PM   Result Value Ref Range    Interpretation ECG Click View Image link to view waveform and result    Cervical spine XR, 2-3 views     Status: None    Collection Time: 10/20/18  2:34 PM    Narrative    Exam: XR CERVICAL SPINE 2/3 VWS, 10/20/2018 2:34 PM    Indication: pain breast ca;     Comparison: None    Findings:   AP, lateral, and odontoid views of the cervical spine. The cervical  spine is well-visualized to the level of the T2 vertebral body.  Alignment is normal. Mild disc space narrowing at C6-7. The lateral  masses of C1 are well aligned on C2. Visualized portions of the  odontoid appear normal. No acute fracture or subluxation of the  cervical spine is identified. The lung apices are clear.      Impression    Impression:   No acute fracture or subluxation of the cervical spine. Mild C6-7 disc  space degenerative narrowing.    I have personally reviewed the examination and initial interpretation  and I agree with the findings.    ELEAZAR POLLARD MD   Humerus XR, G/E 2 views, right     Status: None    Collection Time: 10/20/18  2:36 PM    Narrative    Exam: XR HUMERUS RT G/E 2 VW, 10/20/2018 2:36 PM    Indication: pain breast ca;     Comparison: None    Findings:   AP and lateral views of the right humerus. There is no visualized  fracture of the humerus or proximal radius and ulna. Normal alignment  of the elbow and glenohumeral joints. No significant soft tissue  swelling.      Impression    Impression:   No fracture. Normal alignment.    I have personally reviewed the examination and initial interpretation  and I agree with the findings.    ELEAZAR POLLARD MD   XR Chest 2 Views      Status: None    Collection Time: 10/20/18  2:41 PM    Narrative    Exam:  Chest X-ray 10/20/2018 2:41 PM    History: pain;     Comparison: 10/11/2018    Findings: PA and lateral views of the chest are obtained. Left basilar  Pleurx catheter. Interval increase in size of a left  hydropneumothorax. Slightly decreased size of a right-sided pleural  effusion.    Trachea is midline. The cardiomediastinal silhouette is within normal  limits. Pulmonary vasculature is distinct. No focal pulmonary  opacities. No acute bony abnormalities. The upper abdomen is  unremarkable.      Impression    Impression:   1. Increased size of a left hydropneumothorax, with left basilar  Pleurx catheter in place.  2. Slightly decreased size of a right-sided pleural effusion.    I have personally reviewed the examination and initial interpretation  and I agree with the findings.    ELEAZAR POLLARD MD           Labs Ordered and Resulted from Time of ED Arrival Up to the Time of Departure from the ED - No data to display         Assessments & Plan (with Medical Decision Making)  Left side chest and neck pain, no recurrence of pericardial effusion, but worsening left pleural effusion possibly loculated, but HD stable, no hypoxia, awaiting chest CT, pt feels comfortable going home, D/W Hem Onc Fellow-ok provided labs and CT ok otherwise, arrange close follow up with Hem and Pulmonary.  Right upper arm pain, XR neg, tolerable pain, follow up with Hem Onc.  Will sign off to incoming EP.       I have reviewed the nursing notes.    I have reviewed the findings, diagnosis, plan and need for follow up with the patient.    New Prescriptions    No medications on file       Final diagnoses:   Pleural effusion - worsening left   Pain of right upper arm   Metastatic breast cancer (H)     I, Macie Christianson, am serving as a trained medical scribe to document services personally performed by Rayne Rangel MD, based on the provider's statements to me.      I,  Rayne Rangel MD, was physically present and have reviewed and verified the accuracy of this note documented by Macie Christianson.     10/20/2018   [facility], [place], EMERGENCY DEPARTMENT     Rayne Rangel MD  10/21/18 1045

## 2018-10-20 NOTE — PROGRESS NOTES
Hem/Onc fellow returned page to ED attending.   48 year old female with left sided metastatic breast cancer currently on treatment with Xeloda who presents to the ED for evaluation of right humerus pain and left-sided neck pain. Patient visited [facility] this morning for evaluation of ongoing arm pain rating of 9/10 for the past 2 days and was recommended to visit ED.   POC bedside US and CXR done in ED. US is positive grossly normal left ventricular function and chamber size.  No pericardial effusion. But noted to have moderate right and left pleural effusion left one may be loculated.   Hemodynamically stable, no hypoxia, pt feels comfortable going home. Agree with ED attending sending patient home.  Will e-basket Dr. Whitman to arrange for follow-up.

## 2018-10-20 NOTE — ED AVS SNAPSHOT
[facility], Emergency Department    [address] [address]    Phone:  [phone]                                       Shan Marsh   MRN: [id number]    Department:  [facility], Emergency Department   Date of Visit:  10/20/2018           Patient Information     Date Of Birth          1970        Your diagnoses for this visit were:     Pleural effusion worsening left    Pain of right upper arm     Metastatic breast cancer (H)        You were seen by Rayne Rangel MD and Antoni Mcclure MD.        Discharge Instructions       Please make an appointment to follow up with [facility] (phone: [phone]) as soon as possible.  Results for orders placed or performed during the hospital encounter of 10/20/18   Humerus XR, G/E 2 views, right    Narrative    Exam: XR HUMERUS RT G/E 2 VW, 10/20/2018 2:36 PM    Indication: pain breast ca;     Comparison: None    Findings:   AP and lateral views of the right humerus. There is no visualized  fracture of the humerus or proximal radius and ulna. Normal alignment  of the elbow and glenohumeral joints. No significant soft tissue  swelling.      Impression    Impression:   No fracture. Normal alignment.    I have personally reviewed the examination and initial interpretation  and I agree with the findings.    ELEAZAR POLLARD MD   Cervical spine XR, 2-3 views    Narrative    Exam: XR CERVICAL SPINE 2/3 VWS, 10/20/2018 2:34 PM    Indication: pain breast ca;     Comparison: None    Findings:   AP, lateral, and odontoid views of the cervical spine. The cervical  spine is well-visualized to the level of the T2 vertebral body.  Alignment is normal. Mild disc space narrowing at C6-7. The lateral  masses of C1 are well aligned on C2. Visualized portions of the  odontoid appear normal. No acute fracture or subluxation of the  cervical spine is identified. The lung apices are clear.      Impression    Impression:   No acute  fracture or subluxation of the cervical spine. Mild C6-7 disc  space degenerative narrowing.    I have personally reviewed the examination and initial interpretation  and I agree with the findings.    ELEAZAR POLLARD MD   XR Chest 2 Views    Narrative    Exam:  Chest X-ray 10/20/2018 2:41 PM    History: pain;     Comparison: 10/11/2018    Findings: PA and lateral views of the chest are obtained. Left basilar  Pleurx catheter. Interval increase in size of a left  hydropneumothorax. Slightly decreased size of a right-sided pleural  effusion.    Trachea is midline. The cardiomediastinal silhouette is within normal  limits. Pulmonary vasculature is distinct. No focal pulmonary  opacities. No acute bony abnormalities. The upper abdomen is  unremarkable.      Impression    Impression:   1. Increased size of a left hydropneumothorax, with left basilar  Pleurx catheter in place.  2. Slightly decreased size of a right-sided pleural effusion.    I have personally reviewed the examination and initial interpretation  and I agree with the findings.    ELEAZAR POLLARD MD   POC US ECHO LIMITED    Impression    Limited Bedside Cardiac Ultrasound, performed and interpreted by me.   Indication: Weakness.  Parasternal long axis, parasternal short axis, apical 4 chamber and subcostal views were acquired.   Image quality was satisfactory.    Findings:    Global left ventricular function appears intact.  Chambers do not appear dilated.  There is no evidence of free fluid within the pericardium.    IMPRESSION: Grossly normal left ventricular function and chamber size.  No pericardial effusion. But noted to have moderate right and left pleural effusion left one may be loculated.   Chest CT w/o contrast    Narrative    EXAMINATION: Chest CT  10/20/2018 3:45 PM    CLINICAL HISTORY: Follow-up pleural effusion    COMPARISON: Chest radiograph same day, CT chest 10/6/2018.    TECHNIQUE: CT imaging obtained through the chest without  contrast.  Axial, coronal, and sagittal reconstructions and axial MIP reformatted  images are reviewed.     Lungs: Loculated left pleural effusion with Pleurx catheter in place.  There are scattered foci of air within the left-sided effusion.  Loculated right pleural effusion. No focal consolidative opacities.  Unchanged peribronchovascular nodules in the bilateral upper lobes and  mild interlobular septal thickening in the lung apices. Unchanged  areas of round consolidation in the lingula and left lower lobe,  likely representing round atelectasis.    Mediastinum: The visualized thyroid gland is within normal limits. The  central tracheobronchial tree is patent. The aorta and main pulmonary  artery are normal in caliber. Normal branching pattern of the great  vessels. Heart size is normal. Trace pericardial effusion.  Normal  thoracic vasculature. No thoracic lymphadenopathy.     Bones and soft tissues: Surgical clip in the left axilla. Bilateral  breast implants. Sclerotic bone metastases in the spine and ribs, not  significantly changed from prior. No acute bony abnormality.    Upper Abdomen: Limited evaluation of the upper abdomen demonstrates  unchanged scattered hypodensities in the liver, consistent with  metastatic disease. 1.0 cm lymph node adjacent to the celiac axis, not  significantly changed from prior. No acute abnormalities.      Impression    IMPRESSION:   In this patient with history of metastatic breast cancer, there is:  1. Loculated left hydropneumothorax with Pleurx catheter in place.  Unchanged areas of rounded consolidation in the lingula and left lower  lobe, likely representing atelectasis.  2. Small loculated right pleural effusion.  3. Trace pericardial effusion.  4. Evidence of metastatic disease in the lungs, liver, and skeleton,  unchanged.    I have personally reviewed the examination and initial interpretation  and I agree with the findings.    ELEAZAR POLLARD MD   CBC with platelets  differential   Result Value Ref Range    WBC 7.0 4.0 - 11.0 10e9/L    RBC Count 4.11 3.8 - 5.2 10e12/L    Hemoglobin 13.3 11.7 - 15.7 g/dL    Hematocrit 41.7 35.0 - 47.0 %     (H) 78 - 100 fl    MCH 32.4 26.5 - 33.0 pg    MCHC 31.9 31.5 - 36.5 g/dL    RDW 15.3 (H) 10.0 - 15.0 %    Platelet Count 384 150 - 450 10e9/L    Diff Method Automated Method     % Neutrophils 70.6 %    % Lymphocytes 22.6 %    % Monocytes 5.4 %    % Eosinophils 0.7 %    % Basophils 0.3 %    % Immature Granulocytes 0.4 %    Nucleated RBCs 0 0 /100    Absolute Neutrophil 4.9 1.6 - 8.3 10e9/L    Absolute Lymphocytes 1.6 0.8 - 5.3 10e9/L    Absolute Monocytes 0.4 0.0 - 1.3 10e9/L    Absolute Eosinophils 0.1 0.0 - 0.7 10e9/L    Absolute Basophils 0.0 0.0 - 0.2 10e9/L    Abs Immature Granulocytes 0.0 0 - 0.4 10e9/L    Absolute Nucleated RBC 0.0    Comprehensive metabolic panel   Result Value Ref Range    Sodium 137 133 - 144 mmol/L    Potassium 3.6 3.4 - 5.3 mmol/L    Chloride 102 94 - 109 mmol/L    Carbon Dioxide 28 20 - 32 mmol/L    Anion Gap 6 3 - 14 mmol/L    Glucose 85 70 - 99 mg/dL    Urea Nitrogen 11 7 - 30 mg/dL    Creatinine 0.62 0.52 - 1.04 mg/dL    GFR Estimate >90 >60 mL/min/1.7m2    GFR Estimate If Black >90 >60 mL/min/1.7m2    Calcium 9.2 8.5 - 10.1 mg/dL    Bilirubin Total 0.3 0.2 - 1.3 mg/dL    Albumin 3.3 (L) 3.4 - 5.0 g/dL    Protein Total 8.2 6.8 - 8.8 g/dL    Alkaline Phosphatase 151 (H) 40 - 150 U/L    ALT 33 0 - 50 U/L    AST 36 0 - 45 U/L   INR   Result Value Ref Range    INR 0.94 0.86 - 1.14   Troponin I   Result Value Ref Range    Troponin I ES <0.015 0.000 - 0.045 ug/L   Nt probnp inpatient (BNP)   Result Value Ref Range    N-Terminal Pro BNP Inpatient 272 0 - 450 pg/mL   EKG 12-lead, tracing only   Result Value Ref Range    Interpretation ECG Click View Image link to view waveform and result            Your next 10 appointments already scheduled     Oct 23, 2018  9:00 AM CDT   XR CHEST 2 VIEWS with [email or web address]   [facility] ([facility])    [address] [place] [address]  1st Floor  [facility] [address]   [phone]           How do I prepare for my exam? (Food and drink instructions) No Food and Drink Restrictions.  How do I prepare for my exam? (Other instructions) You do not need to do anything special for this exam.  What should I wear: Wear comfortable clothes.  How long does the exam take: Most scans take less than 5 minutes.  What should I bring: Bring a list of your medicines, including vitamins, minerals and over-the-counter drugs. It is safest to leave personal items at home.  Do I need a :  No  is needed.  What do I need to tell my doctor: Tell your doctor if there s any chance you are pregnant.  What should I do after the exam: No restrictions, You may resume normal activities.  What is this test: An image of a specific body part shown in shades of black and white.  Who should I call with questions: If you have any questions, please call the Imaging Department where you will have your exam. Directions, parking instructions, and other information is available on our website, [email or web address].            Oct 23, 2018 10:00 AM CDT   (Arrive by 9:45 AM)   Return Visit with TURNER Bahena   [facility] ([facility])    [address] [place] [address]  [address]  [facility] [address]   [phone]            Oct 30, 2018  8:45 AM CDT   [facility] Lab Draw with UC [facility] LAB DRAW   [facility] Lab Draw ([facility])    [address] [place] [address]  [address]  [facility] [address]   [phone]            Oct 30, 2018  9:20 AM CDT   (Arrive by 9:05 AM)   Return Visit with Lima Griffiths PA-C   [facility] ([facility])    [address] [place] [address]  [address]  [facility] [address]   [phone]            Oct 30, 2018 11:30 AM CDT   (Arrive  by 11:15 AM)   NEW WITH ROOM with Rachel Pierson, [facility], UC 2 119 CONSULT [facility] ([facility])    [address]  [address]  [facility] [id number] [phone]            Nov 08, 2018  1:20 PM CST   CT SOFT TISSUE NECK W CONTRAST with [email or web address]   [facility] CT ([facility])    [address]  1st Floor  [facility] [id number] [phone]           How do I prepare for my exam? (Food and drink instructions) **You will have contrast for this exam.** Do not eat or drink for 2 hours before your exam. If you need to take medicine, you may take it with small sips of water. (We may ask you to take liquid medicine as well.)  The day before your exam, drink extra fluids at least six 8-ounce glasses (unless your doctor tells you to restrict your fluids).  How do I prepare for my exam? (Other instructions) Patients over 70 or patients with diabetes or kidney problems: If you haven t had a blood test (creatinine test) within the last 30 days, the Cardiologist/Radiologist may require you to get this test prior to your exam.  What should I wear: Please wear loose clothing, such as a sweat suit or jogging clothes.  Avoid snaps, zippers and other metal. We may ask you to undress and put on a hospital gown.  How long does the exam take: Most scans take less than 20 minutes.  What should I bring: Please bring any scans or X-rays taken at other hospitals, if similar tests were done. Also bring a list of your medicines, including vitamins, minerals and over-the-counter drugs. It is safest to leave personal items at home.  Do I need a :  No  is needed.  What do I need to tell my doctor? Be sure to tell your doctor: * If you have any allergies. * If there s any chance you are pregnant. * If you are breastfeeding. * If you have diabetes as your medication may need to be adjusted for this exam.  What should I do  after the exam: No restrictions, You may resume normal activities.  What is this test: A CT (computed tomography) scan is a series of pictures that allows us to look inside your body. The scanner creates images of the body in cross sections, much like slices of bread. This helps us see any problems more clearly. You may receive contrast (X-ray dye) before or during your scan. Contrast is given through an IV (small needle in your arm).  Who should I call with questions: If you have any questions, please call the Imaging Department where you will have your exam. Directions, parking instructions, and other information is available on our website, [email or web address].            Nov 08, 2018  1:40 PM CST   CT CHEST/ABDOMEN/PELVIS W CONTRAST with UCCT1   [facility] CT ([email or web address] and [facility] [place])    [address] [address] [phone]           How do I prepare for my exam? (Food and drink instructions) To prepare: Do not eat or drink for 2 hours before your exam. If you need to take medicine, you may take it with small sips of water. (We may ask you to take liquid medicine as well.)  How do I prepare for my exam? (Other instructions) Please arrive 30 minutes early for your CT.  Once in the department you might be asked to drink water 15-20 minutes prior to your exam.  If indicated you may be asked to drink an oral contrast in advance of your CT.  If this is the case, the imaging team will let you know or be in contact with you prior to your appointment  Patients over 70 or patients with diabetes or kidney problems: If you haven t had a blood test (creatinine test) within the last 30 days, the Cardiologist/Radiologist may require you to get this test prior to your exam.  If you have diabetes:  Continue to take your metformin medication on the day of your exam  What should I wear: Please wear loose clothing, such as a sweat suit or jogging clothes. Avoid snaps,  zippers and other metal. We may ask you to undress and put on a hospital gown.  How long does the exam take: Most scans take less than 20 minutes.  What should I bring: Please bring any scans or X-rays taken at other hospitals, if similar tests were done. Also bring a list of your medicines, including vitamins, minerals and over-the-counter drugs. It is safest to leave personal items at home.  Do I need a : No  is needed.  What do I need to tell my doctor? Be sure to tell your doctor: * If you have any allergies. * If there s any chance you are pregnant. * If you are breastfeeding.  What should I do after the exam: No restrictions, You may resume normal activities.  What is this test: A CT (computed tomography) scan is a series of pictures that allows us to look inside your body. The scanner creates images of the body in cross sections, much like slices of bread. This helps us see any problems more clearly. You may receive contrast (X-ray dye) before or during your scan. You will be asked to drink the contrast.  Who should I call with questions: If you have any questions, please call the Imaging Department where you will have your exam. Directions, parking instructions, and other information is available on our website, [email or web address].            Nov 08, 2018  2:00 PM CST   Ech Complete with [email or web address]   [facility])    [address] [place] [address] [phone]           1.  Please bring or wear a comfortable two-piece outfit. 2.  You may eat, drink and take your normal medicines. 3.  For any questions that cannot be answered, please contact the ordering physician 4.  Please do not wear perfumes or scented lotions on the day of your exam.            Nov 08, 2018  3:30 PM CST   (Arrive by 3:15 PM)   New Patient Visit with Robbie Sanchez MD   [facility])    [address] [place] [address]  [address]  [facility] [address] [phone]              24 Hour Appointment Hotline       To make an appointment at any [facility], call [phone] ([phone]). If you don't have a family doctor or clinic, we will help you find one. [facility] clinics are conveniently located to serve the needs of you and your family.             Review of your medicines      Our records show that you are taking the medicines listed below. If these are incorrect, please call your family doctor or clinic.        Dose / Directions Last dose taken    calcium carbonate 500 mg (elemental) 500 MG tablet   Commonly known as:  OS-KRYSTEN   Dose:  1 tablet        Take 1 tablet by mouth daily   Refills:  0        capecitabine 500 MG tablet CHEMO   Commonly known as:  XELODA   Dose:  1500 mg   Quantity:  84 tablet        Take 3 tablets (1,500 mg) by mouth 2 times daily Take on day 1-7 and 15-21 of 28-day cycle.   Refills:  0        diclofenac 1 % Gel topical gel   Commonly known as:  VOLTAREN   Dose:  2-4 g   Quantity:  1 Tube        Place 2-4 g onto the skin 4 times daily   Refills:  0        ENZYME DIGEST Caps   Dose:  1 capsule        Take 1 capsule by mouth daily   Refills:  0        loperamide 2 MG tablet   Commonly known as:  IMODIUM A-D   Dose:  2 mg   Quantity:  30 tablet        Take 1 tablet (2 mg) by mouth 4 times daily as needed for diarrhea   Refills:  3        medical cannabis (Patient's own supply.  Not a prescription)   Indication:  Oil; 5mg twice daily        (This is NOT a prescription, and does not certify that the patient has a qualifying medical condition for medical cannabis.  The purpose of this order is  to document that the patient reports taking medical cannabis.)   Refills:  0        ondansetron 8 MG tablet   Commonly known as:  ZOFRAN   Dose:  8 mg   Quantity:  40 tablet        Take 1 tablet (8 mg) by mouth every 8 hours as needed for nausea   Refills:  3        prochlorperazine 10 MG tablet   Commonly  known as:  COMPAZINE        Take 10 mg by mouth before 1st dose of oral chemotherapy, then prn thereafter   Refills:  0        traMADol 50 MG tablet   Commonly known as:  ULTRAM   Dose:   mg        Take 1-2 tablets ( mg) by mouth every 6 hours as needed for moderate pain or headaches   Refills:  0        VITAMIN D (CHOLECALCIFEROL) PO   Dose:  1000 Units        Take 1,000 Units by mouth daily   Refills:  0                Procedures and tests performed during your visit     CBC with platelets differential    Cardiac Continuous Monitoring    Cervical spine XR, 2-3 views    Chest CT w/o contrast    Comprehensive metabolic panel    EKG 12-lead, tracing only    Humerus XR, G/E 2 views, right    INR    Nt probnp inpatient (BNP)    POC US ECHO LIMITED    Troponin I    XR Chest 2 Views      Orders Needing Specimen Collection     None      Pending Results     Date and Time Order Name Status Description    10/20/2018 1323 EKG 12-lead, tracing only Preliminary             Pending Culture Results     No orders found from 10/18/2018 to 10/21/2018.            Pending Results Instructions     If you had any lab results that were not finalized at the time of your Discharge, you can call the ED Lab Result RN at [phone]. You will be contacted by this team for any positive Lab results or changes in treatment. The nurses are available 7 days a week from 10A to 6:30P.  You can leave a message 24 hours per day and they will return your call.        Thank you for choosing [facility]       Thank you for choosing [facility] for your care. Our goal is always to provide you with excellent care. Hearing back from our patients is one way we can continue to improve our services. Please take a few minutes to complete the written survey that you may receive in the mail after you visit with us. Thank you!        [employer] Information     [employer] gives you secure access to your electronic health record. If you see a primary care provider,  you can also send messages to your care team and make appointments. If you have questions, please call your primary care clinic.  If you do not have a primary care provider, please call [phone] and they will assist you.        Care EveryWhere ID     This is your Care EveryWhere ID. This could be used by other organizations to access your [facility] medical records  [id number]        Equal Access to Services     LUZ JAMES : Hadii noemy Schaefer, waaxda lupamela, qaybta kaalmada anneliese, edi kolb. So [facility] [phone].    ATENCIÓN: Si habla español, tiene a gamez disposición servicios gratuitos de asistencia lingüística. Gabriella al [phone].    We comply with applicable federal civil rights laws and [place] laws. We do not discriminate on the basis of race, color, national origin, age, disability, sex, sexual orientation, or gender identity.            After Visit Summary       This is your record. Keep this with you and show to your community pharmacist(s) and doctor(s) at your next visit.

## 2018-10-20 NOTE — PROGRESS NOTES
"SUBJECTIVE:   Shan Marsh is a 48 year old female presenting for evaluation of   Chief Complaint   Patient presents with     Urgent Care     Arm Pain     c/o arm pain for 2 days, no injury     Awoke with severe right arm pain 2 days ago. It has been persistent. Pain is located in the right humerus. States pain is a 9/10.  Pain is worse if she elevates the arm or uses it.   No known injuries or falls. No direct trauma to the area. No swollen or red/hot joint. No skin redness. No fever. States she has a little bit of left-sided neck and shoulder pain. She did recently have a hydropneumothorax which presented with left shoulder pain.  Currently has metastatic breast cancer with bone mets. She is currently on chemotherapy.  Treatments tried: Tylenol; massage 2 days ago for neck pain    Review of Systems   Constitutional: Negative for fever.   HENT: Positive for sore throat (weird feeling in her throat last night).    Respiratory: Negative for shortness of breath.    Cardiovascular: Negative for chest pain.   Gastrointestinal: Positive for nausea (yesterday). Negative for vomiting.   Musculoskeletal: Positive for neck pain.        Left humerus pain   Skin: Negative for rash.   Neurological: Positive for headaches (awoke 1 day with HA \"coming from left side of neck\").         PMH:  Past Medical History:   Diagnosis Date     Breast cancer, left breast (H) 5/12    grade 1 infiltrating ductal cancer, ER/[place]+, s/p bilateral mastectomy     Complication of anesthesia     bradycardia,b/p drop p childbirth     Dysplasia of cervix (uteri) 12/01    LUCAS II - III, s/p LEEP  @ [facility]     Incompetence of cervix 4/06    D&E at 19+4 weeks twin pregnancy     Shingles outbreak 5/12     Patient Active Problem List    Diagnosis Date Noted     Hydropneumothorax 10/04/2018     Priority: Medium     Metastatic breast cancer (H) 01/12/2018     Priority: Medium     Bone metastasis (H) 01/12/2018     Priority: Medium     Breast cancer (H) " 06/11/2012     Priority: Medium     S/p bilateral mastectomy 6/2012       S/P LEEP of cervix 11/15/2011     Priority: Medium     2001  10/2014: NIL pap, neg HPV. Hx of LEEP. Plan cotest in 1 year.  11/03/17: NIL pap, Neg HR HPV result. Plan cotest in 3 years.        CARDIOVASCULAR SCREENING; LDL GOAL LESS THAN 160 10/31/2010     Priority: Medium     Incompetence of cervix      Priority: Medium     D&E at 19+4 weeks twin pregnancy           Current medications:  Current Outpatient Prescriptions   Medication Sig Dispense Refill     calcium carbonate (OS-KRYSTEN 500 MG [place]. [place]) 1250 MG tablet Take 1 tablet by mouth daily       capecitabine (XELODA) 500 MG tablet CHEMO Take 3 tablets (1,500 mg) by mouth 2 times daily Take on day 1-7 and 15-21 of 28-day cycle. 84 tablet 0     diclofenac (VOLTAREN) 1 % GEL topical gel Place 2-4 g onto the skin 4 times daily 1 Tube 0     Digestive Enzymes (ENZYME DIGEST) CAPS Take 1 capsule by mouth daily       loperamide (IMODIUM A-D) 2 MG tablet Take 1 tablet (2 mg) by mouth 4 times daily as needed for diarrhea 30 tablet 3     medical cannabis (Patient's own supply.  Not a prescription) (This is NOT a prescription, and does not certify that the patient has a qualifying medical condition for medical cannabis.  The purpose of this order is  to document that the patient reports taking medical cannabis.)       ondansetron (ZOFRAN) 8 MG tablet Take 1 tablet (8 mg) by mouth every 8 hours as needed for nausea 40 tablet 3     prochlorperazine (COMPAZINE) 10 MG tablet Take 10 mg by mouth before 1st dose of oral chemotherapy, then prn thereafter       traMADol (ULTRAM) 50 MG tablet Take 1-2 tablets ( mg) by mouth every 6 hours as needed for moderate pain or headaches       VITAMIN D, CHOLECALCIFEROL, PO Take 1,000 Units by mouth daily        [DISCONTINUED] tamoxifen (NOLVADEX) 20 MG tablet Take 1 tablet (20 mg) by mouth daily 28 tablet 0       Surgical History:  Past Surgical History:    Procedure Laterality Date     D & C  9/2010    retained POC     ENDOBRONCHIAL ULTRASOUND FLEXIBLE N/A 5/12/2017    Procedure: ENDOBRONCHIAL ULTRASOUND FLEXIBLE;  Flexible Bronchoscopy, Endobronchial Ultrasound, Transbronchial Biopsy ;  Surgeon: Bandar Meyer MD;  Location:  GI     INSERT CHEST TUBE Left 10/11/2018    Procedure: INSERT CHEST TUBE;  Insert Chest Tube ;  Surgeon: Brock Chan MD;  Location:  GI     LEEP TX, CERVICAL  12/01    LEEP or LUCAS II - III     MASTECTOMY, RECONSTRUCT BREAST, COMBINED  6/11/2012    Procedure: COMBINED MASTECTOMY, RECONSTRUCT BREAST;  Bilateral Mastectomy with Reconstruction, Left Catharpin Node Biopsy , placement of On-Q catheters X 2 bilateral breast.;  Surgeon: Misael Cheng MD;  Location:  OR     RECONSTRUCT BREAST BILATERAL  3/25/2013    Procedure: RECONSTRUCT BREAST BILATERAL;  Revise bilateral breasts and reconstructions, with insertion of gel breast implants with fat grafting and fat after mastectomies.;  Surgeon: Yair Olvera MD;  Location:  OR     RECONSTRUCT BREAST BILATERAL, IMPLANT PROSTHESIS BILATERAL, COMBINED  9/27/2012    Procedure: COMBINED RECONSTRUCT BREAST BILATERAL, IMPLANT PROSTHESIS BILATERAL;  Bilateral Second Stage Breast Reconstruction With Gel Implants, Fat        THORACENTESIS Left 5/7/2018    Procedure: THORACENTESIS;  Thoracentesis;  Surgeon: Venancio Hussein PA-C;  Location:  OR     THORACENTESIS Left 7/5/2018    Procedure: THORACENTESIS;  Thoracentesis;  Surgeon: Lydia Murillo PA-C;  Location:  OR     THORACENTESIS Left 9/5/2018    Procedure: THORACENTESIS;  Left Thoracentesis;  Surgeon: López Guillen PA-C;  Location:  OR     THORACENTESIS Left 10/3/2018    Procedure: THORACENTESIS;  Thoracentesis, left;  Surgeon: Eros Chauhan PA-C;  Location:  OR     THORACENTESIS Right 10/9/2018    Procedure: THORACENTESIS;  Thoacentesis ;  Surgeon: Linh Hayes MD;  Location: [facility]  "      Family history:  Family History   Problem Relation Age of Onset     Alcohol/Drug Father      Allergies Mother      Neurologic Disorder Mother      seizures, related to fever     GASTROINTESTINAL DISEASE Mother      IBS     Blood Disease Mother      [place]     Cancer Maternal Grandfather      bladder     Circulatory Maternal Grandfather      blood clots     Alcohol/Drug Maternal Grandfather      alcohol dependency     Allergies Maternal Aunt      Allergies Other      maternal cousins     Arthritis Maternal Aunt      Prostate Cancer Maternal Uncle      Thyroid Disease Maternal Aunt      Alcohol/Drug Maternal Uncle      alcohol and drug dependency     Cancer - colorectal Maternal Uncle      GASTROINTESTINAL DISEASE Sister      IBS     Breast Cancer Other      self         Social History:  Social History   Substance Use Topics     Smoking status: Former Smoker     Quit date: 6/23/1997     Smokeless tobacco: Never Used     Alcohol use No         OBJECTIVE  /68  Pulse 78  Temp 97.8  F (36.6  C) (Tympanic)  Ht 5' 4\" (1.626 m)  Wt 108 lb (49 kg)  LMP 10/06/2017 (Approximate)  SpO2 99%  BMI 18.54 kg/m2    Physical Exam    General Appearance:  Alert, cooperative, no distress. Afebrile. Appears comfortable sitting in chair.   Integument: Warm, dry, no rashes or lesions. No redness of right upper extremity.  Neck: Supple.  Respiratory: No distress. Breath sounds diminished on left side. No crackles, wheezes, rhonchi or stridor. Chest tube dressing present on left side of upper abdomen.  Cardiovascular: Regular rate and rhythm, no murmur, rub or gallop. No obvious chest wall deformities.  Abdomen: soft, nontender, non-distended. No masses.  Musculoskeletal: tenderness of right humerus mid-shaft. Mild deltoid tenderness. Shoulder AROM intact. Elbow AROM intact. No tenderness of remainder of shoulder or right arm.  Psych: Normal mood and affect.                  ASSESSMENT/PLAN:      ICD-10-CM    1. Pain of " right upper arm M79.621         Medical Decision Making:    Patient with metastatic breast cancer presenting with new right humerus pain. Concern for new bony mets vs pathologic fracture (though no known injuries). Also considered upper extremity DVT.  Also concerned with her diminished breath sounds of left chest, with her recent history of hydropneumothorax and cardiac tamponade for which she was hospitalized 10/4/18-10/11/18. Recommend she be seen in the ER for more complete workup. Patient understood and agreed to go to the [facility] ED today.  I called report to the [facility] ED charge IVY.          Yamila Sheridan PA-C  10/20/18 12:05 PM

## 2018-10-21 LAB — INTERPRETATION ECG - MUSE: NORMAL

## 2018-10-22 ENCOUNTER — CARE COORDINATION (OUTPATIENT)
Dept: ONCOLOGY | Facility: CLINIC | Age: 48
End: 2018-10-22

## 2018-10-22 DIAGNOSIS — C50.919 METASTATIC BREAST CANCER: ICD-10-CM

## 2018-10-22 DIAGNOSIS — M54.2 NECK PAIN: Primary | ICD-10-CM

## 2018-10-22 NOTE — PROGRESS NOTES
Spoke to patient post ED visit for right shoulder pain and left neck pain. Patient states right shoulder pain improving, but states the bed she sleeps on at her sister's house is not comfortable. Instructed to call the [facility] nurse for advice about a bed not being on hospice for options. Patient is currently on an off week of Xeloda.  Patient eating and drinking w/o difficulty is not dizzy and is afebrile this AM. No shortness of breath today and will be evaluated tomorrow with a CXR with Candi Maria with fluid that is loculated in her left lung. Patient not short of breath and is eating and drinking w/o difficulty. Has a follow-up appointment October 30, 18 with Lima Griffiths.  Answered all patient's questions and verbalized understanding. Sharifa Celeste RN, BSN.             Spoke to Dr Whitman with above symptoms recommending a MRI cervical and see Dr Whitman on October 29, 18 over the noon hour with MRI results. Patient verbalized understanding of Dr Whitman recommendations. Sharifa Celeste RN, BSN.

## 2018-10-23 ENCOUNTER — OFFICE VISIT (OUTPATIENT)
Dept: SURGERY | Facility: CLINIC | Age: 48
End: 2018-10-23
Attending: CLINICAL NURSE SPECIALIST
Payer: COMMERCIAL

## 2018-10-23 ENCOUNTER — RADIANT APPOINTMENT (OUTPATIENT)
Dept: GENERAL RADIOLOGY | Facility: CLINIC | Age: 48
End: 2018-10-23
Attending: CLINICAL NURSE SPECIALIST
Payer: COMMERCIAL

## 2018-10-23 VITALS
OXYGEN SATURATION: 98 % | WEIGHT: 108 LBS | BODY MASS INDEX: 18.44 KG/M2 | SYSTOLIC BLOOD PRESSURE: 113 MMHG | RESPIRATION RATE: 16 BRPM | TEMPERATURE: 98.4 F | HEIGHT: 64 IN | DIASTOLIC BLOOD PRESSURE: 70 MMHG | HEART RATE: 100 BPM

## 2018-10-23 DIAGNOSIS — J94.8 HYDROPNEUMOTHORAX: ICD-10-CM

## 2018-10-23 DIAGNOSIS — R59.0 MEDIASTINAL ADENOPATHY: ICD-10-CM

## 2018-10-23 DIAGNOSIS — J90 PLEURAL EFFUSION: Primary | ICD-10-CM

## 2018-10-23 LAB
BACTERIA SPEC CULT: NORMAL
Lab: NORMAL
SPECIMEN SOURCE: NORMAL

## 2018-10-23 PROCEDURE — G0463 HOSPITAL OUTPT CLINIC VISIT: HCPCS | Mod: ZF

## 2018-10-23 ASSESSMENT — PAIN SCALES - GENERAL: PAINLEVEL: NO PAIN (0)

## 2018-10-23 NOTE — MR AVS SNAPSHOT
After Visit Summary   10/23/2018    Shan Marsh    MRN: [id number]           Patient Information     Date Of Birth          1970        Visit Information        Provider Department      10/23/2018 10:00 AM Candi Maria APRN [facility]        Today's Diagnoses     Pleural effusion    -  1    Hydropneumothorax           Follow-ups after your visit        Follow-up notes from your care team     Return if symptoms worsen or fail to improve.      Your next 10 appointments already scheduled     Oct 24, 2018  3:30 PM CDT   (Arrive by 3:00 PM)   MR CERVICAL SPINE W CONTRAST with URMR1   [facility], [place], MRI ([facility])    [address] [place] [address] [phone]           How do I prepare for my exam? (Food and drink instructions) **If you will be receiving sedation or general anesthesia, please see special notes below.**  How do I prepare for my exam? (Other instructions) Take your medicines as usual, unless your doctor tells you not to. You may or may not receive intravenous (IV) contrast for this exam pending the discretion of the Radiologist.  You do not need to do anything special to prepare.  **If you will be receiving sedation or general anesthesia, please see special notes below.**  What should I wear: The MRI machine uses a strong magnet. Please wear clothes without metal (snaps, zippers). A sweatsuit works well, or we may give you a hospital gown. Please remove any body piercings and hair extensions before you arrive. You will also remove watches, jewelry, hairpins, wallets, dentures, partial dental plates and hearing aids. You may wear contact lenses, and you may be able to wear your rings. We have a safe place to keep your personal items, but it is safer to leave them at home.  How long does the exam take: Most tests take 30 to 60 minutes.  HOWEVER, IF YOUR DOCTOR PRESCRIBES  ANESTHESIA please plan on spending four to five hours in the recovery room.  What should I bring:  Bring a list of your current medicines to your exam (including vitamins, minerals and over-the-counter drugs).  Do I need a :  **If you will be receiving sedation or general anesthesia, please see special notes below.**  What should I do after the exam: No Restrictions, You may resume normal activities.  What is this test: MRI (magnetic resonance imaging) uses a strong magnet and radio waves to look inside the body. An MRA (magnetic resonance angiogram) does the same thing, but it lets us look at your blood vessels. A computer turns the radio waves into pictures showing cross sections of the body, much like slices of bread. This helps us see any problems more clearly. You may receive fluid (called  contrast ) before or during your scan. The fluid helps us see the pictures better. We give the fluid through an IV (small needle in your arm).  Who should I call with questions:  Please call the Imaging Department at your exam site with any questions. Directions, parking instructions, and other information is available on our website, [email or web address].  How do I prepare if I m having sedation or anesthesia? **IMPORTANT** THE INSTRUCTIONS BELOW ARE ONLY FOR THOSE PATIENTS WHO HAVE BEEN TOLD THEY WILL RECEIVE SEDATION OR GENERAL ANESTHESIA DURING THEIR MRI PROCEDURE:  IF YOU WILL RECEIVE SEDATION (take medicine to help you relax during your exam): You must get the medicine from your doctor before you arrive. Bring the medicine to the exam. Do not take it at home. Arrive one hour early. Bring someone who can take you home after the test. Your medicine will make you sleepy. After the exam, you may not drive, take a bus or take a taxi by yourself. No eating 8 hours before your exam. You may have clear liquids up until 4 hours before your exam. (Clear liquids include water, clear tea, black coffee and fruit juice without  pulp.)  IF YOU WILL RECEIVE ANESTHESIA (be asleep for your exam): Arrive 1 1/2 hours early. Bring someone who can take you home after the test. You may not drive, take a bus or take a taxi by yourself. No eating 8 hours before your exam. You may have clear liquids up until 4 hours before your exam. (Clear liquids include water, clear tea, black coffee and fruit juice without pulp.)            Oct 29, 2018 11:15 AM CDT   [facility] Draw with [facility] LAB DRAW   [facility] Lab Draw ([facility])    [address]  [address]  [facility] [address]   [phone]            Oct 29, 2018 12:00 PM CDT   (Arrive by 11:45 AM)   Return Visit with Ana Whitman MD   [facility] ([facility])    [address]  [address]  [place] [address]   [phone]            Oct 30, 2018 11:30 AM CDT   (Arrive by 11:15 AM)   NEW WITH ROOM with Rachel Pierson [facility],  2 [address] CONSULT [facility] Medication Therapy Management ([facility])    [address]  [address]  [place] [address]   [phone]            Nov 08, 2018  1:20 PM CST   CT SOFT TISSUE NECK W CONTRAST with UCCT1   [facility] [place] [facility] ([facility])    [address]  1st Floor  [facility] [address]   [phone]           How do I prepare for my exam? (Food and drink instructions) **You will have contrast for this exam.** Do not eat or drink for 2 hours before your exam. If you need to take medicine, you may take it with small sips of water. (We may ask you to take liquid medicine as well.)  The day before your exam, drink extra fluids at least six 8-ounce glasses (unless your doctor tells you to restrict your fluids).  How do I prepare for my exam? (Other instructions) Patients over 70 or patients with diabetes or kidney problems: If you haven t had a blood test (creatinine test) within  the last 30 days, the Cardiologist/Radiologist may require you to get this test prior to your exam.  What should I wear: Please wear loose clothing, such as a sweat suit or jogging clothes.  Avoid snaps, zippers and other metal. We may ask you to undress and put on a hospital gown.  How long does the exam take: Most scans take less than 20 minutes.  What should I bring: Please bring any scans or X-rays taken at other hospitals, if similar tests were done. Also bring a list of your medicines, including vitamins, minerals and over-the-counter drugs. It is safest to leave personal items at home.  Do I need a :  No  is needed.  What do I need to tell my doctor? Be sure to tell your doctor: * If you have any allergies. * If there s any chance you are pregnant. * If you are breastfeeding. * If you have diabetes as your medication may need to be adjusted for this exam.  What should I do after the exam: No restrictions, You may resume normal activities.  What is this test: A CT (computed tomography) scan is a series of pictures that allows us to look inside your body. The scanner creates images of the body in cross sections, much like slices of bread. This helps us see any problems more clearly. You may receive contrast (X-ray dye) before or during your scan. Contrast is given through an IV (small needle in your arm).  Who should I call with questions: If you have any questions, please call the Imaging Department where you will have your exam. Directions, parking instructions, and other information is available on our website, [email or web address].            Nov 08, 2018  1:40 PM CST   CT CHEST/ABDOMEN/PELVIS W CONTRAST with UCCT1   [facility] CT ([facility])    [address]  1st Floor  [facility] [address] [phone]           How do I prepare for my exam? (Food and drink instructions) To prepare: Do not eat or drink for 2 hours before your exam. If you  need to take medicine, you may take it with small sips of water. (We may ask you to take liquid medicine as well.)  How do I prepare for my exam? (Other instructions) Please arrive 30 minutes early for your CT.  Once in the department you might be asked to drink water 15-20 minutes prior to your exam.  If indicated you may be asked to drink an oral contrast in advance of your CT.  If this is the case, the imaging team will let you know or be in contact with you prior to your appointment  Patients over 70 or patients with diabetes or kidney problems: If you haven t had a blood test (creatinine test) within the last 30 days, the Cardiologist/Radiologist may require you to get this test prior to your exam.  If you have diabetes:  Continue to take your metformin medication on the day of your exam  What should I wear: Please wear loose clothing, such as a sweat suit or jogging clothes. Avoid snaps, zippers and other metal. We may ask you to undress and put on a hospital gown.  How long does the exam take: Most scans take less than 20 minutes.  What should I bring: Please bring any scans or X-rays taken at other hospitals, if similar tests were done. Also bring a list of your medicines, including vitamins, minerals and over-the-counter drugs. It is safest to leave personal items at home.  Do I need a : No  is needed.  What do I need to tell my doctor? Be sure to tell your doctor: * If you have any allergies. * If there s any chance you are pregnant. * If you are breastfeeding.  What should I do after the exam: No restrictions, You may resume normal activities.  What is this test: A CT (computed tomography) scan is a series of pictures that allows us to look inside your body. The scanner creates images of the body in cross sections, much like slices of bread. This helps us see any problems more clearly. You may receive contrast (X-ray dye) before or during your scan. You will be asked to drink the contrast.   Who should I call with questions: If you have any questions, please call the Imaging Department where you will have your exam. Directions, parking instructions, and other information is available on our website, [email or web address].            Nov 08, 2018  2:00 PM CST   Ech Complete with UCECHCR4   [facility] ([facility])    [address]  3rd Floor  [facility] [address] [phone]           1.  Please bring or wear a comfortable two-piece outfit. 2.  You may eat, drink and take your normal medicines. 3.  For any questions that cannot be answered, please contact the ordering physician 4.  Please do not wear perfumes or scented lotions on the day of your exam.            Nov 08, 2018  3:30 PM CST   (Arrive by 3:15 PM)   New Patient Visit with Robbie Sanchez MD   [facility] ([facility])    [address]  [address]  [place] [address] [phone]              Future tests that were ordered for you today     Open Future Orders        Priority Expected Expires Ordered    MR Cervical Spine w Contrast Routine  10/22/2019 10/22/2018            Who to contact     If you have questions or need follow up information about today's clinic visit or your schedule please contact [facility] directly at [phone].  Normal or non-critical lab and imaging results will be communicated to you by MyChart, letter or phone within 4 business days after the clinic has received the results. If you do not hear from us within 7 days, please contact the clinic through MyChart or phone. If you have a critical or abnormal lab result, we will notify you by phone as soon as possible.  Submit refill requests through [email or web address] or call your pharmacy and they will forward the refill request to us. Please allow 3 business days for your refill to be completed.          Additional Information About Your Visit        [email or web address] Information      "[employer] gives you secure access to your electronic health record. If you see a primary care provider, you can also send messages to your care team and make appointments. If you have questions, please call your primary care clinic.  If you do not have a primary care provider, please call [phone] and they will assist you.        Care EveryWhere ID     This is your Care EveryWhere ID. This could be used by other organizations to access your [place] medical records  [id number]        Your Vitals Were     Pulse Temperature Respirations Height Pulse Oximetry BMI (Body Mass Index)    100 98.4  F (36.9  C) (Oral) 16 1.626 m (5' 4\") 98% 18.54 kg/m2       Blood Pressure from Last 3 Encounters:   10/23/18 113/70   10/20/18 110/72   10/20/18 104/68    Weight from Last 3 Encounters:   10/23/18 49 kg (108 lb)   10/20/18 50.2 kg (110 lb 11.2 oz)   10/20/18 49 kg (108 lb)              Today, you had the following     No orders found for display       Primary Care Provider Office Phone # Fax #    Marilu Michelle [phone]       [address]  [facility] [address]        Equal Access to Services     LUZ JAMES : Hadii noemy ku hadasho Soomaali, waaxda luqadaha, qaybta kaalmada adeegyada, waxay laithin haymonikn lexi guajardo lalambert kolb. So [facility] [phone].    ATENCIÓN: Si habla español, tiene a gamez disposición servicios gratuitos de asistencia lingüística. Llame al [phone].    We comply with applicable federal civil rights laws and [place] laws. We do not discriminate on the basis of race, color, national origin, age, disability, sex, sexual orientation, or gender identity.            Thank you!     Thank you for choosing [facility]  for your care. Our goal is always to provide you with excellent care. Hearing back from our patients is one way we can continue to improve our services. Please take a few minutes to complete the written survey that you may receive in the mail after your " visit with us. Thank you!             Your Updated Medication List - Protect others around you: Learn how to safely use, store and throw away your medicines at www.disposemymeds.org.          This list is accurate as of 10/23/18 10:43 AM.  Always use your most recent med list.                   Brand Name Dispense Instructions for use Diagnosis    calcium carbonate 500 mg (elemental) 500 MG tablet    OS-KRYSTEN     Take 1 tablet by mouth daily        capecitabine 500 MG tablet CHEMO    XELODA    84 tablet    Take 3 tablets (1,500 mg) by mouth 2 times daily Take on day 1-7 and 15-21 of 28-day cycle.    Metastatic breast cancer (H)       ENZYME DIGEST Caps      Take 1 capsule by mouth daily        medical cannabis (Patient's own supply.  Not a prescription)      (This is NOT a prescription, and does not certify that the patient has a qualifying medical condition for medical cannabis.  The purpose of this order is  to document that the patient reports taking medical cannabis.)        ondansetron 8 MG tablet    ZOFRAN    40 tablet    Take 1 tablet (8 mg) by mouth every 8 hours as needed for nausea    Metastatic breast cancer (H), Bone metastasis (H)       prochlorperazine 10 MG tablet    COMPAZINE     Take 10 mg by mouth before 1st dose of oral chemotherapy, then prn thereafter        traMADol 50 MG tablet    ULTRAM     Take 1-2 tablets ( mg) by mouth every 6 hours as needed for moderate pain or headaches        VITAMIN D (CHOLECALCIFEROL) PO      Take 1,000 Units by mouth daily

## 2018-10-23 NOTE — NURSING NOTE
"Oncology Rooming Note    October 23, 2018 9:50 AM   Shan Marsh is a 48 year old female who presents for:    Chief Complaint   Patient presents with     Oncology Clinic Visit     Return Hydropnuemothorax     Initial Vitals: /70  Pulse 100  Temp 98.4  F (36.9  C) (Oral)  Resp 16  Ht 1.626 m (5' 4\")  Wt 49 kg (108 lb)  SpO2 98%  BMI 18.54 kg/m2 Estimated body mass index is 18.54 kg/(m^2) as calculated from the following:    Height as of this encounter: 1.626 m (5' 4\").    Weight as of this encounter: 49 kg (108 lb). Body surface area is 1.49 meters squared.  No Pain (0) Comment: Data Unavailable   No LMP recorded. Patient is not currently having periods (Reason: Chemotherapy).  Allergies reviewed: Yes  Medications reviewed: Yes    Medications: Medication refills not needed today.  Pharmacy name entered into [facility]:    [facility] - [place], [place] - [address]  [facility] - [place], [place] [address]    Clinical concerns: Go over x ray results and treatment planning     6 minutes for nursing intake (face to face time)     Linh Issa CMA              "

## 2018-10-23 NOTE — PROGRESS NOTES
"REASON FOR VISIT: Pleurx site check    TUBE TYPE: Pleurx catheter    DATE PLACED: 10/11/218    SUBJECTIVE: Shan reports no difficulty draining her Pleurx. Her drainage amounts are as follows:  10/12: 30 mL  10/13:    5 mL  10/14: 200 mL  10/15: 250 mL  10/16: 250 mL  10/17: 150 mL  10/18: 125 mL  10/19: 100 mL  10/20: 160 mL  10/21: 200 mL  10/22:     5 mL    She has a home care nurse that helps her drain. She does not have pain with drainage. She is working on gaining weight. She would like to know if she would be a candidate for pleurodesis as she does not want to have this tube \"forever.\"    She uses medical cannabis but doesn't want to use this every day although she says it does help.    OBJECTIVE: Tapan can start draining every other day. If she is symptomatic between draining days, she should drain and not wait.     SITE CARE INSTRUCTIONS: OK to remove dressing to shower. NO submerging in water (hot tubs, pools, etc).     PLAN:   Drain her Pleurx every other day  Increase calories and protein to try and gain weight  I will talk with the surgeons about pleurodesis and will get back to her    Total time spent, 25 minutes, all in counseling and coordination of care.    Candi Maria, CNS  Thoracic Surgery    "

## 2018-10-23 NOTE — LETTER
"10/23/2018       RE: Shan Marsh  [address]  [place] [address]     Dear Colleague,    Thank you for referring your patient, Shan Marsh, to the [facility]. Please see a copy of my visit note below.    REASON FOR VISIT: Pleurx site check    TUBE TYPE: Pleurx catheter    DATE PLACED: 10/11/218    SUBJECTIVE: Shan reports no difficulty draining her Pleurx. Her drainage amounts are as follows:  10/12: 30 mL  10/13:    5 mL  10/14: 200 mL  10/15: 250 mL  10/16: 250 mL  10/17: 150 mL  10/18: 125 mL  10/19: 100 mL  10/20: 160 mL  10/21: 200 mL  10/22:     5 mL    She has a home care nurse that helps her drain. She does not have pain with drainage. She is working on gaining weight. She would like to know if she would be a candidate for pleurodesis as she does not want to have this tube \"forever.\"    She uses medical cannabis but doesn't want to use this every day although she says it does help.    OBJECTIVE: Tapan can start draining every other day. If she is symptomatic between draining days, she should drain and not wait.     SITE CARE INSTRUCTIONS: OK to remove dressing to shower. NO submerging in water (hot tubs, pools, etc).     PLAN:   Drain her Pleurx every other day  Increase calories and protein to try and gain weight  I will talk with the surgeons about pleurodesis and will get back to her    Total time spent, 25 minutes, all in counseling and coordination of care.    Candi Maria, CNS  Thoracic Surgery        "

## 2018-10-24 ENCOUNTER — TELEPHONE (OUTPATIENT)
Dept: ONCOLOGY | Facility: CLINIC | Age: 48
End: 2018-10-24

## 2018-10-24 ENCOUNTER — HOSPITAL ENCOUNTER (OUTPATIENT)
Dept: MRI IMAGING | Facility: CLINIC | Age: 48
Discharge: HOME | End: 2018-10-24
Attending: INTERNAL MEDICINE | Admitting: INTERNAL MEDICINE
Payer: COMMERCIAL

## 2018-10-24 DIAGNOSIS — C50.919 METASTATIC BREAST CANCER: ICD-10-CM

## 2018-10-24 DIAGNOSIS — M54.2 NECK PAIN: ICD-10-CM

## 2018-10-24 PROCEDURE — A9585 GADOBUTROL INJECTION: HCPCS | Performed by: INTERNAL MEDICINE

## 2018-10-24 PROCEDURE — 25500064 ZZH RX 255 OP 636: Performed by: INTERNAL MEDICINE

## 2018-10-24 PROCEDURE — 72156 MRI NECK SPINE W/O & W/DYE: CPT

## 2018-10-24 RX ORDER — GADOBUTROL 604.72 MG/ML
7.5 INJECTION INTRAVENOUS ONCE
Status: COMPLETED | OUTPATIENT
Start: 2018-10-24 | End: 2018-10-24

## 2018-10-24 RX ADMIN — GADOBUTROL 5 ML: 604.72 INJECTION INTRAVENOUS at 15:10

## 2018-10-24 NOTE — ORAL ONC MGMT
Oral Chemotherapy Monitoring Program  Primary Oncologist: Dr. Whitman  Primary Oncology Clinic: [facility]  Cancer Diagnosis: Breast Cancer     Therapy History:  C1D1=9/5/18, Xeloda 1500mg BID 7 days on 7 days off for 28 day cycles     Drug Interaction Assessment: Recently started gabapentin and CBD oil, no interactions identified    Lab Monitoring Plan  Place oral chemo monograph lab box here  Subjective/Objective:  Shan Marsh is a 48 year old female contacted by phone for a follow-up visit for oral chemotherapy. Shan is doing well on capecitabine. She reports some fatigue. Encouraged her to continue daily physical activity. Nausea and constipation are well controlled. Denied other side effects. Fluid intake is at least 4 x 16 oz water bottles daily. She appreciated call and care.     ORAL CHEMOTHERAPY 1/12/2018 9/4/2018 9/12/2018 10/24/2018   Drug Name (No Data) Xeloda (Capecitabine) Xeloda (Capecitabine) Xeloda (Capecitabine)   Current Dosage 150mg 1500mg 1500mg 1500mg   Current Schedule BID BID BID BID   Cycle Details Continuous 1 week on 1 week off 1 week on 1 week off 1 week on 1 week off   Start Date of Last Cycle - - 9/5/2018 10/12/2018   Planned next cycle start date - - - 11/9/2018   Doses missed in last 2 weeks - - 0 0   Adherence Assessment - - Adherent Adherent   Adverse Effects - - No AE identified during assessment Fatigue   Fatigue - - - Grade 1   Pharmacist Intervention(fatigue) - - - Yes   Intervention(s) - - - Patient education   Home BPs - - - not needed   Any new drug interactions? No Yes No No   Pharmacist Intervention? - Yes - -   Intervention(s) - Patient Education - -   Is the dose as ordered appropriate for the patient? Yes Yes Yes Yes   Has the patient missed any days of school, work, or other routine activity? - - - No       Last PHQ-2 Score on record:   PHQ-2 ( 1999 [employer]) 7/30/2018 7/2/2018   Q1: Little interest or pleasure in doing things 0 0   Q2: Feeling down, depressed  "or hopeless 0 0   PHQ-2 Score 0 0       Patient does not report depression symptoms.      Vitals:  BP:   BP Readings from Last 1 Encounters:   10/23/18 113/70     Wt Readings from Last 1 Encounters:   10/23/18 49 kg (108 lb)     Estimated body surface area is 1.49 meters squared as calculated from the following:    Height as of 10/23/18: 1.626 m (5' 4\").    Weight as of 10/23/18: 49 kg (108 lb).    Labs:  _  Result Component Current Result Ref Range   Sodium 137 (10/20/2018) 133 - 144 mmol/L     _  Result Component Current Result Ref Range   Potassium 3.6 (10/20/2018) 3.4 - 5.3 mmol/L     _  Result Component Current Result Ref Range   Calcium 9.2 (10/20/2018) 8.5 - 10.1 mg/dL     _  Result Component Current Result Ref Range   Magnesium 2.4 (H) (10/15/2018) 1.6 - 2.3 mg/dL     _  Result Component Current Result Ref Range   Phosphorus 3.1 (10/15/2018) 2.5 - 4.5 mg/dL     _  Result Component Current Result Ref Range   Albumin 3.3 (L) (10/20/2018) 3.4 - 5.0 g/dL     _  Result Component Current Result Ref Range   Urea Nitrogen 11 (10/20/2018) 7 - 30 mg/dL     _  Result Component Current Result Ref Range   Creatinine 0.62 (10/20/2018) 0.52 - 1.04 mg/dL       _  Result Component Current Result Ref Range   AST 36 (10/20/2018) 0 - 45 U/L     _  Result Component Current Result Ref Range   ALT 33 (10/20/2018) 0 - 50 U/L     _  Result Component Current Result Ref Range   Bilirubin Total 0.3 (10/20/2018) 0.2 - 1.3 mg/dL       _  Result Component Current Result Ref Range   WBC 7.0 (10/20/2018) 4.0 - 11.0 10e9/L     _  Result Component Current Result Ref Range   Hemoglobin 13.3 (10/20/2018) 11.7 - 15.7 g/dL     _  Result Component Current Result Ref Range   Platelet Count 384 (10/20/2018) 150 - 450 10e9/L     _  Result Component Current Result Ref Range   Absolute Neutrophil 4.9 (10/20/2018) 1.6 - 8.3 10e9/L       Assessment:  Shan is doing well, has some nausea that is being managed with medication, constipation being managed by " dietary changes, and fatigue. Encouraged continued 64oz daily water intake and regular physical activity.      Plan:  Continue with therapy.     Follow-Up:  10/29/18: Dr. Whitman appointment     Refill Due:  Prior to cycle start 11/9/18      Thank you for the opportunity to participate in the care of the above patient,  Ward Lazcano, PharmD  Hematology/Oncology Clinical Pharmacist  [facility] and [facility]   [phone]

## 2018-10-29 ENCOUNTER — APPOINTMENT (OUTPATIENT)
Dept: LAB | Facility: CLINIC | Age: 48
End: 2018-10-29
Attending: INTERNAL MEDICINE
Payer: COMMERCIAL

## 2018-10-29 ENCOUNTER — ONCOLOGY VISIT (OUTPATIENT)
Dept: ONCOLOGY | Facility: CLINIC | Age: 48
End: 2018-10-29
Attending: PHYSICIAN ASSISTANT
Payer: COMMERCIAL

## 2018-10-29 VITALS
OXYGEN SATURATION: 99 % | BODY MASS INDEX: 19.4 KG/M2 | HEART RATE: 89 BPM | WEIGHT: 113 LBS | TEMPERATURE: 98.2 F | DIASTOLIC BLOOD PRESSURE: 72 MMHG | SYSTOLIC BLOOD PRESSURE: 107 MMHG | RESPIRATION RATE: 18 BRPM

## 2018-10-29 DIAGNOSIS — C50.919 METASTATIC BREAST CANCER: ICD-10-CM

## 2018-10-29 LAB
ALBUMIN SERPL-MCNC: 3 G/DL (ref 3.4–5)
ALP SERPL-CCNC: 126 U/L (ref 40–150)
ALT SERPL W P-5'-P-CCNC: 21 U/L (ref 0–50)
ANION GAP SERPL CALCULATED.3IONS-SCNC: 8 MMOL/L (ref 3–14)
AST SERPL W P-5'-P-CCNC: 37 U/L (ref 0–45)
BASOPHILS # BLD AUTO: 0 10E9/L (ref 0–0.2)
BASOPHILS NFR BLD AUTO: 0.3 %
BILIRUB SERPL-MCNC: 0.3 MG/DL (ref 0.2–1.3)
BUN SERPL-MCNC: 14 MG/DL (ref 7–30)
CALCIUM SERPL-MCNC: 9 MG/DL (ref 8.5–10.1)
CHLORIDE SERPL-SCNC: 106 MMOL/L (ref 94–109)
CO2 SERPL-SCNC: 28 MMOL/L (ref 20–32)
CREAT SERPL-MCNC: 0.66 MG/DL (ref 0.52–1.04)
DIFFERENTIAL METHOD BLD: ABNORMAL
EOSINOPHIL # BLD AUTO: 0.1 10E9/L (ref 0–0.7)
EOSINOPHIL NFR BLD AUTO: 1.1 %
ERYTHROCYTE [DISTWIDTH] IN BLOOD BY AUTOMATED COUNT: 15.3 % (ref 10–15)
GFR SERPL CREATININE-BSD FRML MDRD: >90 ML/MIN/1.7M2
GLUCOSE SERPL-MCNC: 90 MG/DL (ref 70–99)
HCT VFR BLD AUTO: 39.3 % (ref 35–47)
HGB BLD-MCNC: 12.8 G/DL (ref 11.7–15.7)
IMM GRANULOCYTES # BLD: 0 10E9/L (ref 0–0.4)
IMM GRANULOCYTES NFR BLD: 0.3 %
LYMPHOCYTES # BLD AUTO: 1.2 10E9/L (ref 0.8–5.3)
LYMPHOCYTES NFR BLD AUTO: 19.4 %
MCH RBC QN AUTO: 32.1 PG (ref 26.5–33)
MCHC RBC AUTO-ENTMCNC: 32.6 G/DL (ref 31.5–36.5)
MCV RBC AUTO: 99 FL (ref 78–100)
MONOCYTES # BLD AUTO: 0.4 10E9/L (ref 0–1.3)
MONOCYTES NFR BLD AUTO: 5.9 %
NEUTROPHILS # BLD AUTO: 4.5 10E9/L (ref 1.6–8.3)
NEUTROPHILS NFR BLD AUTO: 73 %
NRBC # BLD AUTO: 0 10*3/UL
NRBC BLD AUTO-RTO: 0 /100
PLATELET # BLD AUTO: 298 10E9/L (ref 150–450)
POTASSIUM SERPL-SCNC: 3.7 MMOL/L (ref 3.4–5.3)
PROT SERPL-MCNC: 7.6 G/DL (ref 6.8–8.8)
RBC # BLD AUTO: 3.99 10E12/L (ref 3.8–5.2)
SODIUM SERPL-SCNC: 142 MMOL/L (ref 133–144)
WBC # BLD AUTO: 6.1 10E9/L (ref 4–11)

## 2018-10-29 PROCEDURE — 85025 COMPLETE CBC W/AUTO DIFF WBC: CPT | Performed by: PHYSICIAN ASSISTANT

## 2018-10-29 PROCEDURE — 36415 COLL VENOUS BLD VENIPUNCTURE: CPT

## 2018-10-29 PROCEDURE — G0463 HOSPITAL OUTPT CLINIC VISIT: HCPCS

## 2018-10-29 PROCEDURE — 99214 OFFICE O/P EST MOD 30 MIN: CPT | Mod: ZP | Performed by: INTERNAL MEDICINE

## 2018-10-29 PROCEDURE — 80053 COMPREHEN METABOLIC PANEL: CPT | Performed by: PHYSICIAN ASSISTANT

## 2018-10-29 PROCEDURE — 96040 ZZH GENETIC COUNSELING, EACH 30 MINUTES: CPT | Mod: ZF

## 2018-10-29 ASSESSMENT — PAIN SCALES - GENERAL: PAINLEVEL: NO PAIN (0)

## 2018-10-29 NOTE — MR AVS SNAPSHOT
After Visit Summary   10/29/2018    Shan Marsh    MRN: [id number]           Patient Information     Date Of Birth          1970        Visit Information        Provider Department      10/29/2018 12:00 PM Ana Whitman MD [facility]        Today's Diagnoses     Metastatic breast cancer (H)           Follow-ups after your visit        Your next 10 appointments already scheduled     Nov 08, 2018  1:20 PM CST   CT SOFT TISSUE NECK W CONTRAST with UCCT1   [facility] [place] [facility] ([facility])    [address]  [facility] [address] [phone]           How do I prepare for my exam? (Food and drink instructions) **You will have contrast for this exam.** Do not eat or drink for 2 hours before your exam. If you need to take medicine, you may take it with small sips of water. (We may ask you to take liquid medicine as well.)  The day before your exam, drink extra fluids at least six 8-ounce glasses (unless your doctor tells you to restrict your fluids).  How do I prepare for my exam? (Other instructions) Patients over 70 or patients with diabetes or kidney problems: If you haven t had a blood test (creatinine test) within the last 30 days, the Cardiologist/Radiologist may require you to get this test prior to your exam.  What should I wear: Please wear loose clothing, such as a sweat suit or jogging clothes.  Avoid snaps, zippers and other metal. We may ask you to undress and put on a hospital gown.  How long does the exam take: Most scans take less than 20 minutes.  What should I bring: Please bring any scans or X-rays taken at other hospitals, if similar tests were done. Also bring a list of your medicines, including vitamins, minerals and over-the-counter drugs. It is safest to leave personal items at home.  Do I need a :  No  is needed.  What do I need to tell my doctor? Be sure to tell your doctor:  * If you have any allergies. * If there s any chance you are pregnant. * If you are breastfeeding. * If you have diabetes as your medication may need to be adjusted for this exam.  What should I do after the exam: No restrictions, You may resume normal activities.  What is this test: A CT (computed tomography) scan is a series of pictures that allows us to look inside your body. The scanner creates images of the body in cross sections, much like slices of bread. This helps us see any problems more clearly. You may receive contrast (X-ray dye) before or during your scan. Contrast is given through an IV (small needle in your arm).  Who should I call with questions: If you have any questions, please call the Imaging Department where you will have your exam. Directions, parking instructions, and other information is available on our website, [email or web address].            Nov 08, 2018  1:40 PM CST   CT CHEST/ABDOMEN/PELVIS W CONTRAST with UCCT1   [facility] CT ([facility] [place])    [address] [address] [phone]           How do I prepare for my exam? (Food and drink instructions) To prepare: Do not eat or drink for 2 hours before your exam. If you need to take medicine, you may take it with small sips of water. (We may ask you to take liquid medicine as well.)  How do I prepare for my exam? (Other instructions) Please arrive 30 minutes early for your CT.  Once in the department you might be asked to drink water 15-20 minutes prior to your exam.  If indicated you may be asked to drink an oral contrast in advance of your CT.  If this is the case, the imaging team will let you know or be in contact with you prior to your appointment  Patients over 70 or patients with diabetes or kidney problems: If you haven t had a blood test (creatinine test) within the last 30 days, the Cardiologist/Radiologist may require you to get this test prior to your  exam.  If you have diabetes:  Continue to take your metformin medication on the day of your exam  What should I wear: Please wear loose clothing, such as a sweat suit or jogging clothes. Avoid snaps, zippers and other metal. We may ask you to undress and put on a hospital gown.  How long does the exam take: Most scans take less than 20 minutes.  What should I bring: Please bring any scans or X-rays taken at other hospitals, if similar tests were done. Also bring a list of your medicines, including vitamins, minerals and over-the-counter drugs. It is safest to leave personal items at home.  Do I need a : No  is needed.  What do I need to tell my doctor? Be sure to tell your doctor: * If you have any allergies. * If there s any chance you are pregnant. * If you are breastfeeding.  What should I do after the exam: No restrictions, You may resume normal activities.  What is this test: A CT (computed tomography) scan is a series of pictures that allows us to look inside your body. The scanner creates images of the body in cross sections, much like slices of bread. This helps us see any problems more clearly. You may receive contrast (X-ray dye) before or during your scan. You will be asked to drink the contrast.  Who should I call with questions: If you have any questions, please call the Imaging Department where you will have your exam. Directions, parking instructions, and other information is available on our website, [email or web address].            Nov 08, 2018  2:00 PM CST   Ech Complete with UCECHCR4   [facility] ([facility] [place])    [address] [place] [address] [phone]           1.  Please bring or wear a comfortable two-piece outfit. 2.  You may eat, drink and take your normal medicines. 3.  For any questions that cannot be answered, please contact the ordering physician 4.  Please do not wear perfumes or scented lotions on the day of your  exam.            Nov 08, 2018  3:30 PM CST   (Arrive by 3:15 PM)   New Patient Visit with Robbie Sanchez MD   [facility] ([facility] [place])    [address] [facility]  [address] [place] [address] [phone]              Who to contact     If you have questions or need follow up information about today's clinic visit or your schedule please contact [facility] directly at [phone].  Normal or non-critical lab and imaging results will be communicated to you by [employer], letter or phone within 4 business days after the clinic has received the results. If you do not hear from us within 7 days, please contact the clinic through [email or web address] or phone. If you have a critical or abnormal lab result, we will notify you by phone as soon as possible.  Submit refill requests through [employer] or call your pharmacy and they will forward the refill request to us. Please allow 3 business days for your refill to be completed.          Additional Information About Your Visit        [employer] Information     [employer] gives you secure access to your electronic health record. If you see a primary care provider, you can also send messages to your care team and make appointments. If you have questions, please call your primary care clinic.  If you do not have a primary care provider, please call [phone] and they will assist you.        Care EveryWhere ID     This is your Care EveryWhere ID. This could be used by other organizations to access your [place] medical records  [id number]        Your Vitals Were     Pulse Temperature Respirations Pulse Oximetry BMI (Body Mass Index)       89 98.2  F (36.8  C) (Oral) 18 99% 19.4 kg/m2        Blood Pressure from Last 3 Encounters:   10/29/18 107/72   10/23/18 113/70   10/20/18 110/72    Weight from Last 3 Encounters:   10/29/18 51.3 kg (113 lb)   10/23/18 49 kg (108 lb)   10/20/18 50.2 kg (110 lb 11.2 oz)              We Performed the  Following     CBC with platelets differential     Comprehensive metabolic panel        Primary Care Provider Office Phone # Fax #    Marilu Michelle [phone] [address]  [facility] [address]        Equal Access to Services     LUZ JAMES : Hadii aad ku hadenal Schaefer, waaxda luqadaha, qaybta kaalmada adestephenda, edi marreroluna fonsecadana crowleysukhjinder kolb. So [facility] [phone].    ATENCIÓN: Si habla español, tiene a gamez disposición servicios gratuitos de asistencia lingüística. Llame al [phone].    We comply with applicable federal civil rights laws and [place] laws. We do not discriminate on the basis of race, color, national origin, age, disability, sex, sexual orientation, or gender identity.            Thank you!     Thank you for choosing [facility]  for your care. Our goal is always to provide you with excellent care. Hearing back from our patients is one way we can continue to improve our services. Please take a few minutes to complete the written survey that you may receive in the mail after your visit with us. Thank you!             Your Updated Medication List - Protect others around you: Learn how to safely use, store and throw away your medicines at www.disposemymeds.org.          This list is accurate as of 10/29/18 11:59 PM.  Always use your most recent med list.                   Brand Name Dispense Instructions for use Diagnosis    calcium carbonate 500 mg (elemental) 500 MG tablet    OS-KRYSTEN     Take 1 tablet by mouth daily        capecitabine 500 MG tablet CHEMO    XELODA    84 tablet    Take 3 tablets (1,500 mg) by mouth 2 times daily Take on day 1-7 and 15-21 of 28-day cycle.    Metastatic breast cancer (H)       ENZYME DIGEST Caps      Take 1 capsule by mouth daily        medical cannabis (Patient's own supply.  Not a prescription)      (This is NOT a prescription, and does not certify that the patient has a qualifying medical condition  for medical cannabis.  The purpose of this order is  to document that the patient reports taking medical cannabis.)        ondansetron 8 MG tablet    ZOFRAN    40 tablet    Take 1 tablet (8 mg) by mouth every 8 hours as needed for nausea    Metastatic breast cancer (H), Bone metastasis (H)       prochlorperazine 10 MG tablet    COMPAZINE     Take 10 mg by mouth before 1st dose of oral chemotherapy, then prn thereafter        traMADol 50 MG tablet    ULTRAM     Take 1-2 tablets ( mg) by mouth every 6 hours as needed for moderate pain or headaches        VITAMIN D (CHOLECALCIFEROL) PO      Take 1,000 Units by mouth daily

## 2018-10-29 NOTE — PROGRESS NOTES
Hematology-Oncology Visit  Oct 29, 2018    Reason for Visit: follow-up metastatic breast cancer, ER positive     HPI: Shan Marsh is a 48 year old female without significant past medical history with recent diagnosis of metastatic breast cancer after presenting with left shoulder pain. Shoulder MRI showed soft tissue mass close to distal clavicle concerning for malignancy. She had PET/CT 5/9/17 showing hypermetabolic left axillary, supraclavicular, mediastinal and hilar nodes. She had biopsies of left axilla and hilar nodes showing metastatic breast cancer, strongly ER positive, moderately IL positive and HER-2 kaitlin non-amplified. She originally had [id number], ER/IL positive, HER-2 negative breast cancer diagnosed in spring of 2012 s/p bilateral mastectomies and Tamoxifen from 8/2012-2/2013 and discontinued due to side effects. She met with Dr. Whitman most recently on 5/16/17 who recommended treatment on BIG10 clinical trial with palbociclib and tamoxifen. She started treatment on 5/24/17 and required dose reduction for neutropenia. CT CAP and bone scan 1/11/2018 showing progressive disease with new bony lesions in the ribs, T spine, and possibly liver, lungs. She started Zoladex and abemiciclib on 1/12/2018.     In March 2018, she sought treatment at [facility] in [place], under the care of Dr. Brooks, where she received:  - Vallovex vaccine (per FDA, is in phase I trials here in [place])  - Insulin potentiated therapy with conjugated chemotherapy (5% chemotherapy, unclear what chemotherapy)  - Gina's toxins, IV vitamin C, IV Ozone, hyperbaric chamber, vitamin B17 infusion, vitamin CK3 IV, and coffee enemas PRN  - Also taking oral niacin, Lugol, acidol, pancreatin, and thyroid desiccated liver, per their regimen  - She was continued on Letrozole and advised to conintue Zoladex  In August 2018, she was noted to have disease progression and started on therapy with Xeloda.     INTERVAL HISTORY:  Shan is here  for followup.       She is on Xeloda 1 week on, 1 week off, 1500 mg twice daily.      Shan has had a difficult month of October.  She was hospitalized with pleural effusion.  She had a pericardial effusion requiring drainage.  She currently has a PleurX catheter in the left thorax.  She is draining about 150 mL every other day.  She is following up with Thoracic Surgery.      Since the fluid drainage, she is doing significantly better.  She and I had talked on the last time about not doing any further therapies with the [facility] while receiving Xeloda and she is in agreement.      She remains on Zoladex and Xeloda as well as every 3-month Zometa for bone metastases.      She has no pain.  She occasional intermittently has had left neck pain.  This is resolved with massage.      She is eating and drinking well.  No diarrhea or constipation.  She has good energy levels.  No oral ulcers.  No diarrhea, no dry skin.      REVIEW OF SYSTEMS:  A 10-point review of systems is otherwise negative.             Current Outpatient Prescriptions   Medication     calcium carbonate (OS-KRYSTEN 500 MG [place]. [place]) 1250 MG tablet     capecitabine (XELODA) 500 MG tablet CHEMO     Digestive Enzymes (ENZYME DIGEST) CAPS     medical cannabis (Patient's own supply.  Not a prescription)     VITAMIN D, CHOLECALCIFEROL, PO     ondansetron (ZOFRAN) 8 MG tablet     prochlorperazine (COMPAZINE) 10 MG tablet     traMADol (ULTRAM) 50 MG tablet     [DISCONTINUED] tamoxifen (NOLVADEX) 20 MG tablet     No current facility-administered medications for this visit.        PHYSICAL EXAM:  ECO  /72  Pulse 89  Temp 98.2  F (36.8  C) (Oral)  Resp 18  Wt 51.3 kg (113 lb)  SpO2 99%  BMI 19.4 kg/m2   Wt Readings from Last 10 Encounters:   10/29/18 51.3 kg (113 lb)   10/23/18 49 kg (108 lb)   10/20/18 50.2 kg (110 lb 11.2 oz)   10/20/18 49 kg (108 lb)   10/15/18 49 kg (108 lb)   10/11/18 53.3 kg (117 lb 6.4 oz)   10/03/18 50.3 kg (111 lb)    09/05/18 51.7 kg (114 lb)   09/04/18 51.3 kg (113 lb)   08/28/18 52.6 kg (116 lb)      General: Alert, oriented, pleasant, NAD  Skin: warm, dry without rashes or bruising   HEENT: Normocephalic, atraumatic, PERRLA, EOMI. Moist mucus membranes, no lesions or thrush  Lymph: I do not appreciate any adenopathy, which is an improvement from prior scans  Lungs:  normal work of breathing, slight dullness to  percussion at left base with left pleurx catheter in place, right  Lung clear  Cardiac: RRR, tachycardia, normal S1, S2, no murmurs  Abdomen: Soft, nontender, nondistended. Normoactive bowel sounds. No hepatosplenomegaly, masses  Neuro: CNII-XII grossly intact  Extremities: No pedal edema. No lymphedema    Labs:      Lab Results   Component Value Date    WBC 6.1 10/29/2018     Lab Results   Component Value Date    RBC 3.99 10/29/2018     Lab Results   Component Value Date    HGB 12.8 10/29/2018     Lab Results   Component Value Date    HCT 39.3 10/29/2018     No components found for: MCT  Lab Results   Component Value Date    MCV 99 10/29/2018     Lab Results   Component Value Date    MCH 32.1 10/29/2018     Lab Results   Component Value Date    MCHC 32.6 10/29/2018     Lab Results   Component Value Date    RDW 15.3 10/29/2018     Lab Results   Component Value Date     10/29/2018       Recent Labs   Lab Test  10/29/18   1148  10/20/18   1623   NA  142  137   POTASSIUM  3.7  3.6   CHLORIDE  106  102   CO2  28  28   ANIONGAP  8  6   GLC  90  85   BUN  14  11   CR  0.66  0.62   KRYSTEN  9.0  9.2     Liver Function Studies -   Recent Labs   Lab Test  10/29/18   1148   PROTTOTAL  7.6   ALBUMIN  3.0*   BILITOTAL  0.3   ALKPHOS  126   AST  37   ALT  21       Assessment & Plan:      Shan Marsh is a 48-year-old female with a history of a T1 N0 estrogen and progesterone receptor positive, HER2-negative left breast cancer treated with bilateral mastectomies in 2012, now with metastatic disease involving left axilla  bones, possible liver and lung lesions, hormone receptor positive, HER2-negative.  She progressed on palbociclib and tamoxifen, alternative therapies in [place] and is now on Xeloda.    1.  Metastatic breast cancer.     I had a long discussion with Shan today that currently she is on Xeloda 1500 mg twice daily for 7 days on and 7 days off.  She had 2 weeks of this in September and then had a prolonged hospital stay.  She is now back on week 2 and is tolerating this well.      At this time, I would like her to continue on the current dosing with plans to reimage her in approximately 4 weeks.      Given her bone metastases, I would like her to receive Zometa every 3 months.      She also remains on ovarian suppression with Zoladex monthly.      Pleural effusion secondary to her malignancy.  She has a PleurX catheter in place.  It is draining 150 mL every other day.  We discussed that this may need to remain in permanently.  If she has an excellent response to therapy this could be switched or potentially removed in the future.      Pericardial effusion.  No signs or symptoms of tamponade.  She did have drainage of this and is scheduled to see Dr. Sanchez next week.      She is coping reasonably well.       Ana Whitman MD

## 2018-10-29 NOTE — NURSING NOTE
"Oncology Rooming Note    October 29, 2018 12:15 PM   Shan Marsh is a 48 year old female who presents for:    Chief Complaint   Patient presents with     Blood Draw     VPT blood draw and vitals     Oncology Clinic Visit     return - breast ca      Initial Vitals: /72  Pulse 89  Temp 98.2  F (36.8  C) (Oral)  Resp 18  Wt 51.3 kg (113 lb)  SpO2 99%  BMI 19.4 kg/m2 Estimated body mass index is 19.4 kg/(m^2) as calculated from the following:    Height as of 10/23/18: 1.626 m (5' 4\").    Weight as of this encounter: 51.3 kg (113 lb). Body surface area is 1.52 meters squared.  No Pain (0) Comment: Data Unavailable   No LMP recorded. Patient is not currently having periods (Reason: Chemotherapy).  Allergies reviewed: Yes  Medications reviewed: Yes    Medications: Medication refills not needed today.  Pharmacy name entered into [employer]:    [facility] [place] - [place], [place] - [address]  [facility] - [place], [place] [facility] [address] [address]    Clinical concerns: no new concerns      6 minutes for nursing intake (face to face time)     Sumi Bañuelos CMA            "

## 2018-10-29 NOTE — LETTER
10/29/2018       RE: Shan Marsh  [address]  [facility] [address]     Dear Colleague,    Thank you for referring your patient, Shan Marsh, to the [facility]. Please see a copy of my visit note below.    Hematology-Oncology Visit  Oct 29, 2018    Reason for Visit: follow-up metastatic breast cancer, ER positive     HPI: Shan Marsh is a 48 year old female without significant past medical history with recent diagnosis of metastatic breast cancer after presenting with left shoulder pain. Shoulder MRI showed soft tissue mass close to distal clavicle concerning for malignancy. She had PET/CT 5/9/17 showing hypermetabolic left axillary, supraclavicular, mediastinal and hilar nodes. She had biopsies of left axilla and hilar nodes showing metastatic breast cancer, strongly ER positive, moderately NH positive and HER-2 kaitlin non-amplified. She originally had [id number], ER/NH positive, HER-2 negative breast cancer diagnosed in spring of 2012 s/p bilateral mastectomies and Tamoxifen from 8/2012-2/2013 and discontinued due to side effects. She met with Dr. Whitman most recently on 5/16/17 who recommended treatment on BIG10 clinical trial with palbociclib and tamoxifen. She started treatment on 5/24/17 and required dose reduction for neutropenia. CT CAP and bone scan 1/11/2018 showing progressive disease with new bony lesions in the ribs, T spine, and possibly liver, lungs. She started Zoladex and abemiciclib on 1/12/2018.     In March 2018, she sought treatment at [facility] in [place], under the care of Dr. Brooks, where she received:  - Vallovex vaccine (per FDA, is in phase I trials here in [place])  - Insulin potentiated therapy with conjugated chemotherapy (5% chemotherapy, unclear what chemotherapy)  - Gina's toxins, IV vitamin C, IV Ozone, hyperbaric chamber, vitamin B17 infusion, vitamin CK3 IV, and coffee enemas PRN  - Also taking oral niacin, Lugol, acidol,  pancreatin, and thyroid desiccated liver, per their regimen  - She was continued on Letrozole and advised to conintue Zoladex  In 2018, she was noted to have disease progression and started on therapy with Xeloda.     INTERVAL HISTORY:  Shan is here for followup.       She is on Xeloda 1 week on, 1 week off, 1500 mg twice daily.      Shan has had a difficult month of October.  She was hospitalized with pleural effusion.  She had a pericardial effusion requiring drainage.  She currently has a PleurX catheter in the left thorax.  She is draining about 150 mL every other day.  She is following up with Thoracic Surgery.      Since the fluid drainage, she is doing significantly better.  She and I had talked on the last time about not doing any further therapies with the [facility] while receiving Xeloda and she is in agreement.      She remains on Zoladex and Xeloda as well as every 3-month Zometa for bone metastases.      She has no pain.  She occasional intermittently has had left neck pain.  This is resolved with massage.      She is eating and drinking well.  No diarrhea or constipation.  She has good energy levels.  No oral ulcers.  No diarrhea, no dry skin.      REVIEW OF SYSTEMS:  A 10-point review of systems is otherwise negative.             Current Outpatient Prescriptions   Medication     calcium carbonate (OS-KRYSTEN 500 MG [place]. CA) 1250 MG tablet     capecitabine (XELODA) 500 MG tablet CHEMO     Digestive Enzymes (ENZYME DIGEST) CAPS     medical cannabis (Patient's own supply.  Not a prescription)     VITAMIN D, CHOLECALCIFEROL, PO     ondansetron (ZOFRAN) 8 MG tablet     prochlorperazine (COMPAZINE) 10 MG tablet     traMADol (ULTRAM) 50 MG tablet     [DISCONTINUED] tamoxifen (NOLVADEX) 20 MG tablet     No current facility-administered medications for this visit.        PHYSICAL EXAM:  ECO  /72  Pulse 89  Temp 98.2  F (36.8  C) (Oral)  Resp 18  Wt 51.3 kg (113 lb)  SpO2 99%  BMI 19.4  kg/m2   Wt Readings from Last 10 Encounters:   10/29/18 51.3 kg (113 lb)   10/23/18 49 kg (108 lb)   10/20/18 50.2 kg (110 lb 11.2 oz)   10/20/18 49 kg (108 lb)   10/15/18 49 kg (108 lb)   10/11/18 53.3 kg (117 lb 6.4 oz)   10/03/18 50.3 kg (111 lb)   09/05/18 51.7 kg (114 lb)   09/04/18 51.3 kg (113 lb)   08/28/18 52.6 kg (116 lb)      General: Alert, oriented, pleasant, NAD  Skin: warm, dry without rashes or bruising   HEENT: Normocephalic, atraumatic, PERRLA, EOMI. Moist mucus membranes, no lesions or thrush  Lymph: I do not appreciate any adenopathy, which is an improvement from prior scans  Lungs:  normal work of breathing, slight dullness to  percussion at left base with left pleurx catheter in place, right  Lung clear  Cardiac: RRR, tachycardia, normal S1, S2, no murmurs  Abdomen: Soft, nontender, nondistended. Normoactive bowel sounds. No hepatosplenomegaly, masses  Neuro: CNII-XII grossly intact  Extremities: No pedal edema. No lymphedema    Labs:      Lab Results   Component Value Date    WBC 6.1 10/29/2018     Lab Results   Component Value Date    RBC 3.99 10/29/2018     Lab Results   Component Value Date    HGB 12.8 10/29/2018     Lab Results   Component Value Date    HCT 39.3 10/29/2018     No components found for: MCT  Lab Results   Component Value Date    MCV 99 10/29/2018     Lab Results   Component Value Date    MCH 32.1 10/29/2018     Lab Results   Component Value Date    MCHC 32.6 10/29/2018     Lab Results   Component Value Date    RDW 15.3 10/29/2018     Lab Results   Component Value Date     10/29/2018       Recent Labs   Lab Test  10/29/18   1148  10/20/18   1623   NA  142  137   POTASSIUM  3.7  3.6   CHLORIDE  106  102   CO2  28  28   ANIONGAP  8  6   GLC  90  85   BUN  14  11   CR  0.66  0.62   KRYSTEN  9.0  9.2     Liver Function Studies -   Recent Labs   Lab Test  10/29/18   1148   PROTTOTAL  7.6   ALBUMIN  3.0*   BILITOTAL  0.3   ALKPHOS  126   AST  37   ALT  21       Assessment &  Plan:      Shan Marsh is a 48-year-old female with a history of a T1 N0 estrogen and progesterone receptor positive, HER2-negative left breast cancer treated with bilateral mastectomies in 2012, now with metastatic disease involving left axilla bones, possible liver and lung lesions, hormone receptor positive, HER2-negative.  She progressed on palbociclib and tamoxifen, alternative therapies in [place] and is now on Xeloda.    1.  Metastatic breast cancer.     I had a long discussion with Shan today that currently she is on Xeloda 1500 mg twice daily for 7 days on and 7 days off.  She had 2 weeks of this in September and then had a prolonged hospital stay.  She is now back on week 2 and is tolerating this well.      At this time, I would like her to continue on the current dosing with plans to reimage her in approximately 4 weeks.      Given her bone metastases, I would like her to receive Zometa every 3 months.      She also remains on ovarian suppression with Zoladex monthly.      Pleural effusion secondary to her malignancy.  She has a PleurX catheter in place.  It is draining 150 mL every other day.  We discussed that this may need to remain in permanently.  If she has an excellent response to therapy this could be switched or potentially removed in the future.      Pericardial effusion.  No signs or symptoms of tamponade.  She did have drainage of this and is scheduled to see Dr. Sanchez next week.      She is coping reasonably well.       Ana Whitman MD

## 2018-11-02 ENCOUNTER — TELEPHONE (OUTPATIENT)
Dept: PHARMACY | Facility: OTHER | Age: 48
End: 2018-11-02

## 2018-11-05 DIAGNOSIS — J90 PLEURAL EFFUSION: Primary | ICD-10-CM

## 2018-11-05 NOTE — PROGRESS NOTES
Multiple messages (My Chart) and calls to tamra by Shan wanting to have the Pleurx removed. It was placed on 10/04 so it can safely be removed at this time. Will have the  contact her to arrange for this procedure to be done in ENDO.

## 2018-11-06 ENCOUNTER — ALLIED HEALTH/NURSE VISIT (OUTPATIENT)
Dept: PHARMACY | Facility: CLINIC | Age: 48
End: 2018-11-06
Payer: COMMERCIAL

## 2018-11-06 DIAGNOSIS — C50.919 METASTATIC BREAST CANCER: Primary | ICD-10-CM

## 2018-11-06 DIAGNOSIS — E63.9 NUTRITIONAL DEFICIENCY: ICD-10-CM

## 2018-11-06 LAB
FUNGUS SPEC CULT: NORMAL
SPECIMEN SOURCE: NORMAL

## 2018-11-06 PROCEDURE — 99607 MTMS BY PHARM ADDL 15 MIN: CPT | Mod: U4 | Performed by: PHARMACIST

## 2018-11-06 PROCEDURE — 99605 MTMS BY PHARM NP 15 MIN: CPT | Mod: U4 | Performed by: PHARMACIST

## 2018-11-06 NOTE — MR AVS SNAPSHOT
After Visit Summary   11/6/2018    Shan Marsh    MRN: [id number]           Patient Information     Date Of Birth          1970        Visit Information        Provider Department      11/6/2018 3:00 PM Rachel Pierson [facility] Medication Therapy Management        Today's Diagnoses     Metastatic breast cancer (H)    -  1    Nutritional deficiency          Care Instructions      Recommendations from today's MTM visit:                                                    MTM (medication therapy management) is a service provided by a clinical pharmacist designed to help you get the most of out of your medicines.   Today we reviewed what your medicines are for, how to know if they are working, that your medicines are safe and how to make your medicine regimen as easy as possible.     1. Restart the skin cream/salve at the start of the cycle.    To schedule another MTM appointment, please call the clinic directly or you may call the MTM scheduling line at [phone] or toll-free at [phone].     My Clinical Pharmacist's contact information:                                                      It was a pleasure talking with you today!  Please feel free to contact me with any questions or concerns you have.      Rachel Pierson, PharmD, BCOP, BCPS  Clinical Pharmacy Specialist/  Oncology Medication Therapy Management Pharmacist  [facility]  Pager [phone]  Phone [phone]          You may receive a survey about the MTM services you received.  I would appreciate your feedback to help me serve you better in the future. Please fill it out and return it when you can. Your comments will be anonymous.                Follow-ups after your visit        Your next 10 appointments already scheduled     Nov 12, 2018   Procedure with Bandar Meyer MD   [facility] [place], Endoscopy ([facility] [place])    [address], [place] [address]    [phone]           The [place] campus is located on the corner of [address] on the [facility]. It is easily accessible from virtually any point in the [place] area, via [address] and [address].            Nov 16, 2018 12:00 PM CST   Masonic Lab Draw with  MASONIC LAB DRAW   [facility] Lab Draw ([place] [facility])    [address]  [address]  [facility] [address]   [phone]            Nov 16, 2018 12:50 PM CST   (Arrive by 12:35 PM)   Return Visit with Lima Griffiths PA-C   [facility] ([place] [facility])    [address]  [address]  [facility] [address]   [phone]            Nov 16, 2018  2:00 PM CST   Infusion 60 with  ONCOLOGY INFUSION, UC 24 ATC   [facility] ([place] [facility])    [address]  [address]  [facility] [address]   [phone]            Nov 21, 2018 12:00 PM CST   CT SOFT TISSUE NECK W CONTRAST with UCCT2   [facility] CT ([place] [facility])    [address]  1st Floor  [facility] [address] [phone]           How do I prepare for my exam? (Food and drink instructions) **You will have contrast for this exam.** Do not eat or drink for 2 hours before your exam. If you need to take medicine, you may take it with small sips of water. (We may ask you to take liquid medicine as well.)  The day before your exam, drink extra fluids at least six 8-ounce glasses (unless your doctor tells you to restrict your fluids).  How do I prepare for my exam? (Other instructions) Patients over 70 or patients with diabetes or kidney problems: If you haven t had a blood test (creatinine test) within the last 30 days, the Cardiologist/Radiologist may require you to get this test prior to your exam.  What should I wear: Please wear loose clothing, such as a sweat  suit or jogging clothes.  Avoid snaps, zippers and other metal. We may ask you to undress and put on a hospital gown.  How long does the exam take: Most scans take less than 20 minutes.  What should I bring: Please bring any scans or X-rays taken at other hospitals, if similar tests were done. Also bring a list of your medicines, including vitamins, minerals and over-the-counter drugs. It is safest to leave personal items at home.  Do I need a :  No  is needed.  What do I need to tell my doctor? Be sure to tell your doctor: * If you have any allergies. * If there s any chance you are pregnant. * If you are breastfeeding. * If you have diabetes as your medication may need to be adjusted for this exam.  What should I do after the exam: No restrictions, You may resume normal activities.  What is this test: A CT (computed tomography) scan is a series of pictures that allows us to look inside your body. The scanner creates images of the body in cross sections, much like slices of bread. This helps us see any problems more clearly. You may receive contrast (X-ray dye) before or during your scan. Contrast is given through an IV (small needle in your arm).  Who should I call with questions: If you have any questions, please call the Imaging Department where you will have your exam. Directions, parking instructions, and other information is available on our website, [email or web address].            Nov 21, 2018 12:20 PM CST   CT CHEST/ABDOMEN/PELVIS W CONTRAST with UCCT2   [facility] [place] [facility] ([facility])    [address] [address] [phone]           How do I prepare for my exam? (Food and drink instructions) To prepare: Do not eat or drink for 2 hours before your exam. If you need to take medicine, you may take it with small sips of water. (We may ask you to take liquid medicine as well.)  How do I prepare for my exam? (Other instructions)  Please arrive 30 minutes early for your CT.  Once in the department you might be asked to drink water 15-20 minutes prior to your exam.  If indicated you may be asked to drink an oral contrast in advance of your CT.  If this is the case, the imaging team will let you know or be in contact with you prior to your appointment  Patients over 70 or patients with diabetes or kidney problems: If you haven t had a blood test (creatinine test) within the last 30 days, the Cardiologist/Radiologist may require you to get this test prior to your exam.  If you have diabetes:  Continue to take your metformin medication on the day of your exam  What should I wear: Please wear loose clothing, such as a sweat suit or jogging clothes. Avoid snaps, zippers and other metal. We may ask you to undress and put on a hospital gown.  How long does the exam take: Most scans take less than 20 minutes.  What should I bring: Please bring any scans or X-rays taken at other hospitals, if similar tests were done. Also bring a list of your medicines, including vitamins, minerals and over-the-counter drugs. It is safest to leave personal items at home.  Do I need a : No  is needed.  What do I need to tell my doctor? Be sure to tell your doctor: * If you have any allergies. * If there s any chance you are pregnant. * If you are breastfeeding.  What should I do after the exam: No restrictions, You may resume normal activities.  What is this test: A CT (computed tomography) scan is a series of pictures that allows us to look inside your body. The scanner creates images of the body in cross sections, much like slices of bread. This helps us see any problems more clearly. You may receive contrast (X-ray dye) before or during your scan. You will be asked to drink the contrast.  Who should I call with questions: If you have any questions, please call the Imaging Department where you will have your exam. Directions, parking instructions, and  other information is available on our website, [email or web address].            Nov 26, 2018  8:30 AM CST   Masonic Lab Draw with  MASONIC LAB DRAW   [facility] Masonic Lab Draw ([facility])    [address]  [address]  [facility] [address] [phone]            Nov 26, 2018  9:00 AM CST   (Arrive by 8:45 AM)   Return Visit with Ana Whitman MD   [facility] ([facility])    [address]  [address]  [facility] [address] [phone]            Jan 08, 2019 11:30 AM CST   Ech Complete with [id number]   [facility] Echo ([facility])    [address] [place] [address]  3rd Floor  [facility] [address] [phone]           1.  Please bring or wear a comfortable two-piece outfit. 2.  You may eat, drink and take your normal medicines. 3.  For any questions that cannot be answered, please contact the ordering physician 4.  Please do not wear perfumes or scented lotions on the day of your exam.              Future tests that were ordered for you today     Open Standing Orders        Priority Remaining Interval Expires Ordered    Echocardiogram Complete Routine 4/4 m+3 11/8/2019 11/8/2018          Open Future Orders        Priority Expected Expires Ordered    Echo Complete Routine 2/6/2019 11/8/2019 11/8/2018            Who to contact     If you have questions or need follow up information about today's clinic visit or your schedule please contact [facility] MEDICATION THERAPY MANAGEMENT directly at [phone].  Normal or non-critical lab and imaging results will be communicated to you by [employer], letter or phone within 4 business days after the clinic has received the results. If you do not hear from us within 7 days, please contact the clinic through [employer] or phone. If you have a critical or abnormal lab result, we will notify you by phone as soon as possible.  Submit refill requests through [employer]  or call your pharmacy and they will forward the refill request to us. Please allow 3 business days for your refill to be completed.          Additional Information About Your Visit        [employer] Information     [employer] gives you secure access to your electronic health record. If you see a primary care provider, you can also send messages to your care team and make appointments. If you have questions, please call your primary care clinic.  If you do not have a primary care provider, please call [phone] and they will assist you.        Care EveryWhere ID     This is your Care EveryWhere ID. This could be used by other organizations to access your [place] medical records  [id number]         Blood Pressure from Last 3 Encounters:   11/08/18 110/75   10/29/18 107/72   10/23/18 113/70    Weight from Last 3 Encounters:   11/08/18 116 lb 6.4 oz (52.8 kg)   10/29/18 113 lb (51.3 kg)   10/23/18 108 lb (49 kg)              Today, you had the following     No orders found for display       Primary Care Provider Office Phone # Fax #    Marilu Michelle [phone]       [address]  [facility] [address]        Equal Access to Services     SADIA JAMES : Hadii noemy darlingo Sojailyn, waaxda luqadaha, qaybta kaalmada adeegyada, edi kolb. So [facility] [phone].    ATENCIÓN: Si habla español, tiene a gamez disposición servicios gratuitos de asistencia lingüística. [facility] [phone].    We comply with applicable federal civil rights laws and [place] laws. We do not discriminate on the basis of race, color, national origin, age, disability, sex, sexual orientation, or gender identity.            Thank you!     Thank you for choosing [facility] MEDICATION THERAPY MANAGEMENT  for your care. Our goal is always to provide you with excellent care. Hearing back from our patients is one way we can continue to improve our services. Please take a few minutes to complete the written  survey that you may receive in the mail after your visit with us. Thank you!             Your Updated Medication List - Protect others around you: Learn how to safely use, store and throw away your medicines at www.disposemymeds.org.          This list is accurate as of 11/6/18 11:59 PM.  Always use your most recent med list.                   Brand Name Dispense Instructions for use Diagnosis    calcium carbonate 500 mg (elemental) 500 MG tablet    OS-KRYSTEN     Take 1 tablet by mouth daily        capecitabine 500 MG tablet CHEMO    XELODA    84 tablet    Take 3 tablets (1,500 mg) by mouth 2 times daily Take on day 1-7 and 15-21 of 28-day cycle.    Metastatic breast cancer (H)       ENZYME DIGEST Caps      Take 1 capsule by mouth daily        medical cannabis (Patient's own supply.  Not a prescription)      (This is NOT a prescription, and does not certify that the patient has a qualifying medical condition for medical cannabis.  The purpose of this order is  to document that the patient reports taking medical cannabis.)        OMEGA 3-6-9 COMPLEX PO      Take 1 capsule by mouth daily        ondansetron 8 MG tablet    ZOFRAN    40 tablet    Take 1 tablet (8 mg) by mouth every 8 hours as needed for nausea    Metastatic breast cancer (H), Bone metastasis (H)       prochlorperazine 10 MG tablet    COMPAZINE     Take 10 mg by mouth before 1st dose of oral chemotherapy, then prn thereafter        traMADol 50 MG tablet    ULTRAM     Take 1-2 tablets ( mg) by mouth every 6 hours as needed for moderate pain or headaches        VITAMIN C PO      Take 1,000 mg by mouth daily        VITAMIN D (CHOLECALCIFEROL) PO      Take 1,000 Units by mouth daily

## 2018-11-06 NOTE — TELEPHONE ENCOUNTER
FUTURE VISIT INFORMATION:    Date: 11/8/18    Time: 1530    Location:  Cardiology  REFERRAL INFORMATION:    Referring provider:      Referring providers clinic:      Reason for visit/diagnosis  Pericardial Effusion    All records in ep ic.

## 2018-11-07 ENCOUNTER — TELEPHONE (OUTPATIENT)
Dept: PULMONOLOGY | Facility: CLINIC | Age: 48
End: 2018-11-07

## 2018-11-07 ENCOUNTER — TELEPHONE (OUTPATIENT)
Dept: ONCOLOGY | Facility: CLINIC | Age: 48
End: 2018-11-07

## 2018-11-07 NOTE — TELEPHONE ENCOUNTER
Called patient to schedule procedure with Dr. Sg Meyer, there was no answer.  Left message with my direct line [phone].

## 2018-11-07 NOTE — ORAL ONC MGMT
Spoke with Dr. Whitman this morning and she said she wants Shan to have labs drawn prior to her starting her next cycle of Xeloda.  I called Shan and she stated she has an appointment here at 2:00 tomorrow.  She will have labs drawn at 1:30.  I told her we would review the labs and if good will release the Xeloda.  Shan states her next cycle is to start on Saturday 11/10/18.  All questions were answered for Shan and she appreciated the call.    Rachel Slater, Pharm.D., [facility]  [phone]  11/07/18

## 2018-11-07 NOTE — PROGRESS NOTES
"SUBJECTIVE/OBJECTIVE:                Shan Marsh is a 48 year old female called for a transitions of care visit.  She was discharged from [facility] on 10/11/18 for hydropneumothorax and from ER 10/20/18 for arm/neck pain.       Chief Complaint: medication review, oral chemo monitoring.      Allergies/ADRs: Reviewed in Epic  Tobacco: History of tobacco dependence - quit .   Alcohol: not currently using    PMH: Reviewed in Epic    Medication Adherence/Access:  Patient uses pill box(es).  Patient takes medications 2 time(s) per day.   Per patient, misses medication 0 times per week.    Breast cancer:  Current medications include: Xeloda 1500mg ([id number]) PO BID 7 days on then 7 days off, then repeat.  Her next \"on\" period starts this Saturday 11/10/18.  This treatment started in September 2018.  Patient does have some nausea, which she controls with medical cannabis.  She avoids taking ondansetron due to the risk of QTc prolongation.  She doesn't need the prochlorperazine. She also reports some mild HFS symptoms, controlled with skin salve.  She denies diarrhea.  She also gets monthly Zoladex injections.    ORAL CHEMOTHERAPY 1/12/2018 9/4/2018 9/12/2018 10/24/2018 11/6/2018   Drug Name (No Data) Xeloda (Capecitabine) Xeloda (Capecitabine) Xeloda (Capecitabine) Xeloda (Capecitabine)   Current Dosage 150mg 1500mg 1500mg 1500mg 1500mg   Current Schedule BID BID BID BID BID   Cycle Details Continuous 1 week on 1 week off 1 week on 1 week off 1 week on 1 week off 1 week on 1 week off   Start Date of Last Cycle - - 9/5/2018 10/12/2018 -   Planned next cycle start date - - - 11/9/2018 11/10/2018   Doses missed in last 2 weeks - - 0 0 0   Adherence Assessment - - Adherent Adherent Adherent   Adverse Effects - - No AE identified during assessment Fatigue Nausea;Palmar-plantar erythrodysethesia syndrome   Nausea - - - - Grade 1   Pharmacist Intervention(nausea) - - - - No   Palmar-plantar Erythrodysethesia " syndrome[hand-foot syndrome] - - - - Grade 1   Pharmacist Intervention(Palmar-plantar) - - - - Yes   Intervention(s) - - - - Patient education   Fatigue - - - Grade 1 -   Pharmacist Intervention(fatigue) - - - Yes -   Intervention(s) - - - Patient education -   Home BPs - - - not needed not needed   Any new drug interactions? No Yes No No -   Pharmacist Intervention? - Yes - - -   Intervention(s) - Patient Education - - -   Is the dose as ordered appropriate for the patient? Yes Yes Yes Yes Yes   Has the patient missed any days of school, work, or other routine activity? - - - No -       Vitamins/supplements:  Current medications include: calcium, digestive enzymes, vitamin D, omega 3-6-9 and vitamin C.  No problems reported.    Of note, she is not currently needing the tramadol.        ASSESSMENT:                 Current medications were reviewed today.      Medication Adherence: good, no issues identified    Breast cancer: Stable. Patient is tolerating Xeloda pretty well.  Advised patient to restart the skin cream/salve at the start of her cycle, instead of waiting until she has symptoms.    Vitamins/supplements: Stable. No change needed.        PLAN:                  Restart the skin cream/salve at the start of the cycle.    I spent 30 minutes with this patient today. A copy of the visit note was provided to the patient's primary care and oncology provider.    The patient was sent via [employer] a summary of these recommendations as an after visit summary.        Rachel Pierson, PharmD, BCOP, BCPS  Clinical Pharmacy Specialist/  Oncology Medication Therapy Management Pharmacist  [facility]  Pager [phone]  Phone [phone]

## 2018-11-07 NOTE — TELEPHONE ENCOUNTER
Spoke with patient to schedule procedure with Dr. Sg Meyer   Procedure was scheduled on 11/12 in ENDO  Patient will have H&P with: Not needed  Patient is aware a / is needed day of surgery.   Surgery letter was sent via [employer], patient has my direct contact information for any further questions.

## 2018-11-08 ENCOUNTER — OFFICE VISIT (OUTPATIENT)
Dept: CARDIOLOGY | Facility: CLINIC | Age: 48
End: 2018-11-08
Attending: INTERNAL MEDICINE
Payer: COMMERCIAL

## 2018-11-08 ENCOUNTER — PRE VISIT (OUTPATIENT)
Dept: CARDIOLOGY | Facility: CLINIC | Age: 48
End: 2018-11-08

## 2018-11-08 ENCOUNTER — RADIANT APPOINTMENT (OUTPATIENT)
Dept: CARDIOLOGY | Facility: CLINIC | Age: 48
End: 2018-11-08
Attending: PHYSICIAN ASSISTANT
Payer: COMMERCIAL

## 2018-11-08 VITALS
BODY MASS INDEX: 19.87 KG/M2 | HEIGHT: 64 IN | WEIGHT: 116.4 LBS | HEART RATE: 85 BPM | DIASTOLIC BLOOD PRESSURE: 75 MMHG | SYSTOLIC BLOOD PRESSURE: 110 MMHG | OXYGEN SATURATION: 99 %

## 2018-11-08 DIAGNOSIS — C79.89: Primary | ICD-10-CM

## 2018-11-08 DIAGNOSIS — Z79.899 ENCOUNTER FOR LONG-TERM (CURRENT) USE OF MEDICATIONS: ICD-10-CM

## 2018-11-08 DIAGNOSIS — C50.919 METASTATIC BREAST CANCER: ICD-10-CM

## 2018-11-08 DIAGNOSIS — I31.39 PERICARDIAL EFFUSION: ICD-10-CM

## 2018-11-08 DIAGNOSIS — C50.919 METASTATIC BREAST CANCER: Primary | ICD-10-CM

## 2018-11-08 DIAGNOSIS — C79.51 BONE METASTASIS: ICD-10-CM

## 2018-11-08 DIAGNOSIS — I32: Primary | ICD-10-CM

## 2018-11-08 LAB
ALBUMIN SERPL-MCNC: 2.9 G/DL (ref 3.4–5)
ALP SERPL-CCNC: 138 U/L (ref 40–150)
ALT SERPL W P-5'-P-CCNC: 23 U/L (ref 0–50)
ANION GAP SERPL CALCULATED.3IONS-SCNC: 10 MMOL/L (ref 3–14)
AST SERPL W P-5'-P-CCNC: 43 U/L (ref 0–45)
BASOPHILS # BLD AUTO: 0 10E9/L (ref 0–0.2)
BASOPHILS NFR BLD AUTO: 0.5 %
BILIRUB SERPL-MCNC: 0.3 MG/DL (ref 0.2–1.3)
BUN SERPL-MCNC: 18 MG/DL (ref 7–30)
CALCIUM SERPL-MCNC: 8.9 MG/DL (ref 8.5–10.1)
CANCER AG27-29 SERPL-ACNC: 690 U/ML (ref 0–39)
CEA SERPL-MCNC: 83.3 UG/L (ref 0–2.5)
CHLORIDE SERPL-SCNC: 107 MMOL/L (ref 94–109)
CO2 SERPL-SCNC: 25 MMOL/L (ref 20–32)
CREAT SERPL-MCNC: 0.62 MG/DL (ref 0.52–1.04)
DIFFERENTIAL METHOD BLD: ABNORMAL
EOSINOPHIL # BLD AUTO: 0.1 10E9/L (ref 0–0.7)
EOSINOPHIL NFR BLD AUTO: 1.2 %
ERYTHROCYTE [DISTWIDTH] IN BLOOD BY AUTOMATED COUNT: 16.1 % (ref 10–15)
GFR SERPL CREATININE-BSD FRML MDRD: >90 ML/MIN/1.7M2
GLUCOSE SERPL-MCNC: 114 MG/DL (ref 70–99)
HCT VFR BLD AUTO: 38.7 % (ref 35–47)
HGB BLD-MCNC: 12.5 G/DL (ref 11.7–15.7)
IMM GRANULOCYTES # BLD: 0 10E9/L (ref 0–0.4)
IMM GRANULOCYTES NFR BLD: 0.3 %
LYMPHOCYTES # BLD AUTO: 1.5 10E9/L (ref 0.8–5.3)
LYMPHOCYTES NFR BLD AUTO: 25.4 %
MCH RBC QN AUTO: 32.1 PG (ref 26.5–33)
MCHC RBC AUTO-ENTMCNC: 32.3 G/DL (ref 31.5–36.5)
MCV RBC AUTO: 100 FL (ref 78–100)
MONOCYTES # BLD AUTO: 0.4 10E9/L (ref 0–1.3)
MONOCYTES NFR BLD AUTO: 6.2 %
NEUTROPHILS # BLD AUTO: 3.8 10E9/L (ref 1.6–8.3)
NEUTROPHILS NFR BLD AUTO: 66.4 %
NRBC # BLD AUTO: 0 10*3/UL
NRBC BLD AUTO-RTO: 0 /100
PLATELET # BLD AUTO: 242 10E9/L (ref 150–450)
POTASSIUM SERPL-SCNC: 3.5 MMOL/L (ref 3.4–5.3)
PROT SERPL-MCNC: 7.6 G/DL (ref 6.8–8.8)
RBC # BLD AUTO: 3.89 10E12/L (ref 3.8–5.2)
SODIUM SERPL-SCNC: 141 MMOL/L (ref 133–144)
WBC # BLD AUTO: 5.8 10E9/L (ref 4–11)

## 2018-11-08 PROCEDURE — 86300 IMMUNOASSAY TUMOR CA 15-3: CPT | Performed by: INTERNAL MEDICINE

## 2018-11-08 PROCEDURE — 99214 OFFICE O/P EST MOD 30 MIN: CPT | Mod: ZP | Performed by: INTERNAL MEDICINE

## 2018-11-08 PROCEDURE — 80053 COMPREHEN METABOLIC PANEL: CPT | Performed by: INTERNAL MEDICINE

## 2018-11-08 PROCEDURE — 85025 COMPLETE CBC W/AUTO DIFF WBC: CPT | Performed by: INTERNAL MEDICINE

## 2018-11-08 PROCEDURE — G0463 HOSPITAL OUTPT CLINIC VISIT: HCPCS | Mod: ZF

## 2018-11-08 PROCEDURE — 82378 CARCINOEMBRYONIC ANTIGEN: CPT | Performed by: INTERNAL MEDICINE

## 2018-11-08 RX ORDER — CAPECITABINE 500 MG/1
1500 TABLET, FILM COATED ORAL 2 TIMES DAILY
Qty: 84 TABLET | Refills: 0 | Status: SHIPPED | OUTPATIENT
Start: 2018-11-08 | End: 2018-12-18

## 2018-11-08 ASSESSMENT — PAIN SCALES - GENERAL: PAINLEVEL: NO PAIN (0)

## 2018-11-08 ASSESSMENT — ENCOUNTER SYMPTOMS
MYALGIAS: 1
MUSCLE CRAMPS: 0
JOINT SWELLING: 0
ARTHRALGIAS: 0
STIFFNESS: 0
NECK PAIN: 1
MUSCLE WEAKNESS: 0
BACK PAIN: 1

## 2018-11-08 NOTE — MR AVS SNAPSHOT
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      If you have any questions, you can reach my nurse, Hannah Barnes LPN, at [phone].  Press Option #1 for the [facility], and then press Option #3 for nursing.    We are encouraging the use of [email or web address] to communicate with your HealthCare Provider    Medication Changes: None.    Studies Ordered: Repeat Echocardiogram in 2-3 months.    The results from today include: None.    Please follow up: With Dr. Sanchez based on testing.    Sincerely,    Robbie Sanchez MD     If you have an urgent need after hours (8:00 am to 4:30 pm) please call [phone] and ask for the cardiology fellow on call.                    Follow-ups after your visit        Your next 10 appointments already scheduled     Nov 12, 2018   Procedure with Bandar Meyer MD   [facility] [facility] ([facility])    [address] [address] [phone]           The [facility] is located on the corner of [address] and [address] on the [address]. It is easily accessible from virtually any point in the [place] area, via [address].            Nov 16, 2018 12:00 PM CST   [facility]ic Lab Draw with  MASONIC LAB DRAW   [facility] Lab Draw ([facility])    [address]  [address]  [place] [address] [phone]            Nov 16, 2018 12:50 PM CST   (Arrive by 12:35 PM)   Return  Visit with Lima Griffiths PA-C   [facility] ([facility])    [address]  [address]  [place] [address]   [phone]            Nov 16, 2018  2:00 PM CST   Infusion 60 with UC ONCOLOGY INFUSION, UC 24 ATC   [facility] ([facility])    [address]  [address]  [place] [address]   [phone]            Nov 21, 2018 12:00 PM CST   CT SOFT TISSUE NECK W CONTRAST with UCCT2   [facility] CT ([facility])    [address]  1st Floor  [place] [address]   [phone]           How do I prepare for my exam? (Food and drink instructions) **You will have contrast for this exam.** Do not eat or drink for 2 hours before your exam. If you need to take medicine, you may take it with small sips of water. (We may ask you to take liquid medicine as well.)  The day before your exam, drink extra fluids at least six 8-ounce glasses (unless your doctor tells you to restrict your fluids).  How do I prepare for my exam? (Other instructions) Patients over 70 or patients with diabetes or kidney problems: If you haven t had a blood test (creatinine test) within the last 30 days, the Cardiologist/Radiologist may require you to get this test prior to your exam.  What should I wear: Please wear loose clothing, such as a sweat suit or jogging clothes.  Avoid snaps, zippers and other metal. We may ask you to undress and put on a hospital gown.  How long does the exam take: Most scans take less than 20 minutes.  What should I bring: Please bring any scans or X-rays taken at other hospitals, if similar tests were done. Also bring a list of your medicines, including vitamins, minerals and over-the-counter drugs. It is safest to leave personal items at home.  Do I need a :  No  is needed.  What do I need to tell my doctor? Be sure to tell your doctor: * If  you have any allergies. * If there s any chance you are pregnant. * If you are breastfeeding. * If you have diabetes as your medication may need to be adjusted for this exam.  What should I do after the exam: No restrictions, You may resume normal activities.  What is this test: A CT (computed tomography) scan is a series of pictures that allows us to look inside your body. The scanner creates images of the body in cross sections, much like slices of bread. This helps us see any problems more clearly. You may receive contrast (X-ray dye) before or during your scan. Contrast is given through an IV (small needle in your arm).  Who should I call with questions: If you have any questions, please call the Imaging Department where you will have your exam. Directions, parking instructions, and other information is available on our website, [email or web address].            Nov 21, 2018 12:20 PM CST   CT CHEST/ABDOMEN/PELVIS W CONTRAST with UCCT2   [facility] CT ([facility])    [address] [address] [phone]           How do I prepare for my exam? (Food and drink instructions) To prepare: Do not eat or drink for 2 hours before your exam. If you need to take medicine, you may take it with small sips of water. (We may ask you to take liquid medicine as well.)  How do I prepare for my exam? (Other instructions) Please arrive 30 minutes early for your CT.  Once in the department you might be asked to drink water 15-20 minutes prior to your exam.  If indicated you may be asked to drink an oral contrast in advance of your CT.  If this is the case, the imaging team will let you know or be in contact with you prior to your appointment  Patients over 70 or patients with diabetes or kidney problems: If you haven t had a blood test (creatinine test) within the last 30 days, the Cardiologist/Radiologist may require you to get this test prior to your exam.   If you have diabetes:  Continue to take your metformin medication on the day of your exam  What should I wear: Please wear loose clothing, such as a sweat suit or jogging clothes. Avoid snaps, zippers and other metal. We may ask you to undress and put on a hospital gown.  How long does the exam take: Most scans take less than 20 minutes.  What should I bring: Please bring any scans or X-rays taken at other hospitals, if similar tests were done. Also bring a list of your medicines, including vitamins, minerals and over-the-counter drugs. It is safest to leave personal items at home.  Do I need a : No  is needed.  What do I need to tell my doctor? Be sure to tell your doctor: * If you have any allergies. * If there s any chance you are pregnant. * If you are breastfeeding.  What should I do after the exam: No restrictions, You may resume normal activities.  What is this test: A CT (computed tomography) scan is a series of pictures that allows us to look inside your body. The scanner creates images of the body in cross sections, much like slices of bread. This helps us see any problems more clearly. You may receive contrast (X-ray dye) before or during your scan. You will be asked to drink the contrast.  Who should I call with questions: If you have any questions, please call the Imaging Department where you will have your exam. Directions, parking instructions, and other information is available on our website, [email or web address].            Nov 26, 2018  8:30 AM CST   Masonic Lab Draw with  MASONIC LAB DRAW   [facility] Lab Draw ([facility])    [address] [place] [address]  [address] [place] [id number] [phone]            Nov 26, 2018  9:00 AM CST   (Arrive by 8:45 AM)   Return Visit with Ana Whitman MD   [facility] ([facility])    [address] [place] [address]  [address] [place] [address] [phone]             Jan 08, 2019 11:30 AM CST   Ech Complete with [id number]   [facility] Echo ([facility])    [address]  3rd [address]  [place] [facility] [address] [phone]           1.  Please bring or wear a comfortable two-piece outfit. 2.  You may eat, drink and take your normal medicines. 3.  For any questions that cannot be answered, please contact the ordering physician 4.  Please do not wear perfumes or scented lotions on the day of your exam.              Future tests that were ordered for you today     Open Standing Orders        Priority Remaining Interval Expires Ordered    Echocardiogram Complete Routine 4/4 m+3 11/8/2019 11/8/2018          Open Future Orders        Priority Expected Expires Ordered    Echo Complete Routine 2/6/2019 11/8/2019 11/8/2018            Who to contact     If you have questions or need follow up information about today's clinic visit or your schedule please contact [facility] directly at [phone].  Normal or non-critical lab and imaging results will be communicated to you by [employer], letter or phone within 4 business days after the clinic has received the results. If you do not hear from us within 7 days, please contact the clinic through [employer] or phone. If you have a critical or abnormal lab result, we will notify you by phone as soon as possible.  Submit refill requests through [employer] or call your pharmacy and they will forward the refill request to us. Please allow 3 business days for your refill to be completed.          Additional Information About Your Visit        [employer] Information     [employer] gives you secure access to your electronic health record. If you see a primary care provider, you can also send messages to your care team and make appointments. If you have questions, please call your primary care clinic.  If you do not have a primary care provider, please call [phone] and they will assist you.        Care EveryWhere ID      "This is your Care EveryWhere ID. This could be used by other organizations to access your [facility] medical records  [id number]        Your Vitals Were     Pulse Height Pulse Oximetry BMI (Body Mass Index)          85 1.626 m (5' 4\") 99% 19.98 kg/m2         Blood Pressure from Last 3 Encounters:   11/08/18 110/75   10/29/18 107/72   10/23/18 113/70    Weight from Last 3 Encounters:   11/08/18 52.8 kg (116 lb 6.4 oz)   10/29/18 51.3 kg (113 lb)   10/23/18 49 kg (108 lb)                 Today's Medication Changes          These changes are accurate as of 11/8/18  3:37 PM.  If you have any questions, ask your nurse or doctor.               These medicines have changed or have updated prescriptions.        Dose/Directions    capecitabine 500 MG tablet CHEMO   Commonly known as:  XELODA   This may have changed:  additional instructions   Used for:  Metastatic breast cancer (H)   Changed by:  Rachel Pierson, [facility]        Dose:  1500 mg   Take 3 tablets (1,500 mg) by mouth 2 times daily Take on Days 1 - 7 and 15 - 21 every 28 day cycle. Take with water 30 minutes after a meal.   Quantity:  84 tablet   Refills:  0            Where to get your medicines      These medications were sent to [facility] [address] [facility]  [address] [address]    Hours:  TRANSPLANT PHONE NUMBER [phone] Phone:  [phone]     capecitabine 500 MG tablet CHEMO                Primary Care Provider Office Phone # Fax #    Marilu Michelle [phone]       [address]  [facility] [address]        Equal Access to Services     LUZ JAMES AH: Hadii noemy Schaefer, [place] lupamela, qacandista kaalmada anneliese, edi kolb. So [facility] [phone].    ATENCIÓN: Si habla español, tiene a gamez disposición servicios gratuitos de asistencia lingüística. Llame al [phone].    We comply with applicable federal civil " rights laws and [place] laws. We do not discriminate on the basis of race, color, national origin, age, disability, sex, sexual orientation, or gender identity.            Thank you!     Thank you for choosing [facility]  for your care. Our goal is always to provide you with excellent care. Hearing back from our patients is one way we can continue to improve our services. Please take a few minutes to complete the written survey that you may receive in the mail after your visit with us. Thank you!             Your Updated Medication List - Protect others around you: Learn how to safely use, store and throw away your medicines at www.disposemymeds.org.          This list is accurate as of 11/8/18  3:37 PM.  Always use your most recent med list.                   Brand Name Dispense Instructions for use Diagnosis    calcium carbonate 500 mg (elemental) 500 MG tablet    OS-KRYSTEN     Take 1 tablet by mouth daily        capecitabine 500 MG tablet CHEMO    XELODA    84 tablet    Take 3 tablets (1,500 mg) by mouth 2 times daily Take on Days 1 - 7 and 15 - 21 every 28 day cycle. Take with water 30 minutes after a meal.    Metastatic breast cancer (H)       ENZYME DIGEST Caps      Take 1 capsule by mouth daily        medical cannabis (Patient's own supply.  Not a prescription)      (This is NOT a prescription, and does not certify that the patient has a qualifying medical condition for medical cannabis.  The purpose of this order is  to document that the patient reports taking medical cannabis.)        OMEGA 3-6-9 COMPLEX PO      Take 1 capsule by mouth daily        ondansetron 8 MG tablet    ZOFRAN    40 tablet    Take 1 tablet (8 mg) by mouth every 8 hours as needed for nausea    Metastatic breast cancer (H), Bone metastasis (H)       prochlorperazine 10 MG tablet    COMPAZINE     Take 10 mg by mouth before 1st dose of oral chemotherapy, then prn thereafter        traMADol 50 MG tablet    ULTRAM     Take 1-2  tablets ( mg) by mouth every 6 hours as needed for moderate pain or headaches        VITAMIN C PO      Take 1,000 mg by mouth daily        VITAMIN D (CHOLECALCIFEROL) PO      Take 1,000 Units by mouth daily

## 2018-11-08 NOTE — LETTER
11/8/2018      RE: Shan Marsh  [address]  [facility] [address]       Dear Colleague,    Thank you for the opportunity to participate in the care of your patient, Shan Marsh, at the [facility] at [facility]. Please see a copy of my visit note below.    HPI:     Ms. Marsh is a 47 y/o F w/ metastatic breast cancer who presents for hospital follow-up after she presented with syncope and was found to have malignant (+cytology) pericardial effusion with e/o tamponade s/p pericardial drain 10/7-10/8.    Today, patient reports no longer having lightheadedness, dizziness. No LE edema.    PAST MEDICAL HISTORY:  Past Medical History:   Diagnosis Date     Breast cancer, left breast (H) 5/12    grade 1 infiltrating ductal cancer, [place]+, s/p bilateral mastectomy     Complication of anesthesia     bradycardia,b/p drop p childbirth     Dysplasia of cervix (uteri) 12/01    LUCAS II - III, s/p LEEP  @ [facility]     Incompetence of cervix 4/06    D&E at 19+4 weeks twin pregnancy     Shingles outbreak 5/12       CURRENT MEDICATIONS:  Current Outpatient Prescriptions   Medication Sig Dispense Refill     Ascorbic Acid (VITAMIN C PO) Take 1,000 mg by mouth daily       calcium carbonate (OS-KRYSTEN 500 MG [place]. [place]) 1250 MG tablet Take 1 tablet by mouth daily       capecitabine (XELODA) 500 MG tablet CHEMO Take 3 tablets (1,500 mg) by mouth 2 times daily Take on Days 1 - 7 and 15 - 21 every 28 day cycle. Take with water 30 minutes after a meal. 84 tablet 0     Digestive Enzymes (ENZYME DIGEST) CAPS Take 1 capsule by mouth daily       medical cannabis (Patient's own supply.  Not a prescription) (This is NOT a prescription, and does not certify that the patient has a qualifying medical condition for medical cannabis.  The purpose of this order is  to document that the patient reports taking medical cannabis.)       Omega 3-6-9 Fatty Acids (OMEGA 3-6-9 COMPLEX PO) Take  1 capsule by mouth daily       ondansetron (ZOFRAN) 8 MG tablet Take 1 tablet (8 mg) by mouth every 8 hours as needed for nausea (Patient not taking: Reported on 10/29/2018) 40 tablet 3     prochlorperazine (COMPAZINE) 10 MG tablet Take 10 mg by mouth before 1st dose of oral chemotherapy, then prn thereafter       traMADol (ULTRAM) 50 MG tablet Take 1-2 tablets ( mg) by mouth every 6 hours as needed for moderate pain or headaches       VITAMIN D, CHOLECALCIFEROL, PO Take 1,000 Units by mouth daily        [DISCONTINUED] capecitabine (XELODA) 500 MG tablet CHEMO Take 3 tablets (1,500 mg) by mouth 2 times daily Take on day 1-7 and 15-21 of 28-day cycle. (Patient not taking: Reported on 11/6/2018) 84 tablet 0     [DISCONTINUED] tamoxifen (NOLVADEX) 20 MG tablet Take 1 tablet (20 mg) by mouth daily 28 tablet 0       PAST SURGICAL HISTORY:  Past Surgical History:   Procedure Laterality Date     D & C  9/2010    retained POC     ENDOBRONCHIAL ULTRASOUND FLEXIBLE N/A 5/12/2017    Procedure: ENDOBRONCHIAL ULTRASOUND FLEXIBLE;  Flexible Bronchoscopy, Endobronchial Ultrasound, Transbronchial Biopsy ;  Surgeon: Bandar Meyer MD;  Location:  GI     INSERT CHEST TUBE Left 10/11/2018    Procedure: INSERT CHEST TUBE;  Insert Chest Tube ;  Surgeon: Brock Chan MD;  Location:  GI     LEEP TX, CERVICAL  12/01    LEEP or LUCAS II - III     MASTECTOMY, RECONSTRUCT BREAST, COMBINED  6/11/2012    Procedure: COMBINED MASTECTOMY, RECONSTRUCT BREAST;  Bilateral Mastectomy with Reconstruction, Left Knippa Node Biopsy , placement of On-Q catheters X 2 bilateral breast.;  Surgeon: Misael Cheng MD;  Location:  OR     RECONSTRUCT BREAST BILATERAL  3/25/2013    Procedure: RECONSTRUCT BREAST BILATERAL;  Revise bilateral breasts and reconstructions, with insertion of gel breast implants with fat grafting and fat after mastectomies.;  Surgeon: Yair Olvera MD;  Location:  OR     RECONSTRUCT BREAST BILATERAL,  IMPLANT PROSTHESIS BILATERAL, COMBINED  9/27/2012    Procedure: COMBINED RECONSTRUCT BREAST BILATERAL, IMPLANT PROSTHESIS BILATERAL;  Bilateral Second Stage Breast Reconstruction With Gel Implants, Fat        THORACENTESIS Left 5/7/2018    Procedure: THORACENTESIS;  Thoracentesis;  Surgeon: Venancio Hussein PA-C;  [facility]: [facility]     THORACENTESIS Left 7/5/2018    Procedure: THORACENTESIS;  Thoracentesis;  Surgeon: Lydia Murillo PA-C;  [facility]     THORACENTESIS Left 9/5/2018    Procedure: THORACENTESIS;  Left Thoracentesis;  Surgeon: López Guillen PA-C;  [facility]     THORACENTESIS Left 10/3/2018    Procedure: THORACENTESIS;  Thoracentesis, left;  Surgeon: Eros Chauhan PA-C;  [facility]     THORACENTESIS Right 10/9/2018    Procedure: THORACENTESIS;  Thoacentesis ;  Surgeon: Linh Hayes MD;  Location: [facility] GI       ALLERGIES     Allergies   Allergen Reactions     Erythromycin Shortness Of Breath     Sulfa Drugs      rash       FAMILY HISTORY:  Family History   Problem Relation Age of Onset     Alcohol/Drug Father      Allergies Mother      Neurologic Disorder Mother      seizures, related to fever     GASTROINTESTINAL DISEASE Mother      IBS     Blood Disease Mother      [place]     Cancer Maternal Grandfather      bladder     Circulatory Maternal Grandfather      blood clots     Alcohol/Drug Maternal Grandfather      alcohol dependency     Allergies Maternal Aunt      Allergies Other      maternal cousins     Arthritis Maternal Aunt      Prostate Cancer Maternal Uncle      Thyroid Disease Maternal Aunt      Alcohol/Drug Maternal Uncle      alcohol and drug dependency     Cancer - colorectal Maternal Uncle      GASTROINTESTINAL DISEASE Sister      IBS     Breast Cancer Other      self       SOCIAL HISTORY:  Social History     Social History     Marital status:      Spouse name: Kemar     Number of children: 2     Years of education: 16     Occupational  "History     teacher [employer]     Teacher     Social History Main Topics     Smoking status: Former Smoker     Quit date: 6/23/1997     Smokeless tobacco: Never Used     Alcohol use No     Drug use: Yes      Comment: CBD/THC oil     Sexual activity: Yes     Partners: Male     Birth control/ protection: None      Comment: No birth control/protection usage.  had vasectomy     Other Topics Concern     Parent/Sibling W/ Cabg, Mi Or Angioplasty Before 65f 55m? No     Social History Narrative    Caffeine intake/servings daily - 0-1    Calcium intake/servings daily - 3-4    Exercise 3-4 times weekly - describe running    Sunscreen used - Yes    Seatbelts used - Yes    Guns stored in the home - Yes    Self Breast Exam - No    Pap test up to date -  Yes    Eye exam up to date -  Yes    Dental exam up to date -  Yes    DEXA scan up to date -  Not Applicable    Flex Sig/Colonoscopy up to date -  Not Applicable    Mammography up to date -  Not Applicable    Immunizations reviewed and up to date - Yes    Abuse: Current or Past (Physical, Sexual or Emotional) - No    Do you feel safe in your environment - Yes    Do you cope well with stress - Yes    Do you suffer from insomnia - No    Last updated by: Emani Estes  11/11/2011                   ROS:   Constitutional: No fever, chills, or sweats. No weight gain/loss   ENT: No visual disturbance, ear ache, epistaxis, sore throat  Allergies/Immunologic: Negative.   Respiratory: No cough, hemoptysia  Cardiovascular: As per HPI  GI: No nausea, vomiting, hematemesis, melena, or hematochezia  : No urinary frequency, dysuria, or hematuria  Integument: Negative  Psychiatric: Negative  Neuro: Negative  Endocrinology: Negative   Musculoskeletal: Negative    EXAM:  /75 (BP Location: Right arm, Patient Position: Chair, Cuff Size: Adult Regular)  Pulse 85  Ht 1.626 m (5' 4\")  Wt 52.8 kg (116 lb 6.4 oz)  SpO2 99%  BMI 19.98 kg/m2  In general, the patient is a " pleasant female in no apparent distress.    HEENT: NC/AT.  PERRLA.  EOMI.  Sclerae white, not injected.  Nares clear.  Pharynx without erythema or exudate.  Dentition intact.    Neck: No adenopathy.  No thyromegaly. Carotids +4/4 bilaterally without bruits.  No jugular venous distension.   Heart: RRR. Normal S1, S2 splits physiologically. No murmur, rub, click, or gallop. The PMI is in the 5th ICS in the midclavicular line. There is no heave.    Lungs: CTA.  No ronchi, wheezes, rales.  No dullness to percussion.   Abdomen: Soft, nontender, nondistended. No organomegaly.  No bruits.   Extremities: No clubbing, cyanosis, or edema.  The pulses are +4/4 at the radial, brachial, femoral, popliteal, DP, and PT sites bilaterally.  No bruits are noted.  Neurologic: Alert and oriented to person/place/time, normal speech, gait and affect  Skin: No petechiae, purpura or rash.    Labs:  LIPID RESULTS:  Lab Results   Component Value Date    CHOL 129 11/03/2017    HDL 50 11/03/2017    LDL 43 11/03/2017    TRIG 178 (H) 11/03/2017    CHOLHDLRATIO 2.8 11/13/2012    NHDL 79 11/03/2017       LIVER ENZYME RESULTS:  Lab Results   Component Value Date    AST 43 11/08/2018    ALT 23 11/08/2018       CBC RESULTS:  Lab Results   Component Value Date    WBC 5.8 11/08/2018    RBC 3.89 11/08/2018    HGB 12.5 11/08/2018    HCT 38.7 11/08/2018     11/08/2018    MCH 32.1 11/08/2018    MCHC 32.3 11/08/2018    RDW 16.1 (H) 11/08/2018     11/08/2018       BMP RESULTS:  Lab Results   Component Value Date     11/08/2018    POTASSIUM 3.5 11/08/2018    CHLORIDE 107 11/08/2018    CO2 25 11/08/2018    ANIONGAP 10 11/08/2018     (H) 11/08/2018    BUN 18 11/08/2018    CR 0.62 11/08/2018    GFRESTIMATED >90 11/08/2018    GFRESTBLACK >90 11/08/2018    KRYSTEN 8.9 11/08/2018        A1C RESULTS:  No results found for: A1C    INR RESULTS:  Lab Results   Component Value Date    INR 0.94 10/20/2018    INR 1.21 (H) 10/07/2018        Procedures:    Echocardiogram 11-08-18    Normal biventricular function.  The inferior vena cava was normal in size with preserved respiratory  variability.  No pericardial effusion present.  _____________________________________________________________________________  __        Left Ventricle  Left ventricular size is normal. Global and regional left ventricular function  is normal with an EF of 60-65%.     Right Ventricle  The right ventricle is normal size. Global right ventricular function is  normal.     Aortic Valve  Aortic valve is normal in structure and function.        Vessels  The inferior vena cava was normal in size with preserved respiratory  variability.     Pericardium  No pericardial effusion is present.     Miscellaneous  A bilateral pleural effusion is present.     Compared to Previous Study  This study was compared with the study from 10.11.18, no pericardial effusion  seen .     _____________________________________________________________________________  __      Assessment and Plan:       Ms. Marsh is a 47 y/o F w/ metastatic breast cancer who presents for hospital follow-up after she presented with syncope and was found to have malignant (+cytology) pericardial effusion with e/o tamponade s/p pericardial drain 10/7-10/8.  We discussed the results with patient:    #Recent malignant pericardial effusion w/ tamponade:  Impression is that patient's symptoms have resolved and now with stable vitals.  Echocardiogram does not reveal any pericardial effusion.  -Follow-up 2-3 months    Gisell Sanchez MD, PhD  Professor of Medicine  Division of Cardiology      CC  Patient Care Team:  Marilu Michelle MD as PCP - General (Family Practice)  Sharifa Celeste, RN as Nurse Coordinator (Breast Oncology)  Care, [facility] ([facility] ([facility] ([place]  Gisell Sanchez MD as MD (Cardiology)  GISELL SANCHEZ

## 2018-11-08 NOTE — PATIENT INSTRUCTIONS
Patient Instructions:  It was a pleasure to see you in the cardiology clinic today.      If you have any questions, you can reach my nurse, Hannah Barnes LPN, at [phone].  Press Option #1 for the [facility], and then press Option #3 for nursing.    We are encouraging the use of Zulamat to communicate with your HealthCare Provider    Medication Changes: None.    Studies Ordered: Repeat Echocardiogram in 2-3 months.    The results from today include: None.    Please follow up: With Dr. Sanchez based on testing.    Sincerely,    Robbie Sanchez MD     If you have an urgent need after hours (8:00 am to 4:30 pm) please call [phone] and ask for the cardiology fellow on call.

## 2018-11-08 NOTE — NURSING NOTE
Vitals completed successfully and medication reconciled. Alisa Bacon MA  Chief Complaint   Patient presents with     New Patient      New Pt Visit

## 2018-11-08 NOTE — PROGRESS NOTES
HPI:     Ms. Marsh is a 49 y/o F w/ metastatic breast cancer who presents for hospital follow-up after she presented with syncope and was found to have malignant (+cytology) pericardial effusion with e/o tamponade s/p pericardial drain 10/7-10/8.    Today, patient reports no longer having lightheadedness, dizziness. No LE edema.    PAST MEDICAL HISTORY:  Past Medical History:   Diagnosis Date     Breast cancer, left breast (H) 5/12    grade 1 infiltrating ductal cancer, ER/RI+, s/p bilateral mastectomy     Complication of anesthesia     bradycardia,b/p drop p childbirth     Dysplasia of cervix (uteri) 12/01    LUCAS II - III, s/p LEEP  @ [facility]     Incompetence of cervix 4/06    D&E at 19+4 weeks twin pregnancy     Shingles outbreak 5/12       CURRENT MEDICATIONS:  Current Outpatient Prescriptions   Medication Sig Dispense Refill     Ascorbic Acid (VITAMIN C PO) Take 1,000 mg by mouth daily       calcium carbonate (OS-KRYSTEN 500 MG [place]. CA) 1250 MG tablet Take 1 tablet by mouth daily       capecitabine (XELODA) 500 MG tablet CHEMO Take 3 tablets (1,500 mg) by mouth 2 times daily Take on Days 1 - 7 and 15 - 21 every 28 day cycle. Take with water 30 minutes after a meal. 84 tablet 0     Digestive Enzymes (ENZYME DIGEST) CAPS Take 1 capsule by mouth daily       medical cannabis (Patient's own supply.  Not a prescription) (This is NOT a prescription, and does not certify that the patient has a qualifying medical condition for medical cannabis.  The purpose of this order is  to document that the patient reports taking medical cannabis.)       Omega 3-6-9 Fatty Acids (OMEGA 3-6-9 COMPLEX PO) Take 1 capsule by mouth daily       ondansetron (ZOFRAN) 8 MG tablet Take 1 tablet (8 mg) by mouth every 8 hours as needed for nausea (Patient not taking: Reported on 10/29/2018) 40 tablet 3     prochlorperazine (COMPAZINE) 10 MG tablet Take 10 mg by mouth before 1st dose of oral chemotherapy, then prn thereafter       traMADol (ULTRAM)  50 MG tablet Take 1-2 tablets ( mg) by mouth every 6 hours as needed for moderate pain or headaches       VITAMIN D, CHOLECALCIFEROL, PO Take 1,000 Units by mouth daily        [DISCONTINUED] capecitabine (XELODA) 500 MG tablet CHEMO Take 3 tablets (1,500 mg) by mouth 2 times daily Take on day 1-7 and 15-21 of 28-day cycle. (Patient not taking: Reported on 11/6/2018) 84 tablet 0     [DISCONTINUED] tamoxifen (NOLVADEX) 20 MG tablet Take 1 tablet (20 mg) by mouth daily 28 tablet 0       PAST SURGICAL HISTORY:  Past Surgical History:   Procedure Laterality Date     D & C  9/2010    retained POC     ENDOBRONCHIAL ULTRASOUND FLEXIBLE N/A 5/12/2017    Procedure: ENDOBRONCHIAL ULTRASOUND FLEXIBLE;  Flexible Bronchoscopy, Endobronchial Ultrasound, Transbronchial Biopsy ;  Surgeon: Bandar Meyer MD;  Location:  GI     INSERT CHEST TUBE Left 10/11/2018    Procedure: INSERT CHEST TUBE;  Insert Chest Tube ;  Surgeon: Brock Chan MD;  Location:  GI     LEEP TX, CERVICAL  12/01    LEEP or LUCAS II - III     MASTECTOMY, RECONSTRUCT BREAST, COMBINED  6/11/2012    Procedure: COMBINED MASTECTOMY, RECONSTRUCT BREAST;  Bilateral Mastectomy with Reconstruction, Left Ragland Node Biopsy , placement of On-Q catheters X 2 bilateral breast.;  Surgeon: Misael Cheng MD;  Location:  OR     RECONSTRUCT BREAST BILATERAL  3/25/2013    Procedure: RECONSTRUCT BREAST BILATERAL;  Revise bilateral breasts and reconstructions, with insertion of gel breast implants with fat grafting and fat after mastectomies.;  Surgeon: Yair Olvera MD;  Location:  OR     RECONSTRUCT BREAST BILATERAL, IMPLANT PROSTHESIS BILATERAL, COMBINED  9/27/2012    Procedure: COMBINED RECONSTRUCT BREAST BILATERAL, IMPLANT PROSTHESIS BILATERAL;  Bilateral Second Stage Breast Reconstruction With Gel Implants, Fat        THORACENTESIS Left 5/7/2018    Procedure: THORACENTESIS;  Thoracentesis;  Surgeon: Venancio Hussein PA-C;  Location:  OR      THORACENTESIS Left 7/5/2018    Procedure: THORACENTESIS;  Thoracentesis;  Surgeon: Lydia Murillo PA-C;  Location: [facility]     THORACENTESIS Left 9/5/2018    Procedure: THORACENTESIS;  Left Thoracentesis;  Surgeon: López Guillen PA-C;  Location: [facility] OR     THORACENTESIS Left 10/3/2018    Procedure: THORACENTESIS;  Thoracentesis, left;  Surgeon: Eros Chauhan PA-C;  Location: [facility]     THORACENTESIS Right 10/9/2018    Procedure: THORACENTESIS;  Thoacentesis ;  Surgeon: Linh Hayes MD;  Location: [facility] GI       ALLERGIES     Allergies   Allergen Reactions     Erythromycin Shortness Of Breath     Sulfa Drugs      rash       FAMILY HISTORY:  Family History   Problem Relation Age of Onset     Alcohol/Drug Father      Allergies Mother      Neurologic Disorder Mother      seizures, related to fever     GASTROINTESTINAL DISEASE Mother      IBS     Blood Disease Mother      [place]     Cancer Maternal Grandfather      bladder     Circulatory Maternal Grandfather      blood clots     Alcohol/Drug Maternal Grandfather      alcohol dependency     Allergies Maternal Aunt      Allergies Other      maternal cousins     Arthritis Maternal Aunt      Prostate Cancer Maternal Uncle      Thyroid Disease Maternal Aunt      Alcohol/Drug Maternal Uncle      alcohol and drug dependency     Cancer - colorectal Maternal Uncle      GASTROINTESTINAL DISEASE Sister      IBS     Breast Cancer Other      self       SOCIAL HISTORY:  Social History     Social History     Marital status:      Spouse name: Kemar     Number of children: 2     Years of education: 16     Occupational History     teacher [employer]     Teacher     Social History Main Topics     Smoking status: Former Smoker     Quit date: 6/23/1997     Smokeless tobacco: Never Used     Alcohol use No     Drug use: Yes      Comment: CBD/THC oil     Sexual activity: Yes     Partners: Male     Birth control/ protection: None      Comment: No  "birth control/protection usage.  had vasectomy     Other Topics Concern     Parent/Sibling W/ Cabg, Mi Or Angioplasty Before 65f 55m? No     Social History Narrative    Caffeine intake/servings daily - 0-1    Calcium intake/servings daily - 3-4    Exercise 3-4 times weekly - describe running    Sunscreen used - Yes    Seatbelts used - Yes    Guns stored in the home - Yes    Self Breast Exam - No    Pap test up to date -  Yes    Eye exam up to date -  Yes    Dental exam up to date -  Yes    DEXA scan up to date -  Not Applicable    Flex Sig/Colonoscopy up to date -  Not Applicable    Mammography up to date -  Not Applicable    Immunizations reviewed and up to date - Yes    Abuse: Current or Past (Physical, Sexual or Emotional) - No    Do you feel safe in your environment - Yes    Do you cope well with stress - Yes    Do you suffer from insomnia - No    Last updated by: Emani Estes  11/11/2011                   ROS:   Constitutional: No fever, chills, or sweats. No weight gain/loss   ENT: No visual disturbance, ear ache, epistaxis, sore throat  Allergies/Immunologic: Negative.   Respiratory: No cough, hemoptysia  Cardiovascular: As per HPI  GI: No nausea, vomiting, hematemesis, melena, or hematochezia  : No urinary frequency, dysuria, or hematuria  Integument: Negative  Psychiatric: Negative  Neuro: Negative  Endocrinology: Negative   Musculoskeletal: Negative    EXAM:  /75 (BP Location: Right arm, Patient Position: Chair, Cuff Size: Adult Regular)  Pulse 85  Ht 1.626 m (5' 4\")  Wt 52.8 kg (116 lb 6.4 oz)  SpO2 99%  BMI 19.98 kg/m2  In general, the patient is a pleasant female in no apparent distress.    HEENT: NC/AT.  PERRLA.  EOMI.  Sclerae white, not injected.  Nares clear.  Pharynx without erythema or exudate.  Dentition intact.    Neck: No adenopathy.  No thyromegaly. Carotids +4/4 bilaterally without bruits.  No jugular venous distension.   Heart: RRR. Normal S1, S2 splits " physiologically. No murmur, rub, click, or gallop. The PMI is in the 5th ICS in the midclavicular line. There is no heave.    Lungs: CTA.  No ronchi, wheezes, rales.  No dullness to percussion.   Abdomen: Soft, nontender, nondistended. No organomegaly.  No bruits.   Extremities: No clubbing, cyanosis, or edema.  The pulses are +4/4 at the radial, brachial, femoral, popliteal, DP, and PT sites bilaterally.  No bruits are noted.  Neurologic: Alert and oriented to person/place/time, normal speech, gait and affect  Skin: No petechiae, purpura or rash.    Labs:  LIPID RESULTS:  Lab Results   Component Value Date    CHOL 129 11/03/2017    HDL 50 11/03/2017    LDL 43 11/03/2017    TRIG 178 (H) 11/03/2017    CHOLHDLRATIO 2.8 11/13/2012    NHDL 79 11/03/2017       LIVER ENZYME RESULTS:  Lab Results   Component Value Date    AST 43 11/08/2018    ALT 23 11/08/2018       CBC RESULTS:  Lab Results   Component Value Date    WBC 5.8 11/08/2018    RBC 3.89 11/08/2018    HGB 12.5 11/08/2018    HCT 38.7 11/08/2018     11/08/2018    MCH 32.1 11/08/2018    MCHC 32.3 11/08/2018    RDW 16.1 (H) 11/08/2018     11/08/2018       BMP RESULTS:  Lab Results   Component Value Date     11/08/2018    POTASSIUM 3.5 11/08/2018    CHLORIDE 107 11/08/2018    CO2 25 11/08/2018    ANIONGAP 10 11/08/2018     (H) 11/08/2018    BUN 18 11/08/2018    CR 0.62 11/08/2018    GFRESTIMATED >90 11/08/2018    GFRESTBLACK >90 11/08/2018    KRYSTEN 8.9 11/08/2018        A1C RESULTS:  No results found for: A1C    INR RESULTS:  Lab Results   Component Value Date    INR 0.94 10/20/2018    INR 1.21 (H) 10/07/2018       Procedures:    Echocardiogram 11-08-18    Normal biventricular function.  The inferior vena cava was normal in size with preserved respiratory  variability.  No pericardial effusion present.  _____________________________________________________________________________  __        Left Ventricle  Left ventricular size is normal.  Global and regional left ventricular function  is normal with an EF of 60-65%.     Right Ventricle  The right ventricle is normal size. Global right ventricular function is  normal.     Aortic Valve  Aortic valve is normal in structure and function.        Vessels  The inferior vena cava was normal in size with preserved respiratory  variability.     Pericardium  No pericardial effusion is present.     Miscellaneous  A bilateral pleural effusion is present.     Compared to Previous Study  This study was compared with the study from 10.11.18, no pericardial effusion  seen .     _____________________________________________________________________________  __      Assessment and Plan:       Ms. Marsh is a 49 y/o F w/ metastatic breast cancer who presents for hospital follow-up after she presented with syncope and was found to have malignant (+cytology) pericardial effusion with e/o tamponade s/p pericardial drain 10/7-10/8.  We discussed the results with patient:    #Recent malignant pericardial effusion w/ tamponade:  Impression is that patient's symptoms have resolved and now with stable vitals.  Echocardiogram does not reveal any pericardial effusion.  -Follow-up 2-3 months    Gisell Sanchez MD, PhD  Professor of Medicine  Division of Cardiology      CC  Patient Care Team:  Marilu Michelle MD as PCP - General (Family Practice)  Sharifa Celeste, RN as Nurse Coordinator (Breast Oncology)  Care, [facility] ([facility] ([facility] ([place]  Gisell Sanchez MD as MD (Cardiology)  GISELL SANCHEZ  Answers for HPI/ROS submitted by the patient on 11/8/2018   General Symptoms: No  Skin Symptoms: No  HENT Symptoms: No  EYE SYMPTOMS: No  HEART SYMPTOMS: No  LUNG SYMPTOMS: No  INTESTINAL SYMPTOMS: No  URINARY SYMPTOMS: No  GYNECOLOGIC SYMPTOMS: No  BREAST SYMPTOMS: No  SKELETAL SYMPTOMS: Yes  BLOOD SYMPTOMS: No  NERVOUS SYSTEM SYMPTOMS: No  MENTAL HEALTH SYMPTOMS: No  Back pain: Yes  Muscle aches: Yes  Neck  pain: Yes  Swollen joints: No  Joint pain: No  Bone pain: No  Muscle cramps: No  Muscle weakness: No  Joint stiffness: No  Bone fracture: No

## 2018-11-09 NOTE — PATIENT INSTRUCTIONS
Recommendations from today's MTM visit:                                                    MTM (medication therapy management) is a service provided by a clinical pharmacist designed to help you get the most of out of your medicines.   Today we reviewed what your medicines are for, how to know if they are working, that your medicines are safe and how to make your medicine regimen as easy as possible.     1. Restart the skin cream/salve at the start of the cycle.    To schedule another MTM appointment, please call the clinic directly or you may call the MTM scheduling line at [phone] or toll-free at [phone].     My Clinical Pharmacist's contact information:                                                      It was a pleasure talking with you today!  Please feel free to contact me with any questions or concerns you have.      Rachel Pierson, PharmD, BCOP, BCPS  Clinical Pharmacy Specialist/  Oncology Medication Therapy Management Pharmacist  [facility]  Pager [phone]  Phone [phone]          You may receive a survey about the MTM services you received.  I would appreciate your feedback to help me serve you better in the future. Please fill it out and return it when you can. Your comments will be anonymous.

## 2018-11-12 ENCOUNTER — HOSPITAL ENCOUNTER (OUTPATIENT)
Facility: CLINIC | Age: 48
Discharge: HOME | End: 2018-11-12
Attending: INTERNAL MEDICINE | Admitting: INTERNAL MEDICINE
Payer: COMMERCIAL

## 2018-11-12 ENCOUNTER — SURGERY (OUTPATIENT)
Age: 48
End: 2018-11-12

## 2018-11-12 VITALS
SYSTOLIC BLOOD PRESSURE: 110 MMHG | DIASTOLIC BLOOD PRESSURE: 75 MMHG | RESPIRATION RATE: 22 BRPM | OXYGEN SATURATION: 97 %

## 2018-11-12 PROCEDURE — 32552 REMOVE LUNG CATHETER: CPT | Performed by: INTERNAL MEDICINE

## 2018-11-12 PROCEDURE — 25000125 ZZHC RX 250: Performed by: INTERNAL MEDICINE

## 2018-11-12 PROCEDURE — 40000275 ZZH STATISTIC RCP TIME EA 10 MIN

## 2018-11-12 PROCEDURE — 32551 INSERTION OF CHEST TUBE: CPT | Performed by: INTERNAL MEDICINE

## 2018-11-12 RX ORDER — LIDOCAINE HYDROCHLORIDE 10 MG/ML
INJECTION, SOLUTION EPIDURAL; INFILTRATION; INTRACAUDAL; PERINEURAL PRN
Status: DISCONTINUED | OUTPATIENT
Start: 2018-11-12 | End: 2018-11-12 | Stop reason: HOSPADM

## 2018-11-12 RX ADMIN — LIDOCAINE HYDROCHLORIDE 15 ML: 10 INJECTION, SOLUTION EPIDURAL; INFILTRATION; INTRACAUDAL; PERINEURAL at 11:50

## 2018-11-12 NOTE — PROCEDURES
INTERVENTIONAL PULMONOLOGY     Procedure(s):    Pleurx catheter/chest tube removal    Indication:  Minimal drainage    Attending of Record:  Sg Meyer MD    Medications:    None    Sedation Time:   None    Time Out:  Performed    I have reviewed the patient's medical record and indication for the procedure. Physical examination was performed.  The risks, benefits and alternatives of the procedure were discussed with the the patient in detail and she had the opportunity to ask questions.  I discussed in particular the potential complications including risks of minor or life-threatening bleeding and/or infection, respiratory failure, vocal cord trauma / paralysis, pneumothorax, and discomfort. Sedation risks were also discussed including abnormal heart rhythms, low blood pressure, and respiratory failure. All questions were answered to the best of my ability.  Verbal and written informed consent was obtained.  The proposed procedure and the patient's identification were verified prior to the procedure by the physician and the nurse.    The patient was assessed for the adequacy for the procedure and to receive medications.   Mental Status:  Alert and oriented x 3  Airway examination:  Class I (Complete visualization of soft palate)  Pulmonary:  Clear to ausculation bilaterally  CV:  RRR, no murmurs or gallops  ASA Grade:  (II)  Mild systemic disease    After clinical evaluation and reviewing the indication, risks, alternatives and benefits of the procedure the patient was deemed to be in satisfactory condition to undergo the procedure.      Maneuvers / Procedure:      Indwelling pleural catheter removal: The patient's left chest was prepped in sterile fashion. The sutures were then removed. 10cc of 1% lidocaine was used to anesthetize the area. Using the curved franklin, the track was peeled away from the catheter then removed. EBL was minimal.    Any disposable equipment was visually inspected and deemed to be  intact immediately post procedure.        Recommendations:     -->  Follow up with Oncology      HKeith Meyer MD  [phone]

## 2018-11-14 ENCOUNTER — CARE COORDINATION (OUTPATIENT)
Dept: ONCOLOGY | Facility: CLINIC | Age: 48
End: 2018-11-14

## 2018-11-14 DIAGNOSIS — C50.919 METASTATIC BREAST CANCER: Primary | ICD-10-CM

## 2018-11-14 NOTE — PROGRESS NOTES
Spoke to patient post pleural effusion catheter removal. Patient relieved to have removed to be able to be more active to include yoga. Patient struggling to eat enough protein and writer suggested high protein food and will place a Nutrition consult. Reviewed appointments this Friday for lab work and see Lima Griffiths.  Answered all patient's questions and verbalized understanding. Sharifa Celeste RN, BSN.

## 2018-11-16 ENCOUNTER — ONCOLOGY VISIT (OUTPATIENT)
Dept: ONCOLOGY | Facility: CLINIC | Age: 48
End: 2018-11-16
Attending: PHYSICIAN ASSISTANT
Payer: COMMERCIAL

## 2018-11-16 ENCOUNTER — APPOINTMENT (OUTPATIENT)
Dept: LAB | Facility: CLINIC | Age: 48
End: 2018-11-16
Attending: PHYSICIAN ASSISTANT
Payer: COMMERCIAL

## 2018-11-16 VITALS
BODY MASS INDEX: 19.36 KG/M2 | RESPIRATION RATE: 16 BRPM | DIASTOLIC BLOOD PRESSURE: 74 MMHG | TEMPERATURE: 98.1 F | SYSTOLIC BLOOD PRESSURE: 107 MMHG | HEIGHT: 64 IN | WEIGHT: 113.4 LBS | HEART RATE: 94 BPM | OXYGEN SATURATION: 98 %

## 2018-11-16 DIAGNOSIS — C50.919 METASTATIC BREAST CANCER: Primary | ICD-10-CM

## 2018-11-16 DIAGNOSIS — C79.51 BONE METASTASIS: ICD-10-CM

## 2018-11-16 DIAGNOSIS — Z79.899 ENCOUNTER FOR LONG-TERM (CURRENT) USE OF MEDICATIONS: ICD-10-CM

## 2018-11-16 LAB
ALBUMIN SERPL-MCNC: 3.4 G/DL (ref 3.4–5)
ALP SERPL-CCNC: 127 U/L (ref 40–150)
ALT SERPL W P-5'-P-CCNC: 22 U/L (ref 0–50)
ANION GAP SERPL CALCULATED.3IONS-SCNC: 7 MMOL/L (ref 3–14)
AST SERPL W P-5'-P-CCNC: 37 U/L (ref 0–45)
BASOPHILS # BLD AUTO: 0 10E9/L (ref 0–0.2)
BASOPHILS NFR BLD AUTO: 0.9 %
BILIRUB SERPL-MCNC: 0.5 MG/DL (ref 0.2–1.3)
BUN SERPL-MCNC: 14 MG/DL (ref 7–30)
CALCIUM SERPL-MCNC: 9.2 MG/DL (ref 8.5–10.1)
CHLORIDE SERPL-SCNC: 104 MMOL/L (ref 94–109)
CO2 SERPL-SCNC: 26 MMOL/L (ref 20–32)
CREAT SERPL-MCNC: 0.68 MG/DL (ref 0.52–1.04)
DIFFERENTIAL METHOD BLD: ABNORMAL
EOSINOPHIL # BLD AUTO: 0.1 10E9/L (ref 0–0.7)
EOSINOPHIL NFR BLD AUTO: 1.1 %
ERYTHROCYTE [DISTWIDTH] IN BLOOD BY AUTOMATED COUNT: 15.7 % (ref 10–15)
GFR SERPL CREATININE-BSD FRML MDRD: >90 ML/MIN/1.7M2
GLUCOSE SERPL-MCNC: 109 MG/DL (ref 70–99)
HCT VFR BLD AUTO: 42 % (ref 35–47)
HGB BLD-MCNC: 13.8 G/DL (ref 11.7–15.7)
IMM GRANULOCYTES # BLD: 0 10E9/L (ref 0–0.4)
IMM GRANULOCYTES NFR BLD: 0.4 %
LYMPHOCYTES # BLD AUTO: 1.1 10E9/L (ref 0.8–5.3)
LYMPHOCYTES NFR BLD AUTO: 24 %
MCH RBC QN AUTO: 31.9 PG (ref 26.5–33)
MCHC RBC AUTO-ENTMCNC: 32.9 G/DL (ref 31.5–36.5)
MCV RBC AUTO: 97 FL (ref 78–100)
MONOCYTES # BLD AUTO: 0.3 10E9/L (ref 0–1.3)
MONOCYTES NFR BLD AUTO: 7 %
NEUTROPHILS # BLD AUTO: 3.1 10E9/L (ref 1.6–8.3)
NEUTROPHILS NFR BLD AUTO: 66.6 %
NRBC # BLD AUTO: 0 10*3/UL
NRBC BLD AUTO-RTO: 0 /100
PLATELET # BLD AUTO: 251 10E9/L (ref 150–450)
POTASSIUM SERPL-SCNC: 3.4 MMOL/L (ref 3.4–5.3)
PROT SERPL-MCNC: 8.4 G/DL (ref 6.8–8.8)
RBC # BLD AUTO: 4.32 10E12/L (ref 3.8–5.2)
SODIUM SERPL-SCNC: 137 MMOL/L (ref 133–144)
WBC # BLD AUTO: 4.7 10E9/L (ref 4–11)

## 2018-11-16 PROCEDURE — 85025 COMPLETE CBC W/AUTO DIFF WBC: CPT | Performed by: INTERNAL MEDICINE

## 2018-11-16 PROCEDURE — 25000128 H RX IP 250 OP 636: Mod: ZF | Performed by: PHYSICIAN ASSISTANT

## 2018-11-16 PROCEDURE — 25000125 ZZHC RX 250: Mod: ZF | Performed by: PHYSICIAN ASSISTANT

## 2018-11-16 PROCEDURE — 36415 COLL VENOUS BLD VENIPUNCTURE: CPT

## 2018-11-16 PROCEDURE — G0463 HOSPITAL OUTPT CLINIC VISIT: HCPCS | Mod: ZF

## 2018-11-16 PROCEDURE — 96402 CHEMO HORMON ANTINEOPL SQ/IM: CPT

## 2018-11-16 PROCEDURE — 80053 COMPREHEN METABOLIC PANEL: CPT | Performed by: INTERNAL MEDICINE

## 2018-11-16 PROCEDURE — 99214 OFFICE O/P EST MOD 30 MIN: CPT | Mod: ZP | Performed by: PHYSICIAN ASSISTANT

## 2018-11-16 RX ORDER — LIDOCAINE HYDROCHLORIDE 10 MG/ML
2 INJECTION, SOLUTION EPIDURAL; INFILTRATION; INTRACAUDAL; PERINEURAL ONCE
Status: COMPLETED | OUTPATIENT
Start: 2018-11-16 | End: 2018-11-16

## 2018-11-16 RX ORDER — LIDOCAINE HYDROCHLORIDE 10 MG/ML
2 INJECTION, SOLUTION EPIDURAL; INFILTRATION; INTRACAUDAL; PERINEURAL ONCE
Status: CANCELLED | OUTPATIENT
Start: 2018-11-16 | End: 2018-11-16

## 2018-11-16 RX ADMIN — GOSERELIN ACETATE 3.6 MG: 3.6 IMPLANT SUBCUTANEOUS at 14:16

## 2018-11-16 RX ADMIN — LIDOCAINE HYDROCHLORIDE 2 ML: 10 INJECTION, SOLUTION EPIDURAL; INFILTRATION; INTRACAUDAL; PERINEURAL at 14:12

## 2018-11-16 ASSESSMENT — PAIN SCALES - GENERAL: PAINLEVEL: NO PAIN (0)

## 2018-11-16 NOTE — NURSING NOTE
Chief Complaint   Patient presents with     Blood Draw     Vitals and  labs done by CMA; pt checked into next appointment.     Mariana Doty CMA

## 2018-11-16 NOTE — MR AVS SNAPSHOT
After Visit Summary   11/16/2018    Shan Marsh    MRN: [id number]           Patient Information     Date Of Birth          1970        Visit Information        Provider Department      11/16/2018 12:50 PM Lima Griffiths PA-C [facility]        Today's Diagnoses     Metastatic breast cancer (H)    -  1       Follow-ups after your visit        Your next 10 appointments already scheduled     Nov 16, 2018  2:00 PM CST   Infusion 60 with  ONCOLOGY INFUSION, UC 24 ATC   [facility] ([facility])    [address]  [address]  [facility] [address] [phone]            Nov 21, 2018 12:00 PM CST   CT SOFT TISSUE NECK W CONTRAST with UCCT2   [facility] CT ([facility])    [address]  1st Floor  [facility] [id number] [phone]           How do I prepare for my exam? (Food and drink instructions) **You will have contrast for this exam.** Do not eat or drink for 2 hours before your exam. If you need to take medicine, you may take it with small sips of water. (We may ask you to take liquid medicine as well.)  The day before your exam, drink extra fluids at least six 8-ounce glasses (unless your doctor tells you to restrict your fluids).  How do I prepare for my exam? (Other instructions) Patients over 70 or patients with diabetes or kidney problems: If you haven t had a blood test (creatinine test) within the last 30 days, the Cardiologist/Radiologist may require you to get this test prior to your exam.  What should I wear: Please wear loose clothing, such as a sweat suit or jogging clothes.  Avoid snaps, zippers and other metal. We may ask you to undress and put on a hospital gown.  How long does the exam take: Most scans take less than 20 minutes.  What should I bring: Please bring any scans or X-rays taken at other hospitals, if similar tests were done.  Also bring a list of your medicines, including vitamins, minerals and over-the-counter drugs. It is safest to leave personal items at home.  Do I need a :  No  is needed.  What do I need to tell my doctor? Be sure to tell your doctor: * If you have any allergies. * If there s any chance you are pregnant. * If you are breastfeeding. * If you have diabetes as your medication may need to be adjusted for this exam.  What should I do after the exam: No restrictions, You may resume normal activities.  What is this test: A CT (computed tomography) scan is a series of pictures that allows us to look inside your body. The scanner creates images of the body in cross sections, much like slices of bread. This helps us see any problems more clearly. You may receive contrast (X-ray dye) before or during your scan. Contrast is given through an IV (small needle in your arm).  Who should I call with questions: If you have any questions, please call the Imaging Department where you will have your exam. Directions, parking instructions, and other information is available on our website, [email or web address].            Nov 21, 2018 12:20 PM CST   CT CHEST/ABDOMEN/PELVIS W CONTRAST with UCCT2   [facility] CT ([facility])    [address] [address] [phone]           How do I prepare for my exam? (Food and drink instructions) To prepare: Do not eat or drink for 2 hours before your exam. If you need to take medicine, you may take it with small sips of water. (We may ask you to take liquid medicine as well.)  How do I prepare for my exam? (Other instructions) Please arrive 30 minutes early for your CT.  Once in the department you might be asked to drink water 15-20 minutes prior to your exam.  If indicated you may be asked to drink an oral contrast in advance of your CT.  If this is the case, the imaging team will let you know or be in contact with  you prior to your appointment  Patients over 70 or patients with diabetes or kidney problems: If you haven t had a blood test (creatinine test) within the last 30 days, the Cardiologist/Radiologist may require you to get this test prior to your exam.  If you have diabetes:  Continue to take your metformin medication on the day of your exam  What should I wear: Please wear loose clothing, such as a sweat suit or jogging clothes. Avoid snaps, zippers and other metal. We may ask you to undress and put on a hospital gown.  How long does the exam take: Most scans take less than 20 minutes.  What should I bring: Please bring any scans or X-rays taken at other hospitals, if similar tests were done. Also bring a list of your medicines, including vitamins, minerals and over-the-counter drugs. It is safest to leave personal items at home.  Do I need a : No  is needed.  What do I need to tell my doctor? Be sure to tell your doctor: * If you have any allergies. * If there s any chance you are pregnant. * If you are breastfeeding.  What should I do after the exam: No restrictions, You may resume normal activities.  What is this test: A CT (computed tomography) scan is a series of pictures that allows us to look inside your body. The scanner creates images of the body in cross sections, much like slices of bread. This helps us see any problems more clearly. You may receive contrast (X-ray dye) before or during your scan. You will be asked to drink the contrast.  Who should I call with questions: If you have any questions, please call the Imaging Department where you will have your exam. Directions, parking instructions, and other information is available on our website, [email or web address].            Nov 26, 2018  8:30 AM [facility]   [facility] Lab Draw with  MASONIC LAB DRAW   [facility] Lab Draw ([facility])    [address]  [address]  [facility] [address] [phone]             Nov 26, 2018  9:00 AM CST   (Arrive by 8:45 AM)   Return Visit with Ana Whitman MD   [facility] ([facility])    [address]  [address]  [facility] [id number] [phone]            Nov 26, 2018 10:30 AM CST   (Arrive by 10:15 AM)   New Patient Visit with Rossana Beavers RD   [facility] ([facility])    [address]  [address]  [facility] [id number] [phone]            Jan 08, 2019 11:30 AM CST   Ech Complete with BIANCA   [facility] ([facility])    [address]  3rd [address]  [facility] [address] [phone]           1.  Please bring or wear a comfortable two-piece outfit. 2.  You may eat, drink and take your normal medicines. 3.  For any questions that cannot be answered, please contact the ordering physician 4.  Please do not wear perfumes or scented lotions on the day of your exam.              Future tests that were ordered for you today     Open Future Orders        Priority Expected Expires Ordered    CBC with platelets differential Routine 11/26/2018 2/16/2019 11/16/2018    Comprehensive metabolic panel Routine 11/26/2018 2/16/2019 11/16/2018    CEA Routine 11/26/2018 2/16/2019 11/16/2018    Ca27.29  breast tumor marker Routine 11/26/2018 2/16/2019 11/16/2018            Who to contact     If you have questions or need follow up information about today's clinic visit or your schedule please contact [facility] directly at [phone].  Normal or non-critical lab and imaging results will be communicated to you by MyChart, letter or phone within 4 business days after the clinic has received the results. If you do not hear from us within 7 days, please contact the clinic through MyChart or phone. If you have a critical or abnormal lab result, we will notify you by phone as soon as possible.  Submit refill  "requests through [employer] or call your pharmacy and they will forward the refill request to us. Please allow 3 business days for your refill to be completed.          Additional Information About Your Visit        [employer] Information     [employer] gives you secure access to your electronic health record. If you see a primary care provider, you can also send messages to your care team and make appointments. If you have questions, please call your primary care clinic.  If you do not have a primary care provider, please call [phone] and they will assist you.        Care EveryWhere ID     This is your Care EveryWhere ID. This could be used by other organizations to access your [facility] medical records  [id number]        Your Vitals Were     Pulse Temperature Respirations Height Pulse Oximetry BMI (Body Mass Index)    94 98.1  F (36.7  C) (Oral) 16 1.626 m (5' 4.02\") 98% 19.46 kg/m2       Blood Pressure from Last 3 Encounters:   11/16/18 107/74   11/12/18 110/75   11/08/18 110/75    Weight from Last 3 Encounters:   11/16/18 51.4 kg (113 lb 6.4 oz)   11/08/18 52.8 kg (116 lb 6.4 oz)   10/29/18 51.3 kg (113 lb)               Primary Care Provider Office Phone # Fax #    Marilu Michelle [phone] [address]  [facility] [address]        Equal Access to Services     [facility]: Hadii noemy ku hadasho Sofloridaali, waaxda luqadaha, qaybta kaalmada lexiyada, edi mustafa . So [facility] [phone].    ATENCIÓN: Si habla español, tiene a gamez disposición servicios gratuitos de asistencia lingüística. Llame al [phone].    We comply with applicable federal civil rights laws and [place] laws. We do not discriminate on the basis of race, color, national origin, age, disability, sex, sexual orientation, or gender identity.            Thank you!     Thank you for choosing [facility]  for your care. Our goal is always to provide you with excellent care. " Hearing back from our patients is one way we can continue to improve our services. Please take a few minutes to complete the written survey that you may receive in the mail after your visit with us. Thank you!             Your Updated Medication List - Protect others around you: Learn how to safely use, store and throw away your medicines at www.disposemymeds.org.          This list is accurate as of 11/16/18  1:32 PM.  Always use your most recent med list.                   Brand Name Dispense Instructions for use Diagnosis    calcium carbonate 500 mg (elemental) 500 MG tablet    OS-KRYSTEN     Take 1 tablet by mouth daily        capecitabine 500 MG tablet CHEMO    XELODA    84 tablet    Take 3 tablets (1,500 mg) by mouth 2 times daily Take on Days 1 - 7 and 15 - 21 every 28 day cycle. Take with water 30 minutes after a meal.    Metastatic breast cancer (H)       ENZYME DIGEST Caps      Take 1 capsule by mouth daily        medical cannabis (Patient's own supply.  Not a prescription)      (This is NOT a prescription, and does not certify that the patient has a qualifying medical condition for medical cannabis.  The purpose of this order is  to document that the patient reports taking medical cannabis.)        OMEGA 3-6-9 COMPLEX PO      Take 1 capsule by mouth daily        ondansetron 8 MG tablet    ZOFRAN    40 tablet    Take 1 tablet (8 mg) by mouth every 8 hours as needed for nausea    Metastatic breast cancer (H), Bone metastasis (H)       prochlorperazine 10 MG tablet    COMPAZINE     Take 10 mg by mouth before 1st dose of oral chemotherapy, then prn thereafter        traMADol 50 MG tablet    ULTRAM     Take 1-2 tablets ( mg) by mouth every 6 hours as needed for moderate pain or headaches        VITAMIN C PO      Take 1,000 mg by mouth daily        VITAMIN D (CHOLECALCIFEROL) PO      Take 1,000 Units by mouth daily

## 2018-11-16 NOTE — PATIENT INSTRUCTIONS
Contact Numbers    [facility] [place] Line: [phone]  [facility] Triage and after hours / weekends / holidays:  [phone]      Please call the triage or after hours line if you experience a temperature greater than or equal to 100.5, shaking chills, have uncontrolled nausea, vomiting and/or diarrhea, dizziness, shortness of breath, chest pain, bleeding, unexplained bruising, or if you have any other new/concerning symptoms, questions or concerns.      If you are having any concerning symptoms or wish to speak to a provider before your next infusion visit, please call your care coordinator or triage to notify them so we can adequately serve you.     If you need a refill on a narcotic prescription or other medication, please call before your infusion appointment.           November 2018 Sunday Monday Tuesday Wednesday Thursday Friday Saturday                       1     2     3       4     5     6     TELEMEDICINE    3:00 PM   (60 min.)   Rachel Pierson [facility] Med[facility] Therapy Management 7     8     [facility]    1:30 PM   (15 min.)   [facility]   [facility] Draw     ECH COMPLETE    2:00 PM   (60 min.)   [id number]   [facility] Echo     [facility] NEW    3:15 PM   (30 min.)   Robbie Sanchez MD   [facility] 9     10       11     12     Admission   11:02 AM   Bandar Meyer MD   [facility] [place], Endoscopy   (Discharge: 11/12/2018)     INSERT CHEST TUBE   12:00 PM   Bandar Meyer MD   [facility] 13     14     15     16     [facility] LAB DRAW   12:00 PM   (15 min.)    [facility] LAB DRAW   [facility] Lab Draw     [facility] RETURN   12:35 PM   (50 min.)   Lima Griffiths PA-C   [facility] ONC INFUSION 60    2:00 PM   (60 min.)    ONCOLOGY INFUSION   [facility] 17       18     19     20     21     CT SOFT TISSUE NECK W   12:00 PM   (20 min.)   UCCT2   [facility] CT     CT CHEST/ABDOMEN/PELVIS W   12:20 PM    (20 min.)   UCCT2   [facility] CT 22     23     24       25     26     [facility] LAB DRAW    8:30 AM   (15 min.)    [facility] LAB DRAW   [facility] Lab Draw     [facility] RETURN    8:45 AM   (30 min.)   Ana Whitman MD   [facility] NEW   10:15 AM   (60 min.)   Rossana Moody RD   [facility] 27 28 29 30 December 2018 Sunday Monday Tuesday Wednesday Thursday Friday Saturday                                 1       2     3     4     5     6     7     8       9     10     11     12     13     14     15       16     17     18     19     20     21     22       23     24     25     26     27     28     29       30     31                                           Lab Results:  Recent Results (from the past 12 hour(s))   Comprehensive metabolic panel (BMP + Alb, Alk Phos, ALT, AST, Total. Bili, TP)    Collection Time: 11/16/18 11:48 AM   Result Value Ref Range    Sodium 137 133 - 144 mmol/L    Potassium 3.4 3.4 - 5.3 mmol/L    Chloride 104 94 - 109 mmol/L    Carbon Dioxide 26 20 - 32 mmol/L    Anion Gap 7 3 - 14 mmol/L    Glucose 109 (H) 70 - 99 mg/dL    Urea Nitrogen 14 7 - 30 mg/dL    Creatinine 0.68 0.52 - 1.04 mg/dL    GFR Estimate >90 >60 mL/min/1.7m2    GFR Estimate If Black >90 >60 mL/min/1.7m2    Calcium 9.2 8.5 - 10.1 mg/dL    Bilirubin Total 0.5 0.2 - 1.3 mg/dL    Albumin 3.4 3.4 - 5.0 g/dL    Protein Total 8.4 6.8 - 8.8 g/dL    Alkaline Phosphatase 127 40 - 150 U/L    ALT 22 0 - 50 U/L    AST 37 0 - 45 U/L   CBC with platelets and differential    Collection Time: 11/16/18 11:48 AM   Result Value Ref Range    WBC 4.7 4.0 - 11.0 10e9/L    RBC Count 4.32 3.8 - 5.2 10e12/L    Hemoglobin 13.8 11.7 - 15.7 g/dL    Hematocrit 42.0 35.0 - 47.0 %    MCV 97 78 - 100 fl    MCH 31.9 26.5 - 33.0 pg    MCHC 32.9 31.5 - 36.5 g/dL    RDW 15.7 (H) 10.0 - 15.0 %    Platelet Count 251 150 - 450 10e9/L    Diff Method  Automated Method     % Neutrophils 66.6 %    % Lymphocytes 24.0 %    % Monocytes 7.0 %    % Eosinophils 1.1 %    % Basophils 0.9 %    % Immature Granulocytes 0.4 %    Nucleated RBCs 0 0 /100    Absolute Neutrophil 3.1 1.6 - 8.3 10e9/L    Absolute Lymphocytes 1.1 0.8 - 5.3 10e9/L    Absolute Monocytes 0.3 0.0 - 1.3 10e9/L    Absolute Eosinophils 0.1 0.0 - 0.7 10e9/L    Absolute Basophils 0.0 0.0 - 0.2 10e9/L    Abs Immature Granulocytes 0.0 0 - 0.4 10e9/L    Absolute Nucleated RBC 0.0

## 2018-11-16 NOTE — LETTER
11/16/2018      RE: Shan Marsh  [address]  [facility] [address]       Oncology/Hematology Visit Note  Nov 16, 2018    Reason for Visit: follow up of metastatic breast cancer, ER positive. Post hospital discharge follow up.    History of Present Illness: Shan Marsh is a 48 year old female without significant past medical history with recent diagnosis of metastatic breast cancer after presenting with left shoulder pain. Shoulder MRI showed soft tissue mass close to distal clavicle concerning for malignancy. She had PET/CT 5/9/17 showing hypermetabolic left axillary, supraclavicular, mediastinal and hilar nodes. She had biopsies of left axilla and hilar nodes showing metastatic breast cancer, strongly ER positive, moderately LA positive and HER-2 kaitlin non-amplified. She originally had [id number], ER/LA positive, HER-2 negative breast cancer diagnosed in spring of 2012 s/p bilateral mastectomies and Tamoxifen from 8/2012-2/2013 and discontinued due to side effects. With development of metastatic disease, she was recommended treatment on BIG10 clinical trial with palbociclib and tamoxifen. She started treatment on 5/24/17 and required dose reduction for neutropenia. CT CAP and bone scan 1/11/2018 showing progressive disease with new bony lesions in the ribs, T spine, and possibly liver, lungs. She started Zoladex and abemiciclib on 1/12/2018.      In March 2018, she sought treatment at [facility] in [place], under the care of Dr. Brooks, where she received:  - Vallovex vaccine (per FDA, is in phase I trials here in [place])  - Insulin potentiated therapy with conjugated chemotherapy (5% chemotherapy, unclear what chemotherapy)  - Gina's toxins, IV vitamin C, IV Ozone, hyperbaric chamber, vitamin B17 infusion, vitamin CK3 IV, and coffee enemas PRN  - Also taking oral niacin, Lugol, acidol, pancreatin, and thyroid desiccated liver, per their regimen  - She was continued on Letrozole and  advised to conintue Zoladex.      She met with Dr. Whitman and patient wished to continue alternative regimen. Abemiciclib was placed on hold. She was continued on Zoladex and letrozole. While in [place], she required thoracentesis for symptomatic pleural effusion. A repeat PET August 2018 showing widely progressive disease with multiple new bone mets involving the spine, ribs, pelvis, and sacrum; new lesions involving the left lateral thoracic/abdominal wall musculature and subcutaneous tissues, new metastases in the left adrenal gland, and recurrent large L pleural effusion. She was started on Xeloda 1500 mg BID on 9/5/18.     She was admitted 10/4-10/11/18 after syncopal event/unwitnessed fall at home. Found to have left pneumothorax and pericardial effusion with tamponade physiology. She had left chest tube placed in ED which then fell out on 10/10. Left pleurx placed on 10/11. She had pericardiocentesis on 10/7, drain removed 10/8.     Interval History:  Shan is here for routine follow-up.  No new concerns. The pleurX was pulled due to declining and inconsistent drainge. She has not noticed any change to her breathing since that time. She continues to have some mild WILSON with stairs, unchanged. No chest pains. She has had some numbness spanning ehr right ribcage since the summer, which remains stable. No new sites of pain. Reports Xeloda causes her fatigue and some heartburn, both of which she feels are under control with cannabis and tums, respectively. She hasn't had any issues eating/drinking. No fevers, chills, cough, nausea, vomiting, mouth sores, abdominal pain, bowel changes, bleeding, or swelling. She has left her  and is living with her friend. Her children live with her father and she visits often. She emotionally feels in a good place right now.      Current Outpatient Prescriptions   Medication Sig Dispense Refill     Ascorbic Acid (VITAMIN C PO) Take 1,000 mg by mouth daily       calcium  "carbonate (OS-KRYSTEN 500 MG [place]. CA) 1250 MG tablet Take 1 tablet by mouth daily       capecitabine (XELODA) 500 MG tablet CHEMO Take 3 tablets (1,500 mg) by mouth 2 times daily Take on Days 1 - 7 and 15 - 21 every 28 day cycle. Take with water 30 minutes after a meal. 84 tablet 0     Digestive Enzymes (ENZYME DIGEST) CAPS Take 1 capsule by mouth daily       medical cannabis (Patient's own supply.  Not a prescription) (This is NOT a prescription, and does not certify that the patient has a qualifying medical condition for medical cannabis.  The purpose of this order is  to document that the patient reports taking medical cannabis.)       Omega 3-6-9 Fatty Acids (OMEGA 3-6-9 COMPLEX PO) Take 1 capsule by mouth daily       ondansetron (ZOFRAN) 8 MG tablet Take 1 tablet (8 mg) by mouth every 8 hours as needed for nausea (Patient not taking: Reported on 10/29/2018) 40 tablet 3     prochlorperazine (COMPAZINE) 10 MG tablet Take 10 mg by mouth before 1st dose of oral chemotherapy, then prn thereafter       traMADol (ULTRAM) 50 MG tablet Take 1-2 tablets ( mg) by mouth every 6 hours as needed for moderate pain or headaches       VITAMIN D, CHOLECALCIFEROL, PO Take 1,000 Units by mouth daily        [DISCONTINUED] tamoxifen (NOLVADEX) 20 MG tablet Take 1 tablet (20 mg) by mouth daily 28 tablet 0       Physical Examination:  General: The patient is a pleasant female in no acute distress.  /74  Pulse 94  Temp 98.1  F (36.7  C) (Oral)  Resp 16  Ht 1.626 m (5' 4.02\")  Wt 51.4 kg (113 lb 6.4 oz)  SpO2 98%  BMI 19.46 kg/m2  Wt Readings from Last 10 Encounters:   11/08/18 52.8 kg (116 lb 6.4 oz)   10/29/18 51.3 kg (113 lb)   10/23/18 49 kg (108 lb)   10/20/18 50.2 kg (110 lb 11.2 oz)   10/20/18 49 kg (108 lb)   10/15/18 49 kg (108 lb)   10/11/18 53.3 kg (117 lb 6.4 oz)   10/03/18 50.3 kg (111 lb)   09/05/18 51.7 kg (114 lb)   09/04/18 51.3 kg (113 lb)     HEENT: EOMI, PERRL. Sclerae are anicteric. Oral mucosa is " pink and moist with no lesions or thrush.   Lymph: Palpable left supraclavicular LNs. No other palpable cervical or supraclavicular LNs.   Heart: Regular rate and rhythm.   Lungs: Diminished BS in bilateral bases, L>R. Otherwise clear to auscultation bilaterally. No w/w/r  Abdomen: Bowel sounds present, soft, nontender with no palpable hepatosplenomegaly or masses.   Extremities: No lower extremity edema noted bilaterally.   Neuro: Cranial nerves II through XII are grossly intact.  Skin: No rashes, petechiae, or bruising noted on exposed skin.    Laboratory Data:  Results for SHAN MARSH (MRN [id number]) as of 11/16/2018 12:02   Ref. Range 11/16/2018 11:48   WBC Latest Ref Range: 4.0 - 11.0 10e9/L 4.7   Hemoglobin Latest Ref Range: 11.7 - 15.7 g/dL 13.8   Hematocrit Latest Ref Range: 35.0 - 47.0 % 42.0   Platelet Count Latest Ref Range: 150 - 450 10e9/L 251   Results for SHAN MARSH (MRN [id number]) as of 11/16/2018 13:26   Ref. Range 11/16/2018 11:48   Sodium Latest Ref Range: 133 - 144 mmol/L 137   Potassium Latest Ref Range: 3.4 - 5.3 mmol/L 3.4   Chloride Latest Ref Range: 94 - 109 mmol/L 104   Carbon Dioxide Latest Ref Range: 20 - 32 mmol/L 26   Urea Nitrogen Latest Ref Range: 7 - 30 mg/dL 14   Creatinine Latest Ref Range: 0.52 - 1.04 mg/dL 0.68   GFR Estimate Latest Ref Range: >60 mL/min/1.7m2 >90   GFR Estimate If Black Latest Ref Range: >60 mL/min/1.7m2 >90   Calcium Latest Ref Range: 8.5 - 10.1 mg/dL 9.2   Anion Gap Latest Ref Range: 3 - 14 mmol/L 7   Albumin Latest Ref Range: 3.4 - 5.0 g/dL 3.4   Protein Total Latest Ref Range: 6.8 - 8.8 g/dL 8.4   Bilirubin Total Latest Ref Range: 0.2 - 1.3 mg/dL 0.5   Alkaline Phosphatase Latest Ref Range: 40 - 150 U/L 127   ALT Latest Ref Range: 0 - 50 U/L 22   AST Latest Ref Range: 0 - 45 U/L 37   Glucose Latest Ref Range: 70 - 99 mg/dL 109 (H)     Assessment and Plan:  Shan Marsh is a 47-year-old female with history of a T1 N0, ER/LA  positive, HER2-negative left breast cancer treated with bilateral mastectomies in 2012, now with metastatic disease (bones, pleural effusions, lungs, pericardial effusion, liver, left adrenal/retroperitoneal mets), ER/CA positive, HER2 negative.  She progressed on palbociclib and tamoxifen, alternative therapies in [place], and is now on Xeloda.     Metastatic breast cancer: Continues on Xeloda 1500 mg twice daily for 7 days on and 8 days off, tolerating well overall. She had a two week period in October off of Xeloda due to pleural, pericardial effusions. Hopefully the slowed drainaged from the pleurX represents treatment response. We will continue.  - Continues ovarian suppression with monthly Zoladex, due today.  - Return end of the month to see Dr. Whitman with restaging, as scheduled    Bone mets: Continue monthly Zometa every 3 months. Due in January.     Bilateral pleural effusions. S/p right thoracentesis on 10/9 and S/p left pleurx placement 10/11. PleurX was removed 11/12 due to minimal drainage.    Malignant pericardial effusion: presented with tamponade, s/p pericardial drain 10/7-8. She met with Dr. Sanchez 11/8/2018. Echo at that time without any pericardial effusion. Follow-up in 2-3 months was recommended.    Health maintenance: Influenza vaccine given on 10/11.    PEE Frias PA-C

## 2018-11-16 NOTE — PROGRESS NOTES
Infusion Nursing Note:  Shan Camposdianequinten Marsh presents today for Cycle 11 Day 1 Zoladex.    Patient seen by provider today: Yes: Lima Griffiths PA-C   present during visit today: Not Applicable.    Note: N/A.    Intravenous Access:  No Intravenous access.    Treatment Conditions:   N/A.      Post Infusion Assessment:  Patient tolerated injection without incident.  Zoladex administered to Right Abdomen.     Discharge Plan:   Patient declined prescription refills.  Discharge instructions reviewed with: Patient.  Patient and/or family verbalized understanding of discharge instructions and all questions answered.  AVS to patient via 9You.  Patient will return 11/26/18 for next appointment.   Patient discharged in stable condition accompanied by: self.  Departure Mode: Ambulatory.    Elyssa Young RN

## 2018-11-16 NOTE — PROGRESS NOTES
Oncology/Hematology Visit Note  Nov 16, 2018    Reason for Visit: follow up of metastatic breast cancer, ER positive. Post hospital discharge follow up.    History of Present Illness: Shan Marsh is a 48 year old female without significant past medical history with recent diagnosis of metastatic breast cancer after presenting with left shoulder pain. Shoulder MRI showed soft tissue mass close to distal clavicle concerning for malignancy. She had PET/CT 5/9/17 showing hypermetabolic left axillary, supraclavicular, mediastinal and hilar nodes. She had biopsies of left axilla and hilar nodes showing metastatic breast cancer, strongly ER positive, moderately MA positive and HER-2 kaitlin non-amplified. She originally had [id number], ER/MA positive, HER-2 negative breast cancer diagnosed in spring of 2012 s/p bilateral mastectomies and Tamoxifen from 8/2012-2/2013 and discontinued due to side effects. With development of metastatic disease, she was recommended treatment on BIG10 clinical trial with palbociclib and tamoxifen. She started treatment on 5/24/17 and required dose reduction for neutropenia. CT CAP and bone scan 1/11/2018 showing progressive disease with new bony lesions in the ribs, T spine, and possibly liver, lungs. She started Zoladex and abemiciclib on 1/12/2018.      In March 2018, she sought treatment at [facility] in [place], under the care of Dr. Brooks, where she received:  - Vallovex vaccine (per FDA, is in phase I trials here in [place])  - Insulin potentiated therapy with conjugated chemotherapy (5% chemotherapy, unclear what chemotherapy)  - Gina's toxins, IV vitamin C, IV Ozone, hyperbaric chamber, vitamin B17 infusion, vitamin CK3 IV, and coffee enemas PRN  - Also taking oral niacin, Lugol, acidol, pancreatin, and thyroid desiccated liver, per their regimen  - She was continued on Letrozole and advised to conintue Zoladex.      She met with Dr. Whitman and patient wished to continue alternative  regimen. Abemiciclib was placed on hold. She was continued on Zoladex and letrozole. While in [place], she required thoracentesis for symptomatic pleural effusion. A repeat PET August 2018 showing widely progressive disease with multiple new bone mets involving the spine, ribs, pelvis, and sacrum; new lesions involving the left lateral thoracic/abdominal wall musculature and subcutaneous tissues, new metastases in the left adrenal gland, and recurrent large L pleural effusion. She was started on Xeloda 1500 mg BID on 9/5/18.     She was admitted 10/4-10/11/18 after syncopal event/unwitnessed fall at home. Found to have left pneumothorax and pericardial effusion with tamponade physiology. She had left chest tube placed in ED which then fell out on 10/10. Left pleurx placed on 10/11. She had pericardiocentesis on 10/7, drain removed 10/8.     Interval History:  Shan is here for routine follow-up.  No new concerns. The pleurX was pulled due to declining and inconsistent drainge. She has not noticed any change to her breathing since that time. She continues to have some mild WILSON with stairs, unchanged. No chest pains. She has had some numbness spanning ehr right ribcage since the summer, which remains stable. No new sites of pain. Reports Xeloda causes her fatigue and some heartburn, both of which she feels are under control with cannabis and tums, respectively. She hasn't had any issues eating/drinking. No fevers, chills, cough, nausea, vomiting, mouth sores, abdominal pain, bowel changes, bleeding, or swelling. She has left her  and is living with her friend. Her children live with her father and she visits often. She emotionally feels in a good place right now.      Current Outpatient Prescriptions   Medication Sig Dispense Refill     Ascorbic Acid (VITAMIN C PO) Take 1,000 mg by mouth daily       calcium carbonate (OS-KRYSTEN 500 MG [place]. [place]) 1250 MG tablet Take 1 tablet by mouth daily       capecitabine (XELODA)  "500 MG tablet CHEMO Take 3 tablets (1,500 mg) by mouth 2 times daily Take on Days 1 - 7 and 15 - 21 every 28 day cycle. Take with water 30 minutes after a meal. 84 tablet 0     Digestive Enzymes (ENZYME DIGEST) CAPS Take 1 capsule by mouth daily       medical cannabis (Patient's own supply.  Not a prescription) (This is NOT a prescription, and does not certify that the patient has a qualifying medical condition for medical cannabis.  The purpose of this order is  to document that the patient reports taking medical cannabis.)       Omega 3-6-9 Fatty Acids (OMEGA 3-6-9 COMPLEX PO) Take 1 capsule by mouth daily       ondansetron (ZOFRAN) 8 MG tablet Take 1 tablet (8 mg) by mouth every 8 hours as needed for nausea (Patient not taking: Reported on 10/29/2018) 40 tablet 3     prochlorperazine (COMPAZINE) 10 MG tablet Take 10 mg by mouth before 1st dose of oral chemotherapy, then prn thereafter       traMADol (ULTRAM) 50 MG tablet Take 1-2 tablets ( mg) by mouth every 6 hours as needed for moderate pain or headaches       VITAMIN D, CHOLECALCIFEROL, PO Take 1,000 Units by mouth daily        [DISCONTINUED] tamoxifen (NOLVADEX) 20 MG tablet Take 1 tablet (20 mg) by mouth daily 28 tablet 0       Physical Examination:  General: The patient is a pleasant female in no acute distress.  /74  Pulse 94  Temp 98.1  F (36.7  C) (Oral)  Resp 16  Ht 1.626 m (5' 4.02\")  Wt 51.4 kg (113 lb 6.4 oz)  SpO2 98%  BMI 19.46 kg/m2  Wt Readings from Last 10 Encounters:   11/08/18 52.8 kg (116 lb 6.4 oz)   10/29/18 51.3 kg (113 lb)   10/23/18 49 kg (108 lb)   10/20/18 50.2 kg (110 lb 11.2 oz)   10/20/18 49 kg (108 lb)   10/15/18 49 kg (108 lb)   10/11/18 53.3 kg (117 lb 6.4 oz)   10/03/18 50.3 kg (111 lb)   09/05/18 51.7 kg (114 lb)   09/04/18 51.3 kg (113 lb)     HEENT: EOMI, PERRL. Sclerae are anicteric. Oral mucosa is pink and moist with no lesions or thrush.   Lymph: Palpable left supraclavicular LNs. No other palpable " cervical or supraclavicular LNs.   Heart: Regular rate and rhythm.   Lungs: Diminished BS in bilateral bases, L>R. Otherwise clear to auscultation bilaterally. No w/w/r  Abdomen: Bowel sounds present, soft, nontender with no palpable hepatosplenomegaly or masses.   Extremities: No lower extremity edema noted bilaterally.   Neuro: Cranial nerves II through XII are grossly intact.  Skin: No rashes, petechiae, or bruising noted on exposed skin.    Laboratory Data:  Results for SHAN MARSH (MRN [id number]) as of 11/16/2018 12:02   Ref. Range 11/16/2018 11:48   WBC Latest Ref Range: 4.0 - 11.0 10e9/L 4.7   Hemoglobin Latest Ref Range: 11.7 - 15.7 g/dL 13.8   Hematocrit Latest Ref Range: 35.0 - 47.0 % 42.0   Platelet Count Latest Ref Range: 150 - 450 10e9/L 251   Results for SHAN MARSH (MRN [id number]) as of 11/16/2018 13:26   Ref. Range 11/16/2018 11:48   Sodium Latest Ref Range: 133 - 144 mmol/L 137   Potassium Latest Ref Range: 3.4 - 5.3 mmol/L 3.4   Chloride Latest Ref Range: 94 - 109 mmol/L 104   Carbon Dioxide Latest Ref Range: 20 - 32 mmol/L 26   Urea Nitrogen Latest Ref Range: 7 - 30 mg/dL 14   Creatinine Latest Ref Range: 0.52 - 1.04 mg/dL 0.68   GFR Estimate Latest Ref Range: >60 mL/min/1.7m2 >90   GFR Estimate If Black Latest Ref Range: >60 mL/min/1.7m2 >90   Calcium Latest Ref Range: 8.5 - 10.1 mg/dL 9.2   Anion Gap Latest Ref Range: 3 - 14 mmol/L 7   Albumin Latest Ref Range: 3.4 - 5.0 g/dL 3.4   Protein Total Latest Ref Range: 6.8 - 8.8 g/dL 8.4   Bilirubin Total Latest Ref Range: 0.2 - 1.3 mg/dL 0.5   Alkaline Phosphatase Latest Ref Range: 40 - 150 U/L 127   ALT Latest Ref Range: 0 - 50 U/L 22   AST Latest Ref Range: 0 - 45 U/L 37   Glucose Latest Ref Range: 70 - 99 mg/dL 109 (H)     Assessment and Plan:  Shan Marsh is a 47-year-old female with history of a T1 N0, ER/TX positive, HER2-negative left breast cancer treated with bilateral mastectomies in 2012, now with metastatic  disease (bones, pleural effusions, lungs, pericardial effusion, liver, left adrenal/retroperitoneal mets), ER/VT positive, HER2 negative.  She progressed on palbociclib and tamoxifen, alternative therapies in [place], and is now on Xeloda.     Metastatic breast cancer: Continues on Xeloda 1500 mg twice daily for 7 days on and 8 days off, tolerating well overall. She had a two week period in October off of Xeloda due to pleural, pericardial effusions. Hopefully the slowed drainaged from the pleurX represents treatment response. We will continue.  - Continues ovarian suppression with monthly Zoladex, due today.  - Return end of the month to see Dr. Whitman with restaging, as scheduled    Bone mets: Continue monthly Zometa every 3 months. Due in January.     Bilateral pleural effusions. S/p right thoracentesis on 10/9 and S/p left pleurx placement 10/11. PleurX was removed 11/12 due to minimal drainage.    Malignant pericardial effusion: presented with tamponade, s/p pericardial drain 10/7-8. She met with Dr. Sanchez 11/8/2018. Echo at that time without any pericardial effusion. Follow-up in 2-3 months was recommended.    Health maintenance: Influenza vaccine given on 10/11.    Lima Griffiths PA-C

## 2018-11-16 NOTE — NURSING NOTE
"Oncology Rooming Note    November 16, 2018 12:27 PM   Shan Marsh is a 48 year old female who presents for:    Chief Complaint   Patient presents with     Blood Draw     Vitals and  labs done by CMA; pt checked into next appointment.     Oncology Clinic Visit     Breast Cancer     Initial Vitals: /74  Pulse 94  Temp 98.1  F (36.7  C) (Oral)  Resp 16  Ht 1.626 m (5' 4.02\")  Wt 51.4 kg (113 lb 6.4 oz)  SpO2 98%  BMI 19.46 kg/m2 Estimated body mass index is 19.46 kg/(m^2) as calculated from the following:    Height as of this encounter: 1.626 m (5' 4.02\").    Weight as of this encounter: 51.4 kg (113 lb 6.4 oz). Body surface area is 1.52 meters squared.  No Pain (0) Comment: Data Unavailable   No LMP recorded. Patient is not currently having periods (Reason: Chemotherapy).  Allergies reviewed: Yes  Medications reviewed: Yes    Medications: Medication refills not needed today.  Pharmacy name entered into [employer]:    [facility] - [place], [place] - [address]  [facility] - [place], [place] - [address]    Clinical concerns: No New Concerns    5 minutes for nursing intake (face to face time)     VIRAJ Weinstein      "

## 2018-11-16 NOTE — MR AVS SNAPSHOT
After Visit Summary   11/16/2018    Shan Marsh    MRN: [id number]           Patient Information     Date Of Birth          1970        Visit Information        Provider Department      11/16/2018 2:00 PM  24 ATC;  ONCOLOGY INFUSION [facility]        Today's Diagnoses     Metastatic breast cancer (H)    -  1    Bone metastasis (H)        Encounter for long-term (current) use of medications          Care Instructions    Contact Numbers    [facility] [place]: [phone]  [facility] Triage and after hours / weekends / holidays:  [phone]      Please call the triage or after hours line if you experience a temperature greater than or equal to 100.5, shaking chills, have uncontrolled nausea, vomiting and/or diarrhea, dizziness, shortness of breath, chest pain, bleeding, unexplained bruising, or if you have any other new/concerning symptoms, questions or concerns.      If you are having any concerning symptoms or wish to speak to a provider before your next infusion visit, please call your care coordinator or triage to notify them so we can adequately serve you.     If you need a refill on a narcotic prescription or other medication, please call before your infusion appointment.           November 2018 Sunday Monday Tuesday Wednesday Thursday Friday Saturday                       1     2     3       4     5     6     TELEMEDICINE    3:00 PM   (60 min.)   Rachel Pierson [facility] Medication Therapy Management 7     8     [facility] DRAW    1:30 PM   (15 min.)    [facility] DRAW   [facility] Lab Draw     ECH COMPLETE    2:00 PM   (60 min.)   ECHCR4   [facility] Echo     [facility] NEW    3:15 PM   (30 min.)   Robbie Sanchez MD   [facility] 9     10       11     12     Admission   11:02 AM   Bandar Meyer MD   [facility] Endoscopy   (Discharge: 11/12/2018)     INSERT CHEST TUBE   12:00 PM   Bandar Meyer MD   [facility] 13     14      15     16     [facility] DRAW   12:00 PM   (15 min.)   [facility]   [facility] Lab Draw     [facility] RETURN   12:35 PM   (50 min.)   Lima Griffiths PA-C   [facility] ONC INFUSION 60    2:00 PM   (60 min.)    ONCOLOGY INFUSION   [facility] 17       18     19     20     21     CT SOFT TISSUE NECK W   12:00 PM   (20 min.)   CT2   [facility] CT     CT CHEST/ABDOMEN/PELVIS W   12:20 PM   (20 min.)   CT2   [facility] CT 22     23     24       25     26     [facility]    8:30 AM   (15 min.)   [facility]   [facility] Lab Draw     [facility] RETURN    8:45 AM   (30 min.)   Ana Whitman MD   [facility]     [facility] NEW   10:15 AM   (60 min.)   Rossana Moody RD   [facility] 27     28     29 30 December 2018 Sunday Monday Tuesday Wednesday Thursday Friday Saturday                                 1       2     3     4     5     6     7     8       9     10     11     12     13     14     15       16     17     18     19     20     21     22       23     24     25     26     27     28     29       30     31                                           Lab Results:  Recent Results (from the past 12 hour(s))   Comprehensive metabolic panel (BMP + Alb, Alk Phos, ALT, AST, Total. Bili, TP)    Collection Time: 11/16/18 11:48 AM   Result Value Ref Range    Sodium 137 133 - 144 mmol/L    Potassium 3.4 3.4 - 5.3 mmol/L    Chloride 104 94 - 109 mmol/L    Carbon Dioxide 26 20 - 32 mmol/L    Anion Gap 7 3 - 14 mmol/L    Glucose 109 (H) 70 - 99 mg/dL    Urea Nitrogen 14 7 - 30 mg/dL    Creatinine 0.68 0.52 - 1.04 mg/dL    GFR Estimate >90 >60 mL/min/1.7m2    GFR Estimate If Black >90 >60 mL/min/1.7m2    Calcium 9.2 8.5 - 10.1 mg/dL    Bilirubin Total 0.5 0.2 - 1.3 mg/dL    Albumin 3.4 3.4 - 5.0 g/dL    Protein Total 8.4 6.8 - 8.8 g/dL     Alkaline Phosphatase 127 40 - 150 U/L    ALT 22 0 - 50 U/L    AST 37 0 - 45 U/L   CBC with platelets and differential    Collection Time: 11/16/18 11:48 AM   Result Value Ref Range    WBC 4.7 4.0 - 11.0 10e9/L    RBC Count 4.32 3.8 - 5.2 10e12/L    Hemoglobin 13.8 11.7 - 15.7 g/dL    Hematocrit 42.0 35.0 - 47.0 %    MCV 97 78 - 100 fl    MCH 31.9 26.5 - 33.0 pg    MCHC 32.9 31.5 - 36.5 g/dL    RDW 15.7 (H) 10.0 - 15.0 %    Platelet Count 251 150 - 450 10e9/L    Diff Method Automated Method     % Neutrophils 66.6 %    % Lymphocytes 24.0 %    % Monocytes 7.0 %    % Eosinophils 1.1 %    % Basophils 0.9 %    % Immature Granulocytes 0.4 %    Nucleated RBCs 0 0 /100    Absolute Neutrophil 3.1 1.6 - 8.3 10e9/L    Absolute Lymphocytes 1.1 0.8 - 5.3 10e9/L    Absolute Monocytes 0.3 0.0 - 1.3 10e9/L    Absolute Eosinophils 0.1 0.0 - 0.7 10e9/L    Absolute Basophils 0.0 0.0 - 0.2 10e9/L    Abs Immature Granulocytes 0.0 0 - 0.4 10e9/L    Absolute Nucleated RBC 0.0                Follow-ups after your visit        Your next 10 appointments already scheduled     Nov 21, 2018 12:00 PM CST   CT SOFT TISSUE NECK W CONTRAST with UCCT2   [facility] [place] [facility] ([facility])    [address] [address] [phone]           How do I prepare for my exam? (Food and drink instructions) **You will have contrast for this exam.** Do not eat or drink for 2 hours before your exam. If you need to take medicine, you may take it with small sips of water. (We may ask you to take liquid medicine as well.)  The day before your exam, drink extra fluids at least six 8-ounce glasses (unless your doctor tells you to restrict your fluids).  How do I prepare for my exam? (Other instructions) Patients over 70 or patients with diabetes or kidney problems: If you haven t had a blood test (creatinine test) within the last 30 days, the Cardiologist/Radiologist may require you to get this test  prior to your exam.  What should I wear: Please wear loose clothing, such as a sweat suit or jogging clothes.  Avoid snaps, zippers and other metal. We may ask you to undress and put on a hospital gown.  How long does the exam take: Most scans take less than 20 minutes.  What should I bring: Please bring any scans or X-rays taken at other hospitals, if similar tests were done. Also bring a list of your medicines, including vitamins, minerals and over-the-counter drugs. It is safest to leave personal items at home.  Do I need a :  No  is needed.  What do I need to tell my doctor? Be sure to tell your doctor: * If you have any allergies. * If there s any chance you are pregnant. * If you are breastfeeding. * If you have diabetes as your medication may need to be adjusted for this exam.  What should I do after the exam: No restrictions, You may resume normal activities.  What is this test: A CT (computed tomography) scan is a series of pictures that allows us to look inside your body. The scanner creates images of the body in cross sections, much like slices of bread. This helps us see any problems more clearly. You may receive contrast (X-ray dye) before or during your scan. Contrast is given through an IV (small needle in your arm).  Who should I call with questions: If you have any questions, please call the Imaging Department where you will have your exam. Directions, parking instructions, and other information is available on our website, [email or web address].            Nov 21, 2018 12:20 PM CST   CT CHEST/ABDOMEN/PELVIS W CONTRAST with UCCT2   [facility] ([facility])    [address] [address] [phone]           How do I prepare for my exam? (Food and drink instructions) To prepare: Do not eat or drink for 2 hours before your exam. If you need to take medicine, you may take it with small sips of water. (We may ask you  to take liquid medicine as well.)  How do I prepare for my exam? (Other instructions) Please arrive 30 minutes early for your CT.  Once in the department you might be asked to drink water 15-20 minutes prior to your exam.  If indicated you may be asked to drink an oral contrast in advance of your CT.  If this is the case, the imaging team will let you know or be in contact with you prior to your appointment  Patients over 70 or patients with diabetes or kidney problems: If you haven t had a blood test (creatinine test) within the last 30 days, the Cardiologist/Radiologist may require you to get this test prior to your exam.  If you have diabetes:  Continue to take your metformin medication on the day of your exam  What should I wear: Please wear loose clothing, such as a sweat suit or jogging clothes. Avoid snaps, zippers and other metal. We may ask you to undress and put on a hospital gown.  How long does the exam take: Most scans take less than 20 minutes.  What should I bring: Please bring any scans or X-rays taken at other hospitals, if similar tests were done. Also bring a list of your medicines, including vitamins, minerals and over-the-counter drugs. It is safest to leave personal items at home.  Do I need a : No  is needed.  What do I need to tell my doctor? Be sure to tell your doctor: * If you have any allergies. * If there s any chance you are pregnant. * If you are breastfeeding.  What should I do after the exam: No restrictions, You may resume normal activities.  What is this test: A CT (computed tomography) scan is a series of pictures that allows us to look inside your body. The scanner creates images of the body in cross sections, much like slices of bread. This helps us see any problems more clearly. You may receive contrast (X-ray dye) before or during your scan. You will be asked to drink the contrast.  Who should I call with questions: If you have any questions, please call the  Imaging Department where you will have your exam. Directions, parking instructions, and other information is available on our website, [email or web address].            Nov 26, 2018  8:30 AM CST   [facility] Draw with [facility] DRAW   [facility] Lab Draw ([facility])    [address]  [address]  [facility] [address]   [phone]            Nov 26, 2018  9:00 AM CST   (Arrive by 8:45 AM)   Return Visit with Ana Whitman MD   [facility] ([facility])    [address] [place] [address]  [address]  [facility] [address]   [phone]            Nov 26, 2018 10:30 AM CST   (Arrive by 10:15 AM)   New Patient Visit with Rossana Beavers RD   [facility] ([facility])    [address] [place] [address]  [address]  [facility] [address]   [phone]            Jan 08, 2019 11:30 AM CST   Ech Complete with [email or web address]   [facility] Echo ([facility])    [address]  3rd Floor  [place] [facility] [address]   [phone]           1.  Please bring or wear a comfortable two-piece outfit. 2.  You may eat, drink and take your normal medicines. 3.  For any questions that cannot be answered, please contact the ordering physician 4.  Please do not wear perfumes or scented lotions on the day of your exam.              Future tests that were ordered for you today     Open Future Orders        Priority Expected Expires Ordered    CBC with platelets differential Routine 11/26/2018 2/16/2019 11/16/2018    Comprehensive metabolic panel Routine 11/26/2018 2/16/2019 11/16/2018    CEA Routine 11/26/2018 2/16/2019 11/16/2018    Ca27.29  breast tumor marker Routine 11/26/2018 2/16/2019 11/16/2018            Who to contact     If you have questions or need follow up information about today's clinic visit or your schedule please contact [facility] directly at  [phone].  Normal or non-critical lab and imaging results will be communicated to you by MyChart, letter or phone within 4 business days after the clinic has received the results. If you do not hear from us within 7 days, please contact the clinic through [employer] or phone. If you have a critical or abnormal lab result, we will notify you by phone as soon as possible.  Submit refill requests through [employer] or call your pharmacy and they will forward the refill request to us. Please allow 3 business days for your refill to be completed.          Additional Information About Your Visit        [employer] Information     [employer] gives you secure access to your electronic health record. If you see a primary care provider, you can also send messages to your care team and make appointments. If you have questions, please call your primary care clinic.  If you do not have a primary care provider, please call [phone] and they will assist you.        Care EveryWhere ID     This is your Care EveryWhere ID. This could be used by other organizations to access your [place] medical records  [id number]         Blood Pressure from Last 3 Encounters:   11/16/18 107/74   11/12/18 110/75   11/08/18 110/75    Weight from Last 3 Encounters:   11/16/18 51.4 kg (113 lb 6.4 oz)   11/08/18 52.8 kg (116 lb 6.4 oz)   10/29/18 51.3 kg (113 lb)              We Performed the Following     CBC with platelets and differential     Comprehensive metabolic panel (BMP + Alb, Alk Phos, ALT, AST, Total. Bili, TP)        Primary Care Provider Office Phone # Fax #    Marilu Michelle [phone] [address]  [facility] [address]        Equal Access to Services     [facility] : Hadii aad ku hadasho Sofloridaali, waaxda luqadaha, qaybta kaalmada adedanayacecy, edi kolb. So [facility] [phone].    ATENCIÓN: Si habla español, tiene a gamez disposición servicios gratuitos de asistencia lingüística. Gabriella  al [phone].    We comply with applicable federal civil rights laws and [place] laws. We do not discriminate on the basis of race, color, national origin, age, disability, sex, sexual orientation, or gender identity.            Thank you!     Thank you for choosing [facility]  for your care. Our goal is always to provide you with excellent care. Hearing back from our patients is one way we can continue to improve our services. Please take a few minutes to complete the written survey that you may receive in the mail after your visit with us. Thank you!             Your Updated Medication List - Protect others around you: Learn how to safely use, store and throw away your medicines at www.disposemymeds.org.          This list is accurate as of 11/16/18  3:08 PM.  Always use your most recent med list.                   Brand Name Dispense Instructions for use Diagnosis    calcium carbonate 500 mg (elemental) 500 MG tablet    OS-KRYSTEN     Take 1 tablet by mouth daily        capecitabine 500 MG tablet CHEMO    XELODA    84 tablet    Take 3 tablets (1,500 mg) by mouth 2 times daily Take on Days 1 - 7 and 15 - 21 every 28 day cycle. Take with water 30 minutes after a meal.    Metastatic breast cancer (H)       ENZYME DIGEST Caps      Take 1 capsule by mouth daily        medical cannabis (Patient's own supply.  Not a prescription)      (This is NOT a prescription, and does not certify that the patient has a qualifying medical condition for medical cannabis.  The purpose of this order is  to document that the patient reports taking medical cannabis.)        OMEGA 3-6-9 COMPLEX PO      Take 1 capsule by mouth daily        ondansetron 8 MG tablet    ZOFRAN    40 tablet    Take 1 tablet (8 mg) by mouth every 8 hours as needed for nausea    Metastatic breast cancer (H), Bone metastasis (H)       prochlorperazine 10 MG tablet    COMPAZINE     Take 10 mg by mouth before 1st dose of oral chemotherapy, then  prn thereafter        traMADol 50 MG tablet    ULTRAM     Take 1-2 tablets ( mg) by mouth every 6 hours as needed for moderate pain or headaches        VITAMIN C PO      Take 1,000 mg by mouth daily        VITAMIN D (CHOLECALCIFEROL) PO      Take 1,000 Units by mouth daily

## 2018-11-21 ENCOUNTER — RADIANT APPOINTMENT (OUTPATIENT)
Dept: CT IMAGING | Facility: CLINIC | Age: 48
End: 2018-11-21
Attending: PHYSICIAN ASSISTANT
Payer: COMMERCIAL

## 2018-11-21 DIAGNOSIS — C50.919 METASTATIC BREAST CANCER: ICD-10-CM

## 2018-11-21 RX ORDER — IOPAMIDOL 755 MG/ML
70 INJECTION, SOLUTION INTRAVASCULAR ONCE
Status: COMPLETED | OUTPATIENT
Start: 2018-11-21 | End: 2018-11-21

## 2018-11-21 RX ADMIN — IOPAMIDOL 70 ML: 755 INJECTION, SOLUTION INTRAVASCULAR at 12:25

## 2018-11-21 NOTE — DISCHARGE INSTRUCTIONS

## 2018-11-21 NOTE — DISCHARGE INSTRUCTIONS

## 2018-11-26 ENCOUNTER — ONCOLOGY VISIT (OUTPATIENT)
Dept: ONCOLOGY | Facility: CLINIC | Age: 48
End: 2018-11-26
Attending: INTERNAL MEDICINE
Payer: COMMERCIAL

## 2018-11-26 VITALS
OXYGEN SATURATION: 99 % | DIASTOLIC BLOOD PRESSURE: 72 MMHG | SYSTOLIC BLOOD PRESSURE: 108 MMHG | BODY MASS INDEX: 19.55 KG/M2 | TEMPERATURE: 98.8 F | HEIGHT: 64 IN | WEIGHT: 114.5 LBS | RESPIRATION RATE: 16 BRPM | HEART RATE: 104 BPM

## 2018-11-26 DIAGNOSIS — C50.919 METASTATIC BREAST CANCER: ICD-10-CM

## 2018-11-26 DIAGNOSIS — C79.51 BONE METASTASIS: Primary | ICD-10-CM

## 2018-11-26 LAB
ALBUMIN SERPL-MCNC: 3.3 G/DL (ref 3.4–5)
ALP SERPL-CCNC: 140 U/L (ref 40–150)
ALT SERPL W P-5'-P-CCNC: 19 U/L (ref 0–50)
ANION GAP SERPL CALCULATED.3IONS-SCNC: 9 MMOL/L (ref 3–14)
AST SERPL W P-5'-P-CCNC: 47 U/L (ref 0–45)
BASOPHILS # BLD AUTO: 0 10E9/L (ref 0–0.2)
BASOPHILS NFR BLD AUTO: 0.6 %
BILIRUB SERPL-MCNC: 0.4 MG/DL (ref 0.2–1.3)
BUN SERPL-MCNC: 12 MG/DL (ref 7–30)
CALCIUM SERPL-MCNC: 9.1 MG/DL (ref 8.5–10.1)
CANCER AG27-29 SERPL-ACNC: 974 U/ML (ref 0–39)
CEA SERPL-MCNC: 118.8 UG/L (ref 0–2.5)
CHLORIDE SERPL-SCNC: 105 MMOL/L (ref 94–109)
CO2 SERPL-SCNC: 23 MMOL/L (ref 20–32)
CREAT SERPL-MCNC: 0.63 MG/DL (ref 0.52–1.04)
DIFFERENTIAL METHOD BLD: ABNORMAL
EOSINOPHIL # BLD AUTO: 0.1 10E9/L (ref 0–0.7)
EOSINOPHIL NFR BLD AUTO: 2 %
ERYTHROCYTE [DISTWIDTH] IN BLOOD BY AUTOMATED COUNT: 15.7 % (ref 10–15)
GFR SERPL CREATININE-BSD FRML MDRD: >90 ML/MIN/1.7M2
GLUCOSE SERPL-MCNC: 98 MG/DL (ref 70–99)
HCT VFR BLD AUTO: 40.8 % (ref 35–47)
HGB BLD-MCNC: 12.9 G/DL (ref 11.7–15.7)
IMM GRANULOCYTES # BLD: 0 10E9/L (ref 0–0.4)
IMM GRANULOCYTES NFR BLD: 0.2 %
LYMPHOCYTES # BLD AUTO: 1.5 10E9/L (ref 0.8–5.3)
LYMPHOCYTES NFR BLD AUTO: 30.3 %
MCH RBC QN AUTO: 31.2 PG (ref 26.5–33)
MCHC RBC AUTO-ENTMCNC: 31.6 G/DL (ref 31.5–36.5)
MCV RBC AUTO: 99 FL (ref 78–100)
MONOCYTES # BLD AUTO: 0.4 10E9/L (ref 0–1.3)
MONOCYTES NFR BLD AUTO: 7.5 %
NEUTROPHILS # BLD AUTO: 2.9 10E9/L (ref 1.6–8.3)
NEUTROPHILS NFR BLD AUTO: 59.4 %
NRBC # BLD AUTO: 0 10*3/UL
NRBC BLD AUTO-RTO: 0 /100
PLATELET # BLD AUTO: 222 10E9/L (ref 150–450)
POTASSIUM SERPL-SCNC: 3.5 MMOL/L (ref 3.4–5.3)
PROT SERPL-MCNC: 8.5 G/DL (ref 6.8–8.8)
RBC # BLD AUTO: 4.14 10E12/L (ref 3.8–5.2)
SODIUM SERPL-SCNC: 138 MMOL/L (ref 133–144)
WBC # BLD AUTO: 4.9 10E9/L (ref 4–11)

## 2018-11-26 PROCEDURE — 99215 OFFICE O/P EST HI 40 MIN: CPT | Mod: GC | Performed by: INTERNAL MEDICINE

## 2018-11-26 PROCEDURE — G0463 HOSPITAL OUTPT CLINIC VISIT: HCPCS | Mod: ZF

## 2018-11-26 PROCEDURE — 86300 IMMUNOASSAY TUMOR CA 15-3: CPT | Performed by: PHYSICIAN ASSISTANT

## 2018-11-26 PROCEDURE — 85025 COMPLETE CBC W/AUTO DIFF WBC: CPT | Performed by: PHYSICIAN ASSISTANT

## 2018-11-26 PROCEDURE — 36415 COLL VENOUS BLD VENIPUNCTURE: CPT

## 2018-11-26 PROCEDURE — 82378 CARCINOEMBRYONIC ANTIGEN: CPT | Performed by: PHYSICIAN ASSISTANT

## 2018-11-26 PROCEDURE — 80053 COMPREHEN METABOLIC PANEL: CPT | Performed by: PHYSICIAN ASSISTANT

## 2018-11-26 ASSESSMENT — PAIN SCALES - GENERAL: PAINLEVEL: NO PAIN (0)

## 2018-11-26 NOTE — PROGRESS NOTES
Oncology/Hematology Visit Note  Nov 26, 2018    Reason for Visit: Follow up of metastatic breast cancer, ER positive.     History of Present Illness: Shan Marsh is a 48 year old female without significant past medical history with a diagnosis of metastatic breast cancer after she presented with left shoulder pain. Shoulder MRI showed a soft tissue mass close to the distal clavicle which was concerning for malignancy. PET/CT on 5/9/17 showed hypermetabolic left axillary, supraclavicular, mediastinal and hilar nodes. She had biopsies of left axilla and hilar nodes showing metastatic breast cancer, strongly ER positive, moderately PA positive, and HER-2 kaitlin non-amplified. She originally had [id number], ER/PA positive, HER-2 negative breast cancer diagnosed in spring of 2012 s/p bilateral mastectomies and Tamoxifen from 8/2012-2/2013 and discontinued due to side effects. With development of metastatic disease, she was recommended treatment on BIG10 clinical trial with palbociclib and tamoxifen. She started treatment on 5/24/17 and required dose reduction for neutropenia. CT CAP and bone scan 1/11/2018 showing progressive disease with new bony lesions in the ribs, T spine, and possibly liver, lungs. She started Zoladex and abemiciclib on 1/12/2018.      In March 2018, she sought treatment at [facility] in [place], under the care of Dr. Brooks, where she received:  - Vallovex vaccine (per FDA, is in phase I trials here in [place])  - Insulin potentiated therapy with conjugated chemotherapy (5% chemotherapy, unclear what chemotherapy)  - Gina's toxins, IV vitamin C, IV Ozone, hyperbaric chamber, vitamin B17 infusion, vitamin CK3 IV, and coffee enemas PRN  - Also oral niacin, Lugol, acidol, pancreatin, and thyroid desiccated liver, per their regimen  - She was continued on Letrozole and advised to conintue Zoladex.      She met with Dr. Whitman and patient wished to continue her alternative regimen. Abemiciclib was  "placed on hold. She was continued on Zoladex and letrozole. While in [place], she required thoracentesis for symptomatic pleural effusion. A repeat PET in August 2018 showed widely progressive disease with multiple new bone mets involving the spine, ribs, pelvis, and sacrum; new lesions involving the left lateral thoracic/abdominal wall musculature and subcutaneous tissues, new metastases in the left adrenal gland, and recurrent large L pleural effusion. She was started on Xeloda 1500 mg BID on 9/5/18.     She was admitted 10/4-10/11/18 after a syncopal event/unwitnessed fall at home. She was found to have left pneumothorax and pericardial effusion with tamponade physiology. She had left chest tube placed in ED which then fell out on 10/10. Left pleurx placed on 10/11. She had pericardiocentesis on 10/7, drain removed 10/8.     Interval History:  Shan is here for routine follow-up. She is feeling quite well. Activity and energy level are much better. She recently went for a walk/hike with her sister and was pleased that she was able to keep up without difficulty. She does get mild dyspnea with activity, but recovers quickly with rest. No chest pain or palpitations. No cough or sputum production. Able to sleep while lying flat again. No recent fevers or infectious concerns. Has some numbness in the LUQ/lower left chest which is chronic/stable, and some \"muscular low back aces\" which improve with heating pad. No new concerns today. She again reports Xeloda causes her fatigue and some heartburn, both of which she feels are under control with cannabis and Tums, respectively.    Current Outpatient Prescriptions   Medication Sig Dispense Refill     Ascorbic Acid (VITAMIN C PO) Take 1,000 mg by mouth daily       calcium carbonate (OS-KRYSTEN 500 MG [place]. CA) 1250 MG tablet Take 1 tablet by mouth daily       capecitabine (XELODA) 500 MG tablet CHEMO Take 3 tablets (1,500 mg) by mouth 2 times daily Take on Days 1 - 7 and 15 - 21 " "every 28 day cycle. Take with water 30 minutes after a meal. 84 tablet 0     Digestive Enzymes (ENZYME DIGEST) CAPS Take 1 capsule by mouth daily       medical cannabis (Patient's own supply.  Not a prescription) (This is NOT a prescription, and does not certify that the patient has a qualifying medical condition for medical cannabis.  The purpose of this order is  to document that the patient reports taking medical cannabis.)       Omega 3-6-9 Fatty Acids (OMEGA 3-6-9 COMPLEX PO) Take 1 capsule by mouth daily       VITAMIN D, CHOLECALCIFEROL, PO Take 1,000 Units by mouth daily        ondansetron (ZOFRAN) 8 MG tablet Take 1 tablet (8 mg) by mouth every 8 hours as needed for nausea (Patient not taking: Reported on 10/29/2018) 40 tablet 3     prochlorperazine (COMPAZINE) 10 MG tablet Take 10 mg by mouth before 1st dose of oral chemotherapy, then prn thereafter       traMADol (ULTRAM) 50 MG tablet Take 1-2 tablets ( mg) by mouth every 6 hours as needed for moderate pain or headaches       [DISCONTINUED] tamoxifen (NOLVADEX) 20 MG tablet Take 1 tablet (20 mg) by mouth daily 28 tablet 0       Physical Examination:  General: The patient is a pleasant female in no acute distress.  /72  Pulse 104  Temp 98.8  F (37.1  C) (Oral)  Resp 16  Ht 1.626 m (5' 4.02\")  Wt 51.9 kg (114 lb 8 oz)  SpO2 99%  Breastfeeding? No  BMI 19.64 kg/m2  Wt Readings from Last 10 Encounters:   11/26/18 51.9 kg (114 lb 8 oz)   11/16/18 51.4 kg (113 lb 6.4 oz)   11/08/18 52.8 kg (116 lb 6.4 oz)   10/29/18 51.3 kg (113 lb)   10/23/18 49 kg (108 lb)   10/20/18 50.2 kg (110 lb 11.2 oz)   10/20/18 49 kg (108 lb)   10/15/18 49 kg (108 lb)   10/11/18 53.3 kg (117 lb 6.4 oz)   10/03/18 50.3 kg (111 lb)     HEENT: EOMI, PERRL. Sclerae are anicteric. Oral mucosa is pink and moist with no lesions or thrush.   Lymph: Previously palpable left supraclavicular LNs are no longer evident on exam.   Heart: Regular rate and rhythm. No " murmurs.  Lungs: Diminished BS in bilateral bases, L>R. Otherwise clear to auscultation bilaterally. Normal respiratory effort on ambient air.  Abdomen: Bowel sounds present, soft, nontender with no palpable hepatosplenomegaly or masses.   Extremities: No lower extremity edema noted bilaterally.   Neuro: Alert and oriented. No focal deficits. Normal gait.  Skin: No rashes, petechiae, or bruising noted on exposed skin.    Laboratory Data:  Lab Results   Component Value Date    WBC 4.9 11/26/2018    HGB 12.9 11/26/2018    HCT 40.8 11/26/2018     11/26/2018    CHOL 129 11/03/2017    TRIG 178 (H) 11/03/2017    HDL 50 11/03/2017    ALT 19 11/26/2018    AST 47 (H) 11/26/2018     11/26/2018    BUN 12 11/26/2018    CO2 23 11/26/2018    TSH 1.78 08/21/2017    INR 0.94 10/20/2018     Imaging:  CT SOFT TISSUE NECK W CONTRAST 11/21/2018 12:39 PM  HISTORY:  47 yo female with metastatic breast cancer. Restaging.    COMPARISON:  PET/CT 8/27/2018   IMPRESSION:  1. Positive treatment response with decreased size of cervical lymph  nodes, when compared to 8/27/2018.  2. Multiple lytic and sclerotic osseous lesions are not significantly  changed.  3. Please refer to concurrently acquired CT chest, abdomen and pelvis  for findings within chest.    CT CHEST/ABDOMEN/PELVIS W CONTRAST, 11/21/2018 12:39 PM  COMPARISON: Chest CT 10/20/2018; PET/CT 8/27/2018.  HISTORY: 48-year-old female. Metastatic breast cancer, restaging.  IMPRESSION: In this patient with history of metastatic breast cancer:  1. Moderate right and moderate to large left loculated pleural  effusions with overlying atelectasis, left greater than right. Stable  areas of rounded consolidations in the lingula and left lower lobe  likely representing atelectasis.  2. Stable peribronchial vascular nodules in the lungs particularly in  the upper lobes bilaterally. No convincing new pulmonary nodules.  3. Stable suspicious round left axillary lymph nodes as well as  soft  tissue nodules along the left lateral chest wall.  4. Liver lesions increased in number and size from the prior study as  described above concerning for progression of disease.  5. Stable metastatic bone lesions with a pathologic wedge compression  fracture of the vertebral body L1.  6. Stable thickening of the left adrenal gland which previously  demonstrated hypermetabolic activity.    Assessment and Plan:  Shan Marsh is a 48 year old woman with prior history of a T1 N0, ER/IN positive, HER2-negative left breast cancer treated with bilateral mastectomies in 2012, now with metastatic disease (bones, pleural effusions, lungs, pericardial effusion, liver, left adrenal/retroperitoneal mets), ER/IN positive, HER2 negative.  She progressed on palbociclib and tamoxifen, alternative therapies in [place], and is now on Xeloda. Recent hospitalization in October 2018 for pleural and pericardial effusions.     Metastatic breast cancer: Continues on Xeloda 1500 mg twice daily for 7 days on and 8 days off, tolerating well overall. She had a two week period in October off of Xeloda due to pleural and pericardial effusions, which have now resolved. CT CAP and CT neck on 11/21/18 were reviewed with the reading radiologist. This showed decreased size of left cervical LNs, stable nodules in the lungs (no new concerning pulmonary nodules), and overall stable liver lesions compared to prior exams (though report reads possible new lesion in segment 8 of the liver, this was seen on prior exams as well). Given stable disease, will continue Xeloda for now.  - Continues ovarian suppression with monthly Zoladex.  - Continue Xeloda for now.    Bone mets: Continue monthly Zometa every 3 months. Due in January 2019.     Bilateral pleural effusions. She is s/p right thoracentesis on 10/9/18 and left pleurx placement 10/11/18. PleurX was removed 11/12/18 due to minimal drainage. Stable on CT 11/21/18.    Malignant pericardial effusion:  Presented with tamponade, s/p pericardial drain 10/7/18. She met with Dr. Sanchez 11/8/2018. Echo at that time was without any pericardial effusion. Follow-up in 2-3 months was recommended.    Health maintenance: Influenza vaccine given on 10/11.      Patient was seen and plan was discussed with Dr. Whitman.    Carol Burk MD/PhD  Heme/Onc Fellow    Pt was seen and evaluated by me with Dr Burk.  I edited the above note to reflect my evaluation.  I reviewed the CT scan personally with radiology on Shan.  There were several weeks when Shan was off of Xeloda due to hospital stay and travels to [place].  Overall on review of imaging, her disease is stable.  She has a stable pleural effusion.  Her disease in her liver may be perhaps a few mm larger; she does not meet criteria by RECIST for progression.  I discussed having her continue her current plan of Xeloda.    Ana Whitman MD

## 2018-11-26 NOTE — LETTER
11/26/2018       RE: Shan Marsh  [address]  [facility] [place] [address]     Dear Colleague,    Thank you for referring your patient, Shan Marsh, to the [facility]. Please see a copy of my visit note below.    Oncology/Hematology Visit Note  Nov 26, 2018    Reason for Visit: Follow up of metastatic breast cancer, ER positive.     History of Present Illness: Shan Marsh is a 48 year old female without significant past medical history with a diagnosis of metastatic breast cancer after she presented with left shoulder pain. Shoulder MRI showed a soft tissue mass close to the distal clavicle which was concerning for malignancy. PET/CT on 5/9/17 showed hypermetabolic left axillary, supraclavicular, mediastinal and hilar nodes. She had biopsies of left axilla and hilar nodes showing metastatic breast cancer, strongly ER positive, moderately VA positive, and HER-2 kaitlin non-amplified. She originally had [id number], ER/VA positive, HER-2 negative breast cancer diagnosed in spring of 2012 s/p bilateral mastectomies and Tamoxifen from 8/2012-2/2013 and discontinued due to side effects. With development of metastatic disease, she was recommended treatment on BIG10 clinical trial with palbociclib and tamoxifen. She started treatment on 5/24/17 and required dose reduction for neutropenia. CT CAP and bone scan 1/11/2018 showing progressive disease with new bony lesions in the ribs, T spine, and possibly liver, lungs. She started Zoladex and abemiciclib on 1/12/2018.      In March 2018, she sought treatment at [facility] in [place], under the care of Dr. Brooks, where she received:  - Vallovex vaccine (per FDA, is in phase I trials here in [place])  - Insulin potentiated therapy with conjugated chemotherapy (5% chemotherapy, unclear what chemotherapy)  - Gina's toxins, IV vitamin C, IV Ozone, hyperbaric chamber, vitamin B17 infusion, vitamin CK3 IV, and coffee enemas PRN  - Also  "oral niacin, Lugol, acidol, pancreatin, and thyroid desiccated liver, per their regimen  - She was continued on Letrozole and advised to conintue Zoladex.      She met with Dr. Whitman and patient wished to continue her alternative regimen. Abemiciclib was placed on hold. She was continued on Zoladex and letrozole. While in [place], she required thoracentesis for symptomatic pleural effusion. A repeat PET in August 2018 showed widely progressive disease with multiple new bone mets involving the spine, ribs, pelvis, and sacrum; new lesions involving the left lateral thoracic/abdominal wall musculature and subcutaneous tissues, new metastases in the left adrenal gland, and recurrent large L pleural effusion. She was started on Xeloda 1500 mg BID on 9/5/18.     She was admitted 10/4-10/11/18 after a syncopal event/unwitnessed fall at home. She was found to have left pneumothorax and pericardial effusion with tamponade physiology. She had left chest tube placed in ED which then fell out on 10/10. Left pleurx placed on 10/11. She had pericardiocentesis on 10/7, drain removed 10/8.     Interval History:  Shan is here for routine follow-up. She is feeling quite well. Activity and energy level are much better. She recently went for a walk/hike with her sister and was pleased that she was able to keep up without difficulty. She does get mild dyspnea with activity, but recovers quickly with rest. No chest pain or palpitations. No cough or sputum production. Able to sleep while lying flat again. No recent fevers or infectious concerns. Has some numbness in the LUQ/lower left chest which is chronic/stable, and some \"muscular low back aces\" which improve with heating pad. No new concerns today. She again reports Xeloda causes her fatigue and some heartburn, both of which she feels are under control with cannabis and Tums, respectively.    Current Outpatient Prescriptions   Medication Sig Dispense Refill     Ascorbic Acid (VITAMIN " "C PO) Take 1,000 mg by mouth daily       calcium carbonate (OS-KRYSTEN 500 MG [place]. CA) 1250 MG tablet Take 1 tablet by mouth daily       capecitabine (XELODA) 500 MG tablet CHEMO Take 3 tablets (1,500 mg) by mouth 2 times daily Take on Days 1 - 7 and 15 - 21 every 28 day cycle. Take with water 30 minutes after a meal. 84 tablet 0     Digestive Enzymes (ENZYME DIGEST) CAPS Take 1 capsule by mouth daily       medical cannabis (Patient's own supply.  Not a prescription) (This is NOT a prescription, and does not certify that the patient has a qualifying medical condition for medical cannabis.  The purpose of this order is  to document that the patient reports taking medical cannabis.)       Omega 3-6-9 Fatty Acids (OMEGA 3-6-9 COMPLEX PO) Take 1 capsule by mouth daily       VITAMIN D, CHOLECALCIFEROL, PO Take 1,000 Units by mouth daily        ondansetron (ZOFRAN) 8 MG tablet Take 1 tablet (8 mg) by mouth every 8 hours as needed for nausea (Patient not taking: Reported on 10/29/2018) 40 tablet 3     prochlorperazine (COMPAZINE) 10 MG tablet Take 10 mg by mouth before 1st dose of oral chemotherapy, then prn thereafter       traMADol (ULTRAM) 50 MG tablet Take 1-2 tablets ( mg) by mouth every 6 hours as needed for moderate pain or headaches       [DISCONTINUED] tamoxifen (NOLVADEX) 20 MG tablet Take 1 tablet (20 mg) by mouth daily 28 tablet 0       Physical Examination:  General: The patient is a pleasant female in no acute distress.  /72  Pulse 104  Temp 98.8  F (37.1  C) (Oral)  Resp 16  Ht 1.626 m (5' 4.02\")  Wt 51.9 kg (114 lb 8 oz)  SpO2 99%  Breastfeeding? No  BMI 19.64 kg/m2  Wt Readings from Last 10 Encounters:   11/26/18 51.9 kg (114 lb 8 oz)   11/16/18 51.4 kg (113 lb 6.4 oz)   11/08/18 52.8 kg (116 lb 6.4 oz)   10/29/18 51.3 kg (113 lb)   10/23/18 49 kg (108 lb)   10/20/18 50.2 kg (110 lb 11.2 oz)   10/20/18 49 kg (108 lb)   10/15/18 49 kg (108 lb)   10/11/18 53.3 kg (117 lb 6.4 oz)   10/03/18 " 50.3 kg (111 lb)     HEENT: EOMI, PERRL. Sclerae are anicteric. Oral mucosa is pink and moist with no lesions or thrush.   Lymph: Previously palpable left supraclavicular LNs are no longer evident on exam.   Heart: Regular rate and rhythm. No murmurs.  Lungs: Diminished BS in bilateral bases, L>R. Otherwise clear to auscultation bilaterally. Normal respiratory effort on ambient air.  Abdomen: Bowel sounds present, soft, nontender with no palpable hepatosplenomegaly or masses.   Extremities: No lower extremity edema noted bilaterally.   Neuro: Alert and oriented. No focal deficits. Normal gait.  Skin: No rashes, petechiae, or bruising noted on exposed skin.    Laboratory Data:  Lab Results   Component Value Date    WBC 4.9 11/26/2018    HGB 12.9 11/26/2018    HCT 40.8 11/26/2018     11/26/2018    CHOL 129 11/03/2017    TRIG 178 (H) 11/03/2017    HDL 50 11/03/2017    ALT 19 11/26/2018    AST 47 (H) 11/26/2018     11/26/2018    BUN 12 11/26/2018    CO2 23 11/26/2018    TSH 1.78 08/21/2017    INR 0.94 10/20/2018     Imaging:  CT SOFT TISSUE NECK W CONTRAST 11/21/2018 12:39 PM  HISTORY:  47 yo female with metastatic breast cancer. Restaging.    COMPARISON:  PET/CT 8/27/2018   IMPRESSION:  1. Positive treatment response with decreased size of cervical lymph  nodes, when compared to 8/27/2018.  2. Multiple lytic and sclerotic osseous lesions are not significantly  changed.  3. Please refer to concurrently acquired CT chest, abdomen and pelvis  for findings within chest.    CT CHEST/ABDOMEN/PELVIS W CONTRAST, 11/21/2018 12:39 PM  COMPARISON: Chest CT 10/20/2018; PET/CT 8/27/2018.  HISTORY: 48-year-old female. Metastatic breast cancer, restaging.  IMPRESSION: In this patient with history of metastatic breast cancer:  1. Moderate right and moderate to large left loculated pleural  effusions with overlying atelectasis, left greater than right. Stable  areas of rounded consolidations in the lingula and left lower  lobe  likely representing atelectasis.  2. Stable peribronchial vascular nodules in the lungs particularly in  the upper lobes bilaterally. No convincing new pulmonary nodules.  3. Stable suspicious round left axillary lymph nodes as well as soft  tissue nodules along the left lateral chest wall.  4. Liver lesions increased in number and size from the prior study as  described above concerning for progression of disease.  5. Stable metastatic bone lesions with a pathologic wedge compression  fracture of the vertebral body L1.  6. Stable thickening of the left adrenal gland which previously  demonstrated hypermetabolic activity.    Assessment and Plan:  Shan Marsh is a 48 year old woman with prior history of a T1 N0, ER/VA positive, HER2-negative left breast cancer treated with bilateral mastectomies in 2012, now with metastatic disease (bones, pleural effusions, lungs, pericardial effusion, liver, left adrenal/retroperitoneal mets), ER/VA positive, HER2 negative.  She progressed on palbociclib and tamoxifen, alternative therapies in [place], and is now on Xeloda. Recent hospitalization in October 2018 for pleural and pericardial effusions.     Metastatic breast cancer: Continues on Xeloda 1500 mg twice daily for 7 days on and 8 days off, tolerating well overall. She had a two week period in October off of Xeloda due to pleural and pericardial effusions, which have now resolved. CT CAP and CT neck on 11/21/18 were reviewed with the reading radiologist. This showed decreased size of left cervical LNs, stable nodules in the lungs (no new concerning pulmonary nodules), and overall stable liver lesions compared to prior exams (though report reads possible new lesion in segment 8 of the liver, this was seen on prior exams as well). Given stable disease, will continue Xeloda for now.  - Continues ovarian suppression with monthly Zoladex.  - Continue Xeloda for now.    Bone mets: Continue monthly Zometa every 3 months.  Due in January 2019.     Bilateral pleural effusions. She is s/p right thoracentesis on 10/9/18 and left pleurx placement 10/11/18. PleurX was removed 11/12/18 due to minimal drainage. Stable on CT 11/21/18.    Malignant pericardial effusion: Presented with tamponade, s/p pericardial drain 10/7/18. She met with Dr. Sanchez 11/8/2018. Echo at that time was without any pericardial effusion. Follow-up in 2-3 months was recommended.    Health maintenance: Influenza vaccine given on 10/11.      Patient was seen and plan was discussed with Dr. Whitman.    Carol Burk MD/PhD  Heme/Onc Fellow    Pt was seen and evaluated by me with Dr Burk.  I edited the above note to reflect my evaluation.  I reviewed the CT scan personally with radiology on Shan.  There were several weeks when Shan was off of Xeloda due to hospital stay and travels to [place].  Overall on review of imaging, her disease is stable.  She has a stable pleural effusion.  Her disease in her liver may be perhaps a few mm larger; she does not meet criteria by RECIST for progression.  I discussed having her continue her current plan of Xeloda.    Ana Whitman MD

## 2018-11-26 NOTE — NURSING NOTE
"Oncology Rooming Note    November 26, 2018 9:03 AM   Shan Marsh is a 48 year old female who presents for:    Chief Complaint   Patient presents with     Blood Draw     Vitals and  labs done by MARTIN; pt checked into next appointment.     Oncology Clinic Visit     Return: Breast Ca     Initial Vitals: /72  Pulse 104  Temp 98.8  F (37.1  C) (Oral)  Resp 16  Ht 1.626 m (5' 4.02\")  Wt 51.9 kg (114 lb 8 oz)  SpO2 99%  Breastfeeding? No  BMI 19.64 kg/m2 Estimated body mass index is 19.64 kg/(m^2) as calculated from the following:    Height as of this encounter: 1.626 m (5' 4.02\").    Weight as of this encounter: 51.9 kg (114 lb 8 oz). Body surface area is 1.53 meters squared.  No Pain (0) Comment: Data Unavailable   No LMP recorded. Patient is not currently having periods (Reason: Chemotherapy).  Allergies reviewed: Yes  Medications reviewed: Yes    Medications: Medication refills not needed today.  Pharmacy name entered into [employer]:    [facility] - [place], [place] - [address]  [facility] - [place], [place] - [address]    Clinical concerns: no new concerns today Dr. Whitman was notified.    10 minutes for nursing intake (face to face time)     Shaun De Santiago CMA              "

## 2018-11-26 NOTE — MR AVS SNAPSHOT
After Visit Summary   11/26/2018    Shan Marsh    MRN: [id number]           Patient Information     Date Of Birth          1970        Visit Information        Provider Department      11/26/2018 9:00 AM Ana Whitman MD [facility]ly [facility]        Today's Diagnoses     Bone metastasis (H)    -  1    Metastatic breast cancer (H)           Follow-ups after your visit        Your next 10 appointments already scheduled     Dec 18, 2018 10:30 AM CST   [facility] Lab Draw with  [facility] LAB DRAW   [facility] Lab Draw ([facility])    [address] [place] [address]  [address]  [facility] [address]   [phone]            Dec 18, 2018 11:00 AM CST   (Arrive by 10:45 AM)   Return Visit with Lima Griffiths PA-C   [facility] ([facility])    [address] [place] [address]  [address] [place] [address]   [phone]            Jan 08, 2019 11:30 AM CST   Ech Complete with [id number]   [facility])    [address] [place] [address]  3rd Floor  [facility] [address]   [phone]           1.  Please bring or wear a comfortable two-piece outfit. 2.  You may eat, drink and take your normal medicines. 3.  For any questions that cannot be answered, please contact the ordering physician 4.  Please do not wear perfumes or scented lotions on the day of your exam.            Sukhdev 15, 2019  2:15 PM CST   [facility] Lab Draw with  [facility] LAB DRAW   [facility] Lab Draw ([facility])    [address] [place] [address]  [address]  [facility] [address]   [phone]            Sukhdev 15, 2019  3:00 PM CST   (Arrive by 2:45 PM)   Return Visit with Ana Whitman MD   [facility] ([facility])    [address] [place] [address]  [address] [place] [address]   [phone]            Sukhdev 15, 2019  4:30 PM CST   Infusion 60  "with  ONCOLOGY INFUSION, UC 20 ATC   [facility] ([facility])    [address]  [address]  [facility] [address] [phone]              Who to contact     If you have questions or need follow up information about today's clinic visit or your schedule please contact [facility] directly at [phone].  Normal or non-critical lab and imaging results will be communicated to you by [employer], letter or phone within 4 business days after the clinic has received the results. If you do not hear from us within 7 days, please contact the clinic through [facility] or phone. If you have a critical or abnormal lab result, we will notify you by phone as soon as possible.  Submit refill requests through [employer] or call your pharmacy and they will forward the refill request to us. Please allow 3 business days for your refill to be completed.          Additional Information About Your Visit        [employer] Thing Information     [employer] gives you secure access to your electronic health record. If you see a primary care provider, you can also send messages to your care team and make appointments. If you have questions, please call your primary care clinic.  If you do not have a primary care provider, please call [phone] and they will assist you.        Care EveryWhere ID     This is your Care EveryWhere ID. This could be used by other organizations to access your [facility] medical records  [id number]        Your Vitals Were     Pulse Temperature Respirations Height Pulse Oximetry Breastfeeding?    104 98.8  F (37.1  C) (Oral) 16 1.626 m (5' 4.02\") 99% No    BMI (Body Mass Index)                   19.64 kg/m2            Blood Pressure from Last 3 Encounters:   11/26/18 108/72   11/16/18 107/74   11/12/18 110/75    Weight from Last 3 Encounters:   11/26/18 51.9 kg (114 lb 8 oz)   11/16/18 51.4 kg (113 lb 6.4 oz)   11/08/18 52.8 kg (116 lb 6.4 oz)            "   Today, you had the following     No orders found for display       Primary Care Provider Office Phone # Fax #    Marilu Michelle [phone] [address]  [facility] [address]        Equal Access to Services     LUZ JAMES : Hadii aad ku hadsilvinoo Soomaali, waaxda luqadaha, qaybta kaalmada adestephenda, edi laithin hayaaluna jimenezrenardsukhjinder kolb. So [facility] [phone].    ATENCIÓN: Si habla español, tiene a gamez disposición servicios gratuitos de asistencia lingüística. Llame al [phone].    We comply with applicable federal civil rights laws and [place] laws. We do not discriminate on the basis of race, color, national origin, age, disability, sex, sexual orientation, or gender identity.            Thank you!     Thank you for choosing [facility]  for your care. Our goal is always to provide you with excellent care. Hearing back from our patients is one way we can continue to improve our services. Please take a few minutes to complete the written survey that you may receive in the mail after your visit with us. Thank you!             Your Updated Medication List - Protect others around you: Learn how to safely use, store and throw away your medicines at www.disposemymeds.org.          This list is accurate as of 11/26/18 11:59 PM.  Always use your most recent med list.                   Brand Name Dispense Instructions for use Diagnosis    calcium carbonate 500 MG tablet    OS-KRYSTEN     Take 1 tablet by mouth daily        capecitabine 500 MG tablet CHEMO    XELODA    84 tablet    Take 3 tablets (1,500 mg) by mouth 2 times daily Take on Days 1 - 7 and 15 - 21 every 28 day cycle. Take with water 30 minutes after a meal.    Metastatic breast cancer (H)       ENZYME DIGEST Caps      Take 1 capsule by mouth daily        medical cannabis (Patient's own supply.  Not a prescription)      (This is NOT a prescription, and does not certify that the patient has a qualifying medical  condition for medical cannabis.  The purpose of this order is  to document that the patient reports taking medical cannabis.)        OMEGA 3-6-9 COMPLEX PO      Take 1 capsule by mouth daily        ondansetron 8 MG tablet    ZOFRAN    40 tablet    Take 1 tablet (8 mg) by mouth every 8 hours as needed for nausea    Metastatic breast cancer (H), Bone metastasis (H)       prochlorperazine 10 MG tablet    COMPAZINE     Take 10 mg by mouth before 1st dose of oral chemotherapy, then prn thereafter        traMADol 50 MG tablet    ULTRAM     Take 1-2 tablets ( mg) by mouth every 6 hours as needed for moderate pain or headaches        VITAMIN C PO      Take 1,000 mg by mouth daily        VITAMIN D (CHOLECALCIFEROL) PO      Take 1,000 Units by mouth daily

## 2018-12-01 LAB
MYCOBACTERIUM SPEC CULT: NORMAL
MYCOBACTERIUM SPEC CULT: NORMAL
SPECIMEN SOURCE: NORMAL

## 2018-12-04 DIAGNOSIS — C50.919 METASTATIC BREAST CANCER: Primary | ICD-10-CM

## 2018-12-04 RX ORDER — CAPECITABINE 500 MG/1
1500 TABLET, FILM COATED ORAL 2 TIMES DAILY
Qty: 84 TABLET | Refills: 0 | Status: SHIPPED | OUTPATIENT
Start: 2018-12-04 | End: 2018-12-04

## 2018-12-04 RX ORDER — CAPECITABINE 500 MG/1
1500 TABLET, FILM COATED ORAL 2 TIMES DAILY
Qty: 84 TABLET | Refills: 0 | Status: SHIPPED | OUTPATIENT
Start: 2018-12-04 | End: 2018-12-28

## 2018-12-04 NOTE — ORAL ONC MGMT
Dr. Whitman okayed refilling Cycle 4 of Xeloda without getting labs as patient was just seen on 11/26 and labs were good. Patient due to start Cycle 4 on 12/8.    Rachel Slater, Pharm.D., [facility]  [phone]  12/04/18

## 2018-12-06 ENCOUNTER — TELEPHONE (OUTPATIENT)
Dept: ONCOLOGY | Facility: CLINIC | Age: 48
End: 2018-12-06

## 2018-12-06 NOTE — TELEPHONE ENCOUNTER
----- Message from Lima Griffiths PA-C sent at 12/6/2018 10:47 AM CST -----  Regarding: RE: GERD  Contact: [phone]  In addition to Tums, she can try Zantac or Pepcid over the counter, one or the other. Both are equally effective. If she is struggling on a daily basis with GERD symptoms, I would recommend starting omeprazole 20 mg daily over the counter.  Thanks,  Lima    ----- Message -----     From: Karen Judge RN     Sent: 12/6/2018   8:53 AM       To: Lima Griffiths PA-C  Subject: GERD                                             Pt reporting GERD as side effect from Xeloda. She has tried Tums, but Tums only help for a short time and reports her GERD is persistent. Wondering if she should try something OTC? Or be prescribed something to help with this? Patient states you mentioned this in the past so she wanted to check with you before taking anything. [facility] pharmacy if you want to send anything. Please advise. Thank you!    Karen Judge RN   [facility]

## 2018-12-06 NOTE — TELEPHONE ENCOUNTER
Patient update on recommendations. States she is having symptoms on a daily basis so she will plan to start omeprazole 20mg daily OTC. Advised patient to contact triage department if symptoms do not improve in 48-72 hrs    Karen Judge RN   [facility]

## 2018-12-13 ENCOUNTER — TELEPHONE (OUTPATIENT)
Dept: ONCOLOGY | Facility: CLINIC | Age: 48
End: 2018-12-13

## 2018-12-13 NOTE — TELEPHONE ENCOUNTER
Spoke to patient with symptoms of fatigue, nausea and non-productive cough and is afebrile. Patient attributes symptoms to Xeloda and will contact the Pharm D tomorrow to discuss symptoms. Patient has increased the medical marijuana to Q six hours that is helping, but not increasing her appetite as it was when she first started the medical marijuana. Patient spoke to scheduling to move up her echo that will be next Wednesday and will send a MyChart to Dr Bryant to touch base with her about symptoms. Discussed nutrition to increase caloric intake. Has been taking Zantac in the AM and will change to bedtime. Patient just had [employer] meal delivered and will have for dinner tonight. [facility] and will call if worsening symptoms to be seen sooner.  Answered all patient's questions and verbalized understanding. Sharifa Celeste RN, BSN.

## 2018-12-18 ENCOUNTER — ALLIED HEALTH/NURSE VISIT (OUTPATIENT)
Dept: ONCOLOGY | Facility: CLINIC | Age: 48
End: 2018-12-18

## 2018-12-18 ENCOUNTER — INFUSION THERAPY VISIT (OUTPATIENT)
Dept: ONCOLOGY | Facility: CLINIC | Age: 48
End: 2018-12-18
Attending: INTERNAL MEDICINE
Payer: COMMERCIAL

## 2018-12-18 ENCOUNTER — TELEPHONE (OUTPATIENT)
Dept: FAMILY MEDICINE | Facility: CLINIC | Age: 48
End: 2018-12-18

## 2018-12-18 ENCOUNTER — APPOINTMENT (OUTPATIENT)
Dept: LAB | Facility: CLINIC | Age: 48
End: 2018-12-18
Attending: INTERNAL MEDICINE
Payer: COMMERCIAL

## 2018-12-18 VITALS
OXYGEN SATURATION: 98 % | DIASTOLIC BLOOD PRESSURE: 82 MMHG | SYSTOLIC BLOOD PRESSURE: 124 MMHG | HEART RATE: 95 BPM | RESPIRATION RATE: 16 BRPM | WEIGHT: 114.7 LBS | BODY MASS INDEX: 19.68 KG/M2 | TEMPERATURE: 98.1 F

## 2018-12-18 DIAGNOSIS — Z71.9 VISIT FOR COUNSELING: Primary | ICD-10-CM

## 2018-12-18 DIAGNOSIS — Z79.899 ENCOUNTER FOR LONG-TERM (CURRENT) USE OF MEDICATIONS: ICD-10-CM

## 2018-12-18 DIAGNOSIS — C50.919 METASTATIC BREAST CANCER: Primary | ICD-10-CM

## 2018-12-18 DIAGNOSIS — C79.51 BONE METASTASIS: ICD-10-CM

## 2018-12-18 LAB
ALBUMIN SERPL-MCNC: 3.4 G/DL (ref 3.4–5)
ALP SERPL-CCNC: 138 U/L (ref 40–150)
ALT SERPL W P-5'-P-CCNC: 18 U/L (ref 0–50)
ANION GAP SERPL CALCULATED.3IONS-SCNC: 11 MMOL/L (ref 3–14)
AST SERPL W P-5'-P-CCNC: 40 U/L (ref 0–45)
BASOPHILS # BLD AUTO: 0 10E9/L (ref 0–0.2)
BASOPHILS NFR BLD AUTO: 0.6 %
BILIRUB SERPL-MCNC: 0.4 MG/DL (ref 0.2–1.3)
BUN SERPL-MCNC: 12 MG/DL (ref 7–30)
CALCIUM SERPL-MCNC: 9.3 MG/DL (ref 8.5–10.1)
CANCER AG27-29 SERPL-ACNC: 1310 U/ML (ref 0–39)
CEA SERPL-MCNC: 162.3 UG/L (ref 0–2.5)
CHLORIDE SERPL-SCNC: 103 MMOL/L (ref 94–109)
CO2 SERPL-SCNC: 24 MMOL/L (ref 20–32)
CREAT SERPL-MCNC: 0.64 MG/DL (ref 0.52–1.04)
DIFFERENTIAL METHOD BLD: ABNORMAL
EOSINOPHIL # BLD AUTO: 0.1 10E9/L (ref 0–0.7)
EOSINOPHIL NFR BLD AUTO: 1.8 %
ERYTHROCYTE [DISTWIDTH] IN BLOOD BY AUTOMATED COUNT: 15.9 % (ref 10–15)
GFR SERPL CREATININE-BSD FRML MDRD: >90 ML/MIN/1.7M2
GLUCOSE SERPL-MCNC: 127 MG/DL (ref 70–99)
HCT VFR BLD AUTO: 38.8 % (ref 35–47)
HGB BLD-MCNC: 12.9 G/DL (ref 11.7–15.7)
IMM GRANULOCYTES # BLD: 0 10E9/L (ref 0–0.4)
IMM GRANULOCYTES NFR BLD: 0.4 %
LYMPHOCYTES # BLD AUTO: 1.5 10E9/L (ref 0.8–5.3)
LYMPHOCYTES NFR BLD AUTO: 29.5 %
MCH RBC QN AUTO: 31.9 PG (ref 26.5–33)
MCHC RBC AUTO-ENTMCNC: 33.2 G/DL (ref 31.5–36.5)
MCV RBC AUTO: 96 FL (ref 78–100)
MONOCYTES # BLD AUTO: 0.3 10E9/L (ref 0–1.3)
MONOCYTES NFR BLD AUTO: 6.9 %
NEUTROPHILS # BLD AUTO: 3 10E9/L (ref 1.6–8.3)
NEUTROPHILS NFR BLD AUTO: 60.8 %
NRBC # BLD AUTO: 0 10*3/UL
NRBC BLD AUTO-RTO: 0 /100
PLATELET # BLD AUTO: 234 10E9/L (ref 150–450)
POTASSIUM SERPL-SCNC: 3.3 MMOL/L (ref 3.4–5.3)
PROT SERPL-MCNC: 8.8 G/DL (ref 6.8–8.8)
RBC # BLD AUTO: 4.05 10E12/L (ref 3.8–5.2)
SODIUM SERPL-SCNC: 138 MMOL/L (ref 133–144)
WBC # BLD AUTO: 4.9 10E9/L (ref 4–11)

## 2018-12-18 PROCEDURE — 36415 COLL VENOUS BLD VENIPUNCTURE: CPT

## 2018-12-18 PROCEDURE — 82378 CARCINOEMBRYONIC ANTIGEN: CPT | Performed by: INTERNAL MEDICINE

## 2018-12-18 PROCEDURE — 25000125 ZZHC RX 250: Mod: ZF | Performed by: PHYSICIAN ASSISTANT

## 2018-12-18 PROCEDURE — G0463 HOSPITAL OUTPT CLINIC VISIT: HCPCS | Mod: ZF

## 2018-12-18 PROCEDURE — 80053 COMPREHEN METABOLIC PANEL: CPT | Performed by: INTERNAL MEDICINE

## 2018-12-18 PROCEDURE — 96402 CHEMO HORMON ANTINEOPL SQ/IM: CPT

## 2018-12-18 PROCEDURE — 85025 COMPLETE CBC W/AUTO DIFF WBC: CPT | Performed by: INTERNAL MEDICINE

## 2018-12-18 PROCEDURE — 99214 OFFICE O/P EST MOD 30 MIN: CPT | Mod: ZP | Performed by: PHYSICIAN ASSISTANT

## 2018-12-18 PROCEDURE — 25000132 ZZH RX MED GY IP 250 OP 250 PS 637: Mod: ZF | Performed by: PHYSICIAN ASSISTANT

## 2018-12-18 PROCEDURE — 86300 IMMUNOASSAY TUMOR CA 15-3: CPT | Performed by: INTERNAL MEDICINE

## 2018-12-18 PROCEDURE — 25000128 H RX IP 250 OP 636: Mod: ZF | Performed by: PHYSICIAN ASSISTANT

## 2018-12-18 RX ORDER — POTASSIUM CHLORIDE 1500 MG/1
40 TABLET, EXTENDED RELEASE ORAL ONCE
Status: COMPLETED | OUTPATIENT
Start: 2018-12-18 | End: 2018-12-18

## 2018-12-18 RX ORDER — LIDOCAINE HYDROCHLORIDE 10 MG/ML
2 INJECTION, SOLUTION EPIDURAL; INFILTRATION; INTRACAUDAL; PERINEURAL ONCE
Status: COMPLETED | OUTPATIENT
Start: 2018-12-18 | End: 2018-12-18

## 2018-12-18 RX ORDER — LIDOCAINE HYDROCHLORIDE 10 MG/ML
2 INJECTION, SOLUTION EPIDURAL; INFILTRATION; INTRACAUDAL; PERINEURAL ONCE
Status: CANCELLED | OUTPATIENT
Start: 2018-12-18 | End: 2018-12-18

## 2018-12-18 RX ADMIN — POTASSIUM CHLORIDE 40 MEQ: 1500 TABLET, EXTENDED RELEASE ORAL at 12:24

## 2018-12-18 RX ADMIN — LIDOCAINE HYDROCHLORIDE 2 ML: 10 INJECTION, SOLUTION EPIDURAL; INFILTRATION; INTRACAUDAL; PERINEURAL at 12:14

## 2018-12-18 RX ADMIN — GOSERELIN ACETATE 3.6 MG: 3.6 IMPLANT SUBCUTANEOUS at 12:18

## 2018-12-18 ASSESSMENT — PAIN SCALES - GENERAL: PAINLEVEL: NO PAIN (0)

## 2018-12-18 NOTE — PROGRESS NOTES
Infusion Nursing Note:  Shan Marsh presents for Zoladex  Met with GRAY Frias before infusion.    Note:     TORB- GRAY Frias/Sweta Wne RN  --replace potassium with 40mEq oral per protocol.    Treatment Conditions:  Not Applicable.    Intravenous Access:  No Intravenous access at this visit.    Post Infusion Assessment:  Patient tolerated injection without incident to LEFT lower quadrant    Discharge Plan:   Patient declined prescription refills.  Discharge instructions reviewed with: Patient.  Patient and/or family verbalized understanding of discharge instructions and all questions answered.  AVS to patient via [employer].  Patient will return 1/15 for next appointment.   Patient discharged in stable condition accompanied by: self.    Sweta Wen RN

## 2018-12-18 NOTE — NURSING NOTE
"Oncology Rooming Note    December 18, 2018 11:02 AM   Shan Marsh is a 48 year old female who presents for:    Chief Complaint   Patient presents with     Blood Draw     Labs drawn via  by RN in lab. VS taken.      Oncology Clinic Visit     Breast Ca , Labs, Tx     Initial Vitals: /82 (BP Location: Right arm, Patient Position: Sitting, Cuff Size: Adult Regular)   Pulse 95   Temp 98.1  F (36.7  C) (Oral)   Resp 16   Wt 52 kg (114 lb 11.2 oz)   SpO2 98%   BMI 19.68 kg/m   Estimated body mass index is 19.68 kg/m  as calculated from the following:    Height as of 11/26/18: 1.626 m (5' 4.02\").    Weight as of this encounter: 52 kg (114 lb 11.2 oz). Body surface area is 1.53 meters squared.  No Pain (0) Comment: Data Unavailable   No LMP recorded. Patient is not currently having periods (Reason: Chemotherapy).  Allergies reviewed: Yes  Medications reviewed: Yes    Medications: Medication refills not needed today.  Pharmacy name entered into [employer]:    [facility] - [place], [place] - [address]  [facility] - [place], [place] - [address]    Clinical concerns: Echocardiogram tomorrow , cough when she eats   Lima  was notified.    5 minutes for nursing intake (face to face time)     Julia Mayfield MA              "

## 2018-12-18 NOTE — PROGRESS NOTES
Oncology/Hematology Visit Note  Dec 18, 2018    Reason for Visit: follow up of metastatic breast cancer, ER positive.     History of Present Illness: Shan Marsh is a 48 year old female without significant past medical history with recent diagnosis of metastatic breast cancer after presenting with left shoulder pain. Shoulder MRI showed soft tissue mass close to distal clavicle concerning for malignancy. She had PET/CT 5/9/17 showing hypermetabolic left axillary, supraclavicular, mediastinal and hilar nodes. She had biopsies of left axilla and hilar nodes showing metastatic breast cancer, strongly ER positive, moderately HI positive and HER-2 kaitlin non-amplified. She originally had [id number], ER/HI positive, HER-2 negative breast cancer diagnosed in spring of 2012 s/p bilateral mastectomies and Tamoxifen from 8/2012-2/2013 and discontinued due to side effects. With development of metastatic disease, she was recommended treatment on BIG10 clinical trial with palbociclib and tamoxifen. She started treatment on 5/24/17 and required dose reduction for neutropenia. CT CAP and bone scan 1/11/2018 showing progressive disease with new bony lesions in the ribs, T spine, and possibly liver, lungs. She started Zoladex and abemiciclib on 1/12/2018.      In March 2018, she sought treatment at [facility] in [place], under the care of Dr. Brooks, where she received:  - Vallovex vaccine (per FDA, is in phase I trials here in [place])  - Insulin potentiated therapy with conjugated chemotherapy (5% chemotherapy, unclear what chemotherapy)  - Gina's toxins, IV vitamin C, IV Ozone, hyperbaric chamber, vitamin B17 infusion, vitamin CK3 IV, and coffee enemas PRN  - Also taking oral niacin, Lugol, acidol, pancreatin, and thyroid desiccated liver, per their regimen  - She was continued on Letrozole and advised to conintue Zoladex.      She met with Dr. Whitman and patient wished to continue alternative regimen. Abemiciclib was placed  "on hold. She was continued on Zoladex and letrozole. While in [place], she required thoracentesis for symptomatic pleural effusion. A repeat PET August 2018 showing widely progressive disease with multiple new bone mets involving the spine, ribs, pelvis, and sacrum; new lesions involving the left lateral thoracic/abdominal wall musculature and subcutaneous tissues, new metastases in the left adrenal gland, and recurrent large L pleural effusion. She was started on Xeloda 1500 mg BID on 9/5/18.     She was admitted 10/4-10/11/18 after syncopal event/unwitnessed fall at home. Found to have left pneumothorax and pericardial effusion with tamponade physiology. She had left chest tube placed in ED which then fell out on 10/10. Left pleurx placed on 10/11. She had pericardiocentesis on 10/7, drain removed 10/8.     Interval History:  Shan is here for routine follow-up.  She feels well without any new concerns.  She contines to feel fatigue from Xeloda, improved on off week. She has a \"sour stomach\" on Xeloda and uses enzymes, tums, Zantac, cannabis with improvement. She overall feels Xeloda is tolerable at this time. She hasn't had any new pains, continues to have some right sided paresthesias that wrap around her chest wall. She hasn't had any change in the WILSON, no SOB at rest, no CP. Mild dryness on hands, feet are more dry with history of one blister, no pain. Using emollients. No fevers, chills, diarrhea, constipation, bleeding, swelling. Remainder of 10-pt ROS otherwise negative.      Current Outpatient Medications   Medication Sig Dispense Refill     Ascorbic Acid (VITAMIN C PO) Take 1,000 mg by mouth daily       calcium carbonate (OS-KRYSTEN 500 MG [place]. [place]) 1250 MG tablet Take 1 tablet by mouth daily       capecitabine (XELODA) 500 MG tablet CHEMO Take 3 tablets (1,500 mg) by mouth 2 times daily Take on Days 1 thru 7 and 15 thru 21 of each 28 day cycle. 84 tablet 0     Digestive Enzymes (ENZYME DIGEST) CAPS Take 1 " capsule by mouth daily       medical cannabis (Patient's own supply.  Not a prescription) (This is NOT a prescription, and does not certify that the patient has a qualifying medical condition for medical cannabis.  The purpose of this order is  to document that the patient reports taking medical cannabis.)       Omega 3-6-9 Fatty Acids (OMEGA 3-6-9 COMPLEX PO) Take 1 capsule by mouth daily       ondansetron (ZOFRAN) 8 MG tablet Take 1 tablet (8 mg) by mouth every 8 hours as needed for nausea (Patient not taking: Reported on 10/29/2018) 40 tablet 3     prochlorperazine (COMPAZINE) 10 MG tablet Take 10 mg by mouth before 1st dose of oral chemotherapy, then prn thereafter       traMADol (ULTRAM) 50 MG tablet Take 1-2 tablets ( mg) by mouth every 6 hours as needed for moderate pain or headaches       VITAMIN D, CHOLECALCIFEROL, PO Take 1,000 Units by mouth daily          Physical Examination:  General: The patient is a pleasant female in no acute distress.  /82 (BP Location: Right arm, Patient Position: Sitting, Cuff Size: Adult Regular)   Pulse 95   Temp 98.1  F (36.7  C) (Oral)   Resp 16   Wt 52 kg (114 lb 11.2 oz)   SpO2 98%   BMI 19.68 kg/m    Wt Readings from Last 10 Encounters:   12/18/18 52 kg (114 lb 11.2 oz)   11/26/18 51.9 kg (114 lb 8 oz)   11/16/18 51.4 kg (113 lb 6.4 oz)   11/08/18 52.8 kg (116 lb 6.4 oz)   10/29/18 51.3 kg (113 lb)   10/23/18 49 kg (108 lb)   10/20/18 50.2 kg (110 lb 11.2 oz)   10/20/18 49 kg (108 lb)   10/15/18 49 kg (108 lb)   10/11/18 53.3 kg (117 lb 6.4 oz)     HEENT: EOMI, PERRL. Sclerae are anicteric. Oral mucosa is pink and moist with no lesions or thrush.   Lymph: previously palpable left supraclavicular LNs are difficult to appreciate today. No other palpable cervical or supraclavicular LNs.   Heart: Regular rate and rhythm.   Lungs: Diminished BS in bilateral bases, L>R. Otherwise clear to auscultation bilaterally.   Abdomen: Bowel sounds present, soft,  nontender with no palpable hepatosplenomegaly or masses.   Extremities: No lower extremity edema noted bilaterally.   Neuro: Cranial nerves II through XII are grossly intact.  Skin: No rashes, petechiae, or bruising noted on exposed skin.    Laboratory Data:  Results for SHAN MARSH (MRN [id number]) as of 12/18/2018 13:07   Ref. Range 12/18/2018 10:50   Sodium Latest Ref Range: 133 - 144 mmol/L 138   Potassium Latest Ref Range: 3.4 - 5.3 mmol/L 3.3 (L)   Chloride Latest Ref Range: 94 - 109 mmol/L 103   Carbon Dioxide Latest Ref Range: 20 - 32 mmol/L 24   Urea Nitrogen Latest Ref Range: 7 - 30 mg/dL 12   Creatinine Latest Ref Range: 0.52 - 1.04 mg/dL 0.64   GFR Estimate Latest Ref Range: >60 mL/min/1.7m2 >90   GFR Estimate If Black Latest Ref Range: >60 mL/min/1.7m2 >90   Calcium Latest Ref Range: 8.5 - 10.1 mg/dL 9.3   Anion Gap Latest Ref Range: 3 - 14 mmol/L 11   Albumin Latest Ref Range: 3.4 - 5.0 g/dL 3.4   Protein Total Latest Ref Range: 6.8 - 8.8 g/dL 8.8   Bilirubin Total Latest Ref Range: 0.2 - 1.3 mg/dL 0.4   Alkaline Phosphatase Latest Ref Range: 40 - 150 U/L 138   ALT Latest Ref Range: 0 - 50 U/L 18   AST Latest Ref Range: 0 - 45 U/L 40   Glucose Latest Ref Range: 70 - 99 mg/dL 127 (H)   WBC Latest Ref Range: 4.0 - 11.0 10e9/L 4.9   Hemoglobin Latest Ref Range: 11.7 - 15.7 g/dL 12.9   Hematocrit Latest Ref Range: 35.0 - 47.0 % 38.8   Platelet Count Latest Ref Range: 150 - 450 10e9/L 234       Assessment and Plan:  Shan Marsh is a 47-year-old female with history of a T1 N0, ER/SC positive, HER2-negative left breast cancer treated with bilateral mastectomies in 2012, now with metastatic disease (bones, pleural effusions, lungs, pericardial effusion, liver, left adrenal/retroperitoneal mets), ER/SC positive, HER2 negative.  She progressed on palbociclib and tamoxifen, alternative therapies in [place], and is now on Xeloda.     Metastatic breast cancer: Continues on Xeloda 1500 mg twice daily  for 7 days on and 8 days off, tolerating well overall. Stable disease on repeat imaging in November, however with rising tumor markers. Will see how the tumor markers look today. If substantially increased, will discuss sooner imaging with Dr. Whitman.  - Continues ovarian suppression with monthly Zoladex, due today.  - Repeat CT CAP February    Bone mets: Continue monthly Zometa every 3 months. Due in January.     Bilateral pleural effusions. S/p right thoracentesis on 10/9 and S/p left pleurx placement 10/11. PleurX was removed 11/12 due to minimal drainage.    Malignant pericardial effusion: presented with tamponade, s/p pericardial drain 10/7-8. She met with Dr. Sanchez 11/8/2018. Echo at that time without any pericardial effusion. Follow-up in 2-3 months was recommended, scheduled for tomorrow.    HFS: continue emollients, avoiding friction    GERD, sour stomach: Discussed trial of OTC omeprazole 20 mg daily. She was interested. She may continue the H2 blockers, Tums, as needed.    Mild hypokalemia: Will replace orally in infusion.    Health maintenance: Influenza vaccine given on 10/11.    Lima Griffiths PA-C    Addendum:  Tumor markers are increased again.  Dr. Whitman in agreement with CT prior to visit. Will send Shan a message.  Lima

## 2018-12-18 NOTE — NURSING NOTE
Chief Complaint   Patient presents with     Blood Draw     Labs drawn via  by RN in lab. VS taken.      Labs drawn via venipuncture. Vital signs taken. Checked into next appointment.       Pat Beltran RN

## 2018-12-18 NOTE — TELEPHONE ENCOUNTER
Called oncology, Dr Whitman' office and spoke to nurse triage.  Forms were faxed to them at [phone], attention Dr Whitman.  Jackie Torres RN

## 2018-12-18 NOTE — TELEPHONE ENCOUNTER
Medications reviewed in epic - compared to home care list     Medications that are listed on home care list and NOT on epic listed below:   None    Medications that are listed in EPIC that are NOT on home care list listed below:   1) Ascorbic Acid (Vitamin C PO) Take 1,000 mg by mouth daily  2) Digestive Enzymes (Enzyme Digest) Caps Take 1 capsule by mouth daily  3) Omega 3-6-9 Fatty Acids (Omega 3-6-9 Complex PO) Take 1 capsule by mouth daily  4) Ondansetron (Zofran) 8 MG tablet Take 1 tablet (8 mg) by mouth every 8 hours as needed (patient not taking: Reported 10/29/18)  5) Prochlorperazine (Compazine) 10 mg tablet Take 10 mg by mouth before 1st dose of oral chemotherapy, then prn thereafter  6) Tramadol (Ultram) 50 MG tablet Take 1-2 Tablets (50 - 100 mg) by mouth every 6 hours as needed for moderate pain or headaches    Medications that are listed in EPIC and HOME CARE list that do NOT match doses/directions:   1) Capecitabine (Xeloda) 500 mg tablet    Home Care Directions: Take 3 tabs by oral route 2 times per day   Epic Directions: Take 3 tablets (1,500 mg) by mouth 2 times daily. Take on days 1 thru 7 and 15 thru 21 of each 28 day cycle.  This was entered twice in Epic with same directions so I removed the oldest script from the med list as a duplicate  2) Vitamin D, Cholecalciferol, PO   Home Care Directions: 2,000 unit capsules take 1 cap by oral route every day   Epic Directions: Take 1,000 units by mouth daily      Routed to provider for review and recommendations on medication discrepancies

## 2018-12-18 NOTE — PROGRESS NOTES
Social Work Follow-Up Encounter Visit  Oncology Clinic    Data/Intervention:  Patient Name:  Shan Marsh  /Age:  1970 (48 year old)    Reason for Follow-Up:  Patient and SW made plans to meet during previous phone call regarding financial assistance applications.    Collaborated With:    -Patient     SW met with patient in clinic to complete applications for financial assistance.  [employer] application completed and sent at this time.  SW made plans to meet with patient again during next appointment to complete additional financial applications for [facility] and [facility].      Resources Provided:  [employer] application submitted    Assessment:  NA     Plan:  Previously provided patient/family with writer's contact information and availability.     Soo Yeon Han, MSW, [facility]  Pager: [phone]  Phone: [phone]

## 2018-12-18 NOTE — LETTER
12/18/2018      RE: Shan Marsh  [address]  [facility] [address]           Oncology/Hematology Visit Note  Dec 18, 2018    Reason for Visit: follow up of metastatic breast cancer, ER positive.     History of Present Illness: Shan Marsh is a 48 year old female without significant past medical history with recent diagnosis of metastatic breast cancer after presenting with left shoulder pain. Shoulder MRI showed soft tissue mass close to distal clavicle concerning for malignancy. She had PET/CT 5/9/17 showing hypermetabolic left axillary, supraclavicular, mediastinal and hilar nodes. She had biopsies of left axilla and hilar nodes showing metastatic breast cancer, strongly ER positive, moderately DE positive and HER-2 kaitlin non-amplified. She originally had [id number], ER/DE positive, HER-2 negative breast cancer diagnosed in spring of 2012 s/p bilateral mastectomies and Tamoxifen from 8/2012-2/2013 and discontinued due to side effects. With development of metastatic disease, she was recommended treatment on BIG10 clinical trial with palbociclib and tamoxifen. She started treatment on 5/24/17 and required dose reduction for neutropenia. CT CAP and bone scan 1/11/2018 showing progressive disease with new bony lesions in the ribs, T spine, and possibly liver, lungs. She started Zoladex and abemiciclib on 1/12/2018.      In March 2018, she sought treatment at [facility] in [place], under the care of Dr. Brooks, where she received:  - Vallovex vaccine (per FDA, is in phase I trials here in [place])  - Insulin potentiated therapy with conjugated chemotherapy (5% chemotherapy, unclear what chemotherapy)  - Gina's toxins, IV vitamin C, IV Ozone, hyperbaric chamber, vitamin B17 infusion, vitamin CK3 IV, and coffee enemas PRN  - Also taking oral niacin, Lugol, acidol, pancreatin, and thyroid desiccated liver, per their regimen  - She was continued on Letrozole and advised to conintue Zoladex.  "     She met with Dr. Whitman and patient wished to continue alternative regimen. Abemiciclib was placed on hold. She was continued on Zoladex and letrozole. While in [place], she required thoracentesis for symptomatic pleural effusion. A repeat PET August 2018 showing widely progressive disease with multiple new bone mets involving the spine, ribs, pelvis, and sacrum; new lesions involving the left lateral thoracic/abdominal wall musculature and subcutaneous tissues, new metastases in the left adrenal gland, and recurrent large L pleural effusion. She was started on Xeloda 1500 mg BID on 9/5/18.     She was admitted 10/4-10/11/18 after syncopal event/unwitnessed fall at home. Found to have left pneumothorax and pericardial effusion with tamponade physiology. She had left chest tube placed in ED which then fell out on 10/10. Left pleurx placed on 10/11. She had pericardiocentesis on 10/7, drain removed 10/8.     Interval History:  Shan is here for routine follow-up.  She feels well without any new concerns.  She contines to feel fatigue from Xeloda, improved on off week. She has a \"sour stomach\" on Xeloda and uses enzymes, tums, Zantac, cannabis with improvement. She overall feels Xeloda is tolerable at this time. She hasn't had any new pains, continues to have some right sided paresthesias that wrap around her chest wall. She hasn't had any change in the WILSON, no SOB at rest, no CP. Mild dryness on hands, feet are more dry with history of one blister, no pain. Using emollients. No fevers, chills, diarrhea, constipation, bleeding, swelling. Remainder of 10-pt ROS otherwise negative.      Current Outpatient Medications   Medication Sig Dispense Refill     Ascorbic Acid (VITAMIN C PO) Take 1,000 mg by mouth daily       calcium carbonate (OS-KRYSTEN 500 MG [place]. [place]) 1250 MG tablet Take 1 tablet by mouth daily       capecitabine (XELODA) 500 MG tablet CHEMO Take 3 tablets (1,500 mg) by mouth 2 times daily Take on Days 1 thru 7 " and 15 thru 21 of each 28 day cycle. 84 tablet 0     Digestive Enzymes (ENZYME DIGEST) CAPS Take 1 capsule by mouth daily       medical cannabis (Patient's own supply.  Not a prescription) (This is NOT a prescription, and does not certify that the patient has a qualifying medical condition for medical cannabis.  The purpose of this order is  to document that the patient reports taking medical cannabis.)       Omega 3-6-9 Fatty Acids (OMEGA 3-6-9 COMPLEX PO) Take 1 capsule by mouth daily       ondansetron (ZOFRAN) 8 MG tablet Take 1 tablet (8 mg) by mouth every 8 hours as needed for nausea (Patient not taking: Reported on 10/29/2018) 40 tablet 3     prochlorperazine (COMPAZINE) 10 MG tablet Take 10 mg by mouth before 1st dose of oral chemotherapy, then prn thereafter       traMADol (ULTRAM) 50 MG tablet Take 1-2 tablets ( mg) by mouth every 6 hours as needed for moderate pain or headaches       VITAMIN D, CHOLECALCIFEROL, PO Take 1,000 Units by mouth daily          Physical Examination:  General: The patient is a pleasant female in no acute distress.  /82 (BP Location: Right arm, Patient Position: Sitting, Cuff Size: Adult Regular)   Pulse 95   Temp 98.1  F (36.7  C) (Oral)   Resp 16   Wt 52 kg (114 lb 11.2 oz)   SpO2 98%   BMI 19.68 kg/m     Wt Readings from Last 10 Encounters:   12/18/18 52 kg (114 lb 11.2 oz)   11/26/18 51.9 kg (114 lb 8 oz)   11/16/18 51.4 kg (113 lb 6.4 oz)   11/08/18 52.8 kg (116 lb 6.4 oz)   10/29/18 51.3 kg (113 lb)   10/23/18 49 kg (108 lb)   10/20/18 50.2 kg (110 lb 11.2 oz)   10/20/18 49 kg (108 lb)   10/15/18 49 kg (108 lb)   10/11/18 53.3 kg (117 lb 6.4 oz)     HEENT: EOMI, PERRL. Sclerae are anicteric. Oral mucosa is pink and moist with no lesions or thrush.   Lymph: previously palpable left supraclavicular LNs are difficult to appreciate today. No other palpable cervical or supraclavicular LNs.   Heart: Regular rate and rhythm.   Lungs: Diminished BS in bilateral  bases, L>R. Otherwise clear to auscultation bilaterally.   Abdomen: Bowel sounds present, soft, nontender with no palpable hepatosplenomegaly or masses.   Extremities: No lower extremity edema noted bilaterally.   Neuro: Cranial nerves II through XII are grossly intact.  Skin: No rashes, petechiae, or bruising noted on exposed skin.    Laboratory Data:  Results for SHAN MARSH (MRN [id number]) as of 12/18/2018 13:07   Ref. Range 12/18/2018 10:50   Sodium Latest Ref Range: 133 - 144 mmol/L 138   Potassium Latest Ref Range: 3.4 - 5.3 mmol/L 3.3 (L)   Chloride Latest Ref Range: 94 - 109 mmol/L 103   Carbon Dioxide Latest Ref Range: 20 - 32 mmol/L 24   Urea Nitrogen Latest Ref Range: 7 - 30 mg/dL 12   Creatinine Latest Ref Range: 0.52 - 1.04 mg/dL 0.64   GFR Estimate Latest Ref Range: >60 mL/min/1.7m2 >90   GFR Estimate If Black Latest Ref Range: >60 mL/min/1.7m2 >90   Calcium Latest Ref Range: 8.5 - 10.1 mg/dL 9.3   Anion Gap Latest Ref Range: 3 - 14 mmol/L 11   Albumin Latest Ref Range: 3.4 - 5.0 g/dL 3.4   Protein Total Latest Ref Range: 6.8 - 8.8 g/dL 8.8   Bilirubin Total Latest Ref Range: 0.2 - 1.3 mg/dL 0.4   Alkaline Phosphatase Latest Ref Range: 40 - 150 U/L 138   ALT Latest Ref Range: 0 - 50 U/L 18   AST Latest Ref Range: 0 - 45 U/L 40   Glucose Latest Ref Range: 70 - 99 mg/dL 127 (H)   WBC Latest Ref Range: 4.0 - 11.0 10e9/L 4.9   Hemoglobin Latest Ref Range: 11.7 - 15.7 g/dL 12.9   Hematocrit Latest Ref Range: 35.0 - 47.0 % 38.8   Platelet Count Latest Ref Range: 150 - 450 10e9/L 234       Assessment and Plan:  Shan Marsh is a 47-year-old female with history of a T1 N0, ER/IN positive, HER2-negative left breast cancer treated with bilateral mastectomies in 2012, now with metastatic disease (bones, pleural effusions, lungs, pericardial effusion, liver, left adrenal/retroperitoneal mets), ER/IN positive, HER2 negative.  She progressed on palbociclib and tamoxifen, alternative therapies in  [place], and is now on Xeloda.     Metastatic breast cancer: Continues on Xeloda 1500 mg twice daily for 7 days on and 8 days off, tolerating well overall. Stable disease on repeat imaging in November, however with rising tumor markers. Will see how the tumor markers look today. If substantially increased, will discuss sooner imaging with Dr. Whitman.  - Continues ovarian suppression with monthly Zoladex, due today.  - Repeat CT CAP February    Bone mets: Continue monthly Zometa every 3 months. Due in January.     Bilateral pleural effusions. S/p right thoracentesis on 10/9 and S/p left pleurx placement 10/11. PleurX was removed 11/12 due to minimal drainage.    Malignant pericardial effusion: presented with tamponade, s/p pericardial drain 10/7-8. She met with Dr. Sanchez 11/8/2018. Echo at that time without any pericardial effusion. Follow-up in 2-3 months was recommended, scheduled for tomorrow.    HFS: continue emollients, avoiding friction    GERD, sour stomach: Discussed trial of OTC omeprazole 20 mg daily. She was interested. She may continue the H2 blockers, Tums, as needed.    Mild hypokalemia: Will replace orally in infusion.    Health maintenance: Influenza vaccine given on 10/11.    Lima Griffiths PA-C

## 2018-12-18 NOTE — PATIENT INSTRUCTIONS
Contact numbers:  Triage Main/After hours Line: [phone]    Main (scheduling) line: [phone]    Call with chills and/or temperature greater than or equal to 100.5 and questions or concerns.    If after hours, weekends, or holidays, call main hospital  at  [phone] and ask for Oncology doctor on call.         December 2018 Sunday Monday Tuesday Wednesday Thursday Friday Saturday                                 1       2     3     4     5     6     7     8       9     10     11     12     13     14     15       16     17     18    [facility] DRAW  10:30 AM   (15 min.)    [facility] DRAW   [facility] Draw    [facility] RETURN  10:45 AM   (50 min.)   Lima Griffiths PA-C   [facility]  12:30 PM   (60 min.)   [facility]   [facility] 19    ECHO COMPLETE   3:00 PM   (60 min.)   [id number]   [facility] 20     21     22       23     24     25     26     27     28     29       30     31                                          January 2019 Sunday Monday Tuesday Wednesday Thursday Friday Saturday             1     2     3     4     5       6     7     8     9     10     11     12       13     14     15    [facility] DRAW   2:15 PM   (15 min.)    [facility] DRAW   [facility] Draw    [facility] RETURN   2:45 PM   (30 min.)   Ana Whitman MD   [facility]    [facility] ONC INFUSION 60   4:30 PM   (60 min.)    ONCOLOGY INFUSION   [facility] 16     17     18     19       20     21     22     23     24     25     26       27     28     29     30     31                               Lab Results:  Recent Results (from the past 12 hour(s))   Comprehensive metabolic panel (BMP + Alb, Alk Phos, ALT, AST, Total. Bili, TP)    Collection Time: 12/18/18 10:50 AM   Result Value Ref Range    Sodium 138 133 - 144 mmol/L    Potassium 3.3 (L) 3.4 - 5.3 mmol/L     Chloride 103 94 - 109 mmol/L    Carbon Dioxide 24 20 - 32 mmol/L    Anion Gap 11 3 - 14 mmol/L    Glucose 127 (H) 70 - 99 mg/dL    Urea Nitrogen 12 7 - 30 mg/dL    Creatinine 0.64 0.52 - 1.04 mg/dL    GFR Estimate >90 >60 mL/min/1.7m2    GFR Estimate If Black >90 >60 mL/min/1.7m2    Calcium 9.3 8.5 - 10.1 mg/dL    Bilirubin Total 0.4 0.2 - 1.3 mg/dL    Albumin 3.4 3.4 - 5.0 g/dL    Protein Total 8.8 6.8 - 8.8 g/dL    Alkaline Phosphatase 138 40 - 150 U/L    ALT 18 0 - 50 U/L    AST 40 0 - 45 U/L   CBC with platelets and differential    Collection Time: 12/18/18 10:50 AM   Result Value Ref Range    WBC 4.9 4.0 - 11.0 10e9/L    RBC Count 4.05 3.8 - 5.2 10e12/L    Hemoglobin 12.9 11.7 - 15.7 g/dL    Hematocrit 38.8 35.0 - 47.0 %    MCV 96 78 - 100 fl    MCH 31.9 26.5 - 33.0 pg    MCHC 33.2 31.5 - 36.5 g/dL    RDW 15.9 (H) 10.0 - 15.0 %    Platelet Count 234 150 - 450 10e9/L    Diff Method Automated Method     % Neutrophils 60.8 %    % Lymphocytes 29.5 %    % Monocytes 6.9 %    % Eosinophils 1.8 %    % Basophils 0.6 %    % Immature Granulocytes 0.4 %    Nucleated RBCs 0 0 /100    Absolute Neutrophil 3.0 1.6 - 8.3 10e9/L    Absolute Lymphocytes 1.5 0.8 - 5.3 10e9/L    Absolute Monocytes 0.3 0.0 - 1.3 10e9/L    Absolute Eosinophils 0.1 0.0 - 0.7 10e9/L    Absolute Basophils 0.0 0.0 - 0.2 10e9/L    Abs Immature Granulocytes 0.0 0 - 0.4 10e9/L    Absolute Nucleated RBC 0.0

## 2018-12-18 NOTE — TELEPHONE ENCOUNTER
"Dr Michelle - Are we managing/directing patient's home care or is oncology?  See Med rec below.  Form placed in the \"working on\" folder for Home Health Med Rec  Jackie Torres RN  "

## 2018-12-19 ENCOUNTER — TELEPHONE (OUTPATIENT)
Dept: ONCOLOGY | Facility: CLINIC | Age: 48
End: 2018-12-19

## 2018-12-19 ENCOUNTER — ANCILLARY PROCEDURE (OUTPATIENT)
Dept: CARDIOLOGY | Facility: CLINIC | Age: 48
End: 2018-12-19
Attending: INTERNAL MEDICINE
Payer: COMMERCIAL

## 2018-12-19 DIAGNOSIS — I32: ICD-10-CM

## 2018-12-19 DIAGNOSIS — C79.89: ICD-10-CM

## 2018-12-19 RX ADMIN — Medication 6 ML: at 15:45

## 2018-12-19 NOTE — TELEPHONE ENCOUNTER
"Oral Chemotherapy Monitoring Program    Primary Oncologist: Dr. Whitman  Primary Oncology Clinic: [facility]  Cancer Diagnosis: Breast Cancer   Therapy History:  C1D1=9/5/18, Xeloda 1500mg BID 7 days on 7 days off for 28 day cycles     Drug Interaction Assessment: Recently started gabapentin and CBD oil, no interactions identified   Omeprazole  And Xeloda:Risk Rating C: Monitor therapySummary Proton Pump Inhibitors may diminish the therapeutic effect of Capecitabine. Severity Moderate Reliability Rating Fair: Existing data/reports are inconsistent Patient Management Consider the need for a proton pump inhibitor in patients receiving capecitabine. If combined, monitor closely for any evidence of reduced capecitabine effectiveness with use of this combination. Writer updated Lima Griffiths    Lab Monitoring Plan  Place oral chemo monograph lab box here    Subjective/Objective:  Shan Marsh is a 48 year old female contacted by phone for a follow-up visit for oral chemotherapy. Shan reports that she was just in clinic yesterday and talked with Lima regarding her \"sour stomach\" while on the Xeloda. She reports taking TUMS, zantac and cannabis to help with the heartburn or sour stomach. She is still able to eat and drink but reports it does affect her appetite despite the medications. Pt reported that Lima recommended she try omeprazole as well. See Drug interaction above. Pt will start next cycle on 1/5 and denies any questions for writer.     ORAL CHEMOTHERAPY 1/12/2018 9/4/2018 9/12/2018 10/24/2018 11/6/2018 12/19/2018   Drug Name (No Data) Xeloda (Capecitabine) Xeloda (Capecitabine) Xeloda (Capecitabine) Xeloda (Capecitabine) Xeloda (Capecitabine)   Current Dosage 150mg 1500mg 1500mg 1500mg 1500mg 1500mg   Current Schedule BID BID BID BID BID BID   Cycle Details Continuous 1 week on 1 week off 1 week on 1 week off 1 week on 1 week off 1 week on 1 week off 1 week on 1 week off   Start Date of Last Cycle - - " "9/5/2018 10/12/2018 - 12/8/2018   Planned next cycle start date - - - 11/9/2018 11/10/2018 1/5/2019   Doses missed in last 2 weeks - - 0 0 0 -   Adherence Assessment - - Adherent Adherent Adherent Adherent   Adverse Effects - - No AE identified during assessment Fatigue Nausea;Palmar-plantar erythrodysethesia syndrome Nausea;Fatigue   Nausea - - - - Grade 1 Grade 1   Pharmacist Intervention(nausea) - - - - No No   Palmar-plantar Erythrodysethesia syndrome[hand-foot syndrome] - - - - Grade 1 -   Pharmacist Intervention(Palmar-plantar) - - - - Yes -   Intervention(s) - - - - Patient education -   Fatigue - - - Grade 1 - Grade 1   Pharmacist Intervention(fatigue) - - - Yes - No   Intervention(s) - - - Patient education - -   Home BPs - - - not needed not needed -   Any new drug interactions? No Yes No No - -   Pharmacist Intervention? - Yes - - - -   Intervention(s) - Patient Education - - - -   Is the dose as ordered appropriate for the patient? Yes Yes Yes Yes Yes -   Has the patient missed any days of school, work, or other routine activity? - - - No - -       Last PHQ-2 Score on record:   PHQ-2 ( 1999 Pfizer) 7/30/2018 7/2/2018   Q1: Little interest or pleasure in doing things 0 0   Q2: Feeling down, depressed or hopeless 0 0   PHQ-2 Score 0 0       Patient does not report depression symptoms.      Vitals:  BP:   BP Readings from Last 1 Encounters:   12/18/18 124/82     Wt Readings from Last 1 Encounters:   12/18/18 52 kg (114 lb 11.2 oz)     Estimated body surface area is 1.53 meters squared as calculated from the following:    Height as of 11/26/18: 1.626 m (5' 4.02\").    Weight as of 12/18/18: 52 kg (114 lb 11.2 oz).    Labs:  _  Result Component Current Result Ref Range   Sodium 138 (12/18/2018) 133 - 144 mmol/L     _  Result Component Current Result Ref Range   Potassium 3.3 (L) (12/18/2018) 3.4 - 5.3 mmol/L     _  Result Component Current Result Ref Range   Calcium 9.3 (12/18/2018) 8.5 - 10.1 mg/dL     No " results found for Mag within last 30 days.     No results found for Phos within last 30 days.     _  Result Component Current Result Ref Range   Albumin 3.4 (12/18/2018) 3.4 - 5.0 g/dL     _  Result Component Current Result Ref Range   Urea Nitrogen 12 (12/18/2018) 7 - 30 mg/dL     _  Result Component Current Result Ref Range   Creatinine 0.64 (12/18/2018) 0.52 - 1.04 mg/dL       _  Result Component Current Result Ref Range   AST 40 (12/18/2018) 0 - 45 U/L     _  Result Component Current Result Ref Range   ALT 18 (12/18/2018) 0 - 50 U/L     _  Result Component Current Result Ref Range   Bilirubin Total 0.4 (12/18/2018) 0.2 - 1.3 mg/dL       _  Result Component Current Result Ref Range   WBC 4.9 (12/18/2018) 4.0 - 11.0 10e9/L     _  Result Component Current Result Ref Range   Hemoglobin 12.9 (12/18/2018) 11.7 - 15.7 g/dL     _  Result Component Current Result Ref Range   Platelet Count 234 (12/18/2018) 150 - 450 10e9/L     _  Result Component Current Result Ref Range   Absolute Neutrophil 3.0 (12/18/2018) 1.6 - 8.3 10e9/L               Assessment:  Pt is tolerating current therapy with Grade 1 nausea.     Plan:  Pt is going to start taking omeprazole per Lima's recommendation and continue on Xeloda.     Follow-Up:  4 weeks for TM assessment.     Refill Due:  By 1/5    Ena Dozier RN  [facility]-Therapy Management  [phone]

## 2018-12-28 DIAGNOSIS — C50.519 MALIGNANT NEOPLASM OF LOWER-OUTER QUADRANT OF FEMALE BREAST, UNSPECIFIED ESTROGEN RECEPTOR STATUS, UNSPECIFIED LATERALITY (H): ICD-10-CM

## 2018-12-28 DIAGNOSIS — C50.519 MALIGNANT NEOPLASM OF LOWER-OUTER QUADRANT OF FEMALE BREAST, UNSPECIFIED ESTROGEN RECEPTOR STATUS, UNSPECIFIED LATERALITY (H): Primary | ICD-10-CM

## 2018-12-28 RX ORDER — CAPECITABINE 500 MG/1
1500 TABLET, FILM COATED ORAL 2 TIMES DAILY
Qty: 84 TABLET | Refills: 0 | Status: SHIPPED | OUTPATIENT
Start: 2018-12-28 | End: 2019-01-17

## 2018-12-29 DIAGNOSIS — C50.519 MALIGNANT NEOPLASM OF LOWER-OUTER QUADRANT OF FEMALE BREAST, UNSPECIFIED ESTROGEN RECEPTOR STATUS, UNSPECIFIED LATERALITY (H): ICD-10-CM

## 2019-01-01 ENCOUNTER — ONCOLOGY VISIT (OUTPATIENT)
Dept: ONCOLOGY | Facility: CLINIC | Age: 49
End: 2019-01-01
Attending: PHYSICIAN ASSISTANT
Payer: COMMERCIAL

## 2019-01-01 ENCOUNTER — TELEPHONE (OUTPATIENT)
Dept: ONCOLOGY | Facility: CLINIC | Age: 49
End: 2019-01-01

## 2019-01-01 ENCOUNTER — PRE VISIT (OUTPATIENT)
Dept: DERMATOLOGY | Facility: CLINIC | Age: 49
End: 2019-01-01

## 2019-01-01 ENCOUNTER — ANCILLARY PROCEDURE (OUTPATIENT)
Dept: CARDIOLOGY | Facility: CLINIC | Age: 49
End: 2019-01-01
Attending: INTERNAL MEDICINE
Payer: COMMERCIAL

## 2019-01-01 ENCOUNTER — CARE COORDINATION (OUTPATIENT)
Dept: ONCOLOGY | Facility: CLINIC | Age: 49
End: 2019-01-01

## 2019-01-01 ENCOUNTER — THERAPY VISIT (OUTPATIENT)
Dept: PHYSICAL THERAPY | Facility: CLINIC | Age: 49
End: 2019-01-01
Attending: INTERNAL MEDICINE
Payer: COMMERCIAL

## 2019-01-01 ENCOUNTER — APPOINTMENT (OUTPATIENT)
Dept: LAB | Facility: CLINIC | Age: 49
End: 2019-01-01
Attending: INTERNAL MEDICINE
Payer: COMMERCIAL

## 2019-01-01 ENCOUNTER — HOSPITAL ENCOUNTER (OUTPATIENT)
Dept: NUCLEAR MEDICINE | Facility: CLINIC | Age: 49
Setting detail: NUCLEAR MEDICINE
End: 2019-06-06
Attending: INTERNAL MEDICINE
Payer: COMMERCIAL

## 2019-01-01 ENCOUNTER — ANCILLARY PROCEDURE (OUTPATIENT)
Dept: RADIOLOGY | Facility: AMBULATORY SURGERY CENTER | Age: 49
End: 2019-01-01
Attending: INTERNAL MEDICINE
Payer: COMMERCIAL

## 2019-01-01 ENCOUNTER — APPOINTMENT (OUTPATIENT)
Dept: LAB | Facility: CLINIC | Age: 49
End: 2019-01-01
Attending: PHYSICIAN ASSISTANT
Payer: COMMERCIAL

## 2019-01-01 ENCOUNTER — HOSPITAL ENCOUNTER (OUTPATIENT)
Dept: NUCLEAR MEDICINE | Facility: CLINIC | Age: 49
Setting detail: NUCLEAR MEDICINE
End: 2019-10-03
Attending: PHYSICIAN ASSISTANT
Payer: COMMERCIAL

## 2019-01-01 ENCOUNTER — INFUSION THERAPY VISIT (OUTPATIENT)
Dept: ONCOLOGY | Facility: CLINIC | Age: 49
End: 2019-01-01
Attending: INTERNAL MEDICINE
Payer: COMMERCIAL

## 2019-01-01 ENCOUNTER — APPOINTMENT (OUTPATIENT)
Dept: RADIATION ONCOLOGY | Facility: CLINIC | Age: 49
End: 2019-01-01
Attending: RADIOLOGY
Payer: COMMERCIAL

## 2019-01-01 ENCOUNTER — OFFICE VISIT (OUTPATIENT)
Dept: MIDWIFE SERVICES | Facility: CLINIC | Age: 49
End: 2019-01-01
Payer: COMMERCIAL

## 2019-01-01 ENCOUNTER — PATIENT OUTREACH (OUTPATIENT)
Dept: ONCOLOGY | Facility: CLINIC | Age: 49
End: 2019-01-01

## 2019-01-01 ENCOUNTER — MYC MEDICAL ADVICE (OUTPATIENT)
Dept: ONCOLOGY | Facility: CLINIC | Age: 49
End: 2019-01-01

## 2019-01-01 ENCOUNTER — APPOINTMENT (OUTPATIENT)
Dept: CT IMAGING | Facility: CLINIC | Age: 49
DRG: 054 | End: 2019-01-01
Attending: EMERGENCY MEDICINE
Payer: COMMERCIAL

## 2019-01-01 ENCOUNTER — HOSPITAL ENCOUNTER (OUTPATIENT)
Dept: CT IMAGING | Facility: CLINIC | Age: 49
Discharge: HOME | End: 2019-06-06
Attending: INTERNAL MEDICINE | Admitting: INTERNAL MEDICINE
Payer: COMMERCIAL

## 2019-01-01 ENCOUNTER — ONCOLOGY VISIT (OUTPATIENT)
Dept: RADIATION ONCOLOGY | Facility: CLINIC | Age: 49
End: 2019-01-01

## 2019-01-01 ENCOUNTER — TELEPHONE (OUTPATIENT)
Dept: FAMILY MEDICINE | Facility: CLINIC | Age: 49
End: 2019-01-01

## 2019-01-01 ENCOUNTER — ANCILLARY PROCEDURE (OUTPATIENT)
Dept: GENERAL RADIOLOGY | Facility: CLINIC | Age: 49
End: 2019-01-01
Attending: INTERNAL MEDICINE
Payer: COMMERCIAL

## 2019-01-01 ENCOUNTER — ANCILLARY PROCEDURE (OUTPATIENT)
Dept: CT IMAGING | Facility: CLINIC | Age: 49
End: 2019-01-01
Attending: PHYSICIAN ASSISTANT
Payer: COMMERCIAL

## 2019-01-01 ENCOUNTER — HOSPITAL ENCOUNTER (OUTPATIENT)
Facility: CLINIC | Age: 49
Setting detail: OBSERVATION
Discharge: HOME | End: 2019-01-28
Attending: EMERGENCY MEDICINE | Admitting: INTERNAL MEDICINE
Payer: COMMERCIAL

## 2019-01-01 ENCOUNTER — APPOINTMENT (OUTPATIENT)
Dept: SPEECH THERAPY | Facility: CLINIC | Age: 49
DRG: 054 | End: 2019-01-01
Attending: STUDENT IN AN ORGANIZED HEALTH CARE EDUCATION/TRAINING PROGRAM
Payer: COMMERCIAL

## 2019-01-01 ENCOUNTER — HEALTH MAINTENANCE LETTER (OUTPATIENT)
Age: 49
End: 2019-01-01

## 2019-01-01 ENCOUNTER — PATIENT OUTREACH (OUTPATIENT)
Dept: NEUROLOGY | Facility: CLINIC | Age: 49
End: 2019-01-01

## 2019-01-01 ENCOUNTER — TELEPHONE (OUTPATIENT)
Dept: PHARMACY | Facility: CLINIC | Age: 49
End: 2019-01-01

## 2019-01-01 ENCOUNTER — HOSPITAL ENCOUNTER (OUTPATIENT)
Facility: AMBULATORY SURGERY CENTER | Age: 49
End: 2019-11-25
Attending: PHYSICIAN ASSISTANT
Payer: COMMERCIAL

## 2019-01-01 ENCOUNTER — HOSPITAL ENCOUNTER (OUTPATIENT)
Dept: NUCLEAR MEDICINE | Facility: CLINIC | Age: 49
Setting detail: NUCLEAR MEDICINE
End: 2019-12-13
Attending: INTERNAL MEDICINE
Payer: COMMERCIAL

## 2019-01-01 ENCOUNTER — NURSE TRIAGE (OUTPATIENT)
Dept: NURSING | Facility: CLINIC | Age: 49
End: 2019-01-01

## 2019-01-01 ENCOUNTER — TELEPHONE (OUTPATIENT)
Dept: NUTRITION | Facility: CLINIC | Age: 49
End: 2019-01-01

## 2019-01-01 ENCOUNTER — APPOINTMENT (OUTPATIENT)
Dept: CARDIOLOGY | Facility: CLINIC | Age: 49
DRG: 054 | End: 2019-01-01
Attending: STUDENT IN AN ORGANIZED HEALTH CARE EDUCATION/TRAINING PROGRAM
Payer: COMMERCIAL

## 2019-01-01 ENCOUNTER — ANCILLARY PROCEDURE (OUTPATIENT)
Dept: CARDIOLOGY | Facility: CLINIC | Age: 49
End: 2019-01-01
Attending: PHYSICIAN ASSISTANT
Payer: COMMERCIAL

## 2019-01-01 ENCOUNTER — OFFICE VISIT (OUTPATIENT)
Dept: RADIATION ONCOLOGY | Facility: CLINIC | Age: 49
End: 2019-01-01
Attending: INTERNAL MEDICINE
Payer: COMMERCIAL

## 2019-01-01 ENCOUNTER — HOSPITAL ENCOUNTER (INPATIENT)
Facility: CLINIC | Age: 49
LOS: 1 days | Discharge: HOME | DRG: 054 | End: 2019-12-01
Attending: EMERGENCY MEDICINE | Admitting: PSYCHIATRY & NEUROLOGY
Payer: COMMERCIAL

## 2019-01-01 ENCOUNTER — TUMOR CONFERENCE (OUTPATIENT)
Dept: ONCOLOGY | Facility: CLINIC | Age: 49
End: 2019-01-01

## 2019-01-01 ENCOUNTER — ANCILLARY PROCEDURE (OUTPATIENT)
Dept: CARDIOLOGY | Facility: CLINIC | Age: 49
End: 2019-01-01
Payer: COMMERCIAL

## 2019-01-01 ENCOUNTER — PATIENT OUTREACH (OUTPATIENT)
Dept: CARE COORDINATION | Facility: CLINIC | Age: 49
End: 2019-01-01

## 2019-01-01 ENCOUNTER — APPOINTMENT (OUTPATIENT)
Dept: OCCUPATIONAL THERAPY | Facility: CLINIC | Age: 49
DRG: 054 | End: 2019-01-01
Attending: STUDENT IN AN ORGANIZED HEALTH CARE EDUCATION/TRAINING PROGRAM
Payer: COMMERCIAL

## 2019-01-01 ENCOUNTER — TELEPHONE (OUTPATIENT)
Dept: TRANSPLANT | Facility: CLINIC | Age: 49
End: 2019-01-01

## 2019-01-01 ENCOUNTER — APPOINTMENT (OUTPATIENT)
Dept: MRI IMAGING | Facility: CLINIC | Age: 49
DRG: 054 | End: 2019-01-01
Payer: COMMERCIAL

## 2019-01-01 ENCOUNTER — DOCUMENTATION ONLY (OUTPATIENT)
Dept: CARE COORDINATION | Facility: CLINIC | Age: 49
End: 2019-01-01

## 2019-01-01 ENCOUNTER — TELEPHONE (OUTPATIENT)
Dept: CARE COORDINATION | Facility: CLINIC | Age: 49
End: 2019-01-01

## 2019-01-01 ENCOUNTER — RESEARCH ENCOUNTER (OUTPATIENT)
Dept: ONCOLOGY | Facility: CLINIC | Age: 49
End: 2019-01-01

## 2019-01-01 ENCOUNTER — OFFICE VISIT (OUTPATIENT)
Dept: DERMATOLOGY | Facility: CLINIC | Age: 49
End: 2019-01-01
Payer: COMMERCIAL

## 2019-01-01 ENCOUNTER — APPOINTMENT (OUTPATIENT)
Dept: GENERAL RADIOLOGY | Facility: CLINIC | Age: 49
End: 2019-01-01
Payer: COMMERCIAL

## 2019-01-01 ENCOUNTER — HOSPITAL ENCOUNTER (OUTPATIENT)
Dept: CT IMAGING | Facility: CLINIC | Age: 49
Discharge: HOME | End: 2019-12-13
Attending: INTERNAL MEDICINE | Admitting: INTERNAL MEDICINE
Payer: COMMERCIAL

## 2019-01-01 ENCOUNTER — HOSPITAL ENCOUNTER (OUTPATIENT)
Facility: AMBULATORY SURGERY CENTER | Age: 49
End: 2019-10-14
Attending: PHYSICIAN ASSISTANT
Payer: COMMERCIAL

## 2019-01-01 ENCOUNTER — APPOINTMENT (OUTPATIENT)
Dept: GENERAL RADIOLOGY | Facility: CLINIC | Age: 49
DRG: 054 | End: 2019-01-01
Attending: STUDENT IN AN ORGANIZED HEALTH CARE EDUCATION/TRAINING PROGRAM
Payer: COMMERCIAL

## 2019-01-01 ENCOUNTER — HOSPITAL ENCOUNTER (OUTPATIENT)
Dept: CT IMAGING | Facility: CLINIC | Age: 49
Discharge: HOME | End: 2019-10-03
Attending: PHYSICIAN ASSISTANT | Admitting: INTERNAL MEDICINE
Payer: COMMERCIAL

## 2019-01-01 ENCOUNTER — DOCUMENTATION ONLY (OUTPATIENT)
Dept: ONCOLOGY | Facility: CLINIC | Age: 49
End: 2019-01-01

## 2019-01-01 VITALS
BODY MASS INDEX: 18.47 KG/M2 | HEART RATE: 94 BPM | OXYGEN SATURATION: 98 % | WEIGHT: 107.6 LBS | DIASTOLIC BLOOD PRESSURE: 86 MMHG | SYSTOLIC BLOOD PRESSURE: 123 MMHG | RESPIRATION RATE: 18 BRPM | TEMPERATURE: 97.9 F

## 2019-01-01 VITALS
OXYGEN SATURATION: 98 % | SYSTOLIC BLOOD PRESSURE: 116 MMHG | BODY MASS INDEX: 18.46 KG/M2 | DIASTOLIC BLOOD PRESSURE: 73 MMHG | WEIGHT: 107.6 LBS | RESPIRATION RATE: 16 BRPM | HEART RATE: 86 BPM | TEMPERATURE: 98.2 F

## 2019-01-01 VITALS
DIASTOLIC BLOOD PRESSURE: 94 MMHG | SYSTOLIC BLOOD PRESSURE: 137 MMHG | TEMPERATURE: 98.1 F | HEART RATE: 89 BPM | BODY MASS INDEX: 18.61 KG/M2 | HEIGHT: 64 IN | WEIGHT: 109 LBS | OXYGEN SATURATION: 95 % | RESPIRATION RATE: 16 BRPM

## 2019-01-01 VITALS
SYSTOLIC BLOOD PRESSURE: 112 MMHG | TEMPERATURE: 97.3 F | WEIGHT: 109.1 LBS | DIASTOLIC BLOOD PRESSURE: 71 MMHG | HEIGHT: 64 IN | HEART RATE: 96 BPM | RESPIRATION RATE: 16 BRPM | OXYGEN SATURATION: 97 % | BODY MASS INDEX: 18.63 KG/M2

## 2019-01-01 VITALS
TEMPERATURE: 98.2 F | HEART RATE: 89 BPM | BODY MASS INDEX: 18.34 KG/M2 | HEIGHT: 64 IN | OXYGEN SATURATION: 99 % | SYSTOLIC BLOOD PRESSURE: 116 MMHG | WEIGHT: 107.4 LBS | DIASTOLIC BLOOD PRESSURE: 77 MMHG

## 2019-01-01 VITALS
OXYGEN SATURATION: 95 % | DIASTOLIC BLOOD PRESSURE: 82 MMHG | SYSTOLIC BLOOD PRESSURE: 109 MMHG | HEIGHT: 64 IN | BODY MASS INDEX: 18.3 KG/M2 | WEIGHT: 107.2 LBS | HEART RATE: 85 BPM | RESPIRATION RATE: 16 BRPM | TEMPERATURE: 97.4 F

## 2019-01-01 VITALS
SYSTOLIC BLOOD PRESSURE: 127 MMHG | HEIGHT: 64 IN | RESPIRATION RATE: 16 BRPM | HEART RATE: 99 BPM | WEIGHT: 99.1 LBS | BODY MASS INDEX: 16.92 KG/M2 | TEMPERATURE: 97 F | DIASTOLIC BLOOD PRESSURE: 90 MMHG | OXYGEN SATURATION: 96 %

## 2019-01-01 VITALS
WEIGHT: 107 LBS | HEART RATE: 87 BPM | TEMPERATURE: 98.1 F | SYSTOLIC BLOOD PRESSURE: 102 MMHG | DIASTOLIC BLOOD PRESSURE: 70 MMHG | BODY MASS INDEX: 18.36 KG/M2 | OXYGEN SATURATION: 99 % | RESPIRATION RATE: 16 BRPM

## 2019-01-01 VITALS
DIASTOLIC BLOOD PRESSURE: 73 MMHG | HEART RATE: 86 BPM | BODY MASS INDEX: 18.01 KG/M2 | WEIGHT: 105 LBS | SYSTOLIC BLOOD PRESSURE: 108 MMHG | RESPIRATION RATE: 18 BRPM | OXYGEN SATURATION: 98 % | TEMPERATURE: 98.6 F

## 2019-01-01 VITALS
HEIGHT: 64 IN | TEMPERATURE: 98.7 F | SYSTOLIC BLOOD PRESSURE: 122 MMHG | BODY MASS INDEX: 16.37 KG/M2 | DIASTOLIC BLOOD PRESSURE: 81 MMHG | HEART RATE: 125 BPM | RESPIRATION RATE: 16 BRPM | OXYGEN SATURATION: 95 % | WEIGHT: 95.9 LBS

## 2019-01-01 VITALS
OXYGEN SATURATION: 98 % | DIASTOLIC BLOOD PRESSURE: 78 MMHG | HEIGHT: 64 IN | SYSTOLIC BLOOD PRESSURE: 115 MMHG | HEART RATE: 89 BPM | TEMPERATURE: 98.5 F | WEIGHT: 100.7 LBS | BODY MASS INDEX: 17.19 KG/M2

## 2019-01-01 VITALS
BODY MASS INDEX: 17.99 KG/M2 | WEIGHT: 105.38 LBS | SYSTOLIC BLOOD PRESSURE: 112 MMHG | HEART RATE: 102 BPM | OXYGEN SATURATION: 97 % | DIASTOLIC BLOOD PRESSURE: 81 MMHG | HEIGHT: 64 IN | RESPIRATION RATE: 16 BRPM | TEMPERATURE: 97.6 F

## 2019-01-01 VITALS
HEART RATE: 88 BPM | RESPIRATION RATE: 16 BRPM | OXYGEN SATURATION: 97 % | BODY MASS INDEX: 17.89 KG/M2 | DIASTOLIC BLOOD PRESSURE: 73 MMHG | TEMPERATURE: 96.8 F | WEIGHT: 104.3 LBS | SYSTOLIC BLOOD PRESSURE: 109 MMHG

## 2019-01-01 VITALS
BODY MASS INDEX: 18.03 KG/M2 | DIASTOLIC BLOOD PRESSURE: 74 MMHG | HEART RATE: 91 BPM | SYSTOLIC BLOOD PRESSURE: 104 MMHG | WEIGHT: 105.6 LBS | TEMPERATURE: 98.5 F | RESPIRATION RATE: 18 BRPM | OXYGEN SATURATION: 100 % | HEIGHT: 64 IN

## 2019-01-01 VITALS
TEMPERATURE: 97.8 F | OXYGEN SATURATION: 99 % | WEIGHT: 107.8 LBS | BODY MASS INDEX: 18.4 KG/M2 | SYSTOLIC BLOOD PRESSURE: 118 MMHG | HEIGHT: 64 IN | DIASTOLIC BLOOD PRESSURE: 80 MMHG | RESPIRATION RATE: 16 BRPM | HEART RATE: 80 BPM

## 2019-01-01 VITALS
OXYGEN SATURATION: 100 % | RESPIRATION RATE: 16 BRPM | TEMPERATURE: 98 F | SYSTOLIC BLOOD PRESSURE: 124 MMHG | WEIGHT: 103.7 LBS | DIASTOLIC BLOOD PRESSURE: 79 MMHG | BODY MASS INDEX: 17.79 KG/M2 | HEART RATE: 89 BPM

## 2019-01-01 VITALS
TEMPERATURE: 97.4 F | SYSTOLIC BLOOD PRESSURE: 133 MMHG | WEIGHT: 99.9 LBS | SYSTOLIC BLOOD PRESSURE: 119 MMHG | DIASTOLIC BLOOD PRESSURE: 99 MMHG | OXYGEN SATURATION: 97 % | BODY MASS INDEX: 17.14 KG/M2 | RESPIRATION RATE: 18 BRPM | TEMPERATURE: 99.6 F | HEART RATE: 86 BPM | HEART RATE: 103 BPM | RESPIRATION RATE: 16 BRPM | OXYGEN SATURATION: 94 % | DIASTOLIC BLOOD PRESSURE: 85 MMHG

## 2019-01-01 VITALS
DIASTOLIC BLOOD PRESSURE: 92 MMHG | WEIGHT: 98.4 LBS | OXYGEN SATURATION: 99 % | BODY MASS INDEX: 16.88 KG/M2 | SYSTOLIC BLOOD PRESSURE: 139 MMHG | HEART RATE: 125 BPM

## 2019-01-01 VITALS
RESPIRATION RATE: 16 BRPM | BODY MASS INDEX: 18.13 KG/M2 | OXYGEN SATURATION: 99 % | HEART RATE: 72 BPM | SYSTOLIC BLOOD PRESSURE: 114 MMHG | DIASTOLIC BLOOD PRESSURE: 76 MMHG | HEIGHT: 64 IN | TEMPERATURE: 98.3 F | WEIGHT: 106.2 LBS

## 2019-01-01 VITALS
DIASTOLIC BLOOD PRESSURE: 83 MMHG | SYSTOLIC BLOOD PRESSURE: 118 MMHG | HEART RATE: 106 BPM | WEIGHT: 100 LBS | BODY MASS INDEX: 17.16 KG/M2

## 2019-01-01 VITALS — SYSTOLIC BLOOD PRESSURE: 112 MMHG | DIASTOLIC BLOOD PRESSURE: 81 MMHG | HEART RATE: 101 BPM

## 2019-01-01 VITALS
SYSTOLIC BLOOD PRESSURE: 118 MMHG | HEART RATE: 83 BPM | WEIGHT: 104 LBS | DIASTOLIC BLOOD PRESSURE: 82 MMHG | BODY MASS INDEX: 17.84 KG/M2

## 2019-01-01 VITALS
DIASTOLIC BLOOD PRESSURE: 85 MMHG | OXYGEN SATURATION: 95 % | BODY MASS INDEX: 16.32 KG/M2 | WEIGHT: 95.6 LBS | RESPIRATION RATE: 16 BRPM | TEMPERATURE: 99 F | HEART RATE: 117 BPM | SYSTOLIC BLOOD PRESSURE: 117 MMHG | HEIGHT: 64 IN

## 2019-01-01 VITALS
DIASTOLIC BLOOD PRESSURE: 83 MMHG | SYSTOLIC BLOOD PRESSURE: 121 MMHG | BODY MASS INDEX: 16.64 KG/M2 | TEMPERATURE: 98 F | HEART RATE: 119 BPM | WEIGHT: 97 LBS | RESPIRATION RATE: 18 BRPM | OXYGEN SATURATION: 97 %

## 2019-01-01 VITALS
WEIGHT: 108.4 LBS | DIASTOLIC BLOOD PRESSURE: 81 MMHG | RESPIRATION RATE: 16 BRPM | OXYGEN SATURATION: 95 % | BODY MASS INDEX: 18.61 KG/M2 | TEMPERATURE: 98.2 F | HEART RATE: 93 BPM | SYSTOLIC BLOOD PRESSURE: 122 MMHG

## 2019-01-01 VITALS
OXYGEN SATURATION: 97 % | SYSTOLIC BLOOD PRESSURE: 107 MMHG | WEIGHT: 108.9 LBS | BODY MASS INDEX: 18.69 KG/M2 | TEMPERATURE: 98.7 F | RESPIRATION RATE: 16 BRPM | DIASTOLIC BLOOD PRESSURE: 76 MMHG | HEART RATE: 108 BPM

## 2019-01-01 VITALS
SYSTOLIC BLOOD PRESSURE: 140 MMHG | HEART RATE: 92 BPM | OXYGEN SATURATION: 98 % | TEMPERATURE: 98.6 F | DIASTOLIC BLOOD PRESSURE: 90 MMHG | RESPIRATION RATE: 16 BRPM

## 2019-01-01 VITALS
RESPIRATION RATE: 16 BRPM | TEMPERATURE: 97.8 F | HEART RATE: 104 BPM | BODY MASS INDEX: 17.04 KG/M2 | HEIGHT: 64 IN | SYSTOLIC BLOOD PRESSURE: 104 MMHG | DIASTOLIC BLOOD PRESSURE: 76 MMHG | OXYGEN SATURATION: 98 % | WEIGHT: 99.8 LBS

## 2019-01-01 VITALS
BODY MASS INDEX: 16.98 KG/M2 | SYSTOLIC BLOOD PRESSURE: 127 MMHG | HEART RATE: 122 BPM | DIASTOLIC BLOOD PRESSURE: 90 MMHG | WEIGHT: 99 LBS

## 2019-01-01 VITALS
DIASTOLIC BLOOD PRESSURE: 78 MMHG | SYSTOLIC BLOOD PRESSURE: 108 MMHG | WEIGHT: 96.6 LBS | OXYGEN SATURATION: 96 % | TEMPERATURE: 98 F | HEART RATE: 111 BPM | BODY MASS INDEX: 16.57 KG/M2

## 2019-01-01 DIAGNOSIS — C50.919 METASTATIC BREAST CANCER: ICD-10-CM

## 2019-01-01 DIAGNOSIS — C79.51 BONE METASTASIS: Primary | ICD-10-CM

## 2019-01-01 DIAGNOSIS — C50.519 MALIGNANT NEOPLASM OF LOWER-OUTER QUADRANT OF FEMALE BREAST, UNSPECIFIED ESTROGEN RECEPTOR STATUS, UNSPECIFIED LATERALITY (H): ICD-10-CM

## 2019-01-01 DIAGNOSIS — C50.919 METASTATIC BREAST CANCER: Primary | ICD-10-CM

## 2019-01-01 DIAGNOSIS — C79.31 BRAIN METASTASIS: Primary | ICD-10-CM

## 2019-01-01 DIAGNOSIS — C79.51 BONE METASTASIS: ICD-10-CM

## 2019-01-01 DIAGNOSIS — K12.30 MUCOSITIS: ICD-10-CM

## 2019-01-01 DIAGNOSIS — R06.02 SHORTNESS OF BREATH: ICD-10-CM

## 2019-01-01 DIAGNOSIS — G51.0 7TH NERVE PALSY: Primary | ICD-10-CM

## 2019-01-01 DIAGNOSIS — R29.90 STROKE-LIKE SYMPTOM: ICD-10-CM

## 2019-01-01 DIAGNOSIS — C79.31 SECONDARY MALIGNANT NEOPLASM OF BRAIN AND SPINAL CORD (H): Primary | ICD-10-CM

## 2019-01-01 DIAGNOSIS — K12.31 MUCOSITIS DUE TO CHEMOTHERAPY: Primary | ICD-10-CM

## 2019-01-01 DIAGNOSIS — B37.0 THRUSH OF MOUTH AND ESOPHAGUS (H): Primary | ICD-10-CM

## 2019-01-01 DIAGNOSIS — J94.8 HYDROPNEUMOTHORAX: ICD-10-CM

## 2019-01-01 DIAGNOSIS — R07.9 CHEST PAIN, UNSPECIFIED TYPE: ICD-10-CM

## 2019-01-01 DIAGNOSIS — L98.9 LESION OF SUBCUTANEOUS TISSUE: Primary | ICD-10-CM

## 2019-01-01 DIAGNOSIS — R06.02 SHORTNESS OF BREATH: Primary | ICD-10-CM

## 2019-01-01 DIAGNOSIS — Z85.3 PERSONAL HISTORY OF MALIGNANT NEOPLASM OF BREAST: ICD-10-CM

## 2019-01-01 DIAGNOSIS — C50.519 MALIGNANT NEOPLASM OF LOWER-OUTER QUADRANT OF FEMALE BREAST, UNSPECIFIED ESTROGEN RECEPTOR STATUS, UNSPECIFIED LATERALITY (H): Primary | ICD-10-CM

## 2019-01-01 DIAGNOSIS — B37.31 YEAST INFECTION OF THE VAGINA: ICD-10-CM

## 2019-01-01 DIAGNOSIS — C79.49 SECONDARY MALIGNANT NEOPLASM OF BRAIN AND SPINAL CORD (H): Primary | ICD-10-CM

## 2019-01-01 DIAGNOSIS — C79.31 BRAIN METASTASIS: ICD-10-CM

## 2019-01-01 DIAGNOSIS — N89.8 VAGINAL IRRITATION: Primary | ICD-10-CM

## 2019-01-01 DIAGNOSIS — L27.0 DRUG-INDUCED SKIN RASH: Primary | ICD-10-CM

## 2019-01-01 DIAGNOSIS — B37.81 THRUSH OF MOUTH AND ESOPHAGUS (H): Primary | ICD-10-CM

## 2019-01-01 DIAGNOSIS — K12.30 MUCOSITIS: Primary | ICD-10-CM

## 2019-01-01 DIAGNOSIS — Z23 NEED FOR PROPHYLACTIC VACCINATION AND INOCULATION AGAINST INFLUENZA: Primary | ICD-10-CM

## 2019-01-01 DIAGNOSIS — J90 PLEURAL EFFUSION: ICD-10-CM

## 2019-01-01 DIAGNOSIS — Z79.899 ENCOUNTER FOR LONG-TERM (CURRENT) USE OF MEDICATIONS: ICD-10-CM

## 2019-01-01 DIAGNOSIS — R29.810 FACIAL DROOP: Primary | ICD-10-CM

## 2019-01-01 DIAGNOSIS — M54.6 BILATERAL THORACIC BACK PAIN, UNSPECIFIED CHRONICITY: ICD-10-CM

## 2019-01-01 DIAGNOSIS — Z74.09 DECREASED FUNCTIONAL MOBILITY AND ENDURANCE: ICD-10-CM

## 2019-01-01 DIAGNOSIS — B37.31 YEAST INFECTION OF THE VAGINA: Primary | ICD-10-CM

## 2019-01-01 DIAGNOSIS — R19.7 DIARRHEA, UNSPECIFIED TYPE: ICD-10-CM

## 2019-01-01 LAB
ALBUMIN SERPL-MCNC: 2.6 G/DL (ref 3.4–5)
ALBUMIN SERPL-MCNC: 2.6 G/DL (ref 3.4–5)
ALBUMIN SERPL-MCNC: 2.7 G/DL (ref 3.4–5)
ALBUMIN SERPL-MCNC: 2.9 G/DL (ref 3.4–5)
ALBUMIN SERPL-MCNC: 3 G/DL (ref 3.4–5)
ALBUMIN SERPL-MCNC: 3.1 G/DL (ref 3.4–5)
ALBUMIN SERPL-MCNC: 3.1 G/DL (ref 3.4–5)
ALBUMIN SERPL-MCNC: 3.2 G/DL (ref 3.4–5)
ALBUMIN SERPL-MCNC: 3.3 G/DL (ref 3.4–5)
ALBUMIN SERPL-MCNC: 3.4 G/DL (ref 3.4–5)
ALBUMIN SERPL-MCNC: 3.5 G/DL (ref 3.4–5)
ALBUMIN SERPL-MCNC: 3.6 G/DL (ref 3.4–5)
ALBUMIN UR-MCNC: 10 MG/DL
ALP SERPL-CCNC: 103 U/L (ref 40–150)
ALP SERPL-CCNC: 105 U/L (ref 40–150)
ALP SERPL-CCNC: 120 U/L (ref 40–150)
ALP SERPL-CCNC: 147 U/L (ref 40–150)
ALP SERPL-CCNC: 151 U/L (ref 40–150)
ALP SERPL-CCNC: 154 U/L (ref 40–150)
ALP SERPL-CCNC: 160 U/L (ref 40–150)
ALP SERPL-CCNC: 162 U/L (ref 40–150)
ALP SERPL-CCNC: 174 U/L (ref 40–150)
ALP SERPL-CCNC: 181 U/L (ref 40–150)
ALP SERPL-CCNC: 197 U/L (ref 40–150)
ALP SERPL-CCNC: 212 U/L (ref 40–150)
ALP SERPL-CCNC: 213 U/L (ref 40–150)
ALP SERPL-CCNC: 310 U/L (ref 40–150)
ALP SERPL-CCNC: 418 U/L (ref 40–150)
ALP SERPL-CCNC: 423 U/L (ref 40–150)
ALP SERPL-CCNC: 443 U/L (ref 40–150)
ALP SERPL-CCNC: 637 U/L (ref 40–150)
ALP SERPL-CCNC: 92 U/L (ref 40–150)
ALP SERPL-CCNC: 92 U/L (ref 40–150)
ALP SERPL-CCNC: 93 U/L (ref 40–150)
ALP SERPL-CCNC: 95 U/L (ref 40–150)
ALT SERPL W P-5'-P-CCNC: 13 U/L (ref 0–50)
ALT SERPL W P-5'-P-CCNC: 17 U/L (ref 0–50)
ALT SERPL W P-5'-P-CCNC: 18 U/L (ref 0–50)
ALT SERPL W P-5'-P-CCNC: 19 U/L (ref 0–50)
ALT SERPL W P-5'-P-CCNC: 19 U/L (ref 0–50)
ALT SERPL W P-5'-P-CCNC: 20 U/L (ref 0–50)
ALT SERPL W P-5'-P-CCNC: 22 U/L (ref 0–50)
ALT SERPL W P-5'-P-CCNC: 24 U/L (ref 0–50)
ALT SERPL W P-5'-P-CCNC: 25 U/L (ref 0–50)
ALT SERPL W P-5'-P-CCNC: 27 U/L (ref 0–50)
ALT SERPL W P-5'-P-CCNC: 35 U/L (ref 0–50)
ALT SERPL W P-5'-P-CCNC: 44 U/L (ref 0–50)
ALT SERPL W P-5'-P-CCNC: 46 U/L (ref 0–50)
ALT SERPL W P-5'-P-CCNC: 48 U/L (ref 0–50)
ALT SERPL W P-5'-P-CCNC: 74 U/L (ref 0–50)
ANION GAP SERPL CALCULATED.3IONS-SCNC: 5 MMOL/L (ref 3–14)
ANION GAP SERPL CALCULATED.3IONS-SCNC: 6 MMOL/L (ref 3–14)
ANION GAP SERPL CALCULATED.3IONS-SCNC: 7 MMOL/L (ref 3–14)
ANION GAP SERPL CALCULATED.3IONS-SCNC: 8 MMOL/L (ref 3–14)
ANION GAP SERPL CALCULATED.3IONS-SCNC: 9 MMOL/L (ref 3–14)
ANION GAP SERPL CALCULATED.3IONS-SCNC: 9 MMOL/L (ref 3–14)
APPEARANCE CSF: CLEAR
APPEARANCE UR: CLEAR
APTT PPP: 25 SEC (ref 22–37)
APTT PPP: 30 SEC (ref 22–37)
AST SERPL W P-5'-P-CCNC: 131 U/L (ref 0–45)
AST SERPL W P-5'-P-CCNC: 147 U/L (ref 0–45)
AST SERPL W P-5'-P-CCNC: 30 U/L (ref 0–45)
AST SERPL W P-5'-P-CCNC: 34 U/L (ref 0–45)
AST SERPL W P-5'-P-CCNC: 35 U/L (ref 0–45)
AST SERPL W P-5'-P-CCNC: 36 U/L (ref 0–45)
AST SERPL W P-5'-P-CCNC: 39 U/L (ref 0–45)
AST SERPL W P-5'-P-CCNC: 45 U/L (ref 0–45)
AST SERPL W P-5'-P-CCNC: 47 U/L (ref 0–45)
AST SERPL W P-5'-P-CCNC: 50 U/L (ref 0–45)
AST SERPL W P-5'-P-CCNC: 51 U/L (ref 0–45)
AST SERPL W P-5'-P-CCNC: 53 U/L (ref 0–45)
AST SERPL W P-5'-P-CCNC: 57 U/L (ref 0–45)
AST SERPL W P-5'-P-CCNC: 63 U/L (ref 0–45)
AST SERPL W P-5'-P-CCNC: 74 U/L (ref 0–45)
AST SERPL W P-5'-P-CCNC: 84 U/L (ref 0–45)
AST SERPL W P-5'-P-CCNC: 89 U/L (ref 0–45)
AST SERPL W P-5'-P-CCNC: 93 U/L (ref 0–45)
AST SERPL W P-5'-P-CCNC: 97 U/L (ref 0–45)
AST SERPL W P-5'-P-CCNC: ABNORMAL U/L (ref 0–45)
B-HCG FREE SERPL-ACNC: 1.2 [IU]/L (ref 0.86–1.14)
BACTERIA SPEC CULT: ABNORMAL
BACTERIA SPEC CULT: NO GROWTH
BACTERIA SPEC CULT: NO GROWTH
BASOPHILS # BLD AUTO: 0 10E9/L (ref 0–0.2)
BASOPHILS # BLD AUTO: 0.1 10E9/L (ref 0–0.2)
BASOPHILS # BLD AUTO: 0.1 10E9/L (ref 0–0.2)
BASOPHILS NFR BLD AUTO: 0.2 %
BASOPHILS NFR BLD AUTO: 0.2 %
BASOPHILS NFR BLD AUTO: 0.4 %
BASOPHILS NFR BLD AUTO: 0.4 %
BASOPHILS NFR BLD AUTO: 0.5 %
BASOPHILS NFR BLD AUTO: 0.6 %
BASOPHILS NFR BLD AUTO: 0.7 %
BASOPHILS NFR BLD AUTO: 0.8 %
BASOPHILS NFR BLD AUTO: 0.8 %
BASOPHILS NFR BLD AUTO: 0.9 %
BASOPHILS NFR BLD AUTO: 1 %
BASOPHILS NFR BLD AUTO: 1.1 %
BASOPHILS NFR BLD AUTO: 1.1 %
BILIRUB SERPL-MCNC: 0.2 MG/DL (ref 0.2–1.3)
BILIRUB SERPL-MCNC: 0.3 MG/DL (ref 0.2–1.3)
BILIRUB SERPL-MCNC: 0.4 MG/DL (ref 0.2–1.3)
BILIRUB SERPL-MCNC: 0.4 MG/DL (ref 0.2–1.3)
BILIRUB SERPL-MCNC: 0.5 MG/DL (ref 0.2–1.3)
BILIRUB SERPL-MCNC: 0.6 MG/DL (ref 0.2–1.3)
BILIRUB UR QL STRIP: NEGATIVE
BUN SERPL-MCNC: 10 MG/DL (ref 7–30)
BUN SERPL-MCNC: 10 MG/DL (ref 7–30)
BUN SERPL-MCNC: 11 MG/DL (ref 7–30)
BUN SERPL-MCNC: 12 MG/DL (ref 7–30)
BUN SERPL-MCNC: 12 MG/DL (ref 7–30)
BUN SERPL-MCNC: 14 MG/DL (ref 7–30)
BUN SERPL-MCNC: 8 MG/DL (ref 7–30)
BUN SERPL-MCNC: 9 MG/DL (ref 7–30)
C COLI+JEJUNI+LARI FUSA STL QL NAA+PROBE: NOT DETECTED
CA-I SERPL ISE-MCNC: 4.7 MG/DL (ref 4.4–5.2)
CALCIUM SERPL-MCNC: 10 MG/DL (ref 8.5–10.1)
CALCIUM SERPL-MCNC: 10 MG/DL (ref 8.5–10.1)
CALCIUM SERPL-MCNC: 10.2 MG/DL (ref 8.5–10.1)
CALCIUM SERPL-MCNC: 8.7 MG/DL (ref 8.5–10.1)
CALCIUM SERPL-MCNC: 9 MG/DL (ref 8.5–10.1)
CALCIUM SERPL-MCNC: 9.4 MG/DL (ref 8.5–10.1)
CALCIUM SERPL-MCNC: 9.5 MG/DL (ref 8.5–10.1)
CALCIUM SERPL-MCNC: 9.6 MG/DL (ref 8.5–10.1)
CALCIUM SERPL-MCNC: 9.7 MG/DL (ref 8.5–10.1)
CALCIUM SERPL-MCNC: 9.8 MG/DL (ref 8.5–10.1)
CALCIUM SERPL-MCNC: 9.8 MG/DL (ref 8.5–10.1)
CALCIUM SERPL-MCNC: 9.9 MG/DL (ref 8.5–10.1)
CALCIUM SERPL-MCNC: 9.9 MG/DL (ref 8.5–10.1)
CANCER AG27-29 SERPL-ACNC: 1011 U/ML (ref 0–39)
CANCER AG27-29 SERPL-ACNC: 1159 U/ML (ref 0–39)
CANCER AG27-29 SERPL-ACNC: 1776 U/ML (ref 0–39)
CANCER AG27-29 SERPL-ACNC: 495 U/ML (ref 0–39)
CANCER AG27-29 SERPL-ACNC: 524 U/ML (ref 0–39)
CANCER AG27-29 SERPL-ACNC: 531 U/ML (ref 0–39)
CANCER AG27-29 SERPL-ACNC: 546 U/ML (ref 0–39)
CANCER AG27-29 SERPL-ACNC: 558 U/ML (ref 0–39)
CANCER AG27-29 SERPL-ACNC: 563 U/ML (ref 0–39)
CANCER AG27-29 SERPL-ACNC: 594 U/ML (ref 0–39)
CANCER AG27-29 SERPL-ACNC: 625 U/ML (ref 0–39)
CANCER AG27-29 SERPL-ACNC: 658 U/ML (ref 0–39)
CANCER AG27-29 SERPL-ACNC: 745 U/ML (ref 0–39)
CANCER AG27-29 SERPL-ACNC: 832 U/ML (ref 0–39)
CANCER AG27-29 SERPL-ACNC: 962 U/ML (ref 0–39)
CEA SERPL-MCNC: 104.8 UG/L (ref 0–2.5)
CEA SERPL-MCNC: 132.6 UG/L (ref 0–2.5)
CEA SERPL-MCNC: 150.4 UG/L (ref 0–2.5)
CEA SERPL-MCNC: 161.2 UG/L (ref 0–2.5)
CEA SERPL-MCNC: 176.4 UG/L (ref 0–2.5)
CEA SERPL-MCNC: 180.4 UG/L (ref 0–2.5)
CEA SERPL-MCNC: 71.5 UG/L (ref 0–2.5)
CEA SERPL-MCNC: 75.2 UG/L (ref 0–2.5)
CEA SERPL-MCNC: 75.7 UG/L (ref 0–2.5)
CEA SERPL-MCNC: 76.7 UG/L (ref 0–2.5)
CEA SERPL-MCNC: 79.9 UG/L (ref 0–2.5)
CEA SERPL-MCNC: 80.6 UG/L (ref 0–2.5)
CEA SERPL-MCNC: 81.8 UG/L (ref 0–2.5)
CEA SERPL-MCNC: 83.4 UG/L (ref 0–2.5)
CEA SERPL-MCNC: 88.7 UG/L (ref 0–2.5)
CHLORIDE SERPL-SCNC: 100 MMOL/L (ref 94–109)
CHLORIDE SERPL-SCNC: 101 MMOL/L (ref 94–109)
CHLORIDE SERPL-SCNC: 101 MMOL/L (ref 94–109)
CHLORIDE SERPL-SCNC: 102 MMOL/L (ref 94–109)
CHLORIDE SERPL-SCNC: 103 MMOL/L (ref 94–109)
CHLORIDE SERPL-SCNC: 105 MMOL/L (ref 94–109)
CHLORIDE SERPL-SCNC: 107 MMOL/L (ref 94–109)
CHLORIDE SERPL-SCNC: 94 MMOL/L (ref 94–109)
CHLORIDE SERPL-SCNC: 96 MMOL/L (ref 94–109)
CHLORIDE SERPL-SCNC: 97 MMOL/L (ref 94–109)
CHLORIDE SERPL-SCNC: 99 MMOL/L (ref 94–109)
CHOLEST SERPL-MCNC: 166 MG/DL
CHOLEST SERPL-MCNC: 187 MG/DL
CO2 BLDCOV-SCNC: 29 MMOL/L (ref 21–28)
CO2 SERPL-SCNC: 24 MMOL/L (ref 20–32)
CO2 SERPL-SCNC: 25 MMOL/L (ref 20–32)
CO2 SERPL-SCNC: 26 MMOL/L (ref 20–32)
CO2 SERPL-SCNC: 27 MMOL/L (ref 20–32)
CO2 SERPL-SCNC: 27 MMOL/L (ref 20–32)
CO2 SERPL-SCNC: 28 MMOL/L (ref 20–32)
CO2 SERPL-SCNC: 29 MMOL/L (ref 20–32)
CO2 SERPL-SCNC: 30 MMOL/L (ref 20–32)
CO2 SERPL-SCNC: 31 MMOL/L (ref 20–32)
COLOR CSF: COLORLESS
COLOR UR AUTO: YELLOW
COPATH REPORT: NORMAL
CREAT BLD-MCNC: 0.5 MG/DL (ref 0.52–1.04)
CREAT SERPL-MCNC: 0.47 MG/DL (ref 0.52–1.04)
CREAT SERPL-MCNC: 0.5 MG/DL (ref 0.52–1.04)
CREAT SERPL-MCNC: 0.51 MG/DL (ref 0.52–1.04)
CREAT SERPL-MCNC: 0.52 MG/DL (ref 0.52–1.04)
CREAT SERPL-MCNC: 0.54 MG/DL (ref 0.52–1.04)
CREAT SERPL-MCNC: 0.55 MG/DL (ref 0.52–1.04)
CREAT SERPL-MCNC: 0.56 MG/DL (ref 0.52–1.04)
CREAT SERPL-MCNC: 0.57 MG/DL (ref 0.52–1.04)
CREAT SERPL-MCNC: 0.58 MG/DL (ref 0.52–1.04)
CREAT SERPL-MCNC: 0.58 MG/DL (ref 0.52–1.04)
CREAT SERPL-MCNC: 0.59 MG/DL (ref 0.52–1.04)
CREAT SERPL-MCNC: 0.59 MG/DL (ref 0.52–1.04)
CREAT SERPL-MCNC: 0.6 MG/DL (ref 0.52–1.04)
CREAT SERPL-MCNC: 0.61 MG/DL (ref 0.52–1.04)
CREAT SERPL-MCNC: 0.63 MG/DL (ref 0.52–1.04)
CREAT SERPL-MCNC: 0.65 MG/DL (ref 0.52–1.04)
D DIMER PPP FEU-MCNC: 4.9 UG/ML FEU (ref 0–0.5)
DEPRECATED S PYO AG THROAT QL EIA: NORMAL
DIFFERENTIAL METHOD BLD: ABNORMAL
DIFFERENTIAL METHOD BLD: NORMAL
EC STX1 GENE STL QL NAA+PROBE: NOT DETECTED
EC STX2 GENE STL QL NAA+PROBE: NOT DETECTED
ENTERIC PATHOGEN COMMENT: NORMAL
EOSINOPHIL # BLD AUTO: 0 10E9/L (ref 0–0.7)
EOSINOPHIL # BLD AUTO: 0.1 10E9/L (ref 0–0.7)
EOSINOPHIL NFR BLD AUTO: 0 %
EOSINOPHIL NFR BLD AUTO: 0.2 %
EOSINOPHIL NFR BLD AUTO: 0.3 %
EOSINOPHIL NFR BLD AUTO: 0.4 %
EOSINOPHIL NFR BLD AUTO: 0.4 %
EOSINOPHIL NFR BLD AUTO: 0.5 %
EOSINOPHIL NFR BLD AUTO: 0.5 %
EOSINOPHIL NFR BLD AUTO: 0.6 %
EOSINOPHIL NFR BLD AUTO: 0.6 %
EOSINOPHIL NFR BLD AUTO: 0.8 %
EOSINOPHIL NFR BLD AUTO: 0.9 %
EOSINOPHIL NFR BLD AUTO: 0.9 %
EOSINOPHIL NFR BLD AUTO: 1 %
EOSINOPHIL NFR BLD AUTO: 1 %
EOSINOPHIL NFR BLD AUTO: 1.1 %
EOSINOPHIL NFR BLD AUTO: 1.1 %
EOSINOPHIL NFR BLD AUTO: 1.3 %
EOSINOPHIL NFR BLD AUTO: 1.4 %
EOSINOPHIL NFR BLD AUTO: 1.6 %
EOSINOPHIL NFR BLD AUTO: 1.9 %
EOSINOPHIL NFR BLD AUTO: 2.1 %
ERYTHROCYTE [DISTWIDTH] IN BLOOD BY AUTOMATED COUNT: 13.8 % (ref 10–15)
ERYTHROCYTE [DISTWIDTH] IN BLOOD BY AUTOMATED COUNT: 14.3 % (ref 10–15)
ERYTHROCYTE [DISTWIDTH] IN BLOOD BY AUTOMATED COUNT: 14.5 % (ref 10–15)
ERYTHROCYTE [DISTWIDTH] IN BLOOD BY AUTOMATED COUNT: 14.6 % (ref 10–15)
ERYTHROCYTE [DISTWIDTH] IN BLOOD BY AUTOMATED COUNT: 14.8 % (ref 10–15)
ERYTHROCYTE [DISTWIDTH] IN BLOOD BY AUTOMATED COUNT: 14.8 % (ref 10–15)
ERYTHROCYTE [DISTWIDTH] IN BLOOD BY AUTOMATED COUNT: 14.9 % (ref 10–15)
ERYTHROCYTE [DISTWIDTH] IN BLOOD BY AUTOMATED COUNT: 14.9 % (ref 10–15)
ERYTHROCYTE [DISTWIDTH] IN BLOOD BY AUTOMATED COUNT: 15.3 % (ref 10–15)
ERYTHROCYTE [DISTWIDTH] IN BLOOD BY AUTOMATED COUNT: 15.6 % (ref 10–15)
ERYTHROCYTE [DISTWIDTH] IN BLOOD BY AUTOMATED COUNT: 15.8 % (ref 10–15)
ERYTHROCYTE [DISTWIDTH] IN BLOOD BY AUTOMATED COUNT: 15.9 % (ref 10–15)
ERYTHROCYTE [DISTWIDTH] IN BLOOD BY AUTOMATED COUNT: 16 % (ref 10–15)
ERYTHROCYTE [DISTWIDTH] IN BLOOD BY AUTOMATED COUNT: 16.1 % (ref 10–15)
ERYTHROCYTE [DISTWIDTH] IN BLOOD BY AUTOMATED COUNT: 16.5 % (ref 10–15)
ERYTHROCYTE [DISTWIDTH] IN BLOOD BY AUTOMATED COUNT: 16.6 % (ref 10–15)
ERYTHROCYTE [DISTWIDTH] IN BLOOD BY AUTOMATED COUNT: 16.8 % (ref 10–15)
ERYTHROCYTE [DISTWIDTH] IN BLOOD BY AUTOMATED COUNT: 16.8 % (ref 10–15)
ERYTHROCYTE [DISTWIDTH] IN BLOOD BY AUTOMATED COUNT: 16.9 % (ref 10–15)
ERYTHROCYTE [DISTWIDTH] IN BLOOD BY AUTOMATED COUNT: 17.2 % (ref 10–15)
FLUAV+FLUBV AG SPEC QL: NEGATIVE
FLUAV+FLUBV AG SPEC QL: NEGATIVE
GFR SERPL CREATININE-BSD FRML MDRD: >90 ML/MIN/{1.73_M2}
GLUCOSE BLDC GLUCOMTR-MCNC: 101 MG/DL (ref 70–99)
GLUCOSE BLDC GLUCOMTR-MCNC: 106 MG/DL (ref 70–99)
GLUCOSE BLDC GLUCOMTR-MCNC: 131 MG/DL (ref 70–99)
GLUCOSE BLDC GLUCOMTR-MCNC: 162 MG/DL (ref 70–99)
GLUCOSE BLDC GLUCOMTR-MCNC: 79 MG/DL (ref 70–99)
GLUCOSE BLDC GLUCOMTR-MCNC: 80 MG/DL (ref 70–99)
GLUCOSE BLDC GLUCOMTR-MCNC: 82 MG/DL (ref 70–99)
GLUCOSE BLDC GLUCOMTR-MCNC: 87 MG/DL (ref 70–99)
GLUCOSE CSF-MCNC: 57 MG/DL (ref 40–70)
GLUCOSE SERPL-MCNC: 102 MG/DL (ref 70–99)
GLUCOSE SERPL-MCNC: 103 MG/DL (ref 70–99)
GLUCOSE SERPL-MCNC: 104 MG/DL (ref 70–99)
GLUCOSE SERPL-MCNC: 105 MG/DL (ref 70–99)
GLUCOSE SERPL-MCNC: 108 MG/DL (ref 70–99)
GLUCOSE SERPL-MCNC: 109 MG/DL (ref 70–99)
GLUCOSE SERPL-MCNC: 120 MG/DL (ref 70–99)
GLUCOSE SERPL-MCNC: 130 MG/DL (ref 70–99)
GLUCOSE SERPL-MCNC: 133 MG/DL (ref 70–99)
GLUCOSE SERPL-MCNC: 142 MG/DL (ref 70–99)
GLUCOSE SERPL-MCNC: 143 MG/DL (ref 70–99)
GLUCOSE SERPL-MCNC: 155 MG/DL (ref 70–99)
GLUCOSE SERPL-MCNC: 195 MG/DL (ref 70–99)
GLUCOSE SERPL-MCNC: 77 MG/DL (ref 70–99)
GLUCOSE SERPL-MCNC: 79 MG/DL (ref 70–99)
GLUCOSE SERPL-MCNC: 81 MG/DL (ref 70–99)
GLUCOSE SERPL-MCNC: 84 MG/DL (ref 70–99)
GLUCOSE SERPL-MCNC: 86 MG/DL (ref 70–99)
GLUCOSE SERPL-MCNC: 86 MG/DL (ref 70–99)
GLUCOSE SERPL-MCNC: 88 MG/DL (ref 70–99)
GLUCOSE SERPL-MCNC: 92 MG/DL (ref 70–99)
GLUCOSE SERPL-MCNC: 94 MG/DL (ref 70–99)
GLUCOSE SERPL-MCNC: 95 MG/DL (ref 70–99)
GLUCOSE UR STRIP-MCNC: NEGATIVE MG/DL
HBA1C MFR BLD: 5.7 % (ref 0–5.6)
HCT VFR BLD AUTO: 35.5 % (ref 35–47)
HCT VFR BLD AUTO: 35.7 % (ref 35–47)
HCT VFR BLD AUTO: 36.3 % (ref 35–47)
HCT VFR BLD AUTO: 36.4 % (ref 35–47)
HCT VFR BLD AUTO: 36.5 % (ref 35–47)
HCT VFR BLD AUTO: 36.6 % (ref 35–47)
HCT VFR BLD AUTO: 36.6 % (ref 35–47)
HCT VFR BLD AUTO: 37.1 % (ref 35–47)
HCT VFR BLD AUTO: 37.1 % (ref 35–47)
HCT VFR BLD AUTO: 37.7 % (ref 35–47)
HCT VFR BLD AUTO: 37.7 % (ref 35–47)
HCT VFR BLD AUTO: 38.3 % (ref 35–47)
HCT VFR BLD AUTO: 38.7 % (ref 35–47)
HCT VFR BLD AUTO: 38.8 % (ref 35–47)
HCT VFR BLD AUTO: 38.8 % (ref 35–47)
HCT VFR BLD AUTO: 39.4 % (ref 35–47)
HCT VFR BLD AUTO: 39.6 % (ref 35–47)
HCT VFR BLD AUTO: 39.7 % (ref 35–47)
HCT VFR BLD AUTO: 40.9 % (ref 35–47)
HCT VFR BLD AUTO: 42.6 % (ref 35–47)
HCT VFR BLD AUTO: 42.8 % (ref 35–47)
HDLC SERPL-MCNC: 54 MG/DL
HDLC SERPL-MCNC: 57 MG/DL
HGB BLD-MCNC: 11.2 G/DL (ref 11.7–15.7)
HGB BLD-MCNC: 11.3 G/DL (ref 11.7–15.7)
HGB BLD-MCNC: 11.4 G/DL (ref 11.7–15.7)
HGB BLD-MCNC: 11.5 G/DL (ref 11.7–15.7)
HGB BLD-MCNC: 11.5 G/DL (ref 11.7–15.7)
HGB BLD-MCNC: 11.6 G/DL (ref 11.7–15.7)
HGB BLD-MCNC: 11.7 G/DL (ref 11.7–15.7)
HGB BLD-MCNC: 11.8 G/DL (ref 11.7–15.7)
HGB BLD-MCNC: 12 G/DL (ref 11.7–15.7)
HGB BLD-MCNC: 12.2 G/DL (ref 11.7–15.7)
HGB BLD-MCNC: 12.4 G/DL (ref 11.7–15.7)
HGB BLD-MCNC: 12.4 G/DL (ref 11.7–15.7)
HGB BLD-MCNC: 12.6 G/DL (ref 11.7–15.7)
HGB BLD-MCNC: 12.8 G/DL (ref 11.7–15.7)
HGB BLD-MCNC: 12.9 G/DL (ref 11.7–15.7)
HGB BLD-MCNC: 13.8 G/DL (ref 11.7–15.7)
HGB UR QL STRIP: NEGATIVE
IMM GRANULOCYTES # BLD: 0 10E9/L (ref 0–0.4)
IMM GRANULOCYTES # BLD: 0.1 10E9/L (ref 0–0.4)
IMM GRANULOCYTES # BLD: 0.2 10E9/L (ref 0–0.4)
IMM GRANULOCYTES # BLD: 0.3 10E9/L (ref 0–0.4)
IMM GRANULOCYTES NFR BLD: 0.2 %
IMM GRANULOCYTES NFR BLD: 0.3 %
IMM GRANULOCYTES NFR BLD: 0.3 %
IMM GRANULOCYTES NFR BLD: 0.4 %
IMM GRANULOCYTES NFR BLD: 0.5 %
IMM GRANULOCYTES NFR BLD: 0.7 %
IMM GRANULOCYTES NFR BLD: 1.1 %
IMM GRANULOCYTES NFR BLD: 1.8 %
IMM GRANULOCYTES NFR BLD: 1.8 %
IMM GRANULOCYTES NFR BLD: 2.3 %
INR PPP: 1 (ref 0.86–1.14)
INR PPP: 1.05 (ref 0.86–1.14)
INR PPP: 1.1 (ref 0.86–1.14)
INTERPRETATION ECG - MUSE: NORMAL
INTERPRETATION ECG - MUSE: NORMAL
KETONES UR STRIP-MCNC: NEGATIVE MG/DL
LAB SCANNED RESULT: NORMAL
LACTATE BLD-SCNC: 1.4 MMOL/L (ref 0.7–2)
LACTATE BLD-SCNC: 1.4 MMOL/L (ref 0.7–2.1)
LDLC SERPL CALC-MCNC: 111 MG/DL
LDLC SERPL CALC-MCNC: 99 MG/DL
LEUKOCYTE ESTERASE UR QL STRIP: NEGATIVE
LIPASE SERPL-CCNC: 185 U/L (ref 73–393)
LYMPHOCYTES # BLD AUTO: 0.6 10E9/L (ref 0.8–5.3)
LYMPHOCYTES # BLD AUTO: 0.7 10E9/L (ref 0.8–5.3)
LYMPHOCYTES # BLD AUTO: 0.8 10E9/L (ref 0.8–5.3)
LYMPHOCYTES # BLD AUTO: 0.9 10E9/L (ref 0.8–5.3)
LYMPHOCYTES # BLD AUTO: 1 10E9/L (ref 0.8–5.3)
LYMPHOCYTES # BLD AUTO: 1.1 10E9/L (ref 0.8–5.3)
LYMPHOCYTES # BLD AUTO: 1.1 10E9/L (ref 0.8–5.3)
LYMPHOCYTES # BLD AUTO: 1.2 10E9/L (ref 0.8–5.3)
LYMPHOCYTES # BLD AUTO: 1.3 10E9/L (ref 0.8–5.3)
LYMPHOCYTES # BLD AUTO: 1.4 10E9/L (ref 0.8–5.3)
LYMPHOCYTES NFR BLD AUTO: 11.7 %
LYMPHOCYTES NFR BLD AUTO: 11.8 %
LYMPHOCYTES NFR BLD AUTO: 12.8 %
LYMPHOCYTES NFR BLD AUTO: 15.8 %
LYMPHOCYTES NFR BLD AUTO: 17.2 %
LYMPHOCYTES NFR BLD AUTO: 19 %
LYMPHOCYTES NFR BLD AUTO: 21.1 %
LYMPHOCYTES NFR BLD AUTO: 21.3 %
LYMPHOCYTES NFR BLD AUTO: 21.5 %
LYMPHOCYTES NFR BLD AUTO: 21.7 %
LYMPHOCYTES NFR BLD AUTO: 21.7 %
LYMPHOCYTES NFR BLD AUTO: 23.5 %
LYMPHOCYTES NFR BLD AUTO: 24.5 %
LYMPHOCYTES NFR BLD AUTO: 24.8 %
LYMPHOCYTES NFR BLD AUTO: 26.4 %
LYMPHOCYTES NFR BLD AUTO: 26.5 %
LYMPHOCYTES NFR BLD AUTO: 26.8 %
LYMPHOCYTES NFR BLD AUTO: 27 %
LYMPHOCYTES NFR BLD AUTO: 27.4 %
LYMPHOCYTES NFR BLD AUTO: 28.5 %
LYMPHOCYTES NFR BLD AUTO: 30.4 %
LYMPHOCYTES NFR BLD AUTO: 32.7 %
LYMPHOCYTES NFR BLD AUTO: 5.9 %
Lab: ABNORMAL
Lab: NORMAL
Lab: NORMAL
MAGNESIUM SERPL-MCNC: 2.4 MG/DL (ref 1.6–2.3)
MCH RBC QN AUTO: 27.7 PG (ref 26.5–33)
MCH RBC QN AUTO: 28 PG (ref 26.5–33)
MCH RBC QN AUTO: 28.1 PG (ref 26.5–33)
MCH RBC QN AUTO: 28.2 PG (ref 26.5–33)
MCH RBC QN AUTO: 28.2 PG (ref 26.5–33)
MCH RBC QN AUTO: 28.4 PG (ref 26.5–33)
MCH RBC QN AUTO: 28.5 PG (ref 26.5–33)
MCH RBC QN AUTO: 29 PG (ref 26.5–33)
MCH RBC QN AUTO: 29.3 PG (ref 26.5–33)
MCH RBC QN AUTO: 29.3 PG (ref 26.5–33)
MCH RBC QN AUTO: 29.4 PG (ref 26.5–33)
MCH RBC QN AUTO: 29.5 PG (ref 26.5–33)
MCH RBC QN AUTO: 29.5 PG (ref 26.5–33)
MCH RBC QN AUTO: 29.8 PG (ref 26.5–33)
MCH RBC QN AUTO: 29.8 PG (ref 26.5–33)
MCH RBC QN AUTO: 30 PG (ref 26.5–33)
MCH RBC QN AUTO: 30.1 PG (ref 26.5–33)
MCH RBC QN AUTO: 30.3 PG (ref 26.5–33)
MCH RBC QN AUTO: 30.7 PG (ref 26.5–33)
MCH RBC QN AUTO: 30.9 PG (ref 26.5–33)
MCH RBC QN AUTO: 31 PG (ref 26.5–33)
MCH RBC QN AUTO: 31.4 PG (ref 26.5–33)
MCH RBC QN AUTO: 31.6 PG (ref 26.5–33)
MCHC RBC AUTO-ENTMCNC: 30.1 G/DL (ref 31.5–36.5)
MCHC RBC AUTO-ENTMCNC: 30.9 G/DL (ref 31.5–36.5)
MCHC RBC AUTO-ENTMCNC: 31 G/DL (ref 31.5–36.5)
MCHC RBC AUTO-ENTMCNC: 31 G/DL (ref 31.5–36.5)
MCHC RBC AUTO-ENTMCNC: 31.3 G/DL (ref 31.5–36.5)
MCHC RBC AUTO-ENTMCNC: 31.3 G/DL (ref 31.5–36.5)
MCHC RBC AUTO-ENTMCNC: 31.4 G/DL (ref 31.5–36.5)
MCHC RBC AUTO-ENTMCNC: 31.5 G/DL (ref 31.5–36.5)
MCHC RBC AUTO-ENTMCNC: 31.5 G/DL (ref 31.5–36.5)
MCHC RBC AUTO-ENTMCNC: 31.7 G/DL (ref 31.5–36.5)
MCHC RBC AUTO-ENTMCNC: 32 G/DL (ref 31.5–36.5)
MCHC RBC AUTO-ENTMCNC: 32 G/DL (ref 31.5–36.5)
MCHC RBC AUTO-ENTMCNC: 32.1 G/DL (ref 31.5–36.5)
MCHC RBC AUTO-ENTMCNC: 32.1 G/DL (ref 31.5–36.5)
MCHC RBC AUTO-ENTMCNC: 32.4 G/DL (ref 31.5–36.5)
MCHC RBC AUTO-ENTMCNC: 32.5 G/DL (ref 31.5–36.5)
MCHC RBC AUTO-ENTMCNC: 32.6 G/DL (ref 31.5–36.5)
MCHC RBC AUTO-ENTMCNC: 32.7 G/DL (ref 31.5–36.5)
MCV RBC AUTO: 90 FL (ref 78–100)
MCV RBC AUTO: 91 FL (ref 78–100)
MCV RBC AUTO: 92 FL (ref 78–100)
MCV RBC AUTO: 93 FL (ref 78–100)
MCV RBC AUTO: 94 FL (ref 78–100)
MCV RBC AUTO: 95 FL (ref 78–100)
MCV RBC AUTO: 97 FL (ref 78–100)
MCV RBC AUTO: 97 FL (ref 78–100)
MCV RBC AUTO: 98 FL (ref 78–100)
MONOCYTES # BLD AUTO: 0.3 10E9/L (ref 0–1.3)
MONOCYTES # BLD AUTO: 0.4 10E9/L (ref 0–1.3)
MONOCYTES # BLD AUTO: 0.5 10E9/L (ref 0–1.3)
MONOCYTES # BLD AUTO: 0.6 10E9/L (ref 0–1.3)
MONOCYTES # BLD AUTO: 0.7 10E9/L (ref 0–1.3)
MONOCYTES NFR BLD AUTO: 10.3 %
MONOCYTES NFR BLD AUTO: 10.4 %
MONOCYTES NFR BLD AUTO: 10.4 %
MONOCYTES NFR BLD AUTO: 11.1 %
MONOCYTES NFR BLD AUTO: 11.2 %
MONOCYTES NFR BLD AUTO: 11.3 %
MONOCYTES NFR BLD AUTO: 11.3 %
MONOCYTES NFR BLD AUTO: 12.4 %
MONOCYTES NFR BLD AUTO: 12.4 %
MONOCYTES NFR BLD AUTO: 12.6 %
MONOCYTES NFR BLD AUTO: 12.9 %
MONOCYTES NFR BLD AUTO: 4.3 %
MONOCYTES NFR BLD AUTO: 5.4 %
MONOCYTES NFR BLD AUTO: 5.6 %
MONOCYTES NFR BLD AUTO: 7.1 %
MONOCYTES NFR BLD AUTO: 7.9 %
MONOCYTES NFR BLD AUTO: 8.3 %
MONOCYTES NFR BLD AUTO: 8.9 %
MONOCYTES NFR BLD AUTO: 9.2 %
MONOCYTES NFR BLD AUTO: 9.5 %
MONOCYTES NFR BLD AUTO: 9.7 %
MONOCYTES NFR BLD AUTO: 9.7 %
MONOCYTES NFR BLD AUTO: 9.9 %
MRSA DNA SPEC QL NAA+PROBE: NEGATIVE
NEUTROPHILS # BLD AUTO: 1.6 10E9/L (ref 1.6–8.3)
NEUTROPHILS # BLD AUTO: 1.7 10E9/L (ref 1.6–8.3)
NEUTROPHILS # BLD AUTO: 10.4 10E9/L (ref 1.6–8.3)
NEUTROPHILS # BLD AUTO: 2.3 10E9/L (ref 1.6–8.3)
NEUTROPHILS # BLD AUTO: 2.5 10E9/L (ref 1.6–8.3)
NEUTROPHILS # BLD AUTO: 2.5 10E9/L (ref 1.6–8.3)
NEUTROPHILS # BLD AUTO: 2.6 10E9/L (ref 1.6–8.3)
NEUTROPHILS # BLD AUTO: 2.7 10E9/L (ref 1.6–8.3)
NEUTROPHILS # BLD AUTO: 2.7 10E9/L (ref 1.6–8.3)
NEUTROPHILS # BLD AUTO: 2.8 10E9/L (ref 1.6–8.3)
NEUTROPHILS # BLD AUTO: 2.8 10E9/L (ref 1.6–8.3)
NEUTROPHILS # BLD AUTO: 2.9 10E9/L (ref 1.6–8.3)
NEUTROPHILS # BLD AUTO: 3 10E9/L (ref 1.6–8.3)
NEUTROPHILS # BLD AUTO: 3.3 10E9/L (ref 1.6–8.3)
NEUTROPHILS # BLD AUTO: 3.4 10E9/L (ref 1.6–8.3)
NEUTROPHILS # BLD AUTO: 3.7 10E9/L (ref 1.6–8.3)
NEUTROPHILS # BLD AUTO: 3.8 10E9/L (ref 1.6–8.3)
NEUTROPHILS # BLD AUTO: 3.8 10E9/L (ref 1.6–8.3)
NEUTROPHILS # BLD AUTO: 5.2 10E9/L (ref 1.6–8.3)
NEUTROPHILS # BLD AUTO: 5.4 10E9/L (ref 1.6–8.3)
NEUTROPHILS # BLD AUTO: 6.7 10E9/L (ref 1.6–8.3)
NEUTROPHILS NFR BLD AUTO: 51.3 %
NEUTROPHILS NFR BLD AUTO: 56.3 %
NEUTROPHILS NFR BLD AUTO: 57.6 %
NEUTROPHILS NFR BLD AUTO: 58.1 %
NEUTROPHILS NFR BLD AUTO: 59.1 %
NEUTROPHILS NFR BLD AUTO: 59.8 %
NEUTROPHILS NFR BLD AUTO: 60.4 %
NEUTROPHILS NFR BLD AUTO: 62.1 %
NEUTROPHILS NFR BLD AUTO: 62.2 %
NEUTROPHILS NFR BLD AUTO: 62.5 %
NEUTROPHILS NFR BLD AUTO: 62.5 %
NEUTROPHILS NFR BLD AUTO: 63.8 %
NEUTROPHILS NFR BLD AUTO: 66.2 %
NEUTROPHILS NFR BLD AUTO: 67.7 %
NEUTROPHILS NFR BLD AUTO: 68.1 %
NEUTROPHILS NFR BLD AUTO: 68.2 %
NEUTROPHILS NFR BLD AUTO: 71.7 %
NEUTROPHILS NFR BLD AUTO: 71.7 %
NEUTROPHILS NFR BLD AUTO: 74.6 %
NEUTROPHILS NFR BLD AUTO: 75.6 %
NEUTROPHILS NFR BLD AUTO: 78.1 %
NEUTROPHILS NFR BLD AUTO: 80.7 %
NEUTROPHILS NFR BLD AUTO: 87.3 %
NITRATE UR QL: NEGATIVE
NONHDLC SERPL-MCNC: 113 MG/DL
NONHDLC SERPL-MCNC: 129 MG/DL
NOROV GI+II ORF1-ORF2 JNC STL QL NAA+PR: NOT DETECTED
NRBC # BLD AUTO: 0 10*3/UL
NRBC BLD AUTO-RTO: 0 /100
PCO2 BLDV: 46 MM HG (ref 40–50)
PH BLDV: 7.41 PH (ref 7.32–7.43)
PH UR STRIP: 6 PH (ref 5–7)
PLATELET # BLD AUTO: 228 10E9/L (ref 150–450)
PLATELET # BLD AUTO: 240 10E9/L (ref 150–450)
PLATELET # BLD AUTO: 243 10E9/L (ref 150–450)
PLATELET # BLD AUTO: 259 10E9/L (ref 150–450)
PLATELET # BLD AUTO: 271 10E9/L (ref 150–450)
PLATELET # BLD AUTO: 279 10E9/L (ref 150–450)
PLATELET # BLD AUTO: 285 10E9/L (ref 150–450)
PLATELET # BLD AUTO: 285 10E9/L (ref 150–450)
PLATELET # BLD AUTO: 286 10E9/L (ref 150–450)
PLATELET # BLD AUTO: 320 10E9/L (ref 150–450)
PLATELET # BLD AUTO: 324 10E9/L (ref 150–450)
PLATELET # BLD AUTO: 328 10E9/L (ref 150–450)
PLATELET # BLD AUTO: 329 10E9/L (ref 150–450)
PLATELET # BLD AUTO: 333 10E9/L (ref 150–450)
PLATELET # BLD AUTO: 333 10E9/L (ref 150–450)
PLATELET # BLD AUTO: 335 10E9/L (ref 150–450)
PLATELET # BLD AUTO: 338 10E9/L (ref 150–450)
PLATELET # BLD AUTO: 352 10E9/L (ref 150–450)
PLATELET # BLD AUTO: 363 10E9/L (ref 150–450)
PLATELET # BLD AUTO: 370 10E9/L (ref 150–450)
PLATELET # BLD AUTO: 417 10E9/L (ref 150–450)
PLATELET # BLD AUTO: 418 10E9/L (ref 150–450)
PLATELET # BLD AUTO: 425 10E9/L (ref 150–450)
PLATELET # BLD AUTO: 439 10E9/L (ref 150–450)
PO2 BLDV: 34 MM HG (ref 25–47)
POTASSIUM SERPL-SCNC: 3.2 MMOL/L (ref 3.4–5.3)
POTASSIUM SERPL-SCNC: 3.4 MMOL/L (ref 3.4–5.3)
POTASSIUM SERPL-SCNC: 3.4 MMOL/L (ref 3.4–5.3)
POTASSIUM SERPL-SCNC: 3.5 MMOL/L (ref 3.4–5.3)
POTASSIUM SERPL-SCNC: 3.6 MMOL/L (ref 3.4–5.3)
POTASSIUM SERPL-SCNC: 3.7 MMOL/L (ref 3.4–5.3)
POTASSIUM SERPL-SCNC: 3.8 MMOL/L (ref 3.4–5.3)
POTASSIUM SERPL-SCNC: 3.9 MMOL/L (ref 3.4–5.3)
POTASSIUM SERPL-SCNC: 3.9 MMOL/L (ref 3.4–5.3)
POTASSIUM SERPL-SCNC: 4 MMOL/L (ref 3.4–5.3)
POTASSIUM SERPL-SCNC: 4 MMOL/L (ref 3.4–5.3)
POTASSIUM SERPL-SCNC: 4.2 MMOL/L (ref 3.4–5.3)
POTASSIUM SERPL-SCNC: 4.2 MMOL/L (ref 3.4–5.3)
POTASSIUM SERPL-SCNC: 4.3 MMOL/L (ref 3.4–5.3)
POTASSIUM SERPL-SCNC: 4.5 MMOL/L (ref 3.4–5.3)
PROT CSF-MCNC: 27 MG/DL (ref 15–60)
PROT SERPL-MCNC: 10.6 G/DL (ref 6.8–8.8)
PROT SERPL-MCNC: 8.2 G/DL (ref 6.8–8.8)
PROT SERPL-MCNC: 8.2 G/DL (ref 6.8–8.8)
PROT SERPL-MCNC: 8.3 G/DL (ref 6.8–8.8)
PROT SERPL-MCNC: 8.3 G/DL (ref 6.8–8.8)
PROT SERPL-MCNC: 8.4 G/DL (ref 6.8–8.8)
PROT SERPL-MCNC: 8.5 G/DL (ref 6.8–8.8)
PROT SERPL-MCNC: 8.6 G/DL (ref 6.8–8.8)
PROT SERPL-MCNC: 8.8 G/DL (ref 6.8–8.8)
PROT SERPL-MCNC: 8.9 G/DL (ref 6.8–8.8)
PROT SERPL-MCNC: 9 G/DL (ref 6.8–8.8)
PROT SERPL-MCNC: 9 G/DL (ref 6.8–8.8)
PROT SERPL-MCNC: 9.2 G/DL (ref 6.8–8.8)
PROT SERPL-MCNC: 9.4 G/DL (ref 6.8–8.8)
PROT SERPL-MCNC: 9.5 G/DL (ref 6.8–8.8)
PROT SERPL-MCNC: 9.6 G/DL (ref 6.8–8.8)
RBC # BLD AUTO: 3.67 10E12/L (ref 3.8–5.2)
RBC # BLD AUTO: 3.92 10E12/L (ref 3.8–5.2)
RBC # BLD AUTO: 3.93 10E12/L (ref 3.8–5.2)
RBC # BLD AUTO: 4 10E12/L (ref 3.8–5.2)
RBC # BLD AUTO: 4 10E12/L (ref 3.8–5.2)
RBC # BLD AUTO: 4.04 10E12/L (ref 3.8–5.2)
RBC # BLD AUTO: 4.08 10E12/L (ref 3.8–5.2)
RBC # BLD AUTO: 4.08 10E12/L (ref 3.8–5.2)
RBC # BLD AUTO: 4.1 10E12/L (ref 3.8–5.2)
RBC # BLD AUTO: 4.13 10E12/L (ref 3.8–5.2)
RBC # BLD AUTO: 4.14 10E12/L (ref 3.8–5.2)
RBC # BLD AUTO: 4.16 10E12/L (ref 3.8–5.2)
RBC # BLD AUTO: 4.17 10E12/L (ref 3.8–5.2)
RBC # BLD AUTO: 4.18 10E12/L (ref 3.8–5.2)
RBC # BLD AUTO: 4.19 10E12/L (ref 3.8–5.2)
RBC # BLD AUTO: 4.2 10E12/L (ref 3.8–5.2)
RBC # BLD AUTO: 4.26 10E12/L (ref 3.8–5.2)
RBC # BLD AUTO: 4.33 10E12/L (ref 3.8–5.2)
RBC # BLD AUTO: 4.39 10E12/L (ref 3.8–5.2)
RBC # BLD AUTO: 4.5 10E12/L (ref 3.8–5.2)
RBC # BLD AUTO: 4.55 10E12/L (ref 3.8–5.2)
RBC # CSF MANUAL: 176 /UL (ref 0–2)
RBC #/AREA URNS AUTO: 1 /HPF (ref 0–2)
RVA NSP5 STL QL NAA+PROBE: NOT DETECTED
SALMONELLA SP RPOD STL QL NAA+PROBE: NOT DETECTED
SAO2 % BLDV FROM PO2: 65 %
SHIGELLA SP+EIEC IPAH STL QL NAA+PROBE: NOT DETECTED
SODIUM SERPL-SCNC: 129 MMOL/L (ref 133–144)
SODIUM SERPL-SCNC: 130 MMOL/L (ref 133–144)
SODIUM SERPL-SCNC: 130 MMOL/L (ref 133–144)
SODIUM SERPL-SCNC: 133 MMOL/L (ref 133–144)
SODIUM SERPL-SCNC: 133 MMOL/L (ref 133–144)
SODIUM SERPL-SCNC: 134 MMOL/L (ref 133–144)
SODIUM SERPL-SCNC: 135 MMOL/L (ref 133–144)
SODIUM SERPL-SCNC: 136 MMOL/L (ref 133–144)
SODIUM SERPL-SCNC: 136 MMOL/L (ref 133–144)
SODIUM SERPL-SCNC: 137 MMOL/L (ref 133–144)
SODIUM SERPL-SCNC: 138 MMOL/L (ref 133–144)
SODIUM SERPL-SCNC: 138 MMOL/L (ref 133–144)
SOURCE: ABNORMAL
SP GR UR STRIP: 1.01 (ref 1–1.03)
SPECIMEN SOURCE: ABNORMAL
SPECIMEN SOURCE: NORMAL
TRIGL SERPL-MCNC: 69 MG/DL
TRIGL SERPL-MCNC: 93 MG/DL
TROPONIN I BLD-MCNC: 0 UG/L (ref 0–0.08)
TROPONIN I SERPL-MCNC: <0.015 UG/L (ref 0–0.04)
TUBE # CSF: 3 #
UROBILINOGEN UR STRIP-MCNC: NORMAL MG/DL (ref 0–2)
V CHOL+PARA RFBL+TRKH+TNAA STL QL NAA+PR: NOT DETECTED
WBC # BLD AUTO: 11.9 10E9/L (ref 4–11)
WBC # BLD AUTO: 2.9 10E9/L (ref 4–11)
WBC # BLD AUTO: 3.2 10E9/L (ref 4–11)
WBC # BLD AUTO: 3.7 10E9/L (ref 4–11)
WBC # BLD AUTO: 3.8 10E9/L (ref 4–11)
WBC # BLD AUTO: 4 10E9/L (ref 4–11)
WBC # BLD AUTO: 4.2 10E9/L (ref 4–11)
WBC # BLD AUTO: 4.4 10E9/L (ref 4–11)
WBC # BLD AUTO: 4.5 10E9/L (ref 4–11)
WBC # BLD AUTO: 4.5 10E9/L (ref 4–11)
WBC # BLD AUTO: 4.6 10E9/L (ref 4–11)
WBC # BLD AUTO: 4.7 10E9/L (ref 4–11)
WBC # BLD AUTO: 5.3 10E9/L (ref 4–11)
WBC # BLD AUTO: 5.5 10E9/L (ref 4–11)
WBC # BLD AUTO: 5.6 10E9/L (ref 4–11)
WBC # BLD AUTO: 6.6 10E9/L (ref 4–11)
WBC # BLD AUTO: 6.9 10E9/L (ref 4–11)
WBC # BLD AUTO: 8.5 10E9/L (ref 4–11)
WBC # CSF MANUAL: 4 /UL (ref 0–5)
WBC #/AREA URNS AUTO: 3 /HPF (ref 0–5)
WET PREP SPEC: NORMAL
Y ENTERO RECN STL QL NAA+PROBE: NOT DETECTED

## 2019-01-01 PROCEDURE — 86300 IMMUNOASSAY TUMOR CA 15-3: CPT | Performed by: INTERNAL MEDICINE

## 2019-01-01 PROCEDURE — 80053 COMPREHEN METABOLIC PANEL: CPT | Performed by: PHYSICIAN ASSISTANT

## 2019-01-01 PROCEDURE — 97530 THERAPEUTIC ACTIVITIES: CPT | Mod: GO

## 2019-01-01 PROCEDURE — 85025 COMPLETE CBC W/AUTO DIFF WBC: CPT | Performed by: EMERGENCY MEDICINE

## 2019-01-01 PROCEDURE — G0463 HOSPITAL OUTPT CLINIC VISIT: HCPCS | Mod: 25,ZF

## 2019-01-01 PROCEDURE — G0463 HOSPITAL OUTPT CLINIC VISIT: HCPCS | Mod: ZF

## 2019-01-01 PROCEDURE — 25000128 H RX IP 250 OP 636: Mod: ZF | Performed by: PHYSICIAN ASSISTANT

## 2019-01-01 PROCEDURE — 70498 CT ANGIOGRAPHY NECK: CPT

## 2019-01-01 PROCEDURE — 96402 CHEMO HORMON ANTINEOPL SQ/IM: CPT

## 2019-01-01 PROCEDURE — A9503 TC99M MEDRONATE: HCPCS | Performed by: PHYSICIAN ASSISTANT

## 2019-01-01 PROCEDURE — 92526 ORAL FUNCTION THERAPY: CPT | Mod: GN

## 2019-01-01 PROCEDURE — 85025 COMPLETE CBC W/AUTO DIFF WBC: CPT | Performed by: PHYSICIAN ASSISTANT

## 2019-01-01 PROCEDURE — 77336 RADIATION PHYSICS CONSULT: CPT | Performed by: RADIOLOGY

## 2019-01-01 PROCEDURE — 82378 CARCINOEMBRYONIC ANTIGEN: CPT | Performed by: INTERNAL MEDICINE

## 2019-01-01 PROCEDURE — 36415 COLL VENOUS BLD VENIPUNCTURE: CPT

## 2019-01-01 PROCEDURE — 96361 HYDRATE IV INFUSION ADD-ON: CPT | Performed by: EMERGENCY MEDICINE

## 2019-01-01 PROCEDURE — A9503 TC99M MEDRONATE: HCPCS | Performed by: INTERNAL MEDICINE

## 2019-01-01 PROCEDURE — 86300 IMMUNOASSAY TUMOR CA 15-3: CPT | Performed by: PHYSICIAN ASSISTANT

## 2019-01-01 PROCEDURE — 25800030 ZZH RX IP 258 OP 636: Performed by: EMERGENCY MEDICINE

## 2019-01-01 PROCEDURE — 77412 RADIATION TX DELIVERY LVL 3: CPT | Performed by: RADIOLOGY

## 2019-01-01 PROCEDURE — 80061 LIPID PANEL: CPT | Performed by: STUDENT IN AN ORGANIZED HEALTH CARE EDUCATION/TRAINING PROGRAM

## 2019-01-01 PROCEDURE — 82310 ASSAY OF CALCIUM: CPT | Performed by: PHYSICIAN ASSISTANT

## 2019-01-01 PROCEDURE — 82803 BLOOD GASES ANY COMBINATION: CPT

## 2019-01-01 PROCEDURE — 99285 EMERGENCY DEPT VISIT HI MDM: CPT | Mod: 25 | Performed by: EMERGENCY MEDICINE

## 2019-01-01 PROCEDURE — 25000128 H RX IP 250 OP 636: Mod: ZF | Performed by: INTERNAL MEDICINE

## 2019-01-01 PROCEDURE — 96413 CHEMO IV INFUSION 1 HR: CPT

## 2019-01-01 PROCEDURE — 25800030 ZZH RX IP 258 OP 636: Mod: ZF | Performed by: PHYSICIAN ASSISTANT

## 2019-01-01 PROCEDURE — 80053 COMPREHEN METABOLIC PANEL: CPT | Performed by: INTERNAL MEDICINE

## 2019-01-01 PROCEDURE — 99214 OFFICE O/P EST MOD 30 MIN: CPT | Mod: ZP | Performed by: PHYSICIAN ASSISTANT

## 2019-01-01 PROCEDURE — 77290 THER RAD SIMULAJ FIELD CPLX: CPT | Performed by: RADIOLOGY

## 2019-01-01 PROCEDURE — 84460 ALANINE AMINO (ALT) (SGPT): CPT | Performed by: PHYSICIAN ASSISTANT

## 2019-01-01 PROCEDURE — 40000556 ZZH STATISTIC PERIPHERAL IV START W US GUIDANCE

## 2019-01-01 PROCEDURE — 36415 COLL VENOUS BLD VENIPUNCTURE: CPT | Performed by: STUDENT IN AN ORGANIZED HEALTH CARE EDUCATION/TRAINING PROGRAM

## 2019-01-01 PROCEDURE — 85025 COMPLETE CBC W/AUTO DIFF WBC: CPT | Performed by: INTERNAL MEDICINE

## 2019-01-01 PROCEDURE — 74177 CT ABD & PELVIS W/CONTRAST: CPT

## 2019-01-01 PROCEDURE — 96367 TX/PROPH/DG ADDL SEQ IV INF: CPT

## 2019-01-01 PROCEDURE — 85379 FIBRIN DEGRADATION QUANT: CPT | Performed by: EMERGENCY MEDICINE

## 2019-01-01 PROCEDURE — 25000125 ZZHC RX 250: Mod: ZF | Performed by: PHYSICIAN ASSISTANT

## 2019-01-01 PROCEDURE — 12000001 ZZH R&B MED SURG/OB UMMC

## 2019-01-01 PROCEDURE — 82378 CARCINOEMBRYONIC ANTIGEN: CPT | Performed by: PHYSICIAN ASSISTANT

## 2019-01-01 PROCEDURE — 71260 CT THORAX DX C+: CPT

## 2019-01-01 PROCEDURE — 00000146 ZZHCL STATISTIC GLUCOSE BY METER IP

## 2019-01-01 PROCEDURE — 25000128 H RX IP 250 OP 636: Performed by: STUDENT IN AN ORGANIZED HEALTH CARE EDUCATION/TRAINING PROGRAM

## 2019-01-01 PROCEDURE — 96374 THER/PROPH/DIAG INJ IV PUSH: CPT | Mod: 59 | Performed by: EMERGENCY MEDICINE

## 2019-01-01 PROCEDURE — 96375 TX/PRO/DX INJ NEW DRUG ADDON: CPT

## 2019-01-01 PROCEDURE — 83735 ASSAY OF MAGNESIUM: CPT | Performed by: INTERNAL MEDICINE

## 2019-01-01 PROCEDURE — 85049 AUTOMATED PLATELET COUNT: CPT | Mod: 91 | Performed by: STUDENT IN AN ORGANIZED HEALTH CARE EDUCATION/TRAINING PROGRAM

## 2019-01-01 PROCEDURE — 77417 THER RADIOLOGY PORT IMAGE(S): CPT | Performed by: RADIOLOGY

## 2019-01-01 PROCEDURE — 87040 BLOOD CULTURE FOR BACTERIA: CPT | Performed by: NURSE PRACTITIONER

## 2019-01-01 PROCEDURE — 25000125 ZZHC RX 250: Mod: ZF | Performed by: INTERNAL MEDICINE

## 2019-01-01 PROCEDURE — 99213 OFFICE O/P EST LOW 20 MIN: CPT | Performed by: ADVANCED PRACTICE MIDWIFE

## 2019-01-01 PROCEDURE — 93308 TTE F-UP OR LMTD: CPT | Mod: 26 | Performed by: INTERNAL MEDICINE

## 2019-01-01 PROCEDURE — 96401 CHEMO ANTI-NEOPL SQ/IM: CPT

## 2019-01-01 PROCEDURE — 96365 THER/PROPH/DIAG IV INF INIT: CPT

## 2019-01-01 PROCEDURE — 93010 ELECTROCARDIOGRAM REPORT: CPT | Mod: Z6 | Performed by: EMERGENCY MEDICINE

## 2019-01-01 PROCEDURE — 99215 OFFICE O/P EST HI 40 MIN: CPT | Mod: ZP | Performed by: PHYSICIAN ASSISTANT

## 2019-01-01 PROCEDURE — 87641 MR-STAPH DNA AMP PROBE: CPT | Performed by: STUDENT IN AN ORGANIZED HEALTH CARE EDUCATION/TRAINING PROGRAM

## 2019-01-01 PROCEDURE — 83036 HEMOGLOBIN GLYCOSYLATED A1C: CPT | Performed by: EMERGENCY MEDICINE

## 2019-01-01 PROCEDURE — 36415 COLL VENOUS BLD VENIPUNCTURE: CPT | Performed by: INTERNAL MEDICINE

## 2019-01-01 PROCEDURE — 25000128 H RX IP 250 OP 636: Performed by: EMERGENCY MEDICINE

## 2019-01-01 PROCEDURE — 84075 ASSAY ALKALINE PHOSPHATASE: CPT | Performed by: PHYSICIAN ASSISTANT

## 2019-01-01 PROCEDURE — 70549 MR ANGIOGRAPH NECK W/O&W/DYE: CPT

## 2019-01-01 PROCEDURE — 40000141 ZZH STATISTIC PERIPHERAL IV START W/O US GUIDANCE: Mod: ZF

## 2019-01-01 PROCEDURE — 78306 BONE IMAGING WHOLE BODY: CPT

## 2019-01-01 PROCEDURE — 40000141 ZZH STATISTIC PERIPHERAL IV START W/O US GUIDANCE

## 2019-01-01 PROCEDURE — 40000893 ZZH STATISTIC PT IP EVAL DEFER: Performed by: PHYSICAL THERAPIST

## 2019-01-01 PROCEDURE — 40000556 ZZH STATISTIC PERIPHERAL IV START W US GUIDANCE: Mod: ZF

## 2019-01-01 PROCEDURE — 82565 ASSAY OF CREATININE: CPT | Performed by: STUDENT IN AN ORGANIZED HEALTH CARE EDUCATION/TRAINING PROGRAM

## 2019-01-01 PROCEDURE — 70450 CT HEAD/BRAIN W/O DYE: CPT

## 2019-01-01 PROCEDURE — 85610 PROTHROMBIN TIME: CPT

## 2019-01-01 PROCEDURE — 25800030 ZZH RX IP 258 OP 636: Mod: ZF | Performed by: INTERNAL MEDICINE

## 2019-01-01 PROCEDURE — 87081 CULTURE SCREEN ONLY: CPT | Performed by: EMERGENCY MEDICINE

## 2019-01-01 PROCEDURE — 80048 BASIC METABOLIC PNL TOTAL CA: CPT | Performed by: EMERGENCY MEDICINE

## 2019-01-01 PROCEDURE — 84132 ASSAY OF SERUM POTASSIUM: CPT | Performed by: PHYSICIAN ASSISTANT

## 2019-01-01 PROCEDURE — 82247 BILIRUBIN TOTAL: CPT | Performed by: PHYSICIAN ASSISTANT

## 2019-01-01 PROCEDURE — G0463 HOSPITAL OUTPT CLINIC VISIT: HCPCS | Mod: ZF,25

## 2019-01-01 PROCEDURE — 77307 TELETHX ISODOSE PLAN CPLX: CPT | Performed by: RADIOLOGY

## 2019-01-01 PROCEDURE — 99215 OFFICE O/P EST HI 40 MIN: CPT | Mod: ZP | Performed by: INTERNAL MEDICINE

## 2019-01-01 PROCEDURE — G0463 HOSPITAL OUTPT CLINIC VISIT: HCPCS | Mod: 25 | Performed by: RADIOLOGY

## 2019-01-01 PROCEDURE — 82947 ASSAY GLUCOSE BLOOD QUANT: CPT | Mod: ZF | Performed by: PHYSICIAN ASSISTANT

## 2019-01-01 PROCEDURE — 99284 EMERGENCY DEPT VISIT MOD MDM: CPT | Mod: 25 | Performed by: EMERGENCY MEDICINE

## 2019-01-01 PROCEDURE — A9585 GADOBUTROL INJECTION: HCPCS | Performed by: PSYCHIATRY & NEUROLOGY

## 2019-01-01 PROCEDURE — 83605 ASSAY OF LACTIC ACID: CPT | Mod: 91 | Performed by: EMERGENCY MEDICINE

## 2019-01-01 PROCEDURE — 84484 ASSAY OF TROPONIN QUANT: CPT | Performed by: EMERGENCY MEDICINE

## 2019-01-01 PROCEDURE — 82330 ASSAY OF CALCIUM: CPT | Performed by: INTERNAL MEDICINE

## 2019-01-01 PROCEDURE — 82040 ASSAY OF SERUM ALBUMIN: CPT | Performed by: PHYSICIAN ASSISTANT

## 2019-01-01 PROCEDURE — 99214 OFFICE O/P EST MOD 30 MIN: CPT | Mod: ZP | Performed by: INTERNAL MEDICINE

## 2019-01-01 PROCEDURE — 25000132 ZZH RX MED GY IP 250 OP 250 PS 637: Performed by: STUDENT IN AN ORGANIZED HEALTH CARE EDUCATION/TRAINING PROGRAM

## 2019-01-01 PROCEDURE — 84520 ASSAY OF UREA NITROGEN: CPT | Performed by: PHYSICIAN ASSISTANT

## 2019-01-01 PROCEDURE — 25500064 ZZH RX 255 OP 636: Performed by: PSYCHIATRY & NEUROLOGY

## 2019-01-01 PROCEDURE — 40000929 ZZHCL STATISTIC FOUNDATION ONE GENE PANEL: Performed by: INTERNAL MEDICINE

## 2019-01-01 PROCEDURE — 88108 CYTOPATH CONCENTRATE TECH: CPT | Performed by: INTERNAL MEDICINE

## 2019-01-01 PROCEDURE — G0378 HOSPITAL OBSERVATION PER HR: HCPCS

## 2019-01-01 PROCEDURE — 93308 TTE F-UP OR LMTD: CPT

## 2019-01-01 PROCEDURE — 71045 X-RAY EXAM CHEST 1 VIEW: CPT

## 2019-01-01 PROCEDURE — 25000128 H RX IP 250 OP 636: Performed by: RADIOLOGY

## 2019-01-01 PROCEDURE — 87210 SMEAR WET MOUNT SALINE/INK: CPT | Performed by: ADVANCED PRACTICE MIDWIFE

## 2019-01-01 PROCEDURE — 00000102 ZZHCL STATISTIC CYTO WRIGHT STAIN TC: Performed by: INTERNAL MEDICINE

## 2019-01-01 PROCEDURE — 96372 THER/PROPH/DIAG INJ SC/IM: CPT

## 2019-01-01 PROCEDURE — 87040 BLOOD CULTURE FOR BACTERIA: CPT | Mod: XS | Performed by: INTERNAL MEDICINE

## 2019-01-01 PROCEDURE — 82435 ASSAY OF BLOOD CHLORIDE: CPT | Performed by: PHYSICIAN ASSISTANT

## 2019-01-01 PROCEDURE — 93005 ELECTROCARDIOGRAM TRACING: CPT | Performed by: EMERGENCY MEDICINE

## 2019-01-01 PROCEDURE — 87640 STAPH A DNA AMP PROBE: CPT | Performed by: STUDENT IN AN ORGANIZED HEALTH CARE EDUCATION/TRAINING PROGRAM

## 2019-01-01 PROCEDURE — 77280 THER RAD SIMULAJ FIELD SMPL: CPT | Performed by: RADIOLOGY

## 2019-01-01 PROCEDURE — 84157 ASSAY OF PROTEIN OTHER: CPT | Performed by: INTERNAL MEDICINE

## 2019-01-01 PROCEDURE — G0008 ADMIN INFLUENZA VIRUS VAC: HCPCS

## 2019-01-01 PROCEDURE — 90686 IIV4 VACC NO PRSV 0.5 ML IM: CPT | Mod: ZF | Performed by: INTERNAL MEDICINE

## 2019-01-01 PROCEDURE — 92610 EVALUATE SWALLOWING FUNCTION: CPT | Mod: GN

## 2019-01-01 PROCEDURE — 93321 DOPPLER ECHO F-UP/LMTD STD: CPT | Mod: 26 | Performed by: INTERNAL MEDICINE

## 2019-01-01 PROCEDURE — 82945 GLUCOSE OTHER FLUID: CPT | Performed by: INTERNAL MEDICINE

## 2019-01-01 PROCEDURE — 97165 OT EVAL LOW COMPLEX 30 MIN: CPT | Mod: GO

## 2019-01-01 PROCEDURE — 84484 ASSAY OF TROPONIN QUANT: CPT

## 2019-01-01 PROCEDURE — 84155 ASSAY OF PROTEIN SERUM: CPT | Performed by: PHYSICIAN ASSISTANT

## 2019-01-01 PROCEDURE — 71046 X-RAY EXAM CHEST 2 VIEWS: CPT

## 2019-01-01 PROCEDURE — 82374 ASSAY BLOOD CARBON DIOXIDE: CPT | Performed by: PHYSICIAN ASSISTANT

## 2019-01-01 PROCEDURE — 36592 COLLECT BLOOD FROM PICC: CPT | Performed by: EMERGENCY MEDICINE

## 2019-01-01 PROCEDURE — 87880 STREP A ASSAY W/OPTIC: CPT | Performed by: EMERGENCY MEDICINE

## 2019-01-01 PROCEDURE — 85730 THROMBOPLASTIN TIME PARTIAL: CPT | Performed by: EMERGENCY MEDICINE

## 2019-01-01 PROCEDURE — 93325 DOPPLER ECHO COLOR FLOW MAPG: CPT | Mod: 26 | Performed by: INTERNAL MEDICINE

## 2019-01-01 PROCEDURE — 62270 DX LMBR SPI PNXR: CPT | Mod: ZF | Performed by: PHYSICIAN ASSISTANT

## 2019-01-01 PROCEDURE — 77334 RADIATION TREATMENT AID(S): CPT | Performed by: RADIOLOGY

## 2019-01-01 PROCEDURE — 83690 ASSAY OF LIPASE: CPT | Performed by: EMERGENCY MEDICINE

## 2019-01-01 PROCEDURE — 40000739 ZZH STATISTIC STROKE CODE W/O ACCESS

## 2019-01-01 PROCEDURE — 89050 BODY FLUID CELL COUNT: CPT | Performed by: INTERNAL MEDICINE

## 2019-01-01 PROCEDURE — 99213 OFFICE O/P EST LOW 20 MIN: CPT | Mod: 25 | Performed by: PHYSICIAN ASSISTANT

## 2019-01-01 PROCEDURE — 85610 PROTHROMBIN TIME: CPT | Performed by: EMERGENCY MEDICINE

## 2019-01-01 PROCEDURE — 34300033 ZZH RX 343: Performed by: INTERNAL MEDICINE

## 2019-01-01 PROCEDURE — 82565 ASSAY OF CREATININE: CPT

## 2019-01-01 PROCEDURE — 99215 OFFICE O/P EST HI 40 MIN: CPT | Mod: GC | Performed by: INTERNAL MEDICINE

## 2019-01-01 PROCEDURE — 80053 COMPREHEN METABOLIC PANEL: CPT | Performed by: EMERGENCY MEDICINE

## 2019-01-01 PROCEDURE — 34300033 ZZH RX 343: Performed by: PHYSICIAN ASSISTANT

## 2019-01-01 PROCEDURE — 82565 ASSAY OF CREATININE: CPT | Performed by: PHYSICIAN ASSISTANT

## 2019-01-01 PROCEDURE — 83605 ASSAY OF LACTIC ACID: CPT

## 2019-01-01 PROCEDURE — 84295 ASSAY OF SERUM SODIUM: CPT | Performed by: PHYSICIAN ASSISTANT

## 2019-01-01 PROCEDURE — 25000128 H RX IP 250 OP 636: Performed by: INTERNAL MEDICINE

## 2019-01-01 PROCEDURE — 99235 HOSP IP/OBS SAME DATE MOD 70: CPT | Mod: AI | Performed by: INTERNAL MEDICINE

## 2019-01-01 PROCEDURE — 81001 URINALYSIS AUTO W/SCOPE: CPT | Performed by: EMERGENCY MEDICINE

## 2019-01-01 PROCEDURE — 25000132 ZZH RX MED GY IP 250 OP 250 PS 637: Performed by: NURSE PRACTITIONER

## 2019-01-01 PROCEDURE — 25000132 ZZH RX MED GY IP 250 OP 250 PS 637: Mod: ZF | Performed by: PHYSICIAN ASSISTANT

## 2019-01-01 PROCEDURE — 87804 INFLUENZA ASSAY W/OPTIC: CPT | Performed by: EMERGENCY MEDICINE

## 2019-01-01 RX ORDER — DEXTROSE MONOHYDRATE 25 G/50ML
25-50 INJECTION, SOLUTION INTRAVENOUS
Status: DISCONTINUED | OUTPATIENT
Start: 2019-01-01 | End: 2019-01-01 | Stop reason: HOSPADM

## 2019-01-01 RX ORDER — LORAZEPAM 0.5 MG/1
0.5 TABLET ORAL EVERY 4 HOURS PRN
Qty: 30 TABLET | Refills: 2 | Status: SHIPPED | OUTPATIENT
Start: 2019-01-01

## 2019-01-01 RX ORDER — ALBUTEROL SULFATE 90 UG/1
1-2 AEROSOL, METERED RESPIRATORY (INHALATION)
Status: CANCELLED
Start: 2019-01-01

## 2019-01-01 RX ORDER — CARBOXYMETHYLCELLULOSE SODIUM 5 MG/ML
1 SOLUTION/ DROPS OPHTHALMIC 3 TIMES DAILY PRN
Qty: 1 BOTTLE | Refills: 1 | Status: SHIPPED | OUTPATIENT
Start: 2019-01-01

## 2019-01-01 RX ORDER — SODIUM CHLORIDE 9 MG/ML
1000 INJECTION, SOLUTION INTRAVENOUS CONTINUOUS PRN
Status: CANCELLED
Start: 2019-01-01

## 2019-01-01 RX ORDER — VITAMIN B COMPLEX
1000 TABLET ORAL DAILY
Status: DISCONTINUED | OUTPATIENT
Start: 2019-01-01 | End: 2019-01-01 | Stop reason: HOSPADM

## 2019-01-01 RX ORDER — ALBUTEROL SULFATE 90 UG/1
1-2 AEROSOL, METERED RESPIRATORY (INHALATION)
Status: CANCELLED
Start: 2020-01-01

## 2019-01-01 RX ORDER — LIDOCAINE HYDROCHLORIDE 10 MG/ML
2 INJECTION, SOLUTION EPIDURAL; INFILTRATION; INTRACAUDAL; PERINEURAL ONCE
Status: CANCELLED | OUTPATIENT
Start: 2019-01-01

## 2019-01-01 RX ORDER — IOPAMIDOL 755 MG/ML
59 INJECTION, SOLUTION INTRAVASCULAR ONCE
Status: COMPLETED | OUTPATIENT
Start: 2019-01-01 | End: 2019-01-01

## 2019-01-01 RX ORDER — EPINEPHRINE 0.3 MG/.3ML
0.3 INJECTION SUBCUTANEOUS EVERY 5 MIN PRN
Status: CANCELLED | OUTPATIENT
Start: 2019-01-01

## 2019-01-01 RX ORDER — NALOXONE HYDROCHLORIDE 0.4 MG/ML
.1-.4 INJECTION, SOLUTION INTRAMUSCULAR; INTRAVENOUS; SUBCUTANEOUS
Status: CANCELLED | OUTPATIENT
Start: 2020-01-01

## 2019-01-01 RX ORDER — EPINEPHRINE 1 MG/ML
0.3 INJECTION, SOLUTION INTRAMUSCULAR; SUBCUTANEOUS EVERY 5 MIN PRN
Status: CANCELLED | OUTPATIENT
Start: 2019-01-01

## 2019-01-01 RX ORDER — ASPIRIN 81 MG/1
81 TABLET, CHEWABLE ORAL DAILY
Status: DISCONTINUED | OUTPATIENT
Start: 2019-01-01 | End: 2019-01-01 | Stop reason: HOSPADM

## 2019-01-01 RX ORDER — PROCHLORPERAZINE MALEATE 5 MG
5 TABLET ORAL EVERY 6 HOURS PRN
Qty: 30 TABLET | Refills: 1 | Status: SHIPPED | OUTPATIENT
Start: 2019-01-01 | End: 2019-01-01

## 2019-01-01 RX ORDER — ENZYMES,DIGESTIVE
1 CAPSULE ORAL DAILY
Status: DISCONTINUED | OUTPATIENT
Start: 2019-01-01 | End: 2019-01-01 | Stop reason: CLARIF

## 2019-01-01 RX ORDER — ZOLEDRONIC ACID 0.04 MG/ML
4 INJECTION, SOLUTION INTRAVENOUS ONCE
Status: CANCELLED | OUTPATIENT
Start: 2019-01-01

## 2019-01-01 RX ORDER — ONDANSETRON 8 MG/1
TABLET, FILM COATED ORAL
COMMUNITY
Start: 2018-08-28 | End: 2019-01-01

## 2019-01-01 RX ORDER — ONDANSETRON 8 MG/1
8 TABLET, FILM COATED ORAL EVERY 8 HOURS PRN
Qty: 90 TABLET | Refills: 3 | Status: SHIPPED | OUTPATIENT
Start: 2019-01-01

## 2019-01-01 RX ORDER — LIDOCAINE HYDROCHLORIDE 10 MG/ML
2 INJECTION, SOLUTION EPIDURAL; INFILTRATION; INTRACAUDAL; PERINEURAL ONCE
Status: COMPLETED | OUTPATIENT
Start: 2019-01-01 | End: 2019-01-01

## 2019-01-01 RX ORDER — ALBUTEROL SULFATE 0.83 MG/ML
2.5 SOLUTION RESPIRATORY (INHALATION)
Status: CANCELLED | OUTPATIENT
Start: 2019-01-01

## 2019-01-01 RX ORDER — ONDANSETRON 8 MG/1
8 TABLET, FILM COATED ORAL EVERY 8 HOURS PRN
Qty: 90 TABLET | Refills: 3 | Status: SHIPPED | OUTPATIENT
Start: 2019-01-01 | End: 2019-01-01

## 2019-01-01 RX ORDER — NICOTINE POLACRILEX 4 MG
15-30 LOZENGE BUCCAL
Status: DISCONTINUED | OUTPATIENT
Start: 2019-01-01 | End: 2019-01-01 | Stop reason: HOSPADM

## 2019-01-01 RX ORDER — LORAZEPAM 2 MG/ML
0.5 INJECTION INTRAMUSCULAR EVERY 4 HOURS PRN
Status: CANCELLED
Start: 2019-01-01

## 2019-01-01 RX ORDER — ONDANSETRON 2 MG/ML
4 INJECTION INTRAMUSCULAR; INTRAVENOUS EVERY 6 HOURS PRN
Status: DISCONTINUED | OUTPATIENT
Start: 2019-01-01 | End: 2019-01-01 | Stop reason: HOSPADM

## 2019-01-01 RX ORDER — MEPERIDINE HYDROCHLORIDE 25 MG/ML
25 INJECTION INTRAMUSCULAR; INTRAVENOUS; SUBCUTANEOUS EVERY 30 MIN PRN
Status: CANCELLED | OUTPATIENT
Start: 2019-01-01

## 2019-01-01 RX ORDER — TRAMADOL HYDROCHLORIDE 50 MG/1
50-100 TABLET ORAL EVERY 6 HOURS PRN
Status: DISCONTINUED | OUTPATIENT
Start: 2019-01-01 | End: 2019-01-01 | Stop reason: HOSPADM

## 2019-01-01 RX ORDER — PROCHLORPERAZINE MALEATE 10 MG
10 TABLET ORAL EVERY 6 HOURS PRN
Qty: 30 TABLET | Refills: 2 | Status: SHIPPED | OUTPATIENT
Start: 2019-01-01 | End: 2019-01-01

## 2019-01-01 RX ORDER — DIPHENHYDRAMINE HYDROCHLORIDE 50 MG/ML
50 INJECTION INTRAMUSCULAR; INTRAVENOUS
Status: CANCELLED
Start: 2019-01-01

## 2019-01-01 RX ORDER — METHYLPREDNISOLONE SODIUM SUCCINATE 125 MG/2ML
125 INJECTION, POWDER, LYOPHILIZED, FOR SOLUTION INTRAMUSCULAR; INTRAVENOUS
Status: CANCELLED
Start: 2019-01-01

## 2019-01-01 RX ORDER — ALBUTEROL SULFATE 0.83 MG/ML
2.5 SOLUTION RESPIRATORY (INHALATION)
Status: CANCELLED | OUTPATIENT
Start: 2020-01-01

## 2019-01-01 RX ORDER — TRIAMCINOLONE ACETONIDE 1 MG/G
CREAM TOPICAL 2 TIMES DAILY
Qty: 30 G | Refills: 1 | Status: SHIPPED | OUTPATIENT
Start: 2019-01-01 | End: 2019-01-01

## 2019-01-01 RX ORDER — SODIUM CHLORIDE 9 MG/ML
1000 INJECTION, SOLUTION INTRAVENOUS CONTINUOUS PRN
Status: CANCELLED
Start: 2020-01-01

## 2019-01-01 RX ORDER — LIDOCAINE HYDROCHLORIDE 10 MG/ML
2 INJECTION, SOLUTION EPIDURAL; INFILTRATION; INTRACAUDAL; PERINEURAL ONCE
Status: CANCELLED | OUTPATIENT
Start: 2019-01-01 | End: 2019-01-01

## 2019-01-01 RX ORDER — FLUCONAZOLE 150 MG/1
150 TABLET ORAL ONCE
Qty: 1 TABLET | Refills: 0 | Status: SHIPPED | OUTPATIENT
Start: 2019-01-01 | End: 2019-01-01

## 2019-01-01 RX ORDER — BISACODYL 10 MG
10 SUPPOSITORY, RECTAL RECTAL DAILY PRN
Status: DISCONTINUED | OUTPATIENT
Start: 2019-01-01 | End: 2019-01-01 | Stop reason: HOSPADM

## 2019-01-01 RX ORDER — EPINEPHRINE 0.3 MG/.3ML
0.3 INJECTION SUBCUTANEOUS EVERY 5 MIN PRN
Status: CANCELLED | OUTPATIENT
Start: 2020-01-01

## 2019-01-01 RX ORDER — TC 99M MEDRONATE 20 MG/10ML
25 INJECTION, POWDER, LYOPHILIZED, FOR SOLUTION INTRAVENOUS ONCE
Status: COMPLETED | OUTPATIENT
Start: 2019-01-01 | End: 2019-01-01

## 2019-01-01 RX ORDER — ONDANSETRON 8 MG/1
8 TABLET, FILM COATED ORAL EVERY 8 HOURS PRN
Qty: 30 TABLET | Refills: 3 | Status: SHIPPED | OUTPATIENT
Start: 2019-01-01 | End: 2019-01-01

## 2019-01-01 RX ORDER — ACYCLOVIR 200 MG/1
10 CAPSULE ORAL ONCE
Status: DISCONTINUED | OUTPATIENT
Start: 2019-01-01 | End: 2019-01-01 | Stop reason: HOSPADM

## 2019-01-01 RX ORDER — ATORVASTATIN CALCIUM 20 MG/1
20 TABLET, FILM COATED ORAL DAILY
Qty: 30 TABLET | Refills: 0 | Status: SHIPPED | OUTPATIENT
Start: 2019-01-01 | End: 2019-01-01

## 2019-01-01 RX ORDER — ZOLEDRONIC ACID 0.04 MG/ML
4 INJECTION, SOLUTION INTRAVENOUS ONCE
Status: CANCELLED | OUTPATIENT
Start: 2020-01-01

## 2019-01-01 RX ORDER — DIPHENHYDRAMINE HYDROCHLORIDE 50 MG/ML
50 INJECTION INTRAMUSCULAR; INTRAVENOUS
Status: CANCELLED
Start: 2020-01-01

## 2019-01-01 RX ORDER — PROCHLORPERAZINE MALEATE 10 MG
10 TABLET ORAL EVERY 6 HOURS PRN
Qty: 30 TABLET | Refills: 2 | Status: SHIPPED | OUTPATIENT
Start: 2019-01-01

## 2019-01-01 RX ORDER — ATORVASTATIN CALCIUM 20 MG/1
20 TABLET, FILM COATED ORAL DAILY
Status: DISCONTINUED | OUTPATIENT
Start: 2019-01-01 | End: 2019-01-01 | Stop reason: HOSPADM

## 2019-01-01 RX ORDER — ACYCLOVIR 400 MG/1
400 TABLET ORAL EVERY 12 HOURS
Qty: 60 TABLET | Refills: 1 | Status: SHIPPED | OUTPATIENT
Start: 2019-01-01 | End: 2019-01-01

## 2019-01-01 RX ORDER — ASPIRIN 300 MG/1
300 SUPPOSITORY RECTAL DAILY
Status: DISCONTINUED | OUTPATIENT
Start: 2019-01-01 | End: 2019-01-01

## 2019-01-01 RX ORDER — TC 99M MEDRONATE 20 MG/10ML
20-30 INJECTION, POWDER, LYOPHILIZED, FOR SOLUTION INTRAVENOUS ONCE
Status: COMPLETED | OUTPATIENT
Start: 2019-01-01 | End: 2019-01-01

## 2019-01-01 RX ORDER — DEXAMETHASONE 4 MG/1
2 TABLET ORAL 2 TIMES DAILY WITH MEALS
Qty: 14 TABLET | Refills: 0 | Status: SHIPPED | OUTPATIENT
Start: 2019-01-01 | End: 2019-01-01

## 2019-01-01 RX ORDER — NALOXONE HYDROCHLORIDE 0.4 MG/ML
.1-.4 INJECTION, SOLUTION INTRAMUSCULAR; INTRAVENOUS; SUBCUTANEOUS
Status: DISCONTINUED | OUTPATIENT
Start: 2019-01-01 | End: 2019-01-01 | Stop reason: HOSPADM

## 2019-01-01 RX ORDER — TACROLIMUS 1 MG/G
OINTMENT TOPICAL 2 TIMES DAILY
Qty: 300 G | Refills: 3 | Status: SHIPPED | OUTPATIENT
Start: 2019-01-01 | End: 2019-01-01

## 2019-01-01 RX ORDER — EPINEPHRINE 1 MG/ML
0.3 INJECTION, SOLUTION, CONCENTRATE INTRAVENOUS EVERY 5 MIN PRN
Status: CANCELLED | OUTPATIENT
Start: 2019-01-01

## 2019-01-01 RX ORDER — PROCHLORPERAZINE MALEATE 5 MG
5 TABLET ORAL EVERY 6 HOURS PRN
Qty: 30 TABLET | Refills: 1 | Status: SHIPPED | OUTPATIENT
Start: 2019-01-01

## 2019-01-01 RX ORDER — EPINEPHRINE 1 MG/ML
0.3 INJECTION, SOLUTION INTRAMUSCULAR; SUBCUTANEOUS EVERY 5 MIN PRN
Status: CANCELLED | OUTPATIENT
Start: 2020-01-01

## 2019-01-01 RX ORDER — LORAZEPAM 2 MG/ML
0.5 INJECTION INTRAMUSCULAR EVERY 4 HOURS PRN
Status: CANCELLED
Start: 2020-01-01

## 2019-01-01 RX ORDER — LORAZEPAM 2 MG/ML
0.5 INJECTION INTRAMUSCULAR EVERY 4 HOURS PRN
Status: DISCONTINUED | OUTPATIENT
Start: 2019-01-01 | End: 2019-01-01 | Stop reason: HOSPADM

## 2019-01-01 RX ORDER — IOPAMIDOL 755 MG/ML
61 INJECTION, SOLUTION INTRAVASCULAR ONCE
Status: COMPLETED | OUTPATIENT
Start: 2019-01-01 | End: 2019-01-01

## 2019-01-01 RX ORDER — ZOLEDRONIC ACID 0.04 MG/ML
4 INJECTION, SOLUTION INTRAVENOUS ONCE
Status: COMPLETED | OUTPATIENT
Start: 2019-01-01 | End: 2019-01-01

## 2019-01-01 RX ORDER — NYSTATIN 100000/ML
500000 SUSPENSION, ORAL (FINAL DOSE FORM) ORAL 4 TIMES DAILY
Qty: 400 ML | Refills: 0 | Status: SHIPPED | OUTPATIENT
Start: 2019-01-01 | End: 2020-01-01

## 2019-01-01 RX ORDER — AMOXICILLIN 250 MG
2 CAPSULE ORAL 2 TIMES DAILY
Status: DISCONTINUED | OUTPATIENT
Start: 2019-01-01 | End: 2019-01-01 | Stop reason: HOSPADM

## 2019-01-01 RX ORDER — LORAZEPAM 0.5 MG/1
0.5 TABLET ORAL EVERY 4 HOURS PRN
Qty: 30 TABLET | Refills: 2 | Status: SHIPPED | OUTPATIENT
Start: 2019-01-01 | End: 2019-01-01

## 2019-01-01 RX ORDER — AMOXICILLIN 250 MG
1 CAPSULE ORAL 2 TIMES DAILY PRN
Status: DISCONTINUED | OUTPATIENT
Start: 2019-01-01 | End: 2019-01-01 | Stop reason: HOSPADM

## 2019-01-01 RX ORDER — OLANZAPINE 5 MG/1
2.5 TABLET ORAL AT BEDTIME
Qty: 30 TABLET | Refills: 0 | Status: SHIPPED | OUTPATIENT
Start: 2019-01-01 | End: 2019-01-01

## 2019-01-01 RX ORDER — NICOTINE POLACRILEX 4 MG
15-30 LOZENGE BUCCAL
Status: CANCELLED | OUTPATIENT
Start: 2019-01-01

## 2019-01-01 RX ORDER — DEXAMETHASONE SODIUM PHOSPHATE 4 MG/ML
6 INJECTION, SOLUTION INTRA-ARTICULAR; INTRALESIONAL; INTRAMUSCULAR; INTRAVENOUS; SOFT TISSUE ONCE
Status: CANCELLED
Start: 2019-01-01

## 2019-01-01 RX ORDER — OLANZAPINE 2.5 MG/1
2.5 TABLET, FILM COATED ORAL AT BEDTIME
Qty: 30 TABLET | Refills: 1 | Status: SHIPPED | OUTPATIENT
Start: 2019-01-01

## 2019-01-01 RX ORDER — ONDANSETRON 4 MG/1
4 TABLET, ORALLY DISINTEGRATING ORAL EVERY 6 HOURS PRN
Status: DISCONTINUED | OUTPATIENT
Start: 2019-01-01 | End: 2019-01-01 | Stop reason: HOSPADM

## 2019-01-01 RX ORDER — DEXAMETHASONE 2 MG/1
2 TABLET ORAL 2 TIMES DAILY WITH MEALS
Qty: 60 TABLET | Refills: 0 | Status: SHIPPED | OUTPATIENT
Start: 2019-01-01 | End: 2019-01-01

## 2019-01-01 RX ORDER — METHYLPREDNISOLONE SODIUM SUCCINATE 125 MG/2ML
125 INJECTION, POWDER, LYOPHILIZED, FOR SOLUTION INTRAMUSCULAR; INTRAVENOUS
Status: CANCELLED
Start: 2020-01-01

## 2019-01-01 RX ORDER — LIDOCAINE HYDROCHLORIDE 20 MG/ML
SOLUTION OROPHARYNGEAL
COMMUNITY
Start: 2019-01-01 | End: 2019-01-01

## 2019-01-01 RX ORDER — IOPAMIDOL 755 MG/ML
66 INJECTION, SOLUTION INTRAVASCULAR ONCE
Status: COMPLETED | OUTPATIENT
Start: 2019-01-01 | End: 2019-01-01

## 2019-01-01 RX ORDER — ONDANSETRON 8 MG/1
8 TABLET, FILM COATED ORAL EVERY 8 HOURS PRN
Status: DISCONTINUED | OUTPATIENT
Start: 2019-01-01 | End: 2019-01-01

## 2019-01-01 RX ORDER — SENNOSIDES 8.6 MG
8.6 TABLET ORAL 2 TIMES DAILY PRN
Status: DISCONTINUED | OUTPATIENT
Start: 2019-01-01 | End: 2019-01-01 | Stop reason: HOSPADM

## 2019-01-01 RX ORDER — DOXEPIN HYDROCHLORIDE 10 MG/ML
15 SOLUTION ORAL EVERY 4 HOURS PRN
Qty: 118 ML | Refills: 3 | Status: SHIPPED | OUTPATIENT
Start: 2019-01-01 | End: 2019-01-01

## 2019-01-01 RX ORDER — ASPIRIN 81 MG/1
81 TABLET, CHEWABLE ORAL DAILY
Status: DISCONTINUED | OUTPATIENT
Start: 2019-01-01 | End: 2019-01-01

## 2019-01-01 RX ORDER — PROCHLORPERAZINE MALEATE 5 MG
5 TABLET ORAL EVERY 6 HOURS PRN
Status: DISCONTINUED | OUTPATIENT
Start: 2019-01-01 | End: 2019-01-01 | Stop reason: HOSPADM

## 2019-01-01 RX ORDER — DEXTROSE MONOHYDRATE 25 G/50ML
25-50 INJECTION, SOLUTION INTRAVENOUS
Status: CANCELLED | OUTPATIENT
Start: 2019-01-01

## 2019-01-01 RX ORDER — MINERAL OIL AND WHITE PETROLATUM 30; 940 MG/G; MG/G
OINTMENT OPHTHALMIC
Qty: 1 TUBE | Refills: 1 | Status: SHIPPED | OUTPATIENT
Start: 2019-01-01

## 2019-01-01 RX ORDER — ASPIRIN 81 MG/1
81 TABLET, CHEWABLE ORAL DAILY
Qty: 30 TABLET | Refills: 0 | Status: SHIPPED | OUTPATIENT
Start: 2019-01-01 | End: 2019-01-01

## 2019-01-01 RX ORDER — HYDROXYZINE HYDROCHLORIDE 25 MG/1
25 TABLET, FILM COATED ORAL 2 TIMES DAILY PRN
Status: DISCONTINUED | OUTPATIENT
Start: 2019-01-01 | End: 2019-01-01 | Stop reason: HOSPADM

## 2019-01-01 RX ORDER — FLUCONAZOLE 150 MG/1
TABLET ORAL
Qty: 3 TABLET | Refills: 0 | Status: SHIPPED | OUTPATIENT
Start: 2019-01-01 | End: 2019-01-01

## 2019-01-01 RX ORDER — AMOXICILLIN 250 MG
2 CAPSULE ORAL 2 TIMES DAILY PRN
Status: DISCONTINUED | OUTPATIENT
Start: 2019-01-01 | End: 2019-01-01 | Stop reason: HOSPADM

## 2019-01-01 RX ORDER — IOPAMIDOL 755 MG/ML
75 INJECTION, SOLUTION INTRAVASCULAR ONCE
Status: COMPLETED | OUTPATIENT
Start: 2019-01-01 | End: 2019-01-01

## 2019-01-01 RX ORDER — LIDOCAINE 40 MG/G
CREAM TOPICAL
Status: DISCONTINUED | OUTPATIENT
Start: 2019-01-01 | End: 2019-01-01 | Stop reason: HOSPADM

## 2019-01-01 RX ORDER — CALCIUM CARBONATE 500(1250)
1 TABLET ORAL DAILY
Status: DISCONTINUED | OUTPATIENT
Start: 2019-01-01 | End: 2019-01-01 | Stop reason: HOSPADM

## 2019-01-01 RX ORDER — AMOXICILLIN 250 MG
1 CAPSULE ORAL 2 TIMES DAILY
Status: DISCONTINUED | OUTPATIENT
Start: 2019-01-01 | End: 2019-01-01 | Stop reason: HOSPADM

## 2019-01-01 RX ORDER — GADOBUTROL 604.72 MG/ML
7.5 INJECTION INTRAVENOUS ONCE
Status: COMPLETED | OUTPATIENT
Start: 2019-01-01 | End: 2019-01-01

## 2019-01-01 RX ORDER — MEPERIDINE HYDROCHLORIDE 25 MG/ML
25 INJECTION INTRAMUSCULAR; INTRAVENOUS; SUBCUTANEOUS EVERY 30 MIN PRN
Status: CANCELLED | OUTPATIENT
Start: 2020-01-01

## 2019-01-01 RX ORDER — NALOXONE HYDROCHLORIDE 0.4 MG/ML
.1-.4 INJECTION, SOLUTION INTRAMUSCULAR; INTRAVENOUS; SUBCUTANEOUS
Status: CANCELLED | OUTPATIENT
Start: 2019-01-01

## 2019-01-01 RX ORDER — ONDANSETRON 8 MG/1
8 TABLET, FILM COATED ORAL EVERY 8 HOURS PRN
Status: DISCONTINUED | OUTPATIENT
Start: 2019-01-01 | End: 2019-01-01 | Stop reason: HOSPADM

## 2019-01-01 RX ORDER — ASPIRIN 325 MG
325 TABLET ORAL ONCE
Status: DISCONTINUED | OUTPATIENT
Start: 2019-01-01 | End: 2019-01-01 | Stop reason: HOSPADM

## 2019-01-01 RX ORDER — LORAZEPAM 0.5 MG/1
0.5 TABLET ORAL EVERY 4 HOURS PRN
Status: DISCONTINUED | OUTPATIENT
Start: 2019-01-01 | End: 2019-01-01 | Stop reason: HOSPADM

## 2019-01-01 RX ORDER — LIDOCAINE HYDROCHLORIDE 10 MG/ML
2 INJECTION, SOLUTION EPIDURAL; INFILTRATION; INTRACAUDAL; PERINEURAL ONCE
Status: CANCELLED | OUTPATIENT
Start: 2020-01-28

## 2019-01-01 RX ORDER — ENZYMES,DIGESTIVE
1 CAPSULE ORAL DAILY
Status: DISCONTINUED | OUTPATIENT
Start: 2019-01-01 | End: 2019-01-01 | Stop reason: RX

## 2019-01-01 RX ORDER — POTASSIUM CHLORIDE 1500 MG/1
40 TABLET, EXTENDED RELEASE ORAL ONCE
Status: COMPLETED | OUTPATIENT
Start: 2019-01-01 | End: 2019-01-01

## 2019-01-01 RX ADMIN — GOSERELIN ACETATE 3.6 MG: 3.6 IMPLANT SUBCUTANEOUS at 12:32

## 2019-01-01 RX ADMIN — LIDOCAINE HYDROCHLORIDE 2 ML: 10 INJECTION, SOLUTION EPIDURAL; INFILTRATION; INTRACAUDAL; PERINEURAL at 16:34

## 2019-01-01 RX ADMIN — IOPAMIDOL 59 ML: 755 INJECTION, SOLUTION INTRAVENOUS at 09:45

## 2019-01-01 RX ADMIN — TC 99M MEDRONATE 23.2 MCI.: 20 INJECTION, POWDER, LYOPHILIZED, FOR SOLUTION INTRAVENOUS at 09:30

## 2019-01-01 RX ADMIN — GOSERELIN ACETATE 3.6 MG: 3.6 IMPLANT SUBCUTANEOUS at 16:35

## 2019-01-01 RX ADMIN — OMEPRAZOLE 20 MG: 20 CAPSULE, DELAYED RELEASE ORAL at 08:28

## 2019-01-01 RX ADMIN — GOSERELIN ACETATE 3.6 MG: 3.6 IMPLANT SUBCUTANEOUS at 12:21

## 2019-01-01 RX ADMIN — ONDANSETRON 4 MG: 2 INJECTION INTRAMUSCULAR; INTRAVENOUS at 05:33

## 2019-01-01 RX ADMIN — DEXAMETHASONE SODIUM PHOSPHATE 6 MG: 10 INJECTION, SOLUTION INTRAMUSCULAR; INTRAVENOUS at 11:34

## 2019-01-01 RX ADMIN — DEXTROSE MONOHYDRATE 250 ML: 50 INJECTION, SOLUTION INTRAVENOUS at 15:32

## 2019-01-01 RX ADMIN — DOXORUBICIN HYDROCHLORIDE 60 MG: 2 INJECTABLE, LIPOSOMAL INTRAVENOUS at 15:34

## 2019-01-01 RX ADMIN — IOPAMIDOL 75 ML: 755 INJECTION, SOLUTION INTRAVENOUS at 23:29

## 2019-01-01 RX ADMIN — SODIUM CHLORIDE 250 ML: 9 INJECTION, SOLUTION INTRAVENOUS at 14:51

## 2019-01-01 RX ADMIN — GOSERELIN ACETATE 3.6 MG: 3.6 IMPLANT SUBCUTANEOUS at 12:05

## 2019-01-01 RX ADMIN — DEXAMETHASONE SODIUM PHOSPHATE 12 MG: 10 INJECTION, SOLUTION INTRAMUSCULAR; INTRAVENOUS at 10:30

## 2019-01-01 RX ADMIN — LIDOCAINE HYDROCHLORIDE 2 ML: 10 INJECTION, SOLUTION EPIDURAL; INFILTRATION; INTRACAUDAL; PERINEURAL at 16:15

## 2019-01-01 RX ADMIN — ASPIRIN 81 MG CHEWABLE TABLET 81 MG: 81 TABLET CHEWABLE at 07:40

## 2019-01-01 RX ADMIN — ZOLEDRONIC ACID 4 MG: 0.04 INJECTION, SOLUTION INTRAVENOUS at 14:53

## 2019-01-01 RX ADMIN — ONDANSETRON 4 MG: 4 TABLET, ORALLY DISINTEGRATING ORAL at 23:51

## 2019-01-01 RX ADMIN — DEXTROSE MONOHYDRATE 250 ML: 50 INJECTION, SOLUTION INTRAVENOUS at 11:31

## 2019-01-01 RX ADMIN — LIDOCAINE HYDROCHLORIDE 2 ML: 10 INJECTION, SOLUTION EPIDURAL; INFILTRATION; INTRACAUDAL; PERINEURAL at 13:45

## 2019-01-01 RX ADMIN — DEXTROSE MONOHYDRATE 250 ML: 50 INJECTION, SOLUTION INTRAVENOUS at 15:06

## 2019-01-01 RX ADMIN — FAMOTIDINE 20 MG: 20 INJECTION, SOLUTION INTRAVENOUS at 15:23

## 2019-01-01 RX ADMIN — DEXTROSE MONOHYDRATE 250 ML: 50 INJECTION, SOLUTION INTRAVENOUS at 11:18

## 2019-01-01 RX ADMIN — TC 99M MEDRONATE 25.5 MCI.: 20 INJECTION, POWDER, LYOPHILIZED, FOR SOLUTION INTRAVENOUS at 10:07

## 2019-01-01 RX ADMIN — Medication 6 ML: at 16:00

## 2019-01-01 RX ADMIN — INFLUENZA A VIRUS A/BRISBANE/02/2018 IVR-190 (H1N1) ANTIGEN (FORMALDEHYDE INACTIVATED), INFLUENZA A VIRUS A/KANSAS/14/2017 X-327 (H3N2) ANTIGEN (FORMALDEHYDE INACTIVATED), INFLUENZA B VIRUS B/PHUKET/3073/2013 ANTIGEN (FORMALDEHYDE INACTIVATED), AND INFLUENZA B VIRUS B/MARYLAND/15/2016 BX-69A ANTIGEN (FORMALDEHYDE INACTIVATED) 0.5 ML: 15; 15; 15; 15 INJECTION, SUSPENSION INTRAMUSCULAR at 12:05

## 2019-01-01 RX ADMIN — LIDOCAINE HYDROCHLORIDE 2 ML: 10 INJECTION, SOLUTION EPIDURAL; INFILTRATION; INTRACAUDAL; PERINEURAL at 15:33

## 2019-01-01 RX ADMIN — LIDOCAINE HYDROCHLORIDE 2 ML: 10 INJECTION, SOLUTION EPIDURAL; INFILTRATION; INTRACAUDAL; PERINEURAL at 13:30

## 2019-01-01 RX ADMIN — DOXORUBICIN HYDROCHLORIDE 60 MG: 2 INJECTABLE, LIPOSOMAL INTRAVENOUS at 11:03

## 2019-01-01 RX ADMIN — DEXAMETHASONE SODIUM PHOSPHATE 12 MG: 10 INJECTION, SOLUTION INTRAMUSCULAR; INTRAVENOUS at 15:04

## 2019-01-01 RX ADMIN — DOXORUBICIN HYDROCHLORIDE 53 MG: 2 INJECTABLE, LIPOSOMAL INTRAVENOUS at 16:08

## 2019-01-01 RX ADMIN — GOSERELIN ACETATE 3.6 MG: 3.6 IMPLANT SUBCUTANEOUS at 13:49

## 2019-01-01 RX ADMIN — CALCIUM 500 MG: 500 TABLET ORAL at 07:48

## 2019-01-01 RX ADMIN — DEXTROSE MONOHYDRATE 250 ML: 50 INJECTION, SOLUTION INTRAVENOUS at 12:09

## 2019-01-01 RX ADMIN — MELATONIN 1000 UNITS: at 07:40

## 2019-01-01 RX ADMIN — LIDOCAINE HYDROCHLORIDE 2 ML: 10 INJECTION, SOLUTION EPIDURAL; INFILTRATION; INTRACAUDAL; PERINEURAL at 12:04

## 2019-01-01 RX ADMIN — Medication 3 ML: at 14:15

## 2019-01-01 RX ADMIN — DOXORUBICIN HYDROCHLORIDE 53 MG: 2 INJECTABLE, LIPOSOMAL INTRAVENOUS at 16:07

## 2019-01-01 RX ADMIN — GOSERELIN ACETATE 3.6 MG: 3.6 IMPLANT SUBCUTANEOUS at 16:34

## 2019-01-01 RX ADMIN — DEXAMETHASONE SODIUM PHOSPHATE 12 MG: 10 INJECTION, SOLUTION INTRAMUSCULAR; INTRAVENOUS at 12:49

## 2019-01-01 RX ADMIN — GOSERELIN ACETATE 3.6 MG: 3.6 IMPLANT SUBCUTANEOUS at 16:19

## 2019-01-01 RX ADMIN — LIDOCAINE HYDROCHLORIDE 2 ML: 10 INJECTION, SOLUTION EPIDURAL; INFILTRATION; INTRACAUDAL; PERINEURAL at 12:33

## 2019-01-01 RX ADMIN — DEXAMETHASONE SODIUM PHOSPHATE 12 MG: 10 INJECTION, SOLUTION INTRAMUSCULAR; INTRAVENOUS at 15:49

## 2019-01-01 RX ADMIN — DEXAMETHASONE SODIUM PHOSPHATE 12 MG: 10 INJECTION, SOLUTION INTRAMUSCULAR; INTRAVENOUS at 11:00

## 2019-01-01 RX ADMIN — GADOBUTROL 7.5 ML: 604.72 INJECTION INTRAVENOUS at 15:18

## 2019-01-01 RX ADMIN — LIDOCAINE HYDROCHLORIDE 2 ML: 10 INJECTION, SOLUTION EPIDURAL; INFILTRATION; INTRACAUDAL; PERINEURAL at 12:51

## 2019-01-01 RX ADMIN — SODIUM CHLORIDE, PRESERVATIVE FREE 90 ML: 5 INJECTION INTRAVENOUS at 23:29

## 2019-01-01 RX ADMIN — LIDOCAINE HYDROCHLORIDE 2 ML: 10 INJECTION, SOLUTION EPIDURAL; INFILTRATION; INTRACAUDAL; PERINEURAL at 12:18

## 2019-01-01 RX ADMIN — GOSERELIN ACETATE 3.6 MG: 3.6 IMPLANT SUBCUTANEOUS at 15:40

## 2019-01-01 RX ADMIN — TC 99M MEDRONATE 25.15 MCI.: 20 INJECTION, POWDER, LYOPHILIZED, FOR SOLUTION INTRAVENOUS at 11:00

## 2019-01-01 RX ADMIN — DEXAMETHASONE SODIUM PHOSPHATE 12 MG: 10 INJECTION, SOLUTION INTRAMUSCULAR; INTRAVENOUS at 13:06

## 2019-01-01 RX ADMIN — DOXORUBICIN HYDROCHLORIDE 50 MG: 2 INJECTABLE, LIPOSOMAL INTRAVENOUS at 12:13

## 2019-01-01 RX ADMIN — IOPAMIDOL 61 ML: 755 INJECTION, SOLUTION INTRAVENOUS at 10:16

## 2019-01-01 RX ADMIN — DEXTROSE MONOHYDRATE 250 ML: 50 INJECTION, SOLUTION INTRAVENOUS at 14:15

## 2019-01-01 RX ADMIN — IOPAMIDOL 66 ML: 755 INJECTION, SOLUTION INTRAVENOUS at 11:06

## 2019-01-01 RX ADMIN — GOSERELIN ACETATE 3.6 MG: 3.6 IMPLANT SUBCUTANEOUS at 12:55

## 2019-01-01 RX ADMIN — MELATONIN 1000 UNITS: at 07:28

## 2019-01-01 RX ADMIN — ASPIRIN 81 MG CHEWABLE TABLET 81 MG: 81 TABLET CHEWABLE at 08:52

## 2019-01-01 RX ADMIN — GOSERELIN ACETATE 3.6 MG: 3.6 IMPLANT SUBCUTANEOUS at 13:36

## 2019-01-01 RX ADMIN — DEXTROSE MONOHYDRATE 50 ML: 50 INJECTION, SOLUTION INTRAVENOUS at 13:06

## 2019-01-01 RX ADMIN — DEXTROSE MONOHYDRATE 250 ML: 50 INJECTION, SOLUTION INTRAVENOUS at 10:06

## 2019-01-01 RX ADMIN — ATORVASTATIN CALCIUM 20 MG: 20 TABLET, FILM COATED ORAL at 13:36

## 2019-01-01 RX ADMIN — SENNOSIDES AND DOCUSATE SODIUM 1 TABLET: 8.6; 5 TABLET ORAL at 07:40

## 2019-01-01 RX ADMIN — LIDOCAINE HYDROCHLORIDE 2 ML: 10 INJECTION, SOLUTION EPIDURAL; INFILTRATION; INTRACAUDAL; PERINEURAL at 13:09

## 2019-01-01 RX ADMIN — IOPAMIDOL 66 ML: 755 INJECTION, SOLUTION INTRAVASCULAR at 11:59

## 2019-01-01 RX ADMIN — DEXTROSE MONOHYDRATE 250 ML: 50 INJECTION, SOLUTION INTRAVENOUS at 15:04

## 2019-01-01 RX ADMIN — DOXORUBICIN HYDROCHLORIDE 50 MG: 2 INJECTABLE, LIPOSOMAL INTRAVENOUS at 14:34

## 2019-01-01 RX ADMIN — DOXORUBICIN HYDROCHLORIDE 50 MG: 2 INJECTABLE, LIPOSOMAL INTRAVENOUS at 11:20

## 2019-01-01 RX ADMIN — DOXORUBICIN HYDROCHLORIDE 50 MG: 2 INJECTABLE, LIPOSOMAL INTRAVENOUS at 12:00

## 2019-01-01 RX ADMIN — DEXAMETHASONE SODIUM PHOSPHATE 12 MG: 10 INJECTION, SOLUTION INTRAMUSCULAR; INTRAVENOUS at 15:48

## 2019-01-01 RX ADMIN — GOSERELIN ACETATE 3.6 MG: 3.6 IMPLANT SUBCUTANEOUS at 13:09

## 2019-01-01 RX ADMIN — DEXAMETHASONE SODIUM PHOSPHATE 6 MG: 10 INJECTION, SOLUTION INTRAMUSCULAR; INTRAVENOUS at 14:15

## 2019-01-01 RX ADMIN — ZOLEDRONIC ACID 4 MG: 0.04 INJECTION, SOLUTION INTRAVENOUS at 10:38

## 2019-01-01 RX ADMIN — POTASSIUM CHLORIDE 40 MEQ: 1500 TABLET, EXTENDED RELEASE ORAL at 11:45

## 2019-01-01 RX ADMIN — SODIUM CHLORIDE 250 ML: 9 INJECTION, SOLUTION INTRAVENOUS at 10:38

## 2019-01-01 RX ADMIN — LIDOCAINE HYDROCHLORIDE 2 ML: 10 INJECTION, SOLUTION EPIDURAL; INFILTRATION; INTRACAUDAL; PERINEURAL at 12:32

## 2019-01-01 RX ADMIN — ONDANSETRON 4 MG: 4 TABLET, ORALLY DISINTEGRATING ORAL at 07:45

## 2019-01-01 RX ADMIN — DOXORUBICIN HYDROCHLORIDE 60 MG: 2 INJECTABLE, LIPOSOMAL INTRAVENOUS at 15:36

## 2019-01-01 RX ADMIN — ATORVASTATIN CALCIUM 20 MG: 20 TABLET, FILM COATED ORAL at 07:40

## 2019-01-01 RX ADMIN — DEXTROSE MONOHYDRATE 250 ML: 50 INJECTION, SOLUTION INTRAVENOUS at 15:49

## 2019-01-01 RX ADMIN — GOSERELIN ACETATE 3.6 MG: 3.6 IMPLANT SUBCUTANEOUS at 12:33

## 2019-01-01 RX ADMIN — ZOLEDRONIC ACID 4 MG: 0.04 INJECTION, SOLUTION INTRAVENOUS at 13:00

## 2019-01-01 RX ADMIN — ONDANSETRON 4 MG: 4 TABLET, ORALLY DISINTEGRATING ORAL at 16:08

## 2019-01-01 RX ADMIN — DEXAMETHASONE SODIUM PHOSPHATE 12 MG: 10 INJECTION, SOLUTION INTRAMUSCULAR; INTRAVENOUS at 15:06

## 2019-01-01 RX ADMIN — Medication 5 ML: at 15:30

## 2019-01-01 RX ADMIN — DOXORUBICIN HYDROCHLORIDE 53 MG: 2 INJECTABLE, LIPOSOMAL INTRAVENOUS at 13:44

## 2019-01-01 RX ADMIN — Medication 5 ML: at 10:45

## 2019-01-01 RX ADMIN — SENNOSIDES 8.6 MG: 8.6 TABLET, FILM COATED ORAL at 08:28

## 2019-01-01 RX ADMIN — GEMCITABINE 1200 MG: 38 INJECTION, SOLUTION INTRAVENOUS at 13:16

## 2019-01-01 ASSESSMENT — VISUAL ACUITY
OU: NORMAL ACUITY

## 2019-01-01 ASSESSMENT — PAIN SCALES - GENERAL
PAINLEVEL: MODERATE PAIN (4)
PAINLEVEL: NO PAIN (0)
PAINLEVEL: MODERATE PAIN (5)
PAINLEVEL: NO PAIN (0)
PAINLEVEL: MILD PAIN (3)
PAINLEVEL: NO PAIN (0)

## 2019-01-01 ASSESSMENT — MIFFLIN-ST. JEOR
SCORE: 1112.41
SCORE: 1055.25
SCORE: 1104.25
SCORE: 1077.02
SCORE: 1102.04
SCORE: 1119.87
SCORE: 1109.3
SCORE: 1069.83
SCORE: 1114.88
SCORE: 1071.25
SCORE: 1119.67
SCORE: 1073.01
SCORE: 1106.51
SCORE: 1053.89
SCORE: 1102.98

## 2019-01-01 ASSESSMENT — ENCOUNTER SYMPTOMS
BLOOD IN STOOL: 0
INSOMNIA: 1
FACIAL ASYMMETRY: 1
CONSTIPATION: 0
BACK PAIN: 0
HEADACHES: 1
DIZZINESS: 0
VOMITING: 0
BRUISES/BLEEDS EASILY: 0
DIAPHORESIS: 1
EYE PAIN: 0
HEADACHES: 0
SENSORY CHANGE: 1
DEPRESSION: 0
DOUBLE VISION: 0
SHORTNESS OF BREATH: 1
DIARRHEA: 0
NECK PAIN: 0
TINGLING: 0
NAUSEA: 0
FOCAL WEAKNESS: 1
CHILLS: 0
WEIGHT LOSS: 1
FREQUENCY: 0
FEVER: 1
NERVOUS/ANXIOUS: 1
ABDOMINAL PAIN: 1
DYSURIA: 0
HEMATURIA: 0
FALLS: 0
SORE THROAT: 0
SEIZURES: 0
BLURRED VISION: 1
FEVER: 0
NUMBNESS: 1

## 2019-01-01 ASSESSMENT — PAIN DESCRIPTION - DESCRIPTORS
DESCRIPTORS: TIGHTNESS
DESCRIPTORS: TIGHTNESS

## 2019-01-01 ASSESSMENT — ACTIVITIES OF DAILY LIVING (ADL)
ADLS_ACUITY_SCORE: 11
ADLS_ACUITY_SCORE: 12
RETIRED_EATING: 0-->INDEPENDENT
BATHING: 0-->INDEPENDENT
COGNITION: 0 - NO COGNITION ISSUES REPORTED
DRESS: 0-->INDEPENDENT
FALL_HISTORY_WITHIN_LAST_SIX_MONTHS: NO
SWALLOWING: 0-->SWALLOWS FOODS/LIQUIDS WITHOUT DIFFICULTY
PREVIOUS_RESPONSIBILITIES: MEAL PREP;HOUSEKEEPING;LAUNDRY;SHOPPING;MEDICATION MANAGEMENT;FINANCES;DRIVING;CHILD CARE
AMBULATION: 0-->INDEPENDENT
ADLS_ACUITY_SCORE: 11
ADLS_ACUITY_SCORE: 13
TOILETING: 0-->INDEPENDENT
ADLS_ACUITY_SCORE: 12
RETIRED_COMMUNICATION: 0-->UNDERSTANDS/COMMUNICATES WITHOUT DIFFICULTY
ADLS_ACUITY_SCORE: 12
ADLS_ACUITY_SCORE: 12
ADLS_ACUITY_SCORE: 13
ADLS_ACUITY_SCORE: 12
TRANSFERRING: 0-->INDEPENDENT

## 2019-01-01 ASSESSMENT — 6 MINUTE WALK TEST (6MWT)
TOTAL DISTANCE WALKED (FT): 1300
TOTAL DISTANCE WALKED (METERS): 472
COMMENTS: HR

## 2019-01-03 DIAGNOSIS — C50.519 MALIGNANT NEOPLASM OF LOWER-OUTER QUADRANT OF FEMALE BREAST, UNSPECIFIED ESTROGEN RECEPTOR STATUS, UNSPECIFIED LATERALITY (H): ICD-10-CM

## 2019-01-07 ENCOUNTER — TELEPHONE (OUTPATIENT)
Dept: ONCOLOGY | Facility: CLINIC | Age: 49
End: 2019-01-07

## 2019-01-07 NOTE — TELEPHONE ENCOUNTER
Oral Chemotherapy Monitoring Program    Primary Oncologist: Dr. Whitman  Primary Oncology Clinic: [facility]  Cancer Diagnosis: Breast Cancer   Therapy History:  C1D1=9/5/18, Xeloda 1500mg BID 7 days on 7 days off for 28 day cycles     Drug Interaction Assessment: Recently started gabapentin and CBD oil, no interactions identified  Omeprazole  And Xeloda:Risk Rating C: Monitor therapySummary Proton Pump Inhibitors may diminish the therapeutic effect of Capecitabine. Severity Moderate Reliability Rating Fair: Existing data/reports are inconsistent Patient Management Consider the need for a proton pump inhibitor in patients receiving capecitabine. If combined, monitor closely for any evidence of reduced capecitabine effectiveness with use of this combination. Writer updated Lima Griffiths  1/7: pt has stopped omeprazole   Subjective/Objective:  Shan Marsh is a 48 year old female contacted by phone for a follow-up visit for oral chemotherapy.  Shan reports that after our last conversation she stopped taking omeprazole d/t the interaction with Xeloda. She states that her acid reflux has returned since stopping it and is taking TUMS as needed. She also endorses fatigue which has been pretty stable and unchanged. She denies any questions for writer. Last cycle started on 1/5/19.    ORAL CHEMOTHERAPY 1/12/2018 9/4/2018 9/12/2018 10/24/2018 11/6/2018 12/19/2018 1/7/2019   Drug Name (No Data) Xeloda (Capecitabine) Xeloda (Capecitabine) Xeloda (Capecitabine) Xeloda (Capecitabine) Xeloda (Capecitabine) Xeloda (Capecitabine)   Current Dosage 150mg 1500mg 1500mg 1500mg 1500mg 1500mg 1500mg   Current Schedule BID BID BID BID BID BID BID   Cycle Details Continuous 1 week on 1 week off 1 week on 1 week off 1 week on 1 week off 1 week on 1 week off 1 week on 1 week off 1 week on 1 week off   Start Date of Last Cycle - - 9/5/2018 10/12/2018 - 12/8/2018 1/5/2019   Planned next cycle start date - - - 11/9/2018 11/10/2018  "1/5/2019 2/2/2019   Doses missed in last 2 weeks - - 0 0 0 - -   Adherence Assessment - - Adherent Adherent Adherent Adherent Adherent   Adverse Effects - - No AE identified during assessment Fatigue Nausea;Palmar-plantar erythrodysethesia syndrome Nausea;Fatigue Nausea;Fatigue   Nausea - - - - Grade 1 Grade 1 Grade 1   Pharmacist Intervention(nausea) - - - - No No No   Palmar-plantar Erythrodysethesia syndrome[hand-foot syndrome] - - - - Grade 1 - -   Pharmacist Intervention(Palmar-plantar) - - - - Yes - -   Intervention(s) - - - - Patient education - -   Fatigue - - - Grade 1 - Grade 1 Grade 1   Pharmacist Intervention(fatigue) - - - Yes - No No   Intervention(s) - - - Patient education - - -   Home BPs - - - not needed not needed - -   Any new drug interactions? No Yes No No - - -   Pharmacist Intervention? - Yes - - - - -   Intervention(s) - Patient Education - - - - -   Is the dose as ordered appropriate for the patient? Yes Yes Yes Yes Yes - -   Has the patient missed any days of school, work, or other routine activity? - - - No - - -       Last PHQ-2 Score on record:   PHQ-2 ( 1999 Pfizer) 7/30/2018 7/2/2018   Q1: Little interest or pleasure in doing things 0 0   Q2: Feeling down, depressed or hopeless 0 0   PHQ-2 Score 0 0       Patient does not report depression symptoms.      Vitals:  BP:   BP Readings from Last 1 Encounters:   12/18/18 124/82     Wt Readings from Last 1 Encounters:   12/18/18 52 kg (114 lb 11.2 oz)     Estimated body surface area is 1.53 meters squared as calculated from the following:    Height as of 11/26/18: 1.626 m (5' 4.02\").    Weight as of 12/18/18: 52 kg (114 lb 11.2 oz).    Labs:  _  Result Component Current Result Ref Range   Sodium 138 (12/18/2018) 133 - 144 mmol/L     _  Result Component Current Result Ref Range   Potassium 3.3 (L) (12/18/2018) 3.4 - 5.3 mmol/L     _  Result Component Current Result Ref Range   Calcium 9.3 (12/18/2018) 8.5 - 10.1 mg/dL     No results found for " Mag within last 30 days.     No results found for Phos within last 30 days.     _  Result Component Current Result Ref Range   Albumin 3.4 (12/18/2018) 3.4 - 5.0 g/dL     _  Result Component Current Result Ref Range   Urea Nitrogen 12 (12/18/2018) 7 - 30 mg/dL     _  Result Component Current Result Ref Range   Creatinine 0.64 (12/18/2018) 0.52 - 1.04 mg/dL       _  Result Component Current Result Ref Range   AST 40 (12/18/2018) 0 - 45 U/L     _  Result Component Current Result Ref Range   ALT 18 (12/18/2018) 0 - 50 U/L     _  Result Component Current Result Ref Range   Bilirubin Total 0.4 (12/18/2018) 0.2 - 1.3 mg/dL       _  Result Component Current Result Ref Range   WBC 4.9 (12/18/2018) 4.0 - 11.0 10e9/L     _  Result Component Current Result Ref Range   Hemoglobin 12.9 (12/18/2018) 11.7 - 15.7 g/dL     _  Result Component Current Result Ref Range   Platelet Count 234 (12/18/2018) 150 - 450 10e9/L     _  Result Component Current Result Ref Range   Absolute Neutrophil 3.0 (12/18/2018) 1.6 - 8.3 10e9/L               Assessment:  Pt is having acid reflux and fatigue with Xeloda.     Plan:  Continue with current therapy.     Follow-Up:  3 weeks for TM assessment.     Refill Due:  By 2/2    Ena Dozier RN  [facility]-Therapy Management  [phone]

## 2019-01-14 ENCOUNTER — ANCILLARY PROCEDURE (OUTPATIENT)
Dept: CT IMAGING | Facility: CLINIC | Age: 49
End: 2019-01-14
Attending: PHYSICIAN ASSISTANT
Payer: COMMERCIAL

## 2019-01-14 DIAGNOSIS — C50.919 METASTATIC BREAST CANCER: ICD-10-CM

## 2019-01-14 RX ORDER — IOPAMIDOL 755 MG/ML
70 INJECTION, SOLUTION INTRAVASCULAR ONCE
Status: COMPLETED | OUTPATIENT
Start: 2019-01-14 | End: 2019-01-14

## 2019-01-14 RX ADMIN — IOPAMIDOL 70 ML: 755 INJECTION, SOLUTION INTRAVASCULAR at 15:14

## 2019-01-14 NOTE — DISCHARGE INSTRUCTIONS

## 2019-01-15 ENCOUNTER — ALLIED HEALTH/NURSE VISIT (OUTPATIENT)
Dept: ONCOLOGY | Facility: CLINIC | Age: 49
End: 2019-01-15

## 2019-01-15 ENCOUNTER — ONCOLOGY VISIT (OUTPATIENT)
Dept: ONCOLOGY | Facility: CLINIC | Age: 49
End: 2019-01-15
Attending: INTERNAL MEDICINE
Payer: COMMERCIAL

## 2019-01-15 ENCOUNTER — APPOINTMENT (OUTPATIENT)
Dept: LAB | Facility: CLINIC | Age: 49
End: 2019-01-15
Attending: INTERNAL MEDICINE
Payer: COMMERCIAL

## 2019-01-15 VITALS
WEIGHT: 111.7 LBS | TEMPERATURE: 97.9 F | OXYGEN SATURATION: 97 % | HEART RATE: 98 BPM | RESPIRATION RATE: 16 BRPM | DIASTOLIC BLOOD PRESSURE: 86 MMHG | BODY MASS INDEX: 19.07 KG/M2 | SYSTOLIC BLOOD PRESSURE: 123 MMHG | HEIGHT: 64 IN

## 2019-01-15 DIAGNOSIS — C50.919 METASTATIC BREAST CANCER: Primary | ICD-10-CM

## 2019-01-15 DIAGNOSIS — Z79.899 ENCOUNTER FOR LONG-TERM (CURRENT) USE OF MEDICATIONS: ICD-10-CM

## 2019-01-15 DIAGNOSIS — Z71.9 VISIT FOR COUNSELING: Primary | ICD-10-CM

## 2019-01-15 DIAGNOSIS — C79.51 BONE METASTASIS: ICD-10-CM

## 2019-01-15 LAB
ALBUMIN SERPL-MCNC: 3.6 G/DL (ref 3.4–5)
ALP SERPL-CCNC: 204 U/L (ref 40–150)
ALT SERPL W P-5'-P-CCNC: 18 U/L (ref 0–50)
ANION GAP SERPL CALCULATED.3IONS-SCNC: 8 MMOL/L (ref 3–14)
AST SERPL W P-5'-P-CCNC: 66 U/L (ref 0–45)
BASOPHILS # BLD AUTO: 0 10E9/L (ref 0–0.2)
BASOPHILS NFR BLD AUTO: 0.7 %
BILIRUB SERPL-MCNC: 0.4 MG/DL (ref 0.2–1.3)
BUN SERPL-MCNC: 15 MG/DL (ref 7–30)
CALCIUM SERPL-MCNC: 9.5 MG/DL (ref 8.5–10.1)
CANCER AG27-29 SERPL-ACNC: 1575 U/ML (ref 0–39)
CEA SERPL-MCNC: 154.5 UG/L (ref 0–2.5)
CHLORIDE SERPL-SCNC: 99 MMOL/L (ref 94–109)
CO2 SERPL-SCNC: 28 MMOL/L (ref 20–32)
CREAT SERPL-MCNC: 0.67 MG/DL (ref 0.52–1.04)
DIFFERENTIAL METHOD BLD: ABNORMAL
EOSINOPHIL # BLD AUTO: 0.1 10E9/L (ref 0–0.7)
EOSINOPHIL NFR BLD AUTO: 0.9 %
ERYTHROCYTE [DISTWIDTH] IN BLOOD BY AUTOMATED COUNT: 17.5 % (ref 10–15)
GFR SERPL CREATININE-BSD FRML MDRD: >90 ML/MIN/{1.73_M2}
GLUCOSE SERPL-MCNC: 94 MG/DL (ref 70–99)
HCT VFR BLD AUTO: 38.5 % (ref 35–47)
HGB BLD-MCNC: 12.8 G/DL (ref 11.7–15.7)
IMM GRANULOCYTES # BLD: 0 10E9/L (ref 0–0.4)
IMM GRANULOCYTES NFR BLD: 0.7 %
LYMPHOCYTES # BLD AUTO: 1.6 10E9/L (ref 0.8–5.3)
LYMPHOCYTES NFR BLD AUTO: 27.4 %
MCH RBC QN AUTO: 32 PG (ref 26.5–33)
MCHC RBC AUTO-ENTMCNC: 33.2 G/DL (ref 31.5–36.5)
MCV RBC AUTO: 96 FL (ref 78–100)
MONOCYTES # BLD AUTO: 0.4 10E9/L (ref 0–1.3)
MONOCYTES NFR BLD AUTO: 7.6 %
NEUTROPHILS # BLD AUTO: 3.6 10E9/L (ref 1.6–8.3)
NEUTROPHILS NFR BLD AUTO: 62.7 %
NRBC # BLD AUTO: 0 10*3/UL
NRBC BLD AUTO-RTO: 0 /100
PLATELET # BLD AUTO: 286 10E9/L (ref 150–450)
POTASSIUM SERPL-SCNC: 3.4 MMOL/L (ref 3.4–5.3)
PROT SERPL-MCNC: 9.4 G/DL (ref 6.8–8.8)
RBC # BLD AUTO: 4 10E12/L (ref 3.8–5.2)
SODIUM SERPL-SCNC: 134 MMOL/L (ref 133–144)
WBC # BLD AUTO: 5.8 10E9/L (ref 4–11)

## 2019-01-15 PROCEDURE — 25000128 H RX IP 250 OP 636: Mod: ZF | Performed by: INTERNAL MEDICINE

## 2019-01-15 PROCEDURE — 25000125 ZZHC RX 250: Mod: ZF | Performed by: INTERNAL MEDICINE

## 2019-01-15 PROCEDURE — G0463 HOSPITAL OUTPT CLINIC VISIT: HCPCS | Mod: ZF

## 2019-01-15 PROCEDURE — 80053 COMPREHEN METABOLIC PANEL: CPT | Performed by: INTERNAL MEDICINE

## 2019-01-15 PROCEDURE — 25000128 H RX IP 250 OP 636: Mod: ZF | Performed by: PHYSICIAN ASSISTANT

## 2019-01-15 PROCEDURE — 86300 IMMUNOASSAY TUMOR CA 15-3: CPT | Performed by: INTERNAL MEDICINE

## 2019-01-15 PROCEDURE — 82378 CARCINOEMBRYONIC ANTIGEN: CPT | Performed by: INTERNAL MEDICINE

## 2019-01-15 PROCEDURE — 96365 THER/PROPH/DIAG IV INF INIT: CPT

## 2019-01-15 PROCEDURE — 40000556 ZZH STATISTIC PERIPHERAL IV START W US GUIDANCE: Mod: ZF

## 2019-01-15 PROCEDURE — 85025 COMPLETE CBC W/AUTO DIFF WBC: CPT | Performed by: INTERNAL MEDICINE

## 2019-01-15 PROCEDURE — 99215 OFFICE O/P EST HI 40 MIN: CPT | Mod: ZP | Performed by: INTERNAL MEDICINE

## 2019-01-15 PROCEDURE — 96402 CHEMO HORMON ANTINEOPL SQ/IM: CPT

## 2019-01-15 RX ORDER — LIDOCAINE HYDROCHLORIDE 10 MG/ML
2 INJECTION, SOLUTION EPIDURAL; INFILTRATION; INTRACAUDAL; PERINEURAL ONCE
Status: COMPLETED | OUTPATIENT
Start: 2019-01-15 | End: 2019-01-15

## 2019-01-15 RX ORDER — ZOLEDRONIC ACID 0.04 MG/ML
4 INJECTION, SOLUTION INTRAVENOUS ONCE
Status: COMPLETED | OUTPATIENT
Start: 2019-01-15 | End: 2019-01-15

## 2019-01-15 RX ORDER — LIDOCAINE HYDROCHLORIDE 10 MG/ML
2 INJECTION, SOLUTION EPIDURAL; INFILTRATION; INTRACAUDAL; PERINEURAL ONCE
Status: CANCELLED | OUTPATIENT
Start: 2019-01-15 | End: 2019-01-01

## 2019-01-15 RX ORDER — ZOLEDRONIC ACID 0.04 MG/ML
4 INJECTION, SOLUTION INTRAVENOUS ONCE
Status: CANCELLED | OUTPATIENT
Start: 2019-01-15 | End: 2019-01-15

## 2019-01-15 RX ADMIN — LIDOCAINE HYDROCHLORIDE 2 ML: 10 INJECTION, SOLUTION EPIDURAL; INFILTRATION; INTRACAUDAL; PERINEURAL at 17:02

## 2019-01-15 RX ADMIN — SODIUM CHLORIDE 250 ML: 9 INJECTION, SOLUTION INTRAVENOUS at 16:17

## 2019-01-15 RX ADMIN — ZOLEDRONIC ACID 4 MG: 0.04 INJECTION, SOLUTION INTRAVENOUS at 16:29

## 2019-01-15 RX ADMIN — GOSERELIN ACETATE 3.6 MG: 3.6 IMPLANT SUBCUTANEOUS at 17:06

## 2019-01-15 ASSESSMENT — PAIN SCALES - GENERAL: PAINLEVEL: NO PAIN (0)

## 2019-01-15 ASSESSMENT — MIFFLIN-ST. JEOR: SCORE: 1131.67

## 2019-01-15 NOTE — NURSING NOTE
Chief Complaint   Patient presents with     Blood Draw     Labs drawn via PIV by RN in lab. VS taken. Pt checked in for next appt     Labs drawn from PIV placed by RN. Line flushed with saline. Vitals taken. Pt checked in for appointment(s).    Barbara DOWELL RN PHN BSN  BMT/Oncology Lab

## 2019-01-15 NOTE — LETTER
1/15/2019       RE: Shan Marsh  [address]  [facility] [address]     Dear Colleague,    Thank you for referring your patient, Shan Marsh, to the [facility]. Please see a copy of my visit note below.    Oncology/Hematology Visit Note  Sukhdev 15, 2019    Reason for Visit: Follow up of metastatic breast cancer, ER positive.     History of Present Illness: Shan Marsh is a 48 year old female without significant past medical history with a diagnosis of metastatic breast cancer after she presented with left shoulder pain. Shoulder MRI showed a soft tissue mass close to the distal clavicle which was concerning for malignancy. PET/CT on 5/9/17 showed hypermetabolic left axillary, supraclavicular, mediastinal and hilar nodes. She had biopsies of left axilla and hilar nodes showing metastatic breast cancer, strongly ER positive, moderately WA positive, and HER-2 kaitlin non-amplified. She originally had E9gP3N3, ER/WA positive, HER-2 negative breast cancer diagnosed in spring of 2012 s/p bilateral mastectomies and Tamoxifen from 8/2012-2/2013 and discontinued due to side effects. With development of metastatic disease, she was recommended treatment on BIG10 clinical trial with palbociclib and tamoxifen. She started treatment on 5/24/17 and required dose reduction for neutropenia. CT CAP and bone scan 1/11/2018 showing progressive disease with new bony lesions in the ribs, T spine, and possibly liver, lungs. She started Zoladex and abemiciclib on 1/12/2018.      In March 2018, she sought treatment at [facility] in [place], under the care of Dr. Brooks, where she received:  - Vallovex vaccine (per FDA, is in phase I trials here in [place])  - Insulin potentiated therapy with conjugated chemotherapy (5% chemotherapy, unclear what chemotherapy)  - Gina's toxins, IV vitamin C, IV Ozone, hyperbaric chamber, vitamin B17 infusion, vitamin CK3 IV, and coffee enemas PRN  - Also  oral niacin, Lugol, acidol, pancreatin, and thyroid desiccated liver, per their regimen  - She was continued on Letrozole and advised to continue Zoladex.      She met with Dr. Whitman and patient wished to continue her alternative regimen. Abemiciclib was placed on hold. She was continued on Zoladex and letrozole. While in [place], she required thoracentesis for symptomatic pleural effusion. A repeat PET in August 2018 showed widely progressive disease with multiple new bone mets involving the spine, ribs, pelvis, and sacrum; new lesions involving the left lateral thoracic/abdominal wall musculature and subcutaneous tissues, new metastases in the left adrenal gland, and recurrent large L pleural effusion. She was started on Xeloda 1500 mg BID on 9/5/18.     She was admitted 10/4-10/11/18 after a syncopal event/unwitnessed fall at home. She was found to have left pneumothorax and pericardial effusion with tamponade physiology. She had left chest tube placed in ED which then fell out on 10/10. Left pleurx placed on 10/11. She had pericardiocentesis on 10/7, drain removed 10/8.     Interval History:  Shan is here for routine follow-up. She has remained on Xeloda until now.  She is feeling quite well. Activity and energy level are much better.  No chest pain or palpitations. No cough or sputum production.  No recent fevers or infectious concerns. No new concerns today.  She is anxious to hear about her scans.        ROS: 10 point ROS neg other than the symptoms noted above in the HPI.      Current Outpatient Medications   Medication Sig Dispense Refill     calcium carbonate (OS-KRYSTEN 500 MG [place]. CA) 1250 MG tablet Take 1 tablet by mouth daily       Digestive Enzymes (ENZYME DIGEST) CAPS Take 1 capsule by mouth daily       medical cannabis (Patient's own supply.  Not a prescription) (This is NOT a prescription, and does not certify that the patient has a qualifying medical condition for medical cannabis.  The purpose of  "this order is  to document that the patient reports taking medical cannabis.)       Omega 3-6-9 Fatty Acids (OMEGA 3-6-9 COMPLEX PO) Take 1 capsule by mouth daily       ondansetron (ZOFRAN) 8 MG tablet Take 1 tablet (8 mg) by mouth every 8 hours as needed for nausea 40 tablet 3     prochlorperazine (COMPAZINE) 10 MG tablet Take 10 mg by mouth before 1st dose of oral chemotherapy, then prn thereafter       VITAMIN D, CHOLECALCIFEROL, PO Take 1,000 Units by mouth daily        Ascorbic Acid (VITAMIN C PO) Take 1,000 mg by mouth daily       traMADol (ULTRAM) 50 MG tablet Take 1-2 tablets ( mg) by mouth every 6 hours as needed for moderate pain or headaches         Physical Examination:  General: The patient is a pleasant female in no acute distress.  /86 (BP Location: Right arm, Patient Position: Sitting, Cuff Size: Adult Small)   Pulse 98   Temp 97.9  F (36.6  C) (Oral)   Resp 16   Ht 1.626 m (5' 4\")   Wt 50.7 kg (111 lb 11.2 oz)   SpO2 97%   Breastfeeding? No   BMI 19.17 kg/m     Wt Readings from Last 10 Encounters:   01/15/19 50.7 kg (111 lb 11.2 oz)   12/18/18 52 kg (114 lb 11.2 oz)   11/26/18 51.9 kg (114 lb 8 oz)   11/16/18 51.4 kg (113 lb 6.4 oz)   11/08/18 52.8 kg (116 lb 6.4 oz)   10/29/18 51.3 kg (113 lb)   10/23/18 49 kg (108 lb)   10/20/18 50.2 kg (110 lb 11.2 oz)   10/20/18 49 kg (108 lb)   10/15/18 49 kg (108 lb)   gen: well appearing, thin woman  HEENT: EOMI, PERRL. Sclerae are anicteric. Oral mucosa is pink and moist with no lesions or thrush.   Lymph: Previously palpable left supraclavicular LNs are no longer evident on exam.   Heart: Regular rate and rhythm. No murmurs.  Lungs: Diminished BS in bilateral bases, L>R. Otherwise clear to auscultation bilaterally. Normal respiratory effort on ambient air.  Abdomen: Bowel sounds present, soft, nontender with no palpable hepatosplenomegaly or masses.   Extremities: No lower extremity edema noted bilaterally.   Neuro: Alert and oriented. " No focal deficits. Normal gait.  Skin: No rashes, petechiae, or bruising noted on exposed skin.    Laboratory Data:  Lab Results   Component Value Date    WBC 5.8 01/15/2019    HGB 12.8 01/15/2019    HCT 38.5 01/15/2019     01/15/2019    CHOL 129 11/03/2017    TRIG 178 (H) 11/03/2017    HDL 50 11/03/2017    ALT 18 01/15/2019    AST 66 (H) 01/15/2019     01/15/2019    BUN 15 01/15/2019    CO2 28 01/15/2019    TSH 1.78 08/21/2017    INR 0.94 10/20/2018     Imaging:Personally reviewed CT scan with radiology    Tumor markers pending    Assessment and Plan:  Shan Marsh is a 48 year old woman with prior history of a T1 N0, ER/TX positive, HER2-negative left breast cancer treated with bilateral mastectomies in 2012, now with metastatic disease (bones, pleural effusions, lungs, pericardial effusion, liver, left adrenal/retroperitoneal mets), ER/TX positive, HER2 negative.  She progressed on palbociclib and tamoxifen, alternative therapies in [place], and is now on Xeloda. Recent hospitalization in October 2018 for pleural and pericardial effusions.     Metastatic breast cancer: Continues on Xeloda 1500 mg twice daily for 7 days on and 8 days off, tolerating well overall. We discussed that her CT scan demonstrates a mixed response.  However, in looking at older films in summer 2018, I believe her disease is likely progressing.      We discussed waiting for her tumor markers to return.  Assuming they continue to increase, I would like to switch her therapy.  We discussed choices including taxol or doxil. She is reluctant to have hair loss.   We discussed the side effects of chemotherapy including myelosuppression, nausea/vomiting/diarrhea/constipation, hair loss, neuropathy, fertility complications, dehydration, cardiomyopathy, etc.  The patient will be receiving chemotherapy teaching through my nurse coordinator with handouts describing all of the side effects again.  Consent for chemotherapy was  obtained.    I discussed I would call her back later this week to make definitive plans regarding chemo.    She asked about going to see an integrative specialist in [place].  I discussed I would not recommend this.    Bone mets: Continue monthly Zometa every 3 months. Due today.     Bilateral pleural effusions. She is s/p right thoracentesis on 10/9/18 and left pleurx placement 10/11/18. PleurX was removed 11/12/18 due to minimal drainage. Stable on CT 11/21/18.  Slight increase today.  Will monitor.    Malignant pericardial effusion: Presented with tamponade, s/p pericardial drain 10/7/18. She met with Dr. Sanchez 11/8/2018. Echo at that time was without any pericardial effusion. REpeat echo last week stable.    Coping - she is having a hard time coping.  Assurance to pt.  Resources provided.      Again, thank you for allowing me to participate in the care of your patient.      Sincerely,    Ana Whitman MD

## 2019-01-15 NOTE — NURSING NOTE
"Oncology Rooming Note    January 15, 2019 3:15 PM   Shan Marsh is a 48 year old female who presents for:    Chief Complaint   Patient presents with     Blood Draw     Labs drawn via PIV by RN in lab. VS taken. Pt checked in for next appt     Oncology Clinic Visit     Return: Breast Ca     Initial Vitals: /86 (BP Location: Right arm, Patient Position: Sitting, Cuff Size: Adult Small)   Pulse 98   Temp 97.9  F (36.6  C) (Oral)   Resp 16   Ht 1.626 m (5' 4\")   Wt 50.7 kg (111 lb 11.2 oz)   SpO2 97%   Breastfeeding? No   BMI 19.17 kg/m   Estimated body mass index is 19.17 kg/m  as calculated from the following:    Height as of this encounter: 1.626 m (5' 4\").    Weight as of this encounter: 50.7 kg (111 lb 11.2 oz). Body surface area is 1.51 meters squared.  No Pain (0) Comment: Data Unavailable   No LMP recorded. Patient is not currently having periods (Reason: Chemotherapy).  Allergies reviewed: Yes  Medications reviewed: Yes    Medications: Medication refills not needed today.  Pharmacy name entered into [employer]:    [facility] - [place], [place] - [address]  [facility] - [place], [place] - [address]    Clinical concerns: new concerns are that she has developed a cough now when she is eating, and her oxygen level is lower on the O2 sats than ususal and she is experiencing more tightness in her intercostal muscles in the rib area, possibly from the cough, but today they seam better then they have, she is noticing more numbness and nerve zingers in the upper  abdominal area and the lower back area, are ultra sensitive to light touch, but palpitations do not bother it,  but she has noticed that if she puts ice on it, that does seem to help, (on the low back)   Dr. Whitman was notified.    10 minutes for nursing intake (face to face time)     Shaun De Santiago CMA              "

## 2019-01-15 NOTE — PATIENT INSTRUCTIONS
Contact Numbers  [facility]: [phone]    After Hours:  [phone]  Triage: [phone]    Please call the [facility] Triage line if you experience a temperature greater than or equal to 100.5, shaking chills, have uncontrolled nausea, vomiting and/or diarrhea, dizziness, shortness of breath, chest pain, bleeding, unexplained bruising, or if you have any other new/concerning symptoms, questions or concerns.     If it is after hours, weekends, or holidays, please call the main hospital  at  [phone] and ask to speak to the Oncology doctor on call.     If you are having any concerning symptoms or wish to speak to a provider before your next infusion visit, please call your care coordinator or triage to notify them so we can adequately serve you.     If you need a refill on a narcotic prescription or other medication, please call triage before your infusion appointment.         January 2019 Sunday Monday Tuesday Wednesday Thursday Friday Saturday             1     2     3     4     5       6     7     8     9     10     11     12       13     14    CT CHEST ABDOMEN PELVIS WWO   2:20 PM   (20 min.)   UCCT1   [facility] CT 15    [facility] LAB DRAW   2:15 PM   (15 min.)   [facility] LAB DRAW   [facility] Lab Draw    [facility] RETURN   2:45 PM   (30 min.)   Ana Whitman MD   [facility] ONC INFUSION 60   4:30 PM   (60 min.)    ONCOLOGY INFUSION   [facility] 16     17     18     19       20     21     22     23     24     25     26       27     28     29     30     31 February 2019 Sunday Monday Tuesday Wednesday Thursday Friday Saturday                            1     2       3     4     5     6     7     8     9       10     11     12     13     14     15     16       17     18     19     20     21     22     23       24     25     26     27     28                               Lab  Results:  Recent Results (from the past 12 hour(s))   Comprehensive metabolic panel (BMP + Alb, Alk Phos, ALT, AST, Total. Bili, TP)    Collection Time: 01/15/19  2:25 PM   Result Value Ref Range    Sodium 134 133 - 144 mmol/L    Potassium 3.4 3.4 - 5.3 mmol/L    Chloride 99 94 - 109 mmol/L    Carbon Dioxide 28 20 - 32 mmol/L    Anion Gap 8 3 - 14 mmol/L    Glucose 94 70 - 99 mg/dL    Urea Nitrogen 15 7 - 30 mg/dL    Creatinine 0.67 0.52 - 1.04 mg/dL    GFR Estimate >90 >60 mL/min/[1.73_m2]    GFR Estimate If Black >90 >60 mL/min/[1.73_m2]    Calcium 9.5 8.5 - 10.1 mg/dL    Bilirubin Total 0.4 0.2 - 1.3 mg/dL    Albumin 3.6 3.4 - 5.0 g/dL    Protein Total 9.4 (H) 6.8 - 8.8 g/dL    Alkaline Phosphatase 204 (H) 40 - 150 U/L    ALT 18 0 - 50 U/L    AST 66 (H) 0 - 45 U/L   CBC with platelets and differential    Collection Time: 01/15/19  2:25 PM   Result Value Ref Range    WBC 5.8 4.0 - 11.0 10e9/L    RBC Count 4.00 3.8 - 5.2 10e12/L    Hemoglobin 12.8 11.7 - 15.7 g/dL    Hematocrit 38.5 35.0 - 47.0 %    MCV 96 78 - 100 fl    MCH 32.0 26.5 - 33.0 pg    MCHC 33.2 31.5 - 36.5 g/dL    RDW 17.5 (H) 10.0 - 15.0 %    Platelet Count 286 150 - 450 10e9/L    Diff Method Automated Method     % Neutrophils 62.7 %    % Lymphocytes 27.4 %    % Monocytes 7.6 %    % Eosinophils 0.9 %    % Basophils 0.7 %    % Immature Granulocytes 0.7 %    Nucleated RBCs 0 0 /100    Absolute Neutrophil 3.6 1.6 - 8.3 10e9/L    Absolute Lymphocytes 1.6 0.8 - 5.3 10e9/L    Absolute Monocytes 0.4 0.0 - 1.3 10e9/L    Absolute Eosinophils 0.1 0.0 - 0.7 10e9/L    Absolute Basophils 0.0 0.0 - 0.2 10e9/L    Abs Immature Granulocytes 0.0 0 - 0.4 10e9/L    Absolute Nucleated RBC 0.0

## 2019-01-15 NOTE — PROGRESS NOTES
Infusion Nursing Note:  Shan Marsh presents today for Zoladex and Zometa.    Patient seen by provider today: Yes: Dr. Whitman   present during visit today: Not Applicable.    Note: N/A.    Zometa stopped at 1649    Intravenous Access:  Peripheral IV placed.    Treatment Conditions:  Lab Results   Component Value Date    HGB 12.8 01/15/2019     Lab Results   Component Value Date    WBC 5.8 01/15/2019      Lab Results   Component Value Date    ANEU 3.6 01/15/2019     Lab Results   Component Value Date     01/15/2019      Lab Results   Component Value Date     01/15/2019                   Lab Results   Component Value Date    POTASSIUM 3.4 01/15/2019           Lab Results   Component Value Date    MAG 2.4 10/15/2018            Lab Results   Component Value Date    CR 0.67 01/15/2019                   Lab Results   Component Value Date    KRYSTEN 9.5 01/15/2019                Lab Results   Component Value Date    BILITOTAL 0.4 01/15/2019           Lab Results   Component Value Date    ALBUMIN 3.6 01/15/2019                    Lab Results   Component Value Date    ALT 18 01/15/2019           Lab Results   Component Value Date    AST 66 01/15/2019       Results reviewed, labs MET treatment parameters, ok to proceed with treatment.      Post Infusion Assessment:  Patient tolerated infusion without incident.  Patient iced pre lidocaine and Zoladex  Patient tolerated Lidocaine and Zoladex injection without incident to her Right abdomen.   Blood return noted pre and post infusion.  Site patent and intact, free from redness, edema or discomfort.  No evidence of extravasations.  Access discontinued per protocol.    Discharge Plan:   Patient declined prescription refills.  Discharge instructions reviewed with: Patient and Family.  Patient and/or family verbalized understanding of discharge instructions and all questions answered.  AVS to patient via [employer].  Patient aware Dr. Whitman to still enter  in future scheduling appointments, and to check MYCHART.   Patient discharged in stable condition accompanied by: self.  Departure Mode: Ambulatory.    Rachel Montelongo RN

## 2019-01-16 ENCOUNTER — CARE COORDINATION (OUTPATIENT)
Dept: ONCOLOGY | Facility: CLINIC | Age: 49
End: 2019-01-16

## 2019-01-16 NOTE — PROGRESS NOTES
Social Work Follow-Up Encounter Visit  Oncology Clinic    Data/Intervention:  Patient Name:  Shan Marsh  /Age:  1970 (48 year old)    Reason for Follow-Up:  Follow up on financial resources    Collaborated With:    -patient     SW met with patient in infusion.  SW checked in with patient as she would be eligible again to receive [employer] betty with the start of the new year.  Patient indicated interest in applying, social work assisted in completing application and submitted through online portal.  SW also reminded patient she has not applied for [facility] betty and would need to submit a bill for application.  Patient stated she forgot to bring bill today but would try to remember for her next appointment.    Resources Provided:  [employer] betty submitted.    Assessment:  Patient appears to be coping appropriately.    Plan:  Previously provided patient/family with writer's contact information and availability.   SW continues to be available to assist with needs.    Soo Yeon Han, MSW, [facility] HospitalSW  Pager: [phone]  Phone: [phone]

## 2019-01-16 NOTE — PROGRESS NOTES
Spoke to patient to follow-up appointment with Dr Whitman to offer support. Will try to have a [facility] PT OT assist with strengthening as yoga pain and other exercises patient is doing that now is exacerbating back pain. Patient is currently able to walk on a treadmill that is helping with walking. Patient states she gets short of breath with stair climbing and will call if shortness of breath is worsening with known bilateral pleural effusions. Encouraged patient to see current trauma counselor she has established with and will call to make a follow-up appointment with him.  Answered all patient's questions and verbalized understanding. Sharifa Celeste RN, BSN.

## 2019-01-17 ENCOUNTER — TELEPHONE (OUTPATIENT)
Dept: ONCOLOGY | Facility: CLINIC | Age: 49
End: 2019-01-17

## 2019-01-17 DIAGNOSIS — C50.919 METASTATIC BREAST CANCER: Primary | ICD-10-CM

## 2019-01-17 RX ORDER — LORAZEPAM 2 MG/ML
.5-1 INJECTION INTRAMUSCULAR EVERY 6 HOURS PRN
Status: CANCELLED | OUTPATIENT
Start: 2019-01-01

## 2019-01-17 RX ORDER — LORAZEPAM 0.5 MG/1
.5-1 TABLET ORAL EVERY 6 HOURS PRN
Status: CANCELLED
Start: 2019-01-01

## 2019-01-17 RX ORDER — SODIUM CHLORIDE 9 MG/ML
1000 INJECTION, SOLUTION INTRAVENOUS CONTINUOUS PRN
Status: CANCELLED
Start: 2019-01-01

## 2019-01-17 RX ORDER — EPINEPHRINE 1 MG/ML
0.3 INJECTION, SOLUTION INTRAMUSCULAR; SUBCUTANEOUS EVERY 5 MIN PRN
Status: CANCELLED | OUTPATIENT
Start: 2019-01-01

## 2019-01-17 RX ORDER — ALBUTEROL SULFATE 0.83 MG/ML
2.5 SOLUTION RESPIRATORY (INHALATION)
Status: CANCELLED | OUTPATIENT
Start: 2019-01-01

## 2019-01-17 RX ORDER — MEPERIDINE HYDROCHLORIDE 25 MG/ML
25 INJECTION INTRAMUSCULAR; INTRAVENOUS; SUBCUTANEOUS EVERY 30 MIN PRN
Status: CANCELLED | OUTPATIENT
Start: 2019-01-01

## 2019-01-17 RX ORDER — PROCHLORPERAZINE MALEATE 10 MG
10 TABLET ORAL EVERY 6 HOURS PRN
Status: CANCELLED
Start: 2019-01-01

## 2019-01-17 RX ORDER — ALBUTEROL SULFATE 90 UG/1
1-2 AEROSOL, METERED RESPIRATORY (INHALATION)
Status: CANCELLED
Start: 2019-01-01

## 2019-01-17 RX ORDER — METHYLPREDNISOLONE SODIUM SUCCINATE 125 MG/2ML
125 INJECTION, POWDER, LYOPHILIZED, FOR SOLUTION INTRAMUSCULAR; INTRAVENOUS
Status: CANCELLED
Start: 2019-01-01

## 2019-01-17 RX ORDER — DEXAMETHASONE 4 MG/1
8 TABLET ORAL ONCE
Status: CANCELLED
Start: 2019-01-01

## 2019-01-17 RX ORDER — DIPHENHYDRAMINE HYDROCHLORIDE 50 MG/ML
50 INJECTION INTRAMUSCULAR; INTRAVENOUS
Status: CANCELLED
Start: 2019-01-01

## 2019-01-17 NOTE — TELEPHONE ENCOUNTER
I called Shan to discuss her CT and tumor markers.  TUmor markers are increasing.  In reviewing scan since she started Xeloda, she does have disease progression.  I would favor switching treatment to taxol weekly 3 out of 4 weeks.  If hair loss is a concern, we could also consider doxil IV every four weeks.    I will try to reach her again later tonight or tomorrow to rediscuss.    Marian Whitman

## 2019-01-18 ENCOUNTER — CARE COORDINATION (OUTPATIENT)
Dept: ONCOLOGY | Facility: CLINIC | Age: 49
End: 2019-01-18

## 2019-01-18 NOTE — PROGRESS NOTES
Oncology/Hematology Visit Note  Sukhdev 15, 2019    Reason for Visit: Follow up of metastatic breast cancer, ER positive.     History of Present Illness: Shan Marsh is a 48 year old female without significant past medical history with a diagnosis of metastatic breast cancer after she presented with left shoulder pain. Shoulder MRI showed a soft tissue mass close to the distal clavicle which was concerning for malignancy. PET/CT on 5/9/17 showed hypermetabolic left axillary, supraclavicular, mediastinal and hilar nodes. She had biopsies of left axilla and hilar nodes showing metastatic breast cancer, strongly ER positive, moderately VT positive, and HER-2 kaitlin non-amplified. She originally had [id number], ER/VT positive, HER-2 negative breast cancer diagnosed in spring of 2012 s/p bilateral mastectomies and Tamoxifen from 8/2012-2/2013 and discontinued due to side effects. With development of metastatic disease, she was recommended treatment on BIG10 clinical trial with palbociclib and tamoxifen. She started treatment on 5/24/17 and required dose reduction for neutropenia. CT CAP and bone scan 1/11/2018 showing progressive disease with new bony lesions in the ribs, T spine, and possibly liver, lungs. She started Zoladex and abemiciclib on 1/12/2018.      In March 2018, she sought treatment at [facility] in [place], under the care of Dr. Brooks, where she received:  - Vallovex vaccine (per FDA, is in phase I trials here in [place])  - Insulin potentiated therapy with conjugated chemotherapy (5% chemotherapy, unclear what chemotherapy)  - Gina's toxins, IV vitamin C, IV Ozone, hyperbaric chamber, vitamin B17 infusion, vitamin CK3 IV, and coffee enemas PRN  - Also oral niacin, Lugol, acidol, pancreatin, and thyroid desiccated liver, per their regimen  - She was continued on Letrozole and advised to continue Zoladex.      She met with Dr. Whitman and patient wished to continue her alternative regimen. Abemiciclib was  placed on hold. She was continued on Zoladex and letrozole. While in [place], she required thoracentesis for symptomatic pleural effusion. A repeat PET in August 2018 showed widely progressive disease with multiple new bone mets involving the spine, ribs, pelvis, and sacrum; new lesions involving the left lateral thoracic/abdominal wall musculature and subcutaneous tissues, new metastases in the left adrenal gland, and recurrent large L pleural effusion. She was started on Xeloda 1500 mg BID on 9/5/18.     She was admitted 10/4-10/11/18 after a syncopal event/unwitnessed fall at home. She was found to have left pneumothorax and pericardial effusion with tamponade physiology. She had left chest tube placed in ED which then fell out on 10/10. Left pleurx placed on 10/11. She had pericardiocentesis on 10/7, drain removed 10/8.     Interval History:  Shan is here for routine follow-up. She has remained on Xeloda until now.  She is feeling quite well. Activity and energy level are much better.  No chest pain or palpitations. No cough or sputum production.  No recent fevers or infectious concerns. No new concerns today.  She is anxious to hear about her scans.        ROS: 10 point ROS neg other than the symptoms noted above in the HPI.      Current Outpatient Medications   Medication Sig Dispense Refill     calcium carbonate (OS-KRYSTEN 500 MG [place]. CA) 1250 MG tablet Take 1 tablet by mouth daily       Digestive Enzymes (ENZYME DIGEST) CAPS Take 1 capsule by mouth daily       medical cannabis (Patient's own supply.  Not a prescription) (This is NOT a prescription, and does not certify that the patient has a qualifying medical condition for medical cannabis.  The purpose of this order is  to document that the patient reports taking medical cannabis.)       Omega 3-6-9 Fatty Acids (OMEGA 3-6-9 COMPLEX PO) Take 1 capsule by mouth daily       ondansetron (ZOFRAN) 8 MG tablet Take 1 tablet (8 mg) by mouth every 8 hours as needed  "for nausea 40 tablet 3     prochlorperazine (COMPAZINE) 10 MG tablet Take 10 mg by mouth before 1st dose of oral chemotherapy, then prn thereafter       VITAMIN D, CHOLECALCIFEROL, PO Take 1,000 Units by mouth daily        Ascorbic Acid (VITAMIN C PO) Take 1,000 mg by mouth daily       traMADol (ULTRAM) 50 MG tablet Take 1-2 tablets ( mg) by mouth every 6 hours as needed for moderate pain or headaches         Physical Examination:  General: The patient is a pleasant female in no acute distress.  /86 (BP Location: Right arm, Patient Position: Sitting, Cuff Size: Adult Small)   Pulse 98   Temp 97.9  F (36.6  C) (Oral)   Resp 16   Ht 1.626 m (5' 4\")   Wt 50.7 kg (111 lb 11.2 oz)   SpO2 97%   Breastfeeding? No   BMI 19.17 kg/m    Wt Readings from Last 10 Encounters:   01/15/19 50.7 kg (111 lb 11.2 oz)   12/18/18 52 kg (114 lb 11.2 oz)   11/26/18 51.9 kg (114 lb 8 oz)   11/16/18 51.4 kg (113 lb 6.4 oz)   11/08/18 52.8 kg (116 lb 6.4 oz)   10/29/18 51.3 kg (113 lb)   10/23/18 49 kg (108 lb)   10/20/18 50.2 kg (110 lb 11.2 oz)   10/20/18 49 kg (108 lb)   10/15/18 49 kg (108 lb)   gen: well appearing, thin woman  HEENT: EOMI, PERRL. Sclerae are anicteric. Oral mucosa is pink and moist with no lesions or thrush.   Lymph: Previously palpable left supraclavicular LNs are no longer evident on exam.   Heart: Regular rate and rhythm. No murmurs.  Lungs: Diminished BS in bilateral bases, L>R. Otherwise clear to auscultation bilaterally. Normal respiratory effort on ambient air.  Abdomen: Bowel sounds present, soft, nontender with no palpable hepatosplenomegaly or masses.   Extremities: No lower extremity edema noted bilaterally.   Neuro: Alert and oriented. No focal deficits. Normal gait.  Skin: No rashes, petechiae, or bruising noted on exposed skin.    Laboratory Data:  Lab Results   Component Value Date    WBC 5.8 01/15/2019    HGB 12.8 01/15/2019    HCT 38.5 01/15/2019     01/15/2019    CHOL 129 " 11/03/2017    TRIG 178 (H) 11/03/2017    HDL 50 11/03/2017    ALT 18 01/15/2019    AST 66 (H) 01/15/2019     01/15/2019    BUN 15 01/15/2019    CO2 28 01/15/2019    TSH 1.78 08/21/2017    INR 0.94 10/20/2018     Imaging:Personally reviewed CT scan with radiology    Tumor markers pending    Assessment and Plan:  Shan Marsh is a 48 year old woman with prior history of a T1 N0, ER/AK positive, HER2-negative left breast cancer treated with bilateral mastectomies in 2012, now with metastatic disease (bones, pleural effusions, lungs, pericardial effusion, liver, left adrenal/retroperitoneal mets), ER/AK positive, HER2 negative.  She progressed on palbociclib and tamoxifen, alternative therapies in [place], and is now on Xeloda. Recent hospitalization in October 2018 for pleural and pericardial effusions.     Metastatic breast cancer: Continues on Xeloda 1500 mg twice daily for 7 days on and 8 days off, tolerating well overall. We discussed that her CT scan demonstrates a mixed response.  However, in looking at older films in summer 2018, I believe her disease is likely progressing.      We discussed waiting for her tumor markers to return.  Assuming they continue to increase, I would like to switch her therapy.  We discussed choices including taxol or doxil. She is reluctant to have hair loss.   We discussed the side effects of chemotherapy including myelosuppression, nausea/vomiting/diarrhea/constipation, hair loss, neuropathy, fertility complications, dehydration, cardiomyopathy, etc.  The patient will be receiving chemotherapy teaching through my nurse coordinator with handouts describing all of the side effects again.  Consent for chemotherapy was obtained.    I discussed I would call her back later this week to make definitive plans regarding chemo.    She asked about going to see an integrative specialist in [place].  I discussed I would not recommend this.    Bone mets: Continue monthly Zometa every 3  months. Due today.     Bilateral pleural effusions. She is s/p right thoracentesis on 10/9/18 and left pleurx placement 10/11/18. PleurX was removed 11/12/18 due to minimal drainage. Stable on CT 11/21/18.  Slight increase today.  Will monitor.    Malignant pericardial effusion: Presented with tamponade, s/p pericardial drain 10/7/18. She met with Dr. Sanchez 11/8/2018. Echo at that time was without any pericardial effusion. REpeat echo last week stable.    Coping - she is having a hard time coping.  Assurance to pt.  Resources provided.       Ana Whitman MD

## 2019-01-18 NOTE — PROGRESS NOTES
"Spoke to patient after conversation with Dr Whitman. Patient feels \"very emotional,\" but is seeing someone that she has a long relationship with and a support group that she is involved with weekly. Patient feels very supported at her best friends house, but misses her children that are 8 and 11 not living in the same house. Discussed meeting with patient the day starts Doxil for chemotherapy education.  Spoke to patient with cold capping and would need to verify if her insurance would cover, but usually not covered. Answered all patient's questions and verbalized understanding. Sharifa Celeste RN, BSN.    "

## 2019-01-22 NOTE — PATIENT INSTRUCTIONS
Contact numbers:  Triage Main/After hours Line: [phone]    Main (scheduling) line: [phone]    Call with chills and/or temperature greater than or equal to 100.5 and questions or concerns.    If after hours, weekends, or holidays, call main hospital  at  [phone] and ask for Oncology doctor on call.         January 2019 Sunday Monday Tuesday Wednesday Thursday Friday Saturday             1     2     3     4     5       6     7     8     9     10     11     12       13     14    CT CHEST ABDOMEN PELVIS WWO   2:20 PM   (20 min.)   UCCT1   [facility] CT 15    [facility] DRAW   2:15 PM   (15 min.)    [facility]    [facility] RETURN   2:45 PM   (30 min.)   Ana Whitman MD   [facility]    [facility] ONC INFUSION 60   4:30 PM   (60 min.)    ONCOLOGY INFUSION   [facility] 16     17     18     19       20     21    ECHO COMPLETE   2:00 PM   (60 min.)   UCECHCR2   [facility] 22    [facility] DRAW   1:45 PM   (15 min.)    [facility] DRAW   [facility] Lab Draw    [facility] ONC INFUSION 120   2:30 PM   (120 min.)    ONCOLOGY INFUSION   [facility] 23     24     25     26       27     28     29     30     31 February 2019 Sunday Monday Tuesday Wednesday Thursday Friday Saturday                            1     2       3     4     5     6     7     8     9       10     11     12     13     14     15     16       17     18     19     20     21     22     23       24     25     26     27     28                               Lab Results:  Recent Results (from the past 12 hour(s))   CBC with platelets differential    Collection Time: 01/22/19  1:46 PM   Result Value Ref Range    WBC 5.6 4.0 - 11.0 10e9/L    RBC Count 4.16 3.8 - 5.2 10e12/L    Hemoglobin 12.9 11.7 - 15.7 g/dL    Hematocrit 39.4 35.0 - 47.0 %    MCV 95 78 - 100 fl    MCH 31.0 26.5 -  33.0 pg    MCHC 32.7 31.5 - 36.5 g/dL    RDW 17.2 (H) 10.0 - 15.0 %    Platelet Count 259 150 - 450 10e9/L    Diff Method Automated Method     % Neutrophils 68.1 %    % Lymphocytes 21.5 %    % Monocytes 8.9 %    % Eosinophils 0.5 %    % Basophils 0.5 %    % Immature Granulocytes 0.5 %    Nucleated RBCs 0 0 /100    Absolute Neutrophil 3.8 1.6 - 8.3 10e9/L    Absolute Lymphocytes 1.2 0.8 - 5.3 10e9/L    Absolute Monocytes 0.5 0.0 - 1.3 10e9/L    Absolute Eosinophils 0.0 0.0 - 0.7 10e9/L    Absolute Basophils 0.0 0.0 - 0.2 10e9/L    Abs Immature Granulocytes 0.0 0 - 0.4 10e9/L    Absolute Nucleated RBC 0.0    Comprehensive metabolic panel    Collection Time: 01/22/19  1:46 PM   Result Value Ref Range    Sodium 136 133 - 144 mmol/L    Potassium 3.8 3.4 - 5.3 mmol/L    Chloride 102 94 - 109 mmol/L    Carbon Dioxide 25 20 - 32 mmol/L    Anion Gap 8 3 - 14 mmol/L    Glucose 143 (H) 70 - 99 mg/dL    Urea Nitrogen 11 7 - 30 mg/dL    Creatinine 0.56 0.52 - 1.04 mg/dL    GFR Estimate >90 >60 mL/min/[1.73_m2]    GFR Estimate If Black >90 >60 mL/min/[1.73_m2]    Calcium 9.5 8.5 - 10.1 mg/dL    Bilirubin Total 0.6 0.2 - 1.3 mg/dL    Albumin 3.5 3.4 - 5.0 g/dL    Protein Total 9.2 (H) 6.8 - 8.8 g/dL    Alkaline Phosphatase 213 (H) 40 - 150 U/L    ALT 19 0 - 50 U/L    AST 89 (H) 0 - 45 U/L   Magnesium    Collection Time: 01/22/19  1:46 PM   Result Value Ref Range    Magnesium 2.4 (H) 1.6 - 2.3 mg/dL

## 2019-01-22 NOTE — PROGRESS NOTES
Order faxed for [facility] per Dr Whitman request of left axilla LN specimen [id number]. Sharifa Celeste RN, BSN  [facility] Nurse Coordinator

## 2019-01-22 NOTE — PROGRESS NOTES
Infusion Nursing Note:  Shan Marsh presents for D1C1 Doxil    Note: New regimen for pt today, chemo teaching done in infusion today by HERLINDA Medley. Writer reinforced teaching and answered all additional questions.    Pt feeling well today, no changes since seeing Dr. Whitman last week, feeling ready to start Doxil today. Pt does have some lingering indigestion which relates to xeloda, IV pepcid given today prior to doxil.    Doxil run at the following rates:  10ml/hour X5 minutes  20ml/hour X5 minutes  50ml/hour X5 minutes  100ml/hour X5 minutes  200ml/hour X5 minites  305ml/hour for the remainder of infusion.    Treatment Conditions:  Lab Results   Component Value Date    HGB 12.9 01/22/2019     Lab Results   Component Value Date    WBC 5.6 01/22/2019      Lab Results   Component Value Date    ANEU 3.8 01/22/2019     Lab Results   Component Value Date     01/22/2019      Lab Results   Component Value Date     01/22/2019                   Lab Results   Component Value Date    POTASSIUM 3.8 01/22/2019           Lab Results   Component Value Date    MAG 2.4 01/22/2019            Lab Results   Component Value Date    CR 0.56 01/22/2019                   Lab Results   Component Value Date    KRYSTEN 9.5 01/22/2019                Lab Results   Component Value Date    BILITOTAL 0.6 01/22/2019           Lab Results   Component Value Date    ALBUMIN 3.5 01/22/2019                    Lab Results   Component Value Date    ALT 19 01/22/2019           Lab Results   Component Value Date    AST 89 01/22/2019       Results reviewed, labs MET treatment parameters, ok to proceed with treatment.  ECHO/MUGA completed 1/21/19  EF 55-60%.    Intravenous Access:  PIV placed in lab    Post Infusion Assessment:  Patient tolerated infusion without incident.  Blood return noted pre and post infusion.  No evidence of extravasations.  Access discontinued per protocol.    Discharge Plan:   Prescription refills given for  ativan and compazine.  Discharge instructions reviewed with: Patient and Family.  Patient and/or family verbalized understanding of discharge instructions and all questions answered.  Copy of AVS reviewed with patient and/or family.  Patient will return in 2 weeks for labs and CAROLIN carolin and 1 month for next appointment-- still being scheduled, pt aware to follow up if needed  Patient discharged in stable condition accompanied by: self, mother and father.    Face time: 3 minutes    Sweta Wen RN

## 2019-01-22 NOTE — NURSING NOTE
Chief Complaint   Patient presents with     Blood Draw     labs drawn with piv start by rn.  vs taken     Labs drawn with PIV start by rn.  Pt tolerated well.  VS taken and pt checked in for next appt.  Veronica Aguilar RN

## 2019-01-22 NOTE — ORAL ONC MGMT
Thank you for the opportunity to be a part in the care of this patient's oral chemotherapy. The oncology pharmacy will no longer be following this patient for oral chemotherapy. If there are any questions or the plan changes, feel free to contact us.    Reinaldo Cuellar, JordanD, MS  Hematology/Oncology Clinical Pharmacist  [facility]  [facility]

## 2019-01-24 NOTE — TELEPHONE ENCOUNTER
Per request of patient, [facility] application completed and submitted through online portal.  [facility] will directly contact/follow up with patient regarding application.  No other needs reported at this time.    Soo Yeon Han, MSW, LICSW  Pager: [phone]  Phone: [phone]

## 2019-01-28 PROBLEM — R50.9 FEVER: Status: ACTIVE | Noted: 2019-01-01

## 2019-01-28 NOTE — PHARMACY
"The following home medications were NOT continued on inpatient admission per \"Discontinuation of nonessential home medications during hospitalization\" policy: ENZYME DIGEST PO CAPS    If a therapeutic holiday is deemed inappropriate per the prescriber, please notify the pharmacist regarding the medication order.    The pharmacist is available to answer any questions and/or concerns the patient may have regarding discontinuation of non-essential medications.    Please ensure that these medications are restarted as needed upon discharge via the medication reconciliation discharge process and included on the discharge medication reconciliation report.    Thank you,  Garrick Saba    "

## 2019-01-28 NOTE — PROGRESS NOTES
Discharge Note  Lines/drains: removed PIV   Medications: went over teaching for prilosec  Teaching: see above and went over discharge paperwork  Transportation: pt left 7c via foot

## 2019-01-28 NOTE — H&P
[facility] History and Physical    Shan Marsh MRN# [id number]   Age: 48 year old YOB: 1970     Date of Admission:  1/27/2019    Primary care provider: Marilu Michelle          Assessment and Plan:   47 yo woman with fever, on chemotherapy. Her chest tightness is not new and has been going on for a month. Troponin was negative and it would not make sense to repeat it given the lack of change in the character of the pain.     Fever:   -flu negative  - UA unremarkable  - monitor for fever and send cultures if positive  - pt does not want antibiotics and I tend to agree with her at this time. She is not neutropenic. She agrees that if her temperature rises she will start antibiotics    Digestive upset:   - continue home enzymes    Pain and anxiety:  - continue home cannabis and ativan    FEN: regular  Ppx: enoxaparin  Code: Full    Heather Jiang MD PhD           Chief Complaint:   'I had a fever over 100.4 so I had to come'     History is obtained from the patient          History of Present Illness:   This patient is a 48 year old woman with metastatic breast cancer diagnosed in 2017. She was on Xeloda but had progression so started Doxil on 01/22/19. She sees Dr. Whitman.  She presented to the ED due to a fever of 100.6 at home. She has had chest 'tightness' for one month that does not radiate and is not worse with activity. She has had 'digestive problems' since taking Xeloda and has nausea and constipation despite being off the drug now. She denies any vomiting or diarrhea or dysuria. Flu swab was negative. She has been afebrile since arrival. Troponin was negative in the ED.               Past Medical History:     Past Medical History:   Diagnosis Date     Breast cancer, left breast (H) 5/12    grade 1 infiltrating ductal cancer, ER/[place]+, s/p bilateral mastectomy     Complication of anesthesia     bradycardia,b/p drop p childbirth     Dysplasia of cervix (uteri) 12/01    LUCAS II - III,  s/p LEEP  @ [facility]     Incompetence of cervix 4/06    D&E at 19+4 weeks twin pregnancy     Shingles outbreak 5/12             Past Surgical History:      Past Surgical History:   Procedure Laterality Date     D & C  9/2010    retained POC     ENDOBRONCHIAL ULTRASOUND FLEXIBLE N/A 5/12/2017    Procedure: ENDOBRONCHIAL ULTRASOUND FLEXIBLE;  Flexible Bronchoscopy, Endobronchial Ultrasound, Transbronchial Biopsy ;  Surgeon: Bandar Meyer MD;  Location: [facility]     INSERT CHEST TUBE Left 10/11/2018    Procedure: INSERT CHEST TUBE;  Insert Chest Tube ;  Surgeon: Brock Chan MD;  [facility]: [facility]     INSERT CHEST TUBE N/A 11/12/2018    Procedure: Removal Of Pleurx Catheter ;  Surgeon: Bandar Meyer MD;  Location: [facility]     LEEP TX, CERVICAL  12/01    LEEP or LUCAS II - III     MASTECTOMY, RECONSTRUCT BREAST, COMBINED  6/11/2012    Procedure: COMBINED MASTECTOMY, RECONSTRUCT BREAST;  Bilateral Mastectomy with Reconstruction, Left [place] Node Biopsy , placement of On-Q catheters X 2 bilateral breast.;  Surgeon: Misael Cheng MD;  Location:  OR     RECONSTRUCT BREAST BILATERAL  3/25/2013    Procedure: RECONSTRUCT BREAST BILATERAL;  Revise bilateral breasts and reconstructions, with insertion of gel breast implants with fat grafting and fat after mastectomies.;  Surgeon: Yair Olvera MD;  Location: [facility]     RECONSTRUCT BREAST BILATERAL, IMPLANT PROSTHESIS BILATERAL, COMBINED  9/27/2012    Procedure: COMBINED RECONSTRUCT BREAST BILATERAL, IMPLANT PROSTHESIS BILATERAL;  Bilateral Second Stage Breast Reconstruction With Gel Implants, Fat        THORACENTESIS Left 5/7/2018    Procedure: THORACENTESIS;  Thoracentesis;  Surgeon: Venancio Hussein PA-C;  [facility]     THORACENTESIS Left 7/5/2018    Procedure: THORACENTESIS;  Thoracentesis;  Surgeon: Lydia Murillo PA-C;  [facility]     THORACENTESIS Left 9/5/2018    Procedure: THORACENTESIS;  Left Thoracentesis;  Surgeon: Mindy  López Schwartz PA-C;  [facility]     THORACENTESIS Left 10/3/2018    Procedure: THORACENTESIS;  Thoracentesis, left;  Surgeon: Eros Chauhan PA-C;  [facility]     THORACENTESIS Right 10/9/2018    Procedure: THORACENTESIS;  Thoacentesis ;  Surgeon: Linh Hayes MD;  Location:  GI             Social History:     Social History     Tobacco Use     Smoking status: Former Smoker     Last attempt to quit: 1997     Years since quittin.6     Smokeless tobacco: Never Used   Substance Use Topics     Alcohol use: No             Family History:     I have reviewed this patient's family history  Family History   Problem Relation Age of Onset     Alcohol/Drug Father      Allergies Mother      Neurologic Disorder Mother         seizures, related to fever     Gastrointestinal Disease Mother         IBS     Blood Disease Mother         Shogrens     Cancer Maternal Grandfather         bladder     Circulatory Maternal Grandfather         blood clots     Alcohol/Drug Maternal Grandfather         alcohol dependency     Allergies Maternal Aunt      Allergies Other         maternal cousins     Arthritis Maternal Aunt      Prostate Cancer Maternal Uncle      Thyroid Disease Maternal Aunt      Alcohol/Drug Maternal Uncle         alcohol and drug dependency     Cancer - colorectal Maternal Uncle      Gastrointestinal Disease Sister         IBS     Breast Cancer Other         self     Family history reviewed and updated in EPIC and reviewed            Allergies:     Allergies   Allergen Reactions     Erythromycin Shortness Of Breath     Sulfa Drugs      rash             Medications:     Medications Prior to Admission   Medication Sig Dispense Refill Last Dose     Ascorbic Acid (VITAMIN C PO) Take 1,000 mg by mouth daily   Past Month at Unknown time     calcium carbonate (OS-KRYSTEN 500 MG [place]. [place]) 1250 MG tablet Take 1 tablet by mouth daily   Past Week at Unknown time     Digestive Enzymes (ENZYME DIGEST)  CAPS Take 1 capsule by mouth daily   Past Week at Unknown time     medical cannabis (Patient's own supply.  Not a prescription) (This is NOT a prescription, and does not certify that the patient has a qualifying medical condition for medical cannabis.  The purpose of this order is  to document that the patient reports taking medical cannabis.)   1/27/2019 at Unknown time     Omega 3-6-9 Fatty Acids (OMEGA 3-6-9 COMPLEX PO) Take 1 capsule by mouth daily   Past Week at Unknown time     ondansetron (ZOFRAN) 8 MG tablet Take 1 tablet (8 mg) by mouth every 8 hours as needed for nausea 40 tablet 3 1/27/2019 at Unknown time     VITAMIN D, CHOLECALCIFEROL, PO Take 1,000 Units by mouth daily    Past Week at Unknown time     LORazepam (ATIVAN) 0.5 MG tablet Take 1 tablet (0.5 mg) by mouth every 4 hours as needed (Anxiety, Nausea/Vomiting or Sleep) 30 tablet 2 Unknown at Unknown time     prochlorperazine (COMPAZINE) 10 MG tablet Take 1 tablet (10 mg) by mouth every 6 hours as needed (Nausea/Vomiting) 30 tablet 2      prochlorperazine (COMPAZINE) 10 MG tablet Take 10 mg by mouth before 1st dose of oral chemotherapy, then prn thereafter   Taking     traMADol (ULTRAM) 50 MG tablet Take 1-2 tablets ( mg) by mouth every 6 hours as needed for moderate pain or headaches   Not Taking             Review of Systems:   The Review of Systems is negative other than noted in the HPI           Physical Exam:   Vitals were reviewed  Temp: 98.9  F (37.2  C) Temp src: Oral BP: 121/82 Pulse: 96 Heart Rate: 96 Resp: 18 SpO2: 95 % O2 Device: None (Room air)    General: NAD, thin  HEENT: no scleral icterus, MMM  Pulm: CTAB  CV: RRR, no m/r/g  Abd: soft, nontender, no masses  Extremities: No edema  Neuro: alert, conversant  Psych: appropriate mood and affect           Data:     Results for orders placed or performed during the hospital encounter of 01/27/19 (from the past 24 hour(s))   EKG 12-lead, tracing only   Result Value Ref Range     Interpretation ECG Click View Image link to view waveform and result    Rapid strep screen   Result Value Ref Range    Specimen Description Throat     Rapid Strep A Screen       NEGATIVE: No Group A streptococcal antigen detected by immunoassay, await culture report.  GASA     Beta strep group A culture   Result Value Ref Range    Specimen Description Throat     Special Requests Specimen collected in eSwab transport (white cap)     Culture Micro PENDING    Influenza A/B antigen   Result Value Ref Range    Influenza A/B Agn Specimen Nasopharyngeal     Influenza A Negative NEG^Negative    Influenza B Negative NEG^Negative   XR Chest 2 Views    Narrative    PRELIMINARY REPORT - The following report is a preliminary  interpretation.     Impression    Impression:   Ongoing moderate size partially loculated bilateral pleural effusion  associated with bibasilar opacity, infection versus compressive  atelectasis. Overall these are grossly similar to 04/14/2019 CT.     UA with Microscopic reflex to Culture   Result Value Ref Range    Color Urine Yellow     Appearance Urine Clear     Glucose Urine Negative NEG^Negative mg/dL    Bilirubin Urine Negative NEG^Negative    Ketones Urine Negative NEG^Negative mg/dL    Specific Gravity Urine 1.008 1.003 - 1.035    Blood Urine Negative NEG^Negative    pH Urine 6.0 5.0 - 7.0 pH    Protein Albumin Urine 10 (A) NEG^Negative mg/dL    Urobilinogen mg/dL Normal 0.0 - 2.0 mg/dL    Nitrite Urine Negative NEG^Negative    Leukocyte Esterase Urine Negative NEG^Negative    Source Unspecified Urine     WBC Urine 3 0 - 5 /HPF    RBC Urine 1 0 - 2 /HPF   Troponin I   Result Value Ref Range    Troponin I ES <0.015 0.000 - 0.045 ug/L   CBC with platelets differential   Result Value Ref Range    WBC 5.3 4.0 - 11.0 10e9/L    RBC Count 3.67 (L) 3.8 - 5.2 10e12/L    Hemoglobin 11.6 (L) 11.7 - 15.7 g/dL    Hematocrit 35.5 35.0 - 47.0 %    MCV 97 78 - 100 fl    MCH 31.6 26.5 - 33.0 pg    MCHC 32.7 31.5 -  36.5 g/dL    RDW 16.8 (H) 10.0 - 15.0 %    Platelet Count 240 150 - 450 10e9/L    Diff Method Automated Method     % Neutrophils 71.7 %    % Lymphocytes 21.3 %    % Monocytes 5.6 %    % Eosinophils 0.6 %    % Basophils 0.4 %    % Immature Granulocytes 0.4 %    Nucleated RBCs 0 0 /100    Absolute Neutrophil 3.8 1.6 - 8.3 10e9/L    Absolute Lymphocytes 1.1 0.8 - 5.3 10e9/L    Absolute Monocytes 0.3 0.0 - 1.3 10e9/L    Absolute Eosinophils 0.0 0.0 - 0.7 10e9/L    Absolute Basophils 0.0 0.0 - 0.2 10e9/L    Abs Immature Granulocytes 0.0 0 - 0.4 10e9/L    Absolute Nucleated RBC 0.0    D dimer quantitative   Result Value Ref Range    D Dimer 4.9 (H) 0.0 - 0.50 ug/ml FEU   INR   Result Value Ref Range    INR 1.05 0.86 - 1.14   Partial thromboplastin time   Result Value Ref Range    PTT 25 22 - 37 sec   Comprehensive metabolic panel   Result Value Ref Range    Sodium 134 133 - 144 mmol/L    Potassium 3.7 3.4 - 5.3 mmol/L    Chloride 99 94 - 109 mmol/L    Carbon Dioxide 25 20 - 32 mmol/L    Anion Gap 9 3 - 14 mmol/L    Glucose 108 (H) 70 - 99 mg/dL    Urea Nitrogen 8 7 - 30 mg/dL    Creatinine 0.52 0.52 - 1.04 mg/dL    GFR Estimate >90 >60 mL/min/[1.73_m2]    GFR Estimate If Black >90 >60 mL/min/[1.73_m2]    Calcium 8.7 8.5 - 10.1 mg/dL    Bilirubin Total 0.5 0.2 - 1.3 mg/dL    Albumin 2.9 (L) 3.4 - 5.0 g/dL    Protein Total 8.2 6.8 - 8.8 g/dL    Alkaline Phosphatase 174 (H) 40 - 150 U/L    ALT 13 0 - 50 U/L    AST 51 (H) 0 - 45 U/L   Lipase   Result Value Ref Range    Lipase 185 73 - 393 U/L   Lactic acid whole blood   Result Value Ref Range    Lactic Acid 1.4 0.7 - 2.0 mmol/L   ISTAT gases lactate jaime POCT   Result Value Ref Range    Ph Venous 7.41 7.32 - 7.43 pH    PCO2 Venous 46 40 - 50 mm Hg    PO2 Venous 34 25 - 47 mm Hg    Bicarbonate Venous 29 (H) 21 - 28 mmol/L    O2 Sat Venous 65 %    Lactic Acid 1.4 0.7 - 2.1 mmol/L        Heather Jiang MD

## 2019-01-28 NOTE — TELEPHONE ENCOUNTER
"\"I am not feeling well.\" Patient reporting she started new chemo therapy on 1/22/19. Reporting temp ranging to 100.4 (O) in past 30 minutes. Nausea, decreased appetite. Passing small bowel movement last evening. Denies vomiting. Chills.    Per guidelines advised to be seen in the Emergency Room. Caller verbalized understanding. Denies further questions.    Eliane Guerra RN  [facility]      Reason for Disposition    [1] Neutropenia known or suspected (e.g., recent cancer chemotherapy) AND [2] fever > 100.4 F (38.0 C)    Additional Information    Negative: Shock suspected (e.g., cold/pale/clammy skin, too weak to stand, low BP, rapid pulse)    Negative: Difficult to awaken or acting confused  (e.g., disoriented, slurred speech)    Negative: Sounds like a life-threatening emergency to the triager    Negative: Chest pain    Negative: Vomiting occurs only while coughing    Negative: Constipation is main symptom    Negative: Diarrhea is main symptom    Negative: [1] Vomiting AND [2] contains red blood or black (\"coffee ground\") material  (Exception: few red streaks in vomit that only happened once)    Negative: [1] Vomiting AND [2] recent head injury (within last 3 days)    Negative: [1] Vomiting AND [2] recent abdominal injury (within last 3 days)    Negative: [1] Vomiting AND [2] insulin-dependent diabetes (Type I) AND [3] glucose > 400 mg/dl (22 mmol/l)    Protocols used: CANCER - NAUSEA AND VOMITING-ADULT-      "

## 2019-01-28 NOTE — ED PROVIDER NOTES
History     Chief Complaint   Patient presents with     Chest Pain     Fever     HPI  Shan Marsh is a 48 year old female with a history of breast cancer started Doxil on 01/22, pericardial effusion, who presents to the Emergency Department today for evaluation of fever and chest pain.  The patient reports that she first developed chest pain about 1 month ago.  Over the last 5 days the patient reports that her pain has worsened.  Her pain is reported to be in her lower chest bilaterally and in her lower central chest.  The patient also reports that she does have abdominal pain.  She reports that she has a history of GERD.  She reports that she then noticed she had a fever today with a high of 101.    I have reviewed the Medications, Allergies, Past Medical and Surgical History, and Social History in the [employer] system.    Past Medical History:   Diagnosis Date     Breast cancer, left breast (H) 5/12    grade 1 infiltrating ductal cancer, ER/CO+, s/p bilateral mastectomy     Complication of anesthesia     bradycardia,b/p drop p childbirth     Dysplasia of cervix (uteri) 12/01    LUCAS II - III, s/p LEEP  @ [facility]     Incompetence of cervix 4/06    D&E at 19+4 weeks twin pregnancy     Shingles outbreak 5/12       Past Surgical History:   Procedure Laterality Date     D & C  9/2010    retained POC     ENDOBRONCHIAL ULTRASOUND FLEXIBLE N/A 5/12/2017    Procedure: ENDOBRONCHIAL ULTRASOUND FLEXIBLE;  Flexible Bronchoscopy, Endobronchial Ultrasound, Transbronchial Biopsy ;  Surgeon: Bandar Meyer MD;  [facility]     INSERT CHEST TUBE Left 10/11/2018    Procedure: INSERT CHEST TUBE;  Insert Chest Tube ;  Surgeon: Brock Chan MD;  [facility]     INSERT CHEST TUBE N/A 11/12/2018    Procedure: Removal Of Pleurx Catheter ;  Surgeon: Bandar Meyer MD;  [facility]: [facility]     LEEP TX, CERVICAL  12/01    LEEP or LUCAS II - III     MASTECTOMY, RECONSTRUCT BREAST, COMBINED  6/11/2012    Procedure:  COMBINED MASTECTOMY, RECONSTRUCT BREAST;  Bilateral Mastectomy with Reconstruction, Left [place] Node Biopsy , placement of On-Q catheters X 2 bilateral breast.;  Surgeon: Misael Cheng MD;  Location:  OR     RECONSTRUCT BREAST BILATERAL  3/25/2013    Procedure: RECONSTRUCT BREAST BILATERAL;  Revise bilateral breasts and reconstructions, with insertion of gel breast implants with fat grafting and fat after mastectomies.;  Surgeon: Yair Olvera MD;  Location:  OR     RECONSTRUCT BREAST BILATERAL, IMPLANT PROSTHESIS BILATERAL, COMBINED  9/27/2012    Procedure: COMBINED RECONSTRUCT BREAST BILATERAL, IMPLANT PROSTHESIS BILATERAL;  Bilateral Second Stage Breast Reconstruction With Gel Implants, Fat        THORACENTESIS Left 5/7/2018    Procedure: THORACENTESIS;  Thoracentesis;  Surgeon: Venancio Hussein PA-C;  [facility]     THORACENTESIS Left 7/5/2018    Procedure: THORACENTESIS;  Thoracentesis;  Surgeon: Lydia Murillo PA-C;  [facility]     THORACENTESIS Left 9/5/2018    Procedure: THORACENTESIS;  Left Thoracentesis;  Surgeon: López Guillen PA-C;  [facility]     THORACENTESIS Left 10/3/2018    Procedure: THORACENTESIS;  Thoracentesis, left;  Surgeon: Eros Chauhan PA-C;  [facility]     THORACENTESIS Right 10/9/2018    Procedure: THORACENTESIS;  Thoacentesis ;  Surgeon: Linh Hayes MD;  Location:  GI       Family History   Problem Relation Age of Onset     Alcohol/Drug Father      Allergies Mother      Neurologic Disorder Mother         seizures, related to fever     Gastrointestinal Disease Mother         IBS     Blood Disease Mother         Shogrens     Cancer Maternal Grandfather         bladder     Circulatory Maternal Grandfather         blood clots     Alcohol/Drug Maternal Grandfather         alcohol dependency     Allergies Maternal Aunt      Allergies Other         maternal cousins     Arthritis Maternal Aunt      Prostate Cancer Maternal  "Uncle      Thyroid Disease Maternal Aunt      Alcohol/Drug Maternal Uncle         alcohol and drug dependency     Cancer - colorectal Maternal Uncle      Gastrointestinal Disease Sister         IBS     Breast Cancer Other         self       Social History     Tobacco Use     Smoking status: Former Smoker     Last attempt to quit: 1997     Years since quittin.6     Smokeless tobacco: Never Used   Substance Use Topics     Alcohol use: No       Current Facility-Administered Medications   Medication     aspirin (ASA) tablet 325 mg     Current Outpatient Medications   Medication     medical cannabis (Patient's own supply.  Not a prescription)     ondansetron (ZOFRAN) 8 MG tablet     Ascorbic Acid (VITAMIN C PO)     calcium carbonate (OS-KRYSTEN 500 MG [place]. [place]) 1250 MG tablet     Digestive Enzymes (ENZYME DIGEST) CAPS     LORazepam (ATIVAN) 0.5 MG tablet     Omega 3-6-9 Fatty Acids (OMEGA 3-6-9 COMPLEX PO)     prochlorperazine (COMPAZINE) 10 MG tablet     prochlorperazine (COMPAZINE) 10 MG tablet     traMADol (ULTRAM) 50 MG tablet     VITAMIN D, CHOLECALCIFEROL, PO     Facility-Administered Medications Ordered in Other Encounters   Medication     sodium chloride (PF) 0.9% PF flush 10 mL        Allergies   Allergen Reactions     Erythromycin Shortness Of Breath     Sulfa Drugs      rash      Review of Systems   Constitutional: Positive for fever.   Cardiovascular: Positive for chest pain.   Gastrointestinal: Positive for abdominal pain.   All other systems reviewed and are negative.    Physical Exam   BP: (!) 143/93  Pulse: 105  Heart Rate: 105  Temp: 98.7  F (37.1  C)  Resp: 18  Height: 162.6 cm (5' 4\")  Weight: 49 kg (108 lb)  SpO2: 96 %    Physical Exam   Constitutional: She is oriented to person, place, and time. No distress.   HENT:   Head: Normocephalic and atraumatic.   Eyes: Conjunctivae and EOM are normal. Pupils are equal, round, and reactive to light.   Neck: Normal range of motion. Neck supple. "   Cardiovascular: Normal rate and regular rhythm. Exam reveals no gallop and no friction rub.   No murmur heard.  Pulmonary/Chest: Effort normal and breath sounds normal. No respiratory distress.   Abdominal: Soft. There is no tenderness.   Musculoskeletal: She exhibits no edema or tenderness.   Neurological: She is alert and oriented to person, place, and time. No cranial nerve deficit.   Skin: Skin is warm.   Psychiatric: She has a normal mood and affect. Her behavior is normal. Judgment and thought content normal.   Nursing note and vitals reviewed.    ED Course   11:59 PM  The patient was seen and examined by Dr. Casillas in Room 22.        Pt is well appearing and non toxic. Based on Hx, Px and presentaion she needs to be ruled out. I plan to get single set of cardiac enzymes and will also get EKG.     Procedures  None       EKG Interpretation:      Interpreted by Darius Casillas  Time reviewed: 0006     Symptoms at time of EKG: Unchanged  Rhythm: normal sinus  Rate: 101  Axis: normal  Ectopy: none  Conduction: normal  ST Segments/ T Waves: No ST-T wave changes  Q Waves: none  Comparison to prior: Unchanged from Oct 20th 2018    Clinical Impression: normal EKG          Critical Care time:  none             Labs Ordered and Resulted from Time of ED Arrival Up to the Time of Departure from the ED   CBC WITH PLATELETS DIFFERENTIAL - Abnormal; Notable for the following components:       Result Value    RBC Count 3.67 (*)     Hemoglobin 11.6 (*)     RDW 16.8 (*)     All other components within normal limits   D DIMER QUANTITATIVE - Abnormal; Notable for the following components:    D Dimer 4.9 (*)     All other components within normal limits   COMPREHENSIVE METABOLIC PANEL - Abnormal; Notable for the following components:    Glucose 108 (*)     Albumin 2.9 (*)     Alkaline Phosphatase 174 (*)     AST 51 (*)     All other components within normal limits   ROUTINE UA WITH MICROSCOPIC REFLEX TO CULTURE - Abnormal;  Notable for the following components:    Protein Albumin Urine 10 (*)     All other components within normal limits   ISTAT  GASES LACTATE LASHELL POCT - Abnormal; Notable for the following components:    Bicarbonate Venous 29 (*)     All other components within normal limits   TROPONIN I   INR   PARTIAL THROMBOPLASTIN TIME   LIPASE   LACTIC ACID WHOLE BLOOD   CARDIAC CONTINUOUS MONITORING   PULSE OXIMETRY NURSING   ISTAT CG4 GASES LACTATE LASHELL NURSING POCT   PERIPHERAL IV CATHETER   INFLUENZA A/B ANTIGEN   RAPID STREP SCREEN   BETA STREP GROUP A CULTURE            Assessments & Plan (with Medical Decision Making)   Female osmani passed med hx of breast cancer, undergoing chemo presents for eval of CP. Px is reassuring. Based on hx Px  And presentation I plan to admit for obs and eval.      Spoke to DR Beth on call for Heme Onc. He accepted for doctor Nayeli  I have reviewed the nursing notes.    I have reviewed the findings, diagnosis, plan and need for follow up with the patient.     Medication List      There are no discharge medications for this visit.       Final diagnoses:   Chest pain, unspecified type   Personal history of malignant neoplasm of breast   I, López Foote, am serving as a trained medical scribe to document services personally performed by Darius Casillas MD, based on the provider's statements to me.   IDarius MD, was physically present and have reviewed and verified the accuracy of this note documented by López Foote.     1/27/2019   [facility], [place], EMERGENCY DEPARTMENT     Darius Casillas MD  01/28/19 0155

## 2019-01-28 NOTE — PROGRESS NOTES
Observation goals:  Remain Afebrile through the night: met  Have tolerable pain level at discharge: met    Pt d/c'ing today

## 2019-01-28 NOTE — PLAN OF CARE
Pt arrived from ED @0230. VSS. Chest tightness unchanged. Denies nausea currently. PIV saline locked. Voiding, not saving. Up ad candy. Pt resting comfortably. Continue POC.   
oral

## 2019-01-28 NOTE — DISCHARGE SUMMARY
[facility], [place]    Discharge Summary  Hematology / Oncology    Date of Admission:  1/27/2019   Date of Discharge:  1/28/2019  Discharging Provider: Hannah Araujo  Date of Service (when I saw the patient): 01/28/19    Discharge Diagnoses      Chest pain, unspecified type  Personal history of malignant neoplasm of breast  Metastatic breast cancer (H)    History of Present Illness   47 yo woman with fever history of metastatic breast cancer on IV doxil, was admitted for observation with fever reported at home. Infectious workup was pursued, she remained afebrile throughout hospitalization and not neutropenic and was discharged home.     Plan:  Follow up with Dr. Whitman on 2/5    Hospital Course      # Non neutropenic fever  Shan reports fever at home two times with tmax of 100.9. She continues to have the same GERD type pain that she always has. She has no new infectious signs and symptoms    - Blood cultures drawn prior to discharge  - flu negative  - UA unremarkable, culture no growth  - Chest Xray- continued loculated effusions with a component of atelectasis  - Per patient request, she did not want to start antibiotics.     # GERD  Reports that her Gerd/chest pain had gotten slightly worse with the fever.   EKG on admit- WNL  Troponin negative.   Continue Calcium carbonate tabs, sent a prescription for Prilosec on discharge     Digestive upset:   - continue home enzymes at discharge     Pain and anxiety:  - continue home cannabis and ativan    # Constipation    Reports bowel movements have changed recently, and she is more constipated. Recommended [place] PRN on discharge      EXAM:  Constitutional: Awake, alert, cooperative, no apparent distress  ENT: Oral mucosa pink and moist without visible lesions or petechiae  Respiratory: No increased work of breathing, good air exchange, clear to auscultation bilaterally, no crackles or wheezing.  Cardiovascular: regular rate and  rhythm, normal S1 and S2, no S3 or S4, and no murmur noted.  GI: normal bowel sounds, soft, non-distended, non-tender, no masses palpated  Lymph/Hematologic: No cervical lymphadenopathy and no supraclavicular lymphadenopathy.  Skin: No bruising or bleeding, rashes or lesions  Extremeties: No peripheral extremity edema noted  Neurologic: Awake, alert, oriented to name, place and time.        Code Status   Full Code    Primary Care Physician   Marilu Michelle MD        Discharge Disposition   Discharged to home  Condition at discharge: Stable    Consultations This Hospital Stay   VASCULAR ACCESS CARE ADULT IP CONSULT    Discharge Orders      Reason for your hospital stay    You were admitted with a fever of unknown origin     Follow Up and recommended labs and tests    You have follow up with Dr. Whitman on 2/5/19     Activity    Your activity upon discharge: activity as tolerated     When to contact your care team    [facility] triage line at [phone] for temp >100.4, uncontrolled nausea/vomiting/diarrhea/constipation, unrelieved pain, bleeding not relieved with pressure, dizziness, chest pain, shortness of breath, loss of consciousness, and any new or concerning symptoms.     Discharge Instructions    Start taking Prilosec daily before breakfast  Follow up with Dr. Whitman on 2/5     Full Code     Diet    Follow this diet upon discharge: Regular     Discharge Medications   Current Discharge Medication List      START taking these medications    Details   omeprazole (PRILOSEC) 20 MG DR capsule Take 1 capsule (20 mg) by mouth every morning (before breakfast)  Qty: 30 capsule, Refills: 0    Associated Diagnoses: Metastatic breast cancer (H)         CONTINUE these medications which have NOT CHANGED    Details   Ascorbic Acid (VITAMIN C PO) Take 1,000 mg by mouth daily      calcium carbonate (OS-KRYSTEN 500 MG [place]. [place]) 1250 MG tablet Take 1 tablet by mouth daily      Digestive Enzymes (ENZYME DIGEST) CAPS  Take 1 capsule by mouth daily      medical cannabis (Patient's own supply.  Not a prescription) (This is NOT a prescription, and does not certify that the patient has a qualifying medical condition for medical cannabis.  The purpose of this order is  to document that the patient reports taking medical cannabis.)      Omega 3-6-9 Fatty Acids (OMEGA 3-6-9 COMPLEX PO) Take 1 capsule by mouth daily      VITAMIN D, CHOLECALCIFEROL, PO Take 1,000 Units by mouth daily       LORazepam (ATIVAN) 0.5 MG tablet Take 1 tablet (0.5 mg) by mouth every 4 hours as needed (Anxiety, Nausea/Vomiting or Sleep)  Qty: 30 tablet, Refills: 2    Associated Diagnoses: Metastatic breast cancer (H); Bone metastasis (H); Malignant neoplasm of lower-outer quadrant of female breast, unspecified estrogen receptor status, unspecified laterality (H)      !! prochlorperazine (COMPAZINE) 10 MG tablet Take 1 tablet (10 mg) by mouth every 6 hours as needed (Nausea/Vomiting)  Qty: 30 tablet, Refills: 2    Associated Diagnoses: Metastatic breast cancer (H); Bone metastasis (H); Malignant neoplasm of lower-outer quadrant of female breast, unspecified estrogen receptor status, unspecified laterality (H)      !! prochlorperazine (COMPAZINE) 10 MG tablet Take 10 mg by mouth before 1st dose of oral chemotherapy, then prn thereafter      traMADol (ULTRAM) 50 MG tablet Take 1-2 tablets ( mg) by mouth every 6 hours as needed for moderate pain or headaches       !! - Potential duplicate medications found. Please discuss with provider.      STOP taking these medications       ondansetron (ZOFRAN) 8 MG tablet Comments:   Reason for Stopping:             Allergies   Allergies   Allergen Reactions     Erythromycin Shortness Of Breath     Sulfa Drugs      rash     Data   Most Recent 3 CBC's:  Recent Labs   Lab Test 01/28/19  0744 01/28/19  0055 01/22/19  1346 01/15/19  1425   WBC  --  5.3 5.6 5.8   HGB  --  11.6* 12.9 12.8   MCV  --  97 95 96    240 259 286       Most Recent 3 BMP's:  Recent Labs   Lab Test 01/28/19  0744 01/28/19  0055 01/22/19  1346 01/15/19  1425   NA  --  134 136 134   POTASSIUM  --  3.7 3.8 3.4   CHLORIDE  --  99 102 99   CO2  --  25 25 28   BUN  --  8 11 15   CR 0.51* 0.52 0.56 0.67   ANIONGAP  --  9 8 8   KRYSTEN  --  8.7 9.5 9.5   GLC  --  108* 143* 94     Most Recent 2 LFT's:  Recent Labs   Lab Test 01/28/19  0055 01/22/19  1346   AST 51* 89*   ALT 13 19   ALKPHOS 174* 213*   BILITOTAL 0.5 0.6     Most Recent INR's and Anticoagulation Dosing History:  Anticoagulation Dose History     Recent Dosing and Labs Latest Ref Rng & Units 5/7/2018 7/5/2018 9/5/2018 10/7/2018 10/7/2018 10/20/2018 1/28/2019    INR 0.86 - 1.14 0.99 0.93 0.95 1.18(H) 1.21(H) 0.94 1.05    INR Point of Care 0.86 - 1.14 - - - - - - -        Most Recent 3 Troponin's:  Recent Labs   Lab Test 01/28/19  0055 10/20/18  1623 10/05/18  0817   TROPI <0.015 <0.015 <0.015     Most Recent Cholesterol Panel:  Recent Labs   Lab Test 11/03/17  1611   CHOL 129   LDL 43   HDL 50   TRIG 178*     Most Recent 6 Bacteria Isolates From Any Culture (See EPIC Reports for Culture Details):  Recent Labs   Lab Test 01/28/19  0008 10/09/18  1049 10/09/18  0645 10/08/18  0835 10/08/18  0831 10/07/18  2044   CULT PENDING Culture negative for acid fast bacilli  Assayed at [facility] [address], [place] [id number] [phone]  Culture negative after 4 weeks  No Actinomyces species isolated  No growth No growth  No growth No growth No growth No growth     Most Recent TSH, T4 and A1c Labs:  Recent Labs   Lab Test 11/03/17  1611 08/21/17  1147   TSH  --  1.78   T4 1.01  --

## 2019-01-28 NOTE — ED NOTES
[facility], [place]   ED Nurse to Floor Handoff     Shan Marsh is a 48 year old female who speaks English and lives alone,  in a home  They arrived in the ED by car from home    ED Chief Complaint: Chest Pain and Fever    ED Dx;   Final diagnoses:   Chest pain, unspecified type   Personal history of malignant neoplasm of breast         Needed?: No    Allergies:   Allergies   Allergen Reactions     Erythromycin Shortness Of Breath     Sulfa Drugs      rash   .  Past Medical Hx:   Past Medical History:   Diagnosis Date     Breast cancer, left breast (H) 5/12    grade 1 infiltrating ductal cancer, ER/IN+, s/p bilateral mastectomy     Complication of anesthesia     bradycardia,b/p drop p childbirth     Dysplasia of cervix (uteri) 12/01    LUCAS II - III, s/p LEEP  @ [facility]     Incompetence of cervix 4/06    D&E at 19+4 weeks twin pregnancy     Shingles outbreak 5/12      Baseline Mental status: WDL  Current Mental Status changes: at basesline    Infection present or suspected this encounter: cultures pending  Sepsis suspected: No  Isolation type: Droplet     Activity level - Baseline/Home:  Independent  Activity Level - Current:   Independent    Bariatric equipment needed?: No    In the ED these meds were given:   Medications   aspirin (ASA) tablet 325 mg (not administered)       Drips running?  No    Home pump  No    Current LDAs  Peripheral IV 10/20/18 Right Lower forearm (Active)   Number of days: 100       Peripheral IV 01/14/19 Right (Active)   Number of days: 14       Closed/Suction Drain 2 Right Breast 19 [place] (Active)   Number of days: 2422       Incision/Surgical Site Bilateral Breast (Active)   Number of days:        Incision/Surgical Site 09/27/12 Anterior;Left Breast (Active)   Number of days: 2314       Incision/Surgical Site 09/27/12 Anterior;Right Breast (Active)   Number of days: 2314       Incision/Surgical Site 09/27/12 Left Other (Comment) (Active)  "  Number of days: 2314       Incision/Surgical Site 09/27/12 Right Other (Comment) (Active)   Number of days: 2314       Incision/Surgical Site 03/25/13 Bilateral Breast (Active)   Number of days: 2135       Incision/Surgical Site 03/25/13 Bilateral Abdomen (Active)   Number of days: 2135       Incision/Surgical Site 05/07/18 Left Back (Active)   Number of days: 266       Incision/Surgical Site 05/07/18 Left Back (Active)   Number of days: 266       Incision/Surgical Site 09/05/18 Left Back (Active)   Number of days: 145       Incision/Surgical Site 10/03/18 Left Back (Active)   Number of days: 117       Incision/Surgical Site 10/10/18 Lateral;Left Chest (Active)   Number of days: 110       Incision/Surgical Site 10/10/18 Right;Lateral Chest (Active)   Number of days: 110       Labs results: Labs Ordered and Resulted from Time of ED Arrival Up to the Time of Departure from the ED - No data to display    Imaging Studies: No results found for this or any previous visit (from the past 24 hour(s)).    Recent vital signs:   BP (!) 144/97   Pulse 105   Temp 98.7  F (37.1  C) (Oral)   Resp 18   Ht 1.626 m (5' 4\")   Wt 49 kg (108 lb)   SpO2 97%   BMI 18.54 kg/m      Cardiac Rhythm: Tachycardia  Pt needs tele? NO  Skin/wound Issues: None    Code Status: Full Code    Pain control: pt had none    Nausea control: pt had none    Abnormal labs/tests/findings requiring intervention: pending results    Family present during ED course? Yes   Family Comments/Social Situation comments: none    Tasks needing completion: None    Heidy De Luna, RN    [phone] [facility]    "

## 2019-01-28 NOTE — ED TRIAGE NOTES
"Per patient temp max 101 at home, started chemo recently. Patient reports \"Gerd\" after eating sandwhich. Chest tightness today.   "

## 2019-01-29 NOTE — PROGRESS NOTES
"Spoke to patient after being observed overnight at the [facility]. Patient eating better today having a strawberry bar and cereal. Patient spoke to the care providers with her medical cannabis that a side effect could be GI upset and is not taking at this time. Discussed eating greek yogurt, BRAT diet and push PO fluids. Patient trying to drink Gatorade, but is too \"rich.\" Encouraged patient to drink plenty of water, eat pears or any fruit and make a smoothie to add fruit into her diet and get edward or edward lozenges to assist with nausea. Reivewed follow-up appointment with Lima Griffiths 2/5/19. Sharifa Celeste RN, BSN  [facility] Nurse Coordinator  "

## 2019-01-29 NOTE — TELEPHONE ENCOUNTER
ED / Discharge Outreach Protocol    Patient Contact    Attempt # 1    Was call answered?  No.  Left message on voicemail with information to call triage back.    YOMI ValenciaN, RN

## 2019-01-30 NOTE — TELEPHONE ENCOUNTER
ED / Discharge Outreach Protocol -     Admitted from 1/27-1/28 fever of unknown origin   - stop zofran and start omeprazole   Appointments scheduled with specialty     Patient Contact    Attempt # 2    Was call answered?  No.  Left message on voicemail with information to call me back.    Thelma Montez, Registered Nurse   [facility]

## 2019-02-07 NOTE — PROGRESS NOTES
Oncology/Hematology Visit Note  Feb 8, 2019    Reason for Visit: follow up of metastatic breast cancer, ER positive.     History of Present Illness: Shan Marsh is a 48 year old female without significant past medical history with recent diagnosis of metastatic breast cancer after presenting with left shoulder pain. Shoulder MRI showed soft tissue mass close to distal clavicle concerning for malignancy. She had PET/CT 5/9/17 showing hypermetabolic left axillary, supraclavicular, mediastinal and hilar nodes. She had biopsies of left axilla and hilar nodes showing metastatic breast cancer, strongly ER positive, moderately [place] positive and HER-2 kaitlin non-amplified. She originally had [id number], ER/[place] positive, HER-2 negative breast cancer diagnosed in spring of 2012 s/p bilateral mastectomies and Tamoxifen from 8/2012-2/2013 and discontinued due to side effects. With development of metastatic disease, she was recommended treatment on BIG10 clinical trial with palbociclib and tamoxifen. She started treatment on 5/24/17 and required dose reduction for neutropenia. CT CAP and bone scan 1/11/2018 showing progressive disease with new bony lesions in the ribs, T spine, and possibly liver, lungs. She started Zoladex and abemiciclib on 1/12/2018.      In March 2018, she sought treatment at [facility] in [place], under the care of Dr. Brooks, where she received:  - Vallovex vaccine (per FDA, is in phase I trials here in [place])  - Insulin potentiated therapy with conjugated chemotherapy (5% chemotherapy, unclear what chemotherapy)  - Gina's toxins, IV vitamin C, IV Ozone, hyperbaric chamber, vitamin B17 infusion, vitamin CK3 IV, and coffee enemas PRN  - Also taking oral niacin, Lugol, acidol, pancreatin, and thyroid desiccated liver, per their regimen  - She was continued on Letrozole and advised to conintue Zoladex.      She met with Dr. Whitman and patient wished to continue alternative regimen. Abemiciclib was placed on  hold. She was continued on Zoladex and letrozole. While in [place], she required thoracentesis for symptomatic pleural effusion. A repeat PET August 2018 showing widely progressive disease with multiple new bone mets involving the spine, ribs, pelvis, and sacrum; new lesions involving the left lateral thoracic/abdominal wall musculature and subcutaneous tissues, new metastases in the left adrenal gland, and recurrent large L pleural effusion. She was started on Xeloda 1500 mg BID on 9/5/18.     She was admitted 10/4-10/11/18 after syncopal event/unwitnessed fall at home. Found to have left pneumothorax and pericardial effusion with tamponade physiology. She had left chest tube placed in ED which then fell out on 10/10. Left pleurx placed on 10/11. She had pericardiocentesis on 10/7, drain removed 10/8.  She was able to resume Xeloda. She was noted to have progressive disease January 2019 and was started on IV Doxil. She was admitted 1/27-1/28/2019 for fever, substernal chest pain, c/w GERD.    Interval History:  Shan is here today for hospital follow-up.  She continues to have the epigastric discomfort and decreased appetite, but believes it to be improved since the hospital stay and starting PPI. She overall continues to have decreased appetite. She stopped using cannabis. She emotionally has had some recent events that have been difficult. She had been staying her friend's house and now is back at home to be closer to her kids. She is in the same house as her , whom she has left, but possibly getting back together now. She feels safe and supported at home at this time.  She thinks her breathing was worse around time of hospital discharge but improving now.  Mild nausea is stable to improved.  She is able to eat a more varied diet now.  She hasn't had any other concerns. No fevers, chills, chest pain, new sites of pain, bleeding, or swelling. Some mild dryness and peeling on her soles.      Current  "Outpatient Medications   Medication Sig Dispense Refill     Ascorbic Acid (VITAMIN C PO) Take 1,000 mg by mouth daily       calcium carbonate (OS-KRYSTEN 500 MG [place]. [place]) 1250 MG tablet Take 1 tablet by mouth daily       Digestive Enzymes (ENZYME DIGEST) CAPS Take 1 capsule by mouth daily       LORazepam (ATIVAN) 0.5 MG tablet Take 1 tablet (0.5 mg) by mouth every 4 hours as needed (Anxiety, Nausea/Vomiting or Sleep) 30 tablet 2     medical cannabis (Patient's own supply.  Not a prescription) (This is NOT a prescription, and does not certify that the patient has a qualifying medical condition for medical cannabis.  The purpose of this order is  to document that the patient reports taking medical cannabis.)       Omega 3-6-9 Fatty Acids (OMEGA 3-6-9 COMPLEX PO) Take 1 capsule by mouth daily       omeprazole (PRILOSEC) 20 MG DR capsule Take 1 capsule (20 mg) by mouth every morning (before breakfast) 30 capsule 0     prochlorperazine (COMPAZINE) 10 MG tablet Take 1 tablet (10 mg) by mouth every 6 hours as needed (Nausea/Vomiting) 30 tablet 2     prochlorperazine (COMPAZINE) 10 MG tablet Take 10 mg by mouth before 1st dose of oral chemotherapy, then prn thereafter       traMADol (ULTRAM) 50 MG tablet Take 1-2 tablets ( mg) by mouth every 6 hours as needed for moderate pain or headaches       VITAMIN D, CHOLECALCIFEROL, PO Take 1,000 Units by mouth daily          Physical Examination:  General: The patient is a pleasant female in no acute distress.  BP (!) 137/94 (BP Location: Right arm, Patient Position: Sitting, Cuff Size: Adult Regular)   Pulse 89   Temp 98.1  F (36.7  C) (Oral)   Resp 16   Ht 1.626 m (5' 4.02\")   Wt 49.4 kg (109 lb)   SpO2 95%   BMI 18.70 kg/m    Wt Readings from Last 10 Encounters:   02/08/19 49.4 kg (109 lb)   01/28/19 49.5 kg (109 lb 1.6 oz)   01/22/19 49.4 kg (108 lb 14.4 oz)   01/15/19 50.7 kg (111 lb 11.2 oz)   12/18/18 52 kg (114 lb 11.2 oz)   11/26/18 51.9 kg (114 lb 8 oz)   11/16/18 " 51.4 kg (113 lb 6.4 oz)   11/08/18 52.8 kg (116 lb 6.4 oz)   10/29/18 51.3 kg (113 lb)   10/23/18 49 kg (108 lb)     HEENT: EOMI, PERRL. Sclerae are anicteric. Oral mucosa is pink and moist with no lesions or thrush.   Lymph: previously palpable left supraclavicular LNs are difficult to appreciate today. No other palpable cervical or supraclavicular LNs.   Heart: Regular rate and rhythm.   Lungs: Diminished BS in bilateral bases, L>R w/ associated dullness. Otherwise clear to auscultation bilaterally.   Abdomen: Bowel sounds present, soft, nontender with no palpable hepatosplenomegaly or masses.   Extremities: No lower extremity edema noted bilaterally.   Neuro: Cranial nerves II through XII are grossly intact.  Skin: No rashes, petechiae, or bruising noted on exposed skin.    Laboratory Data:  Results for NIKKIE HICKS (MRN [id number]) as of 2/8/2019 16:51   Ref. Range 2/8/2019 15:32   Sodium Latest Ref Range: 133 - 144 mmol/L 134   Potassium Latest Ref Range: 3.4 - 5.3 mmol/L 3.4   Chloride Latest Ref Range: 94 - 109 mmol/L 99   Carbon Dioxide Latest Ref Range: 20 - 32 mmol/L 26   Urea Nitrogen Latest Ref Range: 7 - 30 mg/dL 9   Creatinine Latest Ref Range: 0.52 - 1.04 mg/dL 0.60   GFR Estimate Latest Ref Range: >60 mL/min/1.73_m2 >90   GFR Estimate If Black Latest Ref Range: >60 mL/min/1.73_m2 >90   Calcium Latest Ref Range: 8.5 - 10.1 mg/dL 9.4   Anion Gap Latest Ref Range: 3 - 14 mmol/L 9   Albumin Latest Ref Range: 3.4 - 5.0 g/dL 3.1 (L)   Protein Total Latest Ref Range: 6.8 - 8.8 g/dL 8.8   Bilirubin Total Latest Ref Range: 0.2 - 1.3 mg/dL 0.3   Alkaline Phosphatase Latest Ref Range: 40 - 150 U/L 151 (H)   ALT Latest Ref Range: 0 - 50 U/L 20   AST Latest Ref Range: 0 - 45 U/L 45   Glucose Latest Ref Range: 70 - 99 mg/dL 104 (H)   WBC Latest Ref Range: 4.0 - 11.0 10e9/L 3.7 (L)   Hemoglobin Latest Ref Range: 11.7 - 15.7 g/dL 12.8   Hematocrit Latest Ref Range: 35.0 - 47.0 % 39.4   Platelet Count  Latest Ref Range: 150 - 450 10e9/L 370       Assessment and Plan:  Shan Marsh is a 47-year-old female with history of a T1 N0, ER/OR positive, HER2-negative left breast cancer treated with bilateral mastectomies in 2012, now with metastatic disease (bones, pleural effusions, lungs, pericardial effusion, liver, left adrenal/retroperitoneal mets), ER/OR positive, HER2 negative.  She progressed on palbociclib and tamoxifen, alternative therapies in [place], Xeloda, now on Doxil.    Metastatic breast cancer: Started Doxil on 1/22/2019. Overall doing well. Return as scheduled for cycle 2.  - Continues ovarian suppression with monthly Zoladex, due at 2/19 visit    Bone mets: Continue monthly Zometa every 3 months. Due April.     Bilateral pleural effusions. S/p right thoracentesis on 10/9 and S/p left pleurx placement 10/11. PleurX was removed 11/12 due to minimal drainage. Effusions stable on recent XR. Breathing stable now though worsened after discharge. Asked that she monitor her symptoms and let us know if worsening.    Malignant pericardial effusion: presented with tamponade, s/p pericardial drain 10/7-8. She met with Dr. Sanchez 11/8/2018. Echo at that time without any pericardial effusion. Repeat Echo 1/21 with EF 55-60%, no effusion.    GERD: Improved since discharge. Continue PPI. Discussed trying 40 mg daily and adding in H2 blocker PRN.    FEN: Decreased appetite is ongoing. Weight is stable. SHe has been off of cannabis. We discussed appetite stimulation as a benefit of cannabis, and alternatives, including Zyprexa. She is going to try cannabis again.    Health maintenance: Influenza vaccine given on 10/11.    Lima Griffiths PA-C

## 2019-02-08 NOTE — LETTER
2/8/2019      RE: Shan Marsh  [address]  [facility] [address]       Oncology/Hematology Visit Note  Feb 8, 2019    Reason for Visit: follow up of metastatic breast cancer, ER positive.     History of Present Illness: Shan Marsh is a 48 year old female without significant past medical history with recent diagnosis of metastatic breast cancer after presenting with left shoulder pain. Shoulder MRI showed soft tissue mass close to distal clavicle concerning for malignancy. She had PET/CT 5/9/17 showing hypermetabolic left axillary, supraclavicular, mediastinal and hilar nodes. She had biopsies of left axilla and hilar nodes showing metastatic breast cancer, strongly ER positive, moderately CO positive and HER-2 kaitlin non-amplified. She originally had [id number], ER/CO positive, HER-2 negative breast cancer diagnosed in spring of 2012 s/p bilateral mastectomies and Tamoxifen from 8/2012-2/2013 and discontinued due to side effects. With development of metastatic disease, she was recommended treatment on BIG10 clinical trial with palbociclib and tamoxifen. She started treatment on 5/24/17 and required dose reduction for neutropenia. CT CAP and bone scan 1/11/2018 showing progressive disease with new bony lesions in the ribs, T spine, and possibly liver, lungs. She started Zoladex and abemiciclib on 1/12/2018.      In March 2018, she sought treatment at [facility] in [place], under the care of Dr. Brooks, where she received:  - Vallovex vaccine (per FDA, is in phase I trials here in [place])  - Insulin potentiated therapy with conjugated chemotherapy (5% chemotherapy, unclear what chemotherapy)  - Gina's toxins, IV vitamin C, IV Ozone, hyperbaric chamber, vitamin B17 infusion, vitamin CK3 IV, and coffee enemas PRN  - Also taking oral niacin, Lugol, acidol, pancreatin, and thyroid desiccated liver, per their regimen  - She was continued on Letrozole and advised to conintue Zoladex.      She met  with Dr. Whitman and patient wished to continue alternative regimen. Abemiciclib was placed on hold. She was continued on Zoladex and letrozole. While in [place], she required thoracentesis for symptomatic pleural effusion. A repeat PET August 2018 showing widely progressive disease with multiple new bone mets involving the spine, ribs, pelvis, and sacrum; new lesions involving the left lateral thoracic/abdominal wall musculature and subcutaneous tissues, new metastases in the left adrenal gland, and recurrent large L pleural effusion. She was started on Xeloda 1500 mg BID on 9/5/18.     She was admitted 10/4-10/11/18 after syncopal event/unwitnessed fall at home. Found to have left pneumothorax and pericardial effusion with tamponade physiology. She had left chest tube placed in ED which then fell out on 10/10. Left pleurx placed on 10/11. She had pericardiocentesis on 10/7, drain removed 10/8.  She was able to resume Xeloda. She was noted to have progressive disease January 2019 and was started on IV Doxil. She was admitted 1/27-1/28/2019 for fever, substernal chest pain, c/w GERD.    Interval History:  Shan is here today for hospital follow-up.  She continues to have the epigastric discomfort and decreased appetite, but believes it to be improved since the hospital stay and starting PPI. She overall continues to have decreased appetite. She stopped using cannabis. She emotionally has had some recent events that have been difficult. She had been staying her friend's house and now is back at home to be closer to her kids. She is in the same house as her , whom she has left, but possibly getting back together now. She feels safe and supported at home at this time.  She thinks her breathing was worse around time of hospital discharge but improving now.  Mild nausea is stable to improved.  She is able to eat a more varied diet now.  She hasn't had any other concerns. No fevers, chills, chest pain, new sites of  "pain, bleeding, or swelling. Some mild dryness and peeling on her soles.      Current Outpatient Medications   Medication Sig Dispense Refill     Ascorbic Acid (VITAMIN C PO) Take 1,000 mg by mouth daily       calcium carbonate (OS-KRYSTEN 500 MG [place]. [place]) 1250 MG tablet Take 1 tablet by mouth daily       Digestive Enzymes (ENZYME DIGEST) CAPS Take 1 capsule by mouth daily       LORazepam (ATIVAN) 0.5 MG tablet Take 1 tablet (0.5 mg) by mouth every 4 hours as needed (Anxiety, Nausea/Vomiting or Sleep) 30 tablet 2     medical cannabis (Patient's own supply.  Not a prescription) (This is NOT a prescription, and does not certify that the patient has a qualifying medical condition for medical cannabis.  The purpose of this order is  to document that the patient reports taking medical cannabis.)       Omega 3-6-9 Fatty Acids (OMEGA 3-6-9 COMPLEX PO) Take 1 capsule by mouth daily       omeprazole (PRILOSEC) 20 MG DR capsule Take 1 capsule (20 mg) by mouth every morning (before breakfast) 30 capsule 0     prochlorperazine (COMPAZINE) 10 MG tablet Take 1 tablet (10 mg) by mouth every 6 hours as needed (Nausea/Vomiting) 30 tablet 2     prochlorperazine (COMPAZINE) 10 MG tablet Take 10 mg by mouth before 1st dose of oral chemotherapy, then prn thereafter       traMADol (ULTRAM) 50 MG tablet Take 1-2 tablets ( mg) by mouth every 6 hours as needed for moderate pain or headaches       VITAMIN D, CHOLECALCIFEROL, PO Take 1,000 Units by mouth daily          Physical Examination:  General: The patient is a pleasant female in no acute distress.  BP (!) 137/94 (BP Location: Right arm, Patient Position: Sitting, Cuff Size: Adult Regular)   Pulse 89   Temp 98.1  F (36.7  C) (Oral)   Resp 16   Ht 1.626 m (5' 4.02\")   Wt 49.4 kg (109 lb)   SpO2 95%   BMI 18.70 kg/m     Wt Readings from Last 10 Encounters:   02/08/19 49.4 kg (109 lb)   01/28/19 49.5 kg (109 lb 1.6 oz)   01/22/19 49.4 kg (108 lb 14.4 oz)   01/15/19 50.7 kg (111 lb " 11.2 oz)   12/18/18 52 kg (114 lb 11.2 oz)   11/26/18 51.9 kg (114 lb 8 oz)   11/16/18 51.4 kg (113 lb 6.4 oz)   11/08/18 52.8 kg (116 lb 6.4 oz)   10/29/18 51.3 kg (113 lb)   10/23/18 49 kg (108 lb)     HEENT: EOMI, PERRL. Sclerae are anicteric. Oral mucosa is pink and moist with no lesions or thrush.   Lymph: previously palpable left supraclavicular LNs are difficult to appreciate today. No other palpable cervical or supraclavicular LNs.   Heart: Regular rate and rhythm.   Lungs: Diminished BS in bilateral bases, L>R w/ associated dullness. Otherwise clear to auscultation bilaterally.   Abdomen: Bowel sounds present, soft, nontender with no palpable hepatosplenomegaly or masses.   Extremities: No lower extremity edema noted bilaterally.   Neuro: Cranial nerves II through XII are grossly intact.  Skin: No rashes, petechiae, or bruising noted on exposed skin.    Laboratory Data:  Results for NIKKIE HICKS (MRN [id number]) as of 2/8/2019 16:51   Ref. Range 2/8/2019 15:32   Sodium Latest Ref Range: 133 - 144 mmol/L 134   Potassium Latest Ref Range: 3.4 - 5.3 mmol/L 3.4   Chloride Latest Ref Range: 94 - 109 mmol/L 99   Carbon Dioxide Latest Ref Range: 20 - 32 mmol/L 26   Urea Nitrogen Latest Ref Range: 7 - 30 mg/dL 9   Creatinine Latest Ref Range: 0.52 - 1.04 mg/dL 0.60   GFR Estimate Latest Ref Range: >60 mL/min/1.73_m2 >90   GFR Estimate If Black Latest Ref Range: >60 mL/min/1.73_m2 >90   Calcium Latest Ref Range: 8.5 - 10.1 mg/dL 9.4   Anion Gap Latest Ref Range: 3 - 14 mmol/L 9   Albumin Latest Ref Range: 3.4 - 5.0 g/dL 3.1 (L)   Protein Total Latest Ref Range: 6.8 - 8.8 g/dL 8.8   Bilirubin Total Latest Ref Range: 0.2 - 1.3 mg/dL 0.3   Alkaline Phosphatase Latest Ref Range: 40 - 150 U/L 151 (H)   ALT Latest Ref Range: 0 - 50 U/L 20   AST Latest Ref Range: 0 - 45 U/L 45   Glucose Latest Ref Range: 70 - 99 mg/dL 104 (H)   WBC Latest Ref Range: 4.0 - 11.0 10e9/L 3.7 (L)   Hemoglobin Latest Ref Range: 11.7 -  15.7 g/dL 12.8   Hematocrit Latest Ref Range: 35.0 - 47.0 % 39.4   Platelet Count Latest Ref Range: 150 - 450 10e9/L 370       Assessment and Plan:  Shan Marsh is a 47-year-old female with history of a T1 N0, ER/VT positive, HER2-negative left breast cancer treated with bilateral mastectomies in 2012, now with metastatic disease (bones, pleural effusions, lungs, pericardial effusion, liver, left adrenal/retroperitoneal mets), ER/VT positive, HER2 negative.  She progressed on palbociclib and tamoxifen, alternative therapies in [place], Xeloda, now on Doxil.    Metastatic breast cancer: Started Doxil on 1/22/2019. Overall doing well. Return as scheduled for cycle 2.  - Continues ovarian suppression with monthly Zoladex, due at 2/19 visit    Bone mets: Continue monthly Zometa every 3 months. Due April.     Bilateral pleural effusions. S/p right thoracentesis on 10/9 and S/p left pleurx placement 10/11. PleurX was removed 11/12 due to minimal drainage. Effusions stable on recent XR. Breathing stable now though worsened after discharge. Asked that she monitor her symptoms and let us know if worsening.    Malignant pericardial effusion: presented with tamponade, s/p pericardial drain 10/7-8. She met with Dr. Sanchez 11/8/2018. Echo at that time without any pericardial effusion. Repeat Echo 1/21 with EF 55-60%, no effusion.    GERD: Improved since discharge. Continue PPI. Discussed trying 40 mg daily and adding in H2 blocker PRN.    FEN: Decreased appetite is ongoing. Weight is stable. SHe has been off of cannabis. We discussed appetite stimulation as a benefit of cannabis, and alternatives, including Zyprexa. She is going to try cannabis again.    Health maintenance: Influenza vaccine given on 10/11.      Lima Griffiths PA-C

## 2019-02-08 NOTE — NURSING NOTE
"Oncology Rooming Note    February 8, 2019 3:43 PM   Shan Marsh is a 48 year old female who presents for:    Chief Complaint   Patient presents with     Blood Draw     pt here for blood draw and provider visit      Oncology Clinic Visit     Return; Breast CA     Initial Vitals: BP (!) 137/94 (BP Location: Right arm, Patient Position: Sitting, Cuff Size: Adult Regular)   Pulse 89   Temp 98.1  F (36.7  C) (Oral)   Resp 16   Ht 1.626 m (5' 4.02\")   Wt 49.4 kg (109 lb)   SpO2 95%   BMI 18.70 kg/m   Estimated body mass index is 18.7 kg/m  as calculated from the following:    Height as of this encounter: 1.626 m (5' 4.02\").    Weight as of this encounter: 49.4 kg (109 lb). Body surface area is 1.49 meters squared.  Moderate Pain (5) Comment: Data Unavailable   No LMP recorded. Patient is not currently having periods (Reason: Chemotherapy).  Allergies reviewed: Yes  Medications reviewed: Yes    Medications: MEDICATION REFILLS NEEDED TODAY. Provider was notified.  Pharmacy name entered into [facility]:    [facility] - [place], [place] - [address]  [facility] - [place], [place] - [address]    Clinical concerns: Patient requests refills of ondansetron and fluconazoletoday. Patient also has some concerns about her breathing. toEmma was notified.    7 minutes for nursing intake (face to face time)     Helen Richardson MA              "

## 2019-02-19 NOTE — PROGRESS NOTES
Infusion Nursing Note:  Shan Marsh presents today for C2 Doxil.    Patient seen by provider today: Yes: Lima CASTELLANO   present during visit today: Not Applicable.    Note: Patient feels well. No complaints made. Appointment is not made yet by the time patient left the facility. Instructed patient if unable to see next few days to call . Verbalized understanding.     Doxil run at the following rates:    999 ml/hour x 14mls  10ml/hour X5 minutes  20ml/hour X5 minutes  50ml/hour X5 minutes  100ml/hour X5 minutes  200ml/hour X5 minites  305ml/hour for the remainder of infusion    Intravenous Access:  Peripheral IV placed.    Treatment Conditions:  Lab Results   Component Value Date    HGB 13.8 02/19/2019     Lab Results   Component Value Date    WBC 4.7 02/19/2019      Lab Results   Component Value Date    ANEU 2.8 02/19/2019     Lab Results   Component Value Date     02/19/2019      Lab Results   Component Value Date     02/19/2019                   Lab Results   Component Value Date    POTASSIUM 4.2 02/19/2019           Lab Results   Component Value Date    MAG 2.4 01/22/2019            Lab Results   Component Value Date    CR 0.61 02/19/2019                   Lab Results   Component Value Date    KRYSTEN 9.4 02/19/2019                Lab Results   Component Value Date    BILITOTAL 0.6 02/19/2019           Lab Results   Component Value Date    ALBUMIN 3.4 02/19/2019                    Lab Results   Component Value Date    ALT 22 02/19/2019           Lab Results   Component Value Date    AST Canceled, Test credited 02/19/2019       Results reviewed, labs MET treatment parameters, ok to proceed with treatment.  ECHO/MUGA completed 1/21/19  EF 55-60%.      Post Infusion Assessment:  Patient tolerated infusion without incident.  Patient tolerated Zoladex injection on left lower abdomen without incident.  Blood return noted pre and post infusion.  Site patent and intact, free  from redness, edema or discomfort.  No evidence of extravasations.  Access discontinued per protocol.    Discharge Plan:   Prescription refills given for Zofran and Fluconazole.  Discharge instructions reviewed with: Patient.  Patient and/or family verbalized understanding of discharge instructions and all questions answered.  AVS to patient via [employer].  Patient will call to return for next appointment.   Patient discharged in stable condition accompanied by: self.  Departure Mode: Ambulatory.    FABRICE LAINEZ RN

## 2019-02-19 NOTE — PATIENT INSTRUCTIONS
Contact Numbers  [facility]: [phone]    After Hours:  [phone]  Triage: [phone]    Please call the [facility] Triage line if you experience a temperature greater than or equal to 100.5, shaking chills, have uncontrolled nausea, vomiting and/or diarrhea, dizziness, shortness of breath, chest pain, bleeding, unexplained bruising, or if you have any other new/concerning symptoms, questions or concerns.     If it is after hours, weekends, or holidays, please call the main hospital  at  [phone] and ask to speak to the Oncology doctor on call.     If you are having any concerning symptoms or wish to speak to a provider before your next infusion visit, please call your care coordinator or triage to notify them so we can adequately serve you.     If you need a refill on a narcotic prescription or other medication, please call triage before your infusion appointment.

## 2019-02-19 NOTE — NURSING NOTE
Chief Complaint   Patient presents with     Blood Draw     [facility] labs drawn IV placed, vitals taken, checked in for provider visit.     Shaun De Santiago CMA ([facility])

## 2019-02-19 NOTE — PROGRESS NOTES
Oncology/Hematology Visit Note  Feb 19, 2019    Reason for Visit: follow up of metastatic breast cancer, ER positive.     History of Present Illness: Shan Marsh is a 48 year old female without significant past medical history with recent diagnosis of metastatic breast cancer after presenting with left shoulder pain. Shoulder MRI showed soft tissue mass close to distal clavicle concerning for malignancy. She had PET/CT 5/9/17 showing hypermetabolic left axillary, supraclavicular, mediastinal and hilar nodes. She had biopsies of left axilla and hilar nodes showing metastatic breast cancer, strongly ER positive, moderately MS positive and HER-2 kaitlin non-amplified. She originally had [id number], ER/MS positive, HER-2 negative breast cancer diagnosed in spring of 2012 s/p bilateral mastectomies and Tamoxifen from 8/2012-2/2013 and discontinued due to side effects. With development of metastatic disease, she was recommended treatment on BIG10 clinical trial with palbociclib and tamoxifen. She started treatment on 5/24/17 and required dose reduction for neutropenia. CT CAP and bone scan 1/11/2018 showing progressive disease with new bony lesions in the ribs, T spine, and possibly liver, lungs. She started Zoladex and abemiciclib on 1/12/2018.      In March 2018, she sought treatment at [facility] in [place], under the care of Dr. Brooks, where she received:  - Vallovex vaccine (per FDA, is in phase I trials here in [place])  - Insulin potentiated therapy with conjugated chemotherapy (5% chemotherapy, unclear what chemotherapy)  - Gina's toxins, IV vitamin C, IV Ozone, hyperbaric chamber, vitamin B17 infusion, vitamin CK3 IV, and coffee enemas PRN  - Also taking oral niacin, Lugol, acidol, pancreatin, and thyroid desiccated liver, per their regimen  - She was continued on Letrozole and advised to conintue Zoladex.      She met with Dr. Whitman and patient wished to continue alternative regimen. Abemiciclib was placed  on hold. She was continued on Zoladex and letrozole. While in [place], she required thoracentesis for symptomatic pleural effusion. A repeat PET August 2018 showing widely progressive disease with multiple new bone mets involving the spine, ribs, pelvis, and sacrum; new lesions involving the left lateral thoracic/abdominal wall musculature and subcutaneous tissues, new metastases in the left adrenal gland, and recurrent large L pleural effusion. She was started on Xeloda 1500 mg BID on 9/5/18.     She was admitted 10/4-10/11/18 after syncopal event/unwitnessed fall at home. Found to have left pneumothorax and pericardial effusion with tamponade physiology. She had left chest tube placed in ED which then fell out on 10/10. Left pleurx placed on 10/11. She had pericardiocentesis on 10/7, drain removed 10/8.  She was able to resume Xeloda. She was noted to have progressive disease January 2019 and was started on IV Doxil. She was admitted 1/27-1/28/2019 for fever, substernal chest pain, c/w GERD.    Interval History:  Shan is here today for cycle 2 Doxil. Overall, compared to when I saw her 2 weeks ago, she feels overall improved. Her breathing feels a lot better. She reports she still has nausea that waxes and wanes, unpredictable, but usually follows eating. She finds certain foods just don't agree with her. She thinks this is also improved compared to two weeks ago, able to eat and drink a greater variety of food and drink. She is upset by the weight loss and was hopeful it would have improved with the improvement in her symptoms. SHe continues on cannabis and using anti-emetics PRN. She hasn't had any headaches, visual changes. She is on the PPI. She hasn't had other concerns, no fevers, chills, cough, chest pain, abdominal pain, bowel changes, rashes, bleeding, swelling. No new sites of pain.    . No fevers, chills, chest pain, new sites of pain, bleeding, or swelling. Some mild dryness and peeling on her  "soles.      Current Outpatient Medications   Medication Sig Dispense Refill     Ascorbic Acid (VITAMIN C PO) Take 1,000 mg by mouth daily       calcium carbonate (OS-KRYSTEN 500 MG [place]. CA) 1250 MG tablet Take 1 tablet by mouth daily       Digestive Enzymes (ENZYME DIGEST) CAPS Take 1 capsule by mouth daily       LORazepam (ATIVAN) 0.5 MG tablet Take 1 tablet (0.5 mg) by mouth every 4 hours as needed (Anxiety, Nausea/Vomiting or Sleep) (Patient not taking: Reported on 2/8/2019) 30 tablet 2     medical cannabis (Patient's own supply.  Not a prescription) (This is NOT a prescription, and does not certify that the patient has a qualifying medical condition for medical cannabis.  The purpose of this order is  to document that the patient reports taking medical cannabis.)       Omega 3-6-9 Fatty Acids (OMEGA 3-6-9 COMPLEX PO) Take 1 capsule by mouth daily       omeprazole (PRILOSEC) 20 MG DR capsule Take 2 capsules (40 mg) by mouth daily 60 capsule 0     ondansetron (ZOFRAN) 8 MG tablet Take 1 tablet (8 mg) by mouth every 8 hours as needed for nausea 30 tablet 3     prochlorperazine (COMPAZINE) 10 MG tablet Take 1 tablet (10 mg) by mouth every 6 hours as needed (Nausea/Vomiting) (Patient not taking: Reported on 2/8/2019) 30 tablet 2     prochlorperazine (COMPAZINE) 10 MG tablet Take 10 mg by mouth before 1st dose of oral chemotherapy, then prn thereafter       traMADol (ULTRAM) 50 MG tablet Take 1-2 tablets ( mg) by mouth every 6 hours as needed for moderate pain or headaches       VITAMIN D, CHOLECALCIFEROL, PO Take 1,000 Units by mouth daily          Physical Examination:  General: The patient is a pleasant female in no acute distress.  /81 (BP Location: Right arm, Patient Position: Sitting, Cuff Size: Adult Small)   Pulse 102   Temp 97.6  F (36.4  C) (Oral)   Resp 16   Ht 1.626 m (5' 4\")   Wt 47.8 kg (105 lb 6 oz)   SpO2 97%   BMI 18.09 kg/m    Wt Readings from Last 10 Encounters:   02/19/19 47.8 kg " (105 lb 6 oz)   02/08/19 49.4 kg (109 lb)   01/28/19 49.5 kg (109 lb 1.6 oz)   01/22/19 49.4 kg (108 lb 14.4 oz)   01/15/19 50.7 kg (111 lb 11.2 oz)   12/18/18 52 kg (114 lb 11.2 oz)   11/26/18 51.9 kg (114 lb 8 oz)   11/16/18 51.4 kg (113 lb 6.4 oz)   11/08/18 52.8 kg (116 lb 6.4 oz)   10/29/18 51.3 kg (113 lb)     HEENT: EOMI, PERRL. Sclerae are anicteric. Oral mucosa is pink and moist with no lesions or thrush.   Lymph: previously palpable left supraclavicular LNs are difficult to appreciate today. No other palpable cervical or supraclavicular LNs.   Heart: Regular rate and rhythm.   Lungs: Diminished BS in bilateral bases, L>R w/ associated dullness. Otherwise clear to auscultation bilaterally.   Abdomen: Bowel sounds present, soft, nontender with no palpable hepatosplenomegaly or masses.   Extremities: No lower extremity edema noted bilaterally.   Neuro: Cranial nerves II through XII are grossly intact.  Skin: No rashes, petechiae, or bruising noted on exposed skin.    Laboratory Data:  Results for NIKKIE HICKS (MRN [id number]) as of 2/19/2019 09:43   Ref. Range 2/19/2019 08:50   Sodium Latest Ref Range: 133 - 144 mmol/L 135   Potassium Latest Ref Range: 3.4 - 5.3 mmol/L 4.2   Chloride Latest Ref Range: 94 - 109 mmol/L 101   Carbon Dioxide Latest Ref Range: 20 - 32 mmol/L 26   Urea Nitrogen Latest Ref Range: 7 - 30 mg/dL 12   Creatinine Latest Ref Range: 0.52 - 1.04 mg/dL 0.61   GFR Estimate Latest Ref Range: >60 mL/min/1.73_m2 >90   GFR Estimate If Black Latest Ref Range: >60 mL/min/1.73_m2 >90   Calcium Latest Ref Range: 8.5 - 10.1 mg/dL 9.4   Anion Gap Latest Ref Range: 3 - 14 mmol/L 7   Albumin Latest Ref Range: 3.4 - 5.0 g/dL 3.4   Protein Total Latest Ref Range: 6.8 - 8.8 g/dL 9.5 (H)   Bilirubin Total Latest Ref Range: 0.2 - 1.3 mg/dL 0.6   Alkaline Phosphatase Latest Ref Range: 40 - 150 U/L 181 (H)   ALT Latest Ref Range: 0 - 50 U/L 22   AST Latest Ref Range: 0 - 45 U/L Canceled, Test cr...    Glucose Latest Ref Range: 70 - 99 mg/dL 94   WBC Latest Ref Range: 4.0 - 11.0 10e9/L 4.7   Hemoglobin Latest Ref Range: 11.7 - 15.7 g/dL 13.8   Hematocrit Latest Ref Range: 35.0 - 47.0 % 42.6   Platelet Count Latest Ref Range: 150 - 450 10e9/L 352     Assessment and Plan:  Shan Marsh is a 47-year-old female with history of a T1 N0, ER/MD positive, HER2-negative left breast cancer treated with bilateral mastectomies in 2012, now with metastatic disease (bones, pleural effusions, lungs, pericardial effusion, liver, left adrenal/retroperitoneal mets), ER/MD positive, HER2 negative.  She progressed on palbociclib and tamoxifen, alternative therapies in [place], Xeloda, now on Doxil.    Metastatic breast cancer: Started Doxil on 1/22/2019. Overall doing well. Here for cycle 2 Doxil today. She is overall tolerating well. We will continue with plan to restage after 3 cycles, unless tumor markers rising and clinically not doing well, then may consider restaging after this cycle.   - Continues ovarian suppression with monthly Zoladex, due at 2/19 visit    Bone mets: Continue monthly Zometa every 3 months. Due April.     Bilateral pleural effusions. S/p right thoracentesis on 10/9 and S/p left pleurx placement 10/11. PleurX was removed 11/12 due to minimal drainage. Effusions stable on recent XR. Breathing reportedly improved, exam stable.    Malignant pericardial effusion: presented with tamponade, s/p pericardial drain 10/7-8. She met with Dr. Sanchez 11/8/2018. Echo at that time without any pericardial effusion. Repeat Echo 1/21 with EF 55-60%, no effusion.    GERD/nausea/stomach pressure: Improved since discharge. Continue PPI, improved with increase to 40 mg daily. Does seem to be related to eating. If fails to continue improving, may consider EGD vs brain MRI. For now, continue PPI, anti-emetics PRN.    FEN: Decreased appetite is ongoing. Weight trending down. Discussed small frequent meals, calorie dense foods. We  discussed appetite stimulation as a benefit of cannabis, and alternatives, including Zyprexa. She has started cannabis up again and wants to continue that for now. We will monitor in 2 weeks.    Health maintenance: Influenza vaccine given on 10/11.      I will see Shan in 2 weeks for follow-up of weight, nausea symptoms.    Lmia Griffiths PA-C      Addendum:  Patient also has yeast infection. Rx fluconazole 150 mg q72h x 3 doses given she is on chemo  ect

## 2019-02-19 NOTE — NURSING NOTE
"Oncology Rooming Note    February 19, 2019 9:14 AM   Shan Marsh is a 48 year old female who presents for:    Chief Complaint   Patient presents with     Blood Draw     [facility] labs drawn IV placed, vitals taken, checked in for provider visit.     Oncology Clinic Visit     Return visit related to Breast Cancer     Initial Vitals: /81 (BP Location: Right arm, Patient Position: Sitting, Cuff Size: Adult Small)   Pulse 102   Temp 97.6  F (36.4  C) (Oral)   Resp 16   Ht 1.626 m (5' 4\")   Wt 47.8 kg (105 lb 6 oz)   SpO2 97%   BMI 18.09 kg/m   Estimated body mass index is 18.09 kg/m  as calculated from the following:    Height as of this encounter: 1.626 m (5' 4\").    Weight as of this encounter: 47.8 kg (105 lb 6 oz). Body surface area is 1.47 meters squared.  No Pain (0) Comment: Data Unavailable   No LMP recorded. Patient is not currently having periods (Reason: Chemotherapy).  Allergies reviewed: Yes  Medications reviewed: Yes    Medications: Medication refills not needed today.  Pharmacy name entered into [facility]:    [facility] - [place], [place] - [address]  [facility] - [place], [place] - [address]    Clinical concerns: No new concerns. Provider was notified.    10 minutes for nursing intake (face to face time)     Ana Solano LPN            "

## 2019-02-27 NOTE — TELEPHONE ENCOUNTER
Nutrition Services:   Called patient today per oncology distress screening - concern with her weight and ability to eat.    She reports that her intake has improved. She has been eating and drinking better.    She expresses her desire to take in more fruits and vegetables.    She is interested in shakes and smoothies.      Interventions:  RD sent Shan recipes in the mail for high calorie/high protein shakes smoothies.      She denies further nutrition questions and concerns.  She will reach out as needed.     Rossana Beavers RD, LD  [facility]  [phone]  Pager: [phone]       today

## 2019-03-08 NOTE — TELEPHONE ENCOUNTER
Pt sent mychart message with attached picture of her underarm showing redness/irritation and a brownish area. Has had for a couple of days. Is not painful.   Pt wondering if needs an antibiotic.   Does not appear infected or fungal. Apply aquaphor to area. Avoid shaving, lotions, deodorant and monitor. If worsens,to call back to triage line.   Sent message to Sharifa Celeste, RNCC    Spoke with Lima Griffiths PA-C. She adds that if noticing enlarged lymph nodes or pain, or sx worsen, to be seen in clinic.     Left detailed message for Shan with the above info.

## 2019-03-12 NOTE — TELEPHONE ENCOUNTER
Patient called clinic triage line to state that her underarm area that had been having redness/irritation has increased in size of reddened area. Patient wanted to send another picture because it is cold outside and she doesn't want to go out. Writer advised that next steps , based on note from Janny Martinez RN, on 3/8 stated that if symptoms were to get worse, she would need to be seen in clinic. Writer placed hold on Lima's 4:10pm appointment slot for Thursday, March 14. Message sent to Lima to ask if labs or imaging needed prior to clinic visit with her.

## 2019-03-13 NOTE — NURSING NOTE
Chief Complaint   Patient presents with     Blood Draw     Unable to obtain labs without vascular access. 3 unsuccessful attempts by RN. Vs taken and pt checked in for next appt. Notified Nora CASTELLANO of issue.     Mahogany Brandon RN

## 2019-03-13 NOTE — NURSING NOTE
"Oncology Rooming Note    March 13, 2019 4:31 PM   Shan Marsh is a 48 year old female who presents for:    Chief Complaint   Patient presents with     Blood Draw     Unable to obtain labs without vascular access. 3 unsuccessful attempts by RN. Vs taken and pt checked in for next appt. Notified Nora CASTELLANO of issue.     Oncology Clinic Visit     Breast Ca , Labs      Initial Vitals: /81 (BP Location: Right arm, Patient Position: Sitting, Cuff Size: Adult Small)   Pulse 93   Temp 98.2  F (36.8  C) (Oral)   Resp 16   Wt 49.2 kg (108 lb 6.4 oz)   SpO2 95%   BMI 18.61 kg/m   Estimated body mass index is 18.61 kg/m  as calculated from the following:    Height as of 2/19/19: 1.626 m (5' 4\").    Weight as of this encounter: 49.2 kg (108 lb 6.4 oz). Body surface area is 1.49 meters squared.  Mild Pain (3) Comment: Data Unavailable   No LMP recorded. Patient is not currently having periods (Reason: Chemotherapy).  Allergies reviewed: Yes  Medications reviewed: Yes    Medications: Medication refills not needed today.  Pharmacy name entered into [facility]:    [facility] - [place], [place] - [address]  [facility] - [place], [place] - [address]    Clinical concerns: alvin Murphy was notified.      Julia Mayfield MA              "

## 2019-03-13 NOTE — LETTER
3/13/2019      RE: Shan Marsh  [address]  [facility] [address]       Oncology/Hematology Visit Note  Mar 13, 2019    Reason for Visit: follow up of metastatic breast cancer, ER positive.     History of Present Illness: Shan Marsh is a 48 year old female without significant past medical history with recent diagnosis of metastatic breast cancer after presenting with left shoulder pain. Shoulder MRI showed soft tissue mass close to distal clavicle concerning for malignancy. She had PET/CT 5/9/17 showing hypermetabolic left axillary, supraclavicular, mediastinal and hilar nodes. She had biopsies of left axilla and hilar nodes showing metastatic breast cancer, strongly ER positive, moderately NH positive and HER-2 kaitlin non-amplified. She originally had [id number], ER/NH positive, HER-2 negative breast cancer diagnosed in spring of 2012 s/p bilateral mastectomies and Tamoxifen from 8/2012-2/2013 and discontinued due to side effects. With development of metastatic disease, she was recommended treatment on BIG10 clinical trial with palbociclib and tamoxifen. She started treatment on 5/24/17 and required dose reduction for neutropenia. CT CAP and bone scan 1/11/2018 showing progressive disease with new bony lesions in the ribs, T spine, and possibly liver, lungs. She started Zoladex and abemiciclib on 1/12/2018.      In March 2018, she sought treatment at [facility] in [place], under the care of Dr. Brooks, where she received:  - Vallovex vaccine (per FDA, is in phase I trials here in [place])  - Insulin potentiated therapy with conjugated chemotherapy (5% chemotherapy, unclear what chemotherapy)  - Gina's toxins, IV vitamin C, IV Ozone, hyperbaric chamber, vitamin B17 infusion, vitamin CK3 IV, and coffee enemas PRN  - Also taking oral niacin, Lugol, acidol, pancreatin, and thyroid desiccated liver, per their regimen  - She was continued on Letrozole and advised to conintue Zoladex.      She met  with Dr. Whitman and patient wished to continue alternative regimen. Abemiciclib was placed on hold. She was continued on Zoladex and letrozole. While in [place], she required thoracentesis for symptomatic pleural effusion. A repeat PET August 2018 showing widely progressive disease with multiple new bone mets involving the spine, ribs, pelvis, and sacrum; new lesions involving the left lateral thoracic/abdominal wall musculature and subcutaneous tissues, new metastases in the left adrenal gland, and recurrent large L pleural effusion. She was started on Xeloda 1500 mg BID on 9/5/18.     She was admitted 10/4-10/11/18 after syncopal event/unwitnessed fall at home. Found to have left pneumothorax and pericardial effusion with tamponade physiology. She had left chest tube placed in ED which then fell out on 10/10. Left pleurx placed on 10/11. She had pericardiocentesis on 10/7, drain removed 10/8.  She was able to resume Xeloda. She was noted to have progressive disease January 2019 and was started on IV Doxil. She was admitted 1/27-1/28/2019 for fever, substernal chest pain, c/w GERD.    She presents today as an add on for new skin rash in b/l armpits.     Interval History:  Shan presents today with her . She noticed redness under her left armpit last Friday (3/8). She states that she had just shaved for the first time in a long time. When the redness started, she thought it was likely bacterial and started using a strong soap to help clean her armpits. The redness has been slowly growing over the past week and now in a semi [place] surrounding her armpit hair. The area is pruritic. She denies any pain, discharged, open lesions, warmth to the area or f/c. She does have some red lesions under her right armpit as well. She has tried Aloe, oatmeal and Aquaphor.     Current Outpatient Medications   Medication Sig Dispense Refill     calcium carbonate (OS-KRYSTEN 500 MG [place]. [place]) 1250 MG tablet Take 1 tablet by mouth  daily       Digestive Enzymes (ENZYME DIGEST) CAPS Take 1 capsule by mouth daily       medical cannabis (Patient's own supply.  Not a prescription) (This is NOT a prescription, and does not certify that the patient has a qualifying medical condition for medical cannabis.  The purpose of this order is  to document that the patient reports taking medical cannabis.)       Omega 3-6-9 Fatty Acids (OMEGA 3-6-9 COMPLEX PO) Take 1 capsule by mouth daily       omeprazole (PRILOSEC) 20 MG DR capsule Take 2 capsules (40 mg) by mouth daily 60 capsule 0     ondansetron (ZOFRAN) 8 MG tablet Take 1 tablet (8 mg) by mouth every 8 hours as needed for nausea 30 tablet 3     Ascorbic Acid (VITAMIN C PO) Take 1,000 mg by mouth daily       fluconazole (DIFLUCAN) 150 MG tablet Take one tablet (150 mg) every 72 hours for 3 doses 3 tablet 0     LORazepam (ATIVAN) 0.5 MG tablet Take 1 tablet (0.5 mg) by mouth every 4 hours as needed (Anxiety, Nausea/Vomiting or Sleep) (Patient not taking: Reported on 3/13/2019) 30 tablet 2     prochlorperazine (COMPAZINE) 10 MG tablet Take 1 tablet (10 mg) by mouth every 6 hours as needed (Nausea/Vomiting) (Patient not taking: Reported on 2/8/2019) 30 tablet 2     prochlorperazine (COMPAZINE) 10 MG tablet Take 10 mg by mouth before 1st dose of oral chemotherapy, then prn thereafter       traMADol (ULTRAM) 50 MG tablet Take 1-2 tablets ( mg) by mouth every 6 hours as needed for moderate pain or headaches       VITAMIN D, CHOLECALCIFEROL, PO Take 1,000 Units by mouth daily          Physical Examination:  General: The patient is a pleasant female in no acute distress.  /81 (BP Location: Right arm, Patient Position: Sitting, Cuff Size: Adult Small)   Pulse 93   Temp 98.2  F (36.8  C) (Oral)   Resp 16   Wt 49.2 kg (108 lb 6.4 oz)   SpO2 95%   BMI 18.61 kg/m     Wt Readings from Last 10 Encounters:   03/13/19 49.2 kg (108 lb 6.4 oz)   02/19/19 47.8 kg (105 lb 6 oz)   02/08/19 49.4 kg (109 lb)    01/28/19 49.5 kg (109 lb 1.6 oz)   01/22/19 49.4 kg (108 lb 14.4 oz)   01/15/19 50.7 kg (111 lb 11.2 oz)   12/18/18 52 kg (114 lb 11.2 oz)   11/26/18 51.9 kg (114 lb 8 oz)   11/16/18 51.4 kg (113 lb 6.4 oz)   11/08/18 52.8 kg (116 lb 6.4 oz)     HEENT: EOMI, Sclerae are anicteric.    Lungs: Breathing not labored and not using accessory muscles  Neuro: Cranial nerves II through XII are grossly intact.  Skin:  erythema in a semicircular pattern under left armpit. Has several satellite lesions. No warmth or tenderness to palpation. No discharge. Has erythematous satellites on right armpit as well though     Laboratory Data:  No new labs    Assessment and Plan:  Shan Marsh is a 47-year-old female with history of a T1 N0, ER/[place] positive, HER2-negative left breast cancer treated with bilateral mastectomies in 2012, now with metastatic disease (bones, pleural effusions, lungs, pericardial effusion, liver, left adrenal/retroperitoneal mets), ER/[place] positive, HER2 negative.  She progressed on palbociclib and tamoxifen, alternative therapies in [place], Xeloda, now on Doxil.    Rash  -developed about a week ago under left armpit. Erythematous and prutitic and has slowly grown in size, now forming a semicircle. No warmth, discharge, open lesions or tenderness. Numerous small lesions under right armpit as well. Appears to be fungal. Will have her start Lotrimin (clotrimazole) OTC BID. If it does not improve by Friday, she should call triage and I will give her Kenalog     Metastatic breast cancer:   -Started Doxil on 1/22/2019. Overall doing well.   -Rash could be also 2/2 to Doxil. Recommended using icepacks under her arms when receiving her infusion next week to decrease incidence of rash  - Continues ovarian suppression with monthly Zoladex, will be due next week and give with Doxil  -Will f/u on 3/21 with Lima prior to C3 of Doxil    Nora Murphy PA-C  [facility] [place] [address] [place]  [address]  [phone]

## 2019-03-13 NOTE — PROGRESS NOTES
Oncology/Hematology Visit Note  Mar 13, 2019    Reason for Visit: follow up of metastatic breast cancer, ER positive.     History of Present Illness: Shan Marsh is a 48 year old female without significant past medical history with recent diagnosis of metastatic breast cancer after presenting with left shoulder pain. Shoulder MRI showed soft tissue mass close to distal clavicle concerning for malignancy. She had PET/CT 5/9/17 showing hypermetabolic left axillary, supraclavicular, mediastinal and hilar nodes. She had biopsies of left axilla and hilar nodes showing metastatic breast cancer, strongly ER positive, moderately AK positive and HER-2 kaitlin non-amplified. She originally had [id number], ER/AK positive, HER-2 negative breast cancer diagnosed in spring of 2012 s/p bilateral mastectomies and Tamoxifen from 8/2012-2/2013 and discontinued due to side effects. With development of metastatic disease, she was recommended treatment on BIG10 clinical trial with palbociclib and tamoxifen. She started treatment on 5/24/17 and required dose reduction for neutropenia. CT CAP and bone scan 1/11/2018 showing progressive disease with new bony lesions in the ribs, T spine, and possibly liver, lungs. She started Zoladex and abemiciclib on 1/12/2018.      In March 2018, she sought treatment at [facility] in [place], under the care of Dr. Brooks, where she received:  - Vallovex vaccine (per FDA, is in phase I trials here in [place])  - Insulin potentiated therapy with conjugated chemotherapy (5% chemotherapy, unclear what chemotherapy)  - Gina's toxins, IV vitamin C, IV Ozone, hyperbaric chamber, vitamin B17 infusion, vitamin CK3 IV, and coffee enemas PRN  - Also taking oral niacin, Lugol, acidol, pancreatin, and thyroid desiccated liver, per their regimen  - She was continued on Letrozole and advised to conintue Zoladex.      She met with Dr. Whitman and patient wished to continue alternative regimen. Abemiciclib was placed  on hold. She was continued on Zoladex and letrozole. While in [place], she required thoracentesis for symptomatic pleural effusion. A repeat PET August 2018 showing widely progressive disease with multiple new bone mets involving the spine, ribs, pelvis, and sacrum; new lesions involving the left lateral thoracic/abdominal wall musculature and subcutaneous tissues, new metastases in the left adrenal gland, and recurrent large L pleural effusion. She was started on Xeloda 1500 mg BID on 9/5/18.     She was admitted 10/4-10/11/18 after syncopal event/unwitnessed fall at home. Found to have left pneumothorax and pericardial effusion with tamponade physiology. She had left chest tube placed in ED which then fell out on 10/10. Left pleurx placed on 10/11. She had pericardiocentesis on 10/7, drain removed 10/8.  She was able to resume Xeloda. She was noted to have progressive disease January 2019 and was started on IV Doxil. She was admitted 1/27-1/28/2019 for fever, substernal chest pain, c/w GERD.    She presents today as an add on for new skin rash in b/l armpits.     Interval History:  Shan presents today with her . She noticed redness under her left armpit last Friday (3/8). She states that she had just shaved for the first time in a long time. When the redness started, she thought it was likely bacterial and started using a strong soap to help clean her armpits. The redness has been slowly growing over the past week and now in a semi [place] surrounding her armpit hair. The area is pruritic. She denies any pain, discharged, open lesions, warmth to the area or f/c. She does have some red lesions under her right armpit as well. She has tried Aloe, oatmeal and Aquaphor.     Current Outpatient Medications   Medication Sig Dispense Refill     calcium carbonate (OS-KRYSTEN 500 MG [place]. CA) 1250 MG tablet Take 1 tablet by mouth daily       Digestive Enzymes (ENZYME DIGEST) CAPS Take 1 capsule by mouth daily       medical  cannabis (Patient's own supply.  Not a prescription) (This is NOT a prescription, and does not certify that the patient has a qualifying medical condition for medical cannabis.  The purpose of this order is  to document that the patient reports taking medical cannabis.)       Omega 3-6-9 Fatty Acids (OMEGA 3-6-9 COMPLEX PO) Take 1 capsule by mouth daily       omeprazole (PRILOSEC) 20 MG DR capsule Take 2 capsules (40 mg) by mouth daily 60 capsule 0     ondansetron (ZOFRAN) 8 MG tablet Take 1 tablet (8 mg) by mouth every 8 hours as needed for nausea 30 tablet 3     Ascorbic Acid (VITAMIN C PO) Take 1,000 mg by mouth daily       fluconazole (DIFLUCAN) 150 MG tablet Take one tablet (150 mg) every 72 hours for 3 doses 3 tablet 0     LORazepam (ATIVAN) 0.5 MG tablet Take 1 tablet (0.5 mg) by mouth every 4 hours as needed (Anxiety, Nausea/Vomiting or Sleep) (Patient not taking: Reported on 3/13/2019) 30 tablet 2     prochlorperazine (COMPAZINE) 10 MG tablet Take 1 tablet (10 mg) by mouth every 6 hours as needed (Nausea/Vomiting) (Patient not taking: Reported on 2/8/2019) 30 tablet 2     prochlorperazine (COMPAZINE) 10 MG tablet Take 10 mg by mouth before 1st dose of oral chemotherapy, then prn thereafter       traMADol (ULTRAM) 50 MG tablet Take 1-2 tablets ( mg) by mouth every 6 hours as needed for moderate pain or headaches       VITAMIN D, CHOLECALCIFEROL, PO Take 1,000 Units by mouth daily          Physical Examination:  General: The patient is a pleasant female in no acute distress.  /81 (BP Location: Right arm, Patient Position: Sitting, Cuff Size: Adult Small)   Pulse 93   Temp 98.2  F (36.8  C) (Oral)   Resp 16   Wt 49.2 kg (108 lb 6.4 oz)   SpO2 95%   BMI 18.61 kg/m    Wt Readings from Last 10 Encounters:   03/13/19 49.2 kg (108 lb 6.4 oz)   02/19/19 47.8 kg (105 lb 6 oz)   02/08/19 49.4 kg (109 lb)   01/28/19 49.5 kg (109 lb 1.6 oz)   01/22/19 49.4 kg (108 lb 14.4 oz)   01/15/19 50.7 kg (111 lb  11.2 oz)   12/18/18 52 kg (114 lb 11.2 oz)   11/26/18 51.9 kg (114 lb 8 oz)   11/16/18 51.4 kg (113 lb 6.4 oz)   11/08/18 52.8 kg (116 lb 6.4 oz)     HEENT: EOMI, Sclerae are anicteric.    Lungs: Breathing not labored and not using accessory muscles  Neuro: Cranial nerves II through XII are grossly intact.  Skin:  erythema in a semicircular pattern under left armpit. Has several satellite lesions. No warmth or tenderness to palpation. No discharge. Has erythematous satellites on right armpit as well though     Laboratory Data:  No new labs    Assessment and Plan:  Shan Marsh is a 47-year-old female with history of a T1 N0, ER/MA positive, HER2-negative left breast cancer treated with bilateral mastectomies in 2012, now with metastatic disease (bones, pleural effusions, lungs, pericardial effusion, liver, left adrenal/retroperitoneal mets), ER/MA positive, HER2 negative.  She progressed on palbociclib and tamoxifen, alternative therapies in [place], Xeloda, now on Doxil.    Rash  -developed about a week ago under left armpit. Erythematous and prutitic and has slowly grown in size, now forming a semicircle. No warmth, discharge, open lesions or tenderness. Numerous small lesions under right armpit as well. Appears to be fungal. Will have her start Lotrimin (clotrimazole) OTC BID. If it does not improve by Friday, she should call triage and I will give her Kenalog     Metastatic breast cancer:   -Started Doxil on 1/22/2019. Overall doing well.   -Rash could be also 2/2 to Doxil. Recommended using icepacks under her arms when receiving her infusion next week to decrease incidence of rash  - Continues ovarian suppression with monthly Zoladex, will be due next week and give with Doxil  -Will f/u on 3/21 with Lima prior to C3 of Doxil    Nora Murphy PA-C  [facility]  [address] [place] [address] [phone]

## 2019-03-19 NOTE — PROGRESS NOTES
Oncology/Hematology Visit Note  Mar 21, 2019    Reason for Visit: follow up of metastatic breast cancer, ER positive.     History of Present Illness: Shan Marsh is a 48 year old female without significant past medical history with recent diagnosis of metastatic breast cancer after presenting with left shoulder pain. Shoulder MRI showed soft tissue mass close to distal clavicle concerning for malignancy. She had PET/CT 5/9/17 showing hypermetabolic left axillary, supraclavicular, mediastinal and hilar nodes. She had biopsies of left axilla and hilar nodes showing metastatic breast cancer, strongly ER positive, moderately ND positive and HER-2 kaitlin non-amplified. She originally had [id number], ER/ND positive, HER-2 negative breast cancer diagnosed in spring of 2012 s/p bilateral mastectomies and Tamoxifen from 8/2012-2/2013 and discontinued due to side effects. With development of metastatic disease, she was recommended treatment on BIG10 clinical trial with palbociclib and tamoxifen. She started treatment on 5/24/17 and required dose reduction for neutropenia. CT CAP and bone scan 1/11/2018 showing progressive disease with new bony lesions in the ribs, T spine, and possibly liver, lungs. She started Zoladex and abemiciclib on 1/12/2018.      In March 2018, she sought treatment at [facility] in [place], under the care of Dr. Brooks, where she received:  - Vallovex vaccine (per FDA, is in phase I trials here in [place])  - Insulin potentiated therapy with conjugated chemotherapy (5% chemotherapy, unclear what chemotherapy)  - Gina's toxins, IV vitamin C, IV Ozone, hyperbaric chamber, vitamin B17 infusion, vitamin CK3 IV, and coffee enemas PRN  - Also taking oral niacin, Lugol, acidol, pancreatin, and thyroid desiccated liver, per their regimen  - She was continued on Letrozole and advised to conintue Zoladex.      She met with Dr. Whitman and patient wished to continue alternative regimen. Abemiciclib was placed  "on hold. She was continued on Zoladex and letrozole. While in [place], she required thoracentesis for symptomatic pleural effusion. A repeat PET August 2018 showing widely progressive disease with multiple new bone mets involving the spine, ribs, pelvis, and sacrum; new lesions involving the left lateral thoracic/abdominal wall musculature and subcutaneous tissues, new metastases in the left adrenal gland, and recurrent large L pleural effusion. She was started on Xeloda 1500 mg BID on 9/5/18.     She was admitted 10/4-10/11/18 after syncopal event/unwitnessed fall at home. Found to have left pneumothorax and pericardial effusion with tamponade physiology. She had left chest tube placed in ED which then fell out on 10/10. Left pleurx placed on 10/11. She had pericardiocentesis on 10/7, drain removed 10/8.  She was able to resume Xeloda. She was noted to have progressive disease January 2019 and was started on IV Doxil. She was admitted 1/27-1/28/2019 for fever, substernal chest pain, c/w GERD.    Interval History:  Shan is here today for cycle 3 Doxil. She feels better since the last time she was seen by me. Her nausea, GERD symptoms are both improved. She has been doing virtual visits with an MD in [place] specializing in cannabis and he believes that her symptoms stem from a liquid cannabis she was using around the time her symptoms started. She used it for about a month and has gradually improved since being off of it. She now feels like she is able to eat and drink pretty normally.  She feels like the PPI is also helping quite a bit. The rash she had her on axilla is much better, almost gone now. She has had ongoing nerve pain in her back, not new, remains intermittent. Breathing feels normal, has some mild WILSON with stairs, stable, but no SOB at rest or with laying flat. No chest pain. No new pains. She remains on cannabis oil but using the \"1:1\" oil formulation. Mild drynses on her palms without peeling. She " hasn't had any headaches, visual changes. She is on the PPI. She hasn't had other concerns, no fevers, chills, cough, chest pain, abdominal pain, bowel changes, rashes, bleeding, swelling.         Current Outpatient Medications   Medication Sig Dispense Refill     Ascorbic Acid (VITAMIN C PO) Take 1,000 mg by mouth daily       calcium carbonate (OS-KRYSTEN 500 MG [place]. CA) 1250 MG tablet Take 1 tablet by mouth daily       Digestive Enzymes (ENZYME DIGEST) CAPS Take 1 capsule by mouth daily       fluconazole (DIFLUCAN) 150 MG tablet Take one tablet (150 mg) every 72 hours for 3 doses 3 tablet 0     LORazepam (ATIVAN) 0.5 MG tablet Take 1 tablet (0.5 mg) by mouth every 4 hours as needed (Anxiety, Nausea/Vomiting or Sleep) (Patient not taking: Reported on 3/13/2019) 30 tablet 2     medical cannabis (Patient's own supply.  Not a prescription) (This is NOT a prescription, and does not certify that the patient has a qualifying medical condition for medical cannabis.  The purpose of this order is  to document that the patient reports taking medical cannabis.)       Omega 3-6-9 Fatty Acids (OMEGA 3-6-9 COMPLEX PO) Take 1 capsule by mouth daily       omeprazole (PRILOSEC) 20 MG DR capsule Take 2 capsules (40 mg) by mouth daily 60 capsule 0     ondansetron (ZOFRAN) 8 MG tablet Take 1 tablet (8 mg) by mouth every 8 hours as needed for nausea 30 tablet 3     prochlorperazine (COMPAZINE) 10 MG tablet Take 1 tablet (10 mg) by mouth every 6 hours as needed (Nausea/Vomiting) (Patient not taking: Reported on 2/8/2019) 30 tablet 2     prochlorperazine (COMPAZINE) 10 MG tablet Take 10 mg by mouth before 1st dose of oral chemotherapy, then prn thereafter       traMADol (ULTRAM) 50 MG tablet Take 1-2 tablets ( mg) by mouth every 6 hours as needed for moderate pain or headaches       VITAMIN D, CHOLECALCIFEROL, PO Take 1,000 Units by mouth daily          Physical Examination:  General: The patient is a pleasant female in no acute  distress.  /86 (BP Location: Right arm, Patient Position: Sitting, Cuff Size: Adult Regular)   Pulse 94   Temp 97.9  F (36.6  C) (Oral)   Resp 18   Wt 48.8 kg (107 lb 9.6 oz)   SpO2 98%   BMI 18.47 kg/m    Wt Readings from Last 10 Encounters:   03/21/19 48.8 kg (107 lb 9.6 oz)   03/13/19 49.2 kg (108 lb 6.4 oz)   02/19/19 47.8 kg (105 lb 6 oz)   02/08/19 49.4 kg (109 lb)   01/28/19 49.5 kg (109 lb 1.6 oz)   01/22/19 49.4 kg (108 lb 14.4 oz)   01/15/19 50.7 kg (111 lb 11.2 oz)   12/18/18 52 kg (114 lb 11.2 oz)   11/26/18 51.9 kg (114 lb 8 oz)   11/16/18 51.4 kg (113 lb 6.4 oz)     HEENT: EOMI, PERRL. Sclerae are anicteric. Oral mucosa is pink and moist with no lesions or thrush.   Lymph: previously palpable left supraclavicular LNs are difficult to appreciate today. No other palpable cervical or supraclavicular LNs.   Heart: Regular rate and rhythm.   Lungs: Diminished BS in bilateral bases, L>R w/ associated dullness. Otherwise clear to auscultation bilaterally.   Abdomen: Bowel sounds present, soft, nontender with no palpable hepatosplenomegaly or masses.   Extremities: No lower extremity edema noted bilaterally.   Neuro: Cranial nerves II through XII are grossly intact.  Skin: No rashes, petechiae, or bruising noted on exposed skin. Mild dryness on palms.    Laboratory Data:  Results for SHAN MARSH (MRN [id number]) as of 3/21/2019 13:35   Ref. Range 11/26/2018 08:38 12/18/2018 10:50 1/15/2019 14:25 2/19/2019 08:50   CEA Latest Ref Range: 0 - 2.5 ug/L 118.8 (H) 162.3 (H) 154.5 (H) 132.6 (H)   Results for SHAN MARSH (MRN [id number]) as of 3/21/2019 13:35   Ref. Range 12/18/2018 10:50 1/15/2019 14:25 2/19/2019 08:50   CA 27-29 Latest Ref Range: 0 - 39 U/mL 1,310 (H) 1,575 (H) 1,776 (H)     Assessment and Plan:  Shan Marsh is a 47-year-old female with history of a T1 N0, ER/WV positive, HER2-negative left breast cancer treated with bilateral mastectomies in 2012, now with  metastatic disease (bones, pleural effusions, lungs, pericardial effusion, liver, left adrenal/retroperitoneal mets), ER/MS positive, HER2 negative.  She progressed on palbociclib and tamoxifen, alternative therapies in [place], Xeloda, now on Doxil.    Metastatic breast cancer: Started Doxil on 1/22/2019. Overall doing well. Here for cycle 3 Doxil today. She is overall tolerating well. She continues to tolerate overall well. We will continue with plan to restage after 3 cycles, as scheduled. Her Doxil infusion for cycle 4 is scheduled one week early, so will request that this is bumped out by one week.  - Continues ovarian suppression with monthly Zoladex, due today    Bone mets: Continue monthly Zometa every 3 months. Due April.     Bilateral pleural effusions. S/p right thoracentesis on 10/9 and S/p left pleurx placement 10/11. PleurX was removed 11/12 due to minimal drainage. She has mild WILSON, stable. exam stable.    Malignant pericardial effusion: presented with tamponade, s/p pericardial drain 10/7-8. She met with Dr. Sanchez 11/8/2018. Echo at that time without any pericardial effusion. Repeat Echo 1/21 with EF 55-60%, no effusion.    GERD/nausea/stomach pressure: Continues to improve. She is following with an MD in [place] specializing in medicinal cannabis and he believes her symptoms are from a cannabis formulation that typically can cause this is a side effect and can last a long time. As it continues to improve, will continue PPI and monitoring. If fails to continue improving, may consider EGD vs brain MRI. For now, continue PPI, anti-emetics PRN.    FEN: Decreased appetite is ongoing. Weight grossly stable over last month. Discussed small frequent meals, calorie dense foods. She is using cannabis.    Rash: Saw Nora Murphy PA-C 3/13 for L axillary rash, felt at that time to be fungal and prescribed clotrimazole cream. This is significantly improved.    Health maintenance: Influenza vaccine given on  10/11.    Lima Griffiths PA-C

## 2019-03-21 NOTE — PATIENT INSTRUCTIONS
Contact Numbers    [facility] Main Line: [phone]  [facility] Triage and after hours / weekends / holidays:  [phone]      Please call the triage or after hours line if you experience a temperature greater than or equal to 100.5, shaking chills, have uncontrolled nausea, vomiting and/or diarrhea, dizziness, shortness of breath, chest pain, bleeding, unexplained bruising, or if you have any other new/concerning symptoms, questions or concerns.      If you are having any concerning symptoms or wish to speak to a provider before your next infusion visit, please call your care coordinator or triage to notify them so we can adequately serve you.     If you need a refill on a narcotic prescription or other medication, please call before your infusion appointment.         March 2019 Sunday Monday Tuesday Wednesday Thursday Friday Saturday                            1     2       3     4     5     6     7     8    LONG   3:40 PM   (20 min.)   Catarino Christianson MD   [facility] 9       10     11     12     13    [facility] DRAW   3:45 PM   (15 min.)    [facility] DRAW   [facility] Lab Draw    P RETURN   4:05 PM   (50 min.)   Nora Murphy PA-C   formerly [facility] 14     15     16       17     18     19     20     21    [facility] DRAW   1:00 PM   (15 min.)    [facility] DRAW   [facility] Lab Draw    P RETURN   1:25 PM   (50 min.)   Lima Griffiths PA-C   [facility]   2:30 PM   (120 min.)    ONCOLOGY INFUSION   formerly [facility] 22     23       24     25     26     27     28     29     30       31 April 2019 Sunday Monday Tuesday Wednesday Thursday Friday Saturday        1     2     3     4     5    CT CHEST ABDOMEN PELVIS WWO  12:00 PM   (20 min.)   [address]    CT SOFT TISSUE NECK W  12:20 PM   (20 min.)   [address]  [facility] CT 6       7     8    [facility] DRAW  12:00 PM   (15 min.)    [facility] DRAW   [facility] Lab Draw    [facility] RETURN  12:15 PM   (30 min.)   Ana Whitman MD   [facility] 9     10     11     12     13       14     15    [facility] DRAW   1:30 PM   (15 min.)    [facility] DRAW   [facility] Lab Draw    [facility] ONC INFUSION 120   2:00 PM   (120 min.)    ONCOLOGY INFUSION   [facility] 16     17     18     19     20       21     22     23     24     25     26     27       28     29     30                                         Lab Results:  Recent Results (from the past 12 hour(s))   CBC with platelets differential    Collection Time: 03/21/19  1:21 PM   Result Value Ref Range    WBC 4.2 4.0 - 11.0 10e9/L    RBC Count 4.39 3.8 - 5.2 10e12/L    Hemoglobin 12.9 11.7 - 15.7 g/dL    Hematocrit 42.8 35.0 - 47.0 %    MCV 98 78 - 100 fl    MCH 29.4 26.5 - 33.0 pg    MCHC 30.1 (L) 31.5 - 36.5 g/dL    RDW 16.1 (H) 10.0 - 15.0 %    Platelet Count 324 150 - 450 10e9/L    Diff Method Automated Method     % Neutrophils 62.5 %    % Lymphocytes 24.5 %    % Monocytes 11.3 %    % Eosinophils 1.0 %    % Basophils 0.5 %    % Immature Granulocytes 0.2 %    Nucleated RBCs 0 0 /100    Absolute Neutrophil 2.6 1.6 - 8.3 10e9/L    Absolute Lymphocytes 1.0 0.8 - 5.3 10e9/L    Absolute Monocytes 0.5 0.0 - 1.3 10e9/L    Absolute Eosinophils 0.0 0.0 - 0.7 10e9/L    Absolute Basophils 0.0 0.0 - 0.2 10e9/L    Abs Immature Granulocytes 0.0 0 - 0.4 10e9/L    Absolute Nucleated RBC 0.0    Comprehensive metabolic panel    Collection Time: 03/21/19  1:21 PM   Result Value Ref Range    Sodium 138 133 - 144 mmol/L    Potassium 4.5 3.4 - 5.3 mmol/L    Chloride 103 94 - 109 mmol/L    Carbon Dioxide 29 20 - 32 mmol/L    Anion Gap 6 3 - 14 mmol/L    Glucose 103 (H) 70 - 99 mg/dL    Urea Nitrogen 8 7 - 30 mg/dL    Creatinine 0.55 0.52 - 1.04 mg/dL    GFR Estimate >90 >60  mL/min/[1.73_m2]    GFR Estimate If Black >90 >60 mL/min/[1.73_m2]    Calcium 9.7 8.5 - 10.1 mg/dL    Bilirubin Total 0.3 0.2 - 1.3 mg/dL    Albumin 3.4 3.4 - 5.0 g/dL    Protein Total 8.3 6.8 - 8.8 g/dL    Alkaline Phosphatase 197 (H) 40 - 150 U/L    ALT 20 0 - 50 U/L    AST 30 0 - 45 U/L

## 2019-03-21 NOTE — NURSING NOTE
Chief Complaint   Patient presents with     Blood Draw     Labs drawn via PIV placed by RN in lab. Line flushed with saline. VS taken.      Lisa Sage RN

## 2019-03-21 NOTE — LETTER
3/21/2019      RE: Shan Marsh  [address]  [facility] [address]       Oncology/Hematology Visit Note  Mar 21, 2019    Reason for Visit: follow up of metastatic breast cancer, ER positive.     History of Present Illness: Shan Marsh is a 48 year old female without significant past medical history with recent diagnosis of metastatic breast cancer after presenting with left shoulder pain. Shoulder MRI showed soft tissue mass close to distal clavicle concerning for malignancy. She had PET/CT 5/9/17 showing hypermetabolic left axillary, supraclavicular, mediastinal and hilar nodes. She had biopsies of left axilla and hilar nodes showing metastatic breast cancer, strongly ER positive, moderately VT positive and HER-2 kaitlin non-amplified. She originally had [id number], ER/VT positive, HER-2 negative breast cancer diagnosed in spring of 2012 s/p bilateral mastectomies and Tamoxifen from 8/2012-2/2013 and discontinued due to side effects. With development of metastatic disease, she was recommended treatment on BIG10 clinical trial with palbociclib and tamoxifen. She started treatment on 5/24/17 and required dose reduction for neutropenia. CT CAP and bone scan 1/11/2018 showing progressive disease with new bony lesions in the ribs, T spine, and possibly liver, lungs. She started Zoladex and abemiciclib on 1/12/2018.      In March 2018, she sought treatment at [facility] in [place], under the care of Dr. Brooks, where she received:  - Vallovex vaccine (per FDA, is in phase I trials here in [place])  - Insulin potentiated therapy with conjugated chemotherapy (5% chemotherapy, unclear what chemotherapy)  - Gina's toxins, IV vitamin C, IV Ozone, hyperbaric chamber, vitamin B17 infusion, vitamin CK3 IV, and coffee enemas PRN  - Also taking oral niacin, Lugol, acidol, pancreatin, and thyroid desiccated liver, per their regimen  - She was continued on Letrozole and advised to conintue Zoladex.      She met  with Dr. Whitman and patient wished to continue alternative regimen. Abemiciclib was placed on hold. She was continued on Zoladex and letrozole. While in [place], she required thoracentesis for symptomatic pleural effusion. A repeat PET August 2018 showing widely progressive disease with multiple new bone mets involving the spine, ribs, pelvis, and sacrum; new lesions involving the left lateral thoracic/abdominal wall musculature and subcutaneous tissues, new metastases in the left adrenal gland, and recurrent large L pleural effusion. She was started on Xeloda 1500 mg BID on 9/5/18.     She was admitted 10/4-10/11/18 after syncopal event/unwitnessed fall at home. Found to have left pneumothorax and pericardial effusion with tamponade physiology. She had left chest tube placed in ED which then fell out on 10/10. Left pleurx placed on 10/11. She had pericardiocentesis on 10/7, drain removed 10/8.  She was able to resume Xeloda. She was noted to have progressive disease January 2019 and was started on IV Doxil. She was admitted 1/27-1/28/2019 for fever, substernal chest pain, c/w GERD.    Interval History:  Shan is here today for cycle 3 Doxil. She feels better since the last time she was seen by me. Her nausea, GERD symptoms are both improved. She has been doing virtual visits with an MD in [place] specializing in cannabis and he believes that her symptoms stem from a liquid cannabis she was using around the time her symptoms started. She used it for about a month and has gradually improved since being off of it. She now feels like she is able to eat and drink pretty normally.  She feels like the PPI is also helping quite a bit. The rash she had her on axilla is much better, almost gone now. She has had ongoing nerve pain in her back, not new, remains intermittent. Breathing feels normal, has some mild WILSON with stairs, stable, but no SOB at rest or with laying flat. No chest pain. No new pains. She remains on  "cannabis oil but using the \"1:1\" oil formulation. Mild drynses on her palms without peeling. She hasn't had any headaches, visual changes. She is on the PPI. She hasn't had other concerns, no fevers, chills, cough, chest pain, abdominal pain, bowel changes, rashes, bleeding, swelling.         Current Outpatient Medications   Medication Sig Dispense Refill     Ascorbic Acid (VITAMIN C PO) Take 1,000 mg by mouth daily       calcium carbonate (OS-KRYSTEN 500 MG [place]. [place]) 1250 MG tablet Take 1 tablet by mouth daily       Digestive Enzymes (ENZYME DIGEST) CAPS Take 1 capsule by mouth daily       fluconazole (DIFLUCAN) 150 MG tablet Take one tablet (150 mg) every 72 hours for 3 doses 3 tablet 0     LORazepam (ATIVAN) 0.5 MG tablet Take 1 tablet (0.5 mg) by mouth every 4 hours as needed (Anxiety, Nausea/Vomiting or Sleep) (Patient not taking: Reported on 3/13/2019) 30 tablet 2     medical cannabis (Patient's own supply.  Not a prescription) (This is NOT a prescription, and does not certify that the patient has a qualifying medical condition for medical cannabis.  The purpose of this order is  to document that the patient reports taking medical cannabis.)       Omega 3-6-9 Fatty Acids (OMEGA 3-6-9 COMPLEX PO) Take 1 capsule by mouth daily       omeprazole (PRILOSEC) 20 MG DR capsule Take 2 capsules (40 mg) by mouth daily 60 capsule 0     ondansetron (ZOFRAN) 8 MG tablet Take 1 tablet (8 mg) by mouth every 8 hours as needed for nausea 30 tablet 3     prochlorperazine (COMPAZINE) 10 MG tablet Take 1 tablet (10 mg) by mouth every 6 hours as needed (Nausea/Vomiting) (Patient not taking: Reported on 2/8/2019) 30 tablet 2     prochlorperazine (COMPAZINE) 10 MG tablet Take 10 mg by mouth before 1st dose of oral chemotherapy, then prn thereafter       traMADol (ULTRAM) 50 MG tablet Take 1-2 tablets ( mg) by mouth every 6 hours as needed for moderate pain or headaches       VITAMIN D, CHOLECALCIFEROL, PO Take 1,000 Units by mouth " daily          Physical Examination:  General: The patient is a pleasant female in no acute distress.  /86 (BP Location: Right arm, Patient Position: Sitting, Cuff Size: Adult Regular)   Pulse 94   Temp 97.9  F (36.6  C) (Oral)   Resp 18   Wt 48.8 kg (107 lb 9.6 oz)   SpO2 98%   BMI 18.47 kg/m     Wt Readings from Last 10 Encounters:   03/21/19 48.8 kg (107 lb 9.6 oz)   03/13/19 49.2 kg (108 lb 6.4 oz)   02/19/19 47.8 kg (105 lb 6 oz)   02/08/19 49.4 kg (109 lb)   01/28/19 49.5 kg (109 lb 1.6 oz)   01/22/19 49.4 kg (108 lb 14.4 oz)   01/15/19 50.7 kg (111 lb 11.2 oz)   12/18/18 52 kg (114 lb 11.2 oz)   11/26/18 51.9 kg (114 lb 8 oz)   11/16/18 51.4 kg (113 lb 6.4 oz)     HEENT: EOMI, PERRL. Sclerae are anicteric. Oral mucosa is pink and moist with no lesions or thrush.   Lymph: previously palpable left supraclavicular LNs are difficult to appreciate today. No other palpable cervical or supraclavicular LNs.   Heart: Regular rate and rhythm.   Lungs: Diminished BS in bilateral bases, L>R w/ associated dullness. Otherwise clear to auscultation bilaterally.   Abdomen: Bowel sounds present, soft, nontender with no palpable hepatosplenomegaly or masses.   Extremities: No lower extremity edema noted bilaterally.   Neuro: Cranial nerves II through XII are grossly intact.  Skin: No rashes, petechiae, or bruising noted on exposed skin. Mild dryness on palms.    Laboratory Data:  Results for SHAN MARSH (MRN [id number]) as of 3/21/2019 13:35   Ref. Range 11/26/2018 08:38 12/18/2018 10:50 1/15/2019 14:25 2/19/2019 08:50   CEA Latest Ref Range: 0 - 2.5 ug/L 118.8 (H) 162.3 (H) 154.5 (H) 132.6 (H)   Results for SHAN MARSH (MRN [id number]) as of 3/21/2019 13:35   Ref. Range 12/18/2018 10:50 1/15/2019 14:25 2/19/2019 08:50   CA 27-29 Latest Ref Range: 0 - 39 U/mL 1,310 (H) 1,575 (H) 1,776 (H)     Assessment and Plan:  Shan Marsh is a 47-year-old female with history of a T1 N0, ER/AL  positive, HER2-negative left breast cancer treated with bilateral mastectomies in 2012, now with metastatic disease (bones, pleural effusions, lungs, pericardial effusion, liver, left adrenal/retroperitoneal mets), ER/MD positive, HER2 negative.  She progressed on palbociclib and tamoxifen, alternative therapies in [place], Xeloda, now on Doxil.    Metastatic breast cancer: Started Doxil on 1/22/2019. Overall doing well. Here for cycle 3 Doxil today. She is overall tolerating well. She continues to tolerate overall well. We will continue with plan to restage after 3 cycles, as scheduled. Her Doxil infusion for cycle 4 is scheduled one week early, so will request that this is bumped out by one week.  - Continues ovarian suppression with monthly Zoladex, due today    Bone mets: Continue monthly Zometa every 3 months. Due April.     Bilateral pleural effusions. S/p right thoracentesis on 10/9 and S/p left pleurx placement 10/11. PleurX was removed 11/12 due to minimal drainage. She has mild WILSON, stable. exam stable.    Malignant pericardial effusion: presented with tamponade, s/p pericardial drain 10/7-8. She met with Dr. Sanchez 11/8/2018. Echo at that time without any pericardial effusion. Repeat Echo 1/21 with EF 55-60%, no effusion.    GERD/nausea/stomach pressure: Continues to improve. She is following with an MD in [place] specializing in medicinal cannabis and he believes her symptoms are from a cannabis formulation that typically can cause this is a side effect and can last a long time. As it continues to improve, will continue PPI and monitoring. If fails to continue improving, may consider EGD vs brain MRI. For now, continue PPI, anti-emetics PRN.    FEN: Decreased appetite is ongoing. Weight grossly stable over last month. Discussed small frequent meals, calorie dense foods. She is using cannabis.    Rash: Saw Nora Murphy PA-C 3/13 for L axillary rash, felt at that time to be fungal and prescribed  clotrimazole cream. This is significantly improved.    Health maintenance: Influenza vaccine given on 10/11.    Lima Griffiths PA-C

## 2019-03-21 NOTE — PROGRESS NOTES
Infusion Nursing Note:  Shan Marsh presents today for Cycle 3 Day 1 Doxil, Zoladex.    Patient seen by provider today: Yes: GRAY Frias   present during visit today: Not Applicable.    Note: Patient presents to infusion today following her provider visit. Patient states she feels well and offers no concerns or questions. Patient denies any pain at this time.     Intravenous Access:  Peripheral IV placed.    Treatment Conditions:  Lab Results   Component Value Date    HGB 12.9 03/21/2019     Lab Results   Component Value Date    WBC 4.2 03/21/2019      Lab Results   Component Value Date    ANEU 2.6 03/21/2019     Lab Results   Component Value Date     03/21/2019      Lab Results   Component Value Date     03/21/2019                   Lab Results   Component Value Date    POTASSIUM 4.5 03/21/2019           Lab Results   Component Value Date    MAG 2.4 01/22/2019            Lab Results   Component Value Date    CR 0.55 03/21/2019                   Lab Results   Component Value Date    KRYSTEN 9.7 03/21/2019                Lab Results   Component Value Date    BILITOTAL 0.3 03/21/2019           Lab Results   Component Value Date    ALBUMIN 3.4 03/21/2019                    Lab Results   Component Value Date    ALT 20 03/21/2019           Lab Results   Component Value Date    AST 30 03/21/2019       Results reviewed, labs MET treatment parameters, ok to proceed with treatment.  ECHO/MUGA completed 1/21/19  EF 55-60%.      Post Infusion Assessment:  Patient tolerated infusion without incident.  Patient tolerated Zoladex injection to the right lower abdomen.   Blood return noted pre and post infusion.  Site patent and intact, free from redness, edema or discomfort.  No evidence of extravasations.  Access discontinued per protocol.       Discharge Plan:   Prescription refills given for Zofran, Prilosec.  Discharge instructions reviewed with: Patient.  Patient and/or family verbalized  understanding of discharge instructions and all questions answered.  AVS to patient via [facility].  Patient will return 4/8/19 for next appointment.   Patient discharged in stable condition accompanied by: mother.  Departure Mode: Ambulatory.    Elyssa Young RN

## 2019-04-01 NOTE — TELEPHONE ENCOUNTER
I called patient this morning to discuss her multiple complaints in [employer] message from 3/29.  I offered her an appt this morning at 1030 to go over her concerns.  I left a voicemail asking her to call.    Ana Whitman

## 2019-04-05 NOTE — DISCHARGE INSTRUCTIONS

## 2019-04-05 NOTE — DISCHARGE INSTRUCTIONS

## 2019-04-08 NOTE — PROGRESS NOTES
Oncology/Hematology Visit Note  Apr 8, 2019    Reason for Visit: Follow up of metastatic breast cancer, ER positive.     History of Present Illness: Shan Marsh is a 48 year old female without significant past medical history with a diagnosis of metastatic breast cancer after she presented with left shoulder pain. Shoulder MRI showed a soft tissue mass close to the distal clavicle which was concerning for malignancy. PET/CT on 5/9/17 showed hypermetabolic left axillary, supraclavicular, mediastinal and hilar nodes. She had biopsies of left axilla and hilar nodes showing metastatic breast cancer, strongly ER positive, moderately [place] positive, and HER-2 kaitlin non-amplified. She originally had [id number], ER/[place] positive, HER-2 negative breast cancer diagnosed in spring of 2012 s/p bilateral mastectomies and Tamoxifen from 8/2012-2/2013 and discontinued due to side effects. With development of metastatic disease, she was recommended treatment on BIG10 clinical trial with palbociclib and tamoxifen. She started treatment on 5/24/17 and required dose reduction for neutropenia. CT CAP and bone scan 1/11/2018 showing progressive disease with new bony lesions in the ribs, T spine, and possibly liver, lungs. She started Zoladex and abemiciclib on 1/12/2018.      In March 2018, she sought treatment at [facility] in [place], under the care of Dr. Brooks, where she received:  - Vallovex vaccine (per FDA, is in phase I trials here in [place])  - Insulin potentiated therapy with conjugated chemotherapy (5% chemotherapy, unclear what chemotherapy)  - Gina's toxins, IV vitamin C, IV Ozone, hyperbaric chamber, vitamin B17 infusion, vitamin CK3 IV, and coffee enemas PRN  - Also oral niacin, Lugol, acidol, pancreatin, and thyroid desiccated liver, per their regimen  - She was continued on Letrozole and advised to continue Zoladex.      She met with Dr. Whitman and patient wished to continue her alternative regimen. Abemiciclib was  placed on hold. She was continued on Zoladex and letrozole. While in [place], she required thoracentesis for symptomatic pleural effusion. A repeat PET in August 2018 showed widely progressive disease with multiple new bone mets involving the spine, ribs, pelvis, and sacrum; new lesions involving the left lateral thoracic/abdominal wall musculature and subcutaneous tissues, new metastases in the left adrenal gland, and recurrent large L pleural effusion. She was started on Xeloda 1500 mg BID on 9/5/18.     She was admitted 10/4-10/11/18 after a syncopal event/unwitnessed fall at home. She was found to have left pneumothorax and pericardial effusion with tamponade physiology. She had left chest tube placed in ED which then fell out on 10/10. Left pleurx placed on 10/11. She had pericardiocentesis on 10/7, drain removed 10/8.     She resumed Xeloda, and remained on this until January 2019.  At that time, she was switched to Doxil.    Interval History:  Shan is here for routine follow-up.      HISTORY OF PRESENT ILLNESS:  Shan has now had 3 cycles of Doxil chemotherapy and returns today to discuss next steps.  Overall, she reports that she has been tolerating the Doxil reasonably well.  She has been having more anxiety, worried about her upcoming scan.      She has no fevers or chills, no chest pain.  She thinks that her breathing overall is stable.      She is walking regularly.  She is not doing vigorous exercise.  She is worried that she will have more symptoms.      She reports no pain, no spine pain.  No hip pain.      She has no neuropathy.  She is here today with her  and her mom to discuss next steps.      Shan had moved out for a while and was living with a friend from October to January.  She is now back home and trying to readjust with her children and worries about her overall disease.  She continues to use cannabis regularly.      REVIEW OF SYSTEMS:  Her 10-point review of systems is otherwise  "negative.         Current Outpatient Medications   Medication Sig Dispense Refill     Ascorbic Acid (VITAMIN C PO) Take 1,000 mg by mouth daily       calcium carbonate (OS-KRYSTEN 500 MG [place]. [place]) 1250 MG tablet Take 1 tablet by mouth daily       Digestive Enzymes (ENZYME DIGEST) CAPS Take 1 capsule by mouth daily       fluconazole (DIFLUCAN) 150 MG tablet Take one tablet (150 mg) every 72 hours for 3 doses 3 tablet 0     LORazepam (ATIVAN) 0.5 MG tablet Take 1 tablet (0.5 mg) by mouth every 4 hours as needed (Anxiety, Nausea/Vomiting or Sleep) 30 tablet 2     medical cannabis (Patient's own supply.  Not a prescription) (This is NOT a prescription, and does not certify that the patient has a qualifying medical condition for medical cannabis.  The purpose of this order is  to document that the patient reports taking medical cannabis.)       Omega 3-6-9 Fatty Acids (OMEGA 3-6-9 COMPLEX PO) Take 1 capsule by mouth daily       omeprazole (PRILOSEC) 20 MG DR capsule Take 2 capsules (40 mg) by mouth daily 180 capsule 3     ondansetron (ZOFRAN) 8 MG tablet Take 1 tablet (8 mg) by mouth every 8 hours as needed for nausea 90 tablet 3     prochlorperazine (COMPAZINE) 10 MG tablet Take 1 tablet (10 mg) by mouth every 6 hours as needed (Nausea/Vomiting) 30 tablet 2     prochlorperazine (COMPAZINE) 10 MG tablet Take 10 mg by mouth before 1st dose of oral chemotherapy, then prn thereafter       traMADol (ULTRAM) 50 MG tablet Take 1-2 tablets ( mg) by mouth every 6 hours as needed for moderate pain or headaches       triamcinolone (KENALOG) 0.1 % external cream Apply topically 2 times daily 30 g 1     VITAMIN D, CHOLECALCIFEROL, PO Take 1,000 Units by mouth daily          Physical Examination:  General: The patient is a pleasant female in no acute distress.  /77 (Patient Position: Sitting, Cuff Size: Adult Small)   Pulse 89   Temp 98.2  F (36.8  C) (Oral)   Ht 1.626 m (5' 4.02\")   Wt 48.7 kg (107 lb 6.4 oz)   SpO2 99% "   BMI 18.43 kg/m    Wt Readings from Last 10 Encounters:   04/08/19 48.7 kg (107 lb 6.4 oz)   03/21/19 48.8 kg (107 lb 9.6 oz)   03/13/19 49.2 kg (108 lb 6.4 oz)   02/19/19 47.8 kg (105 lb 6 oz)   02/08/19 49.4 kg (109 lb)   01/28/19 49.5 kg (109 lb 1.6 oz)   01/22/19 49.4 kg (108 lb 14.4 oz)   01/15/19 50.7 kg (111 lb 11.2 oz)   12/18/18 52 kg (114 lb 11.2 oz)   11/26/18 51.9 kg (114 lb 8 oz)   gen: well appearing, thin woman  HEENT: EOMI, PERRL. Sclerae are anicteric. Oral mucosa is pink and moist with no lesions or thrush.   Lymph: Previously palpable left supraclavicular LNs are no longer evident on exam.   Heart: Regular rate and rhythm. No murmurs.  Lungs: Diminished BS in bilateral bases, R > L. Otherwise clear to auscultation bilaterally. Normal respiratory effort on ambient air.  Abdomen: Bowel sounds present, soft, nontender with no palpable hepatosplenomegaly or masses.   Extremities: No lower extremity edema noted bilaterally.   Neuro: Alert and oriented. No focal deficits. Normal gait.  Skin: acral erythema in the axilla bilaterally.    Laboratory Data:  Lab Results   Component Value Date    WBC 4.0 04/08/2019    HGB 12.6 04/08/2019    HCT 39.7 04/08/2019     04/08/2019    CHOL 129 11/03/2017    TRIG 178 (H) 11/03/2017    HDL 50 11/03/2017    ALT 22 04/08/2019    AST 30 04/08/2019     04/08/2019    BUN 11 04/08/2019    CO2 27 04/08/2019    TSH 1.78 08/21/2017    INR 1.05 01/28/2019     Imaging:Personally reviewed CT scan with radiology    Tumor markers pending    Assessment and Plan:  Shan Marsh is a 48 year old woman with prior history of a T1 N0, ER/[place] positive, HER2-negative left breast cancer treated with bilateral mastectomies in 2012, now with metastatic disease (bones, pleural effusions, lungs, pericardial effusion, liver, left adrenal/retroperitoneal mets), ER/[place] positive, HER2 negative.  She progressed on palbociclib and tamoxifen, alternative therapies in [place].  She was on  Xeloda, and is now on Doxil single agent.    Metastatic breast cancer: Continues on Doxil every four weeks.  I reviewed Shan's PET/CT scan with her and her .  We reviewed that she has had improvement in her lung disease as well as her lymph nodes and her liver.  She has 1 potential new lesion in the T-spine at T5.  Her other areas appear to be sclerosing involving the bones.  She is tolerating the therapy well with mild fatigue and mild acral erythema.      Based on the reduction in her tumor markers as well her tolerance to the therapy, we discussed continuing on Doxil IV every 4 weeks.  We will plan to repeat her imaging in approximately another 3-4 cycles.      Palmar acral erythema secondary to Doxil.  She was given a course of topical triamcinolone cream to help with the discomfort in this area.      Bone mets.  She remains on Zometa every 3 months.      Bilateral pleural effusions.  She has previously had thoracentesis of both lungs.  We reviewed the fact that the right pleural effusion has increased in size.  We will monitor this at this time.      She has a history of a malignant pericardial effusion.  No symptoms at this time.  We will repeat an echocardiogram.      She is coping much better today.  We rediscussed additional supportive services available.      Ana Whitman

## 2019-04-08 NOTE — LETTER
4/8/2019       RE: Shan Marsh  [address]  [facility] [address]     Dear Colleague,    Thank you for referring your patient, Shan Marsh, to the [facility]. Please see a copy of my visit note below.    Oncology/Hematology Visit Note  Apr 8, 2019    Reason for Visit: Follow up of metastatic breast cancer, ER positive.     History of Present Illness: Shan Marsh is a 48 year old female without significant past medical history with a diagnosis of metastatic breast cancer after she presented with left shoulder pain. Shoulder MRI showed a soft tissue mass close to the distal clavicle which was concerning for malignancy. PET/CT on 5/9/17 showed hypermetabolic left axillary, supraclavicular, mediastinal and hilar nodes. She had biopsies of left axilla and hilar nodes showing metastatic breast cancer, strongly ER positive, moderately FL positive, and HER-2 kaitlin non-amplified. She originally had [id number], ER/FL positive, HER-2 negative breast cancer diagnosed in spring of 2012 s/p bilateral mastectomies and Tamoxifen from 8/2012-2/2013 and discontinued due to side effects. With development of metastatic disease, she was recommended treatment on BIG10 clinical trial with palbociclib and tamoxifen. She started treatment on 5/24/17 and required dose reduction for neutropenia. CT CAP and bone scan 1/11/2018 showing progressive disease with new bony lesions in the ribs, T spine, and possibly liver, lungs. She started Zoladex and abemiciclib on 1/12/2018.      In March 2018, she sought treatment at [facility] in [place], under the care of Dr. Brooks, where she received:  - Vallovex vaccine (per FDA, is in phase I trials here in [place])  - Insulin potentiated therapy with conjugated chemotherapy (5% chemotherapy, unclear what chemotherapy)  - Gina's toxins, IV vitamin C, IV Ozone, hyperbaric chamber, vitamin B17 infusion, vitamin CK3 IV, and coffee enemas PRN  - Also  oral niacin, Lugol, acidol, pancreatin, and thyroid desiccated liver, per their regimen  - She was continued on Letrozole and advised to continue Zoladex.      She met with Dr. Whitman and patient wished to continue her alternative regimen. Abemiciclib was placed on hold. She was continued on Zoladex and letrozole. While in [place], she required thoracentesis for symptomatic pleural effusion. A repeat PET in August 2018 showed widely progressive disease with multiple new bone mets involving the spine, ribs, pelvis, and sacrum; new lesions involving the left lateral thoracic/abdominal wall musculature and subcutaneous tissues, new metastases in the left adrenal gland, and recurrent large L pleural effusion. She was started on Xeloda 1500 mg BID on 9/5/18.     She was admitted 10/4-10/11/18 after a syncopal event/unwitnessed fall at home. She was found to have left pneumothorax and pericardial effusion with tamponade physiology. She had left chest tube placed in ED which then fell out on 10/10. Left pleurx placed on 10/11. She had pericardiocentesis on 10/7, drain removed 10/8.     She resumed Xeloda, and remained on this until January 2019.  At that time, she was switched to Doxil.    Interval History:  Shan is here for routine follow-up.      HISTORY OF PRESENT ILLNESS:  Shan has now had 3 cycles of Doxil chemotherapy and returns today to discuss next steps.  Overall, she reports that she has been tolerating the Doxil reasonably well.  She has been having more anxiety, worried about her upcoming scan.      She has no fevers or chills, no chest pain.  She thinks that her breathing overall is stable.      She is walking regularly.  She is not doing vigorous exercise.  She is worried that she will have more symptoms.      She reports no pain, no spine pain.  No hip pain.      She has no neuropathy.  She is here today with her  and her mom to discuss next steps.      Shan had moved out for a while and was living  with a friend from October to January.  She is now back home and trying to readjust with her children and worries about her overall disease.  She continues to use cannabis regularly.      REVIEW OF SYSTEMS:  Her 10-point review of systems is otherwise negative.         Current Outpatient Medications   Medication Sig Dispense Refill     Ascorbic Acid (VITAMIN C PO) Take 1,000 mg by mouth daily       calcium carbonate (OS-KRYSTEN 500 MG [place]. [place]) 1250 MG tablet Take 1 tablet by mouth daily       Digestive Enzymes (ENZYME DIGEST) CAPS Take 1 capsule by mouth daily       fluconazole (DIFLUCAN) 150 MG tablet Take one tablet (150 mg) every 72 hours for 3 doses 3 tablet 0     LORazepam (ATIVAN) 0.5 MG tablet Take 1 tablet (0.5 mg) by mouth every 4 hours as needed (Anxiety, Nausea/Vomiting or Sleep) 30 tablet 2     medical cannabis (Patient's own supply.  Not a prescription) (This is NOT a prescription, and does not certify that the patient has a qualifying medical condition for medical cannabis.  The purpose of this order is  to document that the patient reports taking medical cannabis.)       Omega 3-6-9 Fatty Acids (OMEGA 3-6-9 COMPLEX PO) Take 1 capsule by mouth daily       omeprazole (PRILOSEC) 20 MG DR capsule Take 2 capsules (40 mg) by mouth daily 180 capsule 3     ondansetron (ZOFRAN) 8 MG tablet Take 1 tablet (8 mg) by mouth every 8 hours as needed for nausea 90 tablet 3     prochlorperazine (COMPAZINE) 10 MG tablet Take 1 tablet (10 mg) by mouth every 6 hours as needed (Nausea/Vomiting) 30 tablet 2     prochlorperazine (COMPAZINE) 10 MG tablet Take 10 mg by mouth before 1st dose of oral chemotherapy, then prn thereafter       traMADol (ULTRAM) 50 MG tablet Take 1-2 tablets ( mg) by mouth every 6 hours as needed for moderate pain or headaches       triamcinolone (KENALOG) 0.1 % external cream Apply topically 2 times daily 30 g 1     VITAMIN D, CHOLECALCIFEROL, PO Take 1,000 Units by mouth daily          Physical  "Examination:  General: The patient is a pleasant female in no acute distress.  /77 (Patient Position: Sitting, Cuff Size: Adult Small)   Pulse 89   Temp 98.2  F (36.8  C) (Oral)   Ht 1.626 m (5' 4.02\")   Wt 48.7 kg (107 lb 6.4 oz)   SpO2 99%   BMI 18.43 kg/m     Wt Readings from Last 10 Encounters:   04/08/19 48.7 kg (107 lb 6.4 oz)   03/21/19 48.8 kg (107 lb 9.6 oz)   03/13/19 49.2 kg (108 lb 6.4 oz)   02/19/19 47.8 kg (105 lb 6 oz)   02/08/19 49.4 kg (109 lb)   01/28/19 49.5 kg (109 lb 1.6 oz)   01/22/19 49.4 kg (108 lb 14.4 oz)   01/15/19 50.7 kg (111 lb 11.2 oz)   12/18/18 52 kg (114 lb 11.2 oz)   11/26/18 51.9 kg (114 lb 8 oz)   gen: well appearing, thin woman  HEENT: EOMI, PERRL. Sclerae are anicteric. Oral mucosa is pink and moist with no lesions or thrush.   Lymph: Previously palpable left supraclavicular LNs are no longer evident on exam.   Heart: Regular rate and rhythm. No murmurs.  Lungs: Diminished BS in bilateral bases, R > L. Otherwise clear to auscultation bilaterally. Normal respiratory effort on ambient air.  Abdomen: Bowel sounds present, soft, nontender with no palpable hepatosplenomegaly or masses.   Extremities: No lower extremity edema noted bilaterally.   Neuro: Alert and oriented. No focal deficits. Normal gait.  Skin: acral erythema in the axilla bilaterally.    Laboratory Data:  Lab Results   Component Value Date    WBC 4.0 04/08/2019    HGB 12.6 04/08/2019    HCT 39.7 04/08/2019     04/08/2019    CHOL 129 11/03/2017    TRIG 178 (H) 11/03/2017    HDL 50 11/03/2017    ALT 22 04/08/2019    AST 30 04/08/2019     04/08/2019    BUN 11 04/08/2019    CO2 27 04/08/2019    TSH 1.78 08/21/2017    INR 1.05 01/28/2019     Imaging:Personally reviewed CT scan with radiology    Tumor markers pending    Assessment and Plan:  Shan Marsh is a 48 year old woman with prior history of a T1 N0, ER/FL positive, HER2-negative left breast cancer treated with bilateral mastectomies in " 2012, now with metastatic disease (bones, pleural effusions, lungs, pericardial effusion, liver, left adrenal/retroperitoneal mets), ER/VT positive, HER2 negative.  She progressed on palbociclib and tamoxifen, alternative therapies in [place].  She was on Xeloda, and is now on Doxil single agent.    Metastatic breast cancer: Continues on Doxil every four weeks.  I reviewed Shan's PET/CT scan with her and her .  We reviewed that she has had improvement in her lung disease as well as her lymph nodes and her liver.  She has 1 potential new lesion in the T-spine at T5.  Her other areas appear to be sclerosing involving the bones.  She is tolerating the therapy well with mild fatigue and mild acral erythema.      Based on the reduction in her tumor markers as well her tolerance to the therapy, we discussed continuing on Doxil IV every 4 weeks.  We will plan to repeat her imaging in approximately another 3-4 cycles.      Palmar acral erythema secondary to Doxil.  She was given a course of topical triamcinolone cream to help with the discomfort in this area.      Bone mets.  She remains on Zometa every 3 months.      Bilateral pleural effusions.  She has previously had thoracentesis of both lungs.  We reviewed the fact that the right pleural effusion has increased in size.  We will monitor this at this time.      She has a history of a malignant pericardial effusion.  No symptoms at this time.  We will repeat an echocardiogram.      She is coping much better today.  We rediscussed additional supportive services available.      Ana Whitman

## 2019-04-08 NOTE — NURSING NOTE
"Oncology Rooming Note    April 8, 2019 12:52 PM   Shan Marsh is a 48 year old female who presents for:    Chief Complaint   Patient presents with     Blood Draw     Pt is here for vitals and Lab draw which is done by      Oncology Clinic Visit     Return visit related to Breast Cancer     Initial Vitals: /77 (Patient Position: Sitting, Cuff Size: Adult Small)   Pulse 89   Temp 98.2  F (36.8  C) (Oral)   Ht 1.626 m (5' 4.02\")   Wt 48.7 kg (107 lb 6.4 oz)   SpO2 99%   BMI 18.43 kg/m   Estimated body mass index is 18.43 kg/m  as calculated from the following:    Height as of this encounter: 1.626 m (5' 4.02\").    Weight as of this encounter: 48.7 kg (107 lb 6.4 oz). Body surface area is 1.48 meters squared.  Data Unavailable Comment: Data Unavailable   No LMP recorded. (Menstrual status: Chemotherapy).  Allergies reviewed: Yes  Medications reviewed: Yes    Medications: Medication refills not needed today.  Pharmacy name entered into [email or web address]:    [facility] - [place], [place] - [address]  [facility] - [place], [place] - [address]    Clinical concerns: No new concerns. Provider was notified.      Ana Solano LPN            "

## 2019-04-09 NOTE — PROGRESS NOTES
"Returned call to patient to discuss new rash to her perineum affecting her skin around her vagina and anus that is \"itchy.\" Will apply Triamcinolone cream prescribed to affected areas. Patient concerned rash is due to Doxil and next infusion is scheduled on  April 15th. Patient is also asking for PT at home if possible and writer will place an order for PT either at home or come in to clinic. Will send message to Dr Whitman and Lima Griffiths for rash recommendations. Sharifa Celeste RN, BSN Breast Center Nurse Coordinator    "

## 2019-04-12 NOTE — PROGRESS NOTES
"Spoke to patient with worsening rash under right axilla that is down her right side that is \"sore and itchy.\" Perineum is improved no difficulties urinating or having BM's. Has reddened area on the folds of thigh area to buttock. Patient's  states \"rash areas are white\" and feels are \"healing.\" Patient states feels \"something\" on her lower back, but has not looked in the mirror. Patient has used the OTC fungal cream for \"athletes foot\" that has not helped and triamcinolone cream that also has not helped. Used Aquaphor cream for dry skin that has helped dryness. Spoke to Lima Griffiths recommending taking pictures of affected areas and send by [facility] to review and writer has held time at 1:40 in Lima's schedule.  Answered all patient's questions and verbalized understanding. Sharifa Celeste RN, BSN.    "

## 2019-04-15 NOTE — PATIENT INSTRUCTIONS
Use the Triamcinolone as prescribed for your rash    Contact Numbers    [facility] [place]: [phone]  [facility] Triage and after hours / weekends / holidays:  [phone]      Please call the triage or after hours line if you experience a temperature greater than or equal to 100.5, shaking chills, have uncontrolled nausea, vomiting and/or diarrhea, dizziness, shortness of breath, chest pain, bleeding, unexplained bruising, or if you have any other new/concerning symptoms, questions or concerns.      If you are having any concerning symptoms or wish to speak to a provider before your next infusion visit, please call your care coordinator or triage to notify them so we can adequately serve you.     If you need a refill on a narcotic prescription or other medication, please call before your infusion appointment.       Recent Results (from the past 24 hour(s))   CBC with platelets differential    Collection Time: 04/15/19  1:58 PM   Result Value Ref Range    WBC 4.5 4.0 - 11.0 10e9/L    RBC Count 4.18 3.8 - 5.2 10e12/L    Hemoglobin 12.9 11.7 - 15.7 g/dL    Hematocrit 39.4 35.0 - 47.0 %    MCV 94 78 - 100 fl    MCH 30.9 26.5 - 33.0 pg    MCHC 32.7 31.5 - 36.5 g/dL    RDW 15.6 (H) 10.0 - 15.0 %    Platelet Count 335 150 - 450 10e9/L    Diff Method Automated Method     % Neutrophils 56.3 %    % Lymphocytes 28.5 %    % Monocytes 12.6 %    % Eosinophils 1.3 %    % Basophils 0.9 %    % Immature Granulocytes 0.4 %    Nucleated RBCs 0 0 /100    Absolute Neutrophil 2.6 1.6 - 8.3 10e9/L    Absolute Lymphocytes 1.3 0.8 - 5.3 10e9/L    Absolute Monocytes 0.6 0.0 - 1.3 10e9/L    Absolute Eosinophils 0.1 0.0 - 0.7 10e9/L    Absolute Basophils 0.0 0.0 - 0.2 10e9/L    Abs Immature Granulocytes 0.0 0 - 0.4 10e9/L    Absolute Nucleated RBC 0.0    Comprehensive metabolic panel    Collection Time: 04/15/19  1:58 PM   Result Value Ref Range    Sodium 134 133 - 144 mmol/L    Potassium 3.7 3.4 - 5.3 mmol/L    Chloride 101 94 - 109 mmol/L    Carbon  Dioxide 26 20 - 32 mmol/L    Anion Gap 6 3 - 14 mmol/L    Glucose 88 70 - 99 mg/dL    Urea Nitrogen 14 7 - 30 mg/dL    Creatinine 0.60 0.52 - 1.04 mg/dL    GFR Estimate >90 >60 mL/min/[1.73_m2]    GFR Estimate If Black >90 >60 mL/min/[1.73_m2]    Calcium 9.5 8.5 - 10.1 mg/dL    Bilirubin Total 0.3 0.2 - 1.3 mg/dL    Albumin 3.6 3.4 - 5.0 g/dL    Protein Total 9.0 (H) 6.8 - 8.8 g/dL    Alkaline Phosphatase 162 (H) 40 - 150 U/L    ALT 22 0 - 50 U/L    AST 34 0 - 45 U/L   CEA    Collection Time: 04/15/19  1:58 PM   Result Value Ref Range    CEA 80.6 (H) 0 - 2.5 ug/L   Ca27.29  breast tumor marker    Collection Time: 04/15/19  1:58 PM   Result Value Ref Range    CA 27-29 1,011 (H) 0 - 39 U/mL

## 2019-04-15 NOTE — NURSING NOTE
Chief Complaint   Patient presents with     Blood Draw     Labs drawn via PIV by RN in lab . VS taken.      Labs drawn via peripheral IV. Vital signs taken. Checked into next appointment.   Pat Beltran RN

## 2019-04-15 NOTE — PROGRESS NOTES
Infusion Nursing Note:  Shan Marsh presents today for Zometa, (Cycle 4 Day 1 Doxil Deferred)   Patient seen by provider today: No    Note: Patient states she feels pretty good today. Denies fevers, chills, WILSON, edema, jaw pain. States she is taking vitamin D and that she gets her calcium intake via diet. Patient states she is getting a routine dental cleaning in about a week.     Patient reports that the rash to her right elbow, abdomen, armpits, perineum, thighs remains the same in some areas or worse. States that there are some blister like spots in parts of the rash. Endorses pruritis. States she has not been using the cream as she doesn't feel like it helps. Dr. Whitman notified of above information.     TORB 4/15/19 1430 Dr. Whitman/Erin Araujo RN: Defer Doxil one week due to rash. Ok to give Zometa today. Patient has dermatology appointment on 4/19/19. Instruct patient to use triamcinolone cream as ordered. Patient to return on 4/22/19 for labs, ISADORA, and infusion.     Intravenous Access:  Peripheral IV placed in lab by vascular access.    Treatment Conditions:     Lab Results   Component Value Date    CR 0.60 04/15/2019                   Lab Results   Component Value Date    KRYSTEN 9.5 04/15/2019                  Lab Results   Component Value Date    ALBUMIN 3.6 04/15/2019                    Creatinine Clearance 90.3 mL/min  Results reviewed, labs MET treatment parameters, ok to proceed with treatment.      Post Infusion Assessment:  Patient tolerated infusion without incident.  Blood return noted pre and post infusion.  Access discontinued per protocol.       Discharge Plan:   Prescription refills given for Omeprazole.  Discharge instructions reviewed with: Patient.  Patient verbalized understanding of discharge instructions and all questions answered.  AVS to patient via [employer].  Patient will return 4/22/19 for next infusion, RTC, ECHO appointment. Dermatology appointment on 4/19/19.  Patient  discharged in stable condition accompanied by: mother.  Face to Face time: 2.    JOANNA FLORES, RN

## 2019-04-17 NOTE — TELEPHONE ENCOUNTER
FUTURE VISIT INFORMATION      FUTURE VISIT INFORMATION:    Date: 4/19    Time: 1030a    Location: Derm  REFERRAL INFORMATION:    Referring provider:  Ana Whitman MD    Referring providers clinic:  Onc    Reason for visit/diagnosis  Rash    RECORDS REQUESTED FROM:       Clinic name Comments Records Status Imaging Status   Internal 4/8/19 Referral and Clinic Note EPIC

## 2019-04-19 NOTE — PROGRESS NOTES
04/19/19 1100   Quick Adds   Type of Visit Initial OP PT Evaluation   General Information   Start of Care Date 04/19/19   Referring Physician Dr. Ana Whitman   Orders Evaluate and Treat as Indicated   Order Date 04/10/19   Medical Diagnosis metastatic breast cancer   Onset of illness/injury or Date of Surgery 05/01/17   Precautions/Limitations other (see comments)   Special Instructions mets to bone   Surgical/Medical history reviewed Yes   Pertinent history of current problem (include personal factors and/or comorbidities that impact the POC) prior history of a T1 N0, ER/IN positive, HER2-negative left breast cancer treated with bilateral mastectomies in 2012, now with metastatic disease (bones, pleural effusions, lungs, pericardial effusion, liver, left adrenal/retroperitoneal mets), that presented as L shoulder pain 5/2017.  She progressed on palbociclib and tamoxifen, alternative therapies in [place].  She was on Xeloda, and is now on Doxil every 4 weeks and Zometa every 3 months. Recent scan showed reduction in tumor markers. Presented with tamponade, s/p pericardial drain 10/7-8. Echo 1/21 with EF 55-60%. Was having nerve pain in back but much better. has had several compression fractures in spine. Did yoga and it hurt her back. Feels so weak and sometimes SOB and wants to get stronger.    Pertinent Visual History  thin appearing   Prior level of function comment ran 4 miles/day   Diagnostic Tests CT Scan   CT Results Results   CT results regression of tumors   Current Community Support Family/friend caregiver  (kids: 8 , 11 and spouse)   Patient role/Employment history Disabled  (urban educator, language arts)   Living environment House/townhome   Home/Community Accessibility Comments ok with stairs, SOB with a lot of stairs   Patient/Family Goals Statement get stronger, I used to run and have lost a lot of strength   Fall Risk Screen   Fall screen completed by PT   Have you fallen 2 or more times in the  past year? No   Have you fallen and had an injury in the past year? No   Is patient a fall risk? No   Fall screen comments fell in kitchen x 1    Abuse Screen (yes response referral indicated)   Feels Unsafe at Home or Work/School no   Feels Threatened by Someone no   Does Anyone Try to Keep You From Having Contact with Others or Doing Things Outside Your Home? no   Physical Signs of Abuse Present no   System Outcome Measures   Outcome Measures Cancer Rehab   FACIT Fatigue Subscale (score out of 52). The higher the score, the better the QOL. 33   Six Minute Walk (meters). An increase of 70 or more meters indicates statistically significant change. 472   Pain   Patient currently in pain Yes   Pain location mid thoracic   Pain rating 1-2/10-6/10   Pain description Ache   Vitals Signs   SpO2 97  (92 after 6mwt)   Weight 107   BMI 18   Cognitive Status Examination   Orientation orientation to person, place and time   Level of Consciousness alert   Follows Commands and Answers Questions 100% of the time   Integumentary   Integumentary No deficits were identified   Integumentary Comments no port   Posture   Posture Protracted shoulders   Range of Motion (ROM)   ROM Comment tight hamstrings, full shoulder ROM   Strength   Strength Comments weak abdominals, hip extn, hip abd, decreased aerobic conditioning, weak shoulders   Gait Special Tests   Gait Special Tests SIX MINUTE WALK TEST   Gait Special Tests Six Minute Walk Test   Feet 1300 Feet   Comments HR    Planned Therapy Interventions   Planned Therapy Interventions IADL retraining;balance training;joint mobilization;neuromuscular re-education;ROM;strengthening;stretching;manual therapy   Clinical Impression   Criteria for Skilled Therapeutic Interventions Met yes, treatment indicated   PT Diagnosis decreased aerobic conditioning, decreased LE and core strength   Influenced by the following impairments weakness, decreased 6mwt   Functional limitations due to impairments  6mwt nomr = 2125, difficult to do house work and socialize see facit   Clinical Presentation Evolving/Changing   Clinical Presentation Rationale pt has had continual changes in cancer status with progression to spine,   Clinical Decision Making (Complexity) Moderate complexity   Therapy Frequency other (see comments)   Predicted Duration of Therapy Intervention (days/wks) 1=-2xwx12 visits   Risk & Benefits of therapy have been explained Yes   Patient, Family & other staff in agreement with plan of care Yes   Clinical Impression Comments Pt presents with weakness and decreased conditioning s/p treatment for breast cancer with mets.   Education Assessment   Preferred Learning Style Demonstration   Barriers to Learning No barriers   GOALS   PT Eval Goals 1;2;3   Goal 1   Goal Identifier faict -f   Goal Description Patient will have less fatigue while performing daily activities and mobility as indicated by improved FACIT-F score of 7 or more points   Target Date 07/19/19   Goal 2   Goal Identifier HEP   Goal Description In order to facilitate gains in general strength and endurance, and improve tolerance for functional mobility, ADLs, and recreational activities outside of physical therapy, patient will demonstrate independence with a HEP and report how to safely progress the program.   Target Date 07/19/19   Goal 3   Goal Identifier 6MWT   Goal Description Patient will have improved tolerance for community ambulation as demonstrated an increase in 6 min walk test of 70 meters.   Target Date 07/01/19   Total Evaluation Time   PT Jessica, Moderate Complexity Minutes (11985) 30

## 2019-04-19 NOTE — LETTER
4/19/2019       RE: Shan Marsh  [address]  [facility] [address]     Dear Colleague,    Thank you for referring your patient, Shan Marsh, to the [facility] DERMATOLOGY at [facility]. Please see a copy of my visit note below.    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 She says it has gotten better since decreasing her medication.         History of Present Illness:  Ms. Shan Marsh is a 48 year old female who presents in consultation from Dr. Whitman for chemotherapy induced rash. Ms. Marsh first noticed the rash after the first round of doxorubicinin her armpits. The rash was red, scaly, and itchy and subsided on its own after a week or so. After her third round of doxorubicin, she developed a diffuse red, itchy, painful rash primarily on her armpits, gluteal folds and groin as well as her elbows and hands. At the time her doctor prescribed triamcinolone, however, she noticed an interaction between the triamcinolone and her cannabis. She said when using the cream, the THC effects of the cannabis became too much for her. She then stopped the triamcinolone, which helped with the THC effects. She has been using oatmeal baths and Aveeno lotion for  the affected areas, which has helped. She still has persistent irritation and itchiness particularly in the perineal area. They also delayed her next round of doxorubicin until next week until she saw dermatology. She is looking for some non-steroid options to treat the rash. She is also going to meet with an expert physician in medical cannabis in May to discuss personalized treatment.     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)     Fever     Past Medical History:   Diagnosis Date     Breast cancer, left breast (H) 5/12    grade 1 infiltrating ductal cancer, ER/IL+, s/p bilateral mastectomy     Complication of anesthesia     bradycardia,b/p drop p childbirth     Dysplasia of cervix (uteri) 12/01    LUCAS II - III, s/p LEEP  @ [facility]     Incompetence of cervix 4/06    D&E at 19+4 weeks twin pregnancy     Shingles outbreak 5/12     Past Surgical History:   Procedure Laterality Date     D & C  9/2010    retained POC     ENDOBRONCHIAL ULTRASOUND FLEXIBLE N/A 5/12/2017    Procedure: ENDOBRONCHIAL ULTRASOUND FLEXIBLE;  Flexible Bronchoscopy, Endobronchial Ultrasound, Transbronchial Biopsy ;  Surgeon: Bandar Meyer MD;  Location: [facility]     INSERT CHEST TUBE Left 10/11/2018    Procedure: INSERT CHEST TUBE;  Insert Chest Tube ;  Surgeon: Brock Chan MD;  [facility]: [facility]     INSERT CHEST TUBE N/A 11/12/2018    Procedure: Removal Of Pleurx Catheter ;  Surgeon: Bandar Meyer MD;  Location: [facility]     LEEP TX, CERVICAL  12/01    LEEP or LUCAS II - III     MASTECTOMY, RECONSTRUCT BREAST, COMBINED  6/11/2012    Procedure: COMBINED MASTECTOMY, RECONSTRUCT BREAST;  Bilateral Mastectomy with Reconstruction, Left [place] Node  Biopsy , placement of On-Q catheters X 2 bilateral breast.;  Surgeon: Misael Cheng MD;  Location:  OR     RECONSTRUCT BREAST BILATERAL  3/25/2013    Procedure: RECONSTRUCT BREAST BILATERAL;  Revise bilateral breasts and reconstructions, with insertion of gel breast implants with fat grafting and fat after mastectomies.;  Surgeon: Yair Olvera MD;  Location:  OR     RECONSTRUCT BREAST BILATERAL, IMPLANT PROSTHESIS BILATERAL, COMBINED  9/27/2012    Procedure: COMBINED RECONSTRUCT BREAST BILATERAL, IMPLANT PROSTHESIS BILATERAL;  Bilateral Second Stage Breast Reconstruction With Gel Implants, Fat        THORACENTESIS Left 5/7/2018    Procedure: THORACENTESIS;  Thoracentesis;  Surgeon: Venancio Hussien PA-C;  Location:  OR     THORACENTESIS Left 7/5/2018    Procedure: THORACENTESIS;  Thoracentesis;  Surgeon: Lydia Murillo PA-C;  [facility]     THORACENTESIS Left 9/5/2018    Procedure: THORACENTESIS;  Left Thoracentesis;  Surgeon: López Guillne PA-C;  [facility]     THORACENTESIS Left 10/3/2018    Procedure: THORACENTESIS;  Thoracentesis, left;  Surgeon: Eros Chauhan PA-C;  [facility]     THORACENTESIS Right 10/9/2018    Procedure: THORACENTESIS;  Thoacentesis ;  Surgeon: Linh Hayes MD;  Location:  GI       Social History:  Patient reports that she quit smoking about 21 years ago. She has never used smokeless tobacco. She reports that she has current or past drug history. She reports that she does not drink alcohol.    Family History:  Family History   Problem Relation Age of Onset     Alcohol/Drug Father      Allergies Mother      Neurologic Disorder Mother         seizures, related to fever     Gastrointestinal Disease Mother         IBS     Blood Disease Mother         Shogrens     Cancer Maternal Grandfather         bladder     Circulatory Maternal Grandfather         blood clots     Alcohol/Drug Maternal Grandfather         alcohol dependency      Allergies Maternal Aunt      Allergies Other         maternal cousins     Arthritis Maternal Aunt      Prostate Cancer Maternal Uncle      Thyroid Disease Maternal Aunt      Alcohol/Drug Maternal Uncle         alcohol and drug dependency     Cancer - colorectal Maternal Uncle      Gastrointestinal Disease Sister         IBS     Breast Cancer Other         self       Medications:  Current Outpatient Medications   Medication Sig Dispense Refill     Ascorbic Acid (VITAMIN C PO) Take 1,000 mg by mouth daily       calcium carbonate (OS-KRYSTEN 500 MG [place]. [place]) 1250 MG tablet Take 1 tablet by mouth daily       Digestive Enzymes (ENZYME DIGEST) CAPS Take 1 capsule by mouth daily       LORazepam (ATIVAN) 0.5 MG tablet Take 1 tablet (0.5 mg) by mouth every 4 hours as needed (Anxiety, Nausea/Vomiting or Sleep) 30 tablet 2     medical cannabis (Patient's own supply.  Not a prescription) (This is NOT a prescription, and does not certify that the patient has a qualifying medical condition for medical cannabis.  The purpose of this order is  to document that the patient reports taking medical cannabis.)       Omega 3-6-9 Fatty Acids (OMEGA 3-6-9 COMPLEX PO) Take 1 capsule by mouth daily       omeprazole (PRILOSEC) 20 MG DR capsule Take 2 capsules (40 mg) by mouth daily 180 capsule 3     ondansetron (ZOFRAN) 8 MG tablet Take 1 tablet (8 mg) by mouth every 8 hours as needed for nausea 90 tablet 3     prochlorperazine (COMPAZINE) 10 MG tablet Take 1 tablet (10 mg) by mouth every 6 hours as needed (Nausea/Vomiting) 30 tablet 2     tacrolimus (PROTOPIC) 0.1 % external ointment Apply topically 2 times daily 300 g 3     triamcinolone (KENALOG) 0.1 % external cream Apply topically 2 times daily 30 g 1     VITAMIN D, CHOLECALCIFEROL, PO Take 1,000 Units by mouth daily        traMADol (ULTRAM) 50 MG tablet Take 1-2 tablets ( mg) by mouth every 6 hours as needed for moderate pain or headaches       Allergies   Allergen Reactions      Erythromycin Shortness Of Breath     Sulfa Drugs      rash     Review of Systems:  -Constitutional: Generalized malaise and fatigue  -HEENT: Had some recent oral sores after chemo with sore throat-this is resolving.  -Skin: As above in HPI. No additional skin concerns.    Physical exam:  Vitals: /81 (BP Location: Right arm, Patient Position: Sitting, Cuff Size: Adult Regular)   Pulse 101   GEN: This is a well cachectic female in no acute distress, in a pleasant mood.    SKIN: Full skin, which includes the head/face, both arms, chest, back, abdomen,both legs, genitalia and/or groin buttocks, digits and/or nails, was examined.  -Carreon phototype II  -Faint pink linear slightly desquamating patches noted in the axillae, antecubital fossae, groin, and gluteal folds  -Xerosis on the back  -No other lesions of concern on areas examined.     Examined chart photos: same distribution as described above, though notably greater erythema and scaling    Impression/Plan:  1. Chemotherapy-induced eruption - consistent with malignant intertrigo (intertriginous variant of toxic erythema of chemotherapy) with temporal correlation to doxorubicin administration. Improved following short drug holiday. We would anticipate the eruption will recur with future administrations. The patient is not able to tolerate systemic or topical corticosteroids due to amplified THC effect from cannabis - we discussed working with that prescriber to lower the amount of THC in the cannabis to attenuate this effect; discontinuation of cannabis around the time of administration/exacerbation of the rash if oral steroids are needed may be needed if rash becomes significantly more symptomatic. In the interim, we will start tacrolimus 0.1% ointment twice daily as well as liberal use of a bland topical moisturizer.     Discontinue triamcinolone    Start tacrolimus 0.1% cream to affected areas twice daily     Use thick emollients to affected areas such  as petrolatum based products, CeraVe, or Cetaphil    CC Ana Whitman MD  [address] [facility] [address]  [place] [address] on close of this encounter.  Follow-up prn for new or changing lesions.     Staff Involved:  I,Claudia Wharton, MS4, saw and examined the patient in the presence of Dr. Simone MD.    Staff Physician:  I was present with the medical student who participated in the service and in the documentation of the note. I have verified the history and personally performed the physical exam and medical decision making. I edited the assessment and plan of care as documented in the note.     Fermín Nichols MD

## 2019-04-19 NOTE — NURSING NOTE
Dermatology Rooming Note    Shan Marsh's goals for this visit include:   Chief Complaint   Patient presents with     Derm Problem     Rash in the pernial area due to her chemo therapy. She says it has gotten better since decreasing her medication.     Jai Stanley, [facility]

## 2019-04-19 NOTE — PROGRESS NOTES
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 She says it has gotten better since decreasing her medication.         History of Present Illness:  Ms. Shan Marsh is a 48 year old female who presents in consultation from Dr. Whitman for chemotherapy induced rash. Ms. Marsh first noticed the rash after the first round of doxorubicinin her armpits. The rash was red, scaly, and itchy and subsided on its own after a week or so. After her third round of doxorubicin, she developed a diffuse red, itchy, painful rash primarily on her armpits, gluteal folds and groin as well as her elbows and hands. At the time her doctor prescribed triamcinolone, however, she noticed an interaction between the triamcinolone and her cannabis. She said when using the cream, the THC effects of the cannabis became too much for her. She then stopped the triamcinolone, which helped with the THC effects. She has been using oatmeal baths and Aveeno lotion for the affected areas, which has helped. She still has persistent irritation and itchiness particularly in the perineal area. They also delayed her next round of doxorubicin until next week until she saw dermatology. She is looking for some non-steroid options to treat the rash. She is also going  to meet with an expert physician in medical cannabis in May to discuss personalized treatment.     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)     Fever     Past Medical History:   Diagnosis Date     Breast cancer, left breast (H) 5/12    grade 1 infiltrating ductal cancer, ER/CO+, s/p bilateral mastectomy     Complication of anesthesia     bradycardia,b/p drop p childbirth     Dysplasia of cervix (uteri) 12/01    LUCAS II - III, s/p LEEP  @ [facility]     Incompetence of cervix 4/06    D&E at 19+4 weeks twin pregnancy     Shingles outbreak 5/12     Past Surgical History:   Procedure Laterality Date     D & C  9/2010    retained POC     ENDOBRONCHIAL ULTRASOUND FLEXIBLE N/A 5/12/2017    Procedure: ENDOBRONCHIAL ULTRASOUND FLEXIBLE;  Flexible Bronchoscopy, Endobronchial Ultrasound, Transbronchial Biopsy ;  Surgeon: Bandar Meyer MD;  [facility]     INSERT CHEST TUBE Left 10/11/2018    Procedure: INSERT CHEST TUBE;  Insert Chest Tube ;  Surgeon: Brock Chan MD;  [facility]     INSERT CHEST TUBE N/A 11/12/2018    Procedure: Removal Of Pleurx Catheter ;  Surgeon: Bandar Meyer MD;  [facility]: [facility]     LEEP TX, CERVICAL  12/01    LEEP or LUCAS II - III     MASTECTOMY, RECONSTRUCT BREAST, COMBINED  6/11/2012    Procedure: COMBINED MASTECTOMY, RECONSTRUCT BREAST;  Bilateral Mastectomy with Reconstruction, Left [place] Node Biopsy , placement of On-Q catheters X 2 bilateral breast.;  Surgeon: Misael Cheng MD;  [facility]     RECONSTRUCT BREAST BILATERAL  3/25/2013    Procedure: RECONSTRUCT BREAST BILATERAL;  Revise bilateral breasts and reconstructions, with insertion of gel breast implants with fat grafting  and fat after mastectomies.;  Surgeon: Yair Olvera MD;  [facility]     RECONSTRUCT BREAST BILATERAL, IMPLANT PROSTHESIS BILATERAL, COMBINED  9/27/2012    Procedure: COMBINED RECONSTRUCT BREAST BILATERAL, IMPLANT PROSTHESIS BILATERAL;  Bilateral Second Stage Breast Reconstruction With Gel Implants, Fat        THORACENTESIS Left 5/7/2018    Procedure: THORACENTESIS;  Thoracentesis;  Surgeon: Venancio Hussein PA-C;  [facility]     THORACENTESIS Left 7/5/2018    Procedure: THORACENTESIS;  Thoracentesis;  Surgeon: Lydia Murillo PA-C;  [facility]     THORACENTESIS Left 9/5/2018    Procedure: THORACENTESIS;  Left Thoracentesis;  Surgeon: López Guillen PA-C;  [facility]     THORACENTESIS Left 10/3/2018    Procedure: THORACENTESIS;  Thoracentesis, left;  Surgeon: Eros Chauhan PA-C;  [facility]     THORACENTESIS Right 10/9/2018    Procedure: THORACENTESIS;  Thoacentesis ;  Surgeon: Linh Hayes MD;  Location:  GI       Social History:  Patient reports that she quit smoking about 21 years ago. She has never used smokeless tobacco. She reports that she has current or past drug history. She reports that she does not drink alcohol.    Family History:  Family History   Problem Relation Age of Onset     Alcohol/Drug Father      Allergies Mother      Neurologic Disorder Mother         seizures, related to fever     Gastrointestinal Disease Mother         IBS     Blood Disease Mother         Shogrens     Cancer Maternal Grandfather         bladder     Circulatory Maternal Grandfather         blood clots     Alcohol/Drug Maternal Grandfather         alcohol dependency     Allergies Maternal Aunt      Allergies Other         maternal cousins     Arthritis Maternal Aunt      Prostate Cancer Maternal Uncle      Thyroid Disease Maternal Aunt      Alcohol/Drug Maternal Uncle         alcohol and drug dependency     Cancer - colorectal Maternal Uncle      Gastrointestinal  Disease Sister         IBS     Breast Cancer Other         self       Medications:  Current Outpatient Medications   Medication Sig Dispense Refill     Ascorbic Acid (VITAMIN C PO) Take 1,000 mg by mouth daily       calcium carbonate (OS-KRYSTEN 500 MG [place]. [place]) 1250 MG tablet Take 1 tablet by mouth daily       Digestive Enzymes (ENZYME DIGEST) CAPS Take 1 capsule by mouth daily       LORazepam (ATIVAN) 0.5 MG tablet Take 1 tablet (0.5 mg) by mouth every 4 hours as needed (Anxiety, Nausea/Vomiting or Sleep) 30 tablet 2     medical cannabis (Patient's own supply.  Not a prescription) (This is NOT a prescription, and does not certify that the patient has a qualifying medical condition for medical cannabis.  The purpose of this order is  to document that the patient reports taking medical cannabis.)       Omega 3-6-9 Fatty Acids (OMEGA 3-6-9 COMPLEX PO) Take 1 capsule by mouth daily       omeprazole (PRILOSEC) 20 MG DR capsule Take 2 capsules (40 mg) by mouth daily 180 capsule 3     ondansetron (ZOFRAN) 8 MG tablet Take 1 tablet (8 mg) by mouth every 8 hours as needed for nausea 90 tablet 3     prochlorperazine (COMPAZINE) 10 MG tablet Take 1 tablet (10 mg) by mouth every 6 hours as needed (Nausea/Vomiting) 30 tablet 2     tacrolimus (PROTOPIC) 0.1 % external ointment Apply topically 2 times daily 300 g 3     triamcinolone (KENALOG) 0.1 % external cream Apply topically 2 times daily 30 g 1     VITAMIN D, CHOLECALCIFEROL, PO Take 1,000 Units by mouth daily        traMADol (ULTRAM) 50 MG tablet Take 1-2 tablets ( mg) by mouth every 6 hours as needed for moderate pain or headaches       Allergies   Allergen Reactions     Erythromycin Shortness Of Breath     Sulfa Drugs      rash         Review of Systems:  -Constitutional: Generalized malaise and fatigue  -HEENT: Had some recent oral sores after chemo with sore throat-this is resolving.  -Skin: As above in HPI. No additional skin concerns.    Physical exam:  Vitals: BP  112/81 (BP Location: Right arm, Patient Position: Sitting, Cuff Size: Adult Regular)   Pulse 101   GEN: This is a well cachectic female in no acute distress, in a pleasant mood.    SKIN: Full skin, which includes the head/face, both arms, chest, back, abdomen,both legs, genitalia and/or groin buttocks, digits and/or nails, was examined.  -Carreon phototype II  -Faint pink linear slightly desquamating patches noted in the axillae, antecubital fossae, groin, and gluteal folds  -Xerosis on the back  -No other lesions of concern on areas examined.     Examined chart photos: same distribution as described above, though notably greater erythema and scaling    Impression/Plan:  1. Chemotherapy-induced eruption - consistent with malignant intertrigo (intertriginous variant of toxic erythema of chemotherapy) with temporal correlation to doxorubicin administration. Improved following short drug holiday. We would anticipate the eruption will recur with future administrations. The patient is not able to tolerate systemic or topical corticosteroids due to amplified THC effect from cannabis - we discussed working with that prescriber to lower the amount of THC in the cannabis to attenuate this effect; discontinuation of cannabis around the time of administration/exacerbation of the rash if oral steroids are needed may be needed if rash becomes significantly more symptomatic. In the interim, we will start tacrolimus 0.1% ointment twice daily as well as liberal use of a bland topical moisturizer.     Discontinue triamcinolone    Start tacrolimus 0.1% cream to affected areas twice daily     Use thick emollients to affected areas such as petrolatum based products, CeraVe, or Cetaphil      CC Ana Whitman MD  [address] [address] on close of this encounter.  Follow-up prn for new or changing lesions.     Staff Involved:  IClaudia, MS4, saw and examined the patient in the presence of   MD Simone.    Staff Physician:  I was present with the medical student who participated in the service and in the documentation of the note. I have verified the history and personally performed the physical exam and medical decision making. I edited the assessment and plan of care as documented in the note.     Fermín Nichols MD   of Dermatology  Department of Dermatology  [facility]

## 2019-04-22 NOTE — NURSING NOTE
"Oncology Rooming Note    April 22, 2019 11:44 AM   Shan Marsh is a 48 year old female who presents for:    Chief Complaint   Patient presents with     Blood Draw     Labs drawn via /PIV placed by IVY Galvez in lab. VS taken.     Oncology Clinic Visit     Return visit related to Breast Cancer     Initial Vitals: /74 (BP Location: Left arm, Patient Position: Sitting, Cuff Size: Adult Regular)   Pulse 91   Temp 98.5  F (36.9  C) (Oral)   Resp 18   Ht 1.626 m (5' 4.02\")   Wt 47.9 kg (105 lb 9.6 oz)   SpO2 100%   BMI 18.12 kg/m   Estimated body mass index is 18.12 kg/m  as calculated from the following:    Height as of this encounter: 1.626 m (5' 4.02\").    Weight as of this encounter: 47.9 kg (105 lb 9.6 oz). Body surface area is 1.47 meters squared.  No Pain (0) Comment: Data Unavailable   No LMP recorded. (Menstrual status: Chemotherapy).  Allergies reviewed: Yes  Medications reviewed: Yes    Medications: Medication refills not needed today.  Pharmacy name entered into [employer]:    [facility] - [place], [place] - [address]  [facility] - [place], [place] - [address]    Clinical concerns: No new concerns. Provider was notified.      Ana Solano LPN            "

## 2019-04-22 NOTE — NURSING NOTE
Chief Complaint   Patient presents with     Blood Draw     Labs drawn via /PIV placed by IVY Galvez in lab. VS taken.     Luly Palmer RN

## 2019-04-22 NOTE — PROGRESS NOTES
Infusion Nursing Note:  Shan Marsh presents today for Cycle 4 Day 1 Doxil and Zoladex injection.     Patient seen by provider today: Yes: Karena Cox PA-C   present during visit today: Not Applicable.    Note: Shan arrives following her clinic appointment. She offers no new complaints.    Intravenous Access:  Peripheral IV placed.    Treatment Conditions:  Lab Results   Component Value Date    HGB 12.9 04/22/2019     Lab Results   Component Value Date    WBC 4.4 04/22/2019      Lab Results   Component Value Date    ANEU 2.6 04/22/2019     Lab Results   Component Value Date     04/22/2019      Lab Results   Component Value Date     04/22/2019                   Lab Results   Component Value Date    POTASSIUM 4.3 04/22/2019           Lab Results   Component Value Date    MAG 2.4 01/22/2019            Lab Results   Component Value Date    CR 0.56 04/22/2019                   Lab Results   Component Value Date    KRYSTEN 9.5 04/22/2019                Lab Results   Component Value Date    BILITOTAL 0.2 04/22/2019           Lab Results   Component Value Date    ALBUMIN 3.3 04/22/2019                    Lab Results   Component Value Date    ALT 22 04/22/2019           Lab Results   Component Value Date    AST 36 04/22/2019     ECHO completed 1/21/2019.  EF 55-60%.    Results reviewed, labs MET treatment parameters, ok to proceed with treatment.    Post Infusion Assessment:  Patient tolerated infusion without incident.  Patient tolerated lidocaine and Zoldex injections to LEFT lower abdomen without incident.  Blood return noted pre and post infusion.  Site patent and intact, free from redness, edema or discomfort.  No evidence of extravasations.  Access discontinued per protocol.     Discharge Plan:   Patient declined prescription refills.  Patient and/or family verbalized understanding of discharge instructions and all questions answered.  AVS to patient via [employer].  Patient will return  5/13/2019 for next appointment.   Patient discharged in stable condition.  Departure Mode: Ambulatory.    Thomas Al RN

## 2019-04-22 NOTE — PROGRESS NOTES
Oncology/Hematology Visit Note  Apr 22, 2019    Reason for Visit: follow up of metastatic breast cancer, ER positive.     History of Present Illness: Shan Marsh is a 48 year old female without significant past medical history with a diagnosis of metastatic breast cancer after she presented with left shoulder pain. Shoulder MRI showed a soft tissue mass close to the distal clavicle which was concerning for malignancy. PET/CT on 5/9/17 showed hypermetabolic left axillary, supraclavicular, mediastinal and hilar nodes. She had biopsies of left axilla and hilar nodes showing metastatic breast cancer, strongly ER positive, moderately ND positive, and HER-2 kaitlin non-amplified. She originally had [id number], ER/ND positive, HER-2 negative breast cancer diagnosed in spring of 2012 s/p bilateral mastectomies and Tamoxifen from 8/2012-2/2013 and discontinued due to side effects. With development of metastatic disease, she was recommended treatment on BIG10 clinical trial with palbociclib and tamoxifen. She started treatment on 5/24/17 and required dose reduction for neutropenia. CT CAP and bone scan 1/11/2018 showing progressive disease with new bony lesions in the ribs, T spine, and possibly liver, lungs. She started Zoladex and abemiciclib on 1/12/2018.      In March 2018, she sought treatment at [facility] in [place], under the care of Dr. Brooks, where she received:  - Vallovex vaccine (per FDA, is in phase I trials here in [place])  - Insulin potentiated therapy with conjugated chemotherapy (5% chemotherapy, unclear what chemotherapy)  - Gina's toxins, IV vitamin C, IV Ozone, hyperbaric chamber, vitamin B17 infusion, vitamin CK3 IV, and coffee enemas PRN  - Also oral niacin, Lugol, acidol, pancreatin, and thyroid desiccated liver, per their regimen  - She was continued on Letrozole and advised to continue Zoladex.      She met with Dr. Whitman and patient wished to continue her alternative regimen. Abemiciclib was  placed on hold. She was continued on Zoladex and letrozole. While in [place], she required thoracentesis for symptomatic pleural effusion. A repeat PET in August 2018 showed widely progressive disease with multiple new bone mets involving the spine, ribs, pelvis, and sacrum; new lesions involving the left lateral thoracic/abdominal wall musculature and subcutaneous tissues, new metastases in the left adrenal gland, and recurrent large L pleural effusion. She was started on Xeloda 1500 mg BID on 9/5/18.      She was admitted 10/4-10/11/18 after a syncopal event/unwitnessed fall at home. She was found to have left pneumothorax and pericardial effusion with tamponade physiology. She had left chest tube placed in ED which then fell out on 10/10. Left pleurx placed on 10/11. She had pericardiocentesis on 10/7, drain removed 10/8.      She resumed Xeloda, and remained on this until January 2019.  At that time, she was switched to Doxil.    Interval History:  Shan returns today for follow up. Cycle 4 Doxil was held due to rash. She saw Dr. Nichols from dermatology on 4/19. After her third round of doxorubicin, she developed a diffuse red, itchy, painful rash primarily on her armpits, gluteal folds and groin as well as her elbows and hands. This has improved with time off. She was prescribed tacrolimus by Dr. Nichols but hasn't even picked up yet. She has been doing oatmeal baths and topical aquaphor. She has not been using triamcinolone. She did take benadryl at night to help her sleep. Axillary areas have healed. She has some mild erythema in gluteal folds, but otherwise rash has mostly resolved.     She has noticed fatigue with Doxil. Sometimes has chills, but no fever. Fatigue starts about day 7-10. Appetite is also lower. Has some nausea for which she is using cannabis and Zofran which is working. No vomiting. Feels a little better the week before doxil.    Denies dizziness. She feels breathing is stable and no  worsening dyspnea. She denies orthopnea, chest pain. No swelling in legs. She reports no new pain.  She has no neuropathy.  She continues to use cannabis regularly but with lower THC content. Remaining ROS negative.    Current Outpatient Medications   Medication Sig Dispense Refill     Ascorbic Acid (VITAMIN C PO) Take 1,000 mg by mouth daily       Digestive Enzymes (ENZYME DIGEST) CAPS Take 1 capsule by mouth daily       medical cannabis (Patient's own supply.  Not a prescription) (This is NOT a prescription, and does not certify that the patient has a qualifying medical condition for medical cannabis.  The purpose of this order is  to document that the patient reports taking medical cannabis.)       omeprazole (PRILOSEC) 20 MG DR capsule Take 2 capsules (40 mg) by mouth daily 180 capsule 3     ondansetron (ZOFRAN) 8 MG tablet Take 1 tablet (8 mg) by mouth every 8 hours as needed for nausea 90 tablet 3     tacrolimus (PROTOPIC) 0.1 % external ointment Apply topically 2 times daily 300 g 3     traMADol (ULTRAM) 50 MG tablet Take 1-2 tablets ( mg) by mouth every 6 hours as needed for moderate pain or headaches       triamcinolone (KENALOG) 0.1 % external cream Apply topically 2 times daily 30 g 1     VITAMIN D, CHOLECALCIFEROL, PO Take 1,000 Units by mouth daily        calcium carbonate (OS-KRYSTEN 500 MG [place]. CA) 1250 MG tablet Take 1 tablet by mouth daily       LORazepam (ATIVAN) 0.5 MG tablet Take 1 tablet (0.5 mg) by mouth every 4 hours as needed (Anxiety, Nausea/Vomiting or Sleep) (Patient not taking: Reported on 4/22/2019) 30 tablet 2     Omega 3-6-9 Fatty Acids (OMEGA 3-6-9 COMPLEX PO) Take 1 capsule by mouth daily       prochlorperazine (COMPAZINE) 10 MG tablet Take 1 tablet (10 mg) by mouth every 6 hours as needed (Nausea/Vomiting) (Patient not taking: Reported on 4/22/2019) 30 tablet 2       Physical Examination:  General: The patient is a pleasant female in no acute distress.  /74 (BP Location:  "Left arm, Patient Position: Sitting, Cuff Size: Adult Regular)   Pulse 91   Temp 98.5  F (36.9  C) (Oral)   Resp 18   Ht 1.626 m (5' 4.02\")   Wt 47.9 kg (105 lb 9.6 oz)   SpO2 100%   BMI 18.12 kg/m    Wt Readings from Last 10 Encounters:   04/22/19 47.9 kg (105 lb 9.6 oz)   04/15/19 48.8 kg (107 lb 9.6 oz)   04/08/19 48.7 kg (107 lb 6.4 oz)   03/21/19 48.8 kg (107 lb 9.6 oz)   03/13/19 49.2 kg (108 lb 6.4 oz)   02/19/19 47.8 kg (105 lb 6 oz)   02/08/19 49.4 kg (109 lb)   01/28/19 49.5 kg (109 lb 1.6 oz)   01/22/19 49.4 kg (108 lb 14.4 oz)   01/15/19 50.7 kg (111 lb 11.2 oz)     HEENT: EOMI, PERRL. Sclerae are anicteric. Oral mucosa is pink and moist with no lesions or thrush.   Lymph: No palpable cervical, supraclavicular, subclavicular or axillary lymphadenopathy.  Heart: Regular rate and rhythm.   Lungs: Breathing comfortably on room air. Decreased BS at bilateral bases, otherwise clear to auscultation bilaterally.   Abdomen: Bowel sounds present, soft, nontender with no palpable hepatosplenomegaly or masses.   Extremities: No lower extremity edema noted bilaterally.   Neuro: Cranial nerves II through XII are grossly intact.  Skin: Mild erythema of mid back. Some exfoliation of skin on right elbow with mild erythema. Mild erythema in gluteal folds. No other rashes, petechiae, or bruising noted on exposed skin.    Laboratory Data:  Results for NIKKIE HICKS (MRN [id number]) as of 4/22/2019 12:48   4/22/2019 11:37   Sodium 138   Potassium 4.3   Chloride 107   Carbon Dioxide 24   Urea Nitrogen 9   Creatinine 0.56   GFR Estimate >90   GFR Estimate If Black >90   Calcium 9.5   Anion Gap 8   Albumin 3.3 (L)   Protein Total 8.5   Bilirubin Total 0.2   Alkaline Phosphatase 147   ALT 22   AST 36   Glucose 86   WBC 4.4   Hemoglobin 12.9   Hematocrit 39.6   Platelet Count 286   RBC Count 4.26   MCV 93   MCH 30.3   MCHC 32.6   RDW 15.3 (H)   Diff Method Automated Method   % Neutrophils 59.8   % Lymphocytes " 26.8   % Monocytes 11.1   % Eosinophils 1.1   % Basophils 0.7   % Immature Granulocytes 0.5   Nucleated RBCs 0   Absolute Neutrophil 2.6   Absolute Lymphocytes 1.2   Absolute Monocytes 0.5   Absolute Eosinophils 0.1   Absolute Basophils 0.0   Abs Immature Granulocytes 0.0   Absolute Nucleated RBC 0.0     Results for SHAN MARSH (MRN [id number]) as of 4/22/2019 16:01   Ref. Range 4/8/2019 12:17 4/15/2019 13:58 4/22/2019 11:37   CA 27-29 Latest Ref Range: 0 - 39 U/mL 962 (H) 1,011 (H) 832 (H)   Results for SHAN MARSH (MRN [id number]) as of 4/22/2019 16:01   Ref. Range 4/8/2019 12:17 4/15/2019 13:58 4/22/2019 11:37   CEA Latest Ref Range: 0 - 2.5 ug/L 79.9 (H) 80.6 (H) 83.4 (H)     Assessment and Plan:  Shan Marsh is a 48 year old woman with prior history of a T1 N0, ER/PA positive, HER2-negative left breast cancer treated with bilateral mastectomies in 2012, now with metastatic disease (bones, pleural effusions, lungs, pericardial effusion, liver, left adrenal/retroperitoneal mets), ER/PA positive, HER2 negative.  She progressed on palbociclib and tamoxifen, alternative therapies in [place].  She was on Xeloda, and is now on Doxil single agent.     Metastatic breast cancer: Started Doxil every four weeks on 1/22. Restaging CT on 4/5 with overall response after 3 cycles.  She has 1 potential new lesion in the T-spine at T5.  Her other areas appear to be sclerosing involving the bones.  She is tolerating the therapy well with mild fatigue but progressive rash. C4 held one week. Rash improving with doxil holiday. Spoke with Dr. Whitman. Will reduce Doxil from 40 mg/m2 to 35mg/m2.   --Will adjust schedule. Next doses due 5/20 and 6/17.  --On hormone suppression with Zoladex. Last dose 3/21. Spoke with Dr. Whitman. Plan to continue at this time and not check hormone levels as will be suppressed from Zoladex and not helpful.  Plan to repeat her imaging in approximately another 3-4 cycles.       Chemotherapy induced eruption.  She was prescribed tacrolimus BID by Dr. Nichols from derm. She is also going to be speaking to MD in [place] regarding cannabis and lowering THC so that steroid treatment would be option in future if skin toxicity recurs or worsens     Bone mets.  She remains on Zometa every 3 months. Last dose 4/15     Bilateral pleural effusions.  She has previously had thoracentesis of both lungs.  We reviewed the fact that the right pleural effusion has increased in size.  We will monitor this at this time.      She has a history of a malignant pericardial effusion.  No symptoms at this time.  We will repeat an echocardiogram today     Karena Cox PA-C  [facility]  [address], [place] [address] [phone]

## 2019-04-22 NOTE — PATIENT INSTRUCTIONS
[facility] Main Line: [phone]    Call triage nurse with chills and/or temperature greater than or equal to 100.4, uncontrolled nausea/vomiting, diarrhea, constipation, dizziness, shortness of breath, chest pain, bleeding, unexplained bruising, or any new/concerning symptoms, questions/concerns.     If you are having any concerning symptoms or wish to speak to a provider before your next infusion visit, please call your care coordinator or triage to notify them so we can adequately serve you.     For triage nurse, after hours, weekends, and holidays: [phone]

## 2019-04-22 NOTE — LETTER
4/22/2019      RE: Shan Marsh  [address]  [facility] [address]       Oncology/Hematology Visit Note  Apr 22, 2019    Reason for Visit: follow up of metastatic breast cancer, ER positive.     History of Present Illness: Shan Marsh is a 48 year old female without significant past medical history with a diagnosis of metastatic breast cancer after she presented with left shoulder pain. Shoulder MRI showed a soft tissue mass close to the distal clavicle which was concerning for malignancy. PET/CT on 5/9/17 showed hypermetabolic left axillary, supraclavicular, mediastinal and hilar nodes. She had biopsies of left axilla and hilar nodes showing metastatic breast cancer, strongly ER positive, moderately DC positive, and HER-2 kaitlin non-amplified. She originally had [id number], ER/DC positive, HER-2 negative breast cancer diagnosed in spring of 2012 s/p bilateral mastectomies and Tamoxifen from 8/2012-2/2013 and discontinued due to side effects. With development of metastatic disease, she was recommended treatment on BIG10 clinical trial with palbociclib and tamoxifen. She started treatment on 5/24/17 and required dose reduction for neutropenia. CT CAP and bone scan 1/11/2018 showing progressive disease with new bony lesions in the ribs, T spine, and possibly liver, lungs. She started Zoladex and abemiciclib on 1/12/2018.      In March 2018, she sought treatment at [facility] in [place], under the care of Dr. Brooks, where she received:  - Vallovex vaccine (per FDA, is in phase I trials here in [place])  - Insulin potentiated therapy with conjugated chemotherapy (5% chemotherapy, unclear what chemotherapy)  - Gina's toxins, IV vitamin C, IV Ozone, hyperbaric chamber, vitamin B17 infusion, vitamin CK3 IV, and coffee enemas PRN  - Also oral niacin, Lugol, acidol, pancreatin, and thyroid desiccated liver, per their regimen  - She was continued on Letrozole and advised to continue Zoladex.      She  met with Dr. hWitman and patient wished to continue her alternative regimen. Abemiciclib was placed on hold. She was continued on Zoladex and letrozole. While in [place], she required thoracentesis for symptomatic pleural effusion. A repeat PET in August 2018 showed widely progressive disease with multiple new bone mets involving the spine, ribs, pelvis, and sacrum; new lesions involving the left lateral thoracic/abdominal wall musculature and subcutaneous tissues, new metastases in the left adrenal gland, and recurrent large L pleural effusion. She was started on Xeloda 1500 mg BID on 9/5/18.      She was admitted 10/4-10/11/18 after a syncopal event/unwitnessed fall at home. She was found to have left pneumothorax and pericardial effusion with tamponade physiology. She had left chest tube placed in ED which then fell out on 10/10. Left pleurx placed on 10/11. She had pericardiocentesis on 10/7, drain removed 10/8.      She resumed Xeloda, and remained on this until January 2019.  At that time, she was switched to Doxil.    Interval History:  Shan returns today for follow up. Cycle 4 Doxil was held due to rash. She saw Dr. Nichols from dermatology on 4/19. After her third round of doxorubicin, she developed a diffuse red, itchy, painful rash primarily on her armpits, gluteal folds and groin as well as her elbows and hands. This has improved with time off. She was prescribed tacrolimus by Dr. Nichols but hasn't even picked up yet. She has been doing oatmeal baths and topical aquaphor. She has not been using triamcinolone. She did take benadryl at night to help her sleep. Axillary areas have healed. She has some mild erythema in gluteal folds, but otherwise rash has mostly resolved.     She has noticed fatigue with Doxil. Sometimes has chills, but no fever. Fatigue starts about day 7-10. Appetite is also lower. Has some nausea for which she is using cannabis and Zofran which is working. No vomiting. Feels a little  better the week before doxil.    Denies dizziness. She feels breathing is stable and no worsening dyspnea. She denies orthopnea, chest pain. No swelling in legs. She reports no new pain.  She has no neuropathy.  She continues to use cannabis regularly but with lower THC content. Remaining ROS negative.    Current Outpatient Medications   Medication Sig Dispense Refill     Ascorbic Acid (VITAMIN C PO) Take 1,000 mg by mouth daily       Digestive Enzymes (ENZYME DIGEST) CAPS Take 1 capsule by mouth daily       medical cannabis (Patient's own supply.  Not a prescription) (This is NOT a prescription, and does not certify that the patient has a qualifying medical condition for medical cannabis.  The purpose of this order is  to document that the patient reports taking medical cannabis.)       omeprazole (PRILOSEC) 20 MG DR capsule Take 2 capsules (40 mg) by mouth daily 180 capsule 3     ondansetron (ZOFRAN) 8 MG tablet Take 1 tablet (8 mg) by mouth every 8 hours as needed for nausea 90 tablet 3     tacrolimus (PROTOPIC) 0.1 % external ointment Apply topically 2 times daily 300 g 3     traMADol (ULTRAM) 50 MG tablet Take 1-2 tablets ( mg) by mouth every 6 hours as needed for moderate pain or headaches       triamcinolone (KENALOG) 0.1 % external cream Apply topically 2 times daily 30 g 1     VITAMIN D, CHOLECALCIFEROL, PO Take 1,000 Units by mouth daily        calcium carbonate (OS-KRYSTEN 500 MG [place]. CA) 1250 MG tablet Take 1 tablet by mouth daily       LORazepam (ATIVAN) 0.5 MG tablet Take 1 tablet (0.5 mg) by mouth every 4 hours as needed (Anxiety, Nausea/Vomiting or Sleep) (Patient not taking: Reported on 4/22/2019) 30 tablet 2     Omega 3-6-9 Fatty Acids (OMEGA 3-6-9 COMPLEX PO) Take 1 capsule by mouth daily       prochlorperazine (COMPAZINE) 10 MG tablet Take 1 tablet (10 mg) by mouth every 6 hours as needed (Nausea/Vomiting) (Patient not taking: Reported on 4/22/2019) 30 tablet 2       Physical  "Examination:  General: The patient is a pleasant female in no acute distress.  /74 (BP Location: Left arm, Patient Position: Sitting, Cuff Size: Adult Regular)   Pulse 91   Temp 98.5  F (36.9  C) (Oral)   Resp 18   Ht 1.626 m (5' 4.02\")   Wt 47.9 kg (105 lb 9.6 oz)   SpO2 100%   BMI 18.12 kg/m     Wt Readings from Last 10 Encounters:   04/22/19 47.9 kg (105 lb 9.6 oz)   04/15/19 48.8 kg (107 lb 9.6 oz)   04/08/19 48.7 kg (107 lb 6.4 oz)   03/21/19 48.8 kg (107 lb 9.6 oz)   03/13/19 49.2 kg (108 lb 6.4 oz)   02/19/19 47.8 kg (105 lb 6 oz)   02/08/19 49.4 kg (109 lb)   01/28/19 49.5 kg (109 lb 1.6 oz)   01/22/19 49.4 kg (108 lb 14.4 oz)   01/15/19 50.7 kg (111 lb 11.2 oz)     HEENT: EOMI, PERRL. Sclerae are anicteric. Oral mucosa is pink and moist with no lesions or thrush.   Lymph: No palpable cervical, supraclavicular, subclavicular or axillary lymphadenopathy.  Heart: Regular rate and rhythm.   Lungs: Breathing comfortably on room air. Decreased BS at bilateral bases, otherwise clear to auscultation bilaterally.   Abdomen: Bowel sounds present, soft, nontender with no palpable hepatosplenomegaly or masses.   Extremities: No lower extremity edema noted bilaterally.   Neuro: Cranial nerves II through XII are grossly intact.  Skin: Mild erythema of mid back. Some exfoliation of skin on right elbow with mild erythema. Mild erythema in gluteal folds. No other rashes, petechiae, or bruising noted on exposed skin.    Laboratory Data:  Results for NIKKIE HICKS (MRN [id number]) as of 4/22/2019 12:48   4/22/2019 11:37   Sodium 138   Potassium 4.3   Chloride 107   Carbon Dioxide 24   Urea Nitrogen 9   Creatinine 0.56   GFR Estimate >90   GFR Estimate If Black >90   Calcium 9.5   Anion Gap 8   Albumin 3.3 (L)   Protein Total 8.5   Bilirubin Total 0.2   Alkaline Phosphatase 147   ALT 22   AST 36   Glucose 86   WBC 4.4   Hemoglobin 12.9   Hematocrit 39.6   Platelet Count 286   RBC Count 4.26   MCV 93 "   MCH 30.3   MCHC 32.6   RDW 15.3 (H)   Diff Method Automated Method   % Neutrophils 59.8   % Lymphocytes 26.8   % Monocytes 11.1   % Eosinophils 1.1   % Basophils 0.7   % Immature Granulocytes 0.5   Nucleated RBCs 0   Absolute Neutrophil 2.6   Absolute Lymphocytes 1.2   Absolute Monocytes 0.5   Absolute Eosinophils 0.1   Absolute Basophils 0.0   Abs Immature Granulocytes 0.0   Absolute Nucleated RBC 0.0     Results for SHAN MARSH (MRN [id number]) as of 4/22/2019 16:01   Ref. Range 4/8/2019 12:17 4/15/2019 13:58 4/22/2019 11:37   CA 27-29 Latest Ref Range: 0 - 39 U/mL 962 (H) 1,011 (H) 832 (H)   Results for SHAN MARSH (MRN [id number]) as of 4/22/2019 16:01   Ref. Range 4/8/2019 12:17 4/15/2019 13:58 4/22/2019 11:37   CEA Latest Ref Range: 0 - 2.5 ug/L 79.9 (H) 80.6 (H) 83.4 (H)     Assessment and Plan:  Shan Marsh is a 48 year old woman with prior history of a T1 N0, ER/[place] positive, HER2-negative left breast cancer treated with bilateral mastectomies in 2012, now with metastatic disease (bones, pleural effusions, lungs, pericardial effusion, liver, left adrenal/retroperitoneal mets), ER/[place] positive, HER2 negative.  She progressed on palbociclib and tamoxifen, alternative therapies in [place].  She was on Xeloda, and is now on Doxil single agent.     Metastatic breast cancer: Started Doxil every four weeks on 1/22. Restaging CT on 4/5 with overall response after 3 cycles.  She has 1 potential new lesion in the T-spine at T5.  Her other areas appear to be sclerosing involving the bones.  She is tolerating the therapy well with mild fatigue but progressive rash. C4 held one week. Rash improving with doxil holiday. Spoke with Dr. Whitman. Will reduce Doxil from 40 mg/m2 to 35mg/m2.   --Will adjust schedule. Next doses due 5/20 and 6/17.  --On hormone suppression with Zoladex. Last dose 3/21. Spoke with Dr. Whitman. Plan to continue at this time and not check hormone levels as will be suppressed  from Zoladex and not helpful.  Plan to repeat her imaging in approximately another 3-4 cycles.      Chemotherapy induced eruption.  She was prescribed tacrolimus BID by Dr. Nichols from derm. She is also [facility] and lowering THC so that steroid treatment would be option in future if skin toxicity recurs or worsens     Bone mets.  She remains on Zometa every 3 months. Last dose 4/15     Bilateral pleural effusions.  She has previously had thoracentesis of both lungs.  We reviewed the fact that the right pleural effusion has increased in size.  We will monitor this at this time.      She has a history of a malignant pericardial effusion.  No symptoms at this time.  We will repeat an echocardiogram today     Karena Cox PA-C  [facility]  [address], [place] [address] [phone]      GRAY Wilhelm

## 2019-05-09 NOTE — TELEPHONE ENCOUNTER
Patient called triage this afternoon to report increased pain related to worsening mouth sores over the past couple of days. She reports that they are mostly on the left side of her tongue. She notes some throat pain with swallowing and mild difficulty eating. She is able to stay well hydrated. She has tried salt and soda rinses and notes increased pain when doing the rinses.     She is wondering if there is an additional medication that can be prescribed to ease the pain. She utilizes the [facility].     Mahogany Lowry RN BSN   [facility]  Triage

## 2019-05-09 NOTE — TELEPHONE ENCOUNTER
Script sent in to [facility] per verbal order from Dr. Whitman.    Mahogany Lowyr, RN BSN   Care Coordinator  [facility]

## 2019-05-10 NOTE — TELEPHONE ENCOUNTER
Shan called in reporting her mucositis has not improved since she picked up her MMW. She has been taking 5mL and swishing for a few seconds before spitting it out. She has one sore that is most problematic and is making eating and drinking difficult. I recommended Shan start taking the full 10mL for 10-20 seconds so it can coat her sores. She can take this 20-30 minutes before eating and again 30 minutes after eating to calm down the pain. Shan agreed to the plan and will try this for the next 48hrs; she will call the triage line if she is unable to eat or drink.

## 2019-05-13 NOTE — PROGRESS NOTES
Patient requesting her [employer] testing for assistance with the state with her insurance. Mailed [employer] results to patient's home as requested.  Answered all patient's questions and verbalized understanding. Sharifa Celeste RN, BSN.

## 2019-05-22 NOTE — PROGRESS NOTES
Infusion Nursing Note:  Shan Marsh presents today for Cycle 5 Day 1 Doxil and Zoladex.    Patient seen by provider today: Yes: GRAY Gibson saw patient prior to infusion   present during visit today: Not Applicable.    Intravenous Access:  Peripheral IV placed in lab.    Treatment Conditions:  Lab Results   Component Value Date    HGB 11.7 05/22/2019     Lab Results   Component Value Date    WBC 4.4 05/22/2019      Lab Results   Component Value Date    ANEU 2.5 05/22/2019     Lab Results   Component Value Date     05/22/2019      Lab Results   Component Value Date     05/22/2019                   Lab Results   Component Value Date    POTASSIUM 4.0 05/22/2019           Lab Results   Component Value Date    CR 0.57 05/22/2019                   Lab Results   Component Value Date    KRYSTEN 9.9 05/22/2019                Lab Results   Component Value Date    BILITOTAL 0.3 05/22/2019           Lab Results   Component Value Date    ALBUMIN 3.3 05/22/2019                    Lab Results   Component Value Date    ALT 19 05/22/2019           Lab Results   Component Value Date    AST 30 05/22/2019       Results reviewed, labs MET treatment parameters, ok to proceed with treatment.  ECHO/MUGA completed 4/22/19  EF 60-65%.      Post Infusion Assessment:  Patient tolerated infusion without incident.  Patient tolerated injection without incident.  Zoladex and lidocaine administered into right lower abdominal quadrant.  Patient iced prior to administration.  Blood return noted pre and post infusion.  Site patent and intact, free from redness, edema or discomfort.  No evidence of extravasations.  Access discontinued per protocol.       Discharge Plan:   Prescription refills given for Zofran.  Discharge instructions reviewed with: Patient.  Patient and/or family verbalized understanding of discharge instructions and all questions answered.  AVS to patient via [facility] [facility].  Patient will return 6/13/19  for next appointment with Dr. Whitman.  Next Doxil and Zoladex scheduled for 6/19/19.  Patient discharged in stable condition accompanied by: mother.  Departure Mode: Ambulatory.    Kandi Dempsey RN

## 2019-05-22 NOTE — Clinical Note
5/22/2019       RE: Shan Marsh  [address]  [facility] [address]     Dear Colleague,    Thank you for referring your patient, Shan Marsh, to the [facility]. Please see a copy of my visit note below.    Oncology/Hematology Visit Note  May 22, 2019    Reason for Visit: follow up of metastatic breast cancer, ER positive.     History of Present Illness: Shan Marsh is a 48 year old female without significant past medical history with a diagnosis of metastatic breast cancer after she presented with left shoulder pain. Shoulder MRI showed a soft tissue mass close to the distal clavicle which was concerning for malignancy. PET/CT on 5/9/17 showed hypermetabolic left axillary, supraclavicular, mediastinal and hilar nodes. She had biopsies of left axilla and hilar nodes showing metastatic breast cancer, strongly ER positive, moderately WI positive, and HER-2 kaitlin non-amplified. She originally had [id number], ER/WI positive, HER-2 negative breast cancer diagnosed in spring of 2012 s/p bilateral mastectomies and Tamoxifen from 8/2012-2/2013 and discontinued due to side effects. With development of metastatic disease, she was recommended treatment on BIG10 clinical trial with palbociclib and tamoxifen. She started treatment on 5/24/17 and required dose reduction for neutropenia. CT CAP and bone scan 1/11/2018 showing progressive disease with new bony lesions in the ribs, T spine, and possibly liver, lungs. She started Zoladex and abemiciclib on 1/12/2018.      In March 2018, she sought treatment at [facility] in [place], under the care of Dr. Brooks, where she received:  - Vallovex vaccine (per FDA, is in phase I trials here in [place])  - Insulin potentiated therapy with conjugated chemotherapy (5% chemotherapy, unclear what chemotherapy)  - Gina's toxins, IV vitamin C, IV Ozone, hyperbaric chamber, vitamin B17 infusion, vitamin CK3 IV, and coffee enemas PRN  - Also  oral niacin, Lugol, acidol, pancreatin, and thyroid desiccated liver, per their regimen  - She was continued on Letrozole and advised to continue Zoladex.      She met with Dr. Whitman and patient wished to continue her alternative regimen. Abemiciclib was placed on hold. She was continued on Zoladex and letrozole. While in [place], she required thoracentesis for symptomatic pleural effusion. A repeat PET in August 2018 showed widely progressive disease with multiple new bone mets involving the spine, ribs, pelvis, and sacrum; new lesions involving the left lateral thoracic/abdominal wall musculature and subcutaneous tissues, new metastases in the left adrenal gland, and recurrent large L pleural effusion. She was started on Xeloda 1500 mg BID on 9/5/18.      She was admitted 10/4-10/11/18 after a syncopal event/unwitnessed fall at home. She was found to have left pneumothorax and pericardial effusion with tamponade physiology. She had left chest tube placed in ED which then fell out on 10/10. Left pleurx placed on 10/11. She had pericardiocentesis on 10/7, drain removed 10/8.      She resumed Xeloda, and remained on this until January 2019.  At that time, she was switched to Doxil.    Interval History:  Shan returns today for follow up. She is here with her mother. She had mucositis after the last chemotherapy that was significant. She had a sore on the left side of tongue and some pain with swallowing in left side of throat. She was using MMW. Throat and tongue have now healed. Appetite a little lower. She has been pushing nutrition. She has been having more nausea. She spoke with a naturopathy who felt nausea is due to fungal etiology. She was recommended to take Kefir and probiotics. Nausea has improved. No diarrhea or constipation.    She continues to have some rash on elbows and knees. She is using a thick emollient and aloe. She is not using tacrlolimus or triamcinolone as she feels symptoms are manageable on  alternatives.    She has some fatigue. She feels recovered by the last 1.5 weeks. She feels a little more short of breath more easily but states she has been more active. No cough. Feels some left upper abdominal discomfort whereas previously after starting doxil all upper abdominal pain had resolved. Denies orthopnea, dizziness. Sometimes feels some chest pressure when lying down. No swelling in legs. She reports no other new pain.  She has no neuropathy.  She continues to use cannabis regularly for nausea along with Zofran. Remaining ROS negative.    Current Outpatient Medications   Medication Sig Dispense Refill     Ascorbic Acid (VITAMIN C PO) Take 1,000 mg by mouth daily       calcium carbonate (OS-KRYSTEN 500 MG Hoh. CA) 1250 MG tablet Take 1 tablet by mouth daily       Digestive Enzymes (ENZYME DIGEST) CAPS Take 1 capsule by mouth daily       LORazepam (ATIVAN) 0.5 MG tablet Take 1 tablet (0.5 mg) by mouth every 4 hours as needed (Anxiety, Nausea/Vomiting or Sleep) (Patient not taking: Reported on 4/22/2019) 30 tablet 2     magic mouthwash (ENTER INGREDIENTS IN COMMENTS) suspension Swish and spit 5 ML-10 ML as needed four to six times per day 240 mL 0     medical cannabis (Patient's own supply.  Not a prescription) (This is NOT a prescription, and does not certify that the patient has a qualifying medical condition for medical cannabis.  The purpose of this order is  to document that the patient reports taking medical cannabis.)       Omega 3-6-9 Fatty Acids (OMEGA 3-6-9 COMPLEX PO) Take 1 capsule by mouth daily       omeprazole (PRILOSEC) 20 MG DR capsule Take 2 capsules (40 mg) by mouth daily 180 capsule 3     ondansetron (ZOFRAN) 8 MG tablet Take 1 tablet (8 mg) by mouth every 8 hours as needed for nausea 90 tablet 3     prochlorperazine (COMPAZINE) 10 MG tablet Take 1 tablet (10 mg) by mouth every 6 hours as needed (Nausea/Vomiting) (Patient not taking: Reported on 4/22/2019) 30 tablet 2     tacrolimus  (PROTOPIC) 0.1 % external ointment Apply topically 2 times daily 300 g 3     traMADol (ULTRAM) 50 MG tablet Take 1-2 tablets ( mg) by mouth every 6 hours as needed for moderate pain or headaches       triamcinolone (KENALOG) 0.1 % external cream Apply topically 2 times daily 30 g 1     VITAMIN D, CHOLECALCIFEROL, PO Take 1,000 Units by mouth daily          Physical Examination:  General: The patient is a pleasant female in no acute distress.  There were no vitals taken for this visit.  Wt Readings from Last 10 Encounters:   04/22/19 47.9 kg (105 lb 9.6 oz)   04/15/19 48.8 kg (107 lb 9.6 oz)   04/08/19 48.7 kg (107 lb 6.4 oz)   03/21/19 48.8 kg (107 lb 9.6 oz)   03/13/19 49.2 kg (108 lb 6.4 oz)   02/19/19 47.8 kg (105 lb 6 oz)   02/08/19 49.4 kg (109 lb)   01/28/19 49.5 kg (109 lb 1.6 oz)   01/22/19 49.4 kg (108 lb 14.4 oz)   01/15/19 50.7 kg (111 lb 11.2 oz)     HEENT: EOMI, PERRL. Sclerae are anicteric. Oral mucosa is pink and moist with no lesions or thrush.   Lymph: No palpable cervical, supraclavicular, subclavicular or axillary lymphadenopathy.  Heart: Regular rate and rhythm.   Lungs: Breathing comfortably on room air. Decreased BS at bilateral bases, R>L, otherwise clear to auscultation bilaterally.   Abdomen: Bowel sounds present, soft, nontender with no palpable hepatosplenomegaly or masses.   Extremities: No lower extremity edema noted bilaterally.   Neuro: Cranial nerves II through XII are grossly intact.  Skin: Mild erythema of skin on left elbow. Erythematous, maculopapular rash on right proximal leg. No other rashes, petechiae, or bruising noted on exposed skin.    Laboratory Data:  Results for NIKKIE HICKS (MRN [id number]) as of 5/22/2019 15:01   Ref. Range 5/22/2019 14:17   Sodium Latest Ref Range: 133 - 144 mmol/L 137   Potassium Latest Ref Range: 3.4 - 5.3 mmol/L 4.0   Chloride Latest Ref Range: 94 - 109 mmol/L 102   Carbon Dioxide Latest Ref Range: 20 - 32 mmol/L 27   Urea  Nitrogen Latest Ref Range: 7 - 30 mg/dL 9   Creatinine Latest Ref Range: 0.52 - 1.04 mg/dL 0.57   GFR Estimate Latest Ref Range: >60 mL/min/1.73_m2 >90   GFR Estimate If Black Latest Ref Range: >60 mL/min/1.73_m2 >90   Calcium Latest Ref Range: 8.5 - 10.1 mg/dL 9.9   Anion Gap Latest Ref Range: 3 - 14 mmol/L 8   Albumin Latest Ref Range: 3.4 - 5.0 g/dL 3.3 (L)   Protein Total Latest Ref Range: 6.8 - 8.8 g/dL 8.5   Bilirubin Total Latest Ref Range: 0.2 - 1.3 mg/dL 0.3   Alkaline Phosphatase Latest Ref Range: 40 - 150 U/L 105   ALT Latest Ref Range: 0 - 50 U/L 19   AST Latest Ref Range: 0 - 45 U/L 30   Glucose Latest Ref Range: 70 - 99 mg/dL 79   WBC Latest Ref Range: 4.0 - 11.0 10e9/L 4.4   Hemoglobin Latest Ref Range: 11.7 - 15.7 g/dL 11.7   Hematocrit Latest Ref Range: 35.0 - 47.0 % 36.4   Platelet Count Latest Ref Range: 150 - 450 10e9/L 285   RBC Count Latest Ref Range: 3.8 - 5.2 10e12/L 4.00   MCV Latest Ref Range: 78 - 100 fl 91   MCH Latest Ref Range: 26.5 - 33.0 pg 29.3   MCHC Latest Ref Range: 31.5 - 36.5 g/dL 32.1   RDW Latest Ref Range: 10.0 - 15.0 % 14.5   Diff Method Unknown Automated Method   % Neutrophils Latest Units: % 57.6   % Lymphocytes Latest Units: % 27.0   % Monocytes Latest Units: % 12.4   % Eosinophils Latest Units: % 1.6   % Basophils Latest Units: % 0.7   % Immature Granulocytes Latest Units: % 0.7   Nucleated RBCs Latest Ref Range: 0 /100 0   Absolute Neutrophil Latest Ref Range: 1.6 - 8.3 10e9/L 2.5   Absolute Lymphocytes Latest Ref Range: 0.8 - 5.3 10e9/L 1.2   Absolute Monocytes Latest Ref Range: 0.0 - 1.3 10e9/L 0.5   Absolute Eosinophils Latest Ref Range: 0.0 - 0.7 10e9/L 0.1   Absolute Basophils Latest Ref Range: 0.0 - 0.2 10e9/L 0.0   Abs Immature Granulocytes Latest Ref Range: 0 - 0.4 10e9/L 0.0   Absolute Nucleated RBC Unknown 0.0     Results for NIKKIE HICKS (MRN [id number]) as of 5/23/2019 07:51   4/15/2019 13:58 4/22/2019 11:37 4/22/2019 15:19 5/2/2019 15:43  5/22/2019 14:17   CA 27-29 1,011 (H) 832 (H)   594 (H)   Results for SHAN MARSH (MRN [id number]) as of 5/23/2019 07:51   4/15/2019 13:58 4/22/2019 11:37 4/22/2019 15:19 5/2/2019 15:43 5/22/2019 14:17   CEA 80.6 (H) 83.4 (H)   71.5 (H)     Assessment and Plan:  Shan Marsh is a 48 year old woman with prior history of a T1 N0, ER/NC positive, HER2-negative left breast cancer treated with bilateral mastectomies in 2012, now with metastatic disease (bones, pleural effusions, lungs, pericardial effusion, liver, left adrenal/retroperitoneal mets), ER/NC positive, HER2 negative.  She progressed on palbociclib and tamoxifen, alternative therapies in [place].  She was on Xeloda, and is now on Doxil single agent.     Metastatic breast cancer: Started Doxil every four weeks on 1/22. Restaging CT on 4/5 with overall response after 3 cycles.  She has 1 potential new lesion in the T-spine at T5.  Her other areas appear to be sclerosing involving the bones. C4 held one week due to rash. Resumed on 4/22 but dose reduced from 40 mg/m2 to 35mg/m2. She had some mucositis this past cycle as well as more fatigue and nausea. She declined any adjustment to her nausea pre-medications. She prefers to manage with Zofran and medical cannabis.  --On hormone suppression with Zoladex. Last dose 4/22. Due today  --Will proceed with C5 today  --Plan to re-image after C6, but Shan feels the side effects have been cumulative and is wondering if re-staging could be moved up to prior to C6 in case Doxil is not effective.     Chemotherapy induced eruption.  She was prescribed tacrolimus BID by Dr. Nichols from derm but is not using this as rash has improved without it and with using other OTC topical treatments.      Bone mets.  She remains on Zometa every 3 months. Last dose 4/15. Next dose due in August    Mucositis: She has MMW prn and has been doing salt/soda rinses     Bilateral pleural effusions.  She has previously had  thoracentesis of both lungs.  We reviewed the fact that the right pleural effusion has increased in size.  We will monitor this at this time.      She has a history of a malignant pericardial effusion.  No symptoms at this time.  We will repeat an echocardiogram today     Karena Cox PA-C  [facility] [address] [id number] [phone]      Again, thank you for allowing me to participate in the care of your patient.      Sincerely,    GRAY Wilhelm

## 2019-05-22 NOTE — PATIENT INSTRUCTIONS
Contact Numbers  [facility]: [phone] (for scheduling changes)  Triage: [phone] (for symptoms)    Please call the [facility] Triage line if you experience a temperature greater than or equal to 100.4, shaking chills, have uncontrolled nausea, vomiting and/or diarrhea, dizziness, shortness of breath, chest pain, bleeding, unexplained bruising, or if you have any other new/concerning symptoms, questions or concerns.     If you are having any concerning symptoms or wish to speak to a provider before your next infusion visit, please call your care coordinator or triage to notify them so we can adequately serve you.     If you need a refill on a narcotic prescription or other medication, please call triage before your infusion appointment.

## 2019-05-22 NOTE — PROGRESS NOTES
Oncology/Hematology Visit Note  May 22, 2019    Reason for Visit: follow up of metastatic breast cancer, ER positive.     History of Present Illness: Shan Marsh is a 48 year old female without significant past medical history with a diagnosis of metastatic breast cancer after she presented with left shoulder pain. Shoulder MRI showed a soft tissue mass close to the distal clavicle which was concerning for malignancy. PET/CT on 5/9/17 showed hypermetabolic left axillary, supraclavicular, mediastinal and hilar nodes. She had biopsies of left axilla and hilar nodes showing metastatic breast cancer, strongly ER positive, moderately ND positive, and HER-2 kaitlin non-amplified. She originally had [id number], ER/ND positive, HER-2 negative breast cancer diagnosed in spring of 2012 s/p bilateral mastectomies and Tamoxifen from 8/2012-2/2013 and discontinued due to side effects. With development of metastatic disease, she was recommended treatment on BIG10 clinical trial with palbociclib and tamoxifen. She started treatment on 5/24/17 and required dose reduction for neutropenia. CT CAP and bone scan 1/11/2018 showing progressive disease with new bony lesions in the ribs, T spine, and possibly liver, lungs. She started Zoladex and abemiciclib on 1/12/2018.      In March 2018, she sought treatment at [facility] in [place], under the care of Dr. Brooks, where she received:  - Vallovex vaccine (per FDA, is in phase I trials here in [place])  - Insulin potentiated therapy with conjugated chemotherapy (5% chemotherapy, unclear what chemotherapy)  - Gina's toxins, IV vitamin C, IV Ozone, hyperbaric chamber, vitamin B17 infusion, vitamin CK3 IV, and coffee enemas PRN  - Also oral niacin, Lugol, acidol, pancreatin, and thyroid desiccated liver, per their regimen  - She was continued on Letrozole and advised to continue Zoladex.      She met with Dr. Whitman and patient wished to continue her alternative regimen. Abemiciclib was  placed on hold. She was continued on Zoladex and letrozole. While in [place], she required thoracentesis for symptomatic pleural effusion. A repeat PET in August 2018 showed widely progressive disease with multiple new bone mets involving the spine, ribs, pelvis, and sacrum; new lesions involving the left lateral thoracic/abdominal wall musculature and subcutaneous tissues, new metastases in the left adrenal gland, and recurrent large L pleural effusion. She was started on Xeloda 1500 mg BID on 9/5/18.      She was admitted 10/4-10/11/18 after a syncopal event/unwitnessed fall at home. She was found to have left pneumothorax and pericardial effusion with tamponade physiology. She had left chest tube placed in ED which then fell out on 10/10. Left pleurx placed on 10/11. She had pericardiocentesis on 10/7, drain removed 10/8.      She resumed Xeloda, and remained on this until January 2019.  At that time, she was switched to Doxil.    Interval History:  Shan returns today for follow up. She is here with her mother. She had mucositis after the last chemotherapy that was significant. She had a sore on the left side of tongue and some pain with swallowing in left side of throat. She was using MMW. Throat and tongue have now healed. Appetite a little lower. She has been pushing nutrition. She has been having more nausea. She spoke with a naturopathy who felt nausea is due to fungal etiology. She was recommended to take Kefir and probiotics. Nausea has improved. No diarrhea or constipation.    She continues to have some rash on elbows and knees. She is using a thick emollient and aloe. She is not using tacrlolimus or triamcinolone as she feels symptoms are manageable on alternatives.    She has some fatigue. She feels recovered by the last 1.5 weeks. She feels a little more short of breath more easily but states she has been more active. No cough. Feels some left upper abdominal discomfort whereas previously after  starting doxil all upper abdominal pain had resolved. Denies orthopnea, dizziness. Sometimes feels some chest pressure when lying down. No swelling in legs. She reports no other new pain.  She has no neuropathy.  She continues to use cannabis regularly for nausea along with Zofran. Remaining ROS negative.    Current Outpatient Medications   Medication Sig Dispense Refill     Ascorbic Acid (VITAMIN C PO) Take 1,000 mg by mouth daily       calcium carbonate (OS-KRYSTEN 500 MG [place]. [place]) 1250 MG tablet Take 1 tablet by mouth daily       Digestive Enzymes (ENZYME DIGEST) CAPS Take 1 capsule by mouth daily       LORazepam (ATIVAN) 0.5 MG tablet Take 1 tablet (0.5 mg) by mouth every 4 hours as needed (Anxiety, Nausea/Vomiting or Sleep) (Patient not taking: Reported on 4/22/2019) 30 tablet 2     magic mouthwash (ENTER INGREDIENTS IN COMMENTS) suspension Swish and spit 5 ML-10 ML as needed four to six times per day 240 mL 0     medical cannabis (Patient's own supply.  Not a prescription) (This is NOT a prescription, and does not certify that the patient has a qualifying medical condition for medical cannabis.  The purpose of this order is  to document that the patient reports taking medical cannabis.)       Omega 3-6-9 Fatty Acids (OMEGA 3-6-9 COMPLEX PO) Take 1 capsule by mouth daily       omeprazole (PRILOSEC) 20 MG DR capsule Take 2 capsules (40 mg) by mouth daily 180 capsule 3     ondansetron (ZOFRAN) 8 MG tablet Take 1 tablet (8 mg) by mouth every 8 hours as needed for nausea 90 tablet 3     prochlorperazine (COMPAZINE) 10 MG tablet Take 1 tablet (10 mg) by mouth every 6 hours as needed (Nausea/Vomiting) (Patient not taking: Reported on 4/22/2019) 30 tablet 2     tacrolimus (PROTOPIC) 0.1 % external ointment Apply topically 2 times daily 300 g 3     traMADol (ULTRAM) 50 MG tablet Take 1-2 tablets ( mg) by mouth every 6 hours as needed for moderate pain or headaches       triamcinolone (KENALOG) 0.1 % external cream  Apply topically 2 times daily 30 g 1     VITAMIN D, CHOLECALCIFEROL, PO Take 1,000 Units by mouth daily          Physical Examination:  General: The patient is a pleasant female in no acute distress.  There were no vitals taken for this visit.  Wt Readings from Last 10 Encounters:   04/22/19 47.9 kg (105 lb 9.6 oz)   04/15/19 48.8 kg (107 lb 9.6 oz)   04/08/19 48.7 kg (107 lb 6.4 oz)   03/21/19 48.8 kg (107 lb 9.6 oz)   03/13/19 49.2 kg (108 lb 6.4 oz)   02/19/19 47.8 kg (105 lb 6 oz)   02/08/19 49.4 kg (109 lb)   01/28/19 49.5 kg (109 lb 1.6 oz)   01/22/19 49.4 kg (108 lb 14.4 oz)   01/15/19 50.7 kg (111 lb 11.2 oz)     HEENT: EOMI, PERRL. Sclerae are anicteric. Oral mucosa is pink and moist with no lesions or thrush.   Lymph: No palpable cervical, supraclavicular, subclavicular or axillary lymphadenopathy.  Heart: Regular rate and rhythm.   Lungs: Breathing comfortably on room air. Decreased BS at bilateral bases, R>L, otherwise clear to auscultation bilaterally.   Abdomen: Bowel sounds present, soft, nontender with no palpable hepatosplenomegaly or masses.   Extremities: No lower extremity edema noted bilaterally.   Neuro: Cranial nerves II through XII are grossly intact.  Skin: Mild erythema of skin on left elbow. Erythematous, maculopapular rash on right proximal leg. No other rashes, petechiae, or bruising noted on exposed skin.    Laboratory Data:  Results for NIKKIE HICKS (MRN [id number]) as of 5/22/2019 15:01   Ref. Range 5/22/2019 14:17   Sodium Latest Ref Range: 133 - 144 mmol/L 137   Potassium Latest Ref Range: 3.4 - 5.3 mmol/L 4.0   Chloride Latest Ref Range: 94 - 109 mmol/L 102   Carbon Dioxide Latest Ref Range: 20 - 32 mmol/L 27   Urea Nitrogen Latest Ref Range: 7 - 30 mg/dL 9   Creatinine Latest Ref Range: 0.52 - 1.04 mg/dL 0.57   GFR Estimate Latest Ref Range: >60 mL/min/1.73_m2 >90   GFR Estimate If Black Latest Ref Range: >60 mL/min/1.73_m2 >90   Calcium Latest Ref Range: 8.5 - 10.1  mg/dL 9.9   Anion Gap Latest Ref Range: 3 - 14 mmol/L 8   Albumin Latest Ref Range: 3.4 - 5.0 g/dL 3.3 (L)   Protein Total Latest Ref Range: 6.8 - 8.8 g/dL 8.5   Bilirubin Total Latest Ref Range: 0.2 - 1.3 mg/dL 0.3   Alkaline Phosphatase Latest Ref Range: 40 - 150 U/L 105   ALT Latest Ref Range: 0 - 50 U/L 19   AST Latest Ref Range: 0 - 45 U/L 30   Glucose Latest Ref Range: 70 - 99 mg/dL 79   WBC Latest Ref Range: 4.0 - 11.0 10e9/L 4.4   Hemoglobin Latest Ref Range: 11.7 - 15.7 g/dL 11.7   Hematocrit Latest Ref Range: 35.0 - 47.0 % 36.4   Platelet Count Latest Ref Range: 150 - 450 10e9/L 285   RBC Count Latest Ref Range: 3.8 - 5.2 10e12/L 4.00   MCV Latest Ref Range: 78 - 100 fl 91   MCH Latest Ref Range: 26.5 - 33.0 pg 29.3   MCHC Latest Ref Range: 31.5 - 36.5 g/dL 32.1   RDW Latest Ref Range: 10.0 - 15.0 % 14.5   Diff Method Unknown Automated Method   % Neutrophils Latest Units: % 57.6   % Lymphocytes Latest Units: % 27.0   % Monocytes Latest Units: % 12.4   % Eosinophils Latest Units: % 1.6   % Basophils Latest Units: % 0.7   % Immature Granulocytes Latest Units: % 0.7   Nucleated RBCs Latest Ref Range: 0 /100 0   Absolute Neutrophil Latest Ref Range: 1.6 - 8.3 10e9/L 2.5   Absolute Lymphocytes Latest Ref Range: 0.8 - 5.3 10e9/L 1.2   Absolute Monocytes Latest Ref Range: 0.0 - 1.3 10e9/L 0.5   Absolute Eosinophils Latest Ref Range: 0.0 - 0.7 10e9/L 0.1   Absolute Basophils Latest Ref Range: 0.0 - 0.2 10e9/L 0.0   Abs Immature Granulocytes Latest Ref Range: 0 - 0.4 10e9/L 0.0   Absolute Nucleated RBC Unknown 0.0     Results for NIKKIE HICKS (MRN [id number]) as of 5/23/2019 07:51   4/15/2019 13:58 4/22/2019 11:37 4/22/2019 15:19 5/2/2019 15:43 5/22/2019 14:17   CA 27-29 1,011 (H) 832 (H)   594 (H)   Results for FABIOLA NIKKIE BALAJI (MRN [id number]) as of 5/23/2019 07:51   4/15/2019 13:58 4/22/2019 11:37 4/22/2019 15:19 5/2/2019 15:43 5/22/2019 14:17   CEA 80.6 (H) 83.4 (H)   71.5 (H)     Assessment  and Plan:  Shan Marsh is a 48 year old woman with prior history of a T1 N0, ER/MO positive, HER2-negative left breast cancer treated with bilateral mastectomies in 2012, now with metastatic disease (bones, pleural effusions, lungs, pericardial effusion, liver, left adrenal/retroperitoneal mets), ER/MO positive, HER2 negative.  She progressed on palbociclib and tamoxifen, alternative therapies in [place].  She was on Xeloda, and is now on Doxil single agent.     Metastatic breast cancer: Started Doxil every four weeks on 1/22. Restaging CT on 4/5 with overall response after 3 cycles.  She has 1 potential new lesion in the T-spine at T5.  Her other areas appear to be sclerosing involving the bones. C4 held one week due to rash. Resumed on 4/22 but dose reduced from 40 mg/m2 to 35mg/m2. She had some mucositis this past cycle as well as more fatigue and nausea. She declined any adjustment to her nausea pre-medications. She prefers to manage with Zofran and medical cannabis.  --On hormone suppression with Zoladex. Last dose 4/22. Due today  --Will proceed with C5 today  --Plan to re-image after C6, but Shan feels the side effects have been cumulative and is wondering if re-staging could be moved up to prior to C6 in case Doxil is not effective. Tumor markers dropping. I let Shan know via MyChart.     Chemotherapy induced eruption.  She was prescribed tacrolimus BID by Dr. Nichols from derm but is not using this as rash has improved without it and with using other OTC topical treatments.      Bone mets.  She remains on Zometa every 3 months. Last dose 4/15. Next dose due in August    Mucositis: She has MMW prn and has been doing salt/soda rinses     Bilateral pleural effusions.  She has previously had thoracentesis of both lungs.  We reviewed the fact that the right pleural effusion has increased in size.  We will monitor this at this time.      She has a history of a malignant pericardial effusion.  No symptoms at  this time.  We will repeat an echocardiogram today     Karena Cox PA-C  [facility] [address] [id number] [place] [address] [phone]

## 2019-05-22 NOTE — LETTER
5/22/2019      RE: Shan Marsh  [address]  [facility] [address]       Oncology/Hematology Visit Note  May 22, 2019    Reason for Visit: follow up of metastatic breast cancer, ER positive.     History of Present Illness: Shan Marsh is a 48 year old female without significant past medical history with a diagnosis of metastatic breast cancer after she presented with left shoulder pain. Shoulder MRI showed a soft tissue mass close to the distal clavicle which was concerning for malignancy. PET/CT on 5/9/17 showed hypermetabolic left axillary, supraclavicular, mediastinal and hilar nodes. She had biopsies of left axilla and hilar nodes showing metastatic breast cancer, strongly ER positive, moderately MA positive, and HER-2 kaitlin non-amplified. She originally had [id number], ER/MA positive, HER-2 negative breast cancer diagnosed in spring of 2012 s/p bilateral mastectomies and Tamoxifen from 8/2012-2/2013 and discontinued due to side effects. With development of metastatic disease, she was recommended treatment on BIG10 clinical trial with palbociclib and tamoxifen. She started treatment on 5/24/17 and required dose reduction for neutropenia. CT CAP and bone scan 1/11/2018 showing progressive disease with new bony lesions in the ribs, T spine, and possibly liver, lungs. She started Zoladex and abemiciclib on 1/12/2018.      In March 2018, she sought treatment at [facility] in [place], under the care of Dr. Brooks, where she received:  - Vallovex vaccine (per FDA, is in phase I trials here in [place])  - Insulin potentiated therapy with conjugated chemotherapy (5% chemotherapy, unclear what chemotherapy)  - Gina's toxins, IV vitamin C, IV Ozone, hyperbaric chamber, vitamin B17 infusion, vitamin CK3 IV, and coffee enemas PRN  - Also oral niacin, Lugol, acidol, pancreatin, and thyroid desiccated liver, per their regimen  - She was continued on Letrozole and advised to continue Zoladex.      She  met with Dr. Whitman and patient wished to continue her alternative regimen. Abemiciclib was placed on hold. She was continued on Zoladex and letrozole. While in [place], she required thoracentesis for symptomatic pleural effusion. A repeat PET in August 2018 showed widely progressive disease with multiple new bone mets involving the spine, ribs, pelvis, and sacrum; new lesions involving the left lateral thoracic/abdominal wall musculature and subcutaneous tissues, new metastases in the left adrenal gland, and recurrent large L pleural effusion. She was started on Xeloda 1500 mg BID on 9/5/18.      She was admitted 10/4-10/11/18 after a syncopal event/unwitnessed fall at home. She was found to have left pneumothorax and pericardial effusion with tamponade physiology. She had left chest tube placed in ED which then fell out on 10/10. Left pleurx placed on 10/11. She had pericardiocentesis on 10/7, drain removed 10/8.      She resumed Xeloda, and remained on this until January 2019.  At that time, she was switched to Doxil.    Interval History:  Shan returns today for follow up. She is here with her mother. She had mucositis after the last chemotherapy that was significant. She had a sore on the left side of tongue and some pain with swallowing in left side of throat. She was using MMW. Throat and tongue have now healed. Appetite a little lower. She has been pushing nutrition. She has been having more nausea. She spoke with a naturopathy who felt nausea is due to fungal etiology. She was recommended to take Kefir and probiotics. Nausea has improved. No diarrhea or constipation.    She continues to have some rash on elbows and knees. She is using a thick emollient and aloe. She is not using tacrlolimus or triamcinolone as she feels symptoms are manageable on alternatives.    She has some fatigue. She feels recovered by the last 1.5 weeks. She feels a little more short of breath more easily but states she has been more  active. No cough. Feels some left upper abdominal discomfort whereas previously after starting doxil all upper abdominal pain had resolved. Denies orthopnea, dizziness. Sometimes feels some chest pressure when lying down. No swelling in legs. She reports no other new pain.  She has no neuropathy.  She continues to use cannabis regularly for nausea along with Zofran. Remaining ROS negative.    Current Outpatient Medications   Medication Sig Dispense Refill     Ascorbic Acid (VITAMIN C PO) Take 1,000 mg by mouth daily       calcium carbonate (OS-KRYSTEN 500 MG [place]. [place]) 1250 MG tablet Take 1 tablet by mouth daily       Digestive Enzymes (ENZYME DIGEST) CAPS Take 1 capsule by mouth daily       LORazepam (ATIVAN) 0.5 MG tablet Take 1 tablet (0.5 mg) by mouth every 4 hours as needed (Anxiety, Nausea/Vomiting or Sleep) (Patient not taking: Reported on 4/22/2019) 30 tablet 2     magic mouthwash (ENTER INGREDIENTS IN COMMENTS) suspension Swish and spit 5 ML-10 ML as needed four to six times per day 240 mL 0     medical cannabis (Patient's own supply.  Not a prescription) (This is NOT a prescription, and does not certify that the patient has a qualifying medical condition for medical cannabis.  The purpose of this order is  to document that the patient reports taking medical cannabis.)       Omega 3-6-9 Fatty Acids (OMEGA 3-6-9 COMPLEX PO) Take 1 capsule by mouth daily       omeprazole (PRILOSEC) 20 MG DR capsule Take 2 capsules (40 mg) by mouth daily 180 capsule 3     ondansetron (ZOFRAN) 8 MG tablet Take 1 tablet (8 mg) by mouth every 8 hours as needed for nausea 90 tablet 3     prochlorperazine (COMPAZINE) 10 MG tablet Take 1 tablet (10 mg) by mouth every 6 hours as needed (Nausea/Vomiting) (Patient not taking: Reported on 4/22/2019) 30 tablet 2     tacrolimus (PROTOPIC) 0.1 % external ointment Apply topically 2 times daily 300 g 3     traMADol (ULTRAM) 50 MG tablet Take 1-2 tablets ( mg) by mouth every 6 hours as  needed for moderate pain or headaches       triamcinolone (KENALOG) 0.1 % external cream Apply topically 2 times daily 30 g 1     VITAMIN D, CHOLECALCIFEROL, PO Take 1,000 Units by mouth daily          Physical Examination:  General: The patient is a pleasant female in no acute distress.  There were no vitals taken for this visit.  Wt Readings from Last 10 Encounters:   04/22/19 47.9 kg (105 lb 9.6 oz)   04/15/19 48.8 kg (107 lb 9.6 oz)   04/08/19 48.7 kg (107 lb 6.4 oz)   03/21/19 48.8 kg (107 lb 9.6 oz)   03/13/19 49.2 kg (108 lb 6.4 oz)   02/19/19 47.8 kg (105 lb 6 oz)   02/08/19 49.4 kg (109 lb)   01/28/19 49.5 kg (109 lb 1.6 oz)   01/22/19 49.4 kg (108 lb 14.4 oz)   01/15/19 50.7 kg (111 lb 11.2 oz)     HEENT: EOMI, PERRL. Sclerae are anicteric. Oral mucosa is pink and moist with no lesions or thrush.   Lymph: No palpable cervical, supraclavicular, subclavicular or axillary lymphadenopathy.  Heart: Regular rate and rhythm.   Lungs: Breathing comfortably on room air. Decreased BS at bilateral bases, R>L, otherwise clear to auscultation bilaterally.   Abdomen: Bowel sounds present, soft, nontender with no palpable hepatosplenomegaly or masses.   Extremities: No lower extremity edema noted bilaterally.   Neuro: Cranial nerves II through XII are grossly intact.  Skin: Mild erythema of skin on left elbow. Erythematous, maculopapular rash on right proximal leg. No other rashes, petechiae, or bruising noted on exposed skin.    Laboratory Data:  Results for NIKKIE HICKS (MRN [id number]) as of 5/22/2019 15:01   Ref. Range 5/22/2019 14:17   Sodium Latest Ref Range: 133 - 144 mmol/L 137   Potassium Latest Ref Range: 3.4 - 5.3 mmol/L 4.0   Chloride Latest Ref Range: 94 - 109 mmol/L 102   Carbon Dioxide Latest Ref Range: 20 - 32 mmol/L 27   Urea Nitrogen Latest Ref Range: 7 - 30 mg/dL 9   Creatinine Latest Ref Range: 0.52 - 1.04 mg/dL 0.57   GFR Estimate Latest Ref Range: >60 mL/min/1.73_m2 >90   GFR Estimate If  Black Latest Ref Range: >60 mL/min/1.73_m2 >90   Calcium Latest Ref Range: 8.5 - 10.1 mg/dL 9.9   Anion Gap Latest Ref Range: 3 - 14 mmol/L 8   Albumin Latest Ref Range: 3.4 - 5.0 g/dL 3.3 (L)   Protein Total Latest Ref Range: 6.8 - 8.8 g/dL 8.5   Bilirubin Total Latest Ref Range: 0.2 - 1.3 mg/dL 0.3   Alkaline Phosphatase Latest Ref Range: 40 - 150 U/L 105   ALT Latest Ref Range: 0 - 50 U/L 19   AST Latest Ref Range: 0 - 45 U/L 30   Glucose Latest Ref Range: 70 - 99 mg/dL 79   WBC Latest Ref Range: 4.0 - 11.0 10e9/L 4.4   Hemoglobin Latest Ref Range: 11.7 - 15.7 g/dL 11.7   Hematocrit Latest Ref Range: 35.0 - 47.0 % 36.4   Platelet Count Latest Ref Range: 150 - 450 10e9/L 285   RBC Count Latest Ref Range: 3.8 - 5.2 10e12/L 4.00   MCV Latest Ref Range: 78 - 100 fl 91   MCH Latest Ref Range: 26.5 - 33.0 pg 29.3   MCHC Latest Ref Range: 31.5 - 36.5 g/dL 32.1   RDW Latest Ref Range: 10.0 - 15.0 % 14.5   Diff Method Unknown Automated Method   % Neutrophils Latest Units: % 57.6   % Lymphocytes Latest Units: % 27.0   % Monocytes Latest Units: % 12.4   % Eosinophils Latest Units: % 1.6   % Basophils Latest Units: % 0.7   % Immature Granulocytes Latest Units: % 0.7   Nucleated RBCs Latest Ref Range: 0 /100 0   Absolute Neutrophil Latest Ref Range: 1.6 - 8.3 10e9/L 2.5   Absolute Lymphocytes Latest Ref Range: 0.8 - 5.3 10e9/L 1.2   Absolute Monocytes Latest Ref Range: 0.0 - 1.3 10e9/L 0.5   Absolute Eosinophils Latest Ref Range: 0.0 - 0.7 10e9/L 0.1   Absolute Basophils Latest Ref Range: 0.0 - 0.2 10e9/L 0.0   Abs Immature Granulocytes Latest Ref Range: 0 - 0.4 10e9/L 0.0   Absolute Nucleated RBC Unknown 0.0     Results for NIKKIE HICKSTENZINDEWEY (MRN [id number]) as of 5/23/2019 07:51   4/15/2019 13:58 4/22/2019 11:37 4/22/2019 15:19 5/2/2019 15:43 5/22/2019 14:17   CA 27-29 1,011 (H) 832 (H)   594 (H)   Results for NIKKIE HICKSTENZINDEWEY (MRN [id number]) as of 5/23/2019 07:51   4/15/2019 13:58 4/22/2019 11:37 4/22/2019  15:19 5/2/2019 15:43 5/22/2019 14:17   CEA 80.6 (H) 83.4 (H)   71.5 (H)     Assessment and Plan:  Shan Marsh is a 48 year old woman with prior history of a T1 N0, ER/IL positive, HER2-negative left breast cancer treated with bilateral mastectomies in 2012, now with metastatic disease (bones, pleural effusions, lungs, pericardial effusion, liver, left adrenal/retroperitoneal mets), ER/IL positive, HER2 negative.  She progressed on palbociclib and tamoxifen, alternative therapies in [place].  She was on Xeloda, and is now on Doxil single agent.     Metastatic breast cancer: Started Doxil every four weeks on 1/22. Restaging CT on 4/5 with overall response after 3 cycles.  She has 1 potential new lesion in the T-spine at T5.  Her other areas appear to be sclerosing involving the bones. C4 held one week due to rash. Resumed on 4/22 but dose reduced from 40 mg/m2 to 35mg/m2. She had some mucositis this past cycle as well as more fatigue and nausea. She declined any adjustment to her nausea pre-medications. She prefers to manage with Zofran and medical cannabis.  --On hormone suppression with Zoladex. Last dose 4/22. Due today  --Will proceed with C5 today  --Plan to re-image after C6, but Shan feels the side effects have been cumulative and is wondering if re-staging could be moved up to prior to C6 in case Doxil is not effective. Tumor markers dropping. I let Shan know via MyChart.     Chemotherapy induced eruption.  She was prescribed tacrolimus BID by Dr. Nichols from derm but is not using this as rash has improved without it and with using other OTC topical treatments.      Bone mets.  She remains on Zometa every 3 months. Last dose 4/15. Next dose due in August    Mucositis: She has MMW prn and has been doing salt/soda rinses     Bilateral pleural effusions.  She has previously had thoracentesis of both lungs.  We reviewed the fact that the right pleural effusion has increased in size.  We will monitor this at  this time.      She has a history of a malignant pericardial effusion.  No symptoms at this time.  We will repeat an echocardiogram today     Karena Cox PA-C  [facility] [address] [id number] [phone]

## 2019-05-22 NOTE — NURSING NOTE
"Oncology Rooming Note    May 22, 2019 2:33 PM   Shan Marsh is a 48 year old female who presents for:    Chief Complaint   Patient presents with     Blood Draw     Labs drawn via PIV placed by vascular access. VS taken. Patient checked in for next appointment.     Oncology Clinic Visit     Breast Ca , Labs, Tx     Initial Vitals: /70 (BP Location: Right arm, Patient Position: Sitting, Cuff Size: Adult Small)   Pulse 87   Temp 98.1  F (36.7  C) (Oral)   Resp 16   Wt 48.5 kg (107 lb)   SpO2 99%   BMI 18.36 kg/m   Estimated body mass index is 18.36 kg/m  as calculated from the following:    Height as of 4/22/19: 1.626 m (5' 4.02\").    Weight as of this encounter: 48.5 kg (107 lb). Body surface area is 1.48 meters squared.  Moderate Pain (4) Comment: Data Unavailable   No LMP recorded. (Menstrual status: Chemotherapy).  Allergies reviewed: Yes  Medications reviewed: Yes    Medications: MEDICATION REFILLS NEEDED TODAY. Provider was notified.  Pharmacy name entered into [facility]:    [facility] - [place], [place] - [address]  [facility] - [place], [place] - [address]    Clinical concerns: Refills Raul Cox was notified.      Julia Mayfield MA              "

## 2019-06-13 NOTE — LETTER
6/13/2019       RE: Shan Marsh  [address]  [facility] [id number]     Dear Colleague,    Thank you for referring your patient, Shan Marsh, to the [facility]. Please see a copy of my visit note below.    Oncology/Hematology Visit Note  Jun 13, 2019    Reason for Visit: Follow up of metastatic breast cancer, ER positive.     History of Present Illness: Shan Marsh is a 49 year old female without significant past medical history with a diagnosis of metastatic breast cancer after she presented with left shoulder pain. Shoulder MRI showed a soft tissue mass close to the distal clavicle which was concerning for malignancy. PET/CT on 5/9/17 showed hypermetabolic left axillary, supraclavicular, mediastinal and hilar nodes. She had biopsies of left axilla and hilar nodes showing metastatic breast cancer, strongly ER positive, moderately MD positive, and HER-2 kaitlin non-amplified. She originally had [id number], ER/MD positive, HER-2 negative breast cancer diagnosed in spring of 2012 s/p bilateral mastectomies and Tamoxifen from 8/2012-2/2013 and discontinued due to side effects. With development of metastatic disease, she was recommended treatment on BIG10 clinical trial with palbociclib and tamoxifen. She started treatment on 5/24/17 and required dose reduction for neutropenia. CT CAP and bone scan 1/11/2018 showing progressive disease with new bony lesions in the ribs, T spine, and possibly liver, lungs. She started Zoladex and abemiciclib on 1/12/2018.      In March 2018, she sought treatment at [facility] in [place], under the care of Dr. Brooks, where she received:  - Vallovex vaccine (per FDA, is in phase I trials here in [place])  - Insulin potentiated therapy with conjugated chemotherapy (5% chemotherapy, unclear what chemotherapy)  - Gina's toxins, IV vitamin C, IV Ozone, hyperbaric chamber, vitamin B17 infusion, vitamin CK3 IV, and coffee enemas PRN  - Also  oral niacin, Lugol, acidol, pancreatin, and thyroid desiccated liver, per their regimen  - She was continued on Letrozole and advised to continue Zoladex.      She met with Dr. Whitman and patient wished to continue her alternative regimen. Abemiciclib was placed on hold. She was continued on Zoladex and letrozole. While in [place], she required thoracentesis for symptomatic pleural effusion. A repeat PET in August 2018 showed widely progressive disease with multiple new bone mets involving the spine, ribs, pelvis, and sacrum; new lesions involving the left lateral thoracic/abdominal wall musculature and subcutaneous tissues, new metastases in the left adrenal gland, and recurrent large L pleural effusion. She was started on Xeloda 1500 mg BID on 9/5/18.     She was admitted 10/4-10/11/18 after a syncopal event/unwitnessed fall at home. She was found to have left pneumothorax and pericardial effusion with tamponade physiology. She had left chest tube placed in ED which then fell out on 10/10. Left pleurx placed on 10/11. She had pericardiocentesis on 10/7, drain removed 10/8.     She resumed Xeloda, and remained on this until January 2019.  At that time, she was switched to Doxil.    Interval History:  Shan is here for routine follow-up.      Shan remains on doxil and feels overall like she is doing much better.       She has no fevers or chills, no chest pain.  She thinks that her breathing overall is stable.      She is walking regularly.  She is not doing vigorous exercise.  She says she is engaged more with her children and is very happy about this.     She reports no pain, no spine pain.  No hip pain.      She has no neuropathy.  She is here today with her  to discuss next steps.        REVIEW OF SYSTEMS:  Her 10-point review of systems is otherwise negative.         Current Outpatient Medications   Medication Sig Dispense Refill     acyclovir (ZOVIRAX) 400 MG tablet Take 1 tablet (400 mg) by mouth every  12 hours 60 tablet 1     calcium carbonate (OS-KRYSTEN 500 MG [place]. [place]) 1250 MG tablet Take 1 tablet by mouth daily       Digestive Enzymes (ENZYME DIGEST) CAPS Take 1 capsule by mouth daily       medical cannabis (Patient's own supply.  Not a prescription) (This is NOT a prescription, and does not certify that the patient has a qualifying medical condition for medical cannabis.  The purpose of this order is  to document that the patient reports taking medical cannabis.)       omeprazole (PRILOSEC) 20 MG DR capsule Take 2 capsules (40 mg) by mouth daily 180 capsule 3     VITAMIN D, CHOLECALCIFEROL, PO Take 1,000 Units by mouth daily        Ascorbic Acid (VITAMIN C PO) Take 1,000 mg by mouth daily       LORazepam (ATIVAN) 0.5 MG tablet Take 1 tablet (0.5 mg) by mouth every 4 hours as needed (Anxiety, Nausea/Vomiting or Sleep) (Patient not taking: Reported on 5/22/2019) 30 tablet 2     magic mouthwash (ENTER INGREDIENTS IN COMMENTS) suspension SWISH AND SPIT 5 TO 10 ML BY MOUTH 4 TO 6 TIMES DAILY AS NEEDED 240 mL 0     Omega 3-6-9 Fatty Acids (OMEGA 3-6-9 COMPLEX PO) Take 1 capsule by mouth daily       ondansetron (ZOFRAN) 8 MG tablet Take 1 tablet (8 mg) by mouth every 8 hours as needed for nausea (Patient not taking: Reported on 6/13/2019) 90 tablet 3     prochlorperazine (COMPAZINE) 10 MG tablet Take 1 tablet (10 mg) by mouth every 6 hours as needed (Nausea/Vomiting) (Patient not taking: Reported on 4/22/2019) 30 tablet 2     tacrolimus (PROTOPIC) 0.1 % external ointment Apply topically 2 times daily (Patient not taking: Reported on 5/22/2019) 300 g 3     traMADol (ULTRAM) 50 MG tablet Take 1-2 tablets ( mg) by mouth every 6 hours as needed for moderate pain or headaches       triamcinolone (KENALOG) 0.1 % external cream Apply topically 2 times daily (Patient not taking: Reported on 5/22/2019) 30 g 1       Physical Examination:  General: The patient is a pleasant female in no acute distress.  /80 (BP  "Location: Left arm, Patient Position: Sitting, Cuff Size: Adult Regular)   Pulse 80   Temp 97.8  F (36.6  C) (Oral)   Resp 16   Ht 1.626 m (5' 4.02\")   Wt 48.9 kg (107 lb 12.8 oz)   SpO2 99%   BMI 18.49 kg/m     Wt Readings from Last 10 Encounters:   06/13/19 48.9 kg (107 lb 12.8 oz)   05/22/19 48.5 kg (107 lb)   04/22/19 47.9 kg (105 lb 9.6 oz)   04/15/19 48.8 kg (107 lb 9.6 oz)   04/08/19 48.7 kg (107 lb 6.4 oz)   03/21/19 48.8 kg (107 lb 9.6 oz)   03/13/19 49.2 kg (108 lb 6.4 oz)   02/19/19 47.8 kg (105 lb 6 oz)   02/08/19 49.4 kg (109 lb)   01/28/19 49.5 kg (109 lb 1.6 oz)   gen: well appearing, thin woman  HEENT: EOMI, PERRL. Sclerae are anicteric. Oral mucosa is pink and moist with no lesions or thrush.   Lymph: Previously palpable left supraclavicular LNs are no longer evident on exam.   Heart: Regular rate and rhythm. No murmurs.  Lungs: Diminished BS in bilateral bases,  Otherwise clear to auscultation bilaterally. Normal respiratory effort on ambient air.  Abdomen: Bowel sounds present, soft, nontender with no palpable hepatosplenomegaly or masses.   Extremities: No lower extremity edema noted bilaterally.   Neuro: Alert and oriented. No focal deficits. Normal gait.  Skin: acral erythema in the axilla bilaterally.    Laboratory Data:  Lab Results   Component Value Date    WBC 2.9 (L) 06/13/2019    HGB 12.4 06/13/2019    HCT 38.7 06/13/2019     06/13/2019    CHOL 129 11/03/2017    TRIG 178 (H) 11/03/2017    HDL 50 11/03/2017    ALT 20 06/13/2019    AST 35 06/13/2019     06/13/2019    BUN 11 06/13/2019    CO2 25 06/13/2019    TSH 1.78 08/21/2017    INR 1.05 01/28/2019     Imaging:Personally reviewed CT scan with radiology    Tumor markers pending    Assessment and Plan:  Shan Marsh is a 49 year old woman with prior history of a T1 N0, ER/OR positive, HER2-negative left breast cancer treated with bilateral mastectomies in 2012, now with metastatic disease (bones, pleural effusions, " lungs, pericardial effusion, liver, left adrenal/retroperitoneal mets), ER/[place] positive, HER2 negative.   PLD1 negative.  Acquired BRCA 2 mutation on [employer] testing.  She progressed on palbociclib and tamoxifen, alternative therapies in [place].  She was on Xeloda, and is now on Doxil single agent.    Metastatic breast cancer: Continues on Doxil every four weeks.  I reviewed Shan's  CT scan with her and her .  We reviewed that she has had improvement in her lung disease as well as her lymph nodes and her liver.       Based on the reduction in her tumor markers as well her tolerance to the therapy and improvement on CT scan, we discussed continuing on Doxil IV every 4 weeks.  We will plan to repeat her imaging in approximately another 3-4 cycles.      Palmar acral erythema secondary to Doxil.  She was given a course of topical triamcinolone cream to help with the discomfort in this area.  Overall this is improved.     Bone mets.  She remains on Zometa every 3 months.      Bilateral pleural effusions.  She has previously had thoracentesis of both lungs.  Overall this is stable to improved.     She has a history of a malignant pericardial effusion.  No symptoms at this time.        She is coping much better today.  We rediscussed additional supportive services available.      Ana Whitman

## 2019-06-13 NOTE — NURSING NOTE
Chief Complaint   Patient presents with     Blood Draw     Labs drawn via VPT by vascular nurse. VS taken. Pt. checked in for appt.      Irma Mondragon MA

## 2019-06-13 NOTE — NURSING NOTE
"Oncology Rooming Note    June 13, 2019 10:59 AM   Shan Marsh is a 49 year old female who presents for:    Chief Complaint   Patient presents with     Blood Draw     Labs drawn via VPT by vascular nurse. VS taken. Pt. checked in for appt.      Oncology Clinic Visit     RETURN VISIT; BREAST CA FOLLOW UP      Initial Vitals: /80 (BP Location: Left arm, Patient Position: Sitting, Cuff Size: Adult Regular)   Pulse 80   Temp 97.8  F (36.6  C) (Oral)   Resp 16   Ht 1.626 m (5' 4.02\")   Wt 48.9 kg (107 lb 12.8 oz)   SpO2 99%   BMI 18.49 kg/m   Estimated body mass index is 18.49 kg/m  as calculated from the following:    Height as of this encounter: 1.626 m (5' 4.02\").    Weight as of this encounter: 48.9 kg (107 lb 12.8 oz). Body surface area is 1.49 meters squared.  No Pain (0) Comment: Data Unavailable   No LMP recorded. (Menstrual status: Chemotherapy).  Allergies reviewed: Yes  Medications reviewed: Yes    Medications: MEDICATION REFILLS NEEDED TODAY. Provider was notified. Patient needs a refill on magic mouth wash      Pharmacy name entered into [facility]:    [facility] [place] - [place], [place] - [address]  [facility] - [place], [place] - [address]    Clinical concerns: No new concerns today  Dr. Whitman  was notified.      Xi Dwyer              "

## 2019-06-14 NOTE — PROGRESS NOTES
Oncology/Hematology Visit Note  Jun 13, 2019    Reason for Visit: Follow up of metastatic breast cancer, ER positive.     History of Present Illness: Shan Marsh is a 49 year old female without significant past medical history with a diagnosis of metastatic breast cancer after she presented with left shoulder pain. Shoulder MRI showed a soft tissue mass close to the distal clavicle which was concerning for malignancy. PET/CT on 5/9/17 showed hypermetabolic left axillary, supraclavicular, mediastinal and hilar nodes. She had biopsies of left axilla and hilar nodes showing metastatic breast cancer, strongly ER positive, moderately GA positive, and HER-2 kaitlin non-amplified. She originally had [id number], ER/GA positive, HER-2 negative breast cancer diagnosed in spring of 2012 s/p bilateral mastectomies and Tamoxifen from 8/2012-2/2013 and discontinued due to side effects. With development of metastatic disease, she was recommended treatment on BIG10 clinical trial with palbociclib and tamoxifen. She started treatment on 5/24/17 and required dose reduction for neutropenia. CT CAP and bone scan 1/11/2018 showing progressive disease with new bony lesions in the ribs, T spine, and possibly liver, lungs. She started Zoladex and abemiciclib on 1/12/2018.      In March 2018, she sought treatment at [facility] in [place], under the care of Dr. Brooks, where she received:  - Vallovex vaccine (per FDA, is in phase I trials here in [place])  - Insulin potentiated therapy with conjugated chemotherapy (5% chemotherapy, unclear what chemotherapy)  - Gina's toxins, IV vitamin C, IV Ozone, hyperbaric chamber, vitamin B17 infusion, vitamin CK3 IV, and coffee enemas PRN  - Also oral niacin, Lugol, acidol, pancreatin, and thyroid desiccated liver, per their regimen  - She was continued on Letrozole and advised to continue Zoladex.      She met with Dr. Whitman and patient wished to continue her alternative regimen. Abemiciclib was  placed on hold. She was continued on Zoladex and letrozole. While in [place], she required thoracentesis for symptomatic pleural effusion. A repeat PET in August 2018 showed widely progressive disease with multiple new bone mets involving the spine, ribs, pelvis, and sacrum; new lesions involving the left lateral thoracic/abdominal wall musculature and subcutaneous tissues, new metastases in the left adrenal gland, and recurrent large L pleural effusion. She was started on Xeloda 1500 mg BID on 9/5/18.     She was admitted 10/4-10/11/18 after a syncopal event/unwitnessed fall at home. She was found to have left pneumothorax and pericardial effusion with tamponade physiology. She had left chest tube placed in ED which then fell out on 10/10. Left pleurx placed on 10/11. She had pericardiocentesis on 10/7, drain removed 10/8.     She resumed Xeloda, and remained on this until January 2019.  At that time, she was switched to Doxil.    Interval History:  Shan is here for routine follow-up.      Shan remains on doxil and feels overall like she is doing much better.       She has no fevers or chills, no chest pain.  She thinks that her breathing overall is stable.      She is walking regularly.  She is not doing vigorous exercise.  She says she is engaged more with her children and is very happy about this.     She reports no pain, no spine pain.  No hip pain.      She has no neuropathy.  She is here today with her  to discuss next steps.        REVIEW OF SYSTEMS:  Her 10-point review of systems is otherwise negative.         Current Outpatient Medications   Medication Sig Dispense Refill     acyclovir (ZOVIRAX) 400 MG tablet Take 1 tablet (400 mg) by mouth every 12 hours 60 tablet 1     calcium carbonate (OS-KRYSTEN 500 MG [place]. [place]) 1250 MG tablet Take 1 tablet by mouth daily       Digestive Enzymes (ENZYME DIGEST) CAPS Take 1 capsule by mouth daily       medical cannabis (Patient's own supply.  Not a prescription)  "(This is NOT a prescription, and does not certify that the patient has a qualifying medical condition for medical cannabis.  The purpose of this order is  to document that the patient reports taking medical cannabis.)       omeprazole (PRILOSEC) 20 MG DR capsule Take 2 capsules (40 mg) by mouth daily 180 capsule 3     VITAMIN D, CHOLECALCIFEROL, PO Take 1,000 Units by mouth daily        Ascorbic Acid (VITAMIN C PO) Take 1,000 mg by mouth daily       LORazepam (ATIVAN) 0.5 MG tablet Take 1 tablet (0.5 mg) by mouth every 4 hours as needed (Anxiety, Nausea/Vomiting or Sleep) (Patient not taking: Reported on 5/22/2019) 30 tablet 2     magic mouthwash (ENTER INGREDIENTS IN COMMENTS) suspension SWISH AND SPIT 5 TO 10 ML BY MOUTH 4 TO 6 TIMES DAILY AS NEEDED 240 mL 0     Omega 3-6-9 Fatty Acids (OMEGA 3-6-9 COMPLEX PO) Take 1 capsule by mouth daily       ondansetron (ZOFRAN) 8 MG tablet Take 1 tablet (8 mg) by mouth every 8 hours as needed for nausea (Patient not taking: Reported on 6/13/2019) 90 tablet 3     prochlorperazine (COMPAZINE) 10 MG tablet Take 1 tablet (10 mg) by mouth every 6 hours as needed (Nausea/Vomiting) (Patient not taking: Reported on 4/22/2019) 30 tablet 2     tacrolimus (PROTOPIC) 0.1 % external ointment Apply topically 2 times daily (Patient not taking: Reported on 5/22/2019) 300 g 3     traMADol (ULTRAM) 50 MG tablet Take 1-2 tablets ( mg) by mouth every 6 hours as needed for moderate pain or headaches       triamcinolone (KENALOG) 0.1 % external cream Apply topically 2 times daily (Patient not taking: Reported on 5/22/2019) 30 g 1       Physical Examination:  General: The patient is a pleasant female in no acute distress.  /80 (BP Location: Left arm, Patient Position: Sitting, Cuff Size: Adult Regular)   Pulse 80   Temp 97.8  F (36.6  C) (Oral)   Resp 16   Ht 1.626 m (5' 4.02\")   Wt 48.9 kg (107 lb 12.8 oz)   SpO2 99%   BMI 18.49 kg/m    Wt Readings from Last 10 Encounters: "   06/13/19 48.9 kg (107 lb 12.8 oz)   05/22/19 48.5 kg (107 lb)   04/22/19 47.9 kg (105 lb 9.6 oz)   04/15/19 48.8 kg (107 lb 9.6 oz)   04/08/19 48.7 kg (107 lb 6.4 oz)   03/21/19 48.8 kg (107 lb 9.6 oz)   03/13/19 49.2 kg (108 lb 6.4 oz)   02/19/19 47.8 kg (105 lb 6 oz)   02/08/19 49.4 kg (109 lb)   01/28/19 49.5 kg (109 lb 1.6 oz)   gen: well appearing, thin woman  HEENT: EOMI, PERRL. Sclerae are anicteric. Oral mucosa is pink and moist with no lesions or thrush.   Lymph: Previously palpable left supraclavicular LNs are no longer evident on exam.   Heart: Regular rate and rhythm. No murmurs.  Lungs: Diminished BS in bilateral bases,  Otherwise clear to auscultation bilaterally. Normal respiratory effort on ambient air.  Abdomen: Bowel sounds present, soft, nontender with no palpable hepatosplenomegaly or masses.   Extremities: No lower extremity edema noted bilaterally.   Neuro: Alert and oriented. No focal deficits. Normal gait.  Skin: acral erythema in the axilla bilaterally.    Laboratory Data:  Lab Results   Component Value Date    WBC 2.9 (L) 06/13/2019    HGB 12.4 06/13/2019    HCT 38.7 06/13/2019     06/13/2019    CHOL 129 11/03/2017    TRIG 178 (H) 11/03/2017    HDL 50 11/03/2017    ALT 20 06/13/2019    AST 35 06/13/2019     06/13/2019    BUN 11 06/13/2019    CO2 25 06/13/2019    TSH 1.78 08/21/2017    INR 1.05 01/28/2019     Imaging:Personally reviewed CT scan with radiology    Tumor markers pending    Assessment and Plan:  Shan Marsh is a 49 year old woman with prior history of a T1 N0, ER/[place] positive, HER2-negative left breast cancer treated with bilateral mastectomies in 2012, now with metastatic disease (bones, pleural effusions, lungs, pericardial effusion, liver, left adrenal/retroperitoneal mets), ER/AL positive, HER2 negative.  PLD1 negative.  Acquired BRCA 2 mutation on [employer] testing.  She progressed on palbociclib and tamoxifen, alternative therapies in [place].  She  was on Xeloda, and is now on Doxil single agent.    Metastatic breast cancer: Continues on Doxil every four weeks.  I reviewed Shan's  CT scan with her and her .  We reviewed that she has had improvement in her lung disease as well as her lymph nodes and her liver.       Based on the reduction in her tumor markers as well her tolerance to the therapy and improvement on CT scan, we discussed continuing on Doxil IV every 4 weeks.  We will plan to repeat her imaging in approximately another 3-4 cycles.      Palmar acral erythema secondary to Doxil.  She was given a course of topical triamcinolone cream to help with the discomfort in this area.  Overall this is improved.     Bone mets.  She remains on Zometa every 3 months.      Bilateral pleural effusions.  She has previously had thoracentesis of both lungs.  Overall this is stable to improved.     She has a history of a malignant pericardial effusion.  No symptoms at this time.        She is coping much better today.  We rediscussed additional supportive services available.      Ana Whitman

## 2019-06-19 NOTE — PATIENT INSTRUCTIONS
Contact Numbers    [facility] Main Line: [phone]  [facility] Triage and after hours / weekends / holidays:  [phone]      Please call the triage or after hours line if you experience a temperature greater than or equal to 100.5, shaking chills, have uncontrolled nausea, vomiting and/or diarrhea, dizziness, shortness of breath, chest pain, bleeding, unexplained bruising, or if you have any other new/concerning symptoms, questions or concerns.      If you are having any concerning symptoms or wish to speak to a provider before your next infusion visit, please call your care coordinator or triage to notify them so we can adequately serve you.     If you need a refill on a narcotic prescription or other medication, please call before your infusion appointment.                 June 2019 Sunday Monday Tuesday Wednesday Thursday Friday Saturday                                 1       2     3     4     5     6    NM INJ  10:00 AM   (15 min.)   [id number]   [facility], Nuclear Medicine    CT CHEST/ABDOMEN/PELVIS W  10:20 AM   (20 min.)   UUCT1   [facility], CT    NM BONE SCAN WHOLE BODY   1:00 PM   (60 min.)   UUNM3   [facility], Nuclear Medicine 7     8       9     10     11    LAB   2:15 PM   (15 min.)   SPECIMEN MGMT   [facility], Lab 12     13    [facility] LAB DRAW  10:30 AM   (15 min.)    [facility] LAB DRAW   [facility] Lab Draw    [facility] RETURN  10:45 AM   (30 min.)   Ana Whitman MD   [facility] 14     15       16     17     18     19    [facility] DRAW   2:45 PM   (15 min.)   [facility]   [facility] Lab Draw    [facility] ONC INFUSION 120   3:30 PM   (120 min.)   [facility] ONCOLOGY INFUSION   [facility] 20     21     22       23     24     25     26     27     28     29       30                                               July 2019 Sunday Monday Tuesday Wednesday Thursday Friday Saturday        1     2     3     4     5     6       7      8     9     10     11     12     13       14     15     16     17     18     19     20       21     22     23     24     25     26     27       28     29     30     31                                    Lab Results:  Recent Results (from the past 12 hour(s))   Comprehensive metabolic panel    Collection Time: 06/19/19  3:07 PM   Result Value Ref Range    Sodium 137 133 - 144 mmol/L    Potassium 3.4 3.4 - 5.3 mmol/L    Chloride 102 94 - 109 mmol/L    Carbon Dioxide 28 20 - 32 mmol/L    Anion Gap 7 3 - 14 mmol/L    Glucose 109 (H) 70 - 99 mg/dL    Urea Nitrogen 12 7 - 30 mg/dL    Creatinine 0.54 0.52 - 1.04 mg/dL    GFR Estimate >90 >60 mL/min/[1.73_m2]    GFR Estimate If Black >90 >60 mL/min/[1.73_m2]    Calcium 9.5 8.5 - 10.1 mg/dL    Bilirubin Total 0.3 0.2 - 1.3 mg/dL    Albumin 3.4 3.4 - 5.0 g/dL    Protein Total 8.5 6.8 - 8.8 g/dL    Alkaline Phosphatase 103 40 - 150 U/L    ALT 20 0 - 50 U/L    AST 47 (H) 0 - 45 U/L   CBC with platelets differential    Collection Time: 06/19/19  3:07 PM   Result Value Ref Range    WBC 4.4 4.0 - 11.0 10e9/L    RBC Count 4.00 3.8 - 5.2 10e12/L    Hemoglobin 11.8 11.7 - 15.7 g/dL    Hematocrit 37.7 35.0 - 47.0 %    MCV 94 78 - 100 fl    MCH 29.5 26.5 - 33.0 pg    MCHC 31.3 (L) 31.5 - 36.5 g/dL    RDW 14.6 10.0 - 15.0 %    Platelet Count 279 150 - 450 10e9/L    Diff Method Automated Method     % Neutrophils 60.4 %    % Lymphocytes 26.4 %    % Monocytes 10.4 %    % Eosinophils 1.4 %    % Basophils 0.9 %    % Immature Granulocytes 0.5 %    Nucleated RBCs 0 0 /100    Absolute Neutrophil 2.7 1.6 - 8.3 10e9/L    Absolute Lymphocytes 1.2 0.8 - 5.3 10e9/L    Absolute Monocytes 0.5 0.0 - 1.3 10e9/L    Absolute Eosinophils 0.1 0.0 - 0.7 10e9/L    Absolute Basophils 0.0 0.0 - 0.2 10e9/L    Abs Immature Granulocytes 0.0 0 - 0.4 10e9/L    Absolute Nucleated RBC 0.0

## 2019-06-19 NOTE — NURSING NOTE
Chief Complaint   Patient presents with     Blood Draw     Labs drawn via PIV placed by RN in lab. Line flushed with saline and heparin. VS taken.      Lisa Sage RN

## 2019-06-19 NOTE — PROGRESS NOTES
Infusion Nursing Note:  Shan Marsh presents today for Cycle 6, Day 1 Doxil and Zoladex injection.    Patient seen by provider today: No   present during visit today: Not Applicable.    Note: Pt assessed prior to initiating treatment today. Denies any new issues or symptoms.     Intravenous Access:  Peripheral IV placed.    Treatment Conditions:  Lab Results   Component Value Date    HGB 11.8 06/19/2019     Lab Results   Component Value Date    WBC 4.4 06/19/2019      Lab Results   Component Value Date    ANEU 2.7 06/19/2019     Lab Results   Component Value Date     06/19/2019      Lab Results   Component Value Date     06/19/2019                   Lab Results   Component Value Date    POTASSIUM 3.4 06/19/2019           Lab Results   Component Value Date    MAG 2.4 01/22/2019            Lab Results   Component Value Date    CR 0.54 06/19/2019                   Lab Results   Component Value Date    KRYSTEN 9.5 06/19/2019                Lab Results   Component Value Date    BILITOTAL PENDING 06/19/2019           Lab Results   Component Value Date    ALBUMIN PENDING 06/19/2019                    Lab Results   Component Value Date    ALT PENDING 06/19/2019           Lab Results   Component Value Date    AST PENDING 06/19/2019   Results reviewed, labs MET treatment parameters, ok to proceed with treatment.    Post Infusion Assessment:  Patient tolerated infusion without incident.  Pt tolerated Zoladex injection to left lower abdomen without incident   Blood return noted pre and post infusion.  Site patent and intact, free from redness, edema or discomfort.  No evidence of extravasations.  Access discontinued per protocol.     Discharge Plan:   Prescription refills given for Magic Mouthwash.  AVS to patient via [employer].  No future appointments made. Dr. Whitman notified for appointment orders  Patient discharged in stable condition accompanied by: self.  Departure Mode:  Ambulatory.    Arin Barnes RN

## 2019-07-01 NOTE — PROGRESS NOTES
Completed [employer] Financial Assistance Application and e-mailed to [email or web address] as on form. Spoke to patient to obtain estimated gross annual household income and sent form as above. Will mail a copy to patient's home and have scanned into chart.  Answered all patient's questions and verbalized understanding. Sharifa Celeste RN, BSN.

## 2019-07-15 NOTE — PATIENT INSTRUCTIONS
Contact Numbers    [facility] Main Line (for Scheduling/Triage/After Hours Nurse Line): [phone]    Please call the [facility] nurse triage or the after hours nurse line if you experience a temperature greater than or equal to 100.5, shaking chills, have uncontrolled nausea, vomiting and/or diarrhea, dizziness, lightheadedness, shortness of breath, chest pain, bleeding, unexplained bruising, or if you have any other new/concerning symptoms, questions or concerns.     If you are having any concerning symptoms or wish to speak to a provider before your next infusion visit, please call your care coordinator or triage to notify them so we can adequately serve you.     If you need a refill on a narcotic prescription or other medication, please call triage before your infusion appointment.         July 2019 Sunday Monday Tuesday Wednesday Thursday Friday Saturday        1     2     3     4     5     6       7     8     9     10     11     12     13       14     15    [facility] DRAW   8:00 AM   (15 min.)    [facility] LAB DRAW   [facility] Lab Draw    [facility] RETURN   8:35 AM   (50 min.)   Karena Cox PA   [facility] ONC INFUSION 120  10:30 AM   (120 min.)    ONCOLOGY INFUSION   [facility] 16     17     18     19     20       21     22     23     24     25     26     27       28     29     30     31                                August 2019 Sunday Monday Tuesday Wednesday Thursday Friday Saturday                       1     2     3       4     5     6     7     8     9     10       11     12     13    [facility] DRAW  10:45 AM   (15 min.)    [facility] LAB DRAW   [facility] Lab Draw    P RETURN  11:15 AM   (50 min.)   Lima Griffiths PA-C   [facility] ONC INFUSION 120   1:00 PM   (120 min.)    ONCOLOGY INFUSION   [facility] 14     15     16     17       18     19     20     21      22     23     24       25     26     27     28     29     30     31                    Recent Results (from the past 24 hour(s))   Comprehensive metabolic panel    Collection Time: 07/15/19  8:57 AM   Result Value Ref Range    Sodium 135 133 - 144 mmol/L    Potassium 3.2 (L) 3.4 - 5.3 mmol/L    Chloride 102 94 - 109 mmol/L    Carbon Dioxide 28 20 - 32 mmol/L    Anion Gap 6 3 - 14 mmol/L    Glucose 77 70 - 99 mg/dL    Urea Nitrogen 10 7 - 30 mg/dL    Creatinine 0.56 0.52 - 1.04 mg/dL    GFR Estimate >90 >60 mL/min/[1.73_m2]    GFR Estimate If Black >90 >60 mL/min/[1.73_m2]    Calcium 9.0 8.5 - 10.1 mg/dL    Bilirubin Total 0.3 0.2 - 1.3 mg/dL    Albumin 3.2 (L) 3.4 - 5.0 g/dL    Protein Total 8.3 6.8 - 8.8 g/dL    Alkaline Phosphatase 92 40 - 150 U/L    ALT 18 0 - 50 U/L    AST 39 0 - 45 U/L   CBC with platelets differential    Collection Time: 07/15/19  8:57 AM   Result Value Ref Range    WBC 3.2 (L) 4.0 - 11.0 10e9/L    RBC Count 3.93 3.8 - 5.2 10e12/L    Hemoglobin 11.7 11.7 - 15.7 g/dL    Hematocrit 37.1 35.0 - 47.0 %    MCV 94 78 - 100 fl    MCH 29.8 26.5 - 33.0 pg    MCHC 31.5 31.5 - 36.5 g/dL    RDW 14.9 10.0 - 15.0 %    Platelet Count 271 150 - 450 10e9/L    Diff Method Automated Method     % Neutrophils 51.3 %    % Lymphocytes 32.7 %    % Monocytes 12.9 %    % Eosinophils 1.9 %    % Basophils 0.9 %    % Immature Granulocytes 0.3 %    Nucleated RBCs 0 0 /100    Absolute Neutrophil 1.6 1.6 - 8.3 10e9/L    Absolute Lymphocytes 1.0 0.8 - 5.3 10e9/L    Absolute Monocytes 0.4 0.0 - 1.3 10e9/L    Absolute Eosinophils 0.1 0.0 - 0.7 10e9/L    Absolute Basophils 0.0 0.0 - 0.2 10e9/L    Abs Immature Granulocytes 0.0 0 - 0.4 10e9/L    Absolute Nucleated RBC 0.0    CEA    Collection Time: 07/15/19  8:57 AM   Result Value Ref Range    CEA 75.2 (H) 0 - 2.5 ug/L   Ca27.29  breast tumor marker    Collection Time: 07/15/19  8:57 AM   Result Value Ref Range    CA 27-29 524 (H) 0 - 39 U/mL

## 2019-07-15 NOTE — NURSING NOTE
Chief Complaint   Patient presents with     Blood Draw     Labs drawn via PIV placed by RN in lab. VS taken. Pt checked in for next appt     Labs collected from venipuncture by RN. Vitals taken. Checked in for appointment(s). PIV placed by RN in lab by Vascular access.    Barbara DOWELL RN PHN BSN  BMT/Oncology Lab

## 2019-07-15 NOTE — LETTER
7/15/2019      RE: Shan Marsh  [address]  [facility] [address]       Oncology/Hematology Visit Note  Jul 15, 2019    Reason for Visit: follow up of metastatic breast cancer, ER positive.     History of Present Illness: Shan Marsh is a 49 year old female without significant past medical history with a diagnosis of metastatic breast cancer after she presented with left shoulder pain. Shoulder MRI showed a soft tissue mass close to the distal clavicle which was concerning for malignancy. PET/CT on 5/9/17 showed hypermetabolic left axillary, supraclavicular, mediastinal and hilar nodes. She had biopsies of left axilla and hilar nodes showing metastatic breast cancer, strongly ER positive, moderately MA positive, and HER-2 kaitlin non-amplified. She originally had H7xH4C6, ER/MA positive, HER-2 negative breast cancer diagnosed in spring of 2012 s/p bilateral mastectomies and Tamoxifen from 8/2012-2/2013 and discontinued due to side effects. With development of metastatic disease, she was recommended treatment on BIG10 clinical trial with palbociclib and tamoxifen. She started treatment on 5/24/17 and required dose reduction for neutropenia. CT CAP and bone scan 1/11/2018 showing progressive disease with new bony lesions in the ribs, T spine, and possibly liver, lungs. She started Zoladex and abemiciclib on 1/12/2018.      In March 2018, she sought treatment at [facility] in [place], under the care of Dr. Brooks, where she received:  - Vallovex vaccine (per FDA, is in phase I trials here in [place])  - Insulin potentiated therapy with conjugated chemotherapy (5% chemotherapy, unclear what chemotherapy)  - Gina's toxins, IV vitamin C, IV Ozone, hyperbaric chamber, vitamin B17 infusion, vitamin CK3 IV, and coffee enemas PRN  - Also oral niacin, Lugol, acidol, pancreatin, and thyroid desiccated liver, per their regimen  - She was continued on Letrozole and advised to continue Zoladex.      She  met with Dr. Whitman and patient wished to continue her alternative regimen. Abemiciclib was placed on hold. She was continued on Zoladex and letrozole. While in [place], she required thoracentesis for symptomatic pleural effusion. A repeat PET in August 2018 showed widely progressive disease with multiple new bone mets involving the spine, ribs, pelvis, and sacrum; new lesions involving the left lateral thoracic/abdominal wall musculature and subcutaneous tissues, new metastases in the left adrenal gland, and recurrent large L pleural effusion. She was started on Xeloda 1500 mg BID on 9/5/18.      She was admitted 10/4-10/11/18 after a syncopal event/unwitnessed fall at home. She was found to have left pneumothorax and pericardial effusion with tamponade physiology. She had left chest tube placed in ED which then fell out on 10/10. Left pleurx placed on 10/11. She had pericardiocentesis on 10/7, drain removed 10/8.      She resumed Xeloda, and remained on this until January 2019.  At that time, she was switched to Doxil.    Interval History:  Shan returns today for follow up. She still has mucositis starting about week 2 that cause pain with even drinking water. Mucositis resolves by 4th week. She avoids citrus and spicy foods. She had one sore on lower lip and one on left side of tongue this past cycle. She denies any other pain aside from mouth sores. She does feel it is difficult to cope emotionally when she is having mucositis and would like to be more proactive this cycle. She has lower appetite and fatigue during about the 2nd week as well. She tries to make up for lower food intake when her appetite is better and when she does not have mouth sores.     She has history of hemorrhoids when she has constipation. Recently she has had more problems with hard stools, although she is having BM regularly. She would like to try some OTC treatments for hemorrhoids. She denies pain except with BM. Occasional bleeding  with BM. No fevers or pain when not having a BM. She is trying to eat more fruit to counteract the hard stools.     She denies recurrence of rash. Denies chest pain.  She thinks that her breathing overall is stable. She is walking regularly. Remaining ROS negative.    Current Outpatient Medications   Medication Sig Dispense Refill     Digestive Enzymes (ENZYME DIGEST) CAPS Take 1 capsule by mouth daily       magic mouthwash (ENTER INGREDIENTS IN COMMENTS) suspension SWISH AND SPIT 5 TO 10 ML BY MOUTH 4 TO 6 TIMES DAILY AS NEEDED 240 mL 0     medical cannabis (Patient's own supply.  Not a prescription) (This is NOT a prescription, and does not certify that the patient has a qualifying medical condition for medical cannabis.  The purpose of this order is  to document that the patient reports taking medical cannabis.)       omeprazole (PRILOSEC) 20 MG DR capsule Take 2 capsules (40 mg) by mouth daily 180 capsule 3     ondansetron (ZOFRAN) 8 MG tablet Take 1 tablet (8 mg) by mouth every 8 hours as needed for nausea 90 tablet 3     VITAMIN D, CHOLECALCIFEROL, PO Take 1,000 Units by mouth daily        acyclovir (ZOVIRAX) 400 MG tablet Take 1 tablet (400 mg) by mouth every 12 hours (Patient not taking: Reported on 6/19/2019) 60 tablet 1     Ascorbic Acid (VITAMIN C PO) Take 1,000 mg by mouth daily       calcium carbonate (OS-KRYSTEN 500 MG [place]. [place]) 1250 MG tablet Take 1 tablet by mouth daily       LORazepam (ATIVAN) 0.5 MG tablet Take 1 tablet (0.5 mg) by mouth every 4 hours as needed (Anxiety, Nausea/Vomiting or Sleep) (Patient not taking: Reported on 5/22/2019) 30 tablet 2     Omega 3-6-9 Fatty Acids (OMEGA 3-6-9 COMPLEX PO) Take 1 capsule by mouth daily       prochlorperazine (COMPAZINE) 10 MG tablet Take 1 tablet (10 mg) by mouth every 6 hours as needed (Nausea/Vomiting) (Patient not taking: Reported on 4/22/2019) 30 tablet 2     tacrolimus (PROTOPIC) 0.1 % external ointment Apply topically 2 times daily (Patient not  "taking: Reported on 5/22/2019) 300 g 3     traMADol (ULTRAM) 50 MG tablet Take 1-2 tablets ( mg) by mouth every 6 hours as needed for moderate pain or headaches       triamcinolone (KENALOG) 0.1 % external cream Apply topically 2 times daily (Patient not taking: Reported on 5/22/2019) 30 g 1       Physical Examination:  General: The patient is a pleasant female in no acute distress.  /76 (BP Location: Right arm, Patient Position: Sitting, Cuff Size: Adult Small)   Pulse 72   Temp 98.3  F (36.8  C) (Oral)   Resp 16   Ht 1.626 m (5' 4.02\")   Wt 48.2 kg (106 lb 3.2 oz)   SpO2 99%   BMI 18.22 kg/m     Wt Readings from Last 10 Encounters:   07/15/19 48.2 kg (106 lb 3.2 oz)   06/19/19 47.6 kg (105 lb)   06/13/19 48.9 kg (107 lb 12.8 oz)   05/22/19 48.5 kg (107 lb)   04/22/19 47.9 kg (105 lb 9.6 oz)   04/15/19 48.8 kg (107 lb 9.6 oz)   04/08/19 48.7 kg (107 lb 6.4 oz)   03/21/19 48.8 kg (107 lb 9.6 oz)   03/13/19 49.2 kg (108 lb 6.4 oz)   02/19/19 47.8 kg (105 lb 6 oz)     HEENT: EOMI, PERRL. Sclerae are anicteric. Oral mucosa is pink and moist with no lesions or thrush.   Lymph: No palpable cervical, supraclavicular, subclavicular lymphadenopathy.  Heart: Regular rate and rhythm.   Lungs: Breathing comfortably on room air. Decreased BS at bilateral bases, R>L, otherwise clear to auscultation bilaterally.   Abdomen: Bowel sounds present, soft, nontender with no palpable hepatosplenomegaly or masses.   Extremities: No lower extremity edema noted bilaterally.   Neuro: Cranial nerves II through XII are grossly intact.  Skin: No rashes, petechiae, or bruising noted on exposed skin.  Laboratory Data:  Results for FABIOLANIKKIE (MRN [id number]) as of 7/15/2019 10:29   7/15/2019 08:57   Sodium 135   Potassium 3.2 (L)   Chloride 102   Carbon Dioxide 28   Urea Nitrogen 10   Creatinine 0.56   GFR Estimate >90   GFR Estimate If Black >90   Calcium 9.0   Anion Gap 6   Albumin 3.2 (L)   Protein Total 8.3 "   Bilirubin Total 0.3   Alkaline Phosphatase 92   ALT 18   AST 39   Glucose 77   WBC 3.2 (L)   Hemoglobin 11.7   Hematocrit 37.1   Platelet Count 271   RBC Count 3.93   MCV 94   MCH 29.8   MCHC 31.5   RDW 14.9   Diff Method Automated Method   % Neutrophils 51.3   % Lymphocytes 32.7   % Monocytes 12.9   % Eosinophils 1.9   % Basophils 0.9   % Immature Granulocytes 0.3   Nucleated RBCs 0   Absolute Neutrophil 1.6   Absolute Lymphocytes 1.0   Absolute Monocytes 0.4   Absolute Eosinophils 0.1   Absolute Basophils 0.0   Abs Immature Granulocytes 0.0   Absolute Nucleated RBC 0.0     Assessment and Plan:  Shan Marsh is a 48 year old woman with prior history of a T1 N0, ER/IL positive, HER2-negative left breast cancer treated with bilateral mastectomies in 2012, now with metastatic disease (bones, pleural effusions, lungs, pericardial effusion, liver, left adrenal/retroperitoneal mets), ER/IL positive, HER2 negative.  She progressed on palbociclib and tamoxifen, alternative therapies in [place].  She was on Xeloda, and is now on Doxil single agent.     Metastatic breast cancer: Started Doxil every four weeks on 1/22. Restaging on 6/6 wth improvement in lung disease as well as lymph nodes and liver. Plan to continue Doxil every 4 weeks and to repeat imaging in another 3-4 cycles. Tolerating well aside from mucositis, fatigue. She is working with acupuncturist to help improve fatigue, appetite.   --On hormone suppression with Zoladex. Due today  --Will proceed with C7 today     Chemotherapy induced eruption.  She was prescribed tacrolimus BID by Dr. Nichols from derm but is not using this as rash has improved without it and with using other OTC topical treatments. Has not recurred since doxil dose reduced the second time.     Bone mets.  She remains on Zometa every 3 months. Last dose 4/15. Next dose due today    Mucositis: We discussed starting with salt/soda rinses during week 1 qid. Refilled MMW to use prn. Also sent  doxepin swish & spit for her to try prn    Constipation, hemorrhoids: Discussed adding prunes to diet, possibly fiber/miralax. Discussed trial of preparation Phuong CABRALES.     Bilateral pleural effusions.  She has previously had thoracentesis of both lungs.  We reviewed the fact that the right pleural effusion has increased in size.  We will monitor this at this time.      She has a history of a malignant pericardial effusion.  No symptoms at this time.  We will repeat an echocardiogram today    Hypokalemia: K 3.2 today. No vomiting or diarrhea. Will replace orally today per protocol. Discussed keeping up with nutrition. Weight overall stable.    Coping: She has difficult periods emotionally with mucositis. She is working with therapists at [facility] and feels she is coping at the moment and maintaining positive attitude and gratitude.     Karena Cox PA-C  [facility]  [address] [place] [address]  [phone]

## 2019-07-15 NOTE — PROGRESS NOTES
Oncology/Hematology Visit Note  Jul 15, 2019    Reason for Visit: follow up of metastatic breast cancer, ER positive.     History of Present Illness: Shan Marsh is a 49 year old female without significant past medical history with a diagnosis of metastatic breast cancer after she presented with left shoulder pain. Shoulder MRI showed a soft tissue mass close to the distal clavicle which was concerning for malignancy. PET/CT on 5/9/17 showed hypermetabolic left axillary, supraclavicular, mediastinal and hilar nodes. She had biopsies of left axilla and hilar nodes showing metastatic breast cancer, strongly ER positive, moderately OR positive, and HER-2 kaitlin non-amplified. She originally had [id number], ER/OR positive, HER-2 negative breast cancer diagnosed in spring of 2012 s/p bilateral mastectomies and Tamoxifen from 8/2012-2/2013 and discontinued due to side effects. With development of metastatic disease, she was recommended treatment on BIG10 clinical trial with palbociclib and tamoxifen. She started treatment on 5/24/17 and required dose reduction for neutropenia. CT CAP and bone scan 1/11/2018 showing progressive disease with new bony lesions in the ribs, T spine, and possibly liver, lungs. She started Zoladex and abemiciclib on 1/12/2018.      In March 2018, she sought treatment at [facility] in [place], under the care of Dr. Brooks, where she received:  - Vallovex vaccine (per FDA, is in phase I trials here in [place])  - Insulin potentiated therapy with conjugated chemotherapy (5% chemotherapy, unclear what chemotherapy)  - Gina's toxins, IV vitamin C, IV Ozone, hyperbaric chamber, vitamin B17 infusion, vitamin CK3 IV, and coffee enemas PRN  - Also oral niacin, Lugol, acidol, pancreatin, and thyroid desiccated liver, per their regimen  - She was continued on Letrozole and advised to continue Zoladex.      She met with Dr. Whitman and patient wished to continue her alternative regimen. Abemiciclib was  placed on hold. She was continued on Zoladex and letrozole. While in [place], she required thoracentesis for symptomatic pleural effusion. A repeat PET in August 2018 showed widely progressive disease with multiple new bone mets involving the spine, ribs, pelvis, and sacrum; new lesions involving the left lateral thoracic/abdominal wall musculature and subcutaneous tissues, new metastases in the left adrenal gland, and recurrent large L pleural effusion. She was started on Xeloda 1500 mg BID on 9/5/18.      She was admitted 10/4-10/11/18 after a syncopal event/unwitnessed fall at home. She was found to have left pneumothorax and pericardial effusion with tamponade physiology. She had left chest tube placed in ED which then fell out on 10/10. Left pleurx placed on 10/11. She had pericardiocentesis on 10/7, drain removed 10/8.      She resumed Xeloda, and remained on this until January 2019.  At that time, she was switched to Doxil.    Interval History:  Shan returns today for follow up. She still has mucositis starting about week 2 that cause pain with even drinking water. Mucositis resolves by 4th week. She avoids citrus and spicy foods. She had one sore on lower lip and one on left side of tongue this past cycle. She denies any other pain aside from mouth sores. She does feel it is difficult to cope emotionally when she is having mucositis and would like to be more proactive this cycle. She has lower appetite and fatigue during about the 2nd week as well. She tries to make up for lower food intake when her appetite is better and when she does not have mouth sores.     She has history of hemorrhoids when she has constipation. Recently she has had more problems with hard stools, although she is having BM regularly. She would like to try some OTC treatments for hemorrhoids. She denies pain except with BM. Occasional bleeding with BM. No fevers or pain when not having a BM. She is trying to eat more fruit to counteract  the hard stools.     She denies recurrence of rash. Denies chest pain.  She thinks that her breathing overall is stable. She is walking regularly. Remaining ROS negative.    Current Outpatient Medications   Medication Sig Dispense Refill     Digestive Enzymes (ENZYME DIGEST) CAPS Take 1 capsule by mouth daily       magic mouthwash (ENTER INGREDIENTS IN COMMENTS) suspension SWISH AND SPIT 5 TO 10 ML BY MOUTH 4 TO 6 TIMES DAILY AS NEEDED 240 mL 0     medical cannabis (Patient's own supply.  Not a prescription) (This is NOT a prescription, and does not certify that the patient has a qualifying medical condition for medical cannabis.  The purpose of this order is  to document that the patient reports taking medical cannabis.)       omeprazole (PRILOSEC) 20 MG DR capsule Take 2 capsules (40 mg) by mouth daily 180 capsule 3     ondansetron (ZOFRAN) 8 MG tablet Take 1 tablet (8 mg) by mouth every 8 hours as needed for nausea 90 tablet 3     VITAMIN D, CHOLECALCIFEROL, PO Take 1,000 Units by mouth daily        acyclovir (ZOVIRAX) 400 MG tablet Take 1 tablet (400 mg) by mouth every 12 hours (Patient not taking: Reported on 6/19/2019) 60 tablet 1     Ascorbic Acid (VITAMIN C PO) Take 1,000 mg by mouth daily       calcium carbonate (OS-KRYSTEN 500 MG [place]. CA) 1250 MG tablet Take 1 tablet by mouth daily       LORazepam (ATIVAN) 0.5 MG tablet Take 1 tablet (0.5 mg) by mouth every 4 hours as needed (Anxiety, Nausea/Vomiting or Sleep) (Patient not taking: Reported on 5/22/2019) 30 tablet 2     Omega 3-6-9 Fatty Acids (OMEGA 3-6-9 COMPLEX PO) Take 1 capsule by mouth daily       prochlorperazine (COMPAZINE) 10 MG tablet Take 1 tablet (10 mg) by mouth every 6 hours as needed (Nausea/Vomiting) (Patient not taking: Reported on 4/22/2019) 30 tablet 2     tacrolimus (PROTOPIC) 0.1 % external ointment Apply topically 2 times daily (Patient not taking: Reported on 5/22/2019) 300 g 3     traMADol (ULTRAM) 50 MG tablet Take 1-2 tablets (  "mg) by mouth every 6 hours as needed for moderate pain or headaches       triamcinolone (KENALOG) 0.1 % external cream Apply topically 2 times daily (Patient not taking: Reported on 5/22/2019) 30 g 1       Physical Examination:  General: The patient is a pleasant female in no acute distress.  /76 (BP Location: Right arm, Patient Position: Sitting, Cuff Size: Adult Small)   Pulse 72   Temp 98.3  F (36.8  C) (Oral)   Resp 16   Ht 1.626 m (5' 4.02\")   Wt 48.2 kg (106 lb 3.2 oz)   SpO2 99%   BMI 18.22 kg/m    Wt Readings from Last 10 Encounters:   07/15/19 48.2 kg (106 lb 3.2 oz)   06/19/19 47.6 kg (105 lb)   06/13/19 48.9 kg (107 lb 12.8 oz)   05/22/19 48.5 kg (107 lb)   04/22/19 47.9 kg (105 lb 9.6 oz)   04/15/19 48.8 kg (107 lb 9.6 oz)   04/08/19 48.7 kg (107 lb 6.4 oz)   03/21/19 48.8 kg (107 lb 9.6 oz)   03/13/19 49.2 kg (108 lb 6.4 oz)   02/19/19 47.8 kg (105 lb 6 oz)     HEENT: EOMI, PERRL. Sclerae are anicteric. Oral mucosa is pink and moist with no lesions or thrush.   Lymph: No palpable cervical, supraclavicular, subclavicular lymphadenopathy.  Heart: Regular rate and rhythm.   Lungs: Breathing comfortably on room air. Decreased BS at bilateral bases, R>L, otherwise clear to auscultation bilaterally.   Abdomen: Bowel sounds present, soft, nontender with no palpable hepatosplenomegaly or masses.   Extremities: No lower extremity edema noted bilaterally.   Neuro: Cranial nerves II through XII are grossly intact.  Skin: No rashes, petechiae, or bruising noted on exposed skin.  Laboratory Data:  Results for NIKKIE HICKS (MRN [id number]) as of 7/15/2019 10:29   7/15/2019 08:57   Sodium 135   Potassium 3.2 (L)   Chloride 102   Carbon Dioxide 28   Urea Nitrogen 10   Creatinine 0.56   GFR Estimate >90   GFR Estimate If Black >90   Calcium 9.0   Anion Gap 6   Albumin 3.2 (L)   Protein Total 8.3   Bilirubin Total 0.3   Alkaline Phosphatase 92   ALT 18   AST 39   Glucose 77   WBC 3.2 (L) "   Hemoglobin 11.7   Hematocrit 37.1   Platelet Count 271   RBC Count 3.93   MCV 94   MCH 29.8   MCHC 31.5   RDW 14.9   Diff Method Automated Method   % Neutrophils 51.3   % Lymphocytes 32.7   % Monocytes 12.9   % Eosinophils 1.9   % Basophils 0.9   % Immature Granulocytes 0.3   Nucleated RBCs 0   Absolute Neutrophil 1.6   Absolute Lymphocytes 1.0   Absolute Monocytes 0.4   Absolute Eosinophils 0.1   Absolute Basophils 0.0   Abs Immature Granulocytes 0.0   Absolute Nucleated RBC 0.0     Assessment and Plan:  Shan Marsh is a 48 year old woman with prior history of a T1 N0, ER/SC positive, HER2-negative left breast cancer treated with bilateral mastectomies in 2012, now with metastatic disease (bones, pleural effusions, lungs, pericardial effusion, liver, left adrenal/retroperitoneal mets), ER/SC positive, HER2 negative.  She progressed on palbociclib and tamoxifen, alternative therapies in [place].  She was on Xeloda, and is now on Doxil single agent.     Metastatic breast cancer: Started Doxil every four weeks on 1/22. Restaging on 6/6 wth improvement in lung disease as well as lymph nodes and liver. Plan to continue Doxil every 4 weeks and to repeat imaging in another 3-4 cycles. Tolerating well aside from mucositis, fatigue. She is working with acupuncturist to help improve fatigue, appetite.   --On hormone suppression with Zoladex. Due today  --Will proceed with C7 today     Chemotherapy induced eruption.  She was prescribed tacrolimus BID by Dr. Nichols from derm but is not using this as rash has improved without it and with using other OTC topical treatments. Has not recurred since doxil dose reduced the second time.     Bone mets.  She remains on Zometa every 3 months. Last dose 4/15. Next dose due today    Mucositis: We discussed starting with salt/soda rinses during week 1 qid. Refilled MMW to use prn. Also sent doxepin swish & spit for her to try prn    Constipation, hemorrhoids: Discussed adding prunes  to diet, possibly fiber/miralax. Discussed trial of preparation Phuong CABRALES.     Bilateral pleural effusions.  She has previously had thoracentesis of both lungs.  We reviewed the fact that the right pleural effusion has increased in size.  We will monitor this at this time.      She has a history of a malignant pericardial effusion.  No symptoms at this time.  We will repeat an echocardiogram today    Hypokalemia: K 3.2 today. No vomiting or diarrhea. Will replace orally today per protocol. Discussed keeping up with nutrition. Weight overall stable.    Coping: She has difficult periods emotionally with mucositis. She is working with therapists at [facility] and feels she is coping at the moment and maintaining positive attitude and gratitude.     Karena Cox PA-C  [facility]  [address], [place] [id number] [phone]

## 2019-07-15 NOTE — NURSING NOTE
"Oncology Rooming Note    July 15, 2019 9:08 AM   Shan Marsh is a 49 year old female who presents for:    Chief Complaint   Patient presents with     Blood Draw     Labs drawn via PIV placed by RN in lab. VS taken. Pt checked in for next appt     Oncology Clinic Visit     Return: Breast CA     Initial Vitals: /76 (BP Location: Right arm, Patient Position: Sitting, Cuff Size: Adult Small)   Pulse 72   Temp 98.3  F (36.8  C) (Oral)   Resp 16   Ht 1.626 m (5' 4.02\")   Wt 48.2 kg (106 lb 3.2 oz)   SpO2 99%   BMI 18.22 kg/m   Estimated body mass index is 18.22 kg/m  as calculated from the following:    Height as of this encounter: 1.626 m (5' 4.02\").    Weight as of this encounter: 48.2 kg (106 lb 3.2 oz). Body surface area is 1.48 meters squared.  No Pain (0) Comment: Data Unavailable   No LMP recorded. (Menstrual status: Chemotherapy).  Allergies reviewed: Yes  Medications reviewed: Yes    Medications: MEDICATION REFILLS NEEDED TODAY. Provider was notified.  Pharmacy name entered into [facility]:    [facility] - [place], [place] - [address]  [facility] - [place], [place] - [address] [place] [address] PHARMACY [place], [place] - [address] [facility] [phone]    Clinical concerns: Pt requesting refills on zofran, magic mouth face. Pt complains of mouth sores. Karena LARA was notified.      Rayne Walls CMA              "

## 2019-07-15 NOTE — PROGRESS NOTES
Infusion Nursing Note:  Shan Marsh presents today for Cycle 7, Day 1 Doxil, Zometa, and Zoladex and Potassium Replacement.    Patient seen by provider today: Yes: Karena Cox PA-C.   present during visit today: Not Applicable.    Note:     7/15/19 0948 TORB: Karena Cox PA-C/Bob Can RN  - OK to give Doxil, Zometa, and Zoladex today  - Prescriptions sent to discharge pharmacy    7/15/19 0955 TORB:  Karena Cox PA-C/Bob Can RN  - Please replace potassium PO per protocol.    Intravenous Access:  Peripheral IV placed by vascular access in lab today.    Treatment Conditions:  Lab Results   Component Value Date    HGB 11.7 07/15/2019     Lab Results   Component Value Date    WBC 3.2 07/15/2019      Lab Results   Component Value Date    ANEU 1.6 07/15/2019     Lab Results   Component Value Date     07/15/2019      Lab Results   Component Value Date     07/15/2019                   Lab Results   Component Value Date    POTASSIUM 3.2 07/15/2019           Lab Results   Component Value Date    MAG 2.4 01/22/2019            Lab Results   Component Value Date    CR 0.56 07/15/2019                   Lab Results   Component Value Date    KRYSTEN 9.0 07/15/2019                Lab Results   Component Value Date    BILITOTAL 0.3 07/15/2019           Lab Results   Component Value Date    ALBUMIN 3.2 07/15/2019                    Lab Results   Component Value Date    ALT 18 07/15/2019           Lab Results   Component Value Date    AST 39 07/15/2019       Results reviewed, labs MET treatment parameters, ok to proceed with treatment.  ECHO/MUGA completed 4/22/19  EF 60-65%.    Post Infusion Assessment:  Patient tolerated infusion without incident.  Patient tolerated injection without incident.  Lidocaine and Zoladex administered subcutaneously to patient's right abdomen.  Blood return noted pre and post infusion.  Site patent and intact, free from redness, edema or discomfort.  No  evidence of extravasations.  Access discontinued per protocol.       Discharge Plan:   Prescription refills given for Doxipin and Zofran.  Discharge instructions reviewed with: Patient.  Patient and/or family verbalized understanding of discharge instructions and all questions answered.  AVS to patient via Dom[employer].  Patient will return 8/13/19 for next appointment.   Patient discharged in stable condition accompanied by: self.  Departure Mode: Ambulatory.  Face to Face time: 0.    SMILEY CALLAHAN RN

## 2019-07-25 NOTE — TELEPHONE ENCOUNTER
Pt called in to triage reporting possible vaginal yeast infection: pain with intercourse, itchiness and small amount of white discharge. Denied any recent fevers, chills, thick white discharge or odor. Last time she had to use fluconazole was 2/19 and wondered if she needed to be seen or could Rx be sent to [facility] pharmacy? Paged Lima CASTELLANO.

## 2019-07-26 NOTE — TELEPHONE ENCOUNTER
OK to send for fluconazole but if not improved, she should see PCP/gyn.   Thanks!   Lima Maynard sent to RAHEEM Aguilar lm on pt's phone informing of dose and to see pcp/gyn if symptoms not improved after taking.

## 2019-08-12 NOTE — PROGRESS NOTES
SUBJECTIVE:  Shan Masrh is an 49 year old woman who presents with vaginitis. Symptoms   include local irritation. Onset of symptoms 2   weeks ago, unchanged since.    Predisposing factors: Breast cancer and chemotherapy   Hx of previous vaginitis: rare  Sexually active: yes, single partner, contraception - none.  Postmenopausal due to cancer treatment     Current Outpatient Medications   Medication     acyclovir (ZOVIRAX) 400 MG tablet     Ascorbic Acid (VITAMIN C PO)     calcium carbonate (OS-KRYSTEN 500 MG [place]. CA) 1250 MG tablet     Digestive Enzymes (ENZYME DIGEST) CAPS     doxepin (SINEQUAN) 10 MG/ML (HIGH CONC) solution     LORazepam (ATIVAN) 0.5 MG tablet     magic mouthwash (ENTER INGREDIENTS IN COMMENTS) suspension     medical cannabis (Patient's own supply.  Not a prescription)     Omega 3-6-9 Fatty Acids (OMEGA 3-6-9 COMPLEX PO)     omeprazole (PRILOSEC) 20 MG DR capsule     ondansetron (ZOFRAN) 8 MG tablet     prochlorperazine (COMPAZINE) 10 MG tablet     tacrolimus (PROTOPIC) 0.1 % external ointment     traMADol (ULTRAM) 50 MG tablet     triamcinolone (KENALOG) 0.1 % external cream     VITAMIN D, CHOLECALCIFEROL, PO     No current facility-administered medications for this visit.      Facility-Administered Medications Ordered in Other Visits   Medication     sodium chloride (PF) 0.9% PF flush 10 mL     sodium chloride (PF) 0.9% PF flush 10 mL     Allergies   Allergen Reactions     Erythromycin Shortness Of Breath     Sulfa Drugs      rash       Social History     Tobacco Use     Smoking status: Former Smoker     Last attempt to quit: 1997     Years since quittin.1     Smokeless tobacco: Never Used   Substance Use Topics     Alcohol use: No       OBJECTIVE:  /82   Pulse 83   Wt 47.2 kg (104 lb)   BMI 17.84 kg/m    Pelvic: External genitalia and vagina normal. Bimanual and rectovaginal normal.    ASSESSMENT:  Vaginitis - No infection found   Assumed vaginal dryness/irritation  from chemotherapy     PLAN:  1)  Discussed using Repens or other product for vaginal irritation.  She also plans to talk to her Oncology team about possibly changing medication due to side effects.    2)  Recheck if symptoms persist, worsen, or new symptoms develop.      15 minutes was spent face to face with the patient today discussing her history, diagnosis, and follow-up plan as noted above. Over 50% of the visit was spent in counseling and coordination of care.    Total Visit Time: 15 minutes.

## 2019-08-12 NOTE — NURSING NOTE
"Chief Complaint   Patient presents with     Vaginal Problem       Initial /82   Pulse 83   Wt 47.2 kg (104 lb)   BMI 17.84 kg/m   Estimated body mass index is 17.84 kg/m  as calculated from the following:    Height as of 7/15/19: 1.626 m (5' 4.02\").    Weight as of this encounter: 47.2 kg (104 lb).  BP completed using cuff size: regular    Questioned patient about current smoking habits.  Pt. has never smoked.          The following HM Due: NONE      The following patient reported/Care Every where data was sent to:  P ABSTRACT QUALITY INITIATIVES [[id number]]        N/a      Edith Omalley MA              "

## 2019-08-13 NOTE — NURSING NOTE
Chief Complaint   Patient presents with     Blood Draw     labs drawn via piv start by rn. vs taken      Labs drawn via PIV start by vascular access rn in lab. Flushed with saline. Vitals taken. Pt checked in for next appointment.   Hue Simon RN

## 2019-08-13 NOTE — NURSING NOTE
[id number]: Re-Consent Note     New information has been added to the consent form. This was explained to the patient, and all her questions were answered. The patient verbalized understanding and would like to continue participation in the trial. The patient was provided with a signed copy of the IRB-approved consent form.    Consent Version Date: 10/22/2018  Consent obtained by: Gen Rosa     Date: 8/13/2019    Gen Rosa RN     Pager: [phone]    Form [email or web address] (Version 1)     Effective date: 01JUL2018     Next Review Date: 01JUL2020

## 2019-08-13 NOTE — NURSING NOTE
"Oncology Rooming Note    August 13, 2019 11:54 AM   Shan Marsh is a 49 year old female who presents for:    Chief Complaint   Patient presents with     Blood Draw     labs drawn via piv start by rn. vs taken      Oncology Clinic Visit     Return visit related to Breast Cancer     Initial Vitals: /82 (BP Location: Right arm, Patient Position: Sitting, Cuff Size: Adult Regular)   Pulse 85   Temp 97.4  F (36.3  C) (Oral)   Resp 16   Ht 1.626 m (5' 4.02\")   Wt 48.6 kg (107 lb 3.2 oz)   SpO2 95%   BMI 18.39 kg/m   Estimated body mass index is 18.39 kg/m  as calculated from the following:    Height as of this encounter: 1.626 m (5' 4.02\").    Weight as of this encounter: 48.6 kg (107 lb 3.2 oz). Body surface area is 1.48 meters squared.  No Pain (0) Comment: Data Unavailable   No LMP recorded. (Menstrual status: Chemotherapy).  Allergies reviewed: Yes  Medications reviewed: Yes    Medications: Medication refills not needed today.  Pharmacy name entered into Ku[employer]:    [facility] - [place], [place] - [address]  [facility] [place], [place] - [address] [place], [place] [address] [place], [place] - [address] [facility] [phone]    Clinical concerns: No new concerns. Provider was notified.      Ana Solano LPN            "

## 2019-08-13 NOTE — PROGRESS NOTES
Infusion Nursing Note:  Shan Marsh presents today for Cycle 8 Day 1 Doxil, Zoladex.    Patient seen by provider today: Yes: GRAY Frias   present during visit today: Not Applicable.    Intravenous Access:  Peripheral IV placed.    Treatment Conditions:  Lab Results   Component Value Date    HGB 12.4 08/13/2019     Lab Results   Component Value Date    WBC 4.7 08/13/2019      Lab Results   Component Value Date    ANEU 3.0 08/13/2019     Lab Results   Component Value Date     08/13/2019      Lab Results   Component Value Date     08/13/2019                   Lab Results   Component Value Date    POTASSIUM 3.9 08/13/2019           Lab Results   Component Value Date    MAG 2.4 01/22/2019            Lab Results   Component Value Date    CR 0.65 08/13/2019                   Lab Results   Component Value Date    KRYSTEN 9.8 08/13/2019                Lab Results   Component Value Date    BILITOTAL 0.3 08/13/2019           Lab Results   Component Value Date    ALBUMIN 3.4 08/13/2019                    Lab Results   Component Value Date    ALT 20 08/13/2019           Lab Results   Component Value Date    AST 53 08/13/2019     Results reviewed, labs MET treatment parameters, ok to proceed with treatment.  ECHO/MUGA completed 4/22/19  EF 60-65%.    Post Infusion Assessment:  Patient tolerated infusion without incident.  Patient tolerated injection in left side of abdomen without incident  Blood return noted pre and post infusion.  Site patent and intact, free from redness, edema or discomfort.  No evidence of extravasations.  Access discontinued per protocol.     Discharge Plan:   Prescription refills given for Magic Mouth Wash.  AVS to patient via [employer].  Patient will return 9/10/19 for next appointment.   Patient discharged in stable condition accompanied by: self.  Departure Mode: Ambulatory.    Correct dexamethasone stop time: 2512    Heidy Rouse RN

## 2019-08-13 NOTE — PROGRESS NOTES
Oncology/Hematology Visit Note  Aug 13, 2019    Reason for Visit: follow up of metastatic breast cancer, ER positive.     History of Present Illness: Shan Marsh is a 49 year old female without significant past medical history with a diagnosis of metastatic breast cancer after she presented with left shoulder pain. Shoulder MRI showed a soft tissue mass close to the distal clavicle which was concerning for malignancy. PET/CT on 5/9/17 showed hypermetabolic left axillary, supraclavicular, mediastinal and hilar nodes. She had biopsies of left axilla and hilar nodes showing metastatic breast cancer, strongly ER positive, moderately AK positive, and HER-2 kaitlin non-amplified. She originally had [id number], ER/AK positive, HER-2 negative breast cancer diagnosed in spring of 2012 s/p bilateral mastectomies and Tamoxifen from 8/2012-2/2013 and discontinued due to side effects. With development of metastatic disease, she was recommended treatment on BIG10 clinical trial with palbociclib and tamoxifen. She started treatment on 5/24/17 and required dose reduction for neutropenia. CT CAP and bone scan 1/11/2018 showing progressive disease with new bony lesions in the ribs, T spine, and possibly liver, lungs. She started Zoladex and abemiciclib on 1/12/2018.      In March 2018, she sought treatment at [facility], [place] in [place], under the care of Dr. Brooks, where she received:  - Vallovex vaccine (per FDA, is in phase I trials here in [place])  - Insulin potentiated therapy with conjugated chemotherapy (5% chemotherapy, unclear what chemotherapy)  - Gina's toxins, IV vitamin C, IV Ozone, hyperbaric chamber, vitamin B17 infusion, vitamin CK3 IV, and coffee enemas PRN  - Also oral niacin, Lugol, acidol, pancreatin, and thyroid desiccated liver, per their regimen  - She was continued on Letrozole and advised to continue Zoladex.      She met with Dr. Whitman and patient wished to continue her alternative regimen. Abemiciclib was  placed on hold. She was continued on Zoladex and letrozole. While in [place], she required thoracentesis for symptomatic pleural effusion. A repeat PET in August 2018 showed widely progressive disease with multiple new bone mets involving the spine, ribs, pelvis, and sacrum; new lesions involving the left lateral thoracic/abdominal wall musculature and subcutaneous tissues, new metastases in the left adrenal gland, and recurrent large L pleural effusion. She was started on Xeloda 1500 mg BID on 9/5/18.      She was admitted 10/4-10/11/18 after a syncopal event/unwitnessed fall at home. She was found to have left pneumothorax and pericardial effusion with tamponade physiology. She had left chest tube placed in ED which then fell out on 10/10. Left pleurx placed on 10/11. She had pericardiocentesis on 10/7, drain removed 10/8.      She resumed Xeloda, and remained on this until January 2019.  At that time, she was switched to Doxil.    Interval History:  Shan returns today for follow up. This last cycle went a lot better for her. She used the baking soda rinses QID prophylactically and ended up only getting the mouth sores for 4 days, as opposed to 2 weeks. She didn't even feel like she needed to try the doxepin rinse, but has it available if needed. No recurrence of rash. Having some vaginal dryness and just started Replens. She hasn't had any recurrence of the rash. Breathing well. Occasionally gets a soreness in the R base, not new, remains intermittent. No chest pain, edema. Hemorrhoids resolved now. Eating and drinking well. No fevers, chills, cough, bleeding. She feels like she is coping well at this time. Remainder of 10-pt ROS is otherwise negative.    Current Outpatient Medications   Medication Sig Dispense Refill     acyclovir (ZOVIRAX) 400 MG tablet Take 1 tablet (400 mg) by mouth every 12 hours (Patient not taking: Reported on 6/19/2019) 60 tablet 1     Ascorbic Acid (VITAMIN C PO) Take 1,000 mg by mouth  daily       calcium carbonate (OS-KRYSTEN 500 MG [place]. CA) 1250 MG tablet Take 1 tablet by mouth daily       Digestive Enzymes (ENZYME DIGEST) CAPS Take 1 capsule by mouth daily       doxepin (SINEQUAN) 10 MG/ML (HIGH CONC) solution Take 1.5 mLs (15 mg) by mouth every 4 hours as needed for other (mouth sores) Swish and spit 118 mL 3     LORazepam (ATIVAN) 0.5 MG tablet Take 1 tablet (0.5 mg) by mouth every 4 hours as needed (Anxiety, Nausea/Vomiting or Sleep) (Patient not taking: Reported on 5/22/2019) 30 tablet 2     magic mouthwash (ENTER INGREDIENTS IN COMMENTS) suspension SWISH AND SPIT 5 TO 10 ML BY MOUTH 4 TO 6 TIMES DAILY AS NEEDED 240 mL 0     medical cannabis (Patient's own supply.  Not a prescription) (This is NOT a prescription, and does not certify that the patient has a qualifying medical condition for medical cannabis.  The purpose of this order is  to document that the patient reports taking medical cannabis.)       Omega 3-6-9 Fatty Acids (OMEGA 3-6-9 COMPLEX PO) Take 1 capsule by mouth daily       omeprazole (PRILOSEC) 20 MG DR capsule Take 2 capsules (40 mg) by mouth daily 180 capsule 3     ondansetron (ZOFRAN) 8 MG tablet Take 1 tablet (8 mg) by mouth every 8 hours as needed for nausea 90 tablet 3     prochlorperazine (COMPAZINE) 10 MG tablet Take 1 tablet (10 mg) by mouth every 6 hours as needed (Nausea/Vomiting) (Patient not taking: Reported on 4/22/2019) 30 tablet 2     tacrolimus (PROTOPIC) 0.1 % external ointment Apply topically 2 times daily (Patient not taking: Reported on 5/22/2019) 300 g 3     traMADol (ULTRAM) 50 MG tablet Take 1-2 tablets ( mg) by mouth every 6 hours as needed for moderate pain or headaches       triamcinolone (KENALOG) 0.1 % external cream Apply topically 2 times daily (Patient not taking: Reported on 5/22/2019) 30 g 1     VITAMIN D, CHOLECALCIFEROL, PO Take 1,000 Units by mouth daily          Physical Examination:  General: The patient is a pleasant female in no  "acute distress.  /82 (BP Location: Right arm, Patient Position: Sitting, Cuff Size: Adult Regular)   Pulse 85   Temp 97.4  F (36.3  C) (Oral)   Resp 16   Ht 1.626 m (5' 4.02\")   Wt 48.6 kg (107 lb 3.2 oz)   SpO2 95%   BMI 18.39 kg/m    Wt Readings from Last 10 Encounters:   08/13/19 48.6 kg (107 lb 3.2 oz)   08/12/19 47.2 kg (104 lb)   07/15/19 48.2 kg (106 lb 3.2 oz)   06/19/19 47.6 kg (105 lb)   06/13/19 48.9 kg (107 lb 12.8 oz)   05/22/19 48.5 kg (107 lb)   04/22/19 47.9 kg (105 lb 9.6 oz)   04/15/19 48.8 kg (107 lb 9.6 oz)   04/08/19 48.7 kg (107 lb 6.4 oz)   03/21/19 48.8 kg (107 lb 9.6 oz)     HEENT: EOMI, PERRL. Sclerae are anicteric. Oral mucosa is pink and moist with no lesions or thrush.   Lymph: No palpable cervical, supraclavicular, subclavicular lymphadenopathy.  Heart: Regular rate and rhythm.   Lungs: Breathing comfortably on room air. Decreased BS at bilateral bases, R>L, otherwise clear to auscultation bilaterally.   Abdomen: Bowel sounds present, soft, nontender with no palpable hepatosplenomegaly or masses.   Extremities: No lower extremity edema noted bilaterally.   Neuro: Cranial nerves II through XII are grossly intact.  Skin: No rashes, petechiae, or bruising noted on exposed skin.  Laboratory Data:  Results for NIKKIE HICKS (MRN [id number]) as of 8/13/2019 13:14   Ref. Range 8/13/2019 11:46   Sodium Latest Ref Range: 133 - 144 mmol/L 137   Potassium Latest Ref Range: 3.4 - 5.3 mmol/L 3.9   Chloride Latest Ref Range: 94 - 109 mmol/L 102   Carbon Dioxide Latest Ref Range: 20 - 32 mmol/L 29   Urea Nitrogen Latest Ref Range: 7 - 30 mg/dL 10   Creatinine Latest Ref Range: 0.52 - 1.04 mg/dL 0.65   GFR Estimate Latest Ref Range: >60 mL/min/1.73_m2 >90   GFR Estimate If Black Latest Ref Range: >60 mL/min/1.73_m2 >90   Calcium Latest Ref Range: 8.5 - 10.1 mg/dL 9.8   Anion Gap Latest Ref Range: 3 - 14 mmol/L 6   Albumin Latest Ref Range: 3.4 - 5.0 g/dL 3.4   Protein Total " Latest Ref Range: 6.8 - 8.8 g/dL 9.4 (H)   Bilirubin Total Latest Ref Range: 0.2 - 1.3 mg/dL 0.3   Alkaline Phosphatase Latest Ref Range: 40 - 150 U/L 93   ALT Latest Ref Range: 0 - 50 U/L 20   AST Latest Ref Range: 0 - 45 U/L 53 (H)   Glucose Latest Ref Range: 70 - 99 mg/dL 81   WBC Latest Ref Range: 4.0 - 11.0 10e9/L 4.7   Hemoglobin Latest Ref Range: 11.7 - 15.7 g/dL 12.4   Hematocrit Latest Ref Range: 35.0 - 47.0 % 38.8   Platelet Count Latest Ref Range: 150 - 450 10e9/L 328       Assessment and Plan:  Shan Marsh is a 48 year old woman with prior history of a T1 N0, ER/MS positive, HER2-negative left breast cancer treated with bilateral mastectomies in 2012, now with metastatic disease (bones, pleural effusions, lungs, pericardial effusion, liver, left adrenal/retroperitoneal mets), ER/MS positive, HER2 negative.  She progressed on palbociclib and tamoxifen, alternative therapies in [place], and on Xeloda, and is now on Doxil single agent.     Metastatic breast cancer: Started Doxil every four weeks on 1/22. Restaging on 6/6 wth improvement in lung disease as well as lymph nodes and liver. Plan to continue Doxil every 4 weeks and to repeat imaging in another 2-3 cycles. Tolerating well aside from mucositis, fatigue. She is working with acupuncturist to help improve fatigue, appetite.   - Continue with Doxil today  - On hormone suppression with Zoladex. Due today  - Will plan for repeat imaging in October unless indicated sooner based on symptoms or tumor markers (in process)    Chemotherapy-induced rash. Resolved with further dose reduction of Doxil. Monitor.      Bone mets.  She remains on Zometa every 3 months, most recenetly recieved 7/2019.    Mucositis: Significantly improved with prophylactic use of baking soda swishes starting after chemotherapy. She will continue this. Refilled MMW to use prn. She also has doxepin swish & spit for her to try prn.    Vaginal dryness, dyspareunia: Likely secondary to  estrogen depletion. She has recently started Replens. Advised she continue. Lubricants with intercourse PRN.    Constipation, hemorrhoids: Resolved with OTC interventions     Bilateral pleural effusions.  She has previously had thoracentesis of both lungs. Presently asymptomatic.     History of a malignant pericardial effusion.  No symptoms at this time.  We will repeat an echocardiogram prior to next visit for monitoring on Doxil.      Lima Griffiths PA-C  [facility]  [address] [place] [address] [id number] [phone]

## 2019-08-13 NOTE — LETTER
8/13/2019      RE: Shan Marsh  [address]  [facility] [address]       Oncology/Hematology Visit Note  Aug 13, 2019    Reason for Visit: follow up of metastatic breast cancer, ER positive.     History of Present Illness: Shan Marsh is a 49 year old female without significant past medical history with a diagnosis of metastatic breast cancer after she presented with left shoulder pain. Shoulder MRI showed a soft tissue mass close to the distal clavicle which was concerning for malignancy. PET/CT on 5/9/17 showed hypermetabolic left axillary, supraclavicular, mediastinal and hilar nodes. She had biopsies of left axilla and hilar nodes showing metastatic breast cancer, strongly ER positive, moderately LA positive, and HER-2 kaitlin non-amplified. She originally had [id number], ER/LA positive, HER-2 negative breast cancer diagnosed in spring of 2012 s/p bilateral mastectomies and Tamoxifen from 8/2012-2/2013 and discontinued due to side effects. With development of metastatic disease, she was recommended treatment on BIG10 clinical trial with palbociclib and tamoxifen. She started treatment on 5/24/17 and required dose reduction for neutropenia. CT CAP and bone scan 1/11/2018 showing progressive disease with new bony lesions in the ribs, T spine, and possibly liver, lungs. She started Zoladex and abemiciclib on 1/12/2018.      In March 2018, she sought treatment at [facility] in [place], under the care of Dr. Brooks, where she received:  - Vallovex vaccine (per FDA, is in phase I trials here in [place])  - Insulin potentiated therapy with conjugated chemotherapy (5% chemotherapy, unclear what chemotherapy)  - Gina's toxins, IV vitamin C, IV Ozone, hyperbaric chamber, vitamin B17 infusion, vitamin CK3 IV, and coffee enemas PRN  - Also oral niacin, Lugol, acidol, pancreatin, and thyroid desiccated liver, per their regimen  - She was continued on Letrozole and advised to continue Zoladex.      She  met with Dr. Whitman and patient wished to continue her alternative regimen. Abemiciclib was placed on hold. She was continued on Zoladex and letrozole. While in [place], she required thoracentesis for symptomatic pleural effusion. A repeat PET in August 2018 showed widely progressive disease with multiple new bone mets involving the spine, ribs, pelvis, and sacrum; new lesions involving the left lateral thoracic/abdominal wall musculature and subcutaneous tissues, new metastases in the left adrenal gland, and recurrent large L pleural effusion. She was started on Xeloda 1500 mg BID on 9/5/18.      She was admitted 10/4-10/11/18 after a syncopal event/unwitnessed fall at home. She was found to have left pneumothorax and pericardial effusion with tamponade physiology. She had left chest tube placed in ED which then fell out on 10/10. Left pleurx placed on 10/11. She had pericardiocentesis on 10/7, drain removed 10/8.      She resumed Xeloda, and remained on this until January 2019.  At that time, she was switched to Doxil.    Interval History:  Shan returns today for follow up. This last cycle went a lot better for her. She used the baking soda rinses QID prophylactically and ended up only getting the mouth sores for 4 days, as opposed to 2 weeks. She didn't even feel like she needed to try the doxepin rinse, but has it available if needed. No recurrence of rash. Having some vaginal dryness and just started Replens. She hasn't had any recurrence of the rash. Breathing well. Occasionally gets a soreness in the R base, not new, remains intermittent. No chest pain, edema. Hemorrhoids resolved now. Eating and drinking well. No fevers, chills, cough, bleeding. She feels like she is coping well at this time. Remainder of 10-pt ROS is otherwise negative.    Current Outpatient Medications   Medication Sig Dispense Refill     acyclovir (ZOVIRAX) 400 MG tablet Take 1 tablet (400 mg) by mouth every 12 hours (Patient not taking:  Reported on 6/19/2019) 60 tablet 1     Ascorbic Acid (VITAMIN C PO) Take 1,000 mg by mouth daily       calcium carbonate (OS-KRYSTEN 500 MG [place]. CA) 1250 MG tablet Take 1 tablet by mouth daily       Digestive Enzymes (ENZYME DIGEST) CAPS Take 1 capsule by mouth daily       doxepin (SINEQUAN) 10 MG/ML (HIGH CONC) solution Take 1.5 mLs (15 mg) by mouth every 4 hours as needed for other (mouth sores) Swish and spit 118 mL 3     LORazepam (ATIVAN) 0.5 MG tablet Take 1 tablet (0.5 mg) by mouth every 4 hours as needed (Anxiety, Nausea/Vomiting or Sleep) (Patient not taking: Reported on 5/22/2019) 30 tablet 2     magic mouthwash (ENTER INGREDIENTS IN COMMENTS) suspension SWISH AND SPIT 5 TO 10 ML BY MOUTH 4 TO 6 TIMES DAILY AS NEEDED 240 mL 0     medical cannabis (Patient's own supply.  Not a prescription) (This is NOT a prescription, and does not certify that the patient has a qualifying medical condition for medical cannabis.  The purpose of this order is  to document that the patient reports taking medical cannabis.)       Omega 3-6-9 Fatty Acids (OMEGA 3-6-9 COMPLEX PO) Take 1 capsule by mouth daily       omeprazole (PRILOSEC) 20 MG DR capsule Take 2 capsules (40 mg) by mouth daily 180 capsule 3     ondansetron (ZOFRAN) 8 MG tablet Take 1 tablet (8 mg) by mouth every 8 hours as needed for nausea 90 tablet 3     prochlorperazine (COMPAZINE) 10 MG tablet Take 1 tablet (10 mg) by mouth every 6 hours as needed (Nausea/Vomiting) (Patient not taking: Reported on 4/22/2019) 30 tablet 2     tacrolimus (PROTOPIC) 0.1 % external ointment Apply topically 2 times daily (Patient not taking: Reported on 5/22/2019) 300 g 3     traMADol (ULTRAM) 50 MG tablet Take 1-2 tablets ( mg) by mouth every 6 hours as needed for moderate pain or headaches       triamcinolone (KENALOG) 0.1 % external cream Apply topically 2 times daily (Patient not taking: Reported on 5/22/2019) 30 g 1     VITAMIN D, CHOLECALCIFEROL, PO Take 1,000 Units by  "mouth daily          Physical Examination:  General: The patient is a pleasant female in no acute distress.  /82 (BP Location: Right arm, Patient Position: Sitting, Cuff Size: Adult Regular)   Pulse 85   Temp 97.4  F (36.3  C) (Oral)   Resp 16   Ht 1.626 m (5' 4.02\")   Wt 48.6 kg (107 lb 3.2 oz)   SpO2 95%   BMI 18.39 kg/m     Wt Readings from Last 10 Encounters:   08/13/19 48.6 kg (107 lb 3.2 oz)   08/12/19 47.2 kg (104 lb)   07/15/19 48.2 kg (106 lb 3.2 oz)   06/19/19 47.6 kg (105 lb)   06/13/19 48.9 kg (107 lb 12.8 oz)   05/22/19 48.5 kg (107 lb)   04/22/19 47.9 kg (105 lb 9.6 oz)   04/15/19 48.8 kg (107 lb 9.6 oz)   04/08/19 48.7 kg (107 lb 6.4 oz)   03/21/19 48.8 kg (107 lb 9.6 oz)     HEENT: EOMI, PERRL. Sclerae are anicteric. Oral mucosa is pink and moist with no lesions or thrush.   Lymph: No palpable cervical, supraclavicular, subclavicular lymphadenopathy.  Heart: Regular rate and rhythm.   Lungs: Breathing comfortably on room air. Decreased BS at bilateral bases, R>L, otherwise clear to auscultation bilaterally.   Abdomen: Bowel sounds present, soft, nontender with no palpable hepatosplenomegaly or masses.   Extremities: No lower extremity edema noted bilaterally.   Neuro: Cranial nerves II through XII are grossly intact.  Skin: No rashes, petechiae, or bruising noted on exposed skin.  Laboratory Data:  Results for NIKKIE HICKS (MRN [id number]) as of 8/13/2019 13:14   Ref. Range 8/13/2019 11:46   Sodium Latest Ref Range: 133 - 144 mmol/L 137   Potassium Latest Ref Range: 3.4 - 5.3 mmol/L 3.9   Chloride Latest Ref Range: 94 - 109 mmol/L 102   Carbon Dioxide Latest Ref Range: 20 - 32 mmol/L 29   Urea Nitrogen Latest Ref Range: 7 - 30 mg/dL 10   Creatinine Latest Ref Range: 0.52 - 1.04 mg/dL 0.65   GFR Estimate Latest Ref Range: >60 mL/min/1.73_m2 >90   GFR Estimate If Black Latest Ref Range: >60 mL/min/1.73_m2 >90   Calcium Latest Ref Range: 8.5 - 10.1 mg/dL 9.8   Anion Gap Latest " Ref Range: 3 - 14 mmol/L 6   Albumin Latest Ref Range: 3.4 - 5.0 g/dL 3.4   Protein Total Latest Ref Range: 6.8 - 8.8 g/dL 9.4 (H)   Bilirubin Total Latest Ref Range: 0.2 - 1.3 mg/dL 0.3   Alkaline Phosphatase Latest Ref Range: 40 - 150 U/L 93   ALT Latest Ref Range: 0 - 50 U/L 20   AST Latest Ref Range: 0 - 45 U/L 53 (H)   Glucose Latest Ref Range: 70 - 99 mg/dL 81   WBC Latest Ref Range: 4.0 - 11.0 10e9/L 4.7   Hemoglobin Latest Ref Range: 11.7 - 15.7 g/dL 12.4   Hematocrit Latest Ref Range: 35.0 - 47.0 % 38.8   Platelet Count Latest Ref Range: 150 - 450 10e9/L 328       Assessment and Plan:  Shan Marsh is a 48 year old woman with prior history of a T1 N0, ER/AZ positive, HER2-negative left breast cancer treated with bilateral mastectomies in 2012, now with metastatic disease (bones, pleural effusions, lungs, pericardial effusion, liver, left adrenal/retroperitoneal mets), ER/AZ positive, HER2 negative.  She progressed on palbociclib and tamoxifen, alternative therapies in [place], and on Xeloda, and is now on Doxil single agent.     Metastatic breast cancer: Started Doxil every four weeks on 1/22. Restaging on 6/6 wth improvement in lung disease as well as lymph nodes and liver. Plan to continue Doxil every 4 weeks and to repeat imaging in another 2-3 cycles. Tolerating well aside from mucositis, fatigue. She is working with acupuncturist to help improve fatigue, appetite.   - Continue with Doxil today  - On hormone suppression with Zoladex. Due today  - Will plan for repeat imaging in October unless indicated sooner based on symptoms or tumor markers (in process)    Chemotherapy-induced rash. Resolved with further dose reduction of Doxil. Monitor.      Bone mets.  She remains on Zometa every 3 months, most recenetly recieved 7/2019.    Mucositis: Significantly improved with prophylactic use of baking soda swishes starting after chemotherapy. She will continue this. Refilled MMW to use prn. She also has  doxepin swish & spit for her to try prn.    Vaginal dryness, dyspareunia: Likely secondary to estrogen depletion. She has recently started Replens. Advised she continue. Lubricants with intercourse PRN.    Constipation, hemorrhoids: Resolved with OTC interventions     Bilateral pleural effusions.  She has previously had thoracentesis of both lungs. Presently asymptomatic.     History of a malignant pericardial effusion.  No symptoms at this time.  We will repeat an echocardiogram prior to next visit for monitoring on Doxil.      Lima Griffiths PA-C  [facility]  [address] [place] [id number] [phone]

## 2019-09-02 NOTE — PROGRESS NOTES
Disability forms filled out and put in providers folder for review and signature.      Marisel Hodgson CMA ([facility])

## 2019-09-03 NOTE — PROGRESS NOTES
Disability paperwork completed, signed and faxed to [facility] @ [phone]. A copy was made, filed and original mailed to patient at home address.    Marisel Hodgson CMA, CMA

## 2019-09-06 NOTE — PROGRESS NOTES
Oncology/Hematology Visit Note  Sep 10, 2019    Reason for Visit: follow up of metastatic breast cancer, ER positive.     History of Present Illness: Shan Marsh is a 49 year old female without significant past medical history with a diagnosis of metastatic breast cancer after she presented with left shoulder pain. Shoulder MRI showed a soft tissue mass close to the distal clavicle which was concerning for malignancy. PET/CT on 5/9/17 showed hypermetabolic left axillary, supraclavicular, mediastinal and hilar nodes. She had biopsies of left axilla and hilar nodes showing metastatic breast cancer, strongly ER positive, moderately MD positive, and HER-2 kaitlin non-amplified. She originally had H6sM1G7, ER/MD positive, HER-2 negative breast cancer diagnosed in spring of 2012 s/p bilateral mastectomies and Tamoxifen from 8/2012-2/2013 and discontinued due to side effects. With development of metastatic disease, she was recommended treatment on BIG10 clinical trial with palbociclib and tamoxifen. She started treatment on 5/24/17 and required dose reduction for neutropenia. CT CAP and bone scan 1/11/2018 showing progressive disease with new bony lesions in the ribs, T spine, and possibly liver, lungs. She started Zoladex and abemiciclib on 1/12/2018.      In March 2018, she sought treatment at [facility] in [place], under the care of Dr. Brooks, where she received:  - Vallovex vaccine (per FDA, is in phase I trials here in [place])  - Insulin potentiated therapy with conjugated chemotherapy (5% chemotherapy, unclear what chemotherapy)  - Gina's toxins, IV vitamin C, IV Ozone, hyperbaric chamber, vitamin B17 infusion, vitamin CK3 IV, and coffee enemas PRN  - Also oral niacin, Lugol, acidol, pancreatin, and thyroid desiccated liver, per their regimen  - She was continued on Letrozole and advised to continue Zoladex.      She met with Dr. Whitman and patient wished to continue her alternative regimen. Abemiciclib was  placed on hold. She was continued on Zoladex and letrozole. While in [place], she required thoracentesis for symptomatic pleural effusion. A repeat PET in August 2018 showed widely progressive disease with multiple new bone mets involving the spine, ribs, pelvis, and sacrum; new lesions involving the left lateral thoracic/abdominal wall musculature and subcutaneous tissues, new metastases in the left adrenal gland, and recurrent large L pleural effusion. She was started on Xeloda 1500 mg BID on 9/5/18.      She was admitted 10/4-10/11/18 after a syncopal event/unwitnessed fall at home. She was found to have left pneumothorax and pericardial effusion with tamponade physiology. She had left chest tube placed in ED which then fell out on 10/10. Left pleurx placed on 10/11. She had pericardiocentesis on 10/7, drain removed 10/8.      She resumed Xeloda, and remained on this until January 2019.  At that time, she was switched to Doxil.    Interval History:  Shan returns today for follow up. She has no new concerns. Her appetite remains poor, but she feels like she usually manages well; however this month, her daughter came out as possibly trans, and this has been a difficult month for her as such. She is working on doing better about her intake. She hasn't had any recurrence of the rash. Breathing well. Occasionally gets a soreness in the R base, not new, remains intermittent. No chest pain, edema. Hemorrhoids resolved now. No other concerns. No fevers, chills, cough, bleeding. She feels like she is coping well at this time. Remainder of 10-pt ROS is otherwise negative.    Current Outpatient Medications   Medication Sig Dispense Refill     acyclovir (ZOVIRAX) 400 MG tablet Take 1 tablet (400 mg) by mouth every 12 hours 60 tablet 1     Ascorbic Acid (VITAMIN C PO) Take 1,000 mg by mouth daily       calcium carbonate (OS-KRYSTEN 500 MG [place]. CA) 1250 MG tablet Take 1 tablet by mouth daily       Digestive Enzymes (ENZYME  DIGEST) CAPS Take 1 capsule by mouth daily       doxepin (SINEQUAN) 10 MG/ML (HIGH CONC) solution Take 1.5 mLs (15 mg) by mouth every 4 hours as needed for other (mouth sores) Swish and spit (Patient not taking: Reported on 8/13/2019) 118 mL 3     lidocaine HCl (XYLOCAINE) 2 % solution        LORazepam (ATIVAN) 0.5 MG tablet Take 1 tablet (0.5 mg) by mouth every 4 hours as needed (Anxiety, Nausea/Vomiting or Sleep) (Patient not taking: Reported on 8/13/2019) 30 tablet 2     magic mouthwash (ENTER INGREDIENTS IN COMMENTS) suspension SWISH AND SPIT 5 TO 10 ML BY MOUTH 4 TO 6 TIMES DAILY AS NEEDED 240 mL 0     medical cannabis (Patient's own supply.  Not a prescription) (This is NOT a prescription, and does not certify that the patient has a qualifying medical condition for medical cannabis.  The purpose of this order is  to document that the patient reports taking medical cannabis.)       Omega 3-6-9 Fatty Acids (OMEGA 3-6-9 COMPLEX PO) Take 1 capsule by mouth daily       omeprazole (PRILOSEC) 20 MG DR capsule Take 2 capsules (40 mg) by mouth daily 180 capsule 3     ondansetron (ZOFRAN) 8 MG tablet Take 1 tablet (8 mg) by mouth every 8 hours as needed for nausea 90 tablet 3     prochlorperazine (COMPAZINE) 10 MG tablet Take 1 tablet (10 mg) by mouth every 6 hours as needed (Nausea/Vomiting) (Patient not taking: Reported on 4/22/2019) 30 tablet 2     tacrolimus (PROTOPIC) 0.1 % external ointment Apply topically 2 times daily (Patient not taking: Reported on 8/13/2019) 300 g 3     traMADol (ULTRAM) 50 MG tablet Take 1-2 tablets ( mg) by mouth every 6 hours as needed for moderate pain or headaches       triamcinolone (KENALOG) 0.1 % external cream Apply topically 2 times daily (Patient not taking: Reported on 5/22/2019) 30 g 1     VITAMIN D, CHOLECALCIFEROL, PO Take 1,000 Units by mouth daily          Physical Examination:  General: The patient is a pleasant female in no acute distress.  /73 (BP Location:  Right arm, Patient Position: Sitting, Cuff Size: Adult Small)   Pulse 88   Temp 96.8  F (36  C) (Tympanic)   Resp 16   Wt 47.3 kg (104 lb 4.8 oz)   SpO2 97%   BMI 17.89 kg/m    Wt Readings from Last 10 Encounters:   08/13/19 48.6 kg (107 lb 3.2 oz)   08/12/19 47.2 kg (104 lb)   07/15/19 48.2 kg (106 lb 3.2 oz)   06/19/19 47.6 kg (105 lb)   06/13/19 48.9 kg (107 lb 12.8 oz)   05/22/19 48.5 kg (107 lb)   04/22/19 47.9 kg (105 lb 9.6 oz)   04/15/19 48.8 kg (107 lb 9.6 oz)   04/08/19 48.7 kg (107 lb 6.4 oz)   03/21/19 48.8 kg (107 lb 9.6 oz)     HEENT: EOMI, PERRL. Sclerae are anicteric. Oral mucosa is pink and moist with no lesions or thrush.   Lymph: No palpable cervical, supraclavicular, subclavicular lymphadenopathy.  Heart: Regular rate and rhythm.   Lungs: Breathing comfortably on room air. Decreased BS at bilateral bases, R>L, otherwise clear to auscultation bilaterally.   Abdomen: Bowel sounds present, soft, nontender with no palpable hepatosplenomegaly or masses.   Extremities: No lower extremity edema noted bilaterally.   Neuro: Cranial nerves II through XII are grossly intact.  Skin: No rashes, petechiae, or bruising noted on exposed skin.  Laboratory Data:  Results for SHAN MARSH (MRN [id number]) as of 9/10/2019 10:39   Ref. Range 9/10/2019 10:20   WBC Latest Ref Range: 4.0 - 11.0 10e9/L 4.6   Hemoglobin Latest Ref Range: 11.7 - 15.7 g/dL 11.7   Hematocrit Latest Ref Range: 35.0 - 47.0 % 36.5   Platelet Count Latest Ref Range: 150 - 450 10e9/L 333     Assessment and Plan:  Shan Marsh is a 49 year old woman with prior history of a T1 N0, ER/MN positive, HER2-negative left breast cancer treated with bilateral mastectomies in 2012, now with metastatic disease (bones, pleural effusions, lungs, pericardial effusion, liver, left adrenal/retroperitoneal mets), ER/MN positive, HER2 negative.  She progressed on palbociclib and tamoxifen, alternative therapies in [place], and on Xeloda, and is  now on Doxil single agent.     Metastatic breast cancer: Started Doxil every four weeks on 1/22. Restaging on 6/6 wth improvement in lung disease as well as lymph nodes and liver. Plan to continue Doxil every 4 weeks and to repeat imaging in another 2-3 cycles. Tolerating well aside from mucositis, fatigue. She is working with acupuncturist to help improve fatigue, appetite.   - Continue with Doxil today  - On hormone suppression with Zoladex. Due today  - Will plan for repeat imaging in October     Chemotherapy-induced rash. Resolved with further dose reduction of Doxil. Monitor.      Bone mets.  She remains on Zometa every 3 months, most recenetly recieved 7/2019. Due next visit.    Mucositis: Significantly improved with prophylactic use of baking soda swishes starting after chemotherapy. She will continue this. Has MMW to use prn. She also has doxepin swish & spit for her to try prn.    Vaginal dryness, dyspareunia: Likely secondary to estrogen depletion. She has recently started Replens. Advised she continue. Lubricants with intercourse PRN.    Bilateral pleural effusions.  She has previously had thoracentesis of both lungs. Presently asymptomatic.     History of a malignant pericardial effusion.  No symptoms at this time.  Recent Echo for routine monitoring on Doxil 9/9/19 without any pericardial effusion, otherwise normal.       Lima Griffiths PA-C  [facility]  [address], [place] [address] [phone]

## 2019-09-10 NOTE — PATIENT INSTRUCTIONS
[facility] Triage and after hours / weekends / holidays:  [phone]    Please call the triage or after hours line if you experience a temperature greater than or equal to 100.5, shaking chills, have uncontrolled nausea, vomiting and/or diarrhea, dizziness, shortness of breath, chest pain, bleeding, unexplained bruising, or if you have any other new/concerning symptoms, questions or concerns.      If you are having any concerning symptoms or wish to speak to a provider before your next infusion visit, please call your care coordinator or triage to notify them so we can adequately serve you.     If you need a refill on a narcotic prescription or other medication, please call before your infusion appointment.                 September 2019 Sunday Monday Tuesday Wednesday Thursday Friday Saturday   1     2     3     4     5     6     7       8     9    ECHO COMPLETE   9:45 AM   (60 min.)   [id number]   [facility] 10    [facility] DRAW  10:00 AM   (15 min.)   [facility] DRAW   [facility] Lab Draw    [facility] RETURN  10:25 AM   (50 min.)   Lima Griffiths PA-C   [facility] Cancer [facility] ONC INFUSION 120  12:00 PM   (120 min.)    ONCOLOGY INFUSION   [facility] Cancer [facility] 11     12     13     14       15     16     17     18     19     20     21       22     23     24     25     26     27     28       29     30                                          October 2019 Sunday Monday Tuesday Wednesday Thursday Friday Saturday             1     2     3    NM INJ   9:00 AM   (15 min.)   [id number]   [facility], Nuclear Medicine    CT CHEST ABDOMEN PELVIS WWO   9:20 AM   (30 min.)   UUCT1   [facility], CT    NM BONE SCAN WHOLE BODY  12:00 PM   (60 min.)   [id number]   [facility], Nuclear Medicine 4     5       6     7    [facility] DRAW   9:00 AM   (15 min.)   [facility] DRAW   [facility] Lab Draw    [facility] RETURN   9:15 AM   (30 min.)   Antonette  Ana Jefferson MD   [facility]    [facility] ONC INFUSION 120  10:00 AM   (120 min.)   [facility] ONCOLOGY INFUSION   [facility] 8     9     10     11     12       13     14     15     16     17     18     19       20     21     22     23     24     25     26       27     28     29     30     31                              Recent Results (from the past 24 hour(s))   Comprehensive metabolic panel    Collection Time: 09/10/19 10:20 AM   Result Value Ref Range    Sodium 135 133 - 144 mmol/L    Potassium 3.7 3.4 - 5.3 mmol/L    Chloride 100 94 - 109 mmol/L    Carbon Dioxide 26 20 - 32 mmol/L    Anion Gap 8 3 - 14 mmol/L    Glucose 86 70 - 99 mg/dL    Urea Nitrogen 11 7 - 30 mg/dL    Creatinine 0.54 0.52 - 1.04 mg/dL    GFR Estimate >90 >60 mL/min/[1.73_m2]    GFR Estimate If Black >90 >60 mL/min/[1.73_m2]    Calcium 9.9 8.5 - 10.1 mg/dL    Bilirubin Total 0.3 0.2 - 1.3 mg/dL    Albumin 3.2 (L) 3.4 - 5.0 g/dL    Protein Total 9.2 (H) 6.8 - 8.8 g/dL    Alkaline Phosphatase 95 40 - 150 U/L    ALT 17 0 - 50 U/L    AST 57 (H) 0 - 45 U/L   CBC with platelets differential    Collection Time: 09/10/19 10:20 AM   Result Value Ref Range    WBC 4.6 4.0 - 11.0 10e9/L    RBC Count 3.93 3.8 - 5.2 10e12/L    Hemoglobin 11.7 11.7 - 15.7 g/dL    Hematocrit 36.5 35.0 - 47.0 %    MCV 93 78 - 100 fl    MCH 29.8 26.5 - 33.0 pg    MCHC 32.1 31.5 - 36.5 g/dL    RDW 14.3 10.0 - 15.0 %    Platelet Count 333 150 - 450 10e9/L    Diff Method Automated Method     % Neutrophils 62.1 %    % Lymphocytes 24.8 %    % Monocytes 11.2 %    % Eosinophils 0.9 %    % Basophils 0.6 %    % Immature Granulocytes 0.4 %    Nucleated RBCs 0 0 /100    Absolute Neutrophil 2.9 1.6 - 8.3 10e9/L    Absolute Lymphocytes 1.2 0.8 - 5.3 10e9/L    Absolute Monocytes 0.5 0.0 - 1.3 10e9/L    Absolute Eosinophils 0.0 0.0 - 0.7 10e9/L    Absolute Basophils 0.0 0.0 - 0.2 10e9/L    Abs Immature Granulocytes 0.0 0 - 0.4 10e9/L    Absolute Nucleated RBC 0.0     CEA    Collection Time: 09/10/19 10:20 AM   Result Value Ref Range    CEA 88.7 (H) 0 - 2.5 ug/L   Ca27.29  breast tumor marker    Collection Time: 09/10/19 10:20 AM   Result Value Ref Range    CA 27-29 495 (H) 0 - 39 U/mL

## 2019-09-10 NOTE — NURSING NOTE
Chief Complaint   Patient presents with     Blood Draw     Labs drawn via PIV placed by RN in lab. VS taken. Pt checked in for next appt     Labs drawn from PIV placed by RN. Line flushed with saline. Vitals taken. Pt checked in for appointment(s).    Barbara DOWELL RN PHN BSN  BMT/Oncology Lab

## 2019-09-10 NOTE — PROGRESS NOTES
Infusion Nursing Note:    Patient presents today for Cycle 9 Day 1 Doxil, Zoladex.    Patient met with Lima CASTELLANO  prior to infusion.    Lab Results   Component Value Date    HGB 11.7 09/10/2019     Lab Results   Component Value Date    WBC 4.6 09/10/2019      Lab Results   Component Value Date    ANEU 2.9 09/10/2019     Lab Results   Component Value Date     09/10/2019      Lab Results   Component Value Date     09/10/2019                   Lab Results   Component Value Date    POTASSIUM 3.7 09/10/2019           Lab Results   Component Value Date    MAG 2.4 01/22/2019            Lab Results   Component Value Date    CR 0.54 09/10/2019                   Lab Results   Component Value Date    KRYSTEN 9.9 09/10/2019                Lab Results   Component Value Date    BILITOTAL 0.3 09/10/2019           Lab Results   Component Value Date    ALBUMIN 3.2 09/10/2019                    Lab Results   Component Value Date    ALT 17 09/10/2019           Lab Results   Component Value Date    AST 57 09/10/2019       Results reviewed, labs MET treatment parameters, ok to proceed with treatment.  ECHO/MUGA completed 9/9/19  EF 55-60%.    Note: Patient requesting refill for magic mouthwash, Lima agreed to refill and send to patient's local pharmacy. Pt offered ice to abdomen prior to injection, and ice to suck on prior to doxil.     Intravenous Access:  Peripheral IV placed in lab.    Post Infusion Assessment:  Patient tolerated infusion without incident.  Patient tolerated injection without incident. Ice and lidocaine used prior to injection to RIGHT abdomen.   No evidence of extravasations.  Access discontinued per protocol.    Discharge Plan:   Prescription refills given for magic mouthwash (Lima to send).  Discharge instructions reviewed with: Patient.  AVS to patient via [employer].  Patient will return 10/7 for next appointment.   Patient discharged in stable condition accompanied by: self.  Departure Mode:  Ambulatory.  Face to Face time: 2 minutes.

## 2019-09-10 NOTE — LETTER
9/10/2019      RE: Shan Marsh  [address]  [facility] [id number]       Oncology/Hematology Visit Note  Sep 10, 2019    Reason for Visit: follow up of metastatic breast cancer, ER positive.     History of Present Illness: Shan Marsh is a 49 year old female without significant past medical history with a diagnosis of metastatic breast cancer after she presented with left shoulder pain. Shoulder MRI showed a soft tissue mass close to the distal clavicle which was concerning for malignancy. PET/CT on 5/9/17 showed hypermetabolic left axillary, supraclavicular, mediastinal and hilar nodes. She had biopsies of left axilla and hilar nodes showing metastatic breast cancer, strongly ER positive, moderately NC positive, and HER-2 kaitlin non-amplified. She originally had [id number], ER/NC positive, HER-2 negative breast cancer diagnosed in spring of 2012 s/p bilateral mastectomies and Tamoxifen from 8/2012-2/2013 and discontinued due to side effects. With development of metastatic disease, she was recommended treatment on BIG10 clinical trial with palbociclib and tamoxifen. She started treatment on 5/24/17 and required dose reduction for neutropenia. CT CAP and bone scan 1/11/2018 showing progressive disease with new bony lesions in the ribs, T spine, and possibly liver, lungs. She started Zoladex and abemiciclib on 1/12/2018.      In March 2018, she sought treatment at [facility] in [place], under the care of Dr. Brooks, where she received:  - Vallovex vaccine (per FDA, is in phase I trials here in [place])  - Insulin potentiated therapy with conjugated chemotherapy (5% chemotherapy, unclear what chemotherapy)  - Gina's toxins, IV vitamin C, IV Ozone, hyperbaric chamber, vitamin B17 infusion, vitamin CK3 IV, and coffee enemas PRN  - Also oral niacin, Lugol, acidol, pancreatin, and thyroid desiccated liver, per their regimen  - She was continued on Letrozole and advised to continue Zoladex.      She  met with Dr. Whitman and patient wished to continue her alternative regimen. Abemiciclib was placed on hold. She was continued on Zoladex and letrozole. While in [place], she required thoracentesis for symptomatic pleural effusion. A repeat PET in August 2018 showed widely progressive disease with multiple new bone mets involving the spine, ribs, pelvis, and sacrum; new lesions involving the left lateral thoracic/abdominal wall musculature and subcutaneous tissues, new metastases in the left adrenal gland, and recurrent large L pleural effusion. She was started on Xeloda 1500 mg BID on 9/5/18.      She was admitted 10/4-10/11/18 after a syncopal event/unwitnessed fall at home. She was found to have left pneumothorax and pericardial effusion with tamponade physiology. She had left chest tube placed in ED which then fell out on 10/10. Left pleurx placed on 10/11. She had pericardiocentesis on 10/7, drain removed 10/8.      She resumed Xeloda, and remained on this until January 2019.  At that time, she was switched to Doxil.    Interval History:  Shan returns today for follow up. She has no new concerns. Her appetite remains poor, but she feels like she usually manages well; however this month, her daughter came out as possibly trans, and this has been a difficult month for her as such. She is working on doing better about her intake. She hasn't had any recurrence of the rash. Breathing well. Occasionally gets a soreness in the R base, not new, remains intermittent. No chest pain, edema. Hemorrhoids resolved now. No other concerns. No fevers, chills, cough, bleeding. She feels like she is coping well at this time. Remainder of 10-pt ROS is otherwise negative.    Current Outpatient Medications   Medication Sig Dispense Refill     acyclovir (ZOVIRAX) 400 MG tablet Take 1 tablet (400 mg) by mouth every 12 hours 60 tablet 1     Ascorbic Acid (VITAMIN C PO) Take 1,000 mg by mouth daily       calcium carbonate (OS-KRYSTEN 500 MG  [place]. CA) 1250 MG tablet Take 1 tablet by mouth daily       Digestive Enzymes (ENZYME DIGEST) CAPS Take 1 capsule by mouth daily       doxepin (SINEQUAN) 10 MG/ML (HIGH CONC) solution Take 1.5 mLs (15 mg) by mouth every 4 hours as needed for other (mouth sores) Swish and spit (Patient not taking: Reported on 8/13/2019) 118 mL 3     lidocaine HCl (XYLOCAINE) 2 % solution        LORazepam (ATIVAN) 0.5 MG tablet Take 1 tablet (0.5 mg) by mouth every 4 hours as needed (Anxiety, Nausea/Vomiting or Sleep) (Patient not taking: Reported on 8/13/2019) 30 tablet 2     magic mouthwash (ENTER INGREDIENTS IN COMMENTS) suspension SWISH AND SPIT 5 TO 10 ML BY MOUTH 4 TO 6 TIMES DAILY AS NEEDED 240 mL 0     medical cannabis (Patient's own supply.  Not a prescription) (This is NOT a prescription, and does not certify that the patient has a qualifying medical condition for medical cannabis.  The purpose of this order is  to document that the patient reports taking medical cannabis.)       Omega 3-6-9 Fatty Acids (OMEGA 3-6-9 COMPLEX PO) Take 1 capsule by mouth daily       omeprazole (PRILOSEC) 20 MG DR capsule Take 2 capsules (40 mg) by mouth daily 180 capsule 3     ondansetron (ZOFRAN) 8 MG tablet Take 1 tablet (8 mg) by mouth every 8 hours as needed for nausea 90 tablet 3     prochlorperazine (COMPAZINE) 10 MG tablet Take 1 tablet (10 mg) by mouth every 6 hours as needed (Nausea/Vomiting) (Patient not taking: Reported on 4/22/2019) 30 tablet 2     tacrolimus (PROTOPIC) 0.1 % external ointment Apply topically 2 times daily (Patient not taking: Reported on 8/13/2019) 300 g 3     traMADol (ULTRAM) 50 MG tablet Take 1-2 tablets ( mg) by mouth every 6 hours as needed for moderate pain or headaches       triamcinolone (KENALOG) 0.1 % external cream Apply topically 2 times daily (Patient not taking: Reported on 5/22/2019) 30 g 1     VITAMIN D, CHOLECALCIFEROL, PO Take 1,000 Units by mouth daily          Physical  Examination:  General: The patient is a pleasant female in no acute distress.  /73 (BP Location: Right arm, Patient Position: Sitting, Cuff Size: Adult Small)   Pulse 88   Temp 96.8  F (36  C) (Tympanic)   Resp 16   Wt 47.3 kg (104 lb 4.8 oz)   SpO2 97%   BMI 17.89 kg/m     Wt Readings from Last 10 Encounters:   08/13/19 48.6 kg (107 lb 3.2 oz)   08/12/19 47.2 kg (104 lb)   07/15/19 48.2 kg (106 lb 3.2 oz)   06/19/19 47.6 kg (105 lb)   06/13/19 48.9 kg (107 lb 12.8 oz)   05/22/19 48.5 kg (107 lb)   04/22/19 47.9 kg (105 lb 9.6 oz)   04/15/19 48.8 kg (107 lb 9.6 oz)   04/08/19 48.7 kg (107 lb 6.4 oz)   03/21/19 48.8 kg (107 lb 9.6 oz)     HEENT: EOMI, PERRL. Sclerae are anicteric. Oral mucosa is pink and moist with no lesions or thrush.   Lymph: No palpable cervical, supraclavicular, subclavicular lymphadenopathy.  Heart: Regular rate and rhythm.   Lungs: Breathing comfortably on room air. Decreased BS at bilateral bases, R>L, otherwise clear to auscultation bilaterally.   Abdomen: Bowel sounds present, soft, nontender with no palpable hepatosplenomegaly or masses.   Extremities: No lower extremity edema noted bilaterally.   Neuro: Cranial nerves II through XII are grossly intact.  Skin: No rashes, petechiae, or bruising noted on exposed skin.  Laboratory Data:  Results for SHAN MARSH (MRN [id number]) as of 9/10/2019 10:39   Ref. Range 9/10/2019 10:20   WBC Latest Ref Range: 4.0 - 11.0 10e9/L 4.6   Hemoglobin Latest Ref Range: 11.7 - 15.7 g/dL 11.7   Hematocrit Latest Ref Range: 35.0 - 47.0 % 36.5   Platelet Count Latest Ref Range: 150 - 450 10e9/L 333     Assessment and Plan:  Shan Marsh is a [age] year old woman with prior history of a T1 N0, ER/CO positive, HER2-negative left breast cancer treated with bilateral mastectomies in 2012, now with metastatic disease (bones, pleural effusions, lungs, pericardial effusion, liver, left adrenal/retroperitoneal mets), ER/CO positive, HER2  negative.  She progressed on palbociclib and tamoxifen, alternative therapies in [place], and on Xeloda, and is now on Doxil single agent.     Metastatic breast cancer: Started Doxil every four weeks on 1/22. Restaging on 6/6 wth improvement in lung disease as well as lymph nodes and liver. Plan to continue Doxil every 4 weeks and to repeat imaging in another 2-3 cycles. Tolerating well aside from mucositis, fatigue. She is working with acupuncturist to help improve fatigue, appetite.   - Continue with Doxil today  - On hormone suppression with Zoladex. Due today  - Will plan for repeat imaging in October     Chemotherapy-induced rash. Resolved with further dose reduction of Doxil. Monitor.      Bone mets.  She remains on Zometa every 3 months, most recenetly recieved 7/2019. Due next visit.    Mucositis: Significantly improved with prophylactic use of baking soda swishes starting after chemotherapy. She will continue this. Has MMW to use prn. She also has doxepin swish & spit for her to try prn.    Vaginal dryness, dyspareunia: Likely secondary to estrogen depletion. She has recently started Replens. Advised she continue. Lubricants with intercourse PRN.    Bilateral pleural effusions.  She has previously had thoracentesis of both lungs. Presently asymptomatic.     History of a malignant pericardial effusion.  No symptoms at this time.   Recent Echo for routine monitoring on Doxil 9/9/19 without any pericardial effusion, otherwise normal.       Lima Griffiths PA-C  [facility]  [address] [place] [id number] [phone]

## 2019-09-10 NOTE — NURSING NOTE
"Oncology Rooming Note    September 10, 2019 10:31 AM   Shan Marsh is a 49 year old female who presents for:    Chief Complaint   Patient presents with     Blood Draw     Labs drawn via PIV placed by RN in lab. VS taken. Pt checked in for next appt     Oncology Clinic Visit     Return; Breast Ca     Initial Vitals: /73 (BP Location: Right arm, Patient Position: Sitting, Cuff Size: Adult Small)   Pulse 88   Temp 96.8  F (36  C) (Tympanic)   Resp 16   Wt 47.3 kg (104 lb 4.8 oz)   SpO2 97%   BMI 17.89 kg/m   Estimated body mass index is 17.89 kg/m  as calculated from the following:    Height as of 8/13/19: 1.626 m (5' 4.02\").    Weight as of this encounter: 47.3 kg (104 lb 4.8 oz). Body surface area is 1.46 meters squared.  No Pain (0) Comment: Data Unavailable   No LMP recorded. (Menstrual status: Chemotherapy).  Allergies reviewed: Yes  Medications reviewed: Yes    Medications: MEDICATION REFILLS NEEDED TODAY. Provider was notified.  Pharmacy name entered into [place]:    [facility] - [place], [place] - [address]  [facility] - [place], [place] - [address]  [facility] [place], [place] - [phone]    Clinical concerns: Refill on magic mouthwash; no new concerns.       Franchesca Murphy CMA              "

## 2019-09-18 NOTE — PROGRESS NOTES
Oncology/Hematology Visit Note  Sep 18, 2019    Reason for Visit: add on for fever and diarrhea    History of Present Illness: Shan Marsh is a 49 year old female without significant past medical history with a diagnosis of metastatic breast cancer after she presented with left shoulder pain. Shoulder MRI showed a soft tissue mass close to the distal clavicle which was concerning for malignancy. PET/CT on 5/9/17 showed hypermetabolic left axillary, supraclavicular, mediastinal and hilar nodes. She had biopsies of left axilla and hilar nodes showing metastatic breast cancer, strongly ER positive, moderately AR positive, and HER-2 kaitlin non-amplified. She originally had Q0wL1I6, ER/AR positive, HER-2 negative breast cancer diagnosed in spring of 2012 s/p bilateral mastectomies and Tamoxifen from 8/2012-2/2013 and discontinued due to side effects. With development of metastatic disease, she was recommended treatment on BIG10 clinical trial with palbociclib and tamoxifen. She started treatment on 5/24/17 and required dose reduction for neutropenia. CT CAP and bone scan 1/11/2018 showing progressive disease with new bony lesions in the ribs, T spine, and possibly liver, lungs. She started Zoladex and abemiciclib on 1/12/2018.      In March 2018, she sought treatment at [facility] in [place], under the care of Dr. Brooks, where she received:  - Vallovex vaccine (per FDA, is in phase I trials here in [place])  - Insulin potentiated therapy with conjugated chemotherapy (5% chemotherapy, unclear what chemotherapy)  - Gina's toxins, IV vitamin C, IV Ozone, hyperbaric chamber, vitamin B17 infusion, vitamin CK3 IV, and coffee enemas PRN  - Also oral niacin, Lugol, acidol, pancreatin, and thyroid desiccated liver, per their regimen  - She was continued on Letrozole and advised to continue Zoladex.      She met with Dr. Whitman and patient wished to continue her alternative regimen. Abemiciclib was placed on hold. She was  continued on Zoladex and letrozole. While in [place], she required thoracentesis for symptomatic pleural effusion. A repeat PET in August 2018 showed widely progressive disease with multiple new bone mets involving the spine, ribs, pelvis, and sacrum; new lesions involving the left lateral thoracic/abdominal wall musculature and subcutaneous tissues, new metastases in the left adrenal gland, and recurrent large L pleural effusion. She was started on Xeloda 1500 mg BID on 9/5/18.      She was admitted 10/4-10/11/18 after a syncopal event/unwitnessed fall at home. She was found to have left pneumothorax and pericardial effusion with tamponade physiology. She had left chest tube placed in ED which then fell out on 10/10. Left pleurx placed on 10/11. She had pericardiocentesis on 10/7, drain removed 10/8.      She resumed Xeloda, and remained on this until January 2019.  At that time, she was switched to Doxil.    Interval History:  Shan returns today as an add on for diarrhea and fever.     After she received the last Doxil she started having more fatigue.  She then started to notice loose stools 2 days ago.  She states that usually she has constipation with Doxil and so this is new for her.  She states she has 4-5 loose stools in a day.  Her stools are not too watery though they are not formed.  She denies any mucus or blood in her stools.  She denies any abdominal cramping.  She has some nausea though no vomiting.  She has been needing Compazine 2 times a day.  She typically does have nausea after chemo though this is a little bit more than usual.  She did have some low-grade fevers typically in the 99's though she had one temperature of 100.5 yesterday.  She denied is taking any ibuprofen or Tylenol.  She denies any shortness of breath.  She has a dry cough though this is very minimal.  She denies any dysuria.  She also has not had much of an appetite.  She has been trying to drink fluids though it is been hard to  eat because she is not hungry.    ROS: 10 point ROS neg other than the symptoms noted above in the HPI.    Current Outpatient Medications   Medication Sig Dispense Refill     Digestive Enzymes (ENZYME DIGEST) CAPS Take 1 capsule by mouth daily       magic mouthwash (ENTER INGREDIENTS IN COMMENTS) suspension SWISH AND SPIT 5 TO 10 ML BY MOUTH 4 TO 6 TIMES DAILY AS NEEDED 240 mL 3     medical cannabis (Patient's own supply.  Not a prescription) (This is NOT a prescription, and does not certify that the patient has a qualifying medical condition for medical cannabis.  The purpose of this order is  to document that the patient reports taking medical cannabis.)       Omega 3-6-9 Fatty Acids (OMEGA 3-6-9 COMPLEX PO) Take 1 capsule by mouth daily       omeprazole (PRILOSEC) 20 MG DR capsule Take 2 capsules (40 mg) by mouth daily 180 capsule 3     ondansetron (ZOFRAN) 8 MG tablet Take 1 tablet (8 mg) by mouth every 8 hours as needed for nausea 90 tablet 3     VITAMIN D, CHOLECALCIFEROL, PO Take 1,000 Units by mouth daily        acyclovir (ZOVIRAX) 400 MG tablet Take 1 tablet (400 mg) by mouth every 12 hours (Patient not taking: Reported on 9/18/2019) 60 tablet 1     Ascorbic Acid (VITAMIN C PO) Take 1,000 mg by mouth daily       calcium carbonate (OS-KRYSTEN 500 MG [place]. CA) 1250 MG tablet Take 1 tablet by mouth daily       doxepin (SINEQUAN) 10 MG/ML (HIGH CONC) solution Take 1.5 mLs (15 mg) by mouth every 4 hours as needed for other (mouth sores) Swish and spit (Patient not taking: Reported on 9/18/2019) 118 mL 3     lidocaine HCl (XYLOCAINE) 2 % solution        LORazepam (ATIVAN) 0.5 MG tablet Take 1 tablet (0.5 mg) by mouth every 4 hours as needed (Anxiety, Nausea/Vomiting or Sleep) (Patient not taking: Reported on 8/13/2019) 30 tablet 2     prochlorperazine (COMPAZINE) 10 MG tablet Take 1 tablet (10 mg) by mouth every 6 hours as needed (Nausea/Vomiting) (Patient not taking: Reported on 4/22/2019) 30 tablet 2     tacrolimus  "(PROTOPIC) 0.1 % external ointment Apply topically 2 times daily (Patient not taking: Reported on 8/13/2019) 300 g 3     traMADol (ULTRAM) 50 MG tablet Take 1-2 tablets ( mg) by mouth every 6 hours as needed for moderate pain or headaches       triamcinolone (KENALOG) 0.1 % external cream Apply topically 2 times daily (Patient not taking: Reported on 5/22/2019) 30 g 1       Physical Examination:  General: The patient is a pleasant female in no acute distress.  /78 (BP Location: Right arm, Patient Position: Chair, Cuff Size: Adult Small)   Pulse 89   Temp 98.5  F (36.9  C) (Oral)   Ht 1.626 m (5' 4.02\")   Wt 45.7 kg (100 lb 11.2 oz)   SpO2 98%   BMI 17.28 kg/m    Wt Readings from Last 10 Encounters:   09/18/19 45.7 kg (100 lb 11.2 oz)   09/10/19 47.3 kg (104 lb 4.8 oz)   08/13/19 48.6 kg (107 lb 3.2 oz)   08/12/19 47.2 kg (104 lb)   07/15/19 48.2 kg (106 lb 3.2 oz)   06/19/19 47.6 kg (105 lb)   06/13/19 48.9 kg (107 lb 12.8 oz)   05/22/19 48.5 kg (107 lb)   04/22/19 47.9 kg (105 lb 9.6 oz)   04/15/19 48.8 kg (107 lb 9.6 oz)     HEENT: EOMI, PERRL. Sclerae are anicteric. Oral mucosa is pink and moist with no lesions or thrush.   Lymph: No palpable cervical, supraclavicular, subclavicular lymphadenopathy.  Heart: Regular rate and rhythm.   Lungs: Breathing comfortably on room air. Decreased BS at bilateral bases, R>L, otherwise clear to auscultation bilaterally.   Abdomen: Bowel sounds present, soft, no pain to palpation, no palpable hepatosplenomegaly or masses.   Extremities: No lower extremity edema noted bilaterally.   Neuro: Cranial nerves II through XII are grossly intact.  Skin: No rashes, petechiae, or bruising noted on exposed skin.    Laboratory Data:   9/18/2019 15:07   Sodium 133   Potassium 3.9   Chloride 100   Carbon Dioxide 28   Urea Nitrogen 8   Creatinine 0.59   GFR Estimate >90   GFR Estimate If Black >90   Calcium 9.6   Anion Gap 5   Albumin 3.0 (L)   Protein Total 8.8   Bilirubin " Total 0.3   Alkaline Phosphatase 120   ALT 24   AST 50 (H)   Glucose 102 (H)   WBC 4.7   Hemoglobin 11.5 (L)   Hematocrit 36.6   Platelet Count 338   RBC Count 3.92   MCV 93   MCH 29.3   MCHC 31.4 (L)   RDW 13.8   Diff Method Automated Method   % Neutrophils 71.7   % Lymphocytes 17.2   % Monocytes 9.5   % Eosinophils 0.6   % Basophils 0.6   % Immature Granulocytes 0.4   Nucleated RBCs 0   Absolute Neutrophil 3.4   Absolute Lymphocytes 0.8   Absolute Monocytes 0.5   Absolute Eosinophils 0.0   Absolute Basophils 0.0   Abs Immature Granulocytes 0.0   Absolute Nucleated RBC 0.0       Assessment and Plan:  Shan Marsh is a 49 year old woman with prior history of a T1 N0, ER/IN positive, HER2-negative left breast cancer treated with bilateral mastectomies in 2012, now with metastatic disease (bones, pleural effusions, lungs, pericardial effusion, liver, left adrenal/retroperitoneal mets), ER/IN positive, HER2 negative.  She progressed on palbociclib and tamoxifen, alternative therapies in [place] and on Xeloda, and is now on Doxil single agent.    Diarrhea: Started having fatigue right after chemo and diarrhea started 2 days ago.  Having 4 -5 loose stools each day.  Not watery, bloody or with mucus.  Denies any abdominal cramping.  Does have nausea without vomiting which is slightly worse than previous chemo.  Did have a patient reported fever (100.5F) yesterday though temperature normal today.  She is not neutropenic.  Without abdominal pain, and numerous watery stools, unlikely to be C. difficile.  She could have an enteric viruses.  Of note she did not have IV steroids for the first time this past cycle due to personal preference. Her symptoms could be due to lack of dex with her chemo as this is something she is typically used to. With the low grade fevers, I will get stool samples to look for infection (c. Diff and enteric). No respiratory symptoms or urinary symptoms, so I do not think we need to look for  infections here, especially since she is afebrile today. ANC is normal (3.4). It is possible that she has a GI virus as well as symptoms from the lack of dex like increased nausea, more fatigue and lack of appetite. Will likely add IV dex next cycle at 6 mg. For now, as long as she does not have C. Diff, ok to use Imodium prn. Should push herself to drink 64 oz of fluids and increase oral intake; increasing protein shakes if unable to eat more solid foods. Lytes and Cr normal today. Has had some weight loss. Continue to use antiemetics prn. Should call if symptoms worsenen     Metastatic breast cancer: Started Doxil every four weeks on 1/22. Restaging on 6/6 wth improvement in lung disease as well as lymph nodes and liver. Plan to continue Doxil every 4 weeks and to repeat imaging in another 2-3 cycles. Tolerates with mucositis, constipation and fatigue. She is working with acupuncturist to help improve fatigue, appetite.   - held IV dex with last Doxil   - On hormone suppression with Zoladex. Last given on 9/10  - will f/u with Dr. Whitman on 10/7 with Doxil and imaging prior     Nora Murphy PA-C  [facility]  [address] [place] [address] [phone]

## 2019-09-18 NOTE — NURSING NOTE
"Oncology Rooming Note    September 18, 2019 3:18 PM   Shan Marsh is a 49 year old female who presents for:    Chief Complaint   Patient presents with     Blood Draw     labs drawn v venipuncture by vascuylar access RN in lab     Oncology Clinic Visit     Return visit related to Breast Cancer     Initial Vitals: /78 (BP Location: Right arm, Patient Position: Chair, Cuff Size: Adult Small)   Pulse 89   Temp 98.5  F (36.9  C) (Oral)   Ht 1.626 m (5' 4.02\")   Wt 45.7 kg (100 lb 11.2 oz)   SpO2 98%   BMI 17.28 kg/m   Estimated body mass index is 17.28 kg/m  as calculated from the following:    Height as of this encounter: 1.626 m (5' 4.02\").    Weight as of this encounter: 45.7 kg (100 lb 11.2 oz). Body surface area is 1.44 meters squared.  No Pain (0) Comment: Data Unavailable   No LMP recorded. (Menstrual status: Chemotherapy).  Allergies reviewed: Yes  Medications reviewed: Yes    Medications: Medication refills not needed today.  Pharmacy name entered into [employer]:    [facility] - [place], [place] - [address]  [facility] - [place], [place] - [address] [place], [place] [address]E [facility] [place], [place] - [phone]    Clinical concerns: No new concerns. Provider was notified.      Ana Solano LPN            "

## 2019-09-18 NOTE — LETTER
RE: Shan Marsh  [address]  [facility] [address]       Oncology/Hematology Visit Note  Sep 18, 2019    Reason for Visit: add on for fever and diarrhea    History of Present Illness: Shan Marsh is a 49 year old female without significant past medical history with a diagnosis of metastatic breast cancer after she presented with left shoulder pain. Shoulder MRI showed a soft tissue mass close to the distal clavicle which was concerning for malignancy. PET/CT on 5/9/17 showed hypermetabolic left axillary, supraclavicular, mediastinal and hilar nodes. She had biopsies of left axilla and hilar nodes showing metastatic breast cancer, strongly ER positive, moderately PA positive, and HER-2 kaitlin non-amplified. She originally had [id number], ER/PA positive, HER-2 negative breast cancer diagnosed in spring of 2012 s/p bilateral mastectomies and Tamoxifen from 8/2012-2/2013 and discontinued due to side effects. With development of metastatic disease, she was recommended treatment on BIG10 clinical trial with palbociclib and tamoxifen. She started treatment on 5/24/17 and required dose reduction for neutropenia. CT CAP and bone scan 1/11/2018 showing progressive disease with new bony lesions in the ribs, T spine, and possibly liver, lungs. She started Zoladex and abemiciclib on 1/12/2018.      In March 2018, she sought treatment at [facility] in [place], under the care of Dr. Brooks, where she received:  - Vallovex vaccine (per FDA, is in phase I trials here in [place])  - Insulin potentiated therapy with conjugated chemotherapy (5% chemotherapy, unclear what chemotherapy)  - Gina's toxins, IV vitamin C, IV Ozone, hyperbaric chamber, vitamin B17 infusion, vitamin CK3 IV, and coffee enemas PRN  - Also oral niacin, Lugol, acidol, pancreatin, and thyroid desiccated liver, per their regimen  - She was continued on Letrozole and advised to continue Zoladex.      She met with Dr. Whitman and patient  wished to continue her alternative regimen. Abemiciclib was placed on hold. She was continued on Zoladex and letrozole. While in [place], she required thoracentesis for symptomatic pleural effusion. A repeat PET in August 2018 showed widely progressive disease with multiple new bone mets involving the spine, ribs, pelvis, and sacrum; new lesions involving the left lateral thoracic/abdominal wall musculature and subcutaneous tissues, new metastases in the left adrenal gland, and recurrent large L pleural effusion. She was started on Xeloda 1500 mg BID on 9/5/18.      She was admitted 10/4-10/11/18 after a syncopal event/unwitnessed fall at home. She was found to have left pneumothorax and pericardial effusion with tamponade physiology. She had left chest tube placed in ED which then fell out on 10/10. Left pleurx placed on 10/11. She had pericardiocentesis on 10/7, drain removed 10/8.      She resumed Xeloda, and remained on this until January 2019.  At that time, she was switched to Doxil.    Interval History:  Shan returns today as an add on for diarrhea and fever.     After she received the last Doxil she started having more fatigue.  She then started to notice loose stools 2 days ago.  She states that usually she has constipation with Doxil and so this is new for her.  She states she has 4-5 loose stools in a day.  Her stools are not too watery though they are not formed.  She denies any mucus or blood in her stools.  She denies any abdominal cramping.  She has some nausea though no vomiting.  She has been needing Compazine 2 times a day.  She typically does have nausea after chemo though this is a little bit more than usual.  She did have some low-grade fevers typically in the 99's though she had one temperature of 100.5 yesterday.  She denied is taking any ibuprofen or Tylenol.  She denies any shortness of breath.  She has a dry cough though this is very minimal.  She denies any dysuria.  She also has not had  much of an appetite.  She has been trying to drink fluids though it is been hard to eat because she is not hungry.    ROS: 10 point ROS neg other than the symptoms noted above in the HPI.    Current Outpatient Medications   Medication Sig Dispense Refill     Digestive Enzymes (ENZYME DIGEST) CAPS Take 1 capsule by mouth daily       magic mouthwash (ENTER INGREDIENTS IN COMMENTS) suspension SWISH AND SPIT 5 TO 10 ML BY MOUTH 4 TO 6 TIMES DAILY AS NEEDED 240 mL 3     medical cannabis (Patient's own supply.  Not a prescription) (This is NOT a prescription, and does not certify that the patient has a qualifying medical condition for medical cannabis.  The purpose of this order is  to document that the patient reports taking medical cannabis.)       Omega 3-6-9 Fatty Acids (OMEGA 3-6-9 COMPLEX PO) Take 1 capsule by mouth daily       omeprazole (PRILOSEC) 20 MG DR capsule Take 2 capsules (40 mg) by mouth daily 180 capsule 3     ondansetron (ZOFRAN) 8 MG tablet Take 1 tablet (8 mg) by mouth every 8 hours as needed for nausea 90 tablet 3     VITAMIN D, CHOLECALCIFEROL, PO Take 1,000 Units by mouth daily        acyclovir (ZOVIRAX) 400 MG tablet Take 1 tablet (400 mg) by mouth every 12 hours (Patient not taking: Reported on 9/18/2019) 60 tablet 1     Ascorbic Acid (VITAMIN C PO) Take 1,000 mg by mouth daily       calcium carbonate (OS-KRYSTEN 500 MG [place]. [place]) 1250 MG tablet Take 1 tablet by mouth daily       doxepin (SINEQUAN) 10 MG/ML (HIGH CONC) solution Take 1.5 mLs (15 mg) by mouth every 4 hours as needed for other (mouth sores) Swish and spit (Patient not taking: Reported on 9/18/2019) 118 mL 3     lidocaine HCl (XYLOCAINE) 2 % solution        LORazepam (ATIVAN) 0.5 MG tablet Take 1 tablet (0.5 mg) by mouth every 4 hours as needed (Anxiety, Nausea/Vomiting or Sleep) (Patient not taking: Reported on 8/13/2019) 30 tablet 2     prochlorperazine (COMPAZINE) 10 MG tablet Take 1 tablet (10 mg) by mouth every 6 hours as needed  "(Nausea/Vomiting) (Patient not taking: Reported on 4/22/2019) 30 tablet 2     tacrolimus (PROTOPIC) 0.1 % external ointment Apply topically 2 times daily (Patient not taking: Reported on 8/13/2019) 300 g 3     traMADol (ULTRAM) 50 MG tablet Take 1-2 tablets ( mg) by mouth every 6 hours as needed for moderate pain or headaches       triamcinolone (KENALOG) 0.1 % external cream Apply topically 2 times daily (Patient not taking: Reported on 5/22/2019) 30 g 1       Physical Examination:  General: The patient is a pleasant female in no acute distress.  /78 (BP Location: Right arm, Patient Position: Chair, Cuff Size: Adult Small)   Pulse 89   Temp 98.5  F (36.9  C) (Oral)   Ht 1.626 m (5' 4.02\")   Wt 45.7 kg (100 lb 11.2 oz)   SpO2 98%   BMI 17.28 kg/m     Wt Readings from Last 10 Encounters:   09/18/19 45.7 kg (100 lb 11.2 oz)   09/10/19 47.3 kg (104 lb 4.8 oz)   08/13/19 48.6 kg (107 lb 3.2 oz)   08/12/19 47.2 kg (104 lb)   07/15/19 48.2 kg (106 lb 3.2 oz)   06/19/19 47.6 kg (105 lb)   06/13/19 48.9 kg (107 lb 12.8 oz)   05/22/19 48.5 kg (107 lb)   04/22/19 47.9 kg (105 lb 9.6 oz)   04/15/19 48.8 kg (107 lb 9.6 oz)     HEENT: EOMI, PERRL. Sclerae are anicteric. Oral mucosa is pink and moist with no lesions or thrush.   Lymph: No palpable cervical, supraclavicular, subclavicular lymphadenopathy.  Heart: Regular rate and rhythm.   Lungs: Breathing comfortably on room air. Decreased BS at bilateral bases, R>L, otherwise clear to auscultation bilaterally.   Abdomen: Bowel sounds present, soft, no pain to palpation, no palpable hepatosplenomegaly or masses.   Extremities: No lower extremity edema noted bilaterally.   Neuro: Cranial nerves II through XII are grossly intact.  Skin: No rashes, petechiae, or bruising noted on exposed skin.    Laboratory Data:   9/18/2019 15:07   Sodium 133   Potassium 3.9   Chloride 100   Carbon Dioxide 28   Urea Nitrogen 8   Creatinine 0.59   GFR Estimate >90   GFR Estimate If " Black >90   Calcium 9.6   Anion Gap 5   Albumin 3.0 (L)   Protein Total 8.8   Bilirubin Total 0.3   Alkaline Phosphatase 120   ALT 24   AST 50 (H)   Glucose 102 (H)   WBC 4.7   Hemoglobin 11.5 (L)   Hematocrit 36.6   Platelet Count 338   RBC Count 3.92   MCV 93   MCH 29.3   MCHC 31.4 (L)   RDW 13.8   Diff Method Automated Method   % Neutrophils 71.7   % Lymphocytes 17.2   % Monocytes 9.5   % Eosinophils 0.6   % Basophils 0.6   % Immature Granulocytes 0.4   Nucleated RBCs 0   Absolute Neutrophil 3.4   Absolute Lymphocytes 0.8   Absolute Monocytes 0.5   Absolute Eosinophils 0.0   Absolute Basophils 0.0   Abs Immature Granulocytes 0.0   Absolute Nucleated RBC 0.0       Assessment and Plan:  Shan Marsh is a 49 year old woman with prior history of a T1 N0, ER/UT positive, HER2-negative left breast cancer treated with bilateral mastectomies in 2012, now with metastatic disease (bones, pleural effusions, lungs, pericardial effusion, liver, left adrenal/retroperitoneal mets), ER/UT positive, HER2 negative.  She progressed on palbociclib and tamoxifen, alternative therapies in [place] and on Xeloda, and is now on Doxil single agent.    Diarrhea: Started having fatigue right after chemo and diarrhea started 2 days ago.  Having 4 -5 loose stools each day.  Not watery, bloody or with mucus.  Denies any abdominal cramping.  Does have nausea without vomiting which is slightly worse than previous chemo.  Did have a patient reported fever (100.5F) yesterday though temperature normal today.  She is not neutropenic.  Without abdominal pain, and numerous watery stools, unlikely to be C. difficile.  She could have an enteric viruses.  Of note she did not have IV steroids for the first time this past cycle due to personal preference. Her symptoms could be due to lack of dex with her chemo as this is something she is typically used to. With the low grade fevers, I will get stool samples to look for infection (c. Diff and enteric). No  respiratory symptoms or urinary symptoms, so I do not think we need to look for infections here, especially since she is afebrile today. ANC is normal (3.4). It is possible that she has a GI virus as well as symptoms from the lack of dex like increased nausea, more fatigue and lack of appetite. Will likely add IV dex next cycle at 6 mg. For now, as long as she does not have C. Diff, ok to use Imodium prn. Should push herself to drink 64 oz of fluids and increase oral intake; increasing protein shakes if unable to eat more solid foods. Lytes and Cr normal today. Has had some weight loss. Continue to use antiemetics prn. Should call if symptoms worsenen     Metastatic breast cancer: Started Doxil every four weeks on 1/22. Restaging on 6/6 wth improvement in lung disease as well as lymph nodes and liver. Plan to continue Doxil every 4 weeks and to repeat imaging in another 2-3 cycles. Tolerates with mucositis, constipation and fatigue. She is working with acupuncturist to help improve fatigue, appetite.   - held IV dex with last Doxil   - On hormone suppression with Zoladex. Last given on 9/10  - will f/u with Dr. Whitman on 10/7 with Doxil and imaging prior     Nora Murphy PA-C  [facility]  [address] [place] [address] [phone]

## 2019-09-18 NOTE — TELEPHONE ENCOUNTER
"CB  [facility] Telephone Triage Note    Assessment: Patient called in to triage reporting the following symptoms: diarrhea starting 9/15 with 3 episodes in the last 24 hours. Patient reports \"soft\" stools. Denies watery diarrhea, blood, or mucous in stool. Also had low grade fever 100.5 last night, afebrile today. Patient denies recent ABX use. Reports she is eating and drinking but has a slightly decreased appetite. Pt receiving Doxil, last labs 9/10/19 WBC 4.6, ANC 2.9.    Recommendations: Discussed with Paged Dr. Whitman at [phone] for recommendations. Recommending clinic visit today or tomorrow with ISADORA. GRAY Amor at [phone] re: add on appointment today 3:30PM?  Per RICHIE Melendez to add patient at 3:30PM with labs prior (CBC/CMP).     Follow-Up: Order for above labs/procedures/infusion placed, Message sent to schedulers to add pt on to schedule, Informed patient of appointment times, Instructed patient to seek care immediately for worsening symptoms, including: fever, chest pain, shortness of breath, dizziness. Patient voiced understanding of advice and/or instructions given.     Karen Judge, YOMIN, RN   Triage      "

## 2019-09-18 NOTE — NURSING NOTE
Chief Complaint   Patient presents with     Blood Draw     labs drawn v venipuncture by livylar access RN in lab     /78 (BP Location: Right arm, Patient Position: Chair, Cuff Size: Adult Small)   Pulse 89   Temp 98.5  F (36.9  C) (Oral)   Wt 45.7 kg (100 lb 11.2 oz)   SpO2 98%   BMI 17.28 kg/m      Labs collected and sent from left antecubital venipuncture in lab by RN. Pt tolerated well. Pt checked in for next appointment.    Freida Smith RN

## 2019-10-07 NOTE — LETTER
10/7/2019       RE: Shan Marsh  [address]  [facility] [address]     Dear Colleague,    Thank you for referring your patient, Shan Marsh, to the [facility]. Please see a copy of my visit note below.    Oncology/Hematology Visit Note  October 7, 2019     Reason for Visit: Follow up for metastatic breast cancer (ER/ND+, HER2-)    History of Present Illness: Shan Marsh is a 49 year old female without significant past medical history with a diagnosis of metastatic breast cancer after she presented with left shoulder pain. Shoulder MRI showed a soft tissue mass close to the distal clavicle which was concerning for malignancy. PET/CT on 5/9/17 showed hypermetabolic left axillary, supraclavicular, mediastinal and hilar nodes. She had biopsies of left axilla and hilar nodes showing metastatic breast cancer, strongly ER positive, moderately ND positive, and HER-2 kaitlin non-amplified. She originally had [id number], ER/ND positive, HER-2 negative breast cancer diagnosed in spring of 2012 s/p bilateral mastectomies and Tamoxifen from 8/2012-2/2013 and discontinued due to side effects. With development of metastatic disease, she was recommended treatment on BIG10 clinical trial with palbociclib and tamoxifen. She started treatment on 5/24/17 and required dose reduction for neutropenia. CT CAP and bone scan 1/11/2018 showing progressive disease with new bony lesions in the ribs, T spine, and possibly liver, lungs. She started Zoladex and abemiciclib on 1/12/2018.      In March 2018, she sought treatment at [facility] in [place], under the care of Dr. Brooks, where she received:  - Vallovex vaccine (per FDA, is in phase I trials here in [place])  - Insulin potentiated therapy with conjugated chemotherapy (5% chemotherapy, unclear what chemotherapy)  - Gina's toxins, IV vitamin C, IV Ozone, hyperbaric chamber, vitamin B17 infusion, vitamin CK3 IV, and coffee enemas PRN  -  Also oral niacin, Lugol, acidol, pancreatin, and thyroid desiccated liver, per their regimen  - She was continued on Letrozole and advised to continue Zoladex.      She met with Dr. Whitman and patient wished to continue her alternative regimen. Abemiciclib was placed on hold. She was continued on Zoladex and letrozole. While in [place], she required thoracentesis for symptomatic pleural effusion. A repeat PET in August 2018 showed widely progressive disease with multiple new bone mets involving the spine, ribs, pelvis, and sacrum; new lesions involving the left lateral thoracic/abdominal wall musculature and subcutaneous tissues, new metastases in the left adrenal gland, and recurrent large L pleural effusion. She was started on Xeloda 1500 mg BID on 9/5/18.      She was admitted 10/4-10/11/18 after a syncopal event/unwitnessed fall at home. She was found to have left pneumothorax and pericardial effusion with tamponade physiology. She had left chest tube placed in ED which then fell out on 10/10. Left pleurx placed on 10/11. She had pericardiocentesis on 10/7, drain removed 10/8.      She resumed Xeloda, and remained on this until January 2019.  At that time, she was switched to Doxil.     Interval History:  Shan is here today for follow up for her metastatic breast cancer.      She reports she developed significant side effects after Doxil last month as she did not take steroids, she had worsening nausea/vomiting, worse mucositis, she was feeling more tired/fatigued. All the above symptoms were gradually getting better and are mostly resolved at this point. She would prefer to have steroids back with the chemo from now on.   She notices worsening dyspnea on exertion recently, feeling short of breath after walking one flight of stairs.   She denies any fever/chill, no recent infection, no chest pain, no abdominal pain or diarrhea.     Her appetite was poor, and she was not eating much due to mucositis as well. This  is also improving. She is working hard on diet and trying to gain some weight.       ROS: 10 point ROS neg other than the symptoms noted above in the HPI.     Current Outpatient Medications   Medication     Digestive Enzymes (ENZYME DIGEST) CAPS     magic mouthwash (ENTER INGREDIENTS IN COMMENTS) suspension     medical cannabis (Patient's own supply.  Not a prescription)     omeprazole (PRILOSEC) 20 MG DR capsule     ondansetron (ZOFRAN) 8 MG tablet     VITAMIN D, CHOLECALCIFEROL, PO     acyclovir (ZOVIRAX) 400 MG tablet     Ascorbic Acid (VITAMIN C PO)     calcium carbonate (OS-KRYSTEN 500 MG [place]. [place]) 1250 MG tablet     doxepin (SINEQUAN) 10 MG/ML (HIGH CONC) solution     lidocaine HCl (XYLOCAINE) 2 % solution     LORazepam (ATIVAN) 0.5 MG tablet     Omega 3-6-9 Fatty Acids (OMEGA 3-6-9 COMPLEX PO)     prochlorperazine (COMPAZINE) 10 MG tablet     tacrolimus (PROTOPIC) 0.1 % external ointment     traMADol (ULTRAM) 50 MG tablet     triamcinolone (KENALOG) 0.1 % external cream     No current facility-administered medications for this visit.      Facility-Administered Medications Ordered in Other Visits   Medication     dextrose 5% BOLUS     DOXOrubicin liposome (DOXIL) 50 mg in D5W 300 mL CHEMOTHERAPY     sodium chloride (PF) 0.9% PF flush 10 mL     sodium chloride (PF) 0.9% PF flush 10 mL     sodium chloride (PF) 0.9% PF flush 10 mL          Physical Examination:  /79 (BP Location: Right arm, Patient Position: Sitting, Cuff Size: Adult Small)   Pulse 89   Temp 98  F (36.7  C) (Oral)   Resp 16   Wt 47 kg (103 lb 11.2 oz)   SpO2 100%   BMI 17.79 kg/m     General: The patient is a pleasant female in no acute distress.  HEENT: EOMI, PERRL. Sclerae are anicteric. Oral mucosa is pink and moist with no lesions or thrush.   Lymph: No palpable cervical, supraclavicular, subclavicular lymphadenopathy.  Heart: Regular rate and rhythm.   Lungs: Breathing comfortably on room air. Decreased BS at right side, up to the  middle of right lung, decreased breath sound in left basilar area as well, otherwise clear to auscultation bilaterally.   Abdomen: Bowel sounds present, soft, no pain to palpation, no palpable hepatosplenomegaly or masses.   Extremities: No lower extremity edema noted bilaterally.   Neuro: Cranial nerves II through XII are grossly intact.  Skin: No rashes, petechiae, or bruising noted on exposed skin.     Laboratory Data:  CBC RESULTS:   Recent Labs   Lab Test 10/07/19  0946   WBC 3.8*   RBC 4.14   HGB 12.0   HCT 38.3   MCV 93   MCH 29.0   MCHC 31.3*   RDW 14.9        Recent Labs   Lab Test 10/07/19  0946 09/18/19  1507    133   POTASSIUM 3.5 3.9   CHLORIDE 99 100   CO2 29 28   ANIONGAP 8 5   GLC 95 102*   BUN 8 8   CR 0.54 0.59   KRYSTEN 10.0 9.6     Liver Function Studies -   Recent Labs   Lab Test 10/07/19  0946   PROTTOTAL 9.6*   ALBUMIN 3.3*   BILITOTAL 0.3   ALKPHOS 154*   AST 63*   ALT 25       Assessment and Plan:  Shan Marsh is a 49 year old woman with prior history of a T1 N0, ER/NH positive, HER2-negative left breast cancer treated with bilateral mastectomies in 2012, now with metastatic disease (bones, pleural effusions, lungs, pericardial effusion, liver, left adrenal/retroperitoneal mets), ER/NH positive, HER2 negative.  She progressed on palbociclib and tamoxifen, alternative therapies in [place] and was on Xeloda, and is now on Doxil single agent.       Metastatic breast cancer: Started Doxil every four weeks on 1/22/19. Restaging on 10/3/19 showed stable disease in lung disease as well as lymph nodes and liver. Bone scan on 10/3/19 alos showed stable disease. Plan to continue Doxil every 4 weeks and to repeat imaging after another 3-4 cycles. Tolerates with mucositis, constipation and fatigue. She is working with acupuncturist to help improve fatigue, appetite.   - She has steroids held during her last dose of Doxil, ad developed significant side effects such as worsening nausea,  vomiting, worsening fatigue, poor appetite etc. She would like to resume steroids with chemo this time at 50% dosage. Her labs of today were reviewed, WBC 3.8K with 66% neutrophil, normal hgb and platelet, will proceed with Doxil chemotherapy today  -Continue with hormone suppression with monthly Zoladex.   - her CA27-29 is 563 today, somehow elevated from last month, will repeat in next month before her next chemotherapy, will plan to repeat CT C/A/P and bone scan in 3 months    Cardiac monitoring  - Echo in 9/9/19 showed LVEF of 55-60%, no pericardial effusion  - pt does have worsening dyspnea on exertion recently, most likely due to large R pleural effusion, less likely due to heart  - will refer to IR for thoracentesis, and follow up her symptoms afterwards    Bone mets.  She remains on Zometa every 3 months, most recenetly recieved 7/2019,   - due for Zometa again today     Mucositis:   - she developed mucositis again after last dose of Doxil, prophylactic use of baking soda swishes starting after chemotherapy seems help to control this. She reports the sore in her mouth were all gone at this point. She will continue this.   - She also has doxepin swish & spit for her to try prn.    Constipation, hemorrhoids:   - Resolved with OTC interventions     Bilateral pleural effusions (right worse than left). - She has previously had thoracentesis of both lungs, report developed some degree of dyspnea on exertion recently, repeat CT showed large pleural effusion on right side.  - will refer to IR/thoracic medicine for thoracentesis of right side.     History of a malignant pericardial effusion.    - repeat Echo on 9/9/19 did not show any pericardial effusion, will continue to monitor based on her clinical symptoms.        Patient was seen and discussed with Dr Whitman.     Tatyana Izquierdo  Hem/Onco Fellow    Addendum:  Pt was seen and evaluated by me with Dr Izquierdo.  On evaluation, pt is well appearing and feels well.  She  reports mild WILSON.  No f/c. She had more nausea this cycle with eliminating decadron.    On exam, she is well appearing.  NAD.  Heart regular.  Lungs dullness to auscultation half of right lung field.  Abd soft, nt,nd + bs    Reviewed scans personally.  Overall stable disease.    Breast cancer - metastatic.  Continue doxil.  Effusion - thoracentesis  Nausea - will readd decadron to the infusion.  Continue to monitor cardiac function.    Ana Whitman MD

## 2019-10-07 NOTE — PROGRESS NOTES
Infusion Nursing Note:  Shan Marsh presents today for Day 1 Cycle 10 of Doxil. Zoladex injection and Zometa infusion also given.    Patient seen by provider today: Yes: Dr. Whitman    present during visit today: Not Applicable.    Note: Shan presents to infusion clinic feeling well. She states no discomfort.     Intravenous Access:  Peripheral IV placed.    Treatment Conditions:  Lab Results   Component Value Date    HGB 12.0 10/07/2019     Lab Results   Component Value Date    WBC 3.8 10/07/2019      Lab Results   Component Value Date    ANEU 2.5 10/07/2019     Lab Results   Component Value Date     10/07/2019      Lab Results   Component Value Date     10/07/2019                   Lab Results   Component Value Date    POTASSIUM 3.5 10/07/2019           Lab Results   Component Value Date    MAG 2.4 01/22/2019            Lab Results   Component Value Date    CR 0.54 10/07/2019                   Lab Results   Component Value Date    KRYSTEN 10.0 10/07/2019                Lab Results   Component Value Date    BILITOTAL 0.3 10/07/2019           Lab Results   Component Value Date    ALBUMIN 3.3 10/07/2019                    Lab Results   Component Value Date    ALT 25 10/07/2019           Lab Results   Component Value Date    AST 63 10/07/2019       Results reviewed, labs MET treatment parameters, ok to proceed with treatment.  ECHO/MUGA completed 9/9  EF 55-60%.      Post Infusion Assessment:  Patient tolerated infusion without incident.  Patient tolerated injection without incident. LLQ of Abdomen prepped with ice application and Lidocaine injection. Injection given via Xi Medrano RN   Blood return noted pre and post infusion.  Site patent and intact, free from redness, edema or discomfort.  No evidence of extravasations.  Access discontinued per protocol.     Discharge Plan:   Prescription refills given for Magic mouth wash and Omeprazole.  Discharge instructions reviewed with:  Patient.  Patient and/or family verbalized understanding of discharge instructions and all questions answered.  AVS to patient via [employer].  Patient will return 11/5 for next appointment.   Patient discharged in stable condition accompanied by: self.  Departure Mode: Ambulatory.  Face to Face time: 0 minutes     Shadi Harris RN

## 2019-10-07 NOTE — PATIENT INSTRUCTIONS
[facility] Triage and after hours / weekends / holidays:  [phone]    Please call the triage or after hours line if you experience a temperature greater than or equal to 100.5, shaking chills, have uncontrolled nausea, vomiting and/or diarrhea, dizziness, shortness of breath, chest pain, bleeding, unexplained bruising, or if you have any other new/concerning symptoms, questions or concerns.      If you are having any concerning symptoms or wish to speak to a provider before your next infusion visit, please call your care coordinator or triage to notify them so we can adequately serve you.     If you need a refill on a narcotic prescription or other medication, please call before your infusion appointment.           October 2019 Sunday Monday Tuesday Wednesday Thursday Friday Saturday             1     2     3    NM INJ   9:00 AM   (15 min.)   [id number]   [facility], Nuclear Medicine    CT CHEST ABDOMEN PELVIS WWO   9:20 AM   (30 min.)   UUCT1   [facility], CT    NM BONE SCAN WHOLE BODY  12:00 PM   (60 min.)   UUNM3   [facility], Nuclear Medicine 4     5       6     7    [facility] DRAW   9:00 AM   (15 min.)    [facility] DRAW   [facility] Lab Draw    [facility] RETURN   9:15 AM   (30 min.)   Ana Whitman MD   [facility] ONC INFUSION 120  10:00 AM   (120 min.)    ONCOLOGY INFUSION   [facility] Cancer [facility] 8     9     10     11     12       13     14     15     16     17     18     19       20     21     22     23     24     25     26       27     28     29     30     31 November 2019 Sunday Monday Tuesday Wednesday Thursday Friday Saturday                            1     2       3     4     5     6     7     8     9       10     11     12     13     14     15     16       17     18     19     20     21     22     23       24     25     26     27     28     29     30                     Lab Results:  Recent Results  (from the past 12 hour(s))   CBC with platelets differential    Collection Time: 10/07/19  9:46 AM   Result Value Ref Range    WBC 3.8 (L) 4.0 - 11.0 10e9/L    RBC Count 4.14 3.8 - 5.2 10e12/L    Hemoglobin 12.0 11.7 - 15.7 g/dL    Hematocrit 38.3 35.0 - 47.0 %    MCV 93 78 - 100 fl    MCH 29.0 26.5 - 33.0 pg    MCHC 31.3 (L) 31.5 - 36.5 g/dL    RDW 14.9 10.0 - 15.0 %    Platelet Count 333 150 - 450 10e9/L    Diff Method Automated Method     % Neutrophils 66.2 %    % Lymphocytes 21.1 %    % Monocytes 9.9 %    % Eosinophils 1.3 %    % Basophils 1.0 %    % Immature Granulocytes 0.5 %    Nucleated RBCs 0 0 /100    Absolute Neutrophil 2.5 1.6 - 8.3 10e9/L    Absolute Lymphocytes 0.8 0.8 - 5.3 10e9/L    Absolute Monocytes 0.4 0.0 - 1.3 10e9/L    Absolute Eosinophils 0.1 0.0 - 0.7 10e9/L    Absolute Basophils 0.0 0.0 - 0.2 10e9/L    Abs Immature Granulocytes 0.0 0 - 0.4 10e9/L    Absolute Nucleated RBC 0.0    Comprehensive metabolic panel    Collection Time: 10/07/19  9:46 AM   Result Value Ref Range    Sodium 136 133 - 144 mmol/L    Potassium 3.5 3.4 - 5.3 mmol/L    Chloride 99 94 - 109 mmol/L    Carbon Dioxide 29 20 - 32 mmol/L    Anion Gap 8 3 - 14 mmol/L    Glucose 95 70 - 99 mg/dL    Urea Nitrogen 8 7 - 30 mg/dL    Creatinine 0.54 0.52 - 1.04 mg/dL    GFR Estimate >90 >60 mL/min/[1.73_m2]    GFR Estimate If Black >90 >60 mL/min/[1.73_m2]    Calcium 10.0 8.5 - 10.1 mg/dL    Bilirubin Total 0.3 0.2 - 1.3 mg/dL    Albumin 3.3 (L) 3.4 - 5.0 g/dL    Protein Total 9.6 (H) 6.8 - 8.8 g/dL    Alkaline Phosphatase 154 (H) 40 - 150 U/L    ALT 25 0 - 50 U/L    AST 63 (H) 0 - 45 U/L

## 2019-10-07 NOTE — NURSING NOTE
"Oncology Rooming Note    October 7, 2019 10:10 AM   Shan Marsh is a 49 year old female who presents for:    Chief Complaint   Patient presents with     Blood Draw     Labs drawn via PIV placed by RN in lab. VS taken. Pt checked in for next appt     Initial Vitals: /79 (BP Location: Right arm, Patient Position: Sitting, Cuff Size: Adult Small)   Pulse 89   Temp 98  F (36.7  C) (Oral)   Resp 16   Wt 47 kg (103 lb 11.2 oz)   SpO2 100%   BMI 17.79 kg/m   Estimated body mass index is 17.79 kg/m  as calculated from the following:    Height as of 9/18/19: 1.626 m (5' 4.02\").    Weight as of this encounter: 47 kg (103 lb 11.2 oz). Body surface area is 1.46 meters squared.  No Pain (0) Comment: Data Unavailable   No LMP recorded. (Menstrual status: Chemotherapy).  Allergies reviewed: Yes  Medications reviewed: Yes    Medications: Medication refills not needed today.  Pharmacy name entered into [facility]:    [facility] - [place], [place] - [id number]  [facility] - [place], [place] - [address] [facility] [place], [place] - [phone]    Clinical concerns: No new concerns today. Dr. Whitman was notified.      ARNEL VALDES, MARTIN            "

## 2019-10-07 NOTE — PROGRESS NOTES
Oncology/Hematology Visit Note  October 7, 2019     Reason for Visit: Follow up for metastatic breast cancer (ER/TN+, HER2-)    History of Present Illness: Shan Marsh is a 49 year old female without significant past medical history with a diagnosis of metastatic breast cancer after she presented with left shoulder pain. Shoulder MRI showed a soft tissue mass close to the distal clavicle which was concerning for malignancy. PET/CT on 5/9/17 showed hypermetabolic left axillary, supraclavicular, mediastinal and hilar nodes. She had biopsies of left axilla and hilar nodes showing metastatic breast cancer, strongly ER positive, moderately TN positive, and HER-2 kaitlin non-amplified. She originally had [id number], ER/TN positive, HER-2 negative breast cancer diagnosed in spring of 2012 s/p bilateral mastectomies and Tamoxifen from 8/2012-2/2013 and discontinued due to side effects. With development of metastatic disease, she was recommended treatment on BIG10 clinical trial with palbociclib and tamoxifen. She started treatment on 5/24/17 and required dose reduction for neutropenia. CT CAP and bone scan 1/11/2018 showing progressive disease with new bony lesions in the ribs, T spine, and possibly liver, lungs. She started Zoladex and abemiciclib on 1/12/2018.      In March 2018, she sought treatment at [facility] in [place], under the care of Dr. Brooks, where she received:  - Vallovex vaccine (per FDA, is in phase I trials here in [place])  - Insulin potentiated therapy with conjugated chemotherapy (5% chemotherapy, unclear what chemotherapy)  - Gina's toxins, IV vitamin C, IV Ozone, hyperbaric chamber, vitamin B17 infusion, vitamin CK3 IV, and coffee enemas PRN  - Also oral niacin, Lugol, acidol, pancreatin, and thyroid desiccated liver, per their regimen  - She was continued on Letrozole and advised to continue Zoladex.      She met with Dr. Whitman and patient wished to continue her alternative regimen. Abemiciclib  was placed on hold. She was continued on Zoladex and letrozole. While in [place], she required thoracentesis for symptomatic pleural effusion. A repeat PET in August 2018 showed widely progressive disease with multiple new bone mets involving the spine, ribs, pelvis, and sacrum; new lesions involving the left lateral thoracic/abdominal wall musculature and subcutaneous tissues, new metastases in the left adrenal gland, and recurrent large L pleural effusion. She was started on Xeloda 1500 mg BID on 9/5/18.      She was admitted 10/4-10/11/18 after a syncopal event/unwitnessed fall at home. She was found to have left pneumothorax and pericardial effusion with tamponade physiology. She had left chest tube placed in ED which then fell out on 10/10. Left pleurx placed on 10/11. She had pericardiocentesis on 10/7, drain removed 10/8.      She resumed Xeloda, and remained on this until January 2019.  At that time, she was switched to Doxil.     Interval History:  Shan is here today for follow up for her metastatic breast cancer.      She reports she developed significant side effects after Doxil last month as she did not take steroids, she had worsening nausea/vomiting, worse mucositis, she was feeling more tired/fatigued. All the above symptoms were gradually getting better and are mostly resolved at this point. She would prefer to have steroids back with the chemo from now on.   She notices worsening dyspnea on exertion recently, feeling short of breath after walking one flight of stairs.   She denies any fever/chill, no recent infection, no chest pain, no abdominal pain or diarrhea.     Her appetite was poor, and she was not eating much due to mucositis as well. This is also improving. She is working hard on diet and trying to gain some weight.       ROS: 10 point ROS neg other than the symptoms noted above in the HPI.     Current Outpatient Medications   Medication     Digestive Enzymes (ENZYME DIGEST) CAPS     magic  mouthwash (ENTER INGREDIENTS IN COMMENTS) suspension     medical cannabis (Patient's own supply.  Not a prescription)     omeprazole (PRILOSEC) 20 MG DR capsule     ondansetron (ZOFRAN) 8 MG tablet     VITAMIN D, CHOLECALCIFEROL, PO     acyclovir (ZOVIRAX) 400 MG tablet     Ascorbic Acid (VITAMIN C PO)     calcium carbonate (OS-KRYSTEN 500 MG [place]) 1250 MG tablet     doxepin (SINEQUAN) 10 MG/ML (HIGH CONC) solution     lidocaine HCl (XYLOCAINE) 2 % solution     LORazepam (ATIVAN) 0.5 MG tablet     Omega 3-6-9 Fatty Acids (OMEGA 3-6-9 COMPLEX PO)     prochlorperazine (COMPAZINE) 10 MG tablet     tacrolimus (PROTOPIC) 0.1 % external ointment     traMADol (ULTRAM) 50 MG tablet     triamcinolone (KENALOG) 0.1 % external cream     No current facility-administered medications for this visit.      Facility-Administered Medications Ordered in Other Visits   Medication     dextrose 5% BOLUS     DOXOrubicin liposome (DOXIL) 50 mg in D5W 300 mL CHEMOTHERAPY     sodium chloride (PF) 0.9% PF flush 10 mL     sodium chloride (PF) 0.9% PF flush 10 mL     sodium chloride (PF) 0.9% PF flush 10 mL          Physical Examination:  /79 (BP Location: Right arm, Patient Position: Sitting, Cuff Size: Adult Small)   Pulse 89   Temp 98  F (36.7  C) (Oral)   Resp 16   Wt 47 kg (103 lb 11.2 oz)   SpO2 100%   BMI 17.79 kg/m    General: The patient is a pleasant female in no acute distress.  HEENT: EOMI, PERRL. Sclerae are anicteric. Oral mucosa is pink and moist with no lesions or thrush.   Lymph: No palpable cervical, supraclavicular, subclavicular lymphadenopathy.  Heart: Regular rate and rhythm.   Lungs: Breathing comfortably on room air. Decreased BS at right side, up to the middle of right lung, decreased breath sound in left basilar area as well, otherwise clear to auscultation bilaterally.   Abdomen: Bowel sounds present, soft, no pain to palpation, no palpable hepatosplenomegaly or masses.   Extremities: No lower extremity  edema noted bilaterally.   Neuro: Cranial nerves II through XII are grossly intact.  Skin: No rashes, petechiae, or bruising noted on exposed skin.     Laboratory Data:  CBC RESULTS:   Recent Labs   Lab Test 10/07/19  0946   WBC 3.8*   RBC 4.14   HGB 12.0   HCT 38.3   MCV 93   MCH 29.0   MCHC 31.3*   RDW 14.9        Recent Labs   Lab Test 10/07/19  0946 09/18/19  1507    133   POTASSIUM 3.5 3.9   CHLORIDE 99 100   CO2 29 28   ANIONGAP 8 5   GLC 95 102*   BUN 8 8   CR 0.54 0.59   KRYSTEN 10.0 9.6     Liver Function Studies -   Recent Labs   Lab Test 10/07/19  0946   PROTTOTAL 9.6*   ALBUMIN 3.3*   BILITOTAL 0.3   ALKPHOS 154*   AST 63*   ALT 25       Assessment and Plan:  Shan Marsh is a 49 year old woman with prior history of a T1 N0, ER/OH positive, HER2-negative left breast cancer treated with bilateral mastectomies in 2012, now with metastatic disease (bones, pleural effusions, lungs, pericardial effusion, liver, left adrenal/retroperitoneal mets), ER/OH positive, HER2 negative.  She progressed on palbociclib and tamoxifen, alternative therapies in [place] and was on Xeloda, and is now on Doxil single agent.       Metastatic breast cancer: Started Doxil every four weeks on 1/22/19. Restaging on 10/3/19 showed stable disease in lung disease as well as lymph nodes and liver. Bone scan on 10/3/19 alos showed stable disease. Plan to continue Doxil every 4 weeks and to repeat imaging after another 3-4 cycles. Tolerates with mucositis, constipation and fatigue. She is working with acupuncturist to help improve fatigue, appetite.   - She has steroids held during her last dose of Doxil, ad developed significant side effects such as worsening nausea, vomiting, worsening fatigue, poor appetite etc. She would like to resume steroids with chemo this time at 50% dosage. Her labs of today were reviewed, WBC 3.8K with 66% neutrophil, normal hgb and platelet, will proceed with Doxil chemotherapy today  -Continue  with hormone suppression with monthly Zoladex.   - her CA27-29 is 563 today, somehow elevated from last month, will repeat in next month before her next chemotherapy, will plan to repeat CT C/A/P and bone scan in 3 months    Cardiac monitoring  - Echo in 9/9/19 showed LVEF of 55-60%, no pericardial effusion  - pt does have worsening dyspnea on exertion recently, most likely due to large R pleural effusion, less likely due to heart  - will refer to IR for thoracentesis, and follow up her symptoms afterwards    Bone mets.  She remains on Zometa every 3 months, most recenetly recieved 7/2019,   - due for Zometa again today     Mucositis:   - she developed mucositis again after last dose of Doxil, prophylactic use of baking soda swishes starting after chemotherapy seems help to control this. She reports the sore in her mouth were all gone at this point. She will continue this.   - She also has doxepin swish & spit for her to try prn.    Constipation, hemorrhoids:   - Resolved with OTC interventions     Bilateral pleural effusions (right worse than left). - She has previously had thoracentesis of both lungs, report developed some degree of dyspnea on exertion recently, repeat CT showed large pleural effusion on right side.  - will refer to IR/thoracic medicine for thoracentesis of right side.     History of a malignant pericardial effusion.    - repeat Echo on 9/9/19 did not show any pericardial effusion, will continue to monitor based on her clinical symptoms.        Patient was seen and discussed with Dr Whitman.     Tatyana Izquierdo  Hem/Onco Fellow    Addendum:  Pt was seen and evaluated by me with Dr Izquierdo.  On evaluation, pt is well appearing and feels well.  She reports mild WILSON.  No f/c. She had more nausea this cycle with eliminating decadron.    On exam, she is well appearing.  NAD.  Heart regular.  Lungs dullness to auscultation half of right lung field.  Abd soft, nt,nd + bs    Reviewed scans personally.  Overall  stable disease.    Breast cancer - metastatic.  Continue doxil.  Effusion - thoracentesis  Nausea - will readd decadron to the infusion.  Continue to monitor cardiac function.    Ana Whitman MD

## 2019-10-14 NOTE — DISCHARGE INSTRUCTIONS
Discharge Instructions for Paracentesis or Thoracentesis     Paracentesis is a procedure to remove extra fluid from your belly (abdomen). Thoracentesis is a procedure to remove extra fluid from your chest.  This fluid buildup is called ascites. The procedure may have been done to take a sample of the fluid. Or, it may have been done to drain the extra fluid from your abdomen or chest to help make you more comfortable.     Home care    If you have pain after the procedure, your healthcare provider can prescribe or recommend pain medicines. Take these exactly as directed. If you stopped taking other medicines before the procedure, ask your provider when you can start them again.    Avoid strenuous activity for 48 hours.    You will have a small bandage over the puncture site. Adhesive tapes, adhesive strips, or surgical glue may also be used to close the incision. They also help stop bleeding and promote healing. You may take the bandage off in 24-48 hours. Once there is a scab over the site, no bandages are required.    Check the puncture site for the signs of infection listed below.     Follow-up care  Make a follow-up appointment with your healthcare provider as directed. During your follow-up visit, your provider will check your healing. Let your provider know how you are feeling. You can also discuss the cause of your fluid, and if you need any further treatment.    When to seek medical advice:  Call your healthcare provider if you have any of the following after the procedure:    A fever of 100.4 F (38.0 C) or higher    Trouble breathing    Abdominal pain that is worse than expected    Belly Abdominal pain not caused by having the skin punctured that is worse than expected    Persistent bleeding from the puncture site    More than a small amount of fluid leaking from the puncture site    Swollen belly    Signs of infection at the puncture site. These include increased pain, redness, or swelling, warmth, or  bad-smelling drainage.    Feeling dizzy or lightheaded, or fainting     Call our Interventional Radiology (IR) service if:  If you start bleeding from the procedure site.  If you do start to bleed from the site, lie down and hold some pressure on the site.  Our radiology provider can help you decide if you need to return to the hospital.  If you have new or worsening pain related to the procedure.  If you have pronounced swelling at the procedure site.  If you develop persistent nausea or vomiting.  If you develop hives or a rash or any unexplained itching.  If you have a fever of greater than 100.4  F and chills in the first 5 days after procedure.  Any other concerns related to your procedure.      [facility]  Interventional Radiology (IR)  [address]  [facility] [place] [address]    Contact Number:  [phone]  (IR control desk)  Monday - Friday 8:00 am - 4:30 pm    After hours for urgent concerns:  [phone]  After 4:30 pm Monday - Friday, Weekends and Holidays.   Ask for Interventional Radiology on-call.  Someone is available 24 hours a day.  [facility] toll free number:  [phone]

## 2019-10-14 NOTE — OP NOTE
Interventional Radiology Brief Post Procedure Note    Procedure: Right thoracentesis    Proceduralist: Lydia Brannon PA-C    Assistant: None    Time Out: Prior to the start of the procedure and with procedural staff participation, I verbally confirmed the patient s identity using two indicators, relevant allergies, that the procedure was appropriate and matched the consent or emergent situation, and that the correct equipment/implants were available. Immediately prior to starting the procedure I conducted the Time Out with the procedural staff and re-confirmed the patient s name, procedure, and site/side. (The Joint Commission universal protocol was followed.)  Yes    Sedation: None. Local Anesthestic used    Findings: Completed ultrasound-guided right therapeutic thoracentesis. A total of 800 mL thin dark red fluid drained from the right pleural space. A moderate effusion remains. Drainage stopped due to patient symptoms. No immediate complication.    Estimated Blood Loss: Minimal    Fluoroscopy Time:  minute(s)    SPECIMENS: None    Complications: 1. None     Condition: Stable    Plan: Follow up per primary team    Comments: See dictated procedure note for full details.    Lydia Brannon PA-C

## 2019-11-01 NOTE — PROGRESS NOTES
Oncology/Hematology Visit Note  November 1, 2019       Reason for Visit: Follow up for metastatic breast cancer (ER/SD+, HER2-)    History of Present Illness: Shan Marsh is a 49 year old female without significant past medical history with a diagnosis of metastatic breast cancer after she presented with left shoulder pain. Shoulder MRI showed a soft tissue mass close to the distal clavicle which was concerning for malignancy. PET/CT on 5/9/17 showed hypermetabolic left axillary, supraclavicular, mediastinal and hilar nodes. She had biopsies of left axilla and hilar nodes showing metastatic breast cancer, strongly ER positive, moderately SD positive, and HER-2 kaitlin non-amplified. She originally had [id number], ER/SD positive, HER-2 negative breast cancer diagnosed in spring of 2012 s/p bilateral mastectomies and Tamoxifen from 8/2012-2/2013 and discontinued due to side effects. With development of metastatic disease, she was recommended treatment on BIG10 clinical trial with palbociclib and tamoxifen. She started treatment on 5/24/17 and required dose reduction for neutropenia. CT CAP and bone scan 1/11/2018 showing progressive disease with new bony lesions in the ribs, T spine, and possibly liver, lungs. She started Zoladex and abemiciclib on 1/12/2018.      In March 2018, she sought treatment at [facility] in [place], under the care of Dr. Brooks, where she received:  - Vallovex vaccine (per FDA, is in phase I trials here in [place])  - Insulin potentiated therapy with conjugated chemotherapy (5% chemotherapy, unclear what chemotherapy)  - Gina's toxins, IV vitamin C, IV Ozone, hyperbaric chamber, vitamin B17 infusion, vitamin CK3 IV, and coffee enemas PRN  - Also oral niacin, Lugol, acidol, pancreatin, and thyroid desiccated liver, per their regimen  - She was continued on Letrozole and advised to continue Zoladex.      She met with Dr. Whitman and patient wished to continue her alternative regimen.  Abemiciclib was placed on hold. She was continued on Zoladex and letrozole. While in [place], she required thoracentesis for symptomatic pleural effusion. A repeat PET in August 2018 showed widely progressive disease with multiple new bone mets involving the spine, ribs, pelvis, and sacrum; new lesions involving the left lateral thoracic/abdominal wall musculature and subcutaneous tissues, new metastases in the left adrenal gland, and recurrent large L pleural effusion. She was started on Xeloda 1500 mg BID on 9/5/18.      She was admitted 10/4-10/11/18 after a syncopal event/unwitnessed fall at home. She was found to have left pneumothorax and pericardial effusion with tamponade physiology. She had left chest tube placed in ED which then fell out on 10/10. Left pleurx placed on 10/11. She had pericardiocentesis on 10/7, drain removed 10/8.      She resumed Xeloda, and remained on this until January 2019.  At that time, she was switched to Doxil.     Interval History:  Shan is here for follow-up prior to Doxil.    She has had discussions with  regarding her tolerance of Doxil - and in the last month, has decided she just can't continue on this. She reports spending 100% of her energy managing side effects, including fatigue, mouth sores, nausea, anorexia, and recurrent hemorrhoids.     She is in agreement with the break from treatment that has been previously discussed with her.    Breathing feels better s/p thoracentesis. Able to exert herself without issues right now. No other new pains or concerns.      ROS: 10 point ROS neg other than the symptoms noted above in the HPI.     Current Outpatient Medications   Medication     Digestive Enzymes (ENZYME DIGEST) CAPS     lidocaine HCl (XYLOCAINE) 2 % solution     magic mouthwash (ENTER INGREDIENTS IN COMMENTS) suspension     medical cannabis (Patient's own supply.  Not a prescription)     ondansetron (ZOFRAN) 8 MG tablet     VITAMIN D, CHOLECALCIFEROL, PO      No current facility-administered medications for this visit.      Facility-Administered Medications Ordered in Other Visits   Medication     sodium chloride (PF) 0.9% PF flush 10 mL     sodium chloride (PF) 0.9% PF flush 10 mL     sodium chloride (PF) 0.9% PF flush 10 mL          Physical Examination:  /85   Pulse 86   Temp 97.4  F (36.3  C) (Oral)   Resp 16   Wt 45.3 kg (99 lb 14.4 oz)   SpO2 97%   BMI 17.14 kg/m    General: The patient is a pleasant female in no acute distress.  HEENT: EOMI, PERRL. Sclerae are anicteric. Oral mucosa is pink and moist with no lesions or thrush.   Lymph: No palpable cervical, supraclavicular, subclavicular lymphadenopathy.  Heart: Regular rate and rhythm.   Lungs: Breathing comfortably on room air. Decreased BS at right base, decreased breath sound in left basilar area as well, otherwise clear to auscultation bilaterally.   Abdomen: Bowel sounds present, soft, no pain to palpation, no palpable hepatosplenomegaly or masses.   Extremities: No lower extremity edema noted bilaterally.   Neuro: Cranial nerves II through XII are grossly intact.  Skin: No rashes, petechiae, or bruising noted on exposed skin.     Laboratory Data:  Results for NIKKIE HICKS (MRN [id number]) as of 11/5/2019 12:06   Ref. Range 11/5/2019 11:09   Sodium Latest Ref Range: 133 - 144 mmol/L 135   Potassium Latest Ref Range: 3.4 - 5.3 mmol/L 3.5   Chloride Latest Ref Range: 94 - 109 mmol/L 100   Carbon Dioxide Latest Ref Range: 20 - 32 mmol/L 28   Urea Nitrogen Latest Ref Range: 7 - 30 mg/dL 8   Creatinine Latest Ref Range: 0.52 - 1.04 mg/dL 0.63   GFR Estimate Latest Ref Range: >60 mL/min/1.73_m2 >90   GFR Estimate If Black Latest Ref Range: >60 mL/min/1.73_m2 >90   Calcium Latest Ref Range: 8.5 - 10.1 mg/dL 10.0   Anion Gap Latest Ref Range: 3 - 14 mmol/L 7   Albumin Latest Ref Range: 3.4 - 5.0 g/dL 3.2 (L)   Protein Total Latest Ref Range: 6.8 - 8.8 g/dL 9.0 (H)   Bilirubin Total Latest  Ref Range: 0.2 - 1.3 mg/dL 0.2   Alkaline Phosphatase Latest Ref Range: 40 - 150 U/L 212 (H)   ALT Latest Ref Range: 0 - 50 U/L 35   AST Latest Ref Range: 0 - 45 U/L 84 (H)   Glucose Latest Ref Range: 70 - 99 mg/dL 105 (H)   WBC Latest Ref Range: 4.0 - 11.0 10e9/L 4.4   Hemoglobin Latest Ref Range: 11.7 - 15.7 g/dL 11.5 (L)   Hematocrit Latest Ref Range: 35.0 - 47.0 % 37.1   Platelet Count Latest Ref Range: 150 - 450 10e9/L 329     Assessment and Plan:  Shan Marsh is a 49 year old woman with prior history of a T1 N0, ER/WV positive, HER2-negative left breast cancer treated with bilateral mastectomies in 2012, now with metastatic disease (bones, pleural effusions, lungs, pericardial effusion, liver, left adrenal/retroperitoneal mets), ER/WV positive, HER2 negative.  She progressed on palbociclib and tamoxifen, alternative therapies in [place] and was on Xeloda, and is now on Doxil single agent.     Metastatic breast cancer: Started Doxil every four weeks on 1/22/19. Restaging on 10/3/19 showed stable disease in lung disease as well as lymph nodes and liver. Bone scan on 10/3/19 also showed stable disease; however, she has poor tolerance to Doxil. In discussions with patient and Dr. Whitman, plan to hold Doxil today, see Dr. Whitman in about 2 weeks to consider alternative therapies. Shan is in agreement with this plan.  -Continue with hormone suppression with monthly Zoladex.     Cardiac monitoring  - Echo in 9/9/19 showed LVEF of 55-60%, no pericardial effusion    Bone mets.  She remains on Zometa every 3 months, most recenetly recieved 10/30805,      Mucositis:   - now resolved -- no further Doxil    Constipation, hemorrhoids:   - Continue with OTC interventions     Bilateral pleural effusions (right worse than left). - She has previously had thoracentesis of both lungs, report developed some degree of dyspnea on exertion recently, repeat CT showed large pleural effusion on right side.Symptoms improved, exam  improved. Monitor for s/s of re-accumulation.     History of a malignant pericardial effusion.    - repeat Echo on 9/9/19 did not show any pericardial effusion, will continue to monitor based on her clinical symptoms.      Flu shot today    Lima Griffiths PA-C

## 2019-11-01 NOTE — TELEPHONE ENCOUNTER
Patient calling triage as RNCC is out of clinic today. Patient reports she is currently scheduled 11/5/19 to discuss change in treatment plan. Has already spoke with RNCC and has been expecting a call from Dr. Whitman prior to upcoming visit.     Requesting call back regarding treatment options to think over before appointment 11/5. Encounter routed to care team requesting follow-up.    Karen Judge, YOMIN, RN, PHN  Triage

## 2019-11-05 NOTE — NURSING NOTE
Chief Complaint   Patient presents with     Blood Draw     Labs drawn via PIV by RN in lab. VS taken.      Labs drawn via peripheral IV. Vital signs taken. Checked into next appointment.   Pat Beltran RN

## 2019-11-05 NOTE — PROGRESS NOTES
Infusion Nursing Note:  Shan Marsh presents today for Zoladex injection.    Patient seen by provider today: Yes: GRAY Frias   present during visit today: Not Applicable.    Note: Patient presents to infusion today following her provider appointment. Patient denies any questions or concerns at this time.     11/5/19 1215 TORB GRAY Frias/Elyssa Young RN   -No Doxil administration today, giving patient a break from treatment.   -Ok to give Zoladex injection.     Intravenous Access:  No Intravenous access/labs at this visit.    Treatment Conditions:  Lab Results   Component Value Date    HGB 11.5 11/05/2019     Lab Results   Component Value Date    WBC 4.4 11/05/2019      Lab Results   Component Value Date    ANEU 2.8 11/05/2019     Lab Results   Component Value Date     11/05/2019      Lab Results   Component Value Date     11/05/2019                   Lab Results   Component Value Date    POTASSIUM 3.5 11/05/2019           Lab Results   Component Value Date    MAG 2.4 01/22/2019            Lab Results   Component Value Date    CR 0.63 11/05/2019                   Lab Results   Component Value Date    KRYSTEN 10.0 11/05/2019                Lab Results   Component Value Date    BILITOTAL 0.2 11/05/2019           Lab Results   Component Value Date    ALBUMIN 3.2 11/05/2019                    Lab Results   Component Value Date    ALT 35 11/05/2019           Lab Results   Component Value Date    AST 84 11/05/2019           Post Infusion Assessment:  Patient tolerated Lidocaine and Zoladex injection without incident to the right abdomen.   Ice was applied to site prior to injection.     Discharge Plan:   Prescription refills given for Omeprazole.  Discharge instructions reviewed with: Patient.  Patient and/or family verbalized understanding of discharge instructions and all questions answered.  AVS to patient via [employer].  Patient will return 11/20/19 for next appointment.    Patient discharged in stable condition accompanied by: self.  Departure Mode: Ambulatory.    Elyssa Young RN

## 2019-11-05 NOTE — PATIENT INSTRUCTIONS
Contact Numbers    [facility] Main Line/Triage and after hours / weekends / holidays: [phone]      Please call the triage or after hours line if you experience a temperature greater than or equal to 100.5, shaking chills, have uncontrolled nausea, vomiting and/or diarrhea, dizziness, shortness of breath, chest pain, bleeding, unexplained bruising, or if you have any other new/concerning symptoms, questions or concerns.      If you are having any concerning symptoms or wish to speak to a provider before your next infusion visit, please call your care coordinator or triage to notify them so we can adequately serve you.     If you need a refill on a narcotic prescription or other medication, please call before your infusion appointment.

## 2019-11-05 NOTE — NURSING NOTE
IV removed without complication, patient discharged to home.    DAVIN Miller          Given flu vaccine in right deltoid. Pt tolerated well.    Marisel Hodgson CMA ([facility])

## 2019-11-05 NOTE — LETTER
11/5/2019      RE: Shan Marsh  [address]  [facility] [address]       Oncology/Hematology Visit Note  November 1, 2019       Reason for Visit: Follow up for metastatic breast cancer (ER/SC+, HER2-)    History of Present Illness: Shan Marsh is a 49 year old female without significant past medical history with a diagnosis of metastatic breast cancer after she presented with left shoulder pain. Shoulder MRI showed a soft tissue mass close to the distal clavicle which was concerning for malignancy. PET/CT on 5/9/17 showed hypermetabolic left axillary, supraclavicular, mediastinal and hilar nodes. She had biopsies of left axilla and hilar nodes showing metastatic breast cancer, strongly ER positive, moderately SC positive, and HER-2 kaitlin non-amplified. She originally had [id number], ER/SC positive, HER-2 negative breast cancer diagnosed in spring of 2012 s/p bilateral mastectomies and Tamoxifen from 8/2012-2/2013 and discontinued due to side effects. With development of metastatic disease, she was recommended treatment on BIG10 clinical trial with palbociclib and tamoxifen. She started treatment on 5/24/17 and required dose reduction for neutropenia. CT CAP and bone scan 1/11/2018 showing progressive disease with new bony lesions in the ribs, T spine, and possibly liver, lungs. She started Zoladex and abemiciclib on 1/12/2018.      In March 2018, she sought treatment at [facility] in [place], under the care of Dr. Brooks, where she received:  - Vallovex vaccine (per FDA, is in phase I trials here in [place])  - Insulin potentiated therapy with conjugated chemotherapy (5% chemotherapy, unclear what chemotherapy)  - Gina's toxins, IV vitamin C, IV Ozone, hyperbaric chamber, vitamin B17 infusion, vitamin CK3 IV, and coffee enemas PRN  - Also oral niacin, Lugol, acidol, pancreatin, and thyroid desiccated liver, per their regimen  - She was continued on Letrozole and advised to continue Zoladex.       She met with Dr. Whitman and patient wished to continue her alternative regimen. Abemiciclib was placed on hold. She was continued on Zoladex and letrozole. While in [place], she required thoracentesis for symptomatic pleural effusion. A repeat PET in August 2018 showed widely progressive disease with multiple new bone mets involving the spine, ribs, pelvis, and sacrum; new lesions involving the left lateral thoracic/abdominal wall musculature and subcutaneous tissues, new metastases in the left adrenal gland, and recurrent large L pleural effusion. She was started on Xeloda 1500 mg BID on 9/5/18.      She was admitted 10/4-10/11/18 after a syncopal event/unwitnessed fall at home. She was found to have left pneumothorax and pericardial effusion with tamponade physiology. She had left chest tube placed in ED which then fell out on 10/10. Left pleurx placed on 10/11. She had pericardiocentesis on 10/7, drain removed 10/8.      She resumed Xeloda, and remained on this until January 2019.  At that time, she was switched to Doxil.     Interval History:  Shan is here for follow-up prior to Doxil.    She has had discussions with  regarding her tolerance of Doxil - and in the last month, has decided she just can't continue on this. She reports spending 100% of her energy managing side effects, including fatigue, mouth sores, nausea, anorexia, and recurrent hemorrhoids.     She is in agreement with the break from treatment that has been previously discussed with her.    Breathing feels better s/p thoracentesis. Able to exert herself without issues right now. No other new pains or concerns.      ROS: 10 point ROS neg other than the symptoms noted above in the HPI.     Current Outpatient Medications   Medication     Digestive Enzymes (ENZYME DIGEST) CAPS     lidocaine HCl (XYLOCAINE) 2 % solution     magic mouthwash (ENTER INGREDIENTS IN COMMENTS) suspension     medical cannabis (Patient's own supply.  Not a  prescription)     ondansetron (ZOFRAN) 8 MG tablet     VITAMIN D, CHOLECALCIFEROL, PO     No current facility-administered medications for this visit.      Facility-Administered Medications Ordered in Other Visits   Medication     sodium chloride (PF) 0.9% PF flush 10 mL     sodium chloride (PF) 0.9% PF flush 10 mL     sodium chloride (PF) 0.9% PF flush 10 mL          Physical Examination:  /85   Pulse 86   Temp 97.4  F (36.3  C) (Oral)   Resp 16   Wt 45.3 kg (99 lb 14.4 oz)   SpO2 97%   BMI 17.14 kg/m     General: The patient is a pleasant female in no acute distress.  HEENT: EOMI, PERRL. Sclerae are anicteric. Oral mucosa is pink and moist with no lesions or thrush.   Lymph: No palpable cervical, supraclavicular, subclavicular lymphadenopathy.  Heart: Regular rate and rhythm.   Lungs: Breathing comfortably on room air. Decreased BS at right base, decreased breath sound in left basilar area as well, otherwise clear to auscultation bilaterally.   Abdomen: Bowel sounds present, soft, no pain to palpation, no palpable hepatosplenomegaly or masses.   Extremities: No lower extremity edema noted bilaterally.   Neuro: Cranial nerves II through XII are grossly intact.  Skin: No rashes, petechiae, or bruising noted on exposed skin.     Laboratory Data:  Results for NIKKIE HICKS (MRN [id number]) as of 11/5/2019 12:06   Ref. Range 11/5/2019 11:09   Sodium Latest Ref Range: 133 - 144 mmol/L 135   Potassium Latest Ref Range: 3.4 - 5.3 mmol/L 3.5   Chloride Latest Ref Range: 94 - 109 mmol/L 100   Carbon Dioxide Latest Ref Range: 20 - 32 mmol/L 28   Urea Nitrogen Latest Ref Range: 7 - 30 mg/dL 8   Creatinine Latest Ref Range: 0.52 - 1.04 mg/dL 0.63   GFR Estimate Latest Ref Range: >60 mL/min/1.73_m2 >90   GFR Estimate If Black Latest Ref Range: >60 mL/min/1.73_m2 >90   Calcium Latest Ref Range: 8.5 - 10.1 mg/dL 10.0   Anion Gap Latest Ref Range: 3 - 14 mmol/L 7   Albumin Latest Ref Range: 3.4 - 5.0 g/dL 3.2  (L)   Protein Total Latest Ref Range: 6.8 - 8.8 g/dL 9.0 (H)   Bilirubin Total Latest Ref Range: 0.2 - 1.3 mg/dL 0.2   Alkaline Phosphatase Latest Ref Range: 40 - 150 U/L 212 (H)   ALT Latest Ref Range: 0 - 50 U/L 35   AST Latest Ref Range: 0 - 45 U/L 84 (H)   Glucose Latest Ref Range: 70 - 99 mg/dL 105 (H)   WBC Latest Ref Range: 4.0 - 11.0 10e9/L 4.4   Hemoglobin Latest Ref Range: 11.7 - 15.7 g/dL 11.5 (L)   Hematocrit Latest Ref Range: 35.0 - 47.0 % 37.1   Platelet Count Latest Ref Range: 150 - 450 10e9/L 329     Assessment and Plan:  Shan Marsh is a 49 year old woman with prior history of a T1 N0, ER/AK positive, HER2-negative left breast cancer treated with bilateral mastectomies in 2012, now with metastatic disease (bones, pleural effusions, lungs, pericardial effusion, liver, left adrenal/retroperitoneal mets), ER/AK positive, HER2 negative.  She progressed on palbociclib and tamoxifen, alternative therapies in [place] and was on Xeloda, and is now on Doxil single agent.     Metastatic breast cancer: Started Doxil every four weeks on 1/22/19. Restaging on 10/3/19 showed stable disease in lung disease as well as lymph nodes and liver. Bone scan on 10/3/19 also showed stable disease; however, she has poor tolerance to Doxil. In discussions with patient and Dr. Whitman, plan to hold Doxil today, see Dr. Whitman in about 2 weeks to consider alternative therapies. Shan is in agreement with this plan.  -Continue with hormone suppression with monthly Zoladex.     Cardiac monitoring  - Echo in 9/9/19 showed LVEF of 55-60%, no pericardial effusion    Bone mets.  She remains on Zometa every 3 months, most recenetly recieved 10/99751,      Mucositis:   - now resolved -- no further Doxil    Constipation, hemorrhoids:   - Continue with OTC interventions     Bilateral pleural effusions (right worse than left). - She has previously had thoracentesis of both lungs, report developed some degree of dyspnea on exertion  recently, repeat CT showed large pleural effusion on right side.Symptoms improved, exam improved. Monitor for s/s of re-accumulation.     History of a malignant pericardial effusion.    - repeat Echo on 9/9/19 did not show any pericardial effusion, will continue to monitor based on her clinical symptoms.      Flu shot today    PEE Frias PA-C

## 2019-11-20 NOTE — LETTER
11/20/2019     RE: Shan Marsh  [address]  [facility] [address]     Dear Colleague,    Thank you for referring your patient, Shan Marsh, to the [facility]. Please see a copy of my visit note below.    Oncology/Hematology Visit Note  November 20, 2019     Reason for Visit: Follow up for metastatic breast cancer (ER/DC+, HER2-)    History of Present Illness: Shan Marsh is a 49 year old female without significant past medical history with a diagnosis of metastatic breast cancer after she presented with left shoulder pain. Shoulder MRI showed a soft tissue mass close to the distal clavicle which was concerning for malignancy. PET/CT on 5/9/17 showed hypermetabolic left axillary, supraclavicular, mediastinal and hilar nodes. She had biopsies of left axilla and hilar nodes showing metastatic breast cancer, strongly ER positive, moderately DC positive, and HER-2 kaitlin non-amplified. She originally had [id number], ER/DC positive, HER-2 negative breast cancer diagnosed in spring of 2012 s/p bilateral mastectomies and Tamoxifen from 8/2012-2/2013 and discontinued due to side effects. With development of metastatic disease, she was recommended treatment on BIG10 clinical trial with palbociclib and tamoxifen. She started treatment on 5/24/17 and required dose reduction for neutropenia. CT CAP and bone scan 1/11/2018 showing progressive disease with new bony lesions in the ribs, T spine, and possibly liver, lungs. She started Zoladex and abemiciclib on 1/12/2018.      In March 2018, she sought treatment at [facility] in [place], under the care of Dr. Brooks, where she received:  - Vallovex vaccine (per FDA, is in phase I trials here in [place])  - Insulin potentiated therapy with conjugated chemotherapy (5% chemotherapy, unclear what chemotherapy)  - Gina's toxins, IV vitamin C, IV Ozone, hyperbaric chamber, vitamin B17 infusion, vitamin CK3 IV, and coffee enemas PRN  -  Also oral niacin, Lugol, acidol, pancreatin, and thyroid desiccated liver, per their regimen  - She was continued on Letrozole and advised to continue Zoladex.      She met with Dr. Whitman and patient wished to continue her alternative regimen. Abemiciclib was placed on hold. She was continued on Zoladex and letrozole. While in [place], she required thoracentesis for symptomatic pleural effusion. A repeat PET in August 2018 showed widely progressive disease with multiple new bone mets involving the spine, ribs, pelvis, and sacrum; new lesions involving the left lateral thoracic/abdominal wall musculature and subcutaneous tissues, new metastases in the left adrenal gland, and recurrent large L pleural effusion. She was started on Xeloda 1500 mg BID on 9/5/18.      She was admitted 10/4-10/11/18 after a syncopal event/unwitnessed fall at home. She was found to have left pneumothorax and pericardial effusion with tamponade physiology. She had left chest tube placed in ED which then fell out on 10/10. Left pleurx placed on 10/11. She had pericardiocentesis on 10/7, drain removed 10/8.      She resumed Xeloda, and remained on this until January 2019.  At that time, she was switched to Doxil.     Interval History:  Shan is here today for follow up for her metastatic breast cancer.  She had been on doxil January 2019 - October 2019.  She returns today. We held doxil two weeks ago due to ongoing fatigue.      She continues to have weight loss with her weight now < 100 pounds.  She reports that a few days ago she noticed some improvements in her appetite.  She is trying different foods, etc.    She complains of waking up at night feeling mildly short of breath.  If she props her pillow, this feels better.    ROS: 10 point ROS neg other than the symptoms noted above in the HPI.     Current Outpatient Medications   Medication     Digestive Enzymes (ENZYME DIGEST) CAPS     magic mouthwash (ENTER INGREDIENTS IN COMMENTS)  "suspension     medical cannabis (Patient's own supply.  Not a prescription)     ondansetron (ZOFRAN) 8 MG tablet     VITAMIN D, CHOLECALCIFEROL, PO     lidocaine HCl (XYLOCAINE) 2 % solution     No current facility-administered medications for this visit.      Facility-Administered Medications Ordered in Other Visits   Medication     sodium chloride (PF) 0.9% PF flush 10 mL     sodium chloride (PF) 0.9% PF flush 10 mL     sodium chloride (PF) 0.9% PF flush 10 mL          Physical Examination:  BP (!) 127/90 (BP Location: Right arm, Patient Position: Sitting, Cuff Size: Adult Small)   Pulse 99   Temp 97  F (36.1  C) (Oral)   Resp 16   Ht 1.626 m (5' 4.02\")   Wt 45 kg (99 lb 1.6 oz)   SpO2 96%   BMI 17.00 kg/m     General: The patient is a pleasant female in no acute distress.  HEENT: EOMI, PERRL. Sclerae are anicteric. Oral mucosa is pink and moist with no lesions or thrush.   Lymph: No palpable cervical, supraclavicular, subclavicular lymphadenopathy.  Heart: Regular rate and rhythm.   Lungs: Breathing comfortably on room air. Diminished breath sounds in lung bases bilaterally  Abdomen: Bowel sounds present, soft, no pain to palpation, no palpable hepatosplenomegaly or masses.   Extremities: No lower extremity edema noted bilaterally.   Neuro: Cranial nerves II through XII are grossly intact.  Skin: No rashes, petechiae, or bruising noted on exposed skin.     Lab Results   Component Value Date    WBC 5.5 11/20/2019     Lab Results   Component Value Date    RBC 4.17 11/20/2019     Lab Results   Component Value Date    HGB 11.7 11/20/2019     Lab Results   Component Value Date    HCT 37.7 11/20/2019     No components found for: MCT  Lab Results   Component Value Date    MCV 90 11/20/2019     Lab Results   Component Value Date    MCH 28.1 11/20/2019     Lab Results   Component Value Date    MCHC 31.0 11/20/2019     Lab Results   Component Value Date    RDW 15.8 11/20/2019     Lab Results   Component Value Date "     11/20/2019       Recent Labs   Lab Test 11/20/19  0923 11/05/19  1109    135   POTASSIUM 3.6 3.5   CHLORIDE 100 100   CO2 30 28   ANIONGAP 5 7   * 105*   BUN 9 8   CR 0.58 0.63   KRYSTEN 9.7 10.0     Liver Function Studies -   Recent Labs   Lab Test 11/20/19  0923   PROTTOTAL 8.6   ALBUMIN 3.1*   BILITOTAL 0.3   ALKPHOS 310*   AST 74*   ALT 27          Assessment and Plan:  Shan Marsh is a 49 year old woman with prior history of a T1 N0, ER/WV positive, HER2-negative left breast cancer treated with bilateral mastectomies in 2012, now with metastatic disease (bones, pleural effusions, lungs, pericardial effusion, liver, left adrenal/retroperitoneal mets), ER/WV positive, HER2 negative.  She progressed on palbociclib and tamoxifen, alternative therapies in [place] and was on Xeloda, and is now on Doxil single agent.    Metastatic breast cancer: Started Doxil every four weeks on 1/22/19. Restaging on 10/3/19 showed stable disease in lung disease as well as lymph nodes and liver. Bone scan on 10/3/19 alos showed stable disease.     She has had more fatigue with doxil.  She has had a two week break. Weight is down to < 100 pounds.  We discussed taking a few more weeks off of treatment.  I will plan to start gemzar at that time.  IV weekly for two out of three weeks.  We discussed the side effects of chemotherapy including myelosuppression, nausea/vomiting/diarrhea/constipation, hair loss, neuropathy, fertility complications, dehydration, cardiomyopathy, etc.  The patient will be receiving chemotherapy teaching through my nurse coordinator with handouts describing all of the side effects again.  Consent for chemotherapy was obtained.    She may also be eligible for clinical trial using sacituzumab govitecan.  The study is not open yet, but we will continue to screen her for it.      Shortness of breath - check echo and CXR.  She has a h/o both pleural and pericardial effusions.    Nausea/anorexia -  likely chemotherapy vs disease related.  She is on cannabis.  Continue break from treatment.      No pain.    She is coping reasonably well.  Support provided.    Ana Whitman MD      Addendum:  CXR demonstrate large right pleural effusion.  Will arrange for thoracentesis.    Ana Whitman MD

## 2019-11-20 NOTE — NURSING NOTE
Chief Complaint   Patient presents with     Blood Draw     labs drawn with vpt by rn.  vs taken     Labs drawn with vpt by rn.  Pt tolerated well.  VS taken.  Pt checked in for next appt.    Veronica Aguilar RN

## 2019-11-20 NOTE — PROGRESS NOTES
Oncology/Hematology Visit Note  November 20, 2019     Reason for Visit: Follow up for metastatic breast cancer (ER/AZ+, HER2-)    History of Present Illness: Shan Marsh is a 49 year old female without significant past medical history with a diagnosis of metastatic breast cancer after she presented with left shoulder pain. Shoulder MRI showed a soft tissue mass close to the distal clavicle which was concerning for malignancy. PET/CT on 5/9/17 showed hypermetabolic left axillary, supraclavicular, mediastinal and hilar nodes. She had biopsies of left axilla and hilar nodes showing metastatic breast cancer, strongly ER positive, moderately AZ positive, and HER-2 kaitlin non-amplified. She originally had [id number], ER/AZ positive, HER-2 negative breast cancer diagnosed in spring of 2012 s/p bilateral mastectomies and Tamoxifen from 8/2012-2/2013 and discontinued due to side effects. With development of metastatic disease, she was recommended treatment on BIG10 clinical trial with palbociclib and tamoxifen. She started treatment on 5/24/17 and required dose reduction for neutropenia. CT CAP and bone scan 1/11/2018 showing progressive disease with new bony lesions in the ribs, T spine, and possibly liver, lungs. She started Zoladex and abemiciclib on 1/12/2018.      In March 2018, she sought treatment at [facility] in [place], under the care of Dr. Brooks, where she received:  - Vallovex vaccine (per FDA, is in phase I trials here in [place])  - Insulin potentiated therapy with conjugated chemotherapy (5% chemotherapy, unclear what chemotherapy)  - Gina's toxins, IV vitamin C, IV Ozone, hyperbaric chamber, vitamin B17 infusion, vitamin CK3 IV, and coffee enemas PRN  - Also oral niacin, Lugol, acidol, pancreatin, and thyroid desiccated liver, per their regimen  - She was continued on Letrozole and advised to continue Zoladex.      She met with Dr. Whitman and patient wished to continue her alternative regimen.  Abemiciclib was placed on hold. She was continued on Zoladex and letrozole. While in [place], she required thoracentesis for symptomatic pleural effusion. A repeat PET in August 2018 showed widely progressive disease with multiple new bone mets involving the spine, ribs, pelvis, and sacrum; new lesions involving the left lateral thoracic/abdominal wall musculature and subcutaneous tissues, new metastases in the left adrenal gland, and recurrent large L pleural effusion. She was started on Xeloda 1500 mg BID on 9/5/18.      She was admitted 10/4-10/11/18 after a syncopal event/unwitnessed fall at home. She was found to have left pneumothorax and pericardial effusion with tamponade physiology. She had left chest tube placed in ED which then fell out on 10/10. Left pleurx placed on 10/11. She had pericardiocentesis on 10/7, drain removed 10/8.      She resumed Xeloda, and remained on this until January 2019.  At that time, she was switched to Doxil.     Interval History:  Shan is here today for follow up for her metastatic breast cancer.  She had been on doxil January 2019 - October 2019.  She returns today. We held doxil two weeks ago due to ongoing fatigue.      She continues to have weight loss with her weight now < 100 pounds.  She reports that a few days ago she noticed some improvements in her appetite.  She is trying different foods, etc.    She complains of waking up at night feeling mildly short of breath.  If she props her pillow, this feels better.    ROS: 10 point ROS neg other than the symptoms noted above in the HPI.     Current Outpatient Medications   Medication     Digestive Enzymes (ENZYME DIGEST) CAPS     magic mouthwash (ENTER INGREDIENTS IN COMMENTS) suspension     medical cannabis (Patient's own supply.  Not a prescription)     ondansetron (ZOFRAN) 8 MG tablet     VITAMIN D, CHOLECALCIFEROL, PO     lidocaine HCl (XYLOCAINE) 2 % solution     No current facility-administered medications for this  "visit.      Facility-Administered Medications Ordered in Other Visits   Medication     sodium chloride (PF) 0.9% PF flush 10 mL     sodium chloride (PF) 0.9% PF flush 10 mL     sodium chloride (PF) 0.9% PF flush 10 mL          Physical Examination:  BP (!) 127/90 (BP Location: Right arm, Patient Position: Sitting, Cuff Size: Adult Small)   Pulse 99   Temp 97  F (36.1  C) (Oral)   Resp 16   Ht 1.626 m (5' 4.02\")   Wt 45 kg (99 lb 1.6 oz)   SpO2 96%   BMI 17.00 kg/m    General: The patient is a pleasant female in no acute distress.  HEENT: EOMI, PERRL. Sclerae are anicteric. Oral mucosa is pink and moist with no lesions or thrush.   Lymph: No palpable cervical, supraclavicular, subclavicular lymphadenopathy.  Heart: Regular rate and rhythm.   Lungs: Breathing comfortably on room air. Diminished breath sounds in lung bases bilaterally  Abdomen: Bowel sounds present, soft, no pain to palpation, no palpable hepatosplenomegaly or masses.   Extremities: No lower extremity edema noted bilaterally.   Neuro: Cranial nerves II through XII are grossly intact.  Skin: No rashes, petechiae, or bruising noted on exposed skin.     Lab Results   Component Value Date    WBC 5.5 11/20/2019     Lab Results   Component Value Date    RBC 4.17 11/20/2019     Lab Results   Component Value Date    HGB 11.7 11/20/2019     Lab Results   Component Value Date    HCT 37.7 11/20/2019     No components found for: MCT  Lab Results   Component Value Date    MCV 90 11/20/2019     Lab Results   Component Value Date    MCH 28.1 11/20/2019     Lab Results   Component Value Date    MCHC 31.0 11/20/2019     Lab Results   Component Value Date    RDW 15.8 11/20/2019     Lab Results   Component Value Date     11/20/2019       Recent Labs   Lab Test 11/20/19  0923 11/05/19  1109    135   POTASSIUM 3.6 3.5   CHLORIDE 100 100   CO2 30 28   ANIONGAP 5 7   * 105*   BUN 9 8   CR 0.58 0.63   KRYSTEN 9.7 10.0     Liver Function Studies - "   Recent Labs   Lab Test 11/20/19  0923   PROTTOTAL 8.6   ALBUMIN 3.1*   BILITOTAL 0.3   ALKPHOS 310*   AST 74*   ALT 27          Assessment and Plan:  Shan Marsh is a 49 year old woman with prior history of a T1 N0, ER/AL positive, HER2-negative left breast cancer treated with bilateral mastectomies in 2012, now with metastatic disease (bones, pleural effusions, lungs, pericardial effusion, liver, left adrenal/retroperitoneal mets), ER/AL positive, HER2 negative.  She progressed on palbociclib and tamoxifen, alternative therapies in [place] and was on Xeloda, and is now on Doxil single agent.    Metastatic breast cancer: Started Doxil every four weeks on 1/22/19. Restaging on 10/3/19 showed stable disease in lung disease as well as lymph nodes and liver. Bone scan on 10/3/19 alos showed stable disease.     She has had more fatigue with doxil.  She has had a two week break. Weight is down to < 100 pounds.  We discussed taking a few more weeks off of treatment.  I will plan to start gemzar at that time.  IV weekly for two out of three weeks.  We discussed the side effects of chemotherapy including myelosuppression, nausea/vomiting/diarrhea/constipation, hair loss, neuropathy, fertility complications, dehydration, cardiomyopathy, etc.  The patient will be receiving chemotherapy teaching through my nurse coordinator with handouts describing all of the side effects again.  Consent for chemotherapy was obtained.    She may also be eligible for clinical trial using sacituzumab govitecan.  The study is not open yet, but we will continue to screen her for it.      Shortness of breath - check echo and CXR.  She has a h/o both pleural and pericardial effusions.    Nausea/anorexia - likely chemotherapy vs disease related.  She is on cannabis.  Continue break from treatment.      No pain.    She is coping reasonably well.  Support provided.    Ana Whitman MD      Addendum:  CXR demonstrate large right pleural effusion.   Will arrange for thoracentesis.    Ana Whitman MD

## 2019-11-20 NOTE — NURSING NOTE
"Oncology Rooming Note    November 20, 2019 9:47 AM   Shan Marsh is a 49 year old female who presents for:    Chief Complaint   Patient presents with     Blood Draw     labs drawn with vpt by rn.  vs taken     Oncology Clinic Visit     RETURN VISIT; BREAST CANCER FOLLOW UP      Initial Vitals: BP (!) 127/90 (BP Location: Right arm, Patient Position: Sitting, Cuff Size: Adult Small)   Pulse 99   Temp 97  F (36.1  C) (Oral)   Resp 16   Ht 1.626 m (5' 4.02\")   Wt 45 kg (99 lb 1.6 oz)   SpO2 96%   BMI 17.00 kg/m   Estimated body mass index is 17 kg/m  as calculated from the following:    Height as of this encounter: 1.626 m (5' 4.02\").    Weight as of this encounter: 45 kg (99 lb 1.6 oz). Body surface area is 1.43 meters squared.  No Pain (0) Comment: Data Unavailable   No LMP recorded. (Menstrual status: Chemotherapy).  Allergies reviewed: Yes  Medications reviewed: Yes    Medications: Medication refills not needed today.  Pharmacy name entered into [employer]:    [facility] - [place], [place] - [id number]  [facility] - [place], [place] - [address] [facility] [place] [place] - [phone]    Clinical concerns: No new concerns today  Dr Whitman was notified.      Xi Mckinley                "

## 2019-11-20 NOTE — PATIENT INSTRUCTIONS
Shan,    We will plan for another 3-4 weeks off of treatment.    We will consider at that time either a clinical trial using sacituzumab govitecan or IV gemzar.  Gemzar is given IV once weekly for two weeks and then one week is off.    Ana Whitman

## 2019-11-25 NOTE — OR NURSING
Right Thoracentesis completed by PEE Chauhan. Patient tolerated output of 800 ml, dark red in color, well.

## 2019-11-25 NOTE — DISCHARGE INSTRUCTIONS
Discharge Instructions for Paracentesis or Thoracentesis     Paracentesis is a procedure to remove extra fluid from your belly (abdomen). Thoracentesis is a procedure to remove extra fluid from your chest.  This fluid buildup is called ascites. The procedure may have been done to take a sample of the fluid. Or, it may have been done to drain the extra fluid from your abdomen or chest to help make you more comfortable.     Home care    If you have pain after the procedure, your healthcare provider can prescribe or recommend pain medicines. Take these exactly as directed. If you stopped taking other medicines before the procedure, ask your provider when you can start them again.    Avoid strenuous activity for 48 hours.    You will have a small bandage over the puncture site. Adhesive tapes, adhesive strips, or surgical glue may also be used to close the incision. They also help stop bleeding and promote healing. You may take the bandage off in 24-48 hours. Once there is a scab over the site, no bandages are required.    Check the puncture site for the signs of infection listed below.     Follow-up care  Make a follow-up appointment with your healthcare provider as directed. During your follow-up visit, your provider will check your healing. Let your provider know how you are feeling. You can also discuss the cause of your fluid, and if you need any further treatment.    When to seek medical advice:  Call your healthcare provider if you have any of the following after the procedure:    A fever of 100.4 F (38.0 C) or higher    Trouble breathing    Abdominal pain that is worse than expected    Belly Abdominal pain not caused by having the skin punctured that is worse than expected    Persistent bleeding from the puncture site    More than a small amount of fluid leaking from the puncture site    Swollen belly    Signs of infection at the puncture site. These include increased pain, redness, or swelling, warmth, or  bad-smelling drainage.    Feeling dizzy or lightheaded, or fainting     Call our Interventional Radiology (IR) service if:  If you start bleeding from the procedure site.  If you do start to bleed from the site, lie down and hold some pressure on the site.  Our radiology provider can help you decide if you need to return to the hospital.  If you have new or worsening pain related to the procedure.  If you have pronounced swelling at the procedure site.  If you develop persistent nausea or vomiting.  If you develop hives or a rash or any unexplained itching.  If you have a fever of greater than 100.4  F and chills in the first 5 days after procedure.  Any other concerns related to your procedure.      [facility]  Interventional Radiology (IR)  [address] [facility] [place] [address]    Contact Number:  [phone]  (IR control desk)  Monday - Friday 8:00 am - 4:30 pm    After hours for urgent concerns:  [phone]  After 4:30 pm Monday - Friday, Weekends and Holidays.   Ask for Interventional Radiology on-call.  Someone is available 24 hours a day.  [facility] toll free number:  [phone]

## 2019-11-25 NOTE — PROCEDURES
Interventional Radiology Brief Post Procedure Note    Procedure: IR THORACENTESIS    Proceduralist: Vaibhav Chauhan PA-C    Assistant: None    Time Out: Prior to the start of the procedure and with procedural staff participation, I verbally confirmed the patient s identity using two indicators, relevant allergies, that the procedure was appropriate and matched the consent or emergent situation, and that the correct equipment/implants were available. Immediately prior to starting the procedure I conducted the Time Out with the procedural staff and re-confirmed the patient s name, procedure, and site/side. (The Joint Commission universal protocol was followed.)  Yes    Sedation: None. Local Anesthestic used    Findings: Completed ultrasound-guided right therapeutic thoracentesis. A total of 800 mL clear dark red fluid drained from the right pleural space. Drainage stopped due to patient discomfort - moderate effusion remains. No immediate complication.    Estimated Blood Loss: Minimal    Specimens: None    Complications: 1. None     Condition: Stable    Plan: Follow up per primary team. Return to IR as needed.    Comments: See dictated procedure note for full details.    Vaibhav Chauhan PA-C  Interventional Radiology  [phone]

## 2019-11-29 NOTE — PROGRESS NOTES
Oncology/Hematology Visit Note  November 29, 2019    Reason for Visit: Follow up for metastatic breast cancer (ER/MI+, HER2-)    History of Present Illness: Shan Marsh is a 49 year old female without significant past medical history with a diagnosis of metastatic breast cancer after she presented with left shoulder pain. Shoulder MRI showed a soft tissue mass close to the distal clavicle which was concerning for malignancy. PET/CT on 5/9/17 showed hypermetabolic left axillary, supraclavicular, mediastinal and hilar nodes. She had biopsies of left axilla and hilar nodes showing metastatic breast cancer, strongly ER positive, moderately MI positive, and HER-2 kaitlin non-amplified. She originally had [id number], ER/MI positive, HER-2 negative breast cancer diagnosed in spring of 2012 s/p bilateral mastectomies and Tamoxifen from 8/2012-2/2013 and discontinued due to side effects. With development of metastatic disease, she was recommended treatment on BIG10 clinical trial with palbociclib and tamoxifen. She started treatment on 5/24/17 and required dose reduction for neutropenia. CT CAP and bone scan 1/11/2018 showing progressive disease with new bony lesions in the ribs, T spine, and possibly liver, lungs. She started Zoladex and abemiciclib on 1/12/2018.      In March 2018, she sought treatment at [facility] in [place], under the care of Dr. Brooks, where she received:  - Vallovex vaccine (per FDA, is in phase I trials here in [place])  - Insulin potentiated therapy with conjugated chemotherapy (5% chemotherapy, unclear what chemotherapy)  - Gina's toxins, IV vitamin C, IV Ozone, hyperbaric chamber, vitamin B17 infusion, vitamin CK3 IV, and coffee enemas PRN  - Also oral niacin, Lugol, acidol, pancreatin, and thyroid desiccated liver, per their regimen  - She was continued on Letrozole and advised to continue Zoladex.      She met with Dr. Whitman and patient wished to continue her alternative regimen. Abemiciclib  was placed on hold. She was continued on Zoladex and letrozole. While in [place], she required thoracentesis for symptomatic pleural effusion. A repeat PET in August 2018 showed widely progressive disease with multiple new bone mets involving the spine, ribs, pelvis, and sacrum; new lesions involving the left lateral thoracic/abdominal wall musculature and subcutaneous tissues, new metastases in the left adrenal gland, and recurrent large L pleural effusion. She was started on Xeloda 1500 mg BID on 9/5/18.      She was admitted 10/4-10/11/18 after a syncopal event/unwitnessed fall at home. She was found to have left pneumothorax and pericardial effusion with tamponade physiology. She had left chest tube placed in ED which then fell out on 10/10. Left pleurx placed on 10/11. She had pericardiocentesis on 10/7, drain removed 10/8.      She resumed Xeloda, and remained on this until January 2019.  At that time, she was switched to Doxil. She required several dose reductions due to poor tolerance. Due to ongoing poor tolerance, plan to switch to gemzar. She presented to [facility] ED on 11/30/2019 with new facial droop, found to have multiple small brain metastases. Her case was reviewed at tumor board and recommended palliative WBRT.     Interval History:  Shan is here for follow-up.     She has ongoing facial droop. Started the Dex today and hasn't had any changes. Continues to have nausea, using Zofran PRN, medical cannabis. She has not thrown up but struggles to eat. She is doing protein shakes. She hasn't had emesis. She has had some headaches with R radiating right jaw pain, otherwise no new pains. No confusion. No bowel changes. Breathing stable, with WILSON with stairs, otherwise no orthopnea or chest pains.    ROS: 10 point ROS neg other than the symptoms noted above in the HPI.     Current Outpatient Medications   Medication     Digestive Enzymes (ENZYME DIGEST) CAPS     magic mouthwash (ENTER INGREDIENTS IN COMMENTS)  suspension     medical cannabis (Patient's own supply.  Not a prescription)     ondansetron (ZOFRAN) 8 MG tablet     VITAMIN D, CHOLECALCIFEROL, PO     No current facility-administered medications for this visit.      Facility-Administered Medications Ordered in Other Visits   Medication     sodium chloride (PF) 0.9% PF flush 10 mL     sodium chloride (PF) 0.9% PF flush 10 mL     sodium chloride (PF) 0.9% PF flush 10 mL     sodium chloride (PF) 0.9% PF flush 10 mL          Physical Examination:  /78 (BP Location: Left arm, Patient Position: Sitting, Cuff Size: Adult Regular)   Pulse 111   Temp 98  F (36.7  C) (Oral)   Wt 43.8 kg (96 lb 9.6 oz)   SpO2 96%   BMI 16.57 kg/m    General: The patient is a pleasant female in no acute distress.  HEENT: facial droop, pupils equal and reactive to light   Lymph: No palpable cervical, supraclavicular, subclavicular lymphadenopathy.  Heart: Regular rate and rhythm.   Lungs: Breathing comfortably on room air. Decreased BS at right base to midlung, decreased breath sound in left basilar area as well, otherwise clear to auscultation bilaterally.   Abdomen: Bowel sounds present, soft, no pain to palpation, no palpable hepatosplenomegaly or masses.   Extremities: No lower extremity edema noted bilaterally.   Neuro: + R facial droop. Otheherwise, Cranial nerves II through XII are grossly intact.  Skin: No rashes, petechiae, or bruising noted on exposed skin.     Laboratory Data:  Results for NIKKIE HICKS (MRN [id number]) as of 12/3/2019 13:26   Ref. Range 12/3/2019 11:28   Sodium Latest Ref Range: 133 - 144 mmol/L 130 (L)   Potassium Latest Ref Range: 3.4 - 5.3 mmol/L 3.6   Chloride Latest Ref Range: 94 - 109 mmol/L 97   Carbon Dioxide Latest Ref Range: 20 - 32 mmol/L 28   Urea Nitrogen Latest Ref Range: 7 - 30 mg/dL 11   Creatinine Latest Ref Range: 0.52 - 1.04 mg/dL 0.60   GFR Estimate Latest Ref Range: >60 mL/min/1.73_m2 >90   GFR Estimate If Black Latest Ref  Range: >60 mL/min/1.73_m2 >90   Calcium Latest Ref Range: 8.5 - 10.1 mg/dL 10.2 (H)   Anion Gap Latest Ref Range: 3 - 14 mmol/L 5   Albumin Latest Ref Range: 3.4 - 5.0 g/dL 3.2 (L)   Protein Total Latest Ref Range: 6.8 - 8.8 g/dL 10.6 (H)   Bilirubin Total Latest Ref Range: 0.2 - 1.3 mg/dL 0.4   Alkaline Phosphatase Latest Ref Range: 40 - 150 U/L 443 (H)   ALT Latest Ref Range: 0 - 50 U/L 46   AST Latest Ref Range: 0 - 45 U/L 131 (H)   Glucose Latest Ref Range: 70 - 99 mg/dL 133 (H)   WBC Latest Ref Range: 4.0 - 11.0 10e9/L 6.6   Hemoglobin Latest Ref Range: 11.7 - 15.7 g/dL 12.9   Hematocrit Latest Ref Range: 35.0 - 47.0 % 40.9   Platelet Count Latest Ref Range: 150 - 450 10e9/L 439     Assessment and Plan:  Shan Marsh is a 49 year old woman with prior history of a T1 N0, ER/GA positive, HER2-negative left breast cancer treated with bilateral mastectomies in 2012, now with metastatic disease (bones, pleural effusions, lungs, pericardial effusion, liver, left adrenal/retroperitoneal mets), ER/GA positive, HER2 negative.  She progressed on palbociclib and tamoxifen, alternative therapies in [place] and was on Xeloda, and had poor tolerance to Doxil.     Metastatic breast cancer: Plan was to switch to Gemzar, however found to have brain mets after presentig to the ED 11/30 with new facial droop. Plan now as below. Holding off systemic therapy for now. Continue Zoladex. Inquired with Dr. Whitman re: systemic imaging.    Brain mets: Brain MRI showing several small foci 3mm or less. She started decadron 2 mg BID and recommended WBRT. She will need LP with cytology, Dr. Whitman already requested.    Nausea, anorexia: could be secondary to disease given continuation of symptoms despite stopping chemo. Already started dex. Recommended scheduled use of Zofran, adding on compazine 5 mg prn (historically has side effects from meds so will start at 5 mg). Anticipate some relief with dex. She prefers not to start Zyprexa right  now, but can consider if needed. She will let us know of any worsening symptoms    FEN:  - New hyponatremia, hypocalcemia, mild. Given her nausea and other symptoms are unchanged, do not suspect she is symptomatic from either one of these derangements. Reviewed possible symptoms and when to call. I suspect the hyponatremia is due to poor intake, recommended strategies to improve this, electrolyte beverages. Will monitor again at time of LP.    Bone mets.  She remains on Zometa every 3 months, most recenetly recieved [id number]/[id number]      Bilateral pleural effusions (right worse than left). - Requires periodic thoracentesis, most recently 11/25 with stable effusion 11/29 in ED. Will request for possible thora in about a week given prior needs. Prefer staged procedures given her improved tolerance with these.     History of a malignant pericardial effusion.    - repeat Echo on 9/9/19 did not show any pericardial effusion, will continue to monitor based on her clinical symptoms.        I spent >40 minutes with the patient, with over 50% of the time spent counseling or coordinating their care as described above.    Lima Griffiths PA-C

## 2019-11-30 PROBLEM — R29.810 FACIAL DROOP: Status: ACTIVE | Noted: 2019-01-01

## 2019-11-30 NOTE — DISCHARGE SUMMARY
Physician Attestation   I, Evangelista Cabrera, saw and evaluated this patient prior to discharge.  I discussed the patient with the resident/fellow and agree with plan of care as documented in the note.       I personally reviewed vital signs, medications, labs, and imaging.     I personally spent 25 minutes on discharge activities.     Evangelista Cabrera MD  Date of Service (when I saw the patient): 12/01/19    [facility] [place]    Neurology Stroke Discharge Summary    Date of Admission: 11/29/2019  Date of Discharge: 12/01/2019   Disposition: Discharged to home  Primary Care Physician: Marilu Michelle      Admission Diagnosis:   Right facial droop    Discharge Diagnosis:   Transient Ischemic Attack vs. Cancer Metastasis    Problem Leading to Hospitalization (from [facility]):   Ms. Marsh is a 49 year old female with hx stage IV breast cancer status post bilateral mastectomies with metastatic disease to bones, liver and lung, who presented as a stroke code overnight for new onset right facial droop and numbness beginning ~9 PM last night. Initial NIHSS of 2, with CTH/CTA that did not show any vascular abnormality but does show evidence of previously known osseous metastatic disease in the calvarium and cervical spine. She was admitted for further stroke work-up.     Please see H&P dated 11/29/2019 for further details about presentation.    Brief Hospital Course:   Patient presented with right facial droop, prompting admission to the stroke service and stroke work-up. She was found to have an initial NIHSS of 2, with no vascular abnormalities seen on CT/CTA and was not given TPA due to minor/isolated non-life threatening or disabling symptoms.     Work-up as stated below under Pertinent Investigations.    Etiology is thought to be due to cancer metastasis. During admission, she underwent MRI with and without contrast of brain to further evaluate etiology of right facial droop. MRI demonstrated  no evidence of stroke but multiple osseous lesions and small intraparenchymal brain metastases. TTE was obtained as well which demonstrated no abnormalities. Was placed on aspirin 81 mg for future stroke prevention given prothrombotic state with known malignancy, and was placed on atorvastatin due to elevated LDL.     Rehab evaluation: OT, PT and SLP.     Smoking Cessation: patient is not a smoker    BP Long-term Goal: 140/90 or less    Antithrombotic/Anticoagulant Agent: aspirin 81 mg    Statins: Started on atorvastatin due to LDL of 111.        Hgb A1C Goal: < 7.0 (her A1c was found to be normal at 5.7)    Complications: None.     Other problems addressed during this hospitalization:  - She denied any complaint of midline neck pain and although C6 osseous lesion looked larger on CTA head and neck, there was no evidence of significant enough vertebral collapse to warrant placing in permanent neck brace nor was there midline neck pain.   - Nutrition consultation was placed due to severe malnutrition. Recommended also discussing this with primary oncologist as outpatient.     PERTINENT INVESTIGATIONS    Labs  Lipid Panel:   Recent Labs   Lab Test 11/30/19  1058 11/03/17  1611 11/13/12  1613   CHOL 187 129 154   HDL 57 50 55   * 43 83   TRIG 93 178* 78   CHOLHDLRATIO  --   --  2.8     A1C:   Lab Results   Component Value Date    A1C 5.7 11/29/2019     INR:   Recent Labs   Lab 11/25/19  0935   INR 1.10      Coag Panel / Hypercoag Workup: Not indicated  Echo: normal  Imaging: see above  Endovascular procedure: None     Cardiac Monitoring: Patient had > 24 hrs of cardiac monitor while in hospital.    Findings: No atrial fibrillation was found.     Sleep Apnea Screen:   Questions/Answers      1. Prior to your stroke, have you been told that you snore? No.    2. Prior to your stroke, have you been told that you struggle to breath while you are sleeping? No.    3. Prior to your stroke, do you feel tired and sleepy  even after getting a normal night of sleep? No.    Sleep Apnea Screen Findings: Patient has 0-1 symptoms of sleep apnea.  Further sleep study is not recommended at this time.    PHQ-9 Depression Screen Score: 0    Education discussed with: patient on follow-up recommendations/plan and 911 call if warning signs of stroke.    During daily rounds, the plan of care was discussed and developed with patient.  Plan of care includes: daily aspirin as outpatient and follow up in clinic..    PHYSICAL EXAMINATION  Vital Signs:  B/P: 115/68, T: 98.9, P: 109, R: 18    General:  patient lying in bed without any acute distress    HEENT:  normocephalic/atraumatic  Cardio:  RRR  Pulmonary:  no respiratory distress  Abdomen:  non-distended  Extremities:  no edema  Skin:  intact , dry, poor skin turgor    Neurologic  Mental Status:  fully alert  Cranial Nerves:  visual fields intact, PERRL, EOMI with normal smooth pursuit, hearing not formally tested but intact to conversation, palate elevation symmetric and uvula midline, no dysarthria, shoulder shrug strong bilaterally, tongue protrusion midline, facial sensation diminished over right side. Facial droop present over right.  Motor:  no abnormal movements, able to move all limbs spontaneously, strength 5/5 throughout upper and lower extremities, no pronator drift, Low muscle mass  Reflexes:  2+ and symmetric throughout, toes down-going  Sensory:  light touch sensation intact and symmetric throughout upper and lower extremities, no extinction on double simultaneous stimulation   Coordination:  normal finger-to-nose and heel-to-shin bilaterally without dysmetria, rapid alternating movements symmetric  Station/Gait:  normal width, turn, arm swing    National Institutes of Health Stroke Scale (on day of discharge)  NIHSS Total Score: 2    Modified [place] Scale (on day of discharge): 1-No significant disability despite symptoms; able to carry out all usual duties and  activities    Medications    Current Discharge Medication List      CONTINUE these medications which have NOT CHANGED    Details   Digestive Enzymes (ENZYME DIGEST) CAPS Take 1 capsule by mouth daily      magic mouthwash (ENTER INGREDIENTS IN COMMENTS) suspension SWISH AND SPIT 5 TO 10 ML BY MOUTH 4 TO 6 TIMES DAILY AS NEEDED  Qty: 240 mL, Refills: 3    Associated Diagnoses: Mucositis      medical cannabis (Patient's own supply.  Not a prescription) (This is NOT a prescription, and does not certify that the patient has a qualifying medical condition for medical cannabis.  The purpose of this order is  to document that the patient reports taking medical cannabis.)      ondansetron (ZOFRAN) 8 MG tablet Take 1 tablet (8 mg) by mouth every 8 hours as needed for nausea  Qty: 90 tablet, Refills: 3    Associated Diagnoses: Metastatic breast cancer (H)      VITAMIN D, CHOLECALCIFEROL, PO Take 1,000 Units by mouth daily              Additional recommendations and follow up:    No discharge procedures on file.    Patient was seen and discussed with the Attending, Dr. Cabrera.    Mahogany Babin MD  Pager: [phone]

## 2019-11-30 NOTE — PLAN OF CARE
"  Problem: Adult Inpatient Plan of Care  Goal: Optimal Comfort and Wellbeing  11/30/2019 1217 by Irene Garcia RN  Outcome: No Change    Transferred to: MRI at 1430 and expected to transfer to Unit 6A afterward.  Belongings: Clothes, jewelry, cell phone/ with patient and pt's .  Bergman removed? No: N/A; voiding spontaneously and adequately  Central line removed? NA  Chart and medications sent with patient Yes  Family notified: Yes. With patient at bedside.    Report called to receiving Margaret HAYNES, on 6A. Complete MRI. Continue to monitor and assess. Update POC as needed. Possible discharge today.    BP (!) 125/96   Pulse 109   Temp 99  F (37.2  C) (Axillary)   Resp 20   Ht 1.626 m (5' 4.02\")   Wt 45.1 kg (99 lb 6.8 oz)   SpO2 97%   BMI 17.06 kg/m    Irene Garcia RN November 30, 2019 2:56 PM       "

## 2019-11-30 NOTE — H&P
[facility], [place]    Stroke Admission Note    Chief Complaint  Right facial droop and numbness    HPI  Shan Marsh is a 49 year old female with h/o stage IV breast cancer s/p b/l mastectomies with mets to bones, liver, and lung who presented as stroke code for new onset right facial droop and numbness.     TPA Treatment   Not given due to minor / isolated / quickly resolving stroke symptoms.    Endovascular Treatment  Not initiated due to absence of proximal vessel occlusion    Impression  1. Facial numbness and weakness    While initially onset was reported to be about 9 pm, it became apparent that she was unsure if these symptoms were present before, especially since her  didn't notice them when she asked. Additionally, NIH is 2, these symptoms are not life limiting, and the risk of bleeding may be higher if there is a small metastasis or meningeal involvement.    Her facial symptoms initially appear to involve the lower face but on further testing there is subtle forehead involvement. Stroke possible, and we will treat as such until ruling this out, but malignancy also must be considered. No metastases were seen on CT but a small metastasis could cause these subtle features. Additionally, she has calvarial lesions that may have eroded such that the inner plate is effaced. This raises concern for meningeal involvement of her cancer. Lower suspicion for seizure at this time.      Plan  Acute Ischemic Stroke (without tPA) Plan  - MRI wow contrast in AM  - Admit to Neurology  - Permissive HTN; labetalol PRN for SBP > 220  - Avoid hypotonic IV fluids  - Daily aspirin 81 mg for secondary stroke prevention  - Statin: pending LDL  - MRI Stroke Protocol  - Telemetry, EKG  - Bedside Glucose Monitoring  - A1c, Lipid Panel, Troponin x 3  - PT/OT/SLP  - PM&R  - Stroke Education  - Depression Screen  - Apnea Screen  - Euthermia, Euglycemia      #Stage 4 breast  cancer(ER/MO+,Her2-)  #C6 lytic lesion with possible pathologic fracture  First diagnosed in 2012 s/p bilateral mastectomy. Found to be metastatic 2017 after presenting with shoulder pain. Last seen in Clinic 11/20/2019. Previously on Doxil, which was held in October due to fatigue. Planned at 11/20 visit to restart genzar Has multiple lung metastases, with recent right thoracentesis. We touched base with oncology, will place consult in AM.   -Oncology consult in AM  -Soft collar      Prophylaxis            For VTE Prevention:  - enoxaparin    For Acid Suppression:  - GI prophylaxis is not indicated    Code Status  Full Code    During initial physical assessment, the plan of care was discussed and developed with patient and spouse.  Plan of care includes: Beginning stroke workup and obtaining MRI wow contrast to determine etiology of her symptoms..    Patient was admitted via [facility] ED ([place])    The patient will be admitted to the Neuro Critical Care/Stroke team..     The patient was discussed with Stroke Fellow, Dr. Gooden.  The Stroke Staff is Dr. Cabrera.    Ward Fitzgerald MD  Neurology Resident  Pager:  [phone]  ___________________________________________________    Nutrition:   Orders Placed This Encounter      NPO for Medical/Clinical Reasons Except for: No Exceptions    Past Medical History   Past Medical History:   Diagnosis Date     Breast cancer, left breast (H) 5/12    grade 1 infiltrating ductal cancer, ER/MO+, s/p bilateral mastectomy     Complication of anesthesia     bradycardia,b/p drop p childbirth     Dysplasia of cervix (uteri) 12/01    LUCAS II - III, s/p LEEP  @ [facility]     Incompetence of cervix 4/06    D&E at 19+4 weeks twin pregnancy     Shingles outbreak 5/12     Past Surgical History   Past Surgical History:   Procedure Laterality Date     D & C  9/2010    retained POC     ENDOBRONCHIAL ULTRASOUND FLEXIBLE N/A 5/12/2017    Procedure: ENDOBRONCHIAL ULTRASOUND FLEXIBLE;  Flexible Bronchoscopy,  Endobronchial Ultrasound, Transbronchial Biopsy ;  Surgeon: Bandar Meyer MD;  Location: [facility]     INSERT CHEST TUBE Left 10/11/2018    Procedure: INSERT CHEST TUBE;  Insert Chest Tube ;  Surgeon: Brock Chan MD;  Location: [facility]     INSERT CHEST TUBE N/A 11/12/2018    Procedure: Removal Of Pleurx Catheter ;  Surgeon: Bandar Meyer MD;  Location:  GI     IR THORACENTESIS  10/14/2019     IR THORACENTESIS  11/25/2019     LEEP TX, CERVICAL  12/01    LEEP or LUCAS II - III     MASTECTOMY, RECONSTRUCT BREAST, COMBINED  6/11/2012    Procedure: COMBINED MASTECTOMY, RECONSTRUCT BREAST;  Bilateral Mastectomy with Reconstruction, Left [place] Node Biopsy , placement of On-Q catheters X 2 bilateral breast.;  Surgeon: Misael Cheng MD;  Location: [facility]     RECONSTRUCT BREAST BILATERAL  3/25/2013    Procedure: RECONSTRUCT BREAST BILATERAL;  Revise bilateral breasts and reconstructions, with insertion of gel breast implants with fat grafting and fat after mastectomies.;  Surgeon: Yair Olvera MD;  Location: [facility] OR     RECONSTRUCT BREAST BILATERAL, IMPLANT PROSTHESIS BILATERAL, COMBINED  9/27/2012    Procedure: COMBINED RECONSTRUCT BREAST BILATERAL, IMPLANT PROSTHESIS BILATERAL;  Bilateral Second Stage Breast Reconstruction With Gel Implants, Fat        THORACENTESIS Left 5/7/2018    Procedure: THORACENTESIS;  Thoracentesis;  Surgeon: Venancio Hussein PA-C;  Location: [facility]     THORACENTESIS Left 7/5/2018    Procedure: THORACENTESIS;  Thoracentesis;  Surgeon: Lydia Murillo PA-C;  [facility]     THORACENTESIS Left 9/5/2018    Procedure: THORACENTESIS;  Left Thoracentesis;  Surgeon: López Guillen PA-C;  [facility]     THORACENTESIS Left 10/3/2018    Procedure: THORACENTESIS;  Thoracentesis, left;  Surgeon: Eros Chauhan PA-C;  [facility]     THORACENTESIS Right 10/9/2018    Procedure: THORACENTESIS;  Thoacentesis ;  Surgeon: Linh Hayes MD;  Location:  [facility]     THORACENTESIS Right 10/14/2019    Procedure: THORACENTESIS;  Surgeon: Lydia Brannon PA-C;  [facility]     THORACENTESIS Right 11/25/2019    Procedure: THORACENTESIS;  Surgeon: Eros Chauhan PA-C;  [facility]     Medications   Home Meds  Prior to Admission medications    Medication Sig Start Date End Date Taking? Authorizing Provider   Digestive Enzymes (ENZYME DIGEST) CAPS Take 1 capsule by mouth daily    Unknown, Entered By History   magic mouthwash (ENTER INGREDIENTS IN COMMENTS) suspension SWISH AND SPIT 5 TO 10 ML BY MOUTH 4 TO 6 TIMES DAILY AS NEEDED 9/10/19   Lima Griffiths PA-C   medical cannabis (Patient's own supply.  Not a prescription) (This is NOT a prescription, and does not certify that the patient has a qualifying medical condition for medical cannabis.  The purpose of this order is  to document that the patient reports taking medical cannabis.)    Unknown, Entered By History   ondansetron (ZOFRAN) 8 MG tablet Take 1 tablet (8 mg) by mouth every 8 hours as needed for nausea 7/15/19   Karena Cox PA   VITAMIN D, CHOLECALCIFEROL, PO Take 1,000 Units by mouth daily     Reported, Patient       Scheduled Meds    sodium chloride (PF)  90 mL Intravenous Once       Infusion Meds      PRN Meds      Allergies   Allergies   Allergen Reactions     Erythromycin Shortness Of Breath     Sulfa Drugs      rash     Family History   Family History   Problem Relation Age of Onset     Alcohol/Drug Father      Allergies Mother      Neurologic Disorder Mother         seizures, related to fever     Gastrointestinal Disease Mother         IBS     Blood Disease Mother         [place]     Cancer Maternal Grandfather         bladder     Circulatory Maternal Grandfather         blood clots     Alcohol/Drug Maternal Grandfather         alcohol dependency     Allergies Maternal Aunt      Allergies Other         maternal cousins     Arthritis Maternal Aunt      Prostate Cancer Maternal  Uncle      Thyroid Disease Maternal Aunt      Alcohol/Drug Maternal Uncle         alcohol and drug dependency     Cancer - colorectal Maternal Uncle      Gastrointestinal Disease Sister         IBS     Breast Cancer Other         self     Social History   Social History     Tobacco Use     Smoking status: Former Smoker     Last attempt to quit: 1997     Years since quittin.4     Smokeless tobacco: Never Used   Substance Use Topics     Alcohol use: No     Drug use: Yes     Comment: CBD/THC oil       Review of Systems   Review of systems as noted in HPI       PHYSICAL EXAMINATION  Temp:  [99.7  F (37.6  C)] 99.7  F (37.6  C)  Pulse:  [102] 102  Heart Rate:  [102-111] 102  Resp:  [26-37] 28  BP: (139-153)/() 139/94  SpO2:  [96 %-99 %] 96 %    General:  patient lying in bed without any acute distress    HEENT:  normocephalic/atraumatic  Cardio:  RRR  Pulmonary:  no respiratory distress  Abdomen:  non-distended  Extremities:  no edema  Skin:  intact     Neurologic  Mental Status:  alert, oriented x 3, follows commands, speech clear and fluent, naming and repetition normal  Cranial Nerves:  visual fields intact, PERRL, EOMI with normal smooth pursuit, hearing not formally tested but intact to conversation, palate elevation symmetric and uvula midline, no dysarthria, shoulder shrug strong bilaterally, tongue protrusion midline, facial sensation diminished over right side. Facial droop present over right.  Motor:  no abnormal movements, able to move all limbs spontaneously, strength 5/5 throughout upper and lower extremities, no pronator drift, Low muscle mass  Reflexes:  2+ and symmetric throughout, toes down-going  Sensory:  light touch sensation intact and symmetric throughout upper and lower extremities, no extinction on double simultaneous stimulation   Coordination:  normal finger-to-nose and heel-to-shin bilaterally without dysmetria, rapid alternating movements symmetric  Station/Gait:  normal width,  turn, arm swing    Dysphagia Screen  Dysarthria or facial droop present - Maintain NPO, consult SLP    Modified [place] Scale (pre-stroke):   0-No symptoms     Stroke Scales    NIHSS  Interval baseline (11/29/19 2311)   Interval Comments     1a. Level of Consciousness 0-->Alert, keenly responsive   1b. LOC Questions 0-->Answers both questions correctly   1c. LOC Commands 0-->Performs both tasks correctly   2.   Best Gaze 0-->Normal   3.   Visual 0-->No visual loss   4.   Facial Palsy 1-->Minor paralysis (flattened nasolabial fold, asymmetry on smiling)   5a. Motor Arm, Left 0-->No drift, limb holds 90 (or 45) degrees for full 10 secs   5b. Motor Arm, Right 0-->No drift, limb holds 90 (or 45) degrees for full 10 secs   6a. Motor Leg, Left 0-->No drift, leg holds 30 degree position for full 5 secs   6b. Motor Leg, right 0-->No drift, leg holds 30 degree position for full 5 secs   7.   Limb Ataxia 0-->Absent   8.   Sensory 1-->Mild-to-moderate sensory loss, patient feels pinprick is less sharp or is dull on the affected side, or there is a loss of superficial pain with pinprick, but patient is aware of being touched   9.   Best Language 0-->No aphasia, normal   10. Dysarthria 0-->Normal   11. Extinction and Inattention  0-->No abnormality   Total 2 (11/29/19 2311)       Imaging  I personally reviewed all imaging; relevant findings per the HPI.    Lab Results Data   CBC  Recent Labs   Lab 11/29/19  2322   WBC 6.9   RBC 4.08   HGB 11.3*   HCT 36.6        Basic Metabolic Panel   Recent Labs   Lab 11/29/19  2322      POTASSIUM 3.5   CHLORIDE 96   CO2 31   BUN 9   CR 0.58   *   KRYSTEN 9.5     Liver Panel  Recent Labs   Lab Test 11/20/19  0923 11/05/19  1109 10/07/19  0946   PROTTOTAL 8.6 9.0* 9.6*   ALBUMIN 3.1* 3.2* 3.3*   BILITOTAL 0.3 0.2 0.3   ALKPHOS 310* 212* 154*   AST 74* 84* 63*   ALT 27 35 25     INR  Recent Labs   Lab Test 11/25/19  0935 10/14/19  1100 01/28/19  0055   INR 1.10 1.00 1.05       Lipid Profile  Recent Labs   Lab Test 11/03/17  1611 11/13/12  1613   CHOL 129 154   HDL 50 55   LDL 43 83   TRIG 178* 78   CHOLHDLRATIO  --  2.8     A1CNo lab results found.  Troponin Leonardo results for input(s): TROPI in the last 168 hours.       Stroke Code / Stroke Consult Data Data    Stroke code activated         First stroke provider response         Last known normal         Time of discovery   (or onset of symptoms)         Head CT read by me         Was stroke code de-escalated?

## 2019-11-30 NOTE — ED TRIAGE NOTES
Hx of stage 4 Breast CA reports HA to the right of her head for a few days with elevated BP's for the past week or so usually runs at 70 systolic.  Reports tonight around 9 she noticed numbness and pain to the R jaw and side of her face and within the hour of that she then noticed R sided facial droop. Not on blood thinners but states she drank a tea which is a type of OTC thinner.  For EMS BP was 157/96 and then the last one being 136/60 with a BG of 119.  Pt denies having a HA at this time or being in pain anywhere else.

## 2019-11-30 NOTE — PLAN OF CARE
Discharge Planner SLP   Patient plan for discharge: none stated   Current status: SLP: Clinical swallow eval completed per MD orders. Pts oropharyngeal swallow is WFL. Pt with mild R sided facial weakness. Pt reports overall poor appetite PTA and hx of GERD. Pt tolerated thin liquids via spoon and cup, pureed textures, and regular solid textures with no overt s/sx of aspiration. Recommend regular textures and thin liquids. Pt should be fully upright for all PO, take small sips/bites, slow pacing, and follow reflux precautions. Caregivers to encourage PO intake as able. Pt verbalized understanding of recommendations and rationale; no further ST needs indicated.   Barriers to return to prior living situation: none per ST  Recommendations for discharge: defer to PT/OT  Rationale for recommendations: suspect pt is at baseline oropharyngeal swallow function. Pt was able to complete trained facial exercises independently. Will complete orders.        Entered by: Pat Love 11/30/2019 9:13 AM

## 2019-11-30 NOTE — PLAN OF CARE
Discharge Planner OT   Patient plan for discharge: Home  Current status: Pt independent with bed mobility, basic ADLs, and functional mobility >250 ft without assistive device. VSS throughout activity on room air, pt presenting with R facial weakness causing increased dryness/blurriness in R eye, educated on compensatory strategies, pt demonstrated independence with all education, no further OT needs.  Barriers to return to prior living situation: medical status  Recommendations for discharge: Home  Rationale for recommendations: Pt demonstrates safety and independence with all basic ADLs and mobility without need for assistive device. Demonstrates no further needs for OT services at this time as all goals are met.       Entered by: Nereyda Garrett 11/30/2019 12:59 PM     Occupational Therapy Discharge Summary    Reason for therapy discharge:    All goals and outcomes met, no further needs identified.    Progress towards therapy goal(s). See goals on Care Plan in [facility] electronic health record for goal details.  Goals met    Therapy recommendation(s):    No further therapy is recommended.

## 2019-11-30 NOTE — PROGRESS NOTES
CLINICAL NUTRITION SERVICES - ASSESSMENT NOTE     Nutrition Prescription    RECOMMENDATIONS FOR MDs/PROVIDERS TO ORDER:  Consider appetite stimulant such as marinol or megace.     Malnutrition Status:    Severe malnutrition in the context of chronic illness     Recommendations already ordered by Registered Dietitian (RD):  Boost Plus at 2:00   Boost Breeze BID (10:00 and HS)  Beneprotein/Benecalorie, 1 packet with meals   Changed diet to high calorie, high protein  Ordered calorie count     Future/Additional Recommendations:  Pending calorie count, if patient is not able to consume 60% of estimated energy needs (945 kcal and 35 g protein) and pending goal of care- recommend starting enteral nutrition.   Nutren 1.5 @ 45 mL/hr to provide 1620 kcals (36 kcal/kg/day), 73 g PRO (1.6 g/kg/day), 821 mL H2O, 190 g CHO and no Fiber daily.  --Start at 15 mL/hr and advance by 15 mL q 6 hours to goal      REASON FOR ASSESSMENT  Shan Marsh is a/an 49 year old female assessed by the dietitian for Provider Order - severely malnourished     Clinical History   h/o stage IV breast cancer s/p b/l mastectomies with mets to bones, liver, and lung who presented as stroke code for new onset right facial droop and numbness.    NUTRITION HISTORY  Patient and  in room, reported she has seen dietitians previously, last telephone encounter 2/2019 and last inpatient visit 10/2018.     She is struggling with nausea if she has an empty stomach or if she eats too much. She is trying to consume small frequent meals and high calorie foods as well as protein shakes  (but she does not like protein shakes very much). She will have breakfast of cereal and protein shake, snack of cheese/crackers/nuts, lunch- chowmein, rice, veggies, snack ice cream. On days she eats well, usually cannot have full dinner but will try to consume muffin or cereal bar. Denied chewing/swallowing concerns. No constipation or diarrhea.     CURRENT NUTRITION  "ORDERS  Diet: Regular  Intake/Tolerance: ~50-75% breakfast     LABS  Electrolytes (WNL)    MEDICATIONS  Enzyme digest Caps   Senokot   Cholecalciferol   Zofran PRN     ANTHROPOMETRICS  Height: 162.6 cm (5' 4.016\")  Most Recent Weight: 45.1 kg (99 lb 6.8 oz)    IBW: 54.5 kg  BMI: Underweight BMI <18.5  Weight History:   Wt Readings from Last 15 Encounters:   11/30/19 45.1 kg (99 lb 6.8 oz)   11/20/19 45 kg (99 lb 1.6 oz)   11/05/19 45.3 kg (99 lb 14.4 oz)   10/07/19 47 kg (103 lb 11.2 oz)   09/18/19 45.7 kg (100 lb 11.2 oz)   09/10/19 47.3 kg (104 lb 4.8 oz)   08/13/19 48.6 kg (107 lb 3.2 oz)   08/12/19 47.2 kg (104 lb)   07/15/19 48.2 kg (106 lb 3.2 oz)   06/19/19 47.6 kg (105 lb)   06/13/19 48.9 kg (107 lb 12.8 oz)   05/22/19 48.5 kg (107 lb)   04/22/19 47.9 kg (105 lb 9.6 oz)   04/15/19 48.8 kg (107 lb 9.6 oz)   04/08/19 48.7 kg (107 lb 6.4 oz)     Dosing Weight: 45 kg    ASSESSED NUTRITION NEEDS  Estimated Energy Needs: 2384-3376 kcals/day (35 - 40 kcals/kg)  Justification: Repletion  Estimated Protein Needs: 59-68 grams protein/day (1.3 - 1.5 grams of pro/kg)  Justification: Repletion  Estimated Fluid Needs: 2082-3282 mL/day (30 - 35 mL/kg)   Justification: Maintenance    PHYSICAL FINDINGS  No abnormal nutrition-related physical findings observed.     MALNUTRITION  % Intake: < 75% for >/= 1 month (non-severe)  % Weight Loss: Up to 5% in 1 month (non-severe)  Subcutaneous Fat Loss: Facial region:  Severe, upper arm: mild, no loss at thoracic area   Muscle Loss: Temporal, Facial & jaw region: severe  Scapular bone: moderate Thoracic region (clavicle, acromium bone, deltoid, trapezius, pectoral): moderate-severe, Upper arm (bicep, tricep):  moderate, Lower arm  (forearm): moderate, Dorsal hand:  severe and Posterior calf:  moderate  Fluid Accumulation/Edema: none noted  Malnutrition Diagnosis: Severe malnutrition in the context of chronic illness     NUTRITION DIAGNOSIS  Increased nutrient needs  (energy/protein) " related to increased metabolic demand as evidenced by weight loss October-November, physical muscle/fat depletion     INTERVENTIONS  Implementation  Nutrition Education: RD role in care   Collaboration with other providers  Medical food supplement therapy  Modify composition of meals/snacks     Goals  Total avg nutritional intake to meet a minimum of 35 kcal/kg and 1.3 g PRO/kg daily (per dosing wt 45 kg).     Monitoring/Evaluation  Progress toward goals will be monitored and evaluated per protocol.    Ivelisse Clifford RD, LD  Weekend Pager: [phone]

## 2019-11-30 NOTE — PROGRESS NOTES
11/30/19 0859   General Information   Onset Date 11/29/19   Start of Care Date 11/30/19   Referring Physician Ward Fitzgerald MD   Patient Profile Review/OT: Additional Occupational Profile Info See Profile for full history and prior level of function   Patient/Family Goals Statement Pt did not state   Swallowing Evaluation Bedside swallow evaluation   Behaviorial Observations WNL (within normal limits)   Mode of current nutrition NPO   Respiratory Status Room air   Comments Shan Marsh is a 49 year old female with h/o stage IV breast cancer s/p b/l mastectomies with mets to bones, liver, and lung who presented as stroke code for new onset right facial droop and numbness. Pt denies any trouble swallowing PTA. Pt does report hx of GERD and poor PO intake due to reduced appetite. Clinical swallow eval completed per MD orders to further assess oropharyngeal swallow function.    Clinical Swallow Evaluation   Oral Musculature other (see comments)  (mild R sided facial droop)   Structural Abnormalities none present   Dentition present and adequate   Mucosal Quality adequate   Mandibular Strength and Mobility intact   Oral Labial Strength and Mobility impaired retraction;impaired pursing   Lingual Strength and Mobility impaired left lateral movement   Velar Elevation intact   Buccal Strength and Mobility impaired   Laryngeal Function Cough;Throat clear;Swallow;Voicing initiated   Oral Musculature Comments R sided facial droop   Additional Documentation Yes   Clinical Swallow Eval: Thin Liquid Texture Trial   Mode of Presentation, Thin Liquids cup;spoon;self-fed;fed by clinician   Volume of Liquid or Food Presented 4 oz   Oral Phase of Swallow WFL   Pharyngeal Phase of Swallow intact   Diagnostic Statement Pt tolerated thin liquids via spoon and cup with no overt s/sx of aspiration    Clinical Swallow Eval: Puree Solid Texture Trial   Mode of Presentation, Puree spoon;self-fed   Volume of Puree  Presented 3 tbsp   Oral Phase, Puree WFL   Pharyngeal Phase, Puree intact   Diagnostic Statement Pt tolerated pureed textures via spoon with no overt s/sx of aspiration    Clinical Swallow Eval: Solid Food Texture Trial   Mode of Presentation, Solid self-fed   Volume of Solid Food Presented 3 tbsp   Oral Phase, Solid WFL   Pharyngeal Phase, Solid intact   Diagnostic Statement No overt s/sx of aspiration   VFSS Evaluation   VFSS Additional Documentation No   FEES Evaluation   Additional Documentation No   Swallow Compensations   Swallow Compensations Alternate viscosity of consistencies;Pacing;Reduce amounts;Multiple swallow   Results No difficulties noted   Esophageal Phase of Swallow   Patient reports or presents with symptoms of esophageal dysphagia Yes   Esophageal comments Pt reports hx of GERD; educated pt on reflux precautions   General Therapy Interventions   Planned Therapy Interventions Dysphagia Treatment   Dysphagia treatment Compensatory strategies for swallowing;Instruction of safe swallow strategies   Swallow Eval: Clinical Impressions   Skilled Criteria for Therapy Intervention Current level of function same as previous level of function   Functional Assessment Scale (FAS) 6   Treatment Diagnosis Oropharyngeal swallow WFL   Diet texture recommendations Regular diet;Thin liquids   Recommended Feeding/Eating Techniques alternate between small bites and sips of food/liquid;hard swallow w/ each bite or sip;check mouth frequently for oral residue/pocketing;maintain upright posture during/after eating for 30 mins;small sips/bites   Demonstrates Need for Referral to Another Service physical therapy;occupational therapy;dietitian;respiratory therapy   Therapy Frequency   (x1 tx following eval)   Anticipated Discharge Disposition   (defer to PT/OT)   Risks and Benefits of Treatment have been explained. Yes   Patient, family and/or staff in agreement with Plan of Care Yes   Clinical Impression Comments SLP:  Clinical swallow eval completed per MD orders. Pts oropharyngeal swallow is WFL. Pt with mild R sided facial weakness. Pt reports overall poor appetite PTA and hx of GERD. Pt tolerated thin liquids via spoon and cup, pureed textures, and regular solid textures with no overt s/sx of aspiration. Recommend regular textures and thin liquids. Pt should be fully upright for all PO, take small sips/bites, slow pacing, and follow reflux precautions. Caregivers to encourage PO intake as able. Pt verbalized understanding of recommendations and rationale; no further ST needs indicated.    Total Evaluation Time   Total Evaluation Time (Minutes) 12

## 2019-11-30 NOTE — PROGRESS NOTES
Stroke Code Nurse-Responder Note    Arrival Time to Stroke Code: 2314    Stroke Code Team interventions: de-escalate at 2349, no further intervention      ED/Bedside Nurse providing handoff: SHANIA Holland RN    Time left for CT: 2317     Time arrived to next location (ED/Unit/IR): back ro ED 2328    ED/Bedside Nurse given handoff (name/time): Handoff to SHANIA Holland RN at 2330    Vesta Maloney, RN

## 2019-11-30 NOTE — PROGRESS NOTES
11/30/19 1227   Quick Adds   Type of Visit Initial Occupational Therapy Evaluation   Living Environment   Lives With spouse;child(chantal), dependent   Living Arrangements house   Home Accessibility stairs within home   Number of Stairs, Within Home, Primary 10   Stair Railings, Within Home, Primary railings safe and in good condition   Transportation Anticipated car, drives self;family or friend will provide   Living Environment Comment Pt lives with  and two children ages 9-12. Pt reports she is retired due to medical issues and  works full-time. Pt reports independent with all ADLs/IADLs at baseline, no AD for mobility.   Self-Care   Usual Activity Tolerance moderate   Current Activity Tolerance moderate   Regular Exercise No   Equipment Currently Used at Home none   Functional Level   Ambulation 0-->independent   Transferring 0-->independent   Toileting 0-->independent   Bathing 0-->independent   Dressing 0-->independent   Eating 0-->independent   Communication 0-->understands/communicates without difficulty   Swallowing 0-->swallows foods/liquids without difficulty   Cognition 0 - no cognition issues reported   Fall history within last six months no   Which of the above functional risks had a recent onset or change? none   General Information   Onset of Illness/Injury or Date of Surgery - Date 11/29/19   Referring Physician Ward Fitzgerald MD   Patient/Family Goals Statement To return home   Additional Occupational Profile Info/Pertinent History of Current Problem hSan Marsh is a 49 year old female with h/o stage IV breast cancer s/p b/l mastectomies with mets to bones, liver, and lung who presented as stroke code for new onset right facial droop and numbness.    Precautions/Limitations no known precautions/limitations   Weight-Bearing Status - LUE weight-bearing as tolerated   Weight-Bearing Status - RUE weight-bearing as tolerated   Weight-Bearing Status - LLE full  weight-bearing   Weight-Bearing Status - RLE full weight-bearing   General Observations Pt supine upon arrival with family present, agreeable to OT.   General Info Comments Up ad candy. Per MD and team, pt with no spinal precautions, soft collar as needed. No heavy activity cauing neck pain.   Cognitive Status Examination   Orientation orientation to person, place and time   Level of Consciousness alert   Follows Commands (Cognition) WNL   Memory intact   Attention No deficits were identified   Organization/Problem Solving No deficits were identified   Executive Function No deficits were identified   Visual Perception   Visual Perception Comments Pt presenting with R sided facial paralysis impacting oculomotor muscles and ability to blink. Pt reporting some increased blurriness due to increased tearing up.   Sensory Examination   Sensory Quick Adds No deficits were identified   Pain Assessment   Patient Currently in Pain No   Integumentary/Edema   Integumentary/Edema no deficits were identifed   Posture   Posture not impaired   Range of Motion (ROM)   ROM Quick Adds No deficits were identified   Strength   Manual Muscle Testing Quick Adds No deficits were identified   Hand Strength   Hand Strength Comments WNL   Muscle Tone Assessment   Muscle Tone Quick Adds No deficits were identified   Coordination   Upper Extremity Coordination No deficits were identified   Mobility   Bed Mobility Comments Independent   Transfer Skill: Sit to Stand   Level of [place]: Sit/Stand independent   Balance   Balance Quick Add No deficits identified   Upper Body Dressing   Level of [place]: Dress Upper Body independent   Lower Body Dressing   Level of [place]: Dress Lower Body independent   Instrumental Activities of Daily Living (IADL)   Previous Responsibilities meal prep;housekeeping;laundry;shopping;medication management;finances;driving;   Activities of Daily Living Analysis   Impairments Contributing to  "Impaired Activities of Daily Living   (visual changes, decreased functional endurance)   General Therapy Interventions   Planned Therapy Interventions ADL retraining;IADL retraining;risk factor education;progressive activity/exercise;home program guidelines;visual perception   Clinical Impression   Criteria for Skilled Therapeutic Interventions Met   (Evaluation and 1 Treat)   OT Diagnosis visual deficits and decreased functional endurance impacting IADLs   Influenced by the following impairments visual changes, decreased functional endurance   Assessment of Occupational Performance 1-3 Performance Deficits   Identified Performance Deficits driving   Clinical Decision Making (Complexity) Low complexity   Therapy Frequency Other (see comments)  (Eval and 1 treat only)   Predicted Duration of Therapy Intervention (days/wks) Eval and 1 treat   Anticipated Equipment Needs at Discharge   (None)   Anticipated Discharge Disposition Home   Risks and Benefits of Treatment have been explained. Yes   Patient, Family & other staff in agreement with plan of care Yes   [facility] TM \"6 Clicks\"   2016, Trustees of [facility], under license to [employer].  All rights reserved.   6 Clicks Short Forms Daily Activity Inpatient Short Form   [facility]  \"6 Clicks\" Daily Activity Inpatient Short Form   1. Putting on and taking off regular lower body clothing? 4 - None   2. Bathing (including washing, rinsing, drying)? 4 - None   3. Toileting, which includes using toilet, bedpan or urinal? 4 - None   4. Putting on and taking off regular upper body clothing? 4 - None   5. Taking care of personal grooming such as brushing teeth? 4 - None   6. Eating meals? 4 - None   Daily Activity Raw Score (Score out of 24.Lower scores equate to lower levels of function) 24   Total Evaluation Time   Total Evaluation Time (Minutes) 5     "

## 2019-11-30 NOTE — PHARMACY-CONSULT NOTE
Pharmacy Stroke Code Response  Pharmacist responded as part of the Stroke Code Team activation to patient care area ED 25.  The Stroke Team determined that the patient was not a candidate for IV alteplase therapy and the pharmacy team was dismissed at 7113.

## 2019-11-30 NOTE — ED PROVIDER NOTES
[place] EMERGENCY DEPARTMENT ([facility])  11/29/19     History was provided by the patient, EMS and medical records.      Shan Marsh is a 49 year old female with a history of stage IV breast cancer s/p bilateral mastectomies (2012) with metastases in her bones, lungs, liver and left adrenal/retroperitoneal currently on Doxil who presents today for evaluation of numbness and facial pain. The patient complains of a headache over the past several days. The patient notes her systolic blood pressure has been decreased over this past week. Around 9 PM this evening the patient began to experience numbness and pain on the right side of her face and jaw. Shortly after she noticed right sided facial droop. The patient doesn't take anticoagulants but notes she drank a tea which is a natural blood thinner.     Past Medical History:   Diagnosis Date     Breast cancer, left breast (H) 5/12    grade 1 infiltrating ductal cancer, ER/MN+, s/p bilateral mastectomy     Complication of anesthesia     bradycardia,b/p drop p childbirth     Dysplasia of cervix (uteri) 12/01    LUCAS II - III, s/p LEEP  @ [facility]     Incompetence of cervix 4/06    D&E at 19+4 weeks twin pregnancy     Shingles outbreak 5/12       Past Surgical History:   Procedure Laterality Date     D & C  9/2010    retained POC     ENDOBRONCHIAL ULTRASOUND FLEXIBLE N/A 5/12/2017    Procedure: ENDOBRONCHIAL ULTRASOUND FLEXIBLE;  Flexible Bronchoscopy, Endobronchial Ultrasound, Transbronchial Biopsy ;  Surgeon: Bandar Meyer MD;  Location: [facility]     INSERT CHEST TUBE Left 10/11/2018    Procedure: INSERT CHEST TUBE;  Insert Chest Tube ;  Surgeon: Brock Chan MD;  Location: [facility]     INSERT CHEST TUBE N/A 11/12/2018    Procedure: Removal Of Pleurx Catheter ;  Surgeon: Bandar Meyer MD;  Location: [facility]     IR THORACENTESIS  10/14/2019     IR THORACENTESIS  11/25/2019     LEEP TX, CERVICAL  12/01    LEEP or LUCAS II - III      MASTECTOMY, RECONSTRUCT BREAST, COMBINED  6/11/2012    Procedure: COMBINED MASTECTOMY, RECONSTRUCT BREAST;  Bilateral Mastectomy with Reconstruction, Left Edison Node Biopsy , placement of On-Q catheters X 2 bilateral breast.;  Surgeon: Misael Cheng MD;  Location: [facility]     RECONSTRUCT BREAST BILATERAL  3/25/2013    Procedure: RECONSTRUCT BREAST BILATERAL;  Revise bilateral breasts and reconstructions, with insertion of gel breast implants with fat grafting and fat after mastectomies.;  Surgeon: Yair Olvera MD;  Location:  OR     RECONSTRUCT BREAST BILATERAL, IMPLANT PROSTHESIS BILATERAL, COMBINED  9/27/2012    Procedure: COMBINED RECONSTRUCT BREAST BILATERAL, IMPLANT PROSTHESIS BILATERAL;  Bilateral Second Stage Breast Reconstruction With Gel Implants, Fat        THORACENTESIS Left 5/7/2018    Procedure: THORACENTESIS;  Thoracentesis;  Surgeon: Venancio Hussein PA-C;  [facility]     THORACENTESIS Left 7/5/2018    Procedure: THORACENTESIS;  Thoracentesis;  Surgeon: Lydia Murillo PA-C;  [facility]     THORACENTESIS Left 9/5/2018    Procedure: THORACENTESIS;  Left Thoracentesis;  Surgeon: López Guillen PA-C;  [facility]     THORACENTESIS Left 10/3/2018    Procedure: THORACENTESIS;  Thoracentesis, left;  Surgeon: Eros Chauhan PA-C;  [facility]     THORACENTESIS Right 10/9/2018    Procedure: THORACENTESIS;  Thoacentesis ;  Surgeon: Linh Hayes MD;  Location:  GI     THORACENTESIS Right 10/14/2019    Procedure: THORACENTESIS;  Surgeon: Lydia Brannon PA-C;  Location:  OR     THORACENTESIS Right 11/25/2019    Procedure: THORACENTESIS;  Surgeon: Eros Chauhan PA-C;  [facility]       Family History   Problem Relation Age of Onset     Alcohol/Drug Father      Allergies Mother      Neurologic Disorder Mother         seizures, related to fever     Gastrointestinal Disease Mother         IBS     Blood Disease Mother         [place]      Cancer Maternal Grandfather         bladder     Circulatory Maternal Grandfather         blood clots     Alcohol/Drug Maternal Grandfather         alcohol dependency     Allergies Maternal Aunt      Allergies Other         maternal cousins     Arthritis Maternal Aunt      Prostate Cancer Maternal Uncle      Thyroid Disease Maternal Aunt      Alcohol/Drug Maternal Uncle         alcohol and drug dependency     Cancer - colorectal Maternal Uncle      Gastrointestinal Disease Sister         IBS     Breast Cancer Other         self       Social History     Tobacco Use     Smoking status: Former Smoker     Last attempt to quit: 1997     Years since quittin.4     Smokeless tobacco: Never Used   Substance Use Topics     Alcohol use: No        Allergies   Allergen Reactions     Erythromycin Shortness Of Breath     Sulfa Drugs      rash     Current Facility-Administered Medications   Medication     sodium chloride (PF) 0.9% PF flush 90 mL     Current Outpatient Medications   Medication     Digestive Enzymes (ENZYME DIGEST) CAPS     magic mouthwash (ENTER INGREDIENTS IN COMMENTS) suspension     medical cannabis (Patient's own supply.  Not a prescription)     ondansetron (ZOFRAN) 8 MG tablet     VITAMIN D, CHOLECALCIFEROL, PO     Facility-Administered Medications Ordered in Other Encounters   Medication     sodium chloride (PF) 0.9% PF flush 10 mL     sodium chloride (PF) 0.9% PF flush 10 mL     sodium chloride (PF) 0.9% PF flush 10 mL     sodium chloride (PF) 0.9% PF flush 10 mL     I have reviewed the Medications, Allergies, Past Medical and Surgical History, and Social History in the Epic system.    Review of Systems   Neurological: Positive for facial asymmetry, numbness and headaches.       Physical Exam   BP: (!) 142/103  Pulse: 102  Heart Rate: 102  Temp: 99.7  F (37.6  C)  Resp: (!) 37  Weight: 45.1 kg (99 lb 8 oz)  SpO2: 99 %      Physical Exam  Constitutional:       General: She is not in acute distress.      Appearance: She is well-developed. She is cachectic. She is not diaphoretic.   HENT:      Head: Normocephalic and atraumatic.      Mouth/Throat:      Mouth: Mucous membranes are moist.      Pharynx: No oropharyngeal exudate.   Eyes:      General: No scleral icterus.     Extraocular Movements: Extraocular movements intact.      Pupils: Pupils are equal, round, and reactive to light.   Neck:      Musculoskeletal: Normal range of motion and neck supple.   Cardiovascular:      Pulses: Normal pulses.      Heart sounds: Normal heart sounds.   Pulmonary:      Effort: No respiratory distress.      Breath sounds: Normal breath sounds.   Abdominal:      Palpations: Abdomen is soft.      Tenderness: There is no abdominal tenderness.   Musculoskeletal:         General: No tenderness.   Skin:     General: Skin is warm and dry.      Coloration: Skin is not pale.      Findings: No erythema or rash.   Neurological:      Mental Status: She is alert and oriented to person, place, and time.      Comments: Right sided facial weakness and droop         ED Course   11:09 PM  The patient was seen and examined by Dr. Gavino Mcclure in Room ED 25.        Procedures             EKG Interpretation:      Interpreted by Antoni Mcclure MD  Time reviewed: 3856  Symptoms at time of EKG: right sided facial weakness   Rhythm: normal sinus   Rate: normal  Axis: normal  Ectopy: none  Conduction: normal  ST Segments/ T Waves: No ST-T wave changes  Q Waves: none  Comparison to prior: Unchanged    Clinical Impression: normal EKG          Critical Care time:  none     The patient has stroke symptoms:           ED Stroke specific documentation           NIHSS PDF          Protocol PDF     Patient last known well time: 2100  ED Provider first to bedside at: 1106  CT Results received at:     tPA:   Not given due to brain mets.    If treating with tPA: Ensure SBP<185 and DBP<105 prior to treatment with IV tPA.  Administering IV tPA after treatment with  IV labetalol, hydralazine, or nicardipine is reasonable once BP control is established.    Endovascular Retrieval:  Not initiated due to absence of proximal vessel occlusion    National Institutes of Health Stroke Scale (Baseline)  Time Performed: 1106      Score    Level of consciousness: (0)   Alert, keenly responsive    LOC questions: (0)   Answers both questions correctly    LOC commands: (0)   Performs both tasks correctly    Best gaze: (0)   Normal    Visual: (0)   No visual loss    Facial palsy: (2)   Partial paralysis (total/near total of lower face)    Motor arm (left): (0)   No drift    Motor arm (right): (0)   No drift    Motor leg (left): (0)   No drift    Motor leg (right): (0)   No drift    Limb ataxia: (0)   Absent    Sensory: (0)   Normal- no sensory loss    Best language: (0)   Normal- no aphasia    Dysarthria: (0)   Normal    Extinction and inattention: (0)   No abnormality        Total Score:  2        Stroke Mimics were considered (including migraine headache, seizure disorder, hypoglycemia (or hyperglycemia), head or spinal trauma, CNS infection, Toxin ingestion and shock state (e.g. sepsis) .        Results for orders placed or performed during the hospital encounter of 11/29/19   CBC with platelets differential     Status: Abnormal   Result Value Ref Range    WBC 6.9 4.0 - 11.0 10e9/L    RBC Count 4.08 3.8 - 5.2 10e12/L    Hemoglobin 11.3 (L) 11.7 - 15.7 g/dL    Hematocrit 36.6 35.0 - 47.0 %    MCV 90 78 - 100 fl    MCH 27.7 26.5 - 33.0 pg    MCHC 30.9 (L) 31.5 - 36.5 g/dL    RDW 15.9 (H) 10.0 - 15.0 %    Platelet Count 363 150 - 450 10e9/L    Diff Method Automated Method     % Neutrophils 75.6 %    % Lymphocytes 15.8 %    % Monocytes 7.1 %    % Eosinophils 0.4 %    % Basophils 0.4 %    % Immature Granulocytes 0.7 %    Nucleated RBCs 0 0 /100    Absolute Neutrophil 5.2 1.6 - 8.3 10e9/L    Absolute Lymphocytes 1.1 0.8 - 5.3 10e9/L    Absolute Monocytes 0.5 0.0 - 1.3 10e9/L    Absolute  Eosinophils 0.0 0.0 - 0.7 10e9/L    Absolute Basophils 0.0 0.0 - 0.2 10e9/L    Abs Immature Granulocytes 0.1 0 - 0.4 10e9/L    Absolute Nucleated RBC 0.0    Basic metabolic panel     Status: Abnormal   Result Value Ref Range    Sodium 134 133 - 144 mmol/L    Potassium 3.5 3.4 - 5.3 mmol/L    Chloride 96 94 - 109 mmol/L    Carbon Dioxide 31 20 - 32 mmol/L    Anion Gap 8 3 - 14 mmol/L    Glucose 142 (H) 70 - 99 mg/dL    Urea Nitrogen 9 7 - 30 mg/dL    Creatinine 0.58 0.52 - 1.04 mg/dL    GFR Estimate >90 >60 mL/min/[1.73_m2]    GFR Estimate If Black >90 >60 mL/min/[1.73_m2]    Calcium 9.5 8.5 - 10.1 mg/dL   Partial thromboplastin time     Status: None   Result Value Ref Range    PTT 30 22 - 37 sec   Creatinine POCT     Status: Abnormal   Result Value Ref Range    Creatinine 0.5 (L) 0.52 - 1.04 mg/dL    GFR Estimate >90 >60 mL/min/[1.73_m2]    GFR Estimate If Black >90 >60 mL/min/[1.73_m2]   Glucose by meter     Status: Abnormal   Result Value Ref Range    Glucose 131 (H) 70 - 99 mg/dL   EKG 12-lead, tracing only     Status: None (Preliminary result)   Result Value Ref Range    Interpretation ECG Click View Image link to view waveform and result    ISTAT INR POCT     Status: Abnormal   Result Value Ref Range    ISTAT INR 1.2 (H) 0.86 - 1.14   Troponin POCT     Status: None   Result Value Ref Range    Troponin I 0.00 0.00 - 0.08 ug/L     CTA Head Neck with Contrast   Preliminary Result   Impression:     1. Head CTA demonstrates no aneurysm or stenosis of the major   intracranial arteries.    2. Neck CTA demonstrates no stenosis of the major cervical arteries.   3. Again seen are innumerable sclerotic and lytic osseous metastases,   with enlarging lytic lesion within the C6 vertebral body. There is   associated pathologic fracture versus impending pathologic fracture   within the lateral C6 vertebral body. The transverse foramen and   vertebral artery at this level are patent.   4. Redemonstration of multiple pulmonary  metastases, unchanged since   10/3/2019.      Findings at the level of C6 were discussed with Dr. Mcclure via   telephone at 12:25 am.          CT Head w/o Contrast   Preliminary Result   Impression: No acute intracranial pathology.            Labs Ordered and Resulted from Time of ED Arrival Up to the Time of Departure from the ED   CBC WITH PLATELETS DIFFERENTIAL - Abnormal; Notable for the following components:       Result Value    Hemoglobin 11.3 (*)     MCHC 30.9 (*)     RDW 15.9 (*)     All other components within normal limits   BASIC METABOLIC PANEL - Abnormal; Notable for the following components:    Glucose 142 (*)     All other components within normal limits   CREATININE POCT - Abnormal; Notable for the following components:    Creatinine 0.5 (*)     All other components within normal limits   GLUCOSE BY METER - Abnormal; Notable for the following components:    Glucose 131 (*)     All other components within normal limits   ISTAT INR POCT - Abnormal; Notable for the following components:    ISTAT INR 1.2 (*)     All other components within normal limits   GLUCOSE MONITOR NURSING POCT   PARTIAL THROMBOPLASTIN TIME   ISTAT INR NURSING POCT   ISTAT TROPONIN NURSING POCT   VITAL SIGNS AND NEURO CHECKS   ACTIVITY   PULSE OXIMETRY NURSING   ASSESSMENT   NOTIFY   TROPONIN POCT     I have reviewed the patient's prior chart including recent labs, ER visits, and available inpatient discharge summaries if available.           Assessments & Plan (with Medical Decision Making)   This is a 49-year-old female patient with a history of metastatic breast cancer to the brain who is presenting today with increasing right sided facial weakness.  The symptoms started around 9 where she started developing some numbness and discomfort in the right side of her face.  She started developing some facial droop and then presented to the emergency room.  We are notified of her impending arrival so we contacted the stroke resident  "who was at the bedside at the time of the patient's arrival.  A stroke assessment was performed and the patient was sent over to CT emergently.  There is determined that she is not qualified for TPA due to her brain mets.  Plan at this time is to evaluate her for further symptoms and admit to the stroke service for continued care management.  The patient remains hemodynamically stable.  She still has right-sided facial droop that has not improved since she is been here.  She has no other focal deficits at this time.    I have reviewed the nursing notes.    I have reviewed the findings, diagnosis, plan and need for follow up with the patient.    New Prescriptions    No medications on file       Final diagnoses:   Stroke-like symptom     I, Robbie Bartholomew, am serving as a trained medical scribe to document services personally performed by Antoni Mcclure MD, based on the provider's statements to me.      I, Antoni Mcclure MD, was physically present and have reviewed and verified the accuracy of this note documented by Robbie Bartholomew.     \"This dictation was performed with the assistance of voice recognition software and may contain inadvertant transcription  errors,  omissions and/or  inadvertent word substitution.\" --Antoni Mcclure MD     11/29/2019   [facility], [place], EMERGENCY DEPARTMENT     Antoni Mcclure MD  11/30/19 0331    "

## 2019-11-30 NOTE — PLAN OF CARE
END OF SHIFT NOTE TEMPLATE:   Major Shift Events: Arrival to floor, has r facial droop, baseline right pupil larger than left.  Plan: Neuro/Vitals q2hrs, cervical collar, MRI today, transfer out of ICU if bed available.   For vital signs and complete assessments, please see documentation flowsheets.

## 2019-11-30 NOTE — PROGRESS NOTES
ADMISSION NOTE   Admitted/transferred from: ED  Reason for admission/transfer: Monitor stroke-like symptoms   2 RN skin assessment: completed by Candi Padilla and Silverio Fernandes    Result of skin assessment and interventions/actions: Pale, PIV x1 in LAC, left facial droop,  R pupil 4 mm, L pupil 3 mm, both equal, reactive, and round  Height, weight, drug calc weight: done  Patient belongings in room.      Plan: MRI and swallow eval by speech pathology today, continue to monitor neuro/vitals q2 hrs, notify neurocrit of any changes.

## 2019-12-01 NOTE — PLAN OF CARE
Per Mobility Level Guideline;  Bed/chair alarm No.  Patient may ambulate independently  Patient requires the following assistive equipment: None   PT/OT consult orders in place No

## 2019-12-01 NOTE — CONSULTS
Social Work Services Consult Note:      Hospital Day: 1  Date of Initial Social Work Evaluation:  Not needed  Collaborated with:  Chart review     Data: SW consulted for stroke.     Intervention:  SW reviewed chart and pt appears to have stroke teaching set up by floor nursing.  Will defer to bedside RN and RNCC for any further needs related to stroke.    Assessment: Did not meet with pt for this encounter note.  Plans are for pt to discharge today to home.     Plan:    Anticipated Disposition: Home per chart     Barriers to d/c plan: None    Follow Up: No needs identified for today.  Please reconsult if other needs arise.     Monica Sainz, RAYNA, LCSW  [facility]   Pager [phone]    Care Management Dept: PH: [phone]  4A, 4C, 4E, 5A and 5B; pager [phone]  6A, 6B, 6C and 6D; pager [phone]  7A, 7B, 7C, 7D and 5C; pager [phone]    RNCC/Care Coordinator; job code [id number]

## 2019-12-01 NOTE — DISCHARGE SUMMARY
Pt discharged to home today, ride provided by . Pt left unit ambulatory, medications to be picked up at facility pharmacy. All belongings sent with patient. AVS reviewed, questions answered. Stroke education completed on unit by writer, all questions and concerns answered.

## 2019-12-01 NOTE — PLAN OF CARE
Status: Admitted for stroke symptoms and stroke work-up. Hx stage IV breast cancer with mets to bone/liver/lung/left adrenal retroperitoneal space/brain  Vitals: Tachycardia, otherwise VSS. On CCM.  Neuros: A+O x4. Strengths 5/5 throughout. Right facial droop, tongue deviates to the left. Right pupil 4 mm and reactive. Left pupil 3 mm and reactive. Baseline N/T in left fingertips and forearm. Right eyelid does not close all the way and has clear drainage - moisture chamber in place during the night.  IV: PIV SL.   Resp/trach: LS clear.  Diet: Regular with thin liquids, poor appetite. Given zofran x1 for nausea with adequate relief.  Bowel status: BS+. No BM this shift.   : Voiding spontaneously without issue.  Skin: Pale skin throughout.   Pain: Denies  Activity: Up ad candy  Social: No visitors this shift.  Plan: Pt requesting psych consult. Stroke education needs to be completed prior to discharge. Continue to monitor and follow POC

## 2019-12-01 NOTE — PLAN OF CARE
Status: Admitted for stroke symptoms and stroke work-up. Hx stage IV breast cancer with mets to bone/liver/lung/left adrenal retroperitoneal space/brain  Vitals: Tachycardia, otherwise VSS. On CCM   Neuros: A+O x4. Strengths 5/5 throughout. Right facial droop, tongue deviates to the left. Right pupil 4 mm and reactive. Left pupil 3 mm and reactive. Baseline N/T in left fingertips and forearm. Right eyelid does not close all the way and has clear drainage. Bl  IV: AC PIV SL   Resp/trach: Clear, no SOB upon exertion noted  Diet: Regular with thin liquids, poor appetite.  Bowel status: BS+. No BM this shift. Last BM 11/29.  : Voiding spontaneously   Skin: Pale skin throughout. Otherwise intact   Pain: Reports mild headache, denied pain interventions  Activity: Up independent in room   Social: Family at bedside upon arrival and pleasant   Plan: MRI done today. Stroke education needs to be completed prior to discharge. Continue to monitor and follow POC

## 2019-12-02 NOTE — TELEPHONE ENCOUNTER
Patient has oncology office visit scheduled on 12/3/19, which is appropriate follow up time frame.    No hospital outreach attempt will be made by triage.    YOMI ValenciaN, RN

## 2019-12-02 NOTE — TUMOR CONFERENCE
Tumor Conference Information  Tumor Conference:  Brain  Specialties Present:  Medical oncology, Pathology, Radiology, Radiation oncology, Surgery  Patient Status:  A new patient, For new tumor(s)  Pathology:  Not Discussed  Treatment to Date:  None  Clinical Trial Eligibility:  Not discussed  Recommended Plan:  Palliative radiation therapy (Comment: patient presents with stroke like symptoms; brain MRI shows metastatic disease; plan for WBRT with Dr. Figueroa)  Did the review exceed 30 minutes?:  did not           Documentation / Disclaimer Cancer Tumor Board Note  Cancer tumor board recommendations do not override what is determined to be reasonable care and treatment, which is dependent on the circumstances of a patient's case; the patient's medical, social, and personal concerns; and the clinical judgment of the oncologist [physician].

## 2019-12-02 NOTE — TELEPHONE ENCOUNTER
I called Shan.    She was admitted this weekend to the hospital with concerns for a stroke.  Workup revealed no stroke.  She however had a brain MRI that revealed several small foci 3mm or less of likely brain mets.      She reports no nausea.  She reports right facial droop, and inability to close her right eye, as well as right lip droop.    We discussed my concern for brain mets and leptomeningeal disease.      I'd like her to start decadron.  2mg bid.      Script was sent out.      Will also arrange for LP to be sent for cytology.    Will refer to radiation oncology.

## 2019-12-02 NOTE — PROGRESS NOTES
Called patient's home/cell telephone number and left a VM post discharge yesterday for facial drooping. Instructed patient to return call to follow-up hospitalization and gave appointment details for tomorrow. Sharifa Celeste RN, BSN Breast Center Nurse Coordinator

## 2019-12-02 NOTE — PROGRESS NOTES
Patient has clinic visit within 24-48 hours of discharge so no post DC follow up call is needed.    Name: Shan Marsh MRN: [id number]   Date: 12/3/2019 Status: [facility]   Time: 11:30 AM Length: 50   Visit Type: [facility] RETURN [[id number]] SHITAL: [id number]   Provider: Lima Griffiths PA-C Department:  ONCOLOGY ADULT

## 2019-12-03 NOTE — NURSING NOTE
Vitals done, labs drawn by venipuncture.  See doc flow sheets for details.  Maryse Anglin, MARTIN December 3, 2019

## 2019-12-03 NOTE — LETTER
12/3/2019       RE: Shan Marsh  [address]  [place] [address]     Dear Colleague,    Thank you for referring your patient, Shan Marsh, to the [facility]. Please see a copy of my visit note below.    Oncology/Hematology Visit Note  November 29, 2019    Reason for Visit: Follow up for metastatic breast cancer (ER/ND+, HER2-)    History of Present Illness: Shan Marsh is a 49 year old female without significant past medical history with a diagnosis of metastatic breast cancer after she presented with left shoulder pain. Shoulder MRI showed a soft tissue mass close to the distal clavicle which was concerning for malignancy. PET/CT on 5/9/17 showed hypermetabolic left axillary, supraclavicular, mediastinal and hilar nodes. She had biopsies of left axilla and hilar nodes showing metastatic breast cancer, strongly ER positive, moderately ND positive, and HER-2 kaitlin non-amplified. She originally had [id number], ER/ND positive, HER-2 negative breast cancer diagnosed in spring of 2012 s/p bilateral mastectomies and Tamoxifen from 8/2012-2/2013 and discontinued due to side effects. With development of metastatic disease, she was recommended treatment on BIG10 clinical trial with palbociclib and tamoxifen. She started treatment on 5/24/17 and required dose reduction for neutropenia. CT CAP and bone scan 1/11/2018 showing progressive disease with new bony lesions in the ribs, T spine, and possibly liver, lungs. She started Zoladex and abemiciclib on 1/12/2018.      In March 2018, she sought treatment at [facility] in [place], under the care of Dr. Brooks, where she received:  - Vallovex vaccine (per FDA, is in phase I trials here in [place])  - Insulin potentiated therapy with conjugated chemotherapy (5% chemotherapy, unclear what chemotherapy)  - Gina's toxins, IV vitamin C, IV Ozone, hyperbaric chamber, vitamin B17 infusion, vitamin CK3 IV, and coffee enemas PRN  -  Also oral niacin, Lugol, acidol, pancreatin, and thyroid desiccated liver, per their regimen  - She was continued on Letrozole and advised to continue Zoladex.      She met with Dr. Whitman and patient wished to continue her alternative regimen. Abemiciclib was placed on hold. She was continued on Zoladex and letrozole. While in [place], she required thoracentesis for symptomatic pleural effusion. A repeat PET in August 2018 showed widely progressive disease with multiple new bone mets involving the spine, ribs, pelvis, and sacrum; new lesions involving the left lateral thoracic/abdominal wall musculature and subcutaneous tissues, new metastases in the left adrenal gland, and recurrent large L pleural effusion. She was started on Xeloda 1500 mg BID on 9/5/18.      She was admitted 10/4-10/11/18 after a syncopal event/unwitnessed fall at home. She was found to have left pneumothorax and pericardial effusion with tamponade physiology. She had left chest tube placed in ED which then fell out on 10/10. Left pleurx placed on 10/11. She had pericardiocentesis on 10/7, drain removed 10/8.      She resumed Xeloda, and remained on this until January 2019.  At that time, she was switched to Doxil. She required several dose reductions due to poor tolerance. Due to ongoing poor tolerance, plan to switch to gemzar. She presented to [facility] ED on 11/30/2019 with new facial droop, found to have multiple small brain metastases. Her case was reviewed at tumor board and recommended palliative WBRT.     Interval History:  Shan is here for follow-up.     She has ongoing facial droop. Started the Dex today and hasn't had any changes. Continues to have nausea, using Zofran PRN, medical cannabis. She has not thrown up but struggles to eat. She is doing protein shakes. She hasn't had emesis. She has had some headaches with R radiating right jaw pain, otherwise no new pains. No confusion. No bowel changes. Breathing stable, with WILSON with stairs,  otherwise no orthopnea or chest pains.    ROS: 10 point ROS neg other than the symptoms noted above in the HPI.     Current Outpatient Medications   Medication     Digestive Enzymes (ENZYME DIGEST) CAPS     magic mouthwash (ENTER INGREDIENTS IN COMMENTS) suspension     medical cannabis (Patient's own supply.  Not a prescription)     ondansetron (ZOFRAN) 8 MG tablet     VITAMIN D, CHOLECALCIFEROL, PO     No current facility-administered medications for this visit.      Facility-Administered Medications Ordered in Other Visits   Medication     sodium chloride (PF) 0.9% PF flush 10 mL     sodium chloride (PF) 0.9% PF flush 10 mL     sodium chloride (PF) 0.9% PF flush 10 mL     sodium chloride (PF) 0.9% PF flush 10 mL          Physical Examination:  /78 (BP Location: Left arm, Patient Position: Sitting, Cuff Size: Adult Regular)   Pulse 111   Temp 98  F (36.7  C) (Oral)   Wt 43.8 kg (96 lb 9.6 oz)   SpO2 96%   BMI 16.57 kg/m     General: The patient is a pleasant female in no acute distress.  HEENT: facial droop, pupils equal and reactive to light   Lymph: No palpable cervical, supraclavicular, subclavicular lymphadenopathy.  Heart: Regular rate and rhythm.   Lungs: Breathing comfortably on room air. Decreased BS at right base to midlung, decreased breath sound in left basilar area as well, otherwise clear to auscultation bilaterally.   Abdomen: Bowel sounds present, soft, no pain to palpation, no palpable hepatosplenomegaly or masses.   Extremities: No lower extremity edema noted bilaterally.   Neuro: + R facial droop. Otheherwise, Cranial nerves II through XII are grossly intact.  Skin: No rashes, petechiae, or bruising noted on exposed skin.     Laboratory Data:  Results for NIKKIE HICKS (MRN [id number]) as of 12/3/2019 13:26   Ref. Range 12/3/2019 11:28   Sodium Latest Ref Range: 133 - 144 mmol/L 130 (L)   Potassium Latest Ref Range: 3.4 - 5.3 mmol/L 3.6   Chloride Latest Ref Range: 94 - 109  mmol/L 97   Carbon Dioxide Latest Ref Range: 20 - 32 mmol/L 28   Urea Nitrogen Latest Ref Range: 7 - 30 mg/dL 11   Creatinine Latest Ref Range: 0.52 - 1.04 mg/dL 0.60   GFR Estimate Latest Ref Range: >60 mL/min/1.73_m2 >90   GFR Estimate If Black Latest Ref Range: >60 mL/min/1.73_m2 >90   Calcium Latest Ref Range: 8.5 - 10.1 mg/dL 10.2 (H)   Anion Gap Latest Ref Range: 3 - 14 mmol/L 5   Albumin Latest Ref Range: 3.4 - 5.0 g/dL 3.2 (L)   Protein Total Latest Ref Range: 6.8 - 8.8 g/dL 10.6 (H)   Bilirubin Total Latest Ref Range: 0.2 - 1.3 mg/dL 0.4   Alkaline Phosphatase Latest Ref Range: 40 - 150 U/L 443 (H)   ALT Latest Ref Range: 0 - 50 U/L 46   AST Latest Ref Range: 0 - 45 U/L 131 (H)   Glucose Latest Ref Range: 70 - 99 mg/dL 133 (H)   WBC Latest Ref Range: 4.0 - 11.0 10e9/L 6.6   Hemoglobin Latest Ref Range: 11.7 - 15.7 g/dL 12.9   Hematocrit Latest Ref Range: 35.0 - 47.0 % 40.9   Platelet Count Latest Ref Range: 150 - 450 10e9/L 439     Assessment and Plan:  Shan Marsh is a 49 year old woman with prior history of a T1 N0, ER/IA positive, HER2-negative left breast cancer treated with bilateral mastectomies in 2012, now with metastatic disease (bones, pleural effusions, lungs, pericardial effusion, liver, left adrenal/retroperitoneal mets), ER/IA positive, HER2 negative.  She progressed on palbociclib and tamoxifen, alternative therapies in [place] and was on Xeloda, and had poor tolerance to Doxil.     Metastatic breast cancer: Plan was to switch to Gemzar, however found to have brain mets after presentig to the ED 11/30 with new facial droop. Plan now as below. Holding off systemic therapy for now. Continue Zoladex. Inquired with Dr. Whitman re: systemic imaging.    Brain mets: Brain MRI showing several small foci 3mm or less. She started decadron 2 mg BID and recommended WBRT. She will need LP with cytology, Dr. Whitman already requested.    Nausea, anorexia: could be secondary to disease given continuation of  symptoms despite stopping chemo. Already started dex. Recommended scheduled use of Zofran, adding on compazine 5 mg prn (historically has side effects from meds so will start at 5 mg). Anticipate some relief with dex. She prefers not to start Zyprexa right now, but can consider if needed. She will let us know of any worsening symptoms    FEN:  - New hyponatremia, hypocalcemia, mild. Given her nausea and other symptoms are unchanged, do not suspect she is symptomatic from either one of these derangements. Reviewed possible symptoms and when to call. I suspect the hyponatremia is due to poor intake, recommended strategies to improve this, electrolyte beverages. Will monitor again at time of LP.    Bone mets.  She remains on Zometa every 3 months, most recenetly recieved 10/52593      Bilateral pleural effusions (right worse than left). - Requires periodic thoracentesis, most recently 11/25 with stable effusion 11/29 in ED. Will request for possible thora in about a week given prior needs. Prefer staged procedures given her improved tolerance with these.     History of a malignant pericardial effusion.    - repeat Echo on 9/9/19 did not show any pericardial effusion, will continue to monitor based on her clinical symptoms.        I spent >40 minutes with the patient, with over 50% of the time spent counseling or coordinating their care as described above.    Lima Griffiths PA-C

## 2019-12-03 NOTE — PATIENT INSTRUCTIONS
[facility] Triage and after hours / weekends / holidays:  [phone]    Please call the triage or after hours line if you experience a temperature greater than or equal to 100.5, shaking chills, have uncontrolled nausea, vomiting and/or diarrhea, dizziness, shortness of breath, chest pain, bleeding, unexplained bruising, or if you have any other new/concerning symptoms, questions or concerns.      If you are having any concerning symptoms or wish to speak to a provider before your next infusion visit, please call your care coordinator or triage to notify them so we can adequately serve you.     If you need a refill on a narcotic prescription or other medication, please call before your infusion appointment.                 December 2019 Sunday Monday Tuesday Wednesday Thursday Friday Saturday   1     2    BRAIN TUMOR CONFERENCE   1:15 PM   (5 min.)   BRAIN TUMOR CONFERENCE   [facility] [place] Virtual Scheduling 3    [facility] DRAW  10:45 AM   (15 min.)    [facility] DRAW   [facility] Lab Draw    [facility] RETURN  11:15 AM   (50 min.)   Lima Griffiths PA-C [facility] ONC INFUSION 120  12:30 PM   (120 min.)    ONCOLOGY INFUSION   [facility] 4     5     6     7       8     9     10     11     12    [facility] DRAW  11:45 AM   (15 min.)    [facility] DRAW   [facility] Lab Draw    [facility] RETURN  12:05 PM   (50 min.)   Lima Griffiths PA-C [facility] ONC INFUSION 60   1:00 PM   (60 min.)    ONCOLOGY INFUSION   [facility] 13     14       15     16     17     18     19     20     21       22     23     24     25     26     27     28       29     30     31    [facility] DRAW  10:45 AM   (15 min.)    [facility] DRAW   [facility] Lab Draw    [facility] RETURN  11:15 AM   (50 min.)   Lima Griffiths PA-C [facility]  Clinic    [facility] ONC INFUSION 120  12:30 PM   (120 min.)    ONCOLOGY INFUSION   [facility]                                 January 2020 Sunday Monday Tuesday Wednesday Thursday Friday Saturday                  1     2     3     4       5     6     7     8     9     10     11       12     13     14     15     16     17    NM INJ  10:00 AM   (15 min.)   [id number]   [facility], Nuclear Medicine    CT CHEST/ABDOMEN/PELVIS W  10:20 AM   (20 min.)   UUCT1   [facility], CT    NM BONE SCAN WHOLE BODY   1:00 PM   (60 min.)   UUNM3   [facility], Nuclear Medicine 18       19     20    [facility] DRAW   8:45 AM   (15 min.)    [facility] LAB DRAW   [facility] Lab Draw    [facility] RETURN   9:15 AM   (30 min.)   Ana Whitman MD   [facility] 21     22     23     24     25       26     27    [facility] ONC INFUSION 120  10:30 AM   (120 min.)    ONCOLOGY INFUSION   [facility] 28     29     30     31                          Lab Results:  Recent Results (from the past 12 hour(s))   Comprehensive metabolic panel    Collection Time: 12/03/19 11:28 AM   Result Value Ref Range    Sodium 130 (L) 133 - 144 mmol/L    Potassium 3.6 3.4 - 5.3 mmol/L    Chloride 97 94 - 109 mmol/L    Carbon Dioxide 28 20 - 32 mmol/L    Anion Gap 5 3 - 14 mmol/L    Glucose 133 (H) 70 - 99 mg/dL    Urea Nitrogen 11 7 - 30 mg/dL    Creatinine 0.60 0.52 - 1.04 mg/dL    GFR Estimate >90 >60 mL/min/[1.73_m2]    GFR Estimate If Black >90 >60 mL/min/[1.73_m2]    Calcium 10.2 (H) 8.5 - 10.1 mg/dL    Bilirubin Total 0.4 0.2 - 1.3 mg/dL    Albumin 3.2 (L) 3.4 - 5.0 g/dL    Protein Total 10.6 (H) 6.8 - 8.8 g/dL    Alkaline Phosphatase 443 (H) 40 - 150 U/L    ALT 46 0 - 50 U/L     (H) 0 - 45 U/L   CBC with platelets differential    Collection Time: 12/03/19 11:28 AM   Result Value Ref Range    WBC 6.6 4.0 - 11.0 10e9/L    RBC Count 4.55 3.8 - 5.2 10e12/L    Hemoglobin 12.9 11.7 - 15.7  g/dL    Hematocrit 40.9 35.0 - 47.0 %    MCV 90 78 - 100 fl    MCH 28.4 26.5 - 33.0 pg    MCHC 31.5 31.5 - 36.5 g/dL    RDW 15.9 (H) 10.0 - 15.0 %    Platelet Count 439 150 - 450 10e9/L    Diff Method Automated Method     % Neutrophils 80.7 %    % Lymphocytes 11.7 %    % Monocytes 5.4 %    % Eosinophils 0.3 %    % Basophils 0.8 %    % Immature Granulocytes 1.1 %    Nucleated RBCs 0 0 /100    Absolute Neutrophil 5.4 1.6 - 8.3 10e9/L    Absolute Lymphocytes 0.8 0.8 - 5.3 10e9/L    Absolute Monocytes 0.4 0.0 - 1.3 10e9/L    Absolute Eosinophils 0.0 0.0 - 0.7 10e9/L    Absolute Basophils 0.1 0.0 - 0.2 10e9/L    Abs Immature Granulocytes 0.1 0 - 0.4 10e9/L    Absolute Nucleated RBC 0.0

## 2019-12-03 NOTE — PROGRESS NOTES
Infusion Nursing Note:  Shan Marsh presents today for Zoladex.    Patient seen by provider today: Yes: GRAY Frias   present during visit today: Not Applicable.    Note: Patient assessed by provider prior to infusion visit, no new questions or concerns. Lidocaine and Ice pack applied prior to Zoladex injection.     Treatment Conditions:  Lab Results   Component Value Date    HGB 12.9 12/03/2019     Lab Results   Component Value Date    WBC 6.6 12/03/2019      Lab Results   Component Value Date    ANEU 5.4 12/03/2019     Lab Results   Component Value Date     12/03/2019      Lab Results   Component Value Date     12/03/2019                   Lab Results   Component Value Date    POTASSIUM 3.6 12/03/2019      Lab Results   Component Value Date    CR 0.60 12/03/2019                   Lab Results   Component Value Date    KRYSTEN 10.2 12/03/2019                Lab Results   Component Value Date    BILITOTAL 0.4 12/03/2019           Lab Results   Component Value Date    ALBUMIN 3.2 12/03/2019                    Lab Results   Component Value Date    ALT 46 12/03/2019           Lab Results   Component Value Date     12/03/2019           Post Infusion Assessment:  Patient tolerated 1 Zoladex injection into the left lower abdomen without incident.       Discharge Plan:   Patient declined prescription refills.  Discharge instructions reviewed with: Patient.  Patient and/or family verbalized understanding of discharge instructions and all questions answered.  AVS to patient via [employer].  Patient will return 12/12 for next appointment.   Patient discharged in stable condition accompanied by: self.  Departure Mode: Ambulatory.    Sho Feldman RN

## 2019-12-03 NOTE — NURSING NOTE
"Oncology Rooming Note    December 3, 2019 11:36 AM   Shan Marsh is a 49 year old female who presents for:    Chief Complaint   Patient presents with     Blood Draw      blood draw and vitals     Oncology Clinic Visit     Return; breast cancer     Initial Vitals: /78 (BP Location: Left arm, Patient Position: Sitting, Cuff Size: Adult Regular)   Pulse 111   Temp 98  F (36.7  C) (Oral)   Wt 43.8 kg (96 lb 9.6 oz)   SpO2 96%   BMI 16.57 kg/m   Estimated body mass index is 16.57 kg/m  as calculated from the following:    Height as of 11/30/19: 1.626 m (5' 4.02\").    Weight as of this encounter: 43.8 kg (96 lb 9.6 oz). Body surface area is 1.41 meters squared.  No Pain (0) Comment: Data Unavailable   No LMP recorded. (Menstrual status: Chemotherapy).  Allergies reviewed: Yes  Medications reviewed: Yes    Medications: Medication refills not needed today.  Pharmacy name entered into [facility]:    [facility] - [place], [place] - [address]  [facility] - [place], [place] - [address] [facility] [place] [place] - [phone]    Clinical concerns: No new concerns today. Lima Griffiths was notified.      ARNEL VALDES, MARTIN            "

## 2019-12-03 NOTE — PATIENT INSTRUCTIONS
- Continue the steroid as doing (2 mg twice daily).   - Continue the Zofran - I would recommend taking this on a scheduled basis  - You can add in Compazine if needed. This is safe to take in conjunction with the Zofran.  - If these interventions are not helpful, please let us know.    - Hold off on the tums and try Zantac or pepcid instead if needed

## 2019-12-04 NOTE — PROGRESS NOTES
[facility]: Post-Discharge Note  SITUATION                                                      Admission:    Admission Date: 11/29/19   Reason for Admission: Right facial droop  Discharge:   Discharge Date: 11/30/19  Discharge Diagnosis: Transient Ischemic Attack vs. Cancer Metastasis  Discharge Service: Stroke Neurology     BACKGROUND                                                      Brief Hospital Course:   Patient presented with right facial droop, prompting admission to the stroke service and stroke work-up. She was found to have an initial NIHSS of 2, with no vascular abnormalities seen on CT/CTA and was not given TPA due to minor/isolated non-life threatening or disabling symptoms.      Work-up as stated below under Pertinent Investigations.     Etiology is thought to be due to cancer metastasis. During admission, she underwent MRI with and without contrast of brain to further evaluate etiology of right facial droop. MRI demonstrated no evidence of stroke but multiple osseous lesions and small intraparenchymal brain metastases. TTE was obtained as well which demonstrated no abnormalities. Was placed on aspirin 81 mg for future stroke prevention given prothrombotic state with known malignancy, and was placed on atorvastatin due to elevated LDL.     ASSESSMENT      Discharge Assessment  Patient reports symptoms are: Unchanged  Does the patient have all of their medications?: Yes  Does patient know what their new medications are for?: Other(Quit taking them)  Does patient have a follow-up appointment scheduled?: Yes  Does patient have any other questions or concerns?: No    Post-op  Did the patient have surgery or a procedure: No  Eating & Drinking: eating and drinking without complaints/concerns     Patient states she is feeling ok. Not as nauseous. Saw Oncologist yesterday who said she did not have a stroke. Patient decided not to take aspirin or atorvastatin as she does not like to take  medications and is already on so many. She will do natural remedies, such as chaga tea.      PLAN                                                      Outpatient Plan:   * Follow up with general neurology in 1 month  * Follow up with primary oncologist     Patient aware of the plan and verbalized understanding. Message sent to scheduling to contact patient to make neuro appointment.     Contact information provided and encouraged to call with questions/concerns.    Future Appointments   Date Time Provider Department Center   12/9/2019  8:45 AM  [facility] DRAW [facility]   12/9/2019  9:30 AM Rafia Voss PA-C [facility]   12/12/2019 11:45 AM  [facility]   12/12/2019 12:20 PM Lima Griffiths PA-C [facility]   12/12/2019  1:00 PM  ONCOLOGY INFUSION [facility]   12/31/2019 10:45 AM  [facility]   12/31/2019 11:30 AM Lima Griffiths PA-C [facility]   12/31/2019 12:30 PM  ONCOLOGY INFUSION [facility]   1/17/2020 10:00 AM [email or web address] [facility] O   1/17/2020 10:20 AM [id number] [facility] O   1/17/2020  1:00 PM [email or web address] [facility] O   1/20/2020  8:45 AM  [facility]RAW [facility]   1/20/2020  9:30 AM Ana Whitman MD [facility]   1/27/2020 10:30 AM  ONCOLOGY INFUSION [facility]           Vanda Lemons RN

## 2019-12-09 NOTE — TELEPHONE ENCOUNTER
Social Work Follow-Up Encounter Visit  Oncology Clinic    Data/Intervention:  Patient Name:  Shan Marsh  /Age:  1970 (49 year old)    Reason for Follow-Up:  Patient had called [employer] to inquire about financial assistance resources.    Collaborated With:    -patient    SW met with patient following LP regarding request for assistance with financial assistance applications.  Patient has applied for assistance in the past but has ongoing treatments for metastatic disease and subsequent financial hardship.  SW explained that patient will be eligible to apply for [employer] betty in 2020.  SW will plan to meet with patient again when eligible to apply for [employer].      SW also talked with patient about other previously applied for programs.  Based on previous application dates, patient eligible to apply again for both [facility] [employer] [facility] [employer] and [employer].  [employer] application completed and submitted.  Patient instructed to bring bill in for [facility] application and indicated she would do so for next appointment.  SW also agreed to submit application to [employer] on behalf of patient per her request.  SW will also review other financial options as known.     Resources Provided:  [employer]FF application submitted  [employer] referral submitted    Assessment:  Patient asked appropriate questions, demonstrated understanding of education provided.     Plan:  Previously provided patient/family with writer's contact information and availability.   SW will continue to follow and is available to assist with needs.     Soo Yeon Han, MSW, [facility] Hospital[facility]  Pager: [phone]  Phone: [phone]

## 2019-12-09 NOTE — LETTER
12/9/2019       RE: Shan Marsh  [address]  [facility] [address]     Dear Colleague,    Thank you for referring your patient, Shan Marsh, to the [facility]. Please see a copy of my visit note below.    BMT ONC Adult Lumbar Puncture Procedure Note    December 8, 2019    Procedure: Lumbar Puncture to obtain CSF     Diagnosis: new brain metastasis, rule out leptomeningeal disease     Learning needs assessment complete within 12 months: YES    Vitals reviewed:  YES    Informed Consent: Procedure, benefits, risks, and alternatives explained to the patient who verbalized understanding of the information and agreed to proceed with lumbar puncture. Risks include bleeding, headache, and or infection.  Patient safety checklist completed.    Labs:      12/9/2019 09:26   Sodium 129 (L)   Potassium 3.8   Chloride 94   Carbon Dioxide 29   Urea Nitrogen 14   Creatinine 0.56   GFR Estimate >90   GFR Estimate If Black >90   Calcium 9.7   Anion Gap 6   Albumin 2.7 (L)   Protein Total 9.2 (H)   Bilirubin Total 0.4   Alkaline Phosphatase 423 (H)   ALT 44   AST 97 (H)   Calcium Ionized 4.7   Glucose 155 (H)   WBC 8.5   Hemoglobin 12.2   Hematocrit 38.8   Platelet Count 417   RBC Count 4.33   MCV 90   MCH 28.2   MCHC 31.4 (L)   RDW 16.0 (H)   Diff Method Automated Method   % Neutrophils 78.1   % Lymphocytes 11.8   % Monocytes 7.9   % Eosinophils 0.2   % Basophils 0.2   % Immature Granulocytes 1.8   Nucleated RBCs 0   Absolute Neutrophil 6.7   Absolute Lymphocytes 1.0   Absolute Monocytes 0.7   Absolute Eosinophils 0.0   Absolute Basophils 0.0   Abs Immature Granulocytes 0.2   Absolute Nucleated RBC 0.0       Description:. The patient was seated at the bedside with knees dangling. The L4-5 disc space was prepped and draped in a sterile fashion. Local anesthesia with 2 mL 1% preservative-free lidocaine was used. A 22 gauge, 4 inch needle was placed on the second attempt.  10 mL spinal  fluid collected. Specimen appears clear. Specimen sent for cell count and differential, cytology, protein and glucose.  The needle was removed and a bandage was applied to the site.  Patient tolerated well.    Post-procedure pain assessment: 0 out of 10 on the numeric pain rating scale  Interventions: No    Complications: No     Disposition: Patient will remain on back for 30 minutes post procedure. Avoid soaking in a tub tonight.  Call if develops headaches, fevers and or chills.     Performed by:   Rafia Voss PA-C    Shan also had multiple questions regarding WBRT, dexamethasone dosing, reviewing labs, and discussing her skin lesions. She has had skin lesions erupt over the past few months. They are subcutaneous nodules. Minimally tender to palpation. No overlying skin changes or rash. She states she has brought up multiple times in appointments. 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 I will set her up with Dermatology for a biopsy to confirm, however discussed the best treatment for these are systemic therapy. Reviewed the common side effects with WBRT including fatigue and cognitive side effects. This is a treatment that can only be done once. Discussed Dr. Monroy will go into the risks in full detail. Shan notes her facial droop is a little worse. She has only been taking dex once per day due to insomnia. Discussed increasing this to BID again since neuro deficit is worse--taking dex at 7 am and noon-1 pm with food. If she still has insomnia, then can start zyprexa. Her hyponatremia is stable. She has been drinking massive amounts of free water. Discussed drinking more solute rich fluids. Calcium has improved, ionized calcium is okay. LFTs have improved.     Spent 15 minutes discussing this issues alone in addition to time spent consenting, counseling, and performing the procedure. -Rafia Voss PA-C    Again, thank you for allowing me to participate in the  care of your patient.      Sincerely,    Rafia Voss PA-C

## 2019-12-09 NOTE — NURSING NOTE
"Oncology Rooming Note    December 9, 2019 9:52 AM   Shan Marsh is a 49 year old female who presents for:    Chief Complaint   Patient presents with     Blood Draw     labs drawn via vpt by vascular access rn. vs taken     Oncology Clinic Visit     [facility] RETURN- LUMBAR PUNCTURE     Initial Vitals: /81 (BP Location: Right arm, Patient Position: Sitting, Cuff Size: Adult Small)   Pulse 125   Temp 98.7  F (37.1  C) (Oral)   Resp 16   Ht 1.626 m (5' 4.02\")   Wt 43.5 kg (95 lb 14.4 oz)   SpO2 95%   BMI 16.45 kg/m   Estimated body mass index is 16.45 kg/m  as calculated from the following:    Height as of this encounter: 1.626 m (5' 4.02\").    Weight as of this encounter: 43.5 kg (95 lb 14.4 oz). Body surface area is 1.4 meters squared.  No Pain (0) Comment: Data Unavailable   No LMP recorded. (Menstrual status: Chemotherapy).  Allergies reviewed: Yes  Medications reviewed: Yes    Medications: Medication refills not needed today.  Pharmacy name entered into [employer]:    [facility] - [place], [place] - [address]  [facility] - [place], [place] - [address] [place] [address] PHARMACY [place], [place] - [phone]    Clinical concerns: No new concerns. Shanika was notified.      Chacorta Martinez LPN            "

## 2019-12-09 NOTE — PROGRESS NOTES
BMT ONC Adult Lumbar Puncture Procedure Note    December 8, 2019    Procedure: Lumbar Puncture to obtain CSF     Diagnosis: new brain metastasis, rule out leptomeningeal disease     Learning needs assessment complete within 12 months: YES    Vitals reviewed:  YES    Informed Consent: Procedure, benefits, risks, and alternatives explained to the patient who verbalized understanding of the information and agreed to proceed with lumbar puncture. Risks include bleeding, headache, and or infection.  Patient safety checklist completed.    Labs:      12/9/2019 09:26   Sodium 129 (L)   Potassium 3.8   Chloride 94   Carbon Dioxide 29   Urea Nitrogen 14   Creatinine 0.56   GFR Estimate >90   GFR Estimate If Black >90   Calcium 9.7   Anion Gap 6   Albumin 2.7 (L)   Protein Total 9.2 (H)   Bilirubin Total 0.4   Alkaline Phosphatase 423 (H)   ALT 44   AST 97 (H)   Calcium Ionized 4.7   Glucose 155 (H)   WBC 8.5   Hemoglobin 12.2   Hematocrit 38.8   Platelet Count 417   RBC Count 4.33   MCV 90   MCH 28.2   MCHC 31.4 (L)   RDW 16.0 (H)   Diff Method Automated Method   % Neutrophils 78.1   % Lymphocytes 11.8   % Monocytes 7.9   % Eosinophils 0.2   % Basophils 0.2   % Immature Granulocytes 1.8   Nucleated RBCs 0   Absolute Neutrophil 6.7   Absolute Lymphocytes 1.0   Absolute Monocytes 0.7   Absolute Eosinophils 0.0   Absolute Basophils 0.0   Abs Immature Granulocytes 0.2   Absolute Nucleated RBC 0.0       Description:. The patient was seated at the bedside with knees dangling. The L4-5 disc space was prepped and draped in a sterile fashion. Local anesthesia with 2 mL 1% preservative-free lidocaine was used. A 22 gauge, 4 inch needle was placed on the second attempt.  10 mL spinal fluid collected. Specimen appears clear. Specimen sent for cell count and differential, cytology, protein and glucose.  The needle was removed and a bandage was applied to the site.  Patient tolerated well.    Post-procedure pain assessment: 0 out of 10 on  the numeric pain rating scale  Interventions: No    Complications: No     Disposition: Patient will remain on back for 30 minutes post procedure. Avoid soaking in a tub tonight.  Call if develops headaches, fevers and or chills.     Performed by:   Rafia Voss PA-C    Shan also had multiple questions regarding WBRT, dexamethasone dosing, reviewing labs, and discussing her skin lesions. She has had skin lesions erupt over the past few months. They are subcutaneous nodules. Minimally tender to palpation. No overlying skin changes or rash. She states she has brought up multiple times in appointments. 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 I will set her up with Dermatology for a biopsy to confirm, however discussed the best treatment for these are systemic therapy. Reviewed the common side effects with WBRT including fatigue and cognitive side effects. This is a treatment that can only be done once. Discussed Dr. Monroy will go into the risks in full detail. Shan notes her facial droop is a little worse. She has only been taking dex once per day due to insomnia. Discussed increasing this to BID again since neuro deficit is worse--taking dex at 7 am and noon-1 pm with food. If she still has insomnia, then can start zyprexa. Her hyponatremia is stable. She has been drinking massive amounts of free water. Discussed drinking more solute rich fluids. Calcium has improved, ionized calcium is okay. LFTs have improved.     Spent 15 minutes discussing this issues alone in addition to time spent consenting, counseling, and performing the procedure. -Rafia Voss PA-C

## 2019-12-09 NOTE — NURSING NOTE
Chief Complaint   Patient presents with     Blood Draw     labs drawn via vpt by vascular access rn. vs taken     Blood drawn via vpt by vascular access RN in lab. VS taken. Pt checked into next appointment.   Hue Simon RN

## 2019-12-10 NOTE — PROGRESS NOTES
Department of Radiation Oncology                   [address]  [address] [place]  [address]  Office:  [phone]  Fax:  [phone]   Radiation Oncology [facility]  [address] [place] [address]  Phone:  [phone]  Fax:  [phone]     RE: Shan Marsh : 1970   MRN: [id number] JAYSON: 2019     OUTPATIENT VISIT NOTE       PROBLEM: Metastatic breast cancer with diffuse intracranial disease      was seen for initial consultation in the Dept of Therapeutic Radiology on 2019 at the request of Dr. Whitman    HISTORY OF PRESENT ILLNESS:   Ms. Marsh is a 49-year-old female with metastatic breast cancer with diffuse brain metastases.  She has a history of originally pT1a N0 M0, ER/DC positive, HER-2 negative left-sided breast cancer diagnosed in .  At that time, she underwent treatment with bilateral mastectomies followed by brief hormonal therapy with tamoxifen from 2012-2013.  Tamoxifen was discontinued due to side effects.  Her surgical pathology ([id number]) showed at least 12 foci of invasive carcinoma within the left breast measuring up to 2 mm.  Her tumor was grade 1 and there was extensive associated DCIS.  Margins were widely negative for invasive disease and 2 mm for DCIS.  2 sentinel lymph nodes were removed and both were negative.  There were no abnormalities in the right breast.  She took Tamoxifen from 2012 to 2013, but discontinued due to side effects.     She did well for several years after completion of therapy but developed left shoulder pain in .  She underwent an MRI of the left shoulder at that time which showed a soft tissue mass near the distal clavicle measuring 4 cm in greatest dimension, concerning for malignancy.  Shortly thereafter, she underwent a PET/CT on 2017 which showed hypermetabolic left axillary, supraclavicular, mediastinal, and bilateral hilar lymphadenopathy.  In addition, there  appeared to be a right lower lobe lung metastasis as well.  She underwent biopsy of 1 of the suspicious left axillary nodes and pathology ([id number]) showed metastatic carcinoma consistent with metastatic invasive ductal carcinoma.  The tumor was ER positive (99%, strong), SC positive (40%, moderate), and HER-2 negative.  She subsequently started treatment with palbociclib and tamoxifen on the [employer] clinical trial.  Restaging on 1/11/2018 showed progressive disease with new bony lesions in the ribs, thoracic spine, lungs, and liver.  Her systemic therapy was switched to Zoladex and abemaciclib on 1/12/2018.  Shortly thereafter, the patient traveled to [facility] in February, 2018 at which time she received a number of nonstandard, alternative therapies.  She was again found to have progressive disease in August, 2018 with new bony lesions and lesions involving the thoracic and abdominal wall musculature and subcutaneous tissues.  She was started on Xeloda on 9/5/2018 which she continued until January, 2019 at which time she was switched to Doxil.  She tolerated Doxil poorly and her last cycle was on 10/7/2019 with the plan to switch to Gemzar.    On 11/29/2019, she presented to the emergency department at the [facility] with new onset facial droop.  A CT scan showed osseous metastatic disease within the skull but no acute intracranial pathology.  She was admitted and underwent a brain MRI on 11/30/2019 which showed innumerable subcentimeter enhancing metastatic foci within the brain parenchyma in addition to innumerable metastatic bone lesions in the skull and upper cervical spine.  She was started on dexamethasone 2 mg twice daily.  Her case was discussed at multidisciplinary CNS tumor board and the consensus recommendation was for whole brain radiotherapy.  She underwent a LP on 12/9/2019 to evaluate for possible leptomeningeal disease, however, cytology was negative. CA27.29 on  12/9/19 was 546 (previously 625).    On exam today, Ms. Marsh continues to report symptoms of cranial nerve VII palsy.  She has right facial droop and is unable to close the right eye or elevate the right eyebrow.  She has noticed a lot of tearing in the right eye as a result of being unable to close it.  She is wearing an eye patch at night and sometimes around the house.  She has also found it difficult to keep liquids in the mouth.  She feels that the symptoms of facial nerve palsy have worsened since she initially presented despite remaining on dexamethasone 2 mg twice daily.  She reports that the onset of facial droop was sudden on 11/29 and she also noted some right jaw pain, posterior headache, and upper lip twitching on the right.  These other symptoms have improved significantly resolved with steroids.  She currently denies any headaches, nausea, vomiting, visual changes, focal weakness, sensory changes, disequilibrium.  No areas of significant pain throughout the body. She is not currently on any systemic therapy other than Zoladex but is planning to begin Gemzar which is scheduled to start this Friday, 12/13/2019.  Complete review of systems was otherwise unremarkable.       PAST MEDICAL HISTORY:   Past Medical History:   Diagnosis Date     Breast cancer, left breast (H) 5/12    grade 1 infiltrating ductal cancer, ER/KS+, s/p bilateral mastectomy     Complication of anesthesia     bradycardia,b/p drop p childbirth     Dysplasia of cervix (uteri) 12/01    LUCAS II - III, s/p LEEP  @ [facility]     Incompetence of cervix 4/06    D&E at 19+4 weeks twin pregnancy     Shingles outbreak 5/12     Past Surgical History:   Procedure Laterality Date     D & C  9/2010    retained POC     ENDOBRONCHIAL ULTRASOUND FLEXIBLE N/A 5/12/2017    Procedure: ENDOBRONCHIAL ULTRASOUND FLEXIBLE;  Flexible Bronchoscopy, Endobronchial Ultrasound, Transbronchial Biopsy ;  Surgeon: Bandar Meyer MD;  Location:  GI     INSERT  CHEST TUBE Left 10/11/2018    Procedure: INSERT CHEST TUBE;  Insert Chest Tube ;  Surgeon: Brock Chan MD;  Location: [facility]     INSERT CHEST TUBE N/A 11/12/2018    Procedure: Removal Of Pleurx Catheter ;  Surgeon: Bandar Meyer MD;  Location: [facility]     IR THORACENTESIS  10/14/2019     IR THORACENTESIS  11/25/2019     LEEP TX, CERVICAL  12/01    LEEP or LUCAS II - III     MASTECTOMY, RECONSTRUCT BREAST, COMBINED  6/11/2012    Procedure: COMBINED MASTECTOMY, RECONSTRUCT BREAST;  Bilateral Mastectomy with Reconstruction, Left [place] Node Biopsy , placement of On-Q catheters X 2 bilateral breast.;  Surgeon: Misael Cheng MD;  Location: [facility]     RECONSTRUCT BREAST BILATERAL  3/25/2013    Procedure: RECONSTRUCT BREAST BILATERAL;  Revise bilateral breasts and reconstructions, with insertion of gel breast implants with fat grafting and fat after mastectomies.;  Surgeon: Yair Olvera MD;  Location: [facility] OR     RECONSTRUCT BREAST BILATERAL, IMPLANT PROSTHESIS BILATERAL, COMBINED  9/27/2012    Procedure: COMBINED RECONSTRUCT BREAST BILATERAL, IMPLANT PROSTHESIS BILATERAL;  Bilateral Second Stage Breast Reconstruction With Gel Implants, Fat        THORACENTESIS Left 5/7/2018    Procedure: THORACENTESIS;  Thoracentesis;  Surgeon: Venancio Hussein PA-C;  Location: [facility]     THORACENTESIS Left 7/5/2018    Procedure: THORACENTESIS;  Thoracentesis;  Surgeon: Lydia Murillo PA-C;  [facility]     THORACENTESIS Left 9/5/2018    Procedure: THORACENTESIS;  Left Thoracentesis;  Surgeon: López Guillen PA-C;  [facility]     THORACENTESIS Left 10/3/2018    Procedure: THORACENTESIS;  Thoracentesis, left;  Surgeon: Eros Chauhan PA-C;  [facility]     THORACENTESIS Right 10/9/2018    Procedure: THORACENTESIS;  Thoacentesis ;  Surgeon: Linh Hayes MD;  Location: [facility]     THORACENTESIS Right 10/14/2019    Procedure: THORACENTESIS;  Surgeon: Lydia Brannon PA-C;  Location:  [facility]     THORACENTESIS Right 11/25/2019    Procedure: THORACENTESIS;  Surgeon: Eros Chauhan PA-C;  Location: [facility]        CHEMOTHERAPY HISTORY:  See HPI    PAST RADIATION THERAPY HISTORY:  None    IMPLANTED DEVICES: None    AUTOIMMUNE/CONNECTIVE TISSUE DISORDERS: None    PREGNANCY RISK: last menstrual period 10/6/17    MEDICATIONS:   Current Outpatient Medications   Medication Sig Dispense Refill     carboxymethylcellulose PF (GOODSENSE LUBRICATING EYE DROP) 0.5 % ophthalmic solution Place 1 drop into the right eye 3 times daily as needed for dry eyes 1 Bottle 1     dexamethasone (DECADRON) 4 MG tablet Take 0.5 tablets (2 mg) by mouth 2 times daily (with meals) 14 tablet 0     Digestive Enzymes (ENZYME DIGEST) CAPS Take 1 capsule by mouth daily       Eye Patch MISC 1 Bottle once for 1 dose 30 each 0     magic mouthwash (ENTER INGREDIENTS IN COMMENTS) suspension SWISH AND SPIT 5 TO 10 ML BY MOUTH 4 TO 6 TIMES DAILY AS NEEDED 240 mL 3     medical cannabis (Patient's own supply.  Not a prescription) (This is NOT a prescription, and does not certify that the patient has a qualifying medical condition for medical cannabis.  The purpose of this order is  to document that the patient reports taking medical cannabis.)       ondansetron (ZOFRAN) 8 MG tablet Take 1 tablet (8 mg) by mouth every 8 hours as needed for nausea 90 tablet 3     prochlorperazine (COMPAZINE) 5 MG tablet Take 1 tablet (5 mg) by mouth every 6 hours as needed for nausea or vomiting 30 tablet 1     VITAMIN D, CHOLECALCIFEROL, PO Take 1,000 Units by mouth daily          ALLERGIES:  allergic to erythromycin and sulfa drugs.    SOCIAL HISTORY: Lives in [facility] with  and children. Retired due to illness, previously worked as a teacher. Former smoker, quit 1997.    FAMILY HISTORY:   MGF - cancer NOS  Maternal uncle - colorectal cancer  Maternal uncle - prostate cancer    REVIEW OF SYMPTOMS:  A full 14-point review of  systems was performed.     PHYSICAL EXAMINATION:    BP (!) 139/92   Pulse 125   Wt 44.6 kg (98 lb 6.4 oz)   SpO2 99%   BMI 16.88 kg/m    General: Alert, oriented, NAD  Skin: No rashes or lesions appreciated  HEENT: NCAT, EOMI, right facial droop is present, she is unable to raise the right eyebrow or fully close the right eye, unable to puff cheeks or raise corner of mouth on right  Neck: Supple, full ROM  Breasts: Deferred  Heart: WWP  Lungs: Breathing comfortably on RA  Abd: Nondistended  /Rectal: Deferred  Ext: Atraumatic, grossly intact strength  Neuro: normal gait, coordination grossly intact, negative Romberg    ECOG PS: 0    ASSESSMENT: 49 year old female with metastatic breast cancer with recent MRI showing innumerable subcentimeter brain metastases with associated right facial nerve palsy.    RECOMMENDATIONS:   We had a detailed discussion with Ms. Marsh and her sister regarding the role of radiotherapy in the overall management of her metastatic breast cancer.  We discussed that her facial nerve palsy symptoms on the right is of unclear etiology.  However, we suspect that it may be related to leptomeningeal disease involving the right facial nerve.  It is also technically possible that there is a underlying viral etiology, unrelated to her recently diagnosed intracranial disease.  We discussed that given the innumerable brain metastases seen on her recent MRI, we recommend whole brain radiotherapy for improved local control within the brain. It is uncertain whether WBRT will have an impact on her CN VII palsy but it is possible that it will improve if related to leptomeningeal involvement. We discussed that despite her recent negative LP, the possibility of leptomeningeal disease remains. Stereotactic radiosurgery in this setting is not an option given the large number of lesions.  In addition, we do not recommend observation since these areas are likely to progress even with systemic therapy and  will become harder to control in the future.  We reviewed the logistics as well as the expected acute and potential late side effects of whole brain radiotherapy in detail with the patient and her sister.  They had a number of questions which were answered to the best of our ability.  Informed consent was obtained and the patient underwent a CT simulation in our department today.  We will plan to start her treatment beginning tomorrow.  We also discussed that we will inform Dr. Whitman of the plan to begin whole brain radiotherapy and recommend that the start date of her chemotherapy be moved until after she has completed WBRT.    Thank you for allowing us to participate in this patient's care.  Please feel free to call with any questions or concerns.    The patient was seen and discussed with staff, Dr. Monroy.     Rocco Garcia MD  Resident, PGY-5  Department of Radiation Oncology  [facility]  [phone]           I saw and examined the patient with the resident.  I have reviewed and edited the resident's note and agree with the plan of care.      Don Monroy MD

## 2019-12-10 NOTE — TELEPHONE ENCOUNTER
Social Work Note: Telephone Call  Oncology Clinic    Data/Intervention:  Patient Name:  Shan Marsh  /Age:  1970 (49 year old)    Call From:  SW received phone call from patient   Reason for Call:  Questions about PCA services    Assessment:  SW received phone call from patient who reported she does not have PCA coverage for services under her insurance.  SW explained patient would need to have [place] insurance for PCA coverage and gave education about private duty home care options. SW offered a list of agencies where aid services could be privately hired.  Per request of patient, list of private duty home care agencies was sent by email ([email or web address]).    Plan:  SW contact information previously provided to patient.  SW available to assist with needs.     Soo Yeon Han, MSW, LICSW  Pager: [phone]  Phone: [phone]

## 2019-12-11 NOTE — PROGRESS NOTES
HPI  INITIAL PATIENT ASSESSMENT    Diagnosis: Breast cancer dx 5/2012    Prior radiation therapy: None    Prior chemotherapy:   Protocol: see pt chart for details  Facility:  health  Dates: last dose 10/2019      Prior hormonal therapy:Yes: zoladex    Pain Eval:  Denies    Psychosocial  Living arrangements: family  Fall Risk: independent  Falls Risk Screening Questions - Consult    1. Have you fallen in the past one week?  No.  2. Have you felt unsteady when walking or standing in the past one week?  No.     referral needs: Not needed, has one already.    Advanced Directive: Yes - Location: In chart  Implantable Cardiac Device? No    Review of Systems   Constitutional: Positive for diaphoresis (Every other day), malaise/fatigue (r/t disease process. Decadron helps with energy level) and weight loss (lost about 10 lbs in the last month but starting to gain again). Negative for chills and fever.   HENT: Negative for ear pain, hearing loss, nosebleeds and sore throat.    Eyes: Positive for blurred vision (Blurred vision r/t the watering.). Negative for double vision and pain.   Respiratory: Positive for shortness of breath (with activity/stairs).    Cardiovascular: Negative for chest pain (Pulse running in mid 120's MD aware) and leg swelling.   Gastrointestinal: Negative for blood in stool, constipation, diarrhea, nausea and vomiting.   Genitourinary: Negative for dysuria, frequency, hematuria and urgency.   Musculoskeletal: Negative for back pain, falls, joint pain and neck pain.   Skin: Negative for rash.   Neurological: Positive for sensory change (Rt side faial weakness causing some change in her speach/ muffled) and focal weakness (Rt side of face drooping ). Negative for dizziness, tingling, seizures (as a teenager r/t drinking) and headaches.   Endo/Heme/Allergies: Does not bruise/bleed easily.   Psychiatric/Behavioral: Negative for depression. The patient is nervous/anxious (with appointment  and unknown) and has insomnia (R/T decadron).          Nurse face-to-face time: Level 4:  15 min face to face time

## 2019-12-11 NOTE — LETTER
2019       RE: Shan Marsh  [address]  [place] [address]     Dear Colleague,    Thank you for referring your patient, Shan Marsh, to the RADIATION ONCOLOGY CLINIC. Please see a copy of my visit note below.    Department of Radiation Oncology                   [place] [address]  [address] [place]  [address]  Office:  [phone]  Fax:  [phone]   Radiation Oncology Clinic  [address] [place] [address]  Phone:  [phone]  Fax:  [phone]     RE: Shan Marsh : 1970   MRN: [id number] JAYSON: 2019     OUTPATIENT VISIT NOTE       PROBLEM: Metastatic breast cancer with diffuse intracranial disease      was seen for initial consultation in the Dept of Therapeutic Radiology on 2019 at the request of Dr. Whitman    HISTORY OF PRESENT ILLNESS:   Ms. Marsh is a 49-year-old female with metastatic breast cancer with diffuse brain metastases.  She has a history of originally pT1a N0 M0, ER/NY positive, HER-2 negative left-sided breast cancer diagnosed in .  At that time, she underwent treatment with bilateral mastectomies followed by brief hormonal therapy with tamoxifen from 2012-2013.  Tamoxifen was discontinued due to side effects.  Her surgical pathology ([id number]) showed at least 12 foci of invasive carcinoma within the left breast measuring up to 2 mm.  Her tumor was grade 1 and there was extensive associated DCIS.  Margins were widely negative for invasive disease and 2 mm for DCIS.  2 sentinel lymph nodes were removed and both were negative.  There were no abnormalities in the right breast.  She took Tamoxifen from 2012 to 2013, but discontinued due to side effects.     She did well for several years after completion of therapy but developed left shoulder pain in .  She underwent an MRI of the left shoulder at that time which showed a soft tissue mass near the distal clavicle  measuring 4 cm in greatest dimension, concerning for malignancy.  Shortly thereafter, she underwent a PET/CT on 5/9/2017 which showed hypermetabolic left axillary, supraclavicular, mediastinal, and bilateral hilar lymphadenopathy.  In addition, there appeared to be a right lower lobe lung metastasis as well.  She underwent biopsy of 1 of the suspicious left axillary nodes and pathology ([id number]) showed metastatic carcinoma consistent with metastatic invasive ductal carcinoma.  The tumor was ER positive (99%, strong), W[place] positive (40%, moderate), and HER-2 negative.  She subsequently started treatment with palbociclib and tamoxifen on the [employer] clinical trial.  Restaging on 1/11/2018 showed progressive disease with new bony lesions in the ribs, thoracic spine, lungs, and liver.  Her systemic therapy was switched to Zoladex and abemaciclib on 1/12/2018.  Shortly thereafter, the patient traveled to [facility] in February, 2018 at which time she received a number of nonstandard, alternative therapies.  She was again found to have progressive disease in August, 2018 with new bony lesions and lesions involving the thoracic and abdominal wall musculature and subcutaneous tissues.  She was started on Xeloda on 9/5/2018 which she continued until January, 2019 at which time she was switched to Doxil.  She tolerated Doxil poorly and her last cycle was on 10/7/2019 with the plan to switch to Gemzar.    On 11/29/2019, she presented to the emergency department at the [facility] with new onset facial droop.  A CT scan showed osseous metastatic disease within the skull but no acute intracranial pathology.  She was admitted and underwent a brain MRI on 11/30/2019 which showed innumerable subcentimeter enhancing metastatic foci within the brain parenchyma in addition to innumerable metastatic bone lesions in the skull and upper cervical spine.  She was started on dexamethasone 2 mg twice daily.  Her  case was discussed at multidisciplinary CNS tumor board and the consensus recommendation was for whole brain radiotherapy.  She underwent a LP on 12/9/2019 to evaluate for possible leptomeningeal disease, however, cytology was negative. CA27.29 on 12/9/19 was 546 (previously 625).    On exam today, Ms. Marsh continues to report symptoms of cranial nerve VII palsy.  She has right facial droop and is unable to close the right eye or elevate the right eyebrow.  She has noticed a lot of tearing in the right eye as a result of being unable to close it.  She is wearing an eye patch at night and sometimes around the house.  She has also found it difficult to keep liquids in the mouth.  She feels that the symptoms of facial nerve palsy have worsened since she initially presented despite remaining on dexamethasone 2 mg twice daily.  She reports that the onset of facial droop was sudden on 11/29 and she also noted some right jaw pain, posterior headache, and upper lip twitching on the right.  These other symptoms have improved significantly resolved with steroids.  She currently denies any headaches, nausea, vomiting, visual changes, focal weakness, sensory changes, disequilibrium.  No areas of significant pain throughout the body. She is not currently on any systemic therapy other than Zoladex but is planning to begin Gemzar which is scheduled to start this Friday, 12/13/2019.  Complete review of systems was otherwise unremarkable.       PAST MEDICAL HISTORY:   Past Medical History:   Diagnosis Date     Breast cancer, left breast (H) 5/12    grade 1 infiltrating ductal cancer, ER/AR+, s/p bilateral mastectomy     Complication of anesthesia     bradycardia,b/p drop p childbirth     Dysplasia of cervix (uteri) 12/01    LUCAS II - III, s/p LEEP  @ [facility]     Incompetence of cervix 4/06    D&E at 19+4 weeks twin pregnancy     Shingles outbreak 5/12     Past Surgical History:   Procedure Laterality Date     D & C  9/2010     retained POC     ENDOBRONCHIAL ULTRASOUND FLEXIBLE N/A 5/12/2017    Procedure: ENDOBRONCHIAL ULTRASOUND FLEXIBLE;  Flexible Bronchoscopy, Endobronchial Ultrasound, Transbronchial Biopsy ;  Surgeon: Bandar Meyer MD;  Location: [facility]     INSERT CHEST TUBE Left 10/11/2018    Procedure: INSERT CHEST TUBE;  Insert Chest Tube ;  Surgeon: Brock Chan MD;  Location: [facility]     INSERT CHEST TUBE N/A 11/12/2018    Procedure: Removal Of Pleurx Catheter ;  Surgeon: Bandar Meyer MD;  Location: [facility]     IR THORACENTESIS  10/14/2019     IR THORACENTESIS  11/25/2019     LEEP TX, CERVICAL  12/01    LEEP or LUCAS II - III     MASTECTOMY, RECONSTRUCT BREAST, COMBINED  6/11/2012    Procedure: COMBINED MASTECTOMY, RECONSTRUCT BREAST;  Bilateral Mastectomy with Reconstruction, Left Stanton Node Biopsy , placement of On-Q catheters X 2 bilateral breast.;  Surgeon: Misael Cheng MD;  Location: [facility]     RECONSTRUCT BREAST BILATERAL  3/25/2013    Procedure: RECONSTRUCT BREAST BILATERAL;  Revise bilateral breasts and reconstructions, with insertion of gel breast implants with fat grafting and fat after mastectomies.;  Surgeon: Yair Olvera MD;  Location: [facility]     RECONSTRUCT BREAST BILATERAL, IMPLANT PROSTHESIS BILATERAL, COMBINED  9/27/2012    Procedure: COMBINED RECONSTRUCT BREAST BILATERAL, IMPLANT PROSTHESIS BILATERAL;  Bilateral Second Stage Breast Reconstruction With Gel Implants, Fat        THORACENTESIS Left 5/7/2018    Procedure: THORACENTESIS;  Thoracentesis;  Surgeon: Venancio Hussein PA-C;  Location: [facility]     THORACENTESIS Left 7/5/2018    Procedure: THORACENTESIS;  Thoracentesis;  Surgeon: Lydia Murillo PA-C;  [facility]     THORACENTESIS Left 9/5/2018    Procedure: THORACENTESIS;  Left Thoracentesis;  Surgeon: López Guillen PA-C;  [facility]     THORACENTESIS Left 10/3/2018    Procedure: THORACENTESIS;  Thoracentesis, left;  Surgeon: Eros Chauhan PA-C;   [facility]     THORACENTESIS Right 10/9/2018    Procedure: THORACENTESIS;  Thoacentesis ;  Surgeon: Linh Hayes MD;  Location: [facility]     THORACENTESIS Right 10/14/2019    Procedure: THORACENTESIS;  Surgeon: Lydia Brannon PA-C;  [facility]     THORACENTESIS Right 11/25/2019    Procedure: THORACENTESIS;  Surgeon: Eros Chauhan PA-C;  Location: [facility]        CHEMOTHERAPY HISTORY:  See HPI    PAST RADIATION THERAPY HISTORY:  None    IMPLANTED DEVICES: None    AUTOIMMUNE/CONNECTIVE TISSUE DISORDERS: None    PREGNANCY RISK: last menstrual period 10/6/17    MEDICATIONS:   Current Outpatient Medications   Medication Sig Dispense Refill     carboxymethylcellulose PF (GOODSENSE LUBRICATING EYE DROP) 0.5 % ophthalmic solution Place 1 drop into the right eye 3 times daily as needed for dry eyes 1 Bottle 1     dexamethasone (DECADRON) 4 MG tablet Take 0.5 tablets (2 mg) by mouth 2 times daily (with meals) 14 tablet 0     Digestive Enzymes (ENZYME DIGEST) CAPS Take 1 capsule by mouth daily       Eye Patch MISC 1 Bottle once for 1 dose 30 each 0     magic mouthwash (ENTER INGREDIENTS IN COMMENTS) suspension SWISH AND SPIT 5 TO 10 ML BY MOUTH 4 TO 6 TIMES DAILY AS NEEDED 240 mL 3     medical cannabis (Patient's own supply.  Not a prescription) (This is NOT a prescription, and does not certify that the patient has a qualifying medical condition for medical cannabis.  The purpose of this order is  to document that the patient reports taking medical cannabis.)       ondansetron (ZOFRAN) 8 MG tablet Take 1 tablet (8 mg) by mouth every 8 hours as needed for nausea 90 tablet 3     prochlorperazine (COMPAZINE) 5 MG tablet Take 1 tablet (5 mg) by mouth every 6 hours as needed for nausea or vomiting 30 tablet 1     VITAMIN D, CHOLECALCIFEROL, PO Take 1,000 Units by mouth daily          ALLERGIES:  allergic to erythromycin and sulfa drugs.    SOCIAL HISTORY: Lives in [place] with  and  children. Retired due to illness, previously worked as a teacher. Former smoker, quit 1997.    FAMILY HISTORY:   MGF - cancer NOS  Maternal uncle - colorectal cancer  Maternal uncle - prostate cancer    REVIEW OF SYMPTOMS:  A full 14-point review of systems was performed.     PHYSICAL EXAMINATION:    BP (!) 139/92   Pulse 125   Wt 44.6 kg (98 lb 6.4 oz)   SpO2 99%   BMI 16.88 kg/m     General: Alert, oriented, NAD  Skin: No rashes or lesions appreciated  HEENT: NCAT, EOMI, right facial droop is present, she is unable to raise the right eyebrow or fully close the right eye, unable to puff cheeks or raise corner of mouth on right  Neck: Supple, full ROM  Breasts: Deferred  Heart: WWP  Lungs: Breathing comfortably on RA  Abd: Nondistended  /Rectal: Deferred  Ext: Atraumatic, grossly intact strength  Neuro: normal gait, coordination grossly intact, negative Romberg    ECOG PS: 0    ASSESSMENT: 49 year old female with metastatic breast cancer with recent MRI showing innumerable subcentimeter brain metastases with associated right facial nerve palsy.    RECOMMENDATIONS:   We had a detailed discussion with Ms. Marsh and her sister regarding the role of radiotherapy in the overall management of her metastatic breast cancer.  We discussed that her facial nerve palsy symptoms on the right is of unclear etiology.  However, we suspect that it may be related to leptomeningeal disease involving the right facial nerve.  It is also technically possible that there is a underlying viral etiology, unrelated to her recently diagnosed intracranial disease.  We discussed that given the innumerable brain metastases seen on her recent MRI, we recommend whole brain radiotherapy for improved local control within the brain. It is uncertain whether WBRT will have an impact on her CN VII palsy but it is possible that it will improve if related to leptomeningeal involvement. We discussed that despite her recent negative LP, the  possibility of leptomeningeal disease remains. Stereotactic radiosurgery in this setting is not an option given the large number of lesions.  In addition, we do not recommend observation since these areas are likely to progress even with systemic therapy and will become harder to control in the future.  We reviewed the logistics as well as the expected acute and potential late side effects of whole brain radiotherapy in detail with the patient and her sister.  They had a number of questions which were answered to the best of our ability.  Informed consent was obtained and the patient underwent a CT simulation in our department today.  We will plan to start her treatment beginning tomorrow.  We also discussed that we will inform Dr. Whitman of the plan to begin whole brain radiotherapy and recommend that the start date of her chemotherapy be moved until after she has completed WBRT.    Thank you for allowing us to participate in this patient's care.  Please feel free to call with any questions or concerns.    The patient was seen and discussed with staff, Dr. Monroy.     Rocco Garcia MD  Resident, PGY-5  Department of Radiation Oncology  [facility]  [phone]           I saw and examined the patient with the resident.  I have reviewed and edited the resident's note and agree with the plan of care.      Don Monroy MD        HPI  INITIAL PATIENT ASSESSMENT    Diagnosis: Breast cancer dx 5/2012    Prior radiation therapy: None    Prior chemotherapy:   Protocol: see pt chart for details  Facility: [facility]  Dates: last dose 10/2019      Prior hormonal therapy:Yes: zoladex    Pain Eval:  Denies    Psychosocial  Living arrangements: family  Fall Risk: independent  Falls Risk Screening Questions - Consult    1. Have you fallen in the past one week?  No.  2. Have you felt unsteady when walking or standing in the past one week?  No.     referral needs: Not needed, has one  already.    Advanced Directive: Yes - Location: In chart  Implantable Cardiac Device? No    Review of Systems   Constitutional: Positive for diaphoresis (Every other day), malaise/fatigue (r/t disease process. Decadron helps with energy level) and weight loss (lost about 10 lbs in the last month but starting to gain again). Negative for chills and fever.   HENT: Negative for ear pain, hearing loss, nosebleeds and sore throat.    Eyes: Positive for blurred vision (Blurred vision r/t the watering.). Negative for double vision and pain.   Respiratory: Positive for shortness of breath (with activity/stairs).    Cardiovascular: Negative for chest pain (Pulse running in mid 120's MD aware) and leg swelling.   Gastrointestinal: Negative for blood in stool, constipation, diarrhea, nausea and vomiting.   Genitourinary: Negative for dysuria, frequency, hematuria and urgency.   Musculoskeletal: Negative for back pain, falls, joint pain and neck pain.   Skin: Negative for rash.   Neurological: Positive for sensory change (Rt side faial weakness causing some change in her speach/ muffled) and focal weakness (Rt side of face drooping ). Negative for dizziness, tingling, seizures (as a teenager r/t drinking) and headaches.   Endo/Heme/Allergies: Does not bruise/bleed easily.   Psychiatric/Behavioral: Negative for depression. The patient is nervous/anxious (with appointment and unknown) and has insomnia (R/T decadron).          Nurse face-to-face time: Level 4:  15 min face to face time          Again, thank you for allowing me to participate in the care of your patient.      Sincerely,    Don Monroy MD

## 2019-12-11 NOTE — NURSING NOTE
Radiation Therapy Patient Education    Person involved with teaching: Patient and friend    Patient educational needs for self management of treatment-related side effects assessment completed.  [facility] Patient Ed tab contains Patient Learning Assessment    Education Materials Given  Coping with Taste Changes    Educational Topics Discussed  Sim pamphlet and radiation schedule.    Response To Teaching  Verbalizes understanding    Referrals sent: None    Chemotherapy?  No

## 2019-12-12 NOTE — PROGRESS NOTES
Spoke to patient with symptoms of insomnia and patient's request to decrease steroid. Instructed patient on the importance of keeping the dose as Dr Whitman recommended and not to cut down without instruction to enjoy the holidays with her family. Instructed to use lavender, an carolin on her phone for relaxation and to sleep whenever she can. Will start Radiation today at Noon. Concerned with thigh numbness and perineum, but no c/o bladder or bowel loss of control. Will discuss with Radiation Oncologist today.  Answered all patient's questions and verbalized understanding. Sharifa Celeste RN, BSN.

## 2019-12-12 NOTE — TELEPHONE ENCOUNTER
[facility] Telephone Triage Note    Assessment: Patient called in to triage reporting the following symptoms: Patient on 4mg dexamethasone daily with 2mg with breakfast and 2 mg at noon with lunch. Patient c/o not being able to sleep for approximately 2 weeks. She has been taking generic benadryl, 2 tablets per night, without relief. Per chart, pt prescribed zyprexa at bedtime but patient is upset about this is it is an anti-psychotic that she feels she hasn't been deemed safe to take per information she read online. Patient states that she is on the steroid therapy for appetite stimulant and reduction in facial droop. Her appetite has increased but denies any changes to facial droop. Patient is requesting to reduce dosage of dexamethasone down to 1mg daily.     Discussed with PEE Frias, RNCC. Sharifa stated to writer that she would follow up with patient as care team could not recommend tapering down from steroid at this time.

## 2019-12-13 NOTE — LETTER
12/13/2019      RE: Shan Marsh  [address]  [facility] [address]       Oncology/Hematology Visit Note  December 13, 2019    Reason for Visit: Follow up for metastatic breast cancer (ER/IA+, HER2-)    History of Present Illness: Shan Marsh is a 49 year old female without significant past medical history with a diagnosis of metastatic breast cancer after she presented with left shoulder pain. Shoulder MRI showed a soft tissue mass close to the distal clavicle which was concerning for malignancy. PET/CT on 5/9/17 showed hypermetabolic left axillary, supraclavicular, mediastinal and hilar nodes. She had biopsies of left axilla and hilar nodes showing metastatic breast cancer, strongly ER positive, moderately IA positive, and HER-2 kaitlin non-amplified. She originally had [id number], ER/IA positive, HER-2 negative breast cancer diagnosed in spring of 2012 s/p bilateral mastectomies and Tamoxifen from 8/2012-2/2013 and discontinued due to side effects. With development of metastatic disease, she was recommended treatment on BIG10 clinical trial with palbociclib and tamoxifen. She started treatment on 5/24/17 and required dose reduction for neutropenia. CT CAP and bone scan 1/11/2018 showing progressive disease with new bony lesions in the ribs, T spine, and possibly liver, lungs. She started Zoladex and abemiciclib on 1/12/2018.      In March 2018, she sought treatment at [facility] in [place], under the care of Dr. Brooks, where she received:  - Vallovex vaccine (per FDA, is in phase I trials here in [place])  - Insulin potentiated therapy with conjugated chemotherapy (5% chemotherapy, unclear what chemotherapy)  - Gina's toxins, IV vitamin C, IV Ozone, hyperbaric chamber, vitamin B17 infusion, vitamin CK3 IV, and coffee enemas PRN  - Also oral niacin, Lugol, acidol, pancreatin, and thyroid desiccated liver, per their regimen  - She was continued on Letrozole and advised to continue Zoladex.       She met with Dr. Whitman and patient wished to continue her alternative regimen. Abemiciclib was placed on hold. She was continued on Zoladex and letrozole. While in [place], she required thoracentesis for symptomatic pleural effusion. A repeat PET in August 2018 showed widely progressive disease with multiple new bone mets involving the spine, ribs, pelvis, and sacrum; new lesions involving the left lateral thoracic/abdominal wall musculature and subcutaneous tissues, new metastases in the left adrenal gland, and recurrent large L pleural effusion. She was started on Xeloda 1500 mg BID on 9/5/18.      She was admitted 10/4-10/11/18 after a syncopal event/unwitnessed fall at home. She was found to have left pneumothorax and pericardial effusion with tamponade physiology. She had left chest tube placed in ED which then fell out on 10/10. Left pleurx placed on 10/11. She had pericardiocentesis on 10/7, drain removed 10/8.      She resumed Xeloda, and remained on this until January 2019.  At that time, she was switched to Doxil. She required several dose reductions due to poor tolerance. Due to ongoing poor tolerance, plan to switch to gemzar. She presented to [facility] ED on 11/30/2019 with new facial droop, found to have multiple small brain metastases. Her case was reviewed at tumor board and recommended palliative WBRT, initiated 12/12/19.     Interval History:  Shan is here for follow-up, accompanied by her . She started radiation yesterday.    Nausea is significantly improved with the dexametheasone, and scheduled zofran/compazine. She feels like her intake has subsequently improved. She thinks she developed thrush on her tongue. She drank kefir and it improved, but still present. She is sleeping poorly. She is using bendaryl at bedtime. She has daytime fatigue. She thinks this already worsened on radiation. She has noted no change in the facial droop. Breathing feels stable. The headaches with pain down her  neck have improved. She hasn't had any new pains.    ROS: 10 point ROS neg other than the symptoms noted above in the HPI.     Current Outpatient Medications   Medication     carboxymethylcellulose PF (GOODSENSE LUBRICATING EYE DROP) 0.5 % ophthalmic solution     dexamethasone (DECADRON) 2 MG tablet     Digestive Enzymes (ENZYME DIGEST) CAPS     Eye Patch MISC     magic mouthwash (ENTER INGREDIENTS IN COMMENTS) suspension     medical cannabis (Patient's own supply.  Not a prescription)     OLANZapine (ZYPREXA) 5 MG tablet     ondansetron (ZOFRAN) 8 MG tablet     prochlorperazine (COMPAZINE) 5 MG tablet     VITAMIN D, CHOLECALCIFEROL, PO     No current facility-administered medications for this visit.      Facility-Administered Medications Ordered in Other Visits   Medication     sodium chloride (PF) 0.9% PF flush 10 mL     sodium chloride (PF) 0.9% PF flush 10 mL     sodium chloride (PF) 0.9% PF flush 10 mL     sodium chloride (PF) 0.9% PF flush 10 mL          Physical Examination:  /83   Pulse 119   Temp 98  F (36.7  C) (Oral)   Resp 18   Wt 44 kg (97 lb)   SpO2 97%   BMI 16.64 kg/m     General: The patient is a pleasant female in no acute distress.  HEENT: facial droop, pupils equal and reactive to light. +Thrush on tongue and buccal mucosa  Lymph: No palpable cervical, supraclavicular, subclavicular lymphadenopathy.  Heart: Regular rate and rhythm.   Lungs: Breathing comfortably on room air. Decreased BS at right base to midlung, decreased breath sound in left basilar area as well, otherwise clear to auscultation bilaterally.   Abdomen: Bowel sounds present, soft, no pain to palpation, no palpable hepatosplenomegaly or masses.   Extremities: No lower extremity edema noted bilaterally.   Neuro: + R facial droop. Otheherwise, Cranial nerves II through XII are grossly intact.  Skin: No rashes, petechiae, or bruising noted on exposed skin.     Laboratory Data:    Imaging:  CT CAP 12/13/2019  IMPRESSION:  In this patient with a history of metastatic breast  cancer, there is evidence of disease progression:  1. In the chest:  a. Slightly enlarging right axillary and anterior mediastinal lymph  nodes.   b. New mild right middle lobe ground glass opacities could represent  infectious/inflammatory process. Stable scattered small pulmonary  solid nodules.  c. Stable large right pleural effusion and small left complex pleural  effusion.     2. In the abdomen/pelvis:  a. Enlarging hepatic metastases in the right lobe which results in new  mild intrahepatic biliary dilatation in the right lobe.  b. Enlarging 10 mm retrocaval lymph node.     3. Stable appearance of the diffuse bone metastases.    NM Bone scan 12/13/2019 - result pending    Assessment and Plan:  Shan Marsh is a 49 year old woman with prior history of a T1 N0, ER/NE positive, HER2-negative left breast cancer treated with bilateral mastectomies in 2012, now with metastatic disease (bones, pleural effusions, lungs, pericardial effusion, liver, left adrenal/retroperitoneal mets), ER/NE positive, HER2 negative.  She progressed on palbociclib and tamoxifen, alternative therapies in [place], Xeloda, and had poor tolerance to Doxil.     Metastatic breast cancer: Plan was to switch to Gemzar, however found to have brain mets after presentig to the ED 11/30 with new facial droop. Plan now as below. Holding off systemic therapy for now, until completing radiation. Systemic imaging today showing progressive systemic disease with enlarging lymph nodes, hepatic metastases, retrocaval lymph node, stable bony disease. Continue Zoladex. Will message Dr. Whitman re: plan for systemic therapy. Shan remains interested in clinical trial.    Brain mets: Brain MRI showing several small foci 3mm or less. She started decadron 4 mg BID. WBRT started 12/12/19. CSF negative for malignancy.     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 It  could be possible that this is viral. So far, no response with steroids.    Nausea, anorexia: Improved with dex, scheduled Zofran/Compazine. I did re-discuss zyprexa 2.5 mg with her today and she decided to try it in hopes that this can help her sleep. She is very concerned re: side effects, she may try halving the 2.5 mg tablet if she desires to ensure she tolerates it.    FEN:  - New hyponatremia, hypocalcemia, mild and stable today.     Bone mets.  She remains on Zometa every 3 months, most recenetly recieved 10/7/2019      Bilateral pleural effusions (right worse than left). - Requires periodic thoracentesis, most recently 11/25 with stable effusion 11/29 in ED.  Prefer staged procedures given her improved tolerance with these. Stable on exam, imaging.    History of a malignant pericardial effusion.    - repeat Echo on 9/9/19 did not show any pericardial effusion, will continue to monitor based on her clinical symptoms.    Insomnia: Likely 2/2 steroids. Using bendaryl 50 mg with good effect, however I do think this is contributing to her daytime fatigue. We are trying Zyprexa, above, for nausea.   She will see if this also helps her with sleep.    Thrush: Rx Nystatin    I spent >25 minutes with the patient, with over 50% of the time spent counseling or coordinating their care as described above.    PEE Frias PA-C

## 2019-12-13 NOTE — NURSING NOTE
Chief Complaint   Patient presents with     Blood Draw     VPt blood draw and vitals     Venipuncture labs drawn from left arm

## 2019-12-13 NOTE — NURSING NOTE
"Oncology Rooming Note    December 13, 2019 1:44 PM   Shan Marsh is a 49 year old female who presents for:    Chief Complaint   Patient presents with     Blood Draw     VPt blood draw and vitals     Oncology Clinic Visit     Return visit; breast cancer     Initial Vitals: /83   Pulse 119   Temp 98  F (36.7  C) (Oral)   Resp 18   Wt 44 kg (97 lb)   SpO2 97%   BMI 16.64 kg/m   Estimated body mass index is 16.64 kg/m  as calculated from the following:    Height as of 12/9/19: 1.626 m (5' 4.02\").    Weight as of this encounter: 44 kg (97 lb). Body surface area is 1.41 meters squared.  No Pain (0) Comment: Data Unavailable   No LMP recorded. (Menstrual status: Chemotherapy).  Allergies reviewed: Yes  Medications reviewed: Yes    Medications: Medication refills not needed today.  Pharmacy name entered into [employer]:    [facility] [place], [place] - [address]  [facility] - [place], [place] - [address] [facility] [place], [place] - [phone] [facility] [phone]    Clinical concerns: No new concerns today. Lima Griffiths was notified.      ARNEL VALDES CMA            "

## 2019-12-13 NOTE — PROGRESS NOTES
Oncology/Hematology Visit Note  December 13, 2019    Reason for Visit: Follow up for metastatic breast cancer (ER/TX+, HER2-)    History of Present Illness: Shan Marsh is a 49 year old female without significant past medical history with a diagnosis of metastatic breast cancer after she presented with left shoulder pain. Shoulder MRI showed a soft tissue mass close to the distal clavicle which was concerning for malignancy. PET/CT on 5/9/17 showed hypermetabolic left axillary, supraclavicular, mediastinal and hilar nodes. She had biopsies of left axilla and hilar nodes showing metastatic breast cancer, strongly ER positive, moderately TX positive, and HER-2 kaitlin non-amplified. She originally had [id number], ER/TX positive, HER-2 negative breast cancer diagnosed in spring of 2012 s/p bilateral mastectomies and Tamoxifen from 8/2012-2/2013 and discontinued due to side effects. With development of metastatic disease, she was recommended treatment on BIG10 clinical trial with palbociclib and tamoxifen. She started treatment on 5/24/17 and required dose reduction for neutropenia. CT CAP and bone scan 1/11/2018 showing progressive disease with new bony lesions in the ribs, T spine, and possibly liver, lungs. She started Zoladex and abemiciclib on 1/12/2018.      In March 2018, she sought treatment at [facility] in [place], under the care of Dr. Brooks, where she received:  - Vallovex vaccine (per FDA, is in phase I trials here in [place])  - Insulin potentiated therapy with conjugated chemotherapy (5% chemotherapy, unclear what chemotherapy)  - Gina's toxins, IV vitamin C, IV Ozone, hyperbaric chamber, vitamin B17 infusion, vitamin CK3 IV, and coffee enemas PRN  - Also oral niacin, Lugol, acidol, pancreatin, and thyroid desiccated liver, per their regimen  - She was continued on Letrozole and advised to continue Zoladex.      She met with Dr. Whitman and patient wished to continue her alternative regimen. Abemiciclib  was placed on hold. She was continued on Zoladex and letrozole. While in [place], she required thoracentesis for symptomatic pleural effusion. A repeat PET in August 2018 showed widely progressive disease with multiple new bone mets involving the spine, ribs, pelvis, and sacrum; new lesions involving the left lateral thoracic/abdominal wall musculature and subcutaneous tissues, new metastases in the left adrenal gland, and recurrent large L pleural effusion. She was started on Xeloda 1500 mg BID on 9/5/18.      She was admitted 10/4-10/11/18 after a syncopal event/unwitnessed fall at home. She was found to have left pneumothorax and pericardial effusion with tamponade physiology. She had left chest tube placed in ED which then fell out on 10/10. Left pleurx placed on 10/11. She had pericardiocentesis on 10/7, drain removed 10/8.      She resumed Xeloda, and remained on this until January 2019.  At that time, she was switched to Doxil. She required several dose reductions due to poor tolerance. Due to ongoing poor tolerance, plan to switch to gemzar. She presented to [facility] ED on 11/30/2019 with new facial droop, found to have multiple small brain metastases. Her case was reviewed at tumor board and recommended palliative WBRT, initiated 12/12/19.     Interval History:  Shan is here for follow-up, accompanied by her . She started radiation yesterday.    Nausea is significantly improved with the dexametheasone, and scheduled zofran/compazine. She feels like her intake has subsequently improved. She thinks she developed thrush on her tongue. She drank kefir and it improved, but still present. She is sleeping poorly. She is using bendaryl at bedtime. She has daytime fatigue. She thinks this already worsened on radiation. She has noted no change in the facial droop. Breathing feels stable. The headaches with pain down her neck have improved. She hasn't had any new pains.    ROS: 10 point ROS neg other than the  symptoms noted above in the HPI.     Current Outpatient Medications   Medication     carboxymethylcellulose PF (GOODSENSE LUBRICATING EYE DROP) 0.5 % ophthalmic solution     dexamethasone (DECADRON) 2 MG tablet     Digestive Enzymes (ENZYME DIGEST) CAPS     Eye Patch MISC     magic mouthwash (ENTER INGREDIENTS IN COMMENTS) suspension     medical cannabis (Patient's own supply.  Not a prescription)     OLANZapine (ZYPREXA) 5 MG tablet     ondansetron (ZOFRAN) 8 MG tablet     prochlorperazine (COMPAZINE) 5 MG tablet     VITAMIN D, CHOLECALCIFEROL, PO     No current facility-administered medications for this visit.      Facility-Administered Medications Ordered in Other Visits   Medication     sodium chloride (PF) 0.9% PF flush 10 mL     sodium chloride (PF) 0.9% PF flush 10 mL     sodium chloride (PF) 0.9% PF flush 10 mL     sodium chloride (PF) 0.9% PF flush 10 mL          Physical Examination:  /83   Pulse 119   Temp 98  F (36.7  C) (Oral)   Resp 18   Wt 44 kg (97 lb)   SpO2 97%   BMI 16.64 kg/m    General: The patient is a pleasant female in no acute distress.  HEENT: facial droop, pupils equal and reactive to light. +Thrush on tongue and buccal mucosa  Lymph: No palpable cervical, supraclavicular, subclavicular lymphadenopathy.  Heart: Regular rate and rhythm.   Lungs: Breathing comfortably on room air. Decreased BS at right base to midlung, decreased breath sound in left basilar area as well, otherwise clear to auscultation bilaterally.   Abdomen: Bowel sounds present, soft, no pain to palpation, no palpable hepatosplenomegaly or masses.   Extremities: No lower extremity edema noted bilaterally.   Neuro: + R facial droop. Otheherwise, Cranial nerves II through XII are grossly intact.  Skin: No rashes, petechiae, or bruising noted on exposed skin.     Laboratory Data:    Imaging:  CT CAP 12/13/2019  IMPRESSION: In this patient with a history of metastatic breast  cancer, there is evidence of disease  progression:  1. In the chest:  a. Slightly enlarging right axillary and anterior mediastinal lymph  nodes.   b. New mild right middle lobe ground glass opacities could represent  infectious/inflammatory process. Stable scattered small pulmonary  solid nodules.  c. Stable large right pleural effusion and small left complex pleural  effusion.     2. In the abdomen/pelvis:  a. Enlarging hepatic metastases in the right lobe which results in new  mild intrahepatic biliary dilatation in the right lobe.  b. Enlarging 10 mm retrocaval lymph node.     3. Stable appearance of the diffuse bone metastases.    NM Bone scan 12/13/2019 - result pending    Assessment and Plan:  Shan Marsh is a 49 year old woman with prior history of a T1 N0, ER/MO positive, HER2-negative left breast cancer treated with bilateral mastectomies in 2012, now with metastatic disease (bones, pleural effusions, lungs, pericardial effusion, liver, left adrenal/retroperitoneal mets), ER/MO positive, HER2 negative.  She progressed on palbociclib and tamoxifen, alternative therapies in [place], Xeloda, and had poor tolerance to Doxil.     Metastatic breast cancer: Plan was to switch to Gemzar, however found to have brain mets after presentig to the ED 11/30 with new facial droop. Plan now as below. Holding off systemic therapy for now, until completing radiation. Systemic imaging today showing progressive systemic disease with enlarging lymph nodes, hepatic metastases, retrocaval lymph node, stable bony disease. Continue Zoladex. Will message Dr. Whitman re: plan for systemic therapy. Shan remains interested in clinical trial.    Brain mets: Brain MRI showing several small foci 3mm or less. She started decadron 4 mg BID. WBRT started 12/12/19. CSF negative for malignancy.     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 It could be possible that this is viral. So far, no response with steroids.    Nausea,  anorexia: Improved with dex, scheduled Zofran/Compazine. I did re-discuss zyprexa 2.5 mg with her today and she decided to try it in hopes that this can help her sleep. She is very concerned re: side effects, she may try halving the 2.5 mg tablet if she desires to ensure she tolerates it.    FEN:  - New hyponatremia, hypocalcemia, mild and stable today.     Bone mets.  She remains on Zometa every 3 months, most recenetly recieved 10/7/2019      Bilateral pleural effusions (right worse than left). - Requires periodic thoracentesis, most recently 11/25 with stable effusion 11/29 in ED.  Prefer staged procedures given her improved tolerance with these. Stable on exam, imaging.    History of a malignant pericardial effusion.    - repeat Echo on 9/9/19 did not show any pericardial effusion, will continue to monitor based on her clinical symptoms.    Insomnia: Likely 2/2 steroids. Using bendaryl 50 mg with good effect, however I do think this is contributing to her daytime fatigue. We are trying Zyprexa, above, for nausea.   She will see if this also helps her with sleep.    Thrush: Rx Nystatin    I spent >25 minutes with the patient, with over 50% of the time spent counseling or coordinating their care as described above.    Lima Griffiths PA-C

## 2019-12-16 NOTE — LETTER
Date:December 17, 2019      Provider requested that no letter be sent. Do not send.       [facility] Health Information

## 2019-12-16 NOTE — LETTER
2019       RE: Shan Marsh  [address]  [facility] [address]     Dear Colleague,    Thank you for referring your patient, Shan Marsh, to the RADIATION ONCOLOGY CLINIC. Please see a copy of my visit note below.    [facility]  SPECIALIZING IN BREAKTHROUGHS  Radiation Oncology    On Treatment Visit Note      Shan Marsh      Date: 2019   MRN: [id number]   : 1970  Diagnosis: Breast cancer, brain mets      Reason for Visit:  On Radiation Treatment Visit     Treatment Summary to Date  Treatment Site: Whole brain Current Dose: 900/3000 cGy Fractions: 3/10      Chemotherapy  Chemo concurrent with radx?: No    Subjective:   Tolerating treatment well without acute toxicity. No change in CN VII palsy symptoms. No eye pain. Primary complaint is poor sleep due to dexamethasone. She also is being treated for thrush. Appetite is improved though and she has gained 2 lbs.     Nursing ROS:   Nutrition Alteration  Diet Type: Patient's Preference  Skin  Skin Reaction: 0 - No changes          Psychosocial  Pyschosocial Note: Feeling well but not sleeping much r/t the decadron  Pain Assessment  0-10 Pain Scale: 0      Objective:   BP (!) 127/90   Pulse 122   Wt 44.9 kg (99 lb)   BMI 16.98 kg/m     Gen: Appears well, in no acute distress  Skin: No erythema  Neuro: CN VII palsy on right    Labs:  CBC RESULTS:   Recent Labs   Lab Test 19  1340   WBC 11.9*   RBC 4.04   HGB 11.4*   HCT 36.3   MCV 90   MCH 28.2   MCHC 31.4*   RDW 16.5*        ELECTROLYTES:  Recent Labs   Lab Test 19  1340   *   POTASSIUM 4.2   CHLORIDE 96   KRYSTEN 9.6   CO2 28   BUN 14   CR 0.50*   *       Assessment:    Tolerating radiation therapy well.  All questions and concerns addressed.    Toxicities:  Alopecia: Grade 0: No toxicity  Fatigue: Grade 0: No toxicity  Dermatitis: Grade 0: No toxicity    Plan:   1. Continue current therapy.    2. Ophthalmic  ointment sent to pharmacy for nighttime use  3. Decrease dexamethasone to 2 mg daily for one week then stop since pt seeing no neuro benefit      Mosaiq chart and setup information reviewed  Ports checked    Medication Review  Med Note: No changes per pt    Educational Topic Discussed  Education Instructions: Reviewed    The patient was seen and discussed with staff, Dr. Monroy.    Rocco Garcia MD  Resident, PGY-5  Department of Radiation Oncology  [facility]  P: [phone]      I saw and examined the patient with the resident.  I have reviewed and edited the resident's note and agree with the plan of care.      Don Monroy MD      Please do not send letter to referring physician.        Again, thank you for allowing me to participate in the care of your patient.      Sincerely,    Don Monroy MD

## 2019-12-16 NOTE — PROGRESS NOTES
[facility] PHYSICIANS  SPECIALIZING IN BREAKTHROUGHS  Radiation Oncology    On Treatment Visit Note      Shan Marsh      Date: 2019   MRN: [id number]   : 1970  Diagnosis: Breast cancer, brain mets      Reason for Visit:  On Radiation Treatment Visit     Treatment Summary to Date  Treatment Site: Whole brain Current Dose: 900/3000 cGy Fractions: 310      Chemotherapy  Chemo concurrent with radx?: No    Subjective:   Tolerating treatment well without acute toxicity. No change in CN VII palsy symptoms. No eye pain. Primary complaint is poor sleep due to dexamethasone. She also is being treated for thrush. Appetite is improved though and she has gained 2 lbs.     Nursing ROS:   Nutrition Alteration  Diet Type: Patient's Preference  Skin  Skin Reaction: 0 - No changes          Psychosocial  Pyschosocial Note: Feeling well but not sleeping much r/t the decadron  Pain Assessment  0-10 Pain Scale: 0      Objective:   BP (!) 127/90   Pulse 122   Wt 44.9 kg (99 lb)   BMI 16.98 kg/m    Gen: Appears well, in no acute distress  Skin: No erythema  Neuro: CN VII palsy on right    Labs:  CBC RESULTS:   Recent Labs   Lab Test 19  1340   WBC 11.9*   RBC 4.04   HGB 11.4*   HCT 36.3   MCV 90   MCH 28.2   MCHC 31.4*   RDW 16.5*        ELECTROLYTES:  Recent Labs   Lab Test 19  1340   *   POTASSIUM 4.2   CHLORIDE 96   KRYSTEN 9.6   CO2 28   BUN 14   CR 0.50*   *       Assessment:    Tolerating radiation therapy well.  All questions and concerns addressed.    Toxicities:  Alopecia: Grade 0: No toxicity  Fatigue: Grade 0: No toxicity  Dermatitis: Grade 0: No toxicity    Plan:   1. Continue current therapy.    2. Ophthalmic ointment sent to pharmacy for nighttime use  3. Decrease dexamethasone to 2 mg daily for one week then stop since pt seeing no neuro benefit      Mosaiq chart and setup information reviewed  Ports checked    Medication Review  Med Note: No changes per  pt    Educational Topic Discussed  Education Instructions: Reviewed    The patient was seen and discussed with staff, Dr. Monroy.    Rocco Garcia MD  Resident, PGY-5  Department of Radiation Oncology  [facility]  P: [phone]      I saw and examined the patient with the resident.  I have reviewed and edited the resident's note and agree with the plan of care.      Don Monroy MD      Please do not send letter to referring physician.

## 2019-12-23 NOTE — LETTER
2019       RE: Shan Marsh  [address]  [facility] [address]     Dear Colleague,    Thank you for referring your patient, Shan Marsh, to the RADIATION ONCOLOGY CLINIC. Please see a copy of my visit note below.    [facility] PHYSICIANS  SPECIALIZING IN BREAKTHROUGHS  Radiation Oncology    On Treatment Visit Note      Shan Marsh      Date: 2019   MRN: [id number]   : 1970  Diagnosis: Breast cancer, brain mets      Reason for Visit:  On Radiation Treatment Visit     Treatment Summary to Date  Treatment Site: Whole brain Current Dose: 2700/3000 cGy Fractions: 9/10      Chemotherapy  Chemo concurrent with radx?: No    ED Visit/Hosiptal Admission: None     Treatment Breaks: None       Subjective:   Shan is doing better.  Last week she complained of right ear congestion and was advised on OTC decongestant.  She took Benadryl instead and her symptom largely resolved.  She also began to feel some improvement of her right facial droop.  Oral thrush has resolved.  She is now off of the decadron.  She denies headaches.     Nursing ROS:   Nutrition Alteration  Diet Type: Patient's Preference  Nutrition Note: trying to add calories to her diet  Skin  Skin Reaction: 0 - No changes  CNS Alteration  CNS note: off decadron        Gastrointestinal  GI Note: WNL     Psychosocial  Pyschosocial Note: Tired, tried the CBD oil last night and seemed to help with sleep  Pain Assessment  0-10 Pain Scale: 0  Pain Note: Rt ear pain much better today, using benedryl       Objective:   /83   Pulse 106   Wt 45.4 kg (100 lb)   BMI 17.16 kg/m     Gen: Appears well, in no acute distress  HEENT: Right facial muscles still weak, but lid closure is stronger than last week.   Skin: No scalp erythema; no apparent hair loss.     Labs:  CBC RESULTS:   Recent Labs   Lab Test 19  1340   WBC 11.9*   RBC 4.04   HGB 11.4*   HCT 36.3   MCV 90   MCH 28.2   MCHC 31.4*   RDW  16.5*        ELECTROLYTES:  Recent Labs   Lab Test 12/13/19  1340   *   POTASSIUM 4.2   CHLORIDE 96   KRYSTEN 9.6   CO2 28   BUN 14   CR 0.50*   *       Assessment:    Tolerating radiation therapy well.  All questions and concerns addressed.    Toxicities:  Dermatitis: Grade 0: No toxicity    Plan:   1. Continue current therapy.    2. Will finish treatment in one more fraction tomorrow.  3. Follow-up with our clinic as needed.  4. Follow-up with Dr. Whitman as scheduled.       Mosaiq chart and setup information reviewed  Ports checked    Medication Review  Med Note: No changes per pt    Educational Topic Discussed  Additional Instructions: seeing Med Onc 12/27/19  Education Instructions: Reviewed        Don Monroy MD/PhD  [phone] clinic  Pager [phone]    Please do not send letter to referring physician.      Again, thank you for allowing me to participate in the care of your patient.      Sincerely,    Don Monroy MD

## 2019-12-23 NOTE — LETTER
Date:December 26, 2019      Provider requested that no letter be sent. Do not send.       [facility] Health Information

## 2019-12-23 NOTE — PATIENT INSTRUCTIONS
Continuing Management of the Effects of Radiation Treatment    The side effects of radiation therapy should gradually decrease in 2 to 3 weeks after you have finished radiation.  Some effects take longer to resolve.    Skin reactions:  Skin changes (such as redness or irritation) should begin to get better gradually.  Some people will have a permanent change in skin color.  Their skin may be more pink or  tan  than the untreated skin.  The skin may be thinner or more fragile than before treatment.  Continue to use a gentle moisturizing lotion for several months.  You should always protect the skin in the area that was treated by using sunscreen of spf 30 or higher.      Other skin care instructions:    Fatigue caused by radiation therapy will decrease and your energy will improve.    For concerns or questions call Department of Therapeutic Radiology [phone]

## 2019-12-23 NOTE — PROGRESS NOTES
[facility] PHYSICIANS  SPECIALIZING IN BREAKTHROUGHS  Radiation Oncology    On Treatment Visit Note      Shan Marsh      Date: 2019   MRN: [id number]   : 1970  Diagnosis: Breast cancer, brain mets      Reason for Visit:  On Radiation Treatment Visit     Treatment Summary to Date  Treatment Site: Whole brain Current Dose: 2700/3000 cGy Fractions: 9/10      Chemotherapy  Chemo concurrent with radx?: No    ED Visit/Hosiptal Admission: None     Treatment Breaks: None       Subjective:   Shan is doing better.  Last week she complained of right ear congestion and was advised on OTC decongestant.  She took Benadryl instead and her symptom largely resolved.  She also began to feel some improvement of her right facial droop.  Oral thrush has resolved.  She is now off of the decadron.  She denies headaches.     Nursing ROS:   Nutrition Alteration  Diet Type: Patient's Preference  Nutrition Note: trying to add calories to her diet  Skin  Skin Reaction: 0 - No changes  CNS Alteration  CNS note: off decadron        Gastrointestinal  GI Note: WNL     Psychosocial  Pyschosocial Note: Tired, tried the CBD oil last night and seemed to help with sleep  Pain Assessment  0-10 Pain Scale: 0  Pain Note: Rt ear pain much better today, using benedryl       Objective:   /83   Pulse 106   Wt 45.4 kg (100 lb)   BMI 17.16 kg/m    Gen: Appears well, in no acute distress  HEENT: Right facial muscles still weak, but lid closure is stronger than last week.   Skin: No scalp erythema; no apparent hair loss.     Labs:  CBC RESULTS:   Recent Labs   Lab Test 19  1340   WBC 11.9*   RBC 4.04   HGB 11.4*   HCT 36.3   MCV 90   MCH 28.2   MCHC 31.4*   RDW 16.5*        ELECTROLYTES:  Recent Labs   Lab Test 19  1340   *   POTASSIUM 4.2   CHLORIDE 96   KRYSTEN 9.6   CO2 28   BUN 14   CR 0.50*   *       Assessment:    Tolerating radiation therapy well.  All questions and concerns  addressed.    Toxicities:  Dermatitis: Grade 0: No toxicity    Plan:   1. Continue current therapy.    2. Will finish treatment in one more fraction tomorrow.  3. Follow-up with our clinic as needed.  4. Follow-up with Dr. Whitman as scheduled.       Mosaiq chart and setup information reviewed  Ports checked    Medication Review  Med Note: No changes per pt    Educational Topic Discussed  Additional Instructions: seeing Med Onc 12/27/19  Education Instructions: Reviewed        Don Monroy MD/PhD  [phone] clinic  Pager [phone]    Please do not send letter to referring physician.

## 2019-12-27 NOTE — PROGRESS NOTES
"Patient calling with symptoms of exhaustion from radiation and walking up 12 steps at home causing shortness of breath. Patient requesting a thoracentesis writer reviewed CT CAP with \"stable right large pleural effusion.\" Message sent to Dr Whitman with ordering a CXR 2 VW before appointment with Lima Griffiths on 12/31/19 to determine fluid in lungs. Ordered a right thoracentesis for Thursday January 2nd in case needs to be done and Lima Griffiths can evaluate if needed with CXR. Message sent to scheduling to arrange the CXR prior to Lima's appt.  Answered all patient's questions and verbalized understanding. Sharifa Celeste RN, BSN.    "

## 2019-12-31 NOTE — LETTER
12/31/2019      RE: Shan Marsh  [address]  [facility] [address]       Oncology/Hematology Visit Note  December 31, 2019    Reason for Visit: Follow up for metastatic breast cancer (ER/ID+, HER2-)    History of Present Illness: Shan Marsh is a 49 year old female without significant past medical history with a diagnosis of metastatic breast cancer after she presented with left shoulder pain. Shoulder MRI showed a soft tissue mass close to the distal clavicle which was concerning for malignancy. PET/CT on 5/9/17 showed hypermetabolic left axillary, supraclavicular, mediastinal and hilar nodes. She had biopsies of left axilla and hilar nodes showing metastatic breast cancer, strongly ER positive, moderately ID positive, and HER-2 kaitlin non-amplified. She originally had [id number], ER/ID positive, HER-2 negative breast cancer diagnosed in spring of 2012 s/p bilateral mastectomies and Tamoxifen from 8/2012-2/2013 and discontinued due to side effects. With development of metastatic disease, she was recommended treatment on BIG10 clinical trial with palbociclib and tamoxifen. She started treatment on 5/24/17 and required dose reduction for neutropenia. CT CAP and bone scan 1/11/2018 showing progressive disease with new bony lesions in the ribs, T spine, and possibly liver, lungs. She started Zoladex and abemiciclib on 1/12/2018.      In March 2018, she sought treatment at [facility] in [place], under the care of Dr. Brooks, where she received:  - Vallovex vaccine (per FDA, is in phase I trials here in [place])  - Insulin potentiated therapy with conjugated chemotherapy (5% chemotherapy, unclear what chemotherapy)  - Gina's toxins, IV vitamin C, IV Ozone, hyperbaric chamber, vitamin B17 infusion, vitamin CK3 IV, and coffee enemas PRN  - Also oral niacin, Lugol, acidol, pancreatin, and thyroid desiccated liver, per their regimen  - She was continued on Letrozole and advised to continue Zoladex.  "     She met with Dr. Whitman and patient wished to continue her alternative regimen. Abemiciclib was placed on hold. She was continued on Zoladex and letrozole. While in [place], she required thoracentesis for symptomatic pleural effusion. A repeat PET in August 2018 showed widely progressive disease with multiple new bone mets involving the spine, ribs, pelvis, and sacrum; new lesions involving the left lateral thoracic/abdominal wall musculature and subcutaneous tissues, new metastases in the left adrenal gland, and recurrent large L pleural effusion. She was started on Xeloda 1500 mg BID on 9/5/18.      She was admitted 10/4-10/11/18 after a syncopal event/unwitnessed fall at home. She was found to have left pneumothorax and pericardial effusion with tamponade physiology. She had left chest tube placed in ED which then fell out on 10/10. Left pleurx placed on 10/11. She had pericardiocentesis on 10/7, drain removed 10/8.      She resumed Xeloda, and remained on this until January 2019.  At that time, she was switched to Doxil. She required several dose reductions due to poor tolerance. Due to ongoing poor tolerance, plan to switch to gemzar. She presented to [facility] ED on 11/30/2019 with new facial droop, found to have multiple small brain metastases. Her case was reviewed at tumor board and recommended palliative WBRT, initiated 12/12/19.     Interval History:  Shan is here for follow-up, accompanied by her .    Overall feels like things are going better. Nausea improved, now using Zofran, compzine PRN instead of scheduled. She thinks when she does have nuasea, it is related to GERD, as Tums usually resolves the symptom. She is on a PPI. She has no new pains. Breathing with a little more WILSON than last visit, without pleuritic pain. She reports that the skin lesions involving the chest wall an abdomen have \"exploded\" in the last 2 weeks. Feels like her eating is stable. SHe does use cannabis when needed. She " "continues to struggle with poor sleep, but feels this is also better. Issue with sleep maintenance, usually wakes up around midnight. She did use the Zyprexa but tries to avoid this. She hasn't had any new pains, fevers, chills, cough, abdominal pain, vomiting, bleeding, swelling, bowel changes.  ROS: 10 point ROS neg other than the symptoms noted above in the HPI.     Current Outpatient Medications   Medication     carboxymethylcellulose PF (GOODSENSE LUBRICATING EYE DROP) 0.5 % ophthalmic solution     dexamethasone (DECADRON) 2 MG tablet     Digestive Enzymes (ENZYME DIGEST) CAPS     Eye Patch MISC     LORazepam (ATIVAN) 0.5 MG tablet     magic mouthwash (ENTER INGREDIENTS IN COMMENTS) suspension     medical cannabis (Patient's own supply.  Not a prescription)     nystatin (MYCOSTATIN) 634149 UNIT/ML suspension     OLANZapine (ZYPREXA) 2.5 MG tablet     ondansetron (ZOFRAN) 8 MG tablet     prochlorperazine (COMPAZINE) 10 MG tablet     prochlorperazine (COMPAZINE) 5 MG tablet     VITAMIN D, CHOLECALCIFEROL, PO     White Petrolatum-Mineral Oil (SYSTANE NIGHTTIME) OINT     No current facility-administered medications for this visit.      Facility-Administered Medications Ordered in Other Visits   Medication     sodium chloride (PF) 0.9% PF flush 10 mL     sodium chloride (PF) 0.9% PF flush 10 mL     sodium chloride (PF) 0.9% PF flush 10 mL     sodium chloride (PF) 0.9% PF flush 10 mL          Physical Examination:  /76 (BP Location: Right arm, Patient Position: Sitting, Cuff Size: Adult Regular)   Pulse 104   Temp 97.8  F (36.6  C) (Oral)   Resp 16   Ht 1.626 m (5' 4.02\")   Wt 45.3 kg (99 lb 12.8 oz)   SpO2 98%   BMI 17.12 kg/m     General: The patient is a pleasant female in no acute distress.  HEENT: facial droop, pupils equal and reactive to light. Thrush resolved.  Lymph: No palpable cervical, supraclavicular, subclavicular lymphadenopathy.  Heart: Regular rate and rhythm.   Lungs: Breathing " comfortably on room air. Decreased BS at right base to midlung, decreased breath sound in left basilar area as well, otherwise clear to auscultation bilaterally.   Abdomen: Bowel sounds present, soft, no pain to palpation, no palpable hepatosplenomegaly or masses.   Extremities: No lower extremity edema noted bilaterally.   Neuro: + R facial droop significantly improved from prior, faintly apparent today. Otheherwise, Cranial nerves II through XII are grossly intact.  Skin: Diffuse subcutaneous, raised lesions throughout the left chest wall and thoracic abdomen, predominantely anteriorly with scattered lesions posteriorly. No open areas. No rashes, petechiae, or bruising noted on exposed skin.     Laboratory Data:    Imaging:  CT CAP 12/13/2019  IMPRESSION: In this patient with a history of metastatic breast  cancer, there is evidence of disease progression:  1. In the chest:  a. Slightly enlarging right axillary and anterior mediastinal lymph  nodes.   b. New mild right middle lobe ground glass opacities could represent  infectious/inflammatory process. Stable scattered small pulmonary  solid nodules.  c. Stable large right pleural effusion and small left complex pleural  effusion.     2. In the abdomen/pelvis:  a. Enlarging hepatic metastases in the right lobe which results in new  mild intrahepatic biliary dilatation in the right lobe.  b. Enlarging 10 mm retrocaval lymph node.     3. Stable appearance of the diffuse bone metastases.    Assessment and Plan:  Shan Marsh is a 49 year old woman with prior history of a T1 N0, ER/CA positive, HER2-negative left breast cancer treated with bilateral mastectomies in 2012, now with metastatic disease (bones, pleural effusions, lungs, pericardial effusion, liver, left adrenal/retroperitoneal mets), ER/CA positive, HER2 negative.  She progressed on palbociclib and tamoxifen, alternative therapies in [place], Xeloda, and had poor tolerance to Doxil.     Metastatic breast  cancer: Plan was to switch to Gemzar, however found to have brain mets after presentig to the ED 11/30 with new facial droop. Plan now as below. Holding off systemic therapy for now, until completing radiation. Systemic imaging today showing progressive systemic disease with enlarging lymph nodes, hepatic metastases, retrocaval lymph node, stable bony disease. She also has progressive skin lesions felt to be metastatic disease, awaiting dermatology visit. She currently is not eligible for immunotherapy trial due to recent WBRT.  Plan to start gemzar weekly for 3 out of 4 weeks. The possible side effects and anticipatory side effect management were reviewed in detail. We will plan for toxicity assessment on day 15. Plan for repeat imaging after 2 cycles. Scheduling was requested.  - Continue Zoladex    Brain mets: Brain MRI showing several small foci 3mm or less. She started decadron 4 mg BID. WBRT started 12/12/19, completed 12/24/2019. CSF negative for malignancy. Off steroids now.    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 This is significantly improved.    Nausea, anorexia, GERD: Improving s/p WBRT. Still using PRN compazine, Zofran, PPI, Tums. She overall feels this is well controlled. She has Zyprexa available if needed. She has medical cannabis available as well.    Bone mets.  She remains on Zometa every 3 months, most recenetly recieved 10/7/2019 -- due January       Bilateral pleural effusions (right worse than left). - Requires periodic thoracentesis, most recently 11/25 with stable effusion 11/29 in ED.  Prefer staged procedures given her improved tolerance with these. Imaging showing large R pleural effusion. She is having mildly worsening of WILSON so will keep appointment for thora 1/2.    History of a malignant pericardial effusion.    - repeat Echo on 9/9/19 did not show any pericardial effusion, will continue to monitor based on her clinical symptoms.       Insomnia: improved off of steroids. She does prefer to avoid any sleep aids. Discussed meditative techniques.     Derm: Widespread cutaneous lesions worsening in the last 2 weeks. She has a derm appointment for this 2/24. I called Derm and was able to get her an appointment on Thursday, however she didn't want to do this this early in the day and have 2 procedures scheduled. Messaged on-call MD whom I spoke with regarding any other possible dates.    Elevated alk phos, liver enzymes: likely 2/2 hepatic mets, worsening bone disease, off of systemic treatment. D/w pharmacy, no dose adjustments needed with gemcitabine. Monitor.    I spent >25 minutes with the patient, with over 50% of the time spent counseling or coordinating their care as described above.    Lima Griffiths PA-C

## 2019-12-31 NOTE — PROGRESS NOTES
Infusion Nursing Note:  Shan Marsh presents today for C1D1 Gemzar/C25D1 zoladex .    Patient seen by provider today: Yes: Lima Griffiths PA-C   present during visit today: Not Applicable.    Note: Patient reported to clinic today after seeing provider with no new complaints or concerns.    TORB: 1159 12/31/19 Lima Griffiths PA-C/Casimiro Tong RN  -ok to treat    Intravenous Access:  Peripheral IV placed.    Treatment Conditions:  Lab Results   Component Value Date    HGB 11.2 12/31/2019     Lab Results   Component Value Date    WBC 4.5 12/31/2019      Lab Results   Component Value Date    ANEU 3.3 12/31/2019     Lab Results   Component Value Date     12/31/2019      Lab Results   Component Value Date     12/31/2019                   Lab Results   Component Value Date    POTASSIUM 3.6 12/31/2019           Lab Results   Component Value Date    MAG 2.4 01/22/2019            Lab Results   Component Value Date    CR 0.47 12/31/2019                   Lab Results   Component Value Date    KRYSTEN 9.6 12/31/2019                Lab Results   Component Value Date    BILITOTAL 0.6 12/31/2019           Lab Results   Component Value Date    ALBUMIN 2.6 12/31/2019                    Lab Results   Component Value Date    ALT 74 12/31/2019           Lab Results   Component Value Date     12/31/2019       Results reviewed, labs MET treatment parameters, ok to proceed with treatment.      Post Infusion Assessment:  Patient tolerated infusion without incident.  Blood return noted pre and post infusion.  Site patent and intact, free from redness, edema or discomfort.  No evidence of extravasations.  Access discontinued per protocol.       Discharge Plan:   Patient declined prescription refills.  Discharge instructions reviewed with: Patient.  Patient and/or family verbalized understanding of discharge instructions and all questions answered.  AVS to patient via Ops[employer].  Patient will return  1/7/20 for next appointment.   Patient discharged in stable condition accompanied by: .  Departure Mode: Ambulatory.  Face to Face time: 0 minutes.    Casimiro Tong RN

## 2019-12-31 NOTE — NURSING NOTE
"Oncology Rooming Note    December 31, 2019 11:20 AM   Shan Marsh is a 49 year old female who presents for:    Chief Complaint   Patient presents with     Blood Draw     Labs drawn via PIV placed by RN in lab. Line flushed with saline. VS taken.      Oncology Clinic Visit     [facility] RETURN; BREAST CANCER     Initial Vitals: /76 (BP Location: Right arm, Patient Position: Sitting, Cuff Size: Adult Regular)   Pulse 104   Temp 97.8  F (36.6  C) (Oral)   Resp 16   Ht 1.626 m (5' 4.02\")   Wt 45.3 kg (99 lb 12.8 oz)   SpO2 98%   BMI 17.12 kg/m   Estimated body mass index is 17.12 kg/m  as calculated from the following:    Height as of this encounter: 1.626 m (5' 4.02\").    Weight as of this encounter: 45.3 kg (99 lb 12.8 oz). Body surface area is 1.43 meters squared.  No Pain (0) Comment: Data Unavailable   No LMP recorded. (Menstrual status: Chemotherapy).  Allergies reviewed: Yes  Medications reviewed: Yes    Medications: MEDICATION REFILLS NEEDED TODAY. Provider was notified.  Pharmacy name entered into BOOK A TIG[facility]:    [facility] - [place], [place] - [address]  [facility] - [place], [place] - [address] [place] [address] PHARMACY [place], [place] - [address] [facility] [phone]    Clinical concerns: Refill on compazine and magic mouth wash.  Lima Griffiths PA-C was notified.      Elyssa Funes CMA              "

## 2019-12-31 NOTE — PROGRESS NOTES
Oncology/Hematology Visit Note  December 31, 2019    Reason for Visit: Follow up for metastatic breast cancer (ER/VT+, HER2-)    History of Present Illness: Shan Marsh is a 49 year old female without significant past medical history with a diagnosis of metastatic breast cancer after she presented with left shoulder pain. Shoulder MRI showed a soft tissue mass close to the distal clavicle which was concerning for malignancy. PET/CT on 5/9/17 showed hypermetabolic left axillary, supraclavicular, mediastinal and hilar nodes. She had biopsies of left axilla and hilar nodes showing metastatic breast cancer, strongly ER positive, moderately VT positive, and HER-2 kaitlin non-amplified. She originally had [id number], ER/VT positive, HER-2 negative breast cancer diagnosed in spring of 2012 s/p bilateral mastectomies and Tamoxifen from 8/2012-2/2013 and discontinued due to side effects. With development of metastatic disease, she was recommended treatment on BIG10 clinical trial with palbociclib and tamoxifen. She started treatment on 5/24/17 and required dose reduction for neutropenia. CT CAP and bone scan 1/11/2018 showing progressive disease with new bony lesions in the ribs, T spine, and possibly liver, lungs. She started Zoladex and abemiciclib on 1/12/2018.      In March 2018, she sought treatment at [facility] (formerly [facility]) in [place], under the care of Dr. Brooks, where she received:  - Vallovex vaccine (per FDA, is in phase I trials here in [place])  - Insulin potentiated therapy with conjugated chemotherapy (5% chemotherapy, unclear what chemotherapy)  - Gina's toxins, IV vitamin C, IV Ozone, hyperbaric chamber, vitamin B17 infusion, vitamin CK3 IV, and coffee enemas PRN  - Also oral niacin, Lugol, acidol, pancreatin, and thyroid desiccated liver, per their regimen  - She was continued on Letrozole and advised to continue Zoladex.      She met with Dr. Whitman and patient wished to continue her alternative regimen. Abemiciclib  "was placed on hold. She was continued on Zoladex and letrozole. While in [place], she required thoracentesis for symptomatic pleural effusion. A repeat PET in August 2018 showed widely progressive disease with multiple new bone mets involving the spine, ribs, pelvis, and sacrum; new lesions involving the left lateral thoracic/abdominal wall musculature and subcutaneous tissues, new metastases in the left adrenal gland, and recurrent large L pleural effusion. She was started on Xeloda 1500 mg BID on 9/5/18.      She was admitted 10/4-10/11/18 after a syncopal event/unwitnessed fall at home. She was found to have left pneumothorax and pericardial effusion with tamponade physiology. She had left chest tube placed in ED which then fell out on 10/10. Left pleurx placed on 10/11. She had pericardiocentesis on 10/7, drain removed 10/8.      She resumed Xeloda, and remained on this until January 2019.  At that time, she was switched to Doxil. She required several dose reductions due to poor tolerance. Due to ongoing poor tolerance, plan to switch to gemzar. She presented to [facility] ED on 11/30/2019 with new facial droop, found to have multiple small brain metastases. Her case was reviewed at tumor board and recommended palliative WBRT, initiated 12/12/19.     Interval History:  Shan is here for follow-up, accompanied by her .    Overall feels like things are going better. Nausea improved, now using Zofran, compzine PRN instead of scheduled. She thinks when she does have nuasea, it is related to GERD, as Tums usually resolves the symptom. She is on a PPI. She has no new pains. Breathing with a little more WILSON than last visit, without pleuritic pain. She reports that the skin lesions involving the chest wall an abdomen have \"exploded\" in the last 2 weeks. Feels like her eating is stable. SHe does use cannabis when needed. She continues to struggle with poor sleep, but feels this is also better. Issue with sleep " "maintenance, usually wakes up around midnight. She did use the Zyprexa but tries to avoid this. She hasn't had any new pains, fevers, chills, cough, abdominal pain, vomiting, bleeding, swelling, bowel changes.  ROS: 10 point ROS neg other than the symptoms noted above in the HPI.     Current Outpatient Medications   Medication     carboxymethylcellulose PF (GOODSENSE LUBRICATING EYE DROP) 0.5 % ophthalmic solution     dexamethasone (DECADRON) 2 MG tablet     Digestive Enzymes (ENZYME DIGEST) CAPS     Eye Patch MISC     LORazepam (ATIVAN) 0.5 MG tablet     magic mouthwash (ENTER INGREDIENTS IN COMMENTS) suspension     medical cannabis (Patient's own supply.  Not a prescription)     nystatin (MYCOSTATIN) 750472 UNIT/ML suspension     OLANZapine (ZYPREXA) 2.5 MG tablet     ondansetron (ZOFRAN) 8 MG tablet     prochlorperazine (COMPAZINE) 10 MG tablet     prochlorperazine (COMPAZINE) 5 MG tablet     VITAMIN D, CHOLECALCIFEROL, PO     White Petrolatum-Mineral Oil (SYSTANE NIGHTTIME) OINT     No current facility-administered medications for this visit.      Facility-Administered Medications Ordered in Other Visits   Medication     sodium chloride (PF) 0.9% PF flush 10 mL     sodium chloride (PF) 0.9% PF flush 10 mL     sodium chloride (PF) 0.9% PF flush 10 mL     sodium chloride (PF) 0.9% PF flush 10 mL          Physical Examination:  /76 (BP Location: Right arm, Patient Position: Sitting, Cuff Size: Adult Regular)   Pulse 104   Temp 97.8  F (36.6  C) (Oral)   Resp 16   Ht 1.626 m (5' 4.02\")   Wt 45.3 kg (99 lb 12.8 oz)   SpO2 98%   BMI 17.12 kg/m    General: The patient is a pleasant female in no acute distress.  HEENT: facial droop, pupils equal and reactive to light. Thrush resolved.  Lymph: No palpable cervical, supraclavicular, subclavicular lymphadenopathy.  Heart: Regular rate and rhythm.   Lungs: Breathing comfortably on room air. Decreased BS at right base to midlung, decreased breath sound in left " basilar area as well, otherwise clear to auscultation bilaterally.   Abdomen: Bowel sounds present, soft, no pain to palpation, no palpable hepatosplenomegaly or masses.   Extremities: No lower extremity edema noted bilaterally.   Neuro: + R facial droop significantly improved from prior, faintly apparent today. Otheherwise, Cranial nerves II through XII are grossly intact.  Skin: Diffuse subcutaneous, raised lesions throughout the left chest wall and thoracic abdomen, predominantely anteriorly with scattered lesions posteriorly. No open areas. No rashes, petechiae, or bruising noted on exposed skin.     Laboratory Data:    Imaging:  CT CAP 12/13/2019  IMPRESSION: In this patient with a history of metastatic breast  cancer, there is evidence of disease progression:  1. In the chest:  a. Slightly enlarging right axillary and anterior mediastinal lymph  nodes.   b. New mild right middle lobe ground glass opacities could represent  infectious/inflammatory process. Stable scattered small pulmonary  solid nodules.  c. Stable large right pleural effusion and small left complex pleural  effusion.     2. In the abdomen/pelvis:  a. Enlarging hepatic metastases in the right lobe which results in new  mild intrahepatic biliary dilatation in the right lobe.  b. Enlarging 10 mm retrocaval lymph node.     3. Stable appearance of the diffuse bone metastases.    Assessment and Plan:  Shan Marsh is a 49 year old woman with prior history of a T1 N0, ER/IA positive, HER2-negative left breast cancer treated with bilateral mastectomies in 2012, now with metastatic disease (bones, pleural effusions, lungs, pericardial effusion, liver, left adrenal/retroperitoneal mets), ER/IA positive, HER2 negative.  She progressed on palbociclib and tamoxifen, alternative therapies in [place], Xeloda, and had poor tolerance to Doxil.     Metastatic breast cancer: Plan was to switch to Gemzar, however found to have brain mets after presentig to the  ED 11/30 with new facial droop. Plan now as below. Holding off systemic therapy for now, until completing radiation. Systemic imaging today showing progressive systemic disease with enlarging lymph nodes, hepatic metastases, retrocaval lymph node, stable bony disease. She also has progressive skin lesions felt to be metastatic disease, awaiting dermatology visit. She currently is not eligible for immunotherapy trial due to recent WBRT.  Plan to start gemzar weekly for 3 out of 4 weeks. The possible side effects and anticipatory side effect management were reviewed in detail. We will plan for toxicity assessment on day 15. Plan for repeat imaging after 2 cycles. Scheduling was requested.  - Continue Zoladex    Brain mets: Brain MRI showing several small foci 3mm or less. She started decadron 4 mg BID. WBRT started 12/12/19, completed 12/24/2019. CSF negative for malignancy. Off steroids now.    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 This is significantly improved.    Nausea, anorexia, GERD: Improving s/p WBRT. Still using PRN compazine, Zofran, PPI, Tums. She overall feels this is well controlled. She has Zyprexa available if needed. She has medical cannabis available as well.    Bone mets.  She remains on Zometa every 3 months, most recenetly recieved 10/7/2019 -- due January       Bilateral pleural effusions (right worse than left). - Requires periodic thoracentesis, most recently 11/25 with stable effusion 11/29 in ED.  Prefer staged procedures given her improved tolerance with these. Imaging showing large R pleural effusion. She is having mildly worsening of WILSON so will keep appointment for thora 1/2.    History of a malignant pericardial effusion.    - repeat Echo on 9/9/19 did not show any pericardial effusion, will continue to monitor based on her clinical symptoms.      Insomnia: improved off of steroids. She does prefer to avoid any sleep aids. Discussed  meditative techniques.     Derm: Widespread cutaneous lesions worsening in the last 2 weeks. She has a derm appointment for this 2/24. I called Derm and was able to get her an appointment on Thursday, however she didn't want to do this this early in the day and have 2 procedures scheduled. Messaged on-call MD whom I spoke with regarding any other possible dates.    Elevated alk phos, liver enzymes: likely 2/2 hepatic mets, worsening bone disease, off of systemic treatment. D/w pharmacy, no dose adjustments needed with gemcitabine. Monitor.    I spent >25 minutes with the patient, with over 50% of the time spent counseling or coordinating their care as described above.    Lima Griffiths PA-C

## 2019-12-31 NOTE — NURSING NOTE
Chief Complaint   Patient presents with     Blood Draw     Labs drawn via PIV placed by RN in lab. Line flushed with saline. VS taken.      Lisa Saeg RN

## 2020-01-01 ENCOUNTER — OFFICE VISIT (OUTPATIENT)
Dept: DERMATOLOGY | Facility: CLINIC | Age: 50
End: 2020-01-01
Payer: COMMERCIAL

## 2020-01-01 ENCOUNTER — TELEPHONE (OUTPATIENT)
Dept: DERMATOLOGY | Facility: CLINIC | Age: 50
End: 2020-01-01

## 2020-01-01 ENCOUNTER — APPOINTMENT (OUTPATIENT)
Dept: GENERAL RADIOLOGY | Facility: CLINIC | Age: 50
End: 2020-01-01
Attending: EMERGENCY MEDICINE
Payer: COMMERCIAL

## 2020-01-01 ENCOUNTER — ANCILLARY PROCEDURE (OUTPATIENT)
Dept: RADIOLOGY | Facility: AMBULATORY SURGERY CENTER | Age: 50
End: 2020-01-01
Attending: INTERNAL MEDICINE
Payer: COMMERCIAL

## 2020-01-01 ENCOUNTER — ANCILLARY PROCEDURE (OUTPATIENT)
Dept: CT IMAGING | Facility: CLINIC | Age: 50
End: 2020-01-01
Attending: INTERNAL MEDICINE
Payer: COMMERCIAL

## 2020-01-01 ENCOUNTER — HOSPITAL ENCOUNTER (EMERGENCY)
Facility: CLINIC | Age: 50
Discharge: HOME | End: 2020-01-02
Attending: EMERGENCY MEDICINE | Admitting: EMERGENCY MEDICINE
Payer: COMMERCIAL

## 2020-01-01 ENCOUNTER — TELEPHONE (OUTPATIENT)
Dept: VASCULAR SURGERY | Facility: CLINIC | Age: 50
End: 2020-01-01

## 2020-01-01 ENCOUNTER — ANCILLARY PROCEDURE (OUTPATIENT)
Dept: CARDIOLOGY | Facility: CLINIC | Age: 50
End: 2020-01-01
Attending: INTERNAL MEDICINE
Payer: COMMERCIAL

## 2020-01-01 ENCOUNTER — PRE VISIT (OUTPATIENT)
Dept: PULMONOLOGY | Facility: CLINIC | Age: 50
End: 2020-01-01

## 2020-01-01 ENCOUNTER — TELEPHONE (OUTPATIENT)
Dept: ONCOLOGY | Facility: CLINIC | Age: 50
End: 2020-01-01

## 2020-01-01 ENCOUNTER — PATIENT OUTREACH (OUTPATIENT)
Dept: ONCOLOGY | Facility: CLINIC | Age: 50
End: 2020-01-01

## 2020-01-01 ENCOUNTER — DOCUMENTATION ONLY (OUTPATIENT)
Dept: OTHER | Facility: CLINIC | Age: 50
End: 2020-01-01

## 2020-01-01 ENCOUNTER — ONCOLOGY VISIT (OUTPATIENT)
Dept: ONCOLOGY | Facility: CLINIC | Age: 50
End: 2020-01-01
Attending: INTERNAL MEDICINE
Payer: COMMERCIAL

## 2020-01-01 ENCOUNTER — HOSPITAL ENCOUNTER (OUTPATIENT)
Facility: AMBULATORY SURGERY CENTER | Age: 50
End: 2020-01-02
Attending: PHYSICIAN ASSISTANT
Payer: COMMERCIAL

## 2020-01-01 ENCOUNTER — INFUSION THERAPY VISIT (OUTPATIENT)
Dept: ONCOLOGY | Facility: CLINIC | Age: 50
End: 2020-01-01
Attending: PHYSICIAN ASSISTANT
Payer: COMMERCIAL

## 2020-01-01 ENCOUNTER — APPOINTMENT (OUTPATIENT)
Dept: LAB | Facility: CLINIC | Age: 50
End: 2020-01-01
Attending: INTERNAL MEDICINE
Payer: COMMERCIAL

## 2020-01-01 ENCOUNTER — TELEPHONE (OUTPATIENT)
Dept: CARE COORDINATION | Facility: CLINIC | Age: 50
End: 2020-01-01

## 2020-01-01 ENCOUNTER — PATIENT OUTREACH (OUTPATIENT)
Dept: CARE COORDINATION | Facility: CLINIC | Age: 50
End: 2020-01-01

## 2020-01-01 VITALS
WEIGHT: 100 LBS | OXYGEN SATURATION: 97 % | TEMPERATURE: 98.2 F | SYSTOLIC BLOOD PRESSURE: 112 MMHG | BODY MASS INDEX: 17.16 KG/M2 | DIASTOLIC BLOOD PRESSURE: 84 MMHG | HEART RATE: 123 BPM | RESPIRATION RATE: 17 BRPM

## 2020-01-01 VITALS
OXYGEN SATURATION: 94 % | BODY MASS INDEX: 17.82 KG/M2 | TEMPERATURE: 97.1 F | WEIGHT: 103.8 LBS | DIASTOLIC BLOOD PRESSURE: 96 MMHG | HEART RATE: 113 BPM | SYSTOLIC BLOOD PRESSURE: 134 MMHG | RESPIRATION RATE: 16 BRPM

## 2020-01-01 VITALS
DIASTOLIC BLOOD PRESSURE: 82 MMHG | OXYGEN SATURATION: 94 % | SYSTOLIC BLOOD PRESSURE: 123 MMHG | HEIGHT: 64 IN | RESPIRATION RATE: 24 BRPM | BODY MASS INDEX: 17.07 KG/M2 | TEMPERATURE: 98.1 F | WEIGHT: 100 LBS

## 2020-01-01 VITALS
HEART RATE: 97 BPM | OXYGEN SATURATION: 100 % | SYSTOLIC BLOOD PRESSURE: 119 MMHG | TEMPERATURE: 97.3 F | BODY MASS INDEX: 17.55 KG/M2 | WEIGHT: 102.8 LBS | RESPIRATION RATE: 16 BRPM | HEIGHT: 64 IN | DIASTOLIC BLOOD PRESSURE: 85 MMHG

## 2020-01-01 DIAGNOSIS — Z85.3 HISTORY OF BREAST CANCER: ICD-10-CM

## 2020-01-01 DIAGNOSIS — C50.919 METASTATIC BREAST CANCER: Primary | ICD-10-CM

## 2020-01-01 DIAGNOSIS — C50.919 METASTATIC BREAST CANCER: ICD-10-CM

## 2020-01-01 DIAGNOSIS — J90 PLEURAL EFFUSION: ICD-10-CM

## 2020-01-01 DIAGNOSIS — B37.0 THRUSH OF MOUTH AND ESOPHAGUS (H): ICD-10-CM

## 2020-01-01 DIAGNOSIS — C50.519 MALIGNANT NEOPLASM OF LOWER-OUTER QUADRANT OF FEMALE BREAST, UNSPECIFIED ESTROGEN RECEPTOR STATUS, UNSPECIFIED LATERALITY (H): ICD-10-CM

## 2020-01-01 DIAGNOSIS — B37.81 THRUSH OF MOUTH AND ESOPHAGUS (H): ICD-10-CM

## 2020-01-01 DIAGNOSIS — R21 RASH: Primary | ICD-10-CM

## 2020-01-01 DIAGNOSIS — C79.51 BONE METASTASIS: Primary | ICD-10-CM

## 2020-01-01 LAB
ALBUMIN SERPL-MCNC: 2.3 G/DL (ref 3.4–5)
ALP SERPL-CCNC: 736 U/L (ref 40–150)
ALT SERPL W P-5'-P-CCNC: 287 U/L (ref 0–50)
ANION GAP SERPL CALCULATED.3IONS-SCNC: 4 MMOL/L (ref 3–14)
ANION GAP SERPL CALCULATED.3IONS-SCNC: 5 MMOL/L (ref 3–14)
ANISOCYTOSIS BLD QL SMEAR: SLIGHT
ANISOCYTOSIS BLD QL SMEAR: SLIGHT
AST SERPL W P-5'-P-CCNC: 331 U/L (ref 0–45)
BASOPHILS # BLD AUTO: 0 10E9/L (ref 0–0.2)
BASOPHILS # BLD AUTO: 0 10E9/L (ref 0–0.2)
BASOPHILS NFR BLD AUTO: 0 %
BASOPHILS NFR BLD AUTO: 0 %
BILIRUB SERPL-MCNC: 0.5 MG/DL (ref 0.2–1.3)
BUN SERPL-MCNC: 11 MG/DL (ref 7–30)
BUN SERPL-MCNC: 12 MG/DL (ref 7–30)
CALCIUM SERPL-MCNC: 9 MG/DL (ref 8.5–10.1)
CALCIUM SERPL-MCNC: 9.3 MG/DL (ref 8.5–10.1)
CHLORIDE SERPL-SCNC: 94 MMOL/L (ref 94–109)
CHLORIDE SERPL-SCNC: 96 MMOL/L (ref 94–109)
CO2 SERPL-SCNC: 30 MMOL/L (ref 20–32)
CO2 SERPL-SCNC: 32 MMOL/L (ref 20–32)
CREAT SERPL-MCNC: 0.37 MG/DL (ref 0.52–1.04)
CREAT SERPL-MCNC: 0.46 MG/DL (ref 0.52–1.04)
DIFFERENTIAL METHOD BLD: ABNORMAL
DIFFERENTIAL METHOD BLD: ABNORMAL
EOSINOPHIL # BLD AUTO: 0 10E9/L (ref 0–0.7)
EOSINOPHIL # BLD AUTO: 0 10E9/L (ref 0–0.7)
EOSINOPHIL NFR BLD AUTO: 0 %
EOSINOPHIL NFR BLD AUTO: 0 %
ERYTHROCYTE [DISTWIDTH] IN BLOOD BY AUTOMATED COUNT: 16.4 % (ref 10–15)
ERYTHROCYTE [DISTWIDTH] IN BLOOD BY AUTOMATED COUNT: 16.7 % (ref 10–15)
GFR SERPL CREATININE-BSD FRML MDRD: >90 ML/MIN/{1.73_M2}
GFR SERPL CREATININE-BSD FRML MDRD: >90 ML/MIN/{1.73_M2}
GLUCOSE SERPL-MCNC: 108 MG/DL (ref 70–99)
GLUCOSE SERPL-MCNC: 153 MG/DL (ref 70–99)
HCT VFR BLD AUTO: 33.8 % (ref 35–47)
HCT VFR BLD AUTO: 33.8 % (ref 35–47)
HGB BLD-MCNC: 10.4 G/DL (ref 11.7–15.7)
HGB BLD-MCNC: 10.6 G/DL (ref 11.7–15.7)
INR PPP: 1.05 (ref 0.86–1.14)
INTERPRETATION ECG - MUSE: NORMAL
LYMPHOCYTES # BLD AUTO: 0.1 10E9/L (ref 0.8–5.3)
LYMPHOCYTES # BLD AUTO: 0.8 10E9/L (ref 0.8–5.3)
LYMPHOCYTES NFR BLD AUTO: 1.8 %
LYMPHOCYTES NFR BLD AUTO: 12.3 %
MCH RBC QN AUTO: 28 PG (ref 26.5–33)
MCH RBC QN AUTO: 28.5 PG (ref 26.5–33)
MCHC RBC AUTO-ENTMCNC: 30.8 G/DL (ref 31.5–36.5)
MCHC RBC AUTO-ENTMCNC: 31.4 G/DL (ref 31.5–36.5)
MCV RBC AUTO: 91 FL (ref 78–100)
MCV RBC AUTO: 91 FL (ref 78–100)
METAMYELOCYTES # BLD: 0.2 10E9/L
METAMYELOCYTES NFR BLD MANUAL: 3.5 %
MONOCYTES # BLD AUTO: 0 10E9/L (ref 0–1.3)
MONOCYTES # BLD AUTO: 0.7 10E9/L (ref 0–1.3)
MONOCYTES NFR BLD AUTO: 0.9 %
MONOCYTES NFR BLD AUTO: 10.5 %
MYELOCYTES # BLD: 0.3 10E9/L
MYELOCYTES NFR BLD MANUAL: 4.4 %
NEUTROPHILS # BLD AUTO: 4.3 10E9/L (ref 1.6–8.3)
NEUTROPHILS # BLD AUTO: 4.5 10E9/L (ref 1.6–8.3)
NEUTROPHILS NFR BLD AUTO: 67.5 %
NEUTROPHILS NFR BLD AUTO: 97.3 %
NRBC # BLD AUTO: 0.1 10*3/UL
NRBC BLD AUTO-RTO: 2 /100
PLATELET # BLD AUTO: 239 10E9/L (ref 150–450)
PLATELET # BLD AUTO: 350 10E9/L (ref 150–450)
PLATELET # BLD EST: ABNORMAL 10*3/UL
PLATELET # BLD EST: ABNORMAL 10*3/UL
POLYCHROMASIA BLD QL SMEAR: SLIGHT
POTASSIUM SERPL-SCNC: 4.1 MMOL/L (ref 3.4–5.3)
POTASSIUM SERPL-SCNC: 4.7 MMOL/L (ref 3.4–5.3)
PROMYELOCYTES # BLD MANUAL: 0.1 10E9/L
PROMYELOCYTES NFR BLD MANUAL: 1.8 %
PROT SERPL-MCNC: 8.2 G/DL (ref 6.8–8.8)
RBC # BLD AUTO: 3.71 10E12/L (ref 3.8–5.2)
RBC # BLD AUTO: 3.72 10E12/L (ref 3.8–5.2)
SODIUM SERPL-SCNC: 130 MMOL/L (ref 133–144)
SODIUM SERPL-SCNC: 132 MMOL/L (ref 133–144)
TROPONIN I SERPL-MCNC: <0.015 UG/L (ref 0–0.04)
WBC # BLD AUTO: 4.6 10E9/L (ref 4–11)
WBC # BLD AUTO: 6.4 10E9/L (ref 4–11)

## 2020-01-01 PROCEDURE — G0463 HOSPITAL OUTPT CLINIC VISIT: HCPCS | Mod: 25

## 2020-01-01 PROCEDURE — 93010 ELECTROCARDIOGRAM REPORT: CPT | Mod: 59 | Performed by: EMERGENCY MEDICINE

## 2020-01-01 PROCEDURE — 36592 COLLECT BLOOD FROM PICC: CPT

## 2020-01-01 PROCEDURE — 25000128 H RX IP 250 OP 636: Mod: ZF | Performed by: INTERNAL MEDICINE

## 2020-01-01 PROCEDURE — 93005 ELECTROCARDIOGRAM TRACING: CPT | Performed by: EMERGENCY MEDICINE

## 2020-01-01 PROCEDURE — 84484 ASSAY OF TROPONIN QUANT: CPT | Performed by: EMERGENCY MEDICINE

## 2020-01-01 PROCEDURE — 80053 COMPREHEN METABOLIC PANEL: CPT | Performed by: PHYSICIAN ASSISTANT

## 2020-01-01 PROCEDURE — 99285 EMERGENCY DEPT VISIT HI MDM: CPT | Mod: 25 | Performed by: EMERGENCY MEDICINE

## 2020-01-01 PROCEDURE — 25800030 ZZH RX IP 258 OP 636: Mod: ZF | Performed by: INTERNAL MEDICINE

## 2020-01-01 PROCEDURE — G0463 HOSPITAL OUTPT CLINIC VISIT: HCPCS | Mod: ZF

## 2020-01-01 PROCEDURE — 80048 BASIC METABOLIC PNL TOTAL CA: CPT | Performed by: EMERGENCY MEDICINE

## 2020-01-01 PROCEDURE — 96413 CHEMO IV INFUSION 1 HR: CPT

## 2020-01-01 PROCEDURE — 85025 COMPLETE CBC W/AUTO DIFF WBC: CPT | Performed by: INTERNAL MEDICINE

## 2020-01-01 PROCEDURE — 93308 TTE F-UP OR LMTD: CPT | Performed by: EMERGENCY MEDICINE

## 2020-01-01 PROCEDURE — 93308 TTE F-UP OR LMTD: CPT | Mod: 26 | Performed by: EMERGENCY MEDICINE

## 2020-01-01 PROCEDURE — 71046 X-RAY EXAM CHEST 2 VIEWS: CPT

## 2020-01-01 PROCEDURE — 96367 TX/PROPH/DG ADDL SEQ IV INF: CPT

## 2020-01-01 PROCEDURE — 85025 COMPLETE CBC W/AUTO DIFF WBC: CPT | Performed by: EMERGENCY MEDICINE

## 2020-01-01 PROCEDURE — 99215 OFFICE O/P EST HI 40 MIN: CPT | Mod: GC | Performed by: INTERNAL MEDICINE

## 2020-01-01 RX ORDER — LORAZEPAM 2 MG/ML
.5-1 CONCENTRATE ORAL EVERY 4 HOURS PRN
Qty: 30 ML | Refills: 1 | Status: SHIPPED | OUTPATIENT
Start: 2020-01-01

## 2020-01-01 RX ORDER — HALOPERIDOL 1 MG/1
1-2 TABLET ORAL EVERY 6 HOURS PRN
Qty: 30 TABLET | Refills: 1 | Status: SHIPPED | OUTPATIENT
Start: 2020-01-01

## 2020-01-01 RX ORDER — ACETAMINOPHEN 325 MG/1
325-650 TABLET ORAL EVERY 4 HOURS PRN
Qty: 100 TABLET | Refills: 1 | Status: SHIPPED | OUTPATIENT
Start: 2020-01-01

## 2020-01-01 RX ORDER — DEXTROSE MONOHYDRATE 25 G/50ML
25-50 INJECTION, SOLUTION INTRAVENOUS
Status: DISCONTINUED | OUTPATIENT
Start: 2020-01-01 | End: 2020-01-01 | Stop reason: HOSPADM

## 2020-01-01 RX ORDER — LORAZEPAM 0.5 MG/1
.5-1 TABLET ORAL EVERY 4 HOURS PRN
Qty: 30 TABLET | Refills: 1 | Status: SHIPPED | OUTPATIENT
Start: 2020-01-01

## 2020-01-01 RX ORDER — HYDROMORPHONE HYDROCHLORIDE 1 MG/ML
1-2 SOLUTION ORAL
Qty: 100 ML | Refills: 0 | Status: SHIPPED | OUTPATIENT
Start: 2020-01-01

## 2020-01-01 RX ORDER — SENNOSIDES 8.6 MG
1-2 TABLET ORAL 2 TIMES DAILY
Qty: 30 TABLET | Refills: 1 | Status: SHIPPED | OUTPATIENT
Start: 2020-01-01

## 2020-01-01 RX ORDER — BISACODYL 10 MG
SUPPOSITORY, RECTAL RECTAL
Qty: 12 SUPPOSITORY | Refills: 1 | Status: SHIPPED | OUTPATIENT
Start: 2020-01-01

## 2020-01-01 RX ORDER — GLYCOPYRROLATE 1 MG/1
1-2 TABLET ORAL EVERY 4 HOURS PRN
Qty: 10 TABLET | Refills: 1 | Status: SHIPPED | OUTPATIENT
Start: 2020-01-01

## 2020-01-01 RX ORDER — ATROPINE SULFATE 10 MG/ML
2-4 SOLUTION/ DROPS OPHTHALMIC
Qty: 2 ML | Refills: 1 | Status: SHIPPED | OUTPATIENT
Start: 2020-01-01

## 2020-01-01 RX ORDER — ACETAMINOPHEN 650 MG/1
650 SUPPOSITORY RECTAL EVERY 4 HOURS PRN
Qty: 12 SUPPOSITORY | Refills: 1 | Status: SHIPPED | OUTPATIENT
Start: 2020-01-01

## 2020-01-01 RX ORDER — IOPAMIDOL 755 MG/ML
46 INJECTION, SOLUTION INTRAVASCULAR ONCE
Status: COMPLETED | OUTPATIENT
Start: 2020-01-01 | End: 2020-01-01

## 2020-01-01 RX ORDER — NYSTATIN 100000/ML
500000 SUSPENSION, ORAL (FINAL DOSE FORM) ORAL 4 TIMES DAILY
Qty: 400 ML | Refills: 0 | Status: SHIPPED | OUTPATIENT
Start: 2020-01-01

## 2020-01-01 RX ORDER — HALOPERIDOL 2 MG/ML
1-2 SOLUTION ORAL EVERY 6 HOURS PRN
Qty: 15 ML | Refills: 1 | Status: SHIPPED | OUTPATIENT
Start: 2020-01-01

## 2020-01-01 RX ORDER — NICOTINE POLACRILEX 4 MG
15-30 LOZENGE BUCCAL
Status: DISCONTINUED | OUTPATIENT
Start: 2020-01-01 | End: 2020-01-01 | Stop reason: HOSPADM

## 2020-01-01 RX ADMIN — DEXAMETHASONE SODIUM PHOSPHATE 12 MG: 10 INJECTION, SOLUTION INTRAMUSCULAR; INTRAVENOUS at 14:17

## 2020-01-01 RX ADMIN — IOPAMIDOL 46 ML: 755 INJECTION, SOLUTION INTRAVASCULAR at 14:22

## 2020-01-01 RX ADMIN — Medication 5 ML: at 14:00

## 2020-01-01 RX ADMIN — GEMCITABINE 1200 MG: 38 INJECTION, SOLUTION INTRAVENOUS at 14:44

## 2020-01-01 RX ADMIN — SODIUM CHLORIDE 250 ML: 9 INJECTION, SOLUTION INTRAVENOUS at 14:16

## 2020-01-01 ASSESSMENT — ENCOUNTER SYMPTOMS
NAUSEA: 0
ABDOMINAL PAIN: 0
CONFUSION: 0
SHORTNESS OF BREATH: 1
EYE REDNESS: 0
DIFFICULTY URINATING: 0
CHEST TIGHTNESS: 1
FEVER: 0
VOMITING: 0
COLOR CHANGE: 0
CHILLS: 0
ARTHRALGIAS: 0
NECK STIFFNESS: 0
HEADACHES: 0

## 2020-01-01 ASSESSMENT — PAIN SCALES - GENERAL
PAINLEVEL: NO PAIN (0)
PAINLEVEL: NO PAIN (0)
PAINLEVEL: MILD PAIN (2)

## 2020-01-01 ASSESSMENT — MIFFLIN-ST. JEOR
SCORE: 1086.55
SCORE: 1073.6

## 2020-01-01 ASSESSMENT — PATIENT HEALTH QUESTIONNAIRE - PHQ9: SUM OF ALL RESPONSES TO PHQ QUESTIONS 1-9: 0

## 2020-01-01 NOTE — PROCEDURES
Radiotherapy Treatment Summary          Date of Report: 2019     PATIENT: NIKKIE MARSH  MEDICAL RECORD NO: [id number]  : 1970     DIAGNOSIS: C79.31 Secondary malignant neoplasm of brain  INTENT OF RADIOTHERAPY: Palliative  PATHOLOGY: Invasive ductal carcinoma, ER/TN+ HER2-                                  STAGE: IV  CONCURRENT SYSTEMIC THERAPY:  None                  Details of the treatments summarized below are found in records kept in the Department of Radiation Oncology at [facility].     Treatment Summary:  Radiation Oncology - Course: 1 Protocol:   Treatment Site Dose Modality From To Days Fx.  1 Whole brain  3,000 cGy 10 X 12/12/2019 2019  12 10                Dose per Fraction:  300 cGy      Total Dose:  3000 cGy            COMMENTS:                      Ms. Marsh is a 49 year old female with metastatic breast cancer with diffuse brain metastases. She was   originally diagnosed with pT1a N0 M0 ER/TN+ Her2- disease in . She underwent bilateral mastectomies   and about 6 months of tamoxifen which was discontinued due to side effects. She was found to have recurrent   disease in  and presented with disease involving the left axila, left SCV, mediastinum, bilateral lung jorge a,   and RLL. She started systemic treatment with palbociclib and tamoxifen but progressed with new bone, lung,   and liver metastases in 2018. She then was switched to abemaciclib and zoladex for a brief time before she   sought alternative therapies in [place]. She had progression again in 2018. She was subsequently treated with   Xeloda and then Doxil. Her last Doxil was 10/2019. She recently presented with new onset right facial droop.   An MRI showed innumerable subcentimeter enhancing metastatic foci within the brain parenchyma in addition to   innumerable metastatic bone lesions in the skull and upper cervical spine. LP was negative for CSF involvement. Her   facial droop symptoms did  not resolve with dexamethasone. Given her new intracranial disease, she was referred to our   department and underwent whole brain RT over 2 weeks as described above. She was treated using a 3D conformal   technique with opposed lateral beams. She tolerated the treatment well without acute toxicity. Decadron was tapered   during treatment and was discontinued by the end of treatment. She did report some subjective improvement in her facial   droop towards the end of treatment.         ED visits/hospitalizations: None     Missed treatments: None     Acute Toxicity Profile by CTC v5.0: None     PAIN MANAGEMENT:  N/A                             FOLLOW UP PLAN: Routine follow up with Dr. Whitman. Follow up with rad onc prn.                            Resident Physician:  Rocco Garcia M.D.   Staff Physician: Don Monroy M.D.  Physicist: Jai Blanc     CC:   MD Ana Haque MD                                     Radiation Oncology:  [facility] [address] [place] [address]

## 2020-01-02 NOTE — ED NOTES
Bed: ED02  Expected date: 1/2/20  Expected time:   Means of arrival:   Comments:  Shan Marsh,  [id number]  Met breast CA s/p Thoracentesis today 600cc, with dest after thoracentesis to the 80s improved with 1L.  Coming from Marshall Marshall MD  01/02/20 4401

## 2020-01-02 NOTE — OR NURSING
Pt arrived in Phase 2 after thoracentesis with elevated BP, heart rate, and respiratory rate. O2 sats over 90% on 1L O2 but unable to wean off, with sats dropping into 80's. Pt unable to take a deep breath, describes chest tightness. 3-lead EKG showed sinus tachycardia. Examined by GRAY Zimmerman who determined she should be evaluated by the ED. Pt transported via ambulance to [facility] ED accompanied by family.

## 2020-01-02 NOTE — DISCHARGE INSTRUCTIONS
Discharge Instructions for Paracentesis or Thoracentesis     Paracentesis is a procedure to remove extra fluid from your belly (abdomen). Thoracentesis is a procedure to remove extra fluid from your chest.  This fluid buildup is called ascites. The procedure may have been done to take a sample of the fluid. Or, it may have been done to drain the extra fluid from your abdomen or chest to help make you more comfortable.     Home care    If you have pain after the procedure, your healthcare provider can prescribe or recommend pain medicines. Take these exactly as directed. If you stopped taking other medicines before the procedure, ask your provider when you can start them again.    Avoid strenuous activity for 48 hours.    You will have a small bandage over the puncture site. Adhesive tapes, adhesive strips, or surgical glue may also be used to close the incision. They also help stop bleeding and promote healing. You may take the bandage off in 24-48 hours. Once there is a scab over the site, no bandages are required.    Check the puncture site for the signs of infection listed below.     Follow-up care  Make a follow-up appointment with your healthcare provider as directed. During your follow-up visit, your provider will check your healing. Let your provider know how you are feeling. You can also discuss the cause of your fluid, and if you need any further treatment.    When to seek medical advice:  Call your healthcare provider if you have any of the following after the procedure:    A fever of 100.4 F (38.0 C) or higher    Trouble breathing    Abdominal pain that is worse than expected    Belly Abdominal pain not caused by having the skin punctured that is worse than expected    Persistent bleeding from the puncture site    More than a small amount of fluid leaking from the puncture site    Swollen belly    Signs of infection at the puncture site. These include increased pain, redness, or swelling, warmth, or  bad-smelling drainage.    Feeling dizzy or lightheaded, or fainting     Call our Interventional Radiology (IR) service if:  If you start bleeding from the procedure site.  If you do start to bleed from the site, lie down and hold some pressure on the site.  Our radiology provider can help you decide if you need to return to the hospital.  If you have new or worsening pain related to the procedure.  If you have pronounced swelling at the procedure site.  If you develop persistent nausea or vomiting.  If you develop hives or a rash or any unexplained itching.  If you have a fever of greater than 100.4  F and chills in the first 5 days after procedure.  Any other concerns related to your procedure.      [facility]  Interventional Radiology (IR)  [address] [place] [address]    Contact Number:  [phone]  (IR control desk)  Monday - Friday 8:00 am - 4:30 pm    After hours for urgent concerns:  [phone]  After 4:30 pm Monday - Friday, Weekends and Holidays.   Ask for Interventional Radiology on-call.  Someone is available 24 hours a day.  [facility] toll free number:  [phone]

## 2020-01-02 NOTE — ED PROVIDER NOTES
[facility] EMERGENCY DEPARTMENT ([facility])  1/02/20 ED 2 5:01 PM   History     Chief Complaint   Patient presents with     Shortness of Breath     The history is provided by the patient and medical records.     Shan Marsh is a 49 year old female with history of metastatic breast cancer status post radiation now on gemcitabine (started this on Tuesday this week) who presents with episode of shortness of breath and chest tightness after outpatient thoracentesis today.  She states that prior to having her thoracentesis she was experiencing shortness of breath with going up stairs. During procedure patient felt anxious but after the procedure was completed she did feel improvement to her shortness of breath.  However after short period of time she developed difficulty taking a breath in with low chest tightness and was breathing shallowly. When they took O2 off her O2 sats decreased so they put her back on oxygen and transported here for further evaluation and management. She feels ok at this time.  She states that her chest tightness has since improved.  No leg pain or swelling. No recent travel and no sick contacts. No post procedure chest x-ray were performed prior to transfer here.  No history of cardiac disease.  She notes history of syncope in setting of pericardial effusion which was found in 10/2018 (epic records reviewed, she had left hydropneumothorax pericardial effusion with tamponade status post Pleurx drain placement).  Patient states that after that episode she has not had any subsequent episodes of pericardial effusion.  She notes having had a couple of echocardiograms done in November 2019 for evaluation of her stroke symptoms and ultimately was felt to have Bell's palsy. No fevers, chills, cold or flu symptoms.     Patient followed by Dr. Ana Whitman.     I have reviewed the Medications, Allergies, Past Medical and Surgical History, and Social History in the Epic system.  PAST  MEDICAL HISTORY:   Past Medical History:   Diagnosis Date     Breast cancer, left breast (H) 5/12    grade 1 infiltrating ductal cancer, ER/AZ+, s/p bilateral mastectomy     Complication of anesthesia     bradycardia,b/p drop p childbirth     Dysplasia of cervix (uteri) 12/01    LUCAS II - III, s/p LEEP  @ [facility]     Incompetence of cervix 4/06    D&E at 19+4 weeks twin pregnancy     Shingles outbreak 5/12       PAST SURGICAL HISTORY:   Past Surgical History:   Procedure Laterality Date     D & C  9/2010    retained POC     ENDOBRONCHIAL ULTRASOUND FLEXIBLE N/A 5/12/2017    Procedure: ENDOBRONCHIAL ULTRASOUND FLEXIBLE;  Flexible Bronchoscopy, Endobronchial Ultrasound, Transbronchial Biopsy ;  Surgeon: Bandar Meyer MD;  Location: [facility]     INSERT CHEST TUBE Left 10/11/2018    Procedure: INSERT CHEST TUBE;  Insert Chest Tube ;  Surgeon: Brock Chan MD;  Location: [facility]     INSERT CHEST TUBE N/A 11/12/2018    Procedure: Removal Of Pleurx Catheter ;  Surgeon: Bandar Meyer MD;  Location: [facility]     IR THORACENTESIS  10/14/2019     IR THORACENTESIS  11/25/2019     LEEP TX, CERVICAL  12/01    LEEP or LUCAS II - III     MASTECTOMY, RECONSTRUCT BREAST, COMBINED  6/11/2012    Procedure: COMBINED MASTECTOMY, RECONSTRUCT BREAST;  Bilateral Mastectomy with Reconstruction, Left [place] Node Biopsy , placement of On-Q catheters X 2 bilateral breast.;  Surgeon: Misael Cheng MD;  Location:  OR     RECONSTRUCT BREAST BILATERAL  3/25/2013    Procedure: RECONSTRUCT BREAST BILATERAL;  Revise bilateral breasts and reconstructions, with insertion of gel breast implants with fat grafting and fat after mastectomies.;  Surgeon: Yair Olvera MD;  Location:  OR     RECONSTRUCT BREAST BILATERAL, IMPLANT PROSTHESIS BILATERAL, COMBINED  9/27/2012    Procedure: COMBINED RECONSTRUCT BREAST BILATERAL, IMPLANT PROSTHESIS BILATERAL;  Bilateral Second Stage Breast Reconstruction With Gel Implants, Fat         THORACENTESIS Left 2018    Procedure: THORACENTESIS;  Thoracentesis;  Surgeon: Venancio Hussein PA-C;  [facility]     THORACENTESIS Left 2018    Procedure: THORACENTESIS;  Thoracentesis;  Surgeon: Lydia Murillo PA-C;  [facility]     THORACENTESIS Left 2018    Procedure: THORACENTESIS;  Left Thoracentesis;  Surgeon: López Guillen PA-C;  [facility]     THORACENTESIS Left 10/3/2018    Procedure: THORACENTESIS;  Thoracentesis, left;  Surgeon: Eros Chauhan PA-C;  [facility]     THORACENTESIS Right 10/9/2018    Procedure: THORACENTESIS;  Thoacentesis ;  Surgeon: Linh Hayes MD;  Location: [facility]     THORACENTESIS Right 10/14/2019    Procedure: THORACENTESIS;  Surgeon: Lydia Brannon PA-C;  [facility]     THORACENTESIS Right 2019    Procedure: THORACENTESIS;  Surgeon: Eros Chauhan PA-C;  [facility]       FAMILY HISTORY:   Family History   Problem Relation Age of Onset     Alcohol/Drug Father      Allergies Mother      Neurologic Disorder Mother         seizures, related to fever     Gastrointestinal Disease Mother         IBS     Blood Disease Mother         [place]     Cancer Maternal Grandfather         bladder     Circulatory Maternal Grandfather         blood clots     Alcohol/Drug Maternal Grandfather         alcohol dependency     Allergies Maternal Aunt      Allergies Other         maternal cousins     Arthritis Maternal Aunt      Prostate Cancer Maternal Uncle      Thyroid Disease Maternal Aunt      Alcohol/Drug Maternal Uncle         alcohol and drug dependency     Cancer - colorectal Maternal Uncle      Gastrointestinal Disease Sister         IBS     Breast Cancer Other         self       SOCIAL HISTORY:   Social History     Tobacco Use     Smoking status: Former Smoker     Last attempt to quit: 1997     Years since quittin.5     Smokeless tobacco: Never Used   Substance Use Topics     Alcohol use: No        Discharge Medication List as of 1/2/2020  8:33 PM      CONTINUE these medications which have NOT CHANGED    Details   carboxymethylcellulose PF (GOODSENSE LUBRICATING EYE DROP) 0.5 % ophthalmic solution Place 1 drop into the right eye 3 times daily as needed for dry eyes, Disp-1 Bottle, R-1, E-Prescribe      Digestive Enzymes (ENZYME DIGEST) CAPS Take 1 capsule by mouth daily, Historical      Eye Patch MISC 1 Bottle once for 1 dose, Disp-30 each, R-0, E-Prescribe      LORazepam (ATIVAN) 0.5 MG tablet Take 1 tablet (0.5 mg) by mouth every 4 hours as needed (Anxiety, Nausea/Vomiting or Sleep), Disp-30 tablet, R-2, Local Print      magic mouthwash (ENTER INGREDIENTS IN COMMENTS) suspension SWISH AND SPIT 5 TO 10 ML BY MOUTH 4 TO 6 TIMES DAILY AS NEEDED, Disp-240 mL, R-3, E-Prescribe      medical cannabis (Patient's own supply.  Not a prescription) (This is NOT a prescription, and does not certify that the patient has a qualifying medical condition for medical cannabis.  The purpose of this order is  to document that the patient reports taking medical cannabis.), Historical      nystatin (MYCOSTATIN) 728391 UNIT/ML suspension Take 5 mLs (500,000 Units) by mouth 4 times dailyDisp-400 mL, [id number]      OLANZapine (ZYPREXA) 2.5 MG tablet Take 1 tablet (2.5 mg) by mouth At Bedtime, Disp-30 tablet, R-1, E-Prescribe      ondansetron (ZOFRAN) 8 MG tablet Take 1 tablet (8 mg) by mouth every 8 hours as needed for nausea, Disp-90 tablet, R-3, E-Prescribe      !! prochlorperazine (COMPAZINE) 10 MG tablet Take 1 tablet (10 mg) by mouth every 6 hours as needed (Nausea/Vomiting), Disp-30 tablet, R-2, E-Prescribe      !! prochlorperazine (COMPAZINE) 5 MG tablet Take 1 tablet (5 mg) by mouth every 6 hours as needed for nausea or vomiting, Disp-30 tablet, R-1, E-Prescribe      VITAMIN D, CHOLECALCIFEROL, PO Take 1,000 Units by mouth daily , Historical      White Petrolatum-Mineral Oil (SYSTANE NIGHTTIME) OINT Apply a small  amount to eye at bedtime, Disp-1 Tube, R-1, E-Prescribe       !! - Potential duplicate medications found. Please discuss with provider.           Allergies   Allergen Reactions     Erythromycin Shortness Of Breath     Sulfa Drugs      rash      Review of Systems   Constitutional: Negative for chills and fever.   HENT: Negative for congestion.    Eyes: Negative for redness.   Respiratory: Positive for chest tightness (resolved) and shortness of breath (improved).    Cardiovascular: Negative for chest pain and leg swelling.   Gastrointestinal: Negative for abdominal pain, nausea and vomiting.   Genitourinary: Negative for difficulty urinating.   Musculoskeletal: Negative for arthralgias and neck stiffness.   Skin: Negative for color change.   Neurological: Negative for headaches.   Psychiatric/Behavioral: Negative for confusion.       Physical Exam   BP: 115/79  Pulse: 119  Heart Rate: 116  Temp: 98.2  F (36.8  C)  Resp: 18  Weight: 45.4 kg (100 lb)  SpO2: 100 %      Physical Exam  Constitutional:       General: She is not in acute distress.     Appearance: She is well-developed. She is not toxic-appearing or diaphoretic.      Comments: Cachectic, appears chronically ill   HENT:      Head: Atraumatic.      Mouth/Throat:      Mouth: Mucous membranes are moist.      Pharynx: No oropharyngeal exudate.   Eyes:      General: No scleral icterus.     Pupils: Pupils are equal, round, and reactive to light.   Neck:      Musculoskeletal: No neck rigidity.   Cardiovascular:      Rate and Rhythm: Regular rhythm. Tachycardia present.      Heart sounds: Normal heart sounds.   Pulmonary:      Effort: No respiratory distress.      Breath sounds: Rales present.   Abdominal:      General: Bowel sounds are normal.      Palpations: Abdomen is soft.      Tenderness: There is no abdominal tenderness.   Musculoskeletal:         General: No tenderness.   Skin:     General: Skin is warm.      Capillary Refill: Capillary refill takes less than  2 seconds.      Findings: No rash.   Neurological:      General: No focal deficit present.      Mental Status: She is alert and oriented to person, place, and time.   Psychiatric:         Mood and Affect: Mood normal.         ED Course     ED Course as of Jan 03 0106   Thu Jan 02, 2020   1655 Pulse: 119     Results for orders placed or performed during the hospital encounter of 01/02/20 (from the past 24 hour(s))   POC US ECHO LIMITED    Impression    Limited Bedside Cardiac Ultrasound, performed and interpreted by me.   Indication: Shortness of Breath.  Parasternal long axis, parasternal short axis and subcostal views were acquired.   Image quality was satisfactory.    Findings:    There is no evidence of free fluid within the pericardium.    IMPRESSION: No pericardial effusion..       EKG 12-lead, tracing only   Result Value Ref Range    Interpretation ECG Click View Image link to view waveform and result    CBC with platelets differential   Result Value Ref Range    WBC 4.6 4.0 - 11.0 10e9/L    RBC Count 3.71 (L) 3.8 - 5.2 10e12/L    Hemoglobin 10.4 (L) 11.7 - 15.7 g/dL    Hematocrit 33.8 (L) 35.0 - 47.0 %    MCV 91 78 - 100 fl    MCH 28.0 26.5 - 33.0 pg    MCHC 30.8 (L) 31.5 - 36.5 g/dL    RDW 16.4 (H) 10.0 - 15.0 %    Platelet Count 350 150 - 450 10e9/L    Diff Method Manual Differential     % Neutrophils 97.3 %    % Lymphocytes 1.8 %    % Monocytes 0.9 %    % Eosinophils 0.0 %    % Basophils 0.0 %    Absolute Neutrophil 4.5 1.6 - 8.3 10e9/L    Absolute Lymphocytes 0.1 (L) 0.8 - 5.3 10e9/L    Absolute Monocytes 0.0 0.0 - 1.3 10e9/L    Absolute Eosinophils 0.0 0.0 - 0.7 10e9/L    Absolute Basophils 0.0 0.0 - 0.2 10e9/L    Anisocytosis Slight     Platelet Estimate Confirming automated cell count    Basic metabolic panel   Result Value Ref Range    Sodium 130 (L) 133 - 144 mmol/L    Potassium 4.1 3.4 - 5.3 mmol/L    Chloride 94 94 - 109 mmol/L    Carbon Dioxide 30 20 - 32 mmol/L    Anion Gap 5 3 - 14 mmol/L     Glucose 153 (H) 70 - 99 mg/dL    Urea Nitrogen 12 7 - 30 mg/dL    Creatinine 0.37 (L) 0.52 - 1.04 mg/dL    GFR Estimate >90 >60 mL/min/[1.73_m2]    GFR Estimate If Black >90 >60 mL/min/[1.73_m2]    Calcium 9.0 8.5 - 10.1 mg/dL   Troponin I   Result Value Ref Range    Troponin I ES <0.015 0.000 - 0.045 ug/L   XR Chest 2 Views    Narrative    EXAM: XR CHEST 2 VW  1/2/2020 6:33 PM     HISTORY:  dyspnea s/p R thoracentesis       COMPARISON:  Plain film 12/31/2019 CT chest pelvis 12/13/2019 chest  x-ray 11/30/2019    FINDINGS:   PA and lateral views of chest. The trachea is midline. The cardiac  silhouette is stable although slightly obscured. Large right and  small-to-moderate left pleural effusion with associated basilar  opacities, not significantly changed from exam 12/31/2019. No  pneumothorax. The focal opacity in the right middle lobe is not  significantly changed from prior exam. Diffuse sclerotic changes of  the bones.      Impression    IMPRESSION:  1. Unchanged large right and small to moderate left pleural effusions.  Associated bibasilar opacities, likely atelectasis.  2. Focal opacity in the right middle lobe, unchanged from prior exam  and possibly represents infection.    I have personally reviewed the examination and initial interpretation  and I agree with the findings.    GISELL PLUMMER MD     *Note: Due to a large number of results and/or encounters for the requested time period, some results have not been displayed. A complete set of results can be found in Results Review.     Medications - No data to display      Procedures  Results for orders placed during the hospital encounter of 01/02/20   POC US ECHO LIMITED    Impression Limited Bedside Cardiac Ultrasound, performed and interpreted by me.   Indication: Shortness of Breath.  Parasternal long axis, parasternal short axis and subcostal views were acquired.   Image quality was satisfactory.    Findings:    There is no evidence of free fluid  within the pericardium.    IMPRESSION: No pericardial effusion..                 EKG Interpretation:      Interpreted by Kuldeep Marshall DO  Time reviewed: 1735  Symptoms at time of EKG: dyspnea   Rhythm: sinus tach  Rate: normal  Axis: normal  Ectopy: none  Conduction: normal  ST Segments/ T Waves: No ST-T wave changes  Q Waves: none  Comparison to prior: Unchanged    Clinical Impression: sinus tach                 Assessments & Plan (with Medical Decision Making)   Patient sent from the IR suite after she developed hypoxia, tachypnea after a right-sided thoracentesis.  She has a history of a malignant effusion in the setting of metastatic breast cancer.    On arrival, patient was on 1 L nasal cannula that was placed postoperatively.  She had some mild tachypnea and tachycardia.  There is no accessory muscle use, or other signs of respiratory distress.  She does have some rales, worse on the right.  Remainder physical exam is unremarkable.  Differential includes iatrogenic pneumothorax, reexpansion pulmonary edema, trapped lung, pericardial effusion, less likely pneumonia, PE, ACS.  Plan for chest x-ray, bedside ultrasound, CBC, BMP, Trope, EKG.    EKG shows normal sinus tachycardia without signs of ischemia.  CBC and BMP are unremarkable.  Patient's respiratory status has improved in the emergency department.  She was easily weaned off of nasal cannula.  She continues to saturate on room air.  Chest x-ray does show reaccumulation of fluid in the right pleural space.  This appears unchanged compared to her x-ray just prior to the procedure despite the removal of several hundred cc of fluid.  Given the reaccumulation, I suspect the patient likely has trapped lung and had reaccumulation causing her respiratory distress intraoperatively.  I discussed this case with the interventional radiologist on call to see if patient would be a candidate for a pleural VAC or any surgical intervention at this time.  Given that she  appears clinically, I feel this could be done in the outpatient setting.  IR agreed to see the patient in clinic tomorrow.  Patient has been given information to call the office tomorrow for follow-up.      I did offer admission to hospital if patient was still feeling dyspneic for observation and continued evaluation.  She states she feels much better and would rather have this done as outpatient and is asking for discharge at this time.  Regarding her tachycardia, she seems to have an elevated heart rate at baseline, I do not believe this indicates any severe pathology.    I have reviewed the nursing notes.    I have reviewed the findings, diagnosis, plan and need for follow up with the patient.    Discharge Medication List as of 1/2/2020  8:33 PM          Final diagnoses:   Pleural effusion     I, Ivy Montano, am serving as a trained medical scribe to document services personally performed by Kuldeep Marshall DO based on the provider's statements to me on January 2, 2020.  This document has been checked and approved by the attending provider.    I, Kuldeep Marshall DO, was physically present and have reviewed and verified the accuracy of this note documented by Ivy Montano, medical scribe.       1/2/2020   [facility], [place], EMERGENCY DEPARTMENT     Kuldeep Marshall DO  01/03/20 0107

## 2020-01-02 NOTE — OR NURSING
600ml olive colored fluid drained from pleurall space.  o2 sats to 82 o2 per nasal prongs begun at 2 liters and patient returns to sitting position and sats normalize to 94% on one liter of o2.  O2 discontinued and sats returned to 80%.  o2 begun again at 1-2 liters transferred to PACU.

## 2020-01-02 NOTE — TELEPHONE ENCOUNTER
I called the patient and I offered her an appointment for today with Dr. Marvin, and the patient declined. She states that she has thoracentesis today and she already has an appointment for next week at 3pm. She would rather wait for that appointment.   Yoly Page, [facility]

## 2020-01-02 NOTE — ED TRIAGE NOTES
Patient BIBA from the clinic after experiencing sudden shortness of breath after a thoracentesis. Oxygen saturation dipped into the 80%s and improved with 1 LPM. Patient denies any pain and shortness of breath has decreased with oxygen.

## 2020-01-02 NOTE — ED AVS SNAPSHOT
[facility], [place], Emergency Department  [address] [place] [id number]  Phone:  [phone]                                    Shan Marsh   MRN: [id number]    Department:  [facility], Emergency Department   Date of Visit:  1/2/2020           After Visit Summary Signature Page    I have received my discharge instructions, and my questions have been answered. I have discussed any challenges I see with this plan with the nurse or doctor.    ..........................................................................................................................................  Patient/Patient Representative Signature      ..........................................................................................................................................  Patient Representative Print Name and Relationship to Patient    ..................................................               ................................................  Date                                   Time    ..........................................................................................................................................  Reviewed by Signature/Title    ...................................................              ..............................................  Date                                               Time          22EPIC Rev 08/18

## 2020-01-02 NOTE — PROCEDURES
Interventional Radiology Brief Post Procedure Note    Procedure: IR Thoracentesis    Proceduralist: Boyd Nguyen PA-C    Assistant: None    Time Out: Prior to the start of the procedure and with procedural staff participation, I verbally confirmed the patient s identity using two indicators, relevant allergies, that the procedure was appropriate and matched the consent or emergent situation, and that the correct equipment/implants were available. Immediately prior to starting the procedure I conducted the Time Out with the procedural staff and re-confirmed the patient s name, procedure, and site/side. (The Joint Commission universal protocol was followed.)  Yes        Sedation: None. Local Anesthestic used    Findings: Completed image guided right therapeutic thoracentesis. A total of 600 ml of clear yellow fluid was removed without difficulty. Near the completion of drainage patient became quite anxious and her blood pressure elevated and O2 saturations dropped. Saturation improved on 1 liters of oxygen. Patient's discomfort with inspiration continued to increase and she continued to require 1L O2 in recovery.     Estimated Blood Loss: Minimal    SPECIMENS: None    Complications: 1. None     Condition: Stable    Plan: Increased oxygen demand and discomfort post thoracentesis warrants evaluation in the ER. Patient sent by ambulance.    Comments: See dictated procedure note for full details.    Boyd Nguyen PA-C

## 2020-01-03 NOTE — TELEPHONE ENCOUNTER
"Called pt to f/u on her s/p thoracentesis and having shortness of breath.     Received a msg from [facility] regarding ER note from yesterday.     She states Breathing today is little better than when in the ER.     Denies N/v and fever.     Pain: none    She states that when doing Stairs and deep inspiration, she feels that it's difficult to catch her breath.     Chest tightness: ongoing since prior to the thoracentesis so this is not new to her.     She states that she's having more difficulty breathing when doing any activity.     She wants her \"trapped lung\" addressed. She states that she doesn't know who she needs to see for this.   She wants this addressed due to the periodic thoras that she's having.     I informed her that she domingo need to contact Oncology for their thoughts as the direction will come from them. I will send a msg on her behalf as pt states that her RN Sharifa Celeste is out of town as well as her coverage.     She is seeing Lima Griffiths next week.     *note routed to Lima.     Breanna GARCIA RN, BSN  Interventional Radiology Nurse Coordinator   Phone: [phone]                      "

## 2020-01-03 NOTE — DISCHARGE INSTRUCTIONS
You should follow up with the interventional radiology service tomorrow morning.  They should contact you to arrange this appointment.  If you do not hear from them, you should call them at the following number:  [phone]    Return to the emergency department immediately if you develop any worsening shortness of breath, chest pain, fevers, or any other symptoms that are concerning to you.

## 2020-01-06 NOTE — TELEPHONE ENCOUNTER
Social Work Note: Telephone Call  Oncology Clinic    Data/Intervention:  Patient Name:  Shan Marsh  /Age:  1970 (49 year old)    Call From:  SW received phone call from patient.  Reason for Call:  Request for assistance with financial grants.    Assessment:  SW received phone call from patient requesting [employer] application for 2020.  SW completed with patient on phone and submitted.  Patient would also like to apply for [facility] for Breast Cancer.  Education on application process and requirements given.  Per request of patient, SW emailed link to online application. Patient also stated she had not heard from [employer].  SW agreed to contact [facility] to inquire about application. Writer verified with [facility] that application has been submitted.     Plan:  SW contact information previously given to patient. SW continues to be available to assist with any needs.     Soo Yeon Han, MSW, [facility]  Pager: [phone]  Phone: [phone]

## 2020-01-06 NOTE — PROGRESS NOTES
Spoke to patient with difficulty breathing not having much relief from right thoracentesis of 600 ml's of fluid. Spoke to Dr Whitman recommending a CT chest PE protocol and a complete echocardiogram. Patient had a bedside portable echo done in the ED to check if has fluid in the pericardial sac that was not evident by the echo. Spoke to patient with Dr Whitman recommendations requesting that she needs to be home when her daughter gets off the bus at 3:30. Updated Dr Whitman  with orders and the time patient is unavailable. Patient's  will drive patient for imaging and appointment.  Answered all patient's questions and verbalized understanding. Sharifa Celeste RN, BSN.

## 2020-01-06 NOTE — TELEPHONE ENCOUNTER
"Shan called to report that she has more labored breathing. She is short of breath with activity but can still take a deep breath. She states it feels tight. She is wondering what the plan is with her \"trapped lung.\"    Her care team has been contacted and will be connecting with her with a plan.    Elizabeth Espitia RN      "

## 2020-01-07 NOTE — TELEPHONE ENCOUNTER
She could trial sucking on lozenges or hard candies, this can help stimulate the salivary glands. I'd give Biotleon a chance to work.   She should have a refill of nystatin.   Thanks!  Lima     Called [facility] pharmacy, no more refills on file, rx sent. Above information sent to pt via [employer].

## 2020-01-07 NOTE — TELEPHONE ENCOUNTER
Pt called in to triage reporting around the clock dry mouth since 12/31 C1D1 Gemzar/C25D1 zoladex. Stated she started Biotene mouth rinse yesterday 4 times during the day which helped a little but does not want to wake up at night to do this, is there anything she can take prescription wise that would help during the night? Also noticed small red sores on right inside cheek and white patch on tongue. Used Nystatin this morning but running low, would like a refill to [facility] pharmacy. Has not had pain yet so has not started MMW. Paged Lima CASTELLANO.

## 2020-01-07 NOTE — NURSING NOTE
Chief Complaint   Patient presents with     Blood Draw     labs drawn via piv start by vascular access rn. vs taken      Labs drawn via PIV start by vascular access rn in lab. Flushed with saline. Vitals taken. Pt checked in for next appointment.   Hue Simon RN

## 2020-01-07 NOTE — PATIENT INSTRUCTIONS
Contact Numbers    [facility] [place] (for Scheduling/Triage/After Hours Nurse Line): [phone]    Please call the [facility] nurse triage or the after hours nurse line if you experience a temperature greater than or equal to 100.5, shaking chills, have uncontrolled nausea, vomiting and/or diarrhea, dizziness, lightheadedness, shortness of breath, chest pain, bleeding, unexplained bruising, or if you have any other new/concerning symptoms, questions or concerns.     If you are having any concerning symptoms or wish to speak to a provider before your next infusion visit, please call your care coordinator or triage to notify them so we can adequately serve you.     If you need a refill on a narcotic prescription or other medication, please call triage before your infusion appointment.       January 2020 Sunday Monday Tuesday Wednesday Thursday Friday Saturday                  1     2    Outpatient Visit  12:21 PM   [facility]    IR THORACENTESIS  12:30 PM   (60 min.)   [id number]   [facility]    THORACENTESIS   1:30 PM   Boyd Nguyen PA-C    OR    Admission   4:07 PM   [facility], Emergency Department   (Discharge: 1/2/2020)    POCUS                   5:15 PM   (5 min.)   [id number]   [facility] Point of Care Ultrasound    XR CHEST 2 VIEWS   5:20 PM   (15 min.)   [email or web address]   [facility],  Radiology 3     4       5     6    ECHO COMPLETE   1:30 PM   (60 min.)   [email or web address]   [facility]    CT CHEST W   5:40 PM   (20 min.)   UCCT1   [facility] CT 7    [facility] LAB DRAW   1:00 PM   (15 min.)   [facility] DRAW   [facility] Draw    [facility] ONC INFUSION 60   1:30 PM   (60 min.)    ONCOLOGY INFUSION   [facility] 8     9    [facility] RETURN   2:45 PM   (15 min.)   Isabella Collazo MD   [facility] 10     11       12     13    [facility] DRAW  10:30 AM   (15 min.)    [facility] LAB DRAW   [facility] Lab Draw    [facility] RETURN  10:45 AM   (30 min.)   Ana Whitman MD   [facility]    [facility] ONC INFUSION 60  11:30 AM   (60 min.)    ONCOLOGY INFUSION   [facility] 14     15     16     17     18       19     20     21     22     23     24     25       26     27     28    [facility] DRAW   1:00 PM   (15 min.)    [facility]    [facility] ONC INFUSION 120   1:30 PM   (120 min.)    ONCOLOGY INFUSION   [facility] 29 30 31 February 2020 Sunday Monday Tuesday Wednesday Thursday Friday Saturday                                 1       2     3     4    [facility] LAB DRAW   1:00 PM   (15 min.)    [facility] LAB DRAW   [facility] Lab Draw    [facility] ONC INFUSION 60   1:30 PM   (60 min.)    ONCOLOGY INFUSION   [facility] 5     6     7     8       9     10     11    [facility] LAB DRAW   8:00 AM   (15 min.)    [facility] LAB DRAW   [facility]    [facility] RETURN   8:15 AM   (50 min.)   Lima Griffiths PA-C   [facility]    [facility] ONC INFUSION 60  10:00 AM   (60 min.)    ONCOLOGY INFUSION   [facility] 12     13     14     15       16     17     18     19     20     21     22       23     24    [facility] RETURN  11:45 AM   (15 min.)   Fermín Nichols MD   [facility] Dermatology    [facility] ONC INFUSION 60   1:30 PM   (60 min.)    22 ATC   [facility] Oncology Infusion 25     26     27     28     29                     Lab Results:  Recent Results (from the past 12 hour(s))   CBC with platelets differential    Collection Time: 01/07/20  1:27 PM   Result Value Ref Range    WBC 6.4 4.0 - 11.0 10e9/L    RBC Count 3.72 (L) 3.8 - 5.2 10e12/L    Hemoglobin 10.6 (L) 11.7 - 15.7 g/dL    Hematocrit 33.8 (L) 35.0 - 47.0 %    MCV 91 78 - 100 fl    MCH 28.5 26.5 - 33.0 pg    MCHC 31.4 (L) 31.5 - 36.5 g/dL    RDW 16.7 (H)  10.0 - 15.0 %    Platelet Count 239 150 - 450 10e9/L    Diff Method Manual Differential     % Neutrophils 67.5 %    % Lymphocytes 12.3 %    % Monocytes 10.5 %    % Eosinophils 0.0 %    % Basophils 0.0 %    % Metamyelocytes 3.5 %    % Myelocytes 4.4 %    % Promyelocytes 1.8 %    Nucleated RBCs 2 (H) 0 /100    Absolute Neutrophil 4.3 1.6 - 8.3 10e9/L    Absolute Lymphocytes 0.8 0.8 - 5.3 10e9/L    Absolute Monocytes 0.7 0.0 - 1.3 10e9/L    Absolute Eosinophils 0.0 0.0 - 0.7 10e9/L    Absolute Basophils 0.0 0.0 - 0.2 10e9/L    Absolute Metamyelocytes 0.2 (H) 0 10e9/L    Absolute Myelocytes 0.3 (H) 0 10e9/L    Absolute Promyeloctyes 0.1 (H) 0 10e9/L    Absolute Nucleated RBC 0.1     Anisocytosis Slight     Polychromasia Slight     Platelet Estimate Confirming automated cell count

## 2020-01-07 NOTE — PROGRESS NOTES
Infusion Nursing Note:  Shan Marsh presents today for C1 D8 Gemzar.    Patient seen by provider today: No   present during visit today: Not Applicable.    Note: Patient arrives with mother to infusion. She reports no improvement in SOB/chest tightness since evaluated in ER on 1/2/20 after thoracentesis. CT chest, ECHO and CXR performed to rule out PE/pleural effusion. Pt denies chest pain but visibly SOB at rest. Pt has been trying to quality for home oxygen but has been denied thus far d/t adequate oxygenation. Suggested to patient to get portable reading today while ambulating. Paged Dr. Whitman with findings.    Per TORB Dr. Whitman/Mahogany Brandon, RN @1851 1/7/20:  - Seems to be disease-related so I would like to go ahead with Gemzar today as long as patient feels okay and oxygen sats remain in the 90's while ambulating  - If she feels poorly, and her oxygen sats below 90%, we will have to hold treatment and have her get seen later this week as I do not have any more openings today  - Sharifa Celeste, IVYCC is working on getting pt scheduled with thoracic surgery    Ambulated with patient while measuring oxygen sats. Sats ranged from 92-95% during this time and patient was pleased with results. Pt able to talk while ambulating without issue. Pt feeling well enough for chemo and MD updated.    Sharifa Celeste RNCC came and spoke with patient/mother about getting seen by thoracic per Dr. Whitman' orders. Pt agreeable with plan.    Intravenous Access:  Peripheral IV placed.    Treatment Conditions:  Lab Results   Component Value Date    HGB 10.6 01/07/2020     Lab Results   Component Value Date    WBC 6.4 01/07/2020      Lab Results   Component Value Date    ANEU 4.3 01/07/2020     Lab Results   Component Value Date     01/07/2020      Lab Results   Component Value Date     01/02/2020                   Lab Results   Component Value Date    POTASSIUM 4.1 01/02/2020           Lab Results    Component Value Date    MAG 2.4 01/22/2019            Lab Results   Component Value Date    CR 0.37 01/02/2020                   Lab Results   Component Value Date    KRYSTEN 9.0 01/02/2020                Lab Results   Component Value Date    BILITOTAL 0.6 12/31/2019           Lab Results   Component Value Date    ALBUMIN 2.6 12/31/2019                    Lab Results   Component Value Date    ALT 74 12/31/2019           Lab Results   Component Value Date     12/31/2019       Results reviewed, labs MET treatment parameters, ok to proceed with treatment.      Post Infusion Assessment:  Patient tolerated infusion without incident.  Blood return noted pre and post infusion.  Site patent and intact, free from redness, edema or discomfort.  No evidence of extravasations.  Access discontinued per protocol.       Discharge Plan:   Patient declined prescription refills.  Discharge instructions reviewed with: Patient and mother.  Patient and/or family verbalized understanding of discharge instructions and all questions answered.  AVS to patient via [employer].  Patient will return 1/13 for next appointment.   Patient discharged in stable condition accompanied by: mother.  Departure Mode: Ambulatory.    Mahogany Brandon RN

## 2020-01-08 NOTE — PROGRESS NOTES
"Spoke to patient after Gemzar infusion yesterday to follow up after the infusion. Patient states \"all I do all day is manage my symptoms.\" Patient's biggest complaints is dry mouth and getting enough fluids in and eating. Ate cereal with berries and a protein shake so far today. Patient is using a Biotin spray several times a night with her dry mouth that really interrupts her sleep at night and c/o \"acid reflux.\" Instructed patient to use lemon or edward drops for dry mouth. Patient states is constipated, but had a BM yesterday. Patient will make a fruit smoothie and instructed patient to add pears to the smoothie and will call if constipation worsens.  Will send a message to Dr Whitman with symptoms of reflux for her recommendations.  Answered all patient's questions and verbalized understanding. Sharifa Celeste RN, BSN.   "

## 2020-01-08 NOTE — TELEPHONE ENCOUNTER
RECORDS STATUS - ALL OTHER DIAGNOSIS      RECORDS RECEIVED FROM: [facility] - Internal Referral   DATE RECEIVED: 1/8/20   NOTES STATUS DETAILS   OFFICE NOTE from referring provider [facility] Dr. Whitman   OFFICE NOTE from medical oncologist Epic Last seen 12/9/19 - Shanika    Also See's Dr. Monroy/Rad Onc - most recent OV 12/23/19   DISCHARGE SUMMARY from hospital [facility] 11/29/19, 1/27/19, 11/12/18, 10/4/18, 4/2/18   DISCHARGE REPORT from the ER [facility] 1/2/20, 10/20/18   OPERATIVE REPORT [facility] 1/2/20, 11/25/19, 10/14/19, 10/3/18, 9/5/18, 7/5/18, 5/7/18 - Thoracentesis    11/12/18 - Removal of Pleurx Cath  10/11/18- insert torri tube    5/12/17 - Bronchoscopy   MEDICATION LIST [facility] 1/7/20   CLINICAL TRIAL TREATMENTS TO DATE     LABS     PATHOLOGY REPORTS [facility]    ANYTHING RELATED TO DIAGNOSIS Epic 1/7/20   GENONOMIC TESTING     TYPE:     IMAGING (NEED IMAGES & REPORT)     NM Bone Scan PACS 12/13/19, 10/3/19, 6/6/19, 1/28/19   XR PACS 1/2/20, 12/31/19, 11/30/1911/20/19   CT SCANS PACS 1/6/20, 12/13/19, 11/29/19, 10/3/19, 6/6/19, 4/5/19, 1/14/19   MRI PACS 11/30/19   MAMMO     ULTRASOUND PACS 1/2/20   PET

## 2020-01-08 NOTE — PROGRESS NOTES
"[facility] Dermatology Note      Dermatology Problem List:  1. 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 Multiple firm papules on abdomen, concerning for metastatic breast cancer.  - S/p shave bx R abdomen 1/9/2020    Encounter Date: Jan 9, 2020    CC:  Chief Complaint   Patient presents with     Derm Problem     Shan is due to for several concerning lesions        History of Present Illness:  Ms. Shan Marsh is a 49 year old female who presents today for a rash on her breast, abdomen, back, and armpit area. She was last seen by Dr. Nichols on 4/19/2019 where she discontinued triamcinolone cream and was started on tacrolimus 0.1% ointment BID for malignant intertrigo eruption induced by chemotherapy.     Today she reports that she noticed the rash starting in October and it has since spread. She says it feels \"tight\" and is slightly itchy. She used the tacrolimus 0.1% ointment and this did not resolve the rash. She just started gemcitabine and was previously on doxorubicin chemotherapy. No association with the rash to the medication change. She had recently imaging which showed progression of her metastatic breast cancer. She has never has a rash like this before and she did not have eczema as a baby.    She is otherwise not feeling very well. She was having night sweats for while but has since subsided. She did lose some weight a few months ago but has since gained a little back. No additional skin concerns.    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)     Fever     Facial droop     Past Medical History:   Diagnosis Date     Breast cancer, left breast (H) 5/12    grade 1 infiltrating ductal cancer, ER/GA+, s/p bilateral mastectomy     Complication of anesthesia     bradycardia,b/p drop p childbirth     Dysplasia of cervix (uteri) 12/01    LUCAS II - III, s/p LEEP  @ [facility]     Incompetence of cervix 4/06    D&E at 19+4 weeks twin pregnancy     Shingles outbreak 5/12     Past Surgical History:   Procedure Laterality Date     D & C  9/2010    retained POC     ENDOBRONCHIAL ULTRASOUND FLEXIBLE N/A 5/12/2017    Procedure: ENDOBRONCHIAL ULTRASOUND FLEXIBLE;  Flexible Bronchoscopy, Endobronchial Ultrasound, Transbronchial Biopsy ;  Surgeon: Bandar Meyer MD;  Location: [facility]     INSERT CHEST TUBE Left 10/11/2018    Procedure: INSERT CHEST TUBE;  Insert Chest Tube ;  Surgeon: Brock Chan MD;  Location:  GI     INSERT CHEST TUBE N/A 11/12/2018    Procedure: Removal Of Pleurx Catheter ;  Surgeon: Bandar Meyer MD;  Location: [facility]     IR THORACENTESIS  10/14/2019     IR THORACENTESIS  11/25/2019     IR THORACENTESIS  1/2/2020     LEEP TX, CERVICAL  12/01    LEEP or LUCAS II - III     MASTECTOMY, RECONSTRUCT BREAST, COMBINED  6/11/2012    Procedure: COMBINED MASTECTOMY, RECONSTRUCT BREAST;  Bilateral Mastectomy with Reconstruction, Left [place] Node Biopsy , placement of On-Q catheters X 2 bilateral breast.;  Surgeon: Misael Cheng MD;  Location:  OR     RECONSTRUCT BREAST BILATERAL  3/25/2013    Procedure: RECONSTRUCT BREAST BILATERAL;  Revise bilateral breasts and reconstructions, with insertion of gel breast implants with fat grafting and fat after mastectomies.;   Surgeon: Yair Olvera MD;  Location: [facility] OR     RECONSTRUCT BREAST BILATERAL, IMPLANT PROSTHESIS BILATERAL, COMBINED  9/27/2012    Procedure: COMBINED RECONSTRUCT BREAST BILATERAL, IMPLANT PROSTHESIS BILATERAL;  Bilateral Second Stage Breast Reconstruction With Gel Implants, Fat        THORACENTESIS Left 5/7/2018    Procedure: THORACENTESIS;  Thoracentesis;  Surgeon: Venancio Hussein PA-C;  [facility]     THORACENTESIS Left 7/5/2018    Procedure: THORACENTESIS;  Thoracentesis;  Surgeon: Lydia Murillo PA-C;  [facility]     THORACENTESIS Left 9/5/2018    Procedure: THORACENTESIS;  Left Thoracentesis;  Surgeon: López Guillen PA-C;  [facility]     THORACENTESIS Left 10/3/2018    Procedure: THORACENTESIS;  Thoracentesis, left;  Surgeon: Eros Chauhan PA-C;  [facility]     THORACENTESIS Right 10/9/2018    Procedure: THORACENTESIS;  Thoacentesis ;  Surgeon: Linh Hayes MD;  Location: [facility] GI     THORACENTESIS Right 10/14/2019    Procedure: THORACENTESIS;  Surgeon: Lydia Brannon PA-C;  [facility]     THORACENTESIS Right 11/25/2019    Procedure: THORACENTESIS;  Surgeon: Eros Chauhan PA-C;  [facility]     THORACENTESIS Right 1/2/2020    Procedure: THORACENTESIS;  Surgeon: Boyd Nguyen PA-C;  [facility] OR       Social History:  Patient reports that she quit smoking about 22 years ago. She has never used smokeless tobacco. She reports current drug use. She reports that she does not drink alcohol.    Family History:  Family History   Problem Relation Age of Onset     Alcohol/Drug Father      Allergies Mother      Neurologic Disorder Mother         seizures, related to fever     Gastrointestinal Disease Mother         IBS     Blood Disease Mother         [place]     Cancer Maternal Grandfather         bladder     Circulatory Maternal Grandfather         blood clots     Alcohol/Drug Maternal Grandfather         alcohol dependency      Allergies Maternal Aunt      Allergies Other         maternal cousins     Arthritis Maternal Aunt      Prostate Cancer Maternal Uncle      Thyroid Disease Maternal Aunt      Alcohol/Drug Maternal Uncle         alcohol and drug dependency     Cancer - colorectal Maternal Uncle      Gastrointestinal Disease Sister         IBS     Breast Cancer Other         self       Medications:  Current Outpatient Medications   Medication Sig Dispense Refill     carboxymethylcellulose PF (GOODSENSE LUBRICATING EYE DROP) 0.5 % ophthalmic solution Place 1 drop into the right eye 3 times daily as needed for dry eyes 1 Bottle 1     Digestive Enzymes (ENZYME DIGEST) CAPS Take 1 capsule by mouth daily       LORazepam (ATIVAN) 0.5 MG tablet Take 1 tablet (0.5 mg) by mouth every 4 hours as needed (Anxiety, Nausea/Vomiting or Sleep) 30 tablet 2     magic mouthwash (ENTER INGREDIENTS IN COMMENTS) suspension SWISH AND SPIT 5 TO 10 ML BY MOUTH 4 TO 6 TIMES DAILY AS NEEDED 240 mL 3     medical cannabis (Patient's own supply.  Not a prescription) (This is NOT a prescription, and does not certify that the patient has a qualifying medical condition for medical cannabis.  The purpose of this order is  to document that the patient reports taking medical cannabis.)       nystatin (MYCOSTATIN) 778098 UNIT/ML suspension Take 5 mLs (500,000 Units) by mouth 4 times daily 400 mL 0     OLANZapine (ZYPREXA) 2.5 MG tablet Take 1 tablet (2.5 mg) by mouth At Bedtime 30 tablet 1     ondansetron (ZOFRAN) 8 MG tablet Take 1 tablet (8 mg) by mouth every 8 hours as needed for nausea 90 tablet 3     prochlorperazine (COMPAZINE) 10 MG tablet Take 1 tablet (10 mg) by mouth every 6 hours as needed (Nausea/Vomiting) 30 tablet 2     prochlorperazine (COMPAZINE) 5 MG tablet Take 1 tablet (5 mg) by mouth every 6 hours as needed for nausea or vomiting 30 tablet 1     VITAMIN D, CHOLECALCIFEROL, PO Take 1,000 Units by mouth daily        White Petrolatum-Mineral Oil (SYSTANE  NIGHTTIME) OINT Apply a small amount to eye at bedtime 1 Tube 1     Eye Patch MISC 1 Bottle once for 1 dose 30 each 0       Allergies   Allergen Reactions     Erythromycin Shortness Of Breath     Sulfa Drugs      rash       Review of Systems:  -Skin Establ Pt: The patient denies any new rash, pruritus, or lesions that are symptomatic, changing or bleeding, except as per HPI.  -Constitutional: The patient is feeling generally well.    Physical exam:  GEN: This is a well developed, well-nourished female in no acute distress, in a pleasant mood.    SKIN: Focused examination of the face, neck, chest, abdomen, back, and arms was performed.  - Multiple light pink firm papules across abdomen central chest  L flank   - No other lesions of concern on areas examined.     Impression/Plan:  1. Multiple firm papules on left flank/abdomen. Favor metastatic breast cancer, ddx includes much less likely chemotherapy reaction. Will perform punch bx for clarification.  - Punch biopsy:  After discussion of benefits and risks including but not limited to bleeding/bruising, pain/swelling, infection, scar, incomplete removal, nerve damage/numbness, recurrence, and non-diagnostic biopsy, written consent, verbal consent and photographs were obtained. Time-out was performed. The area was cleaned with isopropyl alcohol. 0.5mL of 1% lidocaine with 1:100,000 epinephrine was injected to obtain adequate anesthesia of the lesion on the R abdomen.  A 4 mm punch biopsy was performed. 4-0 prolene sutures were utilized to approximate the epidermal edges. White petroleum jelly/Vaseline and a bandage was applied to the wound. Explicit verbal and written wound care instructions were provided. The patient left the Dermatology Clinic in good condition. The patient was counseled to follow up for suture removal in approximately 2 weeks.    Follow-up prn pending bx results, sooner if concerns.    Staff Involved:    Scribe Disclosure  I, Davida Hinojosa am  serving as a scribe to document services personally performed by Dr. Isabella Collazo MD, based on data collection and the provider's statements to me.     Provider Disclosure:   The documentation recorded by the scribe accurately reflects the services I personally performed and the decisions made by me.    Isabella Collazo MD    Department of Dermatology  [facility]: Phone: [phone], Fax: [phone]

## 2020-01-09 NOTE — PATIENT INSTRUCTIONS

## 2020-01-09 NOTE — TELEPHONE ENCOUNTER
"I spoke with Shan last evening given her numerous mychart messages and concerns regarding symptoms.  She reported feeling \"okay.\"  She has no shortness of breath at rest.  She feels like it is an extra effort to talk for prolonged periods of time, as well as to exert extra energy (I.e. walking stairs).      She has no f/c.  mild nausea though eating and maintaining her weight continue to be difficult for her.      No pain.      She is concerned the lesions on her chest wall are expanding and gaining in number.      She also reports fatigue.    She reports frustrations with managing symptoms all the time.    We reviewed that some of her symptoms and side effects are from treatment (gemzar, radiation), though I am concerned that some of them are disease related, and are not all from her treatment.  The best way to treat disease related symptoms is to treat her cancer, and thus we have been continuing gemzar.  We will try to wait 1-2 more infusions, to see if the gemzar is working.  She is seeing me on Monday to review this again.      We discussed coping and strategies regarding this.  I suggested she meet with palliative care, particularly SW, given her concerns regarding her family and children.  Referral placed.    She will see thoracic surgery next week regarding pleural effusion, and derm today to confirm skin lesions or cancerous, which is what I suspect.    Ana Whitman MD  "

## 2020-01-09 NOTE — NURSING NOTE
Dermatology Rooming Note    Shan Marsh's goals for this visit include:   Chief Complaint   Patient presents with     Derm Problem     Shan is due to for several concerning lesions      Eun Miller, EMT

## 2020-01-11 NOTE — PROGRESS NOTES
[facility] cancer clinic at [facility]  Oncology outpatient follow-up note  Date of visit: 1/13/20     Reason for Visit: Follow up for metastatic breast cancer (ER/LA+, HER2-)    Oncology history summary:  1. Oncology diagnosis/diagnostic/prognostic factors:  Metastatic ER/LA+ HER2 negative breast cancer involving multiple lymph nodes at Dx; wide-spread disease w/ multiple failure of chemotherapy below  - HX of [id number], ER/LA positive, HER-2 negative breast cancer in spring of 2012; s/p bilateral mastectomies and Tamoxifen from 8/2012-2/2013, discontinued due to side effects  - Presented w/ Lt shoulder pain, MRI shoulder showed  a soft tissue mass close to the distal clavicle, PET/CT on 5/9/17 showed hypermetabolic left axillary, supraclavicular, mediastinal and hilar nodes.   - Biopsies of left axilla and hilar nodes showed metastatic breast cancer, strongly ER positive, moderately LA positive, and HER-2/kaitlin non-amplified.  - [facility] 1/24/19 positive for BRCA2 (not germline), PIK3C/MDM4/RAD21/NF1/IKBKE amplification, MIKE and low TMB 4    2. Treatment  1st line: Palbociclib and Tamoxifen per BIG10 trial, started 5/24/17  -  Required dose reduction for neutropenia  -  CT CAP and bone scan 1/11/2018 showing progressive disease with new bony lesions in the ribs, T spine, and possibly liver, lungs    2nd line: Zoladex and Abemiciclib from 1/12/2018.      In March 2018, she sought treatment at [facility] in [place], under the care of Dr. Brooks, where she received:  - Vallovex vaccine (per FDA, is in phase I trials here in [place])  - Insulin potentiated therapy with conjugated chemotherapy (5% chemotherapy, unclear what chemotherapy)  - Gina's toxins, IV vitamin C, IV Ozone, hyperbaric chamber, vitamin B17 infusion, vitamin CK3 IV, and coffee enemas PRN  - Also oral niacin, Lugol, acidol, pancreatin, and thyroid desiccated liver, per their regimen  - She was continued on Letrozole and advised to continue  "Zoladex.     She met with Dr. Whitman and patient wished to continue her alternative regimen. Abemiciclib was placed on hold. She was continued on Zoladex and letrozole. While in [place], she required thoracentesis for symptomatic pleural effusion. A repeat PET in August 2018 showed widely progressive disease with multiple new bone mets involving the spine, ribs, pelvis, and sacrum; new lesions involving the left lateral thoracic/abdominal wall musculature and subcutaneous tissues, new metastases in the left adrenal gland, and recurrent large L pleural effusion.     3rd line: Xeloda 1500 mg BID from 9/5/18, continued until 1/2019    - She was admitted 10/4-10/11/18 after a syncopal event/unwitnessed fall at home. She was found to have left pneumothorax and pericardial effusion with tamponade physiology. She had left chest tube placed in ED which then fell out on 10/10. Left pleurx placed on 10/11. She had pericardiocentesis on 10/7, drain removed 10/8.     4th line: Doxil January 2019 - October 2019    Whole brain radiation:  12/12/19-12/24/19 3000 cGY    Currently Gemzar C1D1 12/31/19-     Interval History:  Shan is here today for follow up for her metastatic breast cancer.  She has begun new chemotherapy with Gemzar from 12/31/19. Most bothersome symptom is \"dry mouth\" despite frequent use of artificial saliva and often wakes her up at night.    In addition, she developed progressive skin lesions on her chest wall for which biopsy was done 1/9/20 but final report is still pending. She reports that skin lesions started around 9/2019 and persisted once they appeared. Initially, it started underneath the left breast, now extended to left axilla and crossed the midline of chest, down to upper abdomen.     Chronic nausea w/o vomiting, able to eat with maintaining body weight. Breathing shallow but denies any SOB at rest. Takes a short nap a day (<20 min) to avoid sleeping problem at night.    ROS: 10 point ROS neg other " "than the symptoms noted above in the HPI.     Current Outpatient Medications   Medication     carboxymethylcellulose PF (GOODSENSE LUBRICATING EYE DROP) 0.5 % ophthalmic solution     Digestive Enzymes (ENZYME DIGEST) CAPS     Eye Patch MISC     LORazepam (ATIVAN) 0.5 MG tablet     magic mouthwash (ENTER INGREDIENTS IN COMMENTS) suspension     medical cannabis (Patient's own supply.  Not a prescription)     nystatin (MYCOSTATIN) 275202 UNIT/ML suspension     OLANZapine (ZYPREXA) 2.5 MG tablet     ondansetron (ZOFRAN) 8 MG tablet     prochlorperazine (COMPAZINE) 10 MG tablet     prochlorperazine (COMPAZINE) 5 MG tablet     VITAMIN D, CHOLECALCIFEROL, PO     White Petrolatum-Mineral Oil (SYSTANE NIGHTTIME) OINT     No current facility-administered medications for this visit.      Facility-Administered Medications Ordered in Other Visits   Medication     sodium chloride (PF) 0.9% PF flush 10 mL     sodium chloride (PF) 0.9% PF flush 10 mL     sodium chloride (PF) 0.9% PF flush 10 mL     sodium chloride (PF) 0.9% PF flush 10 mL     sodium chloride (PF) 0.9% PF flush 10 mL          Physical Examination:  /85 (BP Location: Right arm, Patient Position: Sitting, Cuff Size: Adult Small)   Pulse 97   Temp 97.3  F (36.3  C) (Oral)   Resp 16   Ht 1.626 m (5' 4.02\")   Wt 46.6 kg (102 lb 12.8 oz)   SpO2 100%   BMI 17.64 kg/m      General: The patient is a cachectic female in no acute distress.  HEENT: EOMI, PERRL. Sclerae are anicteric. Oral mucosa is pink and moist with no lesions or thrush.   Lymph: No palpable cervical, supraclavicular, subclavicular lymphadenopathy.  Heart: Regular rate and rhythm.   Lungs: Breathing comfortably on room air. Diminished breath sounds in lung bases bilaterally  Abdomen: Bowel sounds present, soft, no pain to palpation, no palpable hepatosplenomegaly or masses.   Extremities: No lower extremity edema noted bilaterally.   Neuro: Cranial nerves II through XII are grossly intact.  Skin: " Numerous nodular hard cutaneous lesions below the left breast to left axilla and below right breast      Labs:     Ref. Range 1/13/2020 10:46   Sodium Latest Ref Range: 133 - 144 mmol/L 132 (L)   Potassium Latest Ref Range: 3.4 - 5.3 mmol/L 4.7   Chloride Latest Ref Range: 94 - 109 mmol/L 96   Carbon Dioxide Latest Ref Range: 20 - 32 mmol/L 32   Urea Nitrogen Latest Ref Range: 7 - 30 mg/dL 11   Creatinine Latest Ref Range: 0.52 - 1.04 mg/dL 0.46 (L)   GFR Estimate Latest Ref Range: >60 mL/min/1.73_m2 >90   GFR Estimate If Black Latest Ref Range: >60 mL/min/1.73_m2 >90   Calcium Latest Ref Range: 8.5 - 10.1 mg/dL 9.3   Anion Gap Latest Ref Range: 3 - 14 mmol/L 4   Albumin Latest Ref Range: 3.4 - 5.0 g/dL 2.3 (L)   Protein Total Latest Ref Range: 6.8 - 8.8 g/dL 8.2   Bilirubin Total Latest Ref Range: 0.2 - 1.3 mg/dL 0.5   Alkaline Phosphatase Latest Ref Range: 40 - 150 U/L 736 (H)   ALT Latest Ref Range: 0 - 50 U/L 287 (H)   AST Latest Ref Range: 0 - 45 U/L 331 (H)   WBC Latest Ref Range: 4.0 - 11.0 10e9/L 3.7 (L)   Hemoglobin Latest Ref Range: 11.7 - 15.7 g/dL 10.6 (L)   Hematocrit Latest Ref Range: 35.0 - 47.0 % 34.7 (L)   Platelet Count Latest Ref Range: 150 - 450 10e9/L 155        Assessment and Plan:  Shan Marsh is a 49 year old woman with prior history of a T1 N0, ER/NJ positive, HER2-negative left breast cancer treated with bilateral mastectomies in 2012, now with metastatic disease (bones, pleural effusions, lungs, pericardial effusion, liver, left adrenal/retroperitoneal mets), ER/NJ positive, HER2 negative.  She progressed on palbociclib and tamoxifen, alternative therapies in [place] and was on Xeloda, then Doxil single agent, now on Gemzar cycle 1.    Metastatic breast cancer: Started Gemzar C1 from 12/31/19.   Given her skin findings and LFT changes, it is a concern that her disease progresses despite changes in treatment. Her options would include adding carboplatin (based on sporadic BRCA2),  eribulin or hospice. However, her disease pace is quite fast and not likely to be responsive to a single agent chemotherapy. On the other hand, her overall condition is very frail. After discussing possible refractory disease and requirement of more intensive therapy in case she stays on active treatment, she decided to choose hospice.    Will refer her to hospice/palliative care and .  Referral placed.     The patient was seen and care plans discussed with Dr. Whitman.    Se hillary Carrington MD  [facility]  Hematology oncology and transplantation fellow  Pager [phone]    Addendum:  I met with Shan and her  > 40 min today in counseling and coordinating care.  We reviewed that she has progressive metastatic breast cancer by clinical exam, with further spread of disease across her chest wall.  She has only had two doses of gemzar.  I am concerned however that her disease is spreading, and she is getting progressively weaker.      We reviewed overall goals of care.  We discussed options including pursuing a more aggressive chemotherapy or targeted treatment.  We also discussed pursuing a more palliative approach.  Shan indicated that she is not interested in more aggressive therapies.  She is interested in a palliative approach.  She reported that it is getting more difficult at home and wishes for placement/facility options.  We discussed hospice care.  She wishes to pursue hospice care, and at [facility].  Pt will meet with hospice team for coordination of care.    We reviewed her code status.  She wishes to be DNR/DNI.    Ana Whitman MD

## 2020-01-13 NOTE — NURSING NOTE
"Oncology Rooming Note    January 13, 2020 11:33 AM   Shan Marsh is a 49 year old female who presents for:    Chief Complaint   Patient presents with     Blood Draw     Labs drawn via PIV placed by RN in lab. VS taken. Patient checked in for next appt.     Oncology Clinic Visit     [facility] RETURN- BREAST CA     Initial Vitals: /85 (BP Location: Right arm, Patient Position: Sitting, Cuff Size: Adult Small)   Pulse 97   Temp 97.3  F (36.3  C) (Oral)   Resp 16   Ht 1.626 m (5' 4.02\")   Wt 46.6 kg (102 lb 12.8 oz)   SpO2 100%   BMI 17.64 kg/m   Estimated body mass index is 17.64 kg/m  as calculated from the following:    Height as of this encounter: 1.626 m (5' 4.02\").    Weight as of this encounter: 46.6 kg (102 lb 12.8 oz). Body surface area is 1.45 meters squared.  No Pain (0) Comment: Data Unavailable   No LMP recorded. (Menstrual status: Chemotherapy).  Allergies reviewed: Yes  Medications reviewed: Yes    Medications: MEDICATION REFILLS NEEDED TODAY. Provider was notified.  Pharmacy name entered into CoverMyMeds:    [facility] - [place], [place] - [address]  [facility] - [place], [place] - [address] [place], [place] [address]E [address]  [facility] [place], [place] - [address] [facility] [phone]    Clinical concerns: Refill on Zofran and Compazine. Antonette was notified.      Chacorta Martinez LPN            "

## 2020-01-13 NOTE — LETTER
1/13/2020       RE: Shan Marsh  [address]  [facility] [address]     Dear Colleague,    Thank you for referring your patient, Shan Marsh, to the [facility]. Please see a copy of my visit note below.    [facility] cancer clinic at [facility]  Oncology outpatient follow-up note  Date of visit: 1/13/20     Reason for Visit: Follow up for metastatic breast cancer (ER/WI+, HER2-)    Oncology history summary:  1. Oncology diagnosis/diagnostic/prognostic factors:  Metastatic ER/WI+ HER2 negative breast cancer involving multiple lymph nodes at Dx; wide-spread disease w/ multiple failure of chemotherapy below  - HX of [id number], ER/WI positive, HER-2 negative breast cancer in spring of 2012; s/p bilateral mastectomies and Tamoxifen from 8/2012-2/2013, discontinued due to side effects  - Presented w/ Lt shoulder pain, MRI shoulder showed  a soft tissue mass close to the distal clavicle, PET/CT on 5/9/17 showed hypermetabolic left axillary, supraclavicular, mediastinal and hilar nodes.   - Biopsies of left axilla and hilar nodes showed metastatic breast cancer, strongly ER positive, moderately WI positive, and HER-2/kaitlin non-amplified.  - [facility] 1/24/19 positive for BRCA2 (not germline), PIK3C/MDM4/RAD21/NF1/IKBKE amplification, MIKE and low TMB 4    2. Treatment  1st line: Palbociclib and Tamoxifen per BIG10 trial, started 5/24/17  -  Required dose reduction for neutropenia  -  CT CAP and bone scan 1/11/2018 showing progressive disease with new bony lesions in the ribs, T spine, and possibly liver, lungs    2nd line: Zoladex and Abemiciclib from 1/12/2018.      In March 2018, she sought treatment at [facility] in [place], under the care of Dr. Brooks, where she received:  - Vallovex vaccine (per FDA, is in phase I trials here in [place])  - Insulin potentiated therapy with conjugated chemotherapy (5% chemotherapy, unclear what chemotherapy)  - Gina's toxins, IV  "vitamin C, IV Ozone, hyperbaric chamber, vitamin B17 infusion, vitamin CK3 IV, and coffee enemas PRN  - Also oral niacin, Lugol, acidol, pancreatin, and thyroid desiccated liver, per their regimen  - She was continued on Letrozole and advised to continue Zoladex.     She met with Dr. Whitman and patient wished to continue her alternative regimen. Abemiciclib was placed on hold. She was continued on Zoladex and letrozole. While in [place], she required thoracentesis for symptomatic pleural effusion. A repeat PET in August 2018 showed widely progressive disease with multiple new bone mets involving the spine, ribs, pelvis, and sacrum; new lesions involving the left lateral thoracic/abdominal wall musculature and subcutaneous tissues, new metastases in the left adrenal gland, and recurrent large L pleural effusion.     3rd line: Xeloda 1500 mg BID from 9/5/18, continued until 1/2019    - She was admitted 10/4-10/11/18 after a syncopal event/unwitnessed fall at home. She was found to have left pneumothorax and pericardial effusion with tamponade physiology. She had left chest tube placed in ED which then fell out on 10/10. Left pleurx placed on 10/11. She had pericardiocentesis on 10/7, drain removed 10/8.     4th line: Doxil January 2019 - October 2019    Whole brain radiation:  12/12/19-12/24/19 3000 cGY    Currently Gemzar C1D1 12/31/19-     Interval History:  Shan is here today for follow up for her metastatic breast cancer.  She has begun new chemotherapy with Gemzar from 12/31/19. Most bothersome symptom is \"dry mouth\" despite frequent use of artificial saliva and often wakes her up at night.    In addition, she developed progressive skin lesions on her chest wall for which biopsy was done 1/9/20 but final report is still pending. She reports that skin lesions started around 9/2019 and persisted once they appeared. Initially, it started underneath the left breast, now extended to left axilla and crossed the midline " "of chest, down to upper abdomen.     Chronic nausea w/o vomiting, able to eat with maintaining body weight. Breathing shallow but denies any SOB at rest. Takes a short nap a day (<20 min) to avoid sleeping problem at night.    ROS: 10 point ROS neg other than the symptoms noted above in the HPI.     Current Outpatient Medications   Medication     carboxymethylcellulose PF (GOODSENSE LUBRICATING EYE DROP) 0.5 % ophthalmic solution     Digestive Enzymes (ENZYME DIGEST) CAPS     Eye Patch MISC     LORazepam (ATIVAN) 0.5 MG tablet     magic mouthwash (ENTER INGREDIENTS IN COMMENTS) suspension     medical cannabis (Patient's own supply.  Not a prescription)     nystatin (MYCOSTATIN) 701289 UNIT/ML suspension     OLANZapine (ZYPREXA) 2.5 MG tablet     ondansetron (ZOFRAN) 8 MG tablet     prochlorperazine (COMPAZINE) 10 MG tablet     prochlorperazine (COMPAZINE) 5 MG tablet     VITAMIN D, CHOLECALCIFEROL, PO     White Petrolatum-Mineral Oil (SYSTANE NIGHTTIME) OINT     No current facility-administered medications for this visit.      Facility-Administered Medications Ordered in Other Visits   Medication     sodium chloride (PF) 0.9% PF flush 10 mL     sodium chloride (PF) 0.9% PF flush 10 mL     sodium chloride (PF) 0.9% PF flush 10 mL     sodium chloride (PF) 0.9% PF flush 10 mL     sodium chloride (PF) 0.9% PF flush 10 mL          Physical Examination:  /85 (BP Location: Right arm, Patient Position: Sitting, Cuff Size: Adult Small)   Pulse 97   Temp 97.3  F (36.3  C) (Oral)   Resp 16   Ht 1.626 m (5' 4.02\")   Wt 46.6 kg (102 lb 12.8 oz)   SpO2 100%   BMI 17.64 kg/m       General: The patient is a cachectic female in no acute distress.  HEENT: EOMI, PERRL. Sclerae are anicteric. Oral mucosa is pink and moist with no lesions or thrush.   Lymph: No palpable cervical, supraclavicular, subclavicular lymphadenopathy.  Heart: Regular rate and rhythm.   Lungs: Breathing comfortably on room air. Diminished breath " sounds in lung bases bilaterally  Abdomen: Bowel sounds present, soft, no pain to palpation, no palpable hepatosplenomegaly or masses.   Extremities: No lower extremity edema noted bilaterally.   Neuro: Cranial nerves II through XII are grossly intact.  Skin: Numerous nodular hard cutaneous lesions below the left breast to left axilla and below right breast      Labs:     Ref. Range 1/13/2020 10:46   Sodium Latest Ref Range: 133 - 144 mmol/L 132 (L)   Potassium Latest Ref Range: 3.4 - 5.3 mmol/L 4.7   Chloride Latest Ref Range: 94 - 109 mmol/L 96   Carbon Dioxide Latest Ref Range: 20 - 32 mmol/L 32   Urea Nitrogen Latest Ref Range: 7 - 30 mg/dL 11   Creatinine Latest Ref Range: 0.52 - 1.04 mg/dL 0.46 (L)   GFR Estimate Latest Ref Range: >60 mL/min/1.73_m2 >90   GFR Estimate If Black Latest Ref Range: >60 mL/min/1.73_m2 >90   Calcium Latest Ref Range: 8.5 - 10.1 mg/dL 9.3   Anion Gap Latest Ref Range: 3 - 14 mmol/L 4   Albumin Latest Ref Range: 3.4 - 5.0 g/dL 2.3 (L)   Protein Total Latest Ref Range: 6.8 - 8.8 g/dL 8.2   Bilirubin Total Latest Ref Range: 0.2 - 1.3 mg/dL 0.5   Alkaline Phosphatase Latest Ref Range: 40 - 150 U/L 736 (H)   ALT Latest Ref Range: 0 - 50 U/L 287 (H)   AST Latest Ref Range: 0 - 45 U/L 331 (H)   WBC Latest Ref Range: 4.0 - 11.0 10e9/L 3.7 (L)   Hemoglobin Latest Ref Range: 11.7 - 15.7 g/dL 10.6 (L)   Hematocrit Latest Ref Range: 35.0 - 47.0 % 34.7 (L)   Platelet Count Latest Ref Range: 150 - 450 10e9/L 155        Assessment and Plan:  Shan Marsh is a 49 year old woman with prior history of a T1 N0, ER/CA positive, HER2-negative left breast cancer treated with bilateral mastectomies in 2012, now with metastatic disease (bones, pleural effusions, lungs, pericardial effusion, liver, left adrenal/retroperitoneal mets), ER/CA positive, HER2 negative.  She progressed on palbociclib and tamoxifen, alternative therapies in [place] and was on Xeloda, then Doxil single agent, now on Gemzar cycle  1.    Metastatic breast cancer: Started Gemzar C1 from 12/31/19.   Given her skin findings and LFT changes, it is a concern that her disease progresses despite changes in treatment. Her options would include adding carboplatin (based on sporadic BRCA2), eribulin or hospice. However, her disease pace is quite fast and not likely to be responsive to a single agent chemotherapy. On the other hand, her overall condition is very frail. After discussing possible refractory disease and requirement of more intensive therapy in case she stays on active treatment, she decided to choose hospice.    Will refer her to hospice/palliative care and .  Referral placed.     The patient was seen and care plans discussed with Dr. Whitman.    Se hillary Carrington MD  [facility]  Hematology oncology and transplantation fellow  Pager [phone]    Addendum:  I met with Shan and her  > 40 min today in counseling and coordinating care.  We reviewed that she has progressive metastatic breast cancer by clinical exam, with further spread of disease across her chest wall.  She has only had two doses of gemzar.  I am concerned however that her disease is spreading, and she is getting progressively weaker.      We reviewed overall goals of care.  We discussed options including pursuing a more aggressive chemotherapy or targeted treatment.  We also discussed pursuing a more palliative approach.  Shan indicated that she is not interested in more aggressive therapies.  She is interested in a palliative approach.  She reported that it is getting more difficult at home and wishes for placement/facility options.  We discussed hospice care.  She wishes to pursue hospice care, and at [facility].  Pt will meet with hospice team for coordination of care.    We reviewed her code status.  She wishes to be DNR/DNI.    Ana Whitman MD

## 2020-01-13 NOTE — NURSING NOTE
IV removed by LPN in clinic per provider's reqquest. Patient tolerated well and had no issues.    Chacorta Martinez LPN

## 2020-01-13 NOTE — PROGRESS NOTES
Social Work Follow-Up Encounter Visit  Oncology Clinic    Data/Intervention:  Patient Name:  Shan Marsh  /Age:  1970 (49 year old)    Reason for Follow-Up:  Social work received page from RNCC indicating patient would like immediate residential hospice placement.     Collaborated With:    -RNCC  - Hospice Liaison, Ness Christie  -[facility] RN manager Veronica COUGHLIN met with patient and  who immediately identified [facility] as their preferred residential hospice facility.  SW spoke with [facility] RN Veronica who reported patient would need to be signed onto a hospice agency prior to admission to hospice house.  MADYSON faxed records to Veronica for review.  Met with patient again with list of hospice agencies, identified [facility] as a preferred provider.  Patient shared that she wants to go to residential hospice placement today, directly from clinic.  Pt voiced feeling that her care needs are too much for her family and she does not wish to remain at home at this time.  Patient expressed intent to go directly to residential hospice facility from the clinic.  MADYSON called  hospice liaison Ness to request admission to hospice in clinic.  Ness met with patient and enrolled into [facility] immediately.   hospice and writer coordinated with [facility] to facilitate same day admission to residential hospice.     Resources Provided:  Hospice placement at [facility] with  hospice services.    Assessment:  Patient in agreement with plan.   is supportive at side, appears to be struggling with transition to hospice but supportive of pt's wishes.  Family asking appropriate questions.    Plan:  Previously provided patient/family with writer's contact information and availability.   SW continues to be available to assist with any needs.     Soo Yeon Han, MSW, [facility]  Pager: [phone]  Phone: [phone]

## 2020-01-13 NOTE — NURSING NOTE
Chief Complaint   Patient presents with     Blood Draw     Labs drawn via PIV placed by RN in lab. VS taken. Patient checked in for next appt.     Labs drawn from PIV placed by vascular access RN. Line flushed with saline. Vitals taken. Pt checked in for appointment.    Kim Patton RN

## 2020-01-20 LAB — COPATH REPORT: NORMAL

## 2022-04-08 NOTE — PLAN OF CARE
PT 4A: PT evaluation cx and deferred.     Discharge Planner PT   Patient plan for discharge: refer to OT notes.   Current status: OT completed evaluation.  Per OT, patient independent with functional mobility with acute PT intervention indicated.   Will complete PT order.   Barriers to return to prior living situation: refer to OT notes.  Recommendations for discharge: refer to OT notes.   Rationale for recommendations: refer to OT notes.        Entered by: Stoney Alcala 11/30/2019 1:58 PM        Facial Feminization

## 2023-03-02 NOTE — PROGRESS NOTES
October 4, 2017    ONCOLOGY VISIT NOTE      REASON FOR VISIT:  I am seeing Ms. Marsh in followup for her stage 4, metastatic ER + breast cancer with metastases to the axilla and hilar lymph nodes.  She is currently on the [employer] protocol using tamoxifen and palbociclib.     Briefly in history, she has a history of a T1a N0 M0, ER-positive, SD-positive, and HER2-negative breast cancer.  She originally met me in 12/2011 with a coin-sized mass involving her left breast.  This mass got bigger, and by 05/2012 she was referred for a mammogram, ultrasound and breast MRI.  This revealed heterogeneously dense breast tissue with an area of suspicious microcalcifications measuring 5 x 2.6 cm involving the left breast.  MRI showed non-mass-like enhancement in the upper outer quadrant extending from about 1-3 o'clock, measuring about 6 cm.  Stereotactic needle biopsy revealed DCIS high-grade, which was suspicious for microinvasion.  She was seen by Dr. Misael Cheng, and underwent bilateral mastectomies with sentinel lymph node biopsy on 06/11/2012.  Her final pathology revealed grade 1 infiltrating ductal carcinoma, ER-positive, SD-positive an d HER2-negative.  She had multiple microscopic areas of invasion, each measuring 1-2 mm.  Her pathology was reviewed at our Multidisciplinary Conference, and confirmed multiple areas of microinvasion.  Her final staging was a T1a N0 M0 stage I breast cancer.  [place] lymph node biopsy was negative.  Her Oncotype score was 23, giving her a risk of distant recurrence at 10 years of 15%, assuming she took tamoxifen.  She started on tamoxifen in 08/2012.  This was discontinued in 02/2013 due to complaints of vaginal discharge.  She also had complained of some palpitations at that time and saw Cardiology.  This workup was negative.  She does have a type 1 AV block felt to be benign from her endurance as an athlete.      Shan presented to see me on 05/10/2017, at which time she had  Patient with Parkinson's disease  He is having increased freezing and imbalance which is making his gait harder at this time  No clear correlation between when he is due for dose of medication and when the freezing occurs  He finds that some days are better than others  Because of this we did discuss the option of trying to increase his Sinemet dose further to see if there is any benefit  We will start slowly to try and avoid any negative side effects  He will start by taking 2 tabs at 8 AM for the next week and then he will increase to 2 tabs at 8 and 1 for the next week and then further increase to 2 tabs at 8, 1, 6 along with 1 tab before bed  He will watch for any side effects with this increase including hallucinations or dizziness when standing  He will take a break from physical therapy while making this change however once he is on the higher dosing we can have him reevaluated to see if he has had any improvement  In the meantime he will continue to use his rollator walker as this has helped with reducing falls  It does sound like he tried the U step walker at therapy however this was not beneficial     He has been having some improvement of sleep with the Melatonin and discussed continuing to take this every night  Could consider increasing the dose in the future if needed  "noticed fullness and pain involving her left shoulder with numbness down involving her left upper extremity.  She was found to have left axillary adenopathy, and was sent for a PET/CT scan revealing on MRI of the shoulder that she had a 4 x 1.8 x 1.5 cm lesion close to the subcutaneous tissue with close proximity to the distal clavicle.  She was subsequently sent for a PET/CT scan, which confirmed multiple PET-avid left axillary lymph nodes, as well as hilar lymph nodes that were suspicious for distant metastatic disease.  There were no suspicious lesions in the liver.  No suspicious lesions in the bones.  She has a known 3.2 cm hemangioma in the left lobe of the liver.  Biopsies confirmed estrogen positive metastatic breast cancer with lung involvement.     She enrolled on the BIG[employer] trial using palbociclib and tamoxifen.   Shan returns today for follow up while on the study.  She is tolerating the therapy without any difficulty.  No infections.  No f/c.  She thinks the axillary mass is getting smaller.  She has good energy.  No fatigue.  She has stopped running as extensively and is trying to take better care of herself.  She has had to reduce her hours given mild fatigue, and the need to decrease stress.    Her menses stopped shortly after starting tamoxifen.  No vaginal bleeding.  No sexual dysfunction.        REVIEW OF SYSTEMS:  Her 10-point review of systems is otherwise negative.      PHYSICAL EXAMINATION:   /61  Pulse 62  Temp 99.1  F (37.3  C) (Oral)  Resp 16  Ht 1.646 m (5' 4.8\")  Wt 54.1 kg (119 lb 4.8 oz)  SpO2 98%  BMI 19.97 kg/m2  Gen: well appearing, NAD  Heent: no icterus, o/p clear without ulcers or lesions  CV: rrr, nl S1S2  Lungs: clear  Abd: soft, nt, nd + bs  Breast examination not performed today, left axilla 1 cm firm hard nodule that is significantly smaller than on prior examination.  No right axillary adenopathy.  Ext : no edema  No skin rashes.       Lab Results   Component " Value Date    WBC 3.1 09/19/2017     Lab Results   Component Value Date    RBC 3.20 09/19/2017     Lab Results   Component Value Date    HGB 11.8 09/19/2017     Lab Results   Component Value Date    HCT 34.8 09/19/2017     No components found for: MCT  Lab Results   Component Value Date     09/19/2017     Lab Results   Component Value Date    MCH 36.9 09/19/2017     Lab Results   Component Value Date    MCHC 33.9 09/19/2017     Lab Results   Component Value Date    RDW 13.2 09/19/2017     Lab Results   Component Value Date     09/19/2017       Recent Labs   Lab Test  09/19/17   1736  08/21/17   1147   NA  138  140   POTASSIUM  4.0  4.3   CHLORIDE  103  106   CO2  30  29   ANIONGAP  4  5   GLC  96  93   BUN  9  11   CR  0.62  0.57   KRYSTEN  9.2  8.9     Liver Function Studies -   Recent Labs   Lab Test  09/19/17   1736   PROTTOTAL  7.8   ALBUMIN  3.8   BILITOTAL  0.2   ALKPHOS  41   AST  22   ALT  23     CT scans reviewed     ASSESSMENT AND PLAN:  This is a 47-year-old female with a history of a T1a N0, ER-positive, NC-positive and HER2-negative left breast cancer treated with bilateral mastectomies 5 years ago, who now has metastatic disease involving the left axilla, as well as left hilar lymph node.  On biopsy, these are strongly ER-positive, NC is 40%, and HER2 by FISH was negative with a ratio of 1.5.  She is on the BIG10 trial using palbociclib and tamoxifen.      Shan is tolerating her therapy with minimal side effects.  By examination, she is having an excellent clinical response.  By study, she will have a CT every two months for the first few cycles.  She continues to have mild shrinkage with overall stable disease.    We discussed that she has metastatic disease and I would recommend she stay on a therapy indefinitely.       She is coping reasonably well.     She is thinking about reducing her hours; paperwork brought in for completion.         CAMELIA GRISSOM MD

## 2023-03-21 NOTE — NURSING NOTE
Chief Complaint   Patient presents with     Blood Draw     labs drawn with PIV start by rn.  vs taken     Oncology Clinic Visit     Labs drawn with PIV start by rn.  Pt tolerated well.  VS taken.  Pt checked in for next appt.  Veronica Aguilar RN     21-Mar-2023

## 2023-08-28 NOTE — LETTER
"1/9/2020       RE: Shan Marsh  [address]  [facility] [address]     Dear Colleague,    Thank you for referring your patient, Shan Marsh, to the [facility] DERMATOLOGY at [facility]. Please see a copy of my visit note below.    [facility] Dermatology Note      Dermatology Problem List:  1. 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 Multiple firm papules on abdomen, concerning for metastatic breast cancer.  - S/p shave bx R abdomen 1/9/2020    Encounter Date: Jan 9, 2020    CC:  Chief Complaint   Patient presents with     Derm Problem     Shan is due to for several concerning lesions        History of Present Illness:  Ms. Shan Marsh is a 49 year old female who presents today for a rash on her breast, abdomen, back, and armpit area. She was last seen by Dr. Nichols on 4/19/2019 where she discontinued triamcinolone cream and was started on tacrolimus 0.1% ointment BID for malignant intertrigo eruption induced by chemotherapy.     Today she reports that she noticed the rash starting in October and it has since spread. She says it feels \"tight\" and is slightly itchy. She used the tacrolimus 0.1% ointment and this did not resolve the rash. She just started gemcitabine and was previously on doxorubicin chemotherapy. No association with the rash to the medication change. She had recently imaging which showed progression of her metastatic breast cancer. She has never has a rash like this before and " she did not have eczema as a baby.    She is otherwise not feeling very well. She was having night sweats for while but has since subsided. She did lose some weight a few months ago but has since gained a little back. No additional skin concerns.    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)     Fever     Facial droop     Past Medical History:   Diagnosis Date     Breast cancer, left breast (H) 5/12    grade 1 infiltrating ductal cancer, ER/MA+, s/p bilateral mastectomy     Complication of anesthesia     bradycardia,b/p drop p childbirth     Dysplasia of cervix (uteri) 12/01    LUCAS II - III, s/p LEEP  @ [facility]     Incompetence of cervix 4/06    D&E at 19+4 weeks twin pregnancy     Shingles outbreak 5/12     Past Surgical History:   Procedure Laterality Date     D & C  9/2010    retained POC     ENDOBRONCHIAL ULTRASOUND FLEXIBLE N/A 5/12/2017    Procedure: ENDOBRONCHIAL ULTRASOUND FLEXIBLE;  Flexible Bronchoscopy, Endobronchial Ultrasound, Transbronchial Biopsy ;  Surgeon: Bandar Meyer MD;  Location: [facility]     INSERT CHEST TUBE Left 10/11/2018    Procedure: INSERT CHEST TUBE;  Insert Chest Tube ;  Surgeon: Brock Chan MD;  Location: [facility]     INSERT CHEST TUBE N/A 11/12/2018    Procedure: Removal Of Pleurx Catheter ;  Surgeon: Bandar Meyer MD;  Location: [facility]     IR THORACENTESIS  10/14/2019     IR THORACENTESIS  11/25/2019     IR THORACENTESIS  1/2/2020     LEEP TX, CERVICAL  12/01    LEEP or LUCAS II - III     MASTECTOMY, RECONSTRUCT BREAST, COMBINED  6/11/2012    Procedure: COMBINED MASTECTOMY, RECONSTRUCT BREAST;  Bilateral Mastectomy with Reconstruction, Left [place] Node Biopsy , placement of On-Q  catheters X 2 bilateral breast.;  Surgeon: Misael Cheng MD;  Location: [facility]     RECONSTRUCT BREAST BILATERAL  3/25/2013    Procedure: RECONSTRUCT BREAST BILATERAL;  Revise bilateral breasts and reconstructions, with insertion of gel breast implants with fat grafting and fat after mastectomies.;  Surgeon: Yair Olvera MD;  Location:  OR     RECONSTRUCT BREAST BILATERAL, IMPLANT PROSTHESIS BILATERAL, COMBINED  9/27/2012    Procedure: COMBINED RECONSTRUCT BREAST BILATERAL, IMPLANT PROSTHESIS BILATERAL;  Bilateral Second Stage Breast Reconstruction With Gel Implants, Fat        THORACENTESIS Left 5/7/2018    Procedure: THORACENTESIS;  Thoracentesis;  Surgeon: Venancio Hussein PA-C;  Location:  OR     THORACENTESIS Left 7/5/2018    Procedure: THORACENTESIS;  Thoracentesis;  Surgeon: Lydia Murillo PA-C;  Location:  OR     THORACENTESIS Left 9/5/2018    Procedure: THORACENTESIS;  Left Thoracentesis;  Surgeon: López Guillen PA-C;  Location:  OR     THORACENTESIS Left 10/3/2018    Procedure: THORACENTESIS;  Thoracentesis, left;  Surgeon: Eros Chauhan PA-C;  Location:  OR     THORACENTESIS Right 10/9/2018    Procedure: THORACENTESIS;  Thoacentesis ;  Surgeon: Linh Hayes MD;  Location:  GI     THORACENTESIS Right 10/14/2019    Procedure: THORACENTESIS;  Surgeon: Lydia Brannon PA-C;  Location:  OR     THORACENTESIS Right 11/25/2019    Procedure: THORACENTESIS;  Surgeon: Eros Chauhan PA-C;  Location:  OR     THORACENTESIS Right 1/2/2020    Procedure: THORACENTESIS;  Surgeon: Boyd Nguyen PA-C;  Location:  OR       Social History:  Patient reports that she quit smoking about 22 years ago. She has never used smokeless tobacco. She reports current drug use. She reports that she does not drink alcohol.    Family History:  Family History   Problem Relation Age of Onset     Alcohol/Drug Father      Allergies Mother      Neurologic Disorder Mother          seizures, related to fever     Gastrointestinal Disease Mother         IBS     Blood Disease Mother         [place]     Cancer Maternal Grandfather         bladder     Circulatory Maternal Grandfather         blood clots     Alcohol/Drug Maternal Grandfather         alcohol dependency     Allergies Maternal Aunt      Allergies Other         maternal cousins     Arthritis Maternal Aunt      Prostate Cancer Maternal Uncle      Thyroid Disease Maternal Aunt      Alcohol/Drug Maternal Uncle         alcohol and drug dependency     Cancer - colorectal Maternal Uncle      Gastrointestinal Disease Sister         IBS     Breast Cancer Other         self       Medications:  Current Outpatient Medications   Medication Sig Dispense Refill     carboxymethylcellulose PF (GOODSENSE LUBRICATING EYE DROP) 0.5 % ophthalmic solution Place 1 drop into the right eye 3 times daily as needed for dry eyes 1 Bottle 1     Digestive Enzymes (ENZYME DIGEST) CAPS Take 1 capsule by mouth daily       LORazepam (ATIVAN) 0.5 MG tablet Take 1 tablet (0.5 mg) by mouth every 4 hours as needed (Anxiety, Nausea/Vomiting or Sleep) 30 tablet 2     magic mouthwash (ENTER INGREDIENTS IN COMMENTS) suspension SWISH AND SPIT 5 TO 10 ML BY MOUTH 4 TO 6 TIMES DAILY AS NEEDED 240 mL 3     medical cannabis (Patient's own supply.  Not a prescription) (This is NOT a prescription, and does not certify that the patient has a qualifying medical condition for medical cannabis.  The purpose of this order is  to document that the patient reports taking medical cannabis.)       nystatin (MYCOSTATIN) 022488 UNIT/ML suspension Take 5 mLs (500,000 Units) by mouth 4 times daily 400 mL 0     OLANZapine (ZYPREXA) 2.5 MG tablet Take 1 tablet (2.5 mg) by mouth At Bedtime 30 tablet 1     ondansetron (ZOFRAN) 8 MG tablet Take 1 tablet (8 mg) by mouth every 8 hours as needed for nausea 90 tablet 3     prochlorperazine (COMPAZINE) 10 MG tablet Take 1 tablet (10 mg) by mouth every 6  hours as needed (Nausea/Vomiting) 30 tablet 2     prochlorperazine (COMPAZINE) 5 MG tablet Take 1 tablet (5 mg) by mouth every 6 hours as needed for nausea or vomiting 30 tablet 1     VITAMIN D, CHOLECALCIFEROL, PO Take 1,000 Units by mouth daily        White Petrolatum-Mineral Oil (SYSTANE NIGHTTIME) OINT Apply a small amount to eye at bedtime 1 Tube 1     Eye Patch MISC 1 Bottle once for 1 dose 30 each 0       Allergies   Allergen Reactions     Erythromycin Shortness Of Breath     Sulfa Drugs      rash       Review of Systems:  -Skin Establ Pt: The patient denies any new rash, pruritus, or lesions that are symptomatic, changing or bleeding, except as per HPI.  -Constitutional: The patient is feeling generally well.    Physical exam:  GEN: This is a well developed, well-nourished female in no acute distress, in a pleasant mood.    SKIN: Focused examination of the face, neck, chest, abdomen, back, and arms was performed.  - Multiple light pink firm papules across abdomen central chest  L flank   - No other lesions of concern on areas examined.     Impression/Plan:  1. Multiple firm papules on left flank/abdomen. Favor metastatic breast cancer, ddx includes much less likely chemotherapy reaction. Will perform punch bx for clarification.  - Punch biopsy:  After discussion of benefits and risks including but not limited to bleeding/bruising, pain/swelling, infection, scar, incomplete removal, nerve damage/numbness, recurrence, and non-diagnostic biopsy, written consent, verbal consent and photographs were obtained. Time-out was performed. The area was cleaned with isopropyl alcohol. 0.5mL of 1% lidocaine with 1:100,000 epinephrine was injected to obtain adequate anesthesia of the lesion on the R abdomen.  A 4 mm punch biopsy was performed. 4-0 prolene sutures were utilized to approximate the epidermal edges. White petroleum jelly/Vaseline and a bandage was applied to the wound. Explicit verbal and written wound care  instructions were provided. The patient left the Dermatology Clinic in good condition. The patient was counseled to follow up for suture removal in approximately 2 weeks.    Follow-up prn pending bx results, sooner if concerns.    Staff Involved:    Scribe Disclosure  I, Davida Hinojosa, am serving as a scribe to document services personally performed by Dr. Isabella Collazo MD, based on data collection and the provider's statements to me.     Provider Disclosure:   The documentation recorded by the scribe accurately reflects the services I personally performed and the decisions made by me.    Isabella Collazo MD    Department of Dermatology  [facility]: Phone: [phone], Fax: [phone]   VITAL SIGNS: I have reviewed nursing notes and confirm.  CONSTITUTIONAL: cachectic, chronically il-appearing  SKIN: skin exam is warm and dry, no acute rash.    HEAD: Normocephalic; atraumatic.  EYES: conjunctiva and sclera clear.  ENT: No nasal discharge; airway clear.  CARD: S1, S2 normal; no murmurs, gallops, or rubs. Regular rate and rhythm.   RESP: No wheezes, rales or rhonchi.  ABD: Normal bowel sounds; soft; non-distended; non-tender  EXT: Normal ROM.  No clubbing, cyanosis or edema.   NEURO: Alert, oriented, grossly unremarkable

## 2024-04-09 NOTE — LETTER
2/19/2019    RE: Shan Marsh  [address]  [facility] [id number]     Oncology/Hematology Visit Note  Feb 19, 2019    Reason for Visit: follow up of metastatic breast cancer, ER positive.     History of Present Illness: Shan Marsh is a 48 year old female without significant past medical history with recent diagnosis of metastatic breast cancer after presenting with left shoulder pain. Shoulder MRI showed soft tissue mass close to distal clavicle concerning for malignancy. She had PET/CT 5/9/17 showing hypermetabolic left axillary, supraclavicular, mediastinal and hilar nodes. She had biopsies of left axilla and hilar nodes showing metastatic breast cancer, strongly ER positive, moderately WI positive and HER-2 kaitlin non-amplified. She originally had [id number], ER/WI positive, HER-2 negative breast cancer diagnosed in spring of 2012 s/p bilateral mastectomies and Tamoxifen from 8/2012-2/2013 and discontinued due to side effects. With development of metastatic disease, she was recommended treatment on BIG10 clinical trial with palbociclib and tamoxifen. She started treatment on 5/24/17 and required dose reduction for neutropenia. CT CAP and bone scan 1/11/2018 showing progressive disease with new bony lesions in the ribs, T spine, and possibly liver, lungs. She started Zoladex and abemiciclib on 1/12/2018.      In March 2018, she sought treatment at [facility], [place] in [place], under the care of Dr. Brooks, where she received:  - Vallovex vaccine (per FDA, is in phase I trials here in [place])  - Insulin potentiated therapy with conjugated chemotherapy (5% chemotherapy, unclear what chemotherapy)  - Gina's toxins, IV vitamin C, IV Ozone, hyperbaric chamber, vitamin B17 infusion, vitamin CK3 IV, and coffee enemas PRN  - Also taking oral niacin, Lugol, acidol, pancreatin, and thyroid desiccated liver, per their regimen  - She was continued on Letrozole and advised to conintue Zoladex.      She met  with Dr. Whitman and patient wished to continue alternative regimen. Abemiciclib was placed on hold. She was continued on Zoladex and letrozole. While in [place], she required thoracentesis for symptomatic pleural effusion. A repeat PET August 2018 showing widely progressive disease with multiple new bone mets involving the spine, ribs, pelvis, and sacrum; new lesions involving the left lateral thoracic/abdominal wall musculature and subcutaneous tissues, new metastases in the left adrenal gland, and recurrent large L pleural effusion. She was started on Xeloda 1500 mg BID on 9/5/18.     She was admitted 10/4-10/11/18 after syncopal event/unwitnessed fall at home. Found to have left pneumothorax and pericardial effusion with tamponade physiology. She had left chest tube placed in ED which then fell out on 10/10. Left pleurx placed on 10/11. She had pericardiocentesis on 10/7, drain removed 10/8.  She was able to resume Xeloda. She was noted to have progressive disease January 2019 and was started on IV Doxil. She was admitted 1/27-1/28/2019 for fever, substernal chest pain, c/w GERD.    Interval History:  Shan is here today for cycle 2 Doxil. Overall, compared to when I saw her 2 weeks ago, she feels overall improved. Her breathing feels a lot better. She reports she still has nausea that waxes and wanes, unpredictable, but usually follows eating. She finds certain foods just don't agree with her. She thinks this is also improved compared to two weeks ago, able to eat and drink a greater variety of food and drink. She is upset by the weight loss and was hopeful it would have improved with the improvement in her symptoms. SHe continues on cannabis and using anti-emetics PRN. She hasn't had any headaches, visual changes. She is on the PPI. She hasn't had other concerns, no fevers, chills, cough, chest pain, abdominal pain, bowel changes, rashes, bleeding, swelling. No new sites of pain.    . No fevers, chills, chest  "pain, new sites of pain, bleeding, or swelling. Some mild dryness and peeling on her soles.      Current Outpatient Medications   Medication Sig Dispense Refill     Ascorbic Acid (VITAMIN C PO) Take 1,000 mg by mouth daily       calcium carbonate (OS-KRYSTEN 500 MG [place]. [place]) 1250 MG tablet Take 1 tablet by mouth daily       Digestive Enzymes (ENZYME DIGEST) CAPS Take 1 capsule by mouth daily       LORazepam (ATIVAN) 0.5 MG tablet Take 1 tablet (0.5 mg) by mouth every 4 hours as needed (Anxiety, Nausea/Vomiting or Sleep) (Patient not taking: Reported on 2/8/2019) 30 tablet 2     medical cannabis (Patient's own supply.  Not a prescription) (This is NOT a prescription, and does not certify that the patient has a qualifying medical condition for medical cannabis.  The purpose of this order is  to document that the patient reports taking medical cannabis.)       Omega 3-6-9 Fatty Acids (OMEGA 3-6-9 COMPLEX PO) Take 1 capsule by mouth daily       omeprazole (PRILOSEC) 20 MG DR capsule Take 2 capsules (40 mg) by mouth daily 60 capsule 0     ondansetron (ZOFRAN) 8 MG tablet Take 1 tablet (8 mg) by mouth every 8 hours as needed for nausea 30 tablet 3     prochlorperazine (COMPAZINE) 10 MG tablet Take 1 tablet (10 mg) by mouth every 6 hours as needed (Nausea/Vomiting) (Patient not taking: Reported on 2/8/2019) 30 tablet 2     prochlorperazine (COMPAZINE) 10 MG tablet Take 10 mg by mouth before 1st dose of oral chemotherapy, then prn thereafter       traMADol (ULTRAM) 50 MG tablet Take 1-2 tablets ( mg) by mouth every 6 hours as needed for moderate pain or headaches       VITAMIN D, CHOLECALCIFEROL, PO Take 1,000 Units by mouth daily          Physical Examination:  General: The patient is a pleasant female in no acute distress.  /81 (BP Location: Right arm, Patient Position: Sitting, Cuff Size: Adult Small)   Pulse 102   Temp 97.6  F (36.4  C) (Oral)   Resp 16   Ht 1.626 m (5' 4\")   Wt 47.8 kg (105 lb 6 oz)   " SpO2 97%   BMI 18.09 kg/m     Wt Readings from Last 10 Encounters:   02/19/19 47.8 kg (105 lb 6 oz)   02/08/19 49.4 kg (109 lb)   01/28/19 49.5 kg (109 lb 1.6 oz)   01/22/19 49.4 kg (108 lb 14.4 oz)   01/15/19 50.7 kg (111 lb 11.2 oz)   12/18/18 52 kg (114 lb 11.2 oz)   11/26/18 51.9 kg (114 lb 8 oz)   11/16/18 51.4 kg (113 lb 6.4 oz)   11/08/18 52.8 kg (116 lb 6.4 oz)   10/29/18 51.3 kg (113 lb)     HEENT: EOMI, PERRL. Sclerae are anicteric. Oral mucosa is pink and moist with no lesions or thrush.   Lymph: previously palpable left supraclavicular LNs are difficult to appreciate today. No other palpable cervical or supraclavicular LNs.   Heart: Regular rate and rhythm.   Lungs: Diminished BS in bilateral bases, L>R w/ associated dullness. Otherwise clear to auscultation bilaterally.   Abdomen: Bowel sounds present, soft, nontender with no palpable hepatosplenomegaly or masses.   Extremities: No lower extremity edema noted bilaterally.   Neuro: Cranial nerves II through XII are grossly intact.  Skin: No rashes, petechiae, or bruising noted on exposed skin.    Laboratory Data:  Results for NIKKIE HICKS (MRN [id number]) as of 2/19/2019 09:43   Ref. Range 2/19/2019 08:50   Sodium Latest Ref Range: 133 - 144 mmol/L 135   Potassium Latest Ref Range: 3.4 - 5.3 mmol/L 4.2   Chloride Latest Ref Range: 94 - 109 mmol/L 101   Carbon Dioxide Latest Ref Range: 20 - 32 mmol/L 26   Urea Nitrogen Latest Ref Range: 7 - 30 mg/dL 12   Creatinine Latest Ref Range: 0.52 - 1.04 mg/dL 0.61   GFR Estimate Latest Ref Range: >60 mL/min/1.73_m2 >90   GFR Estimate If Black Latest Ref Range: >60 mL/min/1.73_m2 >90   Calcium Latest Ref Range: 8.5 - 10.1 mg/dL 9.4   Anion Gap Latest Ref Range: 3 - 14 mmol/L 7   Albumin Latest Ref Range: 3.4 - 5.0 g/dL 3.4   Protein Total Latest Ref Range: 6.8 - 8.8 g/dL 9.5 (H)   Bilirubin Total Latest Ref Range: 0.2 - 1.3 mg/dL 0.6   Alkaline Phosphatase Latest Ref Range: 40 - 150 U/L 181 (H)   ALT  Latest Ref Range: 0 - 50 U/L 22   AST Latest Ref Range: 0 - 45 U/L Canceled, Test cr...   Glucose Latest Ref Range: 70 - 99 mg/dL 94   WBC Latest Ref Range: 4.0 - 11.0 10e9/L 4.7   Hemoglobin Latest Ref Range: 11.7 - 15.7 g/dL 13.8   Hematocrit Latest Ref Range: 35.0 - 47.0 % 42.6   Platelet Count Latest Ref Range: 150 - 450 10e9/L 352     Assessment and Plan:  Shan Marsh is a 47-year-old female with history of a T1 N0, ER/WI positive, HER2-negative left breast cancer treated with bilateral mastectomies in 2012, now with metastatic disease (bones, pleural effusions, lungs, pericardial effusion, liver, left adrenal/retroperitoneal mets), ER/WI positive, HER2 negative.  She progressed on palbociclib and tamoxifen, alternative therapies in [place], Xeloda, now on Doxil.    Metastatic breast cancer: Started Doxil on 1/22/2019. Overall doing well. Here for cycle 2 Doxil today. She is overall tolerating well. We will continue with plan to restage after 3 cycles, unless tumor markers rising and clinically not doing well, then may consider restaging after this cycle.   - Continues ovarian suppression with monthly Zoladex, due at 2/19 visit    Bone mets: Continue monthly Zometa every 3 months. Due April.     Bilateral pleural effusions. S/p right thoracentesis on 10/9 and S/p left pleurx placement 10/11. PleurX was removed 11/12 due to minimal drainage. Effusions stable on recent XR. Breathing reportedly improved, exam stable.    Malignant pericardial effusion: presented with tamponade, s/p pericardial drain 10/7-8. She met with Dr. Sanchez 11/8/2018. Echo at that time without any pericardial effusion. Repeat Echo 1/21 with EF 55-60%, no effusion.    GERD/nausea/stomach pressure: Improved since discharge. Continue PPI, improved with increase to 40 mg daily. Does seem to be related to eating. If fails to continue improving, may consider EGD vs brain MRI. For now, continue PPI, anti-emetics PRN.    FEN: Decreased appetite  is ongoing. Weight trending down. Discussed small frequent meals, calorie dense foods. We discussed appetite stimulation as a benefit of cannabis, and alternatives, including Zyprexa. She has started cannabis up again and wants to continue that for now. We will monitor in 2 weeks.    Health maintenance: Influenza vaccine given on 10/11.      I will see Shan in 2 weeks for follow-up of weight, nausea symptoms.    Lima Griffiths PA-C      Addendum:  Patient also has yeast infection. Rx fluconazole 150 mg q72h x 3 doses given she is on chemo  ect     no

## 2024-04-26 NOTE — PATIENT INSTRUCTIONS
[facility] Main Line: [phone]    Call triage nurse with chills and/or temperature greater than or equal to 100.4, uncontrolled nausea/vomiting, diarrhea, constipation, dizziness, shortness of breath, chest pain, bleeding, unexplained bruising, or any new/concerning symptoms, questions/concerns.   If you are having any concerning symptoms or wish to speak to a provider before your next infusion visit, please call your care coordinator or triage to notify them so we can adequately serve you.   Nurse Triage line:  [phone]    If after hours, weekends, or holidays, call main hospital  and ask for Oncology doctor on call @ [phone]      December 2019 Sunday Monday Tuesday Wednesday Thursday Friday Saturday   1     2    BRAIN TUMOR CONFERENCE   1:15 PM   (5 min.)   BRAIN TUMOR CONFERENCE   [facility] Virtual Scheduling 3    [facility] MASONIC LAB DRAW  10:45 AM   (15 min.)    MASONIC LAB DRAW   [facility] Lab Draw    P RETURN  11:15 AM   (50 min.)   Lima Griffiths PA-C M [facility] ONC INFUSION 120  12:30 PM   (120 min.)    ONCOLOGY INFUSION   [facility] 4     5     6     7       8     9    [facility] MASONIC LAB DRAW   8:45 AM   (15 min.)    MASONIC LAB DRAW   [facility] Lab Draw    [facility] LUMBAR PUNCTURE   9:15 AM   (50 min.)   Rafia Voss PA-C M [facility] 10     11    [facility] CONSULT   8:30 AM   (90 min.)   Don Monroy MD   Radiation Oncology Clinic    [facility] TCT/SIM SUITE  10:00 AM   (60 min.)   Don Monroy MD   Radiation Oncology Clinic 12    [facility] EXTERNAL RADIATION TREATMT  12:00 PM   (15 min.)   [facility] RAD ONC VARIAN   Radiation Oncology Clinic 13    NM INJ   9:00 AM   (15 min.)   UUNMINJ1   [facility], Nuclear Medicine    CT CHEST/ABDOMEN/PELVIS W   9:20 AM   (20 min.)   UUCT1   [facility], CT    NM BONE SCAN WHOLE BODY  12:00 PM   (60 min.)    UUNM3   [facility] [place], Nuclear Medicine    [facility] MASONIC LAB DRAW   1:45 PM   (15 min.)   [facility] LAB DRAW   [facility] Masonic Lab Draw    UMP RETURN   1:55 PM   (50 min.)   Lima Griffiths PA-C   [facility]    UMP EXTERNAL RADIATION TREATMT   3:30 PM   (15 min.)   UMP RAD ONC VARIAN   [facility] 14       15     16    UMP EXTERNAL RADIATION TREATMT  10:15 AM   (15 min.)   UMP RAD ONC VARIAN   Radiation Oncology Clinic    UMP ON TREATMENT VISIT  11:00 AM   (15 min.)   Don Monroy MD   [facility] Oncology Clinic 17    UMP EXTERNAL RADIATION TREATMT  10:45 AM   (15 min.)   UMP RAD ONC VARIAN   [facility] 18    UMP EXTERNAL RADIATION TREATMT  10:45 AM   (15 min.)   UMP RAD ONC VARIAN   [facility] 19    UMP EXTERNAL RADIATION TREATMT  10:45 AM   (15 min.)   UMP RAD ONC VARIAN   [facility] 20    UMP EXTERNAL RADIATION TREATMT  10:45 AM   (15 min.)   UMP RAD ONC VARIAN   [facility]cology [facility] 21    UMP EXTERNAL RADIATION TREATMT  10:30 AM   (15 min.)   UMP RAD ONC VARIAN   Radiation Oncology Clinic   22     23    UMP EXTERNAL RADIATION TREATMT  10:45 AM   (15 min.)   UMP RAD ONC VARIAN   Radiation Oncology Clinic    P ON TREATMENT VISIT  11:00 AM   (15 min.)   Don Monroy MD   Radiation Oncology Clinic 24    UMP EXTERNAL RADIATION TREATMT  10:45 AM   (15 min.)   UMP RAD ONC VARIAN   Radiation Oncology Clinic 25     26     27     28       29     30     31    XR CHEST 2 VIEWS   9:40 AM   (15 min.)   UCXR1   [facility] Xray    [facility] MASONIC LAB DRAW  10:45 AM   (15 min.)    MASONIC LAB DRAW   [facility] Masonic Lab Draw    UMP RETURN  11:15 AM   (50 min.)   Lima Griffiths [facility]-C   [facility]  12:30 PM   (120 min.)   UC ONCOLOGY INFUSION   [facility]                                 January 2020 Sunday Monday Tuesday Wednesday Thursday  Friday Saturday                  1     2    IR THORACENTESIS  12:30 PM   (60 min.)   [email or web address]   [facility] ASC Imaging    THORACENTESIS   1:30 PM   Boyd Nguyen PA-C    OR    Outpatient Visit   1:30 PM   [facility] 3     4       5     6     7    [facility] MASONIC LAB DRAW   1:00 PM   (15 min.)    [facility]   [facility]    UMP ONC INFUSION 60   1:30 PM   (60 min.)    ONCOLOGY INFUSION   [facility] 8     9     10     11       12     13     14    [facility] MASONIC LAB DRAW   8:00 AM   (15 min.)    MASONIC LAB DRAW   [facility] Lab Draw    UMP RETURN   8:15 AM   (50 min.)   Nora Murphy PA-C   [facility]    UMP ONC INFUSION 60  10:00 AM   (60 min.)    ONCOLOGY INFUSION   [facility] 15     16     17     18       19     20    [facility] MASONIC LAB DRAW   8:45 AM   (15 min.)    MASONIC LAB DRAW   [facility] Masonic Lab Draw    UMP RETURN   9:15 AM   (30 min.)   Ana Whitman MD   [facility] 21     22     23     24     25       26     27    UMP ONC INFUSION 120  10:30 AM   (120 min.)    ONCOLOGY INFUSION   [facility] 28     29     30     31                          Lab Results:  Recent Results (from the past 12 hour(s))   CBC with platelets differential    Collection Time: 12/31/19 11:05 AM   Result Value Ref Range    WBC 4.5 4.0 - 11.0 10e9/L    RBC Count 3.93 3.8 - 5.2 10e12/L    Hemoglobin 11.2 (L) 11.7 - 15.7 g/dL    Hematocrit 35.7 35.0 - 47.0 %    MCV 91 78 - 100 fl    MCH 28.5 26.5 - 33.0 pg    MCHC 31.4 (L) 31.5 - 36.5 g/dL    RDW 16.8 (H) 10.0 - 15.0 %    Platelet Count 425 150 - 450 10e9/L    Diff Method Automated Method     % Neutrophils 74.6 %    % Lymphocytes 12.8 %    % Monocytes 9.7 %    % Eosinophils 0.4 %    % Basophils 0.7 %    % Immature Granulocytes 1.8 %    Nucleated RBCs 0 0 /100    Absolute Neutrophil 3.3 1.6 - 8.3 10e9/L    Absolute  Lymphocytes 0.6 (L) 0.8 - 5.3 10e9/L    Absolute Monocytes 0.4 0.0 - 1.3 10e9/L    Absolute Eosinophils 0.0 0.0 - 0.7 10e9/L    Absolute Basophils 0.0 0.0 - 0.2 10e9/L    Abs Immature Granulocytes 0.1 0 - 0.4 10e9/L    Absolute Nucleated RBC 0.0    Comprehensive metabolic panel    Collection Time: 12/31/19 11:05 AM   Result Value Ref Range    Sodium 133 133 - 144 mmol/L    Potassium 3.6 3.4 - 5.3 mmol/L    Chloride 96 94 - 109 mmol/L    Carbon Dioxide 29 20 - 32 mmol/L    Anion Gap 8 3 - 14 mmol/L    Glucose 120 (H) 70 - 99 mg/dL    Urea Nitrogen 8 7 - 30 mg/dL    Creatinine 0.47 (L) 0.52 - 1.04 mg/dL    GFR Estimate >90 >60 mL/min/[1.73_m2]    GFR Estimate If Black >90 >60 mL/min/[1.73_m2]    Calcium 9.6 8.5 - 10.1 mg/dL    Bilirubin Total 0.6 0.2 - 1.3 mg/dL    Albumin 2.6 (L) 3.4 - 5.0 g/dL    Protein Total 8.9 (H) 6.8 - 8.8 g/dL    Alkaline Phosphatase 637 (H) 40 - 150 U/L    ALT 74 (H) 0 - 50 U/L     (H) 0 - 45 U/L   CA 27.29 Breast tumor marker    Collection Time: 12/31/19 11:05 AM   Result Value Ref Range    CA 27-29 658 (H) 0 - 39 U/mL   CEA    Collection Time: 12/31/19 11:05 AM   Result Value Ref Range    .4 (H) 0 - 2.5 ug/L     [facility] Main Line: [phone]    Call triage nurse with chills and/or temperature greater than or equal to 100.4, uncontrolled nausea/vomiting, diarrhea, constipation, dizziness, shortness of breath, chest pain, bleeding, unexplained bruising, or any new/concerning symptoms, questions/concerns.   If you are having any concerning symptoms or wish to speak to a provider before your next infusion visit, please call your care coordinator or triage to notify them so we can adequately serve you.   Nurse Triage line:  [phone]    If after hours, weekends, or holidays, call main hospital  and ask for Oncology doctor on call @ [phone]      December 2019 Sunday Monday Tuesday Wednesday Thursday Friday Saturday   1     2    BRAIN TUMOR  CONFERENCE   1:15 PM   (5 min.)   BRAIN TUMOR CONFERENCE   [facility] [place] Virtual Scheduling 3    [facility] MASONIC LAB DRAW  10:45 AM   (15 min.)    [facility] LAB DRAW   [facility]    UMP RETURN  11:15 AM   (50 min.)   Lima Griffiths PA-C M [facility] ONC INFUSION 120  12:30 PM   (120 min.)    ONCOLOGY INFUSION   [facility] 4     5     6     7       8     9    [facility] MASONIC LAB DRAW   8:45 AM   (15 min.)    MASONIC LAB DRAW   [facility] Lab Draw    [facility] LUMBAR PUNCTURE   9:15 AM   (50 min.)   Rafia Voss PA-C M [facility] 10     11    UMP CONSULT   8:30 AM   (90 min.)   Don Monroy MD   Radiation Oncology Clinic    [facility] TCT/SIM SUITE  10:00 AM   (60 min.)   Don Monroy MD   Radiation Oncology Clinic 12    [facility] EXTERNAL RADIATION TREATMT  12:00 PM   (15 min.)   [facility] RAD ONC [facility]   Radiation Oncology Clinic 13    NM INJ   9:00 AM   (15 min.)   UUNMINJ1   [facility], Nuclear Medicine    CT CHEST/ABDOMEN/PELVIS W   9:20 AM   (20 min.)   UUCT1   [facility], CT    NM BONE SCAN WHOLE BODY  12:00 PM   (60 min.)   UUNM3   [facility], Nuclear Medicine    [facility] MASONIC LAB DRAW   1:45 PM   (15 min.)    MASONIC LAB DRAW   [facility] Lab Draw    P RETURN   1:55 PM   (50 min.)   Lima Griffiths PA-C M [facility] EXTERNAL RADIATION TREATMT   3:30 PM   (15 min.)   P RAD ONC [facility]   Radiation Oncology Clinic 14       15     16    UMP EXTERNAL RADIATION TREATMT  10:15 AM   (15 min.)   P RAD ONC VARIAN   Radiation Oncology [facility] ON TREATMENT VISIT  11:00 AM   (15 min.)   Don Monroy MD   Radiation Oncology Clinic 17    UMP EXTERNAL RADIATION TREATMT  10:45 AM   (15 min.)   P RAD ONC [facility]   Radiation Oncology [facility] 18    UMP EXTERNAL RADIATION TREATMT  10:45 AM   (15 min.)   [facility] RAD ONC [facility]   Radiation Oncology [facility]  19    UMP EXTERNAL RADIATION TREATMT  10:45 AM   (15 min.)   UMP RAD ONC [facility]   Radiation Oncology Clinic 20    UMP EXTERNAL RADIATION TREATMT  10:45 AM   (15 min.)   UMP RAD ONC [facility]   Radiation Oncology Clinic 21    UMP EXTERNAL RADIATION TREATMT  10:30 AM   (15 min.)   UMP RAD ONC [facility]   Radiation Oncology Clinic   22     23    UMP EXTERNAL RADIATION TREATMT  10:45 AM   (15 min.)   UMP RAD ONC [facility]   Radiation Oncology Clinic    UMP ON TREATMENT VISIT  11:00 AM   (15 min.)   Don Monroy MD   [facility]cology [facility] 24    UMP EXTERNAL RADIATION TREATMT  10:45 AM   (15 min.)   UMP RAD ONC [facility]   Radiation Oncology Clinic 25     26     27     28       29     30     31    XR CHEST 2 VIEWS   9:40 AM   (15 min.)   UCXR1   [facility] Xray    [facility] MASONIC LAB DRAW  10:45 AM   (15 min.)    MASONIC LAB DRAW   [facility] Lab Draw    UMP RETURN  11:15 AM   (50 min.)   Lima Griffiths PA-C   [facility]  12:30 PM   (120 min.)    ONCOLOGY INFUSION   [facility]                                 January 2020 Sunday Monday Tuesday Wednesday Thursday Friday Saturday                  1     2    IR THORACENTESIS  12:30 PM   (60 min.)   [id number]   [facility] Imaging    THORACENTESIS   1:30 PM   Boyd Nguyen PA-C   UC OR    Outpatient Visit   1:30 PM   [facility] 3     4       5     6     7    UMP MASONIC LAB DRAW   1:00 PM   (15 min.)   [facility] LAB DRAW   [facility] Lab Draw    P ONC INFUSION 60   1:30 PM   (60 min.)    ONCOLOGY INFUSION   [facility] 8     9     10     11       12     13     14    UMP MASONIC LAB DRAW   8:00 AM   (15 min.)   [facility] LAB DRAW   [facility] Lab Draw    UMP RETURN   8:15 AM   (50 min.)   Nora Murphy PA-C   [facility]    UMP ONC INFUSION 60  10:00 AM   (60 min.)    ONCOLOGY INFUSION     [facility] 15     16     17     18       19     20    [facility]   8:45 AM   (15 min.)   [facility] LAB DRAW   [facility] Lab Draw    [facility] RETURN   9:15 AM   (30 min.)   Ana Whitman MD   [facility] 21     22     23     24     25       26     27    [facility] ONC INFUSION 120  10:30 AM   (120 min.)    ONCOLOGY INFUSION   [facility] 28 29     30     31                          Lab Results:  Recent Results (from the past 12 hour(s))   CBC with platelets differential    Collection Time: 12/31/19 11:05 AM   Result Value Ref Range    WBC 4.5 4.0 - 11.0 10e9/L    RBC Count 3.93 3.8 - 5.2 10e12/L    Hemoglobin 11.2 (L) 11.7 - 15.7 g/dL    Hematocrit 35.7 35.0 - 47.0 %    MCV 91 78 - 100 fl    MCH 28.5 26.5 - 33.0 pg    MCHC 31.4 (L) 31.5 - 36.5 g/dL    RDW 16.8 (H) 10.0 - 15.0 %    Platelet Count 425 150 - 450 10e9/L    Diff Method Automated Method     % Neutrophils 74.6 %    % Lymphocytes 12.8 %    % Monocytes 9.7 %    % Eosinophils 0.4 %    % Basophils 0.7 %    % Immature Granulocytes 1.8 %    Nucleated RBCs 0 0 /100    Absolute Neutrophil 3.3 1.6 - 8.3 10e9/L    Absolute Lymphocytes 0.6 (L) 0.8 - 5.3 10e9/L    Absolute Monocytes 0.4 0.0 - 1.3 10e9/L    Absolute Eosinophils 0.0 0.0 - 0.7 10e9/L    Absolute Basophils 0.0 0.0 - 0.2 10e9/L    Abs Immature Granulocytes 0.1 0 - 0.4 10e9/L    Absolute Nucleated RBC 0.0    Comprehensive metabolic panel    Collection Time: 12/31/19 11:05 AM   Result Value Ref Range    Sodium 133 133 - 144 mmol/L    Potassium 3.6 3.4 - 5.3 mmol/L    Chloride 96 94 - 109 mmol/L    Carbon Dioxide 29 20 - 32 mmol/L    Anion Gap 8 3 - 14 mmol/L    Glucose 120 (H) 70 - 99 mg/dL    Urea Nitrogen 8 7 - 30 mg/dL    Creatinine 0.47 (L) 0.52 - 1.04 mg/dL    GFR Estimate >90 >60 mL/min/[1.73_m2]    GFR Estimate If Black >90 >60 mL/min/[1.73_m2]    Calcium 9.6 8.5 - 10.1 mg/dL    Bilirubin Total 0.6 0.2 - 1.3 mg/dL    Albumin 2.6  (L) 3.4 - 5.0 g/dL    Protein Total 8.9 (H) 6.8 - 8.8 g/dL    Alkaline Phosphatase 637 (H) 40 - 150 U/L    ALT 74 (H) 0 - 50 U/L     (H) 0 - 45 U/L   CA 27.29 Breast tumor marker    Collection Time: 12/31/19 11:05 AM   Result Value Ref Range    CA 27-29 658 (H) 0 - 39 U/mL   CEA    Collection Time: 12/31/19 11:05 AM   Result Value Ref Range    .4 (H) 0 - 2.5 ug/L        - DM; per previous RD notes, A1C hx as follows: 11.7% (11/1/22), 12.4% (10/31/22); noted hx of disinterest in nutrition education in past RD notes (3/3/23).  - DM; per previous RD notes, A1C hx as follows: 11.7% (3/1/23), 12.4% (10/30/22); noted hx of disinterest in nutrition education in past RD notes (3/3/23).

## 2024-05-18 NOTE — MR AVS SNAPSHOT
After Visit Summary   5/16/2017    Shan Marsh    MRN: [id number]           Patient Information     Date Of Birth          1970        Visit Information        Provider Department      5/16/2017 9:08 AM Han, Soo Yeon, MSW [facility]        Today's Diagnoses     Visit for counseling    -  1       Follow-ups after your visit        Your next 10 appointments already scheduled     May 17, 2017 11:30 AM CDT   NM INJECTION with UUNMINJ2   [facility] [place], Nuclear Medicine ([facility])    [address] [place] [address] [id number] [phone]            May 17, 2017  2:30 PM CDT   NM BONE SCAN WHOLE BODY with UUNM3   [facility], Nuclear Medicine ([facility])    [address] [place] [address] [phone]           Please bring a list of your medicines to the exam. (Include vitamins, minerals and over-the-counter drugs.) You should wear comfortable clothes. Leave your valuables at home. Please bring related prior results and films. Tell your doctor:   If you are breastfeeding or may be pregnant.   If you have had a barium test within the past few days. Barium may change the results of certain exams.   If you think you may need sedation (medicine to help you relax).  You may eat and drink as normal.  Drink plenty of fluids and empty bladder frequently between injection and scans.            May 24, 2017  8:45 AM CDT   [facility] Lab Draw with  MASONIC LAB DRAW   [facility] Lab Draw ([facility])    [address] [place] [address]  2nd [facility] [id number] [phone]            May 24, 2017  9:30 AM CDT   (Arrive by 9:15 AM)   Return Visit with GRAY Harman   [facility] ([facility])    [address] [facility]  2nd [facility] [id number] [phone]             No care due was identified.  [facility] Embedded Care Due Messages. Reference number: [id number].   5/18/2024 5:59:02 AM CDT     Future tests that were ordered for you today     Open Future Orders        Priority Expected Expires Ordered    HCG qualitative Routine 5/24/2017 5/16/2018 5/16/2017    NM Bone whole body Routine  8/14/2017 5/16/2017    Echocardiogram Complete Routine  5/16/2018 5/16/2017            Who to contact     If you have questions or need follow up information about today's clinic visit or your schedule please contact [facility] CANCER [facility] directly at [phone].  Normal or non-critical lab and imaging results will be communicated to you by [employer], letter or phone within 4 business days after the clinic has received the results. If you do not hear from us within 7 days, please contact the clinic through [employer] or phone. If you have a critical or abnormal lab result, we will notify you by phone as soon as possible.  Submit refill requests through [employer] or call your pharmacy and they will forward the refill request to us. Please allow 3 business days for your refill to be completed.          Additional Information About Your Visit        [employer] Information     [employer] gives you secure access to your electronic health record. If you see a primary care provider, you can also send messages to your care team and make appointments. If you have questions, please call your primary care clinic.  If you do not have a primary care provider, please call [phone] and they will assist you.        Care EveryWhere ID     This is your Care EveryWhere ID. This could be used by other organizations to access your [facility] medical records  [id number]        Your Vitals Were     Last Period                   04/24/2017            Blood Pressure from Last 3 Encounters:   05/16/17 113/75   05/12/17 114/76   05/10/17 113/70    Weight from Last 3 Encounters:   05/16/17 54.3 kg (119 lb 11.4 oz)   05/10/17 56 kg (123 lb 6.4 oz)   05/02/17 58.2 kg (128 lb 3.2 oz)              Today, you had the following     No orders found for  display         Today's Medication Changes          These changes are accurate as of: 5/16/17 11:59 PM.  If you have any questions, ask your nurse or doctor.               Stop taking these medicines if you haven't already. Please contact your care team if you have questions.     doxycycline Monohydrate 100 MG Caps   Stopped by:  Ana Whitman MD           mupirocin 2 % ointment   Commonly known as:  BACTROBAN   Stopped by:  Ana Whitman MD                    Primary Care Provider Office Phone # Fax #    Marilu PAULA Michelle [phone]       [facility] [address] [facility] [address]        Thank you!     Thank you for choosing [facility]  for your care. Our goal is always to provide you with excellent care. Hearing back from our patients is one way we can continue to improve our services. Please take a few minutes to complete the written survey that you may receive in the mail after your visit with us. Thank you!             Your Updated Medication List - Protect others around you: Learn how to safely use, store and throw away your medicines at www.disposemymeds.org.          This list is accurate as of: 5/16/17 11:59 PM.  Always use your most recent med list.                   Brand Name Dispense Instructions for use    FLINTSTONES MULTIVITAMIN Chew      1 tablet daily       methylPREDNISolone 4 MG tablet    MEDROL DOSEPAK    21 tablet    Follow package instructions

## 2024-09-27 NOTE — NURSING NOTE
"Oncology Rooming Note    October 17, 2017 5:00 PM   Shan Marsh is a 47 year old female who presents for:    Chief Complaint   Patient presents with     Blood Draw     Labs Drawn      Oncology Clinic Visit     Breast Ca- F/U     Initial Vitals: /76  Pulse 70  Temp 98.8  F (37.1  C) (Oral)  Resp 15  Ht 1.646 m (5' 4.8\")  Wt 54.1 kg (119 lb 3.2 oz)  SpO2 96%  BMI 19.96 kg/m2 Estimated body mass index is 19.96 kg/(m^2) as calculated from the following:    Height as of this encounter: 1.646 m (5' 4.8\").    Weight as of this encounter: 54.1 kg (119 lb 3.2 oz). Body surface area is 1.57 meters squared.  No Pain (0) Comment: Data Unavailable   No LMP recorded. Patient is not currently having periods (Reason: UNKNOWN).  Allergies reviewed: Yes  Medications reviewed: Yes    Medications: Medication refills not needed today.  Pharmacy name entered into [employer]:    [facility] [place], [place] - [address] [place] [address] DRUG STORE [address] [place], [place] - [address] AT [facility] [address]  [facility] [place], [place] - [address] [facility] DRUG STORE [id number] - [place], [place] - [address] [facility] [address]    Clinical concerns: no clinical concerns  provider was notified.    7 minutes for nursing intake (face to face time)     Elyssa Pike CMA              "
Chief Complaint   Patient presents with     Blood Draw     Labs Drawn      Labs drawn peripherally. Urine sample sent to main floor lab.     Lauren Schoen, RN    
No

## (undated) DEVICE — LINEN TOWEL PACK X5 5464

## (undated) DEVICE — DRSG TEGADERM 2 3/8X2 3/4" 1624W

## (undated) DEVICE — STOPCOCK ANGIO 1-WAY MARQUIS MLL  H1RC

## (undated) DEVICE — PREP CHLORAPREP W/ORANGE TINT 10.5ML 260715

## (undated) DEVICE — CONNECTOR STOPCOCK 3 WAY MALE LL MX4311L

## (undated) DEVICE — TUBING MEDRAD 48" HIGH PRESSURE MX694

## (undated) DEVICE — Device

## (undated) DEVICE — CONNECTOR MALE TO MALE LL

## (undated) DEVICE — GLOVE PROTEXIS POWDER FREE SMT 7.5  2D72PT75X

## (undated) DEVICE — KNIFE HANDLE W/15 BLADE 371615

## (undated) DEVICE — COVER ULTRASOUND PROBE W/GEL FLEXI-FEEL 6"X58" LF  25-FF658

## (undated) DEVICE — GOWN XLG DISP 9545

## (undated) DEVICE — NDL YUEH CENTESIS 5FRX10CM G09490 DTVN-5.0-19-10.0-YUEH

## (undated) DEVICE — GLOVE PROTEXIS POWDER FREE SMT 8.0  2D72PT80X

## (undated) DEVICE — GLOVE PROTEXIS POWDER FREE SMT 7.0  2D72PT70X

## (undated) DEVICE — NDL 15GA 1.5" 8881200029

## (undated) DEVICE — SYR 50ML LL W/O NDL 309653

## (undated) DEVICE — GLOVE BIOGEL 8 LATEX

## (undated) DEVICE — LINEN GOWN XLG 5407

## (undated) DEVICE — DRSG PRIMAPORE 02X3" 7133

## (undated) DEVICE — CONTAINER EVACUATOR GLASS VACUTAINER 1000ML 1A8504

## (undated) DEVICE — GLOVE PROTEXIS POWDER FREE SMT 6.5  2D72PT65X

## (undated) RX ORDER — SIMETHICONE 40MG/0.6ML
SUSPENSION, DROPS(FINAL DOSAGE FORM)(ML) ORAL
Status: DISPENSED
Start: 2018-10-03

## (undated) RX ORDER — LIDOCAINE HYDROCHLORIDE 10 MG/ML
INJECTION, SOLUTION EPIDURAL; INFILTRATION; INTRACAUDAL; PERINEURAL
Status: DISPENSED
Start: 2017-05-12

## (undated) RX ORDER — ONDANSETRON 4 MG/1
TABLET, ORALLY DISINTEGRATING ORAL
Status: DISPENSED
Start: 2020-01-01

## (undated) RX ORDER — DIPHENHYDRAMINE HYDROCHLORIDE 50 MG/ML
INJECTION INTRAMUSCULAR; INTRAVENOUS
Status: DISPENSED
Start: 2017-05-12

## (undated) RX ORDER — OXYMETAZOLINE HYDROCHLORIDE 0.05 G/100ML
SPRAY NASAL
Status: DISPENSED
Start: 2019-01-01

## (undated) RX ORDER — FENTANYL CITRATE 50 UG/ML
INJECTION, SOLUTION INTRAMUSCULAR; INTRAVENOUS
Status: DISPENSED
Start: 2018-10-09

## (undated) RX ORDER — LIDOCAINE HYDROCHLORIDE 40 MG/ML
SOLUTION TOPICAL
Status: DISPENSED
Start: 2017-05-12

## (undated) RX ORDER — FENTANYL CITRATE 50 UG/ML
INJECTION, SOLUTION INTRAMUSCULAR; INTRAVENOUS
Status: DISPENSED
Start: 2018-11-12

## (undated) RX ORDER — ONDANSETRON 2 MG/ML
INJECTION INTRAMUSCULAR; INTRAVENOUS
Status: DISPENSED
Start: 2020-01-01

## (undated) RX ORDER — HEPARIN SOD,PORCINE/0.9 % NACL 5K/1000 ML
INTRAVENOUS SOLUTION INTRAVENOUS
Status: DISPENSED
Start: 2019-01-01

## (undated) RX ORDER — FENTANYL CITRATE 50 UG/ML
INJECTION, SOLUTION INTRAMUSCULAR; INTRAVENOUS
Status: DISPENSED
Start: 2018-10-11

## (undated) RX ORDER — FENTANYL CITRATE 50 UG/ML
INJECTION, SOLUTION INTRAMUSCULAR; INTRAVENOUS
Status: DISPENSED
Start: 2017-05-12

## (undated) RX ORDER — HEPARIN SODIUM (PORCINE) LOCK FLUSH IV SOLN 100 UNIT/ML 100 UNIT/ML
SOLUTION INTRAVENOUS
Status: DISPENSED
Start: 2018-10-03

## (undated) RX ORDER — LIDOCAINE HYDROCHLORIDE 10 MG/ML
INJECTION, SOLUTION INFILTRATION; PERINEURAL
Status: DISPENSED
Start: 2018-11-12

## (undated) RX ORDER — HEPARIN SODIUM,PORCINE 10 UNIT/ML
VIAL (ML) INTRAVENOUS
Status: DISPENSED
Start: 2018-10-03

## (undated) RX ORDER — SIMETHICONE 40MG/0.6ML
SUSPENSION, DROPS(FINAL DOSAGE FORM)(ML) ORAL
Status: DISPENSED
Start: 2018-10-04

## (undated) RX ORDER — SIMETHICONE 40MG/0.6ML
SUSPENSION, DROPS(FINAL DOSAGE FORM)(ML) ORAL
Status: DISPENSED
Start: 2019-01-01

## (undated) RX ORDER — LIDOCAINE HYDROCHLORIDE 10 MG/ML
INJECTION, SOLUTION EPIDURAL; INFILTRATION; INTRACAUDAL; PERINEURAL
Status: DISPENSED
Start: 2018-04-02

## (undated) RX ORDER — ONDANSETRON 2 MG/ML
INJECTION INTRAMUSCULAR; INTRAVENOUS
Status: DISPENSED
Start: 2017-05-12

## (undated) RX ORDER — BUPIVACAINE HYDROCHLORIDE 5 MG/ML
INJECTION, SOLUTION EPIDURAL; INTRACAUDAL
Status: DISPENSED
Start: 2018-07-06

## (undated) RX ORDER — FENTANYL CITRATE 50 UG/ML
INJECTION, SOLUTION INTRAMUSCULAR; INTRAVENOUS
Status: DISPENSED
Start: 2018-10-07